# Patient Record
Sex: FEMALE | Race: WHITE | NOT HISPANIC OR LATINO | Employment: OTHER | URBAN - METROPOLITAN AREA
[De-identification: names, ages, dates, MRNs, and addresses within clinical notes are randomized per-mention and may not be internally consistent; named-entity substitution may affect disease eponyms.]

---

## 2017-01-12 ENCOUNTER — TRANSFERRED RECORDS (OUTPATIENT)
Dept: HEALTH INFORMATION MANAGEMENT | Facility: CLINIC | Age: 72
End: 2017-01-12

## 2017-01-12 LAB
ALT SERPL-CCNC: 16 U/L (ref 5–35)
AST SERPL-CCNC: 21 U/L (ref 5–34)
CREAT SERPL-MCNC: 0.62 MG/DL (ref 0.5–1.3)
GFR SERPL CREATININE-BSD FRML MDRD: 100.9 ML/MIN/1.73M2

## 2017-02-28 ENCOUNTER — OFFICE VISIT (OUTPATIENT)
Dept: FAMILY MEDICINE | Facility: CLINIC | Age: 72
End: 2017-02-28
Payer: COMMERCIAL

## 2017-02-28 VITALS
WEIGHT: 116 LBS | DIASTOLIC BLOOD PRESSURE: 67 MMHG | BODY MASS INDEX: 19.81 KG/M2 | OXYGEN SATURATION: 97 % | HEART RATE: 58 BPM | SYSTOLIC BLOOD PRESSURE: 100 MMHG | TEMPERATURE: 98.1 F | HEIGHT: 64 IN

## 2017-02-28 DIAGNOSIS — Z01.818 PREOP GENERAL PHYSICAL EXAM: Primary | ICD-10-CM

## 2017-02-28 DIAGNOSIS — I10 ESSENTIAL HYPERTENSION WITH GOAL BLOOD PRESSURE LESS THAN 140/90: ICD-10-CM

## 2017-02-28 DIAGNOSIS — Z23 NEED FOR VACCINATION WITH 13-POLYVALENT PNEUMOCOCCAL CONJUGATE VACCINE: ICD-10-CM

## 2017-02-28 DIAGNOSIS — Z23 NEED FOR PNEUMOCOCCAL VACCINE: ICD-10-CM

## 2017-02-28 LAB
ANION GAP SERPL CALCULATED.3IONS-SCNC: 10 MMOL/L (ref 3–14)
BASOPHILS # BLD AUTO: 0 10E9/L (ref 0–0.2)
BASOPHILS NFR BLD AUTO: 0.3 %
BUN SERPL-MCNC: 14 MG/DL (ref 7–30)
CALCIUM SERPL-MCNC: 9.6 MG/DL (ref 8.5–10.1)
CHLORIDE SERPL-SCNC: 103 MMOL/L (ref 94–109)
CO2 SERPL-SCNC: 23 MMOL/L (ref 20–32)
CREAT SERPL-MCNC: 0.7 MG/DL (ref 0.52–1.04)
DIFFERENTIAL METHOD BLD: ABNORMAL
EOSINOPHIL # BLD AUTO: 0.1 10E9/L (ref 0–0.7)
EOSINOPHIL NFR BLD AUTO: 0.8 %
ERYTHROCYTE [DISTWIDTH] IN BLOOD BY AUTOMATED COUNT: 15.8 % (ref 10–15)
GFR SERPL CREATININE-BSD FRML MDRD: 83 ML/MIN/1.7M2
GLUCOSE SERPL-MCNC: 101 MG/DL (ref 70–99)
HCT VFR BLD AUTO: 35.4 % (ref 35–47)
HGB BLD-MCNC: 11.5 G/DL (ref 11.7–15.7)
LYMPHOCYTES # BLD AUTO: 0.7 10E9/L (ref 0.8–5.3)
LYMPHOCYTES NFR BLD AUTO: 6.1 %
MCH RBC QN AUTO: 29 PG (ref 26.5–33)
MCHC RBC AUTO-ENTMCNC: 32.5 G/DL (ref 31.5–36.5)
MCV RBC AUTO: 89 FL (ref 78–100)
MONOCYTES # BLD AUTO: 0.6 10E9/L (ref 0–1.3)
MONOCYTES NFR BLD AUTO: 5.3 %
NEUTROPHILS # BLD AUTO: 9.4 10E9/L (ref 1.6–8.3)
NEUTROPHILS NFR BLD AUTO: 87.5 %
PLATELET # BLD AUTO: 308 10E9/L (ref 150–450)
POTASSIUM SERPL-SCNC: 4.5 MMOL/L (ref 3.4–5.3)
RBC # BLD AUTO: 3.96 10E12/L (ref 3.8–5.2)
SODIUM SERPL-SCNC: 136 MMOL/L (ref 133–144)
WBC # BLD AUTO: 10.7 10E9/L (ref 4–11)

## 2017-02-28 PROCEDURE — 90670 PCV13 VACCINE IM: CPT | Performed by: FAMILY MEDICINE

## 2017-02-28 PROCEDURE — 99214 OFFICE O/P EST MOD 30 MIN: CPT | Mod: 25 | Performed by: FAMILY MEDICINE

## 2017-02-28 PROCEDURE — 80048 BASIC METABOLIC PNL TOTAL CA: CPT | Performed by: FAMILY MEDICINE

## 2017-02-28 PROCEDURE — 36415 COLL VENOUS BLD VENIPUNCTURE: CPT | Performed by: FAMILY MEDICINE

## 2017-02-28 PROCEDURE — 85025 COMPLETE CBC W/AUTO DIFF WBC: CPT | Performed by: FAMILY MEDICINE

## 2017-02-28 PROCEDURE — G0009 ADMIN PNEUMOCOCCAL VACCINE: HCPCS | Performed by: FAMILY MEDICINE

## 2017-02-28 ASSESSMENT — PAIN SCALES - GENERAL: PAINLEVEL: NO PAIN (0)

## 2017-02-28 NOTE — MR AVS SNAPSHOT
After Visit Summary   2/28/2017    Sakshi Jaeger    MRN: [id number]           Patient Information     Date Of Birth          1945        Visit Information        Provider Department      2/28/2017 2:00 PM Javan Pepe MD [facility]        Today's Diagnoses     Preop general physical exam    -  1    Essential hypertension with goal blood pressure less than 140/90          Care Instructions      Before Your Surgery      Call your surgeon if there is any change in your health. This includes signs of a cold or flu (such as a sore throat, runny nose, cough, rash or fever).    Do not smoke, drink alcohol or take over the counter medicine (unless your surgeon or primary care doctor tells you to) for the 24 hours before and after surgery.    If you take prescribed drugs: Follow your doctor s orders about which medicines to take and which to stop until after surgery.    Eating and drinking prior to surgery: follow the instructions from your surgeon    Take a shower or bath the night before surgery. Use the soap your surgeon gave you to gently clean your skin. If you do not have soap from your surgeon, use your regular soap. Do not shave or scrub the surgery site.  Wear clean pajamas and have clean sheets on your bed.         Follow-ups after your visit        Who to contact     If you have questions or need follow up information about today's clinic visit or your schedule please contact [facility] directly at [phone].  Normal or non-critical lab and imaging results will be communicated to you by MyChart, letter or phone within 4 business days after the clinic has received the results. If you do not hear from us within 7 days, please contact the clinic through MyChart or phone. If you have a critical or abnormal lab result, we will notify you by phone as soon as possible.  Submit refill requests through [employer] or call your pharmacy and they will  "forward the refill request to us. Please allow 3 business days for your refill to be completed.          Additional Information About Your Visit        MyChart Information     [employer] lets you send messages to your doctor, view your test results, renew your prescriptions, schedule appointments and more. To sign up, go to [email or web address] . Click on \"Log in\" on the left side of the screen, which will take you to the Welcome page. Then click on \"Sign up Now\" on the right side of the page.     You will be asked to enter the access code listed below, as well as some personal information. Please follow the directions to create your username and password.     Your access code is: [email or web address]  Expires: 2017  3:27 PM     Your access code will  in 90 days. If you need help or a new code, please call your [place] clinic or [phone].        Care EveryWhere ID     This is your [facility] EveryWhere ID. This could be used by other organizations to access your [place] medical records  [id number]        Your Vitals Were     Pulse Temperature Height Pulse Oximetry Breastfeeding? BMI (Body Mass Index)    58 98.1  F (36.7  C) (Oral) 1.626 m (5' 4\") 97% No 19.91 kg/m2       Blood Pressure from Last 3 Encounters:   17 100/67   16 119/64   16 120/68    Weight from Last 3 Encounters:   17 52.6 kg (116 lb)   16 49 kg (108 lb)   16 49 kg (108 lb)              We Performed the Following     Basic metabolic panel     CBC with platelets differential        Primary Care Provider Office Phone # Fax #    Javan Pepe [phone]       [facility] [address]  [facility] [address]        Thank you!     Thank you for choosing [facility]  for your care. Our goal is always to provide you with excellent care. Hearing back from our patients is one way we can continue to improve our services. Please take a few minutes to " complete the written survey that you may receive in the mail after your visit with us. Thank you!             Your Updated Medication List - Protect others around you: Learn how to safely use, store and throw away your medicines at www.disposemymeds.org.          This list is accurate as of: 2/28/17  3:27 PM.  Always use your most recent med list.                   Brand Name Dispense Instructions for use    amLODIPine 5 MG tablet    NORVASC    90 tablet    Take 1 tablet (5 mg) by mouth daily       aspirin 81 MG tablet      Take 2 tablets by mouth daily.       CALCIUM 600 + D PO      2 daily       FLINTSTONES PLUS IRON PO      Take 1 tablet by mouth daily.       leflunomide 20 MG tablet    ARAVA     Take 20 mg by mouth daily.       lisinopril 20 MG tablet    PRINIVIL/ZESTRIL    90 tablet    Take 1 tablet (20 mg) by mouth daily       methotrexate 2.5 MG tablet CHEMO     8 tablet    Take 8 tablets by mouth once a week.       metoprolol 50 MG tablet    LOPRESSOR    180 tablet    Take 1 tablet (50 mg) by mouth 2 times daily       niacin 500 MG tablet     100 tablet    Take 1 tablet by mouth At Bedtime.       nitroglycerin 0.4 MG sublingual tablet    NITROSTAT     0.4 mg by Sublingual route every 5 (five) minutes as needed.       predniSONE 5 MG tablet    DELTASONE     1 TABLET DAILY       simvastatin 20 MG tablet    ZOCOR    90 tablet    Take 1 tablet (20 mg) by mouth At Bedtime APPT NEEDED FOR FURTHER REFILLS

## 2017-02-28 NOTE — Clinical Note
Dr. Jackson. She requires evaluation and anesthesia risk assessment prior to undergoing surgery/procedure for treatment of Right Knee Skin Graft . Proposed procedure: Right Knee Skin Graft   Date of Surgery/ Procedure: TBD Time of Surgery/ Procedure: TBD Hospital/Surgical Facility: [facility]

## 2017-02-28 NOTE — NURSING NOTE
"Chief Complaint   Patient presents with     Pre-Op Exam     [facility] - Skin Graft right knee       Initial /67  Pulse 58  Temp 98.1  F (36.7  C) (Oral)  Ht 5' 4\" (1.626 m)  Wt 116 lb (52.6 kg)  SpO2 97%  Breastfeeding? No  BMI 19.91 kg/m2 Estimated body mass index is 19.91 kg/(m^2) as calculated from the following:    Height as of this encounter: 5' 4\" (1.626 m).    Weight as of this encounter: 116 lb (52.6 kg).  Medication Reconciliation: complete   Ezra RODRÍGUEZ      "

## 2017-02-28 NOTE — LETTER
[facility]   [address] [place]  [address]  [phone]                                   March 8, 2017    Sakshi Jaeger  [address] [facility] [address]        Dear Sakshi,    Your metabolic profile including your kidney tests were essentially normal. Your hemoglobin appears slightly low, but is pretty stable.     Results for orders placed or performed in visit on 02/28/17   CBC with platelets differential   Result Value Ref Range    WBC 10.7 4.0 - 11.0 10e9/L    RBC Count 3.96 3.8 - 5.2 10e12/L    Hemoglobin 11.5 (L) 11.7 - 15.7 g/dL    Hematocrit 35.4 35.0 - 47.0 %    MCV 89 78 - 100 fl    MCH 29.0 26.5 - 33.0 pg    MCHC 32.5 31.5 - 36.5 g/dL    RDW 15.8 (H) 10.0 - 15.0 %    Platelet Count 308 150 - 450 10e9/L    Diff Method Automated Method     % Neutrophils 87.5 %    % Lymphocytes 6.1 %    % Monocytes 5.3 %    % Eosinophils 0.8 %    % Basophils 0.3 %    Absolute Neutrophil 9.4 (H) 1.6 - 8.3 10e9/L    Absolute Lymphocytes 0.7 (L) 0.8 - 5.3 10e9/L    Absolute Monocytes 0.6 0.0 - 1.3 10e9/L    Absolute Eosinophils 0.1 0.0 - 0.7 10e9/L    Absolute Basophils 0.0 0.0 - 0.2 10e9/L   Basic metabolic panel   Result Value Ref Range    Sodium 136 133 - 144 mmol/L    Potassium 4.5 3.4 - 5.3 mmol/L    Chloride 103 94 - 109 mmol/L    Carbon Dioxide 23 20 - 32 mmol/L    Anion Gap 10 3 - 14 mmol/L    Glucose 101 (H) 70 - 99 mg/dL    Urea Nitrogen 14 7 - 30 mg/dL    Creatinine 0.70 0.52 - 1.04 mg/dL    GFR Estimate 83 >60 mL/min/1.7m2    GFR Estimate If Black >90   GFR Calc   >60 mL/min/1.7m2    Calcium 9.6 8.5 - 10.1 mg/dL       If you have any questions please call the clinic at [phone]    Sincerely,    Javan Pepe MD  bmd

## 2017-02-28 NOTE — PROGRESS NOTES
[address] [address]  [phone]  Dept: [phone]    PRE-OP EVALUATION:  Today's date: 2017    Sakshi Jaeger (: 1945) presents for pre-operative evaluation assessment as requested by Dr. Jackson.  She requires evaluation and anesthesia risk assessment prior to undergoing surgery/procedure for treatment of Right Knee Skin Graft .  Proposed procedure: Right Knee Skin Graft    Date of Surgery/ Procedure: TBD  Time of Surgery/ Procedure: TBD  Hospital/Surgical Facility: [facility]  Fax number for surgical facility:   Primary Physician: Javan Pepe  Type of Anesthesia Anticipated: to be determined    Patient has a Health Care Directive or Living Will:  NO    1. YES - DO YOU HAVE A HISTORY OF HEART ATTACK, STROKE, STENT, BYPASS OR SURGERY ON AN ARTERY IN THE HEAD, NECK, HEART OR LEG? Years ago; no symptoms currently.   2. NO - Do you ever have any pain or discomfort in your chest?  3. NO - Do you have a history of  Heart Failure?  4. NO - Are you troubled by shortness of breath when: walking on the level, up a slight hill or at night?  5. NO - Do you currently have a cold, bronchitis or other respiratory infection?  6. NO - Do you have a cough, shortness of breath or wheezing?  7. NO - Do you sometimes get pains in the calves of your legs when you walk?  8. NO - Do you or anyone in your family have previous history of blood clots?  9. NO - Do you or does anyone in your family have a serious bleeding problem such as prolonged bleeding following surgeries or cuts?  10. NO - Have you ever had problems with anemia or been told to take iron pills?  11. NO - Have you had any abnormal blood loss such as black, tarry or bloody stools, or abnormal vaginal bleeding?  12. NO - Have you ever had a blood transfusion?  13. NO - Have you or any of your relatives ever had problems with anesthesia?  14. NO - Do you have sleep  apnea, excessive snoring or daytime drowsiness?  15. NO - Do you have any prosthetic heart valves?  16. YES - DO YOU HAVE PROSTHETIC JOINTS? Complete left knee   17. NO - Is there any chance that you may be pregnant?      HPI:                                                      Brief HPI related to upcoming procedure: patient needs a revision of her stump on her right knee   She is able to wear her prosthetic.  She still needs to be closed.           MEDICAL HISTORY:                                                      Patient Active Problem List    Diagnosis Date Noted     Coronary artery disease involving native coronary artery of native heart without angina pectoris 09/27/2016     Priority: Medium     Essential hypertension with goal blood pressure less than 140/90 08/25/2016     Priority: Medium     Septic prepatellar bursitis of right knee 03/04/2014     Priority: Medium     Status post below knee amputation (H) 12/26/2012     Priority: Medium     Problem list name updated by automated process. Provider to review       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     Priority: Medium     Advanced directives, counseling/discussion 11/25/2011     Priority: Medium     Advance Care Planning 9/30/2016: ACP Review of Chart / Resources Provided:  Reviewed chart for advance care plan.  Sakshi Jaeger has no plan or code status on file. Discussed available resources and provided with information. Confirmed code status reflects current choices pending further ACP discussions.  Confirmed/documented legally designated decision makers.    Added by Mishel Wallace        Discussed advance care planning with patient; information given to patient to review. Jennifer Jaeger, Clinical Medical Assistant   11/25/2011          Hyperlipidemia LDL goal <100 11/12/2010     Priority: Medium     Iron deficiency anemia 09/15/2005     Priority: Medium     Problem list name updated by automated process. Provider to review       Tobacco use disorder  02/16/2005     Priority: Medium     Osteoporosis 02/16/2005     Priority: Medium     Problem list name updated by automated process. Provider to review       Rheumatoid arthritis (H) 02/16/2005     Priority: Medium     Problem list name updated by automated process. Provider to review        Past Medical History   Diagnosis Date     BENIGN HYPERTENSION 3/1/2005     CAD (coronary artery disease) 1/26/2011     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     Hypertension goal BP (blood pressure) < 130/80 1/26/2011     IRON DEFIC ANEMIA NOS 9/15/2005     Lower limb amputation, below knee 12/26/2012     MI (myocardial infarction) (H)      3/2010     OSTEOPOROSIS NOS 2/16/2005     Rheumatoid arthritis(714.0) (H) 2/16/2005     Past Surgical History   Procedure Laterality Date     Surgical history of -        Appendectomy     Amputation below knee rt/lt  2005     right lowwer leg.      Coronary stent       Appendectomy       Arthroplasty knee  4/27/2011     Procedure:ARTHROPLASTY KNEE; Surgeon:JESSE HAWKINS; Location:[facility]     ---------incision and drainage  3/5/14     Current Outpatient Prescriptions   Medication Sig Dispense Refill     simvastatin (ZOCOR) 20 MG tablet Take 1 tablet (20 mg) by mouth At Bedtime APPT NEEDED FOR FURTHER REFILLS 90 tablet 1     lisinopril (PRINIVIL,ZESTRIL) 20 MG tablet Take 1 tablet (20 mg) by mouth daily 90 tablet 3     amLODIPine (NORVASC) 5 MG tablet Take 1 tablet (5 mg) by mouth daily 90 tablet 3     metoprolol (LOPRESSOR) 50 MG tablet Take 1 tablet (50 mg) by mouth 2 times daily 180 tablet 3     nitroglycerin (NITROSTAT) 0.4 MG SL tablet 0.4 mg by Sublingual route every 5 (five) minutes as needed.       niacin 500 MG tablet Take 1 tablet by mouth At Bedtime. 100 tablet 6     aspirin 81 MG tablet Take 2 tablets by mouth daily.  3     METHOTREXate 2.5 MG tablet Take 8 tablets by mouth once a week. 8 tablet 0     Pediatric Multivitamins-Iron (FLINTSTONES PLUS IRON PO) Take 1 tablet by mouth  daily.       leflunomide (ARAVA) 20 MG tablet Take 20 mg by mouth daily.       CALCIUM 600 + D OR 2 daily       PREDNISONE 5 MG OR TABS 1 TABLET DAILY  0     OTC products: Aspirin    Allergies   Allergen Reactions     Penicillins Hives      Latex Allergy: NO    Social History   Substance Use Topics     Smoking status: Former Smoker     Packs/day: 0.50     Years: 45.00     Types: Cigarettes     Quit date: 2/26/2010     Smokeless tobacco: Never Used     Alcohol use Yes      Comment: occsionally-3 -4 drinks a week     History   Drug Use No       REVIEW OF SYSTEMS:                                                    C: NEGATIVE for fever, chills, change in weight  I: NEGATIVE for worrisome rashes, moles or lesions  E: NEGATIVE for vision changes or irritation  E/M: NEGATIVE for ear, mouth and throat problems  R: NEGATIVE for significant cough or SOB  B: NEGATIVE for masses, tenderness or discharge  CV: NEGATIVE for chest pain, palpitations or peripheral edema  GI: NEGATIVE for nausea, abdominal pain, heartburn, or change in bowel habits  : NEGATIVE for frequency, dysuria, or hematuria  M: NEGATIVE for significant arthralgias or myalgia  N: NEGATIVE for weakness, dizziness or paresthesias  E: NEGATIVE for temperature intolerance, skin/hair changes  H: NEGATIVE for bleeding problems  P: NEGATIVE for changes in mood or affect    EXAM:                                                    There were no vitals taken for this visit.    GENERAL APPEARANCE: healthy, alert and no distress     EYES: EOMI, PERRL     HENT: ear canals and TM's normal and nose and mouth without ulcers or lesions     NECK: no adenopathy, no asymmetry, masses, or scars and thyroid normal to palpation     RESP: lungs clear to auscultation - no rales, rhonchi or wheezes     CV: regular rates and rhythm, normal S1 S2, no S3 or S4 and no murmur, click or rub     ABDOMEN:  soft, nontender, no HSM or masses and bowel sounds normal     MS: extremities normal-  no gross deformities noted, no evidence of inflammation in joints, FROM in all extremities.     SKIN: no suspicious lesions or rashes     NEURO: Normal strength and tone, sensory exam grossly normal, mentation intact and speech normal     PSYCH: mentation appears normal. and affect normal/bright     LYMPHATICS: No axillary, cervical, or supraclavicular nodes    DIAGNOSTICS:                                                    EKG: sinus bradycardia, normal axis, normal intervals, no acute ST/T changes c/w ischemia, no LVH by voltage criteria, unchanged from previous tracings    Recent Labs   Lab Test 01/12/17 09/27/16   1655  08/30/16   0850  06/08/16   1418   HGB   --   12.5   --   12.1   PLT   --   343   --   429   NA   --   135  138  137   POTASSIUM   --   4.9  3.7  4.4   CR  0.620  0.61  0.59  0.61        IMPRESSION:                                                    Reason for surgery/procedure: needs graft   Diagnosis/reason for consult: open sore on right leg stump    The proposed surgical procedure is considered INTERMEDIATE risk.    REVISED CARDIAC RISK INDEX  The patient has the following serious cardiovascular risks for perioperative complications such as (MI, PE, VFib and 3  AV Block):  No serious cardiac risks  INTERPRETATION: 0 risks: Class I (very low risk - 0.4% complication rate)    The patient has the following additional risks for perioperative complications:  No identified additional risks      ICD-10-CM    1. Preop general physical exam Z01.818 CBC with platelets differential     Basic metabolic panel   2. Essential hypertension with goal blood pressure less than 140/90 I10 CBC with platelets differential     Basic metabolic panel   3. Need for pneumococcal vaccine Z23    4. Need for vaccination with 13-polyvalent pneumococcal conjugate vaccine Z23 VACCINE ADMINISTRATION, INITIAL     PNEUMOCOCCAL CONJ VACCINE 13 VALENT IM       RECOMMENDATIONS:                                                         Patient will receive routine prevnar vaccination today     --Patient is to take all scheduled medications on the day of surgery EXCEPT for modifications listed below.    APPROVAL GIVEN to proceed with proposed procedure, without further diagnostic evaluation       Signed Electronically by: Javan Pepe MD    Copy of this evaluation report is provided to requesting physician.    [facility] Preop Guidelines

## 2017-03-08 NOTE — PROGRESS NOTES
Your metabolic profile including your kidney tests were essentially normal. Your hemoglobin appears slightly low, but is pretty stable.

## 2017-04-01 DIAGNOSIS — I10 HTN (HYPERTENSION): ICD-10-CM

## 2017-04-03 RX ORDER — METOPROLOL TARTRATE 50 MG
50 TABLET ORAL 2 TIMES DAILY
Qty: 180 TABLET | Refills: 3 | Status: SHIPPED | OUTPATIENT
Start: 2017-04-03 | End: 2018-07-15

## 2017-04-10 ENCOUNTER — OFFICE VISIT (OUTPATIENT)
Dept: FAMILY MEDICINE | Facility: CLINIC | Age: 72
End: 2017-04-10
Payer: COMMERCIAL

## 2017-04-10 VITALS
OXYGEN SATURATION: 98 % | TEMPERATURE: 98.2 F | WEIGHT: 110 LBS | HEIGHT: 65 IN | BODY MASS INDEX: 18.33 KG/M2 | DIASTOLIC BLOOD PRESSURE: 51 MMHG | SYSTOLIC BLOOD PRESSURE: 80 MMHG | HEART RATE: 108 BPM

## 2017-04-10 DIAGNOSIS — I95.89 OTHER SPECIFIED HYPOTENSION: ICD-10-CM

## 2017-04-10 DIAGNOSIS — R19.7 DIARRHEA OF PRESUMED INFECTIOUS ORIGIN: Primary | ICD-10-CM

## 2017-04-10 PROCEDURE — 99213 OFFICE O/P EST LOW 20 MIN: CPT | Performed by: FAMILY MEDICINE

## 2017-04-10 ASSESSMENT — PAIN SCALES - GENERAL: PAINLEVEL: MILD PAIN (2)

## 2017-04-10 NOTE — PROGRESS NOTES
SUBJECTIVE:                                                    Sakshi Jaeger is a 71 year old female who presents to clinic today for the following health issues:    Diarrhea     Onset: 4/7/17    Description:   Consistency of stool: loose but not liquid, sometimes more yellow than brown  Blood in stool: no   Number of loose stools in past 24 hours: 10    Progression of Symptoms:  worsening    Accompanying Signs & Symptoms:  Fever: no   Nausea or vomiting; no   Abdominal pain: YES  Episodes of constipation: no   Weight loss: no    History:   Ill contacts: no   Recent use of antibiotics:Yes - March   Recent travels: YES- February she was in [place]         Recent medication-new or changes(Rx or OTC): no     Precipitating factors:   Eating    Alleviating factors:   None       Therapies Tried and outcome:  Imodium AD; Outcome: no relief    Patient presents for worsening diarrhea. She initially started having symptoms on Friday night, but she was able to tolerate PO without pain or immediate BMs until yesterday; since then she has had no appetite and the frequency of the diarrhea has increased, as has the cramping.     Of note, she had C. Diff in the past after antibiotics, but states that she developed those symptoms more closely to the time she was on antibiotics. It's been about 3-4 weeks since she's taken them.     She also states that she did not take any BP meds this morning.     Problem list and histories reviewed & adjusted, as indicated.  Additional history: as documented    Patient Active Problem List   Diagnosis     Osteoporosis     Rheumatoid arthritis (H)     Iron deficiency anemia     Hyperlipidemia LDL goal <100     Advanced directives, counseling/discussion     Status post below knee amputation (H)     Employs prosthetic leg     Essential hypertension with goal blood pressure less than 140/90     Coronary artery disease involving native coronary artery of native heart without angina pectoris     Past  "Surgical History:   Procedure Laterality Date     ---------INCISION AND DRAINAGE  3/5/14     AMPUTATION BELOW KNEE RT/LT  2005    right lowwer leg.      APPENDECTOMY       ARTHROPLASTY KNEE  4/27/2011    Procedure:ARTHROPLASTY KNEE; Surgeon:JESSE HAWKINS; Location:[facility]     coronary stent       SURGICAL HISTORY OF -       Appendectomy       Social History   Substance Use Topics     Smoking status: Former Smoker     Packs/day: 0.50     Years: 45.00     Types: Cigarettes     Quit date: 2/26/2010     Smokeless tobacco: Never Used     Alcohol use Yes      Comment: occsionally-3 -4 drinks a week     Family History   Problem Relation Age of Onset     Cardiovascular Mother      CANCER Brother      DIABETES No family hx of      Hypertension No family hx of      CEREBROVASCULAR DISEASE No family hx of      Alzheimer Disease No family hx of      Thyroid Disease No family hx of      Respiratory No family hx of            Reviewed and updated as needed this visit by clinical staff       Reviewed and updated as needed this visit by Provider         ROS:  Constitutional, HEENT, cardiovascular, pulmonary, gi and gu systems are negative, except as otherwise noted.    OBJECTIVE:                                                    BP (!) 80/51 (BP Location: Right arm, Patient Position: Chair, Cuff Size: Adult Regular)  Pulse 108  Temp 98.2  F (36.8  C) (Oral)  Ht 5' 5\" (1.651 m)  Wt 110 lb (49.9 kg)  SpO2 98%  BMI 18.3 kg/m2  Body mass index is 18.3 kg/(m^2).  GENERAL: alert, no distress and seated in wheelchair  NECK: no adenopathy, no asymmetry, masses, or scars and thyroid normal to palpation  RESP: lungs clear to auscultation - no rales, rhonchi or wheezes  CV: tachycardia (HR palpated to 102 bpm), normal S1 S2, no S3 or S4, no murmur, click or rub, no peripheral edema and peripheral pulses strong  ABDOMEN: soft, diffusely tender, hyperactive bowel sounds, eccymosis noted on RLQ  MS: FARHAN SOOD, no edema    MD BP recheck " on manual cuff: 90/54       ASSESSMENT/PLAN:                                                        ICD-10-CM    1. Diarrhea of presumed infectious origin A09    2. Other specified hypotension I95.89      Given patient's hypotension and tachycardia, I instructed her to seek care in the ED for IV fluids and further workup. I am still concerned that she may have C diff or another infectious colitis given her recent antibiotic use.     Her partner will transport her to the ED at [facility].     See Patient Instructions    Lauren A. Engelmann, MD  [facility]

## 2017-04-10 NOTE — PATIENT INSTRUCTIONS
Please go to the ER for further evaluation of your diarrhea. Give the note from me to the triage nurse.

## 2017-04-10 NOTE — NURSING NOTE
"Chief Complaint   Patient presents with     Diarrhea       Initial BP (!) 80/51 (BP Location: Right arm, Patient Position: Chair, Cuff Size: Adult Regular)  Pulse 108  Temp 98.2  F (36.8  C) (Oral)  Ht 5' 5\" (1.651 m)  Wt 110 lb (49.9 kg)  SpO2 98%  BMI 18.3 kg/m2 Estimated body mass index is 18.3 kg/(m^2) as calculated from the following:    Height as of this encounter: 5' 5\" (1.651 m).    Weight as of this encounter: 110 lb (49.9 kg).  Medication Reconciliation: complete   Neva De Guzman MA      "

## 2017-04-10 NOTE — MR AVS SNAPSHOT
"              After Visit Summary   4/10/2017    Sakshi Jaeger    MRN: [id number]           Patient Information     Date Of Birth          1945        Visit Information        Provider Department      4/10/2017 1:40 PM Engelmann, Lauren Anneliese, MD [facility]        Today's Diagnoses     Diarrhea of presumed infectious origin    -  1    Other specified hypotension          Care Instructions    Please go to the ER for further evaluation of your diarrhea. Give the note from me to the triage nurse.        Follow-ups after your visit        Who to contact     If you have questions or need follow up information about today's clinic visit or your schedule please contact [facility] directly at [phone].  Normal or non-critical lab and imaging results will be communicated to you by MyChart, letter or phone within 4 business days after the clinic has received the results. If you do not hear from us within 7 days, please contact the clinic through MyChart or phone. If you have a critical or abnormal lab result, we will notify you by phone as soon as possible.  Submit refill requests through [employer] or call your pharmacy and they will forward the refill request to us. Please allow 3 business days for your refill to be completed.          Additional Information About Your Visit        MyChart Information     [employer] lets you send messages to your doctor, view your test results, renew your prescriptions, schedule appointments and more. To sign up, go to [email or web address] . Click on \"Log in\" on the left side of the screen, which will take you to the Welcome page. Then click on \"Sign up Now\" on the right side of the page.     You will be asked to enter the access code listed below, as well as some personal information. Please follow the directions to create your username and password.     Your access code is: [email or web address]  Expires: 5/29/2017  4:27 PM     Your access " "code will  in 90 days. If you need help or a new code, please call your [place] clinic or [phone].        Care EveryWhere ID     This is your Care EveryWhere ID. This could be used by other organizations to access your [place] medical records  [id number]        Your Vitals Were     Pulse Temperature Height Pulse Oximetry BMI (Body Mass Index)       108 98.2  F (36.8  C) (Oral) 5' 5\" (1.651 m) 98% 18.3 kg/m2        Blood Pressure from Last 3 Encounters:   04/10/17 (!) 80/51   17 100/67   16 119/64    Weight from Last 3 Encounters:   04/10/17 110 lb (49.9 kg)   17 116 lb (52.6 kg)   16 108 lb (49 kg)              Today, you had the following     No orders found for display       Primary Care Provider Office Phone # Fax #    Javan Pepe [phone]       [facility] [address] [facility] [address]        Thank you!     Thank you for choosing [facility]  for your care. Our goal is always to provide you with excellent care. Hearing back from our patients is one way we can continue to improve our services. Please take a few minutes to complete the written survey that you may receive in the mail after your visit with us. Thank you!             Your Updated Medication List - Protect others around you: Learn how to safely use, store and throw away your medicines at www.disposemymeds.org.          This list is accurate as of: 4/10/17  2:12 PM.  Always use your most recent med list.                   Brand Name Dispense Instructions for use    amLODIPine 5 MG tablet    NORVASC    90 tablet    Take 1 tablet (5 mg) by mouth daily       aspirin 81 MG tablet      Take 2 tablets by mouth daily.       CALCIUM 600 + D PO      2 daily       FLINTSTONES PLUS IRON PO      Take 1 tablet by mouth daily.       leflunomide 20 MG tablet    ARAVA     Take 20 mg by mouth daily.       lisinopril 20 MG tablet    PRINIVIL/ZESTRIL "    90 tablet    Take 1 tablet (20 mg) by mouth daily       methotrexate 2.5 MG tablet CHEMO     8 tablet    Take 8 tablets by mouth once a week.       metoprolol 50 MG tablet    LOPRESSOR    180 tablet    Take 1 tablet (50 mg) by mouth 2 times daily       niacin 500 MG tablet     100 tablet    Take 1 tablet by mouth At Bedtime.       nitroglycerin 0.4 MG sublingual tablet    NITROSTAT     0.4 mg by Sublingual route every 5 (five) minutes as needed.       predniSONE 5 MG tablet    DELTASONE     1 TABLET DAILY       simvastatin 20 MG tablet    ZOCOR    90 tablet    Take 1 tablet (20 mg) by mouth At Bedtime APPT NEEDED FOR FURTHER REFILLS

## 2017-04-12 ENCOUNTER — TELEPHONE (OUTPATIENT)
Dept: FAMILY MEDICINE | Facility: CLINIC | Age: 72
End: 2017-04-12

## 2017-04-12 ENCOUNTER — DOCUMENTATION ONLY (OUTPATIENT)
Dept: LAB | Facility: CLINIC | Age: 72
End: 2017-04-12

## 2017-04-12 DIAGNOSIS — A04.72 COLITIS DUE TO CLOSTRIDIUM DIFFICILE: Primary | ICD-10-CM

## 2017-04-12 DIAGNOSIS — R19.7 DIARRHEA OF PRESUMED INFECTIOUS ORIGIN: Primary | ICD-10-CM

## 2017-04-12 DIAGNOSIS — R19.7 DIARRHEA OF PRESUMED INFECTIOUS ORIGIN: ICD-10-CM

## 2017-04-12 LAB
C DIFF TOX B STL QL: ABNORMAL
SPECIMEN SOURCE: ABNORMAL

## 2017-04-12 PROCEDURE — 87493 C DIFF AMPLIFIED PROBE: CPT | Performed by: FAMILY MEDICINE

## 2017-04-12 NOTE — TELEPHONE ENCOUNTER
Patient dropped off stool sample without orders. Reviewed patient's chart and she was told by the ED provider at [facility] to bring in an outpatient stool sample for C diff since she was unable to produce one there.     Will order stool culture, O+P, and C diff.     Lauren Engelmann, MD

## 2017-04-12 NOTE — PROGRESS NOTES
Your patient Sakshi Jaeger brought in a stool sample today 4/11/2017. She was seen at the [facility] Emergency room on 4/10/2017 and they told her to bring the stool sample to  because you are her primary care provider. Unfortunately the patient did not have orders with them. So we have a stool sample if you would like to order anything on Sakshi.

## 2017-04-13 RX ORDER — METRONIDAZOLE 500 MG/1
500 TABLET ORAL 3 TIMES DAILY
Qty: 42 TABLET | Refills: 0 | Status: SHIPPED | OUTPATIENT
Start: 2017-04-13 | End: 2017-04-27

## 2017-04-13 NOTE — PROGRESS NOTES
Positive for C diff again. She will need treatment. I will send an rx to the pharmacy on file. I attempted to call patient and left a voice mail to return our call. When she calls back, please communicate these results to her. Thank you!    Lauren Engelmann, MD

## 2017-04-14 NOTE — TELEPHONE ENCOUNTER
Notified patient of the message below per provider.  Patient stated understanding and agreeable with the plan of care. Tiffanie Carmona RN CPC Triage.

## 2017-04-14 NOTE — TELEPHONE ENCOUNTER
Called patient at [phone] (home).  No answer or voicemail.  Will try again later.  Tiffanie Carmona RN CPC Triage.

## 2017-04-14 NOTE — TELEPHONE ENCOUNTER
Reason for Call:  Other     Detailed comments: Patient is calling back / returning call from nurse/ Please call patient back.    Phone Number Patient can be reached at: Home number on file [phone] (home)    Best Time: any    Can we leave a detailed message on this number? YES/ patient only has an answering machine so if she is on the phone you will not be able to leave a message- then you will have to call back.    Call taken on 4/14/2017 at 11:17 AM by Snea Johnson

## 2017-04-14 NOTE — TELEPHONE ENCOUNTER
Notes Recorded by Engelmann, Lauren Anneliese, MD on 4/13/2017 at 12:47 PM  Positive for C diff again. She will need treatment. I will send an rx to the pharmacy on file. I attempted to call patient and left a voice mail to return our call. When she calls back, please communicate these results to her. Thank you!    Lauren Engelmann, MD    Called patient at [phone] (home). No answer or voicemail.  Will try back.  Tiffanie Carmona RN CPC Triage.

## 2017-04-27 ENCOUNTER — TELEPHONE (OUTPATIENT)
Dept: FAMILY MEDICINE | Facility: CLINIC | Age: 72
End: 2017-04-27

## 2017-04-27 NOTE — LETTER
June 1, 2017    Sakshi Jaeger  [address] [address]      Dear Sakshi Jaeger,     We have tried to contact you about your health, but have been unable to reach you.  Please call us as soon as possible so we can provide you with the best care possible.  We will continue to check in with you throughout the year to complete these items of care, if you are not able to complete these items at this time.  If you would like to complete the missing items for your care, please contact us at [phone].    We recommend the following:  -schedule a MAMMOGRAM 1 in 8 women will develop invasive breast cancer during her lifetime and it is the most common non-skin cancer in American women.  EARLY detection, new treatments, and a better understanding of the disease have increased survival rates - the 5 year survival rate in the 1960s was 63% and today it is close to 90% .  Please disregard this reminder if you have had this exam elsewhere within the last year.  It would be helpful for us to have a copy of your mammogram report in our file so that we can best coordinate your care - please contact us with when your test was done so we can update your record. Please call [phone] to schedule your mammogram today.     Sincerely,     Your Care Team at [place]

## 2017-04-27 NOTE — LETTER
April 27, 2017    Sakshi Jaeger  [address] [address]    Dear Sakshi    We care about your health and have reviewed your health plan. We have reviewed your medical conditions, medication list, and lab results and are making recommendations based on this review, to better manage your health.    You are in particular need of attention regarding:  - Scheduling a Breast Cancer Screening (Mammography) [phone]      Here is a list of Health Maintenance topics that are due now or due soon:  Health Maintenance Due   Topic Date Due     DEXA Q3 YR  10/04/2013     MAMMO SCREEN Q2 YR (SYSTEM ASSIGNED)  02/19/2016     FALL RISK ASSESSMENT  09/01/2016     We will be calling you in the next couple of weeks to help you schedule any appointments that are needed.  Please call us at [phone] (or use [employer]) to address the above recommendations.     Thank you for trusting [facility] and we appreciate the opportunity to serve you.  We look forward to supporting your healthcare needs in the future.    Healthy Regards,    Dr. Pepe

## 2017-04-27 NOTE — TELEPHONE ENCOUNTER
Left message to call clinic.  Panel Management Review  Left message to call clinic.    Final letteer mailed.    Patient has the following on her problem list:       IVD   ASA: Passed    Last LDL:    Lab Results   Component Value Date    CHOL 143 06/08/2016     Lab Results   Component Value Date    HDL 41 06/08/2016     Lab Results   Component Value Date    LDL 75 06/08/2016     Lab Results   Component Value Date    TRIG 135 06/08/2016        Lab Results   Component Value Date    CHOLHDLRATIO 3.0 08/27/2014        Is the patient on a Statin? NO   Is the patient on Aspirin? YES                  Medications     HMG CoA Reductase Inhibitors    simvastatin (ZOCOR) 20 MG tablet    Salicylates    aspirin 81 MG tablet          Last three blood pressure readings:  BP Readings from Last 3 Encounters:   04/10/17 (!) 80/51   02/28/17 100/67   07/22/16 119/64        Tobacco History:     History   Smoking Status     Former Smoker     Packs/day: 0.50     Years: 45.00     Types: Cigarettes     Quit date: 2/26/2010   Smokeless Tobacco     Never Used       Hypertension   Last three blood pressure readings:  BP Readings from Last 3 Encounters:   04/10/17 (!) 80/51   02/28/17 100/67   07/22/16 119/64     Blood pressure: Passed    HTN Guidelines:  Age 18-59 BP range:  Less than 140/90  Age 60-85 with Diabetes:  Less than 140/90  Age 60-85 without Diabetes:  less than 150/90      Composite cancer screening  Chart review shows that this patient is due/due soon for the following Mammogram  Summary:    Patient is due/failing the following:   MAMMOGRAM    Action needed:   mammogram    Type of outreach:    Sent letter.    Questions for provider review:    None                                                                                                                                    Shital Sotelo MA       Chart routed to Care Team .

## 2017-05-11 ENCOUNTER — TRANSFERRED RECORDS (OUTPATIENT)
Dept: HEALTH INFORMATION MANAGEMENT | Facility: CLINIC | Age: 72
End: 2017-05-11

## 2017-05-11 LAB
ALT SERPL-CCNC: 13 IU/L (ref 5–35)
AST SERPL-CCNC: 21 U/L (ref 5–34)
CREAT SERPL-MCNC: 0.7 MG/DL (ref 0.5–1.3)
GFR SERPL CREATININE-BSD FRML MDRD: 87.7 ML/MIN/1.73M2

## 2017-05-16 ENCOUNTER — OFFICE VISIT (OUTPATIENT)
Dept: FAMILY MEDICINE | Facility: CLINIC | Age: 72
End: 2017-05-16
Payer: COMMERCIAL

## 2017-05-16 VITALS
OXYGEN SATURATION: 97 % | TEMPERATURE: 98.2 F | WEIGHT: 114 LBS | BODY MASS INDEX: 18.97 KG/M2 | HEART RATE: 64 BPM | SYSTOLIC BLOOD PRESSURE: 101 MMHG | DIASTOLIC BLOOD PRESSURE: 61 MMHG

## 2017-05-16 DIAGNOSIS — M46.1 SACROILIITIS (H): ICD-10-CM

## 2017-05-16 DIAGNOSIS — H61.22 IMPACTED CERUMEN OF LEFT EAR: Primary | ICD-10-CM

## 2017-05-16 DIAGNOSIS — A04.72 C. DIFFICILE COLITIS: ICD-10-CM

## 2017-05-16 PROCEDURE — 69210 REMOVE IMPACTED EAR WAX UNI: CPT | Mod: LT | Performed by: FAMILY MEDICINE

## 2017-05-16 PROCEDURE — 99213 OFFICE O/P EST LOW 20 MIN: CPT | Mod: 25 | Performed by: FAMILY MEDICINE

## 2017-05-16 NOTE — NURSING NOTE
"Chief Complaint   Patient presents with     Ear Problem     Left ear feels plugged       Initial /61  Pulse 64  Temp 98.2  F (36.8  C) (Oral)  Wt 114 lb (51.7 kg)  SpO2 97%  Breastfeeding? No  BMI 18.97 kg/m2 Estimated body mass index is 18.97 kg/(m^2) as calculated from the following:    Height as of 4/10/17: 5' 5\" (1.651 m).    Weight as of this encounter: 114 lb (51.7 kg).  Medication Reconciliation: complete   Ezra RODRÍGUEZ      "

## 2017-05-16 NOTE — PROGRESS NOTES
SUBJECTIVE:                                                    Sakshi Jaeger is a 71 year old female who presents to clinic today for the following health issues:    Left ear feels Plugged     History of c diff with severe diarrhea   This has resolved and she wanted to be tested   Since she no longer had watery diarrhea she no longer can be tested for this and since she completed therapy I told her there probably no reason to repeat this     Problem list and histories reviewed & adjusted, as indicated.      Patient tried to have the ear flushed and this did not work for her   She requested I remove the wax     O: /61  Pulse 64  Temp 98.2  F (36.8  C) (Oral)  Wt 114 lb (51.7 kg)  SpO2 97%  Breastfeeding? No  BMI 18.97 kg/m2    Left ear has wax in it   After irrigation the plug came out further in the canal and I was manually able to remove about 1/2 of the wax   Encouraged to use debrox if needed to remove the rest of the wax     She continues to have pain in the SI joint   She has had this for a while and it does not go down the leg     Tenderness over SI joint           ICD-10-CM    1. Impacted cerumen of left ear H61.22 REMOVE IMPACTED CERUMEN   2. C. difficile colitis A04.7 Clostridium difficile toxin B PCR   3. Sacroiliitis (H) M46.1 PAIN MANAGEMENT CENTER ([place]) REFERRAL     CANCELED: PAIN INJECTION EVAL/TREAT/FOLLOW UP

## 2017-05-16 NOTE — MR AVS SNAPSHOT
After Visit Summary   5/16/2017    Sakshi Jaeger    MRN: [id number]           Patient Information     Date Of Birth          1945        Visit Information        Provider Department      5/16/2017 3:20 PM Javan Pepe MD [facility]        Today's Diagnoses     C. difficile colitis    -  1    Sacroiliitis (H)           Follow-ups after your visit        Additional Services     PAIN MANAGEMENT CENTER ([place]) REFERRAL       Your provider has referred you to the [facility].    Reason for Referral: Procedure or injection only - patient will be contacted within 24 hrs to schedule: Joint Injection: SI Joint    Please complete the following questions:    What is your diagnosis for the patient's pain? sacroilietis     Do you have any specific questions for the pain specialist? No    Are there any red flags that may impact the assessment or management of the patient? None    **ANY DIAGNOSTIC TESTS THAT ARE NOT IN EPIC SHOULD BE SENT TO THE PAIN CENTER**    Please note the Pre-Op Pain Consult must be scheduled 2-3 weeks prior to the patient's surgery.  Patient's trying to schedule within 2 weeks of surgery may not be accommodated.     Pre-Op Pain Consults are only good for 30 days.    REGARDING OPIOID MEDICATIONS:  We will always address appropriateness of opioid pain medications, but we generally will not automatically take on a prescribing role. When we do take on prescribing of opioids for chronic pain, it is in collaboration with the referring physician for an intermediate period of time (months), with an expectation that the primary physician or provider will assume the prescribing role if medications are effective at stable doses with demonstrated compliance.  Therefore, please do not assume that your prescribing responsibilities end on the day of pain clinic consultation.  Is this agreeable to you? YES    For any questions, contact the [facility] at [phone].    Please be aware that coverage of these services is subject to the terms and limitations of your health insurance plan.  Call member services at your health plan with any benefit or coverage questions.      Please bring the following with you to your appointment:    (1) Any X-Rays, CTs or MRIs which have been performed.  Contact the facility where they were done to arrange for  prior to your scheduled appointment.    (2) List of current medications   (3) This referral request   (4) Any documents/labs given to you for this referral                  Future tests that were ordered for you today     Open Future Orders        Priority Expected Expires Ordered    Clostridium difficile toxin B PCR Routine  6/16/2017 5/16/2017            Who to contact     If you have questions or need follow up information about today's clinic visit or your schedule please contact [facility] directly at [phone].  Normal or non-critical lab and imaging results will be communicated to you by MyChart, letter or phone within 4 business days after the clinic has received the results. If you do not hear from us within 7 days, please contact the clinic through MyChart or phone. If you have a critical or abnormal lab result, we will notify you by phone as soon as possible.  Submit refill requests through [employer] or call your pharmacy and they will forward the refill request to us. Please allow 3 business days for your refill to be completed.          Additional Information About Your Visit        [employer] Information     [employer] lets you send messages to your doctor, view your test results, renew your prescriptions, schedule appointments and more. To sign up, go to [email or web address] . Click on \"Log in\" on the left side of the screen, which will take you to the Welcome page. Then click on \"Sign up Now\" on the right side of the page.     You will be asked to enter " the access code listed below, as well as some personal information. Please follow the directions to create your username and password.     Your access code is: [email or web address]  Expires: 2017  4:27 PM     Your access code will  in 90 days. If you need help or a new code, please call your [place] clinic or [phone].        Care EveryWhere ID     This is your Care EveryWhere ID. This could be used by other organizations to access your [place] medical records  [id number]        Your Vitals Were     Pulse Temperature Pulse Oximetry Breastfeeding? BMI (Body Mass Index)       64 98.2  F (36.8  C) (Oral) 97% No 18.97 kg/m2        Blood Pressure from Last 3 Encounters:   17 101/61   04/10/17 (!) 80/51   17 100/67    Weight from Last 3 Encounters:   17 114 lb (51.7 kg)   04/10/17 110 lb (49.9 kg)   17 116 lb (52.6 kg)              We Performed the Following     PAIN MANAGEMENT CENTER ([place]) REFERRAL        Primary Care Provider Office Phone # Fax #    Javan Pepe [phone]       [facility] [address] [facility] [address]        Thank you!     Thank you for choosing [facility]  for your care. Our goal is always to provide you with excellent care. Hearing back from our patients is one way we can continue to improve our services. Please take a few minutes to complete the written survey that you may receive in the mail after your visit with us. Thank you!             Your Updated Medication List - Protect others around you: Learn how to safely use, store and throw away your medicines at www.disposemymeds.org.          This list is accurate as of: 17  4:38 PM.  Always use your most recent med list.                   Brand Name Dispense Instructions for use    amLODIPine 5 MG tablet    NORVASC    90 tablet    Take 1 tablet (5 mg) by mouth daily       aspirin 81 MG tablet      Take 2 tablets by mouth  daily.       CALCIUM 600 + D PO      2 daily       FLINTSTONES PLUS IRON PO      Take 1 tablet by mouth daily.       leflunomide 20 MG tablet    ARAVA     Take 20 mg by mouth daily.       lisinopril 20 MG tablet    PRINIVIL/ZESTRIL    90 tablet    Take 1 tablet (20 mg) by mouth daily       methotrexate 2.5 MG tablet CHEMO     8 tablet    Take 8 tablets by mouth once a week.       metoprolol 50 MG tablet    LOPRESSOR    180 tablet    Take 1 tablet (50 mg) by mouth 2 times daily       niacin 500 MG tablet     100 tablet    Take 1 tablet by mouth At Bedtime.       nitroglycerin 0.4 MG sublingual tablet    NITROSTAT     0.4 mg by Sublingual route every 5 (five) minutes as needed.       predniSONE 5 MG tablet    DELTASONE     1 TABLET DAILY       simvastatin 20 MG tablet    ZOCOR    90 tablet    Take 1 tablet (20 mg) by mouth At Bedtime APPT NEEDED FOR FURTHER REFILLS

## 2017-05-17 ENCOUNTER — TELEPHONE (OUTPATIENT)
Dept: PALLIATIVE MEDICINE | Facility: CLINIC | Age: 72
End: 2017-05-17

## 2017-05-18 NOTE — TELEPHONE ENCOUNTER
Pre-screening Questions for Radiology Injections:    Injection to be done at which interventional clinic site? [facility]    Procedure ordered by Javan Pepe MD    Procedure ordered?  SI Joint Injection    What insurance would patient like us to bill for this procedure? BCBS, Medicare      Worker's comp-Any injection DO NOT SCHEDULE and route to Rashida Laguna.      [employer] insurance - For SI joint injections, DO NOT SCHEDULE and route Rashida Laguna.      [employer]- MBB's must be scheduled at LEAST two weeks apart      [employer] - Any injection besides hip/shoulder/knee joint DO NOT SCHEDULE and route to Rashida Laguna. She will obtain PA and call pt back to schedule procedure or notify pt of denial.     HP [employer]-PA REQUIRED FOR NON-PAGE OR Joint injections    Any chance of pregnancy? NO   If YES, do NOT schedule and route to RN pool    Is an  needed? No     Patient has a drive home? (mandatory) YES: INFORMED    Is patient taking any blood thinners (plavix, coumadin, jantoven, warfarin, heparin, pradaxa or dabigatran )? No   If hold needed, do NOT schedule, route to RN pool     Is patient taking any aspirin products? Yes - Pt takes 81mg daily; instructed to hold 0 day(s) prior to procedure.      If more than 325mg/day do NOT schedule; route to RN pool     For CERVICAL procedures, hold all aspirin products for 6 days.      Does the patient have a bleeding or clotting disorder? No     If yes, okay to schedule AND route to RN nurse pool    **For any patients with platelet count <100, must be forwarded to provider**    Is patient diabetic?  No  If YES, have them bring their glucometer.    Does patient have an active infection or treated for one within the past week? No     Is patient currently taking any antibiotics?  No     For patients on chronic, preventative, or prophylactic antibiotics, procedures may be scheduled.     For patients on antibiotics for active or  recent infection:    Josey Flood [place], [place]-antibiotic course must have been completed for 4 days    Joaquín Lutz-antibiotic course must have been completed for 7 days    Is patient currently taking any steroid medications? (i.e. Prednisone, Medrol)  Yes - 5 MG PREDNISONE     For patients on steroid medications:    Josey Flood [place], [place]-steroid course must have been completed for 4 days    Joaquín Lutz-steroid course must have been completed for 7 days    Reviewed with patient:  If you are started on any steroids or antibiotics between now and your appointment, you must contact us because it may affect our ability to perform your procedure.  Yes    Is patient actively being treated for cancer or immunocompromised, including the spleen having been removed? No    If YES, do NOT schedule and route to RN pool     **For Dr. Cortez patients without spleens should have the chart sent to her**    Are you able to get on and off an exam table with minimal or no assistance? Yes  If NO, do NOT schedule and route to RN pool    Are you able to roll over and lay on your stomach with minimal or no assistance? Yes  If NO, do NOT schedule and route to RN pool     Any allergies to contrast dye, iodine, shellfish, or numbing and steroid medications? No  If YES, route to RN pool AND add allergy information to appointment notes    Allergies: Penicillins        Has the patient had a flu shot or any other vaccinations within 7 days before or after the procedure.  No       Does patient have an MRI/CT?  Not Applicable  (SI joint, hip injections, lumbar sympathetic blocks, and stellate ganglion blocks do not require an MRI)    Was the MRI done w/in the last 3 years?  NA    Was MRI done at [facility]? No      If not, where was it done? N/A       If MRI was not done at [facility], [facility] do NOT schedule and route to nursing.  If pt has an imaging disc, the injection may be scheduled  but pt has to bring disc to appt. If they show up w/out disc the injection cannot be done    Reminders (please tell patient if applicable):       Instructed pt to arrive 30 minutes early for IV start if this is for a cervical procedure, ALL sympathetic (stellate ganglion, hypogastric, or lumbar sympathetic block) and all sedation procedures (RFA, spinal cord stimulation trials).  Not Applicable    -IVs are not routinely placed for [place] and [place] cervical case       If NPO for sedation, informed patient that it is okay to take medications with sips of water (except if they are to hold blood thinners).  Not Applicable   *DO take blood pressure medication if it is prescribed*      If this is for a cervical PAGE, informed patient that aspirin needs to be held for 6 days.   Not Applicable      For all patients not having spinal cord stimulator (SCS) trials or radiofrequency ablations (RFAs), informed patient:  IV sedation is not provided for this procedure.  If you feel that an oral anti-anxiety medication is needed, you can discuss this further with your referring provider or primary care provider.  The Pain Clinic provider will discuss specifics of what the procedure includes at your appointment.  Most procedures last 10-20 minutes.  We use numbing medications to help with any discomfort during the procedure.  Not Applicable      Do not schedule procedures requiring IV placement in the first appointment of the day or first appointment after lunch. REVIEWED      For patients 85 or older we recommend having an adult stay w/ them for the remainder of the day.   REVIEWED    Does the patient have any questions?  NO  Carina Samuel  [facility]

## 2017-06-05 ENCOUNTER — TELEPHONE (OUTPATIENT)
Dept: FAMILY MEDICINE | Facility: CLINIC | Age: 72
End: 2017-06-05

## 2017-06-05 NOTE — TELEPHONE ENCOUNTER
Reason for Call:  Other - Forms    Detailed comments: Shweta from [employer] called to check on the status of a 7 page form she faxed over on 5/30/17. She is going to fax the form over again. It requires two signatures. Please call if this is not received.    Phone Number Patient can be reached at: [employer]: [phone]    Best Time: Anytime    Can we leave a detailed message on this number? YES    Call taken on 6/5/2017 at 3:25 PM by Jenny Irene

## 2017-06-06 ENCOUNTER — TELEPHONE (OUTPATIENT)
Dept: FAMILY MEDICINE | Facility: CLINIC | Age: 72
End: 2017-06-06

## 2017-06-09 ENCOUNTER — MEDICAL CORRESPONDENCE (OUTPATIENT)
Dept: HEALTH INFORMATION MANAGEMENT | Facility: CLINIC | Age: 72
End: 2017-06-09

## 2017-06-14 ENCOUNTER — RADIOLOGY INJECTION OFFICE VISIT (OUTPATIENT)
Dept: PALLIATIVE MEDICINE | Facility: CLINIC | Age: 72
End: 2017-06-14
Payer: COMMERCIAL

## 2017-06-14 ENCOUNTER — RADIANT APPOINTMENT (OUTPATIENT)
Dept: RADIOLOGY | Facility: CLINIC | Age: 72
End: 2017-06-14
Attending: PSYCHIATRY & NEUROLOGY

## 2017-06-14 VITALS — DIASTOLIC BLOOD PRESSURE: 82 MMHG | OXYGEN SATURATION: 96 % | SYSTOLIC BLOOD PRESSURE: 123 MMHG | HEART RATE: 57 BPM

## 2017-06-14 DIAGNOSIS — M53.3 SI (SACROILIAC) JOINT DYSFUNCTION: ICD-10-CM

## 2017-06-14 DIAGNOSIS — M53.3 SI (SACROILIAC) JOINT DYSFUNCTION: Primary | ICD-10-CM

## 2017-06-14 PROCEDURE — 27096 INJECT SACROILIAC JOINT: CPT | Mod: LT | Performed by: PSYCHIATRY & NEUROLOGY

## 2017-06-14 ASSESSMENT — PAIN SCALES - GENERAL
PAINLEVEL: NO PAIN (0)
PAINLEVEL: NO PAIN (0)

## 2017-06-14 NOTE — PATIENT INSTRUCTIONS
[facility]   Procedure Discharge Instructions    Today you saw:  Dr. Rosaura Patton      You had an: sacro-iliac joint injection      Medications used:  Lidocaine  Omnipaque  Ropivicaine   Kenalog             Be cautious when walking. Numbness and/or weakness in the lower extremities may occur up to 6-8 hours after the procedure due to effect of the local anesthetic    Do not drive for 6 hours. The effect of the local anesthetic could slow your reflexes.     You may resume your regular activities after 24 hours    Avoid strenuous activity for the first 24 hours    You may shower, however avoid swimming, tub baths or hot tubs for 24 hours following your procedure    You may have a mild to moderate increase in pain for several days following the injection.    It may take up to 14 days for the steroid medication to start working although you may feel the effect as early as a few days after the procedure.       You may use ice packs for 10-15 minutes, 3 to 4 times a day at the injection site for comfort    Do not use heat to painful areas for 6 to 8 hours. This will give the local anesthetic time to wear off and prevent you from accidentally burning your skin.     You may use anti-inflammatory medications (such as Ibuprofen or Aleve or Advil) or Tylenol for pain control if necessary    If you experience any of the following, call the pain center nursing line during work hours at [phone] or the after hours provider line at [phone]:  -Fever over 100 degree F  -Swelling, bleeding, redness, drainage, warmth at the injection site  -Progressive weakness or numbness in your legs  -Loss of bowel or bladder function  -Unusual new onset of pain that is not improving      Phone #s:  Appointment line: [phone];  Nurse line: [phone]

## 2017-06-14 NOTE — PROGRESS NOTES
Pre procedure Diagnosis: SI joint dysfunction    Post procedure Diagnosis: Same  Procedure performed: Left SI joint injection  Anesthesia: None  Complications: None  Operators: Bibi Patton MD, and Sebastian Ch MD (pain fellow)    Indications:   Sakshi Jaeger is a 71 year old female was sent by Dr. Pepe for SI joint injection.  They have a history of chronic, left-sided low back and buttock pain.  Exam shows tenderness to palpation over the left PSIS, and they have tried conservative treatment including PT and medications.    Options/alternatives, benefits and risks were discussed with the patient including bleeding, infection, tissue trauma, exposure to radiation, reaction to medications including seizure, nerve injury, weakness, and numbness.  Questions were answered to her satisfaction and she agrees to proceed. Voluntary informed consent was obtained and signed.     Vitals were reviewed: Yes  Allergies were reviewed:  Yes   Medications were reviewed:  Yes   Pre-procedure pain score: 0/10    Procedure:  After getting informed consent, patient was brought into the procedure suite and was placed in a prone position on the procedure table.   A Pause for the Cause was performed.  Patient was prepped and draped in sterile fashion.     After identifying the left SI joint, the C-arm was rotated to a obliquely to obtain the best view of the inferior angle of the joint.  A total of 1 ml of Lidocaine 1%  was used to anesthetize the skin at a skin entry site coaxial with the fluoroscopy beam at this location.  A 22gauge 3.5 inch needle was advanced under intermittent fluoroscopy until it was felt to enter the SI joint.    A total of 1ml of Omnipaque-300 was injected, confirming appropriate position, with spread into the intraarticular space, with no intravascular uptake noted.  9ml was wasted. Location was verified in lateral view.    1.5ml of 0.2% ropivacaine with 40mg of kenalog was injected.  The needle was  flushed with lidocaine and removed.     Hemostasis was achieved, the area was cleaned, and bandaids were placed when appropriate.  The patient tolerated the procedure well, and was taken to the recovery room.    Images were saved to PACS.    Post-procedure pain score: 0/10  Follow-up includes:   -f/u phone call in one week  -f/u with referring provider    Bibi Patton MD  [facility]

## 2017-06-14 NOTE — MR AVS SNAPSHOT
After Visit Summary   6/14/2017    Sakshi Jaeger    MRN: [id number]           Patient Information     Date Of Birth          1945        Visit Information        Provider Department      6/14/2017 10:45 AM Rosaura Patton MD [facility]        Care Instructions    [facility]   Procedure Discharge Instructions    Today you saw:  Dr. Rosaura Patton      You had an: sacro-iliac joint injection      Medications used:  Lidocaine  Omnipaque  Ropivicaine   Kenalog             Be cautious when walking. Numbness and/or weakness in the lower extremities may occur up to 6-8 hours after the procedure due to effect of the local anesthetic    Do not drive for 6 hours. The effect of the local anesthetic could slow your reflexes.     You may resume your regular activities after 24 hours    Avoid strenuous activity for the first 24 hours    You may shower, however avoid swimming, tub baths or hot tubs for 24 hours following your procedure    You may have a mild to moderate increase in pain for several days following the injection.    It may take up to 14 days for the steroid medication to start working although you may feel the effect as early as a few days after the procedure.       You may use ice packs for 10-15 minutes, 3 to 4 times a day at the injection site for comfort    Do not use heat to painful areas for 6 to 8 hours. This will give the local anesthetic time to wear off and prevent you from accidentally burning your skin.     You may use anti-inflammatory medications (such as Ibuprofen or Aleve or Advil) or Tylenol for pain control if necessary    If you experience any of the following, call the pain center nursing line during work hours at [phone] or the after hours provider line at [phone]:  -Fever over 100 degree F  -Swelling, bleeding, redness, drainage, warmth at the injection site  -Progressive weakness or numbness in your legs  -Loss of bowel  "or bladder function  -Unusual new onset of pain that is not improving      Phone #s:  Appointment line: [phone];  Nurse line: [phone]              Follow-ups after your visit        Who to contact     If you have questions or need follow up information about today's clinic visit or your schedule please contact [facility] directly at [phone].  Normal or non-critical lab and imaging results will be communicated to you by MyChart, letter or phone within 4 business days after the clinic has received the results. If you do not hear from us within 7 days, please contact the clinic through [employer] or phone. If you have a critical or abnormal lab result, we will notify you by phone as soon as possible.  Submit refill requests through [employer] or call your pharmacy and they will forward the refill request to us. Please allow 3 business days for your refill to be completed.          Additional Information About Your Visit        [employer] Information     [employer] lets you send messages to your doctor, view your test results, renew your prescriptions, schedule appointments and more. To sign up, go to [email or web address] . Click on \"Log in\" on the left side of the screen, which will take you to the Welcome page. Then click on \"Sign up Now\" on the right side of the page.     You will be asked to enter the access code listed below, as well as some personal information. Please follow the directions to create your username and password.     Your access code is: [email or web address]  Expires: 2017 11:21 AM     Your access code will  in 90 days. If you need help or a new code, please call your [facility] clinic or [phone].        Care EveryWhere ID     This is your Care EveryWhere ID. This could be used by other organizations to access your [facility] medical records  [id number]        Your Vitals Were     Pulse Pulse Oximetry                57 96%           Blood Pressure from Last 3 Encounters: "   06/14/17 123/82   05/16/17 101/61   04/10/17 (!) 80/51    Weight from Last 3 Encounters:   05/16/17 51.7 kg (114 lb)   04/10/17 49.9 kg (110 lb)   02/28/17 52.6 kg (116 lb)              Today, you had the following     No orders found for display       Primary Care Provider Office Phone # Fax #    Javan Pepe [phone]       [facility] [address]  [facility] [address]        Thank you!     Thank you for choosing [facility]  for your care. Our goal is always to provide you with excellent care. Hearing back from our patients is one way we can continue to improve our services. Please take a few minutes to complete the written survey that you may receive in the mail after your visit with us. Thank you!             Your Updated Medication List - Protect others around you: Learn how to safely use, store and throw away your medicines at www.disposemymeds.org.          This list is accurate as of: 6/14/17 11:21 AM.  Always use your most recent med list.                   Brand Name Dispense Instructions for use    amLODIPine 5 MG tablet    NORVASC    90 tablet    Take 1 tablet (5 mg) by mouth daily       aspirin 81 MG tablet      Take 2 tablets by mouth daily.       CALCIUM 600 + D PO      2 daily       FLINTSTONES PLUS IRON PO      Take 1 tablet by mouth daily.       leflunomide 20 MG tablet    ARAVA     Take 20 mg by mouth daily.       lisinopril 20 MG tablet    PRINIVIL/ZESTRIL    90 tablet    Take 1 tablet (20 mg) by mouth daily       methotrexate 2.5 MG tablet CHEMO     8 tablet    Take 8 tablets by mouth once a week.       metoprolol 50 MG tablet    LOPRESSOR    180 tablet    Take 1 tablet (50 mg) by mouth 2 times daily       niacin 500 MG tablet     100 tablet    Take 1 tablet by mouth At Bedtime.       nitroglycerin 0.4 MG sublingual tablet    NITROSTAT     0.4 mg by Sublingual route every 5 (five) minutes as needed.       predniSONE 5 MG tablet     DELTASONE     1 TABLET DAILY       simvastatin 20 MG tablet    ZOCOR    90 tablet    Take 1 tablet (20 mg) by mouth At Bedtime APPT NEEDED FOR FURTHER REFILLS

## 2017-06-14 NOTE — NURSING NOTE
"    Chief Complaint   Patient presents with     Pain       Initial /68  Pulse 60 Estimated body mass index is 18.97 kg/(m^2) as calculated from the following:    Height as of 4/10/17: 1.651 m (5' 5\").    Weight as of 5/16/17: 51.7 kg (114 lb).  Medication Reconciliation: complete       Injection intake:    If this procedure is requiring IV sedation has patient been NPO for 6  Hours? NA    Is patient on coumadin, plavix or other prescribed blood thinner?   No    If patient is on coumadin was it held for 5 days?   NA    If patient is on plavix was it held for 7 days?    NA     Does patient take aspirin?  Yes -   ASA - 81mg    If this is for a cervical procedure and patient is on aspirin has it been held for 6 days?   NA    Any allergies to contrast dye, iodine, steroid and/or numbing medications?  NO    Is patient currently taking antibiotics or have an active infection?  NO    Does patient have a ? Yes       Is patient pregnant or breastfeeding?  NO    Are the vital signs normal?  Yes    Criselda Tamayo CMA (AAMA)        "

## 2017-06-14 NOTE — NURSING NOTE
Discharge Information    IV Discontiued Time:  NA    Amount of Fluid Infused:  NA    Discharge Criteria = When patient returns to baseline or as per MD order    Consciousness:  Pt is fully awake    Circulation:  BP +/- 20% of pre-procedure level    Respiration:  Patient is able to breathe deeply    O2 Sat:  Patient is able to maintain O2 Sat >92% on room air    Activity:  Moves 4 extremities on command    Ambulation:  Patient is able to stand and walk or stand and pivot into wheelchair    Dressing:  Clean/dry or No Dressing    Notes:   Discharge instructions and AVS given to patient    Patient meets criteria for discharge?  YES    Admitted to PCU?  No    Responsible adult present to accompany patient home?  Yes    Signature/Title:    Mary Sherwood RN Care Coordinator  [facility]

## 2017-06-21 ENCOUNTER — TELEPHONE (OUTPATIENT)
Dept: RADIOLOGY | Facility: CLINIC | Age: 72
End: 2017-06-21

## 2017-06-21 NOTE — TELEPHONE ENCOUNTER
Patient had a Left SI joint injection on 6/14/17.  Called patient for an update.      Pt reported the following details:  She has had significant improvement in her pain. She still has some pain but nothing compared to before. She is very happy with the results.  Told patient that the information will be forwarded to her provider.  Also explained that, if a steroid medication was used, it could take up to 14 days to feel the full effect and if pt has any further questions or concerns pt should call the nurse line at [phone].

## 2017-08-15 ENCOUNTER — TELEPHONE (OUTPATIENT)
Dept: FAMILY MEDICINE | Facility: CLINIC | Age: 72
End: 2017-08-15

## 2017-08-15 NOTE — TELEPHONE ENCOUNTER
Forms received from: [employer]    Phone number listed: [phone] Fax listed: [phone]  Date received: 08/15/2016  Form description:   Once forms are completed, please return to [employer] via fax.  Form placed: in providers folder  Georgina Ibrahim

## 2017-08-16 DIAGNOSIS — I25.10 CORONARY ARTERY DISEASE INVOLVING NATIVE CORONARY ARTERY OF NATIVE HEART WITHOUT ANGINA PECTORIS: Primary | ICD-10-CM

## 2017-08-16 NOTE — LETTER
[facility]  [address]  [address] [facility] [address] [phone]          August 22, 2017    Sakshi Jaeger                                                                                                                     [address] [address]            Dear Sakshi,    We have tried to contact you, but have not been able to connect with you.    We were calling to let you know that we received a refill request for a medication.    We were able to send in a supply to your pharmacy, but the provider noted that you will need to be seen for a follow up in order to continue the medication.    Please call us at [phone] if you have any questions or to schedule an appointment.    Thank you        Sincerely,         Javan Pepe MD

## 2017-08-16 NOTE — TELEPHONE ENCOUNTER
simvastatin (ZOCOR) 20 MG tablet     Last Written Prescription Date: 11/17/16  Last Fill Quantity: 90, # refills: 1  Last Office Visit with G, [facility] or [facility] prescribing provider: 5/16/17       Lab Results   Component Value Date    CHOL 143 06/08/2016     Lab Results   Component Value Date    HDL 41 06/08/2016     Lab Results   Component Value Date    LDL 75 06/08/2016     Lab Results   Component Value Date    TRIG 135 06/08/2016     Lab Results   Component Value Date    CHOLHDLRATIO 3.0 08/27/2014

## 2017-08-17 RX ORDER — SIMVASTATIN 20 MG
TABLET ORAL
Qty: 90 TABLET | Refills: 0 | Status: SHIPPED | OUTPATIENT
Start: 2017-08-17 | End: 2017-12-04

## 2017-08-17 NOTE — TELEPHONE ENCOUNTER
Routing refill request to provider for review/approval because:  Labs not current  Tiffanie Carmona RN [facility] Triage.

## 2017-08-21 NOTE — TELEPHONE ENCOUNTER
Left message to contact clinic and when contacted please schedule a fasting office visit. BRIGIDA Moore

## 2017-08-23 ENCOUNTER — TELEPHONE (OUTPATIENT)
Dept: FAMILY MEDICINE | Facility: CLINIC | Age: 72
End: 2017-08-23

## 2017-08-23 NOTE — LETTER
August 23, 2017    Sakshi Jaeger  [address] [address]    Dear Sakshi    We care about your health and have reviewed your health plan. We have reviewed your medical conditions, medication list, and lab results and are making recommendations based on this review, to better manage your health.    You are in particular need of attention regarding:  - Scheduling a Breast Cancer Screening (Mammography) [phone]  - Completing a Colon Cancer Screening (FIT) - Please complete FIT test *** and mail completed test to clinic.      Here is a list of Health Maintenance topics that are due now or due soon:  Health Maintenance Due   Topic Date Due     DEXA Q3 YR  10/04/2013     MAMMO SCREEN Q2 YR (SYSTEM ASSIGNED)  02/19/2016     FALL RISK ASSESSMENT  09/01/2016     FIT Q1 YR  08/08/2017     We will be calling you in the next couple of weeks to help you schedule any appointments that are needed.  Please call us at [phone] (or use [employer]) to address the above recommendations.     Thank you for trusting [facility] and we appreciate the opportunity to serve you.  We look forward to supporting your healthcare needs in the future.    Healthy Regards,    Dr. Pepe

## 2017-08-23 NOTE — TELEPHONE ENCOUNTER
Panel Management Review      Patient has the following on her problem list:       IVD   ASA: Passed    Last LDL:    Lab Results   Component Value Date    CHOL 143 06/08/2016     Lab Results   Component Value Date    HDL 41 06/08/2016     Lab Results   Component Value Date    LDL 75 06/08/2016     Lab Results   Component Value Date    TRIG 135 06/08/2016        Lab Results   Component Value Date    CHOLHDLRATIO 3.0 08/27/2014        Is the patient on a Statin? YES   Is the patient on Aspirin? YES                  Medications     HMG CoA Reductase Inhibitors    simvastatin (ZOCOR) 20 MG tablet    Salicylates    aspirin 81 MG tablet          Last three blood pressure readings:  BP Readings from Last 3 Encounters:   06/14/17 123/82   05/16/17 101/61   04/10/17 (!) 80/51        Tobacco History:     History   Smoking Status     Former Smoker     Packs/day: 0.50     Years: 45.00     Types: Cigarettes     Quit date: 2/26/2010   Smokeless Tobacco     Never Used           Composite cancer screening  Chart review shows that this patient is due/due soon for the following Mammogram and Fecal Colorectal (FIT)  Summary:    Patient is due/failing the following:   FIT and MAMMOGRAM    Action needed:   Fit and mammogram     Type of outreach:    Sent letter.    Questions for provider review:    None                                                                                                                                    Shital Sotelo MA       Chart routed to Care Team .

## 2017-09-14 ENCOUNTER — TRANSFERRED RECORDS (OUTPATIENT)
Dept: HEALTH INFORMATION MANAGEMENT | Facility: CLINIC | Age: 72
End: 2017-09-14

## 2017-09-28 DIAGNOSIS — I10 ESSENTIAL HYPERTENSION WITH GOAL BLOOD PRESSURE LESS THAN 140/90: ICD-10-CM

## 2017-09-28 RX ORDER — AMLODIPINE BESYLATE 5 MG/1
TABLET ORAL
Qty: 90 TABLET | Refills: 3 | Status: SHIPPED | OUTPATIENT
Start: 2017-09-28 | End: 2018-09-11

## 2017-09-28 RX ORDER — LISINOPRIL 20 MG/1
TABLET ORAL
Qty: 90 TABLET | Refills: 3 | Status: SHIPPED | OUTPATIENT
Start: 2017-09-28 | End: 2018-09-11

## 2017-09-28 NOTE — TELEPHONE ENCOUNTER
amlodipine      Last Written Prescription Date: 9/30/16  Last Fill Quantity: 90, # refills: 3  Last Office Visit with [facility], [facility] or [facility] prescribing provider: 5/16/17       Potassium   Date Value Ref Range Status   02/28/2017 4.5 3.4 - 5.3 mmol/L Final     Creatinine   Date Value Ref Range Status   05/11/2017 0.700 0.500 - 1.300 mg/dL Final     BP Readings from Last 3 Encounters:   06/14/17 123/82   05/16/17 101/61   04/10/17 (!) 80/51     lisinopril      Last Written Prescription Date: 9/30/16  Last Fill Quantity: 90, # refills: 3  Last Office Visit with [facility], [facility] or [facility] prescribing provider: 5/16/17       Potassium   Date Value Ref Range Status   02/28/2017 4.5 3.4 - 5.3 mmol/L Final     Creatinine   Date Value Ref Range Status   05/11/2017 0.700 0.500 - 1.300 mg/dL Final     BP Readings from Last 3 Encounters:   06/14/17 123/82   05/16/17 101/61   04/10/17 (!) 80/51   Prescription approved per [facility] Refill Protocol.  Nalini Lundberg RN  [facility]

## 2017-12-04 ENCOUNTER — OFFICE VISIT (OUTPATIENT)
Dept: FAMILY MEDICINE | Facility: CLINIC | Age: 72
End: 2017-12-04
Payer: COMMERCIAL

## 2017-12-04 ENCOUNTER — RADIANT APPOINTMENT (OUTPATIENT)
Dept: MAMMOGRAPHY | Facility: CLINIC | Age: 72
End: 2017-12-04
Payer: COMMERCIAL

## 2017-12-04 VITALS
TEMPERATURE: 97.9 F | WEIGHT: 114 LBS | OXYGEN SATURATION: 97 % | HEART RATE: 66 BPM | BODY MASS INDEX: 18.97 KG/M2 | DIASTOLIC BLOOD PRESSURE: 65 MMHG | SYSTOLIC BLOOD PRESSURE: 117 MMHG

## 2017-12-04 DIAGNOSIS — E78.5 HYPERLIPIDEMIA LDL GOAL <100: ICD-10-CM

## 2017-12-04 DIAGNOSIS — Z12.31 VISIT FOR SCREENING MAMMOGRAM: ICD-10-CM

## 2017-12-04 DIAGNOSIS — Z12.11 SPECIAL SCREENING FOR MALIGNANT NEOPLASMS, COLON: ICD-10-CM

## 2017-12-04 DIAGNOSIS — M53.3 SACROILIAC JOINT PAIN: ICD-10-CM

## 2017-12-04 DIAGNOSIS — I25.10 CORONARY ARTERY DISEASE INVOLVING NATIVE CORONARY ARTERY OF NATIVE HEART WITHOUT ANGINA PECTORIS: Primary | ICD-10-CM

## 2017-12-04 PROCEDURE — 99214 OFFICE O/P EST MOD 30 MIN: CPT | Performed by: FAMILY MEDICINE

## 2017-12-04 PROCEDURE — G0202 SCR MAMMO BI INCL CAD: HCPCS | Mod: TC

## 2017-12-04 RX ORDER — SIMVASTATIN 20 MG
20 TABLET ORAL AT BEDTIME
Qty: 90 TABLET | Refills: 3 | Status: SHIPPED | OUTPATIENT
Start: 2017-12-04 | End: 2018-12-14

## 2017-12-04 ASSESSMENT — PAIN SCALES - GENERAL: PAINLEVEL: NO PAIN (0)

## 2017-12-04 NOTE — PROGRESS NOTES
SUBJECTIVE:   Sakshi Jaeger is a 72 year old female who presents to clinic today for the following health issues:    Hypertension Follow-up      Outpatient blood pressures are not being checked.    Low Salt Diet: no added salt    Amount of exercise or physical activity: None    Problems taking medications regularly: No    Medication side effects: none    Diet: low salt    Hyperlipidemia Follow-Up      Rate your low fat/cholesterol diet?: good    Taking statin?  Yes, no muscle aches from statin    Other lipid medications/supplements?:  Niacin    Vascular Disease Follow-up:  Coronary Artery Disease (CAD)      Chest pain or pressure, left side neck or arm pain: No    Shortness of breath/increased sweats/nausea with exertion: No    Pain in calves walking 1-2 blocks: No    Worsened or new symptoms since last visit: No    Nitroglycerin use: no    Daily aspirin use: Yes    Problem list and histories reviewed & adjusted, as indicated.  Additional history: as documented    Patient Active Problem List   Diagnosis     Osteoporosis     Rheumatoid arthritis (H)     Iron deficiency anemia     Hyperlipidemia LDL goal <100     Advanced directives, counseling/discussion     Status post below knee amputation (H)     Employs prosthetic leg     Essential hypertension with goal blood pressure less than 140/90     Coronary artery disease involving native coronary artery of native heart without angina pectoris     Past Surgical History:   Procedure Laterality Date     ---------INCISION AND DRAINAGE  3/5/14     AMPUTATION BELOW KNEE RT/LT  2005    right lowwer leg.      APPENDECTOMY       ARTHROPLASTY KNEE  4/27/2011    Procedure:ARTHROPLASTY KNEE; Surgeon:JESSE HAWKINS; Location:[facility] OR     coronary stent       SURGICAL HISTORY OF -       Appendectomy       Social History   Substance Use Topics     Smoking status: Former Smoker     Packs/day: 0.50     Years: 45.00     Types: Cigarettes     Quit date: 2/26/2010     Smokeless  tobacco: Never Used     Alcohol use Yes      Comment: occsionally-3 -4 drinks a week     Family History   Problem Relation Age of Onset     Cardiovascular Mother      CANCER Brother      DIABETES No family hx of      Hypertension No family hx of      CEREBROVASCULAR DISEASE No family hx of      Alzheimer Disease No family hx of      Thyroid Disease No family hx of      Respiratory No family hx of          Current Outpatient Prescriptions   Medication Sig Dispense Refill     simvastatin (ZOCOR) 20 MG tablet Take 1 tablet (20 mg) by mouth At Bedtime 90 tablet 3     lisinopril (PRINIVIL/ZESTRIL) 20 MG tablet TAKE 1 TABLET (20 MG) BY MOUTH DAILY 90 tablet 3     amLODIPine (NORVASC) 5 MG tablet TAKE 1 TABLET (5 MG) BY MOUTH DAILY 90 tablet 3     metoprolol (LOPRESSOR) 50 MG tablet Take 1 tablet (50 mg) by mouth 2 times daily 180 tablet 3     nitroglycerin (NITROSTAT) 0.4 MG SL tablet 0.4 mg by Sublingual route every 5 (five) minutes as needed.       niacin 500 MG tablet Take 1 tablet by mouth At Bedtime. 100 tablet 6     aspirin 81 MG tablet Take 2 tablets by mouth daily.  3     METHOTREXate 2.5 MG tablet Take 8 tablets by mouth once a week. 8 tablet 0     Pediatric Multivitamins-Iron (FLINTSTONES PLUS IRON PO) Take 1 tablet by mouth daily.       leflunomide (ARAVA) 20 MG tablet Take 20 mg by mouth daily.       CALCIUM 600 + D OR 2 daily       PREDNISONE 5 MG OR TABS 1 TABLET DAILY  0         Reviewed and updated as needed this visit by clinical staffTobacco  Allergies  Meds  Med Hx  Surg Hx  Fam Hx  Soc Hx      Reviewed and updated as needed this visit by Provider         ROS:  Constitutional, HEENT, cardiovascular, pulmonary, gi and gu systems are negative, except as otherwise noted.      OBJECTIVE:   /65 (BP Location: Right arm, Patient Position: Chair, Cuff Size: Adult Small)  Pulse 66  Temp 97.9  F (36.6  C) (Oral)  Wt 114 lb (51.7 kg)  SpO2 97%  BMI 18.97 kg/m2  Body mass index is 18.97  kg/(m^2).  GENERAL: healthy, alert and no distress  NECK: no adenopathy, no asymmetry, masses, or scars and thyroid normal to palpation  RESP: lungs clear to auscultation - no rales, rhonchi or wheezes  CV: regular rate and rhythm, normal S1 S2, no S3 or S4, no murmur, click or rub, no peripheral edema and peripheral pulses strong  ABDOMEN: soft, nontender, no hepatosplenomegaly, no masses and bowel sounds normal  MS: prosthesis due to BKA present on right    Diagnostic Test Results:  none     ASSESSMENT/PLAN:       ICD-10-CM    1. Coronary artery disease involving native coronary artery of native heart without angina pectoris I25.10 simvastatin (ZOCOR) 20 MG tablet   2. Special screening for malignant neoplasms, colon Z12.11 Fecal colorectal cancer screen (FIT)   3. Hyperlipidemia LDL goal <100 E78.5 simvastatin (ZOCOR) 20 MG tablet   4. Sacroiliac joint pain M53.3 PAIN MANAGEMENT REFERRAL     Refilled statin, had HTN meds at pharmacy.   FIT test ordered.   Ordered referral for SI joint injections via [facility].     See Patient Instructions    Lauren A. Engelmann, MD  [facility]

## 2017-12-04 NOTE — MR AVS SNAPSHOT
After Visit Summary   12/4/2017    Sakshi Jaeger    MRN: [id number]           Patient Information     Date Of Birth          1945        Visit Information        Provider Department      12/4/2017 1:00 PM Engelmann, Lauren Anneliese, MD [facility]        Today's Diagnoses     Coronary artery disease involving native coronary artery of native heart without angina pectoris    -  1    Special screening for malignant neoplasms, colon        Hyperlipidemia LDL goal <100        Sacroiliac joint pain           Follow-ups after your visit        Additional Services     PAIN MANAGEMENT REFERRAL       Your provider has referred you to: [facility]: [facility] -    Reason for Referral: Procedure or injection only - patient will be contacted within 24 hrs to schedule: Injection to be determined by Pain Management Specialist    Please complete the following questions:    What is your diagnosis for the patient's pain? SI Joint dysfunction    Do you have any specific questions for the pain specialist? No    Are there any red flags that may impact the assessment or management of the patient? None    For any questions, contact the [facility] at [phone].     **ANY DIAGNOSTIC TESTS THAT ARE NOT IN EPIC SHOULD BE SENT TO THE PAIN CENTER**    REGARDING OPIOID MEDICATIONS:  We will always address appropriateness of opioid pain medications, but we generally will not automatically take on a prescribing role. When we do take on prescribing of opioids for chronic pain, it is in collaboration with the referring physician for an intermediate period of time (months), with an expectation that the primary physician or provider will assume the prescribing role if medications are effective at stable doses with demonstrated compliance.  Therefore, please do not assume that your prescribing responsibilities end on the day of pain clinic consultation.  Is this  agreeable to you? YES    Please be aware that coverage of these services is subject to the terms and limitations of your health insurance plan.  Call member services at your health plan with any benefit or coverage questions.      Please bring the following with you to your appointment:    (1) Any X-Rays, CTs or MRIs which have been performed.  Contact the facility where they were done to arrange for  prior to your scheduled appointment.    (2) List of current medications   (3) This referral request   (4) Any documents/labs given to you for this referral                  Your next 10 appointments already scheduled     Dec 04, 2017  2:00 PM CST   MA SCREENING DIGITAL BILATERAL with [facility]   [facility]    [address]  [facility] [id number] [phone]           Do not use any powder, lotion or deodorant under your arms or on your breast. If you do, we will ask you to remove it before your exam.  Wear comfortable, two-piece clothing.  If you have any allergies, tell your care team.  Bring any previous mammograms from other facilities or have them mailed to the breast center.              Future tests that were ordered for you today     Open Future Orders        Priority Expected Expires Ordered    Fecal colorectal cancer screen (FIT) Routine 12/25/2017 2/26/2018 12/4/2017            Who to contact     If you have questions or need follow up information about today's clinic visit or your schedule please contact [facility] directly at [phone].  Normal or non-critical lab and imaging results will be communicated to you by MyChart, letter or phone within 4 business days after the clinic has received the results. If you do not hear from us within 7 days, please contact the clinic through MyChart or phone. If you have a critical or abnormal lab result, we will notify you by phone as soon as possible.  Submit  "refill requests through "[employer]" or call your pharmacy and they will forward the refill request to us. Please allow 3 business days for your refill to be completed.          Additional Information About Your Visit        [facility] [employer] Information     "[employer]" lets you send messages to your doctor, view your test results, renew your prescriptions, schedule appointments and more. To sign up, go to [email or web address]" . Click on \"Log in\" on the left side of the screen, which will take you to the Welcome page. Then click on \"Sign up Now\" on the right side of the page.     You will be asked to enter the access code listed below, as well as some personal information. Please follow the directions to create your username and password.     Your access code is: [email or web address]  Expires: 3/4/2018  1:52 PM     Your access code will  in 90 days. If you need help or a new code, please call your [place] clinic or [phone].        Care EveryWhere ID     This is your Care EveryWhere ID. This could be used by other organizations to access your [place] medical records  [id number]        Your Vitals Were     Pulse Temperature Pulse Oximetry BMI (Body Mass Index)          66 97.9  F (36.6  C) (Oral) 97% 18.97 kg/m2         Blood Pressure from Last 3 Encounters:   17 117/65   17 123/82   17 101/61    Weight from Last 3 Encounters:   17 114 lb (51.7 kg)   17 114 lb (51.7 kg)   04/10/17 110 lb (49.9 kg)              We Performed the Following     PAIN MANAGEMENT REFERRAL          Today's Medication Changes          These changes are accurate as of: 17  1:52 PM.  If you have any questions, ask your nurse or doctor.               These medicines have changed or have updated prescriptions.        Dose/Directions    simvastatin 20 MG tablet   Commonly known as:  ZOCOR   This may have changed:  See the new instructions.   Used for:  Coronary artery disease involving native coronary artery of native " heart without angina pectoris, Hyperlipidemia LDL goal <100   Changed by:  Engelmann, Lauren Anneliese, MD        Dose:  20 mg   Take 1 tablet (20 mg) by mouth At Bedtime   Quantity:  90 tablet   Refills:  3            Where to get your medicines      These medications were sent to [facility] [address] [facility] [employer] - [place], [place] - [address]  [address] [facility] [address]     Phone:  [phone]     simvastatin 20 MG tablet                Primary Care Provider Office Phone # Fax #    Javan Pepe [phone]       [address] [facility] [address]        Equal Access to Services     [facility]: Hadii aad ku hadasho Soomaali, waaxda luqadaha, qaybta kaalmada adeegyada, waxvirginia phillips . So [facility] [phone].    ATENCIÓN: Si habla español, tiene a smith disposición servicios gratuitos de asistencia lingüística. Llame al [phone].    We comply with applicable federal civil rights laws and [place] laws. We do not discriminate on the basis of race, color, national origin, age, disability, sex, sexual orientation, or gender identity.            Thank you!     Thank you for choosing [facility]  for your care. Our goal is always to provide you with excellent care. Hearing back from our patients is one way we can continue to improve our services. Please take a few minutes to complete the written survey that you may receive in the mail after your visit with us. Thank you!             Your Updated Medication List - Protect others around you: Learn how to safely use, store and throw away your medicines at www.disposemymeds.org.          This list is accurate as of: 12/4/17  1:52 PM.  Always use your most recent med list.                   Brand Name Dispense Instructions for use Diagnosis    amLODIPine 5 MG tablet    NORVASC    90 tablet    TAKE 1 TABLET (5 MG) BY MOUTH DAILY    Essential hypertension with goal blood  pressure less than 140/90       aspirin 81 MG tablet      Take 2 tablets by mouth daily.        CALCIUM 600 + D PO      2 daily        FLINTSTONES PLUS IRON PO      Take 1 tablet by mouth daily.        leflunomide 20 MG tablet    ARAVA     Take 20 mg by mouth daily.        lisinopril 20 MG tablet    PRINIVIL/ZESTRIL    90 tablet    TAKE 1 TABLET (20 MG) BY MOUTH DAILY    Essential hypertension with goal blood pressure less than 140/90       methotrexate 2.5 MG tablet CHEMO     8 tablet    Take 8 tablets by mouth once a week.    Rheumatoid arthritis(714.0)       metoprolol 50 MG tablet    LOPRESSOR    180 tablet    Take 1 tablet (50 mg) by mouth 2 times daily    HTN (hypertension)       niacin 500 MG tablet     100 tablet    Take 1 tablet by mouth At Bedtime.    CAD (coronary artery disease)       nitroGLYcerin 0.4 MG sublingual tablet    NITROSTAT     0.4 mg by Sublingual route every 5 (five) minutes as needed.        predniSONE 5 MG tablet    DELTASONE     1 TABLET DAILY        simvastatin 20 MG tablet    ZOCOR    90 tablet    Take 1 tablet (20 mg) by mouth At Bedtime    Coronary artery disease involving native coronary artery of native heart without angina pectoris, Hyperlipidemia LDL goal <100

## 2017-12-04 NOTE — NURSING NOTE
"Chief Complaint   Patient presents with     Hypertension       Initial /65 (BP Location: Right arm, Patient Position: Chair, Cuff Size: Adult Small)  Pulse 66  Temp 97.9  F (36.6  C) (Oral)  Wt 114 lb (51.7 kg)  SpO2 97%  BMI 18.97 kg/m2 Estimated body mass index is 18.97 kg/(m^2) as calculated from the following:    Height as of 4/10/17: 5' 5\" (1.651 m).    Weight as of this encounter: 114 lb (51.7 kg).  Medication Reconciliation: complete    Neva De Guzman MA    "

## 2017-12-13 ENCOUNTER — TRANSFERRED RECORDS (OUTPATIENT)
Dept: HEALTH INFORMATION MANAGEMENT | Facility: CLINIC | Age: 72
End: 2017-12-13

## 2017-12-13 LAB
ALT SERPL-CCNC: 13 IU/L (ref 5–35)
AST SERPL-CCNC: 21 U/L (ref 5–34)
CREAT SERPL-MCNC: 0.6 MG/DL (ref 0.5–1.3)
GFR SERPL CREATININE-BSD FRML MDRD: 104.4 ML/MIN/1.73M2

## 2017-12-22 DIAGNOSIS — Z12.11 SPECIAL SCREENING FOR MALIGNANT NEOPLASMS, COLON: ICD-10-CM

## 2017-12-22 PROCEDURE — G0328 FECAL BLOOD SCRN IMMUNOASSAY: HCPCS | Performed by: FAMILY MEDICINE

## 2017-12-24 LAB — HEMOCCULT STL QL IA: POSITIVE

## 2018-02-08 ENCOUNTER — RADIANT APPOINTMENT (OUTPATIENT)
Dept: RADIOLOGY | Facility: CLINIC | Age: 73
End: 2018-02-08
Attending: PAIN MEDICINE
Payer: COMMERCIAL

## 2018-02-08 ENCOUNTER — RADIOLOGY INJECTION OFFICE VISIT (OUTPATIENT)
Dept: PALLIATIVE MEDICINE | Facility: CLINIC | Age: 73
End: 2018-02-08
Payer: COMMERCIAL

## 2018-02-08 VITALS
OXYGEN SATURATION: 98 % | SYSTOLIC BLOOD PRESSURE: 125 MMHG | HEART RATE: 62 BPM | DIASTOLIC BLOOD PRESSURE: 60 MMHG | RESPIRATION RATE: 16 BRPM

## 2018-02-08 DIAGNOSIS — M53.3 SACROILIAC JOINT DYSFUNCTION: ICD-10-CM

## 2018-02-08 DIAGNOSIS — M53.3 SI (SACROILIAC) JOINT DYSFUNCTION: Primary | ICD-10-CM

## 2018-02-08 PROCEDURE — 27096 INJECT SACROILIAC JOINT: CPT | Mod: LT | Performed by: PAIN MEDICINE

## 2018-02-08 ASSESSMENT — PAIN SCALES - GENERAL: PAINLEVEL: SEVERE PAIN (7)

## 2018-02-08 NOTE — MR AVS SNAPSHOT
After Visit Summary   2/8/2018    Sakshi Jaeger    MRN: [id number]           Patient Information     Date Of Birth          1945        Visit Information        Provider Department      2/8/2018 3:45 PM Matt Lu MD [facility]        Care Instructions    [facility]   Procedure Discharge Instructions    Today you saw:     Dr. Matt Lu    You had an:    sacro-iliac joint injection      Medications used:  Lidocaine   Bupivacaine     Omnipaque    Kenalog             Be cautious when walking. Numbness and/or weakness in the lower extremities may occur for up to 6-8 hours after the procedure due to effect of the local anesthetic    Do not drive for 6 hours. The effect of the local anesthetic could slow your reflexes.     You may resume your regular activities after 24 hours    Avoid strenuous activity for the first 24 hours    You may shower, however avoid swimming, tub baths or hot tubs for 24 hours following your procedure    You may have a mild to moderate increase in pain for several days following the injection.    It may take up to 14 days for the steroid medication to start working although you may feel the effect as early as a few days after the procedure.       You may use ice packs for 10-15 minutes, 3 to 4 times a day at the injection site for comfort    Do not use heat to painful areas for 6 to 8 hours. This will give the local anesthetic time to wear off and prevent you from accidentally burning your skin.     You may use anti-inflammatory medications (such as Ibuprofen or Aleve or Advil) or Tylenol for pain control if necessary    If you were fasting, you may resume your normal diet and medications after the procedure    If you have diabetes, check your blood sugar more frequently than usual as your blood sugar may be higher than normal for 10-14 days following a steroid injection. Contact your doctor who manages your diabetes if  "your blood sugar is higher than usual    If you experience any of the following, call the pain center nursing line during work hours at [phone] or the after hours provider line at [phone]:  -Fever over 100 degree F  -Swelling, bleeding, redness, drainage, warmth at the injection site  -Progressive weakness or numbness in your legs or arms  -Loss of bowel or bladder function  -Unusual headache that is not relieved by Tylenol or other pain reliever  -Unusual new onset of pain that is not improving      Phone #s:  Appointment line: [phone];  Nurse line: [phone]              Follow-ups after your visit        Who to contact     If you have questions or need follow up information about today's clinic visit or your schedule please contact [facility] directly at [phone].  Normal or non-critical lab and imaging results will be communicated to you by MyChart, letter or phone within 4 business days after the clinic has received the results. If you do not hear from us within 7 days, please contact the clinic through [employer] or phone. If you have a critical or abnormal lab result, we will notify you by phone as soon as possible.  Submit refill requests through [employer] or call your pharmacy and they will forward the refill request to us. Please allow 3 business days for your refill to be completed.          Additional Information About Your Visit        [employer] Information     [employer] lets you send messages to your doctor, view your test results, renew your prescriptions, schedule appointments and more. To sign up, go to [email or web address] . Click on \"Log in\" on the left side of the screen, which will take you to the Welcome page. Then click on \"Sign up Now\" on the right side of the page.     You will be asked to enter the access code listed below, as well as some personal information. Please follow the directions to create your username and password.     Your access code is: " [email or web address]  Expires: 3/4/2018  1:52 PM     Your access code will  in 90 days. If you need help or a new code, please call your [place] clinic or [phone].        Care EveryWhere ID     This is your Care EveryWhere ID. This could be used by other organizations to access your [place] medical records  [id number]        Your Vitals Were     Pulse Respirations Pulse Oximetry             62 16 98%          Blood Pressure from Last 3 Encounters:   18 125/60   17 117/65   17 123/82    Weight from Last 3 Encounters:   17 51.7 kg (114 lb)   17 51.7 kg (114 lb)   04/10/17 49.9 kg (110 lb)              Today, you had the following     No orders found for display       Primary Care Provider Office Phone # Fax #    Javan Pepe [phone]       [address] [place] [address]        Equal Access to Services     [facility]: Hadii aad ku hadasho Soomaali, waaxda luqadaha, qaybta kaalmada adeegyada, waxay joanin hayjackyn petey phillips . So [facility] [phone].    ATENCIÓN: Si habla español, tiene a smith disposición servicios gratuitos de asistencia lingüística. Llame al [phone].    We comply with applicable federal civil rights laws and [place] laws. We do not discriminate on the basis of race, color, national origin, age, disability, sex, sexual orientation, or gender identity.            Thank you!     Thank you for choosing [facility]  for your care. Our goal is always to provide you with excellent care. Hearing back from our patients is one way we can continue to improve our services. Please take a few minutes to complete the written survey that you may receive in the mail after your visit with us. Thank you!             Your Updated Medication List - Protect others around you: Learn how to safely use, store and throw away your medicines at www.disposemymeds.org.          This list is accurate as of 18  4:26 PM.   Always use your most recent med list.                   Brand Name Dispense Instructions for use Diagnosis    amLODIPine 5 MG tablet    NORVASC    90 tablet    TAKE 1 TABLET (5 MG) BY MOUTH DAILY    Essential hypertension with goal blood pressure less than 140/90       aspirin 81 MG tablet      Take 2 tablets by mouth daily.        CALCIUM 600 + D PO      2 daily        FLINTSTONES PLUS IRON PO      Take 1 tablet by mouth daily.        leflunomide 20 MG tablet    ARAVA     Take 20 mg by mouth daily.        lisinopril 20 MG tablet    PRINIVIL/ZESTRIL    90 tablet    TAKE 1 TABLET (20 MG) BY MOUTH DAILY    Essential hypertension with goal blood pressure less than 140/90       methotrexate 2.5 MG tablet CHEMO     8 tablet    Take 8 tablets by mouth once a week.    Rheumatoid arthritis(714.0)       metoprolol tartrate 50 MG tablet    LOPRESSOR    180 tablet    Take 1 tablet (50 mg) by mouth 2 times daily    HTN (hypertension)       niacin 500 MG tablet     100 tablet    Take 1 tablet by mouth At Bedtime.    CAD (coronary artery disease)       nitroGLYcerin 0.4 MG sublingual tablet    NITROSTAT     0.4 mg by Sublingual route every 5 (five) minutes as needed.        predniSONE 5 MG tablet    DELTASONE     1 TABLET DAILY        simvastatin 20 MG tablet    ZOCOR    90 tablet    Take 1 tablet (20 mg) by mouth At Bedtime    Coronary artery disease involving native coronary artery of native heart without angina pectoris, Hyperlipidemia LDL goal <100

## 2018-02-08 NOTE — NURSING NOTE
Discharge Information    IV Discontiued Time:  NA    Amount of Fluid Infused:  NA    Discharge Criteria = When patient returns to baseline or as per MD order    Consciousness:  Pt is fully awake    Circulation:  BP +/- 20% of pre-procedure level    Respiration:  Patient is able to breathe deeply    O2 Sat:  Patient is able to maintain O2 Sat >92% on room air    Activity:  Moves 4 extremities on command    Ambulation:  Patient is able to stand and walk or stand and pivot into wheelchair    Dressing:  Clean/dry or No Dressing    Notes:   Discharge instructions and AVS given to patient    Patient meets criteria for discharge?  YES    Admitted to PCU?  No    Responsible adult present to accompany patient home?  Yes    Signature/Title:    rony guadarrama RN Care Coordinator  [facility]

## 2018-02-08 NOTE — PROGRESS NOTES
Pre procedure Diagnosis: SI joint dysfunction    Post procedure Diagnosis: Same  Procedure performed: Left SI joint injection  Anesthesia: none  Complications: none  Operators: Matt Lu MD and Jus Butler DO    Indications:   Sakshi Jaeger is a 72 year old female. The patient has a history of left low back pain with pain radiating into her buttock. Other conservative treatments prior to injection include oral medications. She has had 3+ months of benefit with previous injection in July of 2017.    Options/alternatives, benefits and risks were discussed with the patient including bleeding, infection, tissue trauma, exposure to radiation, reaction to medications including seizure, nerve injury, weakness, and numbness.  Questions were answered to her satisfaction and she agrees to proceed. Voluntary informed consent was obtained and signed.     Vitals were reviewed: Yes  Allergies were reviewed:  Yes   Medications were reviewed:  Yes   Pre-procedure pain score: 7/10    Procedure:  After obtaining signed informed consent, the patient was brought into the procedure suite and was placed in a prone position on the procedure table.   A Pause for the Cause was performed.  The patient was prepped and draped in the usual sterile fashion.     After identifying the left SI joint, the C-arm was rotated obliquely to obtain the best view of the inferior angle of the joint.  Then 3 ml of Lidocaine 1%  was used to anesthetize the skin at a skin entry site coaxial with the fluoroscopy beam at this location.  A 22 gauge 3.5 inch needle was advanced under intermittent fluoroscopy until it was felt to enter the SI joint.  Aspiration was negative.    A total of 1.5ml of Omnipaque-300 was injected, confirming appropriate position, with spread into the intraarticular space, with no intravascular uptake noted.  3.5ml of Omnipaque was wasted. Location was verified in lateral view.    2 ml of 0.5% bupivacaine with 40mg of  Kenalog was injected.  The needle was removed.     Hemostasis was achieved, the area was cleaned, and bandaids were placed when appropriate.  The patient tolerated the procedure well, and was taken to the recovery room.  Images were saved to PACS.    Post-procedure pain score: 0/10  Follow-up includes:   -f/u phone call in one week  -f/u with referring Physician     Matt Lu MD  [facility]  I saw and examined the patient with the Pain Fellow/Resident. I have reviewed and agree with the resident's note and plan of care and made changes and corrections directly to the body of the note.   TIME SPENT:     BY MYSELF AND FELLOW/RESIDENT TOGETHER with fellow the entire procedure.

## 2018-02-08 NOTE — NURSING NOTE
"Chief Complaint   Patient presents with     Pain       Initial /70 (BP Location: Left arm, Patient Position: Chair, Cuff Size: Adult Small)  Pulse 60  Resp 16 Estimated body mass index is 18.97 kg/(m^2) as calculated from the following:    Height as of 4/10/17: 1.651 m (5' 5\").    Weight as of 12/4/17: 51.7 kg (114 lb).  Medication Reconciliation: complete     Eileen Kim MA  [facility]      "

## 2018-02-08 NOTE — PATIENT INSTRUCTIONS
[facility]   Procedure Discharge Instructions    Today you saw:     Dr. Matt Lu    You had an:    sacro-iliac joint injection      Medications used:  Lidocaine   Bupivacaine     Omnipaque    Kenalog             Be cautious when walking. Numbness and/or weakness in the lower extremities may occur for up to 6-8 hours after the procedure due to effect of the local anesthetic    Do not drive for 6 hours. The effect of the local anesthetic could slow your reflexes.     You may resume your regular activities after 24 hours    Avoid strenuous activity for the first 24 hours    You may shower, however avoid swimming, tub baths or hot tubs for 24 hours following your procedure    You may have a mild to moderate increase in pain for several days following the injection.    It may take up to 14 days for the steroid medication to start working although you may feel the effect as early as a few days after the procedure.       You may use ice packs for 10-15 minutes, 3 to 4 times a day at the injection site for comfort    Do not use heat to painful areas for 6 to 8 hours. This will give the local anesthetic time to wear off and prevent you from accidentally burning your skin.     You may use anti-inflammatory medications (such as Ibuprofen or Aleve or Advil) or Tylenol for pain control if necessary    If you were fasting, you may resume your normal diet and medications after the procedure    If you have diabetes, check your blood sugar more frequently than usual as your blood sugar may be higher than normal for 10-14 days following a steroid injection. Contact your doctor who manages your diabetes if your blood sugar is higher than usual    If you experience any of the following, call the pain center nursing line during work hours at [phone] or the after hours provider line at [phone]:  -Fever over 100 degree F  -Swelling, bleeding, redness, drainage, warmth at the injection  site  -Progressive weakness or numbness in your legs or arms  -Loss of bowel or bladder function  -Unusual headache that is not relieved by Tylenol or other pain reliever  -Unusual new onset of pain that is not improving      Phone #s:  Appointment line: [phone];  Nurse line: [phone]

## 2018-02-08 NOTE — NURSING NOTE
Pre-procedure Intake    Have you been fasting? No     If yes, for how long?     Are you taking a prescribed blood thinner such as coumadin, Plavix, Xarelto?    No    If yes, when did you take your last dose?     Do you take aspirin?  Yes -   ASA    If cervical procedure, have you held aspirin for 6 days?   Yes    Do you have any allergies to contrast dye, iodine, steroid and/or numbing medications?  NO    Are you currently taking antibiotics or have an active infection?  NO    Have you had a fever/elevated temperature within the past week? NO    Are you currently taking oral steroids? YES: Prednisone patient takes every day    Do you have a ? Yes       Are you pregnant or breastfeeding?  Not Applicable    Are the vital signs normal?  Yes

## 2018-02-16 ENCOUNTER — TELEPHONE (OUTPATIENT)
Dept: PALLIATIVE MEDICINE | Facility: CLINIC | Age: 73
End: 2018-02-16

## 2018-02-16 NOTE — TELEPHONE ENCOUNTER
Patient had a Left SI joint injection on 2/8/18.  Called patient for an update.      No answer, no voicemail, unable to leave message.

## 2018-02-22 ENCOUNTER — TELEPHONE (OUTPATIENT)
Dept: FAMILY MEDICINE | Facility: CLINIC | Age: 73
End: 2018-02-22

## 2018-02-22 NOTE — TELEPHONE ENCOUNTER
Forms received from: [facility]   Phone number listed: [phone]   Fax listed: [phone]  Date received: 02/22/2018  Form description: detailed written order and letter of medical necessity  Once forms are completed, please return to [facility] via fax.  Form placed: in providers folder  Georgina Ibrahim

## 2018-03-29 ENCOUNTER — OFFICE VISIT (OUTPATIENT)
Dept: FAMILY MEDICINE | Facility: CLINIC | Age: 73
End: 2018-03-29
Payer: COMMERCIAL

## 2018-03-29 VITALS
SYSTOLIC BLOOD PRESSURE: 120 MMHG | HEART RATE: 52 BPM | DIASTOLIC BLOOD PRESSURE: 63 MMHG | TEMPERATURE: 98.2 F | OXYGEN SATURATION: 99 % | BODY MASS INDEX: 19.3 KG/M2 | WEIGHT: 116 LBS

## 2018-03-29 DIAGNOSIS — D21.9 FIBROMA: ICD-10-CM

## 2018-03-29 DIAGNOSIS — R19.5 HEME POSITIVE STOOL: ICD-10-CM

## 2018-03-29 DIAGNOSIS — M25.512 ACUTE PAIN OF LEFT SHOULDER: ICD-10-CM

## 2018-03-29 DIAGNOSIS — M06.9 RHEUMATOID ARTHRITIS, INVOLVING UNSPECIFIED SITE, UNSPECIFIED RHEUMATOID FACTOR PRESENCE: ICD-10-CM

## 2018-03-29 DIAGNOSIS — M81.8 OTHER OSTEOPOROSIS, UNSPECIFIED PATHOLOGICAL FRACTURE PRESENCE: Primary | ICD-10-CM

## 2018-03-29 DIAGNOSIS — M53.3 SACROILIAC JOINT PAIN: ICD-10-CM

## 2018-03-29 PROCEDURE — 99214 OFFICE O/P EST MOD 30 MIN: CPT | Performed by: FAMILY MEDICINE

## 2018-03-29 RX ORDER — PREDNISONE 20 MG/1
TABLET ORAL
Qty: 20 TABLET | Refills: 0 | Status: SHIPPED | OUTPATIENT
Start: 2018-03-29 | End: 2019-04-11

## 2018-03-29 ASSESSMENT — PAIN SCALES - GENERAL: PAINLEVEL: WORST PAIN (10)

## 2018-03-29 NOTE — NURSING NOTE
"Chief Complaint   Patient presents with     Shoulder     Left Shoulder pain     Derm Problem     Check skin lesion under Right elbow     Referral     Dexa Scan       Initial /63 (BP Location: Right arm, Patient Position: Sitting, Cuff Size: Adult Regular)  Pulse 52  Temp 98.2  F (36.8  C) (Oral)  Wt 116 lb (52.6 kg)  SpO2 99%  Breastfeeding? No  BMI 19.3 kg/m2 Estimated body mass index is 19.3 kg/(m^2) as calculated from the following:    Height as of 4/10/17: 5' 5\" (1.651 m).    Weight as of this encounter: 116 lb (52.6 kg).  Medication Reconciliation: complete   Karen Power MA      "

## 2018-03-29 NOTE — MR AVS SNAPSHOT
After Visit Summary   3/29/2018    Sakshi Jaeger    MRN: [id number]           Patient Information     Date Of Birth          1945        Visit Information        Provider Department      3/29/2018 11:40 AM Javan Pepe MD [facility]        Today's Diagnoses     Other osteoporosis, unspecified pathological fracture presence    -  1    Fibroma        Heme positive stool        Acute pain of left shoulder        Sacroiliac joint pain           Follow-ups after your visit        Additional Services     GASTROENTEROLOGY ADULT REF CONSULT ONLY       Preferred Location: [facility] [phone]      Please be aware that coverage of these services is subject to the terms and limitations of your health insurance plan.  Call member services at your health plan with any benefit or coverage questions.  Any procedures must be performed at a [facility] facility OR coordinated by your clinic's referral office.    Please bring the following with you to your appointment:    (1) Any X-Rays, CTs or MRIs which have been performed.  Contact the facility where they were done to arrange for  prior to your scheduled appointment.    (2) List of current medications   (3) This referral request   (4) Any documents/labs given to you for this referral            GENERAL SURG ADULT REFERRAL       Your provider has referred you to: G: [facility] [phone]   [email or web address]    Please be aware that coverage of these services is subject to the terms and limitations of your health insurance plan.  Call member services at your health plan with any benefit or coverage questions.      Please bring the following with you to your appointment:    (1) Any X-Rays, CTs or MRIs which have been performed.  Contact the facility where they were done to arrange for  prior to your scheduled appointment.   (2) List of current medications   (3) This  referral request   (4) Any documents/labs given to you for this referral            [facility] PT, HAND, AND CHIROPRACTIC REFERRAL       === This order will print in the [facility] Scheduling  Office ===    Physical therapy, hand therapy and chiropractic care are available through:    [facility]  [facility]  [facility]    Call one easy number to schedule at any of the above locations:  [phone].    Your provider has referred you to Physical Therapy at [facility] or [facility]    Indication/Reason for Referral: Shoulder Pain and SI joint pain   Onset of Illness:  2 weeks ago   Therapy Orders:  Evaluate and Treat  Special Programs:  None  Special Request:  None    Additional Comments for the therapist or chiropractor:      Please be aware that coverage of these services is subject to the terms and limitations of your health insurance plan.  Call member services at your health plan with any benefit or coverage questions.      Please bring the following to your appointment:    >>   Your personal calendar for scheduling future appointments  >>   Comfortable clothing                  Future tests that were ordered for you today     Open Future Orders        Priority Expected Expires Ordered    [facility] PT, HAND, AND CHIROPRACTIC REFERRAL Routine 3/30/2018 4/12/2018 3/29/2018    DX Hip/Pelvis/Spine Routine  3/29/2019 3/29/2018            Who to contact     If you have questions or need follow up information about today's clinic visit or your schedule please contact [facility] directly at [phone].  Normal or non-critical lab and imaging results will be communicated to you by MyChart, letter or phone within 4 business days after the clinic has received the results. If you do not hear from us within 7 days, please contact the clinic through MyChart or phone. If you have a critical or abnormal lab result, we will notify you by phone as soon as possible.  Submit refill  "requests through [employer] or call your pharmacy and they will forward the refill request to us. Please allow 3 business days for your refill to be completed.          Additional Information About Your Visit        [employer] Information     [employer] lets you send messages to your doctor, view your test results, renew your prescriptions, schedule appointments and more. To sign up, go to [email or web address] . Click on \"Log in\" on the left side of the screen, which will take you to the Welcome page. Then click on \"Sign up Now\" on the right side of the page.     You will be asked to enter the access code listed below, as well as some personal information. Please follow the directions to create your username and password.     Your access code is: [email or web address]  Expires: 2018 12:37 PM     Your access code will  in 90 days. If you need help or a new code, please call your [facility] clinic or [phone].        Care EveryWhere ID     This is your Care EveryWhere ID. This could be used by other organizations to access your [facility] medical records  [id number]        Your Vitals Were     Pulse Temperature Pulse Oximetry Breastfeeding? BMI (Body Mass Index)       52 98.2  F (36.8  C) (Oral) 99% No 19.3 kg/m2        Blood Pressure from Last 3 Encounters:   18 120/63   18 125/60   17 117/65    Weight from Last 3 Encounters:   18 116 lb (52.6 kg)   17 114 lb (51.7 kg)   17 114 lb (51.7 kg)              We Performed the Following     GASTROENTEROLOGY ADULT REF CONSULT ONLY     GENERAL SURG ADULT REFERRAL          Today's Medication Changes          These changes are accurate as of 3/29/18 12:37 PM.  If you have any questions, ask your nurse or doctor.               These medicines have changed or have updated prescriptions.        Dose/Directions    * predniSONE 5 MG tablet   Commonly known as:  DELTASONE   This may have changed:  Another medication with the same name was added. Make " sure you understand how and when to take each.   Changed by:  Javan Pepe MD        1 TABLET DAILY   Refills:  0       * predniSONE 20 MG tablet   Commonly known as:  DELTASONE   This may have changed:  You were already taking a medication with the same name, and this prescription was added. Make sure you understand how and when to take each.   Used for:  Acute pain of left shoulder, Sacroiliac joint pain   Changed by:  Javan Pepe MD        Take 3 tabs (60 mg) by mouth daily x 3 days, 2 tabs (40 mg) daily x 3 days, 1 tab (20 mg) daily x 3 days, then 1/2 tab (10 mg) x 3 days.   Quantity:  20 tablet   Refills:  0       * Notice:  This list has 2 medication(s) that are the same as other medications prescribed for you. Read the directions carefully, and ask your doctor or other care provider to review them with you.         Where to get your medicines      These medications were sent to [id number] - [place], [place] - [address]  [address] [address]     Phone:  [phone]     predniSONE 20 MG tablet                Primary Care Provider Office Phone # Fax #    Javan Pepe [phone]       [address] [address]        Equal Access to Services     RUBEN KENNY AH: Hadmargi stocko Sorachel, waaxda luqadaha, qaybta kaalmada adeegyada, ethel delarosa haykellie beatty. So [facility] [phone].    ATENCIÓN: Si habla español, tiene a smith disposición servicios gratuitos de asistencia lingüística. Llame al [phone].    We comply with applicable federal civil rights laws and [place] laws. We do not discriminate on the basis of race, color, national origin, age, disability, sex, sexual orientation, or gender identity.            Thank you!     Thank you for choosing [facility]  for your care. Our goal is always to provide you with excellent care. Hearing back from our patients  is one way we can continue to improve our services. Please take a few minutes to complete the written survey that you may receive in the mail after your visit with us. Thank you!             Your Updated Medication List - Protect others around you: Learn how to safely use, store and throw away your medicines at www.disposemymeds.org.          This list is accurate as of 3/29/18 12:37 PM.  Always use your most recent med list.                   Brand Name Dispense Instructions for use Diagnosis    amLODIPine 5 MG tablet    NORVASC    90 tablet    TAKE 1 TABLET (5 MG) BY MOUTH DAILY    Essential hypertension with goal blood pressure less than 140/90       aspirin 81 MG tablet      Take 2 tablets by mouth daily.        CALCIUM 600 + D PO      2 daily        FLINTSTONES PLUS IRON PO      Take 1 tablet by mouth daily.        leflunomide 20 MG tablet    ARAVA     Take 20 mg by mouth daily.        lisinopril 20 MG tablet    PRINIVIL/ZESTRIL    90 tablet    TAKE 1 TABLET (20 MG) BY MOUTH DAILY    Essential hypertension with goal blood pressure less than 140/90       methotrexate 2.5 MG tablet CHEMO     8 tablet    Take 8 tablets by mouth once a week.    Rheumatoid arthritis(714.0)       metoprolol tartrate 50 MG tablet    LOPRESSOR    180 tablet    Take 1 tablet (50 mg) by mouth 2 times daily    HTN (hypertension)       niacin 500 MG tablet     100 tablet    Take 1 tablet by mouth At Bedtime.    CAD (coronary artery disease)       nitroGLYcerin 0.4 MG sublingual tablet    NITROSTAT     0.4 mg by Sublingual route every 5 (five) minutes as needed.        * predniSONE 5 MG tablet    DELTASONE     1 TABLET DAILY        * predniSONE 20 MG tablet    DELTASONE    20 tablet    Take 3 tabs (60 mg) by mouth daily x 3 days, 2 tabs (40 mg) daily x 3 days, 1 tab (20 mg) daily x 3 days, then 1/2 tab (10 mg) x 3 days.    Acute pain of left shoulder, Sacroiliac joint pain       simvastatin 20 MG tablet    ZOCOR    90 tablet    Take 1  tablet (20 mg) by mouth At Bedtime    Coronary artery disease involving native coronary artery of native heart without angina pectoris, Hyperlipidemia LDL goal <100       * Notice:  This list has 2 medication(s) that are the same as other medications prescribed for you. Read the directions carefully, and ask your doctor or other care provider to review them with you.

## 2018-03-29 NOTE — PROGRESS NOTES
SUBJECTIVE:   Sakshi Jaeger is a 72 year old female who presents to clinic today for the following health issues:    Joint Pain     Onset: x 2 weeks    Description:   Location: Left shoulder - NKI  Character: Sharp    Intensity: severe, 10/10    Progression of Symptoms: Same to worse    Accompanying Signs & Symptoms:  Other symptoms: none    History:   Previous similar pain: no       Precipitating factors:   Trauma or overuse: no     Alleviating factors:  Improved by: nothing    No injuries     Left shoulder hurting       Check skin lesion under right elbow    Referral for Dexa Scan    Positive hemoccult   No colonoscopy ever     Schedule     O: /63 (BP Location: Right arm, Patient Position: Sitting, Cuff Size: Adult Regular)  Pulse 52  Temp 98.2  F (36.8  C) (Oral)  Wt 116 lb (52.6 kg)  SpO2 99%  Breastfeeding? No  BMI 19.3 kg/m2    Head: Normocephalic, atraumatic.  Eyes: Conjunctiva clear, non icteric. PERRLA.  Ears: External ears and TMs normal BL.  Nose: Septum midline, nasal mucosa pink and moist. No discharge.  Mouth / Throat: Normal dentition.  No oral lesions. Pharynx non erythematous, tonsils without hypertrophy.  Neck: Supple, no enlarged LN, trachea midline.    Chest wall normal to inspection and palpation. Good excursion bilaterally. Lungs clear to auscultation. Good air movement bilaterally without rales, wheezes, or rhonchi.   Regular rate and  rhythm. S1 and S2 normal, no murmurs, clicks, gallops or rubs. No edema or JVD.    Non tender fibroma of the right forearm that she would like removed   Positive SI joint pain       ICD-10-CM    1. Other osteoporosis, unspecified pathological fracture presence M81.8 DX Hip/Pelvis/Spine   2. Fibroma D21.9 GENERAL SURG ADULT REFERRAL   3. Heme positive stool R19.5 GASTROENTEROLOGY ADULT REF CONSULT ONLY   4. Acute pain of left shoulder M25.512 predniSONE (DELTASONE) 20 MG tablet     [facility] PT, HAND, AND CHIROPRACTIC REFERRAL   5. Sacroiliac joint pain  M53.3 predniSONE (DELTASONE) 20 MG tablet     [facility] PT, HAND, AND CHIR[facility]RACTIC REFERRAL   6. Rheumatoid arthritis, involving unspecified site, unspecified rheumatoid factor presence (H) M06.9          ICD-10-CM    1. Other osteoporosis, unspecified pathological fracture presence M81.8 DX Hip/Pelvis/Spine   2. Fibroma D21.9 GENERAL SURG ADULT REFERRAL   3. Heme positive stool R19.5 GASTROENTEROLOGY ADULT REF CONSULT ONLY   4. Acute pain of left shoulder M25.512 predniSONE (DELTASONE) 20 MG tablet     [facility] PT, HAND, AND CHIROPRACTIC REFERRAL   5. Sacroiliac joint pain M53.3 predniSONE (DELTASONE) 20 MG tablet     [facility] PT, HAND, AND CHIR[facility]RACTIC REFERRAL

## 2018-04-04 ENCOUNTER — TELEPHONE (OUTPATIENT)
Dept: FAMILY MEDICINE | Facility: CLINIC | Age: 73
End: 2018-04-04

## 2018-04-04 NOTE — TELEPHONE ENCOUNTER
Reason for Call:  Other colonoscopy    Detailed comments: Patient states that she called [facility] to schedule her colonoscopy and they told her they needed the order from her doctor before they could schedule that appointment. Please send order to [facility]    Phone Number Patient can be reached at: Home number on file [phone] (home)    Best Time: anytime    Can we leave a detailed message on this number? YES    Call taken on 4/4/2018 at 4:00 PM by Don Cerda

## 2018-04-12 ENCOUNTER — TRANSFERRED RECORDS (OUTPATIENT)
Dept: HEALTH INFORMATION MANAGEMENT | Facility: CLINIC | Age: 73
End: 2018-04-12

## 2018-04-12 LAB
ALT SERPL-CCNC: 12 IU/L (ref 5–35)
AST SERPL-CCNC: 18 U/L (ref 5–34)
CREAT SERPL-MCNC: 0.66 MG/DL (ref 0.5–1.3)
GFR SERPL CREATININE-BSD FRML MDRD: 93.6 ML/MIN/1.73M2

## 2018-04-13 ENCOUNTER — RADIANT APPOINTMENT (OUTPATIENT)
Dept: BONE DENSITY | Facility: CLINIC | Age: 73
End: 2018-04-13
Attending: FAMILY MEDICINE
Payer: COMMERCIAL

## 2018-04-13 DIAGNOSIS — M81.8 OTHER OSTEOPOROSIS, UNSPECIFIED PATHOLOGICAL FRACTURE PRESENCE: ICD-10-CM

## 2018-04-13 PROCEDURE — 77080 DXA BONE DENSITY AXIAL: CPT | Performed by: INTERNAL MEDICINE

## 2018-04-18 ENCOUNTER — TELEPHONE (OUTPATIENT)
Dept: FAMILY MEDICINE | Facility: CLINIC | Age: 73
End: 2018-04-18

## 2018-04-18 DIAGNOSIS — M81.0 SENILE OSTEOPOROSIS: Primary | ICD-10-CM

## 2018-04-18 NOTE — TELEPHONE ENCOUNTER
Your DEXA indicates osteoporosis and you should probably be on Fosamax once a week for the next four years.  Please call.

## 2018-04-19 NOTE — TELEPHONE ENCOUNTER
Called patient at [phone] (home).   Left message on voicemail to return phone call to triage line at [phone].  Tiffanie Carmona RN [facility] Triage.

## 2018-04-20 NOTE — TELEPHONE ENCOUNTER
Called patient at  Sakshi Jaeger (Self) [phone] (H)     Left message on voicemail to return phone call to triage line at [phone].  Tiffanie Carmona RN [facility] Triage.

## 2018-04-23 RX ORDER — ALENDRONATE SODIUM 35 MG/1
TABLET ORAL
Qty: 4 TABLET | Refills: 0 | Status: SHIPPED | OUTPATIENT
Start: 2018-04-23 | End: 2018-05-21

## 2018-04-23 NOTE — TELEPHONE ENCOUNTER
Attempted to call patient at home number, left message on voicemail; patient was instructed to return call to [facility] RN directly on the RN call back line at [phone]   Isidra Montoya RN  [facility]

## 2018-04-23 NOTE — TELEPHONE ENCOUNTER
Notified patient of the message below per PCP.    Patient stated understanding and agreeable with the plan of care.     Routed to PCP to please send medication to pharmacy.    Tiffanie Carmona RN [facility] Triage.

## 2018-04-23 NOTE — TELEPHONE ENCOUNTER
I have prescribed the fosamax   Makes sure she understands to take it about the same day and time once a week and that she must stay upright for 30 minutes after taking the tab

## 2018-04-24 NOTE — TELEPHONE ENCOUNTER
Called patient at [phone] (home). Left message on voicemail to return phone call to triage.line at [phone].    Will route back to PCP after talking to patient to see if he wants to order more that one month at a time. He only sent in 4 pills.    Tiffanie Carmona RN [facility] Triage.

## 2018-04-24 NOTE — TELEPHONE ENCOUNTER
Notified patient of the message below per PCP.    Routed to PCP to see if he would like to add refills.    Tiffanie Carmona RN [facility] Triage.

## 2018-04-24 NOTE — TELEPHONE ENCOUNTER
I purposely only sent in a month for the first dose because I want to make sure the patient is tolerating the medication before we give her an extended prescription.

## 2018-05-02 ENCOUNTER — TRANSFERRED RECORDS (OUTPATIENT)
Dept: HEALTH INFORMATION MANAGEMENT | Facility: CLINIC | Age: 73
End: 2018-05-02

## 2018-05-21 DIAGNOSIS — M81.0 SENILE OSTEOPOROSIS: ICD-10-CM

## 2018-05-21 NOTE — TELEPHONE ENCOUNTER
"Requested Prescriptions   Pending Prescriptions Disp Refills     alendronate (FOSAMAX) 35 MG tablet [Pharmacy Med Name: ALENDRONATE SODIUM 35 MG TAB] 4 tablet 0    Last Written Prescription Date:  4-23-18  Last Fill Quantity: 4,  # refills: 0   Last office visit: 3/29/2018 with prescribing provider:     Future Office Visit:     Sig: TAKE 1 TAB BY MOUTH WITH 8OZ OF WATER ONCE EVERY WEEK BEFORE BREAKFAST, REMAIN UPRIGHT AT THIS TIME    Bisphosphonates Passed    5/21/2018 10:44 AM       Passed - Recent (12 mo) or future (30 days) visit within the authorizing provider's specialty    Patient had office visit in the last 12 months or has a visit in the next 30 days with authorizing provider or within the authorizing provider's specialty.  See \"Patient Info\" tab in inbasket, or \"Choose Columns\" in Meds & Orders section of the refill encounter.           Passed - Dexa on file within past 2 years    Please review last Dexa result.          Passed - Patient is age 18 or older       Passed - Normal serum creatinine on file within past 12 months    Recent Labs   Lab Test 04/12/18   CR  0.660               "

## 2018-05-23 RX ORDER — ALENDRONATE SODIUM 35 MG/1
TABLET ORAL
Qty: 4 TABLET | Refills: 0 | Status: SHIPPED | OUTPATIENT
Start: 2018-05-23 | End: 2018-06-06

## 2018-05-23 NOTE — TELEPHONE ENCOUNTER
Dexa scan 4/13/2018     FINDINGS:               Lumbar Spine (L1-L4) T-score:  0.2, marked degenerative changes present                Left Femoral Neck T-score:  -2.7               Right Femoral Neck T-score:  -2.6                             Lumbar (L1-L4) BMD: 1.199                                                           Total Hip Mean BMD: 0.605    Prescription approved per [facility] Refill Protocol.  Nalini Lundberg RN  [facility]

## 2018-06-06 DIAGNOSIS — M81.0 SENILE OSTEOPOROSIS: ICD-10-CM

## 2018-06-06 NOTE — TELEPHONE ENCOUNTER
"Requested Prescriptions   Pending Prescriptions Disp Refills     alendronate (FOSAMAX) 35 MG tablet [Pharmacy Med Name: ALENDRONATE SODIUM 35 MG TAB] 4 tablet 0    Last Written Prescription Date:  5/23/18  Last Fill Quantity: 4,  # refills: 0   Last office visit: 3/29/2018 with prescribing provider:     Future Office Visit:     Sig: TAKE 1 TAB BY MOUTH WITH 8OZ OF WATER ONCE EVERY WEEK BEFORE BREAKFAST, REMAIN UPRIGHT AT THIS TIME    Bisphosphonates Passed    6/6/2018 11:11 AM       Passed - Recent (12 mo) or future (30 days) visit within the authorizing provider's specialty    Patient had office visit in the last 12 months or has a visit in the next 30 days with authorizing provider or within the authorizing provider's specialty.  See \"Patient Info\" tab in inbasket, or \"Choose Columns\" in Meds & Orders section of the refill encounter.           Passed - Dexa on file within past 2 years    Please review last Dexa result.          Passed - Patient is age 18 or older       Passed - Normal serum creatinine on file within past 12 months    Recent Labs   Lab Test 04/12/18   CR  0.660               "

## 2018-06-07 ENCOUNTER — THERAPY VISIT (OUTPATIENT)
Dept: PHYSICAL THERAPY | Facility: CLINIC | Age: 73
End: 2018-06-07
Payer: MEDICARE

## 2018-06-07 DIAGNOSIS — M53.3 SACROILIAC JOINT PAIN: Primary | ICD-10-CM

## 2018-06-07 PROCEDURE — 97530 THERAPEUTIC ACTIVITIES: CPT | Mod: GP | Performed by: PHYSICAL THERAPIST

## 2018-06-07 PROCEDURE — 97161 PT EVAL LOW COMPLEX 20 MIN: CPT | Mod: GP | Performed by: PHYSICAL THERAPIST

## 2018-06-07 PROCEDURE — 97110 THERAPEUTIC EXERCISES: CPT | Mod: GP | Performed by: PHYSICAL THERAPIST

## 2018-06-07 PROCEDURE — G8978 MOBILITY CURRENT STATUS: HCPCS | Mod: GP | Performed by: PHYSICAL THERAPIST

## 2018-06-07 PROCEDURE — G8979 MOBILITY GOAL STATUS: HCPCS | Mod: GP | Performed by: PHYSICAL THERAPIST

## 2018-06-07 RX ORDER — ALENDRONATE SODIUM 35 MG/1
TABLET ORAL
Qty: 12 TABLET | Refills: 2 | Status: SHIPPED | OUTPATIENT
Start: 2018-06-07 | End: 2019-03-19

## 2018-06-07 NOTE — LETTER
DEPARTMENT OF HEALTH AND HUMAN SERVICES  CENTERS FOR MEDICARE & MEDICAID SERVICES    PLAN/UPDATED PLAN OF PROGRESS FOR OUTPATIENT REHABILITATION    PATIENTS NAME:  Sakshi Jaeger   : 1945    PROVIDER NUMBER:    [id number]  [facility]  [id number]     PROVIDER NAME: [facility]  MEDICAL RECORD NUMBER: [id number]     START OF CARE DATE:  SOC Date: 18   TYPE:  PT    PRIMARY/TREATMENT DIAGNOSIS: (Pertinent Medical Diagnosis)  Sacroiliac joint pain    VISITS FROM START OF CARE:    1     [facility] Initial Evaluation -- Lumbar  Date: 2018  Sakshi Jaeger is a 72 year old female with a Lumbar condition.   Referral: GP  Work mechanical stresses:  Retired  Employment status:    Leisure mechanical stresses: not a regular exerciser  Functional disability score (VINCENZO/STarT Back):  See flowsheet  VAS score (0-10): [facility]10 [facility]  Patient goals/expectations:  Stand and walk longer w/o pain    HISTORY:  Pt to PT w/ orders (dated 3-)  for Lt shld pain and SIJ pain.  She reports her shld is feeling much better and she would like to treat the SIJ pain.  Present symptoms: Lt LB/SI, no radiation  Pain quality (sharp/shooting/stabbing/aching/burning/cramping):  aching - sharp   Paresthesia (yes/no):  no  Present since (onset date): 6 mos.   MD orders 3-.  Symptoms (improving/unchanging/worsening):  unchanging.   Symptoms commenced as a result of: unknown   Condition occurred in the following environment:   unknown   Symptoms at onset (back/thigh/leg): back  Constant symptoms (back/thigh/leg):   Intermittent symptoms (back/thigh/leg): back  Symptoms are made worse with the following: Always Standing, Always Walking, Time of day - No effect and Always On the move   Symptoms are made better with the following: Always Sitting and Other - rubbing  Disturbed sleep (yes/no):  no   Sleeping postures (prone/sup/side R/L):  sides  Previous episodes (0/1-5/6-10/11+): 0  Year of first episode:   Previous history: Previous treatments: Steroid injection - 4-6 wks ago - no relief. Did have an injection prev which did help  PATIENTS NAME:  Sakshi Jaeger   LORRAINE: 1945  PRIMARY/TREATMENT DIAGNOSIS: (Pertinent Medical Diagnosis)  Sacroiliac joint pain    Specific Questions:  Cough/Sneeze/Strain (pos/neg): no  Bowel/Bladder (normal/abnormal): no  Gait (normal/abnormal): no  Medications (nil/NSAIDS/analg/steroids/anticoag/other):  Steroids and Other - Bone densisty and High blood pressure  Medical allergies:  PCN  General health (excellent/good/fair/poor):  good  Pertinent medical history:  High blood pressure, Implanted device, Osteoarthritis, Osteoporosis and Rheumatoid arthritis, Prosthetic leg BK Rt  Imaging (None/Xray/MRI/Other):  none  Recent or major surgery (yes/no):  Heart Stint  Night pain (yes/no): no  Accidents (yes/no): no  Unexplained weight loss (yes/no): no  Barriers at home: none  Other red flags: none    EXAMINATION    Posture:   Sitting (good/fair/poor): Poor  Standing (good/fair/poor):fair - scoliosis curve convex Rt thoracic  Lordosis (red/acc/normal): normal  Correction of posture (better/worse/no effect): NE  Lateral Shift (right/left/nil): Rt  Relevant (yes/no):  no  Other Observations: none    Neurological:  Motor deficit:  Decr Ant Tib Lt,  Reflexes:  NA  Sensory deficit:  NA   Dural signs:  Slump (-)    Movement Loss:   Edilberto Mod Min Nil Pain   Flexion    X    Extension  X      Side Gliding R    X    Side Gliding L    X      Test Movements:   During: produces, abolishes, increases, decreases, no effect, centralizing, peripheralizing   After: better, worse, no better, no worse, no effect, centralized, peripheralized            PATIENTS NAME:  Sakshi Jaeger   LORRAINE: 1945  PRIMARY/TREATMENT DIAGNOSIS: (Pertinent Medical Diagnosis)  Sacroiliac joint pain    Pretest symptoms standing: no pain   Symptoms During Symptoms  After ROM increased ROM decreased No Effect   FIS No Effect    No Effect         Rep FIS No Effect    No Effect      X   EIS No Effect    No Effect         Rep EIS No Effect    No Effect      X   Pretest symptoms lying: no pain    Symptoms During Symptoms After ROM increased ROM decreased No Effect   JOSE ALFREDO No Effect    No Effect         Rep JOSE ALFREDO No Effect    No Effect      X   EIL        Rep EIL        Static Tests:  Sitting slouched:  NE    Sitting erect:  NE - reports sitting up straight eventually causes incr pain.  Standing slouched NA    Standing erect:  NA  Lying prone in extension:  X1-2 min - aching across back, worse initially then NE Long sitting:  NA  Other Tests:   Provisional Classification:  Inconclusive/Other - needs further assessment  Principle of Management:  Education:  Discussed posture, trying to sit erect and avoid slouching, discussed DOP, discussed peripheralisation and needing to D/C exer. And HEP     Equipment provided:  none  Mechanical therapy (Y/N):  ???   Extension principle:      Lateral Principle:    Flexion principle:  FISit 10x 6 x/day    Other:      ASSESSMENT/PLAN:  Patient is a 72 year old female with lumbar and sacral complaints.    Patient has the following significant findings with corresponding treatment plan.                Diagnosis 1:  Lt SIJ Pain  Pain -  hot/cold therapy, manual therapy, education, directional preference exercise and home program  Decreased ROM/flexibility - manual therapy, therapeutic exercise and home program  Decreased strength - therapeutic exercise, therapeutic activities and home program  Decreased function - therapeutic activities and home program  Impaired posture - neuro re-education and home program  PATIENTS NAME:  Sakshi Jaeger   LORRAINE: 1945  PRIMARY/TREATMENT DIAGNOSIS: (Pertinent Medical Diagnosis)  Sacroiliac joint pain    Therapy Evaluation Codes:   1) History comprised of:   Personal factors that impact the plan of care:      None.     Comorbidity factors that impact the plan of care are:      High blood pressure, Implanted device, Osteoarthritis, Rheumatoid arthritis and   Osteoporosis and Prosthetic leg Rt BK.     Medications impacting care: Bone density, High blood pressure and Steroids.  2) Examination of Body Systems comprised of:   Body structures and functions that impact the plan of care:      Lumbar spine.   Activity limitations that impact the plan of care are:      Standing and Walking.  3) Clinical presentation characteristics are:   Stable/Uncomplicated.  4) Decision-Making    Low complexity using standardized patient assessment instrument and/or   measureable assessment of functional outcome.  Cumulative Therapy Evaluation is: Low complexity.    Previous and current functional limitations:  (See Goal Flow Sheet for this information)    Short term and Long term goals: (See Goal Flow Sheet for this information)   Communication ability:  Patient appears to be able to clearly communicate and understand verbal and written communication and follow directions correctly.  Treatment Explanation - The following has been discussed with the patient:   RX ordered/plan of care, Anticipated outcomes, Possible risks and side effects                                              PATIENTS NAME:  Sakshi Jaeger   : 1945  PRIMARY/TREATMENT DIAGNOSIS: (Pertinent Medical Diagnosis)  Sacroiliac joint pain    This patient would benefit from PT intervention to resume normal activities.   Rehab potential is good.  Frequency:  1 X week, once daily  Duration:  for 6 weeks  Discharge Plan:  Achieve all LTG.  Independent in home treatment program.  Reach maximal therapeutic benefit.            Caregiver Signature/Credentials _____________________________ Date ________         Katie Cutler PT, cert MDT     I have reviewed and certified the need for these services and plan of treatment while under my care.        PHYSICIAN'S SIGNATURE:    "_________________________________________      Date___________   Javan Pepe MD    Certification period:  Beginning of Cert date period: 06/07/18 to  09/04/18     Functional Level Progress Report: Please see attached \"Goal Flow sheet for Functional level.\"    ____X____ Continue Services or       ________ DC Services                Service dates: From  SOC Date: 06/07/18 to present                         "

## 2018-06-07 NOTE — PROGRESS NOTES
[facility] Initial Evaluation -- Lumbar    Date: June 7, 2018  Sakshi Jaeger is a 72 year old female with a Lumbar condition.   Referral: GP  Work mechanical stresses:  Retired  Employment status:    Leisure mechanical stresses: not a regular exerciser  Functional disability score (VINCENZO/STarT Back):  See flowsheet  VAS score (0-10): 0/10 currently, 10/10 at [facility]  Patient goals/expectations:  Stand and walk longer w/o pain    HISTORY:    Pt to PT w/ orders (dated 3-)  for Lt shld pain and SIJ pain.  She reports her shld is feeling much better and she would like to treat the SIJ pain.    Present symptoms: Lt LB/SI, no radiation  Pain quality (sharp/shooting/stabbing/aching/burning/cramping):  aching - sharp   Paresthesia (yes/no):  no    Present since (onset date): 6 mos.   MD chong 3-.  Symptoms (improving/unchanging/worsening):  unchanging.     Symptoms commenced as a result of: unknown   Condition occurred in the following environment:   unknown     Symptoms at onset (back/thigh/leg): back  Constant symptoms (back/thigh/leg):   Intermittent symptoms (back/thigh/leg): back    Symptoms are made worse with the following: Always Standing, Always Walking, Time of day - No effect and Always On the move   Symptoms are made better with the following: Always Sitting and Other - rubbing    Disturbed sleep (yes/no):  no Sleeping postures (prone/sup/side R/L): sides    Previous episodes (0/1-5/6-10/11+): 0 Year of first episode:     Previous history:   Previous treatments: Steroid injection - 4-6 wks ago - no relief    Did have an injection prev which did help      Specific Questions:  Cough/Sneeze/Strain (pos/neg): no  Bowel/Bladder (normal/abnormal): no  Gait (normal/abnormal): no  Medications (nil/NSAIDS/analg/steroids/anticoag/other):  Steroids and Other - Bone densisty and High blood pressure  Medical allergies:  PCN  General health (excellent/good/fair/poor):  good  Pertinent medical  history:  High blood pressure, Implanted device, Osteoarthritis, Osteoporosis and Rheumatoid arthritis, Prosthetic leg BK Rt  Imaging (None/Xray/MRI/Other):  none  Recent or major surgery (yes/no):  Heart Stint  Night pain (yes/no): no  Accidents (yes/no): no  Unexplained weight loss (yes/no): no  Barriers at home: none  Other red flags: none    EXAMINATION    Posture:   Sitting (good/fair/poor): Poor  Standing (good/fair/poor):fair - scoliosis curve convex Rt thoracic  Lordosis (red/acc/normal): normal  Correction of posture (better/worse/no effect): NE    Lateral Shift (right/left/nil): Rt  Relevant (yes/no):  no  Other Observations: none    Neurological:    Motor deficit:  Decr Ant Tib Lt,   Reflexes:  NA  Sensory deficit:  NA  Dural signs:  Slump (-)    Movement Loss:   Edilberto Mod Min Nil Pain   Flexion    X    Extension  X      Side Gliding R    X    Side Gliding L    X      Test Movements:   During: produces, abolishes, increases, decreases, no effect, centralizing, peripheralizing   After: better, worse, no better, no worse, no effect, centralized, peripheralized    Pretest symptoms standing: no pain   Symptoms During Symptoms After ROM increased ROM decreased No Effect   FIS No Effect    No Effect         Rep FIS No Effect    No Effect      X   EIS No Effect    No Effect         Rep EIS No Effect    No Effect      X   Pretest symptoms lying: no pain    Symptoms During Symptoms After ROM increased ROM decreased No Effect   JOSE ALFREDO No Effect    No Effect         Rep JOSE ALFREDO No Effect    No Effect      X   EIL        Rep EIL          Static Tests:  Sitting slouched:  NE  Sitting erect:  NE - reports sitting up straight eventually causes incr pain.  Standing slouched NA  Standing erect:  NA  Lying prone in extension:  X1-2 min - aching across back, worse initially then NE  Long sitting:  NA    Other Tests:     Provisional Classification:  Inconclusive/Other - needs further assessment    Principle of Management:  Education:   Discussed posture, trying to sit erect and avoid slouching, discussed DOP, discussed peripheralisation and needing to D/C exer. And HEP   Equipment provided:  none  Mechanical therapy (Y/N):  ???   Extension principle:    Lateral Principle:    Flexion principle:  FISit 10x 6 x/day  Other:      ASSESSMENT/PLAN:    Patient is a 72 year old female with lumbar and sacral complaints.    Patient has the following significant findings with corresponding treatment plan.                Diagnosis 1:  Lt SIJ Pain  Pain -  hot/cold therapy, manual therapy, education, directional preference exercise and home program  Decreased ROM/flexibility - manual therapy, therapeutic exercise and home program  Decreased strength - therapeutic exercise, therapeutic activities and home program  Decreased function - therapeutic activities and home program  Impaired posture - neuro re-education and home program    Therapy Evaluation Codes:   1) History comprised of:   Personal factors that impact the plan of care:      None.    Comorbidity factors that impact the plan of care are:      High blood pressure, Implanted device, Osteoarthritis, Rheumatoid arthritis and Osteoporosis and Prosthetic leg Rt BK.     Medications impacting care: Bone density, High blood pressure and Steroids.  2) Examination of Body Systems comprised of:   Body structures and functions that impact the plan of care:      Lumbar spine.   Activity limitations that impact the plan of care are:      Standing and Walking.  3) Clinical presentation characteristics are:   Stable/Uncomplicated.  4) Decision-Making    Low complexity using standardized patient assessment instrument and/or measureable assessment of functional outcome.  Cumulative Therapy Evaluation is: Low complexity.    Previous and current functional limitations:  (See Goal Flow Sheet for this information)    Short term and Long term goals: (See Goal Flow Sheet for this information)     Communication ability:   Patient appears to be able to clearly communicate and understand verbal and written communication and follow directions correctly.  Treatment Explanation - The following has been discussed with the patient:   RX ordered/plan of care  Anticipated outcomes  Possible risks and side effects  This patient would benefit from PT intervention to resume normal activities.   Rehab potential is good.    Frequency:  1 X week, once daily  Duration:  for 6 weeks  Discharge Plan:  Achieve all LTG.  Independent in home treatment program.  Reach maximal therapeutic benefit.    Please refer to the daily flowsheet for treatment today, total treatment time and time spent performing 1:1 timed codes.

## 2018-06-13 ENCOUNTER — THERAPY VISIT (OUTPATIENT)
Dept: PHYSICAL THERAPY | Facility: CLINIC | Age: 73
End: 2018-06-13
Payer: MEDICARE

## 2018-06-13 DIAGNOSIS — M53.3 SACROILIAC JOINT PAIN: ICD-10-CM

## 2018-06-13 PROCEDURE — 97530 THERAPEUTIC ACTIVITIES: CPT | Mod: GP | Performed by: PHYSICAL THERAPY ASSISTANT

## 2018-06-13 PROCEDURE — 97110 THERAPEUTIC EXERCISES: CPT | Mod: GP | Performed by: PHYSICAL THERAPY ASSISTANT

## 2018-06-20 ENCOUNTER — THERAPY VISIT (OUTPATIENT)
Dept: PHYSICAL THERAPY | Facility: CLINIC | Age: 73
End: 2018-06-20
Payer: MEDICARE

## 2018-06-20 DIAGNOSIS — M53.3 SACROILIAC JOINT PAIN: ICD-10-CM

## 2018-06-20 PROCEDURE — 97110 THERAPEUTIC EXERCISES: CPT | Mod: GP | Performed by: PHYSICAL THERAPIST

## 2018-06-20 PROCEDURE — 97530 THERAPEUTIC ACTIVITIES: CPT | Mod: GP | Performed by: PHYSICAL THERAPIST

## 2018-07-06 ENCOUNTER — TELEPHONE (OUTPATIENT)
Dept: FAMILY MEDICINE | Facility: CLINIC | Age: 73
End: 2018-07-06

## 2018-07-06 NOTE — TELEPHONE ENCOUNTER
Forms received from: [facility]   Phone number listed: [phone]   Fax listed: [phone]  Date received: 07/06/2018  Form description: plan/updated plan of progress for outpatient rehabilitation  Once forms are completed, please return to [facility] via fax.  Form placed: in Providers folder  Georgina Ibrahim

## 2018-07-15 DIAGNOSIS — I10 HTN (HYPERTENSION): ICD-10-CM

## 2018-07-16 RX ORDER — METOPROLOL TARTRATE 50 MG
TABLET ORAL
Qty: 60 TABLET | Refills: 0 | Status: SHIPPED | OUTPATIENT
Start: 2018-07-16 | End: 2018-08-18

## 2018-07-26 ENCOUNTER — OFFICE VISIT (OUTPATIENT)
Dept: FAMILY MEDICINE | Facility: CLINIC | Age: 73
End: 2018-07-26
Payer: COMMERCIAL

## 2018-07-26 VITALS
SYSTOLIC BLOOD PRESSURE: 150 MMHG | TEMPERATURE: 97.4 F | WEIGHT: 113 LBS | OXYGEN SATURATION: 98 % | DIASTOLIC BLOOD PRESSURE: 80 MMHG | HEART RATE: 62 BPM | BODY MASS INDEX: 18.8 KG/M2

## 2018-07-26 DIAGNOSIS — M46.1 SACROILIITIS (H): Primary | ICD-10-CM

## 2018-07-26 DIAGNOSIS — H61.23 BILATERAL IMPACTED CERUMEN: ICD-10-CM

## 2018-07-26 PROCEDURE — 99213 OFFICE O/P EST LOW 20 MIN: CPT | Performed by: FAMILY MEDICINE

## 2018-07-26 RX ORDER — MELOXICAM 15 MG/1
15 TABLET ORAL DAILY
Qty: 14 TABLET | Refills: 0 | Status: SHIPPED | OUTPATIENT
Start: 2018-07-26 | End: 2019-04-11

## 2018-07-26 NOTE — MR AVS SNAPSHOT
After Visit Summary   7/26/2018    Sakshi Jaeger    MRN: [id number]           Patient Information     Date Of Birth          1945        Visit Information        Provider Department      7/26/2018 10:40 AM Javan Pepe MD [facility]        Today's Diagnoses     Sacroiliitis (H)    -  1    Bilateral impacted cerumen           Follow-ups after your visit        Additional Services     OTOLARYNGOLOGY REFERRAL       Your provider has referred you to: [facility]: [facility] [phone]   [email or web address]    Please be aware that coverage of these services is subject to the terms and limitations of your health insurance plan.  Call member services at your health plan with any benefit or coverage questions.      Please bring the following with you to your appointment:    (1) Any X-Rays, CTs or MRIs which have been performed.  Contact the facility where they were done to arrange for  prior to your scheduled appointment.   (2) List of current medications  (3) This referral request   (4) Any documents/labs given to you for this referral            PAIN INJECTION EVAL/TREAT/FOLLOW UP                 Who to contact     If you have questions or need follow up information about today's clinic visit or your schedule please contact [facility] directly at [phone].  Normal or non-critical lab and imaging results will be communicated to you by MyChart, letter or phone within 4 business days after the clinic has received the results. If you do not hear from us within 7 days, please contact the clinic through MyChart or phone. If you have a critical or abnormal lab result, we will notify you by phone as soon as possible.  Submit refill requests through [employer] or call your pharmacy and they will forward the refill request to us. Please allow 3 business days for your refill to be completed.          Additional  Information About Your Visit        Care EveryWhere ID     This is your Care EveryWhere ID. This could be used by other organizations to access your [place] medical records  [id number]        Your Vitals Were     Pulse Temperature Pulse Oximetry Breastfeeding? BMI (Body Mass Index)       62 97.4  F (36.3  C) (Oral) 98% No 18.8 kg/m2        Blood Pressure from Last 3 Encounters:   07/26/18 150/80   03/29/18 120/63   02/08/18 125/60    Weight from Last 3 Encounters:   07/26/18 113 lb (51.3 kg)   03/29/18 116 lb (52.6 kg)   12/04/17 114 lb (51.7 kg)              We Performed the Following     OTOLARYNGOLOGY REFERRAL     PAIN INJECTION EVAL/TREAT/FOLLOW UP          Today's Medication Changes          These changes are accurate as of 7/26/18 11:41 AM.  If you have any questions, ask your nurse or doctor.               Start taking these medicines.        Dose/Directions    meloxicam 15 MG tablet   Commonly known as:  MOBIC   Used for:  Sacroiliitis (H)   Started by:  Javan Pepe MD        Dose:  15 mg   Take 1 tablet (15 mg) by mouth daily   Quantity:  14 tablet   Refills:  0            Where to get your medicines      These medications were sent to [facility] [id number] [place], [place] - [address]  [address] [address]     Phone:  [phone]     meloxicam 15 MG tablet                Primary Care Provider Office Phone # Fax #    Javan Pepe [phone]       [address] [address]        Equal Access to Services     [facility]: Hadii aad ku hadashkoffi Sorachel, waaxda luqadaha, qaybta kaalmada francisco, ethel beatty. So [facility] [phone].    ATENCIÓN: Si habla español, tiene a smith disposición servicios gratuitos de asistencia lingüística. Llame al [phone].    We comply with applicable federal civil rights laws and [place] laws. We do not discriminate on the basis of race, color,  national origin, age, disability, sex, sexual orientation, or gender identity.            Thank you!     Thank you for choosing [facility]  for your care. Our goal is always to provide you with excellent care. Hearing back from our patients is one way we can continue to improve our services. Please take a few minutes to complete the written survey that you may receive in the mail after your visit with us. Thank you!             Your Updated Medication List - Protect others around you: Learn how to safely use, store and throw away your medicines at www.disposemymeds.org.          This list is accurate as of 7/26/18 11:41 AM.  Always use your most recent med list.                   Brand Name Dispense Instructions for use Diagnosis    alendronate 35 MG tablet    FOSAMAX    12 tablet    TAKE 1 TAB BY MOUTH WITH 8OZ OF WATER ONCE EVERY WEEK BEFORE BREAKFAST, REMAIN UPRIGHT AT THIS TIME    Senile osteoporosis       amLODIPine 5 MG tablet    NORVASC    90 tablet    TAKE 1 TABLET (5 MG) BY MOUTH DAILY    Essential hypertension with goal blood pressure less than 140/90       aspirin 81 MG tablet      Take 2 tablets by mouth daily.        CALCIUM 600 + D PO      2 daily        FLINTSTONES PLUS IRON PO      Take 1 tablet by mouth daily.        leflunomide 20 MG tablet    ARAVA     Take 20 mg by mouth daily.        lisinopril 20 MG tablet    PRINIVIL/ZESTRIL    90 tablet    TAKE 1 TABLET (20 MG) BY MOUTH DAILY    Essential hypertension with goal blood pressure less than 140/90       meloxicam 15 MG tablet    MOBIC    14 tablet    Take 1 tablet (15 mg) by mouth daily    Sacroiliitis (H)       methotrexate 2.5 MG tablet CHEMO     8 tablet    Take 8 tablets by mouth once a week.    Rheumatoid arthritis(714.0)       metoprolol tartrate 50 MG tablet    LOPRESSOR    60 tablet    TAKE 1 TABLET BY MOUTH 2 TIMES DAILY    HTN (hypertension)       niacin 500 MG tablet     100 tablet    Take 1 tablet by mouth At  Bedtime.    CAD (coronary artery disease)       nitroGLYcerin 0.4 MG sublingual tablet    NITROSTAT     0.4 mg by Sublingual route every 5 (five) minutes as needed.        * predniSONE 5 MG tablet    DELTASONE     1 TABLET DAILY        * predniSONE 20 MG tablet    DELTASONE    20 tablet    Take 3 tabs (60 mg) by mouth daily x 3 days, 2 tabs (40 mg) daily x 3 days, 1 tab (20 mg) daily x 3 days, then 1/2 tab (10 mg) x 3 days.    Acute pain of left shoulder, Sacroiliac joint pain       simvastatin 20 MG tablet    ZOCOR    90 tablet    Take 1 tablet (20 mg) by mouth At Bedtime    Coronary artery disease involving native coronary artery of native heart without angina pectoris, Hyperlipidemia LDL goal <100       * Notice:  This list has 2 medication(s) that are the same as other medications prescribed for you. Read the directions carefully, and ask your doctor or other care provider to review them with you.

## 2018-07-26 NOTE — PROGRESS NOTES
SUBJECTIVE:   Sakshi Jaeger is a 73 year old female who presents to clinic today for the following health issues:      Ears feel plugged    Would like to get information on Medical [facility].     O: /80  Pulse 62  Temp 97.4  F (36.3  C) (Oral)  Wt 113 lb (51.3 kg)  SpO2 98%  Breastfeeding? No  BMI 18.8 kg/m2    BP Readings from Last 3 Encounters:   07/26/18 150/80   03/29/18 120/63   02/08/18 125/60         Problem list and histories reviewed & adjusted, as indicated.        Patient would like referral for medical marijuana   She has chronic sacroiliac pain   She has not had anyone suggest this   I discussed the medical marijuana program.  I am not sure she qualifies is a candidate for it, but she does have a history of both rheumatoid arthritis and chronic pain from sacroiliitis is not responding to injections.  She can discuss this with the doctor who would certify her for this condition.  I discussed the cost involved and she does not think this would be something that she would want to really pursue at this point.    Patient has tried salon pas and this did not work   She tried using this for 4-6 times   She is using ibuprofen 600 mg every 8 hours     Exercises are not helping   She stopped her physical therapy since she did not think she was getting a benefit from this         O; /80  Pulse 62  Temp 97.4  F (36.3  C) (Oral)  Wt 113 lb (51.3 kg)  SpO2 98%  Breastfeeding? No  BMI 18.8 kg/m2    Lateral earwax.  She does not want these irrigated, and manually removing it did not work since the wax was so soft that when I would grab at it would just break into the chunks.      Patient has sacroiliac tenderness present in the left side    (M46.1) Sacroiliitis (H)  (primary encounter diagnosis)  Comment: Patient was told that she wants medical marijuana and she would have to get certified and she could check to see who is available to do that.  Plan: PAIN INJECTION EVAL/TREAT/FOLLOW UP,  meloxicam         (MOBIC) 15 MG tablet        Hopefully she will respond to the Mobic, she was told that if she was going to use this on a regular basis she would need to get creatinine and liver function tests done every 3 months.  I did suggest she follow-up for another pain injection see if this would improve her symptoms for longer.  It does appear that it was placed correctly and that she had a good response immediately after the injection.    (H61.23) Bilateral impacted cerumen  Comment: Follow-up with ENT for removal  Plan: OTOLARYNGOLOGY REFERRAL

## 2018-07-27 ENCOUNTER — TELEPHONE (OUTPATIENT)
Dept: PALLIATIVE MEDICINE | Facility: CLINIC | Age: 73
End: 2018-07-27

## 2018-07-27 NOTE — TELEPHONE ENCOUNTER
Left voicemail for patient to schedule SI Joint Injection.        Dilma BROOKS    [facility]

## 2018-08-02 NOTE — TELEPHONE ENCOUNTER
Left VM for patient to schedule SI joint injection.      Rashida HENDRICKS    [facility]

## 2018-08-18 DIAGNOSIS — I10 HTN (HYPERTENSION): ICD-10-CM

## 2018-08-21 RX ORDER — METOPROLOL TARTRATE 50 MG
TABLET ORAL
Qty: 60 TABLET | Refills: 0 | Status: SHIPPED | OUTPATIENT
Start: 2018-08-21 | End: 2018-09-19

## 2018-08-29 ENCOUNTER — TRANSFERRED RECORDS (OUTPATIENT)
Dept: HEALTH INFORMATION MANAGEMENT | Facility: CLINIC | Age: 73
End: 2018-08-29

## 2018-09-11 DIAGNOSIS — I10 ESSENTIAL HYPERTENSION WITH GOAL BLOOD PRESSURE LESS THAN 140/90: ICD-10-CM

## 2018-09-11 NOTE — TELEPHONE ENCOUNTER
"Requested Prescriptions   Pending Prescriptions Disp Refills     amLODIPine (NORVASC) 5 MG tablet [Pharmacy Med Name: AMLODIPINE BESYLATE 5 MG TAB] 90 tablet 3    Last Written Prescription Date:  9/28/17  Last Fill Quantity: 90,  # refills: 3   Last office visit: 7/26/2018 with prescribing provider:     Future Office Visit:     Sig: TAKE 1 TABLET (5 MG) BY MOUTH DAILY    Calcium Channel Blockers Protocol  Failed    9/11/2018  1:30 AM       Failed - Blood pressure under 140/90 in past 12 months    BP Readings from Last 3 Encounters:   07/26/18 150/80   03/29/18 120/63   02/08/18 125/60                Passed - Recent (12 mo) or future (30 days) visit within the authorizing provider's specialty    Patient had office visit in the last 12 months or has a visit in the next 30 days with authorizing provider or within the authorizing provider's specialty.  See \"Patient Info\" tab in inbasket, or \"Choose Columns\" in Meds & Orders section of the refill encounter.           Passed - Patient is age 18 or older       Passed - No active pregnancy on record       Passed - Normal serum creatinine on file in past 12 months    Recent Labs   Lab Test 04/12/18   CR  0.660            Passed - No positive pregnancy test in past 12 months        lisinopril (PRINIVIL/ZESTRIL) 20 MG tablet [Pharmacy Med Name: LISINOPRIL 20 MG TABLET] 90 tablet 3    Last Written Prescription Date:  9/28/17  Last Fill Quantity: 90,  # refills: 3   Last office visit: 7/26/2018 with prescribing provider:     Future Office Visit:     Sig: TAKE 1 TABLET (20 MG) BY MOUTH DAILY    ACE Inhibitors (Including Combos) Protocol Failed    9/11/2018  1:30 AM       Failed - Blood pressure under 140/90 in past 12 months    BP Readings from Last 3 Encounters:   07/26/18 150/80   03/29/18 120/63   02/08/18 125/60                Failed - Normal serum potassium on file in past 12 months    Recent Labs   Lab Test  02/28/17   1535   POTASSIUM  4.5            Passed - Recent (12 " "mo) or future (30 days) visit within the authorizing provider's specialty    Patient had office visit in the last 12 months or has a visit in the next 30 days with authorizing provider or within the authorizing provider's specialty.  See \"Patient Info\" tab in inbasket, or \"Choose Columns\" in Meds & Orders section of the refill encounter.           Passed - Patient is age 18 or older       Passed - No active pregnancy on record       Passed - Normal serum creatinine on file in past 12 months    Recent Labs   Lab Test 04/12/18   CR  0.660            Passed - No positive pregnancy test in past 12 months          "

## 2018-09-13 RX ORDER — LISINOPRIL 20 MG/1
TABLET ORAL
Qty: 90 TABLET | Refills: 0 | Status: SHIPPED | OUTPATIENT
Start: 2018-09-13 | End: 2018-12-12

## 2018-09-13 RX ORDER — AMLODIPINE BESYLATE 5 MG/1
TABLET ORAL
Qty: 90 TABLET | Refills: 0 | Status: SHIPPED | OUTPATIENT
Start: 2018-09-13 | End: 2018-12-12

## 2018-09-13 NOTE — TELEPHONE ENCOUNTER
Routing refill request to provider for review/approval because:  Labs not current  BP not at goal.    Routed to PCP.  Please send back if patient needs office visit.    Tiffanie Carmona RN [facility] Triage.

## 2018-09-19 DIAGNOSIS — I10 HTN (HYPERTENSION): ICD-10-CM

## 2018-09-20 RX ORDER — METOPROLOL TARTRATE 50 MG
TABLET ORAL
Qty: 60 TABLET | Refills: 0 | Status: SHIPPED | OUTPATIENT
Start: 2018-09-20 | End: 2018-12-20

## 2018-11-21 DIAGNOSIS — I10 HTN (HYPERTENSION): ICD-10-CM

## 2018-11-26 RX ORDER — METOPROLOL TARTRATE 50 MG
TABLET ORAL
Qty: 60 TABLET | Refills: 0 | OUTPATIENT
Start: 2018-11-26

## 2018-12-07 ENCOUNTER — TELEPHONE (OUTPATIENT)
Dept: FAMILY MEDICINE | Facility: CLINIC | Age: 73
End: 2018-12-07

## 2018-12-07 NOTE — TELEPHONE ENCOUNTER
Forms received from: [employer]    Phone number listed: [phone]   Fax listed: [phone]  Date received: 12/07/2018  Form description: detailed written order and letter of medical necessity  Once forms are completed, please return to [facility] via fax.  Form placed: in providers folder  Georgina Ibrahim

## 2018-12-12 DIAGNOSIS — I10 ESSENTIAL HYPERTENSION WITH GOAL BLOOD PRESSURE LESS THAN 140/90: ICD-10-CM

## 2018-12-12 NOTE — TELEPHONE ENCOUNTER
"Requested Prescriptions   Pending Prescriptions Disp Refills     amLODIPine (NORVASC) 5 MG tablet [Pharmacy Med Name: AMLODIPINE BESYLATE 5 MG TAB] 90 tablet 0    Last Written Prescription Date:  9-13-18  Last Fill Quantity: 90,  # refills: 0   Last office visit: 7/26/2018 with prescribing provider:  7-26-18   Future Office Visit:     Sig: TAKE 1 TABLET (5 MG) BY MOUTH DAILY    Calcium Channel Blockers Protocol  Failed - 12/12/2018  2:13 AM       Failed - Blood pressure under 140/90 in past 12 months    BP Readings from Last 3 Encounters:   07/26/18 150/80   03/29/18 120/63   02/08/18 125/60                Passed - Recent (12 mo) or future (30 days) visit within the authorizing provider's specialty    Patient had office visit in the last 12 months or has a visit in the next 30 days with authorizing provider or within the authorizing provider's specialty.  See \"Patient Info\" tab in inbasket, or \"Choose Columns\" in Meds & Orders section of the refill encounter.             Passed - Patient is age 18 or older       Passed - No active pregnancy on record       Passed - Normal serum creatinine on file in past 12 months    Recent Labs   Lab Test 04/12/18   CR 0.660            Passed - No positive pregnancy test in past 12 months        lisinopril (PRINIVIL/ZESTRIL) 20 MG tablet [Pharmacy Med Name: LISINOPRIL 20 MG TABLET] 90 tablet 0     Sig: TAKE 1 TABLET (20 MG) BY MOUTH DAILY    ACE Inhibitors (Including Combos) Protocol Failed - 12/12/2018  2:13 AM       Failed - Blood pressure under 140/90 in past 12 months    BP Readings from Last 3 Encounters:   07/26/18 150/80   03/29/18 120/63   02/08/18 125/60                Failed - Normal serum potassium on file in past 12 months    Recent Labs   Lab Test 02/28/17  1535   POTASSIUM 4.5            Passed - Recent (12 mo) or future (30 days) visit within the authorizing provider's specialty    Patient had office visit in the last 12 months or has a visit in the next 30 days with " "authorizing provider or within the authorizing provider's specialty.  See \"Patient Info\" tab in inbasket, or \"Choose Columns\" in Meds & Orders section of the refill encounter.             Passed - Patient is age 18 or older       Passed - No active pregnancy on record       Passed - Normal serum creatinine on file in past 12 months    Recent Labs   Lab Test 04/12/18   CR 0.660            Passed - No positive pregnancy test in past 12 months          "

## 2018-12-12 NOTE — TELEPHONE ENCOUNTER
Routing refill request to provider for review/approval because:  Failed protocol: see below     Stefania Thomason RN

## 2018-12-14 DIAGNOSIS — E78.5 HYPERLIPIDEMIA LDL GOAL <100: ICD-10-CM

## 2018-12-14 DIAGNOSIS — I25.10 CORONARY ARTERY DISEASE INVOLVING NATIVE CORONARY ARTERY OF NATIVE HEART WITHOUT ANGINA PECTORIS: ICD-10-CM

## 2018-12-14 RX ORDER — AMLODIPINE BESYLATE 5 MG/1
TABLET ORAL
Qty: 90 TABLET | Refills: 0 | Status: SHIPPED | OUTPATIENT
Start: 2018-12-14 | End: 2019-03-14

## 2018-12-14 RX ORDER — SIMVASTATIN 20 MG
TABLET ORAL
Qty: 90 TABLET | Refills: 1 | Status: SHIPPED | OUTPATIENT
Start: 2018-12-14 | End: 2019-04-11

## 2018-12-14 RX ORDER — LISINOPRIL 20 MG/1
TABLET ORAL
Qty: 90 TABLET | Refills: 0 | Status: SHIPPED | OUTPATIENT
Start: 2018-12-14 | End: 2019-03-16

## 2018-12-14 NOTE — TELEPHONE ENCOUNTER
Patient is due for fasting lipid profile   Ordered   Please call patient to let her know to have this done.

## 2018-12-14 NOTE — TELEPHONE ENCOUNTER
"Requested Prescriptions   Pending Prescriptions Disp Refills     simvastatin (ZOCOR) 20 MG tablet [Pharmacy Med Name: SIMVASTATIN 20 MG TABLET] 90 tablet 1    Last Written Prescription Date:  12-4-17  Last Fill Quantity: 90,  # refills: 3   Last office visit: 7/26/2018 with prescribing provider:  12-4-17   Future Office Visit:     Sig: TAKE 1 TABLET BY MOUTH DAILY AT BEDTIME    Statins Protocol Failed - 12/14/2018  1:52 AM       Failed - LDL on file in past 12 months    Recent Labs   Lab Test 06/08/16  1418   LDL 75            Passed - No abnormal creatine kinase in past 12 months    No lab results found.            Passed - Recent (12 mo) or future (30 days) visit within the authorizing provider's specialty    Patient had office visit in the last 12 months or has a visit in the next 30 days with authorizing provider or within the authorizing provider's specialty.  See \"Patient Info\" tab in inbasket, or \"Choose Columns\" in Meds & Orders section of the refill encounter.             Passed - Patient is age 18 or older       Passed - No active pregnancy on record       Passed - No positive pregnancy test in past 12 months          "

## 2018-12-19 DIAGNOSIS — I10 HTN (HYPERTENSION): ICD-10-CM

## 2018-12-19 DIAGNOSIS — I10 HYPERTENSION GOAL BP (BLOOD PRESSURE) < 140/90: Primary | ICD-10-CM

## 2018-12-19 NOTE — TELEPHONE ENCOUNTER
"Requested Prescriptions   Pending Prescriptions Disp Refills     metoprolol tartrate (LOPRESSOR) 50 MG tablet  Last Written Prescription Date:  9-20-18  Last Fill Quantity: 60,  # refills: 0   Last office visit: 7/26/2018 with prescribing provider:  7-26-18   Future Office Visit:     60 tablet 0    Beta-Blockers Protocol Failed - 12/19/2018  8:34 AM       Failed - Blood pressure under 140/90 in past 12 months    BP Readings from Last 3 Encounters:   07/26/18 150/80   03/29/18 120/63   02/08/18 125/60                Passed - Patient is age 6 or older       Passed - Recent (12 mo) or future (30 days) visit within the authorizing provider's specialty    Patient had office visit in the last 12 months or has a visit in the next 30 days with authorizing provider or within the authorizing provider's specialty.  See \"Patient Info\" tab in inbasket, or \"Choose Columns\" in Meds & Orders section of the refill encounter.                "

## 2018-12-19 NOTE — TELEPHONE ENCOUNTER
Requested Prescriptions   Pending Prescriptions Disp Refills     metoprolol tartrate (LOPRESSOR) 50 MG tablet 60 tablet 0    There is no refill protocol information for this order   Last Written Prescription Date:  9-20-18  Last Fill Quantity: 60,  # refills: 0   Last office visit: 7/26/2018 with prescribing provider:  7-26-18   Future Office Visit:

## 2018-12-19 NOTE — TELEPHONE ENCOUNTER
Routing refill request to provider for review/approval because:  RX Protocol Failed.  Please review refill.  Thank you.  Claritza Peña RN

## 2018-12-20 RX ORDER — METOPROLOL TARTRATE 50 MG
TABLET ORAL
Qty: 60 TABLET | Refills: 1 | Status: SHIPPED | OUTPATIENT
Start: 2018-12-20 | End: 2019-04-11

## 2018-12-24 DIAGNOSIS — E78.5 HYPERLIPIDEMIA LDL GOAL <100: ICD-10-CM

## 2018-12-24 DIAGNOSIS — I25.10 CORONARY ARTERY DISEASE INVOLVING NATIVE CORONARY ARTERY OF NATIVE HEART WITHOUT ANGINA PECTORIS: ICD-10-CM

## 2018-12-24 LAB
CHOLEST SERPL-MCNC: 144 MG/DL
HDLC SERPL-MCNC: 36 MG/DL
LDLC SERPL CALC-MCNC: 84 MG/DL
NONHDLC SERPL-MCNC: 108 MG/DL
TRIGL SERPL-MCNC: 120 MG/DL

## 2018-12-24 PROCEDURE — 36415 COLL VENOUS BLD VENIPUNCTURE: CPT | Performed by: FAMILY MEDICINE

## 2018-12-24 PROCEDURE — 80061 LIPID PANEL: CPT | Performed by: FAMILY MEDICINE

## 2018-12-24 NOTE — LETTER
[facility]   [address] [place]  [address]  [phone]                                   December 27, 2018    Sakshi Jaeger  [address] [id number]        Dear Sakshi,    Your cholesterols are unchanged   Continue on same medications    Results for orders placed or performed in visit on 12/24/18   Lipid panel reflex to direct LDL Fasting   Result Value Ref Range    Cholesterol 144 <200 mg/dL    Triglycerides 120 <150 mg/dL    HDL Cholesterol 36 (L) >49 mg/dL    LDL Cholesterol Calculated 84 <100 mg/dL    Non HDL Cholesterol 108 <130 mg/dL       If you have any questions please call the clinic at [phone]    Sincerely,    Javan Pepe MD  bmd

## 2019-01-07 ENCOUNTER — TRANSFERRED RECORDS (OUTPATIENT)
Dept: HEALTH INFORMATION MANAGEMENT | Facility: CLINIC | Age: 74
End: 2019-01-07

## 2019-01-24 ENCOUNTER — OFFICE VISIT (OUTPATIENT)
Dept: OTOLARYNGOLOGY | Facility: CLINIC | Age: 74
End: 2019-01-24
Payer: COMMERCIAL

## 2019-01-24 VITALS
BODY MASS INDEX: 19.14 KG/M2 | HEART RATE: 63 BPM | WEIGHT: 115 LBS | RESPIRATION RATE: 12 BRPM | SYSTOLIC BLOOD PRESSURE: 133 MMHG | DIASTOLIC BLOOD PRESSURE: 66 MMHG | OXYGEN SATURATION: 100 %

## 2019-01-24 DIAGNOSIS — H93.8X3 EAR FULLNESS, BILATERAL: ICD-10-CM

## 2019-01-24 DIAGNOSIS — H61.23 BILATERAL IMPACTED CERUMEN: Primary | ICD-10-CM

## 2019-01-24 PROCEDURE — 99203 OFFICE O/P NEW LOW 30 MIN: CPT | Mod: 25 | Performed by: OTOLARYNGOLOGY

## 2019-01-24 PROCEDURE — 69210 REMOVE IMPACTED EAR WAX UNI: CPT | Performed by: OTOLARYNGOLOGY

## 2019-01-24 NOTE — LETTER
1/24/2019         RE: Sakshi Jaeger  [address] [facility] [address]        Dear Colleague,    Thank you for referring your patient, Sakshi Jaeger, to the [facility]. Please see a copy of my visit note below.    History of Present Illness - Sakshi Jaeger is a 73 year old female here to see me for the first time at the consult of Dr. OSMAN for an ear issue.    She tells me that she has had issues with ear wax, more on the RIGHT than LEFT, and for many years Dr. OSMAN has helped her with alligators or irrigations.  However, over the last year it seems much more tenacious and sticky.  The other day the LEFT ear got some water in it from the shower and has been totally clogged since then.    No previous ear surgery, no chronic ear disease, no bleeding or drainage from the ears.    Past Medical History -   Patient Active Problem List   Diagnosis     Osteoporosis     Rheumatoid arthritis (H)     Iron deficiency anemia     Hyperlipidemia LDL goal <100     Advanced directives, counseling/discussion     Status post below knee amputation (H)     Employs prosthetic leg     Essential hypertension with goal blood pressure less than 140/90     Coronary artery disease involving native coronary artery of native heart without angina pectoris     Sacroiliac joint pain       Current Medications -   Current Outpatient Medications:      alendronate (FOSAMAX) 35 MG tablet, TAKE 1 TAB BY MOUTH WITH 8OZ OF WATER ONCE EVERY WEEK BEFORE BREAKFAST, REMAIN UPRIGHT AT THIS TIME, Disp: 12 tablet, Rfl: 2     amLODIPine (NORVASC) 5 MG tablet, TAKE 1 TABLET (5 MG) BY MOUTH DAILY, Disp: 90 tablet, Rfl: 0     aspirin 81 MG tablet, Take 2 tablets by mouth daily., Disp: , Rfl: 3     CALCIUM 600 + D OR, 2 daily, Disp: , Rfl:      leflunomide (ARAVA) 20 MG tablet, Take 20 mg by mouth daily., Disp: , Rfl:      lisinopril (PRINIVIL/ZESTRIL) 20 MG tablet, TAKE 1 TABLET (20 MG) BY MOUTH DAILY, Disp: 90 tablet, Rfl: 0     meloxicam  (MOBIC) 15 MG tablet, Take 1 tablet (15 mg) by mouth daily, Disp: 14 tablet, Rfl: 0     METHOTREXate 2.5 MG tablet, Take 8 tablets by mouth once a week., Disp: 8 tablet, Rfl: 0     metoprolol tartrate (LOPRESSOR) 50 MG tablet, TAKE 1 TABLET BY MOUTH TWICE A DAY. PATIENT NEEDS APPOINTMENT FOR FUTURE REFILLS., Disp: 60 tablet, Rfl: 1     niacin 500 MG tablet, Take 1 tablet by mouth At Bedtime., Disp: 100 tablet, Rfl: 6     nitroglycerin (NITROSTAT) 0.4 MG SL tablet, 0.4 mg by Sublingual route every 5 (five) minutes as needed., Disp: , Rfl:      Pediatric Multivitamins-Iron (FLINTSTONES PLUS IRON PO), Take 1 tablet by mouth daily., Disp: , Rfl:      predniSONE (DELTASONE) 20 MG tablet, Take 3 tabs (60 mg) by mouth daily x 3 days, 2 tabs (40 mg) daily x 3 days, 1 tab (20 mg) daily x 3 days, then 1/2 tab (10 mg) x 3 days., Disp: 20 tablet, Rfl: 0     PREDNISONE 5 MG OR TABS, 1 TABLET DAILY, Disp: , Rfl: 0     simvastatin (ZOCOR) 20 MG tablet, TAKE 1 TABLET BY MOUTH DAILY AT BEDTIME, Disp: 90 tablet, Rfl: 1    Allergies -   Allergies   Allergen Reactions     Penicillins Hives       Social History -   Social History     Socioeconomic History     Marital status: Single     Spouse name: None     Number of children: None     Years of education: None     Highest education level: None   Social Needs     Financial resource strain: None     Food insecurity - worry: None     Food insecurity - inability: None     Transportation needs - medical: None     Transportation needs - non-medical: None   Occupational History     None   Tobacco Use     Smoking status: Former Smoker     Packs/day: 0.50     Years: 45.00     Pack years: 22.50     Types: Cigarettes     Last attempt to quit: 2010     Years since quittin.9     Smokeless tobacco: Never Used   Substance and Sexual Activity     Alcohol use: Yes     Comment: occsionally-3 -4 drinks a week     Drug use: No     Sexual activity: Yes     Partners: Male   Other Topics Concern      Parent/sibling w/ CABG, MI or angioplasty before 65F 55M? No   Social History Narrative     None       Family History -   Family History   Problem Relation Age of Onset     Cardiovascular Mother      Cancer Brother      Diabetes No family hx of      Hypertension No family hx of      Cerebrovascular Disease No family hx of      Alzheimer Disease No family hx of      Thyroid Disease No family hx of      Respiratory No family hx of        Review of Systems - As per HPI and PMHx, otherwise 10+ system review of the head and neck, and general constitution is negative.    Physical Exam  B/P: 133/66, T: Data Unavailable, P: 63, R: 12  Vitals: /66   Pulse 63   Resp 12   Wt 52.2 kg (115 lb)   SpO2 100%   BMI 19.14 kg/m     BMI= Body mass index is 19.14 kg/m .    General - The patient is well nourished and well developed, and appears to have good nutritional status.  Alert and oriented to person and place, answers questions and cooperates with examination appropriately.   Head and Face - Normocephalic and atraumatic, with no gross asymmetry noted of the contour of the facial features.  The facial nerve is intact, with strong symmetric movements.  Voice and Breathing - The patient was breathing comfortably without the use of accessory muscles. There was no wheezing, stridor, or stertor.  The patients voice was clear and strong, and had appropriate pitch and quality.  Eyes - Extraocular movements intact, and the pupils were reactive to light.  Sclera were not icteric or injected, conjunctiva were pink and moist.  Mouth - Examination of the oral cavity showed pink, healthy oral mucosa. No lesions or ulcerations noted.  The tongue was mobile and midline, and the dentition were in good condition.    Throat - The walls of the oropharynx were smooth, pink, moist, symmetric, and had no lesions or ulcerations.  The tonsillar pillars and soft palate were symmetric.  The uvula was midline on elevation.    Neck - Normal midline  excursion of the laryngotracheal complex during swallowing.  Full range of motion on passive movement.  Palpation of the occipital, submental, submandibular, internal jugular chain, and supraclavicular nodes did not demonstrate any abnormal lymph nodes or masses.  The carotid pulse was palpable bilaterally.  Palpation of the thyroid was soft and smooth, with no nodules or goiter appreciated.  The trachea was mobile and midline.  Nose - External contour is symmetric, no gross deflection or scars.  Nasal mucosa is pink and moist with no abnormal mucus.  The septum was midline and non-obstructive, turbinates of normal size and position.  No polyps, masses, or purulence noted on examination.    Cerumen Removal    Physical Exam and Procedure  Ears - On examination of the ears, I found that the BOTH sides had cerumen impaction.  Therefore, I positioned them in the examination chair in a semi-supine position, beginning with the right side.  I used the binocular surgical microscope to perform cerumen removal.  I began by using a cerumen loop to gently lift the edges of the cerumen mass away from the walls of the external canal.  Once I did this, I was able to suction away fragments of wax and debris using suction.  Once the mass was loose enough, the entire plug was pulled from the canal.  The tympanic membrane was intact, no sign of perforation or middle ear effusion.    I turned my attention to the contralateral side once again using the binocular surgical microscope to perform cerumen removal.  I began by using a cerumen loop to gently lift the edges of the cerumen mass away from the walls of the external canal.  Once I did this, I was able to suction away fragments of wax and debris using suction.  Once the mass was loose enough, the entire plug was pulled from the canal.  The tympanic membrane was intact, no sign of perforation or middle ear effusion.        A/P - Sakshi Jaeger is a 73 year old female  (H61.23)  Bilateral impacted cerumen  (primary encounter diagnosis)  (H93.8X3) Ear fullness, bilateral    The patient has had their cerumen procedurally removed today.  I have discussed ear care at home, including avoiding qtips or any other object placed in the canal.  I have also discussed that over the counter cerumen kits like Debrox or Cerumenex can be useful.    If no other issues, follow up with me in four months, to make sure that her exceptional ear wax today was just a fluke, and not hypercerumenosis.      Again, thank you for allowing me to participate in the care of your patient.        Sincerely,        Pito Mosher MD

## 2019-01-24 NOTE — PROGRESS NOTES
History of Present Illness - Sakshi Jaeger is a 73 year old female here to see me for the first time at the consult of Dr. OSMAN for an ear issue.    She tells me that she has had issues with ear wax, more on the RIGHT than LEFT, and for many years Dr. OSMAN has helped her with alligators or irrigations.  However, over the last year it seems much more tenacious and sticky.  The other day the LEFT ear got some water in it from the shower and has been totally clogged since then.    No previous ear surgery, no chronic ear disease, no bleeding or drainage from the ears.    Past Medical History -   Patient Active Problem List   Diagnosis     Osteoporosis     Rheumatoid arthritis (H)     Iron deficiency anemia     Hyperlipidemia LDL goal <100     Advanced directives, counseling/discussion     Status post below knee amputation (H)     Employs prosthetic leg     Essential hypertension with goal blood pressure less than 140/90     Coronary artery disease involving native coronary artery of native heart without angina pectoris     Sacroiliac joint pain       Current Medications -   Current Outpatient Medications:      alendronate (FOSAMAX) 35 MG tablet, TAKE 1 TAB BY MOUTH WITH 8OZ OF WATER ONCE EVERY WEEK BEFORE BREAKFAST, REMAIN UPRIGHT AT THIS TIME, Disp: 12 tablet, Rfl: 2     amLODIPine (NORVASC) 5 MG tablet, TAKE 1 TABLET (5 MG) BY MOUTH DAILY, Disp: 90 tablet, Rfl: 0     aspirin 81 MG tablet, Take 2 tablets by mouth daily., Disp: , Rfl: 3     CALCIUM 600 + D OR, 2 daily, Disp: , Rfl:      leflunomide (ARAVA) 20 MG tablet, Take 20 mg by mouth daily., Disp: , Rfl:      lisinopril (PRINIVIL/ZESTRIL) 20 MG tablet, TAKE 1 TABLET (20 MG) BY MOUTH DAILY, Disp: 90 tablet, Rfl: 0     meloxicam (MOBIC) 15 MG tablet, Take 1 tablet (15 mg) by mouth daily, Disp: 14 tablet, Rfl: 0     METHOTREXate 2.5 MG tablet, Take 8 tablets by mouth once a week., Disp: 8 tablet, Rfl: 0     metoprolol tartrate (LOPRESSOR) 50 MG tablet, TAKE 1 TABLET BY  MOUTH TWICE A DAY. PATIENT NEEDS APPOINTMENT FOR FUTURE REFILLS., Disp: 60 tablet, Rfl: 1     niacin 500 MG tablet, Take 1 tablet by mouth At Bedtime., Disp: 100 tablet, Rfl: 6     nitroglycerin (NITROSTAT) 0.4 MG SL tablet, 0.4 mg by Sublingual route every 5 (five) minutes as needed., Disp: , Rfl:      Pediatric Multivitamins-Iron (FLINTSTONES PLUS IRON PO), Take 1 tablet by mouth daily., Disp: , Rfl:      predniSONE (DELTASONE) 20 MG tablet, Take 3 tabs (60 mg) by mouth daily x 3 days, 2 tabs (40 mg) daily x 3 days, 1 tab (20 mg) daily x 3 days, then 1/2 tab (10 mg) x 3 days., Disp: 20 tablet, Rfl: 0     PREDNISONE 5 MG OR TABS, 1 TABLET DAILY, Disp: , Rfl: 0     simvastatin (ZOCOR) 20 MG tablet, TAKE 1 TABLET BY MOUTH DAILY AT BEDTIME, Disp: 90 tablet, Rfl: 1    Allergies -   Allergies   Allergen Reactions     Penicillins Hives       Social History -   Social History     Socioeconomic History     Marital status: Single     Spouse name: None     Number of children: None     Years of education: None     Highest education level: None   Social Needs     Financial resource strain: None     Food insecurity - worry: None     Food insecurity - inability: None     Transportation needs - medical: None     Transportation needs - non-medical: None   Occupational History     None   Tobacco Use     Smoking status: Former Smoker     Packs/day: 0.50     Years: 45.00     Pack years: 22.50     Types: Cigarettes     Last attempt to quit: 2010     Years since quittin.9     Smokeless tobacco: Never Used   Substance and Sexual Activity     Alcohol use: Yes     Comment: occsionally-3 -4 drinks a week     Drug use: No     Sexual activity: Yes     Partners: Male   Other Topics Concern     Parent/sibling w/ CABG, MI or angioplasty before 65F 55M? No   Social History Narrative     None       Family History -   Family History   Problem Relation Age of Onset     Cardiovascular Mother      Cancer Brother      Diabetes No family hx of       Hypertension No family hx of      Cerebrovascular Disease No family hx of      Alzheimer Disease No family hx of      Thyroid Disease No family hx of      Respiratory No family hx of        Review of Systems - As per HPI and PMHx, otherwise 10+ system review of the head and neck, and general constitution is negative.    Physical Exam  B/P: 133/66, T: Data Unavailable, P: 63, R: 12  Vitals: /66   Pulse 63   Resp 12   Wt 52.2 kg (115 lb)   SpO2 100%   BMI 19.14 kg/m    BMI= Body mass index is 19.14 kg/m .    General - The patient is well nourished and well developed, and appears to have good nutritional status.  Alert and oriented to person and place, answers questions and cooperates with examination appropriately.   Head and Face - Normocephalic and atraumatic, with no gross asymmetry noted of the contour of the facial features.  The facial nerve is intact, with strong symmetric movements.  Voice and Breathing - The patient was breathing comfortably without the use of accessory muscles. There was no wheezing, stridor, or stertor.  The patients voice was clear and strong, and had appropriate pitch and quality.  Eyes - Extraocular movements intact, and the pupils were reactive to light.  Sclera were not icteric or injected, conjunctiva were pink and moist.  Mouth - Examination of the oral cavity showed pink, healthy oral mucosa. No lesions or ulcerations noted.  The tongue was mobile and midline, and the dentition were in good condition.    Throat - The walls of the oropharynx were smooth, pink, moist, symmetric, and had no lesions or ulcerations.  The tonsillar pillars and soft palate were symmetric.  The uvula was midline on elevation.    Neck - Normal midline excursion of the laryngotracheal complex during swallowing.  Full range of motion on passive movement.  Palpation of the occipital, submental, submandibular, internal jugular chain, and supraclavicular nodes did not demonstrate any abnormal  lymph nodes or masses.  The carotid pulse was palpable bilaterally.  Palpation of the thyroid was soft and smooth, with no nodules or goiter appreciated.  The trachea was mobile and midline.  Nose - External contour is symmetric, no gross deflection or scars.  Nasal mucosa is pink and moist with no abnormal mucus.  The septum was midline and non-obstructive, turbinates of normal size and position.  No polyps, masses, or purulence noted on examination.    Cerumen Removal    Physical Exam and Procedure  Ears - On examination of the ears, I found that the BOTH sides had cerumen impaction.  Therefore, I positioned them in the examination chair in a semi-supine position, beginning with the right side.  I used the binocular surgical microscope to perform cerumen removal.  I began by using a cerumen loop to gently lift the edges of the cerumen mass away from the walls of the external canal.  Once I did this, I was able to suction away fragments of wax and debris using suction.  Once the mass was loose enough, the entire plug was pulled from the canal.  The tympanic membrane was intact, no sign of perforation or middle ear effusion.    I turned my attention to the contralateral side once again using the binocular surgical microscope to perform cerumen removal.  I began by using a cerumen loop to gently lift the edges of the cerumen mass away from the walls of the external canal.  Once I did this, I was able to suction away fragments of wax and debris using suction.  Once the mass was loose enough, the entire plug was pulled from the canal.  The tympanic membrane was intact, no sign of perforation or middle ear effusion.        A/P - Sakshi Jaeger is a 73 year old female  (H61.23) Bilateral impacted cerumen  (primary encounter diagnosis)  (H93.8X3) Ear fullness, bilateral    The patient has had their cerumen procedurally removed today.  I have discussed ear care at home, including avoiding qtips or any other object placed  in the canal.  I have also discussed that over the counter cerumen kits like Debrox or Cerumenex can be useful.    If no other issues, follow up with me in four months, to make sure that her exceptional ear wax today was just a fluke, and not hypercerumenosis.

## 2019-01-24 NOTE — PATIENT INSTRUCTIONS
Scheduling Information  To schedule your CT/MRI scan, please contact [facility] at [phone] OR [facility] at [phone]    To schedule your Surgery, please contact our Specialty Schedulers at [phone]      ENT Clinic Locations Clinic Hours Telephone Number     [facility]  [address] [place] [address]   Monday:           1:00pm -- 5:00pm    Friday:              8:00am - 12:00pm   To schedule/reschedule an appointment with   Dr. Mosher,   please contact our   Specialty Scheduling Department at:     [phone]       [facility]  [address] [place] [address] Tuesday:          8:00am -- 2:00pm         Urgent Care Locations Clinic Hours Telephone Numbers     [facility]  [address] [place] [address]     Monday-Friday:     11:00am - 9:00pm    Saturday-Sunday:  9:00am - 5:00pm   [phone]     [facility]  [address] [place] [address]     Monday-Friday:      5:00pm - 9:00pm     Saturday-Sunday:  9:00am - 5:00pm   [phone]

## 2019-03-12 ENCOUNTER — MEDICAL CORRESPONDENCE (OUTPATIENT)
Dept: HEALTH INFORMATION MANAGEMENT | Facility: CLINIC | Age: 74
End: 2019-03-12

## 2019-03-14 DIAGNOSIS — I10 ESSENTIAL HYPERTENSION WITH GOAL BLOOD PRESSURE LESS THAN 140/90: ICD-10-CM

## 2019-03-14 NOTE — TELEPHONE ENCOUNTER
"Requested Prescriptions   Pending Prescriptions Disp Refills     amLODIPine (NORVASC) 5 MG tablet [Pharmacy Med Name: AMLODIPINE BESYLATE 5 MG TAB] 90 tablet 0    Last Written Prescription Date:  12-14-18  Last Fill Quantity: 90,  # refills: 0   Last office visit: 7/26/2018 with prescribing provider:  7-26-18   Future Office Visit:     Sig: TAKE 1 TABLET (5 MG) BY MOUTH DAILY    Calcium Channel Blockers Protocol  Passed - 3/14/2019 12:32 PM       Passed - Blood pressure under 140/90 in past 12 months    BP Readings from Last 3 Encounters:   01/24/19 133/66   07/26/18 150/80   03/29/18 120/63                Passed - Recent (12 mo) or future (30 days) visit within the authorizing provider's specialty    Patient had office visit in the last 12 months or has a visit in the next 30 days with authorizing provider or within the authorizing provider's specialty.  See \"Patient Info\" tab in inbasket, or \"Choose Columns\" in Meds & Orders section of the refill encounter.             Passed - Medication is active on med list       Passed - Patient is age 18 or older       Passed - No active pregnancy on record       Passed - Normal serum creatinine on file in past 12 months    Recent Labs   Lab Test 04/12/18   CR 0.660            Passed - No positive pregnancy test in past 12 months          "

## 2019-03-15 RX ORDER — AMLODIPINE BESYLATE 5 MG/1
TABLET ORAL
Qty: 90 TABLET | Refills: 0 | Status: SHIPPED | OUTPATIENT
Start: 2019-03-15 | End: 2019-04-11

## 2019-03-16 DIAGNOSIS — I10 ESSENTIAL HYPERTENSION WITH GOAL BLOOD PRESSURE LESS THAN 140/90: ICD-10-CM

## 2019-03-18 NOTE — TELEPHONE ENCOUNTER
"Requested Prescriptions   Pending Prescriptions Disp Refills     lisinopril (PRINIVIL/ZESTRIL) 20 MG tablet [Pharmacy Med Name: LISINOPRIL 20 MG TABLET] 90 tablet 0    Last Written Prescription Date:  12/14/18  Last Fill Quantity: 90,  # refills: 0   Last office visit: 7/26/2018 with prescribing provider:     Future Office Visit:     Sig: TAKE 1 TABLET (20 MG) BY MOUTH DAILY    ACE Inhibitors (Including Combos) Protocol Failed - 3/16/2019  8:29 AM       Failed - Normal serum potassium on file in past 12 months    Recent Labs   Lab Test 02/28/17  1535   POTASSIUM 4.5            Passed - Blood pressure under 140/90 in past 12 months    BP Readings from Last 3 Encounters:   01/24/19 133/66   07/26/18 150/80   03/29/18 120/63                Passed - Recent (12 mo) or future (30 days) visit within the authorizing provider's specialty    Patient had office visit in the last 12 months or has a visit in the next 30 days with authorizing provider or within the authorizing provider's specialty.  See \"Patient Info\" tab in inbasket, or \"Choose Columns\" in Meds & Orders section of the refill encounter.             Passed - Medication is active on med list       Passed - Patient is age 18 or older       Passed - No active pregnancy on record       Passed - Normal serum creatinine on file in past 12 months    Recent Labs   Lab Test 04/12/18   CR 0.660            Passed - No positive pregnancy test within past 12 months          "

## 2019-03-19 DIAGNOSIS — M81.0 SENILE OSTEOPOROSIS: ICD-10-CM

## 2019-03-19 RX ORDER — LISINOPRIL 20 MG/1
TABLET ORAL
Qty: 90 TABLET | Refills: 0 | Status: SHIPPED | OUTPATIENT
Start: 2019-03-19 | End: 2019-04-11

## 2019-03-19 NOTE — TELEPHONE ENCOUNTER
"Requested Prescriptions   Pending Prescriptions Disp Refills     alendronate (FOSAMAX) 35 MG tablet [Pharmacy Med Name: ALENDRONATE SODIUM 35 MG TAB] 12 tablet 2    Last Written Prescription Date:  6/7/18  Last Fill Quantity: 12,  # refills: 2   Last office visit: 7/26/2018 with prescribing provider:     Future Office Visit:     Sig: TAKE 1 TAB BY MOUTH WITH 8OZ OF WATER ONCE EVERY WEEK BEFORE BREAKFAST, REMAIN UPRIGHT AT THIS TIME    Bisphosphonates Passed - 3/19/2019  1:15 AM       Passed - Recent (12 mo) or future (30 days) visit within the authorizing provider's specialty    Patient had office visit in the last 12 months or has a visit in the next 30 days with authorizing provider or within the authorizing provider's specialty.  See \"Patient Info\" tab in inbasket, or \"Choose Columns\" in Meds & Orders section of the refill encounter.             Passed - Dexa on file within past 2 years    Please review last Dexa result.          Passed - Medication is active on med list       Passed - Patient is age 18 or older       Passed - Normal serum creatinine on file within past 12 months    Recent Labs   Lab Test 04/12/18   CR 0.660               "

## 2019-03-20 RX ORDER — ALENDRONATE SODIUM 35 MG/1
TABLET ORAL
Qty: 12 TABLET | Refills: 2 | Status: ON HOLD | OUTPATIENT
Start: 2019-03-20 | End: 2019-10-13

## 2019-03-29 ENCOUNTER — TELEPHONE (OUTPATIENT)
Dept: FAMILY MEDICINE | Facility: CLINIC | Age: 74
End: 2019-03-29

## 2019-03-29 NOTE — TELEPHONE ENCOUNTER
Forms received from: [employer]   Phone number listed: [phone]   Fax listed: [phone]  Date received: 03/29/2019  Form description: Detailed written order and letter of medical necessity  Once forms are completed, please return to [facility] via fax.  Form placed: in providers folder  Georgina Ibrahim

## 2019-04-11 ENCOUNTER — OFFICE VISIT (OUTPATIENT)
Dept: FAMILY MEDICINE | Facility: CLINIC | Age: 74
End: 2019-04-11
Payer: COMMERCIAL

## 2019-04-11 VITALS
BODY MASS INDEX: 18.44 KG/M2 | SYSTOLIC BLOOD PRESSURE: 134 MMHG | TEMPERATURE: 98.7 F | DIASTOLIC BLOOD PRESSURE: 69 MMHG | OXYGEN SATURATION: 95 % | HEIGHT: 64 IN | HEART RATE: 73 BPM | WEIGHT: 108 LBS

## 2019-04-11 DIAGNOSIS — D50.8 OTHER IRON DEFICIENCY ANEMIA: ICD-10-CM

## 2019-04-11 DIAGNOSIS — Z23 NEED FOR SHINGLES VACCINE: ICD-10-CM

## 2019-04-11 DIAGNOSIS — Z00.00 ENCOUNTER FOR PREVENTATIVE ADULT HEALTH CARE EXAMINATION: ICD-10-CM

## 2019-04-11 DIAGNOSIS — Z12.11 SCREEN FOR COLON CANCER: Primary | ICD-10-CM

## 2019-04-11 DIAGNOSIS — Z12.31 ENCOUNTER FOR SCREENING MAMMOGRAM FOR BREAST CANCER: ICD-10-CM

## 2019-04-11 DIAGNOSIS — I10 HYPERTENSION GOAL BP (BLOOD PRESSURE) < 140/90: ICD-10-CM

## 2019-04-11 DIAGNOSIS — E78.5 HYPERLIPIDEMIA LDL GOAL <100: ICD-10-CM

## 2019-04-11 DIAGNOSIS — I10 ESSENTIAL HYPERTENSION WITH GOAL BLOOD PRESSURE LESS THAN 140/90: ICD-10-CM

## 2019-04-11 DIAGNOSIS — M06.9 RHEUMATOID ARTHRITIS, INVOLVING UNSPECIFIED SITE, UNSPECIFIED RHEUMATOID FACTOR PRESENCE: ICD-10-CM

## 2019-04-11 DIAGNOSIS — Z89.511 STATUS POST BELOW KNEE AMPUTATION OF RIGHT LOWER EXTREMITY (H): ICD-10-CM

## 2019-04-11 DIAGNOSIS — I25.10 CORONARY ARTERY DISEASE INVOLVING NATIVE CORONARY ARTERY OF NATIVE HEART WITHOUT ANGINA PECTORIS: ICD-10-CM

## 2019-04-11 PROCEDURE — 99397 PER PM REEVAL EST PAT 65+ YR: CPT | Performed by: NURSE PRACTITIONER

## 2019-04-11 RX ORDER — METOPROLOL TARTRATE 50 MG
TABLET ORAL
Qty: 180 TABLET | Refills: 3 | Status: ON HOLD | OUTPATIENT
Start: 2019-04-11 | End: 2019-10-13

## 2019-04-11 RX ORDER — LISINOPRIL 20 MG/1
20 TABLET ORAL DAILY
Qty: 90 TABLET | Refills: 3 | Status: ON HOLD | OUTPATIENT
Start: 2019-04-11 | End: 2019-10-13

## 2019-04-11 RX ORDER — SIMVASTATIN 20 MG
TABLET ORAL
Qty: 90 TABLET | Refills: 3 | Status: ON HOLD | OUTPATIENT
Start: 2019-04-11 | End: 2019-10-13

## 2019-04-11 RX ORDER — AMLODIPINE BESYLATE 5 MG/1
5 TABLET ORAL DAILY
Qty: 90 TABLET | Refills: 3 | Status: ON HOLD | OUTPATIENT
Start: 2019-04-11 | End: 2019-10-13

## 2019-04-11 ASSESSMENT — ACTIVITIES OF DAILY LIVING (ADL): CURRENT_FUNCTION: NO ASSISTANCE NEEDED

## 2019-04-11 ASSESSMENT — MIFFLIN-ST. JEOR: SCORE: 979.88

## 2019-04-11 ASSESSMENT — PAIN SCALES - GENERAL: PAINLEVEL: NO PAIN (0)

## 2019-04-11 NOTE — PROGRESS NOTES
"SUBJECTIVE:   Sakshi Jaeger is a 73 year old female who presents for Preventive Visit.      Are you in the first 12 months of your Medicare coverage?  No    Healthy Habits:    In general, how would you rate your overall health?  Very good    Frequency of exercise:  None    Do you usually eat at least 4 servings of fruit and vegetables a day, include whole grains    & fiber and avoid regularly eating high fat or \"junk\" foods?  Yes    Taking medications regularly:  Yes    Barriers to taking medications:  None    Medication side effects:  None    Ability to successfully perform activities of daily living:  No assistance needed    Home Safety:  Lack of grab bars in the bathroom    Hearing Impairment:  No hearing concerns    In the past 6 months, have you been bothered by leaking of urine?  No    In general, how would you rate your overall mental or emotional health?  Excellent      PHQ-2 Total Score:    Additional concerns today:  No    Do you feel safe in your environment? Yes    Do you have a Health Care Directive? No: Advance care planning reviewed with patient; information given to patient to review.    She has a history of CAD, HTN and hyperlipidemia  She had fasting labs Dec 24, 2018  LDL 84    She has a history of RA  On methotrexate  [facility]  She has labs including BMP and CBC every 4 months  Most recently done in January and abstracted into chart and reviewed today    Right BKA 2011  Had stress fracture in ankle, worse, had surgery, developed infection  She wants a new foam around her prostheses  Current foam is worn down, has lost its shape  Needs physical and signed letter faxed to [facility] today for insurance compliance      Last mammogram Dec 2017    Hx osteoporosis  On Fosamax          Fall risk       Cognitive Screening   1) Repeat 3 items (Leader, Season, Table)    2) Clock draw: NORMAL  3) 3 item recall: Recalls 3 objects  Results: NORMAL clock, 1-2 items " recalled: COGNITIVE IMPAIRMENT LESS LIKELY    Mini-CogTM Copyright KEITH Agarwal. Licensed by the author for use in [facility]; reprinted with permission (chelsie@[facility]). All rights reserved.      Do you have sleep apnea, excessive snoring or daytime drowsiness?: no    Reviewed and updated as needed this visit by clinical staff         Reviewed and updated as needed this visit by Provider        Social History     Tobacco Use     Smoking status: Former Smoker     Packs/day: 0.50     Years: 45.00     Pack years: 22.50     Types: Cigarettes     Last attempt to quit: 2010     Years since quittin.1     Smokeless tobacco: Never Used   Substance Use Topics     Alcohol use: Yes     Comment: occsionally-3 -4 drinks a week     If you drink alcohol do you typically have >3 drinks per day or >7 drinks per week? No    No flowsheet data found.      Current providers sharing in care for this patient include:   Patient Care Team:  Javan Pepe MD as PCP - General (Family Practice)  Javan Pepe MD as Assigned PCP    The following health maintenance items are reviewed in Epic and correct as of today:  Health Maintenance   Topic Date Due     ZOSTER IMMUNIZATION (1 of 2) 1995     MEDICARE ANNUAL WELLNESS VISIT  2010     PHQ-2 Q1 YR  2017     BMP Q1 YR  2018     INFLUENZA VACCINE (1) 2018     FIT Q1 YR  2018     FALL RISK ASSESSMENT  2019     MAMMO SCREEN Q2 YR (SYSTEM ASSIGNED)  2019     DEXA Q3 YR  2021     ADVANCE DIRECTIVE PLANNING Q5 YRS  2021     DTAP/TDAP/TD IMMUNIZATION (2 - Td) 2021     LIPID SCREEN Q5 YR FEMALE (SYSTEM ASSIGNED)  2023     HEPATITIS C SCREENING  Completed     IPV IMMUNIZATION  Aged Out     MENINGITIS IMMUNIZATION  Aged Out     Labs reviewed in EPIC  BP Readings from Last 3 Encounters:   19 134/69   19 133/66   18 150/80    Wt Readings from Last 3 Encounters:   19 49 kg (108 lb)    19 52.2 kg (115 lb)   18 51.3 kg (113 lb)                  Patient Active Problem List   Diagnosis     Osteoporosis     Rheumatoid arthritis (H)     Iron deficiency anemia     Hyperlipidemia LDL goal <100     Advanced directives, counseling/discussion     Status post below knee amputation (H)     Employs prosthetic leg     Essential hypertension with goal blood pressure less than 140/90     Coronary artery disease involving native coronary artery of native heart without angina pectoris     Sacroiliac joint pain     Past Surgical History:   Procedure Laterality Date     ---------INCISION AND DRAINAGE  3/5/14     AMPUTATION BELOW KNEE RT/LT      right lowwer leg.      APPENDECTOMY       ARTHROPLASTY KNEE  2011    Procedure:ARTHROPLASTY KNEE; Surgeon:JESSE HAWKINS; Location:[facility]     coronary stent       SURGICAL HISTORY OF -       Appendectomy       Social History     Tobacco Use     Smoking status: Former Smoker     Packs/day: 0.50     Years: 45.00     Pack years: 22.50     Types: Cigarettes     Last attempt to quit: 2010     Years since quittin.1     Smokeless tobacco: Never Used   Substance Use Topics     Alcohol use: Yes     Comment: occsionally-3 -4 drinks a week     Family History   Problem Relation Age of Onset     Cardiovascular Mother      Cancer Brother      Diabetes No family hx of      Hypertension No family hx of      Cerebrovascular Disease No family hx of      Alzheimer Disease No family hx of      Thyroid Disease No family hx of      Respiratory No family hx of          Allergies   Allergen Reactions     Penicillins Hives     Mammogram Screening: Mammogram Screening: Patient over age 50, mutual decision to screen reflected in health maintenance.    Review of Systems  Constitutional, HEENT, cardiovascular, pulmonary, GI, , musculoskeletal, neuro, skin, endocrine and psych systems are negative, except as otherwise noted.    OBJECTIVE:   /69 (BP Location:  "Right arm, Patient Position: Sitting, Cuff Size: Adult Small)   Pulse 73   Temp 98.7  F (37.1  C) (Oral)   Ht 1.626 m (5' 4\")   Wt 49 kg (108 lb)   SpO2 95%   Breastfeeding? No   BMI 18.54 kg/m   Estimated body mass index is 19.14 kg/m  as calculated from the following:    Height as of 4/10/17: 1.651 m (5' 5\").    Weight as of 1/24/19: 52.2 kg (115 lb).  Physical Exam  GENERAL: healthy, alert and no distress  EYES: Eyes grossly normal to inspection, PERRL and conjunctivae and sclerae normal  HENT: ear canals and TM's normal, nose and mouth without ulcers or lesions  NECK: no adenopathy, no asymmetry, masses, or scars and thyroid normal to palpation  RESP: lungs clear to auscultation - no rales, rhonchi or wheezes  BREAST: normal without masses, tenderness or nipple discharge and no palpable axillary masses or adenopathy  CV: regular rate and rhythm, normal S1 S2, no S3 or S4, no murmur, click or rub, no peripheral edema and peripheral pulses strong  ABDOMEN: soft, nontender, no hepatosplenomegaly, no masses and bowel sounds normal  MS: right below the knee amputation with prosthesis. Foam protective barrier is worn down, irregularly shaped  SKIN: no suspicious lesions or rashes  NEURO: Normal strength and tone, mentation intact and speech normal  PSYCH: mentation appears normal, affect normal/bright    Diagnostic Test Results:  none     ASSESSMENT / PLAN:       ICD-10-CM    1. Screen for colon cancer Z12.11 CANCELED: Fecal colorectal cancer screen FIT - Future (S+30)   2. Encounter for preventative adult health care examination Z00.00    3. Status post below knee amputation of right lower extremity (H) Z89.511 order for DME     DISCONTINUED: order for DME   4. Rheumatoid arthritis, involving unspecified site, unspecified rheumatoid factor presence (H) M06.9 CANCELED: BASIC METABOLIC PANEL     CANCELED: CBC with platelets     CANCELED: TSH with free T4 reflex   5. Essential hypertension with goal blood " "pressure less than 140/90 I10 amLODIPine (NORVASC) 5 MG tablet     lisinopril (PRINIVIL/ZESTRIL) 20 MG tablet     CANCELED: BASIC METABOLIC PANEL   6. Other iron deficiency anemia D50.8 CANCELED: CBC with platelets   7. Coronary artery disease involving native coronary artery of native heart without angina pectoris I25.10 simvastatin (ZOCOR) 20 MG tablet   8. Hyperlipidemia LDL goal <100 E78.5 simvastatin (ZOCOR) 20 MG tablet   9. Hypertension goal BP (blood pressure) < 140/90 I10 metoprolol tartrate (LOPRESSOR) 50 MG tablet   10. Encounter for screening mammogram for breast cancer Z12.31 *MA Screening Digital Bilateral   11. Need for shingles vaccine Z23 zoster vaccine recombinant adjuvanted (SHINGRIX) injection       End of Life Planning:  Patient currently has an advanced directive: No.  I have verified the patient's ablity to prepare an advanced directive/make health care decisions.  Literature was provided to assist patient in preparing an advanced directive.    COUNSELING:  Reviewed preventive health counseling, as reflected in patient instructions       Regular exercise       Vision screening       Osteoporosis Prevention/Bone Health    BP Readings from Last 1 Encounters:   01/24/19 133/66     Estimated body mass index is 19.14 kg/m  as calculated from the following:    Height as of 4/10/17: 1.651 m (5' 5\").    Weight as of 1/24/19: 52.2 kg (115 lb).           reports that she quit smoking about 9 years ago. Her smoking use included cigarettes. She has a 22.50 pack-year smoking history. She has never used smokeless tobacco.      Appropriate preventive services were discussed with this patient, including applicable screening as appropriate for cardiovascular disease, diabetes, osteopenia/osteoporosis, and glaucoma.  As appropriate for age/gender, discussed screening for colorectal cancer, prostate cancer, breast cancer, and cervical cancer. Checklist reviewing preventive services available has been given to " the patient.    Reviewed patients plan of care and provided an AVS. The Basic Care Plan (routine screening as documented in Health Maintenance) for Sakshi meets the Care Plan requirement. This Care Plan has been established and reviewed with the Patient.    Counseling Resources:  ATP IV Guidelines  Pooled Cohorts Equation Calculator  Breast Cancer Risk Calculator  FRAX Risk Assessment  ICSI Preventive Guidelines  Dietary Guidelines for Americans, 2010  USDA's MyPlate  ASA Prophylaxis  Lung CA Screening    DESIRE Givens CNP  [facility]    Identified Health Risks:

## 2019-04-11 NOTE — PROGRESS NOTES
"SUBJECTIVE:   Sakshi Jaeger is a 73 year old female who presents for Preventive Visit.  {PVP to remind patient that this is not necessarily a physical exam; physical exam may or may not be done:565340::\"click delete button to remove this line now\"}  {PVP to inform patient that additional E&M charge may apply, if additional problems addressed:272612::\"click delete button to remove this line now\"}  Are you in the first 12 months of your Medicare coverage?  { :901038::\"No\"}    HPI  Do you feel safe in your environment? { :538599}    Do you have a Health Care Directive? { :706084}    {Hearing Test Done (Optional):614526}  Fall risk  { :230954}  {If any of the above assessments are answered yes, consider ordering appropriate referrals (Optional):111120::\"click delete button to remove this line now\"}  Cognitive Screening { :332450}    {Do you have sleep apnea, excessive snoring or daytime drowsiness? (Optional):875013}    Reviewed and updated as needed this visit by clinical staff         Reviewed and updated as needed this visit by Provider        Social History     Tobacco Use     Smoking status: Former Smoker     Packs/day: 0.50     Years: 45.00     Pack years: 22.50     Types: Cigarettes     Last attempt to quit: 2010     Years since quittin.1     Smokeless tobacco: Never Used   Substance Use Topics     Alcohol use: Yes     Comment: occsionally-3 -4 drinks a week     {Rooming Staff- Complete this question if Prescreen response is not shown below for today's visit. If you drink alcohol do you typically have >3 drinks per day or >7 drinks per week? (Optional):745724}    No flowsheet data found.{add AUDIT responses (Optional) (A score of 7 for adult men is an indication of hazardous drinking; a score of 8 or more is an indication of an alcohol use disorder.  A score of 7 or more for adult women is an indication of hazardous drinking or an alchohol use disorder):899132}    {Outside tests to abstract? " ":134575}    {additional problems to add (Optional):203944}    Current providers sharing in care for this patient include: {Rooming staff:  Please update Care Team in Rooming Activity, refresh this note and then delete this statement}  Patient Care Team:  Javan Pepe MD as PCP - General (Family Practice)  Javan Pepe MD as Assigned PCP    The following health maintenance items are reviewed in Epic and correct as of today:  Health Maintenance   Topic Date Due     ZOSTER IMMUNIZATION (1 of 2) 1995     MEDICARE ANNUAL WELLNESS VISIT  2010     PHQ-2 Q1 YR  2017     BMP Q1 YR  2018     INFLUENZA VACCINE (1) 2018     FIT Q1 YR  2018     FALL RISK ASSESSMENT  2019     MAMMO SCREEN Q2 YR (SYSTEM ASSIGNED)  2019     DEXA Q3 YR  2021     ADVANCE DIRECTIVE PLANNING Q5 YRS  2021     DTAP/TDAP/TD IMMUNIZATION (2 - Td) 2021     LIPID SCREEN Q5 YR FEMALE (SYSTEM ASSIGNED)  2023     HEPATITIS C SCREENING  Completed     IPV IMMUNIZATION  Aged Out     MENINGITIS IMMUNIZATION  Aged Out     {Chronicprobdata (Optional):838895}  {Decision Support (Optional):856569}    Review of Systems  {ROS COMP (Optional):578614}    OBJECTIVE:   There were no vitals taken for this visit. Estimated body mass index is 19.14 kg/m  as calculated from the following:    Height as of 4/10/17: 1.651 m (5' 5\").    Weight as of 19: 52.2 kg (115 lb).  Physical Exam  {Exam (Optional) :808551}    {Diagnostic Test Results (Optional):004698::\"Diagnostic Test Results:\",\"none \"}    ASSESSMENT / PLAN:   {Diag Picklist:529278}    End of Life Planning:  Patient currently has an advanced directive: { :207626}    COUNSELING:  {Medicare Counselin}    BP Readings from Last 1 Encounters:   19 133/66     Estimated body mass index is 19.14 kg/m  as calculated from the following:    Height as of 4/10/17: 1.651 m (5' 5\").    Weight as of 19: 52.2 kg (115 lb).    {BP " Counseling- Complete if BP >= 120/80  (Optional):413959}  {Weight Management Plan (ACO) Complete if BMI is abnormal-  Ages 18-64  BMI >24.9.  Age 65+ with BMI <23 or >30 (Optional):033324}     reports that she quit smoking about 9 years ago. Her smoking use included cigarettes. She has a 22.50 pack-year smoking history. She has never used smokeless tobacco.  {Tobacco Cessation -- Complete if patient is a smoker (Optional):479638}    Appropriate preventive services were discussed with this patient, including applicable screening as appropriate for cardiovascular disease, diabetes, osteopenia/osteoporosis, and glaucoma.  As appropriate for age/gender, discussed screening for colorectal cancer, prostate cancer, breast cancer, and cervical cancer. Checklist reviewing preventive services available has been given to the patient.    Reviewed patients plan of care and provided an AVS. The {CarePlan:728805} for Sakshi meets the Care Plan requirement. This Care Plan has been established and reviewed with the {PATIENT, FAMILY MEMBER, CAREGIVER:999978}.    Counseling Resources:  ATP IV Guidelines  Pooled Cohorts Equation Calculator  Breast Cancer Risk Calculator  FRAX Risk Assessment  ICSI Preventive Guidelines  Dietary Guidelines for Americans, 2010  [employer]'s MyPlate  ASA Prophylaxis  Lung CA Screening    DESIRE Givens CNP  [facility]    Identified Health Risks:

## 2019-04-11 NOTE — PATIENT INSTRUCTIONS
You will need a Shingrix booster in 2-6 months. Please schedule this appointment with our ancillary staff

## 2019-04-11 NOTE — LETTER
[address] [address]  Phone: [phone]  Fax: [phone]    April 11, 2019        Sakshi Jaeger  [facility] [facility] [address]          To whom it may concern:    RE: Sakshi Jaeger    I saw Sakshi Jaeger today for annual physical exam. She has a right lower extremity prostheses that she wears daily for ambulation. Her protective cover is very worn and need of replacement.       Sincerely,        DESIRE Givens CNP

## 2019-05-01 ENCOUNTER — TRANSFERRED RECORDS (OUTPATIENT)
Dept: HEALTH INFORMATION MANAGEMENT | Facility: CLINIC | Age: 74
End: 2019-05-01

## 2019-06-13 ENCOUNTER — OFFICE VISIT (OUTPATIENT)
Dept: URGENT CARE | Facility: URGENT CARE | Age: 74
End: 2019-06-13
Payer: COMMERCIAL

## 2019-06-13 ENCOUNTER — ANCILLARY PROCEDURE (OUTPATIENT)
Dept: GENERAL RADIOLOGY | Facility: CLINIC | Age: 74
End: 2019-06-13
Attending: PHYSICIAN ASSISTANT
Payer: COMMERCIAL

## 2019-06-13 VITALS
TEMPERATURE: 98.4 F | SYSTOLIC BLOOD PRESSURE: 136 MMHG | HEART RATE: 68 BPM | OXYGEN SATURATION: 100 % | BODY MASS INDEX: 18.37 KG/M2 | DIASTOLIC BLOOD PRESSURE: 76 MMHG | WEIGHT: 107 LBS

## 2019-06-13 DIAGNOSIS — M53.3 SACROILIAC JOINT PAIN: ICD-10-CM

## 2019-06-13 DIAGNOSIS — M25.551 HIP PAIN, RIGHT: Primary | ICD-10-CM

## 2019-06-13 DIAGNOSIS — M06.9 RHEUMATOID ARTHRITIS, INVOLVING UNSPECIFIED SITE, UNSPECIFIED RHEUMATOID FACTOR PRESENCE: ICD-10-CM

## 2019-06-13 DIAGNOSIS — R35.0 URINARY FREQUENCY: ICD-10-CM

## 2019-06-13 LAB
ALBUMIN UR-MCNC: ABNORMAL MG/DL
APPEARANCE UR: CLEAR
BACTERIA #/AREA URNS HPF: ABNORMAL /HPF
BILIRUB UR QL STRIP: NEGATIVE
COLOR UR AUTO: YELLOW
GLUCOSE UR STRIP-MCNC: NEGATIVE MG/DL
HGB UR QL STRIP: NEGATIVE
KETONES UR STRIP-MCNC: 15 MG/DL
LEUKOCYTE ESTERASE UR QL STRIP: ABNORMAL
NITRATE UR QL: NEGATIVE
NON-SQ EPI CELLS #/AREA URNS LPF: ABNORMAL /LPF
PH UR STRIP: 7 PH (ref 5–7)
RBC #/AREA URNS AUTO: ABNORMAL /HPF
SOURCE: ABNORMAL
SP GR UR STRIP: 1.01 (ref 1–1.03)
UROBILINOGEN UR STRIP-ACNC: 0.2 EU/DL (ref 0.2–1)
WBC #/AREA URNS AUTO: ABNORMAL /HPF

## 2019-06-13 PROCEDURE — 87086 URINE CULTURE/COLONY COUNT: CPT | Performed by: PHYSICIAN ASSISTANT

## 2019-06-13 PROCEDURE — 81001 URINALYSIS AUTO W/SCOPE: CPT | Performed by: PHYSICIAN ASSISTANT

## 2019-06-13 PROCEDURE — 73502 X-RAY EXAM HIP UNI 2-3 VIEWS: CPT

## 2019-06-13 PROCEDURE — 99214 OFFICE O/P EST MOD 30 MIN: CPT | Performed by: PHYSICIAN ASSISTANT

## 2019-06-13 RX ORDER — SULINDAC 200 MG/1
TABLET ORAL
Refills: 0 | Status: ON HOLD | COMMUNITY
Start: 2019-06-03 | End: 2019-10-13

## 2019-06-13 ASSESSMENT — ENCOUNTER SYMPTOMS
BRUISES/BLEEDS EASILY: 0
SHORTNESS OF BREATH: 0
NECK STIFFNESS: 0
HEMATOLOGIC/LYMPHATIC NEGATIVE: 1
FEVER: 0
BACK PAIN: 1
ARTHRALGIAS: 0
WOUND: 0
DIARRHEA: 0
JOINT SWELLING: 0
PALPITATIONS: 0
LIGHT-HEADEDNESS: 0
MYALGIAS: 0
DIZZINESS: 0
SORE THROAT: 0
WEAKNESS: 0
VOMITING: 0
HEADACHES: 0
ALLERGIC/IMMUNOLOGIC NEGATIVE: 1
ENDOCRINE NEGATIVE: 1
CHILLS: 0
RESPIRATORY NEGATIVE: 1
COUGH: 0
RHINORRHEA: 0
EYES NEGATIVE: 1
NECK PAIN: 0
NAUSEA: 0
CARDIOVASCULAR NEGATIVE: 1

## 2019-06-13 NOTE — PROGRESS NOTES
Chief Complaint:    Chief Complaint   Patient presents with     Back Pain     Patient complains of back pain        HPI: Sakshi Jaeger is an 73 year old female who presents for evaluation and treatment of low back pain.  The pain started yesterday.  She does not recall any injury to the low back or hip.  Patient has a significant Hx of sacroiliac joint pain and RA.  The pain is in the lower R lumbar area and extends into R groin.  She has not tried anything for the pain.    She is here with her son who also mentions that she seems to be urinating more than normal.      ROS:      Review of Systems   Constitutional: Negative for chills and fever.   HENT: Negative for congestion, ear pain, rhinorrhea and sore throat.    Eyes: Negative.    Respiratory: Negative.  Negative for cough and shortness of breath.    Cardiovascular: Negative.  Negative for chest pain and palpitations.   Gastrointestinal: Negative for diarrhea, nausea and vomiting.   Endocrine: Negative.    Genitourinary: Negative.    Musculoskeletal: Positive for back pain. Negative for arthralgias, joint swelling, myalgias, neck pain and neck stiffness.   Skin: Negative.  Negative for rash and wound.   Allergic/Immunologic: Negative.  Negative for immunocompromised state.   Neurological: Negative for dizziness, weakness, light-headedness and headaches.   Hematological: Negative.  Does not bruise/bleed easily.        Family History   Family History   Problem Relation Age of Onset     Cardiovascular Mother      Cancer Brother      Diabetes No family hx of      Hypertension No family hx of      Cerebrovascular Disease No family hx of      Alzheimer Disease No family hx of      Thyroid Disease No family hx of      Respiratory No family hx of        Social History  Social History     Socioeconomic History     Marital status: Single     Spouse name: Not on file     Number of children: Not on file     Years of education: Not on file     Highest education level: Not  on file   Occupational History     Not on file   Social Needs     Financial resource strain: Not on file     Food insecurity:     Worry: Not on file     Inability: Not on file     Transportation needs:     Medical: Not on file     Non-medical: Not on file   Tobacco Use     Smoking status: Former Smoker     Packs/day: 0.50     Years: 45.00     Pack years: 22.50     Types: Cigarettes     Last attempt to quit: 2010     Years since quittin.2     Smokeless tobacco: Never Used   Substance and Sexual Activity     Alcohol use: Yes     Comment: occsionally-3 -4 drinks a week     Drug use: No     Sexual activity: Yes     Partners: Male   Lifestyle     Physical activity:     Days per week: Not on file     Minutes per session: Not on file     Stress: Not on file   Relationships     Social connections:     Talks on phone: Not on file     Gets together: Not on file     Attends [employer] service: Not on file     Active member of club or organization: Not on file     Attends meetings of clubs or organizations: Not on file     Relationship status: Not on file     Intimate partner violence:     Fear of current or ex partner: Not on file     Emotionally abused: Not on file     Physically abused: Not on file     Forced sexual activity: Not on file   Other Topics Concern     Parent/sibling w/ CABG, MI or angioplasty before 65F 55M? No   Social History Narrative     Not on file        Surgical History:  Past Surgical History:   Procedure Laterality Date     ---------INCISION AND DRAINAGE  3/5/14     AMPUTATION BELOW KNEE RT/LT      right lowwer leg.      APPENDECTOMY       ARTHROPLASTY KNEE  2011    Procedure:ARTHROPLASTY KNEE; Surgeon:JESSE HAWKINS; Location:[facility]     coronary stent       SURGICAL HISTORY OF -       Appendectomy        Problem List:  Patient Active Problem List   Diagnosis     Osteoporosis     Rheumatoid arthritis (H)     Iron deficiency anemia     Hyperlipidemia LDL goal <100     Advanced  directives, counseling/discussion     Status post below knee amputation (H)     Employs prosthetic leg     Essential hypertension with goal blood pressure less than 140/90     Coronary artery disease involving native coronary artery of native heart without angina pectoris     Sacroiliac joint pain        Allergies:  Allergies   Allergen Reactions     Penicillins Hives        Current Meds:    Current Outpatient Medications:      alendronate (FOSAMAX) 35 MG tablet, TAKE 1 TAB BY MOUTH WITH 8OZ OF WATER ONCE EVERY WEEK BEFORE BREAKFAST, REMAIN UPRIGHT AT THIS TIME, Disp: 12 tablet, Rfl: 2     amLODIPine (NORVASC) 5 MG tablet, Take 1 tablet (5 mg) by mouth daily, Disp: 90 tablet, Rfl: 3     aspirin 81 MG tablet, Take 2 tablets by mouth daily., Disp: , Rfl: 3     CALCIUM 600 + D OR, 2 daily, Disp: , Rfl:      leflunomide (ARAVA) 20 MG tablet, Take 20 mg by mouth daily., Disp: , Rfl:      lisinopril (PRINIVIL/ZESTRIL) 20 MG tablet, Take 1 tablet (20 mg) by mouth daily, Disp: 90 tablet, Rfl: 3     METHOTREXate 2.5 MG tablet, Take 8 tablets by mouth once a week., Disp: 8 tablet, Rfl: 0     metoprolol tartrate (LOPRESSOR) 50 MG tablet, TAKE 1 TABLET BY MOUTH TWICE A DAY, Disp: 180 tablet, Rfl: 3     niacin 500 MG tablet, Take 1 tablet by mouth At Bedtime., Disp: 100 tablet, Rfl: 6     nitroglycerin (NITROSTAT) 0.4 MG SL tablet, 0.4 mg by Sublingual route every 5 (five) minutes as needed., Disp: , Rfl:      order for DME, Equipment being ordered: New foam (custom shaped protective cover) for right below the knee prostheses, Disp: 1 Units, Rfl: 0     Pediatric Multivitamins-Iron (FLINTSTONES PLUS IRON PO), Take 1 tablet by mouth daily., Disp: , Rfl:      PREDNISONE 5 MG OR TABS, 1 TABLET DAILY, Disp: , Rfl: 0     simvastatin (ZOCOR) 20 MG tablet, TAKE 1 TABLET BY MOUTH DAILY AT BEDTIME, Disp: 90 tablet, Rfl: 3     sulindac (CLINORIL) 200 MG tablet, TAKE 1 TABLET BY MOUTH TWICE A DAY WITH FOOD, Disp: , Rfl: 0     PHYSICAL  EXAM:     Vital signs noted and reviewed by Rishabh Zamudio  /76 (BP Location: Left arm, Patient Position: Chair, Cuff Size: Adult Regular)   Pulse 68   Temp 98.4  F (36.9  C) (Oral)   Wt 48.5 kg (107 lb)   SpO2 100%   BMI 18.37 kg/m       PEFR:    Physical Exam   Constitutional: She is oriented to person, place, and time. She appears well-developed and well-nourished. She is cooperative.  Non-toxic appearance. She does not have a sickly appearance. She does not appear ill. No distress.   HENT:   Head: Normocephalic and atraumatic.   Right Ear: Tympanic membrane and external ear normal. Tympanic membrane is not perforated, not erythematous, not retracted and not bulging.   Left Ear: Tympanic membrane and external ear normal. Tympanic membrane is not perforated, not erythematous, not retracted and not bulging.   Nose: No mucosal edema or rhinorrhea.   Mouth/Throat: Oropharynx is clear and moist. No oropharyngeal exudate, posterior oropharyngeal edema, posterior oropharyngeal erythema or tonsillar abscesses.   Eyes: Pupils are equal, round, and reactive to light. EOM are normal.   Neck: Trachea normal, normal range of motion and full passive range of motion without pain. Neck supple.   Cardiovascular: Normal rate, regular rhythm, S1 normal, S2 normal, normal heart sounds and intact distal pulses. Exam reveals no gallop and no friction rub.   No murmur heard.  Pulmonary/Chest: Effort normal and breath sounds normal. No respiratory distress. She has no decreased breath sounds. She has no wheezes. She has no rhonchi. She has no rales.   Abdominal: Soft. Bowel sounds are normal. She exhibits no distension and no mass. There is no hepatosplenomegaly. There is no tenderness. There is no rigidity, no rebound, no guarding and no CVA tenderness.   Musculoskeletal:        Right hip: She exhibits decreased range of motion, tenderness and bony tenderness. She exhibits normal strength and no swelling.   Pain with  movement, as well as palpation of the greater troch area,  Decreased range of motion due to discomfort.   Lymphadenopathy:     She has no cervical adenopathy.   Neurological: She is alert and oriented to person, place, and time. She has normal reflexes. No cranial nerve deficit.   Skin: Skin is warm and dry. She is not diaphoretic.   Psychiatric: She has a normal mood and affect. Her behavior is normal. Judgment and thought content normal.   Nursing note and vitals reviewed.       Labs:       Medical Decision Making:    Differential Diagnosis:  MS Injury Pain: sprain, fracture, tendonitis, muscle strain and dislocation      ASSESSMENT:     1. Hip pain, right    2. Sacroiliac joint pain    3. Rheumatoid arthritis, involving unspecified site, unspecified rheumatoid factor presence (H)    4. Urinary frequency           PLAN:     XR of the pelvis and R hip were negative for any acute fracture per my read.  Patient has antiinflammatory medications and was advised to start these if pain persists.  Urine discussed with patient.  This was unremarkable for UTI at this time.  There were a few bacteria.  Will order culture and call only if positive for growth.  Worrisome symptoms discussed with instructions to go to the ED.  Patient verbalized understanding and agreed with this plan.     Rishabh Zamudio  6/13/2019, 1:10 PM

## 2019-06-14 LAB
BACTERIA SPEC CULT: NO GROWTH
SPECIMEN SOURCE: NORMAL

## 2019-06-19 ENCOUNTER — HOSPITAL ENCOUNTER (EMERGENCY)
Facility: CLINIC | Age: 74
Discharge: HOME | End: 2019-06-19
Attending: EMERGENCY MEDICINE | Admitting: EMERGENCY MEDICINE
Payer: COMMERCIAL

## 2019-06-19 ENCOUNTER — APPOINTMENT (OUTPATIENT)
Dept: CT IMAGING | Facility: CLINIC | Age: 74
End: 2019-06-19
Attending: EMERGENCY MEDICINE
Payer: COMMERCIAL

## 2019-06-19 VITALS
DIASTOLIC BLOOD PRESSURE: 68 MMHG | SYSTOLIC BLOOD PRESSURE: 145 MMHG | RESPIRATION RATE: 16 BRPM | BODY MASS INDEX: 18.42 KG/M2 | WEIGHT: 107.3 LBS | TEMPERATURE: 98.5 F | OXYGEN SATURATION: 98 % | HEART RATE: 68 BPM

## 2019-06-19 DIAGNOSIS — M54.16 LUMBAR RADICULOPATHY: ICD-10-CM

## 2019-06-19 PROCEDURE — 99284 EMERGENCY DEPT VISIT MOD MDM: CPT | Mod: 25

## 2019-06-19 PROCEDURE — 99284 EMERGENCY DEPT VISIT MOD MDM: CPT | Mod: Z6 | Performed by: EMERGENCY MEDICINE

## 2019-06-19 PROCEDURE — 72192 CT PELVIS W/O DYE: CPT

## 2019-06-19 PROCEDURE — 25000132 ZZH RX MED GY IP 250 OP 250 PS 637: Performed by: EMERGENCY MEDICINE

## 2019-06-19 PROCEDURE — 72131 CT LUMBAR SPINE W/O DYE: CPT

## 2019-06-19 RX ORDER — HYDROCODONE BITARTRATE AND ACETAMINOPHEN 5; 325 MG/1; MG/1
1 TABLET ORAL ONCE
Status: COMPLETED | OUTPATIENT
Start: 2019-06-19 | End: 2019-06-19

## 2019-06-19 RX ORDER — MORPHINE SULFATE 15 MG/1
15 TABLET ORAL EVERY 12 HOURS PRN
Qty: 10 TABLET | Refills: 0 | Status: ON HOLD | OUTPATIENT
Start: 2019-06-19 | End: 2019-10-13

## 2019-06-19 RX ORDER — MORPHINE SULFATE 15 MG/1
15 TABLET ORAL ONCE
Status: COMPLETED | OUTPATIENT
Start: 2019-06-19 | End: 2019-06-19

## 2019-06-19 RX ADMIN — HYDROCODONE BITARTRATE AND ACETAMINOPHEN 1 TABLET: 5; 325 TABLET ORAL at 21:03

## 2019-06-19 RX ADMIN — MORPHINE SULFATE 15 MG: 15 TABLET ORAL at 23:02

## 2019-06-19 ASSESSMENT — ENCOUNTER SYMPTOMS
HEMATURIA: 0
CONSTIPATION: 0
FREQUENCY: 0
DIARRHEA: 0
DYSURIA: 0
BACK PAIN: 1
APPETITE CHANGE: 1

## 2019-06-19 NOTE — ED AVS SNAPSHOT
[facility], [place], Emergency Department  [address] [place] [id number]  Phone:  [phone]                                    Sakshi Jaeger   MRN: [id number]    Department:  [facility], Emergency Department   Date of Visit:  6/19/2019           After Visit Summary Signature Page    I have received my discharge instructions, and my questions have been answered. I have discussed any challenges I see with this plan with the nurse or doctor.    ..........................................................................................................................................  Patient/Patient Representative Signature      ..........................................................................................................................................  Patient Representative Print Name and Relationship to Patient    ..................................................               ................................................  Date                                   Time    ..........................................................................................................................................  Reviewed by Signature/Title    ...................................................              ..............................................  Date                                               Time          22EPIC Rev 08/18

## 2019-06-20 NOTE — ED TRIAGE NOTES
Pt arrives to triage with complaints of back pain. Pt reports R lower back pain that radiates to her R hip. Symptoms for the past 10 days. Denies any falls or trauma. Pt using walker at home d/t pain. A&O, VSS  Pt was seen at an urgent care earlier today and an xray was completed. Pt's family member reports pt may have a stress fracture.

## 2019-06-20 NOTE — DISCHARGE INSTRUCTIONS
Thank you for coming to the [facility] Emergency Department.     Please keep your scheduled follow up with the pain clinic on Monday. Discuss with them about options for possible steroid injection for your L1 nerve root impingement.     Return to the ER for new leg weakness, difficulty walking, new inability to pass urine.     Start morphine for pain twice daily if needed. Do not drive while using this medication. Start an over the counter stool softener to prevent constipation. Store morphine in a childproof container if you have children in the home. If you have roommates or teens in then home narcotic medications should be locked.     For information on disposal of narcotic medications, see the [facility] website at [email or web address]

## 2019-06-20 NOTE — ED PROVIDER NOTES
History     Chief Complaint   Patient presents with     Back Pain     HPI  Sakshi Jaeger is a 73 year old female with a history of CAD, HTN, sacroiliac joint pain, rheumatoid arthritis, and right lower leg amputation (2005) who presents with spouse for evaluation of back pain. Patient reports onset of back pain that wraps to her groin 10 days ago after waking. She states the pain is worse in the mornings and is keeping her up at night. Of note, patient was seen in [facility] on 6/13/19, 6 days prior, for evaluation of this pain. She had an x-ray of her pelvis and hip (results below), though states her Rheumatologist is still concerned about the possibility of a fracture and asked her to complete a CT scan. In addition, patient reports she has an appointment with her pain clinic on Monday, 5 days from now, but is unable to wait as her pain is unbearable.     Patient states she had started a new prescription of  mg sulindac that was prescribed by her rheumatologist but feels it is not effective. In addition, she is on prednisone 5 mg chronically and takes methotrexate 1x a week. Patient denies change in urinary/bowel habits or change in gait. She also denies recent trauma as well as history of back surgery. Patient does note, however, that she is not eating well due to lack of appetite.    ----------------------------------------------------------------------------------------------------------------------------------------  XR PELVIS AND HIP RIGHT 1 VIEW 6/13/2019 1:54 PM    IMPRESSION: Mild right hip degenerative change.    Past Medical History:   Diagnosis Date     BENIGN HYPERTENSION 3/1/2005     CAD (coronary artery disease) 1/26/2011     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     Hypertension goal BP (blood pressure) < 130/80 1/26/2011     IRON DEFIC ANEMIA NOS 9/15/2005     Lower limb amputation, below knee 12/26/2012     MI (myocardial infarction) (H)     3/2010      OSTEOPOROSIS NOS 2005     Rheumatoid arthritis(714.0) 2005       Past Surgical History:   Procedure Laterality Date     ---------INCISION AND DRAINAGE  3/5/14     AMPUTATION BELOW KNEE RT/LT      right lowwer leg.      APPENDECTOMY       ARTHROPLASTY KNEE  2011    Procedure:ARTHROPLASTY KNEE; Surgeon:JESSE HAWKINS; Location:[facility]     coronary stent       SURGICAL HISTORY OF -       Appendectomy       Family History   Problem Relation Age of Onset     Cardiovascular Mother      Cancer Brother      Diabetes No family hx of      Hypertension No family hx of      Cerebrovascular Disease No family hx of      Alzheimer Disease No family hx of      Thyroid Disease No family hx of      Respiratory No family hx of        Social History     Tobacco Use     Smoking status: Current Every Day Smoker     Packs/day: 0.50     Years: 45.00     Pack years: 22.50     Types: Cigarettes     Last attempt to quit: 2010     Years since quittin.3     Smokeless tobacco: Never Used     Tobacco comment: pt has recently started smoking again d/t pain   Substance Use Topics     Alcohol use: Yes     Comment: occsionally-3 -4 drinks a week       No current facility-administered medications for this encounter.      Current Outpatient Medications   Medication     morphine (MSIR) 15 MG IR tablet     alendronate (FOSAMAX) 35 MG tablet     amLODIPine (NORVASC) 5 MG tablet     aspirin 81 MG tablet     CALCIUM 600 + D OR     leflunomide (ARAVA) 20 MG tablet     lisinopril (PRINIVIL/ZESTRIL) 20 MG tablet     METHOTREXate 2.5 MG tablet     metoprolol tartrate (LOPRESSOR) 50 MG tablet     niacin 500 MG tablet     nitroglycerin (NITROSTAT) 0.4 MG SL tablet     order for DME     Pediatric Multivitamins-Iron (FLINTSTONES PLUS IRON PO)     PREDNISONE 5 MG OR TABS     simvastatin (ZOCOR) 20 MG tablet     sulindac (CLINORIL) 200 MG tablet        Allergies   Allergen Reactions     Penicillins Hives     I have reviewed the  Medications, Allergies, Past Medical and Surgical History, and Social History in the Epic system.    Review of Systems   Constitutional: Positive for appetite change (decreased).   Gastrointestinal: Negative for constipation and diarrhea.   Genitourinary: Negative for dysuria, frequency, hematuria and urgency.   Musculoskeletal: Positive for back pain.   All other systems reviewed and are negative.      Physical Exam   BP: 145/68  Pulse: 68  Heart Rate: 63  Temp: 98.5  F (36.9  C)  Resp: 16  Weight: 48.7 kg (107 lb 4.8 oz)  SpO2: 98 %      Physical Exam     Gen:A&Ox3, uncomfortable.   HEENT:PERRL, no facial tenderness or wounds, head atraumatic, oropharynx clear, mucous membranes moist, TMs clear bilaterally  Neck:no bony tenderness or step offs, no JVD, trachea midline  Back: lumbar bony hypertrophy and lordosis, midline and right perispinal muscle tenderness  CV:RRR without murmurs  PULM:Clear to auscultation bilaterally  Abd:soft, nontender, nondistended. Bowel sounds present and normal  UE:No traumatic injuries, skin normal, MCP and finger deformities of RA  LE:no traumatic injuries, skin normal, no LE edema  Neuro:CN II-XII intact, strength 5/5 throughout UE and LE, gait stable, coordination normal on finger to nose testing.   Skin: no rashes or ecchymoses    ED Course        Procedures             Critical Care time:  none      Assessments & Plan (with Medical Decision Making)   72 yo F with a hx of rheumatoid arthritis presenting with lumbar back pain. Associated with a high lumbar radiculopathy.   Without leg weakness, sensory changes, or bowel/bladder dysfunction.   CT lumbar spine and pelvis performed.   Impression:  1.  No acute osseous pathology.  2.  Chronic L2 pedicle fractures with grade 1 retrolisthesis of L1 on  L2. Disc herniation and disc sequestration extending into the right  neural foramen causing severe neural foraminal narrowing. Sequestered  disc impinges the exiting right L1 nerve root.  3.   Grade 1 anterolisthesis of L3 on L4 and severe left neuroforaminal  narrowing and moderate to severe right neural foraminal narrowing.  4.  Moderate spinal canal stenosis L4-5.    Pt's symptoms are consistent with L1 nerve root impingement.   Has follow up on Monday with a pain clinic in [place]. Pt given CT report and image copies for that appointment in the event they are considering injection therapy. Referral to spine surgery if not improving.     Given dose of vicodin in the ED with minimal change in her pain. Will try a short course of MSIR 15mg BID until her appointment for follow up on Monday. Reviewed starting OTC stool softener and driving/activity precautions for opiates.     I have reviewed the nursing notes.    I have reviewed the findings, diagnosis, plan and need for follow up with the patient.       Medication List      Started    morphine 15 MG IR tablet  Commonly known as:  MSIR  15 mg, Oral, EVERY 12 HOURS PRN            Final diagnoses:   Lumbar radiculopathy   IHarjinder, am serving as a trained medical scribe to document services personally performed by Funmilayo Newman MD, based on the provider's statements to me.   IFunmilayo MD, was physically present and have reviewed and verified the accuracy of this note documented by Harjinder Carl.    6/19/2019   [facility], [place], EMERGENCY DEPARTMENT    MD PITER Mac Katrina Anne, MD  06/21/19 [id number]

## 2019-09-30 ENCOUNTER — OFFICE VISIT (OUTPATIENT)
Dept: FAMILY MEDICINE | Facility: CLINIC | Age: 74
End: 2019-09-30
Payer: COMMERCIAL

## 2019-09-30 VITALS
WEIGHT: 107 LBS | SYSTOLIC BLOOD PRESSURE: 123 MMHG | HEART RATE: 75 BPM | OXYGEN SATURATION: 95 % | BODY MASS INDEX: 18.37 KG/M2 | DIASTOLIC BLOOD PRESSURE: 74 MMHG | TEMPERATURE: 98.7 F

## 2019-09-30 DIAGNOSIS — L03.115 CELLULITIS OF RIGHT LOWER EXTREMITY: Primary | ICD-10-CM

## 2019-09-30 DIAGNOSIS — T87.40 INFECTION OF AMPUTATION STUMP (H): ICD-10-CM

## 2019-09-30 LAB
GRAM STN SPEC: ABNORMAL
GRAM STN SPEC: ABNORMAL
Lab: ABNORMAL
SPECIMEN SOURCE: ABNORMAL

## 2019-09-30 PROCEDURE — 87077 CULTURE AEROBIC IDENTIFY: CPT | Performed by: FAMILY MEDICINE

## 2019-09-30 PROCEDURE — 87205 SMEAR GRAM STAIN: CPT | Performed by: FAMILY MEDICINE

## 2019-09-30 PROCEDURE — 99213 OFFICE O/P EST LOW 20 MIN: CPT | Performed by: FAMILY MEDICINE

## 2019-09-30 PROCEDURE — 87186 SC STD MICRODIL/AGAR DIL: CPT | Performed by: FAMILY MEDICINE

## 2019-09-30 PROCEDURE — 87070 CULTURE OTHR SPECIMN AEROBIC: CPT | Performed by: FAMILY MEDICINE

## 2019-09-30 RX ORDER — CLINDAMYCIN HCL 300 MG
300 CAPSULE ORAL 3 TIMES DAILY
Qty: 30 CAPSULE | Refills: 0 | Status: ON HOLD | OUTPATIENT
Start: 2019-09-30 | End: 2019-10-13

## 2019-09-30 ASSESSMENT — PAIN SCALES - GENERAL: PAINLEVEL: EXTREME PAIN (8)

## 2019-09-30 NOTE — PROGRESS NOTES
Subjective     Sakshi Jaeger is a 74 year old female who presents to clinic today for the following health issues:    HPI   Patient is present today for weeping wound of the right leg. Patient is also complaining of back pain.  Patient with history of below the knee amputation, right leg since 2005.  She does wear a prosthesis.  Comes in today reports that her stump, been having, pain, and draining and a warm for the past 3 days.  She denies any recent injury or trauma.  She reports her prosthesis is been adjusted few months ago.  Does not feel loose.  She has not lost weight.    She denies any fever, denies any chills.  She reports she has been having clear yellow fluid drainage.    She denies any previous history of MRSA.  She is up-to-date with her immunization.         Review of Systems   ROS COMP: Constitutional, HEENT, cardiovascular, pulmonary, gi and gu systems are negative, except as otherwise noted.  Constitutional, HEENT, cardiovascular, pulmonary, GI, , musculoskeletal, neuro, skin, endocrine and psych systems are negative, except as otherwise noted.      Objective    There were no vitals taken for this visit.  There is no height or weight on file to calculate BMI.  Physical Exam   GENERAL: healthy, alert and no distress  SKIN: Right below-knee amputation, the stump she does have a small area of blister type of ulcer.  Has a clear liquid, yellow drainage.  There is minimal tenderness with minimal warmth.  There is no induration, no fluctuation.    Diagnostic Test Results:  Labs reviewed in Epic  Orders Placed This Encounter   Procedures     Wound Culture Aerobic Bacterial GICH (FUTURE)     Standing Status:   Future     Standing Expiration Date:   9/29/2020     Gram stain GICH (Future)     Standing Status:   Future     Standing Expiration Date:   9/29/2020     Wound Culture Aerobic Bacterial     Gram stain           Assessment & Plan       ICD-10-CM    1. Cellulitis of right lower extremity L03.115  clindamycin (CLEOCIN) 300 MG capsule     Wound Culture Aerobic Bacterial GICH (FUTURE)     Gram stain GICH (Future)     Wound Culture Aerobic Bacterial     Gram stain   2. Infection of amputation stump (H) T87.40 clindamycin (CLEOCIN) 300 MG capsule     Wound Culture Aerobic Bacterial GICH (FUTURE)     Gram stain GICH (Future)     Wound Culture Aerobic Bacterial     Gram stain     I advised patient was supportive care, I did culture the wound.  She was started on clindamycin, advised to take it with food.    She is up-to-date with her tetanus vaccine.    She was advised if symptoms get worse to follow-up to the clinic or go to ER.    See Patient Instructions    No follow-ups on file.    Josr Hernandez MD  [facility]

## 2019-10-01 ENCOUNTER — HOSPITAL ENCOUNTER (INPATIENT)
Facility: CLINIC | Age: 74
LOS: 12 days | Discharge: NURSING, REHAB OR LONG-TERM CARE | DRG: 492 | End: 2019-10-13
Attending: FAMILY MEDICINE | Admitting: INTERNAL MEDICINE
Payer: COMMERCIAL

## 2019-10-01 ENCOUNTER — APPOINTMENT (OUTPATIENT)
Dept: CT IMAGING | Facility: CLINIC | Age: 74
DRG: 492 | End: 2019-10-01
Attending: FAMILY MEDICINE
Payer: COMMERCIAL

## 2019-10-01 ENCOUNTER — APPOINTMENT (OUTPATIENT)
Dept: CT IMAGING | Facility: CLINIC | Age: 74
DRG: 492 | End: 2019-10-01
Attending: INTERNAL MEDICINE
Payer: COMMERCIAL

## 2019-10-01 ENCOUNTER — APPOINTMENT (OUTPATIENT)
Dept: GENERAL RADIOLOGY | Facility: CLINIC | Age: 74
DRG: 492 | End: 2019-10-01
Attending: FAMILY MEDICINE
Payer: COMMERCIAL

## 2019-10-01 ENCOUNTER — TELEPHONE (OUTPATIENT)
Dept: FAMILY MEDICINE | Facility: CLINIC | Age: 74
End: 2019-10-01

## 2019-10-01 DIAGNOSIS — M86.9 OSTEOMYELITIS (H): ICD-10-CM

## 2019-10-01 DIAGNOSIS — M81.0 SENILE OSTEOPOROSIS: ICD-10-CM

## 2019-10-01 DIAGNOSIS — R65.10 SIRS (SYSTEMIC INFLAMMATORY RESPONSE SYNDROME) (H): ICD-10-CM

## 2019-10-01 DIAGNOSIS — I25.10 CAD (CORONARY ARTERY DISEASE): ICD-10-CM

## 2019-10-01 DIAGNOSIS — B95.61 MSSA BACTEREMIA: Primary | ICD-10-CM

## 2019-10-01 DIAGNOSIS — M06.9 RHEUMATOID ARTHRITIS, INVOLVING UNSPECIFIED SITE, UNSPECIFIED RHEUMATOID FACTOR PRESENCE: ICD-10-CM

## 2019-10-01 DIAGNOSIS — M54.50 BILATERAL LOW BACK PAIN, UNSPECIFIED CHRONICITY, UNSPECIFIED WHETHER SCIATICA PRESENT: ICD-10-CM

## 2019-10-01 DIAGNOSIS — L03.115 CELLULITIS OF RIGHT LEG: ICD-10-CM

## 2019-10-01 DIAGNOSIS — R50.9 FEVER IN ADULT: ICD-10-CM

## 2019-10-01 DIAGNOSIS — R78.81 MSSA BACTEREMIA: Primary | ICD-10-CM

## 2019-10-01 DIAGNOSIS — D72.829 LEUKOCYTOSIS, UNSPECIFIED TYPE: ICD-10-CM

## 2019-10-01 DIAGNOSIS — Z89.511 STATUS POST BELOW-KNEE AMPUTATION OF RIGHT LOWER EXTREMITY (H): ICD-10-CM

## 2019-10-01 DIAGNOSIS — I10 ESSENTIAL HYPERTENSION WITH GOAL BLOOD PRESSURE LESS THAN 140/90: ICD-10-CM

## 2019-10-01 DIAGNOSIS — E78.5 HYPERLIPIDEMIA LDL GOAL <100: ICD-10-CM

## 2019-10-01 PROBLEM — T87.43: Status: ACTIVE | Noted: 2019-10-01

## 2019-10-01 LAB
ABO + RH BLD: NORMAL
ABO + RH BLD: NORMAL
ALBUMIN SERPL-MCNC: 2.5 G/DL (ref 3.4–5)
ALP SERPL-CCNC: 62 U/L (ref 40–150)
ALT SERPL W P-5'-P-CCNC: 14 U/L (ref 0–50)
ANION GAP SERPL CALCULATED.3IONS-SCNC: 10 MMOL/L (ref 3–14)
APTT PPP: 39 SEC (ref 22–37)
AST SERPL W P-5'-P-CCNC: 34 U/L (ref 0–45)
BASOPHILS # BLD AUTO: 0 10E9/L (ref 0–0.2)
BASOPHILS NFR BLD AUTO: 0 %
BILIRUB SERPL-MCNC: 0.6 MG/DL (ref 0.2–1.3)
BLD GP AB SCN SERPL QL: NORMAL
BLOOD BANK CMNT PATIENT-IMP: NORMAL
BUN SERPL-MCNC: 12 MG/DL (ref 7–30)
CALCIUM SERPL-MCNC: 8.8 MG/DL (ref 8.5–10.1)
CHLORIDE SERPL-SCNC: 94 MMOL/L (ref 94–109)
CO2 BLDCOV-SCNC: 23 MMOL/L (ref 21–28)
CO2 SERPL-SCNC: 24 MMOL/L (ref 20–32)
CREAT SERPL-MCNC: 0.56 MG/DL (ref 0.52–1.04)
CRP SERPL-MCNC: 280 MG/L (ref 0–8)
DIFFERENTIAL METHOD BLD: ABNORMAL
EOSINOPHIL # BLD AUTO: 0 10E9/L (ref 0–0.7)
EOSINOPHIL NFR BLD AUTO: 0 %
ERYTHROCYTE [DISTWIDTH] IN BLOOD BY AUTOMATED COUNT: 14.8 % (ref 10–15)
GFR SERPL CREATININE-BSD FRML MDRD: >90 ML/MIN/{1.73_M2}
GLUCOSE SERPL-MCNC: 82 MG/DL (ref 70–99)
HCT VFR BLD AUTO: 32.3 % (ref 35–47)
HGB BLD-MCNC: 10.5 G/DL (ref 11.7–15.7)
INR PPP: 1.38 (ref 0.86–1.14)
INTERPRETATION ECG - MUSE: NORMAL
LACTATE BLD-SCNC: 0.9 MMOL/L (ref 0.7–2.1)
LIPASE SERPL-CCNC: 37 U/L (ref 73–393)
LYMPHOCYTES # BLD AUTO: 0.4 10E9/L (ref 0.8–5.3)
LYMPHOCYTES NFR BLD AUTO: 1.7 %
MCH RBC QN AUTO: 29 PG (ref 26.5–33)
MCHC RBC AUTO-ENTMCNC: 32.5 G/DL (ref 31.5–36.5)
MCV RBC AUTO: 89 FL (ref 78–100)
MONOCYTES # BLD AUTO: 0.6 10E9/L (ref 0–1.3)
MONOCYTES NFR BLD AUTO: 2.6 %
NEUTROPHILS # BLD AUTO: 23.2 10E9/L (ref 1.6–8.3)
NEUTROPHILS NFR BLD AUTO: 95.7 %
PCO2 BLDV: 38 MM HG (ref 40–50)
PH BLDV: 7.4 PH (ref 7.32–7.43)
PLATELET # BLD AUTO: 357 10E9/L (ref 150–450)
PLATELET # BLD EST: ABNORMAL 10*3/UL
PO2 BLDV: 17 MM HG (ref 25–47)
POLYCHROMASIA BLD QL SMEAR: SLIGHT
POTASSIUM SERPL-SCNC: 3.7 MMOL/L (ref 3.4–5.3)
PROCALCITONIN SERPL-MCNC: 1.9 NG/ML
PROT SERPL-MCNC: 6.9 G/DL (ref 6.8–8.8)
RBC # BLD AUTO: 3.62 10E12/L (ref 3.8–5.2)
SAO2 % BLDV FROM PO2: 25 %
SODIUM SERPL-SCNC: 128 MMOL/L (ref 133–144)
SPECIMEN EXP DATE BLD: NORMAL
WBC # BLD AUTO: 24.2 10E9/L (ref 4–11)

## 2019-10-01 PROCEDURE — 87040 BLOOD CULTURE FOR BACTERIA: CPT | Performed by: FAMILY MEDICINE

## 2019-10-01 PROCEDURE — 85730 THROMBOPLASTIN TIME PARTIAL: CPT | Performed by: FAMILY MEDICINE

## 2019-10-01 PROCEDURE — 86850 RBC ANTIBODY SCREEN: CPT | Performed by: FAMILY MEDICINE

## 2019-10-01 PROCEDURE — 99223 1ST HOSP IP/OBS HIGH 75: CPT | Mod: AI | Performed by: INTERNAL MEDICINE

## 2019-10-01 PROCEDURE — 96375 TX/PRO/DX INJ NEW DRUG ADDON: CPT

## 2019-10-01 PROCEDURE — 99285 EMERGENCY DEPT VISIT HI MDM: CPT | Mod: 25

## 2019-10-01 PROCEDURE — 86901 BLOOD TYPING SEROLOGIC RH(D): CPT | Performed by: FAMILY MEDICINE

## 2019-10-01 PROCEDURE — 86900 BLOOD TYPING SEROLOGIC ABO: CPT | Performed by: FAMILY MEDICINE

## 2019-10-01 PROCEDURE — 25000128 H RX IP 250 OP 636: Performed by: EMERGENCY MEDICINE

## 2019-10-01 PROCEDURE — 87186 SC STD MICRODIL/AGAR DIL: CPT | Performed by: FAMILY MEDICINE

## 2019-10-01 PROCEDURE — 86140 C-REACTIVE PROTEIN: CPT | Performed by: FAMILY MEDICINE

## 2019-10-01 PROCEDURE — 25800030 ZZH RX IP 258 OP 636

## 2019-10-01 PROCEDURE — 85610 PROTHROMBIN TIME: CPT | Performed by: FAMILY MEDICINE

## 2019-10-01 PROCEDURE — 93010 ELECTROCARDIOGRAM REPORT: CPT | Mod: Z6 | Performed by: FAMILY MEDICINE

## 2019-10-01 PROCEDURE — 71045 X-RAY EXAM CHEST 1 VIEW: CPT

## 2019-10-01 PROCEDURE — 80053 COMPREHEN METABOLIC PANEL: CPT | Performed by: FAMILY MEDICINE

## 2019-10-01 PROCEDURE — 99291 CRITICAL CARE FIRST HOUR: CPT | Mod: 25 | Performed by: FAMILY MEDICINE

## 2019-10-01 PROCEDURE — 25000132 ZZH RX MED GY IP 250 OP 250 PS 637: Performed by: FAMILY MEDICINE

## 2019-10-01 PROCEDURE — 74176 CT ABD & PELVIS W/O CONTRAST: CPT

## 2019-10-01 PROCEDURE — 93005 ELECTROCARDIOGRAM TRACING: CPT

## 2019-10-01 PROCEDURE — 25800030 ZZH RX IP 258 OP 636: Performed by: FAMILY MEDICINE

## 2019-10-01 PROCEDURE — 96367 TX/PROPH/DG ADDL SEQ IV INF: CPT

## 2019-10-01 PROCEDURE — 82803 BLOOD GASES ANY COMBINATION: CPT

## 2019-10-01 PROCEDURE — 83605 ASSAY OF LACTIC ACID: CPT

## 2019-10-01 PROCEDURE — 87077 CULTURE AEROBIC IDENTIFY: CPT | Performed by: FAMILY MEDICINE

## 2019-10-01 PROCEDURE — 25000132 ZZH RX MED GY IP 250 OP 250 PS 637: Performed by: STUDENT IN AN ORGANIZED HEALTH CARE EDUCATION/TRAINING PROGRAM

## 2019-10-01 PROCEDURE — 73701 CT LOWER EXTREMITY W/DYE: CPT | Mod: RT

## 2019-10-01 PROCEDURE — 87800 DETECT AGNT MULT DNA DIREC: CPT | Performed by: FAMILY MEDICINE

## 2019-10-01 PROCEDURE — 83690 ASSAY OF LIPASE: CPT | Performed by: FAMILY MEDICINE

## 2019-10-01 PROCEDURE — 25000131 ZZH RX MED GY IP 250 OP 636 PS 637: Performed by: STUDENT IN AN ORGANIZED HEALTH CARE EDUCATION/TRAINING PROGRAM

## 2019-10-01 PROCEDURE — 12000001 ZZH R&B MED SURG/OB UMMC

## 2019-10-01 PROCEDURE — 25000128 H RX IP 250 OP 636: Performed by: FAMILY MEDICINE

## 2019-10-01 PROCEDURE — 25000128 H RX IP 250 OP 636

## 2019-10-01 PROCEDURE — 36415 COLL VENOUS BLD VENIPUNCTURE: CPT | Performed by: STUDENT IN AN ORGANIZED HEALTH CARE EDUCATION/TRAINING PROGRAM

## 2019-10-01 PROCEDURE — 96366 THER/PROPH/DIAG IV INF ADDON: CPT

## 2019-10-01 PROCEDURE — 96365 THER/PROPH/DIAG IV INF INIT: CPT

## 2019-10-01 PROCEDURE — 85025 COMPLETE CBC W/AUTO DIFF WBC: CPT | Performed by: FAMILY MEDICINE

## 2019-10-01 PROCEDURE — 72131 CT LUMBAR SPINE W/O DYE: CPT

## 2019-10-01 PROCEDURE — 87040 BLOOD CULTURE FOR BACTERIA: CPT | Performed by: STUDENT IN AN ORGANIZED HEALTH CARE EDUCATION/TRAINING PROGRAM

## 2019-10-01 PROCEDURE — 25800030 ZZH RX IP 258 OP 636: Performed by: STUDENT IN AN ORGANIZED HEALTH CARE EDUCATION/TRAINING PROGRAM

## 2019-10-01 PROCEDURE — 84145 PROCALCITONIN (PCT): CPT | Performed by: FAMILY MEDICINE

## 2019-10-01 PROCEDURE — 87077 CULTURE AEROBIC IDENTIFY: CPT | Performed by: STUDENT IN AN ORGANIZED HEALTH CARE EDUCATION/TRAINING PROGRAM

## 2019-10-01 PROCEDURE — 25000128 H RX IP 250 OP 636: Performed by: STUDENT IN AN ORGANIZED HEALTH CARE EDUCATION/TRAINING PROGRAM

## 2019-10-01 RX ORDER — VANCOMYCIN HYDROCHLORIDE 1 G/200ML
1000 INJECTION, SOLUTION INTRAVENOUS EVERY 24 HOURS
Status: DISCONTINUED | OUTPATIENT
Start: 2019-10-01 | End: 2019-10-03

## 2019-10-01 RX ORDER — SIMVASTATIN 20 MG
20 TABLET ORAL AT BEDTIME
Status: DISCONTINUED | OUTPATIENT
Start: 2019-10-01 | End: 2019-10-13 | Stop reason: HOSPADM

## 2019-10-01 RX ORDER — PREDNISONE 5 MG/1
5 TABLET ORAL DAILY
Status: DISCONTINUED | OUTPATIENT
Start: 2019-10-01 | End: 2019-10-13 | Stop reason: HOSPADM

## 2019-10-01 RX ORDER — IOPAMIDOL 755 MG/ML
68 INJECTION, SOLUTION INTRAVASCULAR ONCE
Status: COMPLETED | OUTPATIENT
Start: 2019-10-01 | End: 2019-10-01

## 2019-10-01 RX ORDER — ONDANSETRON 2 MG/ML
4 INJECTION INTRAMUSCULAR; INTRAVENOUS EVERY 6 HOURS PRN
Status: DISCONTINUED | OUTPATIENT
Start: 2019-10-01 | End: 2019-10-13 | Stop reason: HOSPADM

## 2019-10-01 RX ORDER — METOCLOPRAMIDE 5 MG/1
5 TABLET ORAL EVERY 6 HOURS PRN
Status: DISCONTINUED | OUTPATIENT
Start: 2019-10-01 | End: 2019-10-13 | Stop reason: HOSPADM

## 2019-10-01 RX ORDER — PROCHLORPERAZINE MALEATE 5 MG
5 TABLET ORAL EVERY 6 HOURS PRN
Status: DISCONTINUED | OUTPATIENT
Start: 2019-10-01 | End: 2019-10-13 | Stop reason: HOSPADM

## 2019-10-01 RX ORDER — CEFTRIAXONE 1 G/1
1 INJECTION, POWDER, FOR SOLUTION INTRAMUSCULAR; INTRAVENOUS ONCE
Status: COMPLETED | OUTPATIENT
Start: 2019-10-01 | End: 2019-10-01

## 2019-10-01 RX ORDER — PROCHLORPERAZINE 25 MG
12.5 SUPPOSITORY, RECTAL RECTAL EVERY 12 HOURS PRN
Status: DISCONTINUED | OUTPATIENT
Start: 2019-10-01 | End: 2019-10-13 | Stop reason: HOSPADM

## 2019-10-01 RX ORDER — SODIUM CHLORIDE, SODIUM LACTATE, POTASSIUM CHLORIDE, CALCIUM CHLORIDE 600; 310; 30; 20 MG/100ML; MG/100ML; MG/100ML; MG/100ML
INJECTION, SOLUTION INTRAVENOUS CONTINUOUS
Status: DISCONTINUED | OUTPATIENT
Start: 2019-10-01 | End: 2019-10-03

## 2019-10-01 RX ORDER — ACETAMINOPHEN 325 MG/1
650 TABLET ORAL EVERY 4 HOURS
Status: DISCONTINUED | OUTPATIENT
Start: 2019-10-02 | End: 2019-10-13 | Stop reason: HOSPADM

## 2019-10-01 RX ORDER — METOCLOPRAMIDE HYDROCHLORIDE 5 MG/ML
5 INJECTION INTRAMUSCULAR; INTRAVENOUS EVERY 6 HOURS PRN
Status: DISCONTINUED | OUTPATIENT
Start: 2019-10-01 | End: 2019-10-13 | Stop reason: HOSPADM

## 2019-10-01 RX ORDER — OXYCODONE HYDROCHLORIDE 5 MG/1
5 TABLET ORAL EVERY 4 HOURS PRN
Status: DISCONTINUED | OUTPATIENT
Start: 2019-10-01 | End: 2019-10-03

## 2019-10-01 RX ORDER — ONDANSETRON 4 MG/1
4 TABLET, ORALLY DISINTEGRATING ORAL EVERY 6 HOURS PRN
Status: DISCONTINUED | OUTPATIENT
Start: 2019-10-01 | End: 2019-10-13 | Stop reason: HOSPADM

## 2019-10-01 RX ORDER — ACETAMINOPHEN 500 MG
1000 TABLET ORAL ONCE
Status: COMPLETED | OUTPATIENT
Start: 2019-10-01 | End: 2019-10-01

## 2019-10-01 RX ORDER — ACETAMINOPHEN 325 MG/1
650 TABLET ORAL EVERY 4 HOURS PRN
Status: DISCONTINUED | OUTPATIENT
Start: 2019-10-01 | End: 2019-10-01

## 2019-10-01 RX ORDER — SODIUM CHLORIDE 9 MG/ML
1000 INJECTION, SOLUTION INTRAVENOUS CONTINUOUS
Status: DISCONTINUED | OUTPATIENT
Start: 2019-10-01 | End: 2019-10-03

## 2019-10-01 RX ORDER — GABAPENTIN 300 MG/1
600-1200 CAPSULE ORAL 3 TIMES DAILY
Status: ON HOLD | COMMUNITY
End: 2019-10-13

## 2019-10-01 RX ORDER — NALOXONE HYDROCHLORIDE 0.4 MG/ML
.1-.4 INJECTION, SOLUTION INTRAMUSCULAR; INTRAVENOUS; SUBCUTANEOUS
Status: DISCONTINUED | OUTPATIENT
Start: 2019-10-01 | End: 2019-10-09

## 2019-10-01 RX ORDER — MORPHINE SULFATE 4 MG/ML
4 INJECTION, SOLUTION INTRAMUSCULAR; INTRAVENOUS
Status: DISCONTINUED | OUTPATIENT
Start: 2019-10-01 | End: 2019-10-01

## 2019-10-01 RX ORDER — MORPHINE SULFATE 2 MG/ML
4 INJECTION, SOLUTION INTRAMUSCULAR; INTRAVENOUS EVERY 4 HOURS PRN
Status: DISCONTINUED | OUTPATIENT
Start: 2019-10-01 | End: 2019-10-01

## 2019-10-01 RX ORDER — LIDOCAINE 40 MG/G
CREAM TOPICAL
Status: DISCONTINUED | OUTPATIENT
Start: 2019-10-01 | End: 2019-10-11

## 2019-10-01 RX ORDER — LIDOCAINE 40 MG/G
CREAM TOPICAL
Status: DISCONTINUED | OUTPATIENT
Start: 2019-10-01 | End: 2019-10-01

## 2019-10-01 RX ADMIN — OXYCODONE HYDROCHLORIDE 5 MG: 5 TABLET ORAL at 23:59

## 2019-10-01 RX ADMIN — PREDNISONE 5 MG: 5 TABLET ORAL at 18:19

## 2019-10-01 RX ADMIN — ACETAMINOPHEN 650 MG: 325 TABLET, FILM COATED ORAL at 22:04

## 2019-10-01 RX ADMIN — CEFTRIAXONE 1 G: 1 INJECTION, POWDER, FOR SOLUTION INTRAMUSCULAR; INTRAVENOUS at 13:13

## 2019-10-01 RX ADMIN — IOPAMIDOL 68 ML: 755 INJECTION, SOLUTION INTRAVENOUS at 17:31

## 2019-10-01 RX ADMIN — ACETAMINOPHEN 1000 MG: 500 TABLET, FILM COATED ORAL at 13:13

## 2019-10-01 RX ADMIN — MORPHINE SULFATE 4 MG: 4 INJECTION INTRAVENOUS at 18:13

## 2019-10-01 RX ADMIN — SODIUM CHLORIDE 1000 ML: 9 INJECTION, SOLUTION INTRAVENOUS at 14:13

## 2019-10-01 RX ADMIN — MORPHINE SULFATE 4 MG: 4 INJECTION INTRAVENOUS at 16:13

## 2019-10-01 RX ADMIN — SODIUM CHLORIDE, POTASSIUM CHLORIDE, SODIUM LACTATE AND CALCIUM CHLORIDE: 600; 310; 30; 20 INJECTION, SOLUTION INTRAVENOUS at 18:19

## 2019-10-01 RX ADMIN — SODIUM CHLORIDE 1000 ML: 9 INJECTION, SOLUTION INTRAVENOUS at 13:11

## 2019-10-01 RX ADMIN — SIMVASTATIN 20 MG: 20 TABLET, FILM COATED ORAL at 22:04

## 2019-10-01 RX ADMIN — VANCOMYCIN HYDROCHLORIDE 1250 MG: 10 INJECTION, POWDER, LYOPHILIZED, FOR SOLUTION INTRAVENOUS at 14:14

## 2019-10-01 ASSESSMENT — ENCOUNTER SYMPTOMS
ABDOMINAL PAIN: 0
VOMITING: 0
BRUISES/BLEEDS EASILY: 0
SORE THROAT: 0
DIFFICULTY URINATING: 0
CONFUSION: 0
APPETITE CHANGE: 0
BACK PAIN: 1
SHORTNESS OF BREATH: 0
SINUS PRESSURE: 0
WOUND: 1
CHILLS: 1
ACTIVITY CHANGE: 1
HALLUCINATIONS: 0
FEVER: 1
NECK STIFFNESS: 0
EYE REDNESS: 0
COUGH: 0
FATIGUE: 1
COLOR CHANGE: 0
DYSPHORIC MOOD: 1
HEADACHES: 0
DYSURIA: 0
ARTHRALGIAS: 0
WEAKNESS: 1
FACIAL SWELLING: 0
DECREASED CONCENTRATION: 1

## 2019-10-01 ASSESSMENT — ACTIVITIES OF DAILY LIVING (ADL): ADLS_ACUITY_SCORE: 15

## 2019-10-01 ASSESSMENT — MIFFLIN-ST. JEOR: SCORE: 1015.71

## 2019-10-01 NOTE — H&P
[facility], [place]    History and Physical - Roddy 4 Service        Date of Admission:  10/1/2019    Assessment & Plan    74 year old female with history of RA on DMARD therapy s/p R BKA due to surgical infection of the R ankle, CAD and HTN who presents with acute onset R amputation stump pain with purulent drainage positive for S.aureus and CT findings concerning for early onset osteomyelitis.    1. Acute osteomyelitis d/t S.aureus  Wound cultures taken from Dr. Hernandez's office 9/30 were positive for S.aureus, speciation of MSSA v MRSA pending on admission. Upon physical examination, there was significant purulent drainage expressed from the R amputation stump associated with pain, and more expressed with flexion of the joint. Moderate leukocytosis of 24 noted, while CT w/contrast of the R knee was notable for sinus tracking down the femur, suspicious for early onset osteomyelitis. Currently on vancomycin IV for broad spectrum coverage until sensitivities result. Orthopedic surgery consulted, will plan for I&D of the R amputation stump and to send off bone cultures.  -Continue IV vancomycin until sensitivities result  -Continue trending blood cultures daily  -Orthopedic surgery consulted, appreciate recs  -CBC checks daily  -Trend daily CRP    2. Right pleural effusion  Patient with CT findings of trace right pleural fluid and tree-in-bud nodular pattern, concerning for inflammatory v infectious process. Although patient is presenting with a leukocytosis, this is most likely a reaction to the early osteomyelitis; no change in respiratory status to suggest a developing PNA picture.   -Continue trending blood cultures as noted above  -Procalcitonin result pending at this time  -If patient begins to experience signs of respiratory distress, will obtain sputum culture and broaden coverage    3. Mild hyponatremia  Sodium 128 on admission, most likely in the setting of hypovolemia from  infection.  -Continue IVF,  ml/hr  -Repeat BMP in the AM    4. Mild hypoalbuminemia  Albumin 2.5 on admission, most likely due to poor nutrition in the setting of infection.  -NPO at this time due to possible surgical intervention for source control  -Consider RD consult if appetite does not improve    Chronic medical conditions---  #Rheumatoid arthritis - Continue prednisone 5 mg daily; will hold DMARDs at this time  #CAD - Continue PTA statin dose  #HTN - Hold antihypertensives in the setting of developing infection/sepsis  #Lumbar spinal stenosis - Will continue with conservative pain management at this time.     Diet: NPO for Medical/Clinical Reasons Except for: Meds, Ice Chips    Fluids: Lactated Ringers 150 ml/hr IV  DVT Prophylaxis: Pneumatic Compression Devices  Nolan Catheter: in place, indication:    Code Status: Full Code      Disposition Plan   Expected discharge: 2 - 3 days, recommended to prior living arrangement once adequate pain management/ tolerating PO medications and antibiotic plan established.  Entered: Sathya Nolasco MD 10/01/2019, 6:24 PM       The patient's care was discussed with the Attending Physician, Dr. Cuello, Bedside Nurse and Patient.    Sathya Nolasco MD  [address], [place]  Pager: [phone]  Please see sticky note for cross cover information  ______________________________________________________________________    Chief Complaint   Back pain, leg pain    History is obtained from the patient    History of Present Illness   Sakshi Jaeger is a 74 year old female with history of chronic low back pain w/L1 fracture, HTN, HLD, OA of the knees s/p L TKA, RA on DMARDs, s/p R BKA d/t surgical infection from R ankle plate placement who presents to the ED with her fiance for constant, worsening R amputation stump pain for the 4 days and unchanged acute on chronic low back pain. Per patient's fiance, patient began  experiencing R stump pain on 9/27 after it was noted she had a developing sore.  Patient's fiance notes that patient typically wears a prosthetic, but is unable to due to increasing pain from the sore and the presence of brown colored drainage. As a result, patient visited [place] [facility] 9/30 for the R knee wound check; a wound culture was taken and patient was discharged with clindamycin PO. Patient notes feeling feverish overnight, which prompted patient's fiance to bring her to [facility] ED this morning. Patient notes feeling fatigued and weak prior to the onset of her fever. She denies chills, night sweats, CP, SOB, abd pain, n/v/d, dysuria, hematuria, numbness or tingling.    Upon ED arrival, patient's temperature was borderline febrile at 100.2F; the rest of her vital signs were normal. She received 1L of IVF, in addition to ceftriaxone and vancomycin IV. Labs were notable for a leukocytosis of 24, CT abdomen findings of trace R pleural fluid, and chronic degenerative disc changes on CT of the lumbar spine. Wound cultures taken from Dr. Hernandez's clinic at [facility] were positive for S.aureus. Patient was admitted for concern of developing sepsis with positive wound cultures.     Review of Systems    The 10 point Review of Systems is negative other than noted in the HPI or here.     Past Medical History    I have reviewed this patient's medical history and updated it with pertinent information if needed.   Past Medical History:   Diagnosis Date     BENIGN HYPERTENSION 3/1/2005     CAD (coronary artery disease) 1/26/2011     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     Hypertension goal BP (blood pressure) < 130/80 1/26/2011     IRON DEFIC ANEMIA NOS 9/15/2005     Lower limb amputation, below knee 12/26/2012     MI (myocardial infarction) (H)     3/2010     OSTEOPOROSIS NOS 2/16/2005     Rheumatoid arthritis(714.0) 2/16/2005        Past Surgical History   I have reviewed this patient's surgical history and  updated it with pertinent information if needed.  Past Surgical History:   Procedure Laterality Date     ---------INCISION AND DRAINAGE  3/5/14     AMPUTATION BELOW KNEE RT/LT      right lowwer leg.      APPENDECTOMY       ARTHROPLASTY KNEE  2011    Procedure:ARTHROPLASTY KNEE; Surgeon:JESSE HAWKINS; Location:[facility] OR     coronary stent       SURGICAL HISTORY OF -       Appendectomy        Social History   I have reviewed this patient's social history and updated it with pertinent information if needed. Sakshi Jaeger  reports that she has been smoking cigarettes. She has a 22.50 pack-year smoking history. She has never used smokeless tobacco. She reports current alcohol use. She reports that she does not use drugs.    Family History   I have reviewed this patient's family history and updated it with pertinent information if needed.   Family History   Problem Relation Age of Onset     Cardiovascular Mother      Cancer Brother      Diabetes No family hx of      Hypertension No family hx of      Cerebrovascular Disease No family hx of      Alzheimer Disease No family hx of      Thyroid Disease No family hx of      Respiratory No family hx of        Prior to Admission Medications   Prior to Admission Medications   Prescriptions Last Dose Informant Patient Reported? Taking?   CALCIUM 600 + D OR 2019 at AM  Yes Yes   Sig: Take 2 tablets by mouth daily    METHOTREXate 2.5 MG tablet Unknown at Unknown time  No No   Sig: Take 8 tablets by mouth once a week.   Patient taking differently: Take 20 mg by mouth once a week Pt believes that she takes this on Saturday, but is not sure   PREDNISONE 5 MG OR TABS 2019 at AM  Yes Yes   Si TABLET DAILY   Pediatric Multivitamins-Iron (FLINTSTONES PLUS IRON PO) 2019 at AM  Yes Yes   Sig: Take 1 tablet by mouth daily.   alendronate (FOSAMAX) 35 MG tablet   No No   Sig: TAKE 1 TAB BY MOUTH WITH 8OZ OF WATER ONCE EVERY WEEK BEFORE BREAKFAST, REMAIN UPRIGHT AT  THIS TIME   Patient not taking: Reported on 2019   amLODIPine (NORVASC) 5 MG tablet 2019 at AM  No Yes   Sig: Take 1 tablet (5 mg) by mouth daily   aspirin 81 MG tablet   Yes No   Sig: Take 2 tablets by mouth daily.   clindamycin (CLEOCIN) 300 MG capsule   No No   Sig: Take 1 capsule (300 mg) by mouth 3 times daily for 10 days   gabapentin (NEURONTIN) 300 MG capsule 10/1/2019 at 0230  Yes Yes   Sig: Take 600-1,200 mg by mouth 3 times daily   leflunomide (ARAVA) 20 MG tablet Unknown at Unknown time  Yes No   Sig: Take 20 mg by mouth daily.   lisinopril (PRINIVIL/ZESTRIL) 20 MG tablet 2019 at AM  No Yes   Sig: Take 1 tablet (20 mg) by mouth daily   metoprolol tartrate (LOPRESSOR) 50 MG tablet 2019 at AM  No Yes   Sig: TAKE 1 TABLET BY MOUTH TWICE A DAY   morphine (MSIR) 15 MG IR tablet Past Month at Unknown time  No Yes   Sig: Take 1 tablet (15 mg) by mouth every 12 hours as needed for severe pain   niacin 500 MG tablet Unknown at Unknown time  No No   Sig: Take 1 tablet by mouth At Bedtime.   nitroglycerin (NITROSTAT) 0.4 MG SL tablet Unknown at Unknown time  Yes No   Si.4 mg by Sublingual route every 5 (five) minutes as needed.   order for DME   No No   Sig: Equipment being ordered: New foam (custom shaped protective cover) for right below the knee prostheses   simvastatin (ZOCOR) 20 MG tablet 2019 at PM  No Yes   Sig: TAKE 1 TABLET BY MOUTH DAILY AT BEDTIME   sulindac (CLINORIL) 200 MG tablet Unknown at Unknown time  Yes No   Sig: TAKE 1 TABLET BY MOUTH TWICE A DAY WITH FOOD   zoster vaccine recombinant adjuvanted (SHINGRIX) injection   No No   Sig: Inject 0.5 mLs into the muscle once for 1 dose      Facility-Administered Medications: None     Allergies   Allergies   Allergen Reactions     Penicillins Hives       Physical Exam   Vital Signs: Temp: 98.9  F (37.2  C) Temp src: Oral BP: 108/47 Pulse: 87   Resp: 16 SpO2: 95 % O2 Device: None (Room air)    Weight: 110 lbs 0 oz    General  Appearance: Appears as stated age, feeble, in mild distress  Eyes: NC/AT. PERRL.  HEENT: NC/AT. Dry mucous membranes.  Respiratory: Shallow breath sounds, though no increased work of breathing. Speaking in full sentences on room air.  Cardiovascular: Normal rate, regular rhythm. No m/r/g.  GI: Soft, non tender, non distended  Skin: Warm, dry. Erythema and warmth noted over the R amputation site with tenderness to palpation. Bandages were taken down and a puncture site was present; purulent drainage was expressed from the site with pain elicited.  Musculoskeletal: R BKA noted; FROM x 4 extremities. No clubbing or cyanosis.  Neurologic: Normal speech, no FND.    Data   Data reviewed today: I reviewed all medications, new labs and imaging results over the last 24 hours. I personally reviewed     Recent Labs   Lab 10/01/19  1309   WBC 24.2*   HGB 10.5*   MCV 89      INR 1.38*   *   POTASSIUM 3.7   CHLORIDE 94   CO2 24   BUN 12   CR 0.56   ANIONGAP 10   ADRIAN 8.8   GLC 82   ALBUMIN 2.5*   PROTTOTAL 6.9   BILITOTAL 0.6   ALKPHOS 62   ALT 14   AST 34   LIPASE 37*

## 2019-10-01 NOTE — PHARMACY-VANCOMYCIN DOSING SERVICE
Pharmacy Vancomycin Initial Note  Date of Service 2019  Patient's  1945  74 year old, female    Indication: Sepsis, SSTI    Current estimated CrCl = CrCl cannot be calculated (Patient's most recent lab result is older than the maximum 90 days allowed.).    Creatinine for last 3 days  No results found for requested labs within last 72 hours.    Recent Vancomycin Level(s) for last 3 days  No results found for requested labs within last 72 hours.      Vancomycin IV Administrations (past 72 hours)      No vancomycin orders with administrations in past 72 hours.                Nephrotoxins and other renal medications (From now, onward)    Start     Dose/Rate Route Frequency Ordered Stop    10/01/19 1312  vancomycin (VANCOCIN) 1000 mg in dextrose 5% 200 mL PREMIX      1,000 mg  200 mL/hr over 1 Hours Intravenous EVERY 24 HOURS 10/01/19 1311      10/01/19 1312  vancomycin 1250 mg in 0.9% NaCl 250 mL intermittent infusion 1,250 mg      1,250 mg  over 90 Minutes Intravenous ONCE 10/01/19 1311            Contrast Orders - past 72 hours (72h ago, onward)    None                Plan:  1.  Load vancomycin  1250 mg IV (25mg/kg) then 1000mg (20mg/kg) q24h. Last Scr from 18 is 0.6. Will adjust dose as today's level comes back.  2.  Goal Trough Level: 15-20 mg/L   3.  Pharmacy will check trough levels as appropriate in 1-3 Days.    4. Serum creatinine levels will be ordered daily for the first week of therapy and at least twice weekly for subsequent weeks.    5. [place] method utilized to dose vancomycin therapy: Method 2    Denis Pitts [facility]

## 2019-10-01 NOTE — TELEPHONE ENCOUNTER
"Patient was just seen yesterday by Dr. Hernandez.     I see she was started on Clindamycin yesterday.  I don't find consent to communicate with anyone on file.       \"Santi\" is calling with concern.    I called home number, advised Santi that I can take information from him but cannot give him any information.  Advised they sign a consent next time they are in.     He states they \"had a problem\" at the pharmacy last night and so did not get the clindamycin started.   Santi picked it up today but he cannot get patient to wake up and sit up enough to take it; says patient went to bed early last night and is disoriented and \"not herself\" today.   States she is in extreme pain to her back.   He denies she is having chills or fever.  He says this is an unusual and sudden change for her behavior.    Due to report of sudden change in level of consciousness I advised he call 911 for evaluation and likely transport to ER for evaluation.  Patient has cardiac disease on problem list.       Santi verbalized understanding of and agreement with plan.    Source:  Telephone Triage Protocols for Nurses, Jayy, 5th ed, confusion    Isidra Montoya, BREANNE  [facility]          .  "

## 2019-10-01 NOTE — ED PROVIDER NOTES
[place] EMERGENCY DEPARTMENT ([facility])  10/01/19    History     Chief Complaint   Patient presents with     Wound Check     Back Pain     History was provided by the patient, the patient's partner and medical records.      Sakshi Jaeger is a 74 year old female with a history of CAD, HTN, currently joint pain, rheumatoid arthritis and right lower leg amputation (2005) who presents with fiancé for evaluation of multiple complaints.  Patient was seen on 6/19/2019 and found to have a lumbar fracture.  Patient reports injection into her left lumbar back in early September which gave her relief for her back pain for a short time.  She states this episode of back pain began yesterday and is present across her whole lumbar back without radiating elsewhere.  Patient is taking gabapentin for her pain.  While visiting patient's mother over the weekend, patient's partner picked her up and carried her up and down stairs which she believes could be the reason for her pain.    Patient visited [facility] yesterday for her back pain and wound check of her right leg.  Patient typically wears a prosthetic but has been unable to since Friday, when a sore developed on her amputation site.  The patient notes drainage from the site but was clean yesterday at the clinic with a new bandage being placed.  Patient was prescribed oral antibiotics but she has not started taking them.  Patient notes a fever must have developed last night as her vitals were within normal limits at the clinic.  Patient denies taking Tylenol.  She denies cough, dysuria or vomiting.  No headache noted this point just general weakness also feeling fatigued did not know she had a fever until today.  No other rashes seen but some drainage from wound on the right below-knee amputation right.    Lumbar Spine CT - [facility] (6/19/2019)  Impression:  1.  No acute osseous pathology.  2.  Chronic L2 pedicle fractures with grade 1 retrolisthesis of L1  on L2. Disc herniation and disc sequestration extending into the right neural foramen causing severe neural foraminal narrowing. Sequestered disc impinges the exiting right L1 nerve root.  3.  Grade 1 anterolisthesis of L3 on L4 and severe left neuroforaminal narrowing and moderate to severe right neural foraminal narrowing.  4.  Moderate spinal canal stenosis L4-5.    Past Medical History:   Diagnosis Date     BENIGN HYPERTENSION 3/1/2005     CAD (coronary artery disease) 2011     Employs prosthetic leg 2012     Hypertension goal BP (blood pressure) < 130/80 2011     IRON DEFIC ANEMIA NOS 9/15/2005     Lower limb amputation, below knee 2012     MI (myocardial infarction) (H)     3/2010     OSTEOPOROSIS NOS 2005     Rheumatoid arthritis(714.0) 2005       Past Surgical History:   Procedure Laterality Date     ---------INCISION AND DRAINAGE  3/5/14     AMPUTATION BELOW KNEE RT/LT      right lowwer leg.      APPENDECTOMY       ARTHROPLASTY KNEE  2011    Procedure:ARTHROPLASTY KNEE; Surgeon:JESSE HAWKINS; Location:[facility] OR     coronary stent       SURGICAL HISTORY OF -       Appendectomy       Family History   Problem Relation Age of Onset     Cardiovascular Mother      Cancer Brother      Diabetes No family hx of      Hypertension No family hx of      Cerebrovascular Disease No family hx of      Alzheimer Disease No family hx of      Thyroid Disease No family hx of      Respiratory No family hx of        Social History     Tobacco Use     Smoking status: Current Every Day Smoker     Packs/day: 0.50     Years: 45.00     Pack years: 22.50     Types: Cigarettes     Last attempt to quit: 2010     Years since quittin.6     Smokeless tobacco: Never Used     Tobacco comment: pt has recently started smoking again d/t pain   Substance Use Topics     Alcohol use: Yes     Comment: occsionally-3 -4 drinks a week        Allergies   Allergen Reactions     Penicillins Hives      Current Facility-Administered Medications   Medication     lidocaine (LMX4) cream     lidocaine 1 % 0.1-1 mL     sodium chloride (PF) 0.9% PF flush 3 mL     sodium chloride (PF) 0.9% PF flush 3 mL     sodium chloride 0.9% infusion     vancomycin (VANCOCIN) 1000 mg in dextrose 5% 200 mL PREMIX     vancomycin 1250 mg in 0.9% NaCl 250 mL intermittent infusion 1,250 mg     Current Outpatient Medications   Medication     alendronate (FOSAMAX) 35 MG tablet     amLODIPine (NORVASC) 5 MG tablet     aspirin 81 MG tablet     CALCIUM 600 + D OR     clindamycin (CLEOCIN) 300 MG capsule     leflunomide (ARAVA) 20 MG tablet     lisinopril (PRINIVIL/ZESTRIL) 20 MG tablet     METHOTREXate 2.5 MG tablet     metoprolol tartrate (LOPRESSOR) 50 MG tablet     morphine (MSIR) 15 MG IR tablet     niacin 500 MG tablet     nitroglycerin (NITROSTAT) 0.4 MG SL tablet     order for DME     Pediatric Multivitamins-Iron (FLINTSTONES PLUS IRON PO)     PREDNISONE 5 MG OR TABS     simvastatin (ZOCOR) 20 MG tablet     sulindac (CLINORIL) 200 MG tablet     I have reviewed the Medications, Allergies, Past Medical and Surgical History, and Social History in the Epic system.    Review of Systems   Constitutional: Positive for activity change, chills, fatigue and fever. Negative for appetite change.   HENT: Negative for congestion, facial swelling, sinus pressure, sneezing and sore throat.    Eyes: Negative for redness and visual disturbance.   Respiratory: Negative for cough and shortness of breath.    Cardiovascular: Negative for chest pain.   Gastrointestinal: Negative for abdominal pain and vomiting.   Genitourinary: Negative for difficulty urinating and dysuria.   Musculoskeletal: Positive for back pain. Negative for arthralgias and neck stiffness.   Skin: Positive for wound. Negative for color change.        Right below-knee amputation years ago now with localized wound with some erythema   Neurological: Positive for weakness. Negative for  "syncope and headaches.   Hematological: Does not bruise/bleed easily.   Psychiatric/Behavioral: Positive for decreased concentration and dysphoric mood. Negative for confusion and hallucinations.   All other systems reviewed and are negative.      Physical Exam   BP: 90/55  Pulse: 95  Temp: 100.2  F (37.9  C)  Resp: 18  Height: 167.6 cm (5' 6\")  Weight: 49.9 kg (110 lb)  SpO2: 94 %      Physical Exam  Vitals signs and nursing note reviewed.   Constitutional:       General: She is in acute distress.      Appearance: She is well-developed. She is ill-appearing. She is not diaphoretic.   HENT:      Head: Normocephalic and atraumatic.      Mouth/Throat:      Mouth: Mucous membranes are dry.   Eyes:      General: No scleral icterus.     Extraocular Movements: Extraocular movements intact.      Conjunctiva/sclera: Conjunctivae normal.      Pupils: Pupils are equal, round, and reactive to light.   Neck:      Musculoskeletal: Normal range of motion and neck supple.   Cardiovascular:      Rate and Rhythm: Normal rate and regular rhythm.   Pulmonary:      Effort: No respiratory distress.      Breath sounds: Normal breath sounds.   Abdominal:      General: There is no distension.      Palpations: There is no mass.      Tenderness: There is no tenderness.   Musculoskeletal:         General: Tenderness and deformity present. No signs of injury.      Comments: Right bka     Skin:     General: Skin is warm.      Capillary Refill: Capillary refill takes less than 2 seconds.      Coloration: Skin is not pale.      Findings: Erythema and rash present.      Comments: Right bka with small lesion noted on ext with warmth without lymphangitis or crepitus.   Neurological:      General: No focal deficit present.      Mental Status: She is alert and oriented to person, place, and time.   Psychiatric:      Comments: Flat affect         ED Course   12:36 PM  The patient was seen and examined by Dr. Lewis Doan in Room ED 18.      "   Procedures         Patient evaluate here upon arrival in the ER.  IV had been established.  Initial blood pressure 90 systolic.  Patient though was still alert appears somewhat ill appearing has a low-grade temperature 100.2.  Blood cultures were sent along with labs.  White count was 24,200 with a left shift.  Sodium is 128.  Other chemistries with anion gap bicarb renal functions liver function test within normal limits.  Urinalysis with urine culture was pending at this point.  Yesterday wound culture was sent according to the fiancé from the clinic.  Chest x-ray done no acute infiltrates identified.  CT scan abdomen pelvis stone protocol was ordered along with this lumbar spine CT was ordered with this also.  Patient did receive a gram of Tylenol here in the ER for fever also did take her other normal medications etc.  IV fluid normal saline bolus along with continuous infusion here in the ER.  Blood pressure is improved to 1 teens to 120s heart rate is under 100 here in the ER.  Oxygen saturation stable.  Patient reassessed several times after CT is sleeping currently at this point but airway is intact vital signs stable otherwise strong radial pulses bilateral.  Patient is not writhing in pain at this point.    With these findings we had started vancomycin and ceftriaxone initially right away patient does have penicillin allergies.  Discussed with medicine we will plan to admit to medical unit    For ongoing management of leukocytosis with right BKA wound infection with exacerbation of low back pain etiology unknown at this point.  Further work-up in the hospital for cause of back pain and fever etc.    CT scan of the abdomen pelvis did not reveal any stone or major findings could be related to her inflammation infection etc.    Lumbar spine revealed no new fracture.  No significant change of disc sequestration patient with multilevel lumbar spondylosis not changed significantly.       EKG Interpretation:       Interpreted by Lewis Doan MD  Time reviewed: 1252  Symptoms at time of EKG: weakness   Rhythm: normal sinus   Rate: normal  Axis: normal  Ectopy: none  Conduction: normal  ST Segments/ T Waves: No hyperacute ST-T wave changes  Q Waves: none  Comparison to prior: No old EKG available    Clinical Impression: nsr          Critical Care time:  was 45 minutes for this patient excluding procedures.            Labs Ordered and Resulted from Time of ED Arrival Up to the Time of Departure from the ED   CBC WITH PLATELETS DIFFERENTIAL - Abnormal; Notable for the following components:       Result Value    WBC 24.2 (*)     RBC Count 3.62 (*)     Hemoglobin 10.5 (*)     Hematocrit 32.3 (*)     Absolute Neutrophil 23.2 (*)     Absolute Lymphocytes 0.4 (*)     All other components within normal limits   CRP INFLAMMATION - Abnormal; Notable for the following components:    CRP Inflammation 280.0 (*)     All other components within normal limits   INR - Abnormal; Notable for the following components:    INR 1.38 (*)     All other components within normal limits   PARTIAL THROMBOPLASTIN TIME - Abnormal; Notable for the following components:    PTT 39 (*)     All other components within normal limits   COMPREHENSIVE METABOLIC PANEL - Abnormal; Notable for the following components:    Sodium 128 (*)     Albumin 2.5 (*)     All other components within normal limits   LIPASE - Abnormal; Notable for the following components:    Lipase 37 (*)     All other components within normal limits   ISTAT  GASES LACTATE IMHIR POCT - Abnormal; Notable for the following components:    PCO2 Venous 38 (*)     PO2 Venous 17 (*)     All other components within normal limits   ROUTINE UA WITH MICROSCOPIC REFLEX TO CULTURE   PULSE OXIMETRY NURSING   PERIPHERAL IV CATHETER   ISTAT CG4 GASES LACTATE MIHIR NURSING POCT   CARDIAC CONTINUOUS MONITORING   ABO/RH TYPE AND SCREEN   BLOOD CULTURE     Results for orders placed or performed during the  hospital encounter of 10/01/19   XR Chest Port 1 View    Narrative    EXAM: XR CHEST PORT 1 VW  10/1/2019 2:05 PM     HISTORY:  fever hypotension       COMPARISON:  Chest x-ray from 3/31/2010.    FINDINGS:   AP view the chest. The Patient is rotated.    The trachea is midline. The cardiomediastinal silhouette is normal.  The pulmonary vasculature are distinct.     The included osseous thorax, soft tissues and upper abdomen and are  within normal limits.     Lungs are clear without discrete nodule/mass, focal  airspace/interstitial consolidative opacity, pleural effusion or  pneumothorax.      Impression    IMPRESSION: No radiographic evidence on this study for acute disease  in the chest.    Abd/pelvis CT no contrast - Stone Protocol    Narrative    1. No acute intra-abdominal pathology.  2. Trace right pleural fluid/pleural thickening with tree-in-bud  nodular opacities in the right lower lobe, likely  infectious/inflammatory in etiology.  3. Marked vascular calcifications in the abdomen and pelvis. Patency  is not assessed on this noncontrast examination.  4. Skin thickening/soft tissue stranding in the right greater than  left gluteal creases. Recommend direct visualization.  5. Please see same day neuroradiology report for findings in the  lumbar spine.    Lumbar spine CT w/o contrast    Narrative    CT LUMBAR SPINE W/O CONTRAST 10/1/2019 2:07 PM    History: back pain hx of lumbar fracture in June, now increasing pain  also.    Comparison: CT lumbar spine 6/19/2019.    Technique: Using multidetector thin collimation helical acquisition  technique, axial, coronal and sagittal CT images through the lumbar  spine were obtained without intravenous contrast.     Findings: There are 5 lumbar type vertebrae. There is multilevel  spondylosis of the spine. Grade 1 retrolisthesis of L1 on L2. Slightly  improved disc height loss at L1-L2 since 6/9/2019. Chronic bilateral  pedicle fractures of L2. Grade 1 anterolisthesis of  L3 on L4 with  compression deformity of the superior endplate of L4 caused by the  inferoposterior aspect of L3. Dextroscoliosis of the spine centered at  L3.    Findings on a level by level basis are as follows:    L1-L2: Disc extrusion/sequestration with partial calcification  extending into the right neural foramen. Severe right and moderate  left neural foraminal narrowing. Sequestered disc impinges the exiting  L1 nerve root. Mild spinal canal stenosis secondary to partial  calcified disc bulge.     L2-L3: Posterior disc bulge. Mild to moderate neural foraminal  narrowing bilaterally. No significant spinal canal narrowing.    L3-L4: Posterior disc bulge with unroofing of the disc. Ligamentum  flavum hypertrophy. Facet arthropathy. Mild spinal canal stenosis.  Severe left neural foraminal narrowing. Moderate to severe right  neural foraminal narrowing.    L4-L5: Posterior disc bulge. Ligamentum flavum hypertrophy. Facet  arthropathy. Mild to moderate spinal canal stenosis. Moderate  neuroforaminal narrowing bilaterally.    L5-S1: Ligamentum flavum hypertrophy. Posterior disc bulge. Facet  arthropathy. No spinal canal stenosis. Moderate bilateral neural  foraminal narrowing.    The visualized adjacent paraspinous tissues are grossly within normal  limits. Moderate to severe atherosclerotic disease of the distal  aorta, proximal common iliac arteries. Scarring or atelectasis within  the right lung base.      Impression    Impression:  1. No new fracture is identified.  2. No significant change of disc sequestration extending into the  right neural foramen causing severe neural foraminal narrowing at L1-2  since prior.  3. Multilevel lumbar spondylosis, not changed significantly since  6/19/2019. Unchanged grade 1 anterolisthesis of L3 on L4 and severe  left neuroforaminal narrowing and moderate to severe right neural  foraminal narrowing at this level.    I have personally reviewed the examination and initial  interpretation  and I agree with the findings.    KELSY NATH MD   CBC with platelets differential   Result Value Ref Range    WBC 24.2 (H) 4.0 - 11.0 10e9/L    RBC Count 3.62 (L) 3.8 - 5.2 10e12/L    Hemoglobin 10.5 (L) 11.7 - 15.7 g/dL    Hematocrit 32.3 (L) 35.0 - 47.0 %    MCV 89 78 - 100 fl    MCH 29.0 26.5 - 33.0 pg    MCHC 32.5 31.5 - 36.5 g/dL    RDW 14.8 10.0 - 15.0 %    Platelet Count 357 150 - 450 10e9/L    Diff Method Manual Differential     % Neutrophils 95.7 %    % Lymphocytes 1.7 %    % Monocytes 2.6 %    % Eosinophils 0.0 %    % Basophils 0.0 %    Absolute Neutrophil 23.2 (H) 1.6 - 8.3 10e9/L    Absolute Lymphocytes 0.4 (L) 0.8 - 5.3 10e9/L    Absolute Monocytes 0.6 0.0 - 1.3 10e9/L    Absolute Eosinophils 0.0 0.0 - 0.7 10e9/L    Absolute Basophils 0.0 0.0 - 0.2 10e9/L    Polychromasia Slight     Platelet Estimate Confirming automated cell count    CRP inflammation   Result Value Ref Range    CRP Inflammation 280.0 (H) 0.0 - 8.0 mg/L   INR   Result Value Ref Range    INR 1.38 (H) 0.86 - 1.14   Partial thromboplastin time   Result Value Ref Range    PTT 39 (H) 22 - 37 sec   Comprehensive metabolic panel   Result Value Ref Range    Sodium 128 (L) 133 - 144 mmol/L    Potassium 3.7 3.4 - 5.3 mmol/L    Chloride 94 94 - 109 mmol/L    Carbon Dioxide 24 20 - 32 mmol/L    Anion Gap 10 3 - 14 mmol/L    Glucose 82 70 - 99 mg/dL    Urea Nitrogen 12 7 - 30 mg/dL    Creatinine 0.56 0.52 - 1.04 mg/dL    GFR Estimate >90 >60 mL/min/[1.73_m2]    GFR Estimate If Black >90 >60 mL/min/[1.73_m2]    Calcium 8.8 8.5 - 10.1 mg/dL    Bilirubin Total 0.6 0.2 - 1.3 mg/dL    Albumin 2.5 (L) 3.4 - 5.0 g/dL    Protein Total 6.9 6.8 - 8.8 g/dL    Alkaline Phosphatase 62 40 - 150 U/L    ALT 14 0 - 50 U/L    AST 34 0 - 45 U/L   Lipase   Result Value Ref Range    Lipase 37 (L) 73 - 393 U/L   EKG 12-lead, tracing only   Result Value Ref Range    Interpretation ECG Click View Image link to view waveform and result    ISTAT gases  lactate richard POCT   Result Value Ref Range    Ph Venous 7.40 7.32 - 7.43 pH    PCO2 Venous 38 (L) 40 - 50 mm Hg    PO2 Venous 17 (L) 25 - 47 mm Hg    Bicarbonate Venous 23 21 - 28 mmol/L    O2 Sat Venous 25 %    Lactic Acid 0.9 0.7 - 2.1 mmol/L   ABO/Rh type and screen   Result Value Ref Range    ABO B     RH(D) Pos     Antibody Screen Neg     Test Valid Only At          [facility]    Specimen Expires 10/04/2019    Blood culture   Result Value Ref Range    Specimen Description Blood Right Arm     Special Requests Aerobic and anaerobic bottles received     Culture Micro No growth after 1 hour             Assessments & Plan (with Medical Decision Making)  74-year-old female presents the ER with fever and low blood pressure increasing low back pain and wound on right BKA.  Patient has history of lumbar injury had injections in September she is had chronic back pain which does take medications for it seems to be escalating the last few days went to wound clinic yesterday they did a wound culture from a wound that she is had for a week or so in the right BKA.  Increasing back pain now presented the ER now has a fever also 100.3.  Initial blood pressure 90 IV established fluids given blood pressure now is been in the 1 teens to 120 heart rates been under 100.  Patient feels uncomfortable but alert airways patent otherwise.  Patient evaluated here in the ER with IV fluid resuscitation lactic acid 0.9.  White count was 24,000.  Besides a sodium 128 other labs are within normal limits including liver function test chemistries etc.  Culture sent.  Patient started on vancomycin and ceftriaxone immediately in the ER she has a penicillin allergy.  As noted did receive a liter fluid is now getting continuous infusions at 125 an hour.  Tylenol for fever also given chest x-ray done no acute infiltrates.  EKG did not show any hyperacute changes.  Patient had a CT scan lumbar spine that  showed previous injury degenerative changes but no acute fractures.  CT abdomen did not show any obvious acute findings.  Patient the ER is been stable discussed with medicine we will plan to admit for increasing low back pain without radiculopathy along with right BKA wound infection/cellulitis.  Patient admitted to medicine service.       I have reviewed the nursing notes.    I have reviewed the findings, diagnosis, plan and need for follow up with the patient.    New Prescriptions    No medications on file       Final diagnoses:   Cellulitis of right leg   Fever in adult   SIRS (systemic inflammatory response syndrome) (H)   Bilateral low back pain, unspecified chronicity, unspecified whether sciatica present   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)   Rheumatoid arthritis, involving unspecified site, unspecified rheumatoid factor presence (H)   Leukocytosis, unspecified type     I, Pito Zimmerman, am serving as a trained medical scribe to document services personally performed by Lewis Doan MD, based on the provider's statements to me.      I, Lewis Doan MD, was physically present and have reviewed and verified the accuracy of this note documented by Pito Zimmerman.     10/1/2019   [facility], [place], EMERGENCY DEPARTMENT     Lewis Doan MD  10/01/19 1500

## 2019-10-01 NOTE — PHARMACY-ADMISSION MEDICATION HISTORY
Admission medication history interview status for the 10/1/2019 admission is complete. See Epic admission navigator for allergy information, pharmacy, prior to admission medications and immunization status.     Medication history interview sources:  patient + patient's fiance + pt's discharge summary list printed on 09/30/2019 but I have an impression that the information on the sheet wasn't updated during that clinic visit + [facility] pharmacy + patient's morning pill box.     Changes made to PTA medication list (reason)  Added: gabapentin 300 mg capsule (pharmacy dispense history + patient's report)  Deleted: none  Changed: none    Additional medication history information (including reliability of information, actions taken by pharmacist):    1) The patient's fiance told me the pt stops taking aspirin (even though it is still listed as an active med on the summary) but he didn't provide a clear explanation. He said the doctor instructed the pt to replace ibuprofen with acetaminophen and as she starts taking acetaminophen, she should stop taking aspirin...    2) The patient reports she stops taking alendronate for over a year stating Dr. OSMAN at [facility] instructed her so. She stated Dr. OSMAN starts her on an injectable medication but she doesn't recall its name.     3) It is unsure where the patient is getting methotrexate from. I called [facility] but was told that the last time they ever dispensed methotrexate 2.5 mg tablets to the patient was in Dec 2018. The patient is adamant she is still taking methotrexate, and what on the discharge paper (takes 8 tablets once a week) is the dose she is on.     4) I was given the AM pill box and did some ID search on [employer]. I was able to identify lisinopril 20 mg, amlodipine 5mg, metoprolol tartrate 50 mg, ped-shape multivitamin, and prednisone5 mg tablet + 2 of oblong white tablets look like vitamin D + calcium.     5) It is unknown when the pt takes leflunomide  (last dispense was on 03/21 for a 90-day-supply), niacin, and sulindac.     6) The patient's fiance brought in a prescription bottle of morphine and the dose is the same as what we have. The patient only uses it PRN.       Prior to Admission medications    Medication Sig Last Dose Taking? Auth Provider   amLODIPine (NORVASC) 5 MG tablet Take 1 tablet (5 mg) by mouth daily 9/30/2019 at AM Yes Whitney Lainez APRN CNP   CALCIUM 600 + D OR Take 2 tablets by mouth daily  9/30/2019 at AM Yes Reported, Patient   gabapentin (NEURONTIN) 300 MG capsule Take 600-1,200 mg by mouth 3 times daily 10/1/2019 at 0230 Yes Unknown, Entered By History   lisinopril (PRINIVIL/ZESTRIL) 20 MG tablet Take 1 tablet (20 mg) by mouth daily 9/30/2019 at AM Yes Whitney Lainez APRN CNP   metoprolol tartrate (LOPRESSOR) 50 MG tablet TAKE 1 TABLET BY MOUTH TWICE A DAY 9/30/2019 at AM Yes Whitney Lainez APRN CNP   morphine (MSIR) 15 MG IR tablet Take 1 tablet (15 mg) by mouth every 12 hours as needed for severe pain Past Month at Unknown time Yes Funmilayo Newman MD   Pediatric Multivitamins-Iron (FLINTSTONES PLUS IRON PO) Take 1 tablet by mouth daily. 9/30/2019 at AM Yes Reported, Patient   PREDNISONE 5 MG OR TABS 1 TABLET DAILY 9/30/2019 at AM Yes Tali Butler APRN CNP   simvastatin (ZOCOR) 20 MG tablet TAKE 1 TABLET BY MOUTH DAILY AT BEDTIME 9/29/2019 at PM Yes Whitney Lainez APRN CNP   alendronate (FOSAMAX) 35 MG tablet TAKE 1 TAB BY MOUTH WITH 8OZ OF WATER ONCE EVERY WEEK BEFORE BREAKFAST, REMAIN UPRIGHT AT THIS TIME  Patient not taking: Reported on 9/30/2019   Javan Pepe MD   aspirin 81 MG tablet Take 2 tablets by mouth daily.   Alf Ross MD   clindamycin (CLEOCIN) 300 MG capsule Take 1 capsule (300 mg) by mouth 3 times daily for 10 days   Josr Hernandez MD   leflunomide (ARAVA) 20 MG tablet Take 20 mg by mouth daily. Unknown at Unknown time  Reported, Patient    METHOTREXate 2.5 MG tablet Take 8 tablets by mouth once a week.  Patient taking differently: Take 20 mg by mouth once a week Pt believes that she takes this on Saturday, but is not sure Unknown at Unknown time  Jack Everett MD   niacin 500 MG tablet Take 1 tablet by mouth At Bedtime. Unknown at Unknown time  Alf Ross MD   nitroglycerin (NITROSTAT) 0.4 MG SL tablet 0.4 mg by Sublingual route every 5 (five) minutes as needed. Unknown at Unknown time  Reported, Patient   order for DME Equipment being ordered: New foam (custom shaped protective cover) for right below the knee prostheses   Whitney Lainez APRN CNP   sulindac (CLINORIL) 200 MG tablet TAKE 1 TABLET BY MOUTH TWICE A DAY WITH FOOD Unknown at Unknown time  Reported, Patient     Medication history completed by: Thank you,     Zenaida Gonzales, 4th year Pharmacy Student.

## 2019-10-01 NOTE — PLAN OF CARE
Alert but tired. Oriented x4. Arrived to unit from ED @ 1745. Complains of back pain - given 4mg of IV morphine. R PIV running LR @ 100ml/hr. NPO. R BKA with wound on the bottom of leg. Did not void. Can't remember the last BM she had. Micheal Hancock at bedside. Did not ambulate but micheal states that pt's ambulates with prosthetic leg or with a walker.    Admission/Transfer from: ED  2 RN skin assessment completed. Yes  Significant findings include: Wound on R amputated leg  WO Nurse Consult Ordered? Yes

## 2019-10-01 NOTE — TELEPHONE ENCOUNTER
"Reason for call:  Patient reporting a symptom    Symptom or request: sever back pain, sleeping a lot    Duration (how long have symptoms been present):     Have you been treated for this before? Yes    Additional comments: patient was seen in clinic on 09/30, Santi states patient \"has been in and out of it\"    Phone Number patient can be reached at:  Home number on file [phone] (home)    Best Time:  Anytime    Can we leave a detailed message on this number:  YES    Call taken on 10/1/2019 at 10:52 AM by Deepa Merida    "

## 2019-10-01 NOTE — ED NOTES
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  Past Medical Hx:   Past Medical History:   Diagnosis Date     BENIGN HYPERTENSION 3/1/2005     CAD (coronary artery disease) 1/26/2011     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     Hypertension goal BP (blood pressure) < 130/80 1/26/2011     IRON DEFIC ANEMIA NOS 9/15/2005     Lower limb amputation, below knee 12/26/2012     MI (myocardial infarction) (H)     3/2010     OSTEOPOROSIS NOS 2/16/2005     Rheumatoid arthritis(714.0) 2/16/2005      Baseline Mental status: WDL  Current Mental Status changes: at basesline, pt letharic    Infection present or suspected this encounter: cultures pending  Sepsis suspected: No  Isolation type: No active isolations     Activity level - Baseline/Home:  Independent  Activity Level - Current:   pt has right BKA - usually wears prosthetic but unable to at this time due to wound on stump    Bariatric equipment needed?: No    In the ED these meds were given:   Medications   lidocaine 1 % 0.1-1 mL (has no administration in time range)   lidocaine (LMX4) cream (has no administration in time range)   sodium chloride (PF) 0.9% PF flush 3 mL (has no administration in time range)   sodium chloride (PF) 0.9% PF flush 3 mL (3 mLs  Intracatheter Not Given 10/1/19 1356)   0.9% sodium chloride BOLUS (0 mLs Intravenous Stopped 10/1/19 1412)     Followed by   sodium chloride 0.9% infusion (1,000 mLs Intravenous New Bag 10/1/19 1413)   vancomycin (VANCOCIN) 1000 mg in dextrose 5% 200 mL PREMIX (has no administration in time range)   vancomycin 1250 mg in 0.9% NaCl 250 mL intermittent infusion 1,250 mg (1,250 mg Intravenous Given 10/1/19 1414)   acetaminophen (TYLENOL) tablet 1,000 mg (1,000 mg Oral Given 10/1/19 1313)   cefTRIAXone (ROCEPHIN) 1 g vial to attach to  mL bag for ADULTS or NS 50 mL bag for PEDS (0 g Intravenous Stopped 10/1/19 1358)       Drips running?  Yes, IV abx and fluids infusing    Home pump  No    Current LDAs  Peripheral IV 10/01/19 Right Upper forearm (Active)   Number of days: 0       Urethral Catheter Straight-tip (Active)   Number of days: 3079       Incision/Surgical Site 04/27/11 Midline Knee (Active)   Number of days: 3079       Labs results:   Labs Ordered and Resulted from Time of ED Arrival Up to the Time of Departure from the ED   CBC WITH PLATELETS DIFFERENTIAL - Abnormal; Notable for the following components:       Result Value    WBC 24.2 (*)     RBC Count 3.62 (*)     Hemoglobin 10.5 (*)     Hematocrit 32.3 (*)     Absolute Neutrophil 23.2 (*)     Absolute Lymphocytes 0.4 (*)     All other components within normal limits   CRP INFLAMMATION - Abnormal; Notable for the following components:    CRP Inflammation 280.0 (*)     All other components within normal limits   INR - Abnormal; Notable for the following components:    INR 1.38 (*)     All other components within normal limits   PARTIAL THROMBOPLASTIN TIME - Abnormal; Notable for the following components:    PTT 39 (*)     All other components within normal limits   COMPREHENSIVE METABOLIC PANEL - Abnormal; Notable for the following components:    Sodium 128 (*)     Albumin 2.5 (*)     All other components within normal limits   LIPASE - Abnormal;  Notable for the following components:    Lipase 37 (*)     All other components within normal limits   ISTAT  GASES LACTATE MIHIR POCT - Abnormal; Notable for the following components:    PCO2 Venous 38 (*)     PO2 Venous 17 (*)     All other components within normal limits   ROUTINE UA WITH MICROSCOPIC REFLEX TO CULTURE   PULSE OXIMETRY NURSING   PERIPHERAL IV CATHETER   ISTAT CG4 GASES LACTATE MIHIR NURSING POCT   CARDIAC CONTINUOUS MONITORING   ABO/RH TYPE AND SCREEN   BLOOD CULTURE       Imaging Studies:   Recent Results (from the past 24 hour(s))   XR Chest Port 1 View    Narrative    EXAM: XR CHEST PORT 1 VW  10/1/2019 2:05 PM     HISTORY:  fever hypotension       COMPARISON:  Chest x-ray from 3/31/2010.    FINDINGS:   AP view the chest. The Patient is rotated.    The trachea is midline. The cardiomediastinal silhouette is normal.  The pulmonary vasculature are distinct.     The included osseous thorax, soft tissues and upper abdomen and are  within normal limits.     Lungs are clear without discrete nodule/mass, focal  airspace/interstitial consolidative opacity, pleural effusion or  pneumothorax.      Impression    IMPRESSION: No radiographic evidence on this study for acute disease  in the chest.    Lumbar spine CT w/o contrast    Narrative    CT LUMBAR SPINE W/O CONTRAST 10/1/2019 2:07 PM    History: back pain hx of lumbar frax in june, now increasing pain  also.    Comparison: CT lumbar spine 6/19/2019.    Technique: Using multidetector thin collimation helical acquisition  technique, axial, coronal and sagittal CT images through the lumbar  spine were obtained without intravenous contrast.     Findings: There are 5 lumbar type vertebrae. There is multilevel  spondylosis of the spine. Grade 1 retrolisthesis of L1 on L2. Slightly  improved disc height loss at L1-L2 since 6/9/2019. Chronic bilateral  pedicle fractures of L2. Grade 1 anterolisthesis of L3 on L4 with  compression deformity of the superior  "endplate of L4 caused by the  inferoposterior aspect of L3. Dextroscoliosis of the spine centered at  L3.    Findings on a level by level basis are as follows:    L1-L2: Disc sequestration with partial calcification extending into  the right neural foramen. Severe right and moderate left neural  foraminal narrowing. Sequestered disc impinges the exiting L1 nerve  root. Mild spinal canal stenosis secondary to partial calcified disc  bulge.     L2-L3: Posterior disc bulge. Mild to moderate neural foraminal  narrowing bilaterally. No significant spinal canal narrowing.    L3-L4: Posterior disc bulge with unroofing of the disc. Ligamentum  flavum hypertrophy. Facet arthropathy. Mild spinal canal stenosis.  Severe left neural foraminal narrowing. Moderate to severe right  neural foraminal narrowing.    L4-L5: Posterior disc bulge. Ligamentum flavum hypertrophy. Facet  arthropathy. Mild to moderate spinal canal stenosis. Moderate  neuroforaminal narrowing bilaterally.    L5-S1: Ligamentum flavum hypertrophy. Posterior disc bulge. Facet  arthropathy. No spinal canal stenosis. Moderate bilateral neural  foraminal narrowing.    The visualized adjacent paraspinous tissues are grossly within normal  limits. Moderate to severe atherosclerotic disease of the distal  aorta, proximal common iliac arteries.      Impression    Impression:  1. No new fracture is identified.  2. No significant change of disc sequestration extending into the  right neural foramen causing severe neural foraminal narrowing at L1-2  since prior.  3. Multilevel lumbar spondylosis, not changed significantly since  6/19/2019. Unchanged grade 1 anterolisthesis of L3 on L4 and severe  left neuroforaminal narrowing and moderate to severe right neural  foraminal narrowing at this level.         Recent vital signs:   /54   Pulse 86   Temp 100.2  F (37.9  C) (Oral)   Resp 18   Ht 1.676 m (5' 6\")   Wt 49.9 kg (110 lb)   SpO2 93%   BMI 17.75 kg/m  "     Jbphh Coma Scale Score: 15 (10/01/19 1300)       Cardiac Rhythm: Normal Sinus  Pt needs tele? No  Skin/wound Issues: pt has wound on R stump    Code Status: Full Code    Pain control: poor    Nausea control: pt had none    Abnormal labs/tests/findings requiring intervention:     Family present during ED course? Yes   Family Comments/Social Situation comments: fiance at bedside    Tasks needing completion: None    Nena Odom, RN    [phone] ED

## 2019-10-01 NOTE — ED TRIAGE NOTES
Pt BIBA from home with complaints of wound infection and back pain.  R leg amputation. Amputation in 1995. Pt believes amputation is infected. Pt was prescribed oral abx and morphine on Friday in clinic, pt has not started taking medications. Pt reports severe lower back pain and unable to sit up on her own. 18g R IV placed by EMS.

## 2019-10-02 ENCOUNTER — APPOINTMENT (OUTPATIENT)
Dept: GENERAL RADIOLOGY | Facility: CLINIC | Age: 74
DRG: 492 | End: 2019-10-02
Attending: STUDENT IN AN ORGANIZED HEALTH CARE EDUCATION/TRAINING PROGRAM
Payer: COMMERCIAL

## 2019-10-02 ENCOUNTER — APPOINTMENT (OUTPATIENT)
Dept: CARDIOLOGY | Facility: CLINIC | Age: 74
DRG: 492 | End: 2019-10-02
Attending: STUDENT IN AN ORGANIZED HEALTH CARE EDUCATION/TRAINING PROGRAM
Payer: COMMERCIAL

## 2019-10-02 ENCOUNTER — TELEPHONE (OUTPATIENT)
Dept: FAMILY MEDICINE | Facility: CLINIC | Age: 74
End: 2019-10-02

## 2019-10-02 LAB
ANION GAP SERPL CALCULATED.3IONS-SCNC: 10 MMOL/L (ref 3–14)
BACTERIA SPEC CULT: ABNORMAL
BUN SERPL-MCNC: 10 MG/DL (ref 7–30)
CALCIUM SERPL-MCNC: 8 MG/DL (ref 8.5–10.1)
CHLORIDE SERPL-SCNC: 102 MMOL/L (ref 94–109)
CO2 SERPL-SCNC: 19 MMOL/L (ref 20–32)
CREAT SERPL-MCNC: 0.44 MG/DL (ref 0.52–1.04)
CRP SERPL-MCNC: 270 MG/L (ref 0–8)
ERYTHROCYTE [DISTWIDTH] IN BLOOD BY AUTOMATED COUNT: 14.9 % (ref 10–15)
GFR SERPL CREATININE-BSD FRML MDRD: >90 ML/MIN/{1.73_M2}
GLUCOSE SERPL-MCNC: 83 MG/DL (ref 70–99)
HCT VFR BLD AUTO: 27.5 % (ref 35–47)
HGB BLD-MCNC: 8.8 G/DL (ref 11.7–15.7)
Lab: ABNORMAL
MCH RBC QN AUTO: 28.9 PG (ref 26.5–33)
MCHC RBC AUTO-ENTMCNC: 32 G/DL (ref 31.5–36.5)
MCV RBC AUTO: 91 FL (ref 78–100)
PLATELET # BLD AUTO: 302 10E9/L (ref 150–450)
POTASSIUM SERPL-SCNC: 3.8 MMOL/L (ref 3.4–5.3)
RBC # BLD AUTO: 3.04 10E12/L (ref 3.8–5.2)
SODIUM SERPL-SCNC: 131 MMOL/L (ref 133–144)
SPECIMEN SOURCE: ABNORMAL
WBC # BLD AUTO: 22.5 10E9/L (ref 4–11)

## 2019-10-02 PROCEDURE — 40000802 ZZH SITE CHECK

## 2019-10-02 PROCEDURE — 87040 BLOOD CULTURE FOR BACTERIA: CPT | Performed by: STUDENT IN AN ORGANIZED HEALTH CARE EDUCATION/TRAINING PROGRAM

## 2019-10-02 PROCEDURE — 87077 CULTURE AEROBIC IDENTIFY: CPT | Performed by: STUDENT IN AN ORGANIZED HEALTH CARE EDUCATION/TRAINING PROGRAM

## 2019-10-02 PROCEDURE — 25800030 ZZH RX IP 258 OP 636: Performed by: STUDENT IN AN ORGANIZED HEALTH CARE EDUCATION/TRAINING PROGRAM

## 2019-10-02 PROCEDURE — 93306 TTE W/DOPPLER COMPLETE: CPT | Mod: 26 | Performed by: INTERNAL MEDICINE

## 2019-10-02 PROCEDURE — 25000132 ZZH RX MED GY IP 250 OP 250 PS 637: Performed by: STUDENT IN AN ORGANIZED HEALTH CARE EDUCATION/TRAINING PROGRAM

## 2019-10-02 PROCEDURE — 99233 SBSQ HOSP IP/OBS HIGH 50: CPT | Mod: GC | Performed by: INTERNAL MEDICINE

## 2019-10-02 PROCEDURE — 73590 X-RAY EXAM OF LOWER LEG: CPT | Mod: RT

## 2019-10-02 PROCEDURE — 25000128 H RX IP 250 OP 636

## 2019-10-02 PROCEDURE — 12000001 ZZH R&B MED SURG/OB UMMC

## 2019-10-02 PROCEDURE — 25000131 ZZH RX MED GY IP 250 OP 636 PS 637: Performed by: STUDENT IN AN ORGANIZED HEALTH CARE EDUCATION/TRAINING PROGRAM

## 2019-10-02 PROCEDURE — 36415 COLL VENOUS BLD VENIPUNCTURE: CPT | Performed by: STUDENT IN AN ORGANIZED HEALTH CARE EDUCATION/TRAINING PROGRAM

## 2019-10-02 PROCEDURE — 85027 COMPLETE CBC AUTOMATED: CPT | Performed by: STUDENT IN AN ORGANIZED HEALTH CARE EDUCATION/TRAINING PROGRAM

## 2019-10-02 PROCEDURE — 40000141 ZZH STATISTIC PERIPHERAL IV START W/O US GUIDANCE

## 2019-10-02 PROCEDURE — 86140 C-REACTIVE PROTEIN: CPT | Performed by: STUDENT IN AN ORGANIZED HEALTH CARE EDUCATION/TRAINING PROGRAM

## 2019-10-02 PROCEDURE — 80048 BASIC METABOLIC PNL TOTAL CA: CPT | Performed by: STUDENT IN AN ORGANIZED HEALTH CARE EDUCATION/TRAINING PROGRAM

## 2019-10-02 PROCEDURE — 93306 TTE W/DOPPLER COMPLETE: CPT

## 2019-10-02 RX ADMIN — OXYCODONE HYDROCHLORIDE 5 MG: 5 TABLET ORAL at 14:16

## 2019-10-02 RX ADMIN — SODIUM CHLORIDE, POTASSIUM CHLORIDE, SODIUM LACTATE AND CALCIUM CHLORIDE: 600; 310; 30; 20 INJECTION, SOLUTION INTRAVENOUS at 04:33

## 2019-10-02 RX ADMIN — VANCOMYCIN HYDROCHLORIDE 1000 MG: 1 INJECTION, SOLUTION INTRAVENOUS at 16:27

## 2019-10-02 RX ADMIN — PREDNISONE 5 MG: 5 TABLET ORAL at 08:42

## 2019-10-02 RX ADMIN — ACETAMINOPHEN 650 MG: 325 TABLET, FILM COATED ORAL at 06:12

## 2019-10-02 RX ADMIN — ACETAMINOPHEN 650 MG: 325 TABLET, FILM COATED ORAL at 10:16

## 2019-10-02 RX ADMIN — ACETAMINOPHEN 650 MG: 325 TABLET, FILM COATED ORAL at 22:46

## 2019-10-02 RX ADMIN — OXYCODONE HYDROCHLORIDE 5 MG: 5 TABLET ORAL at 04:33

## 2019-10-02 RX ADMIN — ACETAMINOPHEN 650 MG: 325 TABLET, FILM COATED ORAL at 15:03

## 2019-10-02 RX ADMIN — SIMVASTATIN 20 MG: 20 TABLET, FILM COATED ORAL at 22:46

## 2019-10-02 RX ADMIN — OXYCODONE HYDROCHLORIDE 5 MG: 5 TABLET ORAL at 22:46

## 2019-10-02 RX ADMIN — OXYCODONE HYDROCHLORIDE 5 MG: 5 TABLET ORAL at 08:41

## 2019-10-02 RX ADMIN — ACETAMINOPHEN 650 MG: 325 TABLET, FILM COATED ORAL at 18:11

## 2019-10-02 RX ADMIN — OXYCODONE HYDROCHLORIDE 5 MG: 5 TABLET ORAL at 18:51

## 2019-10-02 RX ADMIN — ACETAMINOPHEN 650 MG: 325 TABLET, FILM COATED ORAL at 02:13

## 2019-10-02 ASSESSMENT — ACTIVITIES OF DAILY LIVING (ADL)
ADLS_ACUITY_SCORE: 20

## 2019-10-02 NOTE — PROGRESS NOTES
"Orthopaedic Surgery Progress Note   10/02/2019    Subjective: No acute events overnight.  Patient feels fatigued. Pain well controlled. Tolerating PO. Reporting subjective chills. Denies, CP, SOB, numbness or tingling, motor dysfunction or weakness.  Had long discussion with patient and  regarding treatment of this BKA stump infection- plan of care, future surgery, follow up, weight bearing.  Questions answered.    Objective: /61 (BP Location: Right arm)   Pulse 77   Temp 97.5  F (36.4  C) (Oral)   Resp 18   Ht 1.676 m (5' 6\")   Wt 49.9 kg (110 lb)   SpO2 96%   BMI 17.75 kg/m      General: NAD, alert and oriented, cooperative with exam.   Cardio: RRR, extremities wwp.   Respiratory: Non-labored breathing.  MSK: Focused examination of     Right Lower Extremity: Below  Knee amputation with 3 mm sinus at the distal tip.  Tender but without surrounding erythema.  No pain with ROM hip/knee.  Limb warm and well perfused with soft compartments and intact sensation in the thigh and leg.    Labs:     Lab Results   Component Value Date    WBC 22.5 (H) 10/02/2019    HGB 8.8 (L) 10/02/2019     10/02/2019       BMP:  Lab Results   Component Value Date     (L) 10/02/2019    POTASSIUM 3.8 10/02/2019    CHLORIDE 102 10/02/2019    CO2 19 (L) 10/02/2019    BUN 10 10/02/2019    CR 0.44 (L) 10/02/2019    ANIONGAP 10 10/02/2019    ADRIAN 8.0 (L) 10/02/2019    GLC 83 10/02/2019       Inflammatory Markers:  Lab Results   Component Value Date    WBC 22.5 (H) 10/02/2019    WBC 24.2 (H) 10/01/2019    WBC 10.7 02/28/2017    .0 (H) 10/02/2019    .0 (H) 10/01/2019    CRP 4.10 (A) 03/16/2015    SED 34 03/16/2015    SED 46 (H) 04/21/2005    SED 44 (H) 04/14/2005       Cultures:   Staph Aureus    Assessment and Plan:     ASSESSMENT AND PLAN:     Sakshi Jaeger is a 74 year old female with PMH including CAD, RA (on methotrexate, leflunomide, prednisone), Hypertension, Miocardial infarction, osteoporosis, " and Right Below knee amputation who presents with a 4 day history of right limb pain, malaise, and 1 day of fevers. Consistent with a:     1. Infection of the below knee amputation stump of Right leg with sinus tracking to bone and likely osteomyelitis of the distal tibial stump.     CT results reviewed, very small area of enhancing fluid around the tip of the tibial stump with ostepenia and cortical erosions c/w osteomyelitis of the tibia and stump infection.    Plan for admission with IV abx, fluid resuscitation, monitoring of cultures.  Patient should be screened for all potential sources of infection--as the osteomyelitis appears to be chronic given the bony cahnges and while this could be the sole cause of systemic symptoms, the stump clinically appears to be less significant than labs would suggest (which may be a result of DMARDs for RA) but could be a masked infection.  Discussed case with Dr. Mott who recommends patient be treated for acute infection with systemic symptoms with short course  and follow up in clinic (would recommend oral abx suppression in interim after d/c) to discuss/schedule a revision amputation (at level to be determined) at mutual convenience.  The consideration for this plan includes: patient benefit/outcome, safety, OR and surgeon resources.    If the patient were to clinically worsen or not respond to antibiotics plan likely to change.  Would continue to trend CRP.     Admit to medicine for IV abx     Activity: Up with assist until independent.  Weight bearing status: NWB RLE, no use of prosthesis, ambulate with crutches, walker, chair  Pain management: PO medications as tolerated.    Antibiotics/Tetanus: IV abx to continue pending cultures  Diet: TIERNEY no procedures planned  Anticoagulation/DVT prophylaxis: Mechanical, ok for Chem DVT ppx per primary team  Imaging: XR right tib fib on 10/2/19  Labs: trend CRP q48 h  Dressings: Keep clean, dry and intact, change PRN  Elevation:  Elevate RLE on pillows to keep above the level of the heart as much as possible.   Physical Therapy/Occupational Therapy: Eval and treat as able  Cultures: Pending, follow culture results closely.  Staph aureus on initial growth  Follow-up: within 1 week of discharge with Dr. Mott.  Disposition: Inpatient, pending improvement     Assessment and Plan discussed with Dr. Mott, Orthopaedic Surgery Staff Surgeon.     Lewis Jolly MD  Orthopedic Surgery PGY-4  [phone]      For questions regarding this patient during Weekday business hours (7-4 pm), please attempt to contact me at my pager () prior to contacting the Orthopaedic Surgery Provider on call. If it is a night, weekend, or there is no response please page the Orthopaedic Surgery Provider on call. Thank you!

## 2019-10-02 NOTE — PROVIDER NOTIFICATION
Positive blood culture from R arm drawn 10/1.  Gram + cocci and clusters.  Provider notified.  Will continue to monitor and follow POC.

## 2019-10-02 NOTE — CONSULTS
[facility]  ORTHOPAEDIC SURGERY CONSULT - HISTORY AND PHYSICAL    DATE OF CONSULT: 10/1/2019 7:38 PM    REQUESTING PROVIDER: Lily Cuello *, MD - N Staff.    CC: Right below knee amputation infection    DATE OF INJURY: 9/27/19    HISTORY OF PRESENT ILLNESS:   Sakshi Jaeger is a 74 year old female with PMH including CAD, RA (on methotrexate, leflunomide, prednisone), Hypertension, Miocardial infarction, osteoporosis, and Right Below knee amputation who presents with a 4 day history of right limb pain, malaise, and 1 day of fevers.  The patient is seen at bedside with her  who helps to provide history.  The patient developed soreness in her right leg stump on Friday 9/27 while wearing her prosthesis.  On 9/28 she noted a small wound on the tip of the limb.  This was draining cloudy fluid.  On Monday 9/30 she was seen in her primary physicians office and the wound was cultured, she as started on clindamycin with instructions to be seen in the ED if her condition worsened.  The patient reports that she felt worse today, more malaise, fatigue, and had worsening back pain.    She continued to have drainage from the right leg 3 mm wound.  In the ED she was noted to have elevated WBC to 24, with elevated inflammatory markers.  Meeting SIRS criteria she was admitted for infectious work up.  Medicine team reports being able to express purulent fluid from the wound site.  They obtained a CT with contrast showing a sinus that tracks down to the tip of leg.    Patient reports fatigue.  No new sensory changes reported.  Fevers/chills reported.    The Right below knee amputation was performed in 2005 for osteomyelitis associated with infected surgical implants at the ankle.     She has a history of intermittent wounds on the right below knee stump, and has had skin grafts in the right knee area 3 times in the past with Dr. Noble at [facility]    Nursing and physician Emergency Department  notes reviewed and HPI updated to reflect their ED course.     PAST MEDICAL HISTORY:   Past Medical History:   Diagnosis Date     BENIGN HYPERTENSION 3/1/2005     CAD (coronary artery disease) 1/26/2011     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     Hypertension goal BP (blood pressure) < 130/80 1/26/2011     IRON DEFIC ANEMIA NOS 9/15/2005     Lower limb amputation, below knee 12/26/2012     MI (myocardial infarction) (H)     3/2010     OSTEOPOROSIS NOS 2/16/2005     Rheumatoid arthritis(714.0) 2/16/2005       Patient denies any personal history of bleeding disorders, clotting disorders, or adverse reactions to anesthesia.    PAST SURGICAL HISTORY:   Past Surgical History:   Procedure Laterality Date     ---------INCISION AND DRAINAGE  3/5/14     AMPUTATION BELOW KNEE RT/LT  2005    right lowwer leg.      APPENDECTOMY       ARTHROPLASTY KNEE  4/27/2011    Procedure:ARTHROPLASTY KNEE; Surgeon:JESSE HAWKINS; Location:[facility] OR     coronary stent       SURGICAL HISTORY OF -       Appendectomy         MEDICATIONS:   Prior to Admission medications    Medication Sig Last Dose Taking? Auth Provider   amLODIPine (NORVASC) 5 MG tablet Take 1 tablet (5 mg) by mouth daily 9/30/2019 at AM Yes Whitney Lainez APRN CNP   CALCIUM 600 + D OR Take 2 tablets by mouth daily  9/30/2019 at AM Yes Reported, Patient   gabapentin (NEURONTIN) 300 MG capsule Take 600-1,200 mg by mouth 3 times daily 10/1/2019 at 0230 Yes Unknown, Entered By History   lisinopril (PRINIVIL/ZESTRIL) 20 MG tablet Take 1 tablet (20 mg) by mouth daily 9/30/2019 at AM Yes Whitney Lainez APRN CNP   metoprolol tartrate (LOPRESSOR) 50 MG tablet TAKE 1 TABLET BY MOUTH TWICE A DAY 9/30/2019 at AM Yes Whitney Lainez APRN CNP   morphine (MSIR) 15 MG IR tablet Take 1 tablet (15 mg) by mouth every 12 hours as needed for severe pain Past Month at Unknown time Yes Funmilayo Newman MD   Pediatric Multivitamins-Iron (FLINTSTONES PLUS IRON PO) Take 1  tablet by mouth daily. 9/30/2019 at AM Yes Reported, Patient   PREDNISONE 5 MG OR TABS 1 TABLET DAILY 9/30/2019 at AM Yes Tali Butler APRN CNP   simvastatin (ZOCOR) 20 MG tablet TAKE 1 TABLET BY MOUTH DAILY AT BEDTIME 9/29/2019 at PM Yes Whitney Lainez APRN CNP   alendronate (FOSAMAX) 35 MG tablet TAKE 1 TAB BY MOUTH WITH 8OZ OF WATER ONCE EVERY WEEK BEFORE BREAKFAST, REMAIN UPRIGHT AT THIS TIME  Patient not taking: Reported on 9/30/2019   Javan Pepe MD   aspirin 81 MG tablet Take 2 tablets by mouth daily.   Alf Ross MD   clindamycin (CLEOCIN) 300 MG capsule Take 1 capsule (300 mg) by mouth 3 times daily for 10 days   Josr Hernandez MD   leflunomide (ARAVA) 20 MG tablet Take 20 mg by mouth daily. Unknown at Unknown time  Reported, Patient   METHOTREXate 2.5 MG tablet Take 8 tablets by mouth once a week.  Patient taking differently: Take 20 mg by mouth once a week Pt believes that she takes this on Saturday, but is not sure Unknown at Unknown time  Jack Everett MD   niacin 500 MG tablet Take 1 tablet by mouth At Bedtime. Unknown at Unknown time  Alf Ross MD   nitroglycerin (NITROSTAT) 0.4 MG SL tablet 0.4 mg by Sublingual route every 5 (five) minutes as needed. Unknown at Unknown time  Reported, Patient   order for DME Equipment being ordered: New foam (custom shaped protective cover) for right below the knee prostheses   Whitney Lainez APRN CNP   sulindac (CLINORIL) 200 MG tablet TAKE 1 TABLET BY MOUTH TWICE A DAY WITH FOOD Unknown at Unknown time  Reported, Patient       ALLERGIES:   Penicillins    SOCIAL HISTORY:   Living situation: Patient lives with her  in [facility]  Occupation: retired  Tobacco: denies  Alcohol: denies  Illicit Drugs: dnies    FAMILY HISTORY:  Patient denies known family history of bleeding, clotting, or anesthesia related complications.     REVIEW OF SYSTEMS:   Otherwise, a 10-point reviews of systems was negative  "except as noted above in the HPI.     PHYSICAL EXAM:   Vitals:    10/01/19 1430 10/01/19 1500 10/01/19 1615 10/01/19 1743   BP: 114/52   108/47   BP Location:    Right arm   Pulse: 91   87   Resp:    16   Temp:   98.8  F (37.1  C) 98.9  F (37.2  C)   TempSrc:   Oral Oral   SpO2: 96% 94%  95%   Weight:       Height:         General: Somnolent, appropriate, following commands, NAD.  Neuro: CN II-XII grossly intact.   Psych: Appropriate affect.   Skin: No rashes,  skin color normal.  HEENT: Normal.   Lungs: Breathing comfortably and nonlabored, no wheezes or stridor noted.  Heart/Cardiovascular: Regular pulse, no peripheral cyanosis.    Right Lower Extremity: Below  Knee amputation with 3 mm sinus at the distal tip.  Tender but without surrounding erythema.  No pain with ROM hip/knee.  Limb warm and well perfused with soft compartments and intact sensation in the thigh and leg.    LABS:  Hemoglobin   Date Value Ref Range Status   10/01/2019 10.5 (L) 11.7 - 15.7 g/dL Final   02/28/2017 11.5 (L) 11.7 - 15.7 g/dL Final     WBC   Date Value Ref Range Status   10/01/2019 24.2 (H) 4.0 - 11.0 10e9/L Final     Platelet Count   Date Value Ref Range Status   10/01/2019 357 150 - 450 10e9/L Final     INR   Date Value Ref Range Status   10/01/2019 1.38 (H) 0.86 - 1.14 Final     Creatinine   Date Value Ref Range Status   10/01/2019 0.56 0.52 - 1.04 mg/dL Final     Glucose   Date Value Ref Range Status   10/01/2019 82 70 - 99 mg/dL Final       IMAGING:  CT Right lower extremity with IV constrast 10/1/19:  Sinus tracking to bone from the distal tip of the below knee amputation. Area of fluid collection with probably subcutaneous abscess and osteomyelitis of the distal tibia    Radiology read: \"Area of skin ulceration anterior to the distal tibial stump with  adjacent rim-enhancing fluid collection containing internal foci of  subcutaneous emphysema. Subcutaneous gas and fluid collection extends  into the medullary cavity of the " "distal fibular stump. Multiple foci  of cortical dehiscence of the distal tibial stump. Constellation of  findings compatible with subcutaneous abscess and osteomyelitis.\"                ASSESSMENT AND PLAN:   Sakshi Jaeger is a 74 year old female with PMH including CAD, RA (on methotrexate, leflunomide, prednisone), Hypertension, Miocardial infarction, osteoporosis, and Right Below knee amputation who presents with a 4 day history of right limb pain, malaise, and 1 day of fevers. Consistent with a:    1. Infection of the below knee amputation stump of Right leg with sinus tracking to bone and likely osteomyelitis of the distal tibial stump.    Plan for admission with IV abx, fluid resuscitation, monitoring of cultures. Patient likely to need a revision amputation (level to be determined- challenging given short segment remaining) either inpatient our outpatient pending response to acute treatment and stabilization    Admit to medicine for IV abx    Activity: Up with assist until independent.  Weight bearing status: NWB RLE  Pain management: Transition from IV to PO as tolerated.    Antibiotics/Tetanus: IV abx to continue pending cultures  Diet: NPOafter MN  Anticoagulation/DVT prophylaxis: Mechanical, hold chem DVT ppx at present  Imaging: XR right tib fib on 10/2/19  Labs: trend CRP q48 h  Dressings: Keep clean, dry and intact, change PRN  Elevation: Elevate RLE on pillows to keep above the level of the heart as much as possible.   Physical Therapy/Occupational Therapy: Eval and treat as able  Cultures: Pending, follow culture results closely.  Staph aureus on initial growth  Follow-up: TBD  Disposition: Inpatient    Assessment and Plan discussed with Dr. Mott, Orthopaedic Surgery Staff Surgeon.     Lewis Jolly MD  Orthopaedics PGY4  [phone]      "

## 2019-10-02 NOTE — PLAN OF CARE
"/61 (BP Location: Right arm)   Pulse 77   Temp 97.5  F (36.4  C) (Oral)   Resp 18   Ht 1.676 m (5' 6\")   Wt 49.9 kg (110 lb)   SpO2 96%   BMI 17.75 kg/m      Time:  4049-5836     Reason for admission:  R BKA Infection  Neuro:  A&Ox4.  Clear and logical speech.  No aphasia.  PERRLA.  R BKA.  Behavior:  Calm and cooperative.  Calls appropriately.  Activity:  Ax2 w/ walker.  Bed alarm in place.  Vitals:  VSS on RA.  Febrile this shift w/ Tmax 101, relieved w/ tylenol.  Lines:  R PIV w/ LR @ 100cc.  Cardiac:  Tele NSR  Respiratory:  Dim LS.  Sats in 90s on RA.  GI/:  Incontinent of urine.  No BM this shift.  Need UA.    Skin:  R BKA wound w/ purulent drainage, primapore CDI this shift.  R knee w/ scabbing incision, no drainage, ROMY.  Blanchable redness on buttocks.    Endo:  NA  Pain:  IV morphine dc'd this shift, did not work per patient.  Scheduled tylenol and PRN Q4H oxy given x 2 for c/o pain.    Diet:  NPO  Labs/Imaging:  RUI=955, Fu=087, WBC=24.2, BCx w/ gram + cocci and clusters, team notified.  Consults:  Ortho Surg     New changes this shift:  Admitted w/ acute onset R amputation pain w/ discharge.  CT w/ concern for osteomyelitis.  IV abx.  Trending BCx,  + R arm culture this shift, team aware.  R pleural effusion.  Low Na, LR @ 100.  NPO for possible surgical intervention.  Incontinent of urine.  No BM this shift.  Scheduled tylenol and PRN oxy x 2 this shift for c/o back pain.     Plan:  Trend CRP.  I & D of stump and send off bone cultures.  May need revision amputation.     Continue to monitor and follow POC.  "

## 2019-10-02 NOTE — TELEPHONE ENCOUNTER
----- Message from Josr Hernandez MD sent at 10/2/2019  7:34 AM CDT -----  Please inform pt. Of her final wound culture   grew Staph, ( NOT MRSA )  Continue with current medications ( Clindamycin )  Follow up if needed  thanks

## 2019-10-02 NOTE — PROGRESS NOTES
[facility], [place]    Progress Note - [facility] Service        Date of Admission:  10/1/2019    Assessment & Plan   1. Acute osteomyelitis w/MSSA bacteremia and subcutaneous emphysema  Wound cultures taken from Dr. Hernandez's office 9/30 were positive for MSSA  (Verigene positive). Physical examination demonstrated significant purulent drainage expressed from the R amputation stump associated with pain, and more expressed with flexion of the joint. Initial CT w/contrast of the R knee was notable for sinus tracking down the femur, suspicious for early onset osteomyelitis. WBC, procalcitonin and CRP remain elevated in the presence of MSSA bacteremia, thus concerning for developing sepsis from early osteomyelitis. Infectious Disease consulted, recommended to continue vancomycin IV given patient's allergies to penicillins, in addition to advocating for sooner surgical intervention. At this time Orthopedic Surgery will plan elective I&D unless patient she clinically decompensates.  -TTE ordered to r/o endocarditis  -Infectious disease following, appreciate recs  -Will continue IV vancomycin   -Continue trending blood cultures daily  -Orthopedic surgery consulted, appreciate recs  -Trend daily CBC, CRP     2. Right pleural effusion  Patient with CT findings of trace right pleural fluid and tree-in-bud nodular pattern, concerning for inflammatory v infectious process. No clinical respiratory symptoms to suggest pneumonia or another pulmonary etiology at this time.  -Continue trending blood cultures as noted above  -If patient begins to experience signs of respiratory distress, will obtain sputum culture and broaden coverage     3. Mild hyponatremia - improving  Sodium 128 on admission, most likely in the setting of hypovolemia from infection. Improved to 131.   -Continue IVF,  ml/hr  -Repeat BMP in the AM     4. Mild hypoalbuminemia  Albumin 2.5 on admission, most likely due to poor  nutrition in the setting of infection.  -NPO at this time due to possible surgical intervention for source control  -Consider RD consult if appetite does not improve     Chronic medical conditions---  #Rheumatoid arthritis - Continue prednisone 5 mg daily; will hold DMARDs at this time  #CAD - Continue PTA statin dose  #HTN - Hold antihypertensives in the setting of developing infection/sepsis  #Lumbar spinal stenosis - Will continue with conservative pain management at this time.     Diet: Regular adult diet   Fluids: Lactated Ringers 150 ml/hr IV  DVT Prophylaxis: Pneumatic Compression Devices  Nolan Catheter: in place, indication:    Code Status: Full Code    Disposition Plan   Expected discharge: 2 - 3 days, recommended to transitional care unit once SIRS/Sepsis treated.  Entered: Sathya Nolasco MD 10/02/2019, 7:41 AM       The patient's care was discussed with the Attending Physician, Dr. Cuello, Bedside Nurse and Patient.    Sathya Nolasco MD  [address], [place]  Pager: [phone]  Please see sticky note for cross cover information  ______________________________________________________________________    Interval History   Nursing notes reviewed, patient reports mild improvement of her low back pain and feels less fatigued compared to initial arrival. She remains bedbound and NPO, states she still has pain over her R amputation stump site. No fevers, chills, CP, SOB, cough, abd pain, n/v/d, dysuria, hematuria, numbness, tingling or HA.    Data reviewed today: I reviewed all medications, new labs and imaging results over the last 24 hours. I personally reviewed no images or EKG's today.  Labs notable for hyponatremia, hypoalbuminemia.    Physical Exam   Vital Signs: Temp: 97.5  F (36.4  C) Temp src: Oral BP: 111/61 Pulse: 77   Resp: 18 SpO2: 96 % O2 Device: None (Room air)    Weight: 110 lbs 0 oz  General Appearance: Appears as stated age, cooperative,  no acute distress.  Respiratory: Speaking in full sentences on room air. CTAB posteriorly.  Cardiovascular: Normal rate, regular rhythm. No m/r/g.  GI: Soft, non tender. Non distended.  Skin: Warm, dry. Erythema and warmth noted over the R amputation site with tenderness to palpation unchanged from yesterday. Purulent drainage was expressed from the site with associated tenderness.  Other: No slurred speech, no FND.     Data   Recent Labs   Lab 10/02/19  0816 10/02/19  0637 10/01/19  1309   WBC 22.5*  --  24.2*   HGB 8.8*  --  10.5*   MCV 91  --  89     --  357   INR  --   --  1.38*   NA  --  131* 128*   POTASSIUM  --  3.8 3.7   CHLORIDE  --  102 94   CO2  --  19* 24   BUN  --  10 12   CR  --  0.44* 0.56   ANIONGAP  --  10 10   ADRIAN  --  8.0* 8.8   GLC  --  83 82   ALBUMIN  --   --  2.5*   PROTTOTAL  --   --  6.9   BILITOTAL  --   --  0.6   ALKPHOS  --   --  62   ALT  --   --  14   AST  --   --  34   LIPASE  --   --  37*     XR Tibia & Fibia 10/2/19:  gas. Bony change better assessed on CT from he day prior. Soft tissue gas noted.

## 2019-10-02 NOTE — CONSULTS
GENERAL ID SERVICE: NEW CONSULTATION  Patient:  Sakshi Jaeger, Date of birth 1945, Medical record number [id number]  Date of Admission: 10/1/2019  Date of Visit:  10/2/2019  Requesting Provider: Lily Cuello         Assessment and Recommendations:   Problem List:    #1 Right tibial stump osteomyelitis with CT findings of  fluid collection containing internal foci of subcutaneous emphysema adjacent to the distal tibial stump as well as evidence of osteomyelitis - Wound culture from 9/30 positive for MSSA     #2 MSSA bloodstream infection secondary to #1     #3 Allergy to penicillin - hives      Discussion:    Mrs. Sakshi Jaeger is a 74 year-old female admitted on 10/01/19. 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 She presented to the ED with  worsening R amputation stump pain for the 4 days (started on 9/27)  and fever. She did have fever as well as leukocytosis and elevated CRP.  Wound culture from 9/30 positive for MSSA and blood cultures from 10/1 also positive for MSSA. In addition a CT femur showed fluid collection containing internal foci ofsubcutaneous emphysema adjacent to the distal tibial stump. There is extension to the medullary cavity suggestive of osteomyelitis.     Patient is on vancomycin. Of note, she has report of penicillin history (hives)    Recommendations:  1. Given history of penicillin allergy I recommend to continue vancomycin for treatment of MSSA bloodstream infection secondary to right tibial stump osteomyelitis with adjacent fluid collection  2. Given the severity of the case and the bloodstream infection as well as presence of collection adjacent to the infected bone (right tibial stump) the patient will need surgical intervention for  drainage of the abscess  And bone debridement   for source control   3. TTE ordered. If negative she will still need a LINDA to rule out endocarditis  4. Please repeat blood cultures daily until negative for 48h      Thank you for this consult. The General ID team will continue to follow this patient. Please feel free to call with any questions.     Zeinab Robert MD  Date of Service: 10/02/19  Pager: [phone]       History of Present Illness:   Mrs. Sakshi Jaeger is a 74 year-old female admitted on 10/01/19. 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 She has a history of intermittent wounds on the right below knee stump, and has had skin grafts in the right knee area 3 times in the past  She presented to the ED with  worsening R amputation stump pain for the 4 days (started on 9/27)  and fever. On 9/28 she noted a small wound on the tip of the limb and it was culture on the following day by her primary care physician. She was prescribed clindamycin but her condition worsened and she presented to the hospital .     Upon arrival patient was febrile with T max of 101, leucocytosis (24.2), and normal pro calcitonin acid lactic acid. Renal function is normal. CRP is 270. Wound culture from 9/30 positive for MSSA and blood cultures from 10/1 also positive for MSSA.     CT femur showed fluid collection containing internal foci of subcutaneous emphysema adjacent to the distal tibial stump. There is extension to the medullary cavity suggestive of osteomyelitis.     Ortho on board.     Patient was started on vancomycin and ceftriaxone.             Review of Systems:     CONSTITUTIONAL:  No fevers or chills  INTEGUMENTARY/SKIN: NEGATIVE for worrisome rashes, moles or lesions  EYES: Negative for icterus, vision changes or irritation  ENT/MOUTH:  Negative for oral lesions and sore  throat  RESPIRATORY:  Negative for cough and dyspnea  CARDIOVASCULAR:  Negative for chest pain, palpitations and  shortness of breath  GASTROINTESTINAL:  Negative for abdominal pain, nausea, vomiting, diarrhea and constipation  GENITOURINARY:  Negative for dysuria, hematuria, frequency and urgency  MUSCULOSKELETAL: Negative for joint pain, swelling, motion restriction, negative for musculoskeletal pain  NEURO:  Negative for headache, altered mental status, numbness or weakness  PSYCHIATRIC: Negative for changes in mood or affect  HEMATOLOGIC/LYMPHATIC: negative for lymphadenopathy or bleeding  ALLERGIC/IMMUNOLOGIC: Negative for allergic reaction   ENDOCRINE: Negative for temperature intolerance, skin/hair changes       Past Medical History:     Past Medical History:   Diagnosis Date     BENIGN HYPERTENSION 3/1/2005     CAD (coronary artery disease) 1/26/2011     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     Hypertension goal BP (blood pressure) < 130/80 1/26/2011     IRON DEFIC ANEMIA NOS 9/15/2005     Lower limb amputation, below knee 12/26/2012     MI (myocardial infarction) (H)     3/2010     OSTEOPOROSIS NOS 2/16/2005     Rheumatoid arthritis(714.0) 2/16/2005         Allergies:      Allergies   Allergen Reactions     Penicillins Hives          Family History:   Reviewed and noncontributory.   Family History   Problem Relation Age of Onset     Cardiovascular Mother      Cancer Brother      Diabetes No family hx of      Hypertension No family hx of      Cerebrovascular Disease No family hx of      Alzheimer Disease No family hx of      Thyroid Disease No family hx of      Respiratory No family hx of             Social History:     Social History     Socioeconomic History     Marital status: Single     Spouse name: Not on file     Number of children: Not on file     Years of education: Not on file     Highest education level: Not on file   Occupational History     Not on file   Social Needs     Financial resource strain:  "Not on file     Food insecurity:     Worry: Not on file     Inability: Not on file     Transportation needs:     Medical: Not on file     Non-medical: Not on file   Tobacco Use     Smoking status: Current Every Day Smoker     Packs/day: 0.50     Years: 45.00     Pack years: 22.50     Types: Cigarettes     Last attempt to quit: 2010     Years since quittin.6     Smokeless tobacco: Never Used     Tobacco comment: pt has recently started smoking again d/t pain   Substance and Sexual Activity     Alcohol use: Yes     Comment: occsionally-3 -4 drinks a week     Drug use: No     Sexual activity: Yes     Partners: Male   Lifestyle     Physical activity:     Days per week: Not on file     Minutes per session: Not on file     Stress: Not on file   Relationships     Social connections:     Talks on phone: Not on file     Gets together: Not on file     Attends Confucianist service: Not on file     Active member of club or organization: Not on file     Attends meetings of clubs or organizations: Not on file     Relationship status: Not on file     Intimate partner violence:     Fear of current or ex partner: Not on file     Emotionally abused: Not on file     Physically abused: Not on file     Forced sexual activity: Not on file   Other Topics Concern     Parent/sibling w/ CABG, MI or angioplasty before 65F 55M? No   Social History Narrative     Not on file              Physical Exam:   BP (!) 143/69 (BP Location: Right arm)   Pulse 84   Temp 97.6  F (36.4  C) (Oral)   Resp 16   Ht 1.676 m (5' 6\")   Wt 49.9 kg (110 lb)   SpO2 96%   BMI 17.75 kg/m           Exam:  GENERAL:  Not in acute distress.   HEAD: Normocephalic and atraumatic  ENT:  No hearing impairment, no ear pain or exudate, ear canals and TM's normal, nose and mouth without ulcers or lesions, oropharynx clear, oral mucous membranes moist, no tonsillar erythema and edema, oropharynx is without exudates or ulcers.  EYES:  Eyes grossly normal to inspection, " PERRL and conjunctivae and sclerae normal   NECK:  Supple, no adenopathy, no asymmetry, masses, or scars and thyroid normal to palpation  LUNGS:  Clear to auscultation - no rales, rhonchi or wheezes  CARDIOVASCULAR:  Regular rate and rhythm, normal S1 S2, no S3 or S4, no murmur, click or rub, no peripheral edema and peripheral pulses strong  ABDOMEN:  Soft, nontender, no hepatosplenomegaly, no masses and bowel sounds normal  EXT/MS/SKIN:  Right BKA stump has an opening that tracks to bone. No drainage. Not tenderness.   NEUROLOGIC:  Grossly nonfocal. Normal strength and tone, mentation intact and speech normal             Laboratory Data:     Creatinine   Date Value Ref Range Status   10/02/2019 0.44 (L) 0.52 - 1.04 mg/dL Final   10/01/2019 0.56 0.52 - 1.04 mg/dL Final   04/12/2018 0.660 0.500 - 1.300 mg/dL Final   12/13/2017 0.600 0.500 - 1.300 mg/dL Final   05/11/2017 0.700 0.500 - 1.300 mg/dL Final     WBC   Date Value Ref Range Status   10/02/2019 22.5 (H) 4.0 - 11.0 10e9/L Final   10/01/2019 24.2 (H) 4.0 - 11.0 10e9/L Final   02/28/2017 10.7 4.0 - 11.0 10e9/L Final   09/27/2016 7.9 4.0 - 11.0 10e9/L Final   06/08/2016 9.6 4.0 - 11.0 10e9/L Final     Hemoglobin   Date Value Ref Range Status   10/02/2019 8.8 (L) 11.7 - 15.7 g/dL Final     Platelet Count   Date Value Ref Range Status   10/02/2019 302 150 - 450 10e9/L Final     Lab Results   Component Value Date     (L) 10/02/2019    BUN 10 10/02/2019    CO2 19 (L) 10/02/2019     C-Reactive Protein   Date Value Ref Range Status   03/16/2015 4.10 (A) 0 - 0.8 mg/dL Final   11/05/2014 0.26 0 - 0.8 mg/dL Final   04/21/2005 0.43 0.00 - 0.80 mg/dL Final   04/14/2005 0.59 0.00 - 0.80 mg/dL Final   04/08/2005 0.45 0.00 - 0.80 mg/dL Final     CRP Inflammation   Date Value Ref Range Status   10/02/2019 270.0 (H) 0.0 - 8.0 mg/L Final   10/01/2019 280.0 (H) 0.0 - 8.0 mg/L Final           Pertinent Recent Microbiology Data:     Recent Labs   Lab 10/01/19  1833  10/01/19  1309 09/30/19  1440   CULT Cultured on the 1st day of incubation:  Gram positive cocci in clusters  *  Critical Value/Significant Value, preliminary result only, called to and read back by  Vickie Boyd RN @ 1253 10/2/19 TM.   Cultured on the 1st day of incubation:  Gram positive cocci in clusters  *  Critical Value/Significant Value, preliminary result only, called to and read back by  Krista Mueller RN @ 9282 10/2/19. NANCY    (Note)  POSITIVE for STAPHYLOCOCCUS AUREUS and NEGATIVE for the mecA gene  (not MRSA) by [facility] multiplex nucleic acid test. The mecA gene was  not detected. Final identification and antimicrobial susceptibility  testing will be verified by standard methods.    Specimen tested with Verigene multiplex, gram-positive blood culture  nucleic acid test for the following targets: Staph aureus, Staph  epidermidis, Staph lugdunensis, other Staph species, Enterococcus  faecalis, Enterococcus faecium, Streptococcus species, S. agalactiae,  S. anginosus grp., S. pneumoniae, S. pyogenes, Listeria sp., mecA  (methicillin resistance) and Archie/B (vancomycin resistance).    Critical Value/Significant Value called to and read back by  KRISTA MUELLER RN 5A [id number] 10/2/19 AM       Moderate growth  Staphylococcus aureus  *   SDES Blood Left Arm Blood Right Arm Right Knee Wound  Right Knee Wound            Imaging:     Recent Results (from the past 48 hour(s))   Abd/pelvis CT no contrast - Stone Protocol    Narrative    Exam: CT abdomen pelvis without contrast 10/1/2019 2:04 PM.    History: Back pain and fever.    Comparison: CT pelvis 6/19/2019.    Technique: Helical CT images from the lung bases through the symphysis  pubis without intravenous contrast. Multiplanar reconstructions  obtained and reviewed.    Dose: 813 mGy*cm    Findings:     Abdomen/Pelvis:  Unremarkable noncontrast appearance of the liver. Distended  gallbladder without calcified gallstones or pericholecystic  inflammatory  changes. No biliary dilatation. The pancreas, spleen, and  adrenal glands are normal. Subcentimeter right renal hyperdensity  (series 5, image 133), likely a hemorrhagic/proteinaceous cyst. No  hydronephrosis or hydroureter. No renal or ureteral calculi. Bladder  is well-distended and normal in morphology. Calcified uterine fibroid.  Unchanged punctate right greater than left ovarian calcifications. No  pelvic free fluid.    No abnormally dilated bowel. No pneumatosis, portal venous gas, or  pneumoperitoneum. Dense contents within the colon, likely ingested.  The appendix is not identified, surgically absent. Extensive  atherosclerotic calcifications of the abdominal aorta, iliac arteries,  and origins of the celiac and superior mesenteric, and renal arteries.  Vascular patency is not assessed on this noncontrast exam. No  abdominal aortic aneurysm. No abdominal lymphadenopathy.    Lower chest:  Trace right pleural fluid/pleural thickening. Mucous plugging in the  central right lower lobe bronchi. Tree-in-bud nodular opacities in the  right lower lobe peripherally with mild smooth interlobular septal  thickening in the lung bases. Heart is mildly enlarged. Partially  visualized coronary artery stent.    Bones and soft tissues:  No acute osseous abnormality. Marked degenerative changes in the  visualized thoracal lumbar spine including degenerative retrolisthesis  of L1 on L2 and grade 1 anterolisthesis of L3 on L4. Degenerative  endplate sclerosis at multiple levels. Disc bulges/sequestrations are  better evaluated on same-day lumbar spine CT. Chronic, healed fracture  deformity of the mid sacral body. Skin thickening/soft tissue  stranding in the right greater than left gluteal creases.      Impression    Impression:  1. No acute intra-abdominal pathology.  2. Trace right pleural fluid/pleural thickening with peripheral  tree-in-bud nodular opacities in the right lower lobe, likely  infectious/inflammatory in  etiology. Consider aspiration given mucous  plugging in the central right lower lobe bronchi.  3. Marked vascular calcifications in the abdomen and pelvis without  aneurysmal dilation. Patency is not assessed on this noncontrast  examination.  4. Skin thickening/soft tissue stranding in the right greater than  left gluteal creases. Recommend direct visualization.  5. Please see same day neuroradiology report for findings in the  lumbar spine.     I have personally reviewed the examination and initial interpretation  and I agree with the findings.    BERNADETTE KUHN, DO   XR Chest Port 1 View    Narrative    EXAM: XR CHEST PORT 1 VW  10/1/2019 2:05 PM     HISTORY:  fever hypotension       COMPARISON:  Chest x-ray from 3/31/2010.    FINDINGS:   AP view the chest. The Patient is rotated.    The trachea is midline. The cardiomediastinal silhouette is normal.  The pulmonary vasculature are distinct.     The included osseous thorax, soft tissues and upper abdomen and are  within normal limits.     Lungs are clear without discrete nodule/mass, focal  airspace/interstitial consolidative opacity, pleural effusion or  pneumothorax.      Impression    IMPRESSION: No radiographic evidence on this study for acute disease  in the chest.     I have personally reviewed the examination and initial interpretation  and I agree with the findings.    GAUTAM NUNEZ MD   Lumbar spine CT w/o contrast    Narrative    CT LUMBAR SPINE W/O CONTRAST 10/1/2019 2:07 PM    History: back pain hx of lumbar fracture in June, now increasing pain  also.    Comparison: CT lumbar spine 6/19/2019.    Technique: Using multidetector thin collimation helical acquisition  technique, axial, coronal and sagittal CT images through the lumbar  spine were obtained without intravenous contrast.     Findings: There are 5 lumbar type vertebrae. There is multilevel  spondylosis of the spine. Grade 1 retrolisthesis of L1 on L2. Slightly  improved disc height loss at  L1-L2 since 6/9/2019. Chronic bilateral  pedicle fractures of L2. Grade 1 anterolisthesis of L3 on L4 with  compression deformity of the superior endplate of L4 caused by the  inferoposterior aspect of L3. Dextroscoliosis of the spine centered at  L3.    Findings on a level by level basis are as follows:    L1-L2: Disc extrusion/sequestration with partial calcification  extending into the right neural foramen. Severe right and moderate  left neural foraminal narrowing. Sequestered disc impinges the exiting  L1 nerve root. Mild spinal canal stenosis secondary to partial  calcified disc bulge.     L2-L3: Posterior disc bulge. Mild to moderate neural foraminal  narrowing bilaterally. No significant spinal canal narrowing.    L3-L4: Posterior disc bulge with unroofing of the disc. Ligamentum  flavum hypertrophy. Facet arthropathy. Mild spinal canal stenosis.  Severe left neural foraminal narrowing. Moderate to severe right  neural foraminal narrowing.    L4-L5: Posterior disc bulge. Ligamentum flavum hypertrophy. Facet  arthropathy. Mild to moderate spinal canal stenosis. Moderate  neuroforaminal narrowing bilaterally.    L5-S1: Ligamentum flavum hypertrophy. Posterior disc bulge. Facet  arthropathy. No spinal canal stenosis. Moderate bilateral neural  foraminal narrowing.    The visualized adjacent paraspinous tissues are grossly within normal  limits. Moderate to severe atherosclerotic disease of the distal  aorta, proximal common iliac arteries. Scarring or atelectasis within  the right lung base.      Impression    Impression:  1. No new fracture is identified.  2. No significant change of disc sequestration extending into the  right neural foramen causing severe neural foraminal narrowing at L1-2  since prior.  3. Multilevel lumbar spondylosis, not changed significantly since  6/19/2019. Unchanged grade 1 anterolisthesis of L3 on L4 and severe  left neuroforaminal narrowing and moderate to severe right  neural  foraminal narrowing at this level.    I have personally reviewed the examination and initial interpretation  and I agree with the findings.    KELSY NATH MD   CT Femur Thigh Right w Contrast    Narrative    EXAMINATION: CT FEMUR THIGH RIGHT WITH CONTRAST, 10/1/2019 5:56 PM    TECHNIQUE:  Helical CT images from proximal thigh through right lower  extremity stump. Contrast dose: 68ml isovue 370    COMPARISON: Right knee radiograph on 9/21/2005    HISTORY: h/o R BKA, drainage from stomp, concern for underlying  abscess. pls obtain CT R leg down to stomp    FINDINGS:    Postsurgical changes of below-the-knee amputation. Foci of  subcutaneous emphysema within the subcutaneous tissues overlying the  stump which also extend into the distal medullary cavity of the tibial  stump. Thin rim-enhancing fluid collection about the distal stump then  extending into the medullary cavity of the tibial remnant (coronal  series 4 image 67, sagittal series 5 image 50). Foci of cortical  irregularity and dehiscence in this region. Significant skin  thickening and subcutaneous edema of the remnant leg, especially  anteriorly. Area of anterior skin ulceration anterior to the stump on  sagittal series 5 image 49.    Bones appear osteopenic. Fibular stump unremarkable in appearance.  Visualized femur unremarkable.    Vascular calcifications. Fatty infiltration of the remaining  gastrocnemius and soleus and anterior musculature of the leg.      Impression    IMPRESSION:     Area of skin ulceration anterior to the distal tibial stump with  adjacent rim-enhancing fluid collection containing internal foci of  subcutaneous emphysema. Subcutaneous gas and fluid collection extends  into the medullary cavity of the distal fibular stump. Multiple foci  of cortical dehiscence of the distal tibial stump. Constellation of  findings compatible with subcutaneous abscess and osteomyelitis.    I have personally reviewed the examination and  initial interpretation  and I agree with the findings.    SHIVANI (Fiona NETTLES MD

## 2019-10-02 NOTE — PLAN OF CARE
A&Ox4. VSS on RA. Complains of back pain - given scheduled tylenol and PRN oxy q4h. Complained of chills @ 1415. Vitals signs WNL - MD notified. A2 with walker, bed alarm on for safety. Incontinent of urine. No BM today. On regular diet, tolerating well. R BKA with purulent drainage. R knee with scabbing incision, no drainage. Blanchable redness on buttocks. R PIV infusing LR @ 100/hr. Gets q24h IV vancomycin. Blood cultures came back with gram positive cocci and clusters. X-ray done today of R leg.

## 2019-10-03 LAB
ANION GAP SERPL CALCULATED.3IONS-SCNC: 7 MMOL/L (ref 3–14)
BUN SERPL-MCNC: 7 MG/DL (ref 7–30)
CALCIUM SERPL-MCNC: 8 MG/DL (ref 8.5–10.1)
CHLORIDE SERPL-SCNC: 99 MMOL/L (ref 94–109)
CO2 SERPL-SCNC: 24 MMOL/L (ref 20–32)
CREAT SERPL-MCNC: 0.39 MG/DL (ref 0.52–1.04)
CRP SERPL-MCNC: 240 MG/L (ref 0–8)
ERYTHROCYTE [DISTWIDTH] IN BLOOD BY AUTOMATED COUNT: 14.7 % (ref 10–15)
GFR SERPL CREATININE-BSD FRML MDRD: >90 ML/MIN/{1.73_M2}
GLUCOSE SERPL-MCNC: 90 MG/DL (ref 70–99)
HCT VFR BLD AUTO: 29.2 % (ref 35–47)
HGB BLD-MCNC: 9.2 G/DL (ref 11.7–15.7)
MCH RBC QN AUTO: 28 PG (ref 26.5–33)
MCHC RBC AUTO-ENTMCNC: 31.5 G/DL (ref 31.5–36.5)
MCV RBC AUTO: 89 FL (ref 78–100)
PLATELET # BLD AUTO: 323 10E9/L (ref 150–450)
POTASSIUM SERPL-SCNC: 3.5 MMOL/L (ref 3.4–5.3)
RBC # BLD AUTO: 3.29 10E12/L (ref 3.8–5.2)
SODIUM SERPL-SCNC: 130 MMOL/L (ref 133–144)
VANCOMYCIN SERPL-MCNC: 4.2 MG/L
WBC # BLD AUTO: 22.7 10E9/L (ref 4–11)

## 2019-10-03 PROCEDURE — 99233 SBSQ HOSP IP/OBS HIGH 50: CPT | Mod: GC | Performed by: INTERNAL MEDICINE

## 2019-10-03 PROCEDURE — 25800030 ZZH RX IP 258 OP 636: Performed by: STUDENT IN AN ORGANIZED HEALTH CARE EDUCATION/TRAINING PROGRAM

## 2019-10-03 PROCEDURE — 25000128 H RX IP 250 OP 636

## 2019-10-03 PROCEDURE — 80048 BASIC METABOLIC PNL TOTAL CA: CPT | Performed by: STUDENT IN AN ORGANIZED HEALTH CARE EDUCATION/TRAINING PROGRAM

## 2019-10-03 PROCEDURE — 25000131 ZZH RX MED GY IP 250 OP 636 PS 637: Performed by: STUDENT IN AN ORGANIZED HEALTH CARE EDUCATION/TRAINING PROGRAM

## 2019-10-03 PROCEDURE — 81001 URINALYSIS AUTO W/SCOPE: CPT | Performed by: FAMILY MEDICINE

## 2019-10-03 PROCEDURE — 25000132 ZZH RX MED GY IP 250 OP 250 PS 637: Performed by: STUDENT IN AN ORGANIZED HEALTH CARE EDUCATION/TRAINING PROGRAM

## 2019-10-03 PROCEDURE — 12000001 ZZH R&B MED SURG/OB UMMC

## 2019-10-03 PROCEDURE — 86140 C-REACTIVE PROTEIN: CPT | Performed by: STUDENT IN AN ORGANIZED HEALTH CARE EDUCATION/TRAINING PROGRAM

## 2019-10-03 PROCEDURE — 87040 BLOOD CULTURE FOR BACTERIA: CPT | Performed by: STUDENT IN AN ORGANIZED HEALTH CARE EDUCATION/TRAINING PROGRAM

## 2019-10-03 PROCEDURE — 87077 CULTURE AEROBIC IDENTIFY: CPT | Performed by: STUDENT IN AN ORGANIZED HEALTH CARE EDUCATION/TRAINING PROGRAM

## 2019-10-03 PROCEDURE — 80202 ASSAY OF VANCOMYCIN: CPT | Performed by: INTERNAL MEDICINE

## 2019-10-03 PROCEDURE — G0463 HOSPITAL OUTPT CLINIC VISIT: HCPCS

## 2019-10-03 PROCEDURE — 36415 COLL VENOUS BLD VENIPUNCTURE: CPT | Performed by: INTERNAL MEDICINE

## 2019-10-03 PROCEDURE — 85027 COMPLETE CBC AUTOMATED: CPT | Performed by: STUDENT IN AN ORGANIZED HEALTH CARE EDUCATION/TRAINING PROGRAM

## 2019-10-03 PROCEDURE — 36415 COLL VENOUS BLD VENIPUNCTURE: CPT | Performed by: STUDENT IN AN ORGANIZED HEALTH CARE EDUCATION/TRAINING PROGRAM

## 2019-10-03 RX ORDER — OXYCODONE HYDROCHLORIDE 5 MG/1
5-10 TABLET ORAL EVERY 4 HOURS PRN
Status: DISCONTINUED | OUTPATIENT
Start: 2019-10-03 | End: 2019-10-13 | Stop reason: HOSPADM

## 2019-10-03 RX ORDER — AMLODIPINE BESYLATE 5 MG/1
5 TABLET ORAL DAILY
Status: DISCONTINUED | OUTPATIENT
Start: 2019-10-03 | End: 2019-10-13 | Stop reason: HOSPADM

## 2019-10-03 RX ORDER — LIDOCAINE 4 G/G
1 PATCH TOPICAL
Status: DISCONTINUED | OUTPATIENT
Start: 2019-10-03 | End: 2019-10-13 | Stop reason: HOSPADM

## 2019-10-03 RX ORDER — OXYCODONE HYDROCHLORIDE 5 MG/1
10 TABLET ORAL EVERY 4 HOURS PRN
Status: DISCONTINUED | OUTPATIENT
Start: 2019-10-03 | End: 2019-10-03

## 2019-10-03 RX ORDER — POLYETHYLENE GLYCOL 3350 17 G/17G
17 POWDER, FOR SOLUTION ORAL DAILY
Status: DISCONTINUED | OUTPATIENT
Start: 2019-10-03 | End: 2019-10-04

## 2019-10-03 RX ORDER — LISINOPRIL 20 MG/1
20 TABLET ORAL DAILY
Status: DISCONTINUED | OUTPATIENT
Start: 2019-10-03 | End: 2019-10-13 | Stop reason: HOSPADM

## 2019-10-03 RX ORDER — AMOXICILLIN 250 MG
1 CAPSULE ORAL 2 TIMES DAILY
Status: DISCONTINUED | OUTPATIENT
Start: 2019-10-03 | End: 2019-10-13 | Stop reason: HOSPADM

## 2019-10-03 RX ORDER — VANCOMYCIN HYDROCHLORIDE 1 G/200ML
1000 INJECTION, SOLUTION INTRAVENOUS EVERY 12 HOURS
Status: DISCONTINUED | OUTPATIENT
Start: 2019-10-04 | End: 2019-10-06

## 2019-10-03 RX ADMIN — ACETAMINOPHEN 650 MG: 325 TABLET, FILM COATED ORAL at 18:36

## 2019-10-03 RX ADMIN — SENNOSIDES AND DOCUSATE SODIUM 1 TABLET: 8.6; 5 TABLET ORAL at 12:54

## 2019-10-03 RX ADMIN — OXYCODONE HYDROCHLORIDE 5 MG: 5 TABLET ORAL at 04:10

## 2019-10-03 RX ADMIN — PREDNISONE 5 MG: 5 TABLET ORAL at 08:26

## 2019-10-03 RX ADMIN — LIDOCAINE 1 PATCH: 560 PATCH PERCUTANEOUS; TOPICAL; TRANSDERMAL at 12:54

## 2019-10-03 RX ADMIN — ACETAMINOPHEN 650 MG: 325 TABLET, FILM COATED ORAL at 10:21

## 2019-10-03 RX ADMIN — OXYCODONE HYDROCHLORIDE 10 MG: 5 TABLET ORAL at 23:39

## 2019-10-03 RX ADMIN — SODIUM CHLORIDE, POTASSIUM CHLORIDE, SODIUM LACTATE AND CALCIUM CHLORIDE: 600; 310; 30; 20 INJECTION, SOLUTION INTRAVENOUS at 02:57

## 2019-10-03 RX ADMIN — ACETAMINOPHEN 650 MG: 325 TABLET, FILM COATED ORAL at 02:54

## 2019-10-03 RX ADMIN — ACETAMINOPHEN 650 MG: 325 TABLET, FILM COATED ORAL at 23:39

## 2019-10-03 RX ADMIN — DICLOFENAC 2 G: 10 GEL TOPICAL at 10:21

## 2019-10-03 RX ADMIN — OXYCODONE HYDROCHLORIDE 5 MG: 5 TABLET ORAL at 08:26

## 2019-10-03 RX ADMIN — ACETAMINOPHEN 650 MG: 325 TABLET, FILM COATED ORAL at 06:50

## 2019-10-03 RX ADMIN — VANCOMYCIN HYDROCHLORIDE 1000 MG: 1 INJECTION, SOLUTION INTRAVENOUS at 16:45

## 2019-10-03 RX ADMIN — OXYCODONE HYDROCHLORIDE 10 MG: 5 TABLET ORAL at 19:39

## 2019-10-03 RX ADMIN — SIMVASTATIN 20 MG: 20 TABLET, FILM COATED ORAL at 21:39

## 2019-10-03 RX ADMIN — SENNOSIDES AND DOCUSATE SODIUM 1 TABLET: 8.6; 5 TABLET ORAL at 20:45

## 2019-10-03 RX ADMIN — AMLODIPINE BESYLATE 5 MG: 5 TABLET ORAL at 12:54

## 2019-10-03 RX ADMIN — ACETAMINOPHEN 650 MG: 325 TABLET, FILM COATED ORAL at 15:01

## 2019-10-03 RX ADMIN — OXYCODONE HYDROCHLORIDE 10 MG: 5 TABLET ORAL at 12:55

## 2019-10-03 RX ADMIN — LISINOPRIL 20 MG: 20 TABLET ORAL at 12:54

## 2019-10-03 ASSESSMENT — ACTIVITIES OF DAILY LIVING (ADL)
ADLS_ACUITY_SCORE: 19
ADLS_ACUITY_SCORE: 19
ADLS_ACUITY_SCORE: 20
ADLS_ACUITY_SCORE: 19
ADLS_ACUITY_SCORE: 20
ADLS_ACUITY_SCORE: 20

## 2019-10-03 NOTE — PROGRESS NOTES
[facility] Nurse Inpatient Wound Assessment   Reason for consultation: Evaluate and treat R BKA  wound     Assessment  R BKA stump wound due to Surgical Wound, s/p BKA with chronic tibial osteo  Status: initial assessment    R knee wound: prior wound site with scar tissue and dry, flaky skin.   Status: initial assessment       Treatment Plan  R BKA stump wound: Daily  and prn saturation: irrigate sinus tract using microKlenz spray bottle and dry, apply Cavilon barrier film. Cut out small square of Aquacel AG ([id number]) and place over wound bed, then cover with mepilex.     R knee: BID apply Vaseline over dry, flaky area.     Orders Written    WOC Nurse follow-up plan:weekly  Nursing to notify the Provider(s) and re-consult the WOC Nurse if wound(s) deteriorates or new skin concern.    Patient History  According to provider note(s):  74 year old female with history of RA on DMARD therapy s/p R BKA due to surgical infection of the R ankle, CAD and HTN who presents with acute onset R amputation stump pain with purulent drainage positive for S.aureus and CT findings concerning for early onset osteomyelitis.    Objective Data  Containment of urine/stool: Continent of bowel and Continent of bladder    Active Diet Order  Orders Placed This Encounter      Regular Diet Adult      Output:   I/O last 3 completed shifts:  In: 1360 [P.O.:360; I.V.:1000]  Out: 350 [Urine:350]    Risk Assessment:   Sensory Perception: 3-->slightly limited  Moisture: 3-->occasionally moist  Activity: 2-->chairfast  Mobility: 3-->slightly limited  Nutrition: 3-->adequate  Friction and Shear: 2-->potential problem  Alexandro Score: 16                          Labs:   Recent Labs   Lab 10/03/19  0642  10/01/19  1309   ALBUMIN  --   --  2.5*   HGB 9.2*   < > 10.5*   INR  --   --  1.38*   WBC 22.7*   < > 24.2*   .0*   < > 280.0*    < > = values in this interval not displayed.       Physical Exam  Skin inspection: focused R BKA    Wound Location:  R BKA  stump  Wound History: noted recently, per ortho chronic osteo present   Measurements (length x width x depth, in cm)pinpoint sized open area (sinus tract) within shallow ulceration measuring about 0.5cm x 0.5cm  Wound Base: 100 % dermis with sinus tract draining purulent drainage.   Tunneling up to unknown in center   Undermining N/A  Palpation of the wound bed: boggy, cavity present underneath with expressible drainage.   Periwound skin: dry/scaly  Color: pink  Temperature: normal   Drainage:, moderate  Description of drainage: purulent and cloudy  Odor: none  Pain: denies , none    Interventions  Current support surface: Standard  Atmos Air mattress  Current off-loading measures: Pillows under calves  Visual inspection of wound(s) completed  Wound Care: done per plan of care  Supplies: ordered: aquacel ag  Education provided: plan of care  Discussed plan of care with Patient and Nurse    Kay Babcock RN, BSN, CWON

## 2019-10-03 NOTE — PLAN OF CARE
"  /54 (BP Location: Left arm)   Pulse 88   Temp 98.4  F (36.9  C) (Oral)   Resp 16   Ht 1.676 m (5' 6\")   Wt 49.9 kg (110 lb)   SpO2 94%   BMI 17.75 kg/m      Time: 4894-3871     Reason for admission: Worsening R BKA pain and fever secondary to infection  Activity: Pt up today working w PT  Pain: Pain is a major focus right now since it is the pt's chief complaint. Her oxy dose has been adjusted to range from 5-10mg PRN, diclofenac cream added, essential oils utilized and tylenol regimen maintained. So far today she reports pain is tolerable and appears comfortable.   Neuro: WDL, A&O x4  Cardiac: Normal sinus w occasional PAC  Respiratory: WDL, infrequent cough   GI/: Urinating normally w purewick, unable to have BM so far, stool senna-docusate given.   Diet:  Regular, fair appetite  Lines: PIV SL  Skin/Wounds: WOC consulted in regards to wound on patients R BKA, see orders.   Labs: Elevated WBC (22.7)    New changes this shift: Pt able to comfortably sit up in bed     Plan: Control pain, continue w IV abx and work w therapy to improve mobility.      Continue to monitor and follow POC    "

## 2019-10-03 NOTE — PROGRESS NOTES
[facility], [place]    Progress Note - [facility] Service        Date of Admission:  10/1/2019    Assessment & Plan   1. Acute osteomyelitis w/MSSA bacteremia and subcutaneous emphysema  Wound cultures taken from Dr. Hernandez's office 9/30 were positive for MSSA  (Verigene positive). Physical examination demonstrated significant purulent drainage expressed from the R amputation stump associated with pain, and more expressed with flexion of the joint. Initial CT w/contrast of the R knee was notable for sinus tracking down the femur, suspicious for early onset osteomyelitis. WBC, procalcitonin and CRP remain elevated in the presence of MSSA bacteremia, thus concerning for developing sepsis from early osteomyelitis. Infectious Disease consulted, recommended to continue vancomycin IV given patient's allergies to penicillins. TTE negative for endocarditis with no LINDA follow as decided by the patient, given that the antibiotic course of duration for osteomyelitis is sufficient. MRI R Knee pending to r/o abscess of the R femur.  -MRI of the R knee pending  -Infectious disease following, appreciate recs  -Vancomycin IV per pharmacy dosing schedule   -Trend daily blood cultures  -Orthopedic surgery following, appreciate recs  -Trend daily CBC, CRP     2. Right pleural effusion  Patient with CT findings of trace right pleural fluid and tree-in-bud nodular pattern, concerning for inflammatory v infectious process. No clinical respiratory symptoms to suggest pneumonia or another pulmonary etiology at this time.  -Continue trending blood cultures as noted above  -If patient begins to experience signs of respiratory distress, will obtain sputum culture and broaden coverage     3. Mild hyponatremia - improving  Sodium 128 on admission, most likely in the setting of hypovolemia from infection. Improved to 130.   -PO adlib  -BMP checks in the AM     4. Mild hypoalbuminemia  Albumin 2.5 on admission, most  likely due to poor nutrition in the setting of infection.  -NPO at this time due to possible surgical intervention for source control  -Consider RD consult if appetite does not improve     5. Physical deconditioning  R BKA infection has caused patient ample distress and difficulty with ambulating. Her overall functional improvement will determine her disposition plan, as she will most likely need ongoing PT/OT outpatient.  -PT/OT ordered, appreciate recs    Chronic medical conditions---  #Rheumatoid arthritis - Continue prednisone 5 mg daily; will hold DMARDs at this time  #CAD - Continue PTA statin dose  #HTN - Hold antihypertensives in the setting of developing infection/sepsis  #Lumbar spinal stenosis - Oxycodone 10 mg Q4H prn, lidocaine patches, diclofenac gel PRN.     Diet: Regular adult diet   Fluids: Lactated Ringers 150 ml/hr IV  DVT Prophylaxis: Pneumatic Compression Devices  Nolan Catheter: in place, indication:    Code Status: Full Code    Disposition Plan   Expected discharge: 2 - 3 days, recommended to transitional care unit once SIRS/Sepsis treated.  Entered: Sathya Nolasco MD 10/03/2019, 5:32 PM       The patient's care was discussed with the Attending Physician, Dr. Cuello, Bedside Nurse and Patient.    Sathya Nolasco MD  [place] [address] Service  [facility], [place]  Pager: [phone]  Please see sticky note for cross cover information  ______________________________________________________________________    Interval History   Nursing notes reviewed, patient reports more improvement of her back pain after increasing oxycodone dose to 10 mg q4h prn.  She feels more willing to ambulate and work on physical recovery.  No fevers, chills, CP, SOB, cough, abd pain, n/v/d, dysuria, hematuria, numbness, tingling or HA.    Data reviewed today: I reviewed all medications, new labs and imaging results over the last 24 hours. I personally reviewed no images or EKG's today.  Labs  notable for hyponatremia, hypoalbuminemia.    Physical Exam   Vital Signs: Temp: 97.4  F (36.3  C) Temp src: Oral BP: 110/60 Pulse: 74   Resp: 16 SpO2: 96 % O2 Device: None (Room air)    Weight: 110 lbs 0 oz  General Appearance: Appears as stated age, cooperative, no acute distress.  Respiratory: Speaking in full sentences on room air. CTAB posteriorly.  Cardiovascular: Normal rate, regular rhythm. No m/r/g.  GI: Soft, non tender. Non distended.  Skin: Warm, dry. Erythema and warmth noted over the R amputation site with tenderness to palpation unchanged from yesterday. Bandages over the puncture site appear clean,dry and intact.  Other: No slurred speech, no FND.     Data   Recent Labs   Lab 10/03/19  0642 10/02/19  0816 10/02/19  0637 10/01/19  1309   WBC 22.7* 22.5*  --  24.2*   HGB 9.2* 8.8*  --  10.5*   MCV 89 91  --  89    302  --  357   INR  --   --   --  1.38*   *  --  131* 128*   POTASSIUM 3.5  --  3.8 3.7   CHLORIDE 99  --  102 94   CO2 24  --  19* 24   BUN 7  --  10 12   CR 0.39*  --  0.44* 0.56   ANIONGAP 7  --  10 10   ADRIAN 8.0*  --  8.0* 8.8   GLC 90  --  83 82   ALBUMIN  --   --   --  2.5*   PROTTOTAL  --   --   --  6.9   BILITOTAL  --   --   --  0.6   ALKPHOS  --   --   --  62   ALT  --   --   --  14   AST  --   --   --  34   LIPASE  --   --   --  37*     XR Tibia & Fibia 10/2/19:  gas. Bony change better assessed on CT from he day prior. Soft tissue gas noted.

## 2019-10-03 NOTE — PHARMACY-VANCOMYCIN DOSING SERVICE
Pharmacy Vancomycin Note  Date of Service October 3, 2019  Patient's  1945   74 year old, female    Indication: Sepsis and Skin and Soft Tissue Infection  Goal Trough Level: 15-20 mg/L  Day of Therapy: 3  Current Vancomycin regimen:  1000 mg IV q24h    Current estimated CrCl = Estimated Creatinine Clearance: 99.7 mL/min (A) (based on SCr of 0.39 mg/dL (L)).    Creatinine for last 3 days  10/1/2019:  1:09 PM Creatinine 0.56 mg/dL  10/2/2019:  6:37 AM Creatinine 0.44 mg/dL  10/3/2019:  6:42 AM Creatinine 0.39 mg/dL    Recent Vancomycin Levels (past 3 days)  10/3/2019:  4:09 PM Vancomycin Level 4.2 mg/L    Vancomycin IV Administrations (past 72 hours)                   vancomycin (VANCOCIN) 1000 mg in dextrose 5% 200 mL PREMIX (mg) 1,000 mg New Bag 10/03/19 1645     1,000 mg New Bag 10/02/19 1627                Nephrotoxins and other renal medications (From now, onward)    Start     Dose/Rate Route Frequency Ordered Stop    10/04/19 0445  vancomycin (VANCOCIN) 1000 mg in dextrose 5% 200 mL PREMIX      1,000 mg  200 mL/hr over 1 Hours Intravenous EVERY 12 HOURS 10/03/19 1815      10/03/19 1200  lisinopril (PRINIVIL/ZESTRIL) tablet 20 mg      20 mg Oral DAILY 10/03/19 1149               Contrast Orders - past 72 hours (72h ago, onward)    Start     Dose/Rate Route Frequency Ordered Stop    10/01/19 1725  iopamidol (ISOVUE-370) solution 68 mL      68 mL Intravenous ONCE 10/01/19 1724 10/01/19 1731          Interpretation of levels and current regimen:  Trough level is  Subtherapeutic    Has serum creatinine changed > 50% in last 72 hours: No    Urine output:  unable to determine    Renal Function: Stable    Plan:  1.  Increase Dose to 1000 mg Q12h  2.  Pharmacy will check trough levels as appropriate in 1-3 Days.    3. Serum creatinine levels will be ordered daily for the first week of therapy and at least twice weekly for subsequent weeks.      Trish Varghese RPH        .

## 2019-10-03 NOTE — PLAN OF CARE
Pt A&O. VSS on RA. Bedfast this shift, assist x 2. C/o severe, chronic back pain, poorly managed with current scheduled tylenol and PRN oxycodone. Crosscover aware of uncontrolled pain, plan to reassess regimen today. Right BKA wound with small amount of serosanguineous output, primapore dressing applied. BC's from yesterday both found to be positive, cross cover text page notified. LR at 100 ml/hr to PIV. Purwick in place. No BM. Plan for continued IV abx. ID and ortho following.

## 2019-10-03 NOTE — TELEPHONE ENCOUNTER
Called patient at [phone]. Left message to return call to nurse triage line at [phone].    Nurse sees patient is currently admitted to the hospital for cellulitis. Nurse to close encounter.     Stefania Thomason RN

## 2019-10-03 NOTE — PROGRESS NOTES
GENERAL ID SERVICE: PROGRESS NOTE  Patient:  Sakshi Jaeger, Date of birth 1945, Medical record number [id number]  Date of Admission: 10/1/2019  Date of Visit:  10/3/2019         Assessment and Recommendations:   Problem List:     #1 Right tibial stump osteomyelitis with CT findings of  fluid collection containing internal foci of subcutaneous emphysema adjacent to the distal tibial stump as well as evidence of osteomyelitis - Wound culture from 9/30 positive for MSSA   - CT lumbar spine without evidence of spinal osteomyelitis     #2 MSSA bloodstream infection secondary to #1   - TTE from 10/2 did not show vegetations     #3 Allergy to penicillin - hives       Discussion:    Mrs. Sakshi Jaeger is a 74 year-old female admitted on 10/01/19. 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 She presented to the ED with  worsening R amputation stump pain for the 4 days (started on 9/27)  and fever. She did have fever as well as leukocytosis and elevated CRP.  Wound culture from 9/30 positive for MSSA and blood cultures from 10/1 and 10/2 also positive for MSSA. In addition a CT femur showed fluid collection containing internal foci ofsubcutaneous emphysema adjacent to the distal tibial stump. There is extension to the medullary cavity suggestive of osteomyelitis. Primary team ordered MRI of the lower limb for better visualization of bone infection and collection.      Patient is on vancomycin. Of note, she has report of penicillin history (hives). She states that she had hives (without angioedema or respiratory symptoms) about 15 years ago. She is not sure but she believes that she might have received oral courses of augmentin after the event without allergy. She agrees with skin penicillin allergy test.       Recommendations:  1. Given history of penicillin allergy I recommend to continue vancomycin for treatment of MSSA bloodstream infection secondary to right tibial stump osteomyelitis with adjacent fluid collection      - I would appreciate pharmacy support with vancomycin dose adjustment since the trough from today is low. Goal trough 15-20.   2. Please consult pharmacy to discuss the possibility of penicillin allergy skin test to clarify the penicillin allergy  3. Given the severity of the case and the bloodstream infection as well as presence of collection adjacent to the infected bone (right tibial stump) I do believe that the patient will need surgical intervention for  drainage of the abscess and bone debridement  for source control. Primary team ordered MRI of the lower limb for better visualization of rodrigo infection and collection.   4. I appreciate Ortho support and consideration regarding possibility of surgical debridement  3. Given MSSA bloodstream infection patient will need a LINDA  4. Please repeat blood cultures daily until negative for 48h     The General ID team will continue to follow this patient. Please feel free to call with any questions.     Zeinab Robert MD  Date of Service: 10/03/19  Pager: [phone]          Interval History:   No fever over the last 24h (last fever on 10/1)  White count 22.7 (elevated)  -270-240  TTE (10/1) showed no vegetation - LINDA still needs to be performed    Micro:  9/30 wound culture positive for MSSA  10/01 blood culture MSSA  10/02 blood culture GPC in clusters  10/3 blood culture in progress      ATB:  - On vancomycin started on 10/01        History of Present Illness:   Mrs. Sakshi Jaeger is a 74 year-old female admitted on 10/01/19. She has PMHx of chronic low back pain w/L1 fracture, HTN, HLD, OA of the knees s/p L TKA in 2011, RA  (on methotrexate, leflunomide, prednisone), s/p R BKA in 2005 for osteomyelitis associated with infected surgical  "implants at the ankle. She has a history of intermittent wounds on the right below knee stump, and has had skin grafts in the right knee area 3 times in the past  She presented to the ED with  worsening R amputation stump pain for the 4 days (started on 9/27)  and fever. On 9/28 she noted a small wound on the tip of the limb and it was culture on the following day by her primary care physician. She was prescribed clindamycin but her condition worsened and she presented to the hospital .      Upon arrival patient was febrile with T max of 101, leucocytosis (24.2), and normal pro calcitonin acid lactic acid. Renal function is normal. CRP is 270. Wound culture from 9/30 positive for MSSA and blood cultures from 10/1 also positive for MSSA.      CT femur showed fluid collection containing internal foci of subcutaneous emphysema adjacent to the distal tibial stump. There is extension to the medullary cavity suggestive of osteomyelitis.      Ortho on board.      Patient was started on vancomycin and ceftriaxone.              Physical Exam:   /54 (BP Location: Left arm)   Pulse 88   Temp 98.4  F (36.9  C) (Oral)   Resp 16   Ht 1.676 m (5' 6\")   Wt 49.9 kg (110 lb)   SpO2 94%   BMI 17.75 kg/m       Exam:  GENERAL:  Not in acute distress.   HEAD: Normocephalic and atraumatic  ENT:  No hearing impairment, no ear pain or exudate, ear canals and TM's normal, nose and mouth without ulcers or lesions, oropharynx clear, oral mucous membranes moist, no tonsillar erythema and edema, oropharynx is without exudates or ulcers.  EYES:  Eyes grossly normal to inspection, PERRL and conjunctivae and sclerae normal   NECK:  Supple, no adenopathy, no asymmetry, masses, or scars and thyroid normal to palpation  LUNGS:  Clear to auscultation - no rales, rhonchi or wheezes  CARDIOVASCULAR:  Regular rate and rhythm, normal S1 S2, no S3 or S4, no murmur, click or rub, no peripheral edema and peripheral pulses strong  ABDOMEN:  Soft, " nontender, no hepatosplenomegaly, no masses and bowel sounds normal  EXT/MS/SKIN:  Right BKA stump has an opening that tracks to bone. No drainage. Not tenderness.   NEUROLOGIC:  Grossly nonfocal. Normal strength and tone, mentation intact and speech normal           Laboratory Data:     Creatinine   Date Value Ref Range Status   10/03/2019 0.39 (L) 0.52 - 1.04 mg/dL Final   10/02/2019 0.44 (L) 0.52 - 1.04 mg/dL Final   10/01/2019 0.56 0.52 - 1.04 mg/dL Final   04/12/2018 0.660 0.500 - 1.300 mg/dL Final   12/13/2017 0.600 0.500 - 1.300 mg/dL Final     WBC   Date Value Ref Range Status   10/03/2019 22.7 (H) 4.0 - 11.0 10e9/L Final   10/02/2019 22.5 (H) 4.0 - 11.0 10e9/L Final   10/01/2019 24.2 (H) 4.0 - 11.0 10e9/L Final   02/28/2017 10.7 4.0 - 11.0 10e9/L Final   09/27/2016 7.9 4.0 - 11.0 10e9/L Final     Hemoglobin   Date Value Ref Range Status   10/03/2019 9.2 (L) 11.7 - 15.7 g/dL Final     Platelet Count   Date Value Ref Range Status   10/03/2019 323 150 - 450 10e9/L Final     Lab Results   Component Value Date     (L) 10/03/2019    BUN 7 10/03/2019    CO2 24 10/03/2019     C-Reactive Protein   Date Value Ref Range Status   03/16/2015 4.10 (A) 0 - 0.8 mg/dL Final   11/05/2014 0.26 0 - 0.8 mg/dL Final   04/21/2005 0.43 0.00 - 0.80 mg/dL Final   04/14/2005 0.59 0.00 - 0.80 mg/dL Final   04/08/2005 0.45 0.00 - 0.80 mg/dL Final     CRP Inflammation   Date Value Ref Range Status   10/03/2019 240.0 (H) 0.0 - 8.0 mg/L Final   10/02/2019 270.0 (H) 0.0 - 8.0 mg/L Final   10/01/2019 280.0 (H) 0.0 - 8.0 mg/L Final           Pertinent Recent Microbiology Data:     Recent Labs   Lab 10/03/19  0642 10/02/19  0643 10/02/19  0636 10/01/19  1837 10/01/19  1309 09/30/19  1440   CULT PENDING Cultured on the 1st day of incubation:  Gram positive cocci in clusters  *  Critical Value/Significant Value, preliminary result only, called to and read back by  Shruthi Diaz Rn on 10.2.2019 at 2343.  JRt   Cultured on the 1st day of  incubation:  Gram positive cocci in clusters  *  Critical Value/Significant Value, preliminary result only, called to and read back by  Shruthi Diaz RN @ 0032 10/3/19. NANCY   Cultured on the 1st day of incubation:  Gram positive cocci in clusters  *  Critical Value/Significant Value, preliminary result only, called to and read back by  Vickie Boyd, RN @ 1253 10/2/19 TM.   Cultured on the 1st day of incubation:  Staphylococcus aureus  *  Critical Value/Significant Value, preliminary result only, called to and read back by  Krista Mueller RN @ 0432 10/2/19. NANCY    (Note)  POSITIVE for STAPHYLOCOCCUS AUREUS and NEGATIVE for the mecA gene  (not MRSA) by [employer] multiplex nucleic acid test. The mecA gene was  not detected. Final identification and antimicrobial susceptibility  testing will be verified by standard methods.    Specimen tested with [employer] multiplex, gram-positive blood culture  nucleic acid test for the following targets: Staph aureus, Staph  epidermidis, Staph lugdunensis, other Staph species, Enterococcus  faecalis, Enterococcus faecium, Streptococcus species, S. agalactiae,  S. anginosus grp., S. pneumoniae, S. pyogenes, Listeria sp., mecA  (methicillin resistance) and Archie/B (vancomycin resistance).    Critical Value/Significant Value called to and read back by  KRISTA MUELLER RN 5A 3226 10/2/19 AM       Moderate growth  Staphylococcus aureus  *   SDES Blood Right Arm Blood Right Hand Blood Left Hand Blood Left Arm Blood Right Arm Right Knee Wound  Right Knee Wound            Imaging:     Recent Results (from the past 48 hour(s))   Abd/pelvis CT no contrast - Stone Protocol    Narrative    Exam: CT abdomen pelvis without contrast 10/1/2019 2:04 PM.    History: Back pain and fever.    Comparison: CT pelvis 6/19/2019.    Technique: Helical CT images from the lung bases through the symphysis  pubis without intravenous contrast. Multiplanar reconstructions  obtained and reviewed.    Dose: 813  mGy*cm    Findings:     Abdomen/Pelvis:  Unremarkable noncontrast appearance of the liver. Distended  gallbladder without calcified gallstones or pericholecystic  inflammatory changes. No biliary dilatation. The pancreas, spleen, and  adrenal glands are normal. Subcentimeter right renal hyperdensity  (series 5, image 133), likely a hemorrhagic/proteinaceous cyst. No  hydronephrosis or hydroureter. No renal or ureteral calculi. Bladder  is well-distended and normal in morphology. Calcified uterine fibroid.  Unchanged punctate right greater than left ovarian calcifications. No  pelvic free fluid.    No abnormally dilated bowel. No pneumatosis, portal venous gas, or  pneumoperitoneum. Dense contents within the colon, likely ingested.  The appendix is not identified, surgically absent. Extensive  atherosclerotic calcifications of the abdominal aorta, iliac arteries,  and origins of the celiac and superior mesenteric, and renal arteries.  Vascular patency is not assessed on this noncontrast exam. No  abdominal aortic aneurysm. No abdominal lymphadenopathy.    Lower chest:  Trace right pleural fluid/pleural thickening. Mucous plugging in the  central right lower lobe bronchi. Tree-in-bud nodular opacities in the  right lower lobe peripherally with mild smooth interlobular septal  thickening in the lung bases. Heart is mildly enlarged. Partially  visualized coronary artery stent.    Bones and soft tissues:  No acute osseous abnormality. Marked degenerative changes in the  visualized thoracal lumbar spine including degenerative retrolisthesis  of L1 on L2 and grade 1 anterolisthesis of L3 on L4. Degenerative  endplate sclerosis at multiple levels. Disc bulges/sequestrations are  better evaluated on same-day lumbar spine CT. Chronic, healed fracture  deformity of the mid sacral body. Skin thickening/soft tissue  stranding in the right greater than left gluteal creases.      Impression    Impression:  1. No acute  intra-abdominal pathology.  2. Trace right pleural fluid/pleural thickening with peripheral  tree-in-bud nodular opacities in the right lower lobe, likely  infectious/inflammatory in etiology. Consider aspiration given mucous  plugging in the central right lower lobe bronchi.  3. Marked vascular calcifications in the abdomen and pelvis without  aneurysmal dilation. Patency is not assessed on this noncontrast  examination.  4. Skin thickening/soft tissue stranding in the right greater than  left gluteal creases. Recommend direct visualization.  5. Please see same day neuroradiology report for findings in the  lumbar spine.     I have personally reviewed the examination and initial interpretation  and I agree with the findings.    BERNADETTE KUHN, DO   XR Chest Port 1 View    Narrative    EXAM: XR CHEST PORT 1 VW  10/1/2019 2:05 PM     HISTORY:  fever hypotension       COMPARISON:  Chest x-ray from 3/31/2010.    FINDINGS:   AP view the chest. The Patient is rotated.    The trachea is midline. The cardiomediastinal silhouette is normal.  The pulmonary vasculature are distinct.     The included osseous thorax, soft tissues and upper abdomen and are  within normal limits.     Lungs are clear without discrete nodule/mass, focal  airspace/interstitial consolidative opacity, pleural effusion or  pneumothorax.      Impression    IMPRESSION: No radiographic evidence on this study for acute disease  in the chest.     I have personally reviewed the examination and initial interpretation  and I agree with the findings.    GAUTAM NUNEZ MD   Lumbar spine CT w/o contrast    Narrative    CT LUMBAR SPINE W/O CONTRAST 10/1/2019 2:07 PM    History: back pain hx of lumbar fracture in June, now increasing pain  also.    Comparison: CT lumbar spine 6/19/2019.    Technique: Using multidetector thin collimation helical acquisition  technique, axial, coronal and sagittal CT images through the lumbar  spine were obtained without intravenous  contrast.     Findings: There are 5 lumbar type vertebrae. There is multilevel  spondylosis of the spine. Grade 1 retrolisthesis of L1 on L2. Slightly  improved disc height loss at L1-L2 since 6/9/2019. Chronic bilateral  pedicle fractures of L2. Grade 1 anterolisthesis of L3 on L4 with  compression deformity of the superior endplate of L4 caused by the  inferoposterior aspect of L3. Dextroscoliosis of the spine centered at  L3.    Findings on a level by level basis are as follows:    L1-L2: Disc extrusion/sequestration with partial calcification  extending into the right neural foramen. Severe right and moderate  left neural foraminal narrowing. Sequestered disc impinges the exiting  L1 nerve root. Mild spinal canal stenosis secondary to partial  calcified disc bulge.     L2-L3: Posterior disc bulge. Mild to moderate neural foraminal  narrowing bilaterally. No significant spinal canal narrowing.    L3-L4: Posterior disc bulge with unroofing of the disc. Ligamentum  flavum hypertrophy. Facet arthropathy. Mild spinal canal stenosis.  Severe left neural foraminal narrowing. Moderate to severe right  neural foraminal narrowing.    L4-L5: Posterior disc bulge. Ligamentum flavum hypertrophy. Facet  arthropathy. Mild to moderate spinal canal stenosis. Moderate  neuroforaminal narrowing bilaterally.    L5-S1: Ligamentum flavum hypertrophy. Posterior disc bulge. Facet  arthropathy. No spinal canal stenosis. Moderate bilateral neural  foraminal narrowing.    The visualized adjacent paraspinous tissues are grossly within normal  limits. Moderate to severe atherosclerotic disease of the distal  aorta, proximal common iliac arteries. Scarring or atelectasis within  the right lung base.      Impression    Impression:  1. No new fracture is identified.  2. No significant change of disc sequestration extending into the  right neural foramen causing severe neural foraminal narrowing at L1-2  since prior.  3. Multilevel lumbar  spondylosis, not changed significantly since  6/19/2019. Unchanged grade 1 anterolisthesis of L3 on L4 and severe  left neuroforaminal narrowing and moderate to severe right neural  foraminal narrowing at this level.    I have personally reviewed the examination and initial interpretation  and I agree with the findings.    KELSY NATH MD   CT Femur Thigh Right w Contrast    Narrative    EXAMINATION: CT FEMUR THIGH RIGHT WITH CONTRAST, 10/1/2019 5:56 PM    TECHNIQUE:  Helical CT images from proximal thigh through right lower  extremity stump. Contrast dose: 68ml isovue 370    COMPARISON: Right knee radiograph on 9/21/2005    HISTORY: h/o R BKA, drainage from stomp, concern for underlying  abscess. pls obtain CT R leg down to stomp    FINDINGS:    Postsurgical changes of below-the-knee amputation. Foci of  subcutaneous emphysema within the subcutaneous tissues overlying the  stump which also extend into the distal medullary cavity of the tibial  stump. Thin rim-enhancing fluid collection about the distal stump then  extending into the medullary cavity of the tibial remnant (coronal  series 4 image 67, sagittal series 5 image 50). Foci of cortical  irregularity and dehiscence in this region. Significant skin  thickening and subcutaneous edema of the remnant leg, especially  anteriorly. Area of anterior skin ulceration anterior to the stump on  sagittal series 5 image 49.    Bones appear osteopenic. Fibular stump unremarkable in appearance.  Visualized femur unremarkable.    Vascular calcifications. Fatty infiltration of the remaining  gastrocnemius and soleus and anterior musculature of the leg.      Impression    IMPRESSION:     Area of skin ulceration anterior to the distal tibial stump with  adjacent rim-enhancing fluid collection containing internal foci of  subcutaneous emphysema. Subcutaneous gas and fluid collection extends  into the medullary cavity of the distal fibular stump. Multiple foci  of cortical  dehiscence of the distal tibial stump. Constellation of  findings compatible with subcutaneous abscess and osteomyelitis.    I have personally reviewed the examination and initial interpretation  and I agree with the findings.    SHIVANI (Fiona NETTLES MD   XR Tibia & Fibula Right 2 Views    Narrative    2 views right knee radiographs 10/2/2019 2:49 PM    History: for future comparison/pre op planning revision BKA    Comparison: CT October 1, 2019    Findings:    AP and lateral views of the left knee were obtained.     Redemonstration postsurgical change of the below knee amputation.  There are areas of soft tissue gas foci at the amputation stump area.  Radiographically, amputation stump shows mild irregularity at its tip  but CT from the prior is more sensitive.    Bones appear osteopenic. Multiple surgical clips in the distal thigh.  Vascular calcifications.      Impression    IMPRESSION: Postsurgical change below knee amputation with soft tissue  gas. Bony change better assessed on CT from he day prior.    EUFEMIA MUSA

## 2019-10-03 NOTE — PLAN OF CARE
"  /60 (BP Location: Right arm)   Pulse 74   Temp 97.4  F (36.3  C) (Oral)   Resp 16   Ht 1.676 m (5' 6\")   Wt 49.9 kg (110 lb)   SpO2 96%   BMI 17.75 kg/m      Time: 1145-6609     Reason for admission: fever, pain, infection  Vitals: VSS on RA  Pain: c/o back and L knee pain; scheduled tylenol, PRN Oxy and lido patch and voltaren gel for pain control  Neuro: A&Ox4, forgetful  Cardiac: WDL, tele: NSR with occasional PACs  Respiratory: WDL  GI/: Confinement of urine to purewick  Diet: Regular with fair appetite  Lines: Purewick  Skin/Wounds: R knee scarring. RLE BKA  Labs: WBC=22.7. JKM=628.     New changes this shift: IV abx continue. Pain control. Plan for MRI.      Plan: MRI tonight for wound and bone imaging.      Continue to monitor and follow POC    "

## 2019-10-03 NOTE — PROGRESS NOTES
"Orthopaedic Surgery Progress Note   10/03/2019    Subjective: No acute events overnight.  Patients biggest complaint is back pain, she reports that even laying is bed is causing discomfort. Continues to feel fatigued. Pain well controlled. Tolerating PO. Voiding.  No sensory changes reported.  Discussed with patient ongoing cares and discussion of timing of surgery.    Objective: /54 (BP Location: Left arm)   Pulse 88   Temp 98.4  F (36.9  C) (Oral)   Resp 16   Ht 1.676 m (5' 6\")   Wt 49.9 kg (110 lb)   SpO2 94%   BMI 17.75 kg/m      General: NAD, alert and oriented, cooperative with exam.   Cardio: RRR, extremities wwp.   Respiratory: Non-labored breathing.  MSK: Focused examination of     Right Lower Extremity: Below  Knee amputation with 3 mm sinus at the distal tip.  Tender but without surrounding erythema. Some cloudy drainage expressed from the sinus tract No pain with ROM hip/knee.  Limb warm and well perfused with soft compartments and intact sensation in the thigh and leg.    Labs:     Lab Results   Component Value Date    WBC 22.7 (H) 10/03/2019    HGB 9.2 (L) 10/03/2019     10/03/2019       BMP:  Lab Results   Component Value Date     (L) 10/03/2019    POTASSIUM 3.5 10/03/2019    CHLORIDE 99 10/03/2019    CO2 24 10/03/2019    BUN 7 10/03/2019    CR 0.39 (L) 10/03/2019    ANIONGAP 7 10/03/2019    ADRIAN 8.0 (L) 10/03/2019    GLC 90 10/03/2019       Inflammatory Markers:  Lab Results   Component Value Date    WBC 22.7 (H) 10/03/2019    WBC 22.5 (H) 10/02/2019    WBC 24.2 (H) 10/01/2019    .0 (H) 10/03/2019    .0 (H) 10/02/2019    .0 (H) 10/01/2019    SED 34 03/16/2015    SED 46 (H) 04/21/2005    SED 44 (H) 04/14/2005       Cultures:   Staph Aureus    Assessment and Plan:     ASSESSMENT AND PLAN:     Sakshi Jaeger is a 74 year old female with PMH including CAD, RA (on methotrexate, leflunomide, prednisone), Hypertension, Miocardial infarction, osteoporosis, and " Right Below knee amputation who presents with a 4 day history of right limb pain, malaise, and 1 day of fevers. Consistent with a:     1. Infection of the below knee amputation stump of Right leg with sinus tracking to bone and likely osteomyelitis of the distal tibial stump.     Patient had noted to have MSSA bacteremia with source of Tibial osteomyelitis.  Patient continues to have CRP elevated to 240 and WBC of 22.  Patient continues on IV abx.  Will discuss with Dr. Mott timing of surgical intervention     Admitted for IV abx  Activity: Up with assist until independent.  Weight bearing status: NWB RLE, no use of prosthesis, ambulate with crutches, walker, chair  Pain management: PO medications as tolerated.    Antibiotics/Tetanus: IV abx to continue pending cultures  Diet: TIERNEY no procedures planned  Anticoagulation/DVT prophylaxis: Mechanical, ok for Chem DVT ppx per primary team  Labs: trend CRP q48 h  Dressings: Keep clean, dry and intact, change PRN  Elevation: Elevate RLE on pillows to keep above the level of the heart as much as possible.   Physical Therapy/Occupational Therapy: Eval and treat as able  Cultures: Pending, follow culture results closely.  Staph aureus  Follow-up: TBD.  Disposition: Inpatient, pending improvement        Lewis Jolly MD  Orthopedic Surgery PGY-4  [phone]      For questions regarding this patient during Weekday business hours (7-4 pm), please attempt to contact me at my pager () prior to contacting the Orthopaedic Surgery Provider on call. If it is a night, weekend, or there is no response please page the Orthopaedic Surgery Provider on call. Thank you!

## 2019-10-04 ENCOUNTER — APPOINTMENT (OUTPATIENT)
Dept: PHYSICAL THERAPY | Facility: CLINIC | Age: 74
DRG: 492 | End: 2019-10-04
Attending: STUDENT IN AN ORGANIZED HEALTH CARE EDUCATION/TRAINING PROGRAM
Payer: COMMERCIAL

## 2019-10-04 ENCOUNTER — APPOINTMENT (OUTPATIENT)
Dept: MRI IMAGING | Facility: CLINIC | Age: 74
DRG: 492 | End: 2019-10-04
Attending: STUDENT IN AN ORGANIZED HEALTH CARE EDUCATION/TRAINING PROGRAM
Payer: COMMERCIAL

## 2019-10-04 LAB
ALBUMIN UR-MCNC: NEGATIVE MG/DL
ANION GAP SERPL CALCULATED.3IONS-SCNC: 10 MMOL/L (ref 3–14)
APPEARANCE UR: CLEAR
BACTERIA SPEC CULT: ABNORMAL
BILIRUB UR QL STRIP: NEGATIVE
BUN SERPL-MCNC: 8 MG/DL (ref 7–30)
CALCIUM SERPL-MCNC: 8 MG/DL (ref 8.5–10.1)
CHLORIDE SERPL-SCNC: 99 MMOL/L (ref 94–109)
CO2 SERPL-SCNC: 21 MMOL/L (ref 20–32)
COLOR UR AUTO: ABNORMAL
CREAT SERPL-MCNC: 0.43 MG/DL (ref 0.52–1.04)
CRP SERPL-MCNC: 180 MG/L (ref 0–8)
ERYTHROCYTE [DISTWIDTH] IN BLOOD BY AUTOMATED COUNT: 15.1 % (ref 10–15)
GFR SERPL CREATININE-BSD FRML MDRD: >90 ML/MIN/{1.73_M2}
GLUCOSE SERPL-MCNC: 97 MG/DL (ref 70–99)
GLUCOSE UR STRIP-MCNC: NEGATIVE MG/DL
HCT VFR BLD AUTO: 30.3 % (ref 35–47)
HGB BLD-MCNC: 9.7 G/DL (ref 11.7–15.7)
HGB UR QL STRIP: NEGATIVE
KETONES UR STRIP-MCNC: NEGATIVE MG/DL
LEUKOCYTE ESTERASE UR QL STRIP: NEGATIVE
Lab: ABNORMAL
MCH RBC QN AUTO: 28.5 PG (ref 26.5–33)
MCHC RBC AUTO-ENTMCNC: 32 G/DL (ref 31.5–36.5)
MCV RBC AUTO: 89 FL (ref 78–100)
MUCOUS THREADS #/AREA URNS LPF: PRESENT /LPF
NITRATE UR QL: NEGATIVE
PH UR STRIP: 6.5 PH (ref 5–7)
PLATELET # BLD AUTO: 344 10E9/L (ref 150–450)
POTASSIUM SERPL-SCNC: 3.2 MMOL/L (ref 3.4–5.3)
RBC # BLD AUTO: 3.4 10E12/L (ref 3.8–5.2)
RBC #/AREA URNS AUTO: 0 /HPF (ref 0–2)
SODIUM SERPL-SCNC: 130 MMOL/L (ref 133–144)
SOURCE: ABNORMAL
SP GR UR STRIP: 1.01 (ref 1–1.03)
SPECIMEN SOURCE: ABNORMAL
SQUAMOUS #/AREA URNS AUTO: <1 /HPF (ref 0–1)
UROBILINOGEN UR STRIP-MCNC: NORMAL MG/DL (ref 0–2)
WBC # BLD AUTO: 18.4 10E9/L (ref 4–11)
WBC #/AREA URNS AUTO: 1 /HPF (ref 0–5)

## 2019-10-04 PROCEDURE — 25000132 ZZH RX MED GY IP 250 OP 250 PS 637: Performed by: STUDENT IN AN ORGANIZED HEALTH CARE EDUCATION/TRAINING PROGRAM

## 2019-10-04 PROCEDURE — 25500064 ZZH RX 255 OP 636: Performed by: RADIOLOGY

## 2019-10-04 PROCEDURE — 97530 THERAPEUTIC ACTIVITIES: CPT | Mod: GP | Performed by: PHYSICAL THERAPIST

## 2019-10-04 PROCEDURE — 99233 SBSQ HOSP IP/OBS HIGH 50: CPT | Mod: GC | Performed by: INTERNAL MEDICINE

## 2019-10-04 PROCEDURE — 40000556 ZZH STATISTIC PERIPHERAL IV START W US GUIDANCE

## 2019-10-04 PROCEDURE — 87077 CULTURE AEROBIC IDENTIFY: CPT | Performed by: STUDENT IN AN ORGANIZED HEALTH CARE EDUCATION/TRAINING PROGRAM

## 2019-10-04 PROCEDURE — 25000132 ZZH RX MED GY IP 250 OP 250 PS 637: Performed by: INTERNAL MEDICINE

## 2019-10-04 PROCEDURE — 85027 COMPLETE CBC AUTOMATED: CPT | Performed by: STUDENT IN AN ORGANIZED HEALTH CARE EDUCATION/TRAINING PROGRAM

## 2019-10-04 PROCEDURE — 36415 COLL VENOUS BLD VENIPUNCTURE: CPT | Performed by: STUDENT IN AN ORGANIZED HEALTH CARE EDUCATION/TRAINING PROGRAM

## 2019-10-04 PROCEDURE — 87040 BLOOD CULTURE FOR BACTERIA: CPT | Performed by: STUDENT IN AN ORGANIZED HEALTH CARE EDUCATION/TRAINING PROGRAM

## 2019-10-04 PROCEDURE — 25000128 H RX IP 250 OP 636: Performed by: INTERNAL MEDICINE

## 2019-10-04 PROCEDURE — 97161 PT EVAL LOW COMPLEX 20 MIN: CPT | Mod: GP | Performed by: PHYSICAL THERAPIST

## 2019-10-04 PROCEDURE — G0463 HOSPITAL OUTPT CLINIC VISIT: HCPCS

## 2019-10-04 PROCEDURE — 25000131 ZZH RX MED GY IP 250 OP 636 PS 637: Performed by: STUDENT IN AN ORGANIZED HEALTH CARE EDUCATION/TRAINING PROGRAM

## 2019-10-04 PROCEDURE — 12000001 ZZH R&B MED SURG/OB UMMC

## 2019-10-04 PROCEDURE — A9585 GADOBUTROL INJECTION: HCPCS | Performed by: RADIOLOGY

## 2019-10-04 PROCEDURE — 73723 MRI JOINT LWR EXTR W/O&W/DYE: CPT | Mod: RT

## 2019-10-04 PROCEDURE — 86140 C-REACTIVE PROTEIN: CPT | Performed by: STUDENT IN AN ORGANIZED HEALTH CARE EDUCATION/TRAINING PROGRAM

## 2019-10-04 PROCEDURE — 80048 BASIC METABOLIC PNL TOTAL CA: CPT | Performed by: STUDENT IN AN ORGANIZED HEALTH CARE EDUCATION/TRAINING PROGRAM

## 2019-10-04 RX ORDER — POTASSIUM CHLORIDE 1.5 G/1.58G
40 POWDER, FOR SOLUTION ORAL ONCE
Status: DISCONTINUED | OUTPATIENT
Start: 2019-10-04 | End: 2019-10-04 | Stop reason: ALTCHOICE

## 2019-10-04 RX ORDER — POTASSIUM CHLORIDE 750 MG/1
40 TABLET, EXTENDED RELEASE ORAL ONCE
Status: COMPLETED | OUTPATIENT
Start: 2019-10-04 | End: 2019-10-04

## 2019-10-04 RX ORDER — GADOBUTROL 604.72 MG/ML
0.1 INJECTION INTRAVENOUS ONCE
Status: COMPLETED | OUTPATIENT
Start: 2019-10-04 | End: 2019-10-04

## 2019-10-04 RX ORDER — POLYETHYLENE GLYCOL 3350 17 G/17G
17 POWDER, FOR SOLUTION ORAL 2 TIMES DAILY
Status: DISCONTINUED | OUTPATIENT
Start: 2019-10-04 | End: 2019-10-07

## 2019-10-04 RX ADMIN — ACETAMINOPHEN 650 MG: 325 TABLET, FILM COATED ORAL at 04:30

## 2019-10-04 RX ADMIN — POLYETHYLENE GLYCOL 3350 17 G: 17 POWDER, FOR SOLUTION ORAL at 20:41

## 2019-10-04 RX ADMIN — POTASSIUM CHLORIDE 40 MEQ: 750 TABLET, EXTENDED RELEASE ORAL at 16:18

## 2019-10-04 RX ADMIN — LISINOPRIL 20 MG: 20 TABLET ORAL at 08:00

## 2019-10-04 RX ADMIN — AMLODIPINE BESYLATE 5 MG: 5 TABLET ORAL at 07:59

## 2019-10-04 RX ADMIN — ACETAMINOPHEN 650 MG: 325 TABLET, FILM COATED ORAL at 12:19

## 2019-10-04 RX ADMIN — ACETAMINOPHEN 650 MG: 325 TABLET, FILM COATED ORAL at 16:18

## 2019-10-04 RX ADMIN — POLYETHYLENE GLYCOL 3350 17 G: 17 POWDER, FOR SOLUTION ORAL at 08:00

## 2019-10-04 RX ADMIN — VANCOMYCIN HYDROCHLORIDE 1000 MG: 1 INJECTION, SOLUTION INTRAVENOUS at 05:41

## 2019-10-04 RX ADMIN — PREDNISONE 5 MG: 5 TABLET ORAL at 07:59

## 2019-10-04 RX ADMIN — VANCOMYCIN HYDROCHLORIDE 1000 MG: 1 INJECTION, SOLUTION INTRAVENOUS at 17:33

## 2019-10-04 RX ADMIN — OXYCODONE HYDROCHLORIDE 5 MG: 5 TABLET ORAL at 14:02

## 2019-10-04 RX ADMIN — GADOBUTROL 5 ML: 604.72 INJECTION INTRAVENOUS at 13:44

## 2019-10-04 RX ADMIN — SIMVASTATIN 20 MG: 20 TABLET, FILM COATED ORAL at 21:39

## 2019-10-04 RX ADMIN — SENNOSIDES AND DOCUSATE SODIUM 1 TABLET: 8.6; 5 TABLET ORAL at 20:41

## 2019-10-04 RX ADMIN — ACETAMINOPHEN 650 MG: 325 TABLET, FILM COATED ORAL at 07:59

## 2019-10-04 RX ADMIN — OXYCODONE HYDROCHLORIDE 10 MG: 5 TABLET ORAL at 04:30

## 2019-10-04 RX ADMIN — ACETAMINOPHEN 650 MG: 325 TABLET, FILM COATED ORAL at 20:41

## 2019-10-04 RX ADMIN — OXYCODONE HYDROCHLORIDE 10 MG: 5 TABLET ORAL at 21:40

## 2019-10-04 RX ADMIN — SENNOSIDES AND DOCUSATE SODIUM 1 TABLET: 8.6; 5 TABLET ORAL at 08:00

## 2019-10-04 ASSESSMENT — ACTIVITIES OF DAILY LIVING (ADL)
ADLS_ACUITY_SCORE: 22
ADLS_ACUITY_SCORE: 22
ADLS_ACUITY_SCORE: 19
ADLS_ACUITY_SCORE: 22
ADLS_ACUITY_SCORE: 19
ADLS_ACUITY_SCORE: 19

## 2019-10-04 NOTE — PROGRESS NOTES
"Orthopaedic Surgery Progress Note   10/04/2019    Subjective: No acute events overnight. Continues to have pain in the back.  Was unable to have MRI yesterday due to more urgent MRI and then refused late time slot per notes. Tolerating PO. Voiding.  No sensory changes reported RLE. Discussed with patient that we are hoping to get the MRI today.  Questions answered.    Objective: /55 (BP Location: Right arm)   Pulse 68   Temp 97.5  F (36.4  C) (Oral)   Resp 16   Ht 1.676 m (5' 6\")   Wt 49.9 kg (110 lb)   SpO2 93%   BMI 17.75 kg/m      General: NAD, alert and oriented, cooperative with exam.   Cardio: RRR, extremities wwp.   Respiratory: Non-labored breathing.  MSK: Focused examination of     Right Lower Extremity: Below Knee amputation with 3 mm sinus at the distal tip.  Tender but without surrounding erythema. No drainage from the sinus tract today. No pain with ROM hip/knee.  Limb warm and well perfused with soft compartments and intact sensation in the thigh and leg.    Labs:     Lab Results   Component Value Date    WBC 18.4 (H) 10/04/2019    HGB 9.7 (L) 10/04/2019     10/04/2019       BMP:  Lab Results   Component Value Date     (L) 10/04/2019    POTASSIUM 3.2 (L) 10/04/2019    CHLORIDE 99 10/04/2019    CO2 21 10/04/2019    BUN 8 10/04/2019    CR 0.43 (L) 10/04/2019    ANIONGAP 10 10/04/2019    ADRIAN 8.0 (L) 10/04/2019    GLC 97 10/04/2019       Inflammatory Markers:  Lab Results   Component Value Date    WBC 18.4 (H) 10/04/2019    WBC 22.7 (H) 10/03/2019    WBC 22.5 (H) 10/02/2019    .0 (H) 10/04/2019    .0 (H) 10/03/2019    .0 (H) 10/02/2019    SED 34 03/16/2015    SED 46 (H) 04/21/2005    SED 44 (H) 04/14/2005       Cultures:   Staph Aureus    IMAGING:  MRI RLE pending.    ASSESSMENT AND PLAN:     Sakshi Jaeger is a 74 year old female with PMH including CAD, RA (on methotrexate, leflunomide, prednisone), Hypertension, Miocardial infarction, osteoporosis, and Right " Below knee amputation who presents with a 4 day history of right limb pain, malaise, and 1 day of fevers, bacteremia Consistent with a:     1. Infection of the below knee amputation stump of Right leg with sinus tracking to bone and osteomyelitis of the distal tibial stump.     Patient had noted to have MSSA bacteremia with source of Tibial osteomyelitis.  Patient continues to have CRP elevated to 240 and WBC of 22.  Patient continues on IV abx.  MRI to evaluate for abscess.  If large abscess in limb will need I&D with decompression, if not then will proceed with outpatient revision of BKA.    WBC and CRP improved today down to 18 and 180 respectively.    Patient unable to have MRI yesterday, likely obtained today.     Admitted for IV abx  Activity: Up with assist until independent.  Weight bearing status: NWB RLE, no use of prosthesis, ambulate with crutches, walker, chair  Pain management: PO medications as tolerated.    Antibiotics/Tetanus: IV abx to continue per ID  Diet: TIERNEY no procedures planned  Anticoagulation/DVT prophylaxis: Mechanical, ok for Chem DVT ppx per primary team  Labs: Trend CRP q48 h  Dressings: Keep clean, dry and intact, change PRN  Elevation: Elevate RLE on pillows to keep above the level of the heart as much as possible.   Physical Therapy/Occupational Therapy: Eval and treat as able  Cultures: Pending, follow culture results closely.  Staph aureus  Follow-up: TBD.  Disposition: Inpatient, pending improvement, MRI RLE    Staff: Dr. Pantera Jolly MD  Orthopedic Surgery PGY-4  [phone]

## 2019-10-04 NOTE — PROGRESS NOTES
WO Nurse Inpatient Wound Assessment   Reason for consultation: Evaluate and treat R BKA  Wound  New: concern for R IT PI      Assessment  R BKA stump wound due to Surgical Wound, s/p BKA with chronic tibial osteo  Status: follow up, still with copious seropurulent drainage, hole is slightly larger than on last assessment and able to probe for depth and irrigate using flush syringe. Adjusted orders to irrigate BID.     R knee wound: prior wound site with scar tissue and dry, flaky skin.   Status: follow up, improving, less dry and flaky    Bilateral IT's with chronic moisture changes. Skin is intact with brown discoloration. No acute injury and patient has been completing turns independently, does have constant urine leakage however.     Treatment Plan  R BKA stump wound: BID irrgate sinus tract using a saline flush (hold flush syringe right up to wound bed and try to get fluid within cavity in stump, then allow fluid to flow back out) then irrigate sinus tract using microKlenz spray bottle and dry, apply Cavilon barrier film. Cut out small square of Aquacel AG ([id number]) and place over wound bed, then cover with mepilex.    R knee: BID apply Vaseline over dry, flaky area.     Orders Written    WOC Nurse follow-up plan:weekly  Nursing to notify the Provider(s) and re-consult the WO Nurse if wound(s) deteriorates or new skin concern.    Patient History  According to provider note(s):  74 year old female with history of RA on DMARD therapy s/p R BKA due to surgical infection of the R ankle, CAD and HTN who presents with acute onset R amputation stump pain with purulent drainage positive for S.aureus and CT findings concerning for early onset osteomyelitis.    Objective Data  Containment of urine/stool: Continent of bowel and External catheter    Active Diet Order  Orders Placed This Encounter      Regular Diet Adult      Output:   I/O last 3 completed shifts:  In: 480 [P.O.:480]  Out: 550 [Urine:550]    Risk  Assessment:   Sensory Perception: 3-->slightly limited  Moisture: 3-->occasionally moist  Activity: 2-->chairfast  Mobility: 3-->slightly limited  Nutrition: 3-->adequate  Friction and Shear: 2-->potential problem  Alexandro Score: 16                          Labs:   Recent Labs   Lab 10/04/19  0617  10/01/19  1309   ALBUMIN  --   --  2.5*   HGB 9.7*   < > 10.5*   INR  --   --  1.38*   WBC 18.4*   < > 24.2*   .0*   < > 280.0*    < > = values in this interval not displayed.       Physical Exam  Skin inspection: focused R BKA    Wound Location:  R BKA stump  Wound History: noted recently, per ortho chronic osteo present   Measurements (length x width x depth, in cm)pinhead sized open area (sinus tract) measures about 0.2cm x 0.2cm x 0.5cm   Wound Base: MELVIN, however when probed does feel like bone may be exposed within cavity   Undermining circumferential extending at least 2.5cm  Palpation of the wound bed: boggy, cavity present underneath with expressible drainage.   Periwound skin: dry/scaly  Color: pink  Temperature: normal   Drainage:, copious  Description of drainage: seropurulent   Odor: none  Pain: denies , none     Bilateral IT's with circular brown blanchable areas, likely chronic moisture skin changes.     Interventions  Current support surface: Standard  Atmos Air mattress  Current off-loading measures: Pillows under calves  Visual inspection of wound(s) completed  Wound Care: done per plan of care  Supplies: ordered: aquacel ag  Education provided: plan of care  Discussed plan of care with Patient and Nurse    Kay Babcock, RN, BSN, CWON

## 2019-10-04 NOTE — PLAN OF CARE
Pt A&O. VSS on RA. A1-2 in bed. Complains of back pain, prn Oxy and scheduled Tylenol given.  R BKA. Mepilex CDI on R stump. Incontinent of urine. Purewick in place. UA sent. No BM. Tele-SR w PACs. Pt refused MRI after 10 pm and wanted to sleep. Plan for MRI today. Will cont to monitor.

## 2019-10-04 NOTE — PLAN OF CARE
[id number]: Afebrile. VSS on RA. Tele NSR with PACs. A/Ox4 but forgetful. Calls appropriately. R BKA. Wound on R stump with purulent drainage. Patient receives IV vancomycin for bacteremia. Mepilex dressing CDI. C/O pain in back. Scheduled tylenol and PRN oxycodone given with relief. Incontinent; patient has purewick in place for urine. Last BM was 9/29; bowel regimen increased today. Sacrum and coccyx CDI. WOC consulted for discoloration on ischial tuberosities; no concern for pressure injury at this time. Regular diet with fair appetite. Significant other at bedside most of the day updated on plan. Continue to monitor.    Tiffany Pineda RN on 10/4/2019 at 6:26 PM

## 2019-10-04 NOTE — PLAN OF CARE
PT 5A: PT evaluation completed.   Discharge Planner PT   Patient plan for discharge: home with assist from    Current status: pt needs SBA for bed mobility, limited by back pain sitting EOB. Pt declines standing with walker/Ax2 despite encouragement, pt reports feeling too tired and weak.   Barriers to return to prior living situation: NWB on RLE, unable to use prosthesis on RLE, pt does have stairs to enter home, unable to stand/pivot at this time   Recommendations for discharge: TCU  Rationale for recommendations: pt would benefit from TCU as pt currently unable to ambulate and needing further therapy to maximize IND. Pt reports assist available from  at home, reports  will carry her up the stairs and into their home. Pt declines TCU stay but is agreeable to HHPT to progress strength, safety, and IND.        Entered by: Nena Lara 10/04/2019 2:34 PM

## 2019-10-04 NOTE — PROGRESS NOTES
[facility]  Allergy Assessment and Penicillin Allergy Skin Test (PAAST)   Antimicrobial Stewardship Team (AST) Note    Antimicrobial Stewardship Program - A joint venture between [facility] and  Physicians to optimize antibiotic management.     Allergy History:   Question Response   What was the name of agent which caused the event? Penicillin. Patient unable to recall specific medication or formulation   When did the reaction occur? ~44 years ago (30 years old)   What was the nature of the event? (including symptoms and severity) hives, rash. No reports of angioedema or respiratory symtoms   What was the course of the reaction? (description of event) The patient described raised erythematous non-pruritic, non-painful, rash that developed only on her upper thighs   How long after taking the medication did it take for the symptoms to begin?  Immediately after taking the medication   How was the reaction treated?   Self-resolved after stopping the medication   Did you ever experience symptoms like this before?  No   Can you name antibiotics that you've tolerated? Per patient report she may have tolerated augmentin   From chart review the patient has tolerated the following antibiotics:  (include drug & start date) Cefazolin (2011, 2009), Ceftriaxone (2019), Cephalexin (2005)   Has the patient met [facility] Penicillin Skin Test Protocol for Penicillin Allergy Skin Testing? no   Was the allergy was removed or edited from the health record? yes       Assessment:  Cross-reactivity between penicillins and cephalosporins is associated with structurally similar side chains rather than the beta-lactam ring itself. The rate of cross-reactivity between penicillins and cephalosporins from recent observational studies have been reported between 0.17% and 0.7%, with the highest incidence between 1st/2nd generation cephalosporins.  Initially the rate of cross reactivity between penicillins and cephalosporins was higher due contamination of trace amounts of penicillin G with early preparations of cephalosporins.   There is data supporting B-lactams (i.e. antistaphylococcal penicillins and cefazolin) superiority over vancomycin for the treatment of MSSA blood stream infections. Since this patient has documented tolerance to cefazolin and potentially tolerated Augmentin - she is not a candidate for inpatient penicillin skin testing. Would consider a test dose of amoxicillin since her reaction is not consistent with anaphylaxis or hives and the reaction occurred in her remote past.     Recommendations:  1. Not a candidate for inpatient penicillin skin testing - can tolerate cefazolin  2. Consider test dose of amoxicillin 250 mg PO x 1     Muriel Muller, PharmD, [facility]  Antimicrobial Stewardship & Infectious Diseases Pharmacist  Office Ph: [phone]  Pager: [phone]

## 2019-10-04 NOTE — PROGRESS NOTES
10/04/19 1013   Quick Adds   Type of Visit Initial PT Evaluation   Living Environment   Lives With spouse   Living Arrangements house   Home Accessibility stairs to enter home   Number of Stairs, Main Entrance 4   Stair Railings, Main Entrance none   Transportation Anticipated family or friend will provide   Living Environment Comment pt lives with her  who is around to assist, reports he can carry her up the stairs    Self-Care   Usual Activity Tolerance good   Current Activity Tolerance fair   Regular Exercise No   Equipment Currently Used at Home walker, rolling;prosthesis  (electric scooter)   Activity/Exercise/Self-Care Comment pt generally walks without AD, has a 4WW she can sit on, also just got an electric scooter she can use in the home   Functional Level Prior   Ambulation 1-->assistive equipment   Transferring 1-->assistive equipment   Fall history within last six months no   Which of the above functional risks had a recent onset or change? ambulation;transferring   Prior Functional Level Comment pt uses prosthesis for gait, currently unable to do so    General Information   Onset of Illness/Injury or Date of Surgery - Date 10/01/19   Referring Physician Sathya Nolasco MD   Patient/Family Goals Statement to go home    Pertinent History of Current Problem (include personal factors and/or comorbidities that impact the POC) 74 year old female with history of RA on DMARD therapy s/p R BKA due to surgical infection of the R ankle, CAD and HTN who presents with acute onset R amputation stump pain with purulent drainage positive for S.aureus and CT findings concerning for early onset osteomyelitis.   Precautions/Limitations fall precautions   Weight-Bearing Status - RLE nonweight-bearing  (residual limb )   Heart Disease Risk Factors High blood pressure   General Observations pt supine, agreeable to session with encouragement.    General Info Comments activity orders: ambulate with assist     Cognitive Status Examination   Orientation orientation to person, place and time   Level of Consciousness alert   Follows Commands and Answers Questions 100% of the time   Personal Safety and Judgment intact   Pain Assessment   Patient Currently in Pain Yes, see Vital Sign flowsheet  (low back )   Integumentary/Edema   Integumentary/Edema Comments wound on R residual limb   Posture    Posture Forward head position;Protracted shoulders   Range of Motion (ROM)   ROM Quick Adds No deficits were identified   Strength   Strength Comments not overtly tested due to pt's discomfort    Bed Mobility   Bed Mobility Comments min A for supine to sit transfer   Transfer Skills   Transfer Comments declines STS despite encouragement    Gait   Gait Comments pt declines trial    Balance   Balance Comments SBA for EOB balance    Sensory Examination   Sensory Perception no deficits were identified   General Therapy Interventions   Planned Therapy Interventions progressive activity/exercise;home program guidelines;risk factor education;transfer training;strengthening;bed mobility training;balance training;gait training   Clinical Impression   Criteria for Skilled Therapeutic Intervention yes, treatment indicated   PT Diagnosis impaired mobility    Influenced by the following impairments impaired balance, strength   Functional limitations due to impairments impaired gait and transfers    Clinical Presentation Evolving/Changing   Clinical Presentation Rationale pt with new change in status    Clinical Decision Making (Complexity) Moderate complexity   Therapy Frequency 5x/week   Predicted Duration of Therapy Intervention (days/wks) 1 week    Anticipated Discharge Disposition Transitional Care Facility   Risk & Benefits of therapy have been explained Yes   Patient, Family & other staff in agreement with plan of care Yes   Clinical Impression Comments pt declines TCU today despite education of deficits, agreeable to [facility]   Jalen  "[facility] TM \"6 Clicks\"   2016, Trustees of [facility], under license to [employer].  All rights reserved.   6 Clicks Short Forms Basic Mobility Inpatient Short Form   [facility]  \"6 Clicks\" V.2 Basic Mobility Inpatient Short Form   1. Turning from your back to your side while in a flat bed without using bedrails? 4 - None   2. Moving from lying on your back to sitting on the side of a flat bed without using bedrails? 3 - A Little   3. Moving to and from a bed to a chair (including a wheelchair)? 2 - A Lot   4. Standing up from a chair using your arms (e.g., wheelchair, or bedside chair)? 2 - A Lot   5. To walk in hospital room? 1 - Total   6. Climbing 3-5 steps with a railing? 1 - Total   Basic Mobility Raw Score (Score out of 24.Lower scores equate to lower levels of function) 13   Total Evaluation Time   Total Evaluation Time (Minutes) 8     "

## 2019-10-04 NOTE — PROGRESS NOTES
GENERAL ID SERVICE: PROGRESS NOTE  Patient:  Sakshi Jaeger, Date of birth 1945, Medical record number [id number]  Date of Admission: 10/1/2019  Date of Visit:  10/4/2019         Assessment and Recommendations:   Problem List:  #1 Right tibial stump osteomyelitis with CT findings of  fluid collection containing internal foci of subcutaneous emphysema adjacent to the distal tibial stump as well as evidence of osteomyelitis - Wound culture from 9/30 positive for MSSA   - CT lumbar spine without evidence of spinal osteomyelitis     #2 MSSA persistent  bloodstream infection secondary to #1   - TTE from 10/2 did not show vegetations     #3 Allergy to penicillin - hives        Discussion:     Mrs. Sakshi Jaeger is a 74 year-old female admitted on 10/01/19. 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 She presented to the ED with  worsening R amputation stump pain for the 4 days (started on 9/27)  and fever. She did have fever as well as leukocytosis and elevated CRP.  Wound culture from 9/30 positive for MSSA and blood cultures also positive for MSSA (they persist positive so far from 10/1-10/3). In addition a CT femur showed fluid collection containing internal foci ofsubcutaneous emphysema adjacent to the distal tibial stump. There is extension to the medullary cavity suggestive of osteomyelitis. MRI of the right limb showed osteomyelitis of distal tibia, extending approximately 3 cm from the knee joint line. As well as 4.6 x 4.1 cm air-fluid level containing fluid collection with  likely open skin sinus tract at amputation stump tip.     Given persistent MSSA bacteremia in the setting of right tibial osteomyelitis with air-fluid collection, I would recommend re-consultation with surgery/ortho to discuss the possibility  of bone and air-fluid collection debridement in order to achieve source control.      Patient is on vancomycin. Of note, she has report of penicillin history (hives). She states that she had hives (without angioedema or respiratory symptoms) about 15 years ago. She is not sure but she believes that she might have received oral courses of augmentin after the event without allergy. She agrees with skin penicillin allergy test.      Recommendations:  1. Given history of penicillin allergy I recommend to continue vancomycin for treatment of MSSA bloodstream infection secondary to right tibial stump osteomyelitis with adjacent fluid collection      - I appreciate pharmacy support with vancomycin dose adjustment and vancomycin trough monitoring - goal trough 15-20  2. Please consult pharmacy to discuss the possibility of penicillin allergy skin test to clarify the penicillin allergy      - If it turns out that the patient is not allergic to penicillin we could potentially switch the treatment to nafcillin which would be a better option for treatment of MSSA infection  3. Given persistent MSSA bacteremia in the setting of right tibial osteomyelitis with air-fluid collection, I would recommend re-consultation with surgery/ortho to discuss the possibility of bone and air-fluid collection debridement in order to achieve source control  4. Given MSSA bloodstream infection patient will need a LINDA  4. Please repeat blood cultures daily until negative for 48h       The General ID team will continue to follow this patient. Please feel free to call with any questions.     Zeinab Robert MD  Date of Service: 10/04/19  Pager: [phone]          Interval History:     No fever over the last 24h (last fever on 10/1)  White count 18.4 (down from yesterday)  -[phone]  TTE (10/1) showed no vegetation - LINDA still needs to be performed     Micro:  9/30 wound culture positive for MSSA  10/01 blood culture MSSA  10/02 blood culture  "MSSA  10/3 blood culture GPC in clusters  10/4 blood culture in process        ATB:  - On vancomycin started on 10/01           Physical Exam:   /60   Pulse 66   Temp 97.6  F (36.4  C) (Oral)   Resp 18   Ht 1.676 m (5' 6\")   Wt 49.9 kg (110 lb)   SpO2 96%   BMI 17.75 kg/m       GENERAL:  Not in acute distress.   HEAD: Normocephalic and atraumatic  ENT:  No hearing impairment, no ear pain or exudate, ear canals and TM's normal, nose and mouth without ulcers or lesions, oropharynx clear, oral mucous membranes moist, no tonsillar erythema and edema, oropharynx is without exudates or ulcers.  EYES:  Eyes grossly normal to inspection, PERRL and conjunctivae and sclerae normal   NECK:  Supple, no adenopathy, no asymmetry, masses, or scars and thyroid normal to palpation  LUNGS:  Clear to auscultation - no rales, rhonchi or wheezes  CARDIOVASCULAR:  Regular rate and rhythm, normal S1 S2, no S3 or S4, no murmur, click or rub, no peripheral edema and peripheral pulses strong  ABDOMEN:  Soft, nontender, no hepatosplenomegaly, no masses and bowel sounds normal  EXT/MS/SKIN:  Right BKA stump has an opening that tracks to bone. No drainage. No tenderness.   NEUROLOGIC:  Grossly nonfocal. Normal strength and tone, mentation intact and speech normal         Laboratory Data:     Creatinine   Date Value Ref Range Status   10/04/2019 0.43 (L) 0.52 - 1.04 mg/dL Final   10/03/2019 0.39 (L) 0.52 - 1.04 mg/dL Final   10/02/2019 0.44 (L) 0.52 - 1.04 mg/dL Final   10/01/2019 0.56 0.52 - 1.04 mg/dL Final   04/12/2018 0.660 0.500 - 1.300 mg/dL Final     WBC   Date Value Ref Range Status   10/04/2019 18.4 (H) 4.0 - 11.0 10e9/L Final   10/03/2019 22.7 (H) 4.0 - 11.0 10e9/L Final   10/02/2019 22.5 (H) 4.0 - 11.0 10e9/L Final   10/01/2019 24.2 (H) 4.0 - 11.0 10e9/L Final   02/28/2017 10.7 4.0 - 11.0 10e9/L Final     Hemoglobin   Date Value Ref Range Status   10/04/2019 9.7 (L) 11.7 - 15.7 g/dL Final     Platelet Count   Date Value " Ref Range Status   10/04/2019 344 150 - 450 10e9/L Final     Lab Results   Component Value Date     (L) 10/04/2019    BUN 8 10/04/2019    CO2 21 10/04/2019     C-Reactive Protein   Date Value Ref Range Status   03/16/2015 4.10 (A) 0 - 0.8 mg/dL Final   11/05/2014 0.26 0 - 0.8 mg/dL Final   04/21/2005 0.43 0.00 - 0.80 mg/dL Final   04/14/2005 0.59 0.00 - 0.80 mg/dL Final   04/08/2005 0.45 0.00 - 0.80 mg/dL Final     CRP Inflammation   Date Value Ref Range Status   10/04/2019 180.0 (H) 0.0 - 8.0 mg/L Final   10/03/2019 240.0 (H) 0.0 - 8.0 mg/L Final   10/02/2019 270.0 (H) 0.0 - 8.0 mg/L Final   10/01/2019 280.0 (H) 0.0 - 8.0 mg/L Final           Pertinent Recent Microbiology Data:     Recent Labs   Lab 10/03/19  0642 10/03/19  0445 10/02/19  0643 10/02/19  0636 10/01/19  1837 10/01/19  1309 09/30/19  1440   CULT Cultured on the 1st day of incubation:  Gram positive cocci in clusters  *  Critical Value/Significant Value, preliminary result only, called to and read back by  Hina Gonzales RN at 0440 on 10.4.19 by SS.   Cultured on the 1st day of incubation:  Gram positive cocci in clusters  *  Critical Value/Significant Value, preliminary result only, called to and read back by  Hina Gonzales RN at 0400 on 10.4.19 by SS.   Cultured on the 1st day of incubation:  Staphylococcus aureus  *  Critical Value/Significant Value, preliminary result only, called to and read back by  Shruthi Diaz Rn on 10.2.2019 at 2343.  JRt    Susceptibility testing done on previous specimen Cultured on the 1st day of incubation:  Staphylococcus aureus  *  Critical Value/Significant Value, preliminary result only, called to and read back by  Shruhti Diaz RN @ 0032 10/3/19. NANCY    Susceptibility testing done on previous specimen Cultured on the 1st day of incubation:  Staphylococcus aureus  *  Critical Value/Significant Value, preliminary result only, called to and read back by  Vickie Boyd RN @ 1435 10/2/19 TM.     Susceptibility testing done on previous specimen Cultured on the 1st day of incubation:  Staphylococcus aureus  *  Critical Value/Significant Value, preliminary result only, called to and read back by  Krista Mueller RN @ 0432 10/2/19. NANCY    (Note)  POSITIVE for STAPHYLOCOCCUS AUREUS and NEGATIVE for the mecA gene  (not MRSA) by Verigene multiplex nucleic acid test. The mecA gene was  not detected. Final identification and antimicrobial susceptibility  testing will be verified by standard methods.    Specimen tested with Verigene multiplex, gram-positive blood culture  nucleic acid test for the following targets: Staph aureus, Staph  epidermidis, Staph lugdunensis, other Staph species, Enterococcus  faecalis, Enterococcus faecium, Streptococcus species, S. agalactiae,  S. anginosus grp., S. pneumoniae, S. pyogenes, Listeria sp., mecA  (methicillin resistance) and Archie/B (vancomycin resistance).    Critical Value/Significant Value called to and read back by  KRISTA MUELLER RN [id number] 10/2/19 AM       Moderate growth  Staphylococcus aureus  *   SDES Blood Right Arm Blood Left Arm Blood Right Hand Blood Left Hand Blood Left Arm Blood Right Arm Right Knee Wound  Right Knee Wound            Imaging:     Recent Results (from the past 48 hour(s))   MR Knee Right w/o & w Contrast    Narrative    MR right knee without and with contrast 10/4/2019 3:22 PM    Techniques: Multiplanar multisequence imaging of the right knee was  obtained before and after administration of intravenous contrast using  routing protocol.    Contrast: 5cc Gadavist    History: Osteomyelitis suspected, knee, initial exam; 74 year old  female with history of RA on DMARDs, OA, s/p R BKA d/t complications  from R ankle surgery (2005), p/w osteomyelitis of the R femur and  fluid pocket concerning for abscess    Comparison: October 2, 2019, CT October 1, 2019    Findings:    Postsurgical change of below knee amputation. There is approximately  4.6 x 4.1 cm  air-fluid level containing fluid pocket/collection at the  distal amputation stump soft tissue with likely small open skin sinus  tract. There is underlying distal tibial marrow signal alteration with  T1 hypointensity, enhancement and T2 hyperintensity, consistent with  osteomyelitis. Signal abnormality of osteomyelitis extends  approximately to the level 3 cm distal to the knee joint line.    There is diffuse subcutaneous edema throughout the visualized soft  tissue. There is intense periostitis around the distal tibial stump  area.    Nonspecific focal T2 hyperintensity distal femur with associated  enhancement, likely radiographically occult enchondroma.    Diffuse muscle edema and fatty infiltration/atrophy.    Internal derangement of joint assessment partially compromised owing  to chosen field of view. Medial and lateral menisci, anterior and  posterior cruciate ligaments are intact. Extensor mechanisms are  intact. Apparent patellar articular cartilage thinning and signal  heterogeneity. No associated subchondral edema. Medial and lateral  supporting structures are grossly intact.    Medial and lateral compartment articular cartilage are grossly  preserved. Physiologic amount of joint fluid is present.       Impression    IMPRESSION:   1. In this patient with below knee amputation, osteomyelitis of distal  tibia, extending approximately 3 cm from the knee joint line.  2. 4.6 x 4.1 cm air-fluid level containing fluid collection with  likely open skin sinus tract at amputation stump tip.    EUFEMIA MUSA

## 2019-10-04 NOTE — PROGRESS NOTES
[facility] [place]    Progress Note - Roddy 4 Service        Date of Admission:  10/1/2019    Assessment & Plan   1. Acute osteomyelitis w/MSSA bacteremia and subcutaneous emphysema  2. Concern for abscess of the R amputation  Wound cultures taken from Dr. Hernandez's office 9/30 were positive for MSSA  (Verigene positive). Initial CT w/contrast of the R knee was notable for sinus tracking down the femur, suspicious for early onset osteomyelitis. WBC, procalcitonin and CRP are now downtrending after multiple courses of IV vancomycin; however, there is still purulent drainage expressed from the knee and blood cultures continue to grow MSSA. ID following, recommend penicillin desensitization protocol to treat MSSA bacteremia, as nafcillin is bacteriocidal. TTE negative for endocarditis with no LINDA follow up per patient request, MRI R Knee pending to r/o abscess of the R femur.  -MRI of the R knee pending  -Penicillin desensitization protocol ordered  -Continue to trend daily blood cultures  -Orthopedic surgery following, appreciate recs  -Trend daily CBC, CRP     2. Right pleural effusion  Patient with CT findings of trace right pleural fluid and tree-in-bud nodular pattern, concerning for inflammatory v infectious process. No clinical respiratory symptoms to suggest pneumonia or another pulmonary etiology at this time.  -Continue trending blood cultures as noted above  -If patient begins to experience signs of respiratory distress, will obtain sputum culture and broaden coverage     3. Mild hyponatremia - stable  Sodium 128 on admission, most likely in the setting of hypovolemia from infection. Improved to 130.   -PO adlib  -BMP checks in the AM     4. Mild hypoalbuminemia  Albumin 2.5 on admission, most likely due to poor nutrition in the setting of infection.  -Regular diet ordered, will monitor patient's PO itnake  -Consider RD consult if appetite does not improve     5. Physical  deconditioning  R BKA infection has caused patient ample distress and difficulty with ambulating. Her overall functional improvement will determine her disposition plan, as she will most likely need ongoing PT/OT outpatient.  -PT/OT ordered, appreciate recs    Chronic medical conditions---  #Rheumatoid arthritis - Continue prednisone 5 mg daily; will hold DMARDs at this time  #CAD - Continue PTA statin dose  #HTN - Hold antihypertensives in the setting of developing infection/sepsis  #Lumbar spinal stenosis - Oxycodone 10 mg Q4H prn, lidocaine patches, diclofenac gel PRN.     Diet: Regular adult diet   Fluids: Lactated Ringers 150 ml/hr IV  DVT Prophylaxis: Pneumatic Compression Devices  Nolan Catheter: in place, indication:    Code Status: Full Code    Disposition Plan   Expected discharge: 3-5 days, recommended to transitional care unit once SIRS/Sepsis treated.  Entered: Sathya Nolasco MD 10/04/2019, 6:54 AM     The patient's care was discussed with the Attending Physician, Dr. Cuello, Bedside Nurse and Patient.    Sathya Nolasco MD  [address], [place]  Pager: [phone]  Please see sticky note for cross cover information  ______________________________________________________________________    Interval History   Nursing notes reviewed, patient reports sustained improvement of low back pain and is attempting to partake in physical therapy.  She also notes improvement of her appetite; she is passing gas and having multiple BMs.  No fevers, chills, CP, SOB, cough, abd pain, n/v/d, dysuria, hematuria, numbness, tingling or HA.    Data reviewed today: I reviewed all medications, new labs and imaging results over the last 24 hours. I personally reviewed no images or EKG's today.    Labs notable for hyponatremia, hypoalbuminemia, hypokalemia.     Physical Exam   Vital Signs: Temp: 97.5  F (36.4  C) Temp src: Oral BP: 134/55 Pulse: 68   Resp: 16 SpO2: 93 % O2  Device: None (Room air)    Weight: 110 lbs 0 oz  General Appearance: Appears more alert compared to prior days, pleasant, no acute distress.  Respiratory: Speaking in full sentences on room air. CTAB posteriorly.  Cardiovascular: Normal rate, regular rhythm. No m/r/g.  GI: Soft, non tender. Non distended.  Skin: Warm, dry. Erythema and warmth noted over the R amputation site with tenderness to palpation improved from yesterday. Bandages over the puncture site appear clean,dry and intact.  Other: No slurred speech, no FND.     Data   Recent Labs   Lab 10/04/19  0617 10/03/19  0642 10/02/19  0816 10/02/19  0637 10/01/19  1309   WBC 18.4* 22.7* 22.5*  --  24.2*   HGB 9.7* 9.2* 8.8*  --  10.5*   MCV 89 89 91  --  89    323 302  --  357   INR  --   --   --   --  1.38*   NA  --  130*  --  131* 128*   POTASSIUM  --  3.5  --  3.8 3.7   CHLORIDE  --  99  --  102 94   CO2  --  24  --  19* 24   BUN  --  7  --  10 12   CR  --  0.39*  --  0.44* 0.56   ANIONGAP  --  7  --  10 10   ADRIAN  --  8.0*  --  8.0* 8.8   GLC  --  90  --  83 82   ALBUMIN  --   --   --   --  2.5*   PROTTOTAL  --   --   --   --  6.9   BILITOTAL  --   --   --   --  0.6   ALKPHOS  --   --   --   --  62   ALT  --   --   --   --  14   AST  --   --   --   --  34   LIPASE  --   --   --   --  37*     XR Tibia & Fibia 10/2/19:  gas. Bony change better assessed on CT from he day prior. Soft tissue gas noted.

## 2019-10-04 NOTE — PROGRESS NOTES
RN noticed area of discoloration on R ischial tuberosity when turning patient. Area only slightly blanchable. WOC consult ordered to better assess. Continue to monitor.    Tiffany Pineda RN on 10/4/2019 at 11:46 AM

## 2019-10-05 ENCOUNTER — APPOINTMENT (OUTPATIENT)
Dept: OCCUPATIONAL THERAPY | Facility: CLINIC | Age: 74
DRG: 492 | End: 2019-10-05
Attending: STUDENT IN AN ORGANIZED HEALTH CARE EDUCATION/TRAINING PROGRAM
Payer: COMMERCIAL

## 2019-10-05 LAB
ANION GAP SERPL CALCULATED.3IONS-SCNC: 8 MMOL/L (ref 3–14)
BUN SERPL-MCNC: 10 MG/DL (ref 7–30)
CALCIUM SERPL-MCNC: 8.4 MG/DL (ref 8.5–10.1)
CHLORIDE SERPL-SCNC: 102 MMOL/L (ref 94–109)
CO2 SERPL-SCNC: 24 MMOL/L (ref 20–32)
CREAT SERPL-MCNC: 0.46 MG/DL (ref 0.52–1.04)
ERYTHROCYTE [DISTWIDTH] IN BLOOD BY AUTOMATED COUNT: 15.2 % (ref 10–15)
GFR SERPL CREATININE-BSD FRML MDRD: >90 ML/MIN/{1.73_M2}
GLUCOSE SERPL-MCNC: 77 MG/DL (ref 70–99)
HCT VFR BLD AUTO: 30.8 % (ref 35–47)
HGB BLD-MCNC: 9.7 G/DL (ref 11.7–15.7)
MCH RBC QN AUTO: 28.3 PG (ref 26.5–33)
MCHC RBC AUTO-ENTMCNC: 31.5 G/DL (ref 31.5–36.5)
MCV RBC AUTO: 90 FL (ref 78–100)
PLATELET # BLD AUTO: 455 10E9/L (ref 150–450)
POTASSIUM SERPL-SCNC: 4.4 MMOL/L (ref 3.4–5.3)
RBC # BLD AUTO: 3.43 10E12/L (ref 3.8–5.2)
SODIUM SERPL-SCNC: 134 MMOL/L (ref 133–144)
VANCOMYCIN SERPL-MCNC: 16.4 MG/L
WBC # BLD AUTO: 14.8 10E9/L (ref 4–11)

## 2019-10-05 PROCEDURE — 12000001 ZZH R&B MED SURG/OB UMMC

## 2019-10-05 PROCEDURE — 25000128 H RX IP 250 OP 636: Performed by: INTERNAL MEDICINE

## 2019-10-05 PROCEDURE — 25000132 ZZH RX MED GY IP 250 OP 250 PS 637: Performed by: STUDENT IN AN ORGANIZED HEALTH CARE EDUCATION/TRAINING PROGRAM

## 2019-10-05 PROCEDURE — 40000141 ZZH STATISTIC PERIPHERAL IV START W/O US GUIDANCE

## 2019-10-05 PROCEDURE — 80202 ASSAY OF VANCOMYCIN: CPT | Performed by: INTERNAL MEDICINE

## 2019-10-05 PROCEDURE — 80048 BASIC METABOLIC PNL TOTAL CA: CPT | Performed by: STUDENT IN AN ORGANIZED HEALTH CARE EDUCATION/TRAINING PROGRAM

## 2019-10-05 PROCEDURE — 36415 COLL VENOUS BLD VENIPUNCTURE: CPT | Performed by: INTERNAL MEDICINE

## 2019-10-05 PROCEDURE — 25000131 ZZH RX MED GY IP 250 OP 636 PS 637: Performed by: STUDENT IN AN ORGANIZED HEALTH CARE EDUCATION/TRAINING PROGRAM

## 2019-10-05 PROCEDURE — 99233 SBSQ HOSP IP/OBS HIGH 50: CPT | Mod: GC | Performed by: INTERNAL MEDICINE

## 2019-10-05 PROCEDURE — 97166 OT EVAL MOD COMPLEX 45 MIN: CPT | Mod: GO

## 2019-10-05 PROCEDURE — 87800 DETECT AGNT MULT DNA DIREC: CPT | Performed by: STUDENT IN AN ORGANIZED HEALTH CARE EDUCATION/TRAINING PROGRAM

## 2019-10-05 PROCEDURE — 87077 CULTURE AEROBIC IDENTIFY: CPT | Performed by: STUDENT IN AN ORGANIZED HEALTH CARE EDUCATION/TRAINING PROGRAM

## 2019-10-05 PROCEDURE — 85027 COMPLETE CBC AUTOMATED: CPT | Performed by: STUDENT IN AN ORGANIZED HEALTH CARE EDUCATION/TRAINING PROGRAM

## 2019-10-05 PROCEDURE — 97530 THERAPEUTIC ACTIVITIES: CPT | Mod: GO

## 2019-10-05 PROCEDURE — 87040 BLOOD CULTURE FOR BACTERIA: CPT | Performed by: STUDENT IN AN ORGANIZED HEALTH CARE EDUCATION/TRAINING PROGRAM

## 2019-10-05 PROCEDURE — 25000132 ZZH RX MED GY IP 250 OP 250 PS 637: Performed by: INTERNAL MEDICINE

## 2019-10-05 PROCEDURE — 36415 COLL VENOUS BLD VENIPUNCTURE: CPT | Performed by: STUDENT IN AN ORGANIZED HEALTH CARE EDUCATION/TRAINING PROGRAM

## 2019-10-05 PROCEDURE — 87186 SC STD MICRODIL/AGAR DIL: CPT | Performed by: STUDENT IN AN ORGANIZED HEALTH CARE EDUCATION/TRAINING PROGRAM

## 2019-10-05 RX ORDER — BISACODYL 10 MG
10 SUPPOSITORY, RECTAL RECTAL DAILY
Status: DISCONTINUED | OUTPATIENT
Start: 2019-10-05 | End: 2019-10-13 | Stop reason: HOSPADM

## 2019-10-05 RX ORDER — AMOXICILLIN 250 MG/1
250 CAPSULE ORAL ONCE
Status: COMPLETED | OUTPATIENT
Start: 2019-10-05 | End: 2019-10-05

## 2019-10-05 RX ORDER — DIPHENHYDRAMINE HYDROCHLORIDE 50 MG/ML
25 INJECTION INTRAMUSCULAR; INTRAVENOUS EVERY 6 HOURS PRN
Status: DISCONTINUED | OUTPATIENT
Start: 2019-10-05 | End: 2019-10-13 | Stop reason: HOSPADM

## 2019-10-05 RX ADMIN — LISINOPRIL 20 MG: 20 TABLET ORAL at 08:30

## 2019-10-05 RX ADMIN — ACETAMINOPHEN 650 MG: 325 TABLET, FILM COATED ORAL at 12:39

## 2019-10-05 RX ADMIN — ENOXAPARIN SODIUM 40 MG: 40 INJECTION SUBCUTANEOUS at 19:48

## 2019-10-05 RX ADMIN — ACETAMINOPHEN 650 MG: 325 TABLET, FILM COATED ORAL at 08:29

## 2019-10-05 RX ADMIN — VANCOMYCIN HYDROCHLORIDE 1000 MG: 1 INJECTION, SOLUTION INTRAVENOUS at 05:02

## 2019-10-05 RX ADMIN — ACETAMINOPHEN 650 MG: 325 TABLET, FILM COATED ORAL at 05:02

## 2019-10-05 RX ADMIN — OXYCODONE HYDROCHLORIDE 10 MG: 5 TABLET ORAL at 08:36

## 2019-10-05 RX ADMIN — AMOXICILLIN 250 MG: 250 CAPSULE ORAL at 13:31

## 2019-10-05 RX ADMIN — VANCOMYCIN HYDROCHLORIDE 1000 MG: 1 INJECTION, SOLUTION INTRAVENOUS at 16:33

## 2019-10-05 RX ADMIN — ACETAMINOPHEN 650 MG: 325 TABLET, FILM COATED ORAL at 16:27

## 2019-10-05 RX ADMIN — SENNOSIDES AND DOCUSATE SODIUM 1 TABLET: 8.6; 5 TABLET ORAL at 08:29

## 2019-10-05 RX ADMIN — SIMVASTATIN 20 MG: 20 TABLET, FILM COATED ORAL at 22:00

## 2019-10-05 RX ADMIN — ACETAMINOPHEN 650 MG: 325 TABLET, FILM COATED ORAL at 19:44

## 2019-10-05 RX ADMIN — PREDNISONE 5 MG: 5 TABLET ORAL at 08:29

## 2019-10-05 RX ADMIN — BISACODYL 10 MG: 10 SUPPOSITORY RECTAL at 18:25

## 2019-10-05 RX ADMIN — ACETAMINOPHEN 650 MG: 325 TABLET, FILM COATED ORAL at 00:38

## 2019-10-05 RX ADMIN — POLYETHYLENE GLYCOL 3350 17 G: 17 POWDER, FOR SOLUTION ORAL at 08:28

## 2019-10-05 RX ADMIN — AMLODIPINE BESYLATE 5 MG: 5 TABLET ORAL at 08:29

## 2019-10-05 RX ADMIN — LIDOCAINE 1 PATCH: 560 PATCH PERCUTANEOUS; TOPICAL; TRANSDERMAL at 08:31

## 2019-10-05 RX ADMIN — SENNOSIDES AND DOCUSATE SODIUM 1 TABLET: 8.6; 5 TABLET ORAL at 19:45

## 2019-10-05 ASSESSMENT — ACTIVITIES OF DAILY LIVING (ADL)
ADLS_ACUITY_SCORE: 22
ADLS_ACUITY_SCORE: 20
ADLS_ACUITY_SCORE: 22
ADLS_ACUITY_SCORE: 20
PREVIOUS_RESPONSIBILITIES: MEAL PREP;HOUSEKEEPING;LAUNDRY;SHOPPING;MEDICATION MANAGEMENT;FINANCES;DRIVING
ADLS_ACUITY_SCORE: 20
ADLS_ACUITY_SCORE: 20

## 2019-10-05 NOTE — PROGRESS NOTES
"Orthopaedic Surgery Progress Note   10/05/2019    Subjective: No acute events overnight. Reporting ongoing back pain due to bed. RLE MRI yesterday completed. Denies fevers, chills, sweats. Denies cp, sob.     Objective: BP (!) 143/61 (BP Location: Left arm)   Pulse 73   Temp 97.9  F (36.6  C) (Oral)   Resp 16   Ht 1.676 m (5' 6\")   Wt 49.9 kg (110 lb)   SpO2 94%   BMI 17.75 kg/m      General: NAD, alert and oriented, cooperative with exam.   Cardio: RRR, extremities wwp.   Respiratory: Non-labored breathing.  MSK: Focused examination of     Right Lower Extremity: Below Knee amputation with 3 mm sinus at the distal tip - covered with dressing this am.  Tender but without surrounding erythema. No obvious drainage from the sinus tract today. No pain with ROM hip/knee.  Limb warm and well perfused with soft compartments and intact sensation in the thigh and leg.    Labs:     Lab Results   Component Value Date    WBC 18.4 (H) 10/04/2019    HGB 9.7 (L) 10/04/2019     10/04/2019       BMP:  Lab Results   Component Value Date     (L) 10/04/2019    POTASSIUM 3.2 (L) 10/04/2019    CHLORIDE 99 10/04/2019    CO2 21 10/04/2019    BUN 8 10/04/2019    CR 0.43 (L) 10/04/2019    ANIONGAP 10 10/04/2019    ADRIAN 8.0 (L) 10/04/2019    GLC 97 10/04/2019       Inflammatory Markers:  Lab Results   Component Value Date    WBC 18.4 (H) 10/04/2019    WBC 22.7 (H) 10/03/2019    WBC 22.5 (H) 10/02/2019    .0 (H) 10/04/2019    .0 (H) 10/03/2019    .0 (H) 10/02/2019    SED 34 03/16/2015    SED 46 (H) 04/21/2005    SED 44 (H) 04/14/2005       Cultures:   Staph Aureus    IMAGING:  MRI RLE (10/4/19) - focal osteomyelitis observed in distal tibia, open skin sinus tract      ASSESSMENT AND PLAN:     Sakshi Jaeger is a 74 year old female with PMH including CAD, RA (on methotrexate, leflunomide, prednisone), Hypertension, Miocardial infarction, osteoporosis, and Right Below knee amputation who presents with a 4 " day history of right limb pain, malaise, and 1 day of fevers, bacteremia Consistent with a:     1. Infection of the below knee amputation stump of Right leg with sinus tracking to bone and osteomyelitis of the distal tibial stump.     Patient had noted to have MSSA bacteremia with source of Tibial osteomyelitis on admission.     Currently HDS, afebrile.     WBC and CRP continue downtrend on IV abx.     Admitted for IV abx  Activity: Up with assist until independent.  Weight bearing status: NWB RLE, no use of prosthesis, ambulate with crutches, walker, chair  Pain management: PO medications as tolerated.    Antibiotics/Tetanus: IV abx to continue per ID  Diet: TIERNEY no procedures planned  Anticoagulation/DVT prophylaxis: Mechanical, ok for Chem DVT ppx per primary team  Labs: Trend CRP q48 h  Dressings: Keep clean, dry and intact, change PRN  Elevation: Elevate RLE on pillows to keep above the level of the heart as much as possible.   Physical Therapy/Occupational Therapy: Eval and treat as able  Cultures: Pending, follow culture results closely.  Staph aureus  Follow-up: pending hospital course    Disposition: anticipate repeat surgical intervention next week - possible I&D vs. Through knee amputation vs. AKA. If destabilizes, will possible require emergent I&D.        Nick Napoles MD  Orthopedic Surgery PGY4  [phone]

## 2019-10-05 NOTE — PROGRESS NOTES
10/05/19 0900   Quick Adds   Type of Visit Initial Occupational Therapy Evaluation   Living Environment   Lives With spouse   Living Arrangements house   Home Accessibility stairs to enter home   Number of Stairs, Main Entrance 4   Stair Railings, Main Entrance none   Transportation Anticipated family or friend will provide   Living Environment Comment Pt lives with  who carries her upstairs, transfers her between scooter and car, and assists with other ADL's and IADLs, however pt reporting he is not around or needed to be around 24/7   Self-Care   Usual Activity Tolerance good   Current Activity Tolerance fair   Regular Exercise No   Equipment Currently Used at Home walker, rolling;other (see comments);prosthesis  (scooter)   Activity/Exercise/Self-Care Comment Pt typically independent when able to use prosthetic, however with sore, pt unable to wear prosthetic and instead uses power scooter at home and  assists with self-cares as necessary   Functional Level   Ambulation 1-->assistive equipment   Transferring 1-->assistive equipment   Toileting 0-->independent   Bathing 0-->independent   Dressing 0-->independent   Eating 0-->independent   Communication 0-->understands/communicates without difficulty   Swallowing 0-->swallows foods/liquids without difficulty   Cognition 0 - no cognition issues reported   Fall history within last six months no   General Information   Onset of Illness/Injury or Date of Surgery - Date 10/01/19   Referring Physician Sathya Nolasco MD   Patient/Family Goals Statement return home and regain independence   Additional Occupational Profile Info/Pertinent History of Current Problem Sakshi Jaeger is a 74 year old female with PMH including CAD, RA (on methotrexate, leflunomide, prednisone), Hypertension, Miocardial infarction, osteoporosis, and Right Below knee amputation who presents with a 4 day history of right limb pain, malaise, and 1 day of fevers, bacteremia  Consistent with a:   Precautions/Limitations fall precautions;other (see comments)  (NWB RLE)   Weight-Bearing Status - LUE full weight-bearing   Weight-Bearing Status - RUE full weight-bearing   Weight-Bearing Status - LLE full weight-bearing   Weight-Bearing Status - RLE nonweight-bearing   General Observations Pt observed to have difficulty remembering specifics about adaptations and transfers at home, as well as has some decreased awareness to deficits and limitations   General Info Comments Pt reporting previously independent with prosthetic and completes ADLs and IADLs independently.  Pt reporting since unable to use prosthetic has had to get help from     Cognitive Status Examination   Orientation orientation to person, place and time   Level of Consciousness alert   Follows Commands (Cognition) WNL   Attention No deficits were identified   Cognitive Comment Pt did demonstrate some difficulties with memory and inconsistent stories, cognition to be further monitored   Visual Perception   Visual Perception Wears glasses   Visual Perception Comments cheaters   Sensory Examination   Sensory Comments BUE WNL   Integumentary/Edema   Integumentary/Edema Comments BUE WNL   Range of Motion (ROM)   ROM Comment BUE WNL   Strength   Strength Comments BUE 4/5   Instrumental Activities of Daily Living (IADL)   Previous Responsibilities meal prep;housekeeping;laundry;shopping;medication management;finances;driving   IADL Comments Pt reporting when able to use prosthetic, was able to complete   Activities of Daily Living Analysis   Impairments Contributing to Impaired Activities of Daily Living balance impaired;strength decreased;cognition impaired   General Therapy Interventions   Planned Therapy Interventions ADL retraining;strengthening;home program guidelines   Clinical Impression   Criteria for Skilled Therapeutic Interventions Met yes, treatment indicated   OT Diagnosis decreased independence in ADLs and IADLs  "due to prosthetic restrictions and weight bearing restrictions   Influenced by the following impairments wound, WB precautions   Assessment of Occupational Performance 1-3 Performance Deficits   Identified Performance Deficits dressing, toileting, transfer, showering   Clinical Decision Making (Complexity) Moderate complexity   Therapy Frequency 5x/week   Predicted Duration of Therapy Intervention (days/wks) 2 weeks   Anticipated Discharge Disposition Home with Assist;Home with Home Therapy;Transitional Care Facility   Risks and Benefits of Treatment have been explained. Yes   Patient, Family & other staff in agreement with plan of care Yes   [facility] TM \"6 Clicks\"   2016, Trustees of [facility], under license to [employer].  All rights reserved.   6 Clicks Short Forms Daily Activity Inpatient Short Form   [facility]  \"6 Clicks\" Daily Activity Inpatient Short Form   1. Putting on and taking off regular lower body clothing? 3 - A Little   2. Bathing (including washing, rinsing, drying)? 3 - A Little   3. Toileting, which includes using toilet, bedpan or urinal? 2 - A Lot   4. Putting on and taking off regular upper body clothing? 4 - None   5. Taking care of personal grooming such as brushing teeth? 4 - None   6. Eating meals? 4 - None   Daily Activity Raw Score (Score out of 24.Lower scores equate to lower levels of function) 20   Total Evaluation Time   Total Evaluation Time (Minutes) 10     "

## 2019-10-05 NOTE — PLAN OF CARE
[id number]: Afebrile. VSS on RA. Tele NSR with PACs. A/Ox4 but forgetful. Calls appropriately. R BKA. Wound on R stump with purulent drainage. Dressing change done x2 today. Receives IV vancomycin for bacteremia. Small dose of oral amoxicillin trialed today (patient has history of penicillin reaction.) Tolerated ampicillin well.  C/O pain in back. Scheduled tylenol and PRN oxycodone given with relief. Incontinent; patient has purewick in place for urine. Last BM was 9/29. Suppository given this evening.  Sacrum and coccyx intact with blanchable redness. Regular diet with fair appetite. Continue to monitor.    Tiffany Pineda RN on 10/5/2019 at 6:40 PM

## 2019-10-05 NOTE — PLAN OF CARE
1900 - 0730:    VSS on RA. Tele on - NSR. A&Ox4. Assist of 1 in bed - pt did not get up overnight. Pt complaining of back pain. Scheduled Tylenol and PRN Oxycodone given overnight. PIV TKO. IV antibiotics (Vanco) continued. Plan for Penicillin reaction test to be completed today in order to verify safety of giving pt Penicillin. R BKA wound covered with Mepilex. Dressing CDI. Regular diet. Tolerating well. Incontinent of bladder and bowel. Purewick in place. Pt has not had BM since 9/29. Bowel meds given without success. Will continue to monitor.

## 2019-10-05 NOTE — PROGRESS NOTES
"/52, Pulse 73, Temp 97.9, RR 16, O2 94 on room air, Ht 5' 6\", Wt 49.9 kg, BMI 17.75  Reason for admission: MSSA bacteremia of wound on RLE d/t R BKA with osteomyelitis  Neuro: A&Ox4  Behavior: WDL  Activity: Pt refused ambulation today; working with PT/OT  Lines: R Peripheral IV replaced today; d/t symptomatic at site  Diet: Regular Diet Adult  Cardiac: WDL  Respiratory: WDL  GI/: Urine incontinence, Purewick in place (changed at 0900)  Pain: c/o back and L knee pain; scheduled tylenol, PRN oxy and lidocaine patch (to L knee) for pain control  Labs/Imaging: WBC 14.8  Consults: PT, OT, and Orthopedics consulted for evaluation with plan. Psychiatry to be consulted for evaluation of depression  Plan: Continue to monitor; on Vancomycin for infection. Evaluate pt with amoxicillin dose for Penicillin allergy test. Wound care twice daily.  "

## 2019-10-05 NOTE — PLAN OF CARE
Discharge Planner OT   Patient plan for discharge: Home vs. Possible TCU  Current status: Evaluation completed treatment initiated.  Educated pt on adaptive methods for transfers as well as educated pt on OT role and POC, pt hesitant at first, however when educated, pt realized that things may be more difficult than she expected and warmed up to idea of working with OT.  Pt reporting back very painful, and refusing OOB activity today, but agreeable tomorrow.    Barriers to return to prior living situation: Unable to wear prosthetic,  not home all the time, weight bearing precautions  Recommendations for discharge: TCU vs. Home with A pending progress while inpatient  Rationale for recommendations: Pt with post surgical precautions and unable to bear weight on RUE and has limited transfer abilities, decreasing her independence and safety in ADLs and IADLs.       Entered by: Jenny Hong 10/05/2019 12:47 PM

## 2019-10-05 NOTE — PROVIDER NOTIFICATION
RN notified provider of critical lab result. Blood draw from 0617 on 10/4/19 growing gram positive cocci in clusters.     Tiffany Pineda RN on 10/5/2019 at 2:00 PM

## 2019-10-05 NOTE — PROGRESS NOTES
[facility], [place]    Progress Note - Roddy Savage Service        Date of Admission:  10/1/2019    Assessment & Plan   1. Acute osteomyelitis w/MSSA bacteremia with abscess  Wound cultures taken from Dr. Hernandez's office 9/30 were positive for MSSA  (Verigene positive). Initial CT w/contrast of the R knee was notable for sinus tracking down the femur, suspicious for early onset osteomyelitis. WBC, procalcitonin and CRP are now downtrending after initiating IV vancomycin; however, MRI of the R Knee was notable for fluid collection with a sinus tract to the skin, concerning for abscess formation. ID following, Antimicrobial Stewardship team had recommended giving one dose of amoxicillin and observing for type I hypersensitivity symptoms. TTE negative for endocarditis; Ortho to decide next week when to perform I&D vs through knee amputation vs AKA.   -Continue to trend daily blood cultures  -Orthopedic surgery following, appreciate recs  -Trend daily CBC, CRP     2. Physical deconditioning  R BKA infection has caused patient ample distress and difficulty with ambulating. Her overall functional improvement will determine her disposition plan, as she will most likely need ongoing PT/OT outpatient.  -PT/OT following, appreciate recs    3. Mild hypoalbuminemia  Albumin 2.5 on admission, most likely due to poor nutrition in the setting of infection.  -Regular diet ordered, will monitor patient's PO itnake  -Consider RD consult if appetite does not improve     4. Right pleural effusion  Patient with CT findings of trace right pleural fluid and tree-in-bud nodular pattern, concerning for inflammatory v infectious process. No clinical respiratory symptoms to suggest pneumonia or another pulmonary etiology at this time.  -Continue trending blood cultures as noted above  -If patient begins to experience signs of respiratory distress, will obtain sputum culture and broaden coverage      Mild  hyponatremia - resolved  Sodium 128 on admission, most likely in the setting of hypovolemia from infection. Improved to 134 10/5.     Chronic medical conditions---  #Rheumatoid arthritis - Continue prednisone 5 mg daily; will hold DMARDs at this time  #CAD - Continue PTA statin dose  #HTN - Hold antihypertensives in the setting of developing infection/sepsis  #Lumbar spinal stenosis - Oxycodone 5 mg Q4H prn, lidocaine patches, diclofenac gel PRN.     Diet: Regular adult diet   Fluids: PO adlib  DVT Prophylaxis: Pneumatic Compression Devices  Nolan Catheter: in place, indication:    Code Status: Full Code    Disposition Plan   Expected discharge: 3-5 days, recommended to transitional care unit once SIRS/Sepsis treated.  Entered: Sathya Nolasco MD 10/05/2019, 7:24 AM     The patient's care was discussed with the Attending Physician, Dr. Cuello, Bedside Nurse and Patient.    Sathya Nolasco MD  [address], [place]  Pager: [phone]  Please see sticky note for cross cover information  ______________________________________________________________________    Interval History   Nursing notes reviewed, no acute events overnight. Patient reports feeling sad because she is hospitalized and will require weeks of IV antibiotics. She admits to feeling improved and having more energy after working with PT yesterday. She continues to have a great appetite.  No fevers, chills, CP, SOB, cough, abd pain, n/v/d, dysuria, hematuria, numbness, tingling or HA.    Data reviewed today: I reviewed all medications, new labs and imaging results over the last 24 hours. I personally reviewed the MRI of the R knee image(s) showing evidence of fluid collection, concerning for an abscess.    Labs notable for hypocalcemia    Physical Exam   Vital Signs: Temp: 97.9  F (36.6  C) Temp src: Oral BP: (!) 143/61 Pulse: 73   Resp: 16 SpO2: 94 % O2 Device: None (Room air)    Weight: 110 lbs 0  oz  General Appearance: A&O x 3, no acute distress.  Respiratory: Speaking in full sentences on room air. CTAB posteriorly.  Cardiovascular: Normal rate, regular rhythm. No m/r/g.  GI: Soft, non tender. Non distended.  Skin: Warm, dry. Erythema and warmth noted over the R amputation site with tenderness to palpation improved from yesterday. Bandages over the puncture site appear clean,dry and intact.  Other: No slurred speech, no FND.     Data   Recent Labs   Lab 10/04/19  0617 10/03/19  0642 10/02/19  0816 10/02/19  0637 10/01/19  1309   WBC 18.4* 22.7* 22.5*  --  24.2*   HGB 9.7* 9.2* 8.8*  --  10.5*   MCV 89 89 91  --  89    323 302  --  357   INR  --   --   --   --  1.38*   * 130*  --  131* 128*   POTASSIUM 3.2* 3.5  --  3.8 3.7   CHLORIDE 99 99  --  102 94   CO2 21 24  --  19* 24   BUN 8 7  --  10 12   CR 0.43* 0.39*  --  0.44* 0.56   ANIONGAP 10 7  --  10 10   ADRIAN 8.0* 8.0*  --  8.0* 8.8   GLC 97 90  --  83 82   ALBUMIN  --   --   --   --  2.5*   PROTTOTAL  --   --   --   --  6.9   BILITOTAL  --   --   --   --  0.6   ALKPHOS  --   --   --   --  62   ALT  --   --   --   --  14   AST  --   --   --   --  34   LIPASE  --   --   --   --  37*     MRI of R Knee 10/4/19:  IMPRESSION:   1. In this patient with below knee amputation, osteomyelitis of distal  tibia, extending approximately 3 cm from the knee joint line.  2. 4.6 x 4.1 cm air-fluid level containing fluid collection with  likely open skin sinus tract at amputation stump tip.

## 2019-10-05 NOTE — PHARMACY-VANCOMYCIN DOSING SERVICE
Pharmacy Vancomycin Note  Date of Service 2019  Patient's  1945   74 year old, female    Indication: Bacteremia and Bone and Joint Infection  Goal Trough Level: 15-20 mg/L  Day of Therapy: 4  Current Vancomycin regimen:  1000 mg IV q12h    Current estimated CrCl = Estimated Creatinine Clearance: 84.5 mL/min (A) (based on SCr of 0.46 mg/dL (L)).    Creatinine for last 3 days  10/3/2019:  6:42 AM Creatinine 0.39 mg/dL  10/4/2019:  6:17 AM Creatinine 0.43 mg/dL  10/5/2019:  6:39 AM Creatinine 0.46 mg/dL    Recent Vancomycin Levels (past 3 days)  10/3/2019:  4:09 PM Vancomycin Level 4.2 mg/L  10/5/2019:  3:47 PM Vancomycin Level 16.4 mg/L 10.75hr level in a 12 hour regimen, true trough ~ 15     Vancomycin IV Administrations (past 72 hours)                   vancomycin (VANCOCIN) 1000 mg in dextrose 5% 200 mL PREMIX (mg) 1,000 mg New Bag 10/05/19 1633     1,000 mg New Bag  0502     1,000 mg New Bag 10/04/19 1733     1,000 mg New Bag  0541    vancomycin (VANCOCIN) 1000 mg in dextrose 5% 200 mL PREMIX (mg) 1,000 mg New Bag 10/03/19 1645                Nephrotoxins and other renal medications (From now, onward)    Start     Dose/Rate Route Frequency Ordered Stop    10/04/19 0445  vancomycin (VANCOCIN) 1000 mg in dextrose 5% 200 mL PREMIX      1,000 mg  200 mL/hr over 1 Hours Intravenous EVERY 12 HOURS 10/03/19 1815      10/03/19 1200  lisinopril (PRINIVIL/ZESTRIL) tablet 20 mg      20 mg Oral DAILY 10/03/19 1149               Contrast Orders - past 72 hours (72h ago, onward)    Start     Dose/Rate Route Frequency Ordered Stop    10/04/19 1345  gadobutrol (GADAVIST) injection 4.99 mL      0.1 mL/kg × 49.9 kg Intravenous ONCE 10/04/19 1339 10/04/19 1344          Interpretation of levels and current regimen:  Trough level is  Therapeutic or slightly subtherapeutic, but only after 3 doses of the new 12 hour regimen. Level expected to go up some with subsequent doses.    Has serum creatinine changed > 50% in  last 72 hours: No    Renal Function: Stable    Plan:  1.  Continue Current Dose  2.  Pharmacy will check trough levels as appropriate in 1-3 Days.    3. Serum creatinine levels will be ordered daily for the first week of therapy and at least twice weekly for subsequent weeks.      Mich Schaefer PharmD, [facility]    [phone]  Pager [phone]          .

## 2019-10-06 ENCOUNTER — APPOINTMENT (OUTPATIENT)
Dept: OCCUPATIONAL THERAPY | Facility: CLINIC | Age: 74
DRG: 492 | End: 2019-10-06
Payer: COMMERCIAL

## 2019-10-06 LAB
ANION GAP SERPL CALCULATED.3IONS-SCNC: 8 MMOL/L (ref 3–14)
BACTERIA SPEC CULT: ABNORMAL
BUN SERPL-MCNC: 8 MG/DL (ref 7–30)
CALCIUM SERPL-MCNC: 8.3 MG/DL (ref 8.5–10.1)
CHLORIDE SERPL-SCNC: 99 MMOL/L (ref 94–109)
CO2 SERPL-SCNC: 25 MMOL/L (ref 20–32)
CREAT SERPL-MCNC: 0.44 MG/DL (ref 0.52–1.04)
ERYTHROCYTE [DISTWIDTH] IN BLOOD BY AUTOMATED COUNT: 15.3 % (ref 10–15)
GFR SERPL CREATININE-BSD FRML MDRD: >90 ML/MIN/{1.73_M2}
GLUCOSE SERPL-MCNC: 86 MG/DL (ref 70–99)
HCT VFR BLD AUTO: 31.2 % (ref 35–47)
HGB BLD-MCNC: 10.1 G/DL (ref 11.7–15.7)
Lab: ABNORMAL
Lab: ABNORMAL
MCH RBC QN AUTO: 28.5 PG (ref 26.5–33)
MCHC RBC AUTO-ENTMCNC: 32.4 G/DL (ref 31.5–36.5)
MCV RBC AUTO: 88 FL (ref 78–100)
PLATELET # BLD AUTO: 485 10E9/L (ref 150–450)
POTASSIUM SERPL-SCNC: 3.7 MMOL/L (ref 3.4–5.3)
RBC # BLD AUTO: 3.55 10E12/L (ref 3.8–5.2)
SODIUM SERPL-SCNC: 132 MMOL/L (ref 133–144)
SPECIMEN SOURCE: ABNORMAL
SPECIMEN SOURCE: ABNORMAL
WBC # BLD AUTO: 13.6 10E9/L (ref 4–11)

## 2019-10-06 PROCEDURE — 97535 SELF CARE MNGMENT TRAINING: CPT | Mod: GO

## 2019-10-06 PROCEDURE — 87040 BLOOD CULTURE FOR BACTERIA: CPT | Performed by: STUDENT IN AN ORGANIZED HEALTH CARE EDUCATION/TRAINING PROGRAM

## 2019-10-06 PROCEDURE — 25000132 ZZH RX MED GY IP 250 OP 250 PS 637: Performed by: STUDENT IN AN ORGANIZED HEALTH CARE EDUCATION/TRAINING PROGRAM

## 2019-10-06 PROCEDURE — 97110 THERAPEUTIC EXERCISES: CPT | Mod: GO

## 2019-10-06 PROCEDURE — 25000128 H RX IP 250 OP 636: Performed by: INTERNAL MEDICINE

## 2019-10-06 PROCEDURE — 80048 BASIC METABOLIC PNL TOTAL CA: CPT | Performed by: STUDENT IN AN ORGANIZED HEALTH CARE EDUCATION/TRAINING PROGRAM

## 2019-10-06 PROCEDURE — 85027 COMPLETE CBC AUTOMATED: CPT | Performed by: STUDENT IN AN ORGANIZED HEALTH CARE EDUCATION/TRAINING PROGRAM

## 2019-10-06 PROCEDURE — 12000001 ZZH R&B MED SURG/OB UMMC

## 2019-10-06 PROCEDURE — 25000131 ZZH RX MED GY IP 250 OP 636 PS 637: Performed by: STUDENT IN AN ORGANIZED HEALTH CARE EDUCATION/TRAINING PROGRAM

## 2019-10-06 PROCEDURE — 25000132 ZZH RX MED GY IP 250 OP 250 PS 637: Performed by: INTERNAL MEDICINE

## 2019-10-06 PROCEDURE — 99233 SBSQ HOSP IP/OBS HIGH 50: CPT | Performed by: INTERNAL MEDICINE

## 2019-10-06 PROCEDURE — 40000141 ZZH STATISTIC PERIPHERAL IV START W/O US GUIDANCE

## 2019-10-06 PROCEDURE — 36415 COLL VENOUS BLD VENIPUNCTURE: CPT | Performed by: STUDENT IN AN ORGANIZED HEALTH CARE EDUCATION/TRAINING PROGRAM

## 2019-10-06 PROCEDURE — 25000125 ZZHC RX 250: Performed by: INTERNAL MEDICINE

## 2019-10-06 RX ORDER — NAFCILLIN SODIUM 2 G/8ML
2 INJECTION, POWDER, FOR SOLUTION INTRAMUSCULAR; INTRAVENOUS EVERY 4 HOURS
Status: DISCONTINUED | OUTPATIENT
Start: 2019-10-06 | End: 2019-10-13 | Stop reason: HOSPADM

## 2019-10-06 RX ADMIN — PREDNISONE 5 MG: 5 TABLET ORAL at 07:58

## 2019-10-06 RX ADMIN — VANCOMYCIN HYDROCHLORIDE 1000 MG: 1 INJECTION, SOLUTION INTRAVENOUS at 05:33

## 2019-10-06 RX ADMIN — SENNOSIDES AND DOCUSATE SODIUM 1 TABLET: 8.6; 5 TABLET ORAL at 08:01

## 2019-10-06 RX ADMIN — NAFCILLIN SODIUM 2 G: 2 INJECTION, POWDER, LYOPHILIZED, FOR SOLUTION INTRAMUSCULAR; INTRAVENOUS at 17:31

## 2019-10-06 RX ADMIN — LISINOPRIL 20 MG: 20 TABLET ORAL at 07:58

## 2019-10-06 RX ADMIN — NAFCILLIN SODIUM 2 G: 2 INJECTION, POWDER, LYOPHILIZED, FOR SOLUTION INTRAMUSCULAR; INTRAVENOUS at 20:41

## 2019-10-06 RX ADMIN — SENNOSIDES AND DOCUSATE SODIUM 1 TABLET: 8.6; 5 TABLET ORAL at 20:40

## 2019-10-06 RX ADMIN — ACETAMINOPHEN 650 MG: 325 TABLET, FILM COATED ORAL at 16:20

## 2019-10-06 RX ADMIN — AMLODIPINE BESYLATE 5 MG: 5 TABLET ORAL at 07:58

## 2019-10-06 RX ADMIN — ENOXAPARIN SODIUM 40 MG: 40 INJECTION SUBCUTANEOUS at 20:40

## 2019-10-06 RX ADMIN — OXYCODONE HYDROCHLORIDE 10 MG: 5 TABLET ORAL at 14:45

## 2019-10-06 RX ADMIN — ACETAMINOPHEN 650 MG: 325 TABLET, FILM COATED ORAL at 12:25

## 2019-10-06 RX ADMIN — ACETAMINOPHEN 650 MG: 325 TABLET, FILM COATED ORAL at 07:57

## 2019-10-06 RX ADMIN — OXYCODONE HYDROCHLORIDE 10 MG: 5 TABLET ORAL at 02:38

## 2019-10-06 RX ADMIN — POLYETHYLENE GLYCOL 3350 17 G: 17 POWDER, FOR SOLUTION ORAL at 20:40

## 2019-10-06 RX ADMIN — ACETAMINOPHEN 650 MG: 325 TABLET, FILM COATED ORAL at 05:32

## 2019-10-06 RX ADMIN — NAFCILLIN SODIUM 2 G: 2 INJECTION, POWDER, LYOPHILIZED, FOR SOLUTION INTRAMUSCULAR; INTRAVENOUS at 11:06

## 2019-10-06 RX ADMIN — POLYETHYLENE GLYCOL 3350 17 G: 17 POWDER, FOR SOLUTION ORAL at 08:00

## 2019-10-06 RX ADMIN — ACETAMINOPHEN 650 MG: 325 TABLET, FILM COATED ORAL at 20:40

## 2019-10-06 RX ADMIN — NAFCILLIN SODIUM 2 G: 2 INJECTION, POWDER, LYOPHILIZED, FOR SOLUTION INTRAMUSCULAR; INTRAVENOUS at 14:36

## 2019-10-06 ASSESSMENT — ACTIVITIES OF DAILY LIVING (ADL)
ADLS_ACUITY_SCORE: 22
ADLS_ACUITY_SCORE: 20
ADLS_ACUITY_SCORE: 20
ADLS_ACUITY_SCORE: 22
ADLS_ACUITY_SCORE: 20
ADLS_ACUITY_SCORE: 22

## 2019-10-06 NOTE — PROGRESS NOTES
"/59 (BP Location: Left arm)   Pulse 91   Temp 98.4  F (36.9  C) (Oral)   Resp 18   Ht 1.676 m (5' 6\")   Wt 49.9 kg (110 lb)   SpO2 98%   BMI 17.75 kg/m    Reason for admission: MSSA bacteremia of wound on RLE d/t R BKA with osteomyelitis  Neuro: A&Ox4  Behavior: WDL  Lines: R peripheral IV  Diet: Regular Diet; changing NPO after midnight  Cardiac: WDL  Respiratory: WDL  GI/: Urine incontinence, Purewick in place (changed at 1200)  Pain: c/o back pain; scheduled tylenol for pain control  Labs/Imaging: WBC 13.6, blood culture from 10/5 grew gram positive cocci in chains  Skin: R BKA stump wound; pinpoint wound, purulent drainage, with irrigation and dressing change today. Dry, flaky skin   Consults: Orthopedics consulted for evaluation with plan.  Plan: Continue to monitor. Changed antibiotic to nafcillin. Plan for LINDA, ordered for 10/7)    "

## 2019-10-06 NOTE — PLAN OF CARE
Discharge Planner OT   Patient plan for discharge: Home with A  Current status: Pt demonstrating independence in log roll and supine to sit transfer.  Pt educated on AE for transfers and showering like shower chair, sliding board, etc.  Pt reporting interest in trying sliding board.  Pt sat EOB for 10 minutes with supporting self through hands, then reporting of back pain.  Pt then independent in transferring to supine.  Pt completed 3 BUE AROM exercises while supine in bed.  Barriers to return to prior living situation: Decreased strength  Recommendations for discharge: TCU vs. Home with A and HHOT/ Home Safety Evaluation  Rationale for recommendations: Pt demonstrating decreased activity tolerance and strength, and unable to wear prosthetic at home limiting her independence and safety in transfers, ADLs, and IADLs.       Entered by: Jenny Hong 10/06/2019 3:20 PM

## 2019-10-06 NOTE — PROVIDER NOTIFICATION
Provider notified of critical lab result; blood drawn from right arm on 10/5 growing gram positive cocci in chains.     Tiffany Pineda RN on 10/6/2019 at 8:48 AM

## 2019-10-06 NOTE — PLAN OF CARE
Temp: 98.4  F (36.9  C) Temp src: Oral BP: (!) 148/67 Pulse: 82   Resp: 18 SpO2: 98 % O2 Device: None (Room air)        [id number]  A/ox4- forgetful, VSS, Reg Diet- fair appetite. Right BKA- Wound on R Stump CDI. C/o pain in back- managed with scheduled tylenol and prn oxycodone x1 overnight. Incontinent urine x1 and Small BM. Blanchable redness on bottom, repo throughout night. Recieveing IV abx for bacteremia. Will continue to monitor and follow POC.

## 2019-10-06 NOTE — PROGRESS NOTES
[facility] Progress Note - Teaching Service, Roddy Savage       Date of Admission:  10/1/2019  Assessment & Plan     Acute R Tibial osteomyelitis w MSSA bacteremia with abscess  Wound cultures taken from Dr. Hernandez's office 9/30 were positive for MSSA  (Verigene positive). Initial CT w/contrast of the R knee was notable for sinus tracking down the femur, suspicious for early onset osteomyelitis. WBC, procalcitonin and CRP are now downtrending after initiating IV vancomycin; however, MRI of the R Knee was notable for fluid collection with a sinus tract to the skin, concerning for abscess formation. ID following. Initially kept on vancomycin IV (10/1-10/6) due to hx of PCN allergy (hives), but she tolerated amoxicillin 250mg oral test dose without any issue on 10/5. Blood culture remains positive as of 10/5.  - switch antibiotic to nafcillin 2gm IV q4h (10/6-)   - Discontinue IV vancomycin (10/1-10/6)  - ortho to decide next week when to perform I&D vs through knee amputation vs AKA.  - continue to obtain daily blood culture until negative x48hrs.  - given high grade, persistent MSSA bacteremia, proceed with LINDA (ordered for 10/7), NPO after MN  - appreciate ID inputs  - blood culture from 10/5 speciated as 'streptococcus' as opposed to staph. Will follow further speciation/sensitivity ?contamination    Depressed mood  Has been having anxious mood and frustrated from being in the hospital for some time. Also plan for R leg is yet to be determined.  - health psychology consulted  - therapy dog visits when available     Physical deconditioning  R BKA infection has caused patient ample distress and difficulty with ambulating.   -PT/OT following, PT rec TCU     Mild hypoalbuminemia  Albumin 2.5 on admission, most likely due to poor nutrition in the setting of infection.  -Regular diet ordered, will monitor patient's PO itnake  -Consider RD consult if appetite does not  improve     Incidentally noted Right pleural effusion  Patient with CT findings of trace right pleural fluid and tree-in-bud nodular pattern, concerning for inflammatory v infectious process. No clinical respiratory symptoms to suggest pneumonia or another pulmonary etiology at this time.  -Continue trending blood cultures as noted above  -If patient begins to experience signs of respiratory distress, will obtain sputum culture and broaden coverage      Mild hyponatremia  Sodium 128 on admission, most likely in the setting of hypovolemia from infection. Improved to 134 10/5. 132 today  - continue to monitor    Thrombocytosis  Likely in the setting of acute inflammation.  - continue to monitor     Chronic medical conditions---  #Rheumatoid arthritis - Continue prednisone 5 mg daily; will hold DMARDs at this time  #CAD - Continue PTA statin dose  #HTN - Hold antihypertensives in the setting of developing infection/sepsis. Resume lower dose of metoprolol if BP remains elevated  #Lumbar spinal stenosis - Oxycodone 5 mg Q4H prn, lidocaine patches, diclofenac gel PRN.     Diet: Regular Diet Adult  NPO per Anesthesia Guidelines for Procedure/Surgery Except for: Meds    DVT Prophylaxis: Enoxaparin (Lovenox) SQ  Nolan Catheter: in place, indication:    Code Status: Full Code      Disposition Plan   Expected discharge: 4 - 7 days, recommended to transitional care unit once SIRS/Sepsis treated.  Entered: Lily Cuello MD 10/06/2019, 10:02 AM       The patient's care was discussed with the Bedside Nurse, Patient, Patient's Family and ID Consultant.    Lily Cuello MD  [address]  Pager: [phone]  Please see sticky note for cross cover information  ______________________________________________________________________    Interval History   No event overnight, could not get good rest as she was worried and frustrated about the current situation. Denied any rash, hive or  respiratory issue following amoxicillin. No worsening of pain. Other 4 point ROS negative.    Data reviewed today: I reviewed all medications, new labs and imaging results over the last 24 hours.    Physical Exam   Vital Signs: Temp: 98.4  F (36.9  C) Temp src: Oral BP: 132/59 Pulse: 91   Resp: 18 SpO2: 98 % O2 Device: None (Room air)    Weight: 110 lbs 0 oz  General Appearance: Appears worried but not in acute distress  Respiratory: clear bilaterally  Cardiovascular: RRR no murmur  GI: soft non tender  Skin: R stump with no erythema, 2mm sinus tract present, when exprressed, minimal thin purulent material was drained. Too small to pack. R knee ROM intact.      Data   Recent Labs   Lab 10/06/19  0755 10/05/19  0639 10/04/19  0617  10/01/19  1309   WBC 13.6* 14.8* 18.4*   < > 24.2*   HGB 10.1* 9.7* 9.7*   < > 10.5*   MCV 88 90 89   < > 89   * 455* 344   < > 357   INR  --   --   --   --  1.38*   * 134 130*   < > 128*   POTASSIUM 3.7 4.4 3.2*   < > 3.7   CHLORIDE 99 102 99   < > 94   CO2 25 24 21   < > 24   BUN 8 10 8   < > 12   CR 0.44* 0.46* 0.43*   < > 0.56   ANIONGAP 8 8 10   < > 10   ADRIAN 8.3* 8.4* 8.0*   < > 8.8   GLC 86 77 97   < > 82   ALBUMIN  --   --   --   --  2.5*   PROTTOTAL  --   --   --   --  6.9   BILITOTAL  --   --   --   --  0.6   ALKPHOS  --   --   --   --  62   ALT  --   --   --   --  14   AST  --   --   --   --  34   LIPASE  --   --   --   --  37*    < > = values in this interval not displayed.

## 2019-10-06 NOTE — PROGRESS NOTES
"Orthopaedic Surgery Progress Note   10/06/2019    Subjective: No acute events overnight. Admits to feeling \"frustrated\" that she is still in the hospital. Denies fevers, chills, sweats. Denies cp, sob.     Objective: BP (!) 148/67 (BP Location: Left arm)   Pulse 82   Temp 98.4  F (36.9  C) (Oral)   Resp 18   Ht 1.676 m (5' 6\")   Wt 49.9 kg (110 lb)   SpO2 98%   BMI 17.75 kg/m      General: NAD, alert and oriented, cooperative with exam.   Cardio: RRR, extremities wwp.   Respiratory: Non-labored breathing.  MSK: Focused examination of     Right Lower Extremity: Below Knee amputation with 3 mm sinus at the distal tip - covered with dressing this am.  Tender but without surrounding erythema. No obvious drainage from the sinus tract today. No pain with ROM hip/knee.  Limb warm and well perfused with soft compartments and intact sensation in the thigh and leg.    Labs:     Lab Results   Component Value Date    WBC 14.8 (H) 10/05/2019    HGB 9.7 (L) 10/05/2019     (H) 10/05/2019       BMP:  Lab Results   Component Value Date     10/05/2019    POTASSIUM 4.4 10/05/2019    CHLORIDE 102 10/05/2019    CO2 24 10/05/2019    BUN 10 10/05/2019    CR 0.46 (L) 10/05/2019    ANIONGAP 8 10/05/2019    ADRIAN 8.4 (L) 10/05/2019    GLC 77 10/05/2019       Inflammatory Markers:  Lab Results   Component Value Date    WBC 14.8 (H) 10/05/2019    WBC 18.4 (H) 10/04/2019    WBC 22.7 (H) 10/03/2019    .0 (H) 10/04/2019    .0 (H) 10/03/2019    .0 (H) 10/02/2019    SED 34 03/16/2015    SED 46 (H) 04/21/2005    SED 44 (H) 04/14/2005       Cultures:   Staph Aureus    IMAGING:  MRI RLE (10/4/19) - focal osteomyelitis observed in distal tibia, open skin sinus tract      ASSESSMENT AND PLAN:     Sakshi Jaeger is a 74 year old female with PMH including CAD, RA (on methotrexate, leflunomide, prednisone), Hypertension, Miocardial infarction, osteoporosis, and Right Below knee amputation who presents with a 4 day " history of right limb pain, malaise, and 1 day of fevers, bacteremia Consistent with a:     1. Infection of the below knee amputation stump of Right leg with sinus tracking to bone and osteomyelitis of the distal tibial stump.     Patient had noted to have MSSA bacteremia with source of Tibial osteomyelitis on admission.     Currently HDS, afebrile.     WBC and CRP continue downtrend on IV abx.     Admitted for IV abx  Activity: Up with assist until independent.  Weight bearing status: NWB RLE, no use of prosthesis, ambulate with crutches, walker, chair  Pain management: PO medications as tolerated.    Antibiotics/Tetanus: IV abx to continue per ID  Diet: TIERNEY no procedures planned  Anticoagulation/DVT prophylaxis: Mechanical, ok for Chem DVT ppx per primary team  Labs: Trend CRP q48 h  Dressings: Keep clean, dry and intact, change PRN  Elevation: Elevate RLE on pillows to keep above the level of the heart as much as possible.   Physical Therapy/Occupational Therapy: Eval and treat as able  Cultures: Pending, follow culture results closely.  Staph aureus  Follow-up: pending hospital course    Disposition: anticipate repeat surgical intervention next week - possible I&D vs. Through knee amputation vs. AKA. If destabilizes, will possible require emergent I&D.        Nick Napoles MD  Orthopedic Surgery PGY4  [phone]

## 2019-10-06 NOTE — PLAN OF CARE
4515-1032: Afebrile. VSS on RA. Tele NSR. A/Ox4 but forgetful. Calls appropriately. R BKA. Wound on R stump with purulent drainage. Dressing change done x2 today. Started on IV nafcillin for today for infection; tolerating well. C/O pain in back. Scheduled tylenol and PRN oxycodone given with relief. Incontinent; purewick in place for urine. Had one small BM in bedpan today. Sacrum and coccyx intact with blanchable redness. Regular diet with fair appetite. Patient will need to be NPO at midnight for LINDA procedure tomorrow. Fiance at bedside most of the afternoon. Continue to monitor and follow POC.    Tiffany Pineda RN on 10/6/2019 at 6:08 PM

## 2019-10-07 ENCOUNTER — PREP FOR PROCEDURE (OUTPATIENT)
Dept: ORTHOPEDICS | Facility: CLINIC | Age: 74
End: 2019-10-07

## 2019-10-07 ENCOUNTER — APPOINTMENT (OUTPATIENT)
Dept: CARDIOLOGY | Facility: CLINIC | Age: 74
DRG: 492 | End: 2019-10-07
Attending: INTERNAL MEDICINE
Payer: COMMERCIAL

## 2019-10-07 DIAGNOSIS — M86.9 OSTEOMYELITIS (H): Primary | ICD-10-CM

## 2019-10-07 LAB
ALBUMIN SERPL-MCNC: 1.9 G/DL (ref 3.4–5)
ALP SERPL-CCNC: 64 U/L (ref 40–150)
ALT SERPL W P-5'-P-CCNC: 14 U/L (ref 0–50)
ANION GAP SERPL CALCULATED.3IONS-SCNC: 8 MMOL/L (ref 3–14)
AST SERPL W P-5'-P-CCNC: 14 U/L (ref 0–45)
BACTERIA SPEC CULT: ABNORMAL
BACTERIA SPEC CULT: ABNORMAL
BILIRUB DIRECT SERPL-MCNC: 0.2 MG/DL (ref 0–0.2)
BILIRUB SERPL-MCNC: 0.6 MG/DL (ref 0.2–1.3)
BUN SERPL-MCNC: 10 MG/DL (ref 7–30)
CALCIUM SERPL-MCNC: 8.1 MG/DL (ref 8.5–10.1)
CHLORIDE SERPL-SCNC: 102 MMOL/L (ref 94–109)
CO2 SERPL-SCNC: 26 MMOL/L (ref 20–32)
CREAT SERPL-MCNC: 0.43 MG/DL (ref 0.52–1.04)
ERYTHROCYTE [DISTWIDTH] IN BLOOD BY AUTOMATED COUNT: 15.1 % (ref 10–15)
GFR SERPL CREATININE-BSD FRML MDRD: >90 ML/MIN/{1.73_M2}
GLUCOSE SERPL-MCNC: 89 MG/DL (ref 70–99)
HCT VFR BLD AUTO: 31.4 % (ref 35–47)
HGB BLD-MCNC: 10 G/DL (ref 11.7–15.7)
Lab: ABNORMAL
MCH RBC QN AUTO: 28.2 PG (ref 26.5–33)
MCHC RBC AUTO-ENTMCNC: 31.8 G/DL (ref 31.5–36.5)
MCV RBC AUTO: 89 FL (ref 78–100)
PLATELET # BLD AUTO: 501 10E9/L (ref 150–450)
POTASSIUM SERPL-SCNC: 3.4 MMOL/L (ref 3.4–5.3)
PROT SERPL-MCNC: 5.4 G/DL (ref 6.8–8.8)
RBC # BLD AUTO: 3.55 10E12/L (ref 3.8–5.2)
SODIUM SERPL-SCNC: 136 MMOL/L (ref 133–144)
SPECIMEN SOURCE: ABNORMAL
WBC # BLD AUTO: 12.9 10E9/L (ref 4–11)

## 2019-10-07 PROCEDURE — 25000132 ZZH RX MED GY IP 250 OP 250 PS 637: Performed by: STUDENT IN AN ORGANIZED HEALTH CARE EDUCATION/TRAINING PROGRAM

## 2019-10-07 PROCEDURE — 25000128 H RX IP 250 OP 636: Performed by: STUDENT IN AN ORGANIZED HEALTH CARE EDUCATION/TRAINING PROGRAM

## 2019-10-07 PROCEDURE — 25000131 ZZH RX MED GY IP 250 OP 636 PS 637: Performed by: STUDENT IN AN ORGANIZED HEALTH CARE EDUCATION/TRAINING PROGRAM

## 2019-10-07 PROCEDURE — 36415 COLL VENOUS BLD VENIPUNCTURE: CPT | Performed by: STUDENT IN AN ORGANIZED HEALTH CARE EDUCATION/TRAINING PROGRAM

## 2019-10-07 PROCEDURE — 80076 HEPATIC FUNCTION PANEL: CPT | Performed by: STUDENT IN AN ORGANIZED HEALTH CARE EDUCATION/TRAINING PROGRAM

## 2019-10-07 PROCEDURE — 93320 DOPPLER ECHO COMPLETE: CPT | Mod: 26 | Performed by: INTERNAL MEDICINE

## 2019-10-07 PROCEDURE — 25000125 ZZHC RX 250: Performed by: INTERNAL MEDICINE

## 2019-10-07 PROCEDURE — 85027 COMPLETE CBC AUTOMATED: CPT | Performed by: STUDENT IN AN ORGANIZED HEALTH CARE EDUCATION/TRAINING PROGRAM

## 2019-10-07 PROCEDURE — 99152 MOD SED SAME PHYS/QHP 5/>YRS: CPT

## 2019-10-07 PROCEDURE — 93320 DOPPLER ECHO COMPLETE: CPT

## 2019-10-07 PROCEDURE — 25000128 H RX IP 250 OP 636: Performed by: INTERNAL MEDICINE

## 2019-10-07 PROCEDURE — 99153 MOD SED SAME PHYS/QHP EA: CPT

## 2019-10-07 PROCEDURE — 25800030 ZZH RX IP 258 OP 636: Performed by: INTERNAL MEDICINE

## 2019-10-07 PROCEDURE — 12000001 ZZH R&B MED SURG/OB UMMC

## 2019-10-07 PROCEDURE — 99233 SBSQ HOSP IP/OBS HIGH 50: CPT | Mod: GC | Performed by: INTERNAL MEDICINE

## 2019-10-07 PROCEDURE — 93325 DOPPLER ECHO COLOR FLOW MAPG: CPT | Mod: 26 | Performed by: INTERNAL MEDICINE

## 2019-10-07 PROCEDURE — 87040 BLOOD CULTURE FOR BACTERIA: CPT | Performed by: STUDENT IN AN ORGANIZED HEALTH CARE EDUCATION/TRAINING PROGRAM

## 2019-10-07 PROCEDURE — 80048 BASIC METABOLIC PNL TOTAL CA: CPT | Performed by: STUDENT IN AN ORGANIZED HEALTH CARE EDUCATION/TRAINING PROGRAM

## 2019-10-07 PROCEDURE — 93312 ECHO TRANSESOPHAGEAL: CPT | Mod: 26 | Performed by: INTERNAL MEDICINE

## 2019-10-07 RX ORDER — LIDOCAINE 40 MG/G
CREAM TOPICAL
Status: DISCONTINUED | OUTPATIENT
Start: 2019-10-07 | End: 2019-10-07 | Stop reason: HOSPADM

## 2019-10-07 RX ORDER — FLUMAZENIL 0.1 MG/ML
0.2 INJECTION, SOLUTION INTRAVENOUS
Status: DISCONTINUED | OUTPATIENT
Start: 2019-10-07 | End: 2019-10-07 | Stop reason: HOSPADM

## 2019-10-07 RX ORDER — LIDOCAINE HYDROCHLORIDE 20 MG/ML
15 SOLUTION OROPHARYNGEAL ONCE
Status: COMPLETED | OUTPATIENT
Start: 2019-10-07 | End: 2019-10-07

## 2019-10-07 RX ORDER — NALOXONE HYDROCHLORIDE 0.4 MG/ML
.1-.4 INJECTION, SOLUTION INTRAMUSCULAR; INTRAVENOUS; SUBCUTANEOUS
Status: DISCONTINUED | OUTPATIENT
Start: 2019-10-07 | End: 2019-10-07 | Stop reason: HOSPADM

## 2019-10-07 RX ORDER — SODIUM CHLORIDE 9 MG/ML
INJECTION, SOLUTION INTRAVENOUS CONTINUOUS PRN
Status: DISCONTINUED | OUTPATIENT
Start: 2019-10-07 | End: 2019-10-07 | Stop reason: HOSPADM

## 2019-10-07 RX ORDER — ACYCLOVIR 200 MG/1
9.5 CAPSULE ORAL
Status: DISCONTINUED | OUTPATIENT
Start: 2019-10-07 | End: 2019-10-07 | Stop reason: HOSPADM

## 2019-10-07 RX ORDER — POLYETHYLENE GLYCOL 3350 17 G/17G
17 POWDER, FOR SOLUTION ORAL 2 TIMES DAILY PRN
Status: DISCONTINUED | OUTPATIENT
Start: 2019-10-07 | End: 2019-10-13 | Stop reason: HOSPADM

## 2019-10-07 RX ORDER — FENTANYL CITRATE 50 UG/ML
25 INJECTION, SOLUTION INTRAMUSCULAR; INTRAVENOUS
Status: DISCONTINUED | OUTPATIENT
Start: 2019-10-07 | End: 2019-10-07 | Stop reason: HOSPADM

## 2019-10-07 RX ADMIN — SIMVASTATIN 20 MG: 20 TABLET, FILM COATED ORAL at 00:43

## 2019-10-07 RX ADMIN — ACETAMINOPHEN 650 MG: 325 TABLET, FILM COATED ORAL at 20:05

## 2019-10-07 RX ADMIN — NAFCILLIN SODIUM 2 G: 2 INJECTION, POWDER, LYOPHILIZED, FOR SOLUTION INTRAMUSCULAR; INTRAVENOUS at 10:22

## 2019-10-07 RX ADMIN — NAFCILLIN SODIUM 2 G: 2 INJECTION, POWDER, LYOPHILIZED, FOR SOLUTION INTRAMUSCULAR; INTRAVENOUS at 04:25

## 2019-10-07 RX ADMIN — TOPICAL ANESTHETIC 0.5 ML: 200 SPRAY DENTAL; PERIODONTAL at 08:58

## 2019-10-07 RX ADMIN — LIDOCAINE HYDROCHLORIDE 30 ML: 20 SOLUTION ORAL; TOPICAL at 08:55

## 2019-10-07 RX ADMIN — NAFCILLIN SODIUM 2 G: 2 INJECTION, POWDER, LYOPHILIZED, FOR SOLUTION INTRAMUSCULAR; INTRAVENOUS at 21:07

## 2019-10-07 RX ADMIN — NAFCILLIN SODIUM 2 G: 2 INJECTION, POWDER, LYOPHILIZED, FOR SOLUTION INTRAMUSCULAR; INTRAVENOUS at 17:15

## 2019-10-07 RX ADMIN — AMLODIPINE BESYLATE 5 MG: 5 TABLET ORAL at 08:15

## 2019-10-07 RX ADMIN — OXYCODONE HYDROCHLORIDE 10 MG: 5 TABLET ORAL at 08:14

## 2019-10-07 RX ADMIN — MIDAZOLAM 0.5 MG: 1 INJECTION INTRAMUSCULAR; INTRAVENOUS at 09:08

## 2019-10-07 RX ADMIN — MIDAZOLAM 0.5 MG: 1 INJECTION INTRAMUSCULAR; INTRAVENOUS at 09:14

## 2019-10-07 RX ADMIN — ACETAMINOPHEN 650 MG: 325 TABLET, FILM COATED ORAL at 14:21

## 2019-10-07 RX ADMIN — MIDAZOLAM 0.5 MG: 1 INJECTION INTRAMUSCULAR; INTRAVENOUS at 09:11

## 2019-10-07 RX ADMIN — FENTANYL CITRATE 50 MCG: 50 INJECTION INTRAMUSCULAR; INTRAVENOUS at 09:10

## 2019-10-07 RX ADMIN — ACETAMINOPHEN 650 MG: 325 TABLET, FILM COATED ORAL at 17:14

## 2019-10-07 RX ADMIN — LIDOCAINE 1 PATCH: 560 PATCH PERCUTANEOUS; TOPICAL; TRANSDERMAL at 08:15

## 2019-10-07 RX ADMIN — LISINOPRIL 20 MG: 20 TABLET ORAL at 08:15

## 2019-10-07 RX ADMIN — SIMVASTATIN 20 MG: 20 TABLET, FILM COATED ORAL at 23:46

## 2019-10-07 RX ADMIN — ONDANSETRON 4 MG: 2 INJECTION INTRAMUSCULAR; INTRAVENOUS at 05:19

## 2019-10-07 RX ADMIN — FENTANYL CITRATE 25 MCG: 50 INJECTION INTRAMUSCULAR; INTRAVENOUS at 09:17

## 2019-10-07 RX ADMIN — PREDNISONE 5 MG: 5 TABLET ORAL at 10:23

## 2019-10-07 RX ADMIN — SODIUM CHLORIDE 200 ML: 9 INJECTION, SOLUTION INTRAVENOUS at 09:35

## 2019-10-07 RX ADMIN — NAFCILLIN SODIUM 2 G: 2 INJECTION, POWDER, LYOPHILIZED, FOR SOLUTION INTRAMUSCULAR; INTRAVENOUS at 13:09

## 2019-10-07 RX ADMIN — NAFCILLIN SODIUM 2 G: 2 INJECTION, POWDER, LYOPHILIZED, FOR SOLUTION INTRAMUSCULAR; INTRAVENOUS at 00:43

## 2019-10-07 RX ADMIN — FENTANYL CITRATE 50 MCG: 50 INJECTION INTRAMUSCULAR; INTRAVENOUS at 09:07

## 2019-10-07 RX ADMIN — ENOXAPARIN SODIUM 40 MG: 40 INJECTION SUBCUTANEOUS at 20:05

## 2019-10-07 RX ADMIN — ACETAMINOPHEN 650 MG: 325 TABLET, FILM COATED ORAL at 00:43

## 2019-10-07 RX ADMIN — MIDAZOLAM 0.5 MG: 1 INJECTION INTRAMUSCULAR; INTRAVENOUS at 09:17

## 2019-10-07 ASSESSMENT — PAIN DESCRIPTION - DESCRIPTORS
DESCRIPTORS: ACHING

## 2019-10-07 ASSESSMENT — ACTIVITIES OF DAILY LIVING (ADL)
ADLS_ACUITY_SCORE: 20
ADLS_ACUITY_SCORE: 22
ADLS_ACUITY_SCORE: 20

## 2019-10-07 NOTE — PROGRESS NOTES
[facility], [facility] [place] Progress Note - Teaching Service, Roddy Savage       Date of Admission:  10/1/2019  Assessment & Plan     Acute R Tibial osteomyelitis w MSSA bacteremia with abscess  Wound cultures taken from Dr. Hernandez's office 9/30 were positive for MSSA  (Verigene positive). Initial CT w/contrast of the R knee was notable for sinus tracking down the femur, suspicious for early onset osteomyelitis. WBC, procalcitonin and CRP are now downtrending after initiating IV vancomycin; however, MRI of the R Knee was notable for fluid collection with a sinus tract to the skin, concerning for abscess formation. ID following. Initially kept on vancomycin IV (10/1-10/6) due to hx of PCN allergy (hives), but she tolerated amoxicillin 250mg oral test dose without any issue on 10/5. Blood culture remains positive as of 10/6; currently on nafcillin IV therapy. LINDA results pending to r/o IE given persistent MSSA bacteremia.  - Continue nafcillin 2gm IV q4h (10/6-)   - Ortho to decide this week when to perform I&D vs through knee amputation vs AKA.  - Continue to obtain daily blood culture until negative x48hrs.  - LINDA results pending  - Appreciate ID inputs  - blood culture from 10/5 speciated as 'streptococcus' as opposed to staph. Will follow further speciation/sensitivity ?contamination    Depressed mood  Has been having anxious mood and frustrated from being in the hospital for some time. Also plan for R leg is yet to be determined.  - health psychology consulted  - therapy dog visits when available     Physical deconditioning  R BKA infection has caused patient ample distress and difficulty with ambulating.   -PT/OT following, PT rec TCU     Mild hypoalbuminemia  Albumin 2.5 on admission, most likely due to poor nutrition in the setting of infection.  -Regular diet ordered, will monitor patient's PO itnake  -Consider RD consult if appetite does not improve     Incidentally noted Right  pleural effusion  Patient with CT findings of trace right pleural fluid and tree-in-bud nodular pattern, concerning for inflammatory v infectious process. No clinical respiratory symptoms to suggest pneumonia or another pulmonary etiology at this time.  -Continue trending blood cultures as noted above  -If patient begins to experience signs of respiratory distress, will obtain sputum culture and broaden coverage     Thrombocytosis  Likely in the setting of acute inflammation.  - continue to monitor     Mild hyponatremia - resolved  Sodium 128 on admission, most likely in the setting of hypovolemia from infection. Improved to 136 on 10/7. continue to monitor    Chronic medical conditions---  #Rheumatoid arthritis - Continue prednisone 5 mg daily; will hold DMARDs at this time  #CAD - Continue PTA statin dose  #HTN - Hold antihypertensives in the setting of developing infection/sepsis. Resume lower dose of metoprolol if BP remains elevated  #Lumbar spinal stenosis - Oxycodone 5 mg Q4H prn, lidocaine patches, diclofenac gel PRN.     Diet: NPO per Anesthesia Guidelines for Procedure/Surgery Except for: Meds  Will restart after LINDA is complete.  DVT Prophylaxis: Enoxaparin (Lovenox) SQ  Nolan Catheter: in place, indication:    Code Status: Full Code      Disposition Plan   Expected discharge: 4 - 7 days, recommended to transitional care unit once SIRS/Sepsis treated.  Entered: Sathya Nolasco MD 10/07/2019, 7:04 AM     The patient's care was discussed with the Attending Physician, Dr. Cuello, Bedside Nurse and Patient.    Sathya Nolasco MD  [address], [place]  Pager: [phone]  Please see sticky note for cross cover information  ______________________________________________________________________    Interval History   Nursing notes reviewed, no acute events overnight. Patient reports sleeping comfortably, states she will spend time with her fiance today. She is  scheduled for a LINDA this morning. Patient made NPO at MN, notes having an appetite. No fever, chills, CP, SOB, cough, abd pain, n/v/d.     Data reviewed today: I reviewed all medications, new labs and imaging results over the last 24 hours.    Physical Exam   Vital Signs: Temp: 97.8  F (36.6  C) Temp src: Oral BP: 126/51 Pulse: 74   Resp: 16 SpO2: 94 % O2 Device: None (Room air)    Weight: 110 lbs 0 oz  General Appearance: Lying in bed comfortably, appears slightly anxious.  Respiratory: Speaking in full sentences on room air. CTAB.  Cardiovascular: RRR no murmur  GI: Soft, non tender and non distended.  Skin: R stump with no erythema, 2mm sinus tract present, when expressed, minimal thin purulent material was drained. Too small to pack. R knee ROM intact.      Data   Recent Labs   Lab 10/07/19  0621 10/06/19  0755 10/05/19  0639  10/01/19  1309   WBC 12.9* 13.6* 14.8*   < > 24.2*   HGB 10.0* 10.1* 9.7*   < > 10.5*   MCV 89 88 90   < > 89   * 485* 455*   < > 357   INR  --   --   --   --  1.38*    132* 134   < > 128*   POTASSIUM 3.4 3.7 4.4   < > 3.7   CHLORIDE 102 99 102   < > 94   CO2 26 25 24   < > 24   BUN 10 8 10   < > 12   CR 0.43* 0.44* 0.46*   < > 0.56   ANIONGAP 8 8 8   < > 10   ADRIAN 8.1* 8.3* 8.4*   < > 8.8   GLC 89 86 77   < > 82   ALBUMIN  --   --   --   --  2.5*   PROTTOTAL  --   --   --   --  6.9   BILITOTAL  --   --   --   --  0.6   ALKPHOS  --   --   --   --  62   ALT  --   --   --   --  14   AST  --   --   --   --  34   LIPASE  --   --   --   --  37*    < > = values in this interval not displayed.

## 2019-10-07 NOTE — SEDATION DOCUMENTATION
Pt arrived in ECHO department  for scheduled LINDA.   Procedure explained, questions answered and consent signed. Discharge instructions discussed with patient and her significant other Santi.  Pt's throat sprayed at 0900, therefore pt will not be able to eat or drink until 2 hours after at 1100 .  Informed pt of this time and encouraged to start with warm fluids and soft foods.    Pt tolerated procedure well, and was given a total of 125 mcg IV fentanyl and 2 mg IV versed for conscious sedation.  Pt denied throat or chest pain after LINDA complete.   LINDA probe 51 used for procedure.  Report given to 5A RN.

## 2019-10-07 NOTE — PLAN OF CARE
1900 - 0730:     VSS on RA. Tele on - NSR. A&Ox4. Assist of 1 in bed - pt did not get up overnight. Pt complaining of back pain. Scheduled Tylenol given. Pt complaining of nausea this morning. PRN Zofran given. Pt reports relief. PIV TKO. IV antibiotics (Nafcillin) continued. R BKA wound covered with Mepilex. Dressing CDI. Pt has been NPO since midnight. Incontinent of bladder and bowel. Purewick in place. BM x 3 overnight. Plan for LINDA today. Will continue to monitor.

## 2019-10-07 NOTE — PLAN OF CARE
PT Cancel: Pt declining PT this afternoon. Pt asleep upon arrival and gently awoken. She states she has not gotten any rest and is not interested in participation in therapy this afternoon.

## 2019-10-07 NOTE — CONSULTS
"  Health Psychology                  Clinic    Department of Medicine  Evette Madsen, Ph.D., L.P. [phone]                          [facility]  [facility] Candi Harkins, Ph.D.,  L.P. [phone]                 3rd Floor  [place] [address]   Kerline Bauer, Ph.D., L.P. [phone]     [address] Simi Temple, Ph.D., L.P. [phone]            [place] [address]          Jerson Kelly, Ph.D., A.B.PIOTR.P., L.P. [phone]      Kay Carrasquillo, Ph.D., L.P. [phone]      Inpatient Health Psychology Consultation*    DOS: 10/7/19    Attempted to meet with Ms Jaeger together with postdoctoral fellow Dr Ha. Ms Jaeger had recently returned from LINDA procedure and was sound asleep with sleep mask on.  by bedside, and checked in briefly with him. He reports that she is feeling very discouraged, making statements that appear to reflect significant depression by expressing concern regarding whether or not she can continue doing this, wondering \"if there is a light at the end of the tunnel.\" He reports that she misses her dog greatly, would appreciate opportunity to get visit from her own dog or from volunteer therapy dogs . He also believes that contact with hospital  could be helpful. Let Mr Jaeger know that we will request contact from [facility] and that Health Psychology will return to try and connect with Ms Jaeger on Wednesday 10/9.    Evette Madsen, PhD, LP  [phone]      *In accordance with the Rules of the [place] [employer], it is noted that psychological descriptions and scientific procedures underlying psychological evaluations have limitations.  Absolute predictions cannot be made based on information in this report.    "

## 2019-10-07 NOTE — PROGRESS NOTES
GENERAL ID SERVICE: PROGRESS NOTE  Patient:  Sakshi Jaeger, Date of birth 1945, Medical record number [id number]  Date of Admission: 10/1/2019  Date of Visit:  10/7/2019         Assessment and Recommendations:   Problem List:  #1 Right tibial stump osteomyelitis with CT findings of  fluid collection containing internal foci of subcutaneous emphysema adjacent to the distal tibial stump as well as evidence of osteomyelitis - Wound culture from 9/30 positive for MSSA   - CT lumbar spine without evidence of spinal osteomyelitis     #2 MSSA persistent  bloodstream infection secondary to #1   - TTE from 10/2 did not show vegetations  - LINDA (10/7) negative for vegetations           Discussion:     Mrs. Sakshi Jaeger is a 74 year-old female admitted on 10/01/19. 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 She presented to the ED with  worsening R amputation stump pain for the 4 days (started on 9/27)  and fever. She did have fever as well as leukocytosis and elevated CRP.  Wound culture from 9/30 positive for MSSA and blood cultures also positive for MSSA (they persist positive so far from 10/1-10/3). In addition a CT femur showed fluid collection containing internal foci ofsubcutaneous emphysema adjacent to the distal tibial stump. There is extension to the medullary cavity suggestive of osteomyelitis. MRI of the right limb showed osteomyelitis of distal tibia, extending approximately 3 cm from the knee joint line. As well as 4.6 x 4.1 cm air-fluid level containing fluid collection with  likely open skin sinus tract at amputation stump tip.      Patient was initially on vancomycin given presumed allergy to penicilli, although she passed the Augmentin challenge test and she was switched to nafcillin on 10/6/19. Ortho os  "planning for  air-fluid collection/bone debridement likely on Wednesday. Of note, LINDA was negative for vegetation.      Recommendations:  1. Please continue nafcillin 2 grams IV q 4h  2. Given persistent MSSA bacteremia in the setting of right tibial osteomyelitis with air-fluid collection, surgical debridement will likely be necessary in order to achieve source control - Appreciate Ortho support plan for surgical procedure likely on Wednesday  3. Please repeat blood cultures daily until negative for 48h     The General ID team will continue to follow this patient. Please feel free to call with any questions.     Zeinab Robert MD  Date of Service: 10/07/19  Pager: [phone]          Interval History:      No fever over the last 24h (last fever on 10/1)  White count 18.4 (down from yesterday)  -[phone]- 180  TTE (10/1) showed no vegetation - LINDA still needs to be performed     Micro:  9/30 wound culture positive for MSSA  10/01 blood culture MSSA  10/02 blood culture MSSA  10/3 blood culture MSSA  10/4 blood culture MSSA  10/5 blood culture MSSA  10/6 blood culture NTD  10/7 blood culture NTD          ATB:  - Nafcilin started on 10/6        - On vancomycin started on 10/01- 10/6 - discontinued             Physical Exam:   /46 (BP Location: Left arm, Cuff Size: Adult Regular)   Pulse 72   Temp 97.8  F (36.6  C) (Oral)   Resp 16   Ht 1.676 m (5' 6\")   Wt 49.9 kg (110 lb)   SpO2 95%   BMI 17.75 kg/m       GENERAL:  Not in acute distress.   HEAD: Normocephalic and atraumatic  ENT:  No hearing impairment, no ear pain or exudate, ear canals and TM's normal, nose and mouth without ulcers or lesions, oropharynx clear, oral mucous membranes moist, no tonsillar erythema and edema, oropharynx is without exudates or ulcers.  EYES:  Eyes grossly normal to inspection, PERRL and conjunctivae and sclerae normal   NECK:  Supple, no adenopathy, no asymmetry, masses, or scars and thyroid normal to palpation  LUNGS: "  Clear to auscultation - no rales, rhonchi or wheezes  CARDIOVASCULAR:  Regular rate and rhythm, normal S1 S2, no S3 or S4, no murmur, click or rub, no peripheral edema and peripheral pulses strong  ABDOMEN:  Soft, nontender, no hepatosplenomegaly, no masses and bowel sounds normal  EXT/MS/SKIN:  Right BKA stump has an opening that tracks to bone. No drainage. No tenderness.   NEUROLOGIC:  Grossly nonfocal. Normal strength and tone, mentation intact and speech normal         Laboratory Data:     Creatinine   Date Value Ref Range Status   10/07/2019 0.43 (L) 0.52 - 1.04 mg/dL Final   10/06/2019 0.44 (L) 0.52 - 1.04 mg/dL Final   10/05/2019 0.46 (L) 0.52 - 1.04 mg/dL Final   10/04/2019 0.43 (L) 0.52 - 1.04 mg/dL Final   10/03/2019 0.39 (L) 0.52 - 1.04 mg/dL Final     WBC   Date Value Ref Range Status   10/07/2019 12.9 (H) 4.0 - 11.0 10e9/L Final   10/06/2019 13.6 (H) 4.0 - 11.0 10e9/L Final   10/05/2019 14.8 (H) 4.0 - 11.0 10e9/L Final   10/04/2019 18.4 (H) 4.0 - 11.0 10e9/L Final   10/03/2019 22.7 (H) 4.0 - 11.0 10e9/L Final     Hemoglobin   Date Value Ref Range Status   10/07/2019 10.0 (L) 11.7 - 15.7 g/dL Final     Platelet Count   Date Value Ref Range Status   10/07/2019 501 (H) 150 - 450 10e9/L Final     Lab Results   Component Value Date     10/07/2019    BUN 10 10/07/2019    CO2 26 10/07/2019     C-Reactive Protein   Date Value Ref Range Status   03/16/2015 4.10 (A) 0 - 0.8 mg/dL Final   11/05/2014 0.26 0 - 0.8 mg/dL Final   04/21/2005 0.43 0.00 - 0.80 mg/dL Final   04/14/2005 0.59 0.00 - 0.80 mg/dL Final   04/08/2005 0.45 0.00 - 0.80 mg/dL Final     CRP Inflammation   Date Value Ref Range Status   10/04/2019 180.0 (H) 0.0 - 8.0 mg/L Final   10/03/2019 240.0 (H) 0.0 - 8.0 mg/L Final   10/02/2019 270.0 (H) 0.0 - 8.0 mg/L Final   10/01/2019 280.0 (H) 0.0 - 8.0 mg/L Final           Pertinent Recent Microbiology Data:     Recent Labs   Lab 10/07/19  0621 10/06/19  0755 10/05/19  0639 10/04/19  0617  10/03/19  0642 10/03/19  0445 10/02/19  0643 10/02/19  0636 10/01/19  1837 10/01/19  1309   CULT No growth after 6 hours No growth after 1 day Cultured on the 1st day of incubation:  Streptococcus mitis group  *  Critical Value/Significant Value, preliminary result only, called to and read back by  Italia Pineda RN at 0843 on 10.6.19 n.    Susceptibility testing in progress  (Note)  POSITIVE for STREPTOCOCCUS SPECIES OTHER THAN pneumococcus, anginosus  group, S. pyogenes and S. agalactiae. Performed using ISORG  multiplex nucleic acid test. Final identification and antimicrobial  susceptibility testing will be verified by standard methods.    Specimen tested with Rev [employer]igene multiplex, gram-positive blood culture  nucleic acid test for the following targets: Staph aureus, Staph  epidermidis, Staph lugdunensis, other Staph species, Enterococcus  faecalis, Enterococcus faecium, Streptococcus species, S. agalactiae,  S. anginosus grp., S. pneumoniae, S. pyogenes, Listeria sp., mecA  (methicillin resistance) and Archie/B (vancomycin resistance).    Critical Value/Significant Value called to and read back by Italia Pineda @ 1139.cg 10/06/19     Cultured on the 1st day of incubation:  Staphylococcus aureus  Susceptibility testing done on previous specimen  *  Critical Value/Significant Value, preliminary result only, called to and read back by  CASEY PINEDA RN 1348 10/5/19 AA   Cultured on the 1st day of incubation:  Staphylococcus aureus  Susceptibility testing done on previous specimen  *  Critical Value/Significant Value, preliminary result only, called to and read back by  Hina Gonzales RN at 0440 on 10.4.19 by SS.   Cultured on the 1st day of incubation:  Staphylococcus aureus  Susceptibility testing done on previous specimen  *  Critical Value/Significant Value, preliminary result only, called to and read back by  Hina Gonzales RN at 0400 on 10.4.19 by SS.   Cultured on the 1st day of  incubation:  Staphylococcus aureus  *  Critical Value/Significant Value, preliminary result only, called to and read back by  Shruthi Diaz Rn on 10.2.2019 at 2343.  JRt    Susceptibility testing done on previous specimen Cultured on the 1st day of incubation:  Staphylococcus aureus  *  Critical Value/Significant Value, preliminary result only, called to and read back by  Shruthi Diaz RN @ 0032 10/3/19. NANCY    Susceptibility testing done on previous specimen Cultured on the 1st day of incubation:  Staphylococcus aureus  *  Critical Value/Significant Value, preliminary result only, called to and read back by  Vickie Boyd, RN @ 1253 10/2/19 TM.    Susceptibility testing done on previous specimen Cultured on the 1st day of incubation:  Staphylococcus aureus  *  Critical Value/Significant Value, preliminary result only, called to and read back by  Krista Mueller RN @ 0435 10/2/19. NANCY    (Note)  POSITIVE for STAPHYLOCOCCUS AUREUS and NEGATIVE for the mecA gene  (not MRSA) by Verigene multiplex nucleic acid test. The mecA gene was  not detected. Final identification and antimicrobial susceptibility  testing will be verified by standard methods.    Specimen tested with Verigene multiplex, gram-positive blood culture  nucleic acid test for the following targets: Staph aureus, Staph  epidermidis, Staph lugdunensis, other Staph species, Enterococcus  faecalis, Enterococcus faecium, Streptococcus species, S. agalactiae,  S. anginosus grp., S. pneumoniae, S. pyogenes, Listeria sp., mecA  (methicillin resistance) and Archie/B (vancomycin resistance).    Critical Value/Significant Value called to and read back by  KRISTA MUELLER RN 5A [id number] 10/2/19 AM         SDES Blood Left Arm Blood Left Arm Blood Right Arm Blood Right Arm Blood Right Arm Blood Left Arm Blood Right Hand Blood Left Hand Blood Left Arm Blood Right Arm            Imaging:   No results found for this or any previous visit (from the past 48 hour(s)).

## 2019-10-07 NOTE — PROGRESS NOTES
ORTHO PLAN UPDATE:    Plan for right BKA I&D, possible partial tibia excision on Wednesday 10/9 with Dr. Doron Mott.    - NPO Tuesday at midnight  - preop labs (cbc, bmp, INR, type/screen) within 24 hours of surgery  - please document cardiopulmonary preop risk assessment   - medically optimize  - will consent at bedside    Nick Napoles MD  Orthopedic Surgery PGY4  [phone]

## 2019-10-08 ENCOUNTER — ANESTHESIA EVENT (OUTPATIENT)
Dept: SURGERY | Facility: CLINIC | Age: 74
DRG: 492 | End: 2019-10-08
Payer: COMMERCIAL

## 2019-10-08 ENCOUNTER — APPOINTMENT (OUTPATIENT)
Dept: PHYSICAL THERAPY | Facility: CLINIC | Age: 74
DRG: 492 | End: 2019-10-08
Payer: COMMERCIAL

## 2019-10-08 PROBLEM — M86.9 OSTEOMYELITIS (H): Status: ACTIVE | Noted: 2019-10-01

## 2019-10-08 LAB
ANION GAP SERPL CALCULATED.3IONS-SCNC: 10 MMOL/L (ref 3–14)
BUN SERPL-MCNC: 9 MG/DL (ref 7–30)
C DIFF TOX B STL QL: NEGATIVE
CALCIUM SERPL-MCNC: 8 MG/DL (ref 8.5–10.1)
CHLORIDE SERPL-SCNC: 102 MMOL/L (ref 94–109)
CO2 SERPL-SCNC: 24 MMOL/L (ref 20–32)
CREAT SERPL-MCNC: 0.46 MG/DL (ref 0.52–1.04)
ERYTHROCYTE [DISTWIDTH] IN BLOOD BY AUTOMATED COUNT: 15.4 % (ref 10–15)
GFR SERPL CREATININE-BSD FRML MDRD: >90 ML/MIN/{1.73_M2}
GLUCOSE SERPL-MCNC: 81 MG/DL (ref 70–99)
HCT VFR BLD AUTO: 31 % (ref 35–47)
HGB BLD-MCNC: 9.8 G/DL (ref 11.7–15.7)
MCH RBC QN AUTO: 28.4 PG (ref 26.5–33)
MCHC RBC AUTO-ENTMCNC: 31.6 G/DL (ref 31.5–36.5)
MCV RBC AUTO: 90 FL (ref 78–100)
PLATELET # BLD AUTO: 501 10E9/L (ref 150–450)
POTASSIUM SERPL-SCNC: 2.9 MMOL/L (ref 3.4–5.3)
RBC # BLD AUTO: 3.45 10E12/L (ref 3.8–5.2)
SODIUM SERPL-SCNC: 136 MMOL/L (ref 133–144)
SPECIMEN SOURCE: NORMAL
WBC # BLD AUTO: 16.1 10E9/L (ref 4–11)

## 2019-10-08 PROCEDURE — 25000131 ZZH RX MED GY IP 250 OP 636 PS 637: Performed by: STUDENT IN AN ORGANIZED HEALTH CARE EDUCATION/TRAINING PROGRAM

## 2019-10-08 PROCEDURE — 97110 THERAPEUTIC EXERCISES: CPT | Mod: GP

## 2019-10-08 PROCEDURE — 25000132 ZZH RX MED GY IP 250 OP 250 PS 637: Performed by: INTERNAL MEDICINE

## 2019-10-08 PROCEDURE — 12000001 ZZH R&B MED SURG/OB UMMC

## 2019-10-08 PROCEDURE — 25000125 ZZHC RX 250: Performed by: INTERNAL MEDICINE

## 2019-10-08 PROCEDURE — 25000132 ZZH RX MED GY IP 250 OP 250 PS 637: Performed by: STUDENT IN AN ORGANIZED HEALTH CARE EDUCATION/TRAINING PROGRAM

## 2019-10-08 PROCEDURE — 97530 THERAPEUTIC ACTIVITIES: CPT | Mod: GP

## 2019-10-08 PROCEDURE — 80048 BASIC METABOLIC PNL TOTAL CA: CPT | Performed by: STUDENT IN AN ORGANIZED HEALTH CARE EDUCATION/TRAINING PROGRAM

## 2019-10-08 PROCEDURE — 99233 SBSQ HOSP IP/OBS HIGH 50: CPT | Mod: GC | Performed by: INTERNAL MEDICINE

## 2019-10-08 PROCEDURE — 85027 COMPLETE CBC AUTOMATED: CPT | Performed by: STUDENT IN AN ORGANIZED HEALTH CARE EDUCATION/TRAINING PROGRAM

## 2019-10-08 PROCEDURE — 36415 COLL VENOUS BLD VENIPUNCTURE: CPT | Performed by: STUDENT IN AN ORGANIZED HEALTH CARE EDUCATION/TRAINING PROGRAM

## 2019-10-08 PROCEDURE — 25000128 H RX IP 250 OP 636: Performed by: STUDENT IN AN ORGANIZED HEALTH CARE EDUCATION/TRAINING PROGRAM

## 2019-10-08 PROCEDURE — 87493 C DIFF AMPLIFIED PROBE: CPT | Performed by: INTERNAL MEDICINE

## 2019-10-08 PROCEDURE — 87040 BLOOD CULTURE FOR BACTERIA: CPT | Performed by: STUDENT IN AN ORGANIZED HEALTH CARE EDUCATION/TRAINING PROGRAM

## 2019-10-08 RX ORDER — POTASSIUM CHLORIDE 750 MG/1
40 TABLET, EXTENDED RELEASE ORAL ONCE
Status: COMPLETED | OUTPATIENT
Start: 2019-10-08 | End: 2019-10-08

## 2019-10-08 RX ORDER — MULTIPLE VITAMINS W/ MINERALS TAB 9MG-400MCG
1 TAB ORAL DAILY
Status: DISCONTINUED | OUTPATIENT
Start: 2019-10-08 | End: 2019-10-13 | Stop reason: HOSPADM

## 2019-10-08 RX ORDER — POTASSIUM CHLORIDE 20MEQ/15ML
40 LIQUID (ML) ORAL ONCE
Status: DISCONTINUED | OUTPATIENT
Start: 2019-10-08 | End: 2019-10-13 | Stop reason: HOSPADM

## 2019-10-08 RX ORDER — LACTOBACILLUS RHAMNOSUS GG 10B CELL
1 CAPSULE ORAL 2 TIMES DAILY
Status: DISCONTINUED | OUTPATIENT
Start: 2019-10-08 | End: 2019-10-13 | Stop reason: HOSPADM

## 2019-10-08 RX ORDER — LOPERAMIDE HCL 2 MG
2 CAPSULE ORAL 4 TIMES DAILY PRN
Status: DISCONTINUED | OUTPATIENT
Start: 2019-10-08 | End: 2019-10-13 | Stop reason: HOSPADM

## 2019-10-08 RX ADMIN — LOPERAMIDE HYDROCHLORIDE 2 MG: 2 CAPSULE ORAL at 17:56

## 2019-10-08 RX ADMIN — NAFCILLIN SODIUM 2 G: 2 INJECTION, POWDER, LYOPHILIZED, FOR SOLUTION INTRAMUSCULAR; INTRAVENOUS at 16:58

## 2019-10-08 RX ADMIN — Medication 1 CAPSULE: at 20:52

## 2019-10-08 RX ADMIN — ONDANSETRON 4 MG: 4 TABLET, ORALLY DISINTEGRATING ORAL at 21:29

## 2019-10-08 RX ADMIN — OXYCODONE HYDROCHLORIDE 5 MG: 5 TABLET ORAL at 03:20

## 2019-10-08 RX ADMIN — SIMVASTATIN 20 MG: 20 TABLET, FILM COATED ORAL at 22:16

## 2019-10-08 RX ADMIN — ACETAMINOPHEN 650 MG: 325 TABLET, FILM COATED ORAL at 12:34

## 2019-10-08 RX ADMIN — NAFCILLIN SODIUM 2 G: 2 INJECTION, POWDER, LYOPHILIZED, FOR SOLUTION INTRAMUSCULAR; INTRAVENOUS at 21:08

## 2019-10-08 RX ADMIN — NAFCILLIN SODIUM 2 G: 2 INJECTION, POWDER, LYOPHILIZED, FOR SOLUTION INTRAMUSCULAR; INTRAVENOUS at 00:55

## 2019-10-08 RX ADMIN — ACETAMINOPHEN 650 MG: 325 TABLET, FILM COATED ORAL at 04:17

## 2019-10-08 RX ADMIN — MULTIPLE VITAMINS W/ MINERALS TAB 1 TABLET: TAB at 12:33

## 2019-10-08 RX ADMIN — NAFCILLIN SODIUM 2 G: 2 INJECTION, POWDER, LYOPHILIZED, FOR SOLUTION INTRAMUSCULAR; INTRAVENOUS at 05:02

## 2019-10-08 RX ADMIN — LIDOCAINE 1 PATCH: 560 PATCH PERCUTANEOUS; TOPICAL; TRANSDERMAL at 09:39

## 2019-10-08 RX ADMIN — ONDANSETRON 4 MG: 2 INJECTION INTRAMUSCULAR; INTRAVENOUS at 09:50

## 2019-10-08 RX ADMIN — AMLODIPINE BESYLATE 5 MG: 5 TABLET ORAL at 09:41

## 2019-10-08 RX ADMIN — ACETAMINOPHEN 325 MG: 325 TABLET, FILM COATED ORAL at 22:15

## 2019-10-08 RX ADMIN — Medication 1 MG: at 22:15

## 2019-10-08 RX ADMIN — NAFCILLIN SODIUM 2 G: 2 INJECTION, POWDER, LYOPHILIZED, FOR SOLUTION INTRAMUSCULAR; INTRAVENOUS at 09:42

## 2019-10-08 RX ADMIN — ACETAMINOPHEN 650 MG: 325 TABLET, FILM COATED ORAL at 00:32

## 2019-10-08 RX ADMIN — LISINOPRIL 20 MG: 20 TABLET ORAL at 09:40

## 2019-10-08 RX ADMIN — NAFCILLIN SODIUM 2 G: 2 INJECTION, POWDER, LYOPHILIZED, FOR SOLUTION INTRAMUSCULAR; INTRAVENOUS at 12:32

## 2019-10-08 RX ADMIN — ACETAMINOPHEN 650 MG: 325 TABLET, FILM COATED ORAL at 09:40

## 2019-10-08 RX ADMIN — OXYCODONE HYDROCHLORIDE 5 MG: 5 TABLET ORAL at 04:17

## 2019-10-08 RX ADMIN — POTASSIUM CHLORIDE 40 MEQ: 750 TABLET, EXTENDED RELEASE ORAL at 22:16

## 2019-10-08 RX ADMIN — ACETAMINOPHEN 325 MG: 325 TABLET, FILM COATED ORAL at 20:52

## 2019-10-08 RX ADMIN — PREDNISONE 5 MG: 5 TABLET ORAL at 09:41

## 2019-10-08 ASSESSMENT — ACTIVITIES OF DAILY LIVING (ADL)
ADLS_ACUITY_SCORE: 20
ADLS_ACUITY_SCORE: 20
ADLS_ACUITY_SCORE: 22
ADLS_ACUITY_SCORE: 20
ADLS_ACUITY_SCORE: 22
ADLS_ACUITY_SCORE: 22

## 2019-10-08 NOTE — PLAN OF CARE
[id number]  Reason for admission: CRISTIAN, cellulitis of R leg. Infection of BKA.   Vitals: VSS on RA.  Activity:  Ax1 with walker and gait belt. Did not get out of bed this shift.   Pain: Bilateral lower back pain. PRN 10 mg oxy given and scheduled tylenol.   Neuro:  A&Ox4. Calls appropriately.   Cardiac:  WDL, tele discontinued today  Respiratory: WDL on RA  GI/:  Incontinent of B&B x 2. Diarrhea, likely from bowel regimen 10/8. MD aware, does not want to order antidiarrheal.   Diet:  Regular diet  Lines:  R PIV TKO between int abx   Skin/Wounds: Nonblanchable redness of perineum. Barrier cream applied, incontinence care.      New changes this shift: Pt intermittently nauseous with pain. Refused repo. Incontinence care provided as needed, pt able to make needs known.     Plan: NPO 10/8 at midnight for I/D on Wednesday 10/9.

## 2019-10-08 NOTE — PLAN OF CARE
PT -   Discharge Planner PT   Patient plan for discharge: home  Current status: Pt performs bed mobility with mod I. Pt performs sit<>stand with min A using FWW. Pt has limited tolerance in standing, ~30 seconds for 4 trials of transfers.  Barriers to return to prior living situation: medical status, falls risk  Recommendations for discharge: TCU  Rationale for recommendations: Pt has decreased activity tolerance and would benefit from continued PT to progress strengthening, transfers, standing tolerance and overall activity tolerance for functional mobility with ADLs.  Entered by: Janeth Carlisle 10/08/2019 2:49 PM

## 2019-10-08 NOTE — PROGRESS NOTES
[facility], [facility] Progress Note - Teaching Service, Roddy Savage       Date of Admission:  10/1/2019  Assessment & Plan     Acute R Tibial osteomyelitis w MSSA bacteremia with abscess  Wound cultures taken from Dr. Hernandez's office 9/30 were positive for MSSA  (Verigene positive). Initial CT w/contrast of the R knee was notable for sinus tracking down the femur, suspicious for early onset osteomyelitis. WBC, procalcitonin and CRP are now downtrending after initiating IV vancomycin; however, MRI of the R Knee was notable for fluid collection with a sinus tract to the skin, concerning for abscess formation. ID following. Initially kept on vancomycin IV (10/1-10/6) due to hx of PCN allergy (hives), but she tolerated amoxicillin 250mg oral test dose without any issue on 10/5. Blood culture remains positive as of 10/6; currently on nafcillin IV therapy. LINDA results negative for IE. BCx remain NGTD since 10/6.  - NPO at midnight for debridement tomorrow   - Continue nafcillin 2gm IV q4h (10/6-)   - Appreciate ID inputs    Depressed mood  Has been having anxious mood and frustrated from being in the hospital for some time. Also plan for R leg is yet to be determined.  - Health psychology support appreciated  - therapy dog visits when available     Physical deconditioning  R BKA infection has caused patient ample distress and difficulty with ambulating.   -PT/OT following, PT rec TCU     Mild hypoalbuminemia  Albumin 2.5 on admission, most likely due to poor nutrition in the setting of infection.  -Regular diet ordered, will monitor patient's PO itnake  -Consider RD consult if appetite does not improve     Incidentally noted Right pleural effusion  Patient with CT findings of trace right pleural fluid and tree-in-bud nodular pattern, concerning for inflammatory v infectious process. No clinical respiratory symptoms to suggest pneumonia or another pulmonary etiology at this  time.  -Continue trending blood cultures as noted above  -If patient begins to experience signs of respiratory distress, will obtain sputum culture and broaden coverage     Thrombocytosis  Likely in the setting of acute inflammation.  - continue to monitor     Mild hyponatremia - resolved  Sodium 128 on admission, most likely in the setting of hypovolemia from infection. Improved to 136 on 10/7. continue to monitor    Chronic medical conditions---  #Rheumatoid arthritis - Continue prednisone 5 mg daily; will hold DMARDs at this time  #CAD - Continue PTA statin dose  #HTN - Hold antihypertensives in the setting of developing infection/sepsis. Resume lower dose of metoprolol if BP remains elevated  #Lumbar spinal stenosis - Oxycodone 5 mg Q4H prn, lidocaine patches, diclofenac gel PRN.     Diet: Regular Diet Adult  Snacks/Supplements Adult: Boost Shake; Between Meals  Snacks/Supplements Adult: Other; see below; Between Meals  NPO per Anesthesia Guidelines for Procedure/Surgery Except for: No Exceptions  Will restart after LINDA is complete.  DVT Prophylaxis: HOLD Enoxaparin (Lovenox) subcutaneous for surgery  Nolan Catheter: in place, indication:    Code Status: Full Code      Disposition Plan   Expected discharge: 4 - 7 days, recommended to transitional care unit once SIRS/Sepsis treated.  Entered: Sathya Nolasco MD 10/08/2019, 7:09 AM     The patient's care was discussed with the Attending Physician, Dr. Sahni, Bedside Nurse and Patient.    Sathya Nolasco MD  [address], [place]  Pager: [phone]  Please see sticky note for cross cover information  ______________________________________________________________________    Interval History   Nursing notes reviewed, no acute events overnight. Patient reports sleeping poorly due to the constant alarms sounding in her room. She notes few episodes of watery diarrhea, though states it is not like when she had C.diff in the  past. She is tolerating a regular diet. No fevers, chills, abdominal pain, n/v/d, CP, SOB, dysuria or hematuria.  Data reviewed today: I reviewed all medications, new labs and imaging results over the last 24 hours.    Physical Exam   Vital Signs: Temp: 98.1  F (36.7  C) Temp src: Oral BP: 134/57 Pulse: 81 Heart Rate: 74 Resp: 16 SpO2: 94 % O2 Device: None (Room air) Oxygen Delivery: 2 LPM  Weight: 110 lbs 0 oz  General Appearance: Lying in bed comfortably, appears slightly anxious.  Respiratory: Speaking in full sentences on room air. CTAB.  Cardiovascular: RRR no murmur  GI: Soft, non tender and non distended.  Skin: R stump with no erythema or warmth. Mild tenderness to palpation of the R stump. R knee ROM intact.      Data   Recent Labs   Lab 10/07/19  0621 10/06/19  0755 10/05/19  0639  10/01/19  1309   WBC 12.9* 13.6* 14.8*   < > 24.2*   HGB 10.0* 10.1* 9.7*   < > 10.5*   MCV 89 88 90   < > 89   * 485* 455*   < > 357   INR  --   --   --   --  1.38*    132* 134   < > 128*   POTASSIUM 3.4 3.7 4.4   < > 3.7   CHLORIDE 102 99 102   < > 94   CO2 26 25 24   < > 24   BUN 10 8 10   < > 12   CR 0.43* 0.44* 0.46*   < > 0.56   ANIONGAP 8 8 8   < > 10   ADRIAN 8.1* 8.3* 8.4*   < > 8.8   GLC 89 86 77   < > 82   ALBUMIN 1.9*  --   --   --  2.5*   PROTTOTAL 5.4*  --   --   --  6.9   BILITOTAL 0.6  --   --   --  0.6   ALKPHOS 64  --   --   --  62   ALT 14  --   --   --  14   AST 14  --   --   --  34   LIPASE  --   --   --   --  37*    < > = values in this interval not displayed.

## 2019-10-08 NOTE — PLAN OF CARE
Time:1164-8024   Reason for admission/Dx: SIRS ,Cellulitis of right leg, Fever, Rheumatoid arthritis, involving unspecified site, unspecified rheumatoid factor presence, Leukocytosis, unspecified type,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), Bilateral low back pain, unspecified chronicity, unspecified whether sciatica present    [Vitals]: /54 (BP Location: Right arm)   Pulse 72   Temp 98.1  F (36.7  C)   Resp 16 SpO2 98%   [Pain/PRN/s]: Chronic pain in lower back controled with lidocaine patch and scheduled tylenol. Intermittent pain in abdomen.  [Respiratory]: WDL, LS clear and diminished at bases   [Cardiac]: WDL   [Neuros]: WDL, AOx4   [GI]: Regular Diet Adult, poor appetite, but tolerating well. Several loose BM during shift. Held senna.   []: Incontinent x1, Purewick in place and patent  [Skin/Wounds]: WDL, Dressing changed and wound care performed per POC on R BKA. Site was pink with scant drainage present.   [Lines]: R PIV running TKO. WDL  [Infusions]: IV nafcillin   [Activity]: A2 [Labs/Lactic]:   Lab Results   Component Value Date    HGB 10.0 10/07/2019    HGB 10.1 10/06/2019    CR 0.43 10/07/2019     10/07/2019    HCT 31.4 10/07/2019    WBC 12.9 10/07/2019      [Electrolytes]:   Potassium (mmol/L)   Date Value   10/07/2019 3.4      Shift changes: Hold all bowel medications as pt has had multiple loose BMs during shift. Poor appetite. Wound cares performed per POC.   Plan: Will continue to monitor and follow POC.

## 2019-10-08 NOTE — PLAN OF CARE
[id number]: Afebrile. VSS on RA. A/Ox4. Calls appropriately. Pt has BKA and is here for abscess on R stump + bacteremia. IV nafcillin given q4hrs. Abscess on R knee with purulent drainage. Dressing changed x1, patient will need one additional dressing change this evening. C/O pain in back. Scheduled tylenol and lidocaine patch given with relief. Incontinent of urine and stool. Purewick in place. Stools are loose and watery; cdiff negative. PRN immodium given x1. Sacrum and coccyx intact with blanchable redness. Cleansed and barrier cream applied. Regular diet with fair appetite. Patient will need to be NPO at midnight for I&D of R knee tomorrow. Significant other at bedside throughout the day attentive and involved in POC. Continue to monitor.

## 2019-10-08 NOTE — PROGRESS NOTES
CLINICAL NUTRITION SERVICES - ASSESSMENT NOTE     Nutrition Prescription    RECOMMENDATIONS FOR MDs/PROVIDERS TO ORDER:  HIGHLY recommend consideration of an appetite stimulant (Remeron, Marinol, or Megace) to assist with improvement of PO intake.    Malnutrition Status:    Non-severe malnutrition in the context of acute illness.      Recommendations already ordered by Registered Dietitian (RD):  Trial of Gelatein Plus (cherry) and Gelatein SF (orange)   Boost Shake (chocolate) @ 2 pm  Thera-Vit-M      Future/Additional Recommendations:  1. If patient consistently consuming </= 50% of meals, consider starting calorie counts.     2. Monitor tolerance to scheduled supplements and need for changes pending patient's preference.      REASON FOR ASSESSMENT  Sakshi Jaeger is a/an 74 year old female assessed by the dietitian for [facility]    NUTRITION HISTORY  Information obtained from patient and patient's :   - At baseline, pt reports consuming 1 full meal + 1 snack-like meal daily. Typical intake described as meat + potatoes diet with frozen vegetables (corn, green beans, broccoli, carrots) or fresh tomatoes and occasional fruit (bananas, cantaloupe).    - Currently, she reports having little to no appetite, which usually happens when she is ill. States this has lasted for the past week or so.   - States that she dislikes Boost/Ensure, but is willing to consume as a 'milk shake.'     CURRENT NUTRITION ORDERS  Diet: Regular  Prior Diet: NPO (10/1). Regular (10/2-10/6). NPO (10/7).     Intake/Tolerance: 50 - 100% of 2 meals/day per RN flowsheet. Per [facility] review, patient has only ordered 2 meals via kitchen since admit. Patient states she ate a boris cracker so far today. Bedside RN notes reporting patient with poor appetite.     LABS - 10/7  Cr: 0.43 (L) - may indicate poor muscle mass   CRP: 180 (abnormal)   BGMs (24-hr): 86 - 89 (goal < 180 mg/dL)     MEDICATIONS  Prednisone   Bowel regimen  "    ANTHROPOMETRICS  Height: 167.6 cm (5' 6\")  Most Recent Weight: 49.9 kg (110 lb)    IBW: 56 kg (adj for R BKA)   BMI: Underweight BMI <18.5  Weight History: 4.4% weight loss in 9 months, suspect fluid related, at least in part.   Wt Readings from Last 15 Encounters:   10/01/19 49.9 kg (110 lb)   09/30/19 48.5 kg (107 lb)   06/19/19 48.7 kg (107 lb 4.8 oz)   06/13/19 48.5 kg (107 lb)   04/11/19 49 kg (108 lb)   01/24/19 52.2 kg (115 lb)   07/26/18 51.3 kg (113 lb)   03/29/18 52.6 kg (116 lb)       Dosing Weight: 50 kg (current BW on admit)     ASSESSED NUTRITION NEEDS  Estimated Energy Needs: 1250 - 1500 kcals/day (25 - 30 + kcals/kg)  Justification: Maintenance; aim high end for repletion  Estimated Protein Needs: 60 - 75 grams protein/day (1.2 - 1.5 grams of pro/kg)  Justification: Preservation of LBM   Estimated Fluid Needs: 1 mL/kcal  Justification: Maintenance and Per provider pending fluid status    PHYSICAL FINDINGS  See malnutrition section below.    MALNUTRITION  % Intake: </= 50% for >/= 5 days (severe)  % Weight Loss: Up to 20% in 1 year (non-severe)  Subcutaneous Fat Loss: Facial region, Upper arm, Lower arm and Thoracic/intercostal: Mild - moderate (age vs nutrition related)   Muscle Loss: Temporal, Facial & jaw region, Scapular bone, Thoracic region (clavicle, acromium bone, deltoid, trapezius, pectoral), Upper arm (bicep, tricep) and Lower arm  (forearm): Mild   Fluid Accumulation/Edema: None noted  Malnutrition Diagnosis: Non-severe malnutrition in the context of acute illness.     NUTRITION DIAGNOSIS  Inadequate oral intake related to decreased appetite 2/2 medical status as evidenced by patient consuming </= 50% of estimated nutrition needs over the past week and observed muscle/fat loss.      INTERVENTIONS  Implementation  Nutrition Education: Provided education on encouraged 5-6 small/frequent meals daily, importance of energy/protein intake for optimized healing, encouraged/offered " utilizations of snacks/supplements and role of RD.     Medical food supplement therapy - see above   Multivitamin/mineral supplement therapy - see above    Goals  Patient to consume % of nutritionally adequate meal trays TID, or the equivalent with supplements/snacks.     Monitoring/Evaluation  Progress toward goals will be monitored and evaluated per protocol.      Carina Johnson RD, LD   5A ([phone])/7B floor pager [phone]

## 2019-10-08 NOTE — PROGRESS NOTES
Social Work Services Progress Note    Hospital Day: 8  Collaborated with:  Patient, pt's spouse, RNCC    Data:  Pt is a 74-year-old female admitted to [facility] 10/1/19.     Intervention:  Pt will require IV nafcillin q4 at discharge. Pt may not have home infusion benefits. Discussed with pt that if she does not have home infusion benefits, we can get a private pay quote for IVs at home, or she can go to a TCU for IVs. Pt is not interested in a private pay quote. Pt states she has done IVs at home in the past. Provided pt and spouse a list of TCUs near their home if TCU is needed. Pt and spouse will look over list. Referred pt to RNCC to check into possible home infusion coverage.     Assessment:  Provided information on TCU if needed    Plan:    Anticipated Disposition:  TBD    Barriers to d/c plan:  Medical stability    Follow Up:  SW to follow for discharge planning    DEBI Pedraza, KAROL    [facility]  Pager [phone]  Phone [phone]

## 2019-10-08 NOTE — PROGRESS NOTES
Care Coordinator - Discharge Planning    Admission Date/Time:  10/1/2019  Attending MD:  Lily Cuello,*     Data  Chart reviewed, discussed with interdisciplinary team.   Patient was admitted for:   1. Cellulitis of right leg    2. Fever in adult    3. SIRS (systemic inflammatory response syndrome) (H)    4. Bilateral low back pain, unspecified chronicity, unspecified whether sciatica present    5. 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)    6. Rheumatoid arthritis, involving unspecified site, unspecified rheumatoid factor presence (H)    7. Leukocytosis, unspecified type    8. Osteomyelitis (H)         Assessment   Concerns with insurance coverage for discharge needs: None.  Current Living Situation: Patient lives with spouse.  Support System: Supportive and Involved  Services Involved: None  Transportation at Discharge: Car and Family or friend will provide  Transportation to Medical Appointments:    - Name of caregiver: Self and spouse  Barriers to Discharge: Medical stability    Notified by SW that team anticipates that pt will require q 4 hour IV nafcillin.   Pt and spouse requesting that a benefit check be done as pt has previously been on IV antibiotics at home.    Met with pt and spouse.  Introduced RNCC role.  Discussed home infusion options.   Pt would prefer [facility].   Pt states she had home infusion services back in 2005 but her insurance coverage has changed.      Email referral made to [facility].     Notified by [facility] that pt does not have home infusion coverage.  Updated SW.  Pt will need to look into TCU options.    Coordination of Care and Referrals: Provided patient/family with options for Home Infusion.      Plan  Anticipated Discharge Date:  TBD  Anticipated Discharge Plan:  TBD    Rachell Hutchison RN BSN, PHN RN Care Coordinator  Internal Medicine   [phone]  Pager: [phone]  Weekend RN Care Coordinator job code * * * [phone]  [facility]  Bank  10/8/2019 2:12 PM

## 2019-10-09 ENCOUNTER — ANESTHESIA (OUTPATIENT)
Dept: SURGERY | Facility: CLINIC | Age: 74
DRG: 492 | End: 2019-10-09
Payer: COMMERCIAL

## 2019-10-09 LAB
ABO + RH BLD: NORMAL
ABO + RH BLD: NORMAL
ANION GAP SERPL CALCULATED.3IONS-SCNC: 8 MMOL/L (ref 3–14)
BLD GP AB SCN SERPL QL: NORMAL
BLOOD BANK CMNT PATIENT-IMP: NORMAL
BUN SERPL-MCNC: 10 MG/DL (ref 7–30)
CALCIUM SERPL-MCNC: 7.7 MG/DL (ref 8.5–10.1)
CHLORIDE SERPL-SCNC: 105 MMOL/L (ref 94–109)
CO2 SERPL-SCNC: 22 MMOL/L (ref 20–32)
CREAT SERPL-MCNC: 0.45 MG/DL (ref 0.52–1.04)
CRP SERPL-MCNC: 59 MG/L (ref 0–8)
ERYTHROCYTE [DISTWIDTH] IN BLOOD BY AUTOMATED COUNT: 15.5 % (ref 10–15)
GFR SERPL CREATININE-BSD FRML MDRD: >90 ML/MIN/{1.73_M2}
GLUCOSE SERPL-MCNC: 88 MG/DL (ref 70–99)
GRAM STN SPEC: ABNORMAL
GRAM STN SPEC: ABNORMAL
GRAM STN SPEC: NORMAL
GRAM STN SPEC: NORMAL
HCT VFR BLD AUTO: 28.1 % (ref 35–47)
HGB BLD-MCNC: 8.8 G/DL (ref 11.7–15.7)
INR PPP: 1.31 (ref 0.86–1.14)
MCH RBC QN AUTO: 28.4 PG (ref 26.5–33)
MCHC RBC AUTO-ENTMCNC: 31.3 G/DL (ref 31.5–36.5)
MCV RBC AUTO: 91 FL (ref 78–100)
PLATELET # BLD AUTO: 472 10E9/L (ref 150–450)
POTASSIUM SERPL-SCNC: 3.5 MMOL/L (ref 3.4–5.3)
RBC # BLD AUTO: 3.1 10E12/L (ref 3.8–5.2)
SODIUM SERPL-SCNC: 135 MMOL/L (ref 133–144)
SPECIMEN EXP DATE BLD: NORMAL
SPECIMEN SOURCE: ABNORMAL
SPECIMEN SOURCE: NORMAL
WBC # BLD AUTO: 14.2 10E9/L (ref 4–11)

## 2019-10-09 PROCEDURE — 25000125 ZZHC RX 250

## 2019-10-09 PROCEDURE — 25000131 ZZH RX MED GY IP 250 OP 636 PS 637: Performed by: STUDENT IN AN ORGANIZED HEALTH CARE EDUCATION/TRAINING PROGRAM

## 2019-10-09 PROCEDURE — 37000009 ZZH ANESTHESIA TECHNICAL FEE, EACH ADDTL 15 MIN: Performed by: ORTHOPAEDIC SURGERY

## 2019-10-09 PROCEDURE — 25000128 H RX IP 250 OP 636: Performed by: ANESTHESIOLOGY

## 2019-10-09 PROCEDURE — 85027 COMPLETE CBC AUTOMATED: CPT | Performed by: STUDENT IN AN ORGANIZED HEALTH CARE EDUCATION/TRAINING PROGRAM

## 2019-10-09 PROCEDURE — 36000051 ZZH SURGERY LEVEL 2 1ST 30 MIN - UMMC: Performed by: ORTHOPAEDIC SURGERY

## 2019-10-09 PROCEDURE — 71000016 ZZH RECOVERY PHASE 1 LEVEL 3 FIRST HR: Performed by: ORTHOPAEDIC SURGERY

## 2019-10-09 PROCEDURE — 85610 PROTHROMBIN TIME: CPT | Performed by: STUDENT IN AN ORGANIZED HEALTH CARE EDUCATION/TRAINING PROGRAM

## 2019-10-09 PROCEDURE — 25000566 ZZH SEVOFLURANE, EA 15 MIN: Performed by: ORTHOPAEDIC SURGERY

## 2019-10-09 PROCEDURE — 36415 COLL VENOUS BLD VENIPUNCTURE: CPT | Performed by: STUDENT IN AN ORGANIZED HEALTH CARE EDUCATION/TRAINING PROGRAM

## 2019-10-09 PROCEDURE — 87015 SPECIMEN INFECT AGNT CONCNTJ: CPT | Performed by: ORTHOPAEDIC SURGERY

## 2019-10-09 PROCEDURE — 86901 BLOOD TYPING SEROLOGIC RH(D): CPT | Performed by: STUDENT IN AN ORGANIZED HEALTH CARE EDUCATION/TRAINING PROGRAM

## 2019-10-09 PROCEDURE — 87076 CULTURE ANAEROBE IDENT EACH: CPT | Performed by: ORTHOPAEDIC SURGERY

## 2019-10-09 PROCEDURE — 25000125 ZZHC RX 250: Performed by: INTERNAL MEDICINE

## 2019-10-09 PROCEDURE — 12000001 ZZH R&B MED SURG/OB UMMC

## 2019-10-09 PROCEDURE — 87070 CULTURE OTHR SPECIMN AEROBIC: CPT | Performed by: ORTHOPAEDIC SURGERY

## 2019-10-09 PROCEDURE — 25000132 ZZH RX MED GY IP 250 OP 250 PS 637: Performed by: INTERNAL MEDICINE

## 2019-10-09 PROCEDURE — 25000132 ZZH RX MED GY IP 250 OP 250 PS 637: Performed by: STUDENT IN AN ORGANIZED HEALTH CARE EDUCATION/TRAINING PROGRAM

## 2019-10-09 PROCEDURE — 99233 SBSQ HOSP IP/OBS HIGH 50: CPT | Mod: GC | Performed by: INTERNAL MEDICINE

## 2019-10-09 PROCEDURE — 87102 FUNGUS ISOLATION CULTURE: CPT | Performed by: ORTHOPAEDIC SURGERY

## 2019-10-09 PROCEDURE — 80048 BASIC METABOLIC PNL TOTAL CA: CPT | Performed by: STUDENT IN AN ORGANIZED HEALTH CARE EDUCATION/TRAINING PROGRAM

## 2019-10-09 PROCEDURE — 27110028 ZZH OR GENERAL SUPPLY NON-STERILE: Performed by: ORTHOPAEDIC SURGERY

## 2019-10-09 PROCEDURE — 86850 RBC ANTIBODY SCREEN: CPT | Performed by: STUDENT IN AN ORGANIZED HEALTH CARE EDUCATION/TRAINING PROGRAM

## 2019-10-09 PROCEDURE — 25000128 H RX IP 250 OP 636: Performed by: ORTHOPAEDIC SURGERY

## 2019-10-09 PROCEDURE — 40000170 ZZH STATISTIC PRE-PROCEDURE ASSESSMENT II: Performed by: ORTHOPAEDIC SURGERY

## 2019-10-09 PROCEDURE — 36000053 ZZH SURGERY LEVEL 2 EA 15 ADDTL MIN - UMMC: Performed by: ORTHOPAEDIC SURGERY

## 2019-10-09 PROCEDURE — 86900 BLOOD TYPING SEROLOGIC ABO: CPT | Performed by: STUDENT IN AN ORGANIZED HEALTH CARE EDUCATION/TRAINING PROGRAM

## 2019-10-09 PROCEDURE — 27210794 ZZH OR GENERAL SUPPLY STERILE: Performed by: ORTHOPAEDIC SURGERY

## 2019-10-09 PROCEDURE — 86140 C-REACTIVE PROTEIN: CPT | Performed by: STUDENT IN AN ORGANIZED HEALTH CARE EDUCATION/TRAINING PROGRAM

## 2019-10-09 PROCEDURE — 87205 SMEAR GRAM STAIN: CPT | Performed by: ORTHOPAEDIC SURGERY

## 2019-10-09 PROCEDURE — 87206 SMEAR FLUORESCENT/ACID STAI: CPT | Performed by: ORTHOPAEDIC SURGERY

## 2019-10-09 PROCEDURE — 25800030 ZZH RX IP 258 OP 636

## 2019-10-09 PROCEDURE — 87176 TISSUE HOMOGENIZATION CULTR: CPT | Performed by: ORTHOPAEDIC SURGERY

## 2019-10-09 PROCEDURE — 87075 CULTR BACTERIA EXCEPT BLOOD: CPT | Performed by: ORTHOPAEDIC SURGERY

## 2019-10-09 PROCEDURE — 37000008 ZZH ANESTHESIA TECHNICAL FEE, 1ST 30 MIN: Performed by: ORTHOPAEDIC SURGERY

## 2019-10-09 PROCEDURE — 25000128 H RX IP 250 OP 636

## 2019-10-09 PROCEDURE — 87077 CULTURE AEROBIC IDENTIFY: CPT | Performed by: ORTHOPAEDIC SURGERY

## 2019-10-09 PROCEDURE — 87116 MYCOBACTERIA CULTURE: CPT | Performed by: ORTHOPAEDIC SURGERY

## 2019-10-09 PROCEDURE — 87186 SC STD MICRODIL/AGAR DIL: CPT | Performed by: ORTHOPAEDIC SURGERY

## 2019-10-09 RX ORDER — FENTANYL CITRATE 50 UG/ML
INJECTION, SOLUTION INTRAMUSCULAR; INTRAVENOUS PRN
Status: DISCONTINUED | OUTPATIENT
Start: 2019-10-09 | End: 2019-10-09

## 2019-10-09 RX ORDER — BUPIVACAINE HYDROCHLORIDE 5 MG/ML
INJECTION, SOLUTION EPIDURAL; INTRACAUDAL PRN
Status: DISCONTINUED | OUTPATIENT
Start: 2019-10-09 | End: 2019-10-09 | Stop reason: HOSPADM

## 2019-10-09 RX ORDER — ALBUTEROL SULFATE 0.83 MG/ML
2.5 SOLUTION RESPIRATORY (INHALATION) EVERY 4 HOURS PRN
Status: DISCONTINUED | OUTPATIENT
Start: 2019-10-09 | End: 2019-10-09 | Stop reason: HOSPADM

## 2019-10-09 RX ORDER — SODIUM CHLORIDE, SODIUM LACTATE, POTASSIUM CHLORIDE, CALCIUM CHLORIDE 600; 310; 30; 20 MG/100ML; MG/100ML; MG/100ML; MG/100ML
INJECTION, SOLUTION INTRAVENOUS CONTINUOUS
Status: DISCONTINUED | OUTPATIENT
Start: 2019-10-09 | End: 2019-10-09 | Stop reason: HOSPADM

## 2019-10-09 RX ORDER — ONDANSETRON 4 MG/1
4 TABLET, ORALLY DISINTEGRATING ORAL EVERY 30 MIN PRN
Status: DISCONTINUED | OUTPATIENT
Start: 2019-10-09 | End: 2019-10-09 | Stop reason: HOSPADM

## 2019-10-09 RX ORDER — FENTANYL CITRATE 50 UG/ML
25-50 INJECTION, SOLUTION INTRAMUSCULAR; INTRAVENOUS
Status: DISCONTINUED | OUTPATIENT
Start: 2019-10-09 | End: 2019-10-09 | Stop reason: HOSPADM

## 2019-10-09 RX ORDER — NALOXONE HYDROCHLORIDE 0.4 MG/ML
.1-.4 INJECTION, SOLUTION INTRAMUSCULAR; INTRAVENOUS; SUBCUTANEOUS
Status: DISCONTINUED | OUTPATIENT
Start: 2019-10-09 | End: 2019-10-09 | Stop reason: HOSPADM

## 2019-10-09 RX ORDER — NALOXONE HYDROCHLORIDE 0.4 MG/ML
.1-.4 INJECTION, SOLUTION INTRAMUSCULAR; INTRAVENOUS; SUBCUTANEOUS
Status: DISCONTINUED | OUTPATIENT
Start: 2019-10-09 | End: 2019-10-13 | Stop reason: HOSPADM

## 2019-10-09 RX ORDER — PROPOFOL 10 MG/ML
INJECTION, EMULSION INTRAVENOUS PRN
Status: DISCONTINUED | OUTPATIENT
Start: 2019-10-09 | End: 2019-10-09

## 2019-10-09 RX ORDER — HYDROMORPHONE HYDROCHLORIDE 1 MG/ML
.3-.5 INJECTION, SOLUTION INTRAMUSCULAR; INTRAVENOUS; SUBCUTANEOUS EVERY 10 MIN PRN
Status: DISCONTINUED | OUTPATIENT
Start: 2019-10-09 | End: 2019-10-09 | Stop reason: HOSPADM

## 2019-10-09 RX ORDER — ONDANSETRON 2 MG/ML
INJECTION INTRAMUSCULAR; INTRAVENOUS PRN
Status: DISCONTINUED | OUTPATIENT
Start: 2019-10-09 | End: 2019-10-09

## 2019-10-09 RX ORDER — DIMENHYDRINATE 50 MG/ML
25 INJECTION, SOLUTION INTRAMUSCULAR; INTRAVENOUS
Status: DISCONTINUED | OUTPATIENT
Start: 2019-10-09 | End: 2019-10-09 | Stop reason: HOSPADM

## 2019-10-09 RX ORDER — HYDRALAZINE HYDROCHLORIDE 20 MG/ML
2.5-5 INJECTION INTRAMUSCULAR; INTRAVENOUS EVERY 10 MIN PRN
Status: DISCONTINUED | OUTPATIENT
Start: 2019-10-09 | End: 2019-10-09 | Stop reason: HOSPADM

## 2019-10-09 RX ORDER — LIDOCAINE 40 MG/G
CREAM TOPICAL
Status: DISCONTINUED | OUTPATIENT
Start: 2019-10-09 | End: 2019-10-09 | Stop reason: HOSPADM

## 2019-10-09 RX ORDER — METOPROLOL TARTRATE 1 MG/ML
1-2 INJECTION, SOLUTION INTRAVENOUS EVERY 5 MIN PRN
Status: DISCONTINUED | OUTPATIENT
Start: 2019-10-09 | End: 2019-10-09 | Stop reason: HOSPADM

## 2019-10-09 RX ORDER — EPHEDRINE SULFATE 50 MG/ML
INJECTION, SOLUTION INTRAMUSCULAR; INTRAVENOUS; SUBCUTANEOUS PRN
Status: DISCONTINUED | OUTPATIENT
Start: 2019-10-09 | End: 2019-10-09

## 2019-10-09 RX ORDER — ONDANSETRON 2 MG/ML
4 INJECTION INTRAMUSCULAR; INTRAVENOUS EVERY 30 MIN PRN
Status: DISCONTINUED | OUTPATIENT
Start: 2019-10-09 | End: 2019-10-09 | Stop reason: HOSPADM

## 2019-10-09 RX ORDER — SODIUM CHLORIDE, SODIUM LACTATE, POTASSIUM CHLORIDE, CALCIUM CHLORIDE 600; 310; 30; 20 MG/100ML; MG/100ML; MG/100ML; MG/100ML
INJECTION, SOLUTION INTRAVENOUS CONTINUOUS PRN
Status: DISCONTINUED | OUTPATIENT
Start: 2019-10-09 | End: 2019-10-09

## 2019-10-09 RX ORDER — MEPERIDINE HYDROCHLORIDE 25 MG/ML
12.5 INJECTION INTRAMUSCULAR; INTRAVENOUS; SUBCUTANEOUS
Status: DISCONTINUED | OUTPATIENT
Start: 2019-10-09 | End: 2019-10-09 | Stop reason: HOSPADM

## 2019-10-09 RX ADMIN — FENTANYL CITRATE 25 MCG: 50 INJECTION, SOLUTION INTRAMUSCULAR; INTRAVENOUS at 14:15

## 2019-10-09 RX ADMIN — FENTANYL CITRATE 50 MCG: 50 INJECTION, SOLUTION INTRAMUSCULAR; INTRAVENOUS at 14:10

## 2019-10-09 RX ADMIN — NAFCILLIN SODIUM 2 G: 2 INJECTION, POWDER, LYOPHILIZED, FOR SOLUTION INTRAMUSCULAR; INTRAVENOUS at 20:54

## 2019-10-09 RX ADMIN — PROPOFOL 70 MG: 10 INJECTION, EMULSION INTRAVENOUS at 12:46

## 2019-10-09 RX ADMIN — NAFCILLIN SODIUM 2 G: 2 INJECTION, POWDER, LYOPHILIZED, FOR SOLUTION INTRAMUSCULAR; INTRAVENOUS at 14:57

## 2019-10-09 RX ADMIN — Medication 5 MG: at 13:14

## 2019-10-09 RX ADMIN — LOPERAMIDE HYDROCHLORIDE 2 MG: 2 CAPSULE ORAL at 18:13

## 2019-10-09 RX ADMIN — Medication 1 CAPSULE: at 20:52

## 2019-10-09 RX ADMIN — FENTANYL CITRATE 50 MCG: 50 INJECTION, SOLUTION INTRAMUSCULAR; INTRAVENOUS at 12:43

## 2019-10-09 RX ADMIN — MULTIPLE VITAMINS W/ MINERALS TAB 1 TABLET: TAB at 16:09

## 2019-10-09 RX ADMIN — MIDAZOLAM 1 MG: 1 INJECTION INTRAMUSCULAR; INTRAVENOUS at 12:43

## 2019-10-09 RX ADMIN — SODIUM CHLORIDE, POTASSIUM CHLORIDE, SODIUM LACTATE AND CALCIUM CHLORIDE: 600; 310; 30; 20 INJECTION, SOLUTION INTRAVENOUS at 12:43

## 2019-10-09 RX ADMIN — NAFCILLIN SODIUM 2 G: 2 INJECTION, POWDER, LYOPHILIZED, FOR SOLUTION INTRAMUSCULAR; INTRAVENOUS at 05:33

## 2019-10-09 RX ADMIN — DICLOFENAC 2 G: 10 GEL TOPICAL at 03:08

## 2019-10-09 RX ADMIN — ACETAMINOPHEN 650 MG: 325 TABLET, FILM COATED ORAL at 05:33

## 2019-10-09 RX ADMIN — FENTANYL CITRATE 25 MCG: 50 INJECTION, SOLUTION INTRAMUSCULAR; INTRAVENOUS at 14:00

## 2019-10-09 RX ADMIN — LISINOPRIL 20 MG: 20 TABLET ORAL at 16:07

## 2019-10-09 RX ADMIN — PREDNISONE 5 MG: 5 TABLET ORAL at 16:08

## 2019-10-09 RX ADMIN — ACETAMINOPHEN 650 MG: 325 TABLET, FILM COATED ORAL at 20:52

## 2019-10-09 RX ADMIN — ACETAMINOPHEN 650 MG: 325 TABLET, FILM COATED ORAL at 18:11

## 2019-10-09 RX ADMIN — ONDANSETRON 4 MG: 2 INJECTION INTRAMUSCULAR; INTRAVENOUS at 13:25

## 2019-10-09 RX ADMIN — PROPOFOL 20 MG: 10 INJECTION, EMULSION INTRAVENOUS at 13:00

## 2019-10-09 RX ADMIN — NAFCILLIN SODIUM 2 G: 2 INJECTION, POWDER, LYOPHILIZED, FOR SOLUTION INTRAMUSCULAR; INTRAVENOUS at 10:46

## 2019-10-09 RX ADMIN — LIDOCAINE 1 PATCH: 560 PATCH PERCUTANEOUS; TOPICAL; TRANSDERMAL at 20:52

## 2019-10-09 RX ADMIN — AMLODIPINE BESYLATE 5 MG: 5 TABLET ORAL at 16:09

## 2019-10-09 RX ADMIN — SIMVASTATIN 20 MG: 20 TABLET, FILM COATED ORAL at 20:52

## 2019-10-09 RX ADMIN — FENTANYL CITRATE 50 MCG: 50 INJECTION, SOLUTION INTRAMUSCULAR; INTRAVENOUS at 13:00

## 2019-10-09 RX ADMIN — Medication 0.2 MG: at 14:24

## 2019-10-09 RX ADMIN — NAFCILLIN SODIUM 2 G: 2 INJECTION, POWDER, LYOPHILIZED, FOR SOLUTION INTRAMUSCULAR; INTRAVENOUS at 01:05

## 2019-10-09 RX ADMIN — NAFCILLIN SODIUM 2 G: 2 INJECTION, POWDER, LYOPHILIZED, FOR SOLUTION INTRAMUSCULAR; INTRAVENOUS at 18:16

## 2019-10-09 ASSESSMENT — PAIN DESCRIPTION - DESCRIPTORS: DESCRIPTORS: SPASM;SHARP

## 2019-10-09 ASSESSMENT — ACTIVITIES OF DAILY LIVING (ADL)
ADLS_ACUITY_SCORE: 20
ADLS_ACUITY_SCORE: 20
ADLS_ACUITY_SCORE: 23
ADLS_ACUITY_SCORE: 20
ADLS_ACUITY_SCORE: 23

## 2019-10-09 ASSESSMENT — LIFESTYLE VARIABLES: TOBACCO_USE: 1

## 2019-10-09 NOTE — PROGRESS NOTES
"SPIRITUAL HEALTH SERVICES  SPIRITUAL ASSESSMENT Progress Note  [facility] ([place]) 5A     Follow-up visit with pt and significant other, before pt's scheduled procedure today. When I asked pt and significant other what they are hoping for, pt hesitated but said she hopes \"they can take care of the infection without taking more of my leg...\" Partner said realistically that \"if they end up needing to amputate more, my hope is Sakshi can cope with that and maybe even benefit. That would be a best case.\"  I shared prayer and encouragement with pt and significant other, and will follow-up up with pt again.     PLAN: I will be gone until next week - on-call  always available for support if requested.    Jerson Oneill) Yudith Martinez M.Div., [facility]  Staff   Pager [phone]      "

## 2019-10-09 NOTE — PROGRESS NOTES
Social Work Services Progress Note     Hospital Day: 9  Collaborated with:  Primary team Rodyd Savage, patient     Data:  Pt is a 74-year-old female admitted to [facility] 10/1/19. Pt requires TCU at discharge for IV nafcillin q4.     Intervention:  Met with pt to see if pt has chosen any desired TCUs. Pt states she and her  have not looked at list yet. Updated pt that she may be ready for discharge in a couple days. Pt states she has surgery today and will look at list tomorrow. Pt wondering how long she will need to be in TCU, stated this is dependent on how long she will need IV abx. Pt disappointed by need to go to TCU.     Submitted insurance authorization via [employer] (ph [phone], fax [phone]) for TCU.     Assessment:  Pt has not chosen TCUs for referral yet     Plan:    Anticipated Disposition:  Facility: TCU    Barriers to d/c plan:  Medical stability    Follow Up:  SW to follow for discharge planning     DEBI Pedraza, LGSW    [facility]  Pager [phone]  Phone [phone]

## 2019-10-09 NOTE — ANESTHESIA CARE TRANSFER NOTE
Patient: Sakshi Jaeger    Procedure(s):  Irrigation and debridement Right leg    Diagnosis: Osteomyelitis (H) [M86.9]  Diagnosis Additional Information: No value filed.    Anesthesia Type:   General     Note:  Airway :Room Air  Patient transferred to:PACU  Comments: VSS. Breathing spontaneously at a regular rate with adequate tidal volumes and maintaining O2 sats on RA. Denies nausea or pain. No apparent complications from anesthesia.     Wilson Tillman MD, MSc.  Anesthesia Resident, CA-1  Handoff Report: Identifed the Patient, Identified the Reponsible Provider, Reviewed the pertinent medical history, Discussed the surgical course, Reviewed Intra-OP anesthesia mangement and issues during anesthesia, Set expectations for post-procedure period and Allowed opportunity for questions and acknowledgement of understanding      Vitals: (Last set prior to Anesthesia Care Transfer)    CRNA VITALS  10/9/2019 1308 - 10/9/2019 1352      10/9/2019             Resp Rate (observed): 16                Electronically Signed By: Wilson Tillman MD  October 9, 2019  1:52 PM

## 2019-10-09 NOTE — PLAN OF CARE
Pt is alert and oriented but forgetful. Vomited once unprovoked and a second time after liquid potassium given. Zofran given and pt was able to take her other meds and keep them down. Dressing changed to R knee. Pt is anxious about procedure tomorrow. Melatonin given to help with sleep.

## 2019-10-09 NOTE — CONSULTS
Health Psychology                  Clinic    Department of Medicine  Evette Madsen, PhD, LP [phone]                          Clinics and Surgery Center  [facility] Candi Harkins, PhD, LP [phone]                  3rd Floor  [place] [address]   Jerson Kelly, PhD, ABPP, LP [phone]     [address] [facility], [address],  Simi Temple,  PhD, LP [phone]            [place] [address] Kay Carrasquillo, PhD, LP [phone]     Inpatient Health Psychology Consultation    Reviewed chart and attempted to meet Ms. Jaeger per referral from Lily Cuello MD, [facility], for referral for depression in the context of prolonged hospitalization. Pt not available as she is currently in pre-op for debridement today. Health Psychology to return at a later time, with attending staff available next on 10/11.    Simi Temple, PhD, LP  Clinical Health Psychologist

## 2019-10-09 NOTE — PROGRESS NOTES
"Orthopaedic Surgery Progress Note   10/09/2019    Subjective: No acute events overnight. Service spoke with significant other at patient at length about surgical options and plan for today. Questions answered.     Objective: /59 (BP Location: Right arm)   Pulse 81   Temp 98.2  F (36.8  C) (Oral)   Resp 12   Ht 1.676 m (5' 6\")   Wt 49.9 kg (110 lb)   SpO2 96%   BMI 17.75 kg/m      General: NAD, resting  Cardio: RRR, extremities wwp.   Respiratory: Non-labored breathing.  MSK: Focused examination of     Right Lower Extremity: Below Knee amputation with 3 mm sinus at the distal tip - covered with dressing this am.  Tender but without surrounding erythema. No obvious drainage from the sinus tract today. No pain with ROM hip/knee.  Limb warm and well perfused with soft compartments and intact sensation in the thigh and leg.    Labs:     Lab Results   Component Value Date    WBC 14.2 (H) 10/09/2019    HGB 8.8 (L) 10/09/2019     (H) 10/09/2019       BMP:  Lab Results   Component Value Date     10/08/2019    POTASSIUM 2.9 (L) 10/08/2019    CHLORIDE 102 10/08/2019    CO2 24 10/08/2019    BUN 9 10/08/2019    CR 0.46 (L) 10/08/2019    ANIONGAP 10 10/08/2019    ADRIAN 8.0 (L) 10/08/2019    GLC 81 10/08/2019       Inflammatory Markers:  Lab Results   Component Value Date    WBC 14.2 (H) 10/09/2019    WBC 16.1 (H) 10/08/2019    WBC 12.9 (H) 10/07/2019    .0 (H) 10/04/2019    .0 (H) 10/03/2019    .0 (H) 10/02/2019    SED 34 03/16/2015    SED 46 (H) 04/21/2005    SED 44 (H) 04/14/2005       Cultures:   BCx (prior to 10/6) - MSSA    IMAGING:  MRI RLE (10/4/19) - focal osteomyelitis observed in distal tibia, open skin sinus tract      ASSESSMENT AND PLAN:     Sakshi Jaeger is a 74 year old female with PMH including CAD, RA (on methotrexate, leflunomide, prednisone), Hypertension, Miocardial infarction, osteoporosis, and Right Below knee amputation who presents with a 4 day history of right " limb pain, malaise, and 1 day of fevers, bacteremia Consistent with a:     1. Infection of the below knee amputation stump of Right leg with sinus tracking to bone and osteomyelitis of the distal tibial stump.     Patient had noted to have MSSA bacteremia with source of Tibial osteomyelitis on admission.     Currently HDS, afebrile, on IV abx     Medicine primary  Activity: Up with assist until independent.  Weight bearing status: NWB RLE, no use of prosthesis, ambulate with crutches, walker, chair  Pain management: PO medications as tolerated.  Antibiotics/Tetanus: IV abx to continue per ID  Diet: NPO for OR today  Anticoagulation/DVT prophylaxis: Mechanical, ok for Chem DVT ppx per primary team  Labs: Trend CRP q48 h  Dressings: Keep clean, dry and intact, change PRN  Elevation: Elevate RLE on pillows to keep above the level of the heart as much as possible.   Physical Therapy/Occupational Therapy: Eval and treat as able  Cultures: Pending, follow culture results closely.  Staph aureus  Follow-up: pending hospital course    Disposition: maintain NPO today for I&D vs. Through knee amputation vs. AKA with Dr. Pantera Npaoles MD  Orthopedic Surgery PGY4  [phone]

## 2019-10-09 NOTE — BRIEF OP NOTE
[facility], [place]    Brief Operative Note    Patient: Sakshi Jaeger  : 1945  Date of Service: 10/9/2019 12:33 PM      Pre-operative diagnosis: Osteomyelitis (H) [M86.9]   Post-operative diagnosis Osteomyelitis (H) [M86.9]   Procedure: Procedure(s):  Irrigation and debridement Right leg with packing and partial closure of the incision    Surgeon: Dr. Doron Mott MD   Assistants(s): Stefania Artis PA-C   Estimated blood loss: 50 ml's    Specimens: Cultures of right tibia   Findings: See dictated operative report       Plan:  Transfer back to Inpatient. Medicine Primary  Pain medication per primary team  Appreciate ID recommendations   Appreciate wound nurse consult for management of right lower leg stump wound.  Distal wound left open 5cm and distal tibia packed with one strip of 1/2 in packing gauze.  Soft dressing to right lower leg stump.  To be changed by wound nurse on POD#2  Drains: none  DVT prophylaxis: none per ortho  F/U in clinic with Tiffani Lora or Dr. Walker in 2 weeks for incision check and suture removal    I was asked by Dr. Mott to assist with surgery. I positioned and prepped the patient. I retracted soft tissue.   I suctioned fluids when needed. I provided traction for dissection. I helped to ligate blood vessels. I helped Dr. Mott identify and protect important structures. The procedure was medically necessary for an assistant because Dr. Mott needed the operative exposure and assistance that I provided. This allowed him to safely and efficiently operate. It was also important that I help ligate blood vessels to maintain hemostasis and reduce the bleeding risk. I helped with the closure of the operative incisions as well as helping with the boot/cast/splint.  The assistance that I provided reduced operative time which meant less general anesthetic for the patient. No qualified residents were available to assist.    Stefania Artis PA-C  [facility]

## 2019-10-09 NOTE — PLAN OF CARE
[id number]: Afebrile. VSS on RA. A/Ox4. Calls appropriately. Pt has BKA and is here for abscess on R stump + bacteremia. IV nafcillin given q4hrs. RPIV TKO between infusions. Had I&D done this afternoon in OR. Patient is stable post-op and is on capnography monitoring. R leg is packed and dressed with ace bandage; plan for WOC to remove dressing on POD2. C/O pain in back. Scheduled tylenol given with relief. Wears lidocaine patch overnight. Incontinent of urine and stool. Purewick in place. Buttocks intact with blanchable redness. Cleansed and barrier cream applied. Regular diet with fair appetite. Significant other at bedside throughout the day attentive and involved in POC. Continue to monitor.    Tiffany Pineda RN on 10/9/2019 at 6:06 PM

## 2019-10-09 NOTE — ANESTHESIA PREPROCEDURE EVALUATION
Anesthesia Pre-Procedure Evaluation    Patient: Sakshi Jaeger   MRN:     [id number] Gender:   female   Age:    74 year old :      1945        Preoperative Diagnosis: Osteomyelitis (H) [M86.9]   Procedure(s):  Irrigation and debridement Right leg  Possible partial tibial excision Right leg     Past Medical History:   Diagnosis Date     BENIGN HYPERTENSION 3/1/2005     CAD (coronary artery disease) 2011     Employs prosthetic leg 2012     Hypertension goal BP (blood pressure) < 130/80 2011     IRON DEFIC ANEMIA NOS 9/15/2005     Lower limb amputation, below knee 2012     MI (myocardial infarction) (H)     3/2010     OSTEOPOROSIS NOS 2005     Rheumatoid arthritis(714.0) 2005      Past Surgical History:   Procedure Laterality Date     ---------INCISION AND DRAINAGE  3/5/14     AMPUTATION BELOW KNEE RT/LT      right lowwer leg.      APPENDECTOMY       ARTHROPLASTY KNEE  2011    Procedure:ARTHROPLASTY KNEE; Surgeon:JESSE HAWKINS; Location:[facility] OR     coronary stent       SURGICAL HISTORY OF -       Appendectomy          Anesthesia Evaluation     . Pt has had prior anesthetic. Type: General and Regional    No history of anesthetic complications          ROS/MED HX    ENT/Pulmonary:     (+)tobacco use, Past use , . .    Neurologic:  - neg neurologic ROS     Cardiovascular:     (+) Dyslipidemia, hypertension--CAD, -past MI (3/21/10),-stent,3/21/10  1 RCA Drug Eluting Stent .. : . . . :. . Previous cardiac testing Echodate:10/7/19results:No vegetation or mass identified, however this does not exclude endocarditis.  Transgastric views not obtained due to patient discomfort and aggitation.  Transgastric views not obtained due to patient discomfort and aggitation.  EF=60-65%. No signif valve dzdate: results:ECG reviewed date:10/1/19 results:Sinus rhythm 91 with Premature atrial complexes  Otherwise normal ECG   date: results:          METS/Exercise Tolerance:     Hematologic:      (+) Anemia (Hb=8.8), -      Musculoskeletal: Comment: S/P L TKA  S/P R BKA after infection 2006  LBP  (+) arthritis (RA),  -       GI/Hepatic:  - neg GI/hepatic ROS       Renal/Genitourinary:  - ROS Renal section negative       Endo:     (+) Chronic steroid usage for Arthritis Date most recently used: today,.      Psychiatric:  - neg psychiatric ROS       Infectious Disease: Comment: osteo        Malignancy:      - no malignancy   Other: Comment: Ca++=7.7                        PHYSICAL EXAM:   Mental Status/Neuro: A/A/O   Airway: Facies: Feasible  Mallampati: I  Mouth/Opening: Full  TM distance: > 6 cm  Neck ROM: Full   Respiratory: Auscultation: CTAB     Resp. Rate: Normal     Resp. Effort: Normal      CV: Rhythm: Regular  Rate: Age appropriate  Heart: Normal Sounds  Edema: None   Comments:      Dental: Normal Dentition                LABS:  CBC:   Lab Results   Component Value Date    WBC 14.2 (H) 10/09/2019    WBC 16.1 (H) 10/08/2019    HGB 8.8 (L) 10/09/2019    HGB 9.8 (L) 10/08/2019    HCT 28.1 (L) 10/09/2019    HCT 31.0 (L) 10/08/2019     (H) 10/09/2019     (H) 10/08/2019     BMP:   Lab Results   Component Value Date     10/08/2019     10/07/2019    POTASSIUM 2.9 (L) 10/08/2019    POTASSIUM 3.4 10/07/2019    CHLORIDE 102 10/08/2019    CHLORIDE 102 10/07/2019    CO2 24 10/08/2019    CO2 26 10/07/2019    BUN 9 10/08/2019    BUN 10 10/07/2019    CR 0.46 (L) 10/08/2019    CR 0.43 (L) 10/07/2019    GLC 81 10/08/2019    GLC 89 10/07/2019     COAGS:   Lab Results   Component Value Date    PTT 39 (H) 10/01/2019    INR 1.38 (H) 10/01/2019     POC:   Lab Results   Component Value Date     (H) 05/03/2011     OTHER:   Lab Results   Component Value Date    LACT 0.9 10/01/2019    A1C 5.6 07/07/2008    ADRIAN 8.0 (L) 10/08/2019    PHOS 3.9 06/08/2016    MAG 1.7 04/02/2010    ALBUMIN 1.9 (L) 10/07/2019    PROTTOTAL 5.4 (L) 10/07/2019    ALT 14 10/07/2019    AST 14 10/07/2019    ALKPHOS 64  "10/07/2019    BILITOTAL 0.6 10/07/2019    LIPASE 37 (L) 10/01/2019    TSH 2.97 06/06/2007    .0 (H) 10/04/2019    SED 34 03/16/2015        Preop Vitals    BP Readings from Last 3 Encounters:   10/09/19 135/59   09/30/19 123/74   06/19/19 145/68    Pulse Readings from Last 3 Encounters:   10/08/19 81   09/30/19 75   06/19/19 68      Resp Readings from Last 3 Encounters:   10/09/19 12   06/19/19 16   01/24/19 12    SpO2 Readings from Last 3 Encounters:   10/08/19 96%   09/30/19 95%   06/19/19 98%      Temp Readings from Last 1 Encounters:   10/09/19 36.8  C (98.2  F) (Oral)    Ht Readings from Last 1 Encounters:   10/01/19 1.676 m (5' 6\")      Wt Readings from Last 1 Encounters:   10/01/19 49.9 kg (110 lb)    Estimated body mass index is 17.75 kg/m  as calculated from the following:    Height as of this encounter: 1.676 m (5' 6\").    Weight as of this encounter: 49.9 kg (110 lb).     LDA:  Peripheral IV 10/06/19 Right;Anterior Lower forearm (Active)   Site Assessment WDL 10/9/2019  2:00 AM   Line Status Infusing 10/9/2019  2:00 AM   Phlebitis Scale 0-->no symptoms 10/9/2019  2:00 AM   Infiltration Scale 0 10/9/2019  2:00 AM   Infiltration Site Treatment Method  None 10/9/2019  2:00 AM   Extravasation? No 10/9/2019  2:00 AM   Dressing Intervention Other (Comment) 10/6/2019  9:00 AM   Number of days: 3       Urethral Catheter Straight-tip (Active)   Number of days: 3087        Assessment:   ASA SCORE: 3    H&P: History and physical reviewed and following examination; no interval change.   Smoking Status:  Non-Smoker/Unknown   NPO Status: NPO Appropriate     Plan:   Anes. Type:  General   Pre-Medication: None   Induction:  IV (Standard)   Airway: ETT; Oral   Access/Monitoring: PIV; 2nd PIV (has 22 G)   Maintenance: Balanced     Postop Plan:   Postop Pain: Opioids  Postop Sedation/Airway: Not planned  Disposition: Inpatient/Admit     PONV Management:   Adult Risk Factors: Female, Non-Smoker, Postop Opioids "   Prevention: Ondansetron, Dexamethasone     CONSENT: Direct conversation   Plan and risks discussed with: Patient          Comments for Plan/Consent:  Replace Ca++    Airway Size: 7;  Cuffed;  Oral endotracheal tube;  Blade Type: Mann;  Blade Size: 2;  Place by: Gilda PAREDES;  Insertion Attempts: 1;  Secured at (cm)to lip: 22 cm;  Breath Sounds: Other (comment ) (Diminished BS, Rx Albuterol Inhaler, BS improved, exp wheeze);  End Tidal CO2: Present;  Dentition: Intact (partial plate secured pre-op);  Grade View of Cords: 1                 Tali Bear MD

## 2019-10-09 NOTE — PROGRESS NOTES
"SPIRITUAL HEALTH SERVICES  [facility] ([place]) 5A  ON-CALL VISIT     REFERRAL SOURCE: On-call  visit with patient Sakshi and significant other Santi, per request for hospital  visit as noted in initial nursing assessment.     Pt said she was quite frustrated: \"communication with my doctors has been difficult\". Pt said she often asks God \"why is this happening to me?\". Pt self-identifies as Yarsani, but is active in the [employer] that Santi attends (he is [employer]): \"I help out with the meals when there are funerals at the [employer]...\"  Pt said she expects she will be having surgery on Thursday. Prayer was welcomed today, particularly regarding her upcoming procedure.      PLAN: I will continue to follow while pt on 5A - will check in on pt on Thursday before her surgery.     Jerson Oneill) Yudith Martinez M.Div., [facility]  Staff   Pager [phone]                                                                                          "

## 2019-10-09 NOTE — OR NURSING
"Following message was sent to Sakshi Guillermo MD: pt needs an affirmation consent that states \"Right below knee amputation wound debridement, irrigation, versus revision amputation\".   "

## 2019-10-10 ENCOUNTER — APPOINTMENT (OUTPATIENT)
Dept: PHYSICAL THERAPY | Facility: CLINIC | Age: 74
DRG: 492 | End: 2019-10-10
Attending: PHYSICIAN ASSISTANT
Payer: COMMERCIAL

## 2019-10-10 ENCOUNTER — APPOINTMENT (OUTPATIENT)
Dept: OCCUPATIONAL THERAPY | Facility: CLINIC | Age: 74
DRG: 492 | End: 2019-10-10
Payer: COMMERCIAL

## 2019-10-10 LAB
ANION GAP SERPL CALCULATED.3IONS-SCNC: 11 MMOL/L (ref 3–14)
BUN SERPL-MCNC: 7 MG/DL (ref 7–30)
CALCIUM SERPL-MCNC: 7.5 MG/DL (ref 8.5–10.1)
CHLORIDE SERPL-SCNC: 104 MMOL/L (ref 94–109)
CO2 SERPL-SCNC: 21 MMOL/L (ref 20–32)
CREAT SERPL-MCNC: 0.48 MG/DL (ref 0.52–1.04)
CRP SERPL-MCNC: 59 MG/L (ref 0–8)
ERYTHROCYTE [DISTWIDTH] IN BLOOD BY AUTOMATED COUNT: 15.3 % (ref 10–15)
GFR SERPL CREATININE-BSD FRML MDRD: >90 ML/MIN/{1.73_M2}
GLUCOSE SERPL-MCNC: 85 MG/DL (ref 70–99)
HCT VFR BLD AUTO: 26.4 % (ref 35–47)
HGB BLD-MCNC: 8.3 G/DL (ref 11.7–15.7)
MCH RBC QN AUTO: 28.3 PG (ref 26.5–33)
MCHC RBC AUTO-ENTMCNC: 31.4 G/DL (ref 31.5–36.5)
MCV RBC AUTO: 90 FL (ref 78–100)
PLATELET # BLD AUTO: 506 10E9/L (ref 150–450)
POTASSIUM SERPL-SCNC: 3.2 MMOL/L (ref 3.4–5.3)
RBC # BLD AUTO: 2.93 10E12/L (ref 3.8–5.2)
SODIUM SERPL-SCNC: 136 MMOL/L (ref 133–144)
WBC # BLD AUTO: 16.1 10E9/L (ref 4–11)

## 2019-10-10 PROCEDURE — 25000131 ZZH RX MED GY IP 250 OP 636 PS 637: Performed by: STUDENT IN AN ORGANIZED HEALTH CARE EDUCATION/TRAINING PROGRAM

## 2019-10-10 PROCEDURE — 0QBG0ZZ EXCISION OF RIGHT TIBIA, OPEN APPROACH: ICD-10-PCS | Performed by: ORTHOPAEDIC SURGERY

## 2019-10-10 PROCEDURE — 25000125 ZZHC RX 250: Performed by: INTERNAL MEDICINE

## 2019-10-10 PROCEDURE — 25000132 ZZH RX MED GY IP 250 OP 250 PS 637: Performed by: STUDENT IN AN ORGANIZED HEALTH CARE EDUCATION/TRAINING PROGRAM

## 2019-10-10 PROCEDURE — 80048 BASIC METABOLIC PNL TOTAL CA: CPT | Performed by: STUDENT IN AN ORGANIZED HEALTH CARE EDUCATION/TRAINING PROGRAM

## 2019-10-10 PROCEDURE — 36415 COLL VENOUS BLD VENIPUNCTURE: CPT | Performed by: STUDENT IN AN ORGANIZED HEALTH CARE EDUCATION/TRAINING PROGRAM

## 2019-10-10 PROCEDURE — 40000141 ZZH STATISTIC PERIPHERAL IV START W/O US GUIDANCE

## 2019-10-10 PROCEDURE — 85027 COMPLETE CBC AUTOMATED: CPT | Performed by: STUDENT IN AN ORGANIZED HEALTH CARE EDUCATION/TRAINING PROGRAM

## 2019-10-10 PROCEDURE — 25000128 H RX IP 250 OP 636: Performed by: STUDENT IN AN ORGANIZED HEALTH CARE EDUCATION/TRAINING PROGRAM

## 2019-10-10 PROCEDURE — 12000001 ZZH R&B MED SURG/OB UMMC

## 2019-10-10 PROCEDURE — 25000132 ZZH RX MED GY IP 250 OP 250 PS 637: Performed by: INTERNAL MEDICINE

## 2019-10-10 PROCEDURE — 97535 SELF CARE MNGMENT TRAINING: CPT | Mod: GO | Performed by: OCCUPATIONAL THERAPIST

## 2019-10-10 PROCEDURE — 97110 THERAPEUTIC EXERCISES: CPT | Mod: GO | Performed by: OCCUPATIONAL THERAPIST

## 2019-10-10 PROCEDURE — 99233 SBSQ HOSP IP/OBS HIGH 50: CPT | Mod: GC | Performed by: INTERNAL MEDICINE

## 2019-10-10 PROCEDURE — 97110 THERAPEUTIC EXERCISES: CPT | Mod: GP

## 2019-10-10 PROCEDURE — 86140 C-REACTIVE PROTEIN: CPT | Performed by: STUDENT IN AN ORGANIZED HEALTH CARE EDUCATION/TRAINING PROGRAM

## 2019-10-10 RX ORDER — POTASSIUM CHLORIDE 750 MG/1
40 TABLET, EXTENDED RELEASE ORAL ONCE
Status: COMPLETED | OUTPATIENT
Start: 2019-10-10 | End: 2019-10-10

## 2019-10-10 RX ORDER — POTASSIUM CHLORIDE 1.5 G/1.58G
40 POWDER, FOR SOLUTION ORAL ONCE
Status: DISCONTINUED | OUTPATIENT
Start: 2019-10-10 | End: 2019-10-10

## 2019-10-10 RX ADMIN — ACETAMINOPHEN 650 MG: 325 TABLET, FILM COATED ORAL at 20:30

## 2019-10-10 RX ADMIN — POTASSIUM CHLORIDE 40 MEQ: 750 TABLET, EXTENDED RELEASE ORAL at 10:50

## 2019-10-10 RX ADMIN — NAFCILLIN SODIUM 2 G: 2 INJECTION, POWDER, LYOPHILIZED, FOR SOLUTION INTRAMUSCULAR; INTRAVENOUS at 18:20

## 2019-10-10 RX ADMIN — NAFCILLIN SODIUM 2 G: 2 INJECTION, POWDER, LYOPHILIZED, FOR SOLUTION INTRAMUSCULAR; INTRAVENOUS at 09:55

## 2019-10-10 RX ADMIN — Medication 1 CAPSULE: at 20:30

## 2019-10-10 RX ADMIN — AMLODIPINE BESYLATE 5 MG: 5 TABLET ORAL at 10:51

## 2019-10-10 RX ADMIN — LOPERAMIDE HYDROCHLORIDE 2 MG: 2 CAPSULE ORAL at 16:38

## 2019-10-10 RX ADMIN — NAFCILLIN SODIUM 2 G: 2 INJECTION, POWDER, LYOPHILIZED, FOR SOLUTION INTRAMUSCULAR; INTRAVENOUS at 00:35

## 2019-10-10 RX ADMIN — LIDOCAINE 1 PATCH: 560 PATCH PERCUTANEOUS; TOPICAL; TRANSDERMAL at 20:30

## 2019-10-10 RX ADMIN — LISINOPRIL 20 MG: 20 TABLET ORAL at 10:50

## 2019-10-10 RX ADMIN — NAFCILLIN SODIUM 2 G: 2 INJECTION, POWDER, LYOPHILIZED, FOR SOLUTION INTRAMUSCULAR; INTRAVENOUS at 04:53

## 2019-10-10 RX ADMIN — ACETAMINOPHEN 650 MG: 325 TABLET, FILM COATED ORAL at 00:35

## 2019-10-10 RX ADMIN — ACETAMINOPHEN 650 MG: 325 TABLET, FILM COATED ORAL at 16:38

## 2019-10-10 RX ADMIN — ENOXAPARIN SODIUM 40 MG: 40 INJECTION SUBCUTANEOUS at 10:51

## 2019-10-10 RX ADMIN — PREDNISONE 5 MG: 5 TABLET ORAL at 10:50

## 2019-10-10 RX ADMIN — Medication 1 CAPSULE: at 10:51

## 2019-10-10 RX ADMIN — MULTIPLE VITAMINS W/ MINERALS TAB 1 TABLET: TAB at 10:51

## 2019-10-10 RX ADMIN — NAFCILLIN SODIUM 2 G: 2 INJECTION, POWDER, LYOPHILIZED, FOR SOLUTION INTRAMUSCULAR; INTRAVENOUS at 21:33

## 2019-10-10 RX ADMIN — LOPERAMIDE HYDROCHLORIDE 2 MG: 2 CAPSULE ORAL at 21:33

## 2019-10-10 RX ADMIN — NAFCILLIN SODIUM 2 G: 2 INJECTION, POWDER, LYOPHILIZED, FOR SOLUTION INTRAMUSCULAR; INTRAVENOUS at 14:09

## 2019-10-10 RX ADMIN — ACETAMINOPHEN 650 MG: 325 TABLET, FILM COATED ORAL at 10:50

## 2019-10-10 RX ADMIN — SIMVASTATIN 20 MG: 20 TABLET, FILM COATED ORAL at 20:30

## 2019-10-10 RX ADMIN — LOPERAMIDE HYDROCHLORIDE 2 MG: 2 CAPSULE ORAL at 11:05

## 2019-10-10 ASSESSMENT — ACTIVITIES OF DAILY LIVING (ADL)
ADLS_ACUITY_SCORE: 23
ADLS_ACUITY_SCORE: 22
ADLS_ACUITY_SCORE: 22
ADLS_ACUITY_SCORE: 23
ADLS_ACUITY_SCORE: 22
ADLS_ACUITY_SCORE: 22

## 2019-10-10 NOTE — ANESTHESIA POSTPROCEDURE EVALUATION
Anesthesia POST Procedure Evaluation    Patient: Sakshi Jaeger   MRN:     [id number] Gender:   female   Age:    74 year old :      1945        Preoperative Diagnosis: Osteomyelitis (H) [M86.9]   Procedure(s):  Irrigation and debridement Right leg   Postop Comments: No value filed.       Anesthesia Type:  Not documented  General    Reportable Event: NO     PAIN: Uncomplicated   Sign Out status: Comfortable, Well controlled pain     PONV: No PONV   Sign Out status:  No Nausea or Vomiting     Neuro/Psych: Uneventful perioperative course   Sign Out Status: Preoperative baseline; Age appropriate mentation     Airway/Resp.: Uneventful perioperative course   Sign Out Status: Non labored breathing, age appropriate RR; Resp. Status within EXPECTED Parameters     CV: Uneventful perioperative course   Sign Out status: Appropriate BP and perfusion indices; Appropriate HR/Rhythm     Disposition:   Sign Out in:  PACU  Disposition:  Floor  Recovery Course: Uneventful  Follow-Up: Not required           Last Anesthesia Record Vitals:  CRNA VITALS  10/9/2019 1308 - 10/9/2019 1408      10/9/2019             Resp Rate (observed):  (!) 2          Last PACU Vitals:  Vitals Value Taken Time   /45 10/10/2019  6:21 AM   Temp 36.6  C (97.9  F) 10/10/2019  6:21 AM   Pulse 70 10/9/2019  2:30 PM   Resp 16 10/10/2019  6:21 AM   SpO2 96 % 10/10/2019  6:21 AM   Temp src     NIBP     Pulse     SpO2     Resp     Temp     Ht Rate     Temp 2           Electronically Signed By: Rehan Paulino MD, October 10, 2019, 8:11 AM

## 2019-10-10 NOTE — PROGRESS NOTES
GENERAL ID SERVICE: PROGRESS NOTE  Patient:  Sakshi Jaeger, Date of birth 1945, Medical record number [id number]  Date of Admission: 10/1/2019  Date of Visit:  10/10/2019         Assessment and Recommendations:   Problem List:    #1 Right tibial stump osteomyelitis with CT findings of  fluid collection containing internal foci of subcutaneous emphysema adjacent to the distal tibial stump as well as evidence of osteomyelitis - Wound culture from 9/30 positive for MSSA - s/p surgical debridement on 10/9 - tissue cultures positive for MSSA  - CT lumbar spine without evidence of spinal osteomyelitis     #2 MSSA and Streptococcus mitis  bloodstream infection secondary to #1   - TTE from 10/2 did not show vegetations  - ILNDA (10/7) negative for vegetations  - Blood cultures positive for MSSA from 10/1-10/4. Blood cultures negative since 10/6  - Blood culture positive for Streptococcus mitis on 10/5           Discussion:     Mrs. Sakshi Jaeger is a 74 year-old female admitted on 10/01/19. 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 She presented to the ED with  worsening R amputation stump pain for the 4 days (started on 9/27)  and fever. She did have fever as well as leukocytosis and elevated CRP.  Wound culture from 9/30 positive for MSSA and blood cultures also positive for MSSA (they persist positive so far from 10/1-10/3). In addition a CT femur showed fluid collection containing internal foci ofsubcutaneous emphysema adjacent to the distal tibial stump. There is extension to the medullary cavity suggestive of osteomyelitis. MRI of the right limb showed osteomyelitis of distal tibia, extending approximately 3 cm from the knee joint line. As well as 4.6 x 4.1 cm air-fluid level containing fluid collection with likely  open skin sinus tract at amputation stump tip.      Patient was initially on vancomycin given presumed allergy to penicillin, although she passed the augmentin challenge test and she was switched to nafcillin on 10/6/19.  LINDA was negative for vegetation. Ortho performed right lower leg I and D on 10/9 with partial tibial excision. Intraoperative tissue cultures are positive for Staphylococcus aureus. White count elevated today and I believe that this inflammatory response from surgical procedure     Recommendations:  1. Please continue nafcillin 2 grams IV q 4h (she can be placed in continuous infusion through CADD pump when outside the hospital setting - if insurance coverage allows).   Duration will be at least 6 weeks for treatment of MSSA and Streptococcus mitis bloodstream infection + right tibial stump osteomyelitis (D1: 10/6/19 - date of the first negative blood cultures) . Anticipated last day of treatment will be November 17, 2019.       3. PICC line can be placed if necessary since blood cultures are negative                 4.  While on antibiotics, please obtain at least weekly CBC,  CMP, ESR and CRP (daily CBC and BMP while in hospital). When she is in the outpatient setting, the results will need to be faxed to the ID clinic ()     5. Follow up schedule in the ID clinic on the week of November 11, 2019. . I will call clinic and request to schedule a follow up appointment. Once I hear from the clinic in regards of the follow up appointment I will place a miscellaneous note.      6. I will sign off at this moment. Please see sign off recommendations above. Please page me [phone] if any question. I will still follow peripherally   to make sure white count trends down.      Zeinab Robert MD  Date of Service: 10/10/19  Pager: [phone]          Interval History:        Micro:  9/30 wound culture positive for MSSA  10/01 blood culture MSSA  10/02 blood culture MSSA  10/3 blood culture MSSA  10/4  "blood culture MSSA  10/5 blood culture MSSA  10/6 blood culture NTD  10/7 blood culture NTD  10/8 blood culture NTD       ATB:  - Nafcilin started on 10/6        - On vancomycin started on 10/01- 10/6 - discontinued            Physical Exam:   /57 (BP Location: Right arm)   Pulse 70   Temp 98.4  F (36.9  C) (Oral)   Resp 16   Ht 1.676 m (5' 6\")   Wt 49.9 kg (110 lb)   SpO2 97%   BMI 17.75 kg/m       GENERAL:  Not in acute distress.   HEAD: Normocephalic and atraumatic  ENT:  No hearing impairment, no ear pain or exudate, ear canals and TM's normal, nose and mouth without ulcers or lesions, oropharynx clear, oral mucous membranes moist, no tonsillar erythema and edema, oropharynx is without exudates or ulcers.  EYES:  Eyes grossly normal to inspection, PERRL and conjunctivae and sclerae normal   NECK:  Supple, no adenopathy, no asymmetry, masses, or scars and thyroid normal to palpation  LUNGS:  Clear to auscultation - no rales, rhonchi or wheezes  CARDIOVASCULAR:  Regular rate and rhythm, normal S1 S2, no S3 or S4, no murmur, click or rub, no peripheral edema and peripheral pulses strong  ABDOMEN:  Soft, nontender, no hepatosplenomegaly, no masses and bowel sounds normal  EXT/MS/SKIN:  Right BKA stump covered with dressings  NEUROLOGIC:  Grossly nonfocal. Normal strength and tone, mentation intact and speech normal         Laboratory Data:     Creatinine   Date Value Ref Range Status   10/10/2019 0.48 (L) 0.52 - 1.04 mg/dL Final   10/09/2019 0.45 (L) 0.52 - 1.04 mg/dL Final   10/08/2019 0.46 (L) 0.52 - 1.04 mg/dL Final   10/07/2019 0.43 (L) 0.52 - 1.04 mg/dL Final   10/06/2019 0.44 (L) 0.52 - 1.04 mg/dL Final     WBC   Date Value Ref Range Status   10/10/2019 16.1 (H) 4.0 - 11.0 10e9/L Final   10/09/2019 14.2 (H) 4.0 - 11.0 10e9/L Final   10/08/2019 16.1 (H) 4.0 - 11.0 10e9/L Final   10/07/2019 12.9 (H) 4.0 - 11.0 10e9/L Final   10/06/2019 13.6 (H) 4.0 - 11.0 10e9/L Final     Hemoglobin   Date Value Ref " Range Status   10/10/2019 8.3 (L) 11.7 - 15.7 g/dL Final     Platelet Count   Date Value Ref Range Status   10/10/2019 506 (H) 150 - 450 10e9/L Final     Lab Results   Component Value Date     10/10/2019    BUN 7 10/10/2019    CO2 21 10/10/2019     C-Reactive Protein   Date Value Ref Range Status   03/16/2015 4.10 (A) 0 - 0.8 mg/dL Final   11/05/2014 0.26 0 - 0.8 mg/dL Final   04/21/2005 0.43 0.00 - 0.80 mg/dL Final   04/14/2005 0.59 0.00 - 0.80 mg/dL Final   04/08/2005 0.45 0.00 - 0.80 mg/dL Final     CRP Inflammation   Date Value Ref Range Status   10/10/2019 59.0 (H) 0.0 - 8.0 mg/L Final   10/09/2019 59.0 (H) 0.0 - 8.0 mg/L Final   10/04/2019 180.0 (H) 0.0 - 8.0 mg/L Final   10/03/2019 240.0 (H) 0.0 - 8.0 mg/L Final   10/02/2019 270.0 (H) 0.0 - 8.0 mg/L Final           Pertinent Recent Microbiology Data:     Recent Labs   Lab 10/09/19  1304 10/09/19  1303 10/08/19  1215 10/08/19  1037 10/07/19  0621 10/06/19  0755 10/05/19  0639 10/04/19  0617   CULT Culture negative after 17 hours  Culture negative monitoring continues  Culture negative monitoring continues Culture negative after 19 hours  Light growth  Staphylococcus aureus  *  Culture in progress  Culture negative monitoring continues  --  No growth after 2 days No growth after 3 days No growth after 4 days Cultured on the 1st day of incubation:  Streptococcus mitis group  *  Critical Value/Significant Value, preliminary result only, called to and read back by  Italia Pineda RN at 0843 on 10.6.19 kln.    (Note)  POSITIVE for STREPTOCOCCUS SPECIES OTHER THAN pneumococcus, anginosus  group, S. pyogenes and S. agalactiae. Performed using [employer]  multiplex nucleic acid test. Final identification and antimicrobial  susceptibility testing will be verified by standard methods.    Specimen tested with [employer] multiplex, gram-positive blood culture  nucleic acid test for the following targets: Staph aureus, Staph  epidermidis, Staph lugdunensis, other  Staph species, Enterococcus  faecalis, Enterococcus faecium, Streptococcus species, S. agalactiae,  S. anginosus grp., S. pneumoniae, S. pyogenes, Listeria sp., mecA  (methicillin resistance) and Archie/B (vancomycin resistance).    Critical Value/Significant Value called to and read back by Italia Pineda @ 1139.cg 10/06/19     Cultured on the 1st day of incubation:  Staphylococcus aureus  Susceptibility testing done on previous specimen  *  Critical Value/Significant Value, preliminary result only, called to and read back by  CASEY PINEDA RN 1348 10/5/19 AA     SDES Right Distal Tibia CULTURE2  Right Distal Tibia CULTURE2  Right Distal Tibia CULTURE2  Right Distal Tibia CULTURE2 Tibia Right Distal Tissue SPECIMEN 1  Tibia Right Distal Tissue SPECIMEN 1  Tibia Right Distal Tissue SPECIMEN 1  Tibia Right Distal Tissue SPECIMEN 1 Feces Blood Right Hand Blood Left Arm Blood Left Arm Blood Right Arm Blood Right Arm              Imaging:   No results found for this or any previous visit (from the past 48 hour(s)).

## 2019-10-10 NOTE — PROGRESS NOTES
[facility] Progress Note - Teaching Service, Roddy Savage       Date of Admission:  10/1/2019  Assessment & Plan     Acute R Tibial osteomyelitis w MSSA bacteremia with abscess s/p debridement   Initial CT w/contrast of the R knee was notable for sinus tracking down the femur, suspicious for early onset osteomyelitis. Subsequent MRI of the R Knee was notable for fluid collection with a sinus tract to the skin, concerning for abscess formation. Initially kept on vancomycin IV (10/1-10/6) due to hx of PCN allergy (hives), but was switched to IV nafcillin after tolerating amoxicillin trial.  Blood culture remain negative as of 10/6; LINDA results negative for IE. S/P  irrigation and debridement right leg with packing and partial closure of the incision on 10/9, currently HDS and resuming normal diet. Patient's antibiotic course will total 6 weeks of IV nafcillin infusions, beginning with the first culture negative date.  - Continue nafcillin 2gm IV q4h (10/6- )   - Pending tissue culture results taken from Ortho  - Ortho following, appreciate recs  - Appreciate ID inputs    Depressed mood - improving  Has been having anxious mood and frustrated from being in the hospital for some time, but improved with health psychology and defined discharge plan.  - Health psychology support appreciated     Physical deconditioning - improving  R BKA infection has caused patient ample distress and difficulty with ambulating.   -PT/OT following, PT rec TCU at discharge     Mild hypoalbuminemia  Albumin 2.5 on admission, most likely due to poor nutrition in the setting of infection.  -Regular diet ordered, continue to monitor PO intake     Incidentally noted Right pleural effusion  Patient with CT findings of trace right pleural fluid and tree-in-bud nodular pattern, concerning for inflammatory v infectious process. No clinical respiratory symptoms to suggest pneumonia or another pulmonary  etiology at this time.  -If patient begins to experience signs of respiratory distress, will obtain sputum culture and broaden coverage     Thrombocytosis - improving  Likely in the setting of acute inflammation.  - Continue to monitor     Mild hyponatremia - resolved  Sodium 128 on admission, most likely in the setting of hypovolemia from infection. Improved to 136 on 10/7.     Chronic medical conditions---  #Rheumatoid arthritis - Continue prednisone 5 mg daily; will hold DMARDs at this time, most likely to be restarted by outpatient Rheumatologist.  #CAD - Continue PTA statin dose  #HTN - Hold antihypertensives in the setting of developing infection/sepsis. Resume lower dose of metoprolol if BP remains elevated  #Lumbar spinal stenosis - Oxycodone 5 mg Q4H prn, lidocaine patches, diclofenac gel PRN.     Diet: Snacks/Supplements Adult: Boost Shake; Between Meals  Snacks/Supplements Adult: Other; see below; Between Meals  Regular Diet Adult    DVT Prophylaxis: Enoxaparin (Lovenox) 40 mg subcutaneous  Nolan Catheter: in place, indication:  present for surgery  Code Status: Full Code      Disposition Plan   Expected discharge: 2 - 3 days, recommended to transitional care unit once SIRS/Sepsis treated.  Entered: Sathya Nolasco MD 10/10/2019, 7:08 AM     The patient's care was discussed with the Attending Physician, Dr. Sahni, Bedside Nurse and Patient.    Sathya Nolasco MD  [address], [place]  Pager: [phone]  Please see sticky note for cross cover information  ______________________________________________________________________    Interval History   Nursing notes reviewed, no acute events overnight.  Patient reports sleeping well, states she wants to proceed with more PT exercises. She is tolerating a regular diet and reports being in better spirits. No fever, chills, tingling, numbness, rash, increased lower extremity pain, n/v/d, dysuria.    4 point ROS is  negative except for what is noted in the HPI.    Data reviewed today: I reviewed all medications, new labs and imaging results over the last 24 hours.    Physical Exam   Vital Signs: Temp: 97.9  F (36.6  C) Temp src: Oral BP: 124/45 Pulse: 70 Heart Rate: 67 Resp: 16 SpO2: 96 % O2 Device: None (Room air)    Weight: 110 lbs 0 oz  General Appearance: Sitting upright in bed comfortably, appears in no acute distress  Respiratory: Non labored breathing. Speaking in full sentences on room air.  Cardiovascular: RRR, no murmur, rubs or gallops.  GI: Soft, non tender and non distended.  Ext: Able to move all 4 extremities. Mild tenderness to palpation of the R amputation stump  Skin: R amputation stumped is bandaged with no e/o drainage.   Neuro: Normal speech, no FND.      Data   Recent Labs   Lab 10/10/19  0512 10/09/19  0930 10/09/19  0640 10/08/19  1037 10/07/19  0621   WBC 16.1*  --  14.2* 16.1* 12.9*   HGB 8.3*  --  8.8* 9.8* 10.0*   MCV 90  --  91 90 89   *  --  472* 501* 501*   INR  --  1.31*  --   --   --      --  135 136 136   POTASSIUM 3.2*  --  3.5 2.9* 3.4   CHLORIDE 104  --  105 102 102   CO2 PENDING  --  22 24 26   BUN PENDING  --  10 9 10   CR PENDING  --  0.45* 0.46* 0.43*   ANIONGAP PENDING  --  8 10 8   ADRIAN PENDING  --  7.7* 8.0* 8.1*   GLC PENDING  --  88 81 89   ALBUMIN  --   --   --   --  1.9*   PROTTOTAL  --   --   --   --  5.4*   BILITOTAL  --   --   --   --  0.6   ALKPHOS  --   --   --   --  64   ALT  --   --   --   --  14   AST  --   --   --   --  14

## 2019-10-10 NOTE — PLAN OF CARE
5138-1226: Afebrile. VSS on RA. A/Ox4. Calls appropriately. Pt has BKA and is here for abscess on R stump + bacteremia. IV nafcillin given q4hrs. RPIV TKO between infusions. Today is POD1 for I&D of R knee abscess. R leg is packed and dressed with ace bandage; plan for WOC to remove dressing on POD2. C/O pain in back. Scheduled tylenol given with relief. Wears lidocaine patch overnight. Intermittently incontinent of urine and stool. Patient getting up to commode with A2 + GB today. Stools still watery, PRN immodium given x2. Buttocks intact with blanchable redness. Cleansed and barrier cream applied. Regular diet with fair appetite. Continue to monitor.    Tiffany Pineda RN on 10/10/2019 at 7:02 PM

## 2019-10-10 NOTE — PLAN OF CARE
1900 - 0730:     VSS on RA. A&Ox4. Assist of 1 in bed - pt did not get up overnight. Pt complaining of back pain. Scheduled Tylenol given. PIV TKO. IV antibiotics (Nafcillin) continued. R BKA wound covered with ACE wrap. WOC to change dressing in AM. Dressing CDI. Incontinent of bladder and bowel. BM x 2 overnight. Pt wants to try getting up to bedside commode today. SW following for TCU placement. Will continue to monitor.

## 2019-10-10 NOTE — OP NOTE
Procedure Date: 10/10/2019      PREOPERATIVE DIAGNOSIS:  Chronic nonhealing ulcer, right leg.      POSTOPERATIVE DIAGNOSIS:  Chronic nonhealing ulcer, right leg.      PROCEDURE:  Right lower leg irrigation and debridement with partial tibia excision.      SURGEON:  Doron Mott MD.       FIRST ASSISTANT:  Stefania Artis PA-C.  Mrs. Artis's assistance was required to help with positioning, the surgery itself, holding retractors, closing the wound and application of immobilization devices.  At the time of surgery, there was no available help from an orthopedic trainee.      COMPLICATIONS:  None.      DRAINS:  None.      ESTIMATED BLOOD LOSS:  50  mL.      PATHOLOGY:  Routine cultures from aerobe, anaerobe, Gram stain, fungal and AFB for 2 samples from the tibia canal.      INDICATIONS:  Please refer to hospital chart.      DESCRIPTION OF PROCEDURE:  On 10/10/2019, patient was taken to surgery.  The patient was maintained on her regular schedule of antibiotic treatment.      After successful induction of general endotracheal anesthesia, she was placed supine on the operating table.  The right lower extremity was prepped and draped in a sterile fashion.  After exsanguination by gravity, the tourniquet cuff was inflated to 250 mmHg on the proximal third of the right thigh.      A pause for the cause was performed according to our institution's policy, which confirmed laterality of the procedure.      Following this, we proceeded with an incision along the previous surgical incision for the below-the-knee amputation.  Subcutaneous tissues were dissected.  We proceeded with identification of the most distal portion of the tibia.  We proceeded then with an incision along the most posteromedial portion of the canal and we confirmed the presence of an extremely soft material compatible with a chronic infection.  No purulence was identified at any point.  We proceeded with an extensive debridement of the canal as  well as trimming of the edges of the distal tibia in order to remove the existing infection. A large amount of cancellous bone was excised with a curette from the intramedullary canal. This was excisional.     We debrided approximately 2.5 cm of the canal along the most posteromedial portion of the tibia.  This was performed until finding more consistent intramedullary bone.      With a total of 2 liters of normal saline the area was irrigated.  Following this, we proceeded with approximation of the skin edges with monofilament suture material while still having the center portion that was packed open.  The packing was placed all the way into the tibial canal.      Tourniquet cuff was deflated prior to closure.  Sterile dressings were applied.  The patient was transferred in stable condition to PACU after being extubated in the OR.      PLAN:  The patient will remain nonweightbearing until satisfactory healing of the wound.  The patient will proceed with dressing changes within 48 hours, which will be managed by the wound nurse team while being in the hospital and she will require outpatient followup with either Dr. Walker or Dr. Oseguera.      The patient will require a 2-week appointment for suture removal, which will be followed by a 6-week appointment with us.  No x-rays will be obtained.        Antibiotic therapy will be dictated by ID based on culture results.         JESSICA HAAS MD             D: 10/10/2019   T: 10/10/2019   MT:       Name:     CARLOS PEDRAZA   MRN:      [id number]        Account:        [id number]   :      1945           Procedure Date: 10/10/2019      Document: [id number]

## 2019-10-10 NOTE — PLAN OF CARE
Discharge Planner OT   Patient plan for discharge: wishing for home, accepting of TCU  Current status: pt completed transfer bed <> commode 2x, with ModA 1st attempt, and Adan 2nd attempt.  Pt sat EOB for hygienes and BUE exercises with 1# weights. Returned to supine SBA.    Barriers to return to prior living situation: medical status, falls risk  Recommendations for discharge: TCU  Rationale for recommendations: patient below baseline, and would benefit from continued therapy to increase safety and independence with ADLs       Entered by: QUAN TAN 10/10/2019 2:38 PM

## 2019-10-10 NOTE — PROGRESS NOTES
"Orthopaedic Surgery Progress Note   10/10/2019    Subjective: returned to floor s/p OR yesterday. No acute events overnight. Pain controlled. Denies fevers, chills, sweats. Denies cp, sob.     Objective: /45 (BP Location: Left arm)   Pulse 70   Temp 97.9  F (36.6  C) (Oral)   Resp 16   Ht 1.676 m (5' 6\")   Wt 49.9 kg (110 lb)   SpO2 96%   BMI 17.75 kg/m      General: NAD, resting  Cardio: RRR, extremities wwp.   Respiratory: Non-labored breathing.  MSK: Focused examination of     Right Lower Extremity: Below Knee amputation with operative dressing in place, c/d/i. No pain with ROM hip/knee.      Labs:     Lab Results   Component Value Date    WBC 16.1 (H) 10/10/2019    HGB 8.3 (L) 10/10/2019     (H) 10/10/2019       BMP:  Lab Results   Component Value Date     10/10/2019    POTASSIUM 3.2 (L) 10/10/2019    CHLORIDE 104 10/10/2019    CO2 21 10/10/2019    BUN 7 10/10/2019    CR 0.48 (L) 10/10/2019    ANIONGAP 11 10/10/2019    ADRIAN 7.5 (L) 10/10/2019    GLC 85 10/10/2019       Inflammatory Markers:  Lab Results   Component Value Date    WBC 16.1 (H) 10/10/2019    WBC 14.2 (H) 10/09/2019    WBC 16.1 (H) 10/08/2019    CRP 59.0 (H) 10/10/2019    CRP 59.0 (H) 10/09/2019    .0 (H) 10/04/2019    SED 34 03/16/2015    SED 46 (H) 04/21/2005    SED 44 (H) 04/14/2005       Cultures:   BCx (prior to 10/6) - MSSA    IMAGING:  MRI RLE (10/4/19) - focal osteomyelitis observed in distal tibia, open skin sinus tract      ASSESSMENT AND PLAN:     Sakshi Jaeger is a 74 year old female with PMH including CAD, RA (on methotrexate, leflunomide, prednisone), Hypertension, Miocardial infarction, osteoporosis, and Right Below knee amputation who presents with a 4 day history of right limb pain, malaise, and 1 day of fevers, bacteremia Consistent with a:     1. Infection of the below knee amputation stump of Right leg with sinus tracking to bone and osteomyelitis of the distal tibial stump.     Patient had noted " to have MSSA bacteremia with source of Tibial osteomyelitis on admission.     Now s/p the follow orthopedic procedures:  - right BKA stump I&D, packing and partial closure on 10/10 with Dr. Mott     Medicine primary  Pain medication per primary team  Appreciate ID recommendations   Appreciate wound nurse consult for management of right lower leg stump wound.  Distal wound left open 5cm and distal tibia packed with one strip of 1/2 in packing gauze.  Soft dressing to right lower leg stump.  To be changed by wound nurse on POD#2  Drains: none  DVT prophylaxis: none per ortho    F/U in clinic with Tiffani Lora or Dr. Walker in 2 weeks for incision check and suture removal     Nick Napoles MD  Orthopedic Surgery PGY4  [phone]

## 2019-10-10 NOTE — PLAN OF CARE
Discharge Planner PT   Patient plan for discharge: rehab  Current status: Pt agreeable to therapy for in bed exercises only as reporting fatigue post OT session. Pt performs supine exercises with good tolerance, issued handout for IND performance.   Barriers to return to prior living situation: medical, mobility  Recommendations for discharge: TCU  Rationale for recommendations: Pt is below baseline, recommend rehab stay to progress IND w/ transfers and functional strengthening prior to discharge home.        Entered by: Janeth Meyer 10/10/2019 3:44 PM

## 2019-10-11 LAB
ANION GAP SERPL CALCULATED.3IONS-SCNC: 6 MMOL/L (ref 3–14)
BUN SERPL-MCNC: 6 MG/DL (ref 7–30)
CALCIUM SERPL-MCNC: 7.6 MG/DL (ref 8.5–10.1)
CHLORIDE SERPL-SCNC: 107 MMOL/L (ref 94–109)
CO2 SERPL-SCNC: 22 MMOL/L (ref 20–32)
CREAT SERPL-MCNC: 0.47 MG/DL (ref 0.52–1.04)
CRP SERPL-MCNC: 55 MG/L (ref 0–8)
ERYTHROCYTE [DISTWIDTH] IN BLOOD BY AUTOMATED COUNT: 15.5 % (ref 10–15)
GFR SERPL CREATININE-BSD FRML MDRD: >90 ML/MIN/{1.73_M2}
GLUCOSE SERPL-MCNC: 88 MG/DL (ref 70–99)
HCT VFR BLD AUTO: 25 % (ref 35–47)
HGB BLD-MCNC: 7.8 G/DL (ref 11.7–15.7)
MCH RBC QN AUTO: 28.2 PG (ref 26.5–33)
MCHC RBC AUTO-ENTMCNC: 31.2 G/DL (ref 31.5–36.5)
MCV RBC AUTO: 90 FL (ref 78–100)
PLATELET # BLD AUTO: 505 10E9/L (ref 150–450)
POTASSIUM SERPL-SCNC: 3.3 MMOL/L (ref 3.4–5.3)
RBC # BLD AUTO: 2.77 10E12/L (ref 3.8–5.2)
SODIUM SERPL-SCNC: 136 MMOL/L (ref 133–144)
WBC # BLD AUTO: 13.9 10E9/L (ref 4–11)

## 2019-10-11 PROCEDURE — 25000132 ZZH RX MED GY IP 250 OP 250 PS 637: Performed by: INTERNAL MEDICINE

## 2019-10-11 PROCEDURE — 12000001 ZZH R&B MED SURG/OB UMMC

## 2019-10-11 PROCEDURE — 86140 C-REACTIVE PROTEIN: CPT | Performed by: STUDENT IN AN ORGANIZED HEALTH CARE EDUCATION/TRAINING PROGRAM

## 2019-10-11 PROCEDURE — 25000125 ZZHC RX 250: Performed by: INTERNAL MEDICINE

## 2019-10-11 PROCEDURE — 36415 COLL VENOUS BLD VENIPUNCTURE: CPT | Performed by: STUDENT IN AN ORGANIZED HEALTH CARE EDUCATION/TRAINING PROGRAM

## 2019-10-11 PROCEDURE — 80048 BASIC METABOLIC PNL TOTAL CA: CPT | Performed by: STUDENT IN AN ORGANIZED HEALTH CARE EDUCATION/TRAINING PROGRAM

## 2019-10-11 PROCEDURE — 25000128 H RX IP 250 OP 636: Performed by: STUDENT IN AN ORGANIZED HEALTH CARE EDUCATION/TRAINING PROGRAM

## 2019-10-11 PROCEDURE — 85027 COMPLETE CBC AUTOMATED: CPT | Performed by: STUDENT IN AN ORGANIZED HEALTH CARE EDUCATION/TRAINING PROGRAM

## 2019-10-11 PROCEDURE — 25000128 H RX IP 250 OP 636

## 2019-10-11 PROCEDURE — 25000132 ZZH RX MED GY IP 250 OP 250 PS 637: Performed by: STUDENT IN AN ORGANIZED HEALTH CARE EDUCATION/TRAINING PROGRAM

## 2019-10-11 PROCEDURE — G0463 HOSPITAL OUTPT CLINIC VISIT: HCPCS

## 2019-10-11 PROCEDURE — 99233 SBSQ HOSP IP/OBS HIGH 50: CPT | Mod: GC | Performed by: INTERNAL MEDICINE

## 2019-10-11 PROCEDURE — 25000131 ZZH RX MED GY IP 250 OP 636 PS 637: Performed by: STUDENT IN AN ORGANIZED HEALTH CARE EDUCATION/TRAINING PROGRAM

## 2019-10-11 RX ORDER — HYDROMORPHONE HYDROCHLORIDE 1 MG/ML
.25-.5 INJECTION, SOLUTION INTRAMUSCULAR; INTRAVENOUS; SUBCUTANEOUS ONCE
Status: COMPLETED | OUTPATIENT
Start: 2019-10-11 | End: 2019-10-11

## 2019-10-11 RX ORDER — POTASSIUM CHLORIDE 750 MG/1
40 TABLET, EXTENDED RELEASE ORAL DAILY
Status: DISCONTINUED | OUTPATIENT
Start: 2019-10-12 | End: 2019-10-13 | Stop reason: HOSPADM

## 2019-10-11 RX ORDER — LIDOCAINE 40 MG/G
CREAM TOPICAL
Status: DISCONTINUED | OUTPATIENT
Start: 2019-10-11 | End: 2019-10-13 | Stop reason: HOSPADM

## 2019-10-11 RX ORDER — POTASSIUM CHLORIDE 750 MG/1
40 TABLET, EXTENDED RELEASE ORAL ONCE
Status: COMPLETED | OUTPATIENT
Start: 2019-10-11 | End: 2019-10-11

## 2019-10-11 RX ORDER — HYDROMORPHONE HYDROCHLORIDE 1 MG/ML
INJECTION, SOLUTION INTRAMUSCULAR; INTRAVENOUS; SUBCUTANEOUS
Status: COMPLETED
Start: 2019-10-11 | End: 2019-10-11

## 2019-10-11 RX ORDER — HEPARIN SODIUM,PORCINE 10 UNIT/ML
2-5 VIAL (ML) INTRAVENOUS
Status: DISCONTINUED | OUTPATIENT
Start: 2019-10-11 | End: 2019-10-13 | Stop reason: HOSPADM

## 2019-10-11 RX ADMIN — ACETAMINOPHEN 650 MG: 325 TABLET, FILM COATED ORAL at 17:45

## 2019-10-11 RX ADMIN — AMLODIPINE BESYLATE 5 MG: 5 TABLET ORAL at 09:28

## 2019-10-11 RX ADMIN — HYDROMORPHONE HYDROCHLORIDE 0.2 MG: 1 INJECTION, SOLUTION INTRAMUSCULAR; INTRAVENOUS; SUBCUTANEOUS at 10:58

## 2019-10-11 RX ADMIN — PREDNISONE 5 MG: 5 TABLET ORAL at 09:28

## 2019-10-11 RX ADMIN — LISINOPRIL 20 MG: 20 TABLET ORAL at 09:29

## 2019-10-11 RX ADMIN — Medication 1 CAPSULE: at 20:03

## 2019-10-11 RX ADMIN — Medication 1 CAPSULE: at 11:44

## 2019-10-11 RX ADMIN — NAFCILLIN SODIUM 2 G: 2 INJECTION, POWDER, LYOPHILIZED, FOR SOLUTION INTRAMUSCULAR; INTRAVENOUS at 17:29

## 2019-10-11 RX ADMIN — ACETAMINOPHEN 650 MG: 325 TABLET, FILM COATED ORAL at 01:39

## 2019-10-11 RX ADMIN — HYDROMORPHONE HYDROCHLORIDE 0.3 MG: 1 INJECTION, SOLUTION INTRAMUSCULAR; INTRAVENOUS; SUBCUTANEOUS at 10:38

## 2019-10-11 RX ADMIN — SIMVASTATIN 20 MG: 20 TABLET, FILM COATED ORAL at 21:16

## 2019-10-11 RX ADMIN — NAFCILLIN SODIUM 2 G: 2 INJECTION, POWDER, LYOPHILIZED, FOR SOLUTION INTRAMUSCULAR; INTRAVENOUS at 13:47

## 2019-10-11 RX ADMIN — NAFCILLIN SODIUM 2 G: 2 INJECTION, POWDER, LYOPHILIZED, FOR SOLUTION INTRAMUSCULAR; INTRAVENOUS at 00:43

## 2019-10-11 RX ADMIN — ACETAMINOPHEN 650 MG: 325 TABLET, FILM COATED ORAL at 05:47

## 2019-10-11 RX ADMIN — NAFCILLIN SODIUM 2 G: 2 INJECTION, POWDER, LYOPHILIZED, FOR SOLUTION INTRAMUSCULAR; INTRAVENOUS at 05:11

## 2019-10-11 RX ADMIN — ACETAMINOPHEN 650 MG: 325 TABLET, FILM COATED ORAL at 09:32

## 2019-10-11 RX ADMIN — NAFCILLIN SODIUM 2 G: 2 INJECTION, POWDER, LYOPHILIZED, FOR SOLUTION INTRAMUSCULAR; INTRAVENOUS at 09:28

## 2019-10-11 RX ADMIN — MULTIPLE VITAMINS W/ MINERALS TAB 1 TABLET: TAB at 11:44

## 2019-10-11 RX ADMIN — ACETAMINOPHEN 650 MG: 325 TABLET, FILM COATED ORAL at 21:16

## 2019-10-11 RX ADMIN — ACETAMINOPHEN 650 MG: 325 TABLET, FILM COATED ORAL at 13:45

## 2019-10-11 RX ADMIN — NAFCILLIN SODIUM 2 G: 2 INJECTION, POWDER, LYOPHILIZED, FOR SOLUTION INTRAMUSCULAR; INTRAVENOUS at 21:16

## 2019-10-11 RX ADMIN — LIDOCAINE 1 PATCH: 560 PATCH PERCUTANEOUS; TOPICAL; TRANSDERMAL at 20:02

## 2019-10-11 RX ADMIN — POTASSIUM CHLORIDE 40 MEQ: 750 TABLET, EXTENDED RELEASE ORAL at 09:28

## 2019-10-11 RX ADMIN — ENOXAPARIN SODIUM 40 MG: 40 INJECTION SUBCUTANEOUS at 09:29

## 2019-10-11 ASSESSMENT — ACTIVITIES OF DAILY LIVING (ADL)
ADLS_ACUITY_SCORE: 22

## 2019-10-11 NOTE — PROGRESS NOTES
[facility], [facility] Progress Note - Teaching Service, Roddy Savage       Date of Admission:  10/1/2019  Assessment & Plan     Acute R Tibial osteomyelitis w MSSA bacteremia with abscess s/p debridement   Initial CT w/contrast of the R knee demonstrated  sinus tracking down the femur, suspicious for early onset osteomyelitis. Subsequent MRI of the R Knee was notable for fluid collection with a sinus tract to the skin, concerning for abscess formation. Initially kept on vancomycin IV (10/1-10/6) due to hx of PCN allergy (hives), but was switched to IV nafcillin after tolerating amoxicillin trial.  Blood culture remain negative as of 10/6; LINDA results negative for IE. S/P  irrigation and debridement right leg with packing and partial closure of the incision on 10/9, currently HDS and resuming normal diet. Patient's antibiotic course will total 6 weeks of IV nafcillin infusions, beginning with the first culture negative date.  - Continue nafcillin 2gm IV q4h (10/6- )   - Pending tissue culture results taken from Ortho    Depressed mood - improving  Has been having anxious mood and frustrated from being in the hospital for some time, but improved with health psychology and defined discharge plan.  - Health psychology support appreciated     Physical deconditioning - improving  R BKA infection has caused patient ample distress and difficulty with ambulating.   -PT/OT following, PT rec TCU at discharge     Mild hypoalbuminemia   Albumin 2.5 on admission, most likely due to poor nutrition in the setting of infection.     Incidentally noted Right pleural effusion - no active symptoms of respiratory distress.    Thrombocytosis - improving  Likely in the setting of acute inflammation.  - Continue to monitor     Mild hyponatremia - resolved  Sodium 128 on admission, most likely in the setting of hypovolemia from infection. Improved to 136 on 10/7.     Chronic medical  conditions---  #Rheumatoid arthritis - Continue prednisone 5 mg daily; will hold DMARDs at this time, most likely to be restarted by outpatient Rheumatologist.  #CAD - Continue PTA statin dose  #HTN - Resume lower dose of metoprolol  #Lumbar spinal stenosis - Oxycodone 5 mg Q4H prn, lidocaine patches, diclofenac gel PRN.     Diet: Snacks/Supplements Adult: Boost Shake; Between Meals  Snacks/Supplements Adult: Other; see below; Between Meals  Regular Diet Adult      DVT Prophylaxis: Enoxaparin (Lovenox) 40 mg subcutaneous  Nolan Catheter: in place, indication:  present for surgery  Code Status: Full Code      Disposition Plan   Expected discharge: 2 - 3 days, recommended to transitional care unit once SIRS/Sepsis treated.  Entered: Sathya Nolasco MD 10/11/2019, 7:15 AM     The patient's care was discussed with the Attending Physician, Dr. Sahni, Bedside Nurse and Patient.    Sathya Nolasco MD  [address]  Pager: [phone]  Please see sticky note for cross cover information  ______________________________________________________________________    Interval History   Nursing notes reviewed, no acute events overnight. Patient reports feeling improved overall, notes feeling pain with dressing changes. Otherwise she states she is in good spirits and exploring TCU options with her significant other. No fever, chills, CP, SOB, cough, abd pain, n/v/d, rash or purulent discharge.    4 point ROS is negative except for what is noted in the HPI.    Data reviewed today: I reviewed all medications, new labs and imaging results over the last 24 hours.    Physical Exam   Vital Signs: Temp: 97.5  F (36.4  C) Temp src: Oral BP: (!) 168/60 Pulse: 68 Heart Rate: 69 Resp: 16 SpO2: 97 % O2 Device: None (Room air)    Weight: 110 lbs 0 oz  General Appearance: Sitting upright in bed comfortably, appears in no acute distress  Respiratory: Non labored breathing. Speaking in full  sentences on room air.  Cardiovascular: RRR, no murmur, rubs or gallops.  GI: Soft, non tender and non distended.  Ext: Able to move all 4 extremities. Mild tenderness to palpation of the R amputation stump  Skin: R amputation stumped is bandaged with no e/o drainage.   Neuro: Normal speech, no FND.      Data   Recent Labs   Lab 10/10/19  0512 10/09/19  0930 10/09/19  0640 10/08/19  1037 10/07/19  0621   WBC 16.1*  --  14.2* 16.1* 12.9*   HGB 8.3*  --  8.8* 9.8* 10.0*   MCV 90  --  91 90 89   *  --  472* 501* 501*   INR  --  1.31*  --   --   --      --  135 136 136   POTASSIUM 3.2*  --  3.5 2.9* 3.4   CHLORIDE 104  --  105 102 102   CO2 21  --  22 24 26   BUN 7  --  10 9 10   CR 0.48*  --  0.45* 0.46* 0.43*   ANIONGAP 11  --  8 10 8   ADRIAN 7.5*  --  7.7* 8.0* 8.1*   GLC 85  --  88 81 89   ALBUMIN  --   --   --   --  1.9*   PROTTOTAL  --   --   --   --  5.4*   BILITOTAL  --   --   --   --  0.6   ALKPHOS  --   --   --   --  64   ALT  --   --   --   --  14   AST  --   --   --   --  14

## 2019-10-11 NOTE — CONSULTS
Health Psychology                  Clinic    Department of Medicine  Evette Madsen, PhD, LP [phone]                          Clinics and Surgery Center  [facility] Candi Harkins, PhD, LP [phone]                  3rd Floor  [place] Mail Code 743   Jerson Kelly, PhD, ABPP, LP [phone]     [facility], [address],  Simi Temple,  PhD, LP [phone]            [place] [address] Kay Carrasquillo, PhD, LP [phone]     Inpatient Health Psychology Consultation    Attempted again to meet Ms. Jaeger per referral from Lily Cuello MD, [facility], for referral for depression in the context of prolonged hospitalization. Ms Jaeger was occupied with visit from volunteer and therapy dog and then with nursing cares. Health Psychology to return at a later time, with attending staff available next on 10/14.    Evette Madsen, PhD, LP  [phone]

## 2019-10-11 NOTE — PROGRESS NOTES
[facility] Nurse Inpatient Wound Assessment   Reason for consultation: Evaluate and treat R BKA  Wound  New: concern for R IT PI      Assessment  R BKA stump wound due to Surgical Wound, s/p I&D with ortho on 10/9,   Status: surgical packing difficult to remove today due to being packed into the tibial canal and getting caught. Large bleeding occurred with removal as well as severe pain. Patient given IV dilaudid with minimal relief. Bleeding controlled with surgicel.     R knee wound: prior wound site with scar tissue and dry, flaky skin.   Status: follow up, improving, less dry and flaky    Bilateral IT's with chronic moisture changes. Skin is intact with brown discoloration. No acute injury and patient has been completing turns independently, does have constant urine leakage however.     Treatment Plan  R BKA stump wound: Daily irrigate cavity with saline, then pack with exufiber AG (at bedside), cover with ABD pad, secure with kerlix, then place ace wrap.     R knee: BID apply Vaseline over dry, flaky area.     Orders Written    WO Nurse follow-up plan:weekly  Nursing to notify the Provider(s) and re-consult the WO Nurse if wound(s) deteriorates or new skin concern.    Patient History  According to provider note(s):  74 year old female with history of RA on DMARD therapy s/p R BKA due to surgical infection of the R ankle, CAD and HTN who presents with acute onset R amputation stump pain with purulent drainage positive for S.aureus and CT findings concerning for early onset osteomyelitis. S/P I&D with ortho on 10/9.     Objective Data  Containment of urine/stool: Continent of bowel and External catheter    Active Diet Order  Orders Placed This Encounter      Regular Diet Adult      Output:   I/O last 3 completed shifts:  In: -   Out: 200 [Urine:200]    Risk Assessment:   Sensory Perception: 3-->slightly limited  Moisture: 3-->occasionally moist  Activity: 2-->chairfast  Mobility: 3-->slightly limited  Nutrition:  3-->adequate  Friction and Shear: 2-->potential problem  Alexandro Score: 16                          Labs:   Recent Labs   Lab 10/11/19  0646  10/09/19  0930  10/07/19  0621   ALBUMIN  --   --   --   --  1.9*   HGB 7.8*   < >  --    < > 10.0*   INR  --   --  1.31*  --   --    WBC 13.9*   < >  --    < > 12.9*   CRP 55.0*   < >  --    < >  --     < > = values in this interval not displayed.       Physical Exam  Skin inspection: focused R BKA    Wound Location:  R BKA stump  Wound History: noted recently, per ortho chronic osteo present   Measurements (length x width x depth, in cm) 0.5cm x 1.5cm x 4cm  Wound Base: bone in base  Palpation of the wound bed: Normal  Periwound skin: dry/scaly  Color: pink  Temperature: normal   Drainage:, moderate  Description of drainage: bloody  Odor: none  Pain: denies , none     Bilateral IT's with circular brown blanchable areas, likely chronic moisture skin changes.     Interventions  Current support surface: Standard  Atmos Air mattress  Current off-loading measures: Pillows under calves  Visual inspection of wound(s) completed  Wound Care: done per plan of care  Supplies: ordered: aquacel ag  Education provided: plan of care  Discussed plan of care with Patient and Nurse    Kay Babcock, RN, BSN, CWON

## 2019-10-11 NOTE — PROGRESS NOTES
Social Work Services Progress Note    Hospital Day: 10  Collaborated with:  Pt & significant other, chart review, bedside nurse    Data:  SW involved for discharge planning - TCU recommended.     SW met w/ Pt and significant other at bedside to discuss TCU recommendation. Patient is agreeable to TCU for q4 IV antibiotics and wound care. Per team, Pt could potentially be ready to discharge over the weekend vs early next week. Pt's significant other will transport her when she is ready to discharge.     [facility] (P: [phone], option 1, 8, 1, 2, 1, Ext [phone]) to be updated with discharge location and readjust prior auth dates. Authorization Number: [id number] (Dates 10/12/19-10/16/19).     TCU referrals sent today:   1. [facility] (P: [phone]; F: [phone])  2. [facility] (P: [phone]; F: [phone])  3. [facility]  (P: [phone]; F: [phone])    Intervention:  Discharge planning    Assessment:  Pt was pleasant and open to SW visit. Patient is actively particpatory in discharge planning and reports significant support at home.     Plan:    Anticipated Disposition:  Facility:  [facility]    Barriers to d/c plan:  Medical stability    Follow Up:  SW to f/u & assist as needed.     DEBI Moise, KAROL  7C Surgical/Oncology Unit   Phone: [phone]  Pager: [phone]

## 2019-10-11 NOTE — PLAN OF CARE
5A PT: Attempted PT session this PM though pt asleep and pt's  firmly requests letting pt rest. Will check back as able or reschedule per PT POC.

## 2019-10-11 NOTE — PLAN OF CARE
1900 - 0730:     VSS on RA. A&Ox4. Assist of 2. Pt up to bedside commode in evening. Pt complaining of back pain. Scheduled Tylenol given. Lidocaine patch on lower back. PIV TKO. IV antibiotics (Nafcillin) continued. R BKA wound covered with ACE wrap. WOC to change dressing in AM. Dressing CDI. Incontinent of bladder and bowel. BM x 1 overnight. Imodium given x 1 for diarrhea. SW following for TCU placement. Will continue to monitor.

## 2019-10-12 ENCOUNTER — APPOINTMENT (OUTPATIENT)
Dept: GENERAL RADIOLOGY | Facility: CLINIC | Age: 74
DRG: 492 | End: 2019-10-12
Attending: STUDENT IN AN ORGANIZED HEALTH CARE EDUCATION/TRAINING PROGRAM
Payer: COMMERCIAL

## 2019-10-12 LAB
ACID FAST STN SPEC QL: NORMAL
BACTERIA SPEC CULT: ABNORMAL
BACTERIA SPEC CULT: NO GROWTH
Lab: ABNORMAL
Lab: NORMAL
POTASSIUM SERPL-SCNC: 3.6 MMOL/L (ref 3.4–5.3)
SPECIMEN SOURCE: ABNORMAL
SPECIMEN SOURCE: NORMAL

## 2019-10-12 PROCEDURE — 25000125 ZZHC RX 250: Performed by: INTERNAL MEDICINE

## 2019-10-12 PROCEDURE — 25000132 ZZH RX MED GY IP 250 OP 250 PS 637: Performed by: INTERNAL MEDICINE

## 2019-10-12 PROCEDURE — 36569 INSJ PICC 5 YR+ W/O IMAGING: CPT

## 2019-10-12 PROCEDURE — 25000128 H RX IP 250 OP 636: Performed by: STUDENT IN AN ORGANIZED HEALTH CARE EDUCATION/TRAINING PROGRAM

## 2019-10-12 PROCEDURE — C1751 CATH, INF, PER/CENT/MIDLINE: HCPCS

## 2019-10-12 PROCEDURE — 40000986 XR CHEST PORT 1 VW

## 2019-10-12 PROCEDURE — 25000131 ZZH RX MED GY IP 250 OP 636 PS 637: Performed by: STUDENT IN AN ORGANIZED HEALTH CARE EDUCATION/TRAINING PROGRAM

## 2019-10-12 PROCEDURE — 27211417 ZZ H KIT, VPS RHYTHM STYLET

## 2019-10-12 PROCEDURE — 84132 ASSAY OF SERUM POTASSIUM: CPT | Performed by: STUDENT IN AN ORGANIZED HEALTH CARE EDUCATION/TRAINING PROGRAM

## 2019-10-12 PROCEDURE — 25000132 ZZH RX MED GY IP 250 OP 250 PS 637: Performed by: STUDENT IN AN ORGANIZED HEALTH CARE EDUCATION/TRAINING PROGRAM

## 2019-10-12 PROCEDURE — 27211414 ZZ H ADHESIVE SKIN CLOSURE, DERMABOND

## 2019-10-12 PROCEDURE — 12000001 ZZH R&B MED SURG/OB UMMC

## 2019-10-12 PROCEDURE — 36415 COLL VENOUS BLD VENIPUNCTURE: CPT | Performed by: STUDENT IN AN ORGANIZED HEALTH CARE EDUCATION/TRAINING PROGRAM

## 2019-10-12 PROCEDURE — 71045 X-RAY EXAM CHEST 1 VIEW: CPT

## 2019-10-12 PROCEDURE — 99233 SBSQ HOSP IP/OBS HIGH 50: CPT | Mod: GC | Performed by: INTERNAL MEDICINE

## 2019-10-12 RX ORDER — LIDOCAINE 40 MG/G
CREAM TOPICAL
Status: DISCONTINUED | OUTPATIENT
Start: 2019-10-12 | End: 2019-10-13 | Stop reason: HOSPADM

## 2019-10-12 RX ADMIN — ACETAMINOPHEN 650 MG: 325 TABLET, FILM COATED ORAL at 08:30

## 2019-10-12 RX ADMIN — ACETAMINOPHEN 650 MG: 325 TABLET, FILM COATED ORAL at 17:40

## 2019-10-12 RX ADMIN — ACETAMINOPHEN 650 MG: 325 TABLET, FILM COATED ORAL at 13:14

## 2019-10-12 RX ADMIN — Medication 1 CAPSULE: at 10:15

## 2019-10-12 RX ADMIN — NAFCILLIN SODIUM 2 G: 2 INJECTION, POWDER, LYOPHILIZED, FOR SOLUTION INTRAMUSCULAR; INTRAVENOUS at 08:24

## 2019-10-12 RX ADMIN — ACETAMINOPHEN 650 MG: 325 TABLET, FILM COATED ORAL at 02:26

## 2019-10-12 RX ADMIN — MULTIPLE VITAMINS W/ MINERALS TAB 1 TABLET: TAB at 10:14

## 2019-10-12 RX ADMIN — AMLODIPINE BESYLATE 5 MG: 5 TABLET ORAL at 08:21

## 2019-10-12 RX ADMIN — NAFCILLIN SODIUM 2 G: 2 INJECTION, POWDER, LYOPHILIZED, FOR SOLUTION INTRAMUSCULAR; INTRAVENOUS at 04:34

## 2019-10-12 RX ADMIN — NAFCILLIN SODIUM 2 G: 2 INJECTION, POWDER, LYOPHILIZED, FOR SOLUTION INTRAMUSCULAR; INTRAVENOUS at 17:40

## 2019-10-12 RX ADMIN — OXYCODONE HYDROCHLORIDE 5 MG: 5 TABLET ORAL at 10:13

## 2019-10-12 RX ADMIN — POTASSIUM CHLORIDE 40 MEQ: 750 TABLET, EXTENDED RELEASE ORAL at 10:14

## 2019-10-12 RX ADMIN — NAFCILLIN SODIUM 2 G: 2 INJECTION, POWDER, LYOPHILIZED, FOR SOLUTION INTRAMUSCULAR; INTRAVENOUS at 13:14

## 2019-10-12 RX ADMIN — ENOXAPARIN SODIUM 40 MG: 40 INJECTION SUBCUTANEOUS at 08:30

## 2019-10-12 RX ADMIN — NAFCILLIN SODIUM 2 G: 2 INJECTION, POWDER, LYOPHILIZED, FOR SOLUTION INTRAMUSCULAR; INTRAVENOUS at 01:27

## 2019-10-12 RX ADMIN — NAFCILLIN SODIUM 2 G: 2 INJECTION, POWDER, LYOPHILIZED, FOR SOLUTION INTRAMUSCULAR; INTRAVENOUS at 20:59

## 2019-10-12 RX ADMIN — PREDNISONE 5 MG: 5 TABLET ORAL at 08:21

## 2019-10-12 RX ADMIN — LOPERAMIDE HYDROCHLORIDE 2 MG: 2 CAPSULE ORAL at 16:09

## 2019-10-12 RX ADMIN — LISINOPRIL 20 MG: 20 TABLET ORAL at 08:21

## 2019-10-12 ASSESSMENT — ACTIVITIES OF DAILY LIVING (ADL)
ADLS_ACUITY_SCORE: 15
ADLS_ACUITY_SCORE: 19
ADLS_ACUITY_SCORE: 15

## 2019-10-12 NOTE — PROGRESS NOTES
Temp: 99.3 F   Temp src: Oral BP: 123/60 Heart Rate:83  Resp: 14 SpO2: 98 % O2 Device: None (Room air)       Activity: Able to reposition in bed, non-walker, moves with assistance of 1. Can pivot for transfers.  Pain: Complains of low back pain. Well controlled with scheduled tylenol. 5 mg of oxy given prior to wound debridement. Lidocaine patch removed this morning.   Neuro: A/o x4  Cardiac: WDL  Respiratory: WDL  GI/: Voiding without difficulty, mentions that she sometimes wets her diaper in the evening if her call for assistance isn't answered fast enough. Had one loose bowel movement this morning. Refused laxatives and suppository.   Diet: Regular. Did not eat breakfast but ate 100% of her lunch.   Lines: PICC line in upper right arm; peripheral IV in lower right arm (may be discontinued today).   Skin/Wounds: R stump/wound site- debrided, and new dressing placed.   New this shift: Significant other and their dog, Ivania, came for a visit. PICC line placed in right arm, PICC nurse came bedside to reposition it. Placement confirmed via x-ray.     Plan: Continue to plan for TCU discharge.      SOAP Note:   S: Patient had painful experience during wound debridement yesterday.    O: Daily wound care required for patient's right leg.   A: In order to address the patient's pain and anxiety, preventative pain medication will be provided and procedure explained. Collaborated with patient regarding timing and hygiene cares provided prior to debridement in order to help relax patient.   P: Oxy offered and provided to the patient about an hour prior to performing the wound debridement procedure.Procedure completed successfully and pain tolerable.

## 2019-10-12 NOTE — PLAN OF CARE
Temp: 97.9  F (36.6  C) Temp src: Oral BP: 132/52 Pulse: 68 Heart Rate: 76 Resp: 16 SpO2: 96 % O2 Device: None (Room air)        Time: 9291-0395  Reason for admission: Osteomyelitis   Activity: 1A to bedside commode  Pain: C/o pain in back and stump wound site- managed with scheduled tylenol and lidocaine patch in place  Neuro: A/o x4  Cardiac: WDL  Respiratory: WDL  GI/: Voiding without difficulty, still having loose BMs  Diet: Regular  Lines: R PIV  Skin/Wounds: R stump/wound site- orders for daily dressing changes  Labs/Imaging: K 3.3    New changes this shift: 2 loose BM overnight, slept intermittently throughout night  Plan: PICC placement, awaiting TCU placement     Continue to monitor and follow POC.

## 2019-10-12 NOTE — PLAN OF CARE
"  /46 (BP Location: Left arm)   Pulse 68   Temp 99  F (37.2  C) (Oral)   Resp 14   Ht 1.676 m (5' 6\")   Wt 49.9 kg (110 lb)   SpO2 97%   BMI 17.75 kg/m      2353-7544: Pt A&Ox4, VSS on RA, denies nausea. C/o some pain at stump wound site, on scheduled Tylenol. Needed one time IV Dilaudid for WOCN dressing change today. Wound orders for daily dressings starting tomorrow. IV Nafcillin q4h continues via PIV. Plan for PICC tomorrow. Loose diarrhea slowing down. K 3.3 and replaced one time. Plan to begin scheduled K tomorrow. Awaiting TCU placement. Significant other at bedside most of day. Continue to monitor and with POC.   "

## 2019-10-12 NOTE — PLAN OF CARE
"  /60 (BP Location: Left arm)   Pulse 68   Temp 99.3  F (37.4  C) (Oral)   Resp 14   Ht 1.676 m (5' 6\")   Wt 49.9 kg (110 lb)   SpO2 98%   BMI 17.75 kg/m      0775-2825: Pt A&Ox4, VSS on RA. C/o some pain at stump wound site, on scheduled Tylenol. C/o nausea but declined medication, given lavendar and decreased stimuli. Needed one time PRN Oxy 5mg for dressing change today. IV Nafcillin q4h continues via PIV.  PICC placed and placement confirmed. Loose diarrhea slowing down. Awaiting TCU placement. Significant other at bedside most of day with pt's dog. Continue to monitor and with POC.   "

## 2019-10-12 NOTE — PROGRESS NOTES
[facility], [facility] Progress Note - Teaching Service, Roddy Savage       Date of Admission:  10/1/2019  Assessment & Plan     Acute R Tibial osteomyelitis w MSSA bacteremia with abscess s/p debridement   Initial CT w/contrast of the R knee demonstrated  sinus tracking down the femur, suspicious for early onset osteomyelitis. Subsequent MRI of the R Knee was notable for fluid collection with a sinus tract to the skin, concerning for abscess formation. Initially kept on vancomycin IV (10/1-10/6) due to hx of PCN allergy (hives), but was switched to IV nafcillin after tolerating amoxicillin trial.  Blood culture remain negative as of 10/6; LINDA results negative for IE. S/P  irrigation and debridement right leg with packing and partial closure of the incision on 10/9, currently HDS and resuming normal diet. Patient's antibiotic course will total 6 weeks of IV nafcillin infusions, beginning with the first culture negative date.  - Continue nafcillin 2gm IV q4h (10/6-11/17/19) for total duration of 6 weeks.    Physical deconditioning - improving  R BKA infection has caused patient ample distress and difficulty with ambulating.   -PT/OT following, TCU at discharge    Depressed mood - improved  Patient's mood has improved after surgery, physical therapy and multiple Health psychology visits.      Mild hypoalbuminemia   Albumin 2.5 on admission, most likely due to poor nutrition in the setting of infection.     Incidentally noted Right pleural effusion - no active symptoms of respiratory distress.    Thrombocytosis - improving  Likely in the setting of acute inflammation.  - Continue to monitor     Mild hyponatremia - resolved  Sodium 128 on admission, most likely in the setting of hypovolemia from infection. Improved to 136 on 10/7.     Chronic medical conditions---  #Rheumatoid arthritis - Continue prednisone 5 mg daily; will hold DMARDs at this time, most likely to be restarted by  outpatient Rheumatologist.  #CAD - Continue PTA statin dose  #HTN - Resume lower dose of metoprolol  #Lumbar spinal stenosis - Oxycodone 5 mg Q4H prn, lidocaine patches, diclofenac gel PRN.     Diet: Snacks/Supplements Adult: Boost Shake; Between Meals  Snacks/Supplements Adult: Other; see below; Between Meals  Regular Diet Adult      DVT Prophylaxis: Enoxaparin (Lovenox) 40 mg subcutaneous  Nolan catheter: To be removed  Code Status: Full Code      Disposition Plan   Expected discharge: 2 - 3 days, recommended to transitional care unit once SIRS/Sepsis treated.  Entered: Sathya Nolasco MD 10/12/2019, 7:01 AM     The patient's care was discussed with the Attending Physician, Dr. Sahni, Bedside Nurse and Patient.    Sathya Nolasco MD  [address], [place]  Pager: [phone]  Please see sticky note for cross cover information  ______________________________________________________________________    Interval History   Nursing notes reviewed, no acute events overnight. Patient reports feeling well, states she is excited to see her dog today. She is tolerating a regular diet and having normal BMs. No fever, chills, CP, SOB, cough, abd pain, n/v/d, rash or pain from surgical site.    4 point ROS is negative except for what is noted in the HPI.    Data reviewed today: I reviewed all medications, new labs and imaging results over the last 24 hours.    Physical Exam   Vital Signs: Temp: 97.9  F (36.6  C) Temp src: Oral BP: 132/52 Pulse: 68 Heart Rate: 76 Resp: 16 SpO2: 96 % O2 Device: None (Room air)    Weight: 110 lbs 0 oz  General Appearance: Sitting upright in bed comfortably, appears in no acute distress  Respiratory: Non labored breathing. Speaking in full sentences on room air.  Cardiovascular: RRR, no murmur, rubs or gallops.  GI: Soft, non tender and non distended.  Ext: Able to move all 4 extremities. Mild tenderness to palpation of the R amputation stump  Skin: R  amputation stumped is bandaged with no e/o drainage.   Neuro: Normal speech, no FND.      Data   Recent Labs   Lab 10/11/19  0646 10/10/19  0512 10/09/19  0930 10/09/19  0640  10/07/19  0621   WBC 13.9* 16.1*  --  14.2*   < > 12.9*   HGB 7.8* 8.3*  --  8.8*   < > 10.0*   MCV 90 90  --  91   < > 89   * 506*  --  472*   < > 501*   INR  --   --  1.31*  --   --   --     136  --  135   < > 136   POTASSIUM 3.3* 3.2*  --  3.5   < > 3.4   CHLORIDE 107 104  --  105   < > 102   CO2 22 21  --  22   < > 26   BUN 6* 7  --  10   < > 10   CR 0.47* 0.48*  --  0.45*   < > 0.43*   ANIONGAP 6 11  --  8   < > 8   ADRIAN 7.6* 7.5*  --  7.7*   < > 8.1*   GLC 88 85  --  88   < > 89   ALBUMIN  --   --   --   --   --  1.9*   PROTTOTAL  --   --   --   --   --  5.4*   BILITOTAL  --   --   --   --   --  0.6   ALKPHOS  --   --   --   --   --  64   ALT  --   --   --   --   --  14   AST  --   --   --   --   --  14    < > = values in this interval not displayed.

## 2019-10-12 NOTE — PROGRESS NOTES
Orthopaedic Surgery Progress Note 10/12/2019    Interval Events  Medicine: anticipate discharge to TCU 1-2 days  ID: IV nafcillin x6 weeks (end 11/17/2019)    Subjective  No acute events overnight.  Pain well controlled. Denies new numbness, tingling, or weakness.  Tolerating diet without nausea or vomiting; little appetite due to inactivity.  Voiding spontaneously.  +flatus, -BM.   Denies fevers, chills, chest pain, SOB.    Objective  Temp: 99  F (37.2  C) Temp src: Oral BP: 136/51   Heart Rate: 76 Resp: 16 SpO2: 98 % O2 Device: None (Room air)      Exam:  Gen: No acute distress, resting comfortably in bed.  Resp: Non-labored breathing  Cardio: Regular rate via peripheral pulse  MSK:  RLE:  - Below Knee amputation with dressing, c/d/i.   - No pain with ROM hip/knee.        Recent Labs   Lab 10/11/19  0646 10/10/19  0512 10/09/19  0640   WBC 13.9* 16.1* 14.2*   HGB 7.8* 8.3* 8.8*   * 506* 472*   CRP 55.0* 59.0* 59.0*     Cultures:   BCx (prior to 10/6) - MSSA    Imaging:  MRI RLE (10/4/19) - focal osteomyelitis observed in distal tibia, open skin sinus tract      Assessment: Sakshi Jaeger is a 74 year old female with PMH including CAD, RA (on methotrexate, leflunomide, prednisone), Hypertension, Miocardial infarction, osteoporosis, and Right Below knee amputation who presented with a 4 day history of right limb pain, malaise, and 1 day of fevers, bacteremia Consistent with:     1. Infection of the below knee amputation stump of Right leg with sinus tracking to bone and osteomyelitis of the distal tibial stump.     +MSSA bacteremia with source of Tibial osteomyelitis on admission.     Now s/p the follow orthopedic procedures:  - right BKA stump I&D, packing and partial closure on 10/10 with Dr. Mott    Plan:  Medicine Primary  Pain control: per prmary  Antibiotics: per ID, appreciate recs - IV nafcillin x6 weeks (end 11/17/2019)  DVT prophylaxis: none per ortho.  Wound Care: Appreciate wound nurse consult for  management of right lower leg stump wound.  Distal wound left open 5cm and distal tibia packed with one strip of 1/2 in packing gauze.  Soft dressing to right lower leg stump.  To be changed by wound nurse today, POD#2  Drains: none    Disposition: Pending progress with therapies, pain control on orals, and medical stability, anticipate discharge to TCU this weekend or early this coming week.    Follow-up: in clinic with Tiffani Lora or Dr. Walker in 2 weeks for incision check and suture removal; schedulers emailed    Wolf Farnsworth MD  Orthopaedic Surgery PGY-1  [phone]       Future Appointments   Date Time Provider Department Center   10/14/2019  6:00 AM [facility] OT OVERFLOW [facility] O   10/14/2019  6:00 AM [facility]U PT OVERFLOW [facility] O

## 2019-10-13 VITALS
RESPIRATION RATE: 16 BRPM | TEMPERATURE: 97.7 F | WEIGHT: 110 LBS | BODY MASS INDEX: 17.68 KG/M2 | HEIGHT: 66 IN | HEART RATE: 68 BPM | OXYGEN SATURATION: 98 % | DIASTOLIC BLOOD PRESSURE: 58 MMHG | SYSTOLIC BLOOD PRESSURE: 135 MMHG

## 2019-10-13 LAB
ANION GAP SERPL CALCULATED.3IONS-SCNC: 9 MMOL/L (ref 3–14)
BACTERIA SPEC CULT: NO GROWTH
BUN SERPL-MCNC: 6 MG/DL (ref 7–30)
CALCIUM SERPL-MCNC: 7.6 MG/DL (ref 8.5–10.1)
CHLORIDE SERPL-SCNC: 107 MMOL/L (ref 94–109)
CO2 SERPL-SCNC: 20 MMOL/L (ref 20–32)
CREAT SERPL-MCNC: 0.55 MG/DL (ref 0.52–1.04)
CRP SERPL-MCNC: 45 MG/L (ref 0–8)
ERYTHROCYTE [DISTWIDTH] IN BLOOD BY AUTOMATED COUNT: 16 % (ref 10–15)
GFR SERPL CREATININE-BSD FRML MDRD: >90 ML/MIN/{1.73_M2}
GLUCOSE SERPL-MCNC: 81 MG/DL (ref 70–99)
HCT VFR BLD AUTO: 24.3 % (ref 35–47)
HGB BLD-MCNC: 7.5 G/DL (ref 11.7–15.7)
Lab: NORMAL
MCH RBC QN AUTO: 27.8 PG (ref 26.5–33)
MCHC RBC AUTO-ENTMCNC: 30.9 G/DL (ref 31.5–36.5)
MCV RBC AUTO: 90 FL (ref 78–100)
PLATELET # BLD AUTO: 563 10E9/L (ref 150–450)
POTASSIUM SERPL-SCNC: 3.7 MMOL/L (ref 3.4–5.3)
RBC # BLD AUTO: 2.7 10E12/L (ref 3.8–5.2)
SODIUM SERPL-SCNC: 136 MMOL/L (ref 133–144)
SPECIMEN SOURCE: NORMAL
WBC # BLD AUTO: 11.3 10E9/L (ref 4–11)

## 2019-10-13 PROCEDURE — 80048 BASIC METABOLIC PNL TOTAL CA: CPT | Performed by: STUDENT IN AN ORGANIZED HEALTH CARE EDUCATION/TRAINING PROGRAM

## 2019-10-13 PROCEDURE — 85027 COMPLETE CBC AUTOMATED: CPT | Performed by: STUDENT IN AN ORGANIZED HEALTH CARE EDUCATION/TRAINING PROGRAM

## 2019-10-13 PROCEDURE — 36415 COLL VENOUS BLD VENIPUNCTURE: CPT | Performed by: STUDENT IN AN ORGANIZED HEALTH CARE EDUCATION/TRAINING PROGRAM

## 2019-10-13 PROCEDURE — 25000132 ZZH RX MED GY IP 250 OP 250 PS 637: Performed by: STUDENT IN AN ORGANIZED HEALTH CARE EDUCATION/TRAINING PROGRAM

## 2019-10-13 PROCEDURE — 25000132 ZZH RX MED GY IP 250 OP 250 PS 637: Performed by: INTERNAL MEDICINE

## 2019-10-13 PROCEDURE — 86140 C-REACTIVE PROTEIN: CPT | Performed by: STUDENT IN AN ORGANIZED HEALTH CARE EDUCATION/TRAINING PROGRAM

## 2019-10-13 PROCEDURE — 25000125 ZZHC RX 250: Performed by: INTERNAL MEDICINE

## 2019-10-13 PROCEDURE — 99239 HOSP IP/OBS DSCHRG MGMT >30: CPT | Performed by: INTERNAL MEDICINE

## 2019-10-13 PROCEDURE — 25000128 H RX IP 250 OP 636: Performed by: STUDENT IN AN ORGANIZED HEALTH CARE EDUCATION/TRAINING PROGRAM

## 2019-10-13 PROCEDURE — 25000131 ZZH RX MED GY IP 250 OP 636 PS 637: Performed by: STUDENT IN AN ORGANIZED HEALTH CARE EDUCATION/TRAINING PROGRAM

## 2019-10-13 RX ORDER — LIDOCAINE 4 G/G
1 PATCH TOPICAL EVERY 24 HOURS
DISCHARGE
Start: 2019-10-13 | End: 2021-10-27

## 2019-10-13 RX ORDER — MULTIPLE VITAMINS W/ MINERALS TAB 9MG-400MCG
1 TAB ORAL DAILY
DISCHARGE
Start: 2019-10-14 | End: 2024-01-01

## 2019-10-13 RX ORDER — AMLODIPINE BESYLATE 5 MG/1
5 TABLET ORAL DAILY
Qty: 90 TABLET | Refills: 3 | DISCHARGE
Start: 2019-10-13 | End: 2020-05-21

## 2019-10-13 RX ORDER — SULINDAC 200 MG/1
TABLET ORAL
Refills: 0 | DISCHARGE
Start: 2019-10-13 | End: 2020-03-12

## 2019-10-13 RX ORDER — AMOXICILLIN 250 MG
1 CAPSULE ORAL 2 TIMES DAILY
DISCHARGE
Start: 2019-10-13 | End: 2020-12-17

## 2019-10-13 RX ORDER — NIACIN 500 MG
500 TABLET ORAL AT BEDTIME
Qty: 100 TABLET | Refills: 6 | DISCHARGE
Start: 2019-10-13 | End: 2020-12-17

## 2019-10-13 RX ORDER — ONDANSETRON 4 MG/1
4 TABLET, ORALLY DISINTEGRATING ORAL EVERY 6 HOURS PRN
DISCHARGE
Start: 2019-10-13 | End: 2020-03-12

## 2019-10-13 RX ORDER — GABAPENTIN 300 MG/1
600 CAPSULE ORAL 3 TIMES DAILY
Status: ON HOLD | DISCHARGE
Start: 2019-10-13 | End: 2022-09-27

## 2019-10-13 RX ORDER — ACETAMINOPHEN 325 MG/1
650 TABLET ORAL EVERY 4 HOURS
Status: ON HOLD | DISCHARGE
Start: 2019-10-13 | End: 2024-01-01

## 2019-10-13 RX ORDER — LACTOBACILLUS RHAMNOSUS GG 10B CELL
1 CAPSULE ORAL 2 TIMES DAILY
DISCHARGE
Start: 2019-10-13 | End: 2020-12-17

## 2019-10-13 RX ORDER — POLYETHYLENE GLYCOL 3350 17 G/17G
17 POWDER, FOR SOLUTION ORAL 2 TIMES DAILY PRN
DISCHARGE
Start: 2019-10-13 | End: 2021-03-23

## 2019-10-13 RX ORDER — LISINOPRIL 20 MG/1
20 TABLET ORAL DAILY
Qty: 90 TABLET | Refills: 3 | DISCHARGE
Start: 2019-10-13 | End: 2020-03-12

## 2019-10-13 RX ORDER — POTASSIUM CHLORIDE 1500 MG/1
40 TABLET, EXTENDED RELEASE ORAL DAILY
DISCHARGE
Start: 2019-10-13 | End: 2020-03-12

## 2019-10-13 RX ORDER — PREDNISONE 5 MG/1
5 TABLET ORAL DAILY
Refills: 0 | DISCHARGE
Start: 2019-10-13

## 2019-10-13 RX ORDER — OXYCODONE HYDROCHLORIDE 5 MG/1
5-10 TABLET ORAL EVERY 4 HOURS PRN
Qty: 30 TABLET | Refills: 0 | Status: SHIPPED | OUTPATIENT
Start: 2019-10-13 | End: 2020-03-12

## 2019-10-13 RX ORDER — NAFCILLIN SODIUM 2 G/8ML
2 INJECTION, POWDER, FOR SOLUTION INTRAMUSCULAR; INTRAVENOUS EVERY 4 HOURS
DISCHARGE
Start: 2019-10-13 | End: 2020-03-12

## 2019-10-13 RX ORDER — SIMVASTATIN 20 MG
20 TABLET ORAL AT BEDTIME
DISCHARGE
Start: 2019-10-13 | End: 2020-05-13

## 2019-10-13 RX ORDER — ALENDRONATE SODIUM 35 MG/1
TABLET ORAL
Qty: 12 TABLET | Refills: 2 | DISCHARGE
Start: 2019-10-13 | End: 2020-12-17

## 2019-10-13 RX ADMIN — ACETAMINOPHEN 650 MG: 325 TABLET, FILM COATED ORAL at 08:46

## 2019-10-13 RX ADMIN — ENOXAPARIN SODIUM 40 MG: 40 INJECTION SUBCUTANEOUS at 08:43

## 2019-10-13 RX ADMIN — NAFCILLIN SODIUM 2 G: 2 INJECTION, POWDER, LYOPHILIZED, FOR SOLUTION INTRAMUSCULAR; INTRAVENOUS at 08:43

## 2019-10-13 RX ADMIN — AMLODIPINE BESYLATE 5 MG: 5 TABLET ORAL at 08:46

## 2019-10-13 RX ADMIN — MULTIPLE VITAMINS W/ MINERALS TAB 1 TABLET: TAB at 08:45

## 2019-10-13 RX ADMIN — OXYCODONE HYDROCHLORIDE 10 MG: 5 TABLET ORAL at 05:56

## 2019-10-13 RX ADMIN — OXYCODONE HYDROCHLORIDE 10 MG: 5 TABLET ORAL at 10:06

## 2019-10-13 RX ADMIN — PREDNISONE 5 MG: 5 TABLET ORAL at 08:46

## 2019-10-13 RX ADMIN — ACETAMINOPHEN 650 MG: 325 TABLET, FILM COATED ORAL at 01:05

## 2019-10-13 RX ADMIN — NAFCILLIN SODIUM 2 G: 2 INJECTION, POWDER, LYOPHILIZED, FOR SOLUTION INTRAMUSCULAR; INTRAVENOUS at 04:40

## 2019-10-13 RX ADMIN — NAFCILLIN SODIUM 2 G: 2 INJECTION, POWDER, LYOPHILIZED, FOR SOLUTION INTRAMUSCULAR; INTRAVENOUS at 13:13

## 2019-10-13 RX ADMIN — Medication 1 CAPSULE: at 10:07

## 2019-10-13 RX ADMIN — POTASSIUM CHLORIDE 40 MEQ: 750 TABLET, EXTENDED RELEASE ORAL at 13:14

## 2019-10-13 RX ADMIN — ACETAMINOPHEN 650 MG: 325 TABLET, FILM COATED ORAL at 13:14

## 2019-10-13 RX ADMIN — ACETAMINOPHEN 650 MG: 325 TABLET, FILM COATED ORAL at 04:40

## 2019-10-13 RX ADMIN — LISINOPRIL 20 MG: 20 TABLET ORAL at 08:46

## 2019-10-13 RX ADMIN — NAFCILLIN SODIUM 2 G: 2 INJECTION, POWDER, LYOPHILIZED, FOR SOLUTION INTRAMUSCULAR; INTRAVENOUS at 01:06

## 2019-10-13 ASSESSMENT — ACTIVITIES OF DAILY LIVING (ADL)
ADLS_ACUITY_SCORE: 15
ADLS_ACUITY_SCORE: 19
ADLS_ACUITY_SCORE: 19
ADLS_ACUITY_SCORE: 15
ADLS_ACUITY_SCORE: 19

## 2019-10-13 NOTE — PROGRESS NOTES
Social Work Services Progress Note    Hospital Day: 13  Collaborated with:  Medical team, TCUs, patient    Data:  SW involved for discharge planning - TCU recommended.     SW followed up with previous referrals sent on Friday    TCU referrals sent 10/11/19:   1. [facility] (P: [phone]; F: [phone]) - NO WEEKEND ADMISSION STAFF  2. [facility] (P: [phone]; F: [phone]) - Going to screen now and call SW back    Call from [facility] - they have beds available today, they are going to check on the IV antibiotics and if they can take her.  Sent therapy notes and WOC note.  [facility] will follow up.    [facility]  (P: [phone]; F: [phone]) - NO TCU BEDS OPEN TODAY, CHECK TOMORROW IF NEEDED    [facility] can accept today.  SW updated medical team to work on discharge orders.  SW called AGUSTÍN Orellana to update authorization [id number] with discharge location and date of discharge.  SW spoke with patient about ride time and updated Justina at [facility] [phone].  Called s/o about ride at 230pm.  Updated charge nurse.  Fax: [phone] with discharge time.      Intervention:  Discharge planning    Assessment:  Pt plan to discharge to TCU, tbd    Plan:    Anticipated Disposition:  Facility:  [facility]    Barriers to d/c plan:  Facility acceptance    Follow Up:  SW to continue to follow up as needed.    Per previous note:  AGUST[facility]N Orellana (P: [phone], option 1, 8, 1, 2, 1, Ext [phone]) to be updated with discharge location and readjust prior auth dates. Authorization Number: [id number] (Dates 10/12/19-10/16/19).     DEBI Leyva, [facility]

## 2019-10-13 NOTE — PLAN OF CARE
Temp: 97.3  F (36.3  C) Temp src: Oral BP: 135/50   Heart Rate: 71 Resp: 18 SpO2: 96 % O2 Device: None (Room air)        [id number]  Pt A/0x4. VSS. C/o pain at stump wound site and back pain- managed with scheduled tylenol and prn oxy x1. 1A to bedside commode. R PICC infusing TKO and q4 IV abx. Incontinent of B&B x1 overnight. Hgb 7.5. Calls appropriately makes needs known. Awaiting TCU placement. Will continue to monitor and follow POC.

## 2019-10-13 NOTE — DISCHARGE SUMMARY
[facility] [place]  Hospitalist Discharge Summary       Date of Admission:  10/1/2019  Date of Discharge:  10/13/2019  Discharging Provider: Trish Sahni MD  Discharge Team: Hospitalist Service, Roddy 4    Discharge Diagnoses   Sepsis secondary to acute right tibial osteomyelitis with abscess and MSSA bacteremia, now s/p debridement  Depression  Asymptomatic right pleural effusion  Thrombocytosis  Hyponatremia, resolved  Rheumatoid arthritis  Lumbar spinal stenosis  CAD  HTN      Follow-ups Needed After Discharge   Follow-up Appointments     Follow Up and recommended labs and tests      Follow up with [facility] in 2 weeks.  The following labs/tests   are recommended: weekly CBC, CMP, ESR, and CRP.  Please fax results to the   [facility] at [phone].  Follow up with [facility] the week of November 11.  Follow up with your outpatient rheumatologist in 2-6 weeks.             Unresulted Labs Ordered in the Past 30 Days of this Admission     Date and Time Order Name Status Description    10/9/2019 1305 Fungus Culture, non-blood Preliminary     10/9/2019 1305 Anaerobic bacterial culture Preliminary     10/9/2019 1305 AFB Culture Non Blood Preliminary     10/9/2019 1304 Fungus Culture, non-blood Preliminary     10/9/2019 1304 Anaerobic bacterial culture Preliminary     10/9/2019 1304 AFB Culture Non Blood Preliminary     10/8/2019 0000 Blood culture Preliminary       These results will be followed up by me    Discharge Disposition   Discharged to TCU  Condition at discharge: Stable    Hospital Course   Sepsis secondary to acute right tibial osteomyelitis with abscess and MSSA bacteremia, now s/p debridement  Initial CT w/contrast of the R knee demonstrated  sinus tracking down the femur, suspicious for early onset osteomyelitis. Subsequent MRI of the R Knee was notable for fluid collection with a sinus tract to the skin, concerning for abscess  formation. Initially kept on vancomycin IV (10/1-10/6) due to hx of PCN allergy (hives), but was switched to IV nafcillin after tolerating amoxicillin trial.  Blood culture remain negative as of 10/6; LINDA results negative for IE. S/P  irrigation and debridement right leg with packing and partial closure of the incision on 10/9, currently HDS and resuming normal diet. Patient's antibiotic course will total 6 weeks of IV nafcillin infusions, beginning with the first culture negative date.  - Continue nafcillin 2gm IV q4h (10/6-11/17/19) for total duration of 6 weeks.  -  Daily dressing changes  - Follow up with Ortho in 2 weeks for wound check  -  Weekly CBC, CMP, ESR, and CRP with results faxed to the [facility] at [phone]  -  Follow up with [facility] the week of November 11  -  PT/OT at TCU for deconditioning    Depression  Improved with visits from Health Psychology.    Asymptomatic right pleural effusion  No intervention needed, no respiratory distress.    Thrombocytosis  Presumed secondary to acute inflammation from osteomyelitis and bacteremia.  Will continue to monitor via CBC checks while on IV antibiotics.    Rheumatoid arthritis  Will continue prednisone 5mg only at this time, holding the rest of her DMARDs while she is recovering from surgery and her bacteremia.  Encourage follow-up with her outpatient rheumatologist to determine best time to resume her medications.     Lumbar spinal stenosis  Continue pain management with oxycodone, lidocaine patches.    CAD  Continue statin.    HTN  Continue amlodipine.  Stopped her metoprolol, encourage monitoring after discharge to see if this needs to be resumed at a lower dose.    Hyponatremia, resolved    Consultations This Hospital Stay   PHARMACY TO DOSE [facility]  ORTHOPAEDIC SURGERY ADULT/PEDS IP CONSULT  MEDICATION HISTORY IP PHARMACY CONSULT  INFECTIOUS DISEASE GENERAL ADULT IP CONSULT  VASCULAR ACCESS CARE ADULT IP CONSULT  WOUND OSTOMY CONTINENCE  NURSE  IP CONSULT  PHYSICAL THERAPY ADULT IP CONSULT  OCCUPATIONAL THERAPY ADULT IP CONSULT  VASCULAR ACCESS CARE ADULT IP CONSULT  WOUND OSTOMY CONTINENCE NURSE  IP CONSULT  VASCULAR ACCESS CARE ADULT IP CONSULT  PSYCHOLOGY ADULT IP CONSULT  VASCULAR ACCESS CARE ADULT IP CONSULT  SPIRITUAL HEALTH SERVICES IP CONSULT  OCCUPATIONAL THERAPY ADULT IP CONSULT  PHYSICAL THERAPY ADULT IP CONSULT  VASCULAR ACCESS CARE ADULT IP CONSULT  VASCULAR ACCESS ADULT IP CONSULT  PHYSICAL THERAPY ADULT IP CONSULT  OCCUPATIONAL THERAPY ADULT IP CONSULT  WOUND OSTOMY CONTINENCE NURSE  IP CONSULT    Code Status   Full Code    Time Spent on this Encounter   I, Trish Sahni MD, personally saw the patient today and spent greater than 30 minutes discharging this patient.       Trish Sahni MD  [facility], [place]  ______________________________________________________________________    Physical Exam   Vital Signs: Temp: 97.3  F (36.3  C) Temp src: Oral BP: 132/55   Heart Rate: 71 Resp: 18 SpO2: 96 % O2 Device: None (Room air)    Weight: 110 lbs 0 oz  General Appearance: Sitting in bed in NAD  Respiratory: CTAB, breathing comfortably on room air  Cardiovascular: RRR, no m/r/g, peripheral pulses intact  GI: NABS, soft, NT, ND  Skin: No apparent rashes  MSK: Right stump wrapping in bandage, no apparent drainage.  No edema in bilateral lower extremities.        Primary Care Physician   Javan Pepe    Discharge Orders      General info for SNF    Length of Stay Estimate: Short Term Care: Estimated # of Days 31-90  Condition at Discharge: Improving  Level of care:skilled   Rehabilitation Potential: Good  Admission H&P remains valid and up-to-date: Yes  Recent Chemotherapy: N/A  Use Nursing Home Standing Orders: Yes     Mantoux instructions    Give two-step Mantoux (PPD) Per Facility Policy Yes     Reason for your hospital stay    You were hospitalized for an abscess in your right leg  concerning for osteomyelitis.  You were found to have a blood stream infection as well necessitating treatment with IV antibiotics.     Wound care (specify)    R BKA stump wound: Daily irrigate cavity with saline, then pack with exufiber AG (at bedside), cover with ABD pad, secure with kerlix, then place ace wrap.      R knee: BID apply Vaseline over dry, flaky area.     IV access    PICC.     Activity - Up with assistive device     Follow Up and recommended labs and tests    Follow up with [facility] in 2 weeks.  The following labs/tests are recommended: weekly CBC, CMP, ESR, and CRP.  Please fax results to the [facility] at [phone].  Follow up with [facility] the week of November 11.  Follow up with your outpatient rheumatologist in 2-6 weeks.     Full Code     Physical Therapy Adult Consult    Evaluate and treat as clinically indicated.    Reason:  Deconditioning     Occupational Therapy Adult Consult    Evaluate and treat as clinically indicated.    Reason:  Deconditioning     Fall precautions     Advance Diet as Tolerated    Follow this diet upon discharge: Orders Placed This Encounter      Snacks/Supplements Adult: Boost Shake; Between Meals      Snacks/Supplements Adult: Other; see below; Between Meals      Regular Diet Adult       Significant Results and Procedures   Most Recent 3 CBC's:  Recent Labs   Lab Test 10/13/19  0613 10/11/19  0646 10/10/19  0512   WBC 11.3* 13.9* 16.1*   HGB 7.5* 7.8* 8.3*   MCV 90 90 90   * 505* 506*     Most Recent 3 BMP's:  Recent Labs   Lab Test 10/13/19  0613 10/12/19  0618 10/11/19  0646 10/10/19  0512     --  136 136   POTASSIUM 3.7 3.6 3.3* 3.2*   CHLORIDE 107  --  107 104   CO2 20  --  22 21   BUN 6*  --  6* 7   CR 0.55  --  0.47* 0.48*   ANIONGAP 9  --  6 11   ADRIAN 7.6*  --  7.6* 7.5*   GLC 81  --  88 85   ,   Results for orders placed or performed during the hospital encounter of 10/01/19   XR Chest Port 1 View     Narrative    EXAM: XR CHEST PORT 1 VW  10/1/2019 2:05 PM     HISTORY:  fever hypotension       COMPARISON:  Chest x-ray from 3/31/2010.    FINDINGS:   AP view the chest. The Patient is rotated.    The trachea is midline. The cardiomediastinal silhouette is normal.  The pulmonary vasculature are distinct.     The included osseous thorax, soft tissues and upper abdomen and are  within normal limits.     Lungs are clear without discrete nodule/mass, focal  airspace/interstitial consolidative opacity, pleural effusion or  pneumothorax.      Impression    IMPRESSION: No radiographic evidence on this study for acute disease  in the chest.     I have personally reviewed the examination and initial interpretation  and I agree with the findings.    GAUTAM NUNEZ MD   Abd/pelvis CT no contrast - Stone Protocol    Narrative    Exam: CT abdomen pelvis without contrast 10/1/2019 2:04 PM.    History: Back pain and fever.    Comparison: CT pelvis 6/19/2019.    Technique: Helical CT images from the lung bases through the symphysis  pubis without intravenous contrast. Multiplanar reconstructions  obtained and reviewed.    Dose: 813 mGy*cm    Findings:     Abdomen/Pelvis:  Unremarkable noncontrast appearance of the liver. Distended  gallbladder without calcified gallstones or pericholecystic  inflammatory changes. No biliary dilatation. The pancreas, spleen, and  adrenal glands are normal. Subcentimeter right renal hyperdensity  (series 5, image 133), likely a hemorrhagic/proteinaceous cyst. No  hydronephrosis or hydroureter. No renal or ureteral calculi. Bladder  is well-distended and normal in morphology. Calcified uterine fibroid.  Unchanged punctate right greater than left ovarian calcifications. No  pelvic free fluid.    No abnormally dilated bowel. No pneumatosis, portal venous gas, or  pneumoperitoneum. Dense contents within the colon, likely ingested.  The appendix is not identified, surgically absent.  Extensive  atherosclerotic calcifications of the abdominal aorta, iliac arteries,  and origins of the celiac and superior mesenteric, and renal arteries.  Vascular patency is not assessed on this noncontrast exam. No  abdominal aortic aneurysm. No abdominal lymphadenopathy.    Lower chest:  Trace right pleural fluid/pleural thickening. Mucous plugging in the  central right lower lobe bronchi. Tree-in-bud nodular opacities in the  right lower lobe peripherally with mild smooth interlobular septal  thickening in the lung bases. Heart is mildly enlarged. Partially  visualized coronary artery stent.    Bones and soft tissues:  No acute osseous abnormality. Marked degenerative changes in the  visualized thoracal lumbar spine including degenerative retrolisthesis  of L1 on L2 and grade 1 anterolisthesis of L3 on L4. Degenerative  endplate sclerosis at multiple levels. Disc bulges/sequestrations are  better evaluated on same-day lumbar spine CT. Chronic, healed fracture  deformity of the mid sacral body. Skin thickening/soft tissue  stranding in the right greater than left gluteal creases.      Impression    Impression:  1. No acute intra-abdominal pathology.  2. Trace right pleural fluid/pleural thickening with peripheral  tree-in-bud nodular opacities in the right lower lobe, likely  infectious/inflammatory in etiology. Consider aspiration given mucous  plugging in the central right lower lobe bronchi.  3. Marked vascular calcifications in the abdomen and pelvis without  aneurysmal dilation. Patency is not assessed on this noncontrast  examination.  4. Skin thickening/soft tissue stranding in the right greater than  left gluteal creases. Recommend direct visualization.  5. Please see same day neuroradiology report for findings in the  lumbar spine.     I have personally reviewed the examination and initial interpretation  and I agree with the findings.    BERNADETTE KUHN, DO   Lumbar spine CT w/o contrast    Narrative     CT LUMBAR SPINE W/O CONTRAST 10/1/2019 2:07 PM    History: back pain hx of lumbar fracture in June, now increasing pain  also.    Comparison: CT lumbar spine 6/19/2019.    Technique: Using multidetector thin collimation helical acquisition  technique, axial, coronal and sagittal CT images through the lumbar  spine were obtained without intravenous contrast.     Findings: There are 5 lumbar type vertebrae. There is multilevel  spondylosis of the spine. Grade 1 retrolisthesis of L1 on L2. Slightly  improved disc height loss at L1-L2 since 6/9/2019. Chronic bilateral  pedicle fractures of L2. Grade 1 anterolisthesis of L3 on L4 with  compression deformity of the superior endplate of L4 caused by the  inferoposterior aspect of L3. Dextroscoliosis of the spine centered at  L3.    Findings on a level by level basis are as follows:    L1-L2: Disc extrusion/sequestration with partial calcification  extending into the right neural foramen. Severe right and moderate  left neural foraminal narrowing. Sequestered disc impinges the exiting  L1 nerve root. Mild spinal canal stenosis secondary to partial  calcified disc bulge.     L2-L3: Posterior disc bulge. Mild to moderate neural foraminal  narrowing bilaterally. No significant spinal canal narrowing.    L3-L4: Posterior disc bulge with unroofing of the disc. Ligamentum  flavum hypertrophy. Facet arthropathy. Mild spinal canal stenosis.  Severe left neural foraminal narrowing. Moderate to severe right  neural foraminal narrowing.    L4-L5: Posterior disc bulge. Ligamentum flavum hypertrophy. Facet  arthropathy. Mild to moderate spinal canal stenosis. Moderate  neuroforaminal narrowing bilaterally.    L5-S1: Ligamentum flavum hypertrophy. Posterior disc bulge. Facet  arthropathy. No spinal canal stenosis. Moderate bilateral neural  foraminal narrowing.    The visualized adjacent paraspinous tissues are grossly within normal  limits. Moderate to severe atherosclerotic disease of  the distal  aorta, proximal common iliac arteries. Scarring or atelectasis within  the right lung base.      Impression    Impression:  1. No new fracture is identified.  2. No significant change of disc sequestration extending into the  right neural foramen causing severe neural foraminal narrowing at L1-2  since prior.  3. Multilevel lumbar spondylosis, not changed significantly since  6/19/2019. Unchanged grade 1 anterolisthesis of L3 on L4 and severe  left neuroforaminal narrowing and moderate to severe right neural  foraminal narrowing at this level.    I have personally reviewed the examination and initial interpretation  and I agree with the findings.    KELSY NATH MD   CT Femur Thigh Right w Contrast    Narrative    EXAMINATION: CT FEMUR THIGH RIGHT WITH CONTRAST, 10/1/2019 5:56 PM    TECHNIQUE:  Helical CT images from proximal thigh through right lower  extremity stump. Contrast dose: 68ml isovue 370    COMPARISON: Right knee radiograph on 9/21/2005    HISTORY: h/o R BKA, drainage from stomp, concern for underlying  abscess. pls obtain CT R leg down to stomp    FINDINGS:    Postsurgical changes of below-the-knee amputation. Foci of  subcutaneous emphysema within the subcutaneous tissues overlying the  stump which also extend into the distal medullary cavity of the tibial  stump. Thin rim-enhancing fluid collection about the distal stump then  extending into the medullary cavity of the tibial remnant (coronal  series 4 image 67, sagittal series 5 image 50). Foci of cortical  irregularity and dehiscence in this region. Significant skin  thickening and subcutaneous edema of the remnant leg, especially  anteriorly. Area of anterior skin ulceration anterior to the stump on  sagittal series 5 image 49.    Bones appear osteopenic. Fibular stump unremarkable in appearance.  Visualized femur unremarkable.    Vascular calcifications. Fatty infiltration of the remaining  gastrocnemius and soleus and anterior  musculature of the leg.      Impression    IMPRESSION:     Area of skin ulceration anterior to the distal tibial stump with  adjacent rim-enhancing fluid collection containing internal foci of  subcutaneous emphysema. Subcutaneous gas and fluid collection extends  into the medullary cavity of the distal fibular stump. Multiple foci  of cortical dehiscence of the distal tibial stump. Constellation of  findings compatible with subcutaneous abscess and osteomyelitis.    I have personally reviewed the examination and initial interpretation  and I agree with the findings.    SHIVANI NETTLES MD (Joe)   XR Tibia & Fibula Right 2 Views    Narrative    2 views right knee radiographs 10/2/2019 2:49 PM    History: for future comparison/pre op planning revision BKA    Comparison: CT October 1, 2019    Findings:    AP and lateral views of the left knee were obtained.     Redemonstration postsurgical change of the below knee amputation.  There are areas of soft tissue gas foci at the amputation stump area.  Radiographically, amputation stump shows mild irregularity at its tip  but CT from the prior is more sensitive.    Bones appear osteopenic. Multiple surgical clips in the distal thigh.  Vascular calcifications.      Impression    IMPRESSION: Postsurgical change below knee amputation with soft tissue  gas. Bony change better assessed on CT from he day prior.    EUFEMIA NICOLASAHASHI   MR Knee Right w/o & w Contrast    Narrative    MR right knee without and with contrast 10/4/2019 3:22 PM    Techniques: Multiplanar multisequence imaging of the right knee was  obtained before and after administration of intravenous contrast using  routing protocol.    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 There is approximately  4.6 x 4.1 cm air-fluid level containing fluid pocket/collection at the  distal amputation stump soft tissue with likely small open skin sinus  tract. There is underlying distal tibial marrow signal alteration with  T1 hypointensity, enhancement and T2 hyperintensity, consistent with  osteomyelitis. Signal abnormality of osteomyelitis extends  approximately to the level 3 cm distal to the knee joint line.    There is diffuse subcutaneous edema throughout the visualized soft  tissue. There is intense periostitis around the distal tibial stump  area.    Nonspecific focal T2 hyperintensity distal femur with associated  enhancement, likely radiographically occult enchondroma.    Diffuse muscle edema and fatty infiltration/atrophy.    Internal derangement of joint assessment partially compromised owing  to chosen field of view. Medial and lateral menisci, anterior and  posterior cruciate ligaments are intact. Extensor mechanisms are  intact. Apparent patellar articular cartilage thinning and signal  heterogeneity. No associated subchondral edema. Medial and lateral  supporting structures are grossly intact.    Medial and lateral compartment articular cartilage are grossly  preserved. Physiologic amount of joint fluid is present.       Impression    IMPRESSION:   1. In this patient with below knee amputation, osteomyelitis of distal  tibia, extending approximately 3 cm from the knee joint line.  2. 4.6 x 4.1 cm air-fluid level containing fluid collection with  likely open skin sinus tract at amputation stump tip.    EUFEMIA MUSA   XR Chest Port 1 View    Narrative    EXAMINATION: XR CHEST PORT 1 VW, 10/12/2019 10:27 AM    INDICATION: Please assess for PICC line placement    COMPARISON: Chest x-ray 10/1/2019    FINDINGS: Single portable AP radiograph of the chest. Interval  placement of right upper  extremity PICC line with tip projecting over  the right atrium. Trachea is midline. The cardiac mediastinal  silhouette is within normal limits. There is no left pleural effusion.  Moderate right pleural effusion. No pneumothorax. Diffuse reticular  appearance of the lung parenchyma. Prominent perihilar vasculature.  Opacities overlying the right lung base. Upper abdomen is grossly  unremarkable. No acute osseous abnormalities.      Impression    IMPRESSION:   1. Interval placement of PICC line with tip projecting over the right  atrium.  2. New moderate right pleural effusion.    I have personally reviewed the examination and initial interpretation  and I agree with the findings.    DASHA HOLCOMB MD   XR Chest Port 1 View    Narrative    Exam: XR CHEST PORT 1 VW, 10/12/2019 12:14 PM    Indication: Please assess for PICC line placement    Comparison: 10/12/2018    Findings:   PICC tip appears to been pulled back slightly. The tip is still in the  midright atrium. Suggest retracting 3-4 cm. Chest x-ray is otherwise  unchanged. Normal size heart. Mild interstitial opacities throughout  both lungs are stable. Right pleural effusion with associated  atelectasis.      Impression    Impression: PICC tip still in the and right atrium. Suggest pulling  back 3 to 4 cm.    NURYS PERSON MD   XR Chest Port 1 View    Narrative    EXAMINATION:  XR CHEST PORT 1 VW 10/12/2019 6:10 PM.    COMPARISON: Earlier same day.    HISTORY:  Picc line placement pull back    FINDINGS: Single AP upright radiograph of the chest. Interval  retraction of right upper extremity PICC with tip projecting over the  low SVC. Trachea is midline. Cardiomediastinal silhouette is not  enlarged. Diffuse interstitial opacities. No pneumothorax. Stable  right greater than left pleural effusions. Visualized bones upper  abdomen is grossly normal.      Impression    IMPRESSION: Interval retraction of right upper extremity PICC, with  tip now projecting over the  low SVC.    I have personally reviewed the examination and initial interpretation  and I agree with the findings.    PATRICK WHITE MD       Discharge Medications   Current Discharge Medication List      START taking these medications    Details   acetaminophen (TYLENOL) 325 MG tablet Take 2 tablets (650 mg) by mouth every 4 hours    Associated Diagnoses: Bilateral low back pain, unspecified chronicity, unspecified whether sciatica present; Rheumatoid arthritis, involving unspecified site, unspecified rheumatoid factor presence (H)      calcium carbonate 600 mg-vitamin D 400 units (CALCIUM 600 + D) 600-400 MG-UNIT per tablet Take 2 tablets by mouth daily    Associated Diagnoses: Rheumatoid arthritis, involving unspecified site, unspecified rheumatoid factor presence (H)      diclofenac (VOLTAREN) 1 % topical gel Place 2 g onto the skin 4 times daily as needed for moderate pain    Associated Diagnoses: Rheumatoid arthritis, involving unspecified site, unspecified rheumatoid factor presence (H)      lactobacillus rhamnosus, GG, (CULTURELL) capsule Take 1 capsule by mouth 2 times daily    Associated Diagnoses: Rheumatoid arthritis, involving unspecified site, unspecified rheumatoid factor presence (H)      Lidocaine (LIDOCARE) 4 % Patch Place 1 patch onto the skin every 24 hours To prevent lidocaine toxicity, patient should be patch free for 12 hrs daily.    Associated Diagnoses: Bilateral low back pain, unspecified chronicity, unspecified whether sciatica present; Rheumatoid arthritis, involving unspecified site, unspecified rheumatoid factor presence (H)      multivitamin w/minerals (THERA-VIT-M) tablet Take 1 tablet by mouth daily    Associated Diagnoses: Rheumatoid arthritis, involving unspecified site, unspecified rheumatoid factor presence (H)      nafcillin 2 GM vial Inject 2 g into the vein every 4 hours    Associated Diagnoses: MSSA bacteremia      ondansetron (ZOFRAN-ODT) 4 MG ODT tab Take 1 tablet (4 mg) by  mouth every 6 hours as needed for nausea or vomiting    Associated Diagnoses: Rheumatoid arthritis, involving unspecified site, unspecified rheumatoid factor presence (H)      oxyCODONE (ROXICODONE) 5 MG tablet Take 1-2 tablets (5-10 mg) by mouth every 4 hours as needed for moderate to severe pain  Qty: 30 tablet, Refills: 0    Associated Diagnoses: 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)      polyethylene glycol (MIRALAX/GLYCOLAX) packet Take 17 g by mouth 2 times daily as needed for constipation    Associated Diagnoses: 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)      potassium chloride ER (K-DUR/KLOR-CON M) 20 MEQ CR tablet Take 2 tablets (40 mEq) by mouth daily    Associated Diagnoses: Rheumatoid arthritis, involving unspecified site, unspecified rheumatoid factor presence (H)      senna-docusate (SENOKOT-S/PERICOLACE) 8.6-50 MG tablet Take 1 tablet by mouth 2 times daily    Associated Diagnoses: 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)         CONTINUE these medications which have CHANGED    Details   alendronate (FOSAMAX) 35 MG tablet TAKE 1 TAB BY MOUTH WITH 8OZ OF WATER ONCE EVERY WEEK BEFORE BREAKFAST, REMAIN UPRIGHT AT THIS TIME  Qty: 12 tablet, Refills: 2    Associated Diagnoses: Senile osteoporosis      amLODIPine (NORVASC) 5 MG tablet Take 1 tablet (5 mg) by mouth daily  Qty: 90 tablet, Refills: 3    Associated Diagnoses: Essential hypertension with goal blood pressure less than 140/90      gabapentin (NEURONTIN) 300 MG capsule Take 2 capsules (600 mg) by mouth 3 times daily    Associated Diagnoses: 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)      lisinopril (PRINIVIL/ZESTRIL) 20 MG tablet Take 1 tablet (20 mg) by mouth daily  Qty: 90 tablet, Refills: 3    Associated Diagnoses: Essential hypertension with goal blood pressure less than 140/90      niacin 500 MG tablet Take 1 tablet (500 mg) by mouth At Bedtime  Qty: 100  tablet, Refills: 6    Associated Diagnoses: Hyperlipidemia LDL goal <100      predniSONE (DELTASONE) 5 MG tablet Take 1 tablet (5 mg) by mouth daily  Refills: 0    Associated Diagnoses: Rheumatoid arthritis, involving unspecified site, unspecified rheumatoid factor presence (H)      simvastatin (ZOCOR) 20 MG tablet Take 1 tablet (20 mg) by mouth At Bedtime    Associated Diagnoses: Hyperlipidemia LDL goal <100      sulindac (CLINORIL) 200 MG tablet TAKE 1 TABLET BY MOUTH TWICE A DAY WITH FOOD  Refills: 0    Associated Diagnoses: Rheumatoid arthritis, involving unspecified site, unspecified rheumatoid factor presence (H)         CONTINUE these medications which have NOT CHANGED    Details   order for DME Equipment being ordered: New foam (custom shaped protective cover) for right below the knee prostheses  Qty: 1 Units, Refills: 0    Associated Diagnoses: Status post below knee amputation of right lower extremity (H)         STOP taking these medications       aspirin 81 MG tablet Comments:   Reason for Stopping:         CALCIUM 600 + D OR Comments:   Reason for Stopping:         clindamycin (CLEOCIN) 300 MG capsule Comments:   Reason for Stopping:         leflunomide (ARAVA) 20 MG tablet Comments:   Reason for Stopping:         METHOTREXate 2.5 MG tablet Comments:   Reason for Stopping:         metoprolol tartrate (LOPRESSOR) 50 MG tablet Comments:   Reason for Stopping:         morphine (MSIR) 15 MG IR tablet Comments:   Reason for Stopping:         nitroglycerin (NITROSTAT) 0.4 MG SL tablet Comments:   Reason for Stopping:         Pediatric Multivitamins-Iron (FLINTSTONES PLUS IRON PO) Comments:   Reason for Stopping:         zoster vaccine recombinant adjuvanted (SHINGRIX) injection Comments:   Reason for Stopping:             Allergies   Allergies   Allergen Reactions     Penicillins Rash     Tolerated nafcillin and amoxicillin without rash (2019). Has tolerated cefazolin (2005, 2011), cephalexin (2015, 2014,  2013, 2012), ceftriaxone (2019)

## 2019-10-13 NOTE — PROGRESS NOTES
Social Work Services Discharge Note      Patient Name:  Sakshi Jaeger     Anticipated Discharge Date:  10/13/19    Discharge Disposition:   TCU:  [facility] [phone] Fax: [phone]    Following MD:  Juan VERMA     Pre-Admission Screening (PAS) online form has been completed.  The Level of Care (LOC) is:  Determined  Confirmation Code is:  Admissions stated this was not required  Patient/caregiver informed of referral to [facility] for Pre-Admission Screening for skilled nursing facility (SNF) placement and to expect a phone call post discharge from SNF.     Additional Services/Equipment Arranged:  Significant other to transport     Patient / Family response to discharge plan:  Agreeable     Persons notified of above discharge plan:  Medical team, TCU, patient    Staff Discharge Instructions:  Please fax discharge orders and signed hard scripts for any controlled substances.  Please print a packet and send with patient.     CTS Handoff completed:  NO    Medicare Notice of Rights provided to the patient/family:  YES    BCBS Auth [id number] auth dates 10/13/19-10/17/19    DEBI Leyva, [facility]  [facility]

## 2019-10-13 NOTE — PROGRESS NOTES
Temp: 97.7 F  Temp src: Oral BP: 132/58 mmHg Heart Rate: 89 BPM  Resp: 16 resp/min  SpO2:  98% O2 Device: None (Room air)       Activity: Able to reposition in bed, non-walker, moves with assistance of 1. Can pivot for transfers. Nursing tried to encourage increased activity.   Pain: Low back pain has been the main complaint during this shift. Patient refused pain medications last night and we have been trying to control it throughout the shift with success. Tylenol back on schedule and oxy given prior to wound debridement.   Neuro: A/o x4  Cardiac: WDL  Respiratory: Bilateral lower lobes sounds were diminished and wheezing was auscultated. Incentive spirometer provided to patient and educated on how to use it.   GI/: Voided into commode this morning. Admits to urgency. Last BM was around 1400 today. Laxatives and suppository held due to loose stools.    Diet: Regular. Only had a cup of pears and half a cup of coffee for breakfast. Ate 100% of her lunch and about 200 mL of fluid this afternoon.  Lines: PICC line in upper right arm. Nafcillin infusing every 4 hours.   Labs: Hemoglobin has been trending down.   Skin/Wounds: R stump/wound site- debrided, and new dressing placed. Patient tolerated it well.   New this shift: Diminished lower lobe lung sounds. I/S provided. Edema observed in left ankle.  Plan: Discharged to TCU this afternoon.      SOAP Note:   S: Patient seems very tired but denies shortness of breath.    O: Diminished lung sounds and wheezes auscultated in lower lobes, bilaterally.   A: The patient's inactivity may be promoting her diminished lung sounds.    P: Incentive Spirometer provided to the patient, along with education on how to use it.

## 2019-10-13 NOTE — PLAN OF CARE
"  /58 (BP Location: Left arm)   Pulse 68   Temp 97.7  F (36.5  C) (Oral)   Resp 16   Ht 1.676 m (5' 6\")   Wt 49.9 kg (110 lb)   SpO2 98%   BMI 17.75 kg/m      Pt medically ready for discharge, accepted at [facility]. Orders faxed. PICC is SL. Back in street clothes and belongings all with pt. Went to lobby with staff and hermes Hanley in wheelchair. Transportation to [facility] TCU with hermes Hanley. Left unit at 1510. Hard copy of packet sent with pt and hermes.  "

## 2019-10-14 ENCOUNTER — TRANSFERRED RECORDS (OUTPATIENT)
Dept: HEALTH INFORMATION MANAGEMENT | Facility: CLINIC | Age: 74
End: 2019-10-14

## 2019-10-14 ENCOUNTER — AMBULATORY - HEALTHEAST (OUTPATIENT)
Dept: GERIATRICS | Facility: CLINIC | Age: 74
End: 2019-10-14

## 2019-10-14 ENCOUNTER — TELEPHONE (OUTPATIENT)
Dept: ORTHOPEDICS | Facility: CLINIC | Age: 74
End: 2019-10-14

## 2019-10-14 ENCOUNTER — COMMUNICATION - HEALTHEAST (OUTPATIENT)
Dept: GERIATRICS | Facility: CLINIC | Age: 74
End: 2019-10-14

## 2019-10-14 ENCOUNTER — RECORDS - HEALTHEAST (OUTPATIENT)
Dept: LAB | Facility: CLINIC | Age: 74
End: 2019-10-14

## 2019-10-14 LAB
ALBUMIN SERPL-MCNC: 1.6 G/DL (ref 3.5–5)
ALP SERPL-CCNC: 52 U/L (ref 45–120)
ALT SERPL W P-5'-P-CCNC: <9 U/L (ref 0–45)
ANION GAP SERPL CALCULATED.3IONS-SCNC: 6 MMOL/L (ref 5–18)
AST SERPL W P-5'-P-CCNC: 16 U/L (ref 0–40)
BACTERIA SPEC CULT: NO GROWTH
BASOPHILS # BLD AUTO: 0.1 THOU/UL (ref 0–0.2)
BASOPHILS NFR BLD AUTO: 1 % (ref 0–2)
BILIRUB SERPL-MCNC: 0.4 MG/DL (ref 0–1)
BUN SERPL-MCNC: 6 MG/DL (ref 8–28)
C REACTIVE PROTEIN LHE: 4.3 MG/DL (ref 0–0.8)
CALCIUM SERPL-MCNC: 8.2 MG/DL (ref 8.5–10.5)
CHLORIDE BLD-SCNC: 105 MMOL/L (ref 98–107)
CO2 SERPL-SCNC: 22 MMOL/L (ref 22–31)
CREAT SERPL-MCNC: 0.57 MG/DL (ref 0.6–1.1)
EOSINOPHIL # BLD AUTO: 0.2 THOU/UL (ref 0–0.4)
EOSINOPHIL NFR BLD AUTO: 2 % (ref 0–6)
ERYTHROCYTE [DISTWIDTH] IN BLOOD BY AUTOMATED COUNT: 16.3 % (ref 11–14.5)
ERYTHROCYTE [SEDIMENTATION RATE] IN BLOOD BY WESTERGREN METHOD: 50 MM/HR (ref 0–20)
GFR SERPL CREATININE-BSD FRML MDRD: >60 ML/MIN/1.73M2
GLUCOSE BLD-MCNC: 77 MG/DL (ref 70–125)
HCT VFR BLD AUTO: 23.9 % (ref 35–47)
HGB BLD-MCNC: 7.5 G/DL (ref 12–16)
LYMPHOCYTES # BLD AUTO: 0.8 THOU/UL (ref 0.8–4.4)
LYMPHOCYTES NFR BLD AUTO: 7 % (ref 20–40)
Lab: NORMAL
MCH RBC QN AUTO: 28.3 PG (ref 27–34)
MCHC RBC AUTO-ENTMCNC: 31.4 G/DL (ref 32–36)
MCV RBC AUTO: 90 FL (ref 80–100)
MONOCYTES # BLD AUTO: 0.6 THOU/UL (ref 0–0.9)
MONOCYTES NFR BLD AUTO: 6 % (ref 2–10)
NEUTROPHILS # BLD AUTO: 9.4 THOU/UL (ref 2–7.7)
NEUTROPHILS NFR BLD AUTO: 85 % (ref 50–70)
PLATELET # BLD AUTO: 576 THOU/UL (ref 140–440)
PMV BLD AUTO: 9.1 FL (ref 8.5–12.5)
POTASSIUM BLD-SCNC: 3.8 MMOL/L (ref 3.5–5)
PROT SERPL-MCNC: 5.1 G/DL (ref 6–8)
RBC # BLD AUTO: 2.65 MILL/UL (ref 3.8–5.4)
SODIUM SERPL-SCNC: 133 MMOL/L (ref 136–145)
SPECIMEN SOURCE: NORMAL
WBC: 11.1 THOU/UL (ref 4–11)

## 2019-10-14 NOTE — TELEPHONE ENCOUNTER
Called pt to schedule appt's and left clinic number to schedule.    ----- Message from Janeth Maynard ATC sent at 10/14/2019  7:55 AM CDT -----  Please schedule patient for 2 week pop visit with me on nurse schedule on 10/30/19 between 8am and noon. I currently have one patient at 9:00 so do not book between 9-9:30 then a 6 week follow up with Dr. Mott on 11/19/19.    Thanks  ----- Message -----  From: Nick Napoles MD  Sent: 10/11/2019   6:22 PM CDT  To: [facility]    Please schedule patient for 2 week postop clinic follow up with Tiffani Lora or Dr. Walker. Right BKA stump I&D on 10/10 with Dr. Mott. Open BKA stump wound with WOC dressing changes. Thanks!

## 2019-10-14 NOTE — TELEPHONE ENCOUNTER
[facility] Call Center    Phone Message    May a detailed message be left on voicemail: yes    Reason for Call: Other: Deanna from [facility] called about the appts you are making. She would like to be notified of the appts as she will have to arange transportation, etc. Please call the nurses station at [phone] with the info. Thanks.     Action Taken: Message routed to:  [facility] ([facility]

## 2019-10-14 NOTE — PLAN OF CARE
Physical Therapy Discharge Summary    Reason for therapy discharge:    Discharged to transitional care facility.    Progress towards therapy goal(s). See goals on Care Plan in [facility] electronic health record for goal details.  Goals partially met.  Barriers to achieving goals:   limited tolerance for therapy and discharge from facility.    Therapy recommendation(s):    Continued therapy is recommended.  Rationale/Recommendations:  progress strength, functional endurance, safety and independence with ADLs.

## 2019-10-14 NOTE — TELEPHONE ENCOUNTER
RECORDS RECEIVED FROM: Betty Tabor, Pt Cellulitis in lower right extremity   DATE RECEIVED: 11.11.2019   NOTES (Gather within 2 years) STATUS DETAILS   OFFICE NOTE from referring provider   Internal 10.01.2019   OFFICE NOTE from other specialist Internal 09.30.2019   DISCHARGE SUMMARY from hospital Internal 10.01.2019   DISCHARGE REPORT from the ER N/A    LABS (any labs) Internal    MEDICATION LIST Internal    IMAGING  (NEED IMAGES AND REPORTS)     Osteomyelitis: Foot imaging  N/A    Liver Abscess: Abdominal imaging N/A    Other (anything related to diagnoses Internal

## 2019-10-14 NOTE — PLAN OF CARE
Occupational Therapy Discharge Summary    Reason for therapy discharge:    Discharged to transitional care facility.    Progress towards therapy goal(s). See goals on Care Plan in [facility] electronic health record for goal details.  Goals partially met.  Barriers to achieving goals:   discharge from facility.    Therapy recommendation(s):    Continued therapy is recommended.  Rationale/Recommendations:  to maximize safety and I with ADL.

## 2019-10-15 ENCOUNTER — OFFICE VISIT - HEALTHEAST (OUTPATIENT)
Dept: GERIATRICS | Facility: CLINIC | Age: 74
End: 2019-10-15

## 2019-10-15 DIAGNOSIS — I10 ESSENTIAL HYPERTENSION: ICD-10-CM

## 2019-10-15 DIAGNOSIS — M81.0 AGE-RELATED OSTEOPOROSIS WITHOUT CURRENT PATHOLOGICAL FRACTURE: ICD-10-CM

## 2019-10-15 DIAGNOSIS — M05.80 OTHER RHEUMATOID ARTHRITIS WITH RHEUMATOID FACTOR OF UNSPECIFIED SITE (H): ICD-10-CM

## 2019-10-15 DIAGNOSIS — M48.061 SPINAL STENOSIS OF LUMBAR REGION WITHOUT NEUROGENIC CLAUDICATION: ICD-10-CM

## 2019-10-15 DIAGNOSIS — D64.9 CHRONIC ANEMIA: ICD-10-CM

## 2019-10-15 DIAGNOSIS — M86.661 CHRONIC OSTEOMYELITIS OF RIGHT TIBIA (H): ICD-10-CM

## 2019-10-16 ENCOUNTER — RECORDS - HEALTHEAST (OUTPATIENT)
Dept: LAB | Facility: CLINIC | Age: 74
End: 2019-10-16

## 2019-10-16 ENCOUNTER — TELEPHONE (OUTPATIENT)
Dept: ORTHOPEDICS | Facility: CLINIC | Age: 74
End: 2019-10-16

## 2019-10-16 LAB
BACTERIA SPEC CULT: ABNORMAL
Lab: ABNORMAL
SPECIMEN SOURCE: ABNORMAL

## 2019-10-16 NOTE — TELEPHONE ENCOUNTER
----- Message from Janeth Maynard ATC sent at 10/14/2019 11:37 AM CDT -----  No because its a week and a half and I am full on the 23. I want to give that incision just a little longer any ways. Thanks for double checking!  ----- Message -----  From: Eleanor Quinonez  Sent: 10/14/2019  10:42 AM CDT  To: Janeth Maynard ATC    Tawanna Janeth,    Just want to double check with you about his Post op with you 10/3019 would be 3 weeks do you want it then or sooner    Thanks, Eleanor  ----- Message -----  From: Janeth Maynard ATC  Sent: 10/14/2019   7:55 AM CDT  To: [facility] Coordinators-[facility]    Please schedule patient for 2 week pop visit with me on nurse schedule on 10/30/19 between 8am and noon. I currently have one patient at 9:00 so do not book between 9-9:30 then a 6 week follow up with Dr. Mott on 11/19/19.    Thanks  ----- Message -----  From: Nick Napoles MD  Sent: 10/11/2019   6:22 PM CDT  To: [facility] [place]    Please schedule patient for 2 week postop clinic follow up with Tiffani Lora or Dr. Walker. Right BKA stump I&D on 10/10 with Dr. Mott. Open BKA stump wound with WOC dressing changes. Thanks!

## 2019-10-17 ENCOUNTER — COMMUNICATION - HEALTHEAST (OUTPATIENT)
Dept: GERIATRICS | Facility: CLINIC | Age: 74
End: 2019-10-17

## 2019-10-17 ENCOUNTER — OFFICE VISIT - HEALTHEAST (OUTPATIENT)
Dept: GERIATRICS | Facility: CLINIC | Age: 74
End: 2019-10-17

## 2019-10-17 DIAGNOSIS — D64.9 CHRONIC ANEMIA: ICD-10-CM

## 2019-10-17 DIAGNOSIS — M05.80 OTHER RHEUMATOID ARTHRITIS WITH RHEUMATOID FACTOR OF UNSPECIFIED SITE (H): ICD-10-CM

## 2019-10-17 DIAGNOSIS — I10 ESSENTIAL HYPERTENSION: ICD-10-CM

## 2019-10-17 DIAGNOSIS — M86.661 CHRONIC OSTEOMYELITIS OF RIGHT TIBIA (H): ICD-10-CM

## 2019-10-17 DIAGNOSIS — M81.0 AGE-RELATED OSTEOPOROSIS WITHOUT CURRENT PATHOLOGICAL FRACTURE: ICD-10-CM

## 2019-10-17 LAB
25(OH)D3 SERPL-MCNC: 28.5 NG/ML (ref 30–80)
BACTERIA SPEC CULT: ABNORMAL
HGB BLD-MCNC: 7.6 G/DL (ref 12–16)
Lab: ABNORMAL
POTASSIUM BLD-SCNC: 4 MMOL/L (ref 3.5–5)
SPECIMEN SOURCE: ABNORMAL
TSH SERPL DL<=0.005 MIU/L-ACNC: 4.55 UIU/ML (ref 0.3–5)
TSH SERPL-ACNC: 4.55 UIU/ML (ref 0.3–5)

## 2019-10-18 ENCOUNTER — RECORDS - HEALTHEAST (OUTPATIENT)
Dept: LAB | Facility: CLINIC | Age: 74
End: 2019-10-18

## 2019-10-21 ENCOUNTER — OFFICE VISIT - HEALTHEAST (OUTPATIENT)
Dept: GERIATRICS | Facility: CLINIC | Age: 74
End: 2019-10-21

## 2019-10-21 DIAGNOSIS — M05.80 OTHER RHEUMATOID ARTHRITIS WITH RHEUMATOID FACTOR OF UNSPECIFIED SITE (H): ICD-10-CM

## 2019-10-21 DIAGNOSIS — I10 ESSENTIAL HYPERTENSION: ICD-10-CM

## 2019-10-21 DIAGNOSIS — M86.661 CHRONIC OSTEOMYELITIS OF RIGHT TIBIA (H): ICD-10-CM

## 2019-10-21 DIAGNOSIS — D64.9 CHRONIC ANEMIA: ICD-10-CM

## 2019-10-21 LAB
ALBUMIN SERPL-MCNC: 1.4 G/DL (ref 3.5–5)
ALP SERPL-CCNC: 60 U/L (ref 45–120)
ALT SERPL W P-5'-P-CCNC: <9 U/L (ref 0–45)
ANION GAP SERPL CALCULATED.3IONS-SCNC: 8 MMOL/L (ref 5–18)
AST SERPL W P-5'-P-CCNC: 13 U/L (ref 0–40)
BASOPHILS # BLD AUTO: 0.1 THOU/UL (ref 0–0.2)
BASOPHILS NFR BLD AUTO: 1 % (ref 0–2)
BILIRUB SERPL-MCNC: 0.2 MG/DL (ref 0–1)
BUN SERPL-MCNC: 8 MG/DL (ref 8–28)
C REACTIVE PROTEIN LHE: 2.7 MG/DL (ref 0–0.8)
CALCIUM SERPL-MCNC: 8.1 MG/DL (ref 8.5–10.5)
CHLORIDE BLD-SCNC: 105 MMOL/L (ref 98–107)
CO2 SERPL-SCNC: 24 MMOL/L (ref 22–31)
CREAT SERPL-MCNC: 0.67 MG/DL (ref 0.6–1.1)
EOSINOPHIL # BLD AUTO: 0.2 THOU/UL (ref 0–0.4)
EOSINOPHIL NFR BLD AUTO: 4 % (ref 0–6)
ERYTHROCYTE [DISTWIDTH] IN BLOOD BY AUTOMATED COUNT: 19.3 % (ref 11–14.5)
ERYTHROCYTE [SEDIMENTATION RATE] IN BLOOD BY WESTERGREN METHOD: 48 MM/HR (ref 0–20)
GFR SERPL CREATININE-BSD FRML MDRD: >60 ML/MIN/1.73M2
GLUCOSE BLD-MCNC: 78 MG/DL (ref 70–125)
HCT VFR BLD AUTO: 23.5 % (ref 35–47)
HGB BLD-MCNC: 7.2 G/DL (ref 12–16)
LYMPHOCYTES # BLD AUTO: 0.9 THOU/UL (ref 0.8–4.4)
LYMPHOCYTES NFR BLD AUTO: 17 % (ref 20–40)
MCH RBC QN AUTO: 28.9 PG (ref 27–34)
MCHC RBC AUTO-ENTMCNC: 30.6 G/DL (ref 32–36)
MCV RBC AUTO: 94 FL (ref 80–100)
MONOCYTES # BLD AUTO: 0.6 THOU/UL (ref 0–0.9)
MONOCYTES NFR BLD AUTO: 11 % (ref 2–10)
NEUTROPHILS # BLD AUTO: 3.4 THOU/UL (ref 2–7.7)
NEUTROPHILS NFR BLD AUTO: 65 % (ref 50–70)
PLATELET # BLD AUTO: 482 THOU/UL (ref 140–440)
PMV BLD AUTO: 9.3 FL (ref 8.5–12.5)
POTASSIUM BLD-SCNC: 3.9 MMOL/L (ref 3.5–5)
PROT SERPL-MCNC: 5 G/DL (ref 6–8)
RBC # BLD AUTO: 2.49 MILL/UL (ref 3.8–5.4)
SODIUM SERPL-SCNC: 137 MMOL/L (ref 136–145)
WBC: 5.2 THOU/UL (ref 4–11)

## 2019-10-24 ENCOUNTER — TELEPHONE (OUTPATIENT)
Dept: RHEUMATOLOGY | Facility: CLINIC | Age: 74
End: 2019-10-24

## 2019-10-24 ENCOUNTER — RECORDS - HEALTHEAST (OUTPATIENT)
Dept: LAB | Facility: CLINIC | Age: 74
End: 2019-10-24

## 2019-10-24 ENCOUNTER — COMMUNICATION - HEALTHEAST (OUTPATIENT)
Dept: GERIATRICS | Facility: CLINIC | Age: 74
End: 2019-10-24

## 2019-10-24 ENCOUNTER — OFFICE VISIT - HEALTHEAST (OUTPATIENT)
Dept: GERIATRICS | Facility: CLINIC | Age: 74
End: 2019-10-24

## 2019-10-24 DIAGNOSIS — R60.0 EDEMA OF LEFT LOWER EXTREMITY: ICD-10-CM

## 2019-10-24 DIAGNOSIS — I10 ESSENTIAL HYPERTENSION: ICD-10-CM

## 2019-10-24 DIAGNOSIS — M86.661 CHRONIC OSTEOMYELITIS OF RIGHT TIBIA (H): ICD-10-CM

## 2019-10-24 DIAGNOSIS — M05.80 OTHER RHEUMATOID ARTHRITIS WITH RHEUMATOID FACTOR OF UNSPECIFIED SITE (H): ICD-10-CM

## 2019-10-24 LAB — HGB BLD-MCNC: 7.8 G/DL (ref 12–16)

## 2019-10-24 NOTE — TELEPHONE ENCOUNTER
[facility] Call Center    Phone Message    May a detailed message be left on voicemail: yes    Reason for Call: Other: Daniel, with [facility], wanted to schedule pt per referral. Pt was DC from our ED on 10/13 and taken to an acute rehab facility. Daniel stated the referred her to out patient rheumatology. Writer was unable to see this referral or ED discharge summary. Writer did explain our intake process. Please advise     Action Taken: Message routed to:  [facility]): Rheum

## 2019-10-25 ENCOUNTER — RECORDS - HEALTHEAST (OUTPATIENT)
Dept: LAB | Facility: CLINIC | Age: 74
End: 2019-10-25

## 2019-10-28 ENCOUNTER — COMMUNICATION - HEALTHEAST (OUTPATIENT)
Dept: GERIATRICS | Facility: CLINIC | Age: 74
End: 2019-10-28

## 2019-10-28 ENCOUNTER — TRANSFERRED RECORDS (OUTPATIENT)
Dept: HEALTH INFORMATION MANAGEMENT | Facility: CLINIC | Age: 74
End: 2019-10-28

## 2019-10-28 ENCOUNTER — OFFICE VISIT - HEALTHEAST (OUTPATIENT)
Dept: GERIATRICS | Facility: CLINIC | Age: 74
End: 2019-10-28

## 2019-10-28 DIAGNOSIS — D64.9 CHRONIC ANEMIA: ICD-10-CM

## 2019-10-28 DIAGNOSIS — M86.661 CHRONIC OSTEOMYELITIS OF RIGHT TIBIA (H): ICD-10-CM

## 2019-10-28 DIAGNOSIS — I10 ESSENTIAL HYPERTENSION: ICD-10-CM

## 2019-10-28 DIAGNOSIS — M81.0 AGE-RELATED OSTEOPOROSIS WITHOUT CURRENT PATHOLOGICAL FRACTURE: ICD-10-CM

## 2019-10-28 LAB
ALBUMIN SERPL-MCNC: 1.4 G/DL (ref 3.5–5)
ALP SERPL-CCNC: 68 U/L (ref 45–120)
ALT SERPL W P-5'-P-CCNC: <9 U/L (ref 0–45)
ANION GAP SERPL CALCULATED.3IONS-SCNC: 5 MMOL/L (ref 5–18)
AST SERPL W P-5'-P-CCNC: 28 U/L (ref 0–40)
BASOPHILS # BLD AUTO: 0.1 THOU/UL (ref 0–0.2)
BASOPHILS NFR BLD AUTO: 1 % (ref 0–2)
BILIRUB SERPL-MCNC: 0.3 MG/DL (ref 0–1)
BUN SERPL-MCNC: 8 MG/DL (ref 8–28)
C REACTIVE PROTEIN LHE: 4.4 MG/DL (ref 0–0.8)
CALCIUM SERPL-MCNC: 7.9 MG/DL (ref 8.5–10.5)
CHLORIDE BLD-SCNC: 104 MMOL/L (ref 98–107)
CO2 SERPL-SCNC: 25 MMOL/L (ref 22–31)
CREAT SERPL-MCNC: 0.68 MG/DL (ref 0.6–1.1)
EOSINOPHIL # BLD AUTO: 0.3 THOU/UL (ref 0–0.4)
EOSINOPHIL NFR BLD AUTO: 5 % (ref 0–6)
ERYTHROCYTE [DISTWIDTH] IN BLOOD BY AUTOMATED COUNT: 22.5 % (ref 11–14.5)
ERYTHROCYTE [SEDIMENTATION RATE] IN BLOOD BY WESTERGREN METHOD: 34 MM/HR (ref 0–20)
GFR SERPL CREATININE-BSD FRML MDRD: >60 ML/MIN/1.73M2
GLUCOSE BLD-MCNC: 73 MG/DL (ref 70–125)
HCT VFR BLD AUTO: 28.2 % (ref 35–47)
HGB BLD-MCNC: 8.8 G/DL (ref 12–16)
LYMPHOCYTES # BLD AUTO: 0.7 THOU/UL (ref 0.8–4.4)
LYMPHOCYTES NFR BLD AUTO: 10 % (ref 20–40)
MCH RBC QN AUTO: 28.9 PG (ref 27–34)
MCHC RBC AUTO-ENTMCNC: 31.2 G/DL (ref 32–36)
MCV RBC AUTO: 93 FL (ref 80–100)
MONOCYTES # BLD AUTO: 0.4 THOU/UL (ref 0–0.9)
MONOCYTES NFR BLD AUTO: 6 % (ref 2–10)
NEUTROPHILS # BLD AUTO: 5.6 THOU/UL (ref 2–7.7)
NEUTROPHILS NFR BLD AUTO: 79 % (ref 50–70)
OVALOCYTES: ABNORMAL
PLAT MORPH BLD: NORMAL
PLATELET # BLD AUTO: 419 THOU/UL (ref 140–440)
PMV BLD AUTO: 9.2 FL (ref 8.5–12.5)
POLYCHROMASIA BLD QL SMEAR: ABNORMAL
POTASSIUM BLD-SCNC: 3.8 MMOL/L (ref 3.5–5)
PROT SERPL-MCNC: 5.2 G/DL (ref 6–8)
RBC # BLD AUTO: 3.04 MILL/UL (ref 3.8–5.4)
SODIUM SERPL-SCNC: 134 MMOL/L (ref 136–145)
SPHEROCYTES BLD QL SMEAR: ABNORMAL
WBC: 7.1 THOU/UL (ref 4–11)

## 2019-10-28 NOTE — PROGRESS NOTES
Reason for visit:    Sakshi Jaeger came in to the clinic for a two week post op check.    Her surgery was done 10/9/19 by Dr Mott.  She had Right lower leg irrigation and debridement with partial tibia excision     Assessment:    Sakshi came into the clinic Non-WB    The Surgical wounds were exposed and found to be well-healed; so the sutures were removed. Middle of incision that was left open is looking good. Patient is continuing with dressing changes that include packing the wound daily. Wound is losing depth minimal packing required. She will continue with daily cleaning and dressing changes. CNS intact.   Patient mentioned she may be leaving TCU early and wanted to know plan for dressings if that were to happen. I informed patient and  that he could do the changes if he felt comfortable or we can have a home care nurse go to their home and change for them.     Plan:     ABD pad was placed over wound, then wrapped with Krelex and ACE wrap.  She was told to remain Non-WB until wound is completely healed.     She has an appointment to see Dr. Mott at that time Dr. Mott will determine further restrictions.    She has our phone number and will call with questions or problems.

## 2019-10-29 ENCOUNTER — AMBULATORY - HEALTHEAST (OUTPATIENT)
Dept: GERIATRICS | Facility: CLINIC | Age: 74
End: 2019-10-29

## 2019-10-30 ENCOUNTER — OFFICE VISIT (OUTPATIENT)
Dept: ORTHOPEDICS | Facility: CLINIC | Age: 74
End: 2019-10-30
Payer: COMMERCIAL

## 2019-10-30 DIAGNOSIS — Z51.89 VISIT FOR WOUND CHECK: Primary | ICD-10-CM

## 2019-11-01 ENCOUNTER — RECORDS - HEALTHEAST (OUTPATIENT)
Dept: LAB | Facility: CLINIC | Age: 74
End: 2019-11-01

## 2019-11-04 ENCOUNTER — OFFICE VISIT - HEALTHEAST (OUTPATIENT)
Dept: GERIATRICS | Facility: CLINIC | Age: 74
End: 2019-11-04

## 2019-11-04 ENCOUNTER — OFFICE VISIT (OUTPATIENT)
Dept: OTOLARYNGOLOGY | Facility: CLINIC | Age: 74
End: 2019-11-04
Payer: COMMERCIAL

## 2019-11-04 ENCOUNTER — COMMUNICATION - HEALTHEAST (OUTPATIENT)
Dept: GERIATRICS | Facility: CLINIC | Age: 74
End: 2019-11-04

## 2019-11-04 DIAGNOSIS — M48.061 SPINAL STENOSIS OF LUMBAR REGION WITHOUT NEUROGENIC CLAUDICATION: ICD-10-CM

## 2019-11-04 DIAGNOSIS — M86.661 CHRONIC OSTEOMYELITIS OF RIGHT TIBIA (H): ICD-10-CM

## 2019-11-04 DIAGNOSIS — D64.9 CHRONIC ANEMIA: ICD-10-CM

## 2019-11-04 DIAGNOSIS — H61.23 BILATERAL IMPACTED CERUMEN: ICD-10-CM

## 2019-11-04 DIAGNOSIS — I10 ESSENTIAL HYPERTENSION: ICD-10-CM

## 2019-11-04 DIAGNOSIS — H93.8X3 EAR FULLNESS, BILATERAL: Primary | ICD-10-CM

## 2019-11-04 LAB
ALBUMIN SERPL-MCNC: 1.4 G/DL (ref 3.5–5)
ALP SERPL-CCNC: 72 U/L (ref 45–120)
ALT SERPL W P-5'-P-CCNC: 10 U/L (ref 0–45)
ANION GAP SERPL CALCULATED.3IONS-SCNC: 7 MMOL/L (ref 5–18)
AST SERPL W P-5'-P-CCNC: 15 U/L (ref 0–40)
BASOPHILS # BLD AUTO: 0.1 THOU/UL (ref 0–0.2)
BASOPHILS NFR BLD AUTO: 1 % (ref 0–2)
BILIRUB SERPL-MCNC: 0.4 MG/DL (ref 0–1)
BUN SERPL-MCNC: 10 MG/DL (ref 8–28)
C REACTIVE PROTEIN LHE: 2.7 MG/DL (ref 0–0.8)
CALCIUM SERPL-MCNC: 8.4 MG/DL (ref 8.5–10.5)
CHLORIDE BLD-SCNC: 104 MMOL/L (ref 98–107)
CO2 SERPL-SCNC: 26 MMOL/L (ref 22–31)
CREAT SERPL-MCNC: 0.7 MG/DL (ref 0.6–1.1)
EOSINOPHIL # BLD AUTO: 0.5 THOU/UL (ref 0–0.4)
EOSINOPHIL NFR BLD AUTO: 6 % (ref 0–6)
ERYTHROCYTE [DISTWIDTH] IN BLOOD BY AUTOMATED COUNT: 23.9 % (ref 11–14.5)
ERYTHROCYTE [SEDIMENTATION RATE] IN BLOOD BY WESTERGREN METHOD: 23 MM/HR (ref 0–20)
GFR SERPL CREATININE-BSD FRML MDRD: >60 ML/MIN/1.73M2
GLUCOSE BLD-MCNC: 72 MG/DL (ref 70–125)
HCT VFR BLD AUTO: 28.4 % (ref 35–47)
HGB BLD-MCNC: 8.9 G/DL (ref 12–16)
LYMPHOCYTES # BLD AUTO: 0.9 THOU/UL (ref 0.8–4.4)
LYMPHOCYTES NFR BLD AUTO: 12 % (ref 20–40)
MCH RBC QN AUTO: 29.2 PG (ref 27–34)
MCHC RBC AUTO-ENTMCNC: 31.3 G/DL (ref 32–36)
MCV RBC AUTO: 93 FL (ref 80–100)
MONOCYTES # BLD AUTO: 0.8 THOU/UL (ref 0–0.9)
MONOCYTES NFR BLD AUTO: 9 % (ref 2–10)
NEUTROPHILS # BLD AUTO: 5.7 THOU/UL (ref 2–7.7)
NEUTROPHILS NFR BLD AUTO: 71 % (ref 50–70)
OVALOCYTES: ABNORMAL
PLAT MORPH BLD: NORMAL
PLATELET # BLD AUTO: 408 THOU/UL (ref 140–440)
PMV BLD AUTO: 9.7 FL (ref 8.5–12.5)
POTASSIUM BLD-SCNC: 4.6 MMOL/L (ref 3.5–5)
PROT SERPL-MCNC: 5.1 G/DL (ref 6–8)
RBC # BLD AUTO: 3.05 MILL/UL (ref 3.8–5.4)
SODIUM SERPL-SCNC: 137 MMOL/L (ref 136–145)
WBC: 8.1 THOU/UL (ref 4–11)

## 2019-11-04 PROCEDURE — 99207 ZZC NO CHARGE LOS: CPT | Performed by: OTOLARYNGOLOGY

## 2019-11-04 PROCEDURE — 69210 REMOVE IMPACTED EAR WAX UNI: CPT | Performed by: OTOLARYNGOLOGY

## 2019-11-04 NOTE — PROGRESS NOTES
History of Present Illness - Sakshi Jaeger is a 74 [place] old female last seen on 1/24/2019    To review, she has had issues with ear wax, more on the RIGHT than LEFT, and for many years Dr. OSMAN has helped her with alligators or irrigations.  However, over the last year prior to first seeing me in January 2019, it seems much more tenacious and sticky.  The other day the LEFT ear got some water in it from the shower and had been totally clogged since then.    No previous ear surgery, no chronic ear disease, no bleeding or drainage from the ears.    I had to procedurally clear tremendous cerumen from both ears.  I asked her to come back in four months, but she has not returned until now.    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)     Osteomyelitis (H)       Current Medications -   Current Outpatient Medications:      acetaminophen (TYLENOL) 325 MG tablet, Take 2 tablets (650 mg) by mouth every 4 hours, Disp: , Rfl:      alendronate (FOSAMAX) 35 MG tablet, TAKE 1 TAB BY MOUTH WITH 8OZ OF WATER ONCE EVERY WEEK BEFORE BREAKFAST, REMAIN UPRIGHT AT THIS TIME, Disp: 12 tablet, Rfl: 2     amLODIPine (NORVASC) 5 MG tablet, Take 1 tablet (5 mg) by mouth daily, Disp: 90 tablet, Rfl: 3     calcium carbonate 600 mg-vitamin D 400 units (CALCIUM 600 + D) 600-400 MG-UNIT per tablet, Take 2 tablets by mouth daily, Disp: , Rfl:      diclofenac (VOLTAREN) 1 % topical gel, Place 2 g onto the skin 4 times daily as needed for moderate pain, Disp: , Rfl:       gabapentin (NEURONTIN) 300 MG capsule, Take 2 capsules (600 mg) by mouth 3 times daily, Disp: , Rfl:      lactobacillus rhamnosus, GG, (CULTURELL) capsule, Take 1 capsule by mouth 2 times daily, Disp: , Rfl:      Lidocaine (LIDOCARE) 4 % Patch, Place 1 patch onto the skin every 24 hours To prevent lidocaine toxicity, patient should be patch free for 12 hrs daily., Disp: , Rfl:      lisinopril (PRINIVIL/ZESTRIL) 20 MG tablet, Take 1 tablet (20 mg) by mouth daily, Disp: 90 tablet, Rfl: 3     multivitamin w/minerals (THERA-VIT-M) tablet, Take 1 tablet by mouth daily, Disp: , Rfl:      nafcillin 2 GM vial, Inject 2 g into the vein every 4 hours, Disp: , Rfl:      niacin 500 MG tablet, Take 1 tablet (500 mg) by mouth At Bedtime, Disp: 100 tablet, Rfl: 6     ondansetron (ZOFRAN-ODT) 4 MG ODT tab, Take 1 tablet (4 mg) by mouth every 6 hours as needed for nausea or vomiting, Disp: , Rfl:      order for DME, Equipment being ordered: New foam (custom shaped protective cover) for right below the knee prostheses, Disp: 1 Units, Rfl: 0     oxyCODONE (ROXICODONE) 5 MG tablet, Take 1-2 tablets (5-10 mg) by mouth every 4 hours as needed for moderate to severe pain, Disp: 30 tablet, Rfl: 0     polyethylene glycol (MIRALAX/GLYCOLAX) packet, Take 17 g by mouth 2 times daily as needed for constipation, Disp: , Rfl:      potassium chloride ER (K-DUR/KLOR-CON M) 20 MEQ CR tablet, Take 2 tablets (40 mEq) by mouth daily, Disp: , Rfl:      predniSONE (DELTASONE) 5 MG tablet, Take 1 tablet (5 mg) by mouth daily, Disp: , Rfl: 0     senna-docusate (SENOKOT-S/PERICOLACE) 8.6-50 MG tablet, Take 1 tablet by mouth 2 times daily, Disp: , Rfl:      simvastatin (ZOCOR) 20 MG tablet, Take 1 tablet (20 mg) by mouth At Bedtime, Disp: , Rfl:      sulindac (CLINORIL) 200 MG tablet, TAKE 1 TABLET BY MOUTH TWICE A DAY WITH FOOD, Disp: , Rfl: 0    Allergies -   Allergies   Allergen Reactions     Penicillins Rash     Tolerated nafcillin and amoxicillin  without rash (2019). Has tolerated cefazolin (, ), cephalexin (, , 2013, 2012), ceftriaxone (2019)       Social History -   Social History     Socioeconomic History     Marital status: Single     Spouse name: None     Number of children: None     Years of education: None     Highest education level: None   Social Needs     Financial resource strain: None     Food insecurity - worry: None     Food insecurity - inability: None     Transportation needs - medical: None     Transportation needs - non-medical: None   Occupational History     None   Tobacco Use     Smoking status: Former Smoker     Packs/day: 0.50     Years: 45.00     Pack years: 22.50     Types: Cigarettes     Last attempt to quit: 2010     Years since quittin.9     Smokeless tobacco: Never Used   Substance and Sexual Activity     Alcohol use: Yes     Comment: occsionally-3 -4 drinks a week     Drug use: No     Sexual activity: Yes     Partners: Male   Other Topics Concern     Parent/sibling w/ CABG, MI or angioplasty before 65F 55M? No   Social History Narrative     None       Family History -   Family History   Problem Relation Age of Onset     Cardiovascular Mother      Cancer Brother      Diabetes No family hx of      Hypertension No family hx of      Cerebrovascular Disease No family hx of      Alzheimer Disease No family hx of      Thyroid Disease No family hx of      Respiratory No family hx of        Review of Systems - As per HPI and PMHx, otherwise 10+ system review of the head and neck, and general constitution is negative.    Physical Exam  B/P: 133/66, T: Data Unavailable, P: 63, R: 12  Vitals: There were no vitals taken for this visit.  BMI= There is no height or weight on file to calculate BMI.    General - The patient is well nourished and well developed, and appears to have good nutritional status.  Alert and oriented to person and place, answers questions and cooperates with examination appropriately.   Head and  Face - Normocephalic and atraumatic, with no gross asymmetry noted of the contour of the facial features.  The facial nerve is intact, with strong symmetric movements.  Voice and Breathing - The patient was breathing comfortably without the use of accessory muscles. There was no wheezing, stridor, or stertor.  The patients voice was clear and strong, and had appropriate pitch and quality.  Eyes - Extraocular movements intact, and the pupils were reactive to light.  Sclera were not icteric or injected, conjunctiva were pink and moist.  Mouth - Examination of the oral cavity showed pink, healthy oral mucosa. No lesions or ulcerations noted.  The tongue was mobile and midline, and the dentition were in good condition.    Throat - The walls of the oropharynx were smooth, pink, moist, symmetric, and had no lesions or ulcerations.  The tonsillar pillars and soft palate were symmetric.  The uvula was midline on elevation.    Neck - Normal midline excursion of the laryngotracheal complex during swallowing.  Full range of motion on passive movement.  Palpation of the occipital, submental, submandibular, internal jugular chain, and supraclavicular nodes did not demonstrate any abnormal lymph nodes or masses.  The carotid pulse was palpable bilaterally.  Palpation of the thyroid was soft and smooth, with no nodules or goiter appreciated.  The trachea was mobile and midline.  Nose - External contour is symmetric, no gross deflection or scars.  Nasal mucosa is pink and moist with no abnormal mucus.  The septum was midline and non-obstructive, turbinates of normal size and position.  No polyps, masses, or purulence noted on examination.    Cerumen Removal    Physical Exam and Procedure  Ears - On examination of the ears, I found that the BOTH sides had cerumen impaction.  Therefore, I positioned them in the examination chair in a semi-supine position, beginning with the right side.  I used the binocular surgical microscope to  perform cerumen removal.  I began by using a cerumen loop to gently lift the edges of the cerumen mass away from the walls of the external canal.  Once I did this, I was able to suction away fragments of wax and debris using suction.  Once the mass was loose enough, the entire plug was pulled from the canal.  The tympanic membrane was intact, no sign of perforation or middle ear effusion.    I turned my attention to the contralateral side once again using the binocular surgical microscope to perform cerumen removal.  I began by using a cerumen loop to gently lift the edges of the cerumen mass away from the walls of the external canal.  Once I did this, I was able to suction away fragments of wax and debris using suction.  Once the mass was loose enough, the entire plug was pulled from the canal.  The tympanic membrane was intact, no sign of perforation or middle ear effusion.        A/P - Sakshi Jaeger is a 73 year old female  (H61.23) Bilateral impacted cerumen  (primary encounter diagnosis)  (H93.8X3) Ear fullness, bilateral    The patient has had their cerumen procedurally removed today.  I have discussed ear care at home, including avoiding qtips or any other object placed in the canal.  I have also discussed that over the counter cerumen kits like Debrox or Cerumenex can be useful.

## 2019-11-04 NOTE — LETTER
11/4/2019         RE: Sakshi Jaeger  [address] [facility] [address]        Dear Colleague,    Thank you for referring your patient, Sakshi Jaeger, to the [facility]. Please see a copy of my visit note below.    History of Present Illness - Sakshi Jaeger is a 74 [place] old female last seen on 1/24/2019    To review, she has had issues with ear wax, more on the RIGHT than LEFT, and for many years Dr. OSMAN has helped her with alligators or irrigations.  However, over the last year prior to first seeing me in January 2019, it seems much more tenacious and sticky.  The other day the LEFT ear got some water in it from the shower and had been totally clogged since then.    No previous ear surgery, no chronic ear disease, no bleeding or drainage from the ears.    I had to procedurally clear tremendous cerumen from both ears.  I asked her to come back in four months, but she has not returned until now.    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)     Osteomyelitis (H)       Current Medications -   Current Outpatient Medications:      acetaminophen (TYLENOL) 325 MG tablet, Take 2 tablets (650 mg) by mouth every 4 hours, Disp: , Rfl:      alendronate (FOSAMAX) 35 MG tablet, TAKE 1 TAB BY MOUTH WITH 8OZ OF WATER ONCE EVERY WEEK BEFORE BREAKFAST, REMAIN UPRIGHT AT THIS TIME, Disp: 12 tablet, Rfl: 2     amLODIPine (NORVASC) 5 MG tablet, Take 1 tablet (5 mg) by mouth daily, Disp: 90 tablet, Rfl: 3     calcium carbonate  600 mg-vitamin D 400 units (CALCIUM 600 + D) 600-400 MG-UNIT per tablet, Take 2 tablets by mouth daily, Disp: , Rfl:      diclofenac (VOLTAREN) 1 % topical gel, Place 2 g onto the skin 4 times daily as needed for moderate pain, Disp: , Rfl:      gabapentin (NEURONTIN) 300 MG capsule, Take 2 capsules (600 mg) by mouth 3 times daily, Disp: , Rfl:      lactobacillus rhamnosus, GG, (CULTURELL) capsule, Take 1 capsule by mouth 2 times daily, Disp: , Rfl:      Lidocaine (LIDOCARE) 4 % Patch, Place 1 patch onto the skin every 24 hours To prevent lidocaine toxicity, patient should be patch free for 12 hrs daily., Disp: , Rfl:      lisinopril (PRINIVIL/ZESTRIL) 20 MG tablet, Take 1 tablet (20 mg) by mouth daily, Disp: 90 tablet, Rfl: 3     multivitamin w/minerals (THERA-VIT-M) tablet, Take 1 tablet by mouth daily, Disp: , Rfl:      nafcillin 2 GM vial, Inject 2 g into the vein every 4 hours, Disp: , Rfl:      niacin 500 MG tablet, Take 1 tablet (500 mg) by mouth At Bedtime, Disp: 100 tablet, Rfl: 6     ondansetron (ZOFRAN-ODT) 4 MG ODT tab, Take 1 tablet (4 mg) by mouth every 6 hours as needed for nausea or vomiting, Disp: , Rfl:      order for DME, Equipment being ordered: New foam (custom shaped protective cover) for right below the knee prostheses, Disp: 1 Units, Rfl: 0     oxyCODONE (ROXICODONE) 5 MG tablet, Take 1-2 tablets (5-10 mg) by mouth every 4 hours as needed for moderate to severe pain, Disp: 30 tablet, Rfl: 0     polyethylene glycol (MIRALAX/GLYCOLAX) packet, Take 17 g by mouth 2 times daily as needed for constipation, Disp: , Rfl:      potassium chloride ER (K-DUR/KLOR-CON M) 20 MEQ CR tablet, Take 2 tablets (40 mEq) by mouth daily, Disp: , Rfl:      predniSONE (DELTASONE) 5 MG tablet, Take 1 tablet (5 mg) by mouth daily, Disp: , Rfl: 0     senna-docusate (SENOKOT-S/PERICOLACE) 8.6-50 MG tablet, Take 1 tablet by mouth 2 times daily, Disp: , Rfl:      simvastatin (ZOCOR) 20 MG tablet, Take 1 tablet (20 mg) by  mouth At Bedtime, Disp: , Rfl:      sulindac (CLINORIL) 200 MG tablet, TAKE 1 TABLET BY MOUTH TWICE A DAY WITH FOOD, Disp: , Rfl: 0    Allergies -   Allergies   Allergen Reactions     Penicillins Rash     Tolerated nafcillin and amoxicillin without rash (2019). Has tolerated cefazolin (, ), cephalexin (, , 2013, 2012), ceftriaxone (2019)       Social History -   Social History     Socioeconomic History     Marital status: Single     Spouse name: None     Number of children: None     Years of education: None     Highest education level: None   Social Needs     Financial resource strain: None     Food insecurity - worry: None     Food insecurity - inability: None     Transportation needs - medical: None     Transportation needs - non-medical: None   Occupational History     None   Tobacco Use     Smoking status: Former Smoker     Packs/day: 0.50     Years: 45.00     Pack years: 22.50     Types: Cigarettes     Last attempt to quit: 2010     Years since quittin.9     Smokeless tobacco: Never Used   Substance and Sexual Activity     Alcohol use: Yes     Comment: occsionally-3 -4 drinks a week     Drug use: No     Sexual activity: Yes     Partners: Male   Other Topics Concern     Parent/sibling w/ CABG, MI or angioplasty before 65F 55M? No   Social History Narrative     None       Family History -   Family History   Problem Relation Age of Onset     Cardiovascular Mother      Cancer Brother      Diabetes No family hx of      Hypertension No family hx of      Cerebrovascular Disease No family hx of      Alzheimer Disease No family hx of      Thyroid Disease No family hx of      Respiratory No family hx of        Review of Systems - As per HPI and PMHx, otherwise 10+ system review of the head and neck, and general constitution is negative.    Physical Exam  B/P: 133/66, T: Data Unavailable, P: 63, R: 12  Vitals: There were no vitals taken for this visit.  BMI= There is no height or weight on file to  calculate BMI.    General - The patient is well nourished and well developed, and appears to have good nutritional status.  Alert and oriented to person and place, answers questions and cooperates with examination appropriately.   Head and Face - Normocephalic and atraumatic, with no gross asymmetry noted of the contour of the facial features.  The facial nerve is intact, with strong symmetric movements.  Voice and Breathing - The patient was breathing comfortably without the use of accessory muscles. There was no wheezing, stridor, or stertor.  The patients voice was clear and strong, and had appropriate pitch and quality.  Eyes - Extraocular movements intact, and the pupils were reactive to light.  Sclera were not icteric or injected, conjunctiva were pink and moist.  Mouth - Examination of the oral cavity showed pink, healthy oral mucosa. No lesions or ulcerations noted.  The tongue was mobile and midline, and the dentition were in good condition.    Throat - The walls of the oropharynx were smooth, pink, moist, symmetric, and had no lesions or ulcerations.  The tonsillar pillars and soft palate were symmetric.  The uvula was midline on elevation.    Neck - Normal midline excursion of the laryngotracheal complex during swallowing.  Full range of motion on passive movement.  Palpation of the occipital, submental, submandibular, internal jugular chain, and supraclavicular nodes did not demonstrate any abnormal lymph nodes or masses.  The carotid pulse was palpable bilaterally.  Palpation of the thyroid was soft and smooth, with no nodules or goiter appreciated.  The trachea was mobile and midline.  Nose - External contour is symmetric, no gross deflection or scars.  Nasal mucosa is pink and moist with no abnormal mucus.  The septum was midline and non-obstructive, turbinates of normal size and position.  No polyps, masses, or purulence noted on examination.    Cerumen Removal    Physical Exam and Procedure  Ears -  On examination of the ears, I found that the BOTH sides had cerumen impaction.  Therefore, I positioned them in the examination chair in a semi-supine position, beginning with the right side.  I used the binocular surgical microscope to perform cerumen removal.  I began by using a cerumen loop to gently lift the edges of the cerumen mass away from the walls of the external canal.  Once I did this, I was able to suction away fragments of wax and debris using suction.  Once the mass was loose enough, the entire plug was pulled from the canal.  The tympanic membrane was intact, no sign of perforation or middle ear effusion.    I turned my attention to the contralateral side once again using the binocular surgical microscope to perform cerumen removal.  I began by using a cerumen loop to gently lift the edges of the cerumen mass away from the walls of the external canal.  Once I did this, I was able to suction away fragments of wax and debris using suction.  Once the mass was loose enough, the entire plug was pulled from the canal.  The tympanic membrane was intact, no sign of perforation or middle ear effusion.        A/P - Sakshi Jaeger is a 73 year old female  (H61.23) Bilateral impacted cerumen  (primary encounter diagnosis)  (H93.8X3) Ear fullness, bilateral    The patient has had their cerumen procedurally removed today.  I have discussed ear care at home, including avoiding qtips or any other object placed in the canal.  I have also discussed that over the counter cerumen kits like Debrox or Cerumenex can be useful.        Again, thank you for allowing me to participate in the care of your patient.        Sincerely,        Pito Mosher MD

## 2019-11-05 ENCOUNTER — DOCUMENTATION ONLY (OUTPATIENT)
Dept: CARE COORDINATION | Facility: CLINIC | Age: 74
End: 2019-11-05

## 2019-11-06 ENCOUNTER — TRANSFERRED RECORDS (OUTPATIENT)
Dept: HEALTH INFORMATION MANAGEMENT | Facility: CLINIC | Age: 74
End: 2019-11-06

## 2019-11-06 ENCOUNTER — OFFICE VISIT - HEALTHEAST (OUTPATIENT)
Dept: GERIATRICS | Facility: CLINIC | Age: 74
End: 2019-11-06

## 2019-11-06 DIAGNOSIS — I10 ESSENTIAL HYPERTENSION: ICD-10-CM

## 2019-11-06 DIAGNOSIS — D64.9 CHRONIC ANEMIA: ICD-10-CM

## 2019-11-06 DIAGNOSIS — M81.0 AGE-RELATED OSTEOPOROSIS WITHOUT CURRENT PATHOLOGICAL FRACTURE: ICD-10-CM

## 2019-11-06 DIAGNOSIS — M86.661 CHRONIC OSTEOMYELITIS OF RIGHT TIBIA (H): ICD-10-CM

## 2019-11-06 LAB
FUNGUS SPEC CULT: NORMAL
FUNGUS SPEC CULT: NORMAL
SPECIMEN SOURCE: NORMAL
SPECIMEN SOURCE: NORMAL

## 2019-11-07 ENCOUNTER — TELEPHONE (OUTPATIENT)
Dept: INFECTIOUS DISEASES | Facility: CLINIC | Age: 74
End: 2019-11-07

## 2019-11-07 NOTE — TELEPHONE ENCOUNTER
Left message for pt reminding them of upcoming appointment.  Instructed pt to bring updated medications list.    Cyndi Rocha CMA CMA    11/7/2019 4:30 PM

## 2019-11-11 ENCOUNTER — OFFICE VISIT (OUTPATIENT)
Dept: INFECTIOUS DISEASES | Facility: CLINIC | Age: 74
End: 2019-11-11
Attending: STUDENT IN AN ORGANIZED HEALTH CARE EDUCATION/TRAINING PROGRAM
Payer: COMMERCIAL

## 2019-11-11 ENCOUNTER — RECORDS - HEALTHEAST (OUTPATIENT)
Dept: LAB | Facility: CLINIC | Age: 74
End: 2019-11-11

## 2019-11-11 ENCOUNTER — PRE VISIT (OUTPATIENT)
Dept: INFECTIOUS DISEASES | Facility: CLINIC | Age: 74
End: 2019-11-11

## 2019-11-11 ENCOUNTER — OFFICE VISIT - HEALTHEAST (OUTPATIENT)
Dept: GERIATRICS | Facility: CLINIC | Age: 74
End: 2019-11-11

## 2019-11-11 ENCOUNTER — TRANSFERRED RECORDS (OUTPATIENT)
Dept: HEALTH INFORMATION MANAGEMENT | Facility: CLINIC | Age: 74
End: 2019-11-11

## 2019-11-11 VITALS
TEMPERATURE: 98.6 F | DIASTOLIC BLOOD PRESSURE: 65 MMHG | OXYGEN SATURATION: 98 % | HEART RATE: 74 BPM | SYSTOLIC BLOOD PRESSURE: 153 MMHG

## 2019-11-11 DIAGNOSIS — M86.661 CHRONIC OSTEOMYELITIS OF RIGHT TIBIA (H): ICD-10-CM

## 2019-11-11 DIAGNOSIS — B95.61 MSSA BACTEREMIA: Primary | ICD-10-CM

## 2019-11-11 DIAGNOSIS — M05.80 OTHER RHEUMATOID ARTHRITIS WITH RHEUMATOID FACTOR OF UNSPECIFIED SITE (H): ICD-10-CM

## 2019-11-11 DIAGNOSIS — M81.0 AGE-RELATED OSTEOPOROSIS WITHOUT CURRENT PATHOLOGICAL FRACTURE: ICD-10-CM

## 2019-11-11 DIAGNOSIS — D64.9 CHRONIC ANEMIA: ICD-10-CM

## 2019-11-11 DIAGNOSIS — M86.261 SUBACUTE OSTEOMYELITIS OF RIGHT TIBIA (H): ICD-10-CM

## 2019-11-11 DIAGNOSIS — R78.81 MSSA BACTEREMIA: Primary | ICD-10-CM

## 2019-11-11 LAB
ALBUMIN SERPL-MCNC: 1.4 G/DL (ref 3.5–5)
ALP SERPL-CCNC: 52 U/L (ref 45–120)
ALT SERPL W P-5'-P-CCNC: <9 U/L (ref 0–45)
ALT SERPL-CCNC: <9 U/L (ref 0–45)
ANION GAP SERPL CALCULATED.3IONS-SCNC: 8 MMOL/L (ref 5–18)
AST SERPL W P-5'-P-CCNC: 17 U/L (ref 0–40)
AST SERPL-CCNC: 17 U/L (ref 0–40)
BASOPHILS # BLD AUTO: 0.1 THOU/UL (ref 0–0.2)
BASOPHILS NFR BLD AUTO: 1 % (ref 0–2)
BILIRUB SERPL-MCNC: 0.3 MG/DL (ref 0–1)
BUN SERPL-MCNC: 10 MG/DL (ref 8–28)
C REACTIVE PROTEIN LHE: 2.1 MG/DL (ref 0–0.8)
CALCIUM SERPL-MCNC: 8.4 MG/DL (ref 8.5–10.5)
CHLORIDE BLD-SCNC: 103 MMOL/L (ref 98–107)
CO2 SERPL-SCNC: 23 MMOL/L (ref 22–31)
CREAT SERPL-MCNC: 0.69 MG/DL (ref 0.6–1.1)
CREAT SERPL-MCNC: 0.69 MG/DL (ref 0.6–1.1)
EOSINOPHIL # BLD AUTO: 0.5 THOU/UL (ref 0–0.4)
EOSINOPHIL NFR BLD AUTO: 7 % (ref 0–6)
ERYTHROCYTE [DISTWIDTH] IN BLOOD BY AUTOMATED COUNT: 23.9 % (ref 11–14.5)
ERYTHROCYTE [SEDIMENTATION RATE] IN BLOOD BY WESTERGREN METHOD: 44 MM/HR (ref 0–20)
GFR SERPL CREATININE-BSD FRML MDRD: >60 ML/MIN/1.73M2
GFR SERPL CREATININE-BSD FRML MDRD: >60 ML/MIN/1.73M2
GLUCOSE BLD-MCNC: 77 MG/DL (ref 70–125)
GLUCOSE SERPL-MCNC: 77 MG/DL (ref 70–125)
HCT VFR BLD AUTO: 27.1 % (ref 35–47)
HGB BLD-MCNC: 8.6 G/DL (ref 12–16)
LYMPHOCYTES # BLD AUTO: 0.8 THOU/UL (ref 0.8–4.4)
LYMPHOCYTES NFR BLD AUTO: 11 % (ref 20–40)
MCH RBC QN AUTO: 29.9 PG (ref 27–34)
MCHC RBC AUTO-ENTMCNC: 31.7 G/DL (ref 32–36)
MCV RBC AUTO: 94 FL (ref 80–100)
MONOCYTES # BLD AUTO: 0.8 THOU/UL (ref 0–0.9)
MONOCYTES NFR BLD AUTO: 11 % (ref 2–10)
NEUTROPHILS # BLD AUTO: 4.9 THOU/UL (ref 2–7.7)
NEUTROPHILS NFR BLD AUTO: 69 % (ref 50–70)
OVALOCYTES: ABNORMAL
PLAT MORPH BLD: NORMAL
PLATELET # BLD AUTO: 415 THOU/UL (ref 140–440)
PMV BLD AUTO: 9.8 FL (ref 8.5–12.5)
POTASSIUM BLD-SCNC: 3.8 MMOL/L (ref 3.5–5)
POTASSIUM SERPL-SCNC: 3.8 MMOL/L (ref 3.5–5)
PROT SERPL-MCNC: 5.5 G/DL (ref 6–8)
RBC # BLD AUTO: 2.88 MILL/UL (ref 3.8–5.4)
SODIUM SERPL-SCNC: 134 MMOL/L (ref 136–145)
WBC: 7 THOU/UL (ref 4–11)

## 2019-11-11 PROCEDURE — G0463 HOSPITAL OUTPT CLINIC VISIT: HCPCS | Mod: ZF

## 2019-11-11 ASSESSMENT — PAIN SCALES - GENERAL: PAINLEVEL: NO PAIN (0)

## 2019-11-11 NOTE — LETTER
11/11/2019       RE: Sakshi Jaeger  [address] [facility] [address]     Dear Colleague,    Thank you for referring your patient, Sakshi Jaeger, to the [facility] INFECTIOUS DISEASES at [facility]. Please see a copy of my visit note below.        GENERAL ID SERVICE: PROGRESS NOTE  Patient:  Sakshi Jaeger, Date of birth 1945, Medical record number [id number]  Date of Admission: 10/1/2019  Date of Visit:  10/10/2019         Assessment and Recommendations:   Problem List:    #1 Right tibial stump osteomyelitis with CT findings of  fluid collection containing internal foci of subcutaneous emphysema adjacent to the distal tibial stump as well as evidence of osteomyelitis - Wound culture from 9/30 positive for MSSA - s/p surgical debridement on 10/9 - tissue cultures positive for MSSA  - CT lumbar spine without evidence of spinal osteomyelitis     #2 MSSA bloodstream infection secondary to #1 , strep mitis likely contaminant   - TTE from 10/2 did not show vegetations  - LINDA (10/7) negative for vegetations  - Blood cultures positive for MSSA from 10/1-10/4. Blood cultures negative since 10/6  - Blood culture positive for Streptococcus mitis on 10/5     Recommendations:   Been on on nafcillin 2g iv q4 with plan for 6 weeks until Nov 17, 2019. CRP has normalized and wound is healing well. Ok to complete abx therapy as planned on 11/17 and remove picc line. Continue weekly blood work with CBC, CMP, ESR, CRP until then. Patient knows to contact me if issues arise.        Gabriella Hinojosa MD          Interval History:     This is a follow up after hospital discharge. First time seeing patient. Seen by my colleague as an inpatient.     Mrs. Sakshi Jaeger is a 74 year-old female admitted on 10/01/19. She has PMHx of chronic low back pain w/L1 fracture, HTN, HLD, OA of the knees s/p L TKA in 2011, RA  (on methotrexate, leflunomide, prednisone 5 mg  daily), s/p R BKA in 2005 for osteomyelitis associated with infected surgical implants at the ankle. She presented to the ED in October with  worsening R amputation stump pain for the 4 days (started on 9/27)  and fever. She did have fever as well as leukocytosis and elevated CRP.  Wound culture from 9/30 positive for MSSA and blood cultures also positive for MSSA (they persist positive so far from 10/1-10/3). In addition a CT femur showed fluid collection containing internal foci ofsubcutaneous emphysema adjacent to the distal tibial stump. There is extension to the medullary cavity suggestive of osteomyelitis. MRI of the right limb showed osteomyelitis of distal tibia, extending approximately 3 cm from the knee joint line. As well as 4.6 x 4.1 cm air-fluid level containing fluid collection with likely open skin sinus tract at amputation stump tip.      Patient was initially on vancomycin given presumed allergy to penicillin, although she passed the augmentin challenge test and she was switched to nafcillin on 10/6/19.  LINDA was negative for vegetation. Ortho performed right lower leg I and D on 10/9 with partial tibial excision. Intraoperative tissue cultures are positive for Staphylococcus aureus.     She was dced to TCU on nafcillin 2g iv q4 with plan for 6 weeks until Nov 17, 2019.     Results for CARLOS PEDRAZA (MRN [id number]) as of 11/11/2019 15:51   Ref. Range 10/1/2019 13:09 10/2/2019 06:37 10/3/2019 06:42 10/4/2019 06:17 10/9/2019 06:40 10/10/2019 05:12 10/11/2019 06:46 10/13/2019 06:13   CRP Inflammation Latest Ref Range: 0.0 - 8.0 mg/L 280.0 (H) 270.0 (H) 240.0 (H) 180.0 (H) 59.0 (H) 59.0 (H) 55.0 (H) 45.0 (H)     10/14 ESR 50, CRP 4.3  10/28 ESR 34 , CRP 4.4  11/4 ESR 23   11/11 ESR 44, CRP 2.1 (<0.8)    Periodic CBC, BMP ok except for anemia     Micro:  9/30 wound culture positive for MSSA  10/01 blood culture MSSA  10/02 blood culture MSSA  10/3 blood culture MSSA  10/4 blood culture MSSA  10/5 blood  culture Strep mitis   10/6 blood culture NTD  10/7 blood culture NTD  10/8 blood culture NTD       ATB:  - Nafcilin started on 10/6  - On vancomycin started on 10/01- 10/6 - discontinued            Physical Exam:   BP (!) 153/65 (BP Location: Left arm, Patient Position: Chair, Cuff Size: Adult Regular)   Pulse 74   Temp 98.6  F (37  C) (Oral)   SpO2 98%      Constitutional: Patient in no distress  Eyes: not pale, not jaundiced  Neck: no lymphadenopathy  CVS: no added sounds  RS: clear  Abdomen: soft, BS+  Skin AND Extremities: no pedal edema, R BKA present. Stump dressing removed and redressed. Healing well, smal opening at tip packed with 2 inches of material  Psych: Alert and oriented x 3           Laboratory Data:     Creatinine   Date Value Ref Range Status   10/13/2019 0.55 0.52 - 1.04 mg/dL Final   10/11/2019 0.47 (L) 0.52 - 1.04 mg/dL Final   10/10/2019 0.48 (L) 0.52 - 1.04 mg/dL Final   10/09/2019 0.45 (L) 0.52 - 1.04 mg/dL Final   10/08/2019 0.46 (L) 0.52 - 1.04 mg/dL Final     WBC   Date Value Ref Range Status   10/13/2019 11.3 (H) 4.0 - 11.0 10e9/L Final   10/11/2019 13.9 (H) 4.0 - 11.0 10e9/L Final   10/10/2019 16.1 (H) 4.0 - 11.0 10e9/L Final   10/09/2019 14.2 (H) 4.0 - 11.0 10e9/L Final   10/08/2019 16.1 (H) 4.0 - 11.0 10e9/L Final     Hemoglobin   Date Value Ref Range Status   10/13/2019 7.5 (L) 11.7 - 15.7 g/dL Final     Platelet Count   Date Value Ref Range Status   10/13/2019 563 (H) 150 - 450 10e9/L Final     Lab Results   Component Value Date     10/13/2019    BUN 6 (L) 10/13/2019    CO2 20 10/13/2019     C-Reactive Protein   Date Value Ref Range Status   03/16/2015 4.10 (A) 0 - 0.8 mg/dL Final   11/05/2014 0.26 0 - 0.8 mg/dL Final   04/21/2005 0.43 0.00 - 0.80 mg/dL Final   04/14/2005 0.59 0.00 - 0.80 mg/dL Final   04/08/2005 0.45 0.00 - 0.80 mg/dL Final     CRP Inflammation   Date Value Ref Range Status   10/13/2019 45.0 (H) 0.0 - 8.0 mg/L Final   10/11/2019 55.0 (H) 0.0 - 8.0 mg/L  Final   10/10/2019 59.0 (H) 0.0 - 8.0 mg/L Final   10/09/2019 59.0 (H) 0.0 - 8.0 mg/L Final   10/04/2019 180.0 (H) 0.0 - 8.0 mg/L Final             Imaging:   LINDA 10/7/19  No vegetation or mass identified, however this does not exclude endocarditis.  Transgastric views not obtained due to patient discomfort and aggitation.  Transgastric views not obtained due to patient discomfort and aggitation.    10/4/19   MRI R knee  1. In this patient with below knee amputation, osteomyelitis of distal  tibia, extending approximately 3 cm from the knee joint line.  2. 4.6 x 4.1 cm air-fluid level containing fluid collection with  likely open skin sinus tract at amputation stump tip.    CT femur thigh 10/1/19  Area of skin ulceration anterior to the distal tibial stump with  adjacent rim-enhancing fluid collection containing internal foci of  subcutaneous emphysema. Subcutaneous gas and fluid collection extends  into the medullary cavity of the distal fibular stump. Multiple foci  of cortical dehiscence of the distal tibial stump. Constellation of  findings compatible with subcutaneous abscess and osteomyelitis.    CT lumbar spine 10/1/19  1. No new fracture is identified.  2. No significant change of disc sequestration extending into the  right neural foramen causing severe neural foraminal narrowing at L1-2  since prior.  3. Multilevel lumbar spondylosis, not changed significantly since  6/19/2019. Unchanged grade 1 anterolisthesis of L3 on L4 and severe  left neuroforaminal narrowing and moderate to severe right neural  foraminal narrowing at this level.    Ct abdomen 10/1  Impression:  1. No acute intra-abdominal pathology.  2. Trace right pleural fluid/pleural thickening with peripheral  tree-in-bud nodular opacities in the right lower lobe, likely  infectious/inflammatory in etiology. Consider aspiration given mucous  plugging in the central right lower lobe bronchi.  3. Marked vascular calcifications in the abdomen and  pelvis without  aneurysmal dilation. Patency is not assessed on this noncontrast  examination.  4. Skin thickening/soft tissue stranding in the right greater than  left gluteal creases. Recommend direct visualization.  5. Please see same day neuroradiology report for findings in the  lumbar spine.     Again, thank you for allowing me to participate in the care of your patient.      Sincerely,    Gabriella Hinojosa MD

## 2019-11-11 NOTE — PROGRESS NOTES
GENERAL ID SERVICE: PROGRESS NOTE  Patient:  Sakshi Jaeger, Date of birth 1945, Medical record number [id number]  Date of Admission: 10/1/2019  Date of Visit:  10/10/2019         Assessment and Recommendations:   Problem List:    #1 Right tibial stump osteomyelitis with CT findings of  fluid collection containing internal foci of subcutaneous emphysema adjacent to the distal tibial stump as well as evidence of osteomyelitis - Wound culture from 9/30 positive for MSSA - s/p surgical debridement on 10/9 - tissue cultures positive for MSSA  - CT lumbar spine without evidence of spinal osteomyelitis     #2 MSSA bloodstream infection secondary to #1 , strep mitis likely contaminant   - TTE from 10/2 did not show vegetations  - LINDA (10/7) negative for vegetations  - Blood cultures positive for MSSA from 10/1-10/4. Blood cultures negative since 10/6  - Blood culture positive for Streptococcus mitis on 10/5     Recommendations:   Been on on nafcillin 2g iv q4 with plan for 6 weeks until Nov 17, 2019. CRP has normalized and wound is healing well. Ok to complete abx therapy as planned on 11/17 and remove picc line. Continue weekly blood work with CBC, CMP, ESR, CRP until then. Patient knows to contact me if issues arise.      Addendum: 12/4/19  Got a call that there was abnormal drainage from stump on 11/13. I had asked for culture of the wound that grew normal smita. Patient completed abx therapy 11/17. 11/18 labs show CRP 4.6 and ESR 48.     aGbriella Hinojosa MD          Interval History:     This is a follow up after hospital discharge. First time seeing patient. Seen by my colleague as an inpatient.     Mrs. Sakshi Jaeger is a 74 year-old female admitted on 10/01/19. 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 She presented to the ED in October with  worsening R amputation stump pain for the 4 days (started on 9/27)  and fever. She did have fever as well as leukocytosis and elevated CRP.  Wound culture from 9/30 positive for MSSA and blood cultures also positive for MSSA (they persist positive so far from 10/1-10/3). In addition a CT femur showed fluid collection containing internal foci ofsubcutaneous emphysema adjacent to the distal tibial stump. There is extension to the medullary cavity suggestive of osteomyelitis. MRI of the right limb showed osteomyelitis of distal tibia, extending approximately 3 cm from the knee joint line. As well as 4.6 x 4.1 cm air-fluid level containing fluid collection with likely open skin sinus tract at amputation stump tip.      Patient was initially on vancomycin given presumed allergy to penicillin, although she passed the augmentin challenge test and she was switched to nafcillin on 10/6/19.  LINDA was negative for vegetation. Ortho performed right lower leg I and D on 10/9 with partial tibial excision. Intraoperative tissue cultures are positive for Staphylococcus aureus.     She was dced to TCU on nafcillin 2g iv q4 with plan for 6 weeks until Nov 17, 2019.     Results for CARLOS PEDRAZA (MRN [id number]) as of 11/11/2019 15:51   Ref. Range 10/1/2019 13:09 10/2/2019 06:37 10/3/2019 06:42 10/4/2019 06:17 10/9/2019 06:40 10/10/2019 05:12 10/11/2019 06:46 10/13/2019 06:13   CRP Inflammation Latest Ref Range: 0.0 - 8.0 mg/L 280.0 (H) 270.0 (H) 240.0 (H) 180.0 (H) 59.0 (H) 59.0 (H) 55.0 (H) 45.0 (H)     10/14 ESR 50, CRP 4.3  10/28 ESR 34 , CRP 4.4  11/4 ESR 23   11/11 ESR 44, CRP 2.1 (<0.8)    Periodic CBC, BMP ok except for anemia     Micro:  9/30 wound culture positive for MSSA  10/01 blood culture MSSA  10/02 blood culture MSSA  10/3 blood culture MSSA  10/4 blood culture MSSA  10/5 blood culture Strep mitis   10/6 blood culture NTD  10/7 blood culture NTD  10/8 blood culture  NTD       ATB:  - Nafcilin started on 10/6  - On vancomycin started on 10/01- 10/6 - discontinued            Physical Exam:   BP (!) 153/65 (BP Location: Left arm, Patient Position: Chair, Cuff Size: Adult Regular)   Pulse 74   Temp 98.6  F (37  C) (Oral)   SpO2 98%      Constitutional: Patient in no distress  Eyes: not pale, not jaundiced  Neck: no lymphadenopathy  CVS: no added sounds  RS: clear  Abdomen: soft, BS+  Skin AND Extremities: no pedal edema, R BKA present. Stump dressing removed and redressed. Healing well, smal opening at tip packed with 2 inches of material  Psych: Alert and oriented x 3           Laboratory Data:     Creatinine   Date Value Ref Range Status   10/13/2019 0.55 0.52 - 1.04 mg/dL Final   10/11/2019 0.47 (L) 0.52 - 1.04 mg/dL Final   10/10/2019 0.48 (L) 0.52 - 1.04 mg/dL Final   10/09/2019 0.45 (L) 0.52 - 1.04 mg/dL Final   10/08/2019 0.46 (L) 0.52 - 1.04 mg/dL Final     WBC   Date Value Ref Range Status   10/13/2019 11.3 (H) 4.0 - 11.0 10e9/L Final   10/11/2019 13.9 (H) 4.0 - 11.0 10e9/L Final   10/10/2019 16.1 (H) 4.0 - 11.0 10e9/L Final   10/09/2019 14.2 (H) 4.0 - 11.0 10e9/L Final   10/08/2019 16.1 (H) 4.0 - 11.0 10e9/L Final     Hemoglobin   Date Value Ref Range Status   10/13/2019 7.5 (L) 11.7 - 15.7 g/dL Final     Platelet Count   Date Value Ref Range Status   10/13/2019 563 (H) 150 - 450 10e9/L Final     Lab Results   Component Value Date     10/13/2019    BUN 6 (L) 10/13/2019    CO2 20 10/13/2019     C-Reactive Protein   Date Value Ref Range Status   03/16/2015 4.10 (A) 0 - 0.8 mg/dL Final   11/05/2014 0.26 0 - 0.8 mg/dL Final   04/21/2005 0.43 0.00 - 0.80 mg/dL Final   04/14/2005 0.59 0.00 - 0.80 mg/dL Final   04/08/2005 0.45 0.00 - 0.80 mg/dL Final     CRP Inflammation   Date Value Ref Range Status   10/13/2019 45.0 (H) 0.0 - 8.0 mg/L Final   10/11/2019 55.0 (H) 0.0 - 8.0 mg/L Final   10/10/2019 59.0 (H) 0.0 - 8.0 mg/L Final   10/09/2019 59.0 (H) 0.0 - 8.0 mg/L Final    10/04/2019 180.0 (H) 0.0 - 8.0 mg/L Final             Imaging:   LINDA 10/7/19  No vegetation or mass identified, however this does not exclude endocarditis.  Transgastric views not obtained due to patient discomfort and aggitation.  Transgastric views not obtained due to patient discomfort and aggitation.    10/4/19   MRI R knee  1. In this patient with below knee amputation, osteomyelitis of distal  tibia, extending approximately 3 cm from the knee joint line.  2. 4.6 x 4.1 cm air-fluid level containing fluid collection with  likely open skin sinus tract at amputation stump tip.    CT femur thigh 10/1/19  Area of skin ulceration anterior to the distal tibial stump with  adjacent rim-enhancing fluid collection containing internal foci of  subcutaneous emphysema. Subcutaneous gas and fluid collection extends  into the medullary cavity of the distal fibular stump. Multiple foci  of cortical dehiscence of the distal tibial stump. Constellation of  findings compatible with subcutaneous abscess and osteomyelitis.    CT lumbar spine 10/1/19  1. No new fracture is identified.  2. No significant change of disc sequestration extending into the  right neural foramen causing severe neural foraminal narrowing at L1-2  since prior.  3. Multilevel lumbar spondylosis, not changed significantly since  6/19/2019. Unchanged grade 1 anterolisthesis of L3 on L4 and severe  left neuroforaminal narrowing and moderate to severe right neural  foraminal narrowing at this level.    Ct abdomen 10/1  Impression:  1. No acute intra-abdominal pathology.  2. Trace right pleural fluid/pleural thickening with peripheral  tree-in-bud nodular opacities in the right lower lobe, likely  infectious/inflammatory in etiology. Consider aspiration given mucous  plugging in the central right lower lobe bronchi.  3. Marked vascular calcifications in the abdomen and pelvis without  aneurysmal dilation. Patency is not assessed on this  noncontrast  examination.  4. Skin thickening/soft tissue stranding in the right greater than  left gluteal creases. Recommend direct visualization.  5. Please see same day neuroradiology report for findings in the  lumbar spine.

## 2019-11-11 NOTE — LETTER
11/11/2019      RE: Sakshi Jaeger  [address] [address]     GENERAL ID SERVICE: PROGRESS NOTE  Patient:  Sakshi Jaeger, Date of birth 1945, Medical record number [id number]  Date of Admission: 10/1/2019  Date of Visit:  10/10/2019         Assessment and Recommendations:   Problem List:    #1 Right tibial stump osteomyelitis with CT findings of  fluid collection containing internal foci of subcutaneous emphysema adjacent to the distal tibial stump as well as evidence of osteomyelitis - Wound culture from 9/30 positive for MSSA - s/p surgical debridement on 10/9 - tissue cultures positive for MSSA  - CT lumbar spine without evidence of spinal osteomyelitis     #2 MSSA bloodstream infection secondary to #1 , strep mitis likely contaminant   - TTE from 10/2 did not show vegetations  - LINDA (10/7) negative for vegetations  - Blood cultures positive for MSSA from 10/1-10/4. Blood cultures negative since 10/6  - Blood culture positive for Streptococcus mitis on 10/5     Recommendations:   Been on on nafcillin 2g iv q4 with plan for 6 weeks until Nov 17, 2019. CRP has normalized and wound is healing well. Ok to complete abx therapy as planned on 11/17 and remove picc line. Continue weekly blood work with CBC, CMP, ESR, CRP until then. Patient knows to contact me if issues arise.        Gabriella Hinojosa MD          Interval History:     This is a follow up after hospital discharge. First time seeing patient. Seen by my colleague as an inpatient.     Mrs. Sakshi Jaeger is a 74 year-old female admitted on 10/01/19. She has PMHx of chronic low back pain w/L1 fracture, HTN, HLD, OA of the knees s/p L TKA in 2011, RA  (on methotrexate, leflunomide, prednisone 5 mg daily), s/p R BKA in 2005 for osteomyelitis associated with infected surgical implants at the ankle. She presented to the ED in October with  worsening R amputation stump pain for the 4 days (started on 9/27)  and fever. She did have  fever as well as leukocytosis and elevated CRP.  Wound culture from 9/30 positive for MSSA and blood cultures also positive for MSSA (they persist positive so far from 10/1-10/3). In addition a CT femur showed fluid collection containing internal foci ofsubcutaneous emphysema adjacent to the distal tibial stump. There is extension to the medullary cavity suggestive of osteomyelitis. MRI of the right limb showed osteomyelitis of distal tibia, extending approximately 3 cm from the knee joint line. As well as 4.6 x 4.1 cm air-fluid level containing fluid collection with likely open skin sinus tract at amputation stump tip.      Patient was initially on vancomycin given presumed allergy to penicillin, although she passed the augmentin challenge test and she was switched to nafcillin on 10/6/19.  LINDA was negative for vegetation. Ortho performed right lower leg I and D on 10/9 with partial tibial excision. Intraoperative tissue cultures are positive for Staphylococcus aureus.     She was dced to TCU on nafcillin 2g iv q4 with plan for 6 weeks until Nov 17, 2019.     Results for CARLOS PEDRAZA (MRN [id number]) as of 11/11/2019 15:51   Ref. Range 10/1/2019 13:09 10/2/2019 06:37 10/3/2019 06:42 10/4/2019 06:17 10/9/2019 06:40 10/10/2019 05:12 10/11/2019 06:46 10/13/2019 06:13   CRP Inflammation Latest Ref Range: 0.0 - 8.0 mg/L 280.0 (H) 270.0 (H) 240.0 (H) 180.0 (H) 59.0 (H) 59.0 (H) 55.0 (H) 45.0 (H)     10/14 ESR 50, CRP 4.3  10/28 ESR 34 , CRP 4.4  11/4 ESR 23   11/11 ESR 44, CRP 2.1 (<0.8)    Periodic CBC, BMP ok except for anemia     Micro:  9/30 wound culture positive for MSSA  10/01 blood culture MSSA  10/02 blood culture MSSA  10/3 blood culture MSSA  10/4 blood culture MSSA  10/5 blood culture Strep mitis   10/6 blood culture NTD  10/7 blood culture NTD  10/8 blood culture NTD       ATB:  - Nafcilin started on 10/6  - On vancomycin started on 10/01- 10/6 - discontinued            Physical Exam:   BP (!) 153/65 (BP  Location: Left arm, Patient Position: Chair, Cuff Size: Adult Regular)   Pulse 74   Temp 98.6  F (37  C) (Oral)   SpO2 98%      Constitutional: Patient in no distress  Eyes: not pale, not jaundiced  Neck: no lymphadenopathy  CVS: no added sounds  RS: clear  Abdomen: soft, BS+  Skin AND Extremities: no pedal edema, R BKA present. Stump dressing removed and redressed. Healing well, smal opening at tip packed with 2 inches of material  Psych: Alert and oriented x 3         Laboratory Data:     Creatinine   Date Value Ref Range Status   10/13/2019 0.55 0.52 - 1.04 mg/dL Final   10/11/2019 0.47 (L) 0.52 - 1.04 mg/dL Final   10/10/2019 0.48 (L) 0.52 - 1.04 mg/dL Final   10/09/2019 0.45 (L) 0.52 - 1.04 mg/dL Final   10/08/2019 0.46 (L) 0.52 - 1.04 mg/dL Final     WBC   Date Value Ref Range Status   10/13/2019 11.3 (H) 4.0 - 11.0 10e9/L Final   10/11/2019 13.9 (H) 4.0 - 11.0 10e9/L Final   10/10/2019 16.1 (H) 4.0 - 11.0 10e9/L Final   10/09/2019 14.2 (H) 4.0 - 11.0 10e9/L Final   10/08/2019 16.1 (H) 4.0 - 11.0 10e9/L Final     Hemoglobin   Date Value Ref Range Status   10/13/2019 7.5 (L) 11.7 - 15.7 g/dL Final     Platelet Count   Date Value Ref Range Status   10/13/2019 563 (H) 150 - 450 10e9/L Final     Lab Results   Component Value Date     10/13/2019    BUN 6 (L) 10/13/2019    CO2 20 10/13/2019     C-Reactive Protein   Date Value Ref Range Status   03/16/2015 4.10 (A) 0 - 0.8 mg/dL Final   11/05/2014 0.26 0 - 0.8 mg/dL Final   04/21/2005 0.43 0.00 - 0.80 mg/dL Final   04/14/2005 0.59 0.00 - 0.80 mg/dL Final   04/08/2005 0.45 0.00 - 0.80 mg/dL Final     CRP Inflammation   Date Value Ref Range Status   10/13/2019 45.0 (H) 0.0 - 8.0 mg/L Final   10/11/2019 55.0 (H) 0.0 - 8.0 mg/L Final   10/10/2019 59.0 (H) 0.0 - 8.0 mg/L Final   10/09/2019 59.0 (H) 0.0 - 8.0 mg/L Final   10/04/2019 180.0 (H) 0.0 - 8.0 mg/L Final           Imaging:   LINDA 10/7/19  No vegetation or mass identified, however this does not exclude  endocarditis.  Transgastric views not obtained due to patient discomfort and aggitation.  Transgastric views not obtained due to patient discomfort and aggitation.    10/4/19   MRI R knee  1. In this patient with below knee amputation, osteomyelitis of distal  tibia, extending approximately 3 cm from the knee joint line.  2. 4.6 x 4.1 cm air-fluid level containing fluid collection with  likely open skin sinus tract at amputation stump tip.    CT femur thigh 10/1/19  Area of skin ulceration anterior to the distal tibial stump with  adjacent rim-enhancing fluid collection containing internal foci of  subcutaneous emphysema. Subcutaneous gas and fluid collection extends  into the medullary cavity of the distal fibular stump. Multiple foci  of cortical dehiscence of the distal tibial stump. Constellation of  findings compatible with subcutaneous abscess and osteomyelitis.    CT lumbar spine 10/1/19  1. No new fracture is identified.  2. No significant change of disc sequestration extending into the  right neural foramen causing severe neural foraminal narrowing at L1-2  since prior.  3. Multilevel lumbar spondylosis, not changed significantly since  6/19/2019. Unchanged grade 1 anterolisthesis of L3 on L4 and severe  left neuroforaminal narrowing and moderate to severe right neural  foraminal narrowing at this level.    Ct abdomen 10/1  Impression:  1. No acute intra-abdominal pathology.  2. Trace right pleural fluid/pleural thickening with peripheral  tree-in-bud nodular opacities in the right lower lobe, likely  infectious/inflammatory in etiology. Consider aspiration given mucous  plugging in the central right lower lobe bronchi.  3. Marked vascular calcifications in the abdomen and pelvis without  aneurysmal dilation. Patency is not assessed on this noncontrast  examination.  4. Skin thickening/soft tissue stranding in the right greater than  left gluteal creases. Recommend direct visualization.  5. Please see same  day neuroradiology report for findings in the  lumbar spine.     Gabriella Hinojosa MD

## 2019-11-11 NOTE — NURSING NOTE
Chief Complaint   Patient presents with     Hospital F/U     Cellulitis     Blood pressure (!) 153/65, pulse 74, temperature 98.6  F (37  C), temperature source Oral, SpO2 98 %, not currently breastfeeding.    Edmund Terrell/CEFERINO  November 11, 2019 3:38 PM

## 2019-11-13 ENCOUNTER — RECORDS - HEALTHEAST (OUTPATIENT)
Dept: LAB | Facility: CLINIC | Age: 74
End: 2019-11-13

## 2019-11-13 ENCOUNTER — OFFICE VISIT - HEALTHEAST (OUTPATIENT)
Dept: GERIATRICS | Facility: CLINIC | Age: 74
End: 2019-11-13

## 2019-11-13 ENCOUNTER — TELEPHONE (OUTPATIENT)
Dept: INFECTIOUS DISEASES | Facility: CLINIC | Age: 74
End: 2019-11-13

## 2019-11-13 DIAGNOSIS — M86.661 CHRONIC OSTEOMYELITIS OF RIGHT TIBIA (H): ICD-10-CM

## 2019-11-13 DIAGNOSIS — M81.0 AGE-RELATED OSTEOPOROSIS WITHOUT CURRENT PATHOLOGICAL FRACTURE: ICD-10-CM

## 2019-11-13 DIAGNOSIS — I10 ESSENTIAL HYPERTENSION: ICD-10-CM

## 2019-11-13 DIAGNOSIS — M05.80 OTHER RHEUMATOID ARTHRITIS WITH RHEUMATOID FACTOR OF UNSPECIFIED SITE (H): ICD-10-CM

## 2019-11-13 NOTE — TELEPHONE ENCOUNTER
[facility] Call Center    Phone Message    May a detailed message be left on voicemail: yes    Reason for Call: Other: Bibi, nurse from [facility] called and stated that Bibi noticed pus and some kind of drainage coming out of the wound. Bibi would like to speak with a nurse asap if possible. Thanks.     Action Taken: Message routed to:  [facility] ([facility]): ID

## 2019-11-13 NOTE — TELEPHONE ENCOUNTER
Spoke with Bibi at pt's care center. She reports some tan, yellow drainage from the wound and a lot of slough in the wound bed. The drainage is not malodorous. There is some slough in the wound bed. Discussed with Dr. Hinojosa in clinic. She would like a routine bacterial culture done on the drainage from the wound. Gave Bibi this verbal order. They will call the clinic with the results of the culture. Pt's IV abx stop date is 11/17, and pt is hoping to discharge from TCU that day as well.  Simi Murrell RN

## 2019-11-15 LAB — BACTERIA SPEC CULT: NO GROWTH

## 2019-11-16 ENCOUNTER — RECORDS - HEALTHEAST (OUTPATIENT)
Dept: LAB | Facility: CLINIC | Age: 74
End: 2019-11-16

## 2019-11-16 LAB
C REACTIVE PROTEIN LHE: 2.2 MG/DL (ref 0–0.8)
ERYTHROCYTE [SEDIMENTATION RATE] IN BLOOD BY WESTERGREN METHOD: 48 MM/HR (ref 0–20)

## 2019-11-18 ENCOUNTER — RECORDS - HEALTHEAST (OUTPATIENT)
Dept: LAB | Facility: CLINIC | Age: 74
End: 2019-11-18

## 2019-11-18 ENCOUNTER — OFFICE VISIT - HEALTHEAST (OUTPATIENT)
Dept: GERIATRICS | Facility: CLINIC | Age: 74
End: 2019-11-18

## 2019-11-18 ENCOUNTER — TRANSFERRED RECORDS (OUTPATIENT)
Dept: HEALTH INFORMATION MANAGEMENT | Facility: CLINIC | Age: 74
End: 2019-11-18

## 2019-11-18 DIAGNOSIS — T87.43 RIGHT BKA INFECTION (H): ICD-10-CM

## 2019-11-18 DIAGNOSIS — M48.061 SPINAL STENOSIS OF LUMBAR REGION WITHOUT NEUROGENIC CLAUDICATION: ICD-10-CM

## 2019-11-18 DIAGNOSIS — I10 ESSENTIAL HYPERTENSION: ICD-10-CM

## 2019-11-18 DIAGNOSIS — D64.9 CHRONIC ANEMIA: ICD-10-CM

## 2019-11-18 DIAGNOSIS — M05.80 OTHER RHEUMATOID ARTHRITIS WITH RHEUMATOID FACTOR OF UNSPECIFIED SITE (H): ICD-10-CM

## 2019-11-18 DIAGNOSIS — M86.661 CHRONIC OSTEOMYELITIS OF RIGHT TIBIA (H): ICD-10-CM

## 2019-11-18 DIAGNOSIS — M81.0 AGE-RELATED OSTEOPOROSIS WITHOUT CURRENT PATHOLOGICAL FRACTURE: ICD-10-CM

## 2019-11-18 LAB
C REACTIVE PROTEIN LHE: 4.6 MG/DL (ref 0–0.8)
ERYTHROCYTE [SEDIMENTATION RATE] IN BLOOD BY WESTERGREN METHOD: 48 MM/HR (ref 0–20)

## 2019-11-19 ENCOUNTER — AMBULATORY - HEALTHEAST (OUTPATIENT)
Dept: GERIATRICS | Facility: CLINIC | Age: 74
End: 2019-11-19

## 2019-11-19 ENCOUNTER — PRE VISIT (OUTPATIENT)
Dept: ORTHOPEDICS | Facility: CLINIC | Age: 74
End: 2019-11-19

## 2019-11-19 ENCOUNTER — OFFICE VISIT (OUTPATIENT)
Dept: ORTHOPEDICS | Facility: CLINIC | Age: 74
End: 2019-11-19
Payer: COMMERCIAL

## 2019-11-19 DIAGNOSIS — L97.912 ULCER OF RIGHT LOWER EXTREMITY WITH FAT LAYER EXPOSED (H): ICD-10-CM

## 2019-11-19 DIAGNOSIS — M25.561 ARTHRALGIA OF RIGHT KNEE: Primary | ICD-10-CM

## 2019-11-19 DIAGNOSIS — Z89.511 STATUS POST BELOW-KNEE AMPUTATION OF RIGHT LOWER EXTREMITY (H): Primary | ICD-10-CM

## 2019-11-19 NOTE — NURSING NOTE
Reason For Visit:   Chief Complaint   Patient presents with     Wound Check     Irrigation and debridement Right BKA DOS:10/9/19       There were no vitals taken for this visit.    Pain Assessment  Patient Currently in Pain: Mirta Maynard, ATC

## 2019-11-19 NOTE — LETTER
11/19/2019       RE: Sakshi Jaeger  [address] [address]     Dear Colleague,    Thank you for referring your patient, Sakshi Jaeger, to the [facility] at [facility]. Please see a copy of my visit note below.    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 She presents with her son today. She relates a complicated history with this leg. In 2005, she had a BKA on the leg after having insufficiency fractures in the distal tibia and fibula. She had an ORIF January 2005. A month later, she had an infection that she had an I&D for. She subsequently had osteomyelitis of the distal fibula and had another I&D.  This failed to cure the infection and she had the BKA in September of the same year. She has had cellulitis in the leg. Most recently, she had an I&D of the stump with Dr. Mott on 10/09/19. She was admitted to a TCU for advanced cares. She relates that the wound has gotten much smaller since then. However, there remains an area that is still open and drains fluid. She relates that the area is being packed daily. I was consulted at th request of Dr. Mott for wound management.     Review of Systems: No n/v/d/f/c/ns/sob/cp     PMH:   Past Medical History:   Diagnosis Date     BENIGN HYPERTENSION 3/1/2005     CAD (coronary artery disease) 1/26/2011     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     Hypertension goal BP (blood pressure) < 130/80 1/26/2011     IRON DEFIC ANEMIA NOS 9/15/2005     Lower limb amputation, below knee 12/26/2012     MI (myocardial infarction) (H)     3/2010     OSTEOPOROSIS NOS 2/16/2005     Rheumatoid arthritis(714.0) 2/16/2005       PSxH:   Past Surgical History:   Procedure Laterality Date      ---------INCISION AND DRAINAGE  3/5/14     AMPUTATION BELOW KNEE RT/LT      right lowwer leg.      APPENDECTOMY       ARTHROPLASTY KNEE  2011    Procedure:ARTHROPLASTY KNEE; Surgeon:JESSE HAWKINS; Location:[facility] OR     coronary stent       IRRIGATION AND DEBRIDEMENT LOWER EXTREMITY, COMBINED Right 10/9/2019    Procedure: Irrigation and debridement Right leg;  Surgeon: Doron Mott MD;  Location: [facility] OR     SURGICAL HISTORY OF -       Appendectomy       Allergies: Penicillins    SH:   Social History     Socioeconomic History     Marital status: Single     Spouse name: Not on file     Number of children: Not on file     Years of education: Not on file     Highest education level: Not on file   Occupational History     Not on file   Social Needs     Financial resource strain: Not on file     Food insecurity:     Worry: Not on file     Inability: Not on file     Transportation needs:     Medical: Not on file     Non-medical: Not on file   Tobacco Use     Smoking status: Current Every Day Smoker     Packs/day: 0.50     Years: 45.00     Pack years: 22.50     Types: Cigarettes     Last attempt to quit: 2010     Years since quittin.7     Smokeless tobacco: Never Used     Tobacco comment: pt has recently started smoking again d/t pain   Substance and Sexual Activity     Alcohol use: Yes     Comment: occsionally-3 -4 drinks a week     Drug use: No     Sexual activity: Yes     Partners: Male   Lifestyle     Physical activity:     Days per week: Not on file     Minutes per session: Not on file     Stress: Not on file   Relationships     Social connections:     Talks on phone: Not on file     Gets together: Not on file     Attends Shinto service: Not on file     Active member of club or organization: Not on file     Attends meetings of clubs or organizations: Not on file     Relationship status: Not on file     Intimate partner violence:     Fear of current or ex partner: Not on file      Emotionally abused: Not on file     Physically abused: Not on file     Forced sexual activity: Not on file   Other Topics Concern     Parent/sibling w/ CABG, MI or angioplasty before 65F 55M? No   Social History Narrative     Not on file       FH:   Family History   Problem Relation Age of Onset     Cardiovascular Mother      Cancer Brother      Diabetes No family hx of      Hypertension No family hx of      Cerebrovascular Disease No family hx of      Alzheimer Disease No family hx of      Thyroid Disease No family hx of      Respiratory No family hx of        Objective:  Data Unavailable Data Unavailable Data Unavailable Data Unavailable Data Unavailable 0 lbs 0 oz     CRT is instnat.    Gross sensation is intact bilaterally.             A wound is noted at right  BKA stump measuring 0.4cm x 0.4cm x 4cm.    Ly Classification: 3    Wound base: Not Visible    Edges: intact but slight epibole    Drainage: copious/serous    Odor: no    Undermining: no    Tunnelin O'Clock for 4cm    Bone Exposure: Possible tibia in the base of the tunnel.     Clinical Signs of Infection: No    After obtaining patient consent, the wound was irrigated with copious amounts of saline. No shrp debridement performed today.        Assessment: Sakshi is a 73 YO female with right BKA ulceration. Her son, who does the packing, did not realize that the tunnel was present. She is quite upset that the tunnel has not healed in, as she thought the wound was almost healed.     Plan:  - Pt seen and evaluated.  - Will order a VAC for the area. For now, she should continue with the packing, especially in the tunnel. We taught her son how to perform this.  - Will order the VAC.  - See again after she gets the VAC for placement.     Again, thank you for allowing me to participate in the care of your patient.      Sincerely,    Joe Walker DPM

## 2019-11-19 NOTE — NURSING NOTE
Reason For Visit:   Chief Complaint   Patient presents with     RECHECK     Right lower leg irrigation and Debridement with parial tibia excision DOS: 10/9/19       There were no vitals taken for this visit.    Pain Assessment  Patient Currently in Pain: Mirta Maynard, ATC

## 2019-11-19 NOTE — TELEPHONE ENCOUNTER
RECORDS RECEIVED FROM: [facility] wound check   DATE RECEIVED: Nov 19, 2019     NOTES STATUS DETAILS   OFFICE NOTE from referring provider Internal  Gabriella Hinojosa MD   OFFICE NOTE from other specialist N/A    DISCHARGE SUMMARY from hospital N/A    DISCHARGE REPORT from the ER N/A    OPERATIVE REPORT Internal    MEDICATION LIST Internal    IMPLANT RECORD/STICKER N/A    LABS     CBC/DIFF N/A    CULTURES N/A    INJECTIONS DONE IN RADIOLOGY N/A    MRI N/A    CT SCAN Internal    XRAYS (IMAGES & REPORTS) Internal    TUMOR     PATHOLOGY  Slides & report N/A      11/19/19   2:23 PM   Pre-visit complete  Sol Glamm, CMA

## 2019-11-19 NOTE — PROGRESS NOTES
CHIEF COMPLAINT:  Status post right lower leg irrigation and debridement of the distal tibia performed on 10/09/2019.       HISTORY OF PRESENT ILLNESS:  Mrs. Jaeger presents today for followup.  Reports to be doing well.  Reports to continue making progress with the healing of the wound.      PHYSICAL EXAMINATION:  On today's visit, she presents with still some opening which is approximately 4-5 mm.  There is some serous drainage.  There is no redness.  There is no odor.  There are no signs of active infection or inflammation.      ASSESSMENT:     1.  Status post right distal tibia I&D.    2.  Right wound opening.      PLAN:  I discussed with the patient that I would recommend her to be evaluated and treated by Dr. Walker and to follow his recommendation in order to accomplish complete healing of the wound as she has now gone through a course of antibiotic therapy, which therefore, it is in her best interest to take advantage of that circumstance.      The patient will follow up with us on a p.r.n. basis assuming that she will be under the care of Dr. Walker.  In the eventuality that she chooses not to be under his care, she will be evaluated by us in 6 weeks from now and no x-rays will be obtained.  All questions were answered.  The patient was pleased with the discussion.  The patient will follow up accordingly.

## 2019-11-19 NOTE — LETTER
11/19/2019       RE: Sakshi Jaeger  [address] [address]     Dear Colleague,    Thank you for referring your patient, Sakshi Jaeger, to the [facility] Please see a copy of my visit note below.    CHIEF COMPLAINT:  Status post right lower leg irrigation and debridement of the distal tibia performed on 10/09/2019.       HISTORY OF PRESENT ILLNESS:  Mrs. Jaeger presents today for followup.  Reports to be doing well.  Reports to continue making progress with the healing of the wound.      PHYSICAL EXAMINATION:  On today's visit, she presents with still some opening which is approximately 4-5 mm.  There is some serous drainage.  There is no redness.  There is no odor.  There are no signs of active infection or inflammation.      ASSESSMENT:     1.  Status post right distal tibia I&D.    2.  Right wound opening.      PLAN:  I discussed with the patient that I would recommend her to be evaluated and treated by Dr. Walker and to follow his recommendation in order to accomplish complete healing of the wound as she has now gone through a course of antibiotic therapy, which therefore, it is in her best interest to take advantage of that circumstance.      The patient will follow up with us on a p.r.n. basis assuming that she will be under the care of Dr. Walker.  In the eventuality that she chooses not to be under his care, she will be evaluated by us in 6 weeks from now and no x-rays will be obtained.  All questions were answered.  The patient was pleased with the discussion.  The patient will follow up accordingly.     Doron Mott MD

## 2019-11-20 NOTE — PROGRESS NOTES
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 She presents with her son today. She relates a complicated history with this leg. In 2005, she had a BKA on the leg after having insufficiency fractures in the distal tibia and fibula. She had an ORIF January 2005. A month later, she had an infection that she had an I&D for. She subsequently had osteomyelitis of the distal fibula and had another I&D.  This failed to cure the infection and she had the BKA in September of the same year. She has had cellulitis in the leg. Most recently, she had an I&D of the stump with Dr. Mott on 10/09/19. She was admitted to a TCU for advanced cares. She relates that the wound has gotten much smaller since then. However, there remains an area that is still open and drains fluid. She relates that the area is being packed daily. I was consulted at th request of Dr. Mott for wound management.     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      APPENDECTOMY       ARTHROPLASTY KNEE  4/27/2011    Procedure:ARTHROPLASTY KNEE; Surgeon:JESSE HAWKINS; Location:[facility] OR     coronary stent       IRRIGATION AND DEBRIDEMENT LOWER EXTREMITY,  COMBINED Right 10/9/2019    Procedure: Irrigation and debridement Right leg;  Surgeon: Doron Mott MD;  Location: [facility] OR     SURGICAL HISTORY OF -       Appendectomy       Allergies: Penicillins    SH:   Social History     Socioeconomic History     Marital status: Single     Spouse name: Not on file     Number of children: Not on file     Years of education: Not on file     Highest education level: Not on file   Occupational History     Not on file   Social Needs     Financial resource strain: Not on file     Food insecurity:     Worry: Not on file     Inability: Not on file     Transportation needs:     Medical: Not on file     Non-medical: Not on file   Tobacco Use     Smoking status: Current Every Day Smoker     Packs/day: 0.50     Years: 45.00     Pack years: 22.50     Types: Cigarettes     Last attempt to quit: 2010     Years since quittin.7     Smokeless tobacco: Never Used     Tobacco comment: pt has recently started smoking again d/t pain   Substance and Sexual Activity     Alcohol use: Yes     Comment: occsionally-3 -4 drinks a week     Drug use: No     Sexual activity: Yes     Partners: Male   Lifestyle     Physical activity:     Days per week: Not on file     Minutes per session: Not on file     Stress: Not on file   Relationships     Social connections:     Talks on phone: Not on file     Gets together: Not on file     Attends Buddhist service: Not on file     Active member of club or organization: Not on file     Attends meetings of clubs or organizations: Not on file     Relationship status: Not on file     Intimate partner violence:     Fear of current or ex partner: Not on file     Emotionally abused: Not on file     Physically abused: Not on file     Forced sexual activity: Not on file   Other Topics Concern     Parent/sibling w/ CABG, MI or angioplasty before 65F 55M? No   Social History Narrative     Not on file       FH:   Family History   Problem Relation Age of Onset      Cardiovascular Mother      Cancer Brother      Diabetes No family hx of      Hypertension No family hx of      Cerebrovascular Disease No family hx of      Alzheimer Disease No family hx of      Thyroid Disease No family hx of      Respiratory No family hx of        Objective:  Data Unavailable Data Unavailable Data Unavailable Data Unavailable Data Unavailable 0 lbs 0 oz     CRT is instnat.    Gross sensation is intact bilaterally.             A wound is noted at right  BKA stump measuring 0.4cm x 0.4cm x 4cm.    Ly Classification: 3    Wound base: Not Visible    Edges: intact but slight epibole    Drainage: copious/serous    Odor: no    Undermining: no    Tunnelin O'Clock for 4cm    Bone Exposure: Possible tibia in the base of the tunnel.     Clinical Signs of Infection: No    After obtaining patient consent, the wound was irrigated with copious amounts of saline. No shrp debridement performed today.        Assessment: Sakshi is a 73 YO female with right BKA ulceration. Her son, who does the packing, did not realize that the tunnel was present. She is quite upset that the tunnel has not healed in, as she thought the wound was almost healed.     Plan:  - Pt seen and evaluated.  - Will order a VAC for the area. For now, she should continue with the packing, especially in the tunnel. We taught her son how to perform this.  - Will order the VAC.  - See again after she gets the VAC for placement.

## 2019-11-20 NOTE — TELEPHONE ENCOUNTER
RECORDS RECEIVED FROM: [facility] wound vac check   DATE RECEIVED: Dec 3, 2019       11/20/19   9:07 AM   See 11/19/19 pre-visit  Sol Colin CMA

## 2019-11-22 ENCOUNTER — TELEPHONE (OUTPATIENT)
Dept: ORTHOPEDICS | Facility: CLINIC | Age: 74
End: 2019-11-22

## 2019-11-22 NOTE — TELEPHONE ENCOUNTER
[facility] Call Center    Phone Message    May a detailed message be left on voicemail: yes    Reason for Call: Other: Pt's son is calling in to see what the status is of the pump they are waiting for. Santi was under the impression that it was going to be installed Saturday 11/23. Please advise     Action Taken: Message routed to:  [facility] Ortho

## 2019-11-22 NOTE — TELEPHONE ENCOUNTER
[facility] Call Center    Phone Message    May a detailed message be left on voicemail: yes    Reason for Call: Other: PT needing information regarding the pump that she was prescribed. Pt is needing information like where it willbe coming from and the phone number. Please advise     Action Taken: Message routed to:  [facility] Sports MEd

## 2019-11-22 NOTE — TELEPHONE ENCOUNTER
I spoke with brynn and Santi about the delivery of the wound vac. I spoke with [employer] and they said that the wound vac will be delivered either Saturday or Monday. Patient will call when delivered so that home care wound nursing will be ordered.    Janeth Maynard, ATC

## 2019-11-25 NOTE — TELEPHONE ENCOUNTER
[facility] Call Center    Phone Message    May a detailed message be left on voicemail: yes    Reason for Call: Other: Wound vac has arrived. Need orders. Per son. Please call to discuss.      Action Taken: Message routed to:  [facility] Ortho

## 2019-11-26 NOTE — TELEPHONE ENCOUNTER
[facility] Call Center    Phone Message    May a detailed message be left on voicemail: yes    Reason for Call: Other: Pt is very anxious because they thought someone was going to go out to them to help set up the wound vac. Please call pt or fiance to advise. Thank you.     Action Taken: Message routed to:  [facility] Sports/Ortho

## 2019-11-29 ENCOUNTER — TELEPHONE (OUTPATIENT)
Dept: FAMILY MEDICINE | Facility: CLINIC | Age: 74
End: 2019-11-29

## 2019-11-29 ENCOUNTER — OFFICE VISIT (OUTPATIENT)
Dept: ORTHOPEDICS | Facility: CLINIC | Age: 74
End: 2019-11-29
Payer: COMMERCIAL

## 2019-11-29 DIAGNOSIS — L97.912 ULCER OF RIGHT LOWER EXTREMITY WITH FAT LAYER EXPOSED (H): ICD-10-CM

## 2019-11-29 DIAGNOSIS — Z89.511 STATUS POST BELOW-KNEE AMPUTATION OF RIGHT LOWER EXTREMITY (H): Primary | ICD-10-CM

## 2019-11-29 NOTE — PROGRESS NOTES
Chief Complaint   Patient presents with     RECHECK     Right BKA wound vac placement            Allergies   Allergen Reactions     Penicillins Rash     Tolerated nafcillin and amoxicillin without rash (2019). Has tolerated cefazolin (, ), cephalexin (, , 2013, 2012), ceftriaxone (2019)         Subjective: Sakshi is a 74 year old female who presents to the clinic today for a follow up of right BKA stump ulceration. She got the VAC and all of the supplies. She presents today for application.     Objective  A wound is noted at right  BKA stump measuring 0.4cm x 0.4cm x 4cm.     Ly Classification: 3     Wound base: Not Visible     Edges: intact but slight epibole     Drainage: copious/serous     Odor: no     Undermining: no     Tunnelin O'Clock for 4cm     Bone Exposure: Possible tibia in the base of the tunnel.      Clinical Signs of Infection: No     After obtaining patient consent, the wound was irrigated with copious amounts of saline. No shrp debridement performed today.         Assessment: Sakshi is a 73 YO female with right BKA ulceration. Her son, who does the packing, did not realize that the tunnel was present. She is quite upset that the tunnel has not healed in, as she thought the wound was almost healed.      Plan:  - Pt seen and evaluated.  - VAC was applied to the right BKA stump.   - Will order home nursing  - See again in 1 week.

## 2019-11-29 NOTE — TELEPHONE ENCOUNTER
Reason for Call:  Other / Home care follow up    Detailed comments: Asia with [employer] called and stated they would like to know if patient's PCP would be willing to follow up with home care service.  Asia also stated since PCP will be leaving in the near future, they will try to schedule patient to see him before he leaves.    Phone Number Patient can be reached at: [phone] (Asia villalobos/[email or web address])    Best Time: ASAP    Can we leave a detailed message on this number? YES    Call taken on 11/29/2019 at 4:01 PM by Rosi Fair

## 2019-11-29 NOTE — NURSING NOTE
Reason For Visit:   Chief Complaint   Patient presents with     RECHECK     Right BKA wound vac placement       There were no vitals taken for this visit.    Pain Assessment  Patient Currently in Pain: Mirta Maynard, ATC

## 2019-11-29 NOTE — LETTER
2019       RE: Sakshi Jaeger  [address] [address]     Dear Colleague,    Thank you for referring your patient, Sakshi Jaeger, to the [facility] at [facility]. Please see a copy of my visit note below.    Chief Complaint   Patient presents with     RECHECK     Right BKA wound vac placement       Allergies   Allergen Reactions     Penicillins Rash     Tolerated nafcillin and amoxicillin without rash (2019). Has tolerated cefazolin (, ), cephalexin (, , , ), ceftriaxone ()       Subjective: Sakshi is a 74 year old female who presents to the clinic today for a follow up of right BKA stump ulceration. She got the VAC and all of the supplies. She presents today for application.     Objective  A wound is noted at right  BKA stump measuring 0.4cm x 0.4cm x 4cm.     Ly Classification: 3     Wound base: Not Visible     Edges: intact but slight epibole     Drainage: copious/serous     Odor: no     Undermining: no     Tunnelin O'Clock for 4cm     Bone Exposure: Possible tibia in the base of the tunnel.      Clinical Signs of Infection: No     After obtaining patient consent, the wound was irrigated with copious amounts of saline. No shrp debridement performed today.        Assessment: Sakshi is a 75 YO female with right BKA ulceration. Her son, who does the packing, did not realize that the tunnel was present. She is quite upset that the tunnel has not healed in, as she thought the wound was almost healed.      Plan:  - Pt seen and evaluated.  - VAC was applied to the right BKA stump.   - Will order home nursing  - See again in 1 week.     Again, thank you for allowing me to participate in the care of your patient.      Sincerely,    Joe Walker DPM

## 2019-12-02 NOTE — TELEPHONE ENCOUNTER
Called [employer] and relayed the message below. She verbalized understanding.     Stefania Thomason RN

## 2019-12-03 ENCOUNTER — PRE VISIT (OUTPATIENT)
Dept: ORTHOPEDICS | Facility: CLINIC | Age: 74
End: 2019-12-03

## 2019-12-05 ENCOUNTER — TELEPHONE (OUTPATIENT)
Dept: FAMILY MEDICINE | Facility: CLINIC | Age: 74
End: 2019-12-05

## 2019-12-05 NOTE — TELEPHONE ENCOUNTER
Forms received from: [facility]   Phone number listed: [phone]   Fax listed: [phone]  Date received: 12.05.19  Form description: Orders  Once forms are completed, please return to [facility] via Fax.  Is patient requesting to be contacted when forms are completed: NA    Form placed: in providers rodolfo Brennan

## 2019-12-06 ENCOUNTER — OFFICE VISIT (OUTPATIENT)
Dept: ORTHOPEDICS | Facility: CLINIC | Age: 74
End: 2019-12-06
Payer: COMMERCIAL

## 2019-12-06 DIAGNOSIS — Z89.511 STATUS POST BELOW-KNEE AMPUTATION OF RIGHT LOWER EXTREMITY (H): Primary | ICD-10-CM

## 2019-12-06 DIAGNOSIS — L97.912 ULCER OF RIGHT LOWER EXTREMITY WITH FAT LAYER EXPOSED (H): ICD-10-CM

## 2019-12-06 LAB
MYCOBACTERIUM SPEC CULT: NORMAL
SPECIMEN SOURCE: NORMAL
SPECIMEN SOURCE: NORMAL

## 2019-12-06 NOTE — LETTER
2019       RE: Sakshi Jaeger  [address] [address]     Dear Colleague,    Thank you for referring your patient, Sakshi Jaeger, to the [facility] at [facility]. Please see a copy of my visit note below.    Chief Complaint   Patient presents with     RECHECK     BKA wound vac check            Allergies   Allergen Reactions     Penicillins Rash     Tolerated nafcillin and amoxicillin without rash (2019). Has tolerated cefazolin (, ), cephalexin (, , , ), ceftriaxone ()         Subjective: Sakshi is a 74 year old female who presents to the clinic today for a follow up of right BKA stump ulcer. She relates that she is having the VAC changed M and W. No pain to the area.     Objective    A wound is noted at right  BKA stump measuring 0.4cm x 0.4cm x 3cm.     Ly Classification: 2     Wound base: Not Visible     Edges: intact but slight epibole     Drainage: copious/serous     Odor: no     Undermining: no     Tunnelin O'Clock for 4cm     Bone Exposure: Possible tibia in the base of the tunnel.      Clinical Signs of Infection: No     After obtaining patient consent, the wound was irrigated with copious amounts of saline. No sharp debridement performed today.         Assessment: Sakshi is a 75 YO female with right BKA ulceration. Her son, who does the packing, did not realize that the tunnel was present. She is quite upset that the tunnel has not healed in, as she thought the wound was almost healed.      Plan:  - Pt seen and evaluated.  - VAC was applied to the right BKA stump.   - Cont home nursing  - See again in  2 weeks.       Again, thank you for allowing me to participate in the care of your patient.      Sincerely,    Joe Walker DPM

## 2019-12-06 NOTE — NURSING NOTE
Reason For Visit:   Chief Complaint   Patient presents with     RECHECK     BKA wound vac check       There were no vitals taken for this visit.    Pain Assessment  Patient Currently in Pain: Mirta Maynard, ATC

## 2019-12-06 NOTE — PROGRESS NOTES
Chief Complaint   Patient presents with     RECHECK     BKA wound vac check            Allergies   Allergen Reactions     Penicillins Rash     Tolerated nafcillin and amoxicillin without rash (2019). Has tolerated cefazolin (, ), cephalexin (, , 2013, 2012), ceftriaxone (2019)         Subjective: Sakshi is a 74 year old female who presents to the clinic today for a follow up of right BKA stump ulcer. She relates that she is having the VAC changed M and W. No pain to the area.     Objective    A wound is noted at right  BKA stump measuring 0.4cm x 0.4cm x 3cm.     Ly Classification: 2     Wound base: Not Visible     Edges: intact but slight epibole     Drainage: copious/serous     Odor: no     Undermining: no     Tunnelin O'Clock for 4cm     Bone Exposure: Possible tibia in the base of the tunnel.      Clinical Signs of Infection: No     After obtaining patient consent, the wound was irrigated with copious amounts of saline. No sharp debridement performed today.         Assessment: Sakshi is a 73 YO female with right BKA ulceration. Her son, who does the packing, did not realize that the tunnel was present. She is quite upset that the tunnel has not healed in, as she thought the wound was almost healed.      Plan:  - Pt seen and evaluated.  - VAC was applied to the right BKA stump.   - Cont home nursing  - See again in 2 weeks.

## 2019-12-17 ENCOUNTER — TELEPHONE (OUTPATIENT)
Dept: FAMILY MEDICINE | Facility: CLINIC | Age: 74
End: 2019-12-17

## 2019-12-17 NOTE — TELEPHONE ENCOUNTER
Forms received from: [employer]   Phone number listed: [phone]   Fax listed: [phone]  Date received: 12/17/2019  Form description: Plan of Care  Once forms are completed, please return to [employer] via fax.  Is patient requesting to be contacted when forms are completed: NA    Form placed: In provider's basket  Keren De La Paz

## 2019-12-19 NOTE — PROGRESS NOTES
Chief Complaint   Patient presents with     RECHECK     right BKA wound vac check            Allergies   Allergen Reactions     Penicillins Rash     Tolerated nafcillin and amoxicillin without rash (2019). Has tolerated cefazolin (, ), cephalexin (, , 2013, 2012), ceftriaxone (2019)         Subjective: Sakshi is a 74 year old female who presents to the clinic today for a follow up of right BKA stump ulcer. She relates that she is having the VAC changed M/W/F. No pain to the area.      Objective         A wound is noted at right  BKA stump measuring 0.4cm x 0.4cm x 1.5cm.     Ly Classification: 2     Wound base: Not Visible     Edges: intact but slight epibole     Drainage: copious/serous     Odor: no     Undermining: no     Tunnelin O'Clock for 4cm     Bone Exposure: Possible tibia in the base of the tunnel.      Clinical Signs of Infection: No     After obtaining patient consent, the wound was irrigated with copious amounts of saline. No sharp debridement performed today.         Assessment: Sakshi is a 75 YO female with right BKA ulceration. Her son, who does the packing, did not realize that the tunnel was present. She is quite upset that the tunnel has not healed in, as she thought the wound was almost healed.      Plan:  - Pt seen and evaluated.  - VAC was applied to the right BKA stump.   - Cont home nursing  - See again in 2 weeks.

## 2019-12-20 ENCOUNTER — TELEPHONE (OUTPATIENT)
Dept: ORTHOPEDICS | Facility: CLINIC | Age: 74
End: 2019-12-20

## 2019-12-20 ENCOUNTER — OFFICE VISIT (OUTPATIENT)
Dept: ORTHOPEDICS | Facility: CLINIC | Age: 74
End: 2019-12-20
Payer: COMMERCIAL

## 2019-12-20 DIAGNOSIS — Z89.511 STATUS POST BELOW-KNEE AMPUTATION OF RIGHT LOWER EXTREMITY (H): Primary | ICD-10-CM

## 2019-12-20 DIAGNOSIS — L97.912 ULCER OF RIGHT LOWER EXTREMITY WITH FAT LAYER EXPOSED (H): ICD-10-CM

## 2019-12-20 NOTE — NURSING NOTE
Reason For Visit:   Chief Complaint   Patient presents with     RECHECK     right BKA wound vac check       There were no vitals taken for this visit.    Pain Assessment  Patient Currently in Pain: Mirta Maynard, ATC

## 2019-12-20 NOTE — TELEPHONE ENCOUNTER
[facility] Call Center    Phone Message    May a detailed message be left on voicemail: yes    Reason for Call: Other: pts hermes [facility]rea is calling stated they will be out of town starting jan 1st- he is wondering if they should/can reschuedule the filter change, or if thats not ok, please call andrea thanks     Action Taken: Message routed to:  [facility] ortho

## 2019-12-20 NOTE — TELEPHONE ENCOUNTER
I called Santi and Sakshi back and there question was not to change the appointment with Dr. Walker but that they would be out of town on the day Sakshi is scheduled to have the wound vac changed and what she should do in the mean time. Dr. Walker would like for Sakshi to reschedule her wound vac change for either later in the day on 1/1/20 or right away the following day 1/2/20. They will change this and we will see her back in clinic on 1/3/20 as planned.    Janeth Maynard, ATC

## 2019-12-20 NOTE — LETTER
2019       RE: Sakshi Jaeger  [address] [address]     Dear Colleague,    Thank you for referring your patient, Sakshi Jaeger, to the [facility] at [facility]. Please see a copy of my visit note below.    Chief Complaint   Patient presents with     RECHECK     right BKA wound vac check            Allergies   Allergen Reactions     Penicillins Rash     Tolerated nafcillin and amoxicillin without rash (2019). Has tolerated cefazolin (, ), cephalexin (, , , ), ceftriaxone ()         Subjective: Sakshi is a 74 year old female who presents to the clinic today for a follow up of right BKA stump ulcer. She relates that she is having the VAC changed M/W/F. No pain to the area.      Objective         A wound is noted at right  BKA stump measuring 0.4cm x 0.4cm x 1.5cm.     Ly Classification: 2     Wound base: Not Visible     Edges: intact but slight epibole     Drainage: copious/serous     Odor: no     Undermining: no     Tunnelin O'Clock for 4cm     Bone Exposure: Possible tibia in the base of the tunnel.      Clinical Signs of Infection: No     After obtaining patient consent, the wound was irrigated with copious amounts of saline. No sharp debridement performed today.         Assessment: Sakshi is a 75 YO female with right BKA ulceration. Her son, who does the packing, did not realize that the tunnel was present. She is quite upset that the tunnel has not healed in, as she thought the wound was almost healed.      Plan:  - Pt seen and evaluated.  - VAC was applied to the right BKA stump.   - Cont home nursing  - See again in 2 weeks.     Joe Walker, LYLE

## 2020-01-02 NOTE — PROGRESS NOTES
Chief Complaint   Patient presents with     RECHECK     BKA wound vac check             Allergies   Allergen Reactions     Penicillins Rash     Tolerated nafcillin and amoxicillin without rash (2019). Has tolerated cefazolin (, ), cephalexin (, , 2013, 2012), ceftriaxone (2019)         Subjective: Sakshi is a 74 year old female who presents to the clinic today for a follow up of right BKA stump ulcer. She is tolerating the VAC with no complications.     Objective        A wound is noted at right  BKA stump measuring 0.4cm x 0.4cm x 1.2cm.     Ly Classification: 2     Wound base: Not Visible     Edges: intact but slight epibole     Drainage: copious/serous     Odor: no     Undermining: no     Tunnelin O'Clock for 1cm     Bone Exposure: No     Clinical Signs of Infection: No     After obtaining patient consent, the wound was irrigated with copious amounts of saline. No sharp debridement performed today.         Assessment: Sakshi is a 73 YO female with right BKA ulceration. Her son, who does the packing, did not realize that the tunnel was present.      Plan:  - Pt seen and evaluated.  - VAC holiday for now.   - Start MeSalt packing M/W/F.   - Cont home nursing  - See again in 2 weeks.

## 2020-01-03 ENCOUNTER — OFFICE VISIT (OUTPATIENT)
Dept: ORTHOPEDICS | Facility: CLINIC | Age: 75
End: 2020-01-03
Payer: COMMERCIAL

## 2020-01-03 DIAGNOSIS — L97.912 ULCER OF RIGHT LOWER EXTREMITY WITH FAT LAYER EXPOSED (H): ICD-10-CM

## 2020-01-03 DIAGNOSIS — Z89.511 STATUS POST BELOW-KNEE AMPUTATION OF RIGHT LOWER EXTREMITY (H): Primary | ICD-10-CM

## 2020-01-03 NOTE — LETTER
1/3/2020       RE: Sakshi Jaeger  [address] [address]     Dear Colleague,    Thank you for referring your patient, Sakshi Jaeger, to the [facility] at [facility]. Please see a copy of my visit note below.    Chief Complaint   Patient presents with     RECHECK     BKA wound vac check         Allergies   Allergen Reactions     Penicillins Rash     Tolerated nafcillin and amoxicillin without rash (). Has tolerated cefazolin (, ), cephalexin (, , , ), ceftriaxone ()       Subjective: Sakshi is a 74 year old female who presents to the clinic today for a follow up of right BKA stump ulcer. She is tolerating the VAC with no complications.     Objective        A wound is noted at right  BKA stump measuring 0.4cm x 0.4cm x 1.2cm.     Ly Classification: 2     Wound base: Not Visible     Edges: intact but slight epibole     Drainage: copious/serous     Odor: no     Undermining: no     Tunnelin O'Clock for 1cm     Bone Exposure: No     Clinical Signs of Infection: No     After obtaining patient consent, the wound was irrigated with copious amounts of saline. No sharp debridement performed today.      Assessment: Sakshi is a 73 YO female with right BKA ulceration. Her son, who does the packing, did not realize that the tunnel was present.      Plan:  - Pt seen and evaluated.  - VAC holiday for now.   - Start MeSalt packing M/W/.   - Cont home nursing  - See again in 2 weeks.     Again, thank you for allowing me to participate in the care of your patient.      Sincerely,    Joe Walker DPM

## 2020-01-06 ENCOUNTER — TELEPHONE (OUTPATIENT)
Dept: FAMILY MEDICINE | Facility: CLINIC | Age: 75
End: 2020-01-06

## 2020-01-06 NOTE — TELEPHONE ENCOUNTER
Forms received from: [employer]   Phone number listed: [phone]   Fax listed: [phone]  Date received: 01/06/2020  Form description: physician orders  Once forms are completed, please return to [employer] via fax.  Form placed: in providers folder  Georgina Ibrahim

## 2020-01-08 ENCOUNTER — TELEPHONE (OUTPATIENT)
Dept: FAMILY MEDICINE | Facility: CLINIC | Age: 75
End: 2020-01-08

## 2020-01-08 NOTE — TELEPHONE ENCOUNTER
Forms received from: [employer]   Phone number listed: [phone]   Fax listed: [phone]  Date received: 01/08/2020  Form description: physician order  Once forms are completed, please return to [employer] via fax.  Form placed: in providers folder  Georgina Ibrahim

## 2020-01-10 NOTE — PROGRESS NOTES
Chief Complaint   Patient presents with     Wound Check     2wk follow up R BKA            Allergies   Allergen Reactions     Penicillins Rash     Tolerated nafcillin and amoxicillin without rash (2019). Has tolerated cefazolin (, ), cephalexin (, , 2013, ), ceftriaxone (2019)         Subjective: Sakshi is a 74 year old female who presents to the clinic today for a follow up of right BKA stump. She has been using the MeSalt packing and changing daily.     Objective        A wound is noted at right  BKA stump measuring 0.4cm x 0.4cm x 0.5cm.     Ly Classification: 2     Wound base: Not Visible     Edges: intact but slight epibole     Drainage: copious/serous     Odor: no     Undermining: no     Tunnelin O'Clock for 1cm     Bone Exposure: No     Clinical Signs of Infection: No     After obtaining patient consent, the wound was irrigated with copious amounts of saline. No sharp debridement performed today.         Assessment: Sakshi is a 75 YO female with right BKA ulceration.      Plan:  - Pt seen and evaluated.  - VAC holiday for now.   - Start MeSalt packing M/W/F.   - Cont home nursing  - See again in 4 weeks.

## 2020-01-16 ENCOUNTER — OFFICE VISIT (OUTPATIENT)
Dept: WOUND CARE | Facility: CLINIC | Age: 75
End: 2020-01-16
Payer: COMMERCIAL

## 2020-01-16 DIAGNOSIS — L97.912 ULCER OF RIGHT LOWER EXTREMITY WITH FAT LAYER EXPOSED (H): Primary | ICD-10-CM

## 2020-01-16 RX ORDER — METOPROLOL TARTRATE 50 MG
TABLET ORAL
COMMUNITY
Start: 2019-12-26 | End: 2020-07-10

## 2020-01-16 ASSESSMENT — PAIN SCALES - GENERAL: PAINLEVEL: NO PAIN (0)

## 2020-01-16 NOTE — NURSING NOTE
Chief Complaint   Patient presents with     Wound Check     2wk follow up R BKA       There were no vitals filed for this visit.    There is no height or weight on file to calculate BMI.    Tyler Matute NREMT     Vitals not taken per provider order.    Unable to obtain history as provider wanted to see patient.

## 2020-01-16 NOTE — LETTER
2020       RE: Sakshi Jaeger  [address] [facility] [address]     Dear Colleague,    Thank you for referring your patient, Sakshi Jaeger, to the [facility] at [facility] Please see a copy of my visit note below.    Chief Complaint   Patient presents with     Wound Check     2wk follow up R BKA       Allergies   Allergen Reactions     Penicillins Rash     Tolerated nafcillin and amoxicillin without rash (). Has tolerated cefazolin (, ), cephalexin (, , 2013, ), ceftriaxone ()     Subjective: Sakshi is a 74 year old female who presents to the clinic today for a follow up of right BKA stump. She has been using the MeSalt packing and changing daily.     Objective        A wound is noted at right  BKA stump measuring 0.4cm x 0.4cm x 0.5cm.     Ly Classification: 2     Wound base: Not Visible     Edges: intact but slight epibole     Drainage: copious/serous     Odor: no     Undermining: no     Tunnelin O'Clock for 1cm     Bone Exposure: No     Clinical Signs of Infection: No     After obtaining patient consent, the wound was irrigated with copious amounts of saline. No sharp debridement performed today.      Assessment: Sakshi is a 73 YO female with right BKA ulceration.      Plan:  - Pt seen and evaluated.  - VAC holiday for now.   - Start MeSalt packing M/W/F.   - Cont home nursing  - See again in 4 weeks.     Joe Walker DPM

## 2020-01-17 ENCOUNTER — MEDICAL CORRESPONDENCE (OUTPATIENT)
Dept: HEALTH INFORMATION MANAGEMENT | Facility: CLINIC | Age: 75
End: 2020-01-17

## 2020-01-23 ENCOUNTER — TELEPHONE (OUTPATIENT)
Dept: FAMILY MEDICINE | Facility: CLINIC | Age: 75
End: 2020-01-23

## 2020-01-23 NOTE — TELEPHONE ENCOUNTER
Forms received from: [employer]   Phone number listed: [phone]   Fax listed: [phone]  Date received: 01/23/2020  Form description: skilled nurse patient missed visit  Once forms are completed, please return to [employer] via fax.  Form placed: in providers folder  Georgina Ibrahim

## 2020-02-10 ENCOUNTER — TELEPHONE (OUTPATIENT)
Dept: FAMILY MEDICINE | Facility: CLINIC | Age: 75
End: 2020-02-10

## 2020-02-10 NOTE — TELEPHONE ENCOUNTER
Forms received from: [employer]   Phone number listed: [phone]   Fax listed: [phone]  Date received: 02/10/2020  Form description: plan of care  Once forms are completed, please return to [employer] via fax.  Form placed: in providers folder  Georgina Ibrahim

## 2020-02-13 ENCOUNTER — OFFICE VISIT (OUTPATIENT)
Dept: WOUND CARE | Facility: CLINIC | Age: 75
End: 2020-02-13
Payer: COMMERCIAL

## 2020-02-13 DIAGNOSIS — Z89.512 STATUS POST BELOW-KNEE AMPUTATION OF LEFT LOWER EXTREMITY (H): Primary | ICD-10-CM

## 2020-02-13 ASSESSMENT — PAIN SCALES - GENERAL: PAINLEVEL: NO PAIN (0)

## 2020-02-13 NOTE — LETTER
2/13/2020       RE: Sakshi Jaeger  [address] [address]     Dear Colleague,    Thank you for referring your patient, Sakshi Jaeger, to the [facility] Please see a copy of my visit note below.    Chief Complaint:   Chief Complaint   Patient presents with     Wound Check     Patient here today for wound check.           Allergies   Allergen Reactions     Penicillins Rash     Tolerated nafcillin and amoxicillin without rash (2019). Has tolerated cefazolin (2005, 2011), cephalexin (2015, 2014, 2013, 2012), ceftriaxone (2019)  Tolerated nafcillin and amoxicillin without rash (2019). Has tolerated cefazolin (2005, 2011), cephalexin (2015, 2014, 2013, 2012), ceftriaxone (2019)           Subjective: Sakshi is a 74 year old female who presents to the clinic today for a follow up of left BKA stump ulcer. She relates that the wound has healed over. She is no longer covering the area. No pain.     Objective  Data Unavailable Data Unavailable Data Unavailable Data Unavailable Data Unavailable 0 lbs 0 oz  Previous ulcer is scabbed over with solid base under the scab. No drainage noted. No s/s of infection noted.     Assessment: Healed left BKA ulcer.     Plan:   - Pt seen and evaluated  - No further treatment needed.  - Can start with prosthetic fitting.   - Pt to return to clinic PRN.    Joe Walker DPM

## 2020-02-13 NOTE — PROGRESS NOTES
Chief Complaint:   Chief Complaint   Patient presents with     Wound Check     Patient here today for wound check.           Allergies   Allergen Reactions     Penicillins Rash     Tolerated nafcillin and amoxicillin without rash (2019). Has tolerated cefazolin (2005, 2011), cephalexin (2015, 2014, 2013, 2012), ceftriaxone (2019)  Tolerated nafcillin and amoxicillin without rash (2019). Has tolerated cefazolin (2005, 2011), cephalexin (2015, 2014, 2013, 2012), ceftriaxone (2019)           Subjective: Sakshi is a 74 year old female who presents to the clinic today for a follow up of left BKA stump ulcer. She relates that the wound has healed over. She is no longer covering the area. No pain.     Objective  Data Unavailable Data Unavailable Data Unavailable Data Unavailable Data Unavailable 0 lbs 0 oz  Previous ulcer is scabbed over with solid base under the scab. No drainage noted. No s/s of infection noted.     Assessment: Healed left BKA ulcer.     Plan:   - Pt seen and evaluated  - No further treatment needed.  - Can start with prosthetic fitting.   - Pt to return to clinic PRN.

## 2020-02-13 NOTE — NURSING NOTE
Chief Complaint   Patient presents with     Wound Check     Patient here today for wound check.        There were no vitals filed for this visit.    There is no height or weight on file to calculate BMI.      WOUND EVALUATION: Right lower leg wound.    Leo Queen LPN

## 2020-02-19 ENCOUNTER — TELEPHONE (OUTPATIENT)
Dept: FAMILY MEDICINE | Facility: CLINIC | Age: 75
End: 2020-02-19

## 2020-02-19 NOTE — TELEPHONE ENCOUNTER
Forms received from: [employer]   Phone number listed: [phone]   Fax listed: [phone]  Date received: 02/19/2020  Form description: skilled nurse patient missed visit  Once forms are completed, please return to [employer] via fax.  Form placed: in providers folder  Georgina Ibrahim

## 2020-02-21 ENCOUNTER — TELEPHONE (OUTPATIENT)
Dept: FAMILY MEDICINE | Facility: CLINIC | Age: 75
End: 2020-02-21

## 2020-02-21 NOTE — TELEPHONE ENCOUNTER
Forms received from: home health care [facility]   Phone number listed: [phone]   Fax listed: [phone]  Date received: 02/21/2020  Form description: home care discontinue summary  Once forms are completed, please return to [employer] [facility] [employer] via fax.  Form placed: in providers folder  Georgina Ibrahim

## 2020-03-03 ENCOUNTER — TELEPHONE (OUTPATIENT)
Dept: FAMILY MEDICINE | Facility: CLINIC | Age: 75
End: 2020-03-03

## 2020-03-03 NOTE — TELEPHONE ENCOUNTER
Forms received from: [facility]   Phone number listed: [phone]   Fax listed: [phone]  Date received: 03/03/2020  Form description: Detailed Written Order and CMN  Once forms are completed, please return to [facility] via fax.  Is patient requesting to be contacted when forms are completed: NA    Form placed: In provider's basket  Keren De La Paz

## 2020-03-09 NOTE — TELEPHONE ENCOUNTER
Reason for call:  Other   Patient called regarding (reason for call): Orders    Additional comments: Jack from [employer] [facility] called in. There were 3 spots on the forms  was supposed to sign and only 1 of them was signed.       Phone number to reach patient:  [phone] -can talk to anyone that answers about this    Best Time:      Can we leave a detailed message on this number?      Travel screening:

## 2020-03-12 ENCOUNTER — OFFICE VISIT (OUTPATIENT)
Dept: FAMILY MEDICINE | Facility: CLINIC | Age: 75
End: 2020-03-12
Payer: COMMERCIAL

## 2020-03-12 VITALS
OXYGEN SATURATION: 96 % | HEART RATE: 64 BPM | DIASTOLIC BLOOD PRESSURE: 78 MMHG | TEMPERATURE: 98.2 F | SYSTOLIC BLOOD PRESSURE: 132 MMHG

## 2020-03-12 DIAGNOSIS — D64.9 ANEMIA, UNSPECIFIED TYPE: ICD-10-CM

## 2020-03-12 DIAGNOSIS — I10 ESSENTIAL HYPERTENSION WITH GOAL BLOOD PRESSURE LESS THAN 140/90: Primary | ICD-10-CM

## 2020-03-12 DIAGNOSIS — I25.10 CORONARY ARTERY DISEASE INVOLVING NATIVE CORONARY ARTERY OF NATIVE HEART WITHOUT ANGINA PECTORIS: ICD-10-CM

## 2020-03-12 DIAGNOSIS — M86.261 SUBACUTE OSTEOMYELITIS OF RIGHT TIBIA (H): ICD-10-CM

## 2020-03-12 DIAGNOSIS — M06.9 RHEUMATOID ARTHRITIS, INVOLVING UNSPECIFIED SITE, UNSPECIFIED RHEUMATOID FACTOR PRESENCE: ICD-10-CM

## 2020-03-12 LAB
ERYTHROCYTE [DISTWIDTH] IN BLOOD BY AUTOMATED COUNT: 14.3 % (ref 10–15)
HCT VFR BLD AUTO: 38.1 % (ref 35–47)
HGB BLD-MCNC: 12.2 G/DL (ref 11.7–15.7)
MCH RBC QN AUTO: 28.4 PG (ref 26.5–33)
MCHC RBC AUTO-ENTMCNC: 32 G/DL (ref 31.5–36.5)
MCV RBC AUTO: 89 FL (ref 78–100)
PLATELET # BLD AUTO: 356 10E9/L (ref 150–450)
RBC # BLD AUTO: 4.29 10E12/L (ref 3.8–5.2)
WBC # BLD AUTO: 13 10E9/L (ref 4–11)

## 2020-03-12 PROCEDURE — 99214 OFFICE O/P EST MOD 30 MIN: CPT | Performed by: FAMILY MEDICINE

## 2020-03-12 PROCEDURE — 36415 COLL VENOUS BLD VENIPUNCTURE: CPT | Performed by: FAMILY MEDICINE

## 2020-03-12 PROCEDURE — 85027 COMPLETE CBC AUTOMATED: CPT | Performed by: FAMILY MEDICINE

## 2020-03-12 RX ORDER — LISINOPRIL 20 MG/1
20 TABLET ORAL DAILY
Qty: 90 TABLET | Refills: 3 | Status: SHIPPED | OUTPATIENT
Start: 2020-03-12 | End: 2021-02-22

## 2020-03-12 ASSESSMENT — PAIN SCALES - GENERAL: PAINLEVEL: MILD PAIN (2)

## 2020-03-12 NOTE — LETTER
[facility]   [address] [place]  [address]  [phone]                                   March 16, 2020    Sakshi Jaeger  [address] [address]        Dear Sakshi,    Your blood counts consistently look better compared to the last check in October.  The reason for the mild elevation in your white blood cell count is not entirely clear but you do not have any current evidence of infection, so right now I would suggest rechecking this at your next visit.  Other markers of inflammation including an elevated platelet count that you had before have normalized.     Results for orders placed or performed in visit on 03/12/20   CBC with platelets     Status: Abnormal   Result Value Ref Range    WBC 13.0 (H) 4.0 - 11.0 10e9/L    RBC Count 4.29 3.8 - 5.2 10e12/L    Hemoglobin 12.2 11.7 - 15.7 g/dL    Hematocrit 38.1 35.0 - 47.0 %    MCV 89 78 - 100 fl    MCH 28.4 26.5 - 33.0 pg    MCHC 32.0 31.5 - 36.5 g/dL    RDW 14.3 10.0 - 15.0 %    Platelet Count 356 150 - 450 10e9/L       If you have any questions please call the clinic at [phone]    Sincerely,    Nick De La Cruz MD  bmd

## 2020-03-12 NOTE — PROGRESS NOTES
Subjective     Sakshi Jaeger is a 74 year old female who presents to clinic today for the following health issues:    HPI   Medication Followup of all mediactions    Taking Medication as prescribed: yes    Side Effects:  None    Medication Helping Symptoms:  yes     She has morphine at home but not taking anymore.  Confirmed she was not longer allergic to Penicillin removed from allergy list.  Neva De Guzman MA    Sakshi is here for follow-up.  She is accompanied today by her significant other.  She would also like to establish care.  She is new to me but had previously seen Dr. OSMAN.  She thinks she needs a refill on her lisinopril today.  Her medication list and history were reviewed today.  Overall she reports feeling well at this time.    Her most recent significant health issues were in October of last year where she dealt with osteomyelitis of her tibia.  She had many years ago a right BKA.  The tibial fragment got infected and she was treated with nafcillin for 6 weeks.  It is pretty much healed up at this time and she is seeing the orthotist to get fit with a new prosthesis in the near future.  She is not having any pain in the stump and there are no open areas.  She is no longer taking any pain medication because of this.      Patient Active Problem List   Diagnosis     Osteoporosis     Rheumatoid arthritis (H)     Iron deficiency anemia     Hyperlipidemia LDL goal <100     Advanced directives, counseling/discussion     Status post below-knee amputation (H)     Employs prosthetic leg     Essential hypertension with goal blood pressure less than 140/90     Coronary artery disease involving native coronary artery of native heart without angina pectoris     Sacroiliac joint pain     Infection of right below knee amputation (H)     Osteomyelitis (H)     Past Surgical History:   Procedure Laterality Date     ---------INCISION AND DRAINAGE  3/5/14     AMPUTATION BELOW KNEE RT/LT  2005    right lowwer leg.       APPENDECTOMY       ARTHROPLASTY KNEE  4/27/2011    Procedure:ARTHROPLASTY KNEE; Surgeon:JESSE HAWKINS; Location:[facility] OR     coronary stent       IRRIGATION AND DEBRIDEMENT LOWER EXTREMITY, COMBINED Right 10/9/2019    Procedure: Irrigation and debridement Right leg;  Surgeon: Doron Mott MD;  Location: [facility] OR     SURGICAL HISTORY OF -       Appendectomy       Social History     Tobacco Use     Smoking status: Former Smoker     Packs/day: 0.50     Years: 45.00     Pack years: 22.50     Types: Cigarettes     Last attempt to quit: 2/26/2010     Years since quitting: 10.0     Smokeless tobacco: Never Used     Tobacco comment: pt has recently started smoking again d/t pain   Substance Use Topics     Alcohol use: Yes     Comment: occsionally-3 -4 drinks a week     Family History   Problem Relation Age of Onset     Cardiovascular Mother      Cancer Brother      Diabetes No family hx of      Hypertension No family hx of      Cerebrovascular Disease No family hx of      Alzheimer Disease No family hx of      Thyroid Disease No family hx of      Respiratory No family hx of          Current Outpatient Medications   Medication Sig Dispense Refill     acetaminophen (TYLENOL) 325 MG tablet Take 2 tablets (650 mg) by mouth every 4 hours       alendronate (FOSAMAX) 35 MG tablet TAKE 1 TAB BY MOUTH WITH 8OZ OF WATER ONCE EVERY WEEK BEFORE BREAKFAST, REMAIN UPRIGHT AT THIS TIME 12 tablet 2     amLODIPine (NORVASC) 5 MG tablet Take 1 tablet (5 mg) by mouth daily 90 tablet 3     Ascorbic Acid (VITAMIN C) 500 MG CHEW        aspirin (ASA) 81 MG tablet Take 1 tablet (81 mg) by mouth daily       calcium carbonate 600 mg-vitamin D 400 units (CALCIUM 600 + D) 600-400 MG-UNIT per tablet Take 2 tablets by mouth daily       gabapentin (NEURONTIN) 300 MG capsule Take 2 capsules (600 mg) by mouth 3 times daily       lactobacillus rhamnosus, GG, (CULTURELL) capsule Take 1 capsule by mouth 2 times daily       Lidocaine (LIDOCARE) 4 %  Patch Place 1 patch onto the skin every 24 hours To prevent lidocaine toxicity, patient should be patch free for 12 hrs daily.       lisinopril (ZESTRIL) 20 MG tablet Take 1 tablet (20 mg) by mouth daily 90 tablet 3     metoprolol tartrate (LOPRESSOR) 50 MG tablet        multivitamin w/minerals (THERA-VIT-M) tablet Take 1 tablet by mouth daily       niacin 500 MG tablet Take 1 tablet (500 mg) by mouth At Bedtime 100 tablet 6     order for DME Equipment being ordered: New foam (custom shaped protective cover) for right below the knee prostheses 1 Units 0     polyethylene glycol (MIRALAX/GLYCOLAX) packet Take 17 g by mouth 2 times daily as needed for constipation       predniSONE (DELTASONE) 5 MG tablet Take 1 tablet (5 mg) by mouth daily  0     senna-docusate (SENOKOT-S/PERICOLACE) 8.6-50 MG tablet Take 1 tablet by mouth 2 times daily       simvastatin (ZOCOR) 20 MG tablet Take 1 tablet (20 mg) by mouth At Bedtime       No Known Allergies    Reviewed and updated as needed this visit by Provider  Tobacco  Allergies  Meds  Problems  Med Hx  Surg Hx  Fam Hx  Soc Hx          Review of Systems   ROS COMP: Constitutional, HEENT, cardiovascular, pulmonary, gi and gu systems are negative, except as otherwise noted.      Objective    /78 (BP Location: Right arm, Patient Position: Chair, Cuff Size: Adult Regular)   Pulse 64   Temp 98.2  F (36.8  C) (Oral)   SpO2 96%   There is no height or weight on file to calculate BMI.  Physical Exam   GENERAL: healthy, alert and no distress  EYES: Eyes grossly normal to inspection, PERRL and conjunctivae and sclerae normal  HENT: ear canals and TM's normal, nose and mouth without ulcers or lesions  NECK: no adenopathy, no asymmetry, masses, or scars and thyroid normal to palpation  RESP: lungs clear to auscultation - no rales, rhonchi or wheezes  CV: regular rate and rhythm, normal S1 S2, no S3 or S4, no murmur, click or rub, no peripheral edema and peripheral pulses  strong  MS: no gross musculoskeletal defects noted, no edema  MS: The right BKA stump looks good.  There was 1 tiny area where I removed basically a dried scab but there was no open area beneath it.  It is difficult for the patient to get out this because it was an indentation in the scar.  SKIN: no suspicious lesions or rashes  NEURO: Normal strength and tone, mentation intact and speech normal  PSYCH: mentation appears normal, affect normal/bright    Diagnostic Test Results:  Labs reviewed in Epic        Assessment & Plan     1. Essential hypertension with goal blood pressure less than 140/90  Under control.  Lisinopril was refilled.  Most recent BMP was normal so I did not recheck that today.  - lisinopril (ZESTRIL) 20 MG tablet; Take 1 tablet (20 mg) by mouth daily  Dispense: 90 tablet; Refill: 3    2. Rheumatoid arthritis, involving unspecified site, unspecified rheumatoid factor presence (H)  Clinically stable but does have stigmata of this disease at least noted with her hands.    3. Subacute osteomyelitis of right tibia (H)  This has resolved and there is no evidence of recurrence at this time clinically.    4. Coronary artery disease involving native coronary artery of native heart without angina pectoris  Patient denies any angina at this time.  She is not on antiplatelet therapy and had stopped aspirin prior to his surgery without ever restarting it.  I currently see no contraindication to restarting it and have suggested that she do so with a dose of 81 mg once daily.  - aspirin (ASA) 81 MG tablet; Take 1 tablet (81 mg) by mouth daily  Dispense:      5. Anemia, unspecified type  Significant anemia was noted even at time of the transitional care unit discharge so I recommended that we recheck that today and she reluctantly agreed.  - CBC with platelets           Return in about 6 months (around 9/12/2020) for Routine Visit.    Nick De La Cruz MD  [facility]

## 2020-03-16 ENCOUNTER — TELEPHONE (OUTPATIENT)
Dept: FAMILY MEDICINE | Facility: CLINIC | Age: 75
End: 2020-03-16

## 2020-03-16 NOTE — TELEPHONE ENCOUNTER
Forms received from: [employer]   Phone number listed: [phone]   Fax listed: [phone]  Date received: 03/16/2020  Form description: physician/practioner signature & abbreviation   Once forms are completed, please return to [employer] via fax.  Form placed: in providers folder  Georgina Ibrahim

## 2020-03-26 ENCOUNTER — TELEPHONE (OUTPATIENT)
Dept: FAMILY MEDICINE | Facility: CLINIC | Age: 75
End: 2020-03-26

## 2020-03-26 NOTE — TELEPHONE ENCOUNTER
Forms received from: [employer]   Phone number listed: [phone]   Fax listed: [phone]  Date received: 03/26/2020  Form description: physician/practioner signature & abbreviation log  Once forms are completed, please return to [employer] via fax.  Form placed: in providers folder  Georgina Ibrahim

## 2020-04-20 ENCOUNTER — OFFICE VISIT (OUTPATIENT)
Dept: OTOLARYNGOLOGY | Facility: CLINIC | Age: 75
End: 2020-04-20
Payer: COMMERCIAL

## 2020-04-20 VITALS — BODY MASS INDEX: 18.56 KG/M2 | HEART RATE: 67 BPM | RESPIRATION RATE: 14 BRPM | WEIGHT: 115 LBS

## 2020-04-20 DIAGNOSIS — H61.23 BILATERAL IMPACTED CERUMEN: Primary | ICD-10-CM

## 2020-04-20 DIAGNOSIS — H93.8X3 EAR FULLNESS, BILATERAL: ICD-10-CM

## 2020-04-20 PROCEDURE — 69210 REMOVE IMPACTED EAR WAX UNI: CPT | Performed by: OTOLARYNGOLOGY

## 2020-04-20 ASSESSMENT — PAIN SCALES - GENERAL: PAINLEVEL: NO PAIN (0)

## 2020-04-20 NOTE — LETTER
4/20/2020         RE: Sakshi Jaeger  [address] [address]        Dear Colleague,    Thank you for referring your patient, Sakshi Jaeger, to the [facility]. Please see a copy of my visit note below.    History of Present Illness - Sakshi Jaeger is a 74 mitchell old female last seen on 11/4/2019    To review, she has had issues with ear wax, more on the RIGHT than LEFT, and for many years Dr. OSMAN has helped her with alligators or irrigations.  However, over the last year prior to first seeing me in January 2019, it seems much more tenacious and sticky.  The other day the LEFT ear got some water in it from the shower and had been totally clogged since then.    No previous ear surgery, no chronic ear disease, no bleeding or drainage from the ears.    I had to procedurally clear tremendous cerumen from both ears.  And after the initial visit I asked her to come back in four months and she was again impacted severely.  So at this point I have asked her to come back at least every 4-6 months for debridement.    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)     Osteomyelitis (H)       Current Medications -   Current Outpatient Medications:      acetaminophen (TYLENOL) 325 MG tablet, Take 2 tablets (650 mg) by mouth every 4 hours, Disp: , Rfl:      alendronate (FOSAMAX) 35 MG tablet, TAKE 1 TAB BY MOUTH WITH 8OZ OF WATER ONCE EVERY WEEK BEFORE BREAKFAST, REMAIN UPRIGHT AT THIS TIME, Disp: 12 tablet, Rfl: 2      amLODIPine (NORVASC) 5 MG tablet, Take 1 tablet (5 mg) by mouth daily, Disp: 90 tablet, Rfl: 3     Ascorbic Acid (VITAMIN C) 500 MG CHEW, , Disp: , Rfl:      aspirin (ASA) 81 MG tablet, Take 1 tablet (81 mg) by mouth daily, Disp:  , Rfl:      calcium carbonate 600 mg-vitamin D 400 units (CALCIUM 600 + D) 600-400 MG-UNIT per tablet, Take 2 tablets by mouth daily, Disp: , Rfl:      gabapentin (NEURONTIN) 300 MG capsule, Take 2 capsules (600 mg) by mouth 3 times daily, Disp: , Rfl:      lactobacillus rhamnosus, GG, (CULTURELL) capsule, Take 1 capsule by mouth 2 times daily, Disp: , Rfl:      Lidocaine (LIDOCARE) 4 % Patch, Place 1 patch onto the skin every 24 hours To prevent lidocaine toxicity, patient should be patch free for 12 hrs daily., Disp: , Rfl:      lisinopril (ZESTRIL) 20 MG tablet, Take 1 tablet (20 mg) by mouth daily, Disp: 90 tablet, Rfl: 3     metoprolol tartrate (LOPRESSOR) 50 MG tablet, , Disp: , Rfl:      multivitamin w/minerals (THERA-VIT-M) tablet, Take 1 tablet by mouth daily, Disp: , Rfl:      niacin 500 MG tablet, Take 1 tablet (500 mg) by mouth At Bedtime, Disp: 100 tablet, Rfl: 6     order for DME, Equipment being ordered: New foam (custom shaped protective cover) for right below the knee prostheses, Disp: 1 Units, Rfl: 0     polyethylene glycol (MIRALAX/GLYCOLAX) packet, Take 17 g by mouth 2 times daily as needed for constipation, Disp: , Rfl:      predniSONE (DELTASONE) 5 MG tablet, Take 1 tablet (5 mg) by mouth daily, Disp: , Rfl: 0     senna-docusate (SENOKOT-S/PERICOLACE) 8.6-50 MG tablet, Take 1 tablet by mouth 2 times daily, Disp: , Rfl:      simvastatin (ZOCOR) 20 MG tablet, Take 1 tablet (20 mg) by mouth At Bedtime, Disp: , Rfl:     Allergies -   No Known Allergies    Social History -   Social History     Socioeconomic History     Marital status: Single     Spouse name: None     Number of children: None     Years of education: None     Highest education level: None   Social Needs      Financial resource strain: None     Food insecurity - worry: None     Food insecurity - inability: None     Transportation needs - medical: None     Transportation needs - non-medical: None   Occupational History     None   Tobacco Use     Smoking status: Former Smoker     Packs/day: 0.50     Years: 45.00     Pack years: 22.50     Types: Cigarettes     Last attempt to quit: 2010     Years since quittin.9     Smokeless tobacco: Never Used   Substance and Sexual Activity     Alcohol use: Yes     Comment: occsionally-3 -4 drinks a week     Drug use: No     Sexual activity: Yes     Partners: Male   Other Topics Concern     Parent/sibling w/ CABG, MI or angioplasty before 65F 55M? No   Social History Narrative     None       Family History -   Family History   Problem Relation Age of Onset     Cardiovascular Mother      Cancer Brother      Diabetes No family hx of      Hypertension No family hx of      Cerebrovascular Disease No family hx of      Alzheimer Disease No family hx of      Thyroid Disease No family hx of      Respiratory No family hx of        Review of Systems - As per HPI and PMHx, otherwise 10+ system review of the head and neck, and general constitution is negative.    Physical Exam  Pulse 67   Resp 14   Wt 52.2 kg (115 lb)   BMI 18.56 kg/m      General - The patient is well nourished and well developed, and appears to have good nutritional status.  Alert and oriented to person and place, answers questions and cooperates with examination appropriately.   Head and Face - Normocephalic and atraumatic, with no gross asymmetry noted of the contour of the facial features.  The facial nerve is intact, with strong symmetric movements.  Voice and Breathing - The patient was breathing comfortably without the use of accessory muscles. There was no wheezing, stridor, or stertor.  The patients voice was clear and strong, and had appropriate pitch and quality.  Eyes - Extraocular movements intact, and the  pupils were reactive to light.  Sclera were not icteric or injected, conjunctiva were pink and moist.  Mouth - Examination of the oral cavity showed pink, healthy oral mucosa. No lesions or ulcerations noted.  The tongue was mobile and midline, and the dentition were in good condition.        Cerumen Removal    Physical Exam and Procedure  Ears - On examination of the ears, I found that the BOTH sides had cerumen impaction.  Therefore, I positioned them in the examination chair in a semi-supine position, beginning with the right side.  I used the binocular surgical microscope to perform cerumen removal.  I began by using a cerumen loop to gently lift the edges of the cerumen mass away from the walls of the external canal.  Once I did this, I was able to suction away fragments of wax and debris using suction.  Once the mass was loose enough, the entire plug was pulled from the canal.  The tympanic membrane was intact, no sign of perforation or middle ear effusion.    I turned my attention to the contralateral side once again using the binocular surgical microscope to perform cerumen removal.  I began by using a cerumen loop to gently lift the edges of the cerumen mass away from the walls of the external canal.  Once I did this, I was able to suction away fragments of wax and debris using suction.  Once the mass was loose enough, the entire plug was pulled from the canal.  The tympanic membrane was intact, no sign of perforation or middle ear effusion.        A/P - Sakshi Jaeger is a 74 year old female  (H61.23) Bilateral impacted cerumen  (primary encounter diagnosis)  (H93.8X3) Ear fullness, bilateral    The patient has had their cerumen procedurally removed today.  I have discussed ear care at home, including avoiding qtips or any other object placed in the canal.  I have also discussed that over the counter cerumen kits like Debrox or Cerumenex can be useful.    See me in another 4-5 months    Again, thank you  for allowing me to participate in the care of your patient.        Sincerely,        Pito Mosher MD

## 2020-04-20 NOTE — PROGRESS NOTES
History of Present Illness - Sakshi Jaeger is a 74 mitchell old female last seen on 11/4/2019    To review, she has had issues with ear wax, more on the RIGHT than LEFT, and for many years Dr. OSMAN has helped her with alligators or irrigations.  However, over the last year prior to first seeing me in January 2019, it seems much more tenacious and sticky.  The other day the LEFT ear got some water in it from the shower and had been totally clogged since then.    No previous ear surgery, no chronic ear disease, no bleeding or drainage from the ears.    I had to procedurally clear tremendous cerumen from both ears.  And after the initial visit I asked her to come back in four months and she was again impacted severely.  So at this point I have asked her to come back at least every 4-6 months for debridement.    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)     Osteomyelitis (H)       Current Medications -   Current Outpatient Medications:      acetaminophen (TYLENOL) 325 MG tablet, Take 2 tablets (650 mg) by mouth every 4 hours, Disp: , Rfl:      alendronate (FOSAMAX) 35 MG tablet, TAKE 1 TAB BY MOUTH WITH 8OZ OF WATER ONCE EVERY WEEK BEFORE BREAKFAST, REMAIN UPRIGHT AT THIS TIME, Disp: 12 tablet, Rfl: 2     amLODIPine (NORVASC) 5 MG tablet, Take 1 tablet (5 mg) by mouth daily, Disp: 90 tablet, Rfl: 3     Ascorbic Acid (VITAMIN C) 500 MG CHEW, , Disp: , Rfl:      aspirin (ASA) 81 MG tablet, Take 1 tablet (81 mg) by mouth daily, Disp:  , Rfl:       calcium carbonate 600 mg-vitamin D 400 units (CALCIUM 600 + D) 600-400 MG-UNIT per tablet, Take 2 tablets by mouth daily, Disp: , Rfl:      gabapentin (NEURONTIN) 300 MG capsule, Take 2 capsules (600 mg) by mouth 3 times daily, Disp: , Rfl:      lactobacillus rhamnosus, GG, (CULTURELL) capsule, Take 1 capsule by mouth 2 times daily, Disp: , Rfl:      Lidocaine (LIDOCARE) 4 % Patch, Place 1 patch onto the skin every 24 hours To prevent lidocaine toxicity, patient should be patch free for 12 hrs daily., Disp: , Rfl:      lisinopril (ZESTRIL) 20 MG tablet, Take 1 tablet (20 mg) by mouth daily, Disp: 90 tablet, Rfl: 3     metoprolol tartrate (LOPRESSOR) 50 MG tablet, , Disp: , Rfl:      multivitamin w/minerals (THERA-VIT-M) tablet, Take 1 tablet by mouth daily, Disp: , Rfl:      niacin 500 MG tablet, Take 1 tablet (500 mg) by mouth At Bedtime, Disp: 100 tablet, Rfl: 6     order for DME, Equipment being ordered: New foam (custom shaped protective cover) for right below the knee prostheses, Disp: 1 Units, Rfl: 0     polyethylene glycol (MIRALAX/GLYCOLAX) packet, Take 17 g by mouth 2 times daily as needed for constipation, Disp: , Rfl:      predniSONE (DELTASONE) 5 MG tablet, Take 1 tablet (5 mg) by mouth daily, Disp: , Rfl: 0     senna-docusate (SENOKOT-S/PERICOLACE) 8.6-50 MG tablet, Take 1 tablet by mouth 2 times daily, Disp: , Rfl:      simvastatin (ZOCOR) 20 MG tablet, Take 1 tablet (20 mg) by mouth At Bedtime, Disp: , Rfl:     Allergies -   No Known Allergies    Social History -   Social History     Socioeconomic History     Marital status: Single     Spouse name: None     Number of children: None     Years of education: None     Highest education level: None   Social Needs     Financial resource strain: None     Food insecurity - worry: None     Food insecurity - inability: None     Transportation needs - medical: None     Transportation needs - non-medical: None   Occupational History     None   Tobacco Use      Smoking status: Former Smoker     Packs/day: 0.50     Years: 45.00     Pack years: 22.50     Types: Cigarettes     Last attempt to quit: 2010     Years since quittin.9     Smokeless tobacco: Never Used   Substance and Sexual Activity     Alcohol use: Yes     Comment: occsionally-3 -4 drinks a week     Drug use: No     Sexual activity: Yes     Partners: Male   Other Topics Concern     Parent/sibling w/ CABG, MI or angioplasty before 65F 55M? No   Social History Narrative     None       Family History -   Family History   Problem Relation Age of Onset     Cardiovascular Mother      Cancer Brother      Diabetes No family hx of      Hypertension No family hx of      Cerebrovascular Disease No family hx of      Alzheimer Disease No family hx of      Thyroid Disease No family hx of      Respiratory No family hx of        Review of Systems - As per HPI and PMHx, otherwise 10+ system review of the head and neck, and general constitution is negative.    Physical Exam  Pulse 67   Resp 14   Wt 52.2 kg (115 lb)   BMI 18.56 kg/m      General - The patient is well nourished and well developed, and appears to have good nutritional status.  Alert and oriented to person and place, answers questions and cooperates with examination appropriately.   Head and Face - Normocephalic and atraumatic, with no gross asymmetry noted of the contour of the facial features.  The facial nerve is intact, with strong symmetric movements.  Voice and Breathing - The patient was breathing comfortably without the use of accessory muscles. There was no wheezing, stridor, or stertor.  The patients voice was clear and strong, and had appropriate pitch and quality.  Eyes - Extraocular movements intact, and the pupils were reactive to light.  Sclera were not icteric or injected, conjunctiva were pink and moist.  Mouth - Examination of the oral cavity showed pink, healthy oral mucosa. No lesions or ulcerations noted.  The tongue was mobile and  midline, and the dentition were in good condition.        Cerumen Removal    Physical Exam and Procedure  Ears - On examination of the ears, I found that the BOTH sides had cerumen impaction.  Therefore, I positioned them in the examination chair in a semi-supine position, beginning with the right side.  I used the binocular surgical microscope to perform cerumen removal.  I began by using a cerumen loop to gently lift the edges of the cerumen mass away from the walls of the external canal.  Once I did this, I was able to suction away fragments of wax and debris using suction.  Once the mass was loose enough, the entire plug was pulled from the canal.  The tympanic membrane was intact, no sign of perforation or middle ear effusion.    I turned my attention to the contralateral side once again using the binocular surgical microscope to perform cerumen removal.  I began by using a cerumen loop to gently lift the edges of the cerumen mass away from the walls of the external canal.  Once I did this, I was able to suction away fragments of wax and debris using suction.  Once the mass was loose enough, the entire plug was pulled from the canal.  The tympanic membrane was intact, no sign of perforation or middle ear effusion.        A/P - Sakshi Jaeger is a 74 year old female  (H61.23) Bilateral impacted cerumen  (primary encounter diagnosis)  (H93.8X3) Ear fullness, bilateral    The patient has had their cerumen procedurally removed today.  I have discussed ear care at home, including avoiding qtips or any other object placed in the canal.  I have also discussed that over the counter cerumen kits like Debrox or Cerumenex can be useful.    See me in another 4-5 months

## 2020-05-13 ENCOUNTER — TELEPHONE (OUTPATIENT)
Dept: WOUND CARE | Facility: CLINIC | Age: 75
End: 2020-05-13

## 2020-05-13 DIAGNOSIS — E78.5 HYPERLIPIDEMIA LDL GOAL <100: ICD-10-CM

## 2020-05-13 NOTE — TELEPHONE ENCOUNTER
Reason for Call:  Medication or medication refill:    Do you use a [facility]?  Name of the pharmacy and phone number for the current request:  [facility], [address], [phone]    Name of the medication requested: simvastatin (ZOCOR) 20 MG tablet     Other request:     Can we leave a detailed message on this number? YES    Phone number patient can be reached at: Home number on file [phone] (home)    Best Time: any    Call taken on 5/13/2020 at 10:36 AM by Sena Johnson

## 2020-05-13 NOTE — TELEPHONE ENCOUNTER
Called patient, per Dr. Walker, set up appointment to be seen in clinic next Thursday 5/21/20. Pt to apply Mepilex to the sore until seen. Clinic appointment made for 5/21/20 @ 4pm. Mepilex supply ready for patient to  at front door of [facility]. [facility] will  supply for the patient. They will call with any questions.    Leo Queen LPN

## 2020-05-13 NOTE — TELEPHONE ENCOUNTER
[facility] Call Center    Phone Message    May a detailed message be left on voicemail: yes     Reason for Call: Symptoms or Concerns     If patient has red-flag symptoms, warm transfer to triage line    Current symptom or concern: new issues with wound, puss    Symptoms have been present for:  unknown day(s)    Has patient previously been seen for this? Yes    By : Dr. Walker    Date: 2/13/20    Are there any new or worsening symptoms? Yes: see above      Action Taken: Message routed to:  [facility] ([facility] wound

## 2020-05-14 NOTE — TELEPHONE ENCOUNTER
Reason for Call:  Other / Requests call back    Detailed comments: Santi, patient's emergency contact, called and stated they have been waiting for this medication refill for several days and would appreciate to know why refill has not been completed yet.    Phone Number Patient can be reached at: Home number on file [phone] (home)    Best Time: ASAP    Can we leave a detailed message on this number? YES    Call taken on 5/14/2020 at 3:57 PM by Rosi Fair

## 2020-05-14 NOTE — TELEPHONE ENCOUNTER
"Routing refill request to provider for review/approval because:  Labs not current:  FLP  Med is \"transitional\"      Isidra Montoya RN  [facility]            "

## 2020-05-14 NOTE — TELEPHONE ENCOUNTER
"Requested Prescriptions   Pending Prescriptions Disp Refills     simvastatin (ZOCOR) 20 MG tablet       Sig: Take 1 tablet (20 mg) by mouth At Bedtime       Statins Protocol Failed - 5/14/2020  3:59 PM        Failed - LDL on file in past 12 months     Recent Labs   Lab Test 12/24/18  0959   LDL 84             Passed - No abnormal creatine kinase in past 12 months     No lab results found.             Passed - Recent (12 mo) or future (30 days) visit within the authorizing provider's specialty     Patient has had an office visit with the authorizing provider or a provider within the authorizing providers department within the previous 12 mos or has a future within next 30 days. See \"Patient Info\" tab in inbasket, or \"Choose Columns\" in Meds & Orders section of the refill encounter.              Passed - Medication is active on med list        Passed - Patient is age 18 or older        Passed - No active pregnancy on record        Passed - No positive pregnancy test in past 12 months             "

## 2020-05-15 RX ORDER — SIMVASTATIN 20 MG
20 TABLET ORAL AT BEDTIME
Qty: 90 TABLET | Refills: 3 | Status: SHIPPED | OUTPATIENT
Start: 2020-05-15 | End: 2021-06-16

## 2020-05-15 NOTE — TELEPHONE ENCOUNTER
Refill on the simvastatin was sent to the pharmacy.  Please call patient to let her know if needed.    Nick De La Cruz MD

## 2020-05-20 DIAGNOSIS — I10 ESSENTIAL HYPERTENSION WITH GOAL BLOOD PRESSURE LESS THAN 140/90: ICD-10-CM

## 2020-05-20 NOTE — TELEPHONE ENCOUNTER
Reason for Call:  Medication or medication refill:    Do you use a [facility]?  Name of the pharmacy and phone number for the current request:  [facility] #[id number]    Name of the medication requested: amLODIPine (NORVASC) 5 MG tablet    Other request: Pt would like a call when sent.    Can we leave a detailed message on this number? YES    Phone number patient can be reached at: Home number on file [phone] (home)    Best Time: Anytime    Call taken on 5/20/2020 at 12:08 PM by Joanna Vo

## 2020-05-21 ENCOUNTER — OFFICE VISIT (OUTPATIENT)
Dept: WOUND CARE | Facility: CLINIC | Age: 75
End: 2020-05-21
Payer: COMMERCIAL

## 2020-05-21 DIAGNOSIS — Z89.512 STATUS POST BELOW-KNEE AMPUTATION OF LEFT LOWER EXTREMITY (H): Primary | ICD-10-CM

## 2020-05-21 DIAGNOSIS — L97.911 ULCER OF RIGHT LOWER EXTREMITY, LIMITED TO BREAKDOWN OF SKIN (H): ICD-10-CM

## 2020-05-21 RX ORDER — AMLODIPINE BESYLATE 5 MG/1
5 TABLET ORAL DAILY
Qty: 90 TABLET | Refills: 3 | Status: SHIPPED | OUTPATIENT
Start: 2020-05-21 | End: 2021-05-06

## 2020-05-21 RX ORDER — METOPROLOL SUCCINATE 50 MG/1
1 TABLET, EXTENDED RELEASE ORAL EVERY 24 HOURS
COMMUNITY
End: 2020-07-14

## 2020-05-21 RX ORDER — LEFLUNOMIDE 20 MG/1
1 TABLET ORAL EVERY 24 HOURS
COMMUNITY

## 2020-05-21 ASSESSMENT — PAIN SCALES - GENERAL: PAINLEVEL: NO PAIN (0)

## 2020-05-21 NOTE — LETTER
5/21/2020     RE: Sakshi Jaeger  [address] [address]     Dear Colleague,    Thank you for referring your patient, Sakshi Jaeger, to the [facility] at [facility]. Please see a copy of my visit note below.    Chief Complaint:   Chief Complaint   Patient presents with     Wound Check     RLE wound        No Known Allergies      Subjective: Sakshi is a 74 year old female who presents to the clinic today for a follow up of right stump wound. Shew relates that she was wearing the new prosthetic and a wound formed. It has been healing well. No pain to the area. No n/v/d/f/c/ns/sob/cp    Objective  Data Unavailable Data Unavailable Data Unavailable Data Unavailable Data Unavailable 0 lbs 0 oz        A wound is noted at right  stump measuring 0.3cm x 0.7cm x 0.1cm.    Ly Classification: 2    Wound base: Pink/dermis    Edges: intact    Drainage: scant/serous    Odor: no    Undermining: no    Bone Exposure: No    Clinical Signs of Infection: No    After obtaining patient consent, the wound was irrigated with copious amounts of saline.       Assessment: Sakshi is a 75 YO with new right stump irritation. 2/2 new prosthetic. I called Fidel, her prosthetist, and he will add more padding to the sleeve.     Plan:   - Pt seen and evaluated  - Start Polymem and Mepilex to the area. Change daily.   - Pt to return to clinic in 1 month.     Again, thank you for allowing me to participate in the care of your patient.      Sincerely,    Joe Walker DPM

## 2020-05-21 NOTE — NURSING NOTE
Chief Complaint   Patient presents with     Wound Check     RLE wound       There were no vitals filed for this visit.    There is no height or weight on file to calculate BMI.    Tyler Matute, EMT

## 2020-05-22 NOTE — PROGRESS NOTES
Chief Complaint:   Chief Complaint   Patient presents with     Wound Check     RLE wound        No Known Allergies      Subjective: Sakshi is a 74 year old female who presents to the clinic today for a follow up of right stump wound. Shew relates that she was wearing the new prosthetic and a wound formed. It has been healing well. No pain to the area. No n/v/d/f/c/ns/sob/cp    Objective  Data Unavailable Data Unavailable Data Unavailable Data Unavailable Data Unavailable 0 lbs 0 oz        A wound is noted at right  stump measuring 0.3cm x 0.7cm x 0.1cm.    Ly Classification: 2    Wound base: Pink/dermis    Edges: intact    Drainage: scant/serous    Odor: no    Undermining: no    Bone Exposure: No    Clinical Signs of Infection: No    After obtaining patient consent, the wound was irrigated with copious amounts of saline.       Assessment: Sakshi is a 73 YO with new right stump irritation. 2/2 new prosthetic. I called Fidel, her prosthetist, and he will add more padding to the sleeve.     Plan:   - Pt seen and evaluated  - Start Polymem and Mepilex to the area. Change daily.   - Pt to return to clinic in 1 month.

## 2020-06-04 ENCOUNTER — OFFICE VISIT (OUTPATIENT)
Dept: WOUND CARE | Facility: CLINIC | Age: 75
End: 2020-06-04
Payer: COMMERCIAL

## 2020-06-04 DIAGNOSIS — Z89.512 STATUS POST BELOW-KNEE AMPUTATION OF LEFT LOWER EXTREMITY (H): Primary | ICD-10-CM

## 2020-06-04 ASSESSMENT — PAIN SCALES - GENERAL: PAINLEVEL: NO PAIN (0)

## 2020-06-04 NOTE — PROGRESS NOTES
Chief Complaint:   Chief Complaint   Patient presents with     WOUND CARE     Pt here for wound care on left leg        No Known Allergies      Subjective: Sakshi is a 74 year old female who presents to the clinic today for a follow up of right stump ulcer. She has been using the Polymem on the area. No pain. No drainage.     Objective  Data Unavailable Data Unavailable Data Unavailable Data Unavailable Data Unavailable 0 lbs 0 oz  Area on the right stump is healed over today. No s/s of infection noted.     Assessment: Healed right stump ulceration.    Plan:   - Pt seen and evaluated  - OK to get new prosthetic.   - Keep an eye on the area.   - Pt to return to clinic in 2 weeks.

## 2020-06-04 NOTE — NURSING NOTE
Chief Complaint   Patient presents with     WOUND CARE     Pt here for wound care on left leg       There were no vitals filed for this visit.    There is no height or weight on file to calculate BMI.      JO Terrell NREMT                    No vitals taken per provider

## 2020-06-04 NOTE — LETTER
6/4/2020     RE: Sakshi Jaeger  [address] [address]     Dear Colleague,    Thank you for referring your patient, Sakshi Jaeger, to the [facility] WOUND CARE at [facility]. Please see a copy of my visit note below.    Chief Complaint:   Chief Complaint   Patient presents with     WOUND CARE     Pt here for wound care on left leg        No Known Allergies      Subjective: Sakshi is a 74 year old female who presents to the clinic today for a follow up of right stump ulcer. She has been using the Polymem on the area. No pain. No drainage.     Objective  Data Unavailable Data Unavailable Data Unavailable Data Unavailable Data Unavailable 0 lbs 0 oz  Area on the right stump is healed over today. No s/s of infection noted.     Assessment: Healed right stump ulceration.    Plan:   - Pt seen and evaluated  - OK to get new prosthetic.   - Keep an eye on the area.   - Pt to return to clinic in 2 weeks.     Again, thank you for allowing me to participate in the care of your patient.      Sincerely,    Joe Walker DPM

## 2020-06-18 ENCOUNTER — OFFICE VISIT (OUTPATIENT)
Dept: WOUND CARE | Facility: CLINIC | Age: 75
End: 2020-06-18
Payer: COMMERCIAL

## 2020-06-18 DIAGNOSIS — L97.911 ULCER OF RIGHT LOWER EXTREMITY, LIMITED TO BREAKDOWN OF SKIN (H): ICD-10-CM

## 2020-06-18 DIAGNOSIS — Z89.512 STATUS POST BELOW-KNEE AMPUTATION OF LEFT LOWER EXTREMITY (H): Primary | ICD-10-CM

## 2020-06-18 ASSESSMENT — PAIN SCALES - GENERAL: PAINLEVEL: NO PAIN (0)

## 2020-06-18 NOTE — LETTER
6/18/2020       RE: Sakshi Jaeger  [address] [address]     Dear Colleague,    Thank you for referring your patient, Sakshi Jaeger, to the [facility] WOUND CARE at [facility]. Please see a copy of my visit note below.    Chief Complaint:   Chief Complaint   Patient presents with     Wound Check     2wk recheck RLE wound        No Known Allergies      Subjective: Sakshi is a 74 year old female who presents to the clinic today for a follow up of right stump wound. Shew relates that she was wearing the new prosthetic and a wound formed. It has been healing well. No pain to the area. No n/v/d/f/c/ns/sob/cp    Objective  Data Unavailable Data Unavailable Data Unavailable Data Unavailable Data Unavailable 0 lbs 0 oz            A wound is noted at right  stump measuring 0.3cm x 0.7cm x 0.1cm.    Ly Classification: 2    Wound base: Pink/dermis    Edges: intact    Drainage: scant/serous    Odor: no    Undermining: no    Bone Exposure: No    Clinical Signs of Infection: No    After obtaining patient consent, the wound was irrigated with copious amounts of saline.       Assessment: Sakshi is a 75 YO with new right stump irritation. 2/2 new prosthetic. She is goping to the prosthetic lab in a few days.     Plan:   - Pt seen and evaluated  - Start Polymem and Mepilex to the area. Change daily.   - Pt to return to clinic in 2 weeks.     Again, thank you for allowing me to participate in the care of your patient.      Sincerely,    Joe Walker DPM

## 2020-06-18 NOTE — NURSING NOTE
Chief Complaint   Patient presents with     Wound Check     2wk recheck RLE wound       There were no vitals filed for this visit.    There is no height or weight on file to calculate BMI.    Tyler Matute, EMT

## 2020-06-19 NOTE — PROGRESS NOTES
Chief Complaint:   Chief Complaint   Patient presents with     Wound Check     2wk recheck RLE wound        No Known Allergies      Subjective: Sakshi is a 74 year old female who presents to the clinic today for a follow up of right stump wound. Shew relates that she was wearing the new prosthetic and a wound formed. It has been healing well. No pain to the area. No n/v/d/f/c/ns/sob/cp    Objective  Data Unavailable Data Unavailable Data Unavailable Data Unavailable Data Unavailable 0 lbs 0 oz            A wound is noted at right  stump measuring 0.3cm x 0.7cm x 0.1cm.    Ly Classification: 2    Wound base: Pink/dermis    Edges: intact    Drainage: scant/serous    Odor: no    Undermining: no    Bone Exposure: No    Clinical Signs of Infection: No    After obtaining patient consent, the wound was irrigated with copious amounts of saline.       Assessment: Sakshi is a 75 YO with new right stump irritation. 2/2 new prosthetic. She is goping to the prosthetic lab in a few days.     Plan:   - Pt seen and evaluated  - Start Polymem and Mepilex to the area. Change daily.   - Pt to return to clinic in 2 weeks.

## 2020-07-02 ENCOUNTER — OFFICE VISIT (OUTPATIENT)
Dept: WOUND CARE | Facility: CLINIC | Age: 75
End: 2020-07-02
Payer: COMMERCIAL

## 2020-07-02 DIAGNOSIS — L97.911 ULCER OF RIGHT LOWER EXTREMITY, LIMITED TO BREAKDOWN OF SKIN (H): ICD-10-CM

## 2020-07-02 DIAGNOSIS — Z89.512 STATUS POST BELOW-KNEE AMPUTATION OF LEFT LOWER EXTREMITY (H): Primary | ICD-10-CM

## 2020-07-02 ASSESSMENT — PAIN SCALES - GENERAL: PAINLEVEL: NO PAIN (0)

## 2020-07-02 NOTE — PROGRESS NOTES
Chief Complaint   Patient presents with     WOUND CARE     Pt here for wound care on right lower leg          No Known Allergies      Subjective: Sakshi is a 74 year old female who presents to the clinic today for a follow up of right stump wound. She relates that she has had the new prosthetic remolded, but she does not want to wear it until the wound is fully healed.     No n/v/d/f/c/ns/sob/cp    Objective  Data Unavailable Data Unavailable Data Unavailable Data Unavailable Data Unavailable 0 lbs 0 oz                A wound is noted at right  stump measuring 0.3cm x 0.7cm x 1cm.    Ly Classification: 2    Wound base: Pink/dermis    Edges: intact    Drainage: scant/serous    Odor: no    Undermining: no    Bone Exposure: No    Clinical Signs of Infection: No    After obtaining patient consent, the wound was irrigated with copious amounts of saline.       Assessment: Sakshi is a 73 YO with new right stump irritation. 2/2 new prosthetic. She wants to wait on getting the new prosthetic on.     Plan:   - Pt seen and evaluated  - Start MeSalt packing and gauze and a Tegaderm over the area and change daily.   - Pt to return to clinic in 3 weeks.

## 2020-07-02 NOTE — NURSING NOTE
Chief Complaint   Patient presents with     WOUND CARE     Pt here for wound care on right lower leg       There were no vitals filed for this visit.    There is no height or weight on file to calculate BMI.      JO Terrell NREMT                    No vitals taken per provider

## 2020-07-10 DIAGNOSIS — I25.10 CORONARY ARTERY DISEASE INVOLVING NATIVE CORONARY ARTERY OF NATIVE HEART WITHOUT ANGINA PECTORIS: Primary | ICD-10-CM

## 2020-07-10 DIAGNOSIS — I10 ESSENTIAL HYPERTENSION WITH GOAL BLOOD PRESSURE LESS THAN 140/90: ICD-10-CM

## 2020-07-13 NOTE — TELEPHONE ENCOUNTER
There are two forms of metoprolol in her chart (different forms) and I don't know which one the refill is for and if for the lopressor, I do not what the directions are as we have never filled that medication for her.    Please call the pharmacy to check,    Nick De La Cruz MD

## 2020-07-13 NOTE — TELEPHONE ENCOUNTER
Routing refill request to provider for review/approval because:  Medication is reported/historical      Stefania Thomason RN

## 2020-07-14 RX ORDER — METOPROLOL TARTRATE 50 MG
50 TABLET ORAL 2 TIMES DAILY
Qty: 180 TABLET | Refills: 3 | Status: SHIPPED | OUTPATIENT
Start: 2020-07-14 | End: 2022-01-17

## 2020-07-14 NOTE — TELEPHONE ENCOUNTER
Fax from pharmacy indicates script is for lopressor 50 mg bid.    Will send refill to pharmacy and discontinue metoprolol xl.    Nick De La Cruz MD

## 2020-07-23 ENCOUNTER — OFFICE VISIT (OUTPATIENT)
Dept: WOUND CARE | Facility: CLINIC | Age: 75
End: 2020-07-23
Payer: COMMERCIAL

## 2020-07-23 DIAGNOSIS — Z89.511 STATUS POST BELOW-KNEE AMPUTATION OF RIGHT LOWER EXTREMITY (H): Primary | ICD-10-CM

## 2020-07-23 DIAGNOSIS — L97.911 ULCER OF RIGHT LOWER EXTREMITY, LIMITED TO BREAKDOWN OF SKIN (H): ICD-10-CM

## 2020-07-23 ASSESSMENT — PAIN SCALES - GENERAL: PAINLEVEL: NO PAIN (0)

## 2020-07-23 NOTE — PROGRESS NOTES
Chief Complaint   Patient presents with     Wound Check     RLE wound.          No Known Allergies      Subjective: Sakshi is a 74 year old female who presents to the clinic today for a follow up of right stump wound. She relates that she would like to try the new prosthetic. She no longer needs to use the MeSalt packing. No pain to the stump.     No n/v/d/f/c/ns/sob/cp    Objective  Data Unavailable Data Unavailable Data Unavailable Data Unavailable Data Unavailable 0 lbs 0 oz          A wound is noted at right  stump measuring 0.1cm x 0.1cm x 1cm.    Ly Classification: 2    Wound base: Pink/dermis    Edges: intact    Drainage: scant/serous    Odor: no    Undermining: no    Bone Exposure: No    Clinical Signs of Infection: No    After obtaining patient consent, the wound was irrigated with copious amounts of saline.       Assessment: Sakshi is a 75 YO with new right stump irritation. 2/2 new prosthetic. She would like to try the new prosthetic. Wound is significantly improved.      Plan:   - Pt seen and evaluated  - Cont MeSalt gauze and a Tegaderm over the area and change daily.   - Can try her new socket.   - Pt to return to clinic in 2 weeks.

## 2020-07-23 NOTE — LETTER
7/23/2020       RE: Sakshi Jaeger  [address] [address]     Dear Colleague,    Thank you for referring your patient, Sakshi Jaeger, to the [facility] WOUND CARE at [facility]. Please see a copy of my visit note below.    Chief Complaint   Patient presents with     Wound Check     RLE wound.          No Known Allergies      Subjective: Sakshi is a 74 year old female who presents to the clinic today for a follow up of right stump wound. She relates that she would like to try the new prosthetic. She no longer needs to use the MeSalt packing. No pain to the stump.     No n/v/d/f/c/ns/sob/cp    Objective  Data Unavailable Data Unavailable Data Unavailable Data Unavailable Data Unavailable 0 lbs 0 oz          A wound is noted at right  stump measuring 0.1cm x 0.1cm x 1cm.    Ly Classification: 2    Wound base: Pink/dermis    Edges: intact    Drainage: scant/serous    Odor: no    Undermining: no    Bone Exposure: No    Clinical Signs of Infection: No    After obtaining patient consent, the wound was irrigated with copious amounts of saline.       Assessment: Sakshi is a 73 YO with new right stump irritation. 2/2 new prosthetic. She would like to try the new prosthetic. Wound is significantly improved.      Plan:   - Pt seen and evaluated  - Cont MeSalt gauze and a Tegaderm over the area and change daily.   - Can try her new socket.   - Pt to return to clinic in 2 weeks.     Again, thank you for allowing me to participate in the care of your patient.      Sincerely,    Joe Walker DPM

## 2020-07-23 NOTE — NURSING NOTE
Chief Complaint   Patient presents with     Wound Check     RLE wound.       There were no vitals filed for this visit.    There is no height or weight on file to calculate BMI.    Leo Queen LPN

## 2020-07-27 ENCOUNTER — TELEPHONE (OUTPATIENT)
Dept: ORTHOPEDICS | Facility: CLINIC | Age: 75
End: 2020-07-27

## 2020-07-27 NOTE — TELEPHONE ENCOUNTER
[facility] Call Center    Phone Message    May a detailed message be left on voicemail: yes     Reason for Call: Other: Pt needs bandage supplies ordered as she is out and would like a call back to discuss asap . PLease reach out to pt     Action Taken: Message routed to:  [facility]o    Travel Screening: Not Applicable

## 2020-07-28 NOTE — TELEPHONE ENCOUNTER
Phoned patient to inquire about what dressing were needed. I left my direct number for a call back.    JO Terrell NREMT

## 2020-08-06 ENCOUNTER — OFFICE VISIT (OUTPATIENT)
Dept: WOUND CARE | Facility: CLINIC | Age: 75
End: 2020-08-06
Payer: COMMERCIAL

## 2020-08-06 DIAGNOSIS — Z89.512 STATUS POST BELOW-KNEE AMPUTATION OF LEFT LOWER EXTREMITY (H): ICD-10-CM

## 2020-08-06 DIAGNOSIS — L97.911 ULCER OF RIGHT LOWER EXTREMITY, LIMITED TO BREAKDOWN OF SKIN (H): Primary | ICD-10-CM

## 2020-08-06 ASSESSMENT — PAIN SCALES - GENERAL: PAINLEVEL: NO PAIN (0)

## 2020-08-06 NOTE — NURSING NOTE
Chief Complaint   Patient presents with     WOUND CARE     Pt here for wound care       There were no vitals filed for this visit.    There is no height or weight on file to calculate BMI.      JO Terrell NREMT                    No vitals taken per provider

## 2020-08-06 NOTE — LETTER
8/6/2020       RE: Sakshi Jaeger  [address] [facility] [address]     Dear Colleague,    Thank you for referring your patient, Sakshi Jaeger, to the [facility] WOUND CARE at [facility]. Please see a copy of my visit note below.    Chief Complaint   Patient presents with     WOUND CARE     Pt here for wound care          No Known Allergies      Subjective: Sakshi is a 74 year old female who presents to the clinic today for a follow up of right stump wound. She relates that she has been using the new prosthetic.  She relates that for the first week, it was fine.  The week after, she did have some irritation to the stump and it started draining again.  She relates that the area is now quite painful for her.  They have been using PolyMem on the area.    No n/v/d/f/c/ns/sob/cp    Objective  Data Unavailable Data Unavailable Data Unavailable Data Unavailable Data Unavailable 0 lbs 0 oz              A wound is noted at right  stump measuring 0.2cm x 0.2cm x 1cm.    Ly Classification: 2    Wound base: Pink/dermis    Edges: intact    Drainage: scant/serous    Odor: no    Undermining: no    Bone Exposure: No    Clinical Signs of Infection: No    After obtaining patient consent, the wound was irrigated with copious amounts of saline.       Assessment: Sakshi is a 75 YO with new right stump irritation 2/2 new prosthetic.  For this ulceration, I sterilely applied a piece of Meri into the wound.  We should be left intact until Saturday.  The area was covered with PolyMem silver and Medipore tape.  On Saturday, the area should be changed with intra-dry strips Tegaderm.  I discussed with her today that the prosthetic is just not feeling well on her stump, even with multiple attempts to modify it.  She may need surgical modification of the stump in order to get this to close.  We have been trying to avoid this, but it may be inevitable at this point.  We will see how she is  doing in a couple of weeks.    Plan:   - Pt seen and evaluated  - Interdry gauze and a Tegaderm over the area and change daily.   - No prosthetic  - Pt to return to clinic in 2 weeks.     Again, thank you for allowing me to participate in the care of your patient.      Sincerely,    Joe Walker DPM

## 2020-08-06 NOTE — PROGRESS NOTES
Chief Complaint   Patient presents with     WOUND CARE     Pt here for wound care          No Known Allergies      Subjective: Sakshi is a 74 year old female who presents to the clinic today for a follow up of right stump wound. She relates that she has been using the new prosthetic.  She relates that for the first week, it was fine.  The week after, she did have some irritation to the stump and it started draining again.  She relates that the area is now quite painful for her.  They have been using PolyMem on the area.    No n/v/d/f/c/ns/sob/cp    Objective  Data Unavailable Data Unavailable Data Unavailable Data Unavailable Data Unavailable 0 lbs 0 oz              A wound is noted at right  stump measuring 0.2cm x 0.2cm x 1cm.    Ly Classification: 2    Wound base: Pink/dermis    Edges: intact    Drainage: scant/serous    Odor: no    Undermining: no    Bone Exposure: No    Clinical Signs of Infection: No    After obtaining patient consent, the wound was irrigated with copious amounts of saline.       Assessment: Sakshi is a 75 YO with new right stump irritation 2/2 new prosthetic.  For this ulceration, I sterilely applied a piece of Meri into the wound.  We should be left intact until Saturday.  The area was covered with PolyMem silver and Medipore tape.  On Saturday, the area should be changed with intra-dry strips Tegaderm.  I discussed with her today that the prosthetic is just not feeling well on her stump, even with multiple attempts to modify it.  She may need surgical modification of the stump in order to get this to close.  We have been trying to avoid this, but it may be inevitable at this point.  We will see how she is doing in a couple of weeks.    Plan:   - Pt seen and evaluated  - Interdry gauze and a Tegaderm over the area and change daily.   - No prosthetic  - Pt to return to clinic in 2 weeks.

## 2020-08-20 ENCOUNTER — OFFICE VISIT (OUTPATIENT)
Dept: WOUND CARE | Facility: CLINIC | Age: 75
End: 2020-08-20
Payer: COMMERCIAL

## 2020-08-20 DIAGNOSIS — Z89.511 STATUS POST BELOW-KNEE AMPUTATION OF RIGHT LOWER EXTREMITY (H): Primary | ICD-10-CM

## 2020-08-20 ASSESSMENT — PAIN SCALES - GENERAL: PAINLEVEL: NO PAIN (0)

## 2020-08-20 NOTE — PROGRESS NOTES
Chief Complaint:   Chief Complaint   Patient presents with     WOUND CARE     Pt here for wound care        No Known Allergies      Subjective: Sakshi is a 75 year old female who presents to the clinic today for a follow up of right BKA ulceration.  She relates that the dressing will be changed today.  There is no more drainage to the area.  She does have a prosthetic today, she is hoping that she can wear this today.    Objective  Data Unavailable Data Unavailable Data Unavailable Data Unavailable Data Unavailable 0 lbs 0 oz  Wound on the right BKA stump is healed over today.  There is no drainage noted to the area.  I examined his closely and in the divot, it appears to be dermal tissue.  No anais open ulcerations noted.    Assessment: Sakshi is a 75-year-old woman with right BKA and ulceration that is healed on the stump.  She would like to get back into her prosthetic.  I related that she can do this, however she needs to go slowly.  She can wear for 1 hour day increasing by 1 hour as long as there is no drainage on the dressing.    Plan:   - Pt seen and evaluated  -Cover the area with an unfolded piece of Mesalt.  Cover with a Tegaderm.  Changes daily.  - Pt to return to clinic in 1 week.

## 2020-08-20 NOTE — LETTER
8/20/2020       RE: Sakshi Jaeger  [address] [address]     Dear Colleague,    Thank you for referring your patient, Sakshi Jaeger, to the [facility] WOUND CARE at [facility]. Please see a copy of my visit note below.    Chief Complaint:   Chief Complaint   Patient presents with     WOUND CARE     Pt here for wound care        No Known Allergies    Subjective: Sakshi is a 75 year old female who presents to the clinic today for a follow up of right BKA ulceration.  She relates that the dressing will be changed today.  There is no more drainage to the area.  She does have a prosthetic today, she is hoping that she can wear this today.    Objective  Data Unavailable Data Unavailable Data Unavailable Data Unavailable Data Unavailable 0 lbs 0 oz  Wound on the right BKA stump is healed over today.  There is no drainage noted to the area.  I examined his closely and in the divot, it appears to be dermal tissue.  No anais open ulcerations noted.    Assessment: Sakshi is a 75-year-old woman with right BKA and ulceration that is healed on the stump.  She would like to get back into her prosthetic.  I related that she can do this, however she needs to go slowly.  She can wear for 1 hour day increasing by 1 hour as long as there is no drainage on the dressing.                  Plan:   - Pt seen and evaluated  -Cover the area with an unfolded piece of Mesalt.  Cover with a Tegaderm.  Changes daily.  - Pt to return to clinic in 1 week.      Again, thank you for allowing me to participate in the care of your patient.  Sincerely,    Joe Walker DPM

## 2020-08-20 NOTE — LETTER
8/20/2020       RE: Sakshi Jaeger  [address] [address]     Dear Colleague,    Thank you for referring your patient, Sakshi Jaeger, to the [facility] WOUND CARE at [facility]. Please see a copy of my visit note below.    Chief Complaint:   Chief Complaint   Patient presents with     WOUND CARE     Pt here for wound care        No Known Allergies      Subjective: Sakshi is a 75 year old female who presents to the clinic today for a follow up of right BKA ulceration.  She relates that the dressing will be changed today.  There is no more drainage to the area.  She does have a prosthetic today, she is hoping that she can wear this today.    Objective  Data Unavailable Data Unavailable Data Unavailable Data Unavailable Data Unavailable 0 lbs 0 oz  Wound on the right BKA stump is healed over today.  There is no drainage noted to the area.  I examined his closely and in the divot, it appears to be dermal tissue.  No anais open ulcerations noted.    Assessment: Sakshi is a 75-year-old woman with right BKA and ulceration that is healed on the stump.  She would like to get back into her prosthetic.  I related that she can do this, however she needs to go slowly.  She can wear for 1 hour day increasing by 1 hour as long as there is no drainage on the dressing.    Plan:   - Pt seen and evaluated  -Cover the area with an unfolded piece of Mesalt.  Cover with a Tegaderm.  Changes daily.  - Pt to return to clinic in 1 week.      Again, thank you for allowing me to participate in the care of your patient.      Sincerely,    Joe Walker DPM

## 2020-08-27 ENCOUNTER — OFFICE VISIT (OUTPATIENT)
Dept: WOUND CARE | Facility: CLINIC | Age: 75
End: 2020-08-27
Payer: COMMERCIAL

## 2020-08-27 DIAGNOSIS — Z89.511 STATUS POST BELOW-KNEE AMPUTATION OF RIGHT LOWER EXTREMITY (H): Primary | ICD-10-CM

## 2020-08-27 DIAGNOSIS — L97.911 ULCER OF RIGHT LOWER EXTREMITY, LIMITED TO BREAKDOWN OF SKIN (H): ICD-10-CM

## 2020-08-27 ASSESSMENT — PAIN SCALES - GENERAL: PAINLEVEL: NO PAIN (0)

## 2020-08-27 NOTE — LETTER
8/27/2020       RE: Sakshi Jaeger  [address] [address]     Dear Colleague,    Thank you for referring your patient, Sakshi Jaeger, to the [facility] WOUND CARE at [facility]. Please see a copy of my visit note below.    Chief Complaint   Patient presents with     Wound Check     Wound check          No Known Allergies      Subjective: Sakshi is a 75 year old female who presents to the clinic today for a follow up of right BKA ulceration.  She has been wearing the prosthetic, increasing an hour each day. On day 4, the stump started to get irritated around the crevice. She stopped using the prosthetic and kept the areas open to scab over. The crevice has not reopened. No pain to the area.     Objective  Data Unavailable Data Unavailable Data Unavailable Data Unavailable Data Unavailable 0 lbs 0 oz  Wound on the right BKA stump crevice is healed over today.  There is no drainage noted to the area.  I examined his closely and in the divot, it appears to be dermal tissue.  New scabbing noted to the distal stump at the prominence of the distal tibia. No s/s of infection noted.           Assessment: Sakshi is a 75-year-old woman with right BKA and ulceration that is healed on the stump crevice, however she has new areas of irritation from the sleeve. I discussed with her that she can go back to the orthotist to see if she can get the sleeve made in a new way. If not, I think she would benefit from seeing Dr. Perea in plastics to see if there are ways to create a more padded distal tibia.     Plan:   - Pt seen and evaluated  - Areas are scabbed over. She does not need to cover the stump.  - Stop prosthetic for now.   - See Dr. Perea.   - Pt to return to clinic PRN.      Again, thank you for allowing me to participate in the care of your patient.      Sincerely,    Joe Walker DPM

## 2020-08-27 NOTE — PROGRESS NOTES
Chief Complaint   Patient presents with     Wound Check     Wound check          No Known Allergies      Subjective: Sakshi is a 75 year old female who presents to the clinic today for a follow up of right BKA ulceration.  She has been wearing the prosthetic, increasing an hour each day. On day 4, the stump started to get irritated around the crevice. She stopped using the prosthetic and kept the areas open to scab over. The crevice has not reopened. No pain to the area.     Objective  Data Unavailable Data Unavailable Data Unavailable Data Unavailable Data Unavailable 0 lbs 0 oz  Wound on the right BKA stump crevice is healed over today.  There is no drainage noted to the area.  I examined his closely and in the divot, it appears to be dermal tissue.  New scabbing noted to the distal stump at the prominence of the distal tibia. No s/s of infection noted.           Assessment: Sakshi is a 75-year-old woman with right BKA and ulceration that is healed on the stump crevice, however she has new areas of irritation from the sleeve. I discussed with her that she can go back to the orthotist to see if she can get the sleeve made in a new way. If not, I think she would benefit from seeing Dr. Perea in plastics to see if there are ways to create a more padded distal tibia.     Plan:   - Pt seen and evaluated  - Areas are scabbed over. She does not need to cover the stump.  - Stop prosthetic for now.   - See Dr. Perea.   - Pt to return to clinic PRN.

## 2020-08-28 NOTE — TELEPHONE ENCOUNTER
FUTURE VISIT INFORMATION      FUTURE VISIT INFORMATION:    Date: 9/9/20    Time: 10:15am    [place] [facility] [place]:    Referring providers clinic:  [facility]    Reason for visit/diagnosis  Ulcer of right lower extremity, limited to breakdown of skin    RECORDS REQUESTED FROM:       Clinic name Comments Records Status Imaging Status   [facility] OV/referral 8/27/20 EPIC

## 2020-09-02 ENCOUNTER — TELEPHONE (OUTPATIENT)
Dept: SURGERY | Facility: CLINIC | Age: 75
End: 2020-09-02

## 2020-09-02 NOTE — TELEPHONE ENCOUNTER
9/2/2020 2:25 PM    Called patient at the contact number listed on file; no answer. Left voicemail informing of clinic cancellation on 9/9 w/ Dr Perea. Provided contact info for patient to call back in order to reschedule appointment.    ABIGAIL LaneT

## 2020-09-03 ENCOUNTER — TELEPHONE (OUTPATIENT)
Dept: PLASTIC SURGERY | Facility: CLINIC | Age: 75
End: 2020-09-03

## 2020-09-03 NOTE — TELEPHONE ENCOUNTER
9/3/2020, 1:52 PM    Spoke with patient. Rescheduled appointment from 9/9 to 9/23/20 at 1015h. The patient voiced agreement and understanding of date, time, and place of appointment.      ABIGAIL LaneT

## 2020-09-03 NOTE — TELEPHONE ENCOUNTER
[facility] Call Center    Phone Message    May a detailed message be left on voicemail: yes     Reason for Call: Other: Santi called in with pt and they were trying to get this rescheduled. Writer did advise them of next available appointmen being in November and they stated they were offered an appointment for 9/16/2020. Please follow up when available. thank you      Action Taken: Message routed to:  [facility] Plastic Surgery    Travel Screening: Not Applicable

## 2020-09-04 NOTE — TELEPHONE ENCOUNTER
FUTURE VISIT INFORMATION      FUTURE VISIT INFORMATION:    Date: 10/23/20    Time: 10:15am    [place] [facility] [place] INFORMATION:    Referring providers clinic:  [facility]    Reason for visit/diagnosis  Ulcer of right lower extremity, limited to breakdown of skin     RECORDS REQUESTED FROM:         Clinic name Comments Records Status Imaging Status   [facility] OV/referral 8/27/20 EPIC

## 2020-09-09 ENCOUNTER — PRE VISIT (OUTPATIENT)
Dept: SURGERY | Facility: CLINIC | Age: 75
End: 2020-09-09

## 2020-09-21 NOTE — PROGRESS NOTES
History of Present Illness - Sakshi Jaeger is a 74 [place] old female last seen on 4/20/20.    She is here for a pre planned regular procedure visit    To review, she has had issues with ear wax, more on the RIGHT than LEFT, and for many years Dr. OSMAN has helped her with alligators or irrigations.  However, over the last year prior to first seeing me in January 2019, it seems much more tenacious and sticky.  The other day the LEFT ear got some water in it from the shower and had been totally clogged since then.    No previous ear surgery, no chronic ear disease, no bleeding or drainage from the ears.    I had to procedurally clear tremendous cerumen from both ears.  And after the initial visit I asked her to come back in four months and she was again impacted severely.  So at this point I have asked her to come back at least every 4-6 months for debridement.    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)     Osteomyelitis (H)       Current Medications -   Current Outpatient Medications:      acetaminophen (TYLENOL) 325 MG tablet, Take 2 tablets (650 mg) by mouth every 4 hours, Disp: , Rfl:      alendronate (FOSAMAX) 35 MG tablet, TAKE 1 TAB BY MOUTH WITH 8OZ OF WATER ONCE EVERY WEEK BEFORE BREAKFAST, REMAIN UPRIGHT AT THIS TIME, Disp: 12 tablet, Rfl: 2     amLODIPine (NORVASC) 5 MG tablet, Take 1 tablet (5 mg) by mouth daily, Disp: 90 tablet, Rfl: 3     Ascorbic Acid (VITAMIN C) 500 MG CHEW, , Disp: , Rfl:      aspirin (ASA) 81 MG tablet,  Take 1 tablet (81 mg) by mouth daily, Disp:  , Rfl:      calcium carbonate 600 mg-vitamin D 400 units (CALCIUM 600 + D) 600-400 MG-UNIT per tablet, Take 2 tablets by mouth daily, Disp: , Rfl:      gabapentin (NEURONTIN) 300 MG capsule, Take 2 capsules (600 mg) by mouth 3 times daily, Disp: , Rfl:      lactobacillus rhamnosus, GG, (CULTURELL) capsule, Take 1 capsule by mouth 2 times daily, Disp: , Rfl:      leflunomide (ARAVA) 20 MG tablet, Take 1 tablet by mouth every 24 hours, Disp: , Rfl:      Lidocaine (LIDOCARE) 4 % Patch, Place 1 patch onto the skin every 24 hours To prevent lidocaine toxicity, patient should be patch free for 12 hrs daily., Disp: , Rfl:      lisinopril (ZESTRIL) 20 MG tablet, Take 1 tablet (20 mg) by mouth daily, Disp: 90 tablet, Rfl: 3     methotrexate 2.5 MG tablet, Take 1 tablet by mouth, Disp: , Rfl:      metoprolol tartrate (LOPRESSOR) 50 MG tablet, Take 1 tablet (50 mg) by mouth 2 times daily, Disp: 180 tablet, Rfl: 3     multivitamin w/minerals (THERA-VIT-M) tablet, Take 1 tablet by mouth daily, Disp: , Rfl:      niacin 500 MG tablet, Take 1 tablet (500 mg) by mouth At Bedtime, Disp: 100 tablet, Rfl: 6     order for DME, Equipment being ordered: New foam (custom shaped protective cover) for right below the knee prostheses, Disp: 1 Units, Rfl: 0     polyethylene glycol (MIRALAX/GLYCOLAX) packet, Take 17 g by mouth 2 times daily as needed for constipation, Disp: , Rfl:      predniSONE (DELTASONE) 5 MG tablet, Take 1 tablet (5 mg) by mouth daily, Disp: , Rfl: 0     senna-docusate (SENOKOT-S/PERICOLACE) 8.6-50 MG tablet, Take 1 tablet by mouth 2 times daily, Disp: , Rfl:      simvastatin (ZOCOR) 20 MG tablet, Take 1 tablet (20 mg) by mouth At Bedtime, Disp: 90 tablet, Rfl: 3    Allergies -   No Known Allergies    Social History -   Social History     Socioeconomic History     Marital status: Single     Spouse name: None     Number of children: None     Years of education: None     Highest  education level: None   Social Needs     Financial resource strain: None     Food insecurity - worry: None     Food insecurity - inability: None     Transportation needs - medical: None     Transportation needs - non-medical: None   Occupational History     None   Tobacco Use     Smoking status: Former Smoker     Packs/day: 0.50     Years: 45.00     Pack years: 22.50     Types: Cigarettes     Last attempt to quit: 2010     Years since quittin.9     Smokeless tobacco: Never Used   Substance and Sexual Activity     Alcohol use: Yes     Comment: occsionally-3 -4 drinks a week     Drug use: No     Sexual activity: Yes     Partners: Male   Other Topics Concern     Parent/sibling w/ CABG, MI or angioplasty before 65F 55M? No   Social History Narrative     None       Family History -   Family History   Problem Relation Age of Onset     Cardiovascular Mother      Cancer Brother      Diabetes No family hx of      Hypertension No family hx of      Cerebrovascular Disease No family hx of      Alzheimer Disease No family hx of      Thyroid Disease No family hx of      Respiratory No family hx of        Review of Systems - As per HPI and PMHx, otherwise 10+ system review of the head and neck, and general constitution is negative.    Physical Exam  There were no vitals taken for this visit.    General - The patient is well nourished and well developed, and appears to have good nutritional status.  Alert and oriented to person and place, answers questions and cooperates with examination appropriately.   Head and Face - Normocephalic and atraumatic, with no gross asymmetry noted of the contour of the facial features.  The facial nerve is intact, with strong symmetric movements.  Voice and Breathing - The patient was breathing comfortably without the use of accessory muscles. There was no wheezing, stridor, or stertor.  The patients voice was clear and strong, and had appropriate pitch and quality.  Eyes - Extraocular  movements intact, and the pupils were reactive to light.  Sclera were not icteric or injected, conjunctiva were pink and moist.  Mouth - Examination of the oral cavity showed pink, healthy oral mucosa. No lesions or ulcerations noted.  The tongue was mobile and midline, and the dentition were in good condition.        Cerumen Removal    Physical Exam and Procedure  Ears - On examination of the ears, I found that the BOTH sides had cerumen impaction.  Therefore, I positioned them in the examination chair in a semi-supine position, beginning with the right side.  I used the binocular surgical microscope to perform cerumen removal.  I began by using a cerumen loop to gently lift the edges of the cerumen mass away from the walls of the external canal.  Once I did this, I was able to suction away fragments of wax and debris using suction.  Once the mass was loose enough, the entire plug was pulled from the canal.  The tympanic membrane was intact, no sign of perforation or middle ear effusion.    I turned my attention to the contralateral side once again using the binocular surgical microscope to perform cerumen removal.  I began by using a cerumen loop to gently lift the edges of the cerumen mass away from the walls of the external canal.  Once I did this, I was able to suction away fragments of wax and debris using suction.  Once the mass was loose enough, the entire plug was pulled from the canal.  The tympanic membrane was intact, no sign of perforation or middle ear effusion.        A/P - Sakshi Jaeger is a 74 year old female  (H61.23) Bilateral impacted cerumen  (primary encounter diagnosis)  (H93.8X3) Ear fullness, bilateral    The patient has had their cerumen procedurally removed today.  I have discussed ear care at home, including avoiding qtips or any other object placed in the canal.  I have also discussed that over the counter cerumen kits like Debrox or Cerumenex can be useful.    See me in another 4-5  months

## 2020-09-23 ENCOUNTER — OFFICE VISIT (OUTPATIENT)
Dept: PLASTIC SURGERY | Facility: CLINIC | Age: 75
End: 2020-09-23
Payer: COMMERCIAL

## 2020-09-23 ENCOUNTER — PRE VISIT (OUTPATIENT)
Dept: SURGERY | Facility: CLINIC | Age: 75
End: 2020-09-23

## 2020-09-23 VITALS
DIASTOLIC BLOOD PRESSURE: 72 MMHG | BODY MASS INDEX: 18.33 KG/M2 | HEIGHT: 65 IN | WEIGHT: 110 LBS | HEART RATE: 62 BPM | SYSTOLIC BLOOD PRESSURE: 141 MMHG | OXYGEN SATURATION: 97 %

## 2020-09-23 DIAGNOSIS — T81.89XD NON-HEALING SURGICAL WOUND, SUBSEQUENT ENCOUNTER: Primary | ICD-10-CM

## 2020-09-23 RX ORDER — CEFAZOLIN SODIUM 2 G/50ML
2 SOLUTION INTRAVENOUS
Status: CANCELLED | OUTPATIENT
Start: 2020-09-23

## 2020-09-23 RX ORDER — CEFAZOLIN SODIUM 1 G/50ML
1 INJECTION, SOLUTION INTRAVENOUS SEE ADMIN INSTRUCTIONS
Status: CANCELLED | OUTPATIENT
Start: 2020-09-23

## 2020-09-23 ASSESSMENT — PAIN SCALES - GENERAL: PAINLEVEL: NO PAIN (0)

## 2020-09-23 ASSESSMENT — MIFFLIN-ST. JEOR: SCORE: 994.84

## 2020-09-23 NOTE — LETTER
9/23/2020       RE: Sakshi Jaeger  [address] [facility] [facility] [address]     Dear Colleague,    Thank you for referring your patient, Sakshi Jaeger, to the [facility] P[facility]IC [facility] RECONSTRU[facility]IVE SURGERY at [facility]. Please see a copy of my visit note below.    Service Date: 09/23/2020      CONSULT NOTE      REFERRING PROVIDER:  Joe Walker MD      PRESENTING COMPLAINT:  Consultation for a slow healing wound on the distal end of a right BKA stump.      HISTORY OF PRESENTING COMPLAINT:  Ms. Jaeger is 75 years old.  She has a right BKA.  This occurred in 2005 from a tib-fib fracture that got osteomyelitis requiring BKA.  Over the years, she has been using a lower extremity prosthesis without much issue until 2019 when she had a pressure sore that ended up getting infected requiring a number of debridements including shortening of the stump and primary closure.  Prior to this in 2015, she also had a prepatellar wound that required some form of flap reconstruction.  The latest wound redeveloped the end of last year, and despite continuing to use her lower extremity prosthesis, it was just very slow to heal.  Ultimately, it did heal and 6 weeks ago she started using a recontoured prosthesis and once again it opened up again.  She is here to have it looked at.      PAST MEDICAL HISTORY:  Osteoporosis, rheumatoid arthritis, MI in 2009 requiring stenting, hypertension and hyperlipidemia.      PAST SURGICAL HISTORY:  Right lower extremity surgeries, right knee wound requiring flap closure, left TKA and open appendectomy.      MEDICATIONS:  Prednisone. methotrexate, amlodipine, aspirin, gabapentin, lisinopril, simvastatin.          ALLERGIES:  Nil.      SOCIAL HISTORY:  Used to smoke about 1/2 pack a day for 40 years, quit in 2010.  Drinks socially.      REVIEW OF SYSTEMS:  Denies active chest pain, shortness of breath, has had an MI, no CVA, DVT or PE.      PHYSICAL  EXAMINATION:  Vital signs are stable.  She is afebrile, in no obvious distress.  She is 5 feet 5 inches, 110 pounds, BMI of 18 kg/m2.  She is sitting in a wheelchair.  She does have a prosthesis with her that she has been wearing.  On examination of her right BKA stump, there is an approximately 2 cm wide scar that is indented, basically like a crevice, with a small, less than 1 cm superficial opening.  No evidence for active infection.  She has very thin skin in the BKA stump with underlying bone very close to the skin.      ASSESSMENT AND PLAN:  Based on above findings, a diagnosis of slow healing/nonhealing stump wound was made.  This most likely is related to pressure, poor soft tissue coverage over the BKA stump and, of course, bad physiology and protoplasm secondary to her medical comorbidities, low BMI and active medications that inhibit healing.  She is an ex-smoker but is around an active smoker.  All of these difficult issues probably relate to why she has not completely healed in.  I had a anais conversation with the patient and her significant other.  I reiterated the importance of staying away from nicotine.  I think from a standpoint of the wound currently my advice would be to go back to the operating room, excise this unstable scar, recontour the area and close it primarily and then see if wearing this refurbished prosthesis results in either a sustained closed wound or it recurs.  If it does recur, then the only option going forward is to refurbish the entire BKA stump by removing this unstable thin skin and replacing it with a free flap like an ALT flap which is thicker and more cushioned.  This plan was explained.  They understood and agreed.  All risks, benefits and alternatives of the first stage, including pain, infection, bleeding, scarring, asymmetries, recurrence, requirement of further surgeries, DVT, PE, MI, CVA, pneumonia, renal failure and death were explained.  She understood everything  and wants to proceed.  I look forward to helping her out in the near future.      Total time spent with the patient was 30 minutes, more than half counseling.      cc:   Joe Walker DPM   [facility] [address]  [address]         RAISA PEREA MD             D: 2020   T: 2020   MT: monika      Name:     CARLOS PEDRAZA   MRN:      [id number]        Account:      [id number]   :      1945           Service Date: 2020     Document: [id number]        Again, thank you for allowing me to participate in the care of your patient.      Sincerely,    RAISA Perea MD

## 2020-09-23 NOTE — PROGRESS NOTES
Service Date: 09/23/2020      CONSULT NOTE      REFERRING PROVIDER:  Joe Walker MD      PRESENTING COMPLAINT:  Consultation for a slow healing wound on the distal end of a right BKA stump.      HISTORY OF PRESENTING COMPLAINT:  Ms. Jaeger is 75 years old.  She has a right BKA.  This occurred in 2005 from a tib-fib fracture that got osteomyelitis requiring BKA.  Over the years, she has been using a lower extremity prosthesis without much issue until 2019 when she had a pressure sore that ended up getting infected requiring a number of debridements including shortening of the stump and primary closure.  Prior to this in 2015, she also had a prepatellar wound that required some form of flap reconstruction.  The latest wound redeveloped the end of last year, and despite continuing to use her lower extremity prosthesis, it was just very slow to heal.  Ultimately, it did heal and 6 weeks ago she started using a recontoured prosthesis and once again it opened up again.  She is here to have it looked at.      PAST MEDICAL HISTORY:  Osteoporosis, rheumatoid arthritis, MI in 2009 requiring stenting, hypertension and hyperlipidemia.      PAST SURGICAL HISTORY:  Right lower extremity surgeries, right knee wound requiring flap closure, left TKA and open appendectomy.      MEDICATIONS:  Prednisone. methotrexate, amlodipine, aspirin, gabapentin, lisinopril, simvastatin.          ALLERGIES:  Nil.      SOCIAL HISTORY:  Used to smoke about 1/2 pack a day for 40 years, quit in 2010.  Drinks socially.      REVIEW OF SYSTEMS:  Denies active chest pain, shortness of breath, has had an MI, no CVA, DVT or PE.      PHYSICAL EXAMINATION:  Vital signs are stable.  She is afebrile, in no obvious distress.  She is 5 feet 5 inches, 110 pounds, BMI of 18 kg/m2.  She is sitting in a wheelchair.  She does have a prosthesis with her that she has been wearing.  On examination of her right BKA stump, there is an approximately 2 cm wide scar  that is indented, basically like a crevice, with a small, less than 1 cm superficial opening.  No evidence for active infection.  She has very thin skin in the BKA stump with underlying bone very close to the skin.      ASSESSMENT AND PLAN:  Based on above findings, a diagnosis of slow healing/nonhealing stump wound was made.  This most likely is related to pressure, poor soft tissue coverage over the BKA stump and, of course, bad physiology and protoplasm secondary to her medical comorbidities, low BMI and active medications that inhibit healing.  She is an ex-smoker but is around an active smoker.  All of these difficult issues probably relate to why she has not completely healed in.  I had a anais conversation with the patient and her significant other.  I reiterated the importance of staying away from nicotine.  I think from a standpoint of the wound currently my advice would be to go back to the operating room, excise this unstable scar, recontour the area and close it primarily and then see if wearing this refurbished prosthesis results in either a sustained closed wound or it recurs.  If it does recur, then the only option going forward is to refurbish the entire BKA stump by removing this unstable thin skin and replacing it with a free flap like an ALT flap which is thicker and more cushioned.  This plan was explained.  They understood and agreed.  All risks, benefits and alternatives of the first stage, including pain, infection, bleeding, scarring, asymmetries, recurrence, requirement of further surgeries, DVT, PE, MI, CVA, pneumonia, renal failure and death were explained.  She understood everything and wants to proceed.  I look forward to helping her out in the near future.      Total time spent with the patient was 30 minutes, more than half counseling.      cc:   Joe Walker DPM   [facility]   [address] [place] [address]  [id number] ABI EATON MD              D: 2020   T: 2020   MT: monika      Name:     CARLOS PEDRAZA   MRN:      [id number]        Account:      [id number]   :      1945           Service Date: 2020      Document: [id number]

## 2020-09-23 NOTE — NURSING NOTE
"Chief Complaint   Patient presents with     Consult     consult for possible flap for RLE wound per Dr Walker       Vitals:    09/23/20 1012   BP: (!) 141/72   BP Location: Left arm   Patient Position: Chair   Cuff Size: Adult Small   Pulse: 62   SpO2: 97%   Weight: 49.9 kg (110 lb)   Height: 1.651 m (5' 5\")       Body mass index is 18.3 kg/m .    Tyler Matute, EMT    "

## 2020-09-23 NOTE — LETTER
9/23/2020       RE: Sakshi Jaeger  [address] [facility] [address]     Dear Colleague,    Thank you for referring your patient, Sakshi Jaeger, to the [facility] PLASTIC AND RECONSTRUCTIVE SURGERY at [facility]. Please see a copy of my visit note below.    Service Date: 09/23/2020      CONSULT NOTE      REFERRING PROVIDER:  Joe Walker MD      PRESENTING COMPLAINT:  Consultation for a slow healing wound on the distal end of a right BKA stump.      HISTORY OF PRESENTING COMPLAINT:  Ms. Jaeger is 75 years old.  She has a right BKA.  This occurred in 2005 from a tib-fib fracture that got osteomyelitis requiring BKA.  Over the years, she has been using a lower extremity prosthesis without much issue until 2019 when she had a pressure sore that ended up getting infected requiring a number of debridements including shortening of the stump and primary closure.  Prior to this in 2015, she also had a prepatellar wound that required some form of flap reconstruction.  The latest wound redeveloped the end of last year, and despite continuing to use her lower extremity prosthesis, it was just very slow to heal.  Ultimately, it did heal and 6 weeks ago she started using a recontoured prosthesis and once again it opened up again.  She is here to have it looked at.      PAST MEDICAL HISTORY:  Osteoporosis, rheumatoid arthritis, MI in 2009 requiring stenting, hypertension and hyperlipidemia.      PAST SURGICAL HISTORY:  Right lower extremity surgeries, right knee wound requiring flap closure, left TKA and open appendectomy.      MEDICATIONS:  Prednisone. methotrexate, amlodipine, aspirin, gabapentin, lisinopril, simvastatin.          ALLERGIES:  Nil.      SOCIAL HISTORY:  Used to smoke about 1/2 pack a day for 40 years, quit in 2010.  Drinks socially.      REVIEW OF SYSTEMS:  Denies active chest pain, shortness of breath, has had an MI, no CVA, DVT or PE.      PHYSICAL  EXAMINATION:  Vital signs are stable.  She is afebrile, in no obvious distress.  She is 5 feet 5 inches, 110 pounds, BMI of 18 kg/m2.  She is sitting in a wheelchair.  She does have a prosthesis with her that she has been wearing.  On examination of her right BKA stump, there is an approximately 2 cm wide scar that is indented, basically like a crevice, with a small, less than 1 cm superficial opening.  No evidence for active infection.  She has very thin skin in the BKA stump with underlying bone very close to the skin.      ASSESSMENT AND PLAN:  Based on above findings, a diagnosis of slow healing/nonhealing stump wound was made.  This most likely is related to pressure, poor soft tissue coverage over the BKA stump and, of course, bad physiology and protoplasm secondary to her medical comorbidities, low BMI and active medications that inhibit healing.  She is an ex-smoker but is around an active smoker.  All of these difficult issues probably relate to why she has not completely healed in.  I had a anais conversation with the patient and her significant other.  I reiterated the importance of staying away from nicotine.  I think from a standpoint of the wound currently my advice would be to go back to the operating room, excise this unstable scar, recontour the area and close it primarily and then see if wearing this refurbished prosthesis results in either a sustained closed wound or it recurs.  If it does recur, then the only option going forward is to refurbish the entire BKA stump by removing this unstable thin skin and replacing it with a free flap like an ALT flap which is thicker and more cushioned.  This plan was explained.  They understood and agreed.  All risks, benefits and alternatives of the first stage, including pain, infection, bleeding, scarring, asymmetries, recurrence, requirement of further surgeries, DVT, PE, MI, CVA, pneumonia, renal failure and death were explained.  She understood everything  and wants to proceed.  I look forward to helping her out in the near future.      Total time spent with the patient was 30 minutes, more than half counseling.      Again, thank you for allowing me to participate in the care of your patient.  Sincerely,        RAISA EATON MD  D: 2020   T: 2020   MT: monika      Name:     CARLOS PEDRAZA   MRN:      [id number]        Account:      [id number]   :      1945           Service Date: 2020     Document: [id number]      cc:   Joe Walker DPM   [facility]   [address] [place] [address]  [id number]

## 2020-09-24 ENCOUNTER — OFFICE VISIT (OUTPATIENT)
Dept: OTOLARYNGOLOGY | Facility: CLINIC | Age: 75
End: 2020-09-24
Payer: COMMERCIAL

## 2020-09-24 ENCOUNTER — TELEPHONE (OUTPATIENT)
Dept: SURGERY | Facility: CLINIC | Age: 75
End: 2020-09-24

## 2020-09-24 VITALS
RESPIRATION RATE: 16 BRPM | SYSTOLIC BLOOD PRESSURE: 148 MMHG | OXYGEN SATURATION: 97 % | BODY MASS INDEX: 18.33 KG/M2 | DIASTOLIC BLOOD PRESSURE: 79 MMHG | HEART RATE: 71 BPM | HEIGHT: 65 IN | WEIGHT: 110 LBS

## 2020-09-24 DIAGNOSIS — H93.8X3 EAR FULLNESS, BILATERAL: ICD-10-CM

## 2020-09-24 DIAGNOSIS — H61.23 BILATERAL IMPACTED CERUMEN: Primary | ICD-10-CM

## 2020-09-24 PROBLEM — T81.89XD NON-HEALING SURGICAL WOUND, SUBSEQUENT ENCOUNTER: Status: ACTIVE | Noted: 2020-09-24

## 2020-09-24 PROCEDURE — 69210 REMOVE IMPACTED EAR WAX UNI: CPT | Mod: 50 | Performed by: OTOLARYNGOLOGY

## 2020-09-24 ASSESSMENT — PAIN SCALES - GENERAL: PAINLEVEL: NO PAIN (0)

## 2020-09-24 ASSESSMENT — MIFFLIN-ST. JEOR: SCORE: 994.84

## 2020-09-24 NOTE — LETTER
9/24/2020         RE: Sakshi Jaeger  [address] [facility] [address]        Dear Colleague,    Thank you for referring your patient, Sakshi Jaeger, to the [facility]. Please see a copy of my visit note below.    History of Present Illness - Sakshi Jaeger is a 74 [place] old female last seen on 4/20/20.    She is here for a pre planned regular procedure visit    To review, she has had issues with ear wax, more on the RIGHT than LEFT, and for many years Dr. OSMAN has helped her with alligators or irrigations.  However, over the last year prior to first seeing me in January 2019, it seems much more tenacious and sticky.  The other day the LEFT ear got some water in it from the shower and had been totally clogged since then.    No previous ear surgery, no chronic ear disease, no bleeding or drainage from the ears.    I had to procedurally clear tremendous cerumen from both ears.  And after the initial visit I asked her to come back in four months and she was again impacted severely.  So at this point I have asked her to come back at least every 4-6 months for debridement.    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)     Osteomyelitis (H)       Current Medications -   Current Outpatient Medications:      acetaminophen (TYLENOL) 325 MG tablet, Take 2 tablets (650 mg) by mouth every 4 hours, Disp: , Rfl:      alendronate (FOSAMAX) 35 MG tablet, TAKE 1 TAB BY MOUTH WITH 8OZ OF WATER ONCE EVERY WEEK BEFORE BREAKFAST,  REMAIN UPRIGHT AT THIS TIME, Disp: 12 tablet, Rfl: 2     amLODIPine (NORVASC) 5 MG tablet, Take 1 tablet (5 mg) by mouth daily, Disp: 90 tablet, Rfl: 3     Ascorbic Acid (VITAMIN C) 500 MG CHEW, , Disp: , Rfl:      aspirin (ASA) 81 MG tablet, Take 1 tablet (81 mg) by mouth daily, Disp:  , Rfl:      calcium carbonate 600 mg-vitamin D 400 units (CALCIUM 600 + D) 600-400 MG-UNIT per tablet, Take 2 tablets by mouth daily, Disp: , Rfl:      gabapentin (NEURONTIN) 300 MG capsule, Take 2 capsules (600 mg) by mouth 3 times daily, Disp: , Rfl:      lactobacillus rhamnosus, GG, (CULTURELL) capsule, Take 1 capsule by mouth 2 times daily, Disp: , Rfl:      leflunomide (ARAVA) 20 MG tablet, Take 1 tablet by mouth every 24 hours, Disp: , Rfl:      Lidocaine (LIDOCARE) 4 % Patch, Place 1 patch onto the skin every 24 hours To prevent lidocaine toxicity, patient should be patch free for 12 hrs daily., Disp: , Rfl:      lisinopril (ZESTRIL) 20 MG tablet, Take 1 tablet (20 mg) by mouth daily, Disp: 90 tablet, Rfl: 3     methotrexate 2.5 MG tablet, Take 1 tablet by mouth, Disp: , Rfl:      metoprolol tartrate (LOPRESSOR) 50 MG tablet, Take 1 tablet (50 mg) by mouth 2 times daily, Disp: 180 tablet, Rfl: 3     multivitamin w/minerals (THERA-VIT-M) tablet, Take 1 tablet by mouth daily, Disp: , Rfl:      niacin 500 MG tablet, Take 1 tablet (500 mg) by mouth At Bedtime, Disp: 100 tablet, Rfl: 6     order for DME, Equipment being ordered: New foam (custom shaped protective cover) for right below the knee prostheses, Disp: 1 Units, Rfl: 0     polyethylene glycol (MIRALAX/GLYCOLAX) packet, Take 17 g by mouth 2 times daily as needed for constipation, Disp: , Rfl:      predniSONE (DELTASONE) 5 MG tablet, Take 1 tablet (5 mg) by mouth daily, Disp: , Rfl: 0     senna-docusate (SENOKOT-S/PERICOLACE) 8.6-50 MG tablet, Take 1 tablet by mouth 2 times daily, Disp: , Rfl:      simvastatin (ZOCOR) 20 MG tablet, Take 1 tablet (20 mg) by mouth At Bedtime,  Disp: 90 tablet, Rfl: 3    Allergies -   No Known Allergies    Social History -   Social History     Socioeconomic History     Marital status: Single     Spouse name: None     Number of children: None     Years of education: None     Highest education level: None   Social Needs     Financial resource strain: None     Food insecurity - worry: None     Food insecurity - inability: None     Transportation needs - medical: None     Transportation needs - non-medical: None   Occupational History     None   Tobacco Use     Smoking status: Former Smoker     Packs/day: 0.50     Years: 45.00     Pack years: 22.50     Types: Cigarettes     Last attempt to quit: 2010     Years since quittin.9     Smokeless tobacco: Never Used   Substance and Sexual Activity     Alcohol use: Yes     Comment: occsionally-3 -4 drinks a week     Drug use: No     Sexual activity: Yes     Partners: Male   Other Topics Concern     Parent/sibling w/ CABG, MI or angioplasty before 65F 55M? No   Social History Narrative     None       Family History -   Family History   Problem Relation Age of Onset     Cardiovascular Mother      Cancer Brother      Diabetes No family hx of      Hypertension No family hx of      Cerebrovascular Disease No family hx of      Alzheimer Disease No family hx of      Thyroid Disease No family hx of      Respiratory No family hx of        Review of Systems - As per HPI and PMHx, otherwise 10+ system review of the head and neck, and general constitution is negative.    Physical Exam  There were no vitals taken for this visit.    General - The patient is well nourished and well developed, and appears to have good nutritional status.  Alert and oriented to person and place, answers questions and cooperates with examination appropriately.   Head and Face - Normocephalic and atraumatic, with no gross asymmetry noted of the contour of the facial features.  The facial nerve is intact, with strong symmetric movements.  Voice  and Breathing - The patient was breathing comfortably without the use of accessory muscles. There was no wheezing, stridor, or stertor.  The patients voice was clear and strong, and had appropriate pitch and quality.  Eyes - Extraocular movements intact, and the pupils were reactive to light.  Sclera were not icteric or injected, conjunctiva were pink and moist.  Mouth - Examination of the oral cavity showed pink, healthy oral mucosa. No lesions or ulcerations noted.  The tongue was mobile and midline, and the dentition were in good condition.        Cerumen Removal    Physical Exam and Procedure  Ears - On examination of the ears, I found that the BOTH sides had cerumen impaction.  Therefore, I positioned them in the examination chair in a semi-supine position, beginning with the right side.  I used the binocular surgical microscope to perform cerumen removal.  I began by using a cerumen loop to gently lift the edges of the cerumen mass away from the walls of the external canal.  Once I did this, I was able to suction away fragments of wax and debris using suction.  Once the mass was loose enough, the entire plug was pulled from the canal.  The tympanic membrane was intact, no sign of perforation or middle ear effusion.    I turned my attention to the contralateral side once again using the binocular surgical microscope to perform cerumen removal.  I began by using a cerumen loop to gently lift the edges of the cerumen mass away from the walls of the external canal.  Once I did this, I was able to suction away fragments of wax and debris using suction.  Once the mass was loose enough, the entire plug was pulled from the canal.  The tympanic membrane was intact, no sign of perforation or middle ear effusion.        A/P - Sakshi Jaeger is a 74 year old female  (H61.23) Bilateral impacted cerumen  (primary encounter diagnosis)  (H93.8X3) Ear fullness, bilateral    The patient has had their cerumen procedurally  removed today.  I have discussed ear care at home, including avoiding qtips or any other object placed in the canal.  I have also discussed that over the counter cerumen kits like Debrox or Cerumenex can be useful.    See me in another 4-5 months    Again, thank you for allowing me to participate in the care of your patient.        Sincerely,        Pito Mosher MD

## 2020-09-24 NOTE — TELEPHONE ENCOUNTER
Surgery is scheduled with Dr. Perea on 12/17 at [facility].  Scheduled per MD.    H&P: to be completed by PCP.  POST-OP: 12/29    The surgery packet was provided via letter in the mail.    Pre-op consult with Dr. Perea on 12/2.     Pt is aware that they will be contacted to schedule a COVID-19 test before surgery.    They are aware that they will receive a call  ~2 days prior to the scheduled procedure and will be given an exact arrival/start time.    I contacted the patient via phone and left a message to confirm the scheduled dates. Letter also sent.

## 2020-09-24 NOTE — PATIENT INSTRUCTIONS
Sakshi to follow up with Primary Care provider regarding elevated blood pressure.      Scheduling Information  To schedule your CT/MRI scan, please contact [facility] at [phone] OR [facility] at [phone]    To schedule your Surgery, please contact our Specialty Schedulers at [phone]      ENT Clinic Locations Clinic Hours Telephone Number     [facility]  [address] [place] [address]   Monday:           1:00pm -- 5:00pm    Friday:              8:00am - 12:00pm   To schedule/reschedule an appointment with   Dr. Mosher,   please contact our   Specialty Scheduling Department at:     [phone]       [facility]  [address] [place] [address] Tuesday:          8:00am -- 2:00pm         Urgent Care Locations Clinic Hours Telephone Numbers     [facility]  [address] [place] [address]     Monday-Friday:     11:00am - 9:00pm    Saturday-Sunday:  9:00am - 5:00pm   [phone]     [facility]  [address] [place] [address]     Monday-Friday:      5:00pm - 9:00pm     Saturday-Sunday:  9:00am - 5:00pm   [phone]

## 2020-09-24 NOTE — NURSING NOTE
Sakshi to follow up with Primary Care provider regarding elevated blood pressure.  Italo Lomax MA

## 2020-09-30 DIAGNOSIS — Z11.59 ENCOUNTER FOR SCREENING FOR OTHER VIRAL DISEASES: Primary | ICD-10-CM

## 2020-10-20 ENCOUNTER — TELEPHONE (OUTPATIENT)
Dept: ORTHOPEDICS | Facility: CLINIC | Age: 75
End: 2020-10-20

## 2020-10-20 NOTE — TELEPHONE ENCOUNTER
ATC called pt and her  regarding what dressing supplies do they need. They informed that they need Tegaderm, 4x4 gauze, and Q-tips. ATC sent it to the first floor and they will come pick it up from there.            -LATHA Stein- Orthopedics

## 2020-11-04 ENCOUNTER — DOCUMENTATION ONLY (OUTPATIENT)
Dept: CARE COORDINATION | Facility: CLINIC | Age: 75
End: 2020-11-04

## 2020-11-17 NOTE — TELEPHONE ENCOUNTER
[facility] Call Center    Phone Message    May a detailed message be left on voicemail: yes     Reason for Call: Other: Pt's  Santi is calling to speak with Dr. Walker's care team about getting more wound care supplies for Pt.  He said they normally pick them up at the [facility] but he is asking for a call back to see what other options are available     Action Taken: Message routed to:  [facility] ([facility]): Ortho    Travel Screening: Not Applicable                                                                       Is This A New Presentation, Or A Follow-Up?: Mole What Type Of Note Output Would You Prefer (Optional)?: Standard Output How Severe Is Your Skin Lesion?: moderate Has Your Skin Lesion Been Treated?: not been treated

## 2020-11-18 NOTE — PROGRESS NOTES
Chief Complaint   Patient presents with     Right Leg - WOUND CARE     Wound Check     right lower leg wound check         No Known Allergies      Subjective: Sakshi is a 75 year old female who presents to the clinic today for a follow up of right BKA ulceration.  She is currently scheduled to have a scar revision with Dr. Perea in a month. Area does leak still. Sometimes painful. She does use the Polymem.     Objective  Data Unavailable Data Unavailable Data Unavailable Data Unavailable Data Unavailable 0 lbs 0 oz      A wound is noted at right  BKA stump measuring 0.5cm x 2cm x 0.3cm.    Ly Classification: 2    Wound base: Red/Granulation    Edges: epibole    Drainage: slight/serous    Odor: no    Underminin O'Clock    Bone Exposure: No    Clinical Signs of Infection: No    After obtaining patient consent, the wound was irrigated with copious amounts of saline.     Barriers to Healing: Wound is in an area of pressure in the prosthetic.     Treatment Plan: Hyderofera Blue strips and covered with Mepitel One.     Pt Ability to Follow Plan: Good.           Assessment: Sakshi is a 75-year-old woman with right BKA and ulceration. She will be having sx with Dr. Perea in about a month.     Plan:   - Pt seen and evaluated  - Dress wound as described above. .  - Stop prosthetic for now.   - See Dr. Perea.   - Pt to return to clinic PRN.

## 2020-11-20 ENCOUNTER — OFFICE VISIT (OUTPATIENT)
Dept: ORTHOPEDICS | Facility: CLINIC | Age: 75
End: 2020-11-20
Payer: COMMERCIAL

## 2020-11-20 DIAGNOSIS — L97.912 ULCER OF RIGHT LOWER EXTREMITY WITH FAT LAYER EXPOSED (H): ICD-10-CM

## 2020-11-20 DIAGNOSIS — Z89.511 STATUS POST BELOW-KNEE AMPUTATION OF RIGHT LOWER EXTREMITY (H): Primary | ICD-10-CM

## 2020-11-20 PROCEDURE — 99213 OFFICE O/P EST LOW 20 MIN: CPT | Performed by: PODIATRIST

## 2020-11-20 ASSESSMENT — PAIN SCALES - GENERAL: PAINLEVEL: SEVERE PAIN (6)

## 2020-11-20 NOTE — NURSING NOTE
Reason For Visit:   Chief Complaint   Patient presents with     Right Leg - WOUND CARE     Wound Check     right lower leg wound check        There were no vitals taken for this visit.         Vasquez Thapa CMA

## 2020-11-20 NOTE — LETTER
2020         RE: Sakshi Jaeger  [address] [facility] [address]        Dear Colleague,    Thank you for referring your patient, Sakshi Jaeger, to the [facility] [place]. Please see a copy of my visit note below.    Chief Complaint   Patient presents with     Right Leg - WOUND CARE     Wound Check     right lower leg wound check         No Known Allergies      Subjective: Sakshi is a 75 year old female who presents to the clinic today for a follow up of right BKA ulceration.  She is currently scheduled to have a scar revision with Dr. Perea in a month. Area does leak still. Sometimes painful. She does use the Polymem.     Objective  Data Unavailable Data Unavailable Data Unavailable Data Unavailable Data Unavailable 0 lbs 0 oz      A wound is noted at right  BKA stump measuring 0.5cm x 2cm x 0.3cm.    Ly Classification: 2    Wound base: Red/Granulation    Edges: epibole    Drainage: slight/serous    Odor: no    Underminin O'Clock    Bone Exposure: No    Clinical Signs of Infection: No    After obtaining patient consent, the wound was irrigated with copious amounts of saline.     Barriers to Healing: Wound is in an area of pressure in the prosthetic.     Treatment Plan: Hyderofera Blue strips and covered with Mepitel One.     Pt Ability to Follow Plan: Good.           Assessment: Sakshi is a 75-year-old woman with right BKA and ulceration. She will be having sx with Dr. Perea in about a month.     Plan:   - Pt seen and evaluated  - Dress wound as described above. .  - Stop prosthetic for now.   - See Dr. Perea.   - Pt to return to clinic PRN.      Joe Walker DPM

## 2020-11-30 ENCOUNTER — OFFICE VISIT (OUTPATIENT)
Dept: FAMILY MEDICINE | Facility: CLINIC | Age: 75
End: 2020-11-30
Payer: COMMERCIAL

## 2020-11-30 VITALS
OXYGEN SATURATION: 96 % | SYSTOLIC BLOOD PRESSURE: 108 MMHG | TEMPERATURE: 98.2 F | WEIGHT: 112 LBS | HEART RATE: 68 BPM | BODY MASS INDEX: 18.64 KG/M2 | DIASTOLIC BLOOD PRESSURE: 68 MMHG

## 2020-11-30 DIAGNOSIS — Z01.818 PREOP GENERAL PHYSICAL EXAM: Primary | ICD-10-CM

## 2020-11-30 DIAGNOSIS — M81.8 OTHER OSTEOPOROSIS, UNSPECIFIED PATHOLOGICAL FRACTURE PRESENCE: ICD-10-CM

## 2020-11-30 DIAGNOSIS — Z89.511 STATUS POST BELOW-KNEE AMPUTATION OF RIGHT LOWER EXTREMITY (H): ICD-10-CM

## 2020-11-30 DIAGNOSIS — Z97.10 EMPLOYS PROSTHETIC LEG: ICD-10-CM

## 2020-11-30 DIAGNOSIS — I10 ESSENTIAL HYPERTENSION WITH GOAL BLOOD PRESSURE LESS THAN 140/90: ICD-10-CM

## 2020-11-30 DIAGNOSIS — E78.5 HYPERLIPIDEMIA LDL GOAL <100: ICD-10-CM

## 2020-11-30 DIAGNOSIS — M06.9 RHEUMATOID ARTHRITIS, INVOLVING UNSPECIFIED SITE, UNSPECIFIED WHETHER RHEUMATOID FACTOR PRESENT (H): ICD-10-CM

## 2020-11-30 PROCEDURE — 99215 OFFICE O/P EST HI 40 MIN: CPT | Performed by: FAMILY MEDICINE

## 2020-11-30 PROCEDURE — 93000 ELECTROCARDIOGRAM COMPLETE: CPT | Performed by: FAMILY MEDICINE

## 2020-11-30 ASSESSMENT — PAIN SCALES - GENERAL: PAINLEVEL: NO PAIN (0)

## 2020-11-30 NOTE — PATIENT INSTRUCTIONS

## 2020-11-30 NOTE — PROGRESS NOTES
[address] [address]  [phone]  Dept: [phone]    PRE-OP EVALUATION:  Today's date: 2020    Sakshi aJeger (: 1945) presents for pre-operative evaluation assessment as requested by Dr. Perea.  She requires evaluation and anesthesia risk assessment prior to undergoing surgery/procedure for treatment of stump scar revision .    Proposed Surgery/ Procedure: stump scar revision  Date of Surgery/ Procedure: 20  Time of Surgery/ Procedure: unknown  Hospital/Surgical Facility: [facility]  Surgery Fax Number: Note does not need to be faxed, will be available electronically in Epic.  Primary Physician: Nick De La Cruz  Type of Anesthesia Anticipated: to be determined    Preoperative Questionnaire:   yes - Have you ever had a heart attack or stroke?  yes - Have you ever had surgery on your heart or blood vessels, such as a stent, coronary (heart) bypass, or surgery on an artery in the head, neck, heart, or legs?  No - Do you have chest pain when you are physically active?  No - Do you have a history of heart failure?  No - Do you currently have a cold, bronchitis, or symptoms of other respiratory (head and chest) infections?  No - Do you have a cough, shortness of breath, or wheezing?  No - Do you or anyone in your family have a history of blood clots?  No - Do you or anyone in your family have a serious bleeding problem, such as long-lasting bleeding after surgeries or cuts?  yes - Have you ever had anemia or been told to take iron pills?  No - Have you had any abnormal blood loss such as black, tarry or bloody stools, or abnormal vaginal bleeding?  yes - Have you ever had a blood transfusion?  Yes - Are you willing to have a blood transfusion if it is medically needed before, during, or after your surgery?  No - Have you or anyone in your family ever had problems with anesthesia (sedation for  surgery)?  No - Do you have sleep apnea, excessive snoring, or daytime drowsiness?   No - Do you have any artifical heart valves or other implanted medical devices, such as a pacemaker, defibrillator, or continuous glucose monitor?  No - Do you have any artifical joints?  No - Are you allergic to latex?  No - Is there any chance that you may be pregnant?    Patient has a Health Care Directive or Living Will:  N/A    HPI:     HPI related to upcoming procedure: Patient has prosthetic leg, right side, status post below-knee amputation. She has scar tissue of her Stump. She is scheduled to have reconstruction of her stump.    Previous history of coronary artery disease, status post stent he has been stable she has no recent.  Chest pain, short of breath.  She had a normal 2D echo from October 2019.  With ejection fraction of 60 to 65%.    History of rheumatoid arthritis, she is on a low-dose of prednisone 5 mg, Leflunomide, and methotrexate.  No lower extremity edema.    See problem list for active medical problems.  Problems all longstanding and stable, except as noted/documented.  See ROS for pertinent symptoms related to these conditions.      MEDICAL HISTORY:     Patient Active Problem List    Diagnosis Date Noted     Non-healing surgical wound, subsequent encounter 09/24/2020     Priority: Medium     Added automatically from request for surgery [id number]       Infection of right below knee amputation (H) 10/01/2019     Priority: Medium     Osteomyelitis (H) 10/01/2019     Priority: Medium     Added automatically from request for surgery [id number]       Sacroiliac joint pain 06/07/2018     Priority: Medium     Coronary artery disease involving native coronary artery of native heart without angina pectoris 09/27/2016     Priority: Medium     Essential hypertension with goal blood pressure less than 140/90 08/25/2016     Priority: Medium     Status post below-knee amputation (H) 12/26/2012     Priority: Medium     Problem  list name updated by automated process. Provider to review       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     Priority: Medium     Advanced directives, counseling/discussion 11/25/2011     Priority: Medium     Advance Care Planning 9/30/2016: ACP Review of Chart / Resources Provided:  Reviewed chart for advance care plan.  Sakshi Jaeger has no plan or code status on file. Discussed available resources and provided with information. Confirmed code status reflects current choices pending further ACP discussions.  Confirmed/documented legally designated decision makers.    Added by Mishel Wallace        Discussed advance care planning with patient; information given to patient to review. Jennifer Jaeger, Clinical Medical Assistant   11/25/2011          Hyperlipidemia LDL goal <100 11/12/2010     Priority: Medium     Iron deficiency anemia 09/15/2005     Priority: Medium     Problem list name updated by automated process. Provider to review       Osteoporosis 02/16/2005     Priority: Medium     Problem list name updated by automated process. Provider to review       Rheumatoid arthritis (H) 02/16/2005     Priority: Medium     Problem list name updated by automated process. Provider to review        Past Medical History:   Diagnosis Date     BENIGN HYPERTENSION 3/1/2005     CAD (coronary artery disease) 1/26/2011     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     Hypertension goal BP (blood pressure) < 130/80 1/26/2011     IRON DEFIC ANEMIA NOS 9/15/2005     Lower limb amputation, below knee 12/26/2012     MI (myocardial infarction) (H)     3/2010     OSTEOPOROSIS NOS 2/16/2005     Rheumatoid arthritis(714.0) 2/16/2005     Past Surgical History:   Procedure Laterality Date     ---------INCISION AND DRAINAGE  3/5/14     AMPUTATION BELOW KNEE RT/LT  2005    right lowwer leg.      APPENDECTOMY       ARTHROPLASTY KNEE  4/27/2011    Procedure:ARTHROPLASTY KNEE; Surgeon:JESSE HAWKINS; Location:[facility] OR     coronary stent       IRRIGATION AND  DEBRIDEMENT LOWER EXTREMITY, COMBINED Right 10/9/2019    Procedure: Irrigation and debridement Right leg;  Surgeon: Doron Mott MD;  Location: [facility] OR     SURGICAL HISTORY OF -       Appendectomy     Current Outpatient Medications   Medication Sig Dispense Refill     acetaminophen (TYLENOL) 325 MG tablet Take 2 tablets (650 mg) by mouth every 4 hours       alendronate (FOSAMAX) 35 MG tablet TAKE 1 TAB BY MOUTH WITH 8OZ OF WATER ONCE EVERY WEEK BEFORE BREAKFAST, REMAIN UPRIGHT AT THIS TIME 12 tablet 2     amLODIPine (NORVASC) 5 MG tablet Take 1 tablet (5 mg) by mouth daily 90 tablet 3     Ascorbic Acid (VITAMIN C) 500 MG CHEW        aspirin (ASA) 81 MG tablet Take 1 tablet (81 mg) by mouth daily       calcium carbonate 600 mg-vitamin D 400 units (CALCIUM 600 + D) 600-400 MG-UNIT per tablet Take 2 tablets by mouth daily       gabapentin (NEURONTIN) 300 MG capsule Take 2 capsules (600 mg) by mouth 3 times daily       lactobacillus rhamnosus, GG, (CULTURELL) capsule Take 1 capsule by mouth 2 times daily       leflunomide (ARAVA) 20 MG tablet Take 1 tablet by mouth every 24 hours       Lidocaine (LIDOCARE) 4 % Patch Place 1 patch onto the skin every 24 hours To prevent lidocaine toxicity, patient should be patch free for 12 hrs daily.       lisinopril (ZESTRIL) 20 MG tablet Take 1 tablet (20 mg) by mouth daily 90 tablet 3     methotrexate 2.5 MG tablet Take 1 tablet by mouth       metoprolol tartrate (LOPRESSOR) 50 MG tablet Take 1 tablet (50 mg) by mouth 2 times daily 180 tablet 3     multivitamin w/minerals (THERA-VIT-M) tablet Take 1 tablet by mouth daily       niacin 500 MG tablet Take 1 tablet (500 mg) by mouth At Bedtime 100 tablet 6     order for DME Equipment being ordered: New foam (custom shaped protective cover) for right below the knee prostheses 1 Units 0     polyethylene glycol (MIRALAX/GLYCOLAX) packet Take 17 g by mouth 2 times daily as needed for constipation       predniSONE (DELTASONE) 5 MG  tablet Take 1 tablet (5 mg) by mouth daily  0     senna-docusate (SENOKOT-S/PERICOLACE) 8.6-50 MG tablet Take 1 tablet by mouth 2 times daily       simvastatin (ZOCOR) 20 MG tablet Take 1 tablet (20 mg) by mouth At Bedtime 90 tablet 3     OTC products: None, except as noted above    No Known Allergies   Latex Allergy: NO    Social History     Tobacco Use     Smoking status: Former Smoker     Packs/day: 0.50     Years: 45.00     Pack years: 22.50     Types: Cigarettes     Quit date: 2/26/2010     Years since quitting: 10.7     Smokeless tobacco: Never Used     Tobacco comment: pt has recently started smoking again d/t pain   Substance Use Topics     Alcohol use: Yes     Comment: occsionally-3 -4 drinks a week     History   Drug Use No       REVIEW OF SYSTEMS:   CONSTITUTIONAL: NEGATIVE for fever, chills, change in weight  INTEGUMENTARY/SKIN: NEGATIVE for worrisome rashes, moles or lesions  EYES: NEGATIVE for vision changes or irritation  ENT/MOUTH: NEGATIVE for ear, mouth and throat problems  RESP: NEGATIVE for significant cough or SOB  BREAST: NEGATIVE for masses, tenderness or discharge  CV: NEGATIVE for chest pain, palpitations or peripheral edema  GI: NEGATIVE for nausea, abdominal pain, heartburn, or change in bowel habits  : NEGATIVE for frequency, dysuria, or hematuria  MUSCULOSKELETAL: NEGATIVE for significant arthralgias or myalgia  NEURO: NEGATIVE for weakness, dizziness or paresthesias  ENDOCRINE: NEGATIVE for temperature intolerance, skin/hair changes  HEME: NEGATIVE for bleeding problems  PSYCHIATRIC: NEGATIVE for changes in mood or affect    EXAM:   There were no vitals taken for this visit.    GENERAL APPEARANCE: healthy, alert and no distress     HENT: ear canals and TM's normal and nose and mouth without ulcers or lesions     NECK: no adenopathy, no asymmetry, masses, or scars and thyroid normal to palpation     RESP: lungs clear to auscultation - no rales, rhonchi or wheezes     CV: regular rates  and rhythm, normal S1 S2, no S3 or S4 and no murmur, click or rub     ABDOMEN:  soft, nontender, no HSM or masses and bowel sounds normal     MS: extremities normal- no gross deformities noted, no evidence of inflammation in joints, FROM in all extremities.     SKIN: no suspicious lesions or rashes     NEURO: Normal strength and tone, sensory exam grossly normal, mentation intact and speech normal     PSYCH: mentation appears normal. and affect normal/bright     LYMPHATICS: No cervical adenopathy    DIAGNOSTICS:   EKG: Sinuses Bradycardia, nonspecific T- abnormality., unchanged from previous tracings  2D echo from October 2019 was normal with ejection fraction of 60 to 65%.    Recent Labs   Lab Test 03/12/20  1518 11/11/19 10/13/19  0613 10/11/19  0646 10/11/19  0646 10/09/19  0930 10/09/19  0930 10/01/19  1309 10/01/19  1309   HGB 12.2  --  7.5*  --  7.8*   < >  --    < > 10.5*     --  563*  --  505*   < >  --    < > 357   INR  --   --   --   --   --   --  1.31*  --  1.38*   NA  --   --  136  --  136   < >  --    < > 128*   POTASSIUM  --  3.8 3.7   < > 3.3*   < >  --    < > 3.7   CR  --  0.69 0.55  --  0.47*   < >  --    < > 0.56    < > = values in this interval not displayed.        IMPRESSION:   Reason for surgery/procedure: Reconstructions for right stump  Diagnosis/reason for consult: PreOP, history and physical.    The proposed surgical procedure is considered INTERMEDIATE risk.    REVISED CARDIAC RISK INDEX  The patient has the following serious cardiovascular risks for perioperative complications such as (MI, PE, VFib and 3  AV Block):  No serious cardiac risks  INTERPRETATION: 0 risks: Class I (very low risk - 0.4% complication rate)    The patient has the following additional risks for perioperative complications:  No identified additional risks     Diagnosis Comments   1. Preop general physical exam  EKG 12-lead complete w/read - Clinics    2. Status post below-knee amputation of right lower  "extremity (H)     3. Employs prosthetic leg     4. Essential hypertension with goal blood pressure less than 140/90  EKG 12-lead complete w/read - Clinics    5. Hyperlipidemia LDL goal <100     6. Rheumatoid arthritis, involving unspecified site, unspecified whether rheumatoid factor present (H)     7. Other osteoporosis, unspecified pathological fracture presence       Asked patient to check with her rheumatology, she has a follow-up appointment with him in 2 days.   if she could be able to stop her for the prednisone, and Leflunomide.    .  Patient Instructions       Preparing for Your Surgery  Getting started  A surgery nurse will call you to review your health history and instructions. They will give you an arrival time based on your scheduled surgery time.  Please be ready to share the following:    Your doctor's clinic name and phone number    Your medical, surgical and anesthesia history    A list of allergies and sensitivities    A list of medicines, including herbal treatments and over-the-counter drugs    Whether the patient has a legal guardian (ask how to send us the papers in advance)  If your child is having surgery, please ask for a copy of Preparing for Your Child's Surgery.    Preparing for surgery    Within 30 days of surgery: Have an exam at your family clinic (primary care clinic), or go to a pre-operative clinic. This exam is called a \"History and Physical,\" or H&P.    At your H&P exam, talk to your care team about all medicines you take. If you need to stop any medicines before surgery, ask when to start taking them again.  ? We do this for your safety. Many medicines can make you bleed too much during surgery. Some change how well surgery (anesthesia) drugs work.    Call your insurance company to see what it will and won't pay for. Ask if they need to pre-approve the surgery. (If no insurance, call [phone].)    Call your surgeon's clinic if there's any change in your health. This includes " signs of a cold or flu (sore throat, runny nose, cough, rash, fever). It also includes a scrape or scratch near the surgery site.    If you have questions on the day of surgery, call your surgery center.  Eating and drinking guidelines  For your safety: Unless your surgeon tells you otherwise, follow the guidelines below.    Eat and drink as usual until 8 hours before surgery. After that, no food or milk.    Drink clear liquids until 2 hours before surgery. These are liquids you can see through, like water, Gatorade and Propel Water. You may also have black coffee and tea (no cream or milk).    Nothing by mouth within 2 hours of surgery. This includes gum, candy and breath mints.    Stop alcohol the midnight before surgery.    If your family clinic tells you to take medicine on the morning of surgery, it's okay to take it with a sip of water.  Preventing infection    Shower or bathe the night before and morning of your surgery. Follow the instructions your clinic gave you. (If no instructions, use regular soap.)    Don't shave or clip hair near your surgery site. This can lead to skin infection.    Don't smoke the morning of surgery. Smoking increases the risk of infection. You may chew nicotine gum up to 2 hours before surgery. A nicotine patch is okay.  ? Note: Some surgeries require you to completely quit smoking and nicotine. Check with your surgeon.    Your care team will make every effort to keep you safe from infection. We will:  ? Clean our hands often with soap and water (or an alcohol-based hand rub).  ? Clean the skin at your surgery site with a special soap that kills germs. We'll also remove hair from the site as needed.  ? Wear special hair covers, masks, gowns and gloves during surgery.  ? Give antibiotic medicine, if prescribed. Not all surgeries need antibiotics.  What to bring on the day of surgery    Photo ID and insurance card    Copy of your health care directive, if you have one    Glasses and  hearing aides (bring cases)  ? You can't wear contacts during surgery    Inhaler and eye drops, if you use them (tell us about these when you arrive)    CPAP machine or breathing device, if you use them    A few personal items, if spending the night    If you have . . .  ? A pacemaker or ICD (cardiac defibrillator): Bring the ID card.  ? An implanted stimulator: Bring the remote control.  ? A legal guardian: Bring a copy of the certified (court-stamped) guardianship papers.  Please remove any jewelry, including body piercings. Leave jewelry and other valuables at home.  If you're going home the day of surgery  Important: If you don't follow the rules below, we must cancel your surgery.     Arrange for someone to drive you home after surgery. You may not drive, take a taxi or take public transportation by yourself (unless you'll have local anesthesia only).    Arrange for a responsible adult to stay with you overnight. If you don't, we may keep you in the hospital overnight, and you may need to pay the costs yourself.  Questions?   If you have any questions for your care team, list them here: _________________________________________________________________________________________________________________________________________________________________________________________________________________________________________________________________________________________________________________________  For informational purposes only. Not to replace the advice of your health care provider. Copyright   6373-1465 [facility]. All rights reserved. Clinically reviewed by Marie Montano MD. [employer] ([employer] [id number] - REV 07/19.      Please check with your Rheumatologist if able to hold Prednisone, Leflunomide, and Methotrexate.      RECOMMENDATIONS:     --Consult hospital rounder / IM to assist post-op medical management    --Patient is to take all scheduled medications on the day of surgery EXCEPT for modifications  listed below.    APPROVAL GIVEN to proceed with proposed procedure, without further diagnostic evaluation       Signed Electronically by: Josr Hernandez MD    Copy of this evaluation report is provided to requesting physician.    [facility] Preop Guidelines    Revised Cardiac Risk Index

## 2020-12-02 ENCOUNTER — VIRTUAL VISIT (OUTPATIENT)
Dept: PLASTIC SURGERY | Facility: CLINIC | Age: 75
End: 2020-12-02
Payer: COMMERCIAL

## 2020-12-02 VITALS — BODY MASS INDEX: 19.12 KG/M2 | HEIGHT: 64 IN | WEIGHT: 112 LBS

## 2020-12-02 DIAGNOSIS — T81.89XD NON-HEALING SURGICAL WOUND, SUBSEQUENT ENCOUNTER: Primary | ICD-10-CM

## 2020-12-02 PROCEDURE — 99213 OFFICE O/P EST LOW 20 MIN: CPT | Mod: 95 | Performed by: PLASTIC SURGERY

## 2020-12-02 ASSESSMENT — MIFFLIN-ST. JEOR: SCORE: 988.03

## 2020-12-02 ASSESSMENT — PAIN SCALES - GENERAL: PAINLEVEL: NO PAIN (0)

## 2020-12-02 NOTE — LETTER
"12/2/2020       RE: Sakshi Jaeger  [address] [address]     Dear Colleague,    Thank you for referring your patient, Sakshi Jaeger, to the [facility] [place] [facility]. Please see a copy of my visit note below.    Sakshi Jaeger is a 75 year old female who is being evaluated via a billable telephone visit.      The patient has been notified of following:     \"This telephone visit will be conducted via a call between you and your physician/provider. We have found that certain health care needs can be provided without the need for a physical exam.  This service lets us provide the care you need with a short phone conversation.  If a prescription is necessary we can send it directly to your pharmacy.  If lab work is needed we can place an order for that and you can then stop by our lab to have the test done at a later time.    Telephone visits are billed at different rates depending on your insurance coverage. During this emergency period, for some insurers they may be billed the same as an in-person visit.  Please reach out to your insurance provider with any questions.    If during the course of the call the physician/provider feels a telephone visit is not appropriate, you will not be charged for this service.\"    Patient has given verbal consent for Telephone visit?  Yes    What phone number would you like to be contacted at? [phone]    How would you like to obtain your AVS? Mail a copy    Phone call duration: 15 minutes            Service Date: 12/02/2020      PRESENTING COMPLAINT:  Preoperative visit for upcoming right stump scar revision scheduled for 12/17/2020.      HISTORY OF PRESENTING COMPLAINT:  Ms. Jaeger is 75 years old, scheduled for the above-named procedure.  No change in history and physical exam.      ASSESSMENT AND PLAN:  Based on above findings, a diagnosis of a right stump " scar requiring revision was made.  Plan is to excise the original nonhealing scar, clean it out, wash it out and close it primarily.  Risks of pain, infection, bleeding, scarring, asymmetry, seromas, hematomas, wound breakdown, wound dehiscence, recurrence, requirement of further surgeries in the future, injury to deeper structures, DVT, PE, MI, CVA, pneumonia, renal failure and death were explained.  They understood everything and want to proceed.  I look forward to helping her out in the near future.      Total time spent with the patient was 15 minutes, more than half counseling.      RAISA EATON MD          D: 2020   T: 2020   MT: EDGARD      Name:     CARLOS PEDRAZA   MRN:      [id number]        Account:      [id number]   :      1945           Service Date: 2020      Document: [id number]

## 2020-12-02 NOTE — NURSING NOTE
"Chief Complaint   Patient presents with     RECHECK     pre op, R stump scar revision DOS 12/17       Vitals:    12/02/20 0953   Weight: 50.8 kg (112 lb)   Height: 1.626 m (5' 4\")       Body mass index is 19.22 kg/m .    Tyler Matute, EMT    "

## 2020-12-02 NOTE — PROGRESS NOTES
"Sakshi Jaeger is a 75 year old female who is being evaluated via a billable telephone visit.      The patient has been notified of following:     \"This telephone visit will be conducted via a call between you and your physician/provider. We have found that certain health care needs can be provided without the need for a physical exam.  This service lets us provide the care you need with a short phone conversation.  If a prescription is necessary we can send it directly to your pharmacy.  If lab work is needed we can place an order for that and you can then stop by our lab to have the test done at a later time.    Telephone visits are billed at different rates depending on your insurance coverage. During this emergency period, for some insurers they may be billed the same as an in-person visit.  Please reach out to your insurance provider with any questions.    If during the course of the call the physician/provider feels a telephone visit is not appropriate, you will not be charged for this service.\"    Patient has given verbal consent for Telephone visit?  Yes    What phone number would you like to be contacted at? [phone]    How would you like to obtain your AVS? Mail a copy    Phone call duration: 15 minutes    Terell Perea MD      "

## 2020-12-03 NOTE — PROGRESS NOTES
Service Date: 2020      PRESENTING COMPLAINT:  Preoperative visit for upcoming right stump scar revision scheduled for 2020.      HISTORY OF PRESENTING COMPLAINT:  Ms. Jaeger is 75 years old, scheduled for the above-named procedure.  No change in history and physical exam.      ASSESSMENT AND PLAN:  Based on above findings, a diagnosis of a right stump scar requiring revision was made.  Plan is to excise the original nonhealing scar, clean it out, wash it out and close it primarily.  Risks of pain, infection, bleeding, scarring, asymmetry, seromas, hematomas, wound breakdown, wound dehiscence, recurrence, requirement of further surgeries in the future, injury to deeper structures, DVT, PE, MI, CVA, pneumonia, renal failure and death were explained.  They understood everything and want to proceed.  I look forward to helping her out in the near future.      Total time spent with the patient was 15 minutes, more than half counseling.         RAISA EATON MD             D: 2020   T: 2020   MT: EDGARD      Name:     CARLOS JAEGER   MRN:      [id number]        Account:      [id number]   :      1945           Service Date: 2020      Document: [id number]

## 2020-12-14 DIAGNOSIS — Z11.59 ENCOUNTER FOR SCREENING FOR OTHER VIRAL DISEASES: ICD-10-CM

## 2020-12-14 PROCEDURE — U0003 INFECTIOUS AGENT DETECTION BY NUCLEIC ACID (DNA OR RNA); SEVERE ACUTE RESPIRATORY SYNDROME CORONAVIRUS 2 (SARS-COV-2) (CORONAVIRUS DISEASE [COVID-19]), AMPLIFIED PROBE TECHNIQUE, MAKING USE OF HIGH THROUGHPUT TECHNOLOGIES AS DESCRIBED BY CMS-2020-01-R: HCPCS | Performed by: PLASTIC SURGERY

## 2020-12-15 ENCOUNTER — TELEPHONE (OUTPATIENT)
Dept: PLASTIC SURGERY | Facility: CLINIC | Age: 75
End: 2020-12-15

## 2020-12-15 ENCOUNTER — PATIENT OUTREACH (OUTPATIENT)
Dept: PLASTIC SURGERY | Facility: CLINIC | Age: 75
End: 2020-12-15

## 2020-12-15 ENCOUNTER — NURSE TRIAGE (OUTPATIENT)
Dept: NURSING | Facility: CLINIC | Age: 75
End: 2020-12-15

## 2020-12-15 LAB
SARS-COV-2 RNA SPEC QL NAA+PROBE: NOT DETECTED
SPECIMEN SOURCE: NORMAL

## 2020-12-15 NOTE — TELEPHONE ENCOUNTER
Coronavirus (COVID-19) Notification     Reason for call  Patient requesting results     Lab Result    Lab test 2019-nCoV rRt-PCR in process        RN Recommendations/Instructions per [facility]  Continue quarantee and following instructions until you receive the results     Please Contact your PCP clinic or return to the Emergency department if your:    Symptoms worsen or other concerning symptom's.     Patient informed that if test for COVID19 is POSITIVE,  you will receive a call typically within 48 hours from the test date (date lab collected).  If NEGATIVE result, you will receive a letter in the mail or [employer].      Yanelis Jason RN

## 2020-12-15 NOTE — PATIENT INSTRUCTIONS
Spoke with pt regarding request for graft or flap to cover wound. Explained that Dr. Perea does not feel this is necessary. Feels the area can be closed and larger surgery is not needed. If this fails and the area continues to open up or become larger we could always consider a bigger flap closure in the future. Pt states understanding. States she would like to proceed as scheduled. Kerline HAWKINS RN, BSN

## 2020-12-15 NOTE — TELEPHONE ENCOUNTER
Receive call from patient and spouse, patient has surgery on 12/17 with Dr. perea for stump revision. They wanted to clarify the surgery because their understanding was that a graft was going to be done. Inform them, this writer will send message to confirm with Dr. Perea and someone will get back to them.    Leo Queen LPN

## 2020-12-16 ENCOUNTER — ANESTHESIA EVENT (OUTPATIENT)
Dept: SURGERY | Facility: AMBULATORY SURGERY CENTER | Age: 75
End: 2020-12-16

## 2020-12-17 ENCOUNTER — ANESTHESIA (OUTPATIENT)
Dept: SURGERY | Facility: AMBULATORY SURGERY CENTER | Age: 75
End: 2020-12-17

## 2020-12-17 ENCOUNTER — HOSPITAL ENCOUNTER (OUTPATIENT)
Facility: AMBULATORY SURGERY CENTER | Age: 75
Discharge: HOME | End: 2020-12-17
Attending: PLASTIC SURGERY | Admitting: PLASTIC SURGERY
Payer: COMMERCIAL

## 2020-12-17 VITALS
SYSTOLIC BLOOD PRESSURE: 128 MMHG | HEIGHT: 64 IN | RESPIRATION RATE: 18 BRPM | WEIGHT: 112 LBS | HEART RATE: 87 BPM | OXYGEN SATURATION: 93 % | BODY MASS INDEX: 19.12 KG/M2 | TEMPERATURE: 97.9 F | DIASTOLIC BLOOD PRESSURE: 64 MMHG

## 2020-12-17 DIAGNOSIS — T81.89XD NON-HEALING SURGICAL WOUND, SUBSEQUENT ENCOUNTER: ICD-10-CM

## 2020-12-17 PROCEDURE — 88304 TISSUE EXAM BY PATHOLOGIST: CPT | Performed by: PATHOLOGY

## 2020-12-17 PROCEDURE — 27884 AMPUTATION FOLLOW-UP SURGERY: CPT | Mod: RT

## 2020-12-17 RX ORDER — PROPOFOL 10 MG/ML
INJECTION, EMULSION INTRAVENOUS CONTINUOUS PRN
Status: DISCONTINUED | OUTPATIENT
Start: 2020-12-17 | End: 2020-12-17

## 2020-12-17 RX ORDER — FENTANYL CITRATE 50 UG/ML
INJECTION, SOLUTION INTRAMUSCULAR; INTRAVENOUS PRN
Status: DISCONTINUED | OUTPATIENT
Start: 2020-12-17 | End: 2020-12-17

## 2020-12-17 RX ORDER — SODIUM CHLORIDE, SODIUM LACTATE, POTASSIUM CHLORIDE, CALCIUM CHLORIDE 600; 310; 30; 20 MG/100ML; MG/100ML; MG/100ML; MG/100ML
INJECTION, SOLUTION INTRAVENOUS CONTINUOUS
Status: DISCONTINUED | OUTPATIENT
Start: 2020-12-17 | End: 2020-12-17

## 2020-12-17 RX ORDER — NALOXONE HYDROCHLORIDE 0.4 MG/ML
0.4 INJECTION, SOLUTION INTRAMUSCULAR; INTRAVENOUS; SUBCUTANEOUS
Status: DISCONTINUED | OUTPATIENT
Start: 2020-12-17 | End: 2020-12-18 | Stop reason: HOSPADM

## 2020-12-17 RX ORDER — AMOXICILLIN 250 MG
1-2 CAPSULE ORAL 2 TIMES DAILY
Qty: 30 TABLET | Refills: 0 | Status: SHIPPED | OUTPATIENT
Start: 2020-12-17 | End: 2021-03-23

## 2020-12-17 RX ORDER — CEFAZOLIN SODIUM 2 G/50ML
2 SOLUTION INTRAVENOUS
Status: COMPLETED | OUTPATIENT
Start: 2020-12-17 | End: 2020-12-17

## 2020-12-17 RX ORDER — ONDANSETRON 2 MG/ML
4 INJECTION INTRAMUSCULAR; INTRAVENOUS EVERY 30 MIN PRN
Status: DISCONTINUED | OUTPATIENT
Start: 2020-12-17 | End: 2020-12-18 | Stop reason: HOSPADM

## 2020-12-17 RX ORDER — OXYCODONE HYDROCHLORIDE 5 MG/1
5 TABLET ORAL EVERY 4 HOURS PRN
Status: DISCONTINUED | OUTPATIENT
Start: 2020-12-17 | End: 2020-12-18 | Stop reason: HOSPADM

## 2020-12-17 RX ORDER — DEXAMETHASONE SODIUM PHOSPHATE 4 MG/ML
INJECTION, SOLUTION INTRA-ARTICULAR; INTRALESIONAL; INTRAMUSCULAR; INTRAVENOUS; SOFT TISSUE PRN
Status: DISCONTINUED | OUTPATIENT
Start: 2020-12-17 | End: 2020-12-17

## 2020-12-17 RX ORDER — SODIUM CHLORIDE, SODIUM LACTATE, POTASSIUM CHLORIDE, CALCIUM CHLORIDE 600; 310; 30; 20 MG/100ML; MG/100ML; MG/100ML; MG/100ML
INJECTION, SOLUTION INTRAVENOUS CONTINUOUS
Status: DISCONTINUED | OUTPATIENT
Start: 2020-12-17 | End: 2020-12-17 | Stop reason: HOSPADM

## 2020-12-17 RX ORDER — FENTANYL CITRATE 50 UG/ML
25-50 INJECTION, SOLUTION INTRAMUSCULAR; INTRAVENOUS
Status: DISCONTINUED | OUTPATIENT
Start: 2020-12-17 | End: 2020-12-18 | Stop reason: HOSPADM

## 2020-12-17 RX ORDER — OXYCODONE HYDROCHLORIDE 5 MG/1
5-10 TABLET ORAL EVERY 6 HOURS PRN
Qty: 8 TABLET | Refills: 0 | Status: SHIPPED | OUTPATIENT
Start: 2020-12-17 | End: 2021-03-23

## 2020-12-17 RX ORDER — PROPOFOL 10 MG/ML
INJECTION, EMULSION INTRAVENOUS PRN
Status: DISCONTINUED | OUTPATIENT
Start: 2020-12-17 | End: 2020-12-17

## 2020-12-17 RX ORDER — ONDANSETRON 2 MG/ML
INJECTION INTRAMUSCULAR; INTRAVENOUS PRN
Status: DISCONTINUED | OUTPATIENT
Start: 2020-12-17 | End: 2020-12-17

## 2020-12-17 RX ORDER — NALOXONE HYDROCHLORIDE 0.4 MG/ML
0.2 INJECTION, SOLUTION INTRAMUSCULAR; INTRAVENOUS; SUBCUTANEOUS
Status: DISCONTINUED | OUTPATIENT
Start: 2020-12-17 | End: 2020-12-18 | Stop reason: HOSPADM

## 2020-12-17 RX ORDER — MEPERIDINE HYDROCHLORIDE 25 MG/ML
12.5 INJECTION INTRAMUSCULAR; INTRAVENOUS; SUBCUTANEOUS
Status: DISCONTINUED | OUTPATIENT
Start: 2020-12-17 | End: 2020-12-18 | Stop reason: HOSPADM

## 2020-12-17 RX ORDER — ONDANSETRON 4 MG/1
4 TABLET, ORALLY DISINTEGRATING ORAL EVERY 30 MIN PRN
Status: DISCONTINUED | OUTPATIENT
Start: 2020-12-17 | End: 2020-12-18 | Stop reason: HOSPADM

## 2020-12-17 RX ORDER — BUPIVACAINE HYDROCHLORIDE 2.5 MG/ML
INJECTION, SOLUTION INFILTRATION; PERINEURAL PRN
Status: DISCONTINUED | OUTPATIENT
Start: 2020-12-17 | End: 2020-12-17 | Stop reason: HOSPADM

## 2020-12-17 RX ORDER — SODIUM CHLORIDE, SODIUM LACTATE, POTASSIUM CHLORIDE, CALCIUM CHLORIDE 600; 310; 30; 20 MG/100ML; MG/100ML; MG/100ML; MG/100ML
INJECTION, SOLUTION INTRAVENOUS CONTINUOUS
Status: DISCONTINUED | OUTPATIENT
Start: 2020-12-17 | End: 2020-12-18 | Stop reason: HOSPADM

## 2020-12-17 RX ORDER — HYDROMORPHONE HYDROCHLORIDE 1 MG/ML
.3-.5 INJECTION, SOLUTION INTRAMUSCULAR; INTRAVENOUS; SUBCUTANEOUS EVERY 10 MIN PRN
Status: DISCONTINUED | OUTPATIENT
Start: 2020-12-17 | End: 2020-12-18 | Stop reason: HOSPADM

## 2020-12-17 RX ORDER — ONDANSETRON 4 MG/1
4-8 TABLET, ORALLY DISINTEGRATING ORAL EVERY 8 HOURS PRN
Qty: 4 TABLET | Refills: 0 | Status: SHIPPED | OUTPATIENT
Start: 2020-12-17 | End: 2021-03-23

## 2020-12-17 RX ORDER — ACETAMINOPHEN 325 MG/1
975 TABLET ORAL ONCE
Status: COMPLETED | OUTPATIENT
Start: 2020-12-17 | End: 2020-12-17

## 2020-12-17 RX ORDER — CEFAZOLIN SODIUM 1 G/50ML
1 SOLUTION INTRAVENOUS SEE ADMIN INSTRUCTIONS
Status: DISCONTINUED | OUTPATIENT
Start: 2020-12-17 | End: 2020-12-17 | Stop reason: HOSPADM

## 2020-12-17 RX ORDER — LIDOCAINE HYDROCHLORIDE 20 MG/ML
INJECTION, SOLUTION INFILTRATION; PERINEURAL PRN
Status: DISCONTINUED | OUTPATIENT
Start: 2020-12-17 | End: 2020-12-17

## 2020-12-17 RX ADMIN — PROPOFOL 100 MCG/KG/MIN: 10 INJECTION, EMULSION INTRAVENOUS at 08:34

## 2020-12-17 RX ADMIN — DEXAMETHASONE SODIUM PHOSPHATE 4 MG: 4 INJECTION, SOLUTION INTRA-ARTICULAR; INTRALESIONAL; INTRAMUSCULAR; INTRAVENOUS; SOFT TISSUE at 08:43

## 2020-12-17 RX ADMIN — OXYCODONE HYDROCHLORIDE 5 MG: 5 TABLET ORAL at 10:07

## 2020-12-17 RX ADMIN — Medication 100 MCG: at 08:43

## 2020-12-17 RX ADMIN — Medication 100 MCG: at 08:51

## 2020-12-17 RX ADMIN — FENTANYL CITRATE 50 MCG: 50 INJECTION, SOLUTION INTRAMUSCULAR; INTRAVENOUS at 10:02

## 2020-12-17 RX ADMIN — PROPOFOL 130 MG: 10 INJECTION, EMULSION INTRAVENOUS at 08:34

## 2020-12-17 RX ADMIN — FENTANYL CITRATE 25 MCG: 50 INJECTION, SOLUTION INTRAMUSCULAR; INTRAVENOUS at 08:34

## 2020-12-17 RX ADMIN — FENTANYL CITRATE 25 MCG: 50 INJECTION, SOLUTION INTRAMUSCULAR; INTRAVENOUS at 09:57

## 2020-12-17 RX ADMIN — PROPOFOL 30 MG: 10 INJECTION, EMULSION INTRAVENOUS at 09:00

## 2020-12-17 RX ADMIN — FENTANYL CITRATE 25 MCG: 50 INJECTION, SOLUTION INTRAMUSCULAR; INTRAVENOUS at 09:00

## 2020-12-17 RX ADMIN — Medication 100 MCG: at 08:55

## 2020-12-17 RX ADMIN — ACETAMINOPHEN 975 MG: 325 TABLET ORAL at 07:47

## 2020-12-17 RX ADMIN — SODIUM CHLORIDE, SODIUM LACTATE, POTASSIUM CHLORIDE, CALCIUM CHLORIDE: 600; 310; 30; 20 INJECTION, SOLUTION INTRAVENOUS at 08:28

## 2020-12-17 RX ADMIN — CEFAZOLIN SODIUM 2 G: 2 SOLUTION INTRAVENOUS at 08:45

## 2020-12-17 RX ADMIN — Medication 100 MCG: at 08:38

## 2020-12-17 RX ADMIN — FENTANYL CITRATE 25 MCG: 50 INJECTION, SOLUTION INTRAMUSCULAR; INTRAVENOUS at 09:52

## 2020-12-17 RX ADMIN — Medication 100 MCG: at 09:05

## 2020-12-17 RX ADMIN — ONDANSETRON 4 MG: 2 INJECTION INTRAMUSCULAR; INTRAVENOUS at 08:43

## 2020-12-17 RX ADMIN — LIDOCAINE HYDROCHLORIDE 100 MG: 20 INJECTION, SOLUTION INFILTRATION; PERINEURAL at 08:34

## 2020-12-17 ASSESSMENT — MIFFLIN-ST. JEOR: SCORE: 988.03

## 2020-12-17 ASSESSMENT — LIFESTYLE VARIABLES: TOBACCO_USE: 1

## 2020-12-17 NOTE — ANESTHESIA CARE TRANSFER NOTE
Patient: Sakshi Jaeger    Procedure(s):  Right stump scar revision    Diagnosis: Non-healing surgical wound, subsequent encounter [T81.89XD]  Diagnosis Additional Information: No value filed.    Anesthesia Type:   General     Note:  Airway :Room Air  Patient transferred to:PACU  Comments: Cristina Report: Identifed the Patient, Identified the Reponsible Provider, Reviewed the pertinent medical history, Discussed the surgical course, Reviewed Intra-OP anesthesia mangement and issues during anesthesia, Set expectations for post-procedure period and Allowed opportunity for questions and acknowledgement of understanding      Vitals: (Last set prior to Anesthesia Care Transfer)    CRNA VITALS  12/17/2020 0911 - 12/17/2020 0941      12/17/2020             Resp Rate (set):  10                Electronically Signed By: DESIRE Guevara CRNA  December 17, 2020  9:41 AM

## 2020-12-17 NOTE — DISCHARGE INSTRUCTIONS
SCAR REVISION OR EXCISION OF LESIONS POST-OPERATIVE INSTRUCTIONS    Instructions       Have someone drive you home after surgery and help you at home for 1-2      days.      Get plenty of rest.      Follow balanced diet.      Decreased activity may promote constipation, so you may want to add      more raw fruit to your diet, and be sure to increase fluid intake.      Take pain medication as prescribed. Do not take aspirin or any products      containing aspirin.      Do not drink alcohol when taking pain medications.      Even when not taking pain medications, no alcohol for 3 weeks as it      causes fluid retention.      If you are taking vitamins with iron, resume these as tolerated.      Do not smoke, as smoking delays healing and increases the risk of      complications.    Activities      Do not drive until you are no longer taking any pain medications      (narcotics).      Start walking as soon as possible, this helps to reduce swelling and       lowers the chance of blood clots.      Resume normal activities gradually.      Return to work in 1-2 days, depending upon extent of surgery      No strenuous work or stress on wound for 2-3 weeks.    Incision Care      If steri strips have been used, keep steri-strips on; replace if they come off.      Avoid exposing scars to sun for at least 12 months.      Always use a strong sunblock, if sun exposure is unavoidable (SPF 50 or      greater).      Inspect daily for signs of infection.      No tub soaking, bathing, or swimming while sutures or drains are in place.      Keep area clean and dry for first 24 hours.     What to Expect      Some swelling and bruising.      May have slight bleeding from incision.  Apply 4 x 4 gauze with slight pressure to       control bleeding.      Skin grafts and flaps may take several weeks or months to heal; a support garment or      bandage may be necessary for up to a year.    Appearance      Final results of surgery may take a  year or more.    Follow-Up Care      If external sutures have been used, they will be removed in 5-14 days.    Please note my office will call you 1-2 business days after your procedure to check up on how you're doing and to schedule your post-operative appointment.        When to Call      If you have increased swelling or bruising.      If swelling and redness persist after a few days.      If you have increased redness along the incision.      If you have severe or increased pain not relieved by medication.      If you have any side effects to medications; such as, rash, nausea,      headache, vomiting.      If you have an oral temperature over 100.4 degrees.      If you have any yellowish or greenish drainage from the incisions or      notice a foul odor.      If you have bleeding from the incisions that is difficult to control with      light pressure.      If you have loss of feeling or motion.    For Medical Questions, Please Call:      [phone], Monday - Friday, 8 a.m. - 4:30 p.m.      After hours and on weekends, call Hospital Paging at [phone] and      ask for the Plastic Surgeon on call.    [facility]  Home Care Following Anesthesia  For 24 hours after surgery:  1. Get plenty of rest.  A responsible adult must stay with you for at least 24 hours after you leave the surgery center.  2. Do not drive or use heavy equipment.  If you have weakness or tingling, don't drive or use heavy equipment until this feeling goes away.   3. Do not drink alcohol.   4. Avoid strenuous or risky activities.  Ask for help when climbing stairs.  5. You may feel lightheaded.  IF so, sit for a few minutes before standing.  Have someone help you get up.   6. If you have nausea (feel sick to your stomach): Drink only clear liquids such as apple juice, ginger ale, broth or 7-Up.  Rest may also help.  Be sure to drink enough fluids.  Move to a regular diet as you feel able.   7. You may  have a slight fever.  Call the doctor if your fever is over 100 F (37.7 C) (taken under the tongue) or lasts longer than 24 hours.  8. You may have a dry mouth, a sore throat, muscle aches or trouble sleeping. These should go away after 24 hours.  9. Do not make important or legal decisions.               Tips for taking pain medications  To get the best pain relief possible, remember these points:    Take pain medications as directed, before pain becomes severe.    Pain medication can upset your stomach: taking it with food may help.    Constipation is a common side effect of pain medication. Drink plenty of  fluids.    Eat foods high in fiber. Take a stool softener if recommended by your doctor or pharmacist.    Do not drink alcohol, drive or operate machinery while taking pain medications.    Ask about other ways to control pain, such as with heat, ice or relaxation.    Tylenol/Acetaminophen Consumption  To help encourage the safe use of acetaminophen, the makers of TYLENOL  have lowered the maximum daily dose for single-ingredient Extra Strength TYLENOL  (acetaminophen) products sold in the [place] from 8 pills per day (4,000 mg) to 6 pills per day (3,000 mg). The dosing interval has also changed from 2 pills every 4-6 hours to 2 pills every 6 hours.    If you feel your pain relief is insufficient, you may take Tylenol/Acetaminophen in addition to your narcotic pain medication.     Be careful not to exceed 3,000 mg of Tylenol/Acetaminophen in a 24 hour period from all sources.    If you are taking extra strength Tylenol/acetaminophen (500 mg), the maximum dose is 6 tablets in 24 hours.    If you are taking regular strength acetaminophen (325 mg), the maximum dose is 9 tablets in 24 hours.    Call a doctor for any of the followin. Signs of infection (fever, growing tenderness at the surgery site, a large amount of drainage or bleeding, severe pain, foul-smelling drainage, redness, swelling).  2. It has been  over 8 to 10 hours since surgery and you are still not able to urinate (pass water).  3. Headache for over 24 hours.  4. Signs of Covid-19 infection (temperature over 100 degrees, shortness of breath, cough, loss of taste/smell, generalized body aches, persistent headache, chills, sore throat, nausea/vomiting/diarrhea)    Your doctor is:  Dr. Terell Perea, Plastic Surgery: [phone]  Or dial [phone] and ask for the resident on call for:  Plastics  For emergency care, call the:  [facility]:  [phone] (TTY for hearing impaired: [phone])

## 2020-12-17 NOTE — ANESTHESIA POSTPROCEDURE EVALUATION
Anesthesia POST Procedure Evaluation    Patient: Sakshi Jaeger   MRN:     [id number] Gender:   female   Age:    75 year old :      1945        Preoperative Diagnosis: Non-healing surgical wound, subsequent encounter [T81.89XD]   Procedure(s):  Right stump scar revision   Postop Comments: No value filed.     Anesthesia Type: General       Disposition: Outpatient   Postop Pain Control: Uneventful            Sign Out: Well controlled pain   PONV: No   Neuro/Psych: Uneventful            Sign Out: Acceptable/Baseline neuro status   Airway/Respiratory: Uneventful            Sign Out: Acceptable/Baseline resp. status   CV/Hemodynamics: Uneventful            Sign Out: Acceptable CV status   Other NRE: NONE   DID A NON-ROUTINE EVENT OCCUR? No         Last Anesthesia Record Vitals:  CRNA VITALS  2020 0911 - 2020 1011      2020             Resp Rate (set):  10          Last PACU Vitals:  Vitals Value Taken Time   /89 20 1013   Temp 36.7  C (98  F) 20 1000   Pulse 83 20 1013   Resp 18 20 1013   SpO2 94 % 20 1013   Temp src     NIBP     Pulse     SpO2     Resp     Temp     Ht Rate     Temp 2           Electronically Signed By: Rush Norton MD, 2020, 3:11 PM

## 2020-12-17 NOTE — OP NOTE
Procedure Date: 12/17/2020      PREOPERATIVE DIAGNOSIS:  Status post right BKA with nonhealing wound.      POSTOPERATIVE DIAGNOSIS:  Status post right BKA with nonhealing wound.      PROCEDURES:  Revision of right BKA stump.      SURGEON:  BETTY Perea MD      RESIDENT:  Italo Eric MD      ANESTHESIA:  General anesthesia with LMA.      COMPLICATIONS:  Nil.      DRAINS:  Nil.      SPECIMENS:  Right BKA stump wound.      BLOOD LOSS:  5 mL      DESCRIPTION OF PROCEDURE:  After informed consent was taken from the patient, the proper site and procedure was ascertained with her and she was appropriately marked, she was taken to the operating room.  She was placed in a supine position with the knees comfortably flexed, pillows underneath them and pneumoboots placed and running prior to induction of anesthesia.  Preoperative antibiotics were given in the OR.  All pressure points were appropriately padded.  General anesthesia was administered without any complications.  Her right stump and thigh were prepped and draped in a standard surgical fashion.  She had approximately a 2 cm chronically draining sinus at the distal tip of the amputation stump.  Went ahead and opened the incision for at least 5-6 cm, about 2 cm on each side of the open area.  I followed the tract all the way down to the underlying bone, then elevated the flaps anteriorly and posteriorly completely.  I then used a rongeur to rongeur down the bone and took away all underlying bursa-like soft tissue in the area.  The skin tract was also excised.  All of this was sent for pathology.  No purulence was seen.  No obvious infection was seen.  I then released the skin from the underlying tissues, refreshened the skin edge anteriorly and then posteriorly the skin edge that was redundant was advanced and the area that needed to be excised was not really excised but just deepithelialized.  I then used this deepithelialized portion of skin and  subcutaneous tissues as a bolster over the underlying bone.  I sewed it to the bone anterior periosteum with 0 PDS suture.  I then went ahead and closed the skin using 3-0 nylon suture in interrupted horizontal mattress fashion.  Surgical tape and glue were placed.  The patient was wrapped with a Coban dressing.  The patient tolerated the procedure well.  All counts correct at the end of the case.  The patient was extubated and sent to recovery room in a stable condition.         RAISA EATON MD             D: 2020   T: 2020   MT: monika      Name:     CARLOS PEDRAZA   MRN:      [id number]        Account:        [id number]   :      1945           Procedure Date: 2020      Document: [id number]

## 2020-12-17 NOTE — ANESTHESIA PREPROCEDURE EVALUATION
Anesthesia Pre-Procedure Evaluation    Patient: Sakshi Jaeger   MRN:     [id number] Gender:   female   Age:    74 year old :      1945        Preoperative Diagnosis: Osteomyelitis (H) [M86.9]   Procedure(s):  Irrigation and debridement Right leg  Possible partial tibial excision Right leg     Past Medical History:   Diagnosis Date     BENIGN HYPERTENSION 3/1/2005     CAD (coronary artery disease) 2011     Employs prosthetic leg 2012     Hypertension goal BP (blood pressure) < 130/80 2011     IRON DEFIC ANEMIA NOS 9/15/2005     Lower limb amputation, below knee 2012     MI (myocardial infarction) (H)     3/2010     OSTEOPOROSIS NOS 2005     Rheumatoid arthritis(714.0) 2005      Past Surgical History:   Procedure Laterality Date     ---------INCISION AND DRAINAGE  3/5/14     AMPUTATION BELOW KNEE RT/LT      right lowwer leg.      APPENDECTOMY       ARTHROPLASTY KNEE  2011    Procedure:ARTHROPLASTY KNEE; Surgeon:JESSE HAWKINS; Location:[facility] OR     coronary stent       IRRIGATION AND DEBRIDEMENT LOWER EXTREMITY, COMBINED Right 10/9/2019    Procedure: Irrigation and debridement Right leg;  Surgeon: Doron Mott MD;  Location: [facility] OR     SURGICAL HISTORY OF -       Appendectomy          Anesthesia Evaluation     . Pt has had prior anesthetic. Type: General and Regional    No history of anesthetic complications          ROS/MED HX    ENT/Pulmonary:     (+)tobacco use, Past use , . .    Neurologic:  - neg neurologic ROS     Cardiovascular:     (+) Dyslipidemia, hypertension--CAD, -past MI (3/21/10),-stent,3/21/10  1 RCA Drug Eluting Stent .. : . . . :. . Previous cardiac testing Echodate:10/7/19results:No vegetation or mass identified, however this does not exclude endocarditis.  Transgastric views not obtained due to patient discomfort and aggitation.  Transgastric views not obtained due to patient discomfort and aggitation.  EF=60-65%. No signif valve dzdate:  results:ECG reviewed date:10/1/19 results:Sinus rhythm 91 with Premature atrial complexes  Otherwise normal ECG   date: results:          METS/Exercise Tolerance:     Hematologic:     (+) Anemia (Hb=8.8), -      Musculoskeletal: Comment: S/P L TKA  S/P R BKA after infection 2006  LBP  (+) arthritis (RA),  -       GI/Hepatic:  - neg GI/hepatic ROS       Renal/Genitourinary:  - ROS Renal section negative       Endo:     (+) Chronic steroid usage for Arthritis Date most recently used: today,.      Psychiatric:  - neg psychiatric ROS       Infectious Disease: Comment: osteo        Malignancy:      - no malignancy   Other: Comment: Ca++=7.7                        PHYSICAL EXAM:   Mental Status/Neuro: A/A/O   Airway: Facies: Feasible  Mallampati: I  Mouth/Opening: Full  TM distance: > 6 cm  Neck ROM: Full   Respiratory: Auscultation: CTAB     Resp. Rate: Normal     Resp. Effort: Normal      CV: Rhythm: Regular  Rate: Age appropriate  Heart: Normal Sounds  Edema: None   Comments:      Dental: Normal Dentition                LABS:  CBC:   Lab Results   Component Value Date    WBC 13.0 (H) 03/12/2020    WBC 11.3 (H) 10/13/2019    HGB 12.2 03/12/2020    HGB 7.5 (L) 10/13/2019    HCT 38.1 03/12/2020    HCT 24.3 (L) 10/13/2019     03/12/2020     (H) 10/13/2019     BMP:   Lab Results   Component Value Date     10/13/2019     10/11/2019    POTASSIUM 3.8 11/11/2019    POTASSIUM 3.7 10/13/2019    CHLORIDE 107 10/13/2019    CHLORIDE 107 10/11/2019    CO2 20 10/13/2019    CO2 22 10/11/2019    BUN 6 (L) 10/13/2019    BUN 6 (L) 10/11/2019    CR 0.69 11/11/2019    CR 0.55 10/13/2019    GLC 77 11/11/2019    GLC 81 10/13/2019     COAGS:   Lab Results   Component Value Date    PTT 39 (H) 10/01/2019    INR 1.31 (H) 10/09/2019     POC:   Lab Results   Component Value Date     (H) 05/03/2011     OTHER:   Lab Results   Component Value Date    LACT 0.9 10/01/2019    A1C 5.6 07/07/2008    ADRIAN 7.6 (L) 10/13/2019  "   PHOS 3.9 06/08/2016    MAG 1.7 04/02/2010    ALBUMIN 1.9 (L) 10/07/2019    PROTTOTAL 5.4 (L) 10/07/2019    ALT <9 11/11/2019    AST 17 11/11/2019    ALKPHOS 64 10/07/2019    BILITOTAL 0.6 10/07/2019    LIPASE 37 (L) 10/01/2019    TSH 4.55 10/17/2019    CRP 45.0 (H) 10/13/2019    SED 34 03/16/2015        Preop Vitals    BP Readings from Last 3 Encounters:   12/17/20 (!) 141/82   11/30/20 108/68   09/24/20 (!) 148/79    Pulse Readings from Last 3 Encounters:   12/17/20 89   11/30/20 68   09/24/20 71      Resp Readings from Last 3 Encounters:   12/17/20 16   09/24/20 16   04/20/20 14    SpO2 Readings from Last 3 Encounters:   12/17/20 96%   11/30/20 96%   09/24/20 97%      Temp Readings from Last 1 Encounters:   12/17/20 36.5  C (97.7  F) (Temporal)    Ht Readings from Last 1 Encounters:   12/17/20 1.626 m (5' 4\")      Wt Readings from Last 1 Encounters:   12/17/20 50.8 kg (112 lb)    Estimated body mass index is 19.22 kg/m  as calculated from the following:    Height as of an earlier encounter on 12/17/20: 1.626 m (5' 4\").    Weight as of an earlier encounter on 12/17/20: 50.8 kg (112 lb).     LDA:  PICC Single Lumen 10/12/19 Right Basilic (Active)   Number of days: 432       Supraglottic Airway (Active)   Number of days: 0       Urethral Catheter Straight-tip (Active)   Number of days: 3522        Assessment:   ASA SCORE: 3    H&P: History and physical reviewed and following examination; no interval change.   Smoking Status:  Non-Smoker/Unknown   NPO Status: NPO Appropriate     Plan:   Anes. Type:  General   Pre-Medication: None   Induction:  IV (Standard)   Airway: LMA   Access/Monitoring: PIV   Maintenance: TIVA     Postop Plan:   Postop Pain: Opioids  Postop Sedation/Airway: Not planned  Disposition: Outpatient     PONV Management:   Adult Risk Factors: Female, Non-Smoker, Postop Opioids   Prevention: Ondansetron, Dexamethasone, No Volatiles     CONSENT: Direct conversation   Plan and risks discussed with: " Patient                          Rush Norton MD

## 2020-12-17 NOTE — BRIEF OP NOTE
[facility] [place]    Brief Operative Note    Pre-operative diagnosis: Non-healing surgical wound, subsequent encounter [T81.89XD]  Post-operative diagnosis Same as pre-operative diagnosis    Procedure: Procedure(s):  Right stump scar revision    Surgeon: Surgeon(s) and Role:     * RAISA Perea MD - Primary  Anesthesia: General   Estimated blood loss: 10 ml  Drains: None  Specimens:   ID Type Source Tests Collected by Time Destination   A : Right stump wound Tissue Leg, Right SURGICAL PATHOLOGY EXAM RAISA Perea MD 12/17/2020  9:03 AM      Findings:   0.5-1.0cm pressure ulcer on the patella.  Complications: None.  Implants: * No implants in log *

## 2020-12-18 LAB — COPATH REPORT: NORMAL

## 2020-12-28 ENCOUNTER — DOCUMENTATION ONLY (OUTPATIENT)
Dept: SURGERY | Facility: CLINIC | Age: 75
End: 2020-12-28

## 2020-12-28 NOTE — PROGRESS NOTES
Writer rec'd forms to be completed from [employer] for coverage of Odansetron 4 mg tablets for Medicare coverage.     Forms completed and sent to [phone].    Forms sent to [facility] for scanning.    Radha Rogel LPN

## 2020-12-29 ENCOUNTER — OFFICE VISIT (OUTPATIENT)
Dept: PLASTIC SURGERY | Facility: CLINIC | Age: 75
End: 2020-12-29
Payer: COMMERCIAL

## 2020-12-29 VITALS — SYSTOLIC BLOOD PRESSURE: 128 MMHG | DIASTOLIC BLOOD PRESSURE: 66 MMHG | HEART RATE: 52 BPM

## 2020-12-29 DIAGNOSIS — T81.89XD NON-HEALING SURGICAL WOUND, SUBSEQUENT ENCOUNTER: Primary | ICD-10-CM

## 2020-12-29 PROCEDURE — 99024 POSTOP FOLLOW-UP VISIT: CPT | Performed by: PHYSICIAN ASSISTANT

## 2020-12-29 ASSESSMENT — PAIN SCALES - GENERAL: PAINLEVEL: NO PAIN (0)

## 2020-12-29 NOTE — LETTER
12/29/2020       RE: Sakshi Jaeger  [address] [address]     Dear Colleague,    Thank you for referring your patient, Sakshi Jaeger, to the [facility]LASTIC [facility]ERY [facility] [place] at [facility]. Please see a copy of my visit note below.    Plastic Surgery Outpatient Visit    ID: Sakshi Jaeger is a 75 year old female PMH R BKA non healing wound now s/p stump revision 12/17/20.    S: Doing well. Minimal pain.     O:  /66 (BP Location: Right arm, Patient Position: Sitting, Cuff Size: Adult Regular)   Pulse 52    General: NAD  R stump with steri strips intact. Skin soft. No erythema. Small open wound to anterior knee.     A/P:  -ok to shower and get leg wet. Peel off steri strips as they loosen. May use aquafor to help remove steri strips.   -RTC 2 weeks for suture removal    Total time spent with patient was 7 min of which greater than 50% was in counseling.    Jennifer Preston PA-C  Plastic and Reconstructive Surgery

## 2020-12-29 NOTE — PROGRESS NOTES
Plastic Surgery Outpatient Visit    ID: Sakshi Jaeger is a 75 year old female PMH R BKA non healing wound now s/p stump revision 12/17/20.    S: Doing well. Minimal pain.     O:  /66 (BP Location: Right arm, Patient Position: Sitting, Cuff Size: Adult Regular)   Pulse 52    General: NAD  R stump with steri strips intact. Skin soft. No erythema. Small open wound to anterior knee.     A/P:  -ok to shower and get leg wet. Peel off steri strips as they loosen. May use aquafor to help remove steri strips.   -RTC 2 weeks for suture removal    Total time spent with patient was 7 min of which greater than 50% was in counseling.    Jennifer Preston PA-C  Plastic and Reconstructive Surgery

## 2020-12-29 NOTE — NURSING NOTE
Chief Complaint   Patient presents with     Surgical Followup     Post op visit. DOS: 12/17       Vitals:    12/29/20 1041   BP: 128/66   BP Location: Right arm   Patient Position: Sitting   Cuff Size: Adult Regular   Pulse: 52       There is no height or weight on file to calculate BMI.    Leo Queen LPN

## 2021-01-05 ENCOUNTER — DOCUMENTATION ONLY (OUTPATIENT)
Dept: SURGERY | Facility: CLINIC | Age: 76
End: 2021-01-05

## 2021-01-05 NOTE — PROGRESS NOTES
Approval from [facility] for Odansetron 4mg tablets from 9/30/20 - 12/29/21. Case# [id number].    Sent to [facility] for scanning.    Radha Rogel LPN

## 2021-01-12 ENCOUNTER — OFFICE VISIT (OUTPATIENT)
Dept: PLASTIC SURGERY | Facility: CLINIC | Age: 76
End: 2021-01-12
Payer: COMMERCIAL

## 2021-01-12 VITALS — OXYGEN SATURATION: 100 %

## 2021-01-12 DIAGNOSIS — S81.801D OPEN WOUND OF RIGHT LOWER EXTREMITY, SUBSEQUENT ENCOUNTER: ICD-10-CM

## 2021-01-12 DIAGNOSIS — T81.89XD NON-HEALING SURGICAL WOUND, SUBSEQUENT ENCOUNTER: Primary | ICD-10-CM

## 2021-01-12 PROCEDURE — 99024 POSTOP FOLLOW-UP VISIT: CPT | Performed by: PHYSICIAN ASSISTANT

## 2021-01-12 ASSESSMENT — PAIN SCALES - GENERAL: PAINLEVEL: NO PAIN (0)

## 2021-01-12 NOTE — NURSING NOTE
Chief Complaint   Patient presents with     Surgical Followup     Post op visit. DOS: 12/17       Vitals:    01/12/21 1030   SpO2: 100%       There is no height or weight on file to calculate BMI.    Leo Queen LPN

## 2021-01-12 NOTE — LETTER
1/12/2021       RE: Sakshi Jaeger  [address] [address]     Dear Colleague,    Thank you for referring your patient, Sakshi Jaeger, to the [facility] PLASTIC AND RECONSTRU[facility] SURGERY CLINIC [place] at [facility]. Please see a copy of my visit note below.    Plastic Surgery Outpatient Visit    ID: Sakshi Jaeger is a 75 year old female PMH R BKA non healing wound now s/p stump revision 12/17/20.    S: Doing well since last week, no issues with surgical site, no pain. Does continue to have small wound to anterior knee.     O:  SpO2 100%    General: NAD  RLE: incision stump incision c/d/i with sutures intact. No open areas or erythema. Small wound to anterior knee, ~3mm, in area of old skin graft.     A/P:  -sutures removed  -ok to use prosthesis and bear weight on stump at 6 weeks post op  -moisturize incision  -ok for aquafor and dressing to small open area. Recommend wound nurse for wound management, pt refusing at this time. Pt prefers seeeing Dr. Walker but cannot for this wound due to location.   -RTC 1 month. May consider WTD dressings at this time if not healing in or consult to Shasha.    25 minutes spent on the date of the encounter doing chart review, history and physical, dressing changes, documentation and further activity as noted above.    Jennifer Preston PA-C  Plastic and Reconstructive Surgery

## 2021-01-12 NOTE — PROGRESS NOTES
Plastic Surgery Outpatient Visit    ID: Sakshi Jaeger is a 75 year old female PMH R BKA non healing wound now s/p stump revision 12/17/20.    S: Doing well since last week, no issues with surgical site, no pain. Does continue to have small wound to anterior knee.     O:  SpO2 100%    General: NAD  RLE: incision stump incision c/d/i with sutures intact. No open areas or erythema. Small wound to anterior knee, ~3mm, in area of old skin graft.     A/P:  -sutures removed  -ok to use prosthesis and bear weight on stump at 6 weeks post op  -moisturize incision  -ok for aquafor and dressing to small open area. Recommend wound nurse for wound management, pt refusing at this time. Pt prefers seeeing Dr. Walker but cannot for this wound due to location.   -RTC 1 month. May consider WTD dressings at this time if not healing in or consult to Shasha.    25 minutes spent on the date of the encounter doing chart review, history and physical, dressing changes, documentation and further activity as noted above.    Jennifer Preston PA-C  Plastic and Reconstructive Surgery

## 2021-01-13 ENCOUNTER — TELEPHONE (OUTPATIENT)
Dept: PLASTIC SURGERY | Facility: CLINIC | Age: 76
End: 2021-01-13

## 2021-01-13 NOTE — TELEPHONE ENCOUNTER
LVM for patient to call back to schedule 1 month follow up with Jennifer REGALADO (Plastic surgery).    -LON Bailey

## 2021-02-11 NOTE — PROGRESS NOTES
"Plastic Surgery Outpatient Visit    ID: Sakshi Jaeger is a 75 year old female PMH R BKA non healing wound now s/p stump revision 12/17/20.    S: Doing well since last visit. No issues with stump revision site. Still has small wound to anterior knee. Using vaseline and gauze covering.     O:  /43 (BP Location: Left arm, Patient Position: Sitting, Cuff Size: Adult Regular)   Pulse 64   Ht 1.626 m (5' 4\")   Wt 49.9 kg (110 lb)   SpO2 97%   BMI 18.88 kg/m     General: NAD  RLE: stump scar well healed, no open areas. Anterior knee with 5mm open area in old skin graft. Small amount serous drainage on dressing.     A/P:  -surgical site well healed  -small knee wound persists  -Discussed with Shasha. Will start medi honey. Also recommend speaking with prosthetic specialist to see if they can modify sleeve or prosethetic leg so it doesn't have as much pressure.   -RTC 6 weeks    Jennifer Preston PA-C  Plastic and Reconstructive Surgery    15 minutes spent on the date of the encounter doing chart review, history and physical, dressing changes, documentation and further activity as noted above.    "

## 2021-02-12 ENCOUNTER — OFFICE VISIT (OUTPATIENT)
Dept: PLASTIC SURGERY | Facility: CLINIC | Age: 76
End: 2021-02-12
Payer: COMMERCIAL

## 2021-02-12 VITALS
DIASTOLIC BLOOD PRESSURE: 43 MMHG | HEIGHT: 64 IN | SYSTOLIC BLOOD PRESSURE: 109 MMHG | HEART RATE: 64 BPM | OXYGEN SATURATION: 97 % | WEIGHT: 110 LBS | BODY MASS INDEX: 18.78 KG/M2

## 2021-02-12 DIAGNOSIS — S81.801D OPEN WOUND OF RIGHT LOWER EXTREMITY, SUBSEQUENT ENCOUNTER: Primary | ICD-10-CM

## 2021-02-12 PROCEDURE — 99024 POSTOP FOLLOW-UP VISIT: CPT | Performed by: PHYSICIAN ASSISTANT

## 2021-02-12 ASSESSMENT — PAIN SCALES - GENERAL: PAINLEVEL: NO PAIN (0)

## 2021-02-12 ASSESSMENT — MIFFLIN-ST. JEOR: SCORE: 978.96

## 2021-02-12 NOTE — LETTER
"2/12/2021       RE: Sakshi Jaeger  [address] [facility] [address]     Dear Colleague,    Thank you for referring your patient, Sakshi Jaeger, to the [facility] PLASTIC AND RECONSTRUCTIVE SURGERY CLINIC [place] at [facility]. Please see a copy of my visit note below.    Plastic Surgery Outpatient Visit    ID: Sakshi Jaeger is a 75 year old female PMH R BKA non healing wound now s/p stump revision 12/17/20.    S: Doing well since last visit. No issues with stump revision site. Still has small wound to anterior knee. Using vaseline and gauze covering.     O:  /43 (BP Location: Left arm, Patient Position: Sitting, Cuff Size: Adult Regular)   Pulse 64   Ht 1.626 m (5' 4\")   Wt 49.9 kg (110 lb)   SpO2 97%   BMI 18.88 kg/m     General: NAD  RLE: stump scar well healed, no open areas. Anterior knee with 5mm open area in old skin graft. Small amount serous drainage on dressing.     A/P:  -surgical site well healed  -small knee wound persists  -Discussed with Shasha. Will start medi honey. Also recommend speaking with prosthetic specialist to see if they can modify sleeve or prosethetic leg so it doesn't have as much pressure.   -RTC 6 weeks    Jennifer Preston PA-C  Plastic and Reconstructive Surgery    15 minutes spent on the date of the encounter doing chart review, history and physical, dressing changes, documentation and further activity as noted above.  "

## 2021-02-22 DIAGNOSIS — I10 ESSENTIAL HYPERTENSION WITH GOAL BLOOD PRESSURE LESS THAN 140/90: ICD-10-CM

## 2021-02-22 RX ORDER — LISINOPRIL 20 MG/1
20 TABLET ORAL DAILY
Qty: 90 TABLET | Refills: 3 | Status: SHIPPED | OUTPATIENT
Start: 2021-02-22 | End: 2022-02-25

## 2021-02-23 ENCOUNTER — MEDICAL CORRESPONDENCE (OUTPATIENT)
Dept: HEALTH INFORMATION MANAGEMENT | Facility: CLINIC | Age: 76
End: 2021-02-23

## 2021-02-26 ENCOUNTER — TELEPHONE (OUTPATIENT)
Dept: PLASTIC SURGERY | Facility: CLINIC | Age: 76
End: 2021-02-26

## 2021-02-26 NOTE — TELEPHONE ENCOUNTER
Spoke w/ [employer] rep, confirmed receipt of faxed forms. Will get to dr. Perea to sign before faxing back to [employer] at [phone]  Tyler Matute, EMT

## 2021-03-01 ENCOUNTER — TELEPHONE (OUTPATIENT)
Dept: SURGERY | Facility: CLINIC | Age: 76
End: 2021-03-01

## 2021-03-01 NOTE — TELEPHONE ENCOUNTER
[facility] Call Center    Phone Message    May a detailed message be left on voicemail: yes     Reason for Call: Symptoms or Concerns     If patient has red-flag symptoms, warm transfer to triage line    Current symptom or concern: Wound on knee is getting bigger.    Symptoms have been present for:  2-3 day(s)    Has patient previously been seen for this? No    Are there any new or worsening symptoms? Yes: Wound is getting bigger      Action Taken: Message routed to:  [facility]    Travel Screening: Not Applicable

## 2021-03-01 NOTE — TELEPHONE ENCOUNTER
Spoke with pt regarding report that wound is enlarging. Pt states it is now about 1 inch across. Pt states padding was added to prosthesis and she believes this created additional pressure over the area. Pt states pads have since been removed. Recommended pt monitor wound over the next week now that pads have been removed. If wound is not improving or continues to worsen pt to reach out for an earlier appt. Pt states understanding. Kerline HAWKINS RN, BSN

## 2021-03-02 ENCOUNTER — IMMUNIZATION (OUTPATIENT)
Dept: NURSING | Facility: CLINIC | Age: 76
End: 2021-03-02
Payer: COMMERCIAL

## 2021-03-02 PROCEDURE — 91300 PR COVID VAC PFIZER DIL RECON 30 MCG/0.3 ML IM: CPT

## 2021-03-02 PROCEDURE — 0001A PR COVID VAC PFIZER DIL RECON 30 MCG/0.3 ML IM: CPT

## 2021-03-22 NOTE — PROGRESS NOTES
"Plastic Surgery Outpatient Visit    ID: Sakshi Jaeger is a 75 year old female PMH R BKA non healing wound now s/p stump revision 12/17/20.    S: pt last seen 6 weeks ago, wound has become larger. States that she had her prosthesis tweaked and feels that it rubbed on the wound causing it to enlarge. Orthotist disagrees that prosthetic caused the enlarging wound. Pt has not been wearing prosthetic for 2+ weeks to avoid pressure on wound.     O:  /68   Pulse 59   Ht 1.626 m (5' 4\")   Wt 49.9 kg (110 lb)   SpO2 98%   BMI 18.88 kg/m     General: NAD  RLE: BKA stump with 2cm x 2cm x 0.3mm wound over anterior knee with pink granulation tissue at base. Wound is in previous skin graft. Stump revision scar well healed.     A/P:  -wound larger since last visit  -discussed with Shasha, she will see patient to discuss more wound care options. I would like Dr. Perea to see as well as I feel that this may require more intervention as it is in a previous skin graft.   -continue with medihoney for now  -RTC 1-2 weeks with Shasha and Dr. Perea.     Jennifer Preston PA-C  Plastic and Reconstructive Surgery    20 minutes spent on the date of the encounter doing chart review, history and physical, dressing changes, documentation and further activity as noted above.    "

## 2021-03-23 ENCOUNTER — OFFICE VISIT (OUTPATIENT)
Dept: PLASTIC SURGERY | Facility: CLINIC | Age: 76
End: 2021-03-23
Payer: COMMERCIAL

## 2021-03-23 ENCOUNTER — IMMUNIZATION (OUTPATIENT)
Dept: NURSING | Facility: CLINIC | Age: 76
End: 2021-03-23
Attending: FAMILY MEDICINE
Payer: COMMERCIAL

## 2021-03-23 VITALS
OXYGEN SATURATION: 98 % | SYSTOLIC BLOOD PRESSURE: 134 MMHG | DIASTOLIC BLOOD PRESSURE: 68 MMHG | WEIGHT: 110 LBS | HEART RATE: 59 BPM | HEIGHT: 64 IN | BODY MASS INDEX: 18.78 KG/M2

## 2021-03-23 DIAGNOSIS — S81.801D OPEN WOUND OF RIGHT LOWER EXTREMITY, SUBSEQUENT ENCOUNTER: Primary | ICD-10-CM

## 2021-03-23 PROCEDURE — 0002A PR COVID VAC PFIZER DIL RECON 30 MCG/0.3 ML IM: CPT

## 2021-03-23 PROCEDURE — 99213 OFFICE O/P EST LOW 20 MIN: CPT | Performed by: PHYSICIAN ASSISTANT

## 2021-03-23 PROCEDURE — 91300 PR COVID VAC PFIZER DIL RECON 30 MCG/0.3 ML IM: CPT

## 2021-03-23 ASSESSMENT — MIFFLIN-ST. JEOR: SCORE: 978.96

## 2021-03-23 ASSESSMENT — PAIN SCALES - GENERAL: PAINLEVEL: NO PAIN (0)

## 2021-03-23 NOTE — LETTER
"3/23/2021       RE: Sakshi Jaeger  [address] [facility] [address]     Dear Colleague,    Thank you for referring your patient, Sakshi Jaeger, to the [facility] PLASTIC AND RECONSTRUCTIVE SURGERY CLINIC [place] at [facility]. Please see a copy of my visit note below.    Plastic Surgery Outpatient Visit    ID: Sakshi Jaeger is a 75 year old female PMH R BKA non healing wound now s/p stump revision 12/17/20.    S: pt last seen 6 weeks ago, wound has become larger. States that she had her prosthesis tweaked and feels that it rubbed on the wound causing it to enlarge. Orthotist disagrees that prosthetic caused the enlarging wound. Pt has not been wearing prosthetic for 2+ weeks to avoid pressure on wound.     O:  /68   Pulse 59   Ht 1.626 m (5' 4\")   Wt 49.9 kg (110 lb)   SpO2 98%   BMI 18.88 kg/m     General: NAD  RLE: BKA stump with 2cm x 2cm x 0.3mm wound over anterior knee with pink granulation tissue at base. Wound is in previous skin graft. Stump revision scar well healed.     A/P:  -wound larger since last visit  -discussed with Shasha, she will see patient to discuss more wound care options. I would like Dr. Perea to see as well as I feel that this may require more intervention as it is in a previous skin graft.   -continue with medihoney for now  -RTC 1-2 weeks with Shasha and Dr. Perea.     Jennifer Preston PA-C  Plastic and Reconstructive Surgery    20 minutes spent on the date of the encounter doing chart review, history and physical, dressing changes, documentation and further activity as noted above.  "

## 2021-03-23 NOTE — PROGRESS NOTES
History of Present Illness - Sakshi Jaeger is a wonderfully nice 75 [place] old female last seen on 9/24/2020.    To review, she has had issues with ear wax, more on the RIGHT than LEFT, and for many years Dr. OSMAN has helped her with alligators or irrigations.  However, over the last year prior to first seeing me in January 2019, it seems much more tenacious and sticky.  The other day the LEFT ear got some water in it from the shower and had been totally clogged since then.    No previous ear surgery, no chronic ear disease, no bleeding or drainage from the ears.    I had to procedurally clear tremendous cerumen from both ears.  And after the initial visit I asked her to come back in four months and she was again impacted severely.  So at this point I have asked her to come back at least every 4-6 months for debridement.    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)     Osteomyelitis (H)     Non-healing surgical wound, subsequent encounter       Current Medications -   Current Outpatient Medications:      acetaminophen (TYLENOL) 325 MG tablet, Take 2 tablets (650 mg) by mouth every 4 hours, Disp: , Rfl:      amLODIPine (NORVASC) 5 MG tablet, Take 1 tablet (5 mg) by mouth daily, Disp: 90 tablet, Rfl: 3     Ascorbic Acid (VITAMIN C) 500 MG CHEW, , Disp: , Rfl:      aspirin (ASA) 81 MG tablet, Take 1 tablet (81 mg) by mouth daily, Disp:  , Rfl:      calcium carbonate 600 mg-vitamin D 400 units (CALCIUM 600 + D) 600-400 MG-UNIT per tablet, Take  2 tablets by mouth daily, Disp: , Rfl:      gabapentin (NEURONTIN) 300 MG capsule, Take 2 capsules (600 mg) by mouth 3 times daily, Disp: , Rfl:      leflunomide (ARAVA) 20 MG tablet, Take 1 tablet by mouth every 24 hours, Disp: , Rfl:      Lidocaine (LIDOCARE) 4 % Patch, Place 1 patch onto the skin every 24 hours To prevent lidocaine toxicity, patient should be patch free for 12 hrs daily., Disp: , Rfl:      lisinopril (ZESTRIL) 20 MG tablet, Take 1 tablet (20 mg) by mouth daily, Disp: 90 tablet, Rfl: 3     methotrexate 2.5 MG tablet, Take 1 tablet by mouth, Disp: , Rfl:      metoprolol tartrate (LOPRESSOR) 50 MG tablet, Take 1 tablet (50 mg) by mouth 2 times daily, Disp: 180 tablet, Rfl: 3     multivitamin w/minerals (THERA-VIT-M) tablet, Take 1 tablet by mouth daily, Disp: , Rfl:      ondansetron (ZOFRAN-ODT) 4 MG ODT tab, Take 1-2 tablets (4-8 mg) by mouth every 8 hours as needed for nausea (Patient not taking: Reported on 1/12/2021), Disp: 4 tablet, Rfl: 0     order for DME, Equipment being ordered: New foam (custom shaped protective cover) for right below the knee prostheses, Disp: 1 Units, Rfl: 0     oxyCODONE (ROXICODONE) 5 MG tablet, Take 1-2 tablets (5-10 mg) by mouth every 6 hours as needed for moderate to severe pain (Patient not taking: Reported on 1/12/2021), Disp: 8 tablet, Rfl: 0     polyethylene glycol (MIRALAX/GLYCOLAX) packet, Take 17 g by mouth 2 times daily as needed for constipation (Patient not taking: Reported on 1/12/2021), Disp: , Rfl:      predniSONE (DELTASONE) 5 MG tablet, Take 1 tablet (5 mg) by mouth daily, Disp: , Rfl: 0     senna-docusate (SENOKOT-S/PERICOLACE) 8.6-50 MG tablet, Take 1-2 tablets by mouth 2 times daily (Patient not taking: Reported on 1/12/2021), Disp: 30 tablet, Rfl: 0     simvastatin (ZOCOR) 20 MG tablet, Take 1 tablet (20 mg) by mouth At Bedtime, Disp: 90 tablet, Rfl: 3    Allergies -   No Known Allergies    Social History -   Social History     Socioeconomic  "History     Marital status: Single     Spouse name: None     Number of children: None     Years of education: None     Highest education level: None   Social Needs     Financial resource strain: None     Food insecurity - worry: None     Food insecurity - inability: None     Transportation needs - medical: None     Transportation needs - non-medical: None   Occupational History     None   Tobacco Use     Smoking status: Former Smoker     Packs/day: 0.50     Years: 45.00     Pack years: 22.50     Types: Cigarettes     Last attempt to quit: 2010     Years since quittin.9     Smokeless tobacco: Never Used   Substance and Sexual Activity     Alcohol use: Yes     Comment: occsionally-3 -4 drinks a week     Drug use: No     Sexual activity: Yes     Partners: Male   Other Topics Concern     Parent/sibling w/ CABG, MI or angioplasty before 65F 55M? No   Social History Narrative     None       Family History -   Family History   Problem Relation Age of Onset     Cardiovascular Mother      Cancer Brother      Diabetes No family hx of      Hypertension No family hx of      Cerebrovascular Disease No family hx of      Alzheimer Disease No family hx of      Thyroid Disease No family hx of      Respiratory No family hx of        Review of Systems - As per HPI and PMHx, otherwise 10+ system review of the head and neck, and general constitution is negative.    Physical Exam  Resp 14   Ht 1.626 m (5' 4\")   Wt 49.9 kg (110 lb)   SpO2 95%   BMI 18.88 kg/m      General - The patient is well nourished and well developed, and appears to have good nutritional status.  Alert and oriented to person and place, answers questions and cooperates with examination appropriately.   Head and Face - Normocephalic and atraumatic, with no gross asymmetry noted of the contour of the facial features.  The facial nerve is intact, with strong symmetric movements.  Voice and Breathing - The patient was breathing comfortably without the use of " accessory muscles. There was no wheezing, stridor, or stertor.  The patients voice was clear and strong, and had appropriate pitch and quality.  Eyes - Extraocular movements intact, and the pupils were reactive to light.  Sclera were not icteric or injected, conjunctiva were pink and moist.  Mouth - Examination of the oral cavity showed pink, healthy oral mucosa. No lesions or ulcerations noted.  The tongue was mobile and midline, and the dentition were in good condition.        Cerumen Removal    Physical Exam and Procedure  Ears - On examination of the ears, I found that the BOTH sides had cerumen impaction.  Therefore, I positioned them in the examination chair in a semi-supine position, beginning with the right side.  I used the binocular surgical microscope to perform cerumen removal.  I began by using a cerumen loop to gently lift the edges of the cerumen mass away from the walls of the external canal.  Once I did this, I was able to suction away fragments of wax and debris using suction.  Once the mass was loose enough, the entire plug was pulled from the canal.  The tympanic membrane was intact, no sign of perforation or middle ear effusion.    I turned my attention to the contralateral side once again using the binocular surgical microscope to perform cerumen removal.  I began by using a cerumen loop to gently lift the edges of the cerumen mass away from the walls of the external canal.  Once I did this, I was able to suction away fragments of wax and debris using suction.  Once the mass was loose enough, the entire plug was pulled from the canal.  The tympanic membrane was intact, no sign of perforation or middle ear effusion.        A/P - Sakshi Jaeger is a 75 year old female  (H61.23) Bilateral impacted cerumen  (primary encounter diagnosis)  (H93.8X3) Ear fullness, bilateral    The patient has had their cerumen procedurally removed today.  I have discussed ear care at home, including avoiding qtips or  any other object placed in the canal.  I have also discussed that over the counter cerumen kits like Debrox or Cerumenex can be useful.    See me in another 4-6 months

## 2021-03-23 NOTE — NURSING NOTE
"Chief Complaint   Patient presents with     RECHECK     6 week recheck.        Vitals:    03/23/21 1125   BP: 134/68   Pulse: 59   SpO2: 98%   Weight: 49.9 kg (110 lb)   Height: 1.626 m (5' 4\")       Body mass index is 18.88 kg/m .    Leo Queen LPN      "

## 2021-03-24 ENCOUNTER — TELEPHONE (OUTPATIENT)
Dept: PLASTIC SURGERY | Facility: CLINIC | Age: 76
End: 2021-03-24

## 2021-03-24 NOTE — TELEPHONE ENCOUNTER
LVM for patient to call back to set up appointment with Dr. Perea and Shasha in a few weeks.    Leo Queen LPN

## 2021-03-24 NOTE — TELEPHONE ENCOUNTER
----- Message from Kerline Sargent RN sent at 3/23/2021  3:15 PM CDT -----  Hi,     Can you please schedule?    Thanks,   Kerline  ----- Message -----  From: Jennifer Preston PA-C  Sent: 3/23/2021   1:13 PM CDT  To: Shasha eDgroot RN, #    Kerline, Zong, Max,    Can we please schedule this patient of Dr. Perea on a Wednesday in the next few weeks with Dr. Perea and Shasha. I have discussed patient with Shasha, she is ok seeing her. Visit is for enlarging R knee wound.     Thanks,  Jennifer

## 2021-03-25 ENCOUNTER — OFFICE VISIT (OUTPATIENT)
Dept: OTOLARYNGOLOGY | Facility: CLINIC | Age: 76
End: 2021-03-25
Payer: COMMERCIAL

## 2021-03-25 VITALS — WEIGHT: 110 LBS | RESPIRATION RATE: 14 BRPM | OXYGEN SATURATION: 95 % | HEIGHT: 64 IN | BODY MASS INDEX: 18.78 KG/M2

## 2021-03-25 DIAGNOSIS — H93.8X3 EAR FULLNESS, BILATERAL: ICD-10-CM

## 2021-03-25 DIAGNOSIS — H61.23 BILATERAL IMPACTED CERUMEN: Primary | ICD-10-CM

## 2021-03-25 PROCEDURE — 69210 REMOVE IMPACTED EAR WAX UNI: CPT | Mod: 50 | Performed by: OTOLARYNGOLOGY

## 2021-03-25 PROCEDURE — 99213 OFFICE O/P EST LOW 20 MIN: CPT | Mod: 25 | Performed by: OTOLARYNGOLOGY

## 2021-03-25 ASSESSMENT — PAIN SCALES - GENERAL: PAINLEVEL: NO PAIN (0)

## 2021-03-25 ASSESSMENT — MIFFLIN-ST. JEOR: SCORE: 978.96

## 2021-03-25 NOTE — LETTER
3/25/2021         RE: Sakshi Jaeger  [address] [facility] [address]        Dear Colleague,    Thank you for referring your patient, Sakshi Jaeger, to the [facility]. Please see a copy of my visit note below.    History of Present Illness - Sakshi Jaeger is a wonderfully nice 75 [place] old female last seen on 9/24/2020.    To review, she has had issues with ear wax, more on the RIGHT than LEFT, and for many years Dr. OSMAN has helped her with alligators or irrigations.  However, over the last year prior to first seeing me in January 2019, it seems much more tenacious and sticky.  The other day the LEFT ear got some water in it from the shower and had been totally clogged since then.    No previous ear surgery, no chronic ear disease, no bleeding or drainage from the ears.    I had to procedurally clear tremendous cerumen from both ears.  And after the initial visit I asked her to come back in four months and she was again impacted severely.  So at this point I have asked her to come back at least every 4-6 months for debridement.    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)     Osteomyelitis (H)     Non-healing surgical wound, subsequent encounter       Current Medications -   Current Outpatient Medications:      acetaminophen (TYLENOL) 325 MG tablet, Take 2 tablets (650 mg) by mouth every 4 hours, Disp: , Rfl:      amLODIPine (NORVASC) 5 MG tablet, Take 1 tablet (5 mg) by mouth daily, Disp: 90  tablet, Rfl: 3     Ascorbic Acid (VITAMIN C) 500 MG CHEW, , Disp: , Rfl:      aspirin (ASA) 81 MG tablet, Take 1 tablet (81 mg) by mouth daily, Disp:  , Rfl:      calcium carbonate 600 mg-vitamin D 400 units (CALCIUM 600 + D) 600-400 MG-UNIT per tablet, Take 2 tablets by mouth daily, Disp: , Rfl:      gabapentin (NEURONTIN) 300 MG capsule, Take 2 capsules (600 mg) by mouth 3 times daily, Disp: , Rfl:      leflunomide (ARAVA) 20 MG tablet, Take 1 tablet by mouth every 24 hours, Disp: , Rfl:      Lidocaine (LIDOCARE) 4 % Patch, Place 1 patch onto the skin every 24 hours To prevent lidocaine toxicity, patient should be patch free for 12 hrs daily., Disp: , Rfl:      lisinopril (ZESTRIL) 20 MG tablet, Take 1 tablet (20 mg) by mouth daily, Disp: 90 tablet, Rfl: 3     methotrexate 2.5 MG tablet, Take 1 tablet by mouth, Disp: , Rfl:      metoprolol tartrate (LOPRESSOR) 50 MG tablet, Take 1 tablet (50 mg) by mouth 2 times daily, Disp: 180 tablet, Rfl: 3     multivitamin w/minerals (THERA-VIT-M) tablet, Take 1 tablet by mouth daily, Disp: , Rfl:      ondansetron (ZOFRAN-ODT) 4 MG ODT tab, Take 1-2 tablets (4-8 mg) by mouth every 8 hours as needed for nausea (Patient not taking: Reported on 1/12/2021), Disp: 4 tablet, Rfl: 0     order for DME, Equipment being ordered: New foam (custom shaped protective cover) for right below the knee prostheses, Disp: 1 Units, Rfl: 0     oxyCODONE (ROXICODONE) 5 MG tablet, Take 1-2 tablets (5-10 mg) by mouth every 6 hours as needed for moderate to severe pain (Patient not taking: Reported on 1/12/2021), Disp: 8 tablet, Rfl: 0     polyethylene glycol (MIRALAX/GLYCOLAX) packet, Take 17 g by mouth 2 times daily as needed for constipation (Patient not taking: Reported on 1/12/2021), Disp: , Rfl:      predniSONE (DELTASONE) 5 MG tablet, Take 1 tablet (5 mg) by mouth daily, Disp: , Rfl: 0     senna-docusate (SENOKOT-S/PERICOLACE) 8.6-50 MG tablet, Take 1-2 tablets by mouth 2 times daily (Patient  "not taking: Reported on 2021), Disp: 30 tablet, Rfl: 0     simvastatin (ZOCOR) 20 MG tablet, Take 1 tablet (20 mg) by mouth At Bedtime, Disp: 90 tablet, Rfl: 3    Allergies -   No Known Allergies    Social History -   Social History     Socioeconomic History     Marital status: Single     Spouse name: None     Number of children: None     Years of education: None     Highest education level: None   Social Needs     Financial resource strain: None     Food insecurity - worry: None     Food insecurity - inability: None     Transportation needs - medical: None     Transportation needs - non-medical: None   Occupational History     None   Tobacco Use     Smoking status: Former Smoker     Packs/day: 0.50     Years: 45.00     Pack years: 22.50     Types: Cigarettes     Last attempt to quit: 2010     Years since quittin.9     Smokeless tobacco: Never Used   Substance and Sexual Activity     Alcohol use: Yes     Comment: occsionally-3 -4 drinks a week     Drug use: No     Sexual activity: Yes     Partners: Male   Other Topics Concern     Parent/sibling w/ CABG, MI or angioplasty before 65F 55M? No   Social History Narrative     None       Family History -   Family History   Problem Relation Age of Onset     Cardiovascular Mother      Cancer Brother      Diabetes No family hx of      Hypertension No family hx of      Cerebrovascular Disease No family hx of      Alzheimer Disease No family hx of      Thyroid Disease No family hx of      Respiratory No family hx of        Review of Systems - As per HPI and PMHx, otherwise 10+ system review of the head and neck, and general constitution is negative.    Physical Exam  Resp 14   Ht 1.626 m (5' 4\")   Wt 49.9 kg (110 lb)   SpO2 95%   BMI 18.88 kg/m      General - The patient is well nourished and well developed, and appears to have good nutritional status.  Alert and oriented to person and place, answers questions and cooperates with examination appropriately. "   Head and Face - Normocephalic and atraumatic, with no gross asymmetry noted of the contour of the facial features.  The facial nerve is intact, with strong symmetric movements.  Voice and Breathing - The patient was breathing comfortably without the use of accessory muscles. There was no wheezing, stridor, or stertor.  The patients voice was clear and strong, and had appropriate pitch and quality.  Eyes - Extraocular movements intact, and the pupils were reactive to light.  Sclera were not icteric or injected, conjunctiva were pink and moist.  Mouth - Examination of the oral cavity showed pink, healthy oral mucosa. No lesions or ulcerations noted.  The tongue was mobile and midline, and the dentition were in good condition.        Cerumen Removal    Physical Exam and Procedure  Ears - On examination of the ears, I found that the BOTH sides had cerumen impaction.  Therefore, I positioned them in the examination chair in a semi-supine position, beginning with the right side.  I used the binocular surgical microscope to perform cerumen removal.  I began by using a cerumen loop to gently lift the edges of the cerumen mass away from the walls of the external canal.  Once I did this, I was able to suction away fragments of wax and debris using suction.  Once the mass was loose enough, the entire plug was pulled from the canal.  The tympanic membrane was intact, no sign of perforation or middle ear effusion.    I turned my attention to the contralateral side once again using the binocular surgical microscope to perform cerumen removal.  I began by using a cerumen loop to gently lift the edges of the cerumen mass away from the walls of the external canal.  Once I did this, I was able to suction away fragments of wax and debris using suction.  Once the mass was loose enough, the entire plug was pulled from the canal.  The tympanic membrane was intact, no sign of perforation or middle ear effusion.        JEFFERY/PIOTR PARKER  Mil is a 75 year old female  (H61.23) Bilateral impacted cerumen  (primary encounter diagnosis)  (H93.8X3) Ear fullness, bilateral    The patient has had their cerumen procedurally removed today.  I have discussed ear care at home, including avoiding qtips or any other object placed in the canal.  I have also discussed that over the counter cerumen kits like Debrox or Cerumenex can be useful.    See me in another 4-6 months      Again, thank you for allowing me to participate in the care of your patient.        Sincerely,        Pito Mosher MD

## 2021-03-25 NOTE — PATIENT INSTRUCTIONS
Scheduling Information  To schedule your CT/MRI scan, please contact [facility] at [phone] OR [facility] at [phone]    To schedule your Surgery, please contact our Specialty Schedulers at [phone]      ENT Clinic Locations Clinic Hours Telephone Number     [facility]  [address] [place] [address]   Monday:           1:00pm -- 5:00pm    Friday:              8:00am - 12:00pm   To schedule/reschedule an appointment with   Dr. Mosher,   please contact our   Specialty Scheduling Department at:     [phone]       [facility]  [address] [place] [address] Tuesday:          8:00am -- 2:00pm         Urgent Care Locations Clinic Hours Telephone Numbers     [facility]  [address] [place] [address]     Monday-Friday:     11:00am - 9:00pm    Saturday-Sunday:  9:00am - 5:00pm   [phone]     [facility]  [address] [place] [address]     Monday-Friday:      5:00pm - 9:00pm     Saturday-Sunday:  9:00am - 5:00pm   [phone]

## 2021-04-14 ENCOUNTER — OFFICE VISIT (OUTPATIENT)
Dept: WOUND CARE | Facility: CLINIC | Age: 76
End: 2021-04-14
Payer: COMMERCIAL

## 2021-04-14 DIAGNOSIS — L97.911 ULCER OF RIGHT LOWER EXTREMITY, LIMITED TO BREAKDOWN OF SKIN (H): Primary | ICD-10-CM

## 2021-04-14 PROCEDURE — 99211 OFF/OP EST MAY X REQ PHY/QHP: CPT

## 2021-04-14 NOTE — PROGRESS NOTES
Patient comes to wound clinic for dressing change  per request of dr. Walker. She has history of a new wound on 3 knee in wskin graft     Dressing removed, wound washed with Microklenz, irrigated with Microklenz,and measured.     Wound evaluation:  Size:  2.5 cm length x 2 cm width x 0.3 cm depth.    There is undermining or tunneling and wound bed is dry     Dressing change:           PLAN: Dressing changes two times per week  Wound gel on the wound  Cover with Meri Ag  HydroferaBlue dressing  Add a little more gel if needed  Cover with Mepilex   Change twice per week    Pt has our number should other issues arise. All questions answered for now.   Patient needs to be seen 2 weeks.   Treated and follow-up appointment made Dr. Kaur was available for supervision of care if needed or if questions should arise and regarding plan of care. Shasha Degroot RN CWON

## 2021-04-14 NOTE — PATIENT INSTRUCTIONS
Wound gel on the wound  Cover with Meri Ag  HydroferaBlue dressing  Add a little more gel if needed  Cover with Mepilex   Change twice per week

## 2021-05-03 ENCOUNTER — OFFICE VISIT (OUTPATIENT)
Dept: WOUND CARE | Facility: CLINIC | Age: 76
End: 2021-05-03
Payer: COMMERCIAL

## 2021-05-03 DIAGNOSIS — L97.911 ULCER OF RIGHT LOWER EXTREMITY, LIMITED TO BREAKDOWN OF SKIN (H): Primary | ICD-10-CM

## 2021-05-03 DIAGNOSIS — Z89.511 STATUS POST BELOW-KNEE AMPUTATION OF RIGHT LOWER EXTREMITY (H): ICD-10-CM

## 2021-05-03 PROCEDURE — 97607 NEG PRS WND THR NDME<=50SQCM: CPT

## 2021-05-03 NOTE — PROGRESS NOTES
Patient comes to wound clinic for dressing change  per request of dr. Walker. She has history of a new wound on 3 knee in wskin graft     Dressing removed, wound washed with Microklenz, irrigated with Microklenz,and measured.     Wound evaluation: Slight improvement in depth. Will try to see if we get more accomplished with the snap vac today  Size: length  2.5 cm length x 2 cm width x 0.1 cm depth.    There is no undermining or tunneling and wound bed more moist   and less deep    Dressing change:                 PLAN: Dressing changes two times per week.  SNAP disposable wound vac placement: Non-excisional debridement: Wound washed with Microklenz soaked gauze. Patient is assessed for discomfort, which is minimal, and understanding of SNAP wound vac change procedure.  Wound is repacked with blue SNAP foam and covered with hydrocolloid adhesive and suction. Amount of sponges used are noted to be 1 . Vacuum pump is initialed and assured there are no leaks. turned on  continuous suction at 125 mmHg and seal is confirmed. Instructions and warnings reiterated. Meri AG underneath the sponge       PLAN: Dressing changes 2 times/week in clinic  Pt has our number should other issues arise. All questions answered for now.   Patient needs to be seen 2 weeks.   Treated and follow-up appointment made Dr. Kaur was available for supervision of care if needed or if questions should arise and regarding plan of care. Shasha Degroot RN CWON

## 2021-05-06 ENCOUNTER — OFFICE VISIT (OUTPATIENT)
Dept: WOUND CARE | Facility: CLINIC | Age: 76
End: 2021-05-06
Payer: COMMERCIAL

## 2021-05-06 DIAGNOSIS — I10 ESSENTIAL HYPERTENSION WITH GOAL BLOOD PRESSURE LESS THAN 140/90: ICD-10-CM

## 2021-05-06 DIAGNOSIS — L97.911 ULCER OF RIGHT LOWER EXTREMITY, LIMITED TO BREAKDOWN OF SKIN (H): Primary | ICD-10-CM

## 2021-05-06 DIAGNOSIS — Z89.511 STATUS POST BELOW-KNEE AMPUTATION OF RIGHT LOWER EXTREMITY (H): ICD-10-CM

## 2021-05-06 PROCEDURE — 99211 OFF/OP EST MAY X REQ PHY/QHP: CPT

## 2021-05-06 RX ORDER — AMLODIPINE BESYLATE 5 MG/1
TABLET ORAL
Qty: 90 TABLET | Refills: 3 | Status: SHIPPED | OUTPATIENT
Start: 2021-05-06 | End: 2022-05-18

## 2021-05-06 NOTE — PROGRESS NOTES
Patient comes to wound clinic for dressing change  per request of dr. Walker. She has history of a new wound on 3 knee in wskin graft     Dressing removed, wound washed with Microklenz, irrigated with Microklenz,and measured.     Wound evaluation: Slight improvement in depth. Will try to see if we get more accomplished with the snap vac today  Size: length  2.5 cm length x 2 cm width x 0.1 cm depth.    There is no undermining or tunneling and wound bed more moist   and less deep   Macerated from Vac. Not much improvement not much drainage, only in tubing.   Dressing change:                Treatment Clean with Microklenz spray , HydroferaBlue dressing and elier, Change twice week.     PLAN: Dressing changes 2 times/week in clinic  Pt has our number should other issues arise. All questions answered for now.   Patient needs to be seen 2 weeks.   Treated and follow-up appointment made Dr. Kaur was available for supervision of care if needed or if questions should arise and regarding plan of care. Shasha Degroot RN CWON

## 2021-05-18 ENCOUNTER — OFFICE VISIT (OUTPATIENT)
Dept: WOUND CARE | Facility: CLINIC | Age: 76
End: 2021-05-18
Payer: COMMERCIAL

## 2021-05-18 DIAGNOSIS — L97.911 ULCER OF RIGHT LOWER EXTREMITY, LIMITED TO BREAKDOWN OF SKIN (H): Primary | ICD-10-CM

## 2021-05-18 PROCEDURE — 99211 OFF/OP EST MAY X REQ PHY/QHP: CPT

## 2021-05-18 NOTE — PROGRESS NOTES
Patient comes to wound clinic for dressing change  per request of dr. Walker. She has history of a new wound on 3 knee in wskin graft     Dressing removed, wound washed with Microklenz, irrigated with Microklenz,and measured.     Wound evaluation: No improvement.   Size: length  2.5 cm length x 2.5 cm width x 0.1 cm depth.    There is no undermining or tunneling and wound bed is pretty dry and light pink   Dressing change:                Treatment Clean with Microklenz spray , HydroferaBlue dressing and elier, Change twice week.     PLAN: Will refer pt back to plastic surgery and dr Perea for possible skin graft. Continue with current dressing however I don't expect much improvement  Pt has our number should other issues arise. All questions answered for now.   Patient needs to be seen prn .   Treated and follow-up appointment made Dr. Perea was available for supervision of care if needed or if questions should arise and regarding plan of care. Shasha Degroot RN CWON

## 2021-05-19 ENCOUNTER — PATIENT OUTREACH (OUTPATIENT)
Dept: PLASTIC SURGERY | Facility: CLINIC | Age: 76
End: 2021-05-19

## 2021-05-19 NOTE — TELEPHONE ENCOUNTER
FUTURE VISIT INFORMATION      FUTURE VISIT INFORMATION:    Date: 6/23/21    Time: 11:00am    [place] [facility] [place]ERRAL INFORMATION:    Referring provider:  Shasha Degroot RN    Referring providers clinic:  [facility]OUND [facility]    Reason for visit/diagnosis  Ulcer of right lower extremity, limited to breakdown of skin     RECORDS REQUESTED FROM:       Clinic name Comments Records Status Imaging Status   [facility] Referral/ov 5/18/21  Ov/notes 5/18/16-5/18/21 EPIC

## 2021-05-19 NOTE — PATIENT INSTRUCTIONS
Left message for pt offering earlier appt with Dr. Perea. Provided direct contact information and requested call back to schedule. Kerline HAWKINS RN, BSN

## 2021-05-21 ENCOUNTER — PATIENT OUTREACH (OUTPATIENT)
Dept: PLASTIC SURGERY | Facility: CLINIC | Age: 76
End: 2021-05-21

## 2021-05-21 NOTE — PATIENT INSTRUCTIONS
Left message for pt offering earlier appt with Dr. Perea. Provided direct contact information and requested call back to schedule. Kerline HAWKINS RN, BSN  Pt returned call and confirmed offered appt time on 6/2/21 at 11:15am. Kerline HAWKINS RN, BSN

## 2021-05-30 ENCOUNTER — RECORDS - HEALTHEAST (OUTPATIENT)
Dept: ADMINISTRATIVE | Facility: CLINIC | Age: 76
End: 2021-05-30

## 2021-06-02 ENCOUNTER — OFFICE VISIT (OUTPATIENT)
Dept: PLASTIC SURGERY | Facility: CLINIC | Age: 76
End: 2021-06-02
Payer: COMMERCIAL

## 2021-06-02 ENCOUNTER — OFFICE VISIT (OUTPATIENT)
Dept: WOUND CARE | Facility: CLINIC | Age: 76
End: 2021-06-02
Payer: COMMERCIAL

## 2021-06-02 VITALS
HEIGHT: 64 IN | SYSTOLIC BLOOD PRESSURE: 136 MMHG | OXYGEN SATURATION: 97 % | HEART RATE: 79 BPM | WEIGHT: 103.6 LBS | BODY MASS INDEX: 17.69 KG/M2 | DIASTOLIC BLOOD PRESSURE: 62 MMHG

## 2021-06-02 DIAGNOSIS — S81.801D OPEN WOUND OF RIGHT LOWER EXTREMITY, SUBSEQUENT ENCOUNTER: Primary | ICD-10-CM

## 2021-06-02 DIAGNOSIS — L97.911 ULCER OF RIGHT LOWER EXTREMITY, LIMITED TO BREAKDOWN OF SKIN (H): Primary | ICD-10-CM

## 2021-06-02 DIAGNOSIS — Z89.511 STATUS POST BELOW-KNEE AMPUTATION OF RIGHT LOWER EXTREMITY (H): ICD-10-CM

## 2021-06-02 PROCEDURE — 99211 OFF/OP EST MAY X REQ PHY/QHP: CPT

## 2021-06-02 PROCEDURE — 99213 OFFICE O/P EST LOW 20 MIN: CPT | Performed by: PLASTIC SURGERY

## 2021-06-02 ASSESSMENT — MIFFLIN-ST. JEOR: SCORE: 949.93

## 2021-06-02 ASSESSMENT — PAIN SCALES - GENERAL: PAINLEVEL: NO PAIN (0)

## 2021-06-02 NOTE — TELEPHONE ENCOUNTER
[facility] Nurse Triage Telephone Note      Provider: KELLI Gao  Facility: [facility]    Facility Type: TCU    Caller: Nivia  Call Back Number:  [phone]    Allergies: Penicillins    Reason for call: ESR 59 On IV Nafcillin with weekly labs. Also pt requests,compression wrap/ace LLE.    Verbal Order/Direction given by Provider: Fax to [phone] compression wrap/ace to LLE, on in am off HS.    Provider giving order: KELLI Gao    Verbal order given to: Clemencia Grant RN

## 2021-06-02 NOTE — NURSING NOTE
"Chief Complaint   Patient presents with     RECHECK     Discuss possible skin graft.       Vitals:    06/02/21 1123   BP: 136/62   BP Location: Left arm   Patient Position: Sitting   Cuff Size: Adult Regular   Pulse: 79   SpO2: 97%   Weight: 47 kg (103 lb 9.6 oz)   Height: 1.626 m (5' 4\")       Body mass index is 17.78 kg/m .                          Tiffanie Fisher, EMT    "

## 2021-06-02 NOTE — TELEPHONE ENCOUNTER
[facility] Nurse Triage Telephone Note      Provider: KELLI Gao  Facility: [facility]    Facility Type: TCU    Caller: Marjorie   Call Back Number:  [phone]    Allergies: Penicillins    Reason for call: Hgb of 7.8 last was 7.2 on Fe 325mg two times a day      Verbal Order/Direction given by Provider: Hgb Monday 10/28    Provider giving order: KELLI Gao    Verbal order given to: Marjorie Bower RN

## 2021-06-02 NOTE — TELEPHONE ENCOUNTER
[facility] Nurse Triage Telephone Note      Provider: KELLI Gao  Facility: [facility]    Facility Type: TCU    Caller: Bibi  Call Back Number:  [phone]    Allergies: Penicillins    Reason for call: CMP, CRP, Sed rate and CBC     Verbal Order/Direction given by Provider: send results to infectious disease    Provider giving order: KELLI Gao    Verbal order given to: Bibi Bower RN

## 2021-06-02 NOTE — TELEPHONE ENCOUNTER
[facility] Nurse Triage Telephone Note      Provider: KELLI Gao  Facility: [facility]    Facility Type: TCU    Caller: Bibi  Call Back Number:  [phone]    Allergies: Penicillins    Reason for call: Hgb-7.6 last 7.5, Vit D28.5- on calcium with D, TSH-4.55 not on levothyroxine and K+-4.0    Verbal Order/Direction given by Provider: Type and cross, 2units PRBC with lasix 20mg between units. Check Hgb 24hr after blood transfusion     Provider giving order: KELLI Gao    Verbal order given to: Bibi Bower RN

## 2021-06-02 NOTE — PROGRESS NOTES
"Patient comes to wound clinic for dressing change  per request of dr. Walker. She has history of a new wound on right knee  S/p skingraft      Dressing removed, wound washed with Microklenz, irrigated with Microklenz,and measured.     Wound evaluation: No improvement.   Size: length  2.5 cm length x 2.5 cm width x 0.1 cm depth.    There is no undermining or tunneling and wound bed is pretty dry and light pink   Drainage minimal  Serosanguinous  Not debrided not surgical      PLAN:  Pt saw dr Perea today for evaluation of wound and possible flap closure. Continue with HydroferaBlue dressing and bordered gauze    Plan of care:   Reason for dressing use non-debriding dressing applied   Cleaning: Microklenz  Primary:  HydroferaBlue dressing  Secondary: 4x4 bordered gauze  Secure with 1\" tape Micropore to keep on the knee  Frequency of change 2/week per day  Verbally approved by   How many supplies pt still has on hand: none    Pt received the following instructions:    Cleansing with Microklenz or soap and water Primary Dressing HydroferaBlue dressing. Secure with: bordered gauze. Frequency:2/week.   Signs and symptoms of infection taught.      Pt has our number should other issues arise. All questions answered for now.   Patient needs to be seen 1 month for wound measurements.   Treated and follow-up appointment made Dr. Perea was available for supervision of care if needed or if questions should arise and regarding plan of care. Shasha Degroot RN CWON                "

## 2021-06-02 NOTE — LETTER
6/2/2021       RE: Sakshi Jaeger  [address] [facility] [facility] [address]     Dear Colleague,    Thank you for referring your patient, Sakshi Jaeger, to the [facility] [place] at [facility]. Please see a copy of my visit note below.    Service Date: 06/02/2021    PRESENTING COMPLAINT:   Followup visit for nonhealing wound on the right BKA.    HISTORY OF PRESENTING COMPLAINT:  Ms. Jaeger is 75 years old, well known to my service.  She had a BKA done and a chronic nonhealing wound on the end of the stump that I operated on in 12/2020 that has healed.  The patient now has a prepatellar pressure wound, approximately 3 x 3 cm, down to the patella that is clean and noninfected but not healing.  She has been sent to me for my recommendations.  She is wearing a stump, but the stump does not put pressure on that wound.  There is a cut-out in that area.  She has no pain, no infection from it and just doing daily dressing changes.  Otherwise, her history and physical exam remain unchanged.    ASSESSMENT AND PLAN:  Based on the above findings, a diagnosis of pressure sore in the prepatellar wound of the BKA of the right leg was made.  I had a anais discussion with the patient and her  about options.  The conservative option is to leave things alone, just do conservative wound dressings, and continue to wear her stump without pressure to the area and live her life.  This would be the most conservative way to proceed.  Obviously, there is a very small risk of this wound getting worse or getting infected, and if that occurs, then it would force us into doing something more invasive.  The invasive way to proceed would be to plan a wide excision of the unstable prepatellar skin including the wound and then reconstructing it with a free ALT flap, and then once healed, then proceed with using the prosthesis; however, the  tissue that would be removed may change the architecture of her stump, and that needs to be taken into consideration as well.  Additionally, she has had numerous surgeries to her leg, and therefore, a thorough workup will need to be undertaken including old OP notes as well as a CTA to plan this procedure.  She wants to go with the conservative route.  I think that is reasonable.  I am available if she changes her mind.  I will see her back as needed.    Total time spent with chart review, visit itself and post-visit paperwork was 20 minutes.    RAISA Perea MD    D: 2021   T: 2021   MT: stefano    Name:     CARLOS PEDRAZA  MRN:      [id number]        Account:      [id number]   :      1945           Service Date: 2021       Document: [id number]

## 2021-06-03 VITALS
TEMPERATURE: 98 F | HEART RATE: 76 BPM | SYSTOLIC BLOOD PRESSURE: 124 MMHG | BODY MASS INDEX: 18.88 KG/M2 | WEIGHT: 117 LBS | DIASTOLIC BLOOD PRESSURE: 53 MMHG | RESPIRATION RATE: 18 BRPM | OXYGEN SATURATION: 92 %

## 2021-06-03 VITALS
OXYGEN SATURATION: 94 % | TEMPERATURE: 98.4 F | RESPIRATION RATE: 16 BRPM | SYSTOLIC BLOOD PRESSURE: 148 MMHG | WEIGHT: 119.2 LBS | DIASTOLIC BLOOD PRESSURE: 68 MMHG | HEART RATE: 80 BPM | BODY MASS INDEX: 19.24 KG/M2

## 2021-06-03 VITALS
TEMPERATURE: 98.4 F | BODY MASS INDEX: 18.88 KG/M2 | DIASTOLIC BLOOD PRESSURE: 64 MMHG | WEIGHT: 117 LBS | RESPIRATION RATE: 18 BRPM | HEART RATE: 90 BPM | SYSTOLIC BLOOD PRESSURE: 142 MMHG | OXYGEN SATURATION: 94 %

## 2021-06-03 VITALS
SYSTOLIC BLOOD PRESSURE: 151 MMHG | RESPIRATION RATE: 16 BRPM | BODY MASS INDEX: 19.24 KG/M2 | WEIGHT: 119.2 LBS | OXYGEN SATURATION: 94 % | TEMPERATURE: 97.8 F | DIASTOLIC BLOOD PRESSURE: 69 MMHG | HEART RATE: 89 BPM

## 2021-06-03 VITALS
TEMPERATURE: 97.7 F | DIASTOLIC BLOOD PRESSURE: 65 MMHG | OXYGEN SATURATION: 97 % | RESPIRATION RATE: 16 BRPM | WEIGHT: 117 LBS | SYSTOLIC BLOOD PRESSURE: 141 MMHG | HEART RATE: 86 BPM | BODY MASS INDEX: 18.88 KG/M2

## 2021-06-03 NOTE — PROGRESS NOTES
[facility]    NAME:  Sakshi Jaeger             :  1945  MRN: [id number]  CODE STATUS:  FULL CODE    VISIT TYPE: DISCHARGE SUMMARY  FACILYTY: [facility] [[id number]]                    PRIMARY CARE PROVIDER: Javan Pepe MD    DISCHARGE DIAGNOSIS:      1. Chronic osteomyelitis of right tibia (H)    2. Chronic anemia    3. Essential hypertension    4. Spinal stenosis of lumbar region without neurogenic claudication    5. Other rheumatoid arthritis with rheumatoid factor of unspecified site (H)    6. Age-related osteoporosis without current pathological fracture    7. Right BKA infection (H)         DISCHARGE MEDICATIONS:      Current Outpatient Medications   Medication Sig Dispense Refill     acetaminophen (TYLENOL) 325 MG tablet Take 650 mg by mouth every 4 (four) hours.       alendronate (FOSAMAX) 35 MG tablet Take 35 mg by mouth every 7 days. Take in the morning on an empty stomach with a full glass of water 30 minutes before food       amLODIPine (NORVASC) 5 MG tablet Take 5 mg by mouth daily.       ascorbic acid, vitamin C, (ASCORBIC ACID WITH ERI HIPS) 500 MG tablet Take 500 mg by mouth 2 (two) times a day.        calcium carbonate-vit D3-min 600 mg calcium- 400 unit Tab Take 2 tablets by mouth daily.       diclofenac sodium (VOLTAREN) 1 % Gel Apply 2 g topically 4 (four) times a day as needed.       ferrous sulfate 325 (65 FE) MG tablet Take 1 tablet by mouth 2 (two) times a day.       gabapentin (NEURONTIN) 300 MG capsule Take 600 mg by mouth 3 (three) times a day.       Lactobacillus rhamnosus GG (CULTURELLE) 10-15 Billion cell capsule Take 1 capsule by mouth 2 (two) times a day.       lidocaine (LIDOCARE) 4 % patch Place 1 patch on the skin daily. Remove and discard patch with 12 hours or as directed by MD.       lisinopril (PRINIVIL,ZESTRIL) 20 MG tablet Take 20 mg by mouth daily.       loperamide (IMODIUM A-D) 2 mg tablet Take 2 mg by mouth as needed for  diarrhea (Give 4 mg for after first stool then 2 mg for any loose stools after than for a max of 4 tabs/day).       multivitamin therapeutic tablet Take 1 tablet by mouth daily.       niacin 500 MG tablet Take 500 mg by mouth daily with breakfast.       ondansetron (ZOFRAN) 4 MG tablet Take 4 mg by mouth every 6 (six) hours as needed for nausea.       oxyCODONE (ROXICODONE) 5 MG immediate release tablet Take 5-10 mg by mouth every 4 (four) hours as needed for pain.       polyethylene glycol (MIRALAX) 17 gram packet Take 17 g by mouth 2 (two) times a day as needed.       potassium chloride (K-DUR,KLOR-CON) 20 MEQ tablet Take 20 mEq by mouth daily.              predniSONE (DELTASONE) 5 MG tablet Take 5 mg by mouth daily.       senna-docusate (PERICOLACE) 8.6-50 mg tablet Take 1 tablet by mouth 2 (two) times a day as needed.        simvastatin (ZOCOR) 20 MG tablet Take 20 mg by mouth at bedtime.       sulindac (CLINORIL) 200 MG tablet Take 200 mg by mouth 2 (two) times a day.       No current facility-administered medications for this visit.        HISTORY OF PRESENT ILLNESS: Sakshi Jaeger is a 74 y.o. female with a history of hypertension, CAD status post PCI in 2009, depression, RA on DMARDs and prednisone, lumbar spinal stenosis with chronic pain on narcotics, osteoporosis and chronic osteomyelitis of the right lower extremity after the patient sustained an ankle fracture with placement of screws in early 2005 status post foot amputation in 2005 but continued to have intermittent right leg stump infection.  She was found to have Sepsis secondary to acute right tibial osteomyelitis with abscess and MSSA bacteremia, now s/p debridement  Initial CT w/contrast of the R knee demonstrated sinus tracking down the femur, suspicious for early onset osteomyelitis. Subsequent MRI of the R Knee was notable for fluid collection with a sinus tract to the skin, concerning for abscess formation. Initially kept on vancomycin IV  (10/1-10/6) due to hx of PCN allergy (hives), but was switched to IV nafcillin after tolerating amoxicillin trial. Blood culture remain negative as of 10/6; LINDA results negative for IE. S/P irrigation and debridement right leg with packing and partial closure of the incision on 10/9, currently HDS and resuming normal diet. Patient's antibiotic course will total 6 weeks of IV nafcillin infusions, beginning with the first culture negative date.  - Continue nafcillin 2gm IV q4h (10/6-11/17/19) for total duration of 6 weeks.  - Daily dressing changes  - Follow up with Ortho in 2 weeks for wound check  - Weekly CBC, CMP, ESR, and CRP with results faxed to the [facility] at [phone]  - Follow up with [facility] the week of November 11  - PT/OT at TCU for deconditioning    Depression  Improved with visits from Health Psychology.    Asymptomatic right pleural effusion  No intervention needed, no respiratory distress.    Thrombocytosis  Presumed secondary to acute inflammation from osteomyelitis and bacteremia. Will continue to monitor via CBC checks while on IV antibiotics.    Rheumatoid arthritis  Will continue prednisone 5mg only at this time, holding the rest of her DMARDs while she is recovering from surgery and her bacteremia. Encourage follow-up with her outpatient rheumatologist to determine best time to resume her medications.    Lumbar spinal stenosis  Continue pain management with oxycodone, lidocaine patches.    CAD  Continue statin.    HTN  Continue amlodipine. Stopped her metoprolol, encourage monitoring after discharge to see if this needs to be resumed at a lower dose.    Hyponatremia, resolved    Patient was stabilized and transferred to TCU for continued rehabilitation.    SKILLED NURSING FACILITY COURSE:  During this TCU stay, patient completed all anticipated goals of therapy.  Completed a 6 week course of Nafcillin 2gm IV q4h (10/6-11/17/19).  Her pain is well managed with   Gabapentin,  Oxycodone, Lidocaine patch, and APAP.  Staff was concerned that her wound was infected so a wound culture was done and it came back negative. She wears a tubigrip on LLE for edema.  Unable to tolerate STEF hose. PICC line was discontinue. She has a follow up appointment on 11/19/19 at 1:30 with Ortho.  Patient had a Hgb of 7.2, last Hgb was 8.6, continues with Ferrous sulfate 325 mg two times a day coupled with Vitamin C.  She will need to follow up with Rheumatology in 2 - 6 weeks.      PHYSICAL EXAMINATION:    Vitals:    11/25/19 1046   BP: 126/49   Pulse: 78   Resp: 16   Temp: 98.2  F (36.8  C)   SpO2: 94%   Weight: 115 lb (52.2 kg)     General: Awake, Alert, oriented x3, not in any form of acute distress, answers questions appropriately, follows simple commands, conversant slightly anxious and thin looking.  Pale  HEENT: Pale conjunctiva, anicteric sclerae, oral mucosa is moist  NECK: Supple, without any lymphadenopathy, thyromegaly or any masses  LUNG: Clear to auscultation, good chest expansion. There are no crackles, no wheezes, normal AP diameter  BACK:  Kyphosis of the thoracic spine  CVS: There is good S1  S2, there are no murmurs, no heaves, rhythm is regular with occasional premature beats  ABDOMEN: Globular and soft, nontender to palpation, no organomegaly, good bowel sounds left buttock area with very small blanchable reddish area, no wound or opening noted  EXTREMITIES: Good range of motion on both upper and lower extremities except on the right knee where there is decreased range of motion with some tenderness, right BKA area with clean sutures with a 1inch surgical opening with extra fiber Ag pack that is filled with blood, no pedal edema, no cyanosis or clubbing, no calf tenderness PICC line on right arm is clean  SKIN: Warm and dry, no rashes or erythema noted    LABS:  All labs reviewed in the nursing home record.    DISCHARGE PLAN:  I certify that this patient is under my care and that I or the  nurse practitioner working with me, had a face-to-face encounter that meets the physician face-to-face encounter requirements with this patient.   Date of Face-to-Face Encounter: 11/18/2019    This patient is homebound because: The patient cannot leave home without  considerable and taxing effort.  Patient requires the aid of an electric wheelchair and needs the assistance of another person.    I certify that, based on my findings, the following services are medically necessary home health services: PT, OT, RN, and HHA    My clinical findings support the need for the above skilled services because:   Patient to be followed by home care for physical therapy to eval and treat for strengthening, balance, endurance, and safety with mobility, and ambulation.  Patient to be followed by home care for occupational therapy to eval and treat for strengthening, ADL needs, adaptive equipment, and safety.  Patient to be followed by home care for nursing services for medication set up and teaching, symptom and disease processes monitoring and education.    Patient to be followed by home care for home health aid services for bathing and ADL needs.    The patient is, or has been, under my care and I have initiated the establishment of the plan of care. This patient will be followed by a physician who will periodically review the plan of care.  Planned discharge.  All therapy goals have been met.  Family will assist with discharge and transportation.  Patient will follow up with PCP within 7-10 days after discharge for medication mangagment and appropriate lab studies, specifically Hgb.  Patient will need to follow up with Rheumatology in 2 - 6 weeks.       Patient received a hard script for Oxycodone #20      Electronically signed by:  Deisi Stein CNP      For documentation purposes, chart review, medication management, and discharge coordination of care was greater than 35 minutes

## 2021-06-03 NOTE — PROGRESS NOTES
[facility]    NAME:  Sakshi Jaeger             :  1945  MRN: [id number]  CODE STATUS:  FULL CODE    FACILITY:  [facility] [[id number]]         Chief Complaint   Patient presents with     Problem Visit     Osteomyeltitis of right tibia     HISTORY OF PRESENT ILLNESS: Sakshi Jaeger is a 74 y.o. female with a history of hypertension, CAD status post PCI in , depression, RA on DMARDs and prednisone, lumbar spinal stenosis with chronic pain on narcotics, osteoporosis and chronic osteomyelitis of the right lower extremity after the patient sustained an ankle fracture with placement of screws in early  status post foot amputation in  but continued to have intermittent right leg stump infection.  She was found to have Sepsis secondary to acute right tibial osteomyelitis with abscess and MSSA bacteremia, now s/p debridement  Initial CT w/contrast of the R knee demonstrated sinus tracking down the femur, suspicious for early onset osteomyelitis. Subsequent MRI of the R Knee was notable for fluid collection with a sinus tract to the skin, concerning for abscess formation. Initially kept on vancomycin IV (10/1-10/6) due to hx of PCN allergy (hives), but was switched to IV nafcillin after tolerating amoxicillin trial. Blood culture remain negative as of 10/6; LINDA results negative for IE. S/P irrigation and debridement right leg with packing and partial closure of the incision on 10/9, currently HDS and resuming normal diet. Patient's antibiotic course will total 6 weeks of IV nafcillin infusions, beginning with the first culture negative date.  - Continue nafcillin 2gm IV q4h (10/6-19) for total duration of 6 weeks.  - Daily dressing changes  - Follow up with Ortho in 2 weeks for wound check  - Weekly CBC, CMP, ESR, and CRP with results faxed to the  [facility] at [phone]  - Follow up with  [facility] the week of   - PT/OT at [facility] for  deconditioning    Depression  Improved with visits from Health Psychology.    Asymptomatic right pleural effusion  No intervention needed, no respiratory distress.    Thrombocytosis  Presumed secondary to acute inflammation from osteomyelitis and bacteremia. Will continue to monitor via CBC checks while on IV antibiotics.    Rheumatoid arthritis  Will continue prednisone 5mg only at this time, holding the rest of her DMARDs while she is recovering from surgery and her bacteremia. Encourage follow-up with her outpatient rheumatologist to determine best time to resume her medications.    Lumbar spinal stenosis  Continue pain management with oxycodone, lidocaine patches.    CAD  Continue statin.    HTN  Continue amlodipine. Stopped her metoprolol, encourage monitoring after discharge to see if this needs to be resumed at a lower dose.    Hyponatremia, resolved    Patient was stabilized and transferred to TCU for continued rehabilitation.    Today, patient is seen at the bedside.  Staff expresses concerns about a possible infected of right tibia stump.  Afebrile.  No erythema or malodorous.  Currently on IV nafcillin until 11/17/2019.  She had a follow up appointment with ID on 11/11/19.  Last CRP was 2.1 from 2.7.  No pain.    Past Medical History:   Diagnosis Date     Acute osteomyelitis of right tibia (H)     Acute right tibial osteomyelitis with abscess and MSSA bacteremia, now s/p debridement     Benign hypertension      CAD (coronary artery disease)      Depression      Hyponatremia      Iron deficiency anemia      Lumbar spinal stenosis      MI (myocardial infarction) (H) 03/2010     Osteoporosis      Pleural effusion, right      Rheumatoid arthritis (H)      Thrombocytosis (H)      Past Surgical History:   Procedure Laterality Date     APPENDECTOMY       BELOW KNEE LEG AMPUTATION Right 2005     CORONARY STENT PLACEMENT       I AND D   03/05/2014     IRRIGATION AND DEBRIDEMENT LOWER EXTREMITY, COMBINED Right  10/09/2019     KNEE ARTHROPLASTY Left 2011     Family History   Problem Relation Age of Onset     Other Mother         CARDIOVASCULAR     Cancer Brother      Social History     Socioeconomic History     Marital status:      Spouse name: Not on file     Number of children: Not on file     Years of education: Not on file     Highest education level: Not on file   Occupational History     Not on file   Social Needs     Financial resource strain: Not on file     Food insecurity:     Worry: Not on file     Inability: Not on file     Transportation needs:     Medical: Not on file     Non-medical: Not on file   Tobacco Use     Smoking status: Former Smoker     Packs/day: 0.50     Years: 45.00     Pack years: 22.50     Types: Cigarettes     Last attempt to quit: 2010     Years since quittin.7     Smokeless tobacco: Never Used   Substance and Sexual Activity     Alcohol use: Yes     Alcohol/week: 3.0 - 4.0 standard drinks     Types: 3 - 4 Standard drinks or equivalent per week     Drug use: Not on file     Sexual activity: Not on file   Lifestyle     Physical activity:     Days per week: Not on file     Minutes per session: Not on file     Stress: Not on file   Relationships     Social connections:     Talks on phone: Not on file     Gets together: Not on file     Attends Yazdanism service: Not on file     Active member of club or organization: Not on file     Attends meetings of clubs or organizations: Not on file     Relationship status: Not on file     Intimate partner violence:     Fear of current or ex partner: Not on file     Emotionally abused: Not on file     Physically abused: Not on file     Forced sexual activity: Not on file   Other Topics Concern     Not on file   Social History Narrative     Not on file     Allergies   Allergen Reactions     Penicillins Rash     Tolerated nafcillin and amoxicillin without rash (2019). Has tolerated cefazolin (, ), cephalexin (, , 2013,  2012), ceftriaxone (2019)       Current Outpatient Medications   Medication Sig Dispense Refill     acetaminophen (TYLENOL) 325 MG tablet Take 650 mg by mouth every 4 (four) hours.       alendronate (FOSAMAX) 35 MG tablet Take 35 mg by mouth every 7 days. Take in the morning on an empty stomach with a full glass of water 30 minutes before food       amLODIPine (NORVASC) 5 MG tablet Take 5 mg by mouth daily.       ascorbic acid, vitamin C, (ASCORBIC ACID WITH ERI HIPS) 500 MG tablet Take 500 mg by mouth daily.       calcium carbonate-vit D3-min 600 mg calcium- 400 unit Tab Take 2 tablets by mouth daily.       diclofenac sodium (VOLTAREN) 1 % Gel Apply 2 g topically 4 (four) times a day as needed.       ferrous sulfate 325 (65 FE) MG tablet Take 1 tablet by mouth 2 (two) times a day.       gabapentin (NEURONTIN) 300 MG capsule Take 600 mg by mouth 3 (three) times a day.       Lactobacillus rhamnosus GG (CULTURELLE) 10-15 Billion cell capsule Take 1 capsule by mouth 2 (two) times a day.       lidocaine (LIDOCARE) 4 % patch Place 1 patch on the skin daily. Remove and discard patch with 12 hours or as directed by MD.       lisinopril (PRINIVIL,ZESTRIL) 20 MG tablet Take 20 mg by mouth daily.       multivitamin therapeutic tablet Take 1 tablet by mouth daily.       nafcillin 1 gram/50 mL IVPB Infuse 2 g into a venous catheter every 4 (four) hours.       niacin 500 MG tablet Take 500 mg by mouth daily with breakfast.       ondansetron (ZOFRAN) 4 MG tablet Take 4 mg by mouth every 6 (six) hours as needed for nausea.       oxyCODONE (ROXICODONE) 5 MG immediate release tablet Take 5-10 mg by mouth every 4 (four) hours as needed for pain.       polyethylene glycol (MIRALAX) 17 gram packet Take 17 g by mouth 2 (two) times a day as needed.       potassium chloride (K-DUR,KLOR-CON) 20 MEQ tablet Take 20 mEq by mouth daily.              predniSONE (DELTASONE) 5 MG tablet Take 5 mg by mouth daily.       senna-docusate (PERICOLACE)  8.6-50 mg tablet Take 1 tablet by mouth 2 (two) times a day.       simvastatin (ZOCOR) 20 MG tablet Take 20 mg by mouth at bedtime.       sulindac (CLINORIL) 200 MG tablet Take 200 mg by mouth 2 (two) times a day.       No current facility-administered medications for this visit.        REVIEW OF SYSTEMS:    Currently, no fever, chills, or rigors. Does not have any visual or hearing problems. Denies any chest pain, headaches, palpitations, lightheadedness, dizziness, shortness of breath, or cough. Appetite is good. Denies any GERD symptoms. Denies any difficulty with swallowing, nausea, or vomiting.  Denies any abdominal pain, diarrhea or constipation. Denies any urinary symptoms. No insomnia. No active bleeding. No rash.       PHYSICAL EXAMINATION:  Vitals:    11/13/19 2023   BP: 148/68   Pulse: 80   Resp: 16   Temp: 98.4  F (36.9  C)   SpO2: 94%   Weight: 119 lb 3.2 oz (54.1 kg)       GENERAL: Awake, Alert, oriented x3, not in any form of acute distress, answers questions appropriately, follows simple commands, conversant  HEENT: Head is normocephalic with normal hair distribution. No evidence of trauma. Ears: No acute purulent discharge. Eyes: Conjunctivae pink with no scleral jaundice. Nose: Normal mucosa and septum. NECK: Supple with no cervical or supraclavicular lymphadenopathy. Trachea is midline.   CHEST: No tenderness or deformity, no crepitus  LUNG: Clear to auscultation with good chest expansion. There are no crackles or wheezes, normal AP diameter.  BACK: No kyphosis of the thoracic spine. Symmetric, no curvature, ROM normal, no CVA tenderness, no spinal tenderness   CVS: There is good S1  S2, there are no murmurs, rubs, gallops, or heaves, rhythm is regular,  2+ pulses symmetric in all extremities.  ABDOMEN: Globular and soft, nontender to palpation, non distended, no masses, no organomegaly, good bowel sounds, no rebound or guarding, no peritoneal signs.   EXTREMITIES:  Full range of motion on both  upper and lower extremities, there is no tenderness to palpation, no pedal edema, no cyanosis or clubbing, no calf tenderness.  Pulses equal in all extremities, normal cap refill, no joint swelling.  SKIN: Warm and dry, no erythema noted.  Skin color, texture, no rashes or lesions.  NEUROLOGICAL: The patient is oriented to person, place and time. Strength and sensation are grossly intact. Face is symmetric.    LABS:      Lab Results   Component Value Date    WBC 7.0 11/11/2019    HGB 8.6 (L) 11/11/2019    HCT 27.1 (L) 11/11/2019    MCV 94 11/11/2019     11/11/2019     Vitamin D, Total (25-Hydroxy)   Date Value Ref Range Status   10/17/2019 28.5 (L) 30.0 - 80.0 ng/mL Final     Lab Results   Component Value Date    TSH 4.55 10/17/2019         ASSESSMENT/PLAN:    1. Chronic osteomyelitis of right tibia (H) - Status post I&D care of Dr. Lopes, next appointment is this afternoon. The patient will continue on IV Nafcillin.  The patient will need dressing daily dressing changes and weekly CBC, CMP, ESR, CRP   2. Other rheumatoid arthritis with rheumatoid factor of unspecified site (H) - Continue Prednisone   3. Age-related osteoporosis without current pathological fracture  - Continue Alendronate.  Last  Vitamin D - 28.5 and TSH - 4.55   4. Chronic anemia - Last Hgb 8.9, will continue Ferrous sulfate and Vitamin C                       Electronically signed by:  Deisi Stein CNP

## 2021-06-03 NOTE — PROGRESS NOTES
[facility]    NAME:  Sakshi Jaeger             :  1945  MRN: [id number]  CODE STATUS:  FULL CODE    FACILITY:  [facility], located within [facility] [[id number]]         Chief Complaint   Patient presents with     Problem Visit     LLE edema     HISTORY OF PRESENT ILLNESS: Sakshi Jaeger is a 74 y.o. female with a history of hypertension, CAD status post PCI in , depression, RA on DMARDs and prednisone, lumbar spinal stenosis with chronic pain on narcotics, osteoporosis and chronic osteomyelitis of the right lower extremity after the patient sustained an ankle fracture with placement of screws in early  status post foot amputation in  but continued to have intermittent right leg stump infection.  She was found to have Sepsis secondary to acute right tibial osteomyelitis with abscess and MSSA bacteremia, now s/p debridement  Initial CT w/contrast of the R knee demonstrated sinus tracking down the femur, suspicious for early onset osteomyelitis. Subsequent MRI of the R Knee was notable for fluid collection with a sinus tract to the skin, concerning for abscess formation. Initially kept on vancomycin IV (10/1-10/6) due to hx of PCN allergy (hives), but was switched to IV nafcillin after tolerating amoxicillin trial. Blood culture remain negative as of 10/6; LINDA results negative for IE. S/P irrigation and debridement right leg with packing and partial closure of the incision on 10/9, currently HDS and resuming normal diet. Patient's antibiotic course will total 6 weeks of IV nafcillin infusions, beginning with the first culture negative date.  - Continue nafcillin 2gm IV q4h (10/6-19) for total duration of 6 weeks.  - Daily dressing changes  - Follow up with Ortho in 2 weeks for wound check  - Weekly CBC, CMP, ESR, and CRP with results faxed to the  [facility] at [phone]  - Follow up with  [facility] the week of   - PT/OT at [facility] for  deconditioning    Depression  Improved with visits from Health Psychology.    Asymptomatic right pleural effusion  No intervention needed, no respiratory distress.    Thrombocytosis  Presumed secondary to acute inflammation from osteomyelitis and bacteremia. Will continue to monitor via CBC checks while on IV antibiotics.    Rheumatoid arthritis  Will continue prednisone 5mg only at this time, holding the rest of her DMARDs while she is recovering from surgery and her bacteremia. Encourage follow-up with her outpatient rheumatologist to determine best time to resume her medications.    Lumbar spinal stenosis  Continue pain management with oxycodone, lidocaine patches.    CAD  Continue statin.    HTN  Continue amlodipine. Stopped her metoprolol, encourage monitoring after discharge to see if this needs to be resumed at a lower dose.    Hyponatremia, resolved    Patient was stabilized and transferred to TCU for continued rehabilitation.    Today, patient is seen at the bedside.  She denies any pain.  Expresses some concerns about LLE edema which is managed with Tubigrips.  Hgb continue to improve, with last Hgb of 8.9 from 7.2.  No active bleeding. No complaints of fatigue, loss of energy, dizziness, insomnia.    Past Medical History:   Diagnosis Date     Acute osteomyelitis of right tibia (H)     Acute right tibial osteomyelitis with abscess and MSSA bacteremia, now s/p debridement     Benign hypertension      CAD (coronary artery disease)      Depression      Hyponatremia      Iron deficiency anemia      Lumbar spinal stenosis      MI (myocardial infarction) (H) 03/2010     Osteoporosis      Pleural effusion, right      Rheumatoid arthritis (H)      Thrombocytosis (H)      Past Surgical History:   Procedure Laterality Date     APPENDECTOMY       BELOW KNEE LEG AMPUTATION Right 2005     CORONARY STENT PLACEMENT       I AND D   03/05/2014     IRRIGATION AND DEBRIDEMENT LOWER EXTREMITY, COMBINED Right 10/09/2019      KNEE ARTHROPLASTY Left 2011     Family History   Problem Relation Age of Onset     Other Mother         CARDIOVASCULAR     Cancer Brother      Social History     Socioeconomic History     Marital status:      Spouse name: Not on file     Number of children: Not on file     Years of education: Not on file     Highest education level: Not on file   Occupational History     Not on file   Social Needs     Financial resource strain: Not on file     Food insecurity:     Worry: Not on file     Inability: Not on file     Transportation needs:     Medical: Not on file     Non-medical: Not on file   Tobacco Use     Smoking status: Former Smoker     Packs/day: 0.50     Years: 45.00     Pack years: 22.50     Types: Cigarettes     Last attempt to quit: 2010     Years since quittin.7     Smokeless tobacco: Never Used   Substance and Sexual Activity     Alcohol use: Yes     Alcohol/week: 3.0 - 4.0 standard drinks     Types: 3 - 4 Standard drinks or equivalent per week     Drug use: Not on file     Sexual activity: Not on file   Lifestyle     Physical activity:     Days per week: Not on file     Minutes per session: Not on file     Stress: Not on file   Relationships     Social connections:     Talks on phone: Not on file     Gets together: Not on file     Attends Christianity service: Not on file     Active member of club or organization: Not on file     Attends meetings of clubs or organizations: Not on file     Relationship status: Not on file     Intimate partner violence:     Fear of current or ex partner: Not on file     Emotionally abused: Not on file     Physically abused: Not on file     Forced sexual activity: Not on file   Other Topics Concern     Not on file   Social History Narrative     Not on file     Allergies   Allergen Reactions     Penicillins Rash     Tolerated nafcillin and amoxicillin without rash (2019). Has tolerated cefazolin (, ), cephalexin (, , , ), ceftriaxone  (2019)       Current Outpatient Medications   Medication Sig Dispense Refill     acetaminophen (TYLENOL) 325 MG tablet Take 650 mg by mouth every 4 (four) hours.       alendronate (FOSAMAX) 35 MG tablet Take 35 mg by mouth every 7 days. Take in the morning on an empty stomach with a full glass of water 30 minutes before food       amLODIPine (NORVASC) 5 MG tablet Take 5 mg by mouth daily.       ascorbic acid, vitamin C, (ASCORBIC ACID WITH ERI HIPS) 500 MG tablet Take 500 mg by mouth daily.       calcium carbonate-vit D3-min 600 mg calcium- 400 unit Tab Take 2 tablets by mouth daily.       diclofenac sodium (VOLTAREN) 1 % Gel Apply 2 g topically 4 (four) times a day as needed.       ferrous sulfate 325 (65 FE) MG tablet Take 1 tablet by mouth 2 (two) times a day.       gabapentin (NEURONTIN) 300 MG capsule Take 600 mg by mouth 3 (three) times a day.       Lactobacillus rhamnosus GG (CULTURELLE) 10-15 Billion cell capsule Take 1 capsule by mouth 2 (two) times a day.       lidocaine (LIDOCARE) 4 % patch Place 1 patch on the skin daily. Remove and discard patch with 12 hours or as directed by MD.       lisinopril (PRINIVIL,ZESTRIL) 20 MG tablet Take 20 mg by mouth daily.       multivitamin therapeutic tablet Take 1 tablet by mouth daily.       nafcillin 1 gram/50 mL IVPB Infuse 2 g into a venous catheter every 4 (four) hours.       niacin 500 MG tablet Take 500 mg by mouth daily with breakfast.       ondansetron (ZOFRAN) 4 MG tablet Take 4 mg by mouth every 6 (six) hours as needed for nausea.       oxyCODONE (ROXICODONE) 5 MG immediate release tablet Take 5-10 mg by mouth every 4 (four) hours as needed for pain.       polyethylene glycol (MIRALAX) 17 gram packet Take 17 g by mouth 2 (two) times a day as needed.       potassium chloride (K-DUR,KLOR-CON) 20 MEQ tablet Take 20 mEq by mouth daily.              predniSONE (DELTASONE) 5 MG tablet Take 5 mg by mouth daily.       senna-docusate (PERICOLACE) 8.6-50 mg tablet  Take 1 tablet by mouth 2 (two) times a day.       simvastatin (ZOCOR) 20 MG tablet Take 20 mg by mouth at bedtime.       sulindac (CLINORIL) 200 MG tablet Take 200 mg by mouth 2 (two) times a day.       No current facility-administered medications for this visit.        REVIEW OF SYSTEMS:    Currently, no fever, chills, or rigors. Does not have any visual or hearing problems. Denies any chest pain, headaches, palpitations, lightheadedness, dizziness, shortness of breath, or cough. Appetite is good. Denies any GERD symptoms. Denies any difficulty with swallowing, nausea, or vomiting.  Denies any abdominal pain, diarrhea or constipation. Denies any urinary symptoms. No insomnia. No active bleeding. No rash.       PHYSICAL EXAMINATION:  Vitals:    11/04/19 1417   BP: 142/64   Pulse: 90   Resp: 18   Temp: 98.4  F (36.9  C)   SpO2: 94%   Weight: 117 lb (53.1 kg)       GENERAL: Awake, Alert, oriented x3, not in any form of acute distress, answers questions appropriately, follows simple commands, conversant  HEENT: Head is normocephalic with normal hair distribution. No evidence of trauma. Ears: No acute purulent discharge. Eyes: Conjunctivae pink with no scleral jaundice. Nose: Normal mucosa and septum. NECK: Supple with no cervical or supraclavicular lymphadenopathy. Trachea is midline.   CHEST: No tenderness or deformity, no crepitus  LUNG: Clear to auscultation with good chest expansion. There are no crackles or wheezes, normal AP diameter.  BACK: No kyphosis of the thoracic spine. Symmetric, no curvature, ROM normal, no CVA tenderness, no spinal tenderness   CVS: There is good S1  S2, there are no murmurs, rubs, gallops, or heaves, rhythm is regular,  2+ pulses symmetric in all extremities.  ABDOMEN: Globular and soft, nontender to palpation, non distended, no masses, no organomegaly, good bowel sounds, no rebound or guarding, no peritoneal signs.   EXTREMITIES:  Full range of motion on both upper and lower  extremities, there is no tenderness to palpation, no pedal edema, no cyanosis or clubbing, no calf tenderness.  Pulses equal in all extremities, normal cap refill, no joint swelling.  SKIN: Warm and dry, no erythema noted.  Skin color, texture, no rashes or lesions.  NEUROLOGICAL: The patient is oriented to person, place and time. Strength and sensation are grossly intact. Face is symmetric.    LABS:  All labs reviewed in the nursing home record.  Lab Results   Component Value Date    WBC 8.1 11/04/2019    HGB 8.9 (L) 11/04/2019    HCT 28.4 (L) 11/04/2019    MCV 93 11/04/2019     11/04/2019     No results found for this or any previous visit.      No results found for: HGBA1C  Vitamin D, Total (25-Hydroxy)   Date Value Ref Range Status   10/17/2019 28.5 (L) 30.0 - 80.0 ng/mL Final       ASSESSMENT/PLAN:      1. Chronic osteomyelitis of right tibia (H)  Status post I&D care of Dr. Lopes.  The patient has follow-up with Dr. Lopes on 10/30.  The patient will continue on IV nafcillin until 11/17/2019 and will have ID follow-up on 11/11/19.  The patient will need dressing daily dressing changes and weekly CBC, CMP, ESR, CRP.   2. Essential hypertension  We will continue to monitor for now since intermittently high blood pressures could be secondary to the patient's pain.  Decrease KCl to 20 M EQ daily and recheck potassium levels next week.  Patient also has mild hyponatremia with sodium on 10/28/2019 at 134 from 133 on 10/14/19 .  Will repeat BMP as already ordered by infectious disease   3. Spinal stenosis of lumbar region without neurogenic claudication  Continue pain medications, discussed narcotics risks and benefits   4. Chronic anemia  Will continue to monitor, consider PT if less than 8 since the patient has a history of CAD   5. Left lower extremity edema Will continue Tubigrips and continue to monitor           Electronically signed by:  Deisi Stein CNP

## 2021-06-03 NOTE — PROGRESS NOTES
[facility]    NAME:  Sakshi Jaeger             :  1945  MRN: [id number]  CODE STATUS:  FULL CODE    FACILITY:  [facility] [[id number]]         Chief Complaint   Patient presents with     Problem Visit     Osteomyeltitis of the right tibia and Anemia     HISTORY OF PRESENT ILLNESS: Sakshi Jaeger is a 74 y.o. female with a history of hypertension, CAD status post PCI in , depression, RA on DMARDs and prednisone, lumbar spinal stenosis with chronic pain on narcotics, osteoporosis and chronic osteomyelitis of the right lower extremity after the patient sustained an ankle fracture with placement of screws in early  status post foot amputation in  but continued to have intermittent right leg stump infection.  She was found to have Sepsis secondary to acute right tibial osteomyelitis with abscess and MSSA bacteremia, now s/p debridement  Initial CT w/contrast of the R knee demonstrated sinus tracking down the femur, suspicious for early onset osteomyelitis. Subsequent MRI of the R Knee was notable for fluid collection with a sinus tract to the skin, concerning for abscess formation. Initially kept on vancomycin IV (10/1-10/6) due to hx of PCN allergy (hives), but was switched to IV nafcillin after tolerating amoxicillin trial. Blood culture remain negative as of 10/6; LINDA results negative for IE. S/P irrigation and debridement right leg with packing and partial closure of the incision on 10/9, currently HDS and resuming normal diet. Patient's antibiotic course will total 6 weeks of IV nafcillin infusions, beginning with the first culture negative date.  - Continue nafcillin 2gm IV q4h (10/6-19) for total duration of 6 weeks.  - Daily dressing changes  - Follow up with Ortho in 2 weeks for wound check  - Weekly CBC, CMP, ESR, and CRP with results faxed to the [facility] at [phone]  - Follow up with [facility] the week of   - PT/OT at [facility]  for deconditioning    Depression  Improved with visits from Health Psychology.    Asymptomatic right pleural effusion  No intervention needed, no respiratory distress.    Thrombocytosis  Presumed secondary to acute inflammation from osteomyelitis and bacteremia. Will continue to monitor via CBC checks while on IV antibiotics.    Rheumatoid arthritis  Will continue prednisone 5mg only at this time, holding the rest of her DMARDs while she is recovering from surgery and her bacteremia. Encourage follow-up with her outpatient rheumatologist to determine best time to resume her medications.    Lumbar spinal stenosis  Continue pain management with oxycodone, lidocaine patches.    CAD  Continue statin.    HTN  Continue amlodipine. Stopped her metoprolol, encourage monitoring after discharge to see if this needs to be resumed at a lower dose.    Hyponatremia, resolved     Patient was stabilized and transferred to TCU for continued rehabilitation.     Today, patient is seen at the bedside. S/p BKA.. She is on IV nafcillin until 11/17/2019 for Osteomyelitis of the right tibia.   Followed by ID with the next appointment on 11/11/19.  She has LLEE but is noncompliant with STEF hose as she feels that these are too tight.     Past Medical History:   Diagnosis Date     Acute osteomyelitis of right tibia (H)     Acute right tibial osteomyelitis with abscess and MSSA bacteremia, now s/p debridement     Benign hypertension      CAD (coronary artery disease)      Depression      Hyponatremia      Iron deficiency anemia      Lumbar spinal stenosis      MI (myocardial infarction) (H) 03/2010     Osteoporosis      Pleural effusion, right      Rheumatoid arthritis (H)      Thrombocytosis (H)      Past Surgical History:   Procedure Laterality Date     APPENDECTOMY       BELOW KNEE LEG AMPUTATION Right 2005     CORONARY STENT PLACEMENT       I AND D   03/05/2014     IRRIGATION AND DEBRIDEMENT LOWER EXTREMITY, COMBINED Right 10/09/2019      KNEE ARTHROPLASTY Left 2011     Family History   Problem Relation Age of Onset     Other Mother         CARDIOVASCULAR     Cancer Brother      Social History     Socioeconomic History     Marital status:      Spouse name: Not on file     Number of children: Not on file     Years of education: Not on file     Highest education level: Not on file   Occupational History     Not on file   Social Needs     Financial resource strain: Not on file     Food insecurity:     Worry: Not on file     Inability: Not on file     Transportation needs:     Medical: Not on file     Non-medical: Not on file   Tobacco Use     Smoking status: Former Smoker     Packs/day: 0.50     Years: 45.00     Pack years: 22.50     Types: Cigarettes     Last attempt to quit: 2010     Years since quittin.7     Smokeless tobacco: Never Used   Substance and Sexual Activity     Alcohol use: Yes     Alcohol/week: 3.0 - 4.0 standard drinks     Types: 3 - 4 Standard drinks or equivalent per week     Drug use: Not on file     Sexual activity: Not on file   Lifestyle     Physical activity:     Days per week: Not on file     Minutes per session: Not on file     Stress: Not on file   Relationships     Social connections:     Talks on phone: Not on file     Gets together: Not on file     Attends Jewish service: Not on file     Active member of club or organization: Not on file     Attends meetings of clubs or organizations: Not on file     Relationship status: Not on file     Intimate partner violence:     Fear of current or ex partner: Not on file     Emotionally abused: Not on file     Physically abused: Not on file     Forced sexual activity: Not on file   Other Topics Concern     Not on file   Social History Narrative     Not on file     Allergies   Allergen Reactions     Penicillins Rash     Tolerated nafcillin and amoxicillin without rash (2019). Has tolerated cefazolin (, ), cephalexin (, , , ), ceftriaxone  (2019)       Current Outpatient Medications   Medication Sig Dispense Refill     acetaminophen (TYLENOL) 325 MG tablet Take 650 mg by mouth every 4 (four) hours.       alendronate (FOSAMAX) 35 MG tablet Take 35 mg by mouth every 7 days. Take in the morning on an empty stomach with a full glass of water 30 minutes before food       amLODIPine (NORVASC) 5 MG tablet Take 5 mg by mouth daily.       ascorbic acid, vitamin C, (ASCORBIC ACID WITH ERI HIPS) 500 MG tablet Take 500 mg by mouth daily.       calcium carbonate-vit D3-min 600 mg calcium- 400 unit Tab Take 2 tablets by mouth daily.       diclofenac sodium (VOLTAREN) 1 % Gel Apply 2 g topically 4 (four) times a day as needed.       ferrous sulfate 325 (65 FE) MG tablet Take 1 tablet by mouth 2 (two) times a day.       gabapentin (NEURONTIN) 300 MG capsule Take 600 mg by mouth 3 (three) times a day.       Lactobacillus rhamnosus GG (CULTURELLE) 10-15 Billion cell capsule Take 1 capsule by mouth 2 (two) times a day.       lidocaine (LIDOCARE) 4 % patch Place 1 patch on the skin daily. Remove and discard patch with 12 hours or as directed by MD.       lisinopril (PRINIVIL,ZESTRIL) 20 MG tablet Take 20 mg by mouth daily.       multivitamin therapeutic tablet Take 1 tablet by mouth daily.       nafcillin 1 gram/50 mL IVPB Infuse 2 g into a venous catheter every 4 (four) hours.       niacin 500 MG tablet Take 500 mg by mouth daily with breakfast.       ondansetron (ZOFRAN) 4 MG tablet Take 4 mg by mouth every 6 (six) hours as needed for nausea.       oxyCODONE (ROXICODONE) 5 MG immediate release tablet Take 5-10 mg by mouth every 4 (four) hours as needed for pain.       polyethylene glycol (MIRALAX) 17 gram packet Take 17 g by mouth 2 (two) times a day as needed.       potassium chloride (K-DUR,KLOR-CON) 20 MEQ tablet Take 20 mEq by mouth daily.              predniSONE (DELTASONE) 5 MG tablet Take 5 mg by mouth daily.       senna-docusate (PERICOLACE) 8.6-50 mg tablet  Take 1 tablet by mouth 2 (two) times a day.       simvastatin (ZOCOR) 20 MG tablet Take 20 mg by mouth at bedtime.       sulindac (CLINORIL) 200 MG tablet Take 200 mg by mouth 2 (two) times a day.       No current facility-administered medications for this visit.        REVIEW OF SYSTEMS:    Currently, no fever, chills, or rigors. Does not have any visual or hearing problems. Denies any chest pain, headaches, palpitations, lightheadedness, dizziness, shortness of breath, or cough. Appetite is good. Denies any GERD symptoms. Denies any difficulty with swallowing, nausea, or vomiting.  Denies any abdominal pain, diarrhea or constipation. Denies any urinary symptoms. No insomnia. No active bleeding. No rash.       PHYSICAL EXAMINATION:  Vitals:    11/10/19 1842   BP: 141/65   Pulse: 86   Resp: 16   Temp: 97.7  F (36.5  C)   SpO2: 97%   Weight: 117 lb (53.1 kg)       GENERAL: Awake, Alert, oriented x3, not in any form of acute distress, answers questions appropriately, follows simple commands, conversant  HEENT: Head is normocephalic with normal hair distribution. No evidence of trauma. Ears: No acute purulent discharge. Eyes: Conjunctivae pink with no scleral jaundice. Nose: Normal mucosa and septum. NECK: Supple with no cervical or supraclavicular lymphadenopathy. Trachea is midline.   CHEST: No tenderness or deformity, no crepitus  LUNG: Clear to auscultation with good chest expansion. There are no crackles or wheezes, normal AP diameter.  BACK: No kyphosis of the thoracic spine. Symmetric, no curvature, ROM normal, no CVA tenderness, no spinal tenderness   CVS: There is good S1  S2, there are no murmurs, rubs, gallops, or heaves, rhythm is regular,  2+ pulses symmetric in all extremities.  ABDOMEN: Globular and soft, nontender to palpation, non distended, no masses, no organomegaly, good bowel sounds, no rebound or guarding, no peritoneal signs.   EXTREMITIES:  Full range of motion on both upper and lower  extremities, there is no tenderness to palpation, no pedal edema, no cyanosis or clubbing, no calf tenderness.  Pulses equal in all extremities, normal cap refill, no joint swelling.  SKIN: Warm and dry, no erythema noted.  Skin color, texture, no rashes or lesions.  NEUROLOGICAL: The patient is oriented to person, place and time. Strength and sensation are grossly intact. Face is symmetric.    LABS:    Lab Results   Component Value Date    WBC 8.1 11/04/2019    HGB 8.9 (L) 11/04/2019    HCT 28.4 (L) 11/04/2019    MCV 93 11/04/2019     11/04/2019     Vitamin D, Total (25-Hydroxy)   Date Value Ref Range Status   10/17/2019 28.5 (L) 30.0 - 80.0 ng/mL Final     ASSESSMENT/PLAN:    1. Chronic osteomyelitis of right tibia (H)  - Continue IV nafcillin until 11/17/2019. Will follow-up with ID 11/11/2019    2. Chronic anemia - Will continue to monitor Hgb weeky   3. Age-related osteoporosis without current pathological fracture - Continue Alendronate   4. Essential hypertension - Blood pressures are within target range, will continue Lisinopril and Norvasc   5. Left lower extremity edema - Will have therapy manage Tubigrips           Electronically signed by:  Deisi Stein CNP    Total time spent on the unit was 40 minutes of which 25 minutes was spent in counseling Antibiotic therapy, Anemia and close monitoring of Hgb and coordination of care of the above plan with nursing staff, patient, and therapy.

## 2021-06-03 NOTE — TELEPHONE ENCOUNTER
[facility] Nurse Triage Telephone Note      Provider: KELLI Gao  Facility: [facility]    Facility Type: TCU    Caller: Bibi  Call Back Number:  [phone]    Allergies: Penicillins    Reason for call: Ca 8.4, Protein 5.1, Albumin 1.4, CRP 2.7 (4.4), WBC 8.1, Hgb 8.9 ESR 23 (34)     Verbal Order/Direction given by Provider: FAX to ID, RD to increase protein.    Provider giving order: KELLI Gao    Verbal order given to: Bibi Grant RN

## 2021-06-03 NOTE — PROGRESS NOTES
[facility]    NAME:  Sakshi Jaeger             :  1945  MRN: [id number]  CODE STATUS:  FULL CODE    FACILITY:  [facility] [[id number]]         Chief Complaint   Patient presents with     Problem Visit     Chronic osteomyelitis of right tibia     HISTORY OF PRESENT ILLNESS: Sakshi Jaeger is a 74 y.o. female with a history of hypertension, CAD status post PCI in , depression, RA on DMARDs and prednisone, lumbar spinal stenosis with chronic pain on narcotics, osteoporosis and chronic osteomyelitis of the right lower extremity after the patient sustained an ankle fracture with placement of screws in early  status post foot amputation in  but continued to have intermittent right leg stump infection.  She was found to have Sepsis secondary to acute right tibial osteomyelitis with abscess and MSSA bacteremia, now s/p debridement  Initial CT w/contrast of the R knee demonstrated sinus tracking down the femur, suspicious for early onset osteomyelitis. Subsequent MRI of the R Knee was notable for fluid collection with a sinus tract to the skin, concerning for abscess formation. Initially kept on vancomycin IV (10/1-10/6) due to hx of PCN allergy (hives), but was switched to IV nafcillin after tolerating amoxicillin trial. Blood culture remain negative as of 10/6; LINDA results negative for IE. S/P irrigation and debridement right leg with packing and partial closure of the incision on 10/9, currently HDS and resuming normal diet. Patient's antibiotic course will total 6 weeks of IV nafcillin infusions, beginning with the first culture negative date.  - Continue nafcillin 2gm IV q4h (10/6-19) for total duration of 6 weeks.  - Daily dressing changes  - Follow up with Ortho in 2 weeks for wound check  - Weekly CBC, CMP, ESR, and CRP with results faxed to the  [facility] at [phone]  - Follow up with  [facility] the week of   - PT[facility] for  deconditioning    Depression  Improved with visits from Health Psychology.    Asymptomatic right pleural effusion  No intervention needed, no respiratory distress.    Thrombocytosis  Presumed secondary to acute inflammation from osteomyelitis and bacteremia. Will continue to monitor via CBC checks while on IV antibiotics.    Rheumatoid arthritis  Will continue prednisone 5mg only at this time, holding the rest of her DMARDs while she is recovering from surgery and her bacteremia. Encourage follow-up with her outpatient rheumatologist to determine best time to resume her medications.    Lumbar spinal stenosis  Continue pain management with oxycodone, lidocaine patches.    CAD  Continue statin.    HTN  Continue amlodipine. Stopped her metoprolol, encourage monitoring after discharge to see if this needs to be resumed at a lower dose.    Hyponatremia, resolved    Patient was stabilized and transferred to TCU for continued rehabilitation.    Today, patient is seen at the bedside.  Staff expresses concerns about a possible infected of right tibia stump.  Afebrile.  No erythema or malodorous.  Currently on IV nafcillin until 11/17/2019 and will have ID follow-up on 11/11/19.    Past Medical History:   Diagnosis Date     Acute osteomyelitis of right tibia (H)     Acute right tibial osteomyelitis with abscess and MSSA bacteremia, now s/p debridement     Benign hypertension      CAD (coronary artery disease)      Depression      Hyponatremia      Iron deficiency anemia      Lumbar spinal stenosis      MI (myocardial infarction) (H) 03/2010     Osteoporosis      Pleural effusion, right      Rheumatoid arthritis (H)      Thrombocytosis (H)      Past Surgical History:   Procedure Laterality Date     APPENDECTOMY       BELOW KNEE LEG AMPUTATION Right 2005     CORONARY STENT PLACEMENT       I AND D   03/05/2014     IRRIGATION AND DEBRIDEMENT LOWER EXTREMITY, COMBINED Right 10/09/2019     KNEE ARTHROPLASTY Left 04/27/2011      Family History   Problem Relation Age of Onset     Other Mother         CARDIOVASCULAR     Cancer Brother      Social History     Socioeconomic History     Marital status:      Spouse name: Not on file     Number of children: Not on file     Years of education: Not on file     Highest education level: Not on file   Occupational History     Not on file   Social Needs     Financial resource strain: Not on file     Food insecurity:     Worry: Not on file     Inability: Not on file     Transportation needs:     Medical: Not on file     Non-medical: Not on file   Tobacco Use     Smoking status: Former Smoker     Packs/day: 0.50     Years: 45.00     Pack years: 22.50     Types: Cigarettes     Last attempt to quit: 2010     Years since quittin.7     Smokeless tobacco: Never Used   Substance and Sexual Activity     Alcohol use: Yes     Alcohol/week: 3.0 - 4.0 standard drinks     Types: 3 - 4 Standard drinks or equivalent per week     Drug use: Not on file     Sexual activity: Not on file   Lifestyle     Physical activity:     Days per week: Not on file     Minutes per session: Not on file     Stress: Not on file   Relationships     Social connections:     Talks on phone: Not on file     Gets together: Not on file     Attends [employer]ars[employer] service: Not on file     Active member of club or organization: Not on file     Attends meetings of clubs or organizations: Not on file     Relationship status: Not on file     Intimate partner violence:     Fear of current or ex partner: Not on file     Emotionally abused: Not on file     Physically abused: Not on file     Forced sexual activity: Not on file   Other Topics Concern     Not on file   Social History Narrative     Not on file     Allergies   Allergen Reactions     Penicillins Rash     Tolerated nafcillin and amoxicillin without rash (2019). Has tolerated cefazolin (, ), cephalexin (, , 2013, ), ceftriaxone (2019)       Current Outpatient  Medications   Medication Sig Dispense Refill     acetaminophen (TYLENOL) 325 MG tablet Take 650 mg by mouth every 4 (four) hours.       alendronate (FOSAMAX) 35 MG tablet Take 35 mg by mouth every 7 days. Take in the morning on an empty stomach with a full glass of water 30 minutes before food       amLODIPine (NORVASC) 5 MG tablet Take 5 mg by mouth daily.       ascorbic acid, vitamin C, (ASCORBIC ACID WITH ERI HIPS) 500 MG tablet Take 500 mg by mouth daily.       calcium carbonate-vit D3-min 600 mg calcium- 400 unit Tab Take 2 tablets by mouth daily.       diclofenac sodium (VOLTAREN) 1 % Gel Apply 2 g topically 4 (four) times a day as needed.       ferrous sulfate 325 (65 FE) MG tablet Take 1 tablet by mouth 2 (two) times a day.       gabapentin (NEURONTIN) 300 MG capsule Take 600 mg by mouth 3 (three) times a day.       Lactobacillus rhamnosus GG (CULTURELLE) 10-15 Billion cell capsule Take 1 capsule by mouth 2 (two) times a day.       lidocaine (LIDOCARE) 4 % patch Place 1 patch on the skin daily. Remove and discard patch with 12 hours or as directed by MD.       lisinopril (PRINIVIL,ZESTRIL) 20 MG tablet Take 20 mg by mouth daily.       multivitamin therapeutic tablet Take 1 tablet by mouth daily.       nafcillin 1 gram/50 mL IVPB Infuse 2 g into a venous catheter every 4 (four) hours.       niacin 500 MG tablet Take 500 mg by mouth daily with breakfast.       ondansetron (ZOFRAN) 4 MG tablet Take 4 mg by mouth every 6 (six) hours as needed for nausea.       oxyCODONE (ROXICODONE) 5 MG immediate release tablet Take 5-10 mg by mouth every 4 (four) hours as needed for pain.       polyethylene glycol (MIRALAX) 17 gram packet Take 17 g by mouth 2 (two) times a day as needed.       potassium chloride (K-DUR,KLOR-CON) 20 MEQ tablet Take 20 mEq by mouth daily.              predniSONE (DELTASONE) 5 MG tablet Take 5 mg by mouth daily.       senna-docusate (PERICOLACE) 8.6-50 mg tablet Take 1 tablet by mouth 2 (two)  times a day.       simvastatin (ZOCOR) 20 MG tablet Take 20 mg by mouth at bedtime.       sulindac (CLINORIL) 200 MG tablet Take 200 mg by mouth 2 (two) times a day.       No current facility-administered medications for this visit.        REVIEW OF SYSTEMS:    Currently, no fever, chills, or rigors. Does not have any visual or hearing problems. Denies any chest pain, headaches, palpitations, lightheadedness, dizziness, shortness of breath, or cough. Appetite is good. Denies any GERD symptoms. Denies any difficulty with swallowing, nausea, or vomiting.  Denies any abdominal pain, diarrhea or constipation. Denies any urinary symptoms. No insomnia. No active bleeding. No rash.       PHYSICAL EXAMINATION:  Vitals:    11/09/19 2134   BP: 124/53   Pulse: 76   Resp: 18   Temp: 98  F (36.7  C)   SpO2: 92%   Weight: 117 lb (53.1 kg)       GENERAL: Awake, Alert, oriented x3, not in any form of acute distress, answers questions appropriately, follows simple commands, conversant  HEENT: Head is normocephalic with normal hair distribution. No evidence of trauma. Ears: No acute purulent discharge. Eyes: Conjunctivae pink with no scleral jaundice. Nose: Normal mucosa and septum. NECK: Supple with no cervical or supraclavicular lymphadenopathy. Trachea is midline.   CHEST: No tenderness or deformity, no crepitus  LUNG: Clear to auscultation with good chest expansion. There are no crackles or wheezes, normal AP diameter.  BACK: No kyphosis of the thoracic spine. Symmetric, no curvature, ROM normal, no CVA tenderness, no spinal tenderness   CVS: There is good S1  S2, there are no murmurs, rubs, gallops, or heaves, rhythm is regular,  2+ pulses symmetric in all extremities.  ABDOMEN: Globular and soft, nontender to palpation, non distended, no masses, no organomegaly, good bowel sounds, no rebound or guarding, no peritoneal signs.   EXTREMITIES:  Full range of motion on both upper and lower extremities, there is no tenderness to  palpation, no pedal edema, no cyanosis or clubbing, no calf tenderness.  Pulses equal in all extremities, normal cap refill, no joint swelling.  SKIN: Warm and dry, no erythema noted.  Skin color, texture, no rashes or lesions.  NEUROLOGICAL: The patient is oriented to person, place and time. Strength and sensation are grossly intact. Face is symmetric.    LABS:  All labs reviewed in the nursing home record.  Lab Results   Component Value Date    WBC 7.0 11/11/2019    HGB 8.6 (L) 11/11/2019    HCT 27.1 (L) 11/11/2019    MCV 94 11/11/2019     11/11/2019     Vitamin D, Total (25-Hydroxy)   Date Value Ref Range Status   10/17/2019 28.5 (L) 30.0 - 80.0 ng/mL Final     Lab Results   Component Value Date    TSH 4.55 10/17/2019         ASSESSMENT/PLAN:    1. Chronic osteomyelitis of right tibia (H) - Status post I&D care of Dr. Lopes.  The patient has follow-up with Dr. Lopes on 10/30.  The patient will continue on IV nafcillin until 11/17/2019 and will have ID follow-up on 11/11/19.  The patient will need dressing daily dressing changes and weekly CBC, CMP, ESR, CRP.   2. Chronic anemia - Last Hgb 8.9, will continue to monitor    3. Age-related osteoporosis without current pathological fracture - Continue Alendronate.  Last  Vitamin D - 28.5 and TSH - 4.55   4. Essential hypertension - Patient also has mild hyponatremia with sodium on 10/28/2019 at 134 from 133 on 10/14/19 .  Will repeat BMP as already ordered by infectious disease                 Electronically signed by:  Deisi Stein CNP

## 2021-06-03 NOTE — PROGRESS NOTES
Service Date: 06/02/2021    PRESENTING COMPLAINT:   Followup visit for nonhealing wound on the right BKA.    HISTORY OF PRESENTING COMPLAINT:  Ms. Jaeger is 75 years old, well known to my service.  She had a BKA done and a chronic nonhealing wound on the end of the stump that I operated on in 12/2020 that has healed.  The patient now has a prepatellar pressure wound, approximately 3 x 3 cm, down to the patella that is clean and noninfected but not healing.  She has been sent to me for my recommendations.  She is wearing a stump, but the stump does not put pressure on that wound.  There is a cut-out in that area.  She has no pain, no infection from it and just doing daily dressing changes.  Otherwise, her history and physical exam remain unchanged.    ASSESSMENT AND PLAN:  Based on the above findings, a diagnosis of pressure sore in the prepatellar wound of the BKA of the right leg was made.  I had a anais discussion with the patient and her  about options.  The conservative option is to leave things alone, just do conservative wound dressings, and continue to wear her stump without pressure to the area and live her life.  This would be the most conservative way to proceed.  Obviously, there is a very small risk of this wound getting worse or getting infected, and if that occurs, then it would force us into doing something more invasive.  The invasive way to proceed would be to plan a wide excision of the unstable prepatellar skin including the wound and then reconstructing it with a free ALT flap, and then once healed, then proceed with using the prosthesis; however, the tissue that would be removed may change the architecture of her stump, and that needs to be taken into consideration as well.  Additionally, she has had numerous surgeries to her leg, and therefore, a thorough workup will need to be undertaken including old OP notes as well as a CTA to plan this procedure.  She wants to go with the  conservative route.  I think that is reasonable.  I am available if she changes her mind.  I will see her back as needed.    Total time spent with chart review, visit itself and post-visit paperwork was 20 minutes.    RAISA Perea MD        D: 2021   T: 2021   MT: stefano    Name:     CARLOS PEDRAZA  MRN:      [id number]        Account:      [id number]   :      1945           Service Date: 2021       Document: [id number]

## 2021-06-03 NOTE — PROGRESS NOTES
[facility]    NAME:  Sakshi Jaeger             :  1945  MRN: [id number]  CODE STATUS:  FULL CODE    FACILITY:  [facility] [[id number]]         Chief Complaint   Patient presents with     Problem Visit     Osteomyelitis of the right tibia     HISTORY OF PRESENT ILLNESS: Sakshi Jaeger is a 74 y.o. female with a history of hypertension, CAD status post PCI in , depression, RA on DMARDs and prednisone, lumbar spinal stenosis with chronic pain on narcotics, osteoporosis and chronic osteomyelitis of the right lower extremity after the patient sustained an ankle fracture with placement of screws in early  status post foot amputation in  but continued to have intermittent right leg stump infection.  She was found to have Sepsis secondary to acute right tibial osteomyelitis with abscess and MSSA bacteremia, now s/p debridement  Initial CT w/contrast of the R knee demonstrated sinus tracking down the femur, suspicious for early onset osteomyelitis. Subsequent MRI of the R Knee was notable for fluid collection with a sinus tract to the skin, concerning for abscess formation. Initially kept on vancomycin IV (10/1-10/6) due to hx of PCN allergy (hives), but was switched to IV nafcillin after tolerating amoxicillin trial. Blood culture remain negative as of 10/6; LINDA results negative for IE. S/P irrigation and debridement right leg with packing and partial closure of the incision on 10/9, currently HDS and resuming normal diet. Patient's antibiotic course will total 6 weeks of IV nafcillin infusions, beginning with the first culture negative date.  - Continue nafcillin 2gm IV q4h (10/6-19) for total duration of 6 weeks.  - Daily dressing changes  - Follow up with Ortho in 2 weeks for wound check  - Weekly CBC, CMP, ESR, and CRP with results faxed to the  [facility] at [phone]  - Follow up with  [facility] the week of   - [facility] for  deconditioning    Depression  Improved with visits from Health Psychology.    Asymptomatic right pleural effusion  No intervention needed, no respiratory distress.    Thrombocytosis  Presumed secondary to acute inflammation from osteomyelitis and bacteremia. Will continue to monitor via CBC checks while on IV antibiotics.    Rheumatoid arthritis  Will continue prednisone 5mg only at this time, holding the rest of her DMARDs while she is recovering from surgery and her bacteremia. Encourage follow-up with her outpatient rheumatologist to determine best time to resume her medications.    Lumbar spinal stenosis  Continue pain management with oxycodone, lidocaine patches.    CAD  Continue statin.    HTN  Continue amlodipine. Stopped her metoprolol, encourage monitoring after discharge to see if this needs to be resumed at a lower dose.    Hyponatremia, resolved    Patient was stabilized and transferred to TCU for continued rehabilitation.    Today, patient is seen at the bedside. She has osteomyelitis of right tibia stump. Currently on IV nafcillin until 11/17/2019.  Follow-up Appt Nov 19th 1:30PM with Dr. Pantera More. She was seen by ID on 11/12/2019 and they have given the ok to discharge on 11/18/2019. Last CRP was 2.1 from 2.7.  No pain.    Past Medical History:   Diagnosis Date     Acute osteomyelitis of right tibia (H)     Acute right tibial osteomyelitis with abscess and MSSA bacteremia, now s/p debridement     Benign hypertension      CAD (coronary artery disease)      Depression      Hyponatremia      Iron deficiency anemia      Lumbar spinal stenosis      MI (myocardial infarction) (H) 03/2010     Osteoporosis      Pleural effusion, right      Rheumatoid arthritis (H)      Thrombocytosis (H)      Past Surgical History:   Procedure Laterality Date     APPENDECTOMY       BELOW KNEE LEG AMPUTATION Right 2005     CORONARY STENT PLACEMENT       I AND D   03/05/2014     IRRIGATION AND DEBRIDEMENT LOWER  EXTREMITY, COMBINED Right 10/09/2019     KNEE ARTHROPLASTY Left 2011     Family History   Problem Relation Age of Onset     Other Mother         CARDIOVASCULAR     Cancer Brother      Social History     Socioeconomic History     Marital status:      Spouse name: Not on file     Number of children: Not on file     Years of education: Not on file     Highest education level: Not on file   Occupational History     Not on file   Social Needs     Financial resource strain: Not on file     Food insecurity:     Worry: Not on file     Inability: Not on file     Transportation needs:     Medical: Not on file     Non-medical: Not on file   Tobacco Use     Smoking status: Former Smoker     Packs/day: 0.50     Years: 45.00     Pack years: 22.50     Types: Cigarettes     Last attempt to quit: 2010     Years since quittin.7     Smokeless tobacco: Never Used   Substance and Sexual Activity     Alcohol use: Yes     Alcohol/week: 3.0 - 4.0 standard drinks     Types: 3 - 4 Standard drinks or equivalent per week     Drug use: Not on file     Sexual activity: Not on file   Lifestyle     Physical activity:     Days per week: Not on file     Minutes per session: Not on file     Stress: Not on file   Relationships     Social connections:     Talks on phone: Not on file     Gets together: Not on file     Attends Jainism service: Not on file     Active member of club or organization: Not on file     Attends meetings of clubs or organizations: Not on file     Relationship status: Not on file     Intimate partner violence:     Fear of current or ex partner: Not on file     Emotionally abused: Not on file     Physically abused: Not on file     Forced sexual activity: Not on file   Other Topics Concern     Not on file   Social History Narrative     Not on file     Allergies   Allergen Reactions     Penicillins Rash     Tolerated nafcillin and amoxicillin without rash (). Has tolerated cefazolin (, ),  cephalexin (2015, 2014, 2013, 2012), ceftriaxone (2019)       Current Outpatient Medications   Medication Sig Dispense Refill     acetaminophen (TYLENOL) 325 MG tablet Take 650 mg by mouth every 4 (four) hours.       alendronate (FOSAMAX) 35 MG tablet Take 35 mg by mouth every 7 days. Take in the morning on an empty stomach with a full glass of water 30 minutes before food       amLODIPine (NORVASC) 5 MG tablet Take 5 mg by mouth daily.       ascorbic acid, vitamin C, (ASCORBIC ACID WITH ERI HIPS) 500 MG tablet Take 500 mg by mouth daily.       calcium carbonate-vit D3-min 600 mg calcium- 400 unit Tab Take 2 tablets by mouth daily.       diclofenac sodium (VOLTAREN) 1 % Gel Apply 2 g topically 4 (four) times a day as needed.       ferrous sulfate 325 (65 FE) MG tablet Take 1 tablet by mouth 2 (two) times a day.       gabapentin (NEURONTIN) 300 MG capsule Take 600 mg by mouth 3 (three) times a day.       Lactobacillus rhamnosus GG (CULTURELLE) 10-15 Billion cell capsule Take 1 capsule by mouth 2 (two) times a day.       lidocaine (LIDOCARE) 4 % patch Place 1 patch on the skin daily. Remove and discard patch with 12 hours or as directed by MD.       lisinopril (PRINIVIL,ZESTRIL) 20 MG tablet Take 20 mg by mouth daily.       multivitamin therapeutic tablet Take 1 tablet by mouth daily.       nafcillin 1 gram/50 mL IVPB Infuse 2 g into a venous catheter every 4 (four) hours.       niacin 500 MG tablet Take 500 mg by mouth daily with breakfast.       ondansetron (ZOFRAN) 4 MG tablet Take 4 mg by mouth every 6 (six) hours as needed for nausea.       oxyCODONE (ROXICODONE) 5 MG immediate release tablet Take 5-10 mg by mouth every 4 (four) hours as needed for pain.       polyethylene glycol (MIRALAX) 17 gram packet Take 17 g by mouth 2 (two) times a day as needed.       potassium chloride (K-DUR,KLOR-CON) 20 MEQ tablet Take 20 mEq by mouth daily.              predniSONE (DELTASONE) 5 MG tablet Take 5 mg by mouth daily.        senna-docusate (PERICOLACE) 8.6-50 mg tablet Take 1 tablet by mouth 2 (two) times a day.       simvastatin (ZOCOR) 20 MG tablet Take 20 mg by mouth at bedtime.       sulindac (CLINORIL) 200 MG tablet Take 200 mg by mouth 2 (two) times a day.       No current facility-administered medications for this visit.        REVIEW OF SYSTEMS:    Currently, no fever, chills, or rigors. Does not have any visual or hearing problems. Denies any chest pain, headaches, palpitations, lightheadedness, dizziness, shortness of breath, or cough. Appetite is good. Denies any GERD symptoms. Denies any difficulty with swallowing, nausea, or vomiting.  Denies any abdominal pain, diarrhea or constipation. Denies any urinary symptoms. No insomnia. No active bleeding. No rash.       PHYSICAL EXAMINATION:  Vitals:    11/13/19 1102   BP: 151/69   Pulse: 89   Resp: 16   Temp: 97.8  F (36.6  C)   SpO2: 94%   Weight: 119 lb 3.2 oz (54.1 kg)       GENERAL: Awake, Alert, oriented x3, not in any form of acute distress, answers questions appropriately, follows simple commands, conversant  HEENT: Head is normocephalic with normal hair distribution. No evidence of trauma. Ears: No acute purulent discharge. Eyes: Conjunctivae pink with no scleral jaundice. Nose: Normal mucosa and septum. NECK: Supple with no cervical or supraclavicular lymphadenopathy. Trachea is midline.   CHEST: No tenderness or deformity, no crepitus  LUNG: Clear to auscultation with good chest expansion. There are no crackles or wheezes, normal AP diameter.  BACK: No kyphosis of the thoracic spine. Symmetric, no curvature, ROM normal, no CVA tenderness, no spinal tenderness   CVS: There is good S1  S2, there are no murmurs, rubs, gallops, or heaves, rhythm is regular,  2+ pulses symmetric in all extremities.  ABDOMEN: Globular and soft, nontender to palpation, non distended, no masses, no organomegaly, good bowel sounds, no rebound or guarding, no peritoneal signs.   EXTREMITIES:   Full range of motion on both upper and lower extremities, there is no tenderness to palpation, no pedal edema, no cyanosis or clubbing, no calf tenderness.  Pulses equal in all extremities, normal cap refill, no joint swelling.  SKIN: Warm and dry, no erythema noted.  Skin color, texture, no rashes or lesions.  NEUROLOGICAL: The patient is oriented to person, place and time. Strength and sensation are grossly intact. Face is symmetric.    LABS:      Lab Results   Component Value Date    WBC 7.0 11/11/2019    HGB 8.6 (L) 11/11/2019    HCT 27.1 (L) 11/11/2019    MCV 94 11/11/2019     11/11/2019     Vitamin D, Total (25-Hydroxy)   Date Value Ref Range Status   10/17/2019 28.5 (L) 30.0 - 80.0 ng/mL Final     Lab Results   Component Value Date    TSH 4.55 10/17/2019         ASSESSMENT/PLAN:    1. Chronic osteomyelitis of right tibia (H) Status post I&D care of Dr. Lopes, last appointment was on 11/12/2019 with no further follow up appointments. The patient will continue on IV Nafcillin until 11/17/2019.  Last CRP 2.1   2. Essential hypertension - Blood pressures are within target range, will continue Norvasc and Lisinopril   3. Other rheumatoid arthritis with rheumatoid factor of unspecified site (H)  - Continue DMARDs and Prednisone   4. Age-related osteoporosis without current pathological fracture  - Continue Alendronate.  Last  Vitamin D - 28.5 and TSH - 4.55                             Electronically signed by:  Deisi Stein CNP

## 2021-06-04 VITALS
BODY MASS INDEX: 18.56 KG/M2 | TEMPERATURE: 98.2 F | SYSTOLIC BLOOD PRESSURE: 126 MMHG | OXYGEN SATURATION: 94 % | HEART RATE: 78 BPM | RESPIRATION RATE: 16 BRPM | DIASTOLIC BLOOD PRESSURE: 49 MMHG | WEIGHT: 115 LBS

## 2021-06-04 VITALS
RESPIRATION RATE: 16 BRPM | TEMPERATURE: 98.2 F | BODY MASS INDEX: 18.56 KG/M2 | HEART RATE: 78 BPM | DIASTOLIC BLOOD PRESSURE: 49 MMHG | OXYGEN SATURATION: 94 % | WEIGHT: 115 LBS | SYSTOLIC BLOOD PRESSURE: 126 MMHG

## 2021-06-04 VITALS
HEART RATE: 76 BPM | WEIGHT: 104.5 LBS | OXYGEN SATURATION: 96 % | SYSTOLIC BLOOD PRESSURE: 137 MMHG | DIASTOLIC BLOOD PRESSURE: 76 MMHG | BODY MASS INDEX: 16.87 KG/M2 | TEMPERATURE: 98.6 F | RESPIRATION RATE: 18 BRPM

## 2021-06-04 VITALS
RESPIRATION RATE: 16 BRPM | WEIGHT: 104.5 LBS | SYSTOLIC BLOOD PRESSURE: 144 MMHG | BODY MASS INDEX: 16.87 KG/M2 | DIASTOLIC BLOOD PRESSURE: 76 MMHG | OXYGEN SATURATION: 97 % | HEART RATE: 85 BPM | TEMPERATURE: 97.4 F

## 2021-06-04 NOTE — PROGRESS NOTES
[facility]    NAME:  Sakshi Jaeger             :  1945  MRN: [id number]  CODE STATUS:  FULL CODE    FACILITY:  [facility] [[id number]]         Chief Complaint   Patient presents with     Problem Visit     Right BKA     HISTORY OF PRESENT ILLNESS: Sakshi Jaeger is a 74 y.o. female with a history of hypertension, CAD status post PCI in , depression, RA on DMARDs and prednisone, lumbar spinal stenosis with chronic pain on narcotics, osteoporosis and chronic osteomyelitis of the right lower extremity after the patient sustained an ankle fracture with placement of screws in early  status post foot amputation in  but continued to have intermittent right leg stump infection.  She was found to have Sepsis secondary to acute right tibial osteomyelitis with abscess and MSSA bacteremia, now s/p debridement  Initial CT w/contrast of the R knee demonstrated sinus tracking down the femur, suspicious for early onset osteomyelitis. Subsequent MRI of the R Knee was notable for fluid collection with a sinus tract to the skin, concerning for abscess formation. Initially kept on vancomycin IV (10/1-10/6) due to hx of PCN allergy (hives), but was switched to IV nafcillin after tolerating amoxicillin trial. Blood culture remain negative as of 10/6; LINDA results negative for IE. S/P irrigation and debridement right leg with packing and partial closure of the incision on 10/9, currently HDS and resuming normal diet. Patient's antibiotic course will total 6 weeks of IV nafcillin infusions, beginning with the first culture negative date.  - Continue nafcillin 2gm IV q4h (10/6-19) for total duration of 6 weeks.  - Daily dressing changes  - Follow up with Ortho in 2 weeks for wound check  - Weekly CBC, CMP, ESR, and CRP with results faxed to the  [facility] at [phone]  - Follow up with  [facility] the week of   - PT/OT at [facility] for  deconditioning    Depression  Improved with visits from Health Psychology.    Asymptomatic right pleural effusion  No intervention needed, no respiratory distress.    Thrombocytosis  Presumed secondary to acute inflammation from osteomyelitis and bacteremia. Will continue to monitor via CBC checks while on IV antibiotics.    Rheumatoid arthritis  Will continue prednisone 5mg only at this time, holding the rest of her DMARDs while she is recovering from surgery and her bacteremia. Encourage follow-up with her outpatient rheumatologist to determine best time to resume her medications.    Lumbar spinal stenosis  Continue pain management with oxycodone, lidocaine patches.    CAD  Continue statin.    HTN  Continue amlodipine. Stopped her metoprolol, encourage monitoring after discharge to see if this needs to be resumed at a lower dose.    Hyponatremia, resolved     Patient was stabilized and transferred to TCU for continued rehabilitation.    Today, patient is seen at the bedside.  Denies pain.  No insomnia. Appetite is good. She does have some LLEE.      Past Medical History:   Diagnosis Date     Acute osteomyelitis of right tibia (H)     Acute right tibial osteomyelitis with abscess and MSSA bacteremia, now s/p debridement     Benign hypertension      CAD (coronary artery disease)      Depression      Hyponatremia      Iron deficiency anemia      Lumbar spinal stenosis      MI (myocardial infarction) (H) 03/2010     Osteoporosis      Pleural effusion, right      Rheumatoid arthritis (H)      Thrombocytosis (H)      Past Surgical History:   Procedure Laterality Date     APPENDECTOMY       BELOW KNEE LEG AMPUTATION Right 2005     CORONARY STENT PLACEMENT       I AND D   03/05/2014     IRRIGATION AND DEBRIDEMENT LOWER EXTREMITY, COMBINED Right 10/09/2019     KNEE ARTHROPLASTY Left 04/27/2011     Family History   Problem Relation Age of Onset     Other Mother         CARDIOVASCULAR     Cancer Brother      Social History      Socioeconomic History     Marital status:      Spouse name: Not on file     Number of children: Not on file     Years of education: Not on file     Highest education level: Not on file   Occupational History     Not on file   Social Needs     Financial resource strain: Not on file     Food insecurity:     Worry: Not on file     Inability: Not on file     Transportation needs:     Medical: Not on file     Non-medical: Not on file   Tobacco Use     Smoking status: Former Smoker     Packs/day: 0.50     Years: 45.00     Pack years: 22.50     Types: Cigarettes     Last attempt to quit: 2010     Years since quittin.8     Smokeless tobacco: Never Used   Substance and Sexual Activity     Alcohol use: Yes     Alcohol/week: 3.0 - 4.0 standard drinks     Types: 3 - 4 Standard drinks or equivalent per week     Drug use: Not on file     Sexual activity: Not on file   Lifestyle     Physical activity:     Days per week: Not on file     Minutes per session: Not on file     Stress: Not on file   Relationships     Social connections:     Talks on phone: Not on file     Gets together: Not on file     Attends Latter day service: Not on file     Active member of club or organization: Not on file     Attends meetings of clubs or organizations: Not on file     Relationship status: Not on file     Intimate partner violence:     Fear of current or ex partner: Not on file     Emotionally abused: Not on file     Physically abused: Not on file     Forced sexual activity: Not on file   Other Topics Concern     Not on file   Social History Narrative     Not on file     Allergies   Allergen Reactions     Penicillins Rash     Tolerated nafcillin and amoxicillin without rash (2019). Has tolerated cefazolin (, ), cephalexin (, , 2013, 2012), ceftriaxone (2019)       Current Outpatient Medications   Medication Sig Dispense Refill     acetaminophen (TYLENOL) 325 MG tablet Take 650 mg by mouth every 4 (four) hours.        alendronate (FOSAMAX) 35 MG tablet Take 35 mg by mouth every 7 days. Take in the morning on an empty stomach with a full glass of water 30 minutes before food       amLODIPine (NORVASC) 5 MG tablet Take 5 mg by mouth daily.       ascorbic acid, vitamin C, (ASCORBIC ACID WITH ERI HIPS) 500 MG tablet Take 500 mg by mouth 2 (two) times a day.        calcium carbonate-vit D3-min 600 mg calcium- 400 unit Tab Take 2 tablets by mouth daily.       diclofenac sodium (VOLTAREN) 1 % Gel Apply 2 g topically 4 (four) times a day as needed.       ferrous sulfate 325 (65 FE) MG tablet Take 1 tablet by mouth 2 (two) times a day.       gabapentin (NEURONTIN) 300 MG capsule Take 600 mg by mouth 3 (three) times a day.       Lactobacillus rhamnosus GG (CULTURELLE) 10-15 Billion cell capsule Take 1 capsule by mouth 2 (two) times a day.       lidocaine (LIDOCARE) 4 % patch Place 1 patch on the skin daily. Remove and discard patch with 12 hours or as directed by MD.       lisinopril (PRINIVIL,ZESTRIL) 20 MG tablet Take 20 mg by mouth daily.       loperamide (IMODIUM A-D) 2 mg tablet Take 2 mg by mouth as needed for diarrhea (Give 4 mg for after first stool then 2 mg for any loose stools after than for a max of 4 tabs/day).       multivitamin therapeutic tablet Take 1 tablet by mouth daily.       niacin 500 MG tablet Take 500 mg by mouth daily with breakfast.       ondansetron (ZOFRAN) 4 MG tablet Take 4 mg by mouth every 6 (six) hours as needed for nausea.       oxyCODONE (ROXICODONE) 5 MG immediate release tablet Take 5-10 mg by mouth every 4 (four) hours as needed for pain.       polyethylene glycol (MIRALAX) 17 gram packet Take 17 g by mouth 2 (two) times a day as needed.       potassium chloride (K-DUR,KLOR-CON) 20 MEQ tablet Take 20 mEq by mouth daily.              predniSONE (DELTASONE) 5 MG tablet Take 5 mg by mouth daily.       senna-docusate (PERICOLACE) 8.6-50 mg tablet Take 1 tablet by mouth 2 (two) times a day as needed.         simvastatin (ZOCOR) 20 MG tablet Take 20 mg by mouth at bedtime.       sulindac (CLINORIL) 200 MG tablet Take 200 mg by mouth 2 (two) times a day.       No current facility-administered medications for this visit.        REVIEW OF SYSTEMS:    Currently, no fever, chills, or rigors. Does not have any visual or hearing problems. Denies any chest pain, headaches, palpitations, lightheadedness, dizziness, shortness of breath, or cough. Appetite is good. Denies any GERD symptoms. Denies any difficulty with swallowing, nausea, or vomiting.  Denies any abdominal pain, diarrhea or constipation. Denies any urinary symptoms. No insomnia. No active bleeding. No rash.       PHYSICAL EXAMINATION:  Vitals:    12/15/19 2048   BP: 137/76   Pulse: 76   Resp: 18   Temp: 98.6  F (37  C)   SpO2: 96%   Weight: 104 lb 8 oz (47.4 kg)       GENERAL: Awake, Alert, oriented x3, not in any form of acute distress, answers questions appropriately, follows simple commands, conversant  HEENT: Head is normocephalic with normal hair distribution. No evidence of trauma. Ears: No acute purulent discharge. Eyes: Conjunctivae pink with no scleral jaundice. Nose: Normal mucosa and septum. NECK: Supple with no cervical or supraclavicular lymphadenopathy. Trachea is midline.   CHEST: No tenderness or deformity, no crepitus  LUNG: Clear to auscultation with good chest expansion. There are no crackles or wheezes, normal AP diameter.  BACK: No kyphosis of the thoracic spine. Symmetric, no curvature, ROM normal, no CVA tenderness, no spinal tenderness   CVS: There is good S1  S2, there are no murmurs, rubs, gallops, or heaves, rhythm is regular,  2+ pulses symmetric in all extremities.  ABDOMEN: Globular and soft, nontender to palpation, non distended, no masses, no organomegaly, good bowel sounds, no rebound or guarding, no peritoneal signs.   EXTREMITIES:  Full range of motion on both upper and lower extremities, there is no tenderness to palpation,  no pedal edema, no cyanosis or clubbing, no calf tenderness.  Pulses equal in all extremities, normal cap refill, no joint swelling.  SKIN: Warm and dry, no erythema noted.  Skin color, texture, no rashes or lesions.  NEUROLOGICAL: The patient is oriented to person, place and time. Strength and sensation are grossly intact. Face is symmetric.    LABS:      Lab Results   Component Value Date    WBC 7.0 11/11/2019    HGB 8.6 (L) 11/11/2019    HCT 27.1 (L) 11/11/2019    MCV 94 11/11/2019     11/11/2019     Vitamin D, Total (25-Hydroxy)   Date Value Ref Range Status   10/17/2019 28.5 (L) 30.0 - 80.0 ng/mL Final     ASSESSMENT/PLAN:    1. Edema of left lower extremity - Ok for Tubigrips   2. Chronic osteomyelitis of right tibia (H) - Continue IV nafcillin until 11/17/2019. Will follow-up with ID 11/11/2019    3. Other rheumatoid arthritis with rheumatoid factor of unspecified site (H) ) - Followed by Rheumatology, will continue prednisone 5 mg daily.  However, patient's DMARDs and Methotrexate are being held until after IV antibiotics.   4. Essential hypertension - Blood pressures are within target range, will continue Lisinopril and Norvasc   5. Chronic anemia - Hgb 7.8, will continue to monitor                   Electronically signed by:  Deisi Stein CNP

## 2021-06-04 NOTE — PROGRESS NOTES
[facility]    NAME:  Sakshi Jaeger             :  1945  MRN: [id number]  CODE STATUS:  FULL CODE    FACILITY:  [facility] [[id number]]         Chief Complaint   Patient presents with     Problem Visit     Chronic osteomyelitis of right tibia      HISTORY OF PRESENT ILLNESS: Sakshi Jaeger is a 74 y.o. female with a history of hypertension, CAD status post PCI in , depression, RA on DMARDs and prednisone, lumbar spinal stenosis with chronic pain on narcotics, osteoporosis and chronic osteomyelitis of the right lower extremity after the patient sustained an ankle fracture with placement of screws in early  status post foot amputation in  but continued to have intermittent right leg stump infection.  She was found to have Sepsis secondary to acute right tibial osteomyelitis with abscess and MSSA bacteremia, now s/p debridement  Initial CT w/contrast of the R knee demonstrated sinus tracking down the femur, suspicious for early onset osteomyelitis. Subsequent MRI of the R Knee was notable for fluid collection with a sinus tract to the skin, concerning for abscess formation. Initially kept on vancomycin IV (10/1-10/6) due to hx of PCN allergy (hives), but was switched to IV nafcillin after tolerating amoxicillin trial. Blood culture remain negative as of 10/6; LINDA results negative for IE. S/P irrigation and debridement right leg with packing and partial closure of the incision on 10/9, currently HDS and resuming normal diet. Patient's antibiotic course will total 6 weeks of IV nafcillin infusions, beginning with the first culture negative date.  - Continue nafcillin 2gm IV q4h (10/6-19) for total duration of 6 weeks.  - Daily dressing changes  - Follow up with Ortho in 2 weeks for wound check  - Weekly CBC, CMP, ESR, and CRP with results faxed to the  [facility] at [phone]  - Follow up with  [facility] the week of   - [facility] for  deconditioning    Depression  Improved with visits from Health Psychology.    Asymptomatic right pleural effusion  No intervention needed, no respiratory distress.    Thrombocytosis  Presumed secondary to acute inflammation from osteomyelitis and bacteremia. Will continue to monitor via CBC checks while on IV antibiotics.    Rheumatoid arthritis  Will continue prednisone 5mg only at this time, holding the rest of her DMARDs while she is recovering from surgery and her bacteremia. Encourage follow-up with her outpatient rheumatologist to determine best time to resume her medications.    Lumbar spinal stenosis  Continue pain management with oxycodone, lidocaine patches.    CAD  Continue statin.    HTN  Continue amlodipine. Stopped her metoprolol, encourage monitoring after discharge to see if this needs to be resumed at a lower dose.    Hyponatremia, resolved     Patient was stabilized and transferred to TCU for continued rehabilitation.    Past Medical History:   Diagnosis Date     Acute osteomyelitis of right tibia (H)     Acute right tibial osteomyelitis with abscess and MSSA bacteremia, now s/p debridement     Benign hypertension      CAD (coronary artery disease)      Depression      Hyponatremia      Iron deficiency anemia      Lumbar spinal stenosis      MI (myocardial infarction) (H) 03/2010     Osteoporosis      Pleural effusion, right      Rheumatoid arthritis (H)      Thrombocytosis (H)      Past Surgical History:   Procedure Laterality Date     APPENDECTOMY       BELOW KNEE LEG AMPUTATION Right 2005     CORONARY STENT PLACEMENT       I AND D   03/05/2014     IRRIGATION AND DEBRIDEMENT LOWER EXTREMITY, COMBINED Right 10/09/2019     KNEE ARTHROPLASTY Left 04/27/2011     Family History   Problem Relation Age of Onset     Other Mother         CARDIOVASCULAR     Cancer Brother      Social History     Socioeconomic History     Marital status:      Spouse name: Not on file     Number of children: Not on  file     Years of education: Not on file     Highest education level: Not on file   Occupational History     Not on file   Social Needs     Financial resource strain: Not on file     Food insecurity:     Worry: Not on file     Inability: Not on file     Transportation needs:     Medical: Not on file     Non-medical: Not on file   Tobacco Use     Smoking status: Former Smoker     Packs/day: 0.50     Years: 45.00     Pack years: 22.50     Types: Cigarettes     Last attempt to quit: 2010     Years since quittin.8     Smokeless tobacco: Never Used   Substance and Sexual Activity     Alcohol use: Yes     Alcohol/week: 3.0 - 4.0 standard drinks     Types: 3 - 4 Standard drinks or equivalent per week     Drug use: Not on file     Sexual activity: Not on file   Lifestyle     Physical activity:     Days per week: Not on file     Minutes per session: Not on file     Stress: Not on file   Relationships     Social connections:     Talks on phone: Not on file     Gets together: Not on file     Attends [employer]ism service: Not on file     Active member of club or organization: Not on file     Attends meetings of clubs or organizations: Not on file     Relationship status: Not on file     Intimate partner violence:     Fear of current or ex partner: Not on file     Emotionally abused: Not on file     Physically abused: Not on file     Forced sexual activity: Not on file   Other Topics Concern     Not on file   Social History Narrative     Not on file     Allergies   Allergen Reactions     Penicillins Rash     Tolerated nafcillin and amoxicillin without rash (2019). Has tolerated cefazolin (, ), cephalexin (, , , ), ceftriaxone (2019)       Current Outpatient Medications   Medication Sig Dispense Refill     acetaminophen (TYLENOL) 325 MG tablet Take 650 mg by mouth every 4 (four) hours.       alendronate (FOSAMAX) 35 MG tablet Take 35 mg by mouth every 7 days. Take in the morning on an empty stomach  with a full glass of water 30 minutes before food       amLODIPine (NORVASC) 5 MG tablet Take 5 mg by mouth daily.       ascorbic acid, vitamin C, (ASCORBIC ACID WITH ERI HIPS) 500 MG tablet Take 500 mg by mouth 2 (two) times a day.        calcium carbonate-vit D3-min 600 mg calcium- 400 unit Tab Take 2 tablets by mouth daily.       diclofenac sodium (VOLTAREN) 1 % Gel Apply 2 g topically 4 (four) times a day as needed.       ferrous sulfate 325 (65 FE) MG tablet Take 1 tablet by mouth 2 (two) times a day.       gabapentin (NEURONTIN) 300 MG capsule Take 600 mg by mouth 3 (three) times a day.       Lactobacillus rhamnosus GG (CULTURELLE) 10-15 Billion cell capsule Take 1 capsule by mouth 2 (two) times a day.       lidocaine (LIDOCARE) 4 % patch Place 1 patch on the skin daily. Remove and discard patch with 12 hours or as directed by MD.       lisinopril (PRINIVIL,ZESTRIL) 20 MG tablet Take 20 mg by mouth daily.       loperamide (IMODIUM A-D) 2 mg tablet Take 2 mg by mouth as needed for diarrhea (Give 4 mg for after first stool then 2 mg for any loose stools after than for a max of 4 tabs/day).       multivitamin therapeutic tablet Take 1 tablet by mouth daily.       niacin 500 MG tablet Take 500 mg by mouth daily with breakfast.       ondansetron (ZOFRAN) 4 MG tablet Take 4 mg by mouth every 6 (six) hours as needed for nausea.       oxyCODONE (ROXICODONE) 5 MG immediate release tablet Take 5-10 mg by mouth every 4 (four) hours as needed for pain.       polyethylene glycol (MIRALAX) 17 gram packet Take 17 g by mouth 2 (two) times a day as needed.       potassium chloride (K-DUR,KLOR-CON) 20 MEQ tablet Take 20 mEq by mouth daily.              predniSONE (DELTASONE) 5 MG tablet Take 5 mg by mouth daily.       senna-docusate (PERICOLACE) 8.6-50 mg tablet Take 1 tablet by mouth 2 (two) times a day as needed.        simvastatin (ZOCOR) 20 MG tablet Take 20 mg by mouth at bedtime.       sulindac (CLINORIL) 200 MG tablet  Take 200 mg by mouth 2 (two) times a day.       No current facility-administered medications for this visit.        REVIEW OF SYSTEMS:    Currently, no fever, chills, or rigors. Does not have any visual or hearing problems. Denies any chest pain, headaches, palpitations, lightheadedness, dizziness, shortness of breath, or cough. Appetite is good. Denies any GERD symptoms. Denies any difficulty with swallowing, nausea, or vomiting.  Denies any abdominal pain, diarrhea or constipation. Denies any urinary symptoms. No insomnia. No active bleeding. No rash.       PHYSICAL EXAMINATION:  Vitals:    12/15/19 0711   BP: 126/49   Pulse: 78   Resp: 16   Temp: 98.2  F (36.8  C)   SpO2: 94%   Weight: 115 lb (52.2 kg)       GENERAL: Awake, Alert, oriented x3, not in any form of acute distress, answers questions appropriately, follows simple commands, conversant  HEENT: Head is normocephalic with normal hair distribution. No evidence of trauma. Ears: No acute purulent discharge. Eyes: Conjunctivae pink with no scleral jaundice. Nose: Normal mucosa and septum. NECK: Supple with no cervical or supraclavicular lymphadenopathy. Trachea is midline.   CHEST: No tenderness or deformity, no crepitus  LUNG: Clear to auscultation with good chest expansion. There are no crackles or wheezes, normal AP diameter.  BACK: No kyphosis of the thoracic spine. Symmetric, no curvature, ROM normal, no CVA tenderness, no spinal tenderness   CVS: There is good S1  S2, there are no murmurs, rubs, gallops, or heaves, rhythm is regular,  2+ pulses symmetric in all extremities.  ABDOMEN: Globular and soft, nontender to palpation, non distended, no masses, no organomegaly, good bowel sounds, no rebound or guarding, no peritoneal signs.   EXTREMITIES:  Full range of motion on both upper and lower extremities, there is no tenderness to palpation, no pedal edema, no cyanosis or clubbing, no calf tenderness.  Pulses equal in all extremities, normal cap refill,  no joint swelling.  SKIN: Warm and dry, no erythema noted.  Skin color, texture, no rashes or lesions.  NEUROLOGICAL: The patient is oriented to person, place and time. Strength and sensation are grossly intact. Face is symmetric.    LABS:     Lab Results   Component Value Date    WBC 7.0 11/11/2019    HGB 8.6 (L) 11/11/2019    HCT 27.1 (L) 11/11/2019    MCV 94 11/11/2019     11/11/2019       Vitamin D, Total (25-Hydroxy)   Date Value Ref Range Status   10/17/2019 28.5 (L) 30.0 - 80.0 ng/mL Final       ASSESSMENT/PLAN:    1. Chronic osteomyelitis of right tibia (H) - Continue IV nafcillin until 11/17/2019. Will follow-up with ID 11/11/2019    2. Chronic anemia  - Hgb 7.6, will check again on 10/24/2019, if it continues to be low, patient will need a transfusion.  This has been discussed with patient and she agrees with plan of care   3. Essential hypertension  - Blood pressures are within target range, will continue Lisinopril and Norvasc   4. Other rheumatoid arthritis with rheumatoid factor of unspecified site (H) - Followed by Rheumatology, will continue prednisone 5 mg daily.  However, patient's DMARDs and Methotrexate are being held until after IV antibiotics.   5. Age-related osteoporosis without current pathological fracture - Continue Fosamax           Electronically signed by:  Deisi Stein CNP    Total time spent on the unit was 40 minutes of which 25 minutes was spent in counseling  Osteomyelitis and duration of antibiotic therapy and the need to f/u with ID, Anemia and the possibility of blood transfusion and coordination of care of the above plan with nursing staff, patient, and therapy.

## 2021-06-04 NOTE — PROGRESS NOTES
[facility]    NAME:  Sakshi Jaeger             :  1945  MRN: [id number]  CODE STATUS:  FULL CODE    FACILITY:  [facility] [[id number]]         Chief Complaint   Patient presents with     Problem Visit     Chronic anemia     HISTORY OF PRESENT ILLNESS: Sakshi Jaeger is a 74 y.o. female with a history of hypertension, CAD status post PCI in , depression, RA on DMARDs and prednisone, lumbar spinal stenosis with chronic pain on narcotics, osteoporosis and chronic osteomyelitis of the right lower extremity after the patient sustained an ankle fracture with placement of screws in early  status post foot amputation in  but continued to have intermittent right leg stump infection.  She was found to have Sepsis secondary to acute right tibial osteomyelitis with abscess and MSSA bacteremia, now s/p debridement  Initial CT w/contrast of the R knee demonstrated sinus tracking down the femur, suspicious for early onset osteomyelitis. Subsequent MRI of the R Knee was notable for fluid collection with a sinus tract to the skin, concerning for abscess formation. Initially kept on vancomycin IV (10/1-10/6) due to hx of PCN allergy (hives), but was switched to IV nafcillin after tolerating amoxicillin trial. Blood culture remain negative as of 10/6; LINDA results negative for IE. S/P irrigation and debridement right leg with packing and partial closure of the incision on 10/9, currently HDS and resuming normal diet. Patient's antibiotic course will total 6 weeks of IV nafcillin infusions, beginning with the first culture negative date.  - Continue nafcillin 2gm IV q4h (10/6-19) for total duration of 6 weeks.  - Daily dressing changes  - Follow up with Ortho in 2 weeks for wound check  - Weekly CBC, CMP, ESR, and CRP with results faxed to the  [facility] at [phone]  - Follow up with  [facility] the week of   - PT/OT at [facility] for  deconditioning    Depression  Improved with visits from Health Psychology.    Asymptomatic right pleural effusion  No intervention needed, no respiratory distress.    Thrombocytosis  Presumed secondary to acute inflammation from osteomyelitis and bacteremia. Will continue to monitor via CBC checks while on IV antibiotics.    Rheumatoid arthritis  Will continue prednisone 5mg only at this time, holding the rest of her DMARDs while she is recovering from surgery and her bacteremia. Encourage follow-up with her outpatient rheumatologist to determine best time to resume her medications.    Lumbar spinal stenosis  Continue pain management with oxycodone, lidocaine patches.    CAD  Continue statin.    HTN  Continue amlodipine. Stopped her metoprolol, encourage monitoring after discharge to see if this needs to be resumed at a lower dose.    Hyponatremia, resolved     Patient was stabilized and transferred to TCU for continued rehabilitation.    Today, patient is seen at the bedside.  Hgb today was 7.2.  No active bleeding.  Denies any shortness of breath, chest pain, extreme fatigue, or dizziness.  Denies pain.  No insomnia. Appetite is good.      Past Medical History:   Diagnosis Date     Acute osteomyelitis of right tibia (H)     Acute right tibial osteomyelitis with abscess and MSSA bacteremia, now s/p debridement     Benign hypertension      CAD (coronary artery disease)      Depression      Hyponatremia      Iron deficiency anemia      Lumbar spinal stenosis      MI (myocardial infarction) (H) 03/2010     Osteoporosis      Pleural effusion, right      Rheumatoid arthritis (H)      Thrombocytosis (H)      Past Surgical History:   Procedure Laterality Date     APPENDECTOMY       BELOW KNEE LEG AMPUTATION Right 2005     CORONARY STENT PLACEMENT       I AND D   03/05/2014     IRRIGATION AND DEBRIDEMENT LOWER EXTREMITY, COMBINED Right 10/09/2019     KNEE ARTHROPLASTY Left 04/27/2011     Family History   Problem Relation  Age of Onset     Other Mother         CARDIOVASCULAR     Cancer Brother      Social History     Socioeconomic History     Marital status:      Spouse name: Not on file     Number of children: Not on file     Years of education: Not on file     Highest education level: Not on file   Occupational History     Not on file   Social Needs     Financial resource strain: Not on file     Food insecurity:     Worry: Not on file     Inability: Not on file     Transportation needs:     Medical: Not on file     Non-medical: Not on file   Tobacco Use     Smoking status: Former Smoker     Packs/day: 0.50     Years: 45.00     Pack years: 22.50     Types: Cigarettes     Last attempt to quit: 2010     Years since quittin.7     Smokeless tobacco: Never Used   Substance and Sexual Activity     Alcohol use: Yes     Alcohol/week: 3.0 - 4.0 standard drinks     Types: 3 - 4 Standard drinks or equivalent per week     Drug use: Not on file     Sexual activity: Not on file   Lifestyle     Physical activity:     Days per week: Not on file     Minutes per session: Not on file     Stress: Not on file   Relationships     Social connections:     Talks on phone: Not on file     Gets together: Not on file     Attends Holiness service: Not on file     Active member of club or organization: Not on file     Attends meetings of clubs or organizations: Not on file     Relationship status: Not on file     Intimate partner violence:     Fear of current or ex partner: Not on file     Emotionally abused: Not on file     Physically abused: Not on file     Forced sexual activity: Not on file   Other Topics Concern     Not on file   Social History Narrative     Not on file     Allergies   Allergen Reactions     Penicillins Rash     Tolerated nafcillin and amoxicillin without rash (2019). Has tolerated cefazolin (, ), cephalexin (, , 2013, 2012), ceftriaxone (2019)       Current Outpatient Medications   Medication Sig Dispense  Refill     acetaminophen (TYLENOL) 325 MG tablet Take 650 mg by mouth every 4 (four) hours.       alendronate (FOSAMAX) 35 MG tablet Take 35 mg by mouth every 7 days. Take in the morning on an empty stomach with a full glass of water 30 minutes before food       amLODIPine (NORVASC) 5 MG tablet Take 5 mg by mouth daily.       ascorbic acid, vitamin C, (ASCORBIC ACID WITH ERI HIPS) 500 MG tablet Take 500 mg by mouth 2 (two) times a day.        calcium carbonate-vit D3-min 600 mg calcium- 400 unit Tab Take 2 tablets by mouth daily.       diclofenac sodium (VOLTAREN) 1 % Gel Apply 2 g topically 4 (four) times a day as needed.       ferrous sulfate 325 (65 FE) MG tablet Take 1 tablet by mouth 2 (two) times a day.       gabapentin (NEURONTIN) 300 MG capsule Take 600 mg by mouth 3 (three) times a day.       Lactobacillus rhamnosus GG (CULTURELLE) 10-15 Billion cell capsule Take 1 capsule by mouth 2 (two) times a day.       lidocaine (LIDOCARE) 4 % patch Place 1 patch on the skin daily. Remove and discard patch with 12 hours or as directed by MD.       lisinopril (PRINIVIL,ZESTRIL) 20 MG tablet Take 20 mg by mouth daily.       loperamide (IMODIUM A-D) 2 mg tablet Take 2 mg by mouth as needed for diarrhea (Give 4 mg for after first stool then 2 mg for any loose stools after than for a max of 4 tabs/day).       multivitamin therapeutic tablet Take 1 tablet by mouth daily.       niacin 500 MG tablet Take 500 mg by mouth daily with breakfast.       ondansetron (ZOFRAN) 4 MG tablet Take 4 mg by mouth every 6 (six) hours as needed for nausea.       oxyCODONE (ROXICODONE) 5 MG immediate release tablet Take 5-10 mg by mouth every 4 (four) hours as needed for pain.       polyethylene glycol (MIRALAX) 17 gram packet Take 17 g by mouth 2 (two) times a day as needed.       potassium chloride (K-DUR,KLOR-CON) 20 MEQ tablet Take 20 mEq by mouth daily.              predniSONE (DELTASONE) 5 MG tablet Take 5 mg by mouth daily.        senna-docusate (PERICOLACE) 8.6-50 mg tablet Take 1 tablet by mouth 2 (two) times a day as needed.        simvastatin (ZOCOR) 20 MG tablet Take 20 mg by mouth at bedtime.       sulindac (CLINORIL) 200 MG tablet Take 200 mg by mouth 2 (two) times a day.       No current facility-administered medications for this visit.        REVIEW OF SYSTEMS:    Currently, no fever, chills, or rigors. Does not have any visual or hearing problems. Denies any chest pain, headaches, palpitations, lightheadedness, dizziness, shortness of breath, or cough. Appetite is good. Denies any GERD symptoms. Denies any difficulty with swallowing, nausea, or vomiting.  Denies any abdominal pain, diarrhea or constipation. Denies any urinary symptoms. No insomnia. No active bleeding. No rash.       PHYSICAL EXAMINATION:  Vitals:    12/11/19 1620   BP: 144/76   Pulse: 85   Resp: 16   Temp: 97.4  F (36.3  C)   SpO2: 97%   Weight: 104 lb 8 oz (47.4 kg)       GENERAL: Awake, Alert, oriented x3, not in any form of acute distress, answers questions appropriately, follows simple commands, conversant  HEENT: Head is normocephalic with normal hair distribution. No evidence of trauma. Ears: No acute purulent discharge. Eyes: Conjunctivae pink with no scleral jaundice. Nose: Normal mucosa and septum. NECK: Supple with no cervical or supraclavicular lymphadenopathy. Trachea is midline.   CHEST: No tenderness or deformity, no crepitus  LUNG: Clear to auscultation with good chest expansion. There are no crackles or wheezes, normal AP diameter.  BACK: No kyphosis of the thoracic spine. Symmetric, no curvature, ROM normal, no CVA tenderness, no spinal tenderness   CVS: There is good S1  S2, there are no murmurs, rubs, gallops, or heaves, rhythm is regular,  2+ pulses symmetric in all extremities.  ABDOMEN: Globular and soft, nontender to palpation, non distended, no masses, no organomegaly, good bowel sounds, no rebound or guarding, no peritoneal signs.    EXTREMITIES:  Full range of motion on both upper and lower extremities, there is no tenderness to palpation, no pedal edema, no cyanosis or clubbing, no calf tenderness.  Pulses equal in all extremities, normal cap refill, no joint swelling.  SKIN: Warm and dry, no erythema noted.  Skin color, texture, no rashes or lesions.  NEUROLOGICAL: The patient is oriented to person, place and time. Strength and sensation are grossly intact. Face is symmetric.    LABS:      Lab Results   Component Value Date    WBC 7.0 11/11/2019    HGB 8.6 (L) 11/11/2019    HCT 27.1 (L) 11/11/2019    MCV 94 11/11/2019     11/11/2019     Vitamin D, Total (25-Hydroxy)   Date Value Ref Range Status   10/17/2019 28.5 (L) 30.0 - 80.0 ng/mL Final     ASSESSMENT/PLAN:    1. Chronic anemia - Hgb 7.2, will check again on 10/24/2019, if it continues to be low, patient will need a transfusion.  This has been discussed with patient and she agrees with plan of care   2. Chronic osteomyelitis of right tibia (H) - Continue IV nafcillin until 11/17/2019. Will follow-up with ID 11/11/2019    3. Other rheumatoid arthritis with rheumatoid factor of unspecified site (H) - Followed by Rheumatology, will continue prednisone 5 mg daily.  However, patient's DMARDs and Methotrexate are being held until after IV antibiotics.   4. Essential hypertension  - Blood pressures are within target range, will continue Lisinopril and Norvasc           Electronically signed by:  Deisi Stein CNP

## 2021-06-08 NOTE — NURSING NOTE
DME order , letter ,and Letter of Medical Necessity for a custom shaped protective cover faxed to [facility] [employer].    Left message voice mail, Dr. Esqueda sent message with results in portal 5/25. Resent message.     Please call with questions.

## 2021-06-16 DIAGNOSIS — E78.5 HYPERLIPIDEMIA LDL GOAL <100: ICD-10-CM

## 2021-06-16 RX ORDER — SIMVASTATIN 20 MG
TABLET ORAL
Qty: 90 TABLET | Refills: 3 | Status: SHIPPED | OUTPATIENT
Start: 2021-06-16 | End: 2022-05-18

## 2021-06-23 ENCOUNTER — PRE VISIT (OUTPATIENT)
Dept: SURGERY | Facility: CLINIC | Age: 76
End: 2021-06-23

## 2021-07-01 ENCOUNTER — OFFICE VISIT (OUTPATIENT)
Dept: WOUND CARE | Facility: CLINIC | Age: 76
End: 2021-07-01
Payer: COMMERCIAL

## 2021-07-01 DIAGNOSIS — L97.911 ULCER OF RIGHT LOWER EXTREMITY, LIMITED TO BREAKDOWN OF SKIN (H): Primary | ICD-10-CM

## 2021-07-01 DIAGNOSIS — Z89.511 STATUS POST BELOW-KNEE AMPUTATION OF RIGHT LOWER EXTREMITY (H): ICD-10-CM

## 2021-07-01 PROCEDURE — 99211 OFF/OP EST MAY X REQ PHY/QHP: CPT

## 2021-07-01 NOTE — PROGRESS NOTES
"Patient comes to wound clinic for dressing change  per request of dr. Walker. She has history of a new wound on right knee  S/p skingraft      Dressing removed, wound washed with Microklenz, irrigated with Microklenz,and measured.     Wound evaluation: No improvement.   Size: length  3 cm length x 2.5 cm width x 0.1 cm depth.    There is no undermining or tunneling and wound bed is pretty dry and light pink   Drainage minimal  Serosanguinous  Not debrided not surgical      PLAN: Patient will do flap closure of this wound in September with Dr. Perea.  Continue with Hydrofera Blue dressings and bordered gauze every other day changes for now.  Due to the deterioration of the wound I suspect she would benefit from an JESSICA and TCO2 to exam     Plan of care:   Reason for dressing use non-debriding dressing applied   Cleaning: Microklenz  Primary:  HydroferaBlue dressing  Secondary: 4x4 bordered gauze  Secure with 1\" tape Micropore to keep on the knee  Frequency of change1 per day  Verbally approved by   How many supplies pt still has on hand: none    Pt received the following instructions:    Cleansing with Microklenz or soap and water Primary Dressing HydroferaBlue dressing. Secure with: bordered gauze. Frequency: 1/day   Signs and symptoms of infection taught.      Pt has our number should other issues arise. All questions answered for now.   Patient needs to be seen 1 month for wound measurements.   Treated and follow-up appointment made Dr. Perea was available for supervision of care if needed or if questions should arise and regarding plan of care. Shasha Degroot RN CWON         "

## 2021-07-06 ENCOUNTER — TELEPHONE (OUTPATIENT)
Dept: WOUND CARE | Facility: CLINIC | Age: 76
End: 2021-07-06

## 2021-07-21 ENCOUNTER — ANCILLARY PROCEDURE (OUTPATIENT)
Dept: ULTRASOUND IMAGING | Facility: CLINIC | Age: 76
End: 2021-07-21
Attending: PODIATRIST
Payer: COMMERCIAL

## 2021-07-21 DIAGNOSIS — Z89.511 STATUS POST BELOW-KNEE AMPUTATION OF RIGHT LOWER EXTREMITY (H): ICD-10-CM

## 2021-07-21 DIAGNOSIS — L97.911 ULCER OF RIGHT LOWER EXTREMITY, LIMITED TO BREAKDOWN OF SKIN (H): ICD-10-CM

## 2021-07-21 PROCEDURE — 93922 UPR/L XTREMITY ART 2 LEVELS: CPT | Performed by: RADIOLOGY

## 2021-08-11 ENCOUNTER — TELEPHONE (OUTPATIENT)
Dept: WOUND CARE | Facility: CLINIC | Age: 76
End: 2021-08-11

## 2021-08-11 NOTE — TELEPHONE ENCOUNTER
LVM regarding 8/12/21 appt cancellation with Shasha due to provider out ill. Reschedule to next available No Mychart.

## 2021-08-23 ENCOUNTER — TELEPHONE (OUTPATIENT)
Dept: PLASTIC SURGERY | Facility: CLINIC | Age: 76
End: 2021-08-23

## 2021-08-23 NOTE — TELEPHONE ENCOUNTER
Called back patient to discuss appointment to be scheduled with .    Patient is scheduled 9/22 at 11:30.    Patient is aware of date, time, and location of appointment and had no further questions.    Patient did call Zong's number and was concerned about needing to wait for a month to be seen.

## 2021-09-16 ENCOUNTER — TELEPHONE (OUTPATIENT)
Dept: PLASTIC SURGERY | Facility: CLINIC | Age: 76
End: 2021-09-16

## 2021-09-17 ENCOUNTER — TELEPHONE (OUTPATIENT)
Dept: PLASTIC SURGERY | Facility: CLINIC | Age: 76
End: 2021-09-17

## 2021-09-17 NOTE — TELEPHONE ENCOUNTER
Called and spoke to patient about rescheduling her appointment.  Date and time changed to 10/6/21 at 11 am.  Pt aware of date, time and location.

## 2021-10-06 ENCOUNTER — OFFICE VISIT (OUTPATIENT)
Dept: PLASTIC SURGERY | Facility: CLINIC | Age: 76
End: 2021-10-06
Payer: COMMERCIAL

## 2021-10-06 VITALS
HEIGHT: 64 IN | SYSTOLIC BLOOD PRESSURE: 133 MMHG | OXYGEN SATURATION: 97 % | WEIGHT: 101 LBS | BODY MASS INDEX: 17.24 KG/M2 | HEART RATE: 63 BPM | DIASTOLIC BLOOD PRESSURE: 69 MMHG | TEMPERATURE: 98.3 F

## 2021-10-06 DIAGNOSIS — T81.89XD NON-HEALING SURGICAL WOUND, SUBSEQUENT ENCOUNTER: Primary | ICD-10-CM

## 2021-10-06 PROCEDURE — 99214 OFFICE O/P EST MOD 30 MIN: CPT | Performed by: PLASTIC SURGERY

## 2021-10-06 ASSESSMENT — PAIN SCALES - GENERAL: PAINLEVEL: NO PAIN (0)

## 2021-10-06 ASSESSMENT — MIFFLIN-ST. JEOR: SCORE: 933.13

## 2021-10-06 NOTE — LETTER
10/6/2021       RE: Sakshi Jaeger  [address] [id number]     Dear Colleague,    Thank you for referring your patient, Sakshi Jaeger, to the [facility] [place] at [facility]. Please see a copy of my visit note below.    Service Date: 10/06/2021    PRESENTING COMPLAINT:  Followup visit for nonhealing right BKA stump wound and prepatellar wound.    HISTORY OF PRESENTING COMPLAINT:  Ms. Jaeger is 76 years old.  She last saw me in 06/2021.  At that time, the decision was made to conservatively manage her wounds; however, the patient now has changed her mind and wants to proceed with surgical treatment of these nonhealing wounds on her right BKA stump and prepatellar area.  The BKA stump wound reopened a few weeks ago.  Prior to that it was fully healed for a number of months.  She denies any trauma.  She has been wearing her prosthesis, which is due for a change per the prosthetic people.  The prepatellar wound is stable, but not healed.    ASSESSMENT AND PLAN:  Based on the above findings, a diagnosis of pressure sore in the prepatellar area of the right BKA.  Plan is to get the old operative notes of her right BKA stump area that she has had and what seems to be a gracilis flap and a skin graft.  Additionally, need to workup her blood flow to the right leg to plan appropriate free flap reconstruction and then also get a cardiac workup given her medical history and history of MI with stenting about 10 years ago.  All of this was explained to the patient and her  in detail.  I was very clear that this is going to be a complicated reconstruction and there is a high chance for this not to work and lead to an AKA.  In my opinion, her options are to either continue with conservative wound management or proceed with an AKA directly or proceed with this kind of redo reconstruction of her right  BKA stump area.  She is adamant she wants to try this surgery knowing the risks involved.  Once the above steps are taken, we will then plan the appropriate procedure.  All questions answered.  She was happy with the visit.    Total time spent with chart review, the visit itself and post-visit paperwork was 30 minutes.    RAISA Perea MD

## 2021-10-06 NOTE — PROGRESS NOTES
History of Present Illness - Sakshi Jaeger is a wonderfully nice 76 mitchell old female last seen on 3/25/2021    To review, she has had issues with ear wax, more on the RIGHT than LEFT, and for many years Dr. OSMAN has helped her with alligators or irrigations.  However, over the last year prior to first seeing me in January 2019, it seems much more tenacious and sticky.  The other day the LEFT ear got some water in it from the shower and had been totally clogged since then.    No previous ear surgery, no chronic ear disease, no bleeding or drainage from the ears.    I had to procedurally clear tremendous cerumen from both ears.  And after the initial visit I asked her to come back in four months and she was again impacted severely.  So at this point I have asked her to come back at least every 4-6 months for debridement.    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)     Osteomyelitis (H)     Non-healing surgical wound, subsequent encounter       Current Medications -   Current Outpatient Medications:      acetaminophen (TYLENOL) 325 MG tablet, Take 2 tablets (650 mg) by mouth every 4 hours, Disp: , Rfl:      amLODIPine (NORVASC) 5 MG tablet, TAKE 1 TABLET BY MOUTH EVERY DAY, Disp: 90 tablet, Rfl: 3     Ascorbic Acid (VITAMIN C) 500 MG CHEW, , Disp: , Rfl:      aspirin (ASA) 81 MG tablet, Take 1 tablet (81 mg) by mouth daily, Disp:  , Rfl:      calcium carbonate 600 mg-vitamin D 400 units (CALCIUM 600 + D) 600-400 MG-UNIT per tablet, Take 2  tablets by mouth daily, Disp: , Rfl:      gabapentin (NEURONTIN) 300 MG capsule, Take 2 capsules (600 mg) by mouth 3 times daily, Disp: , Rfl:      leflunomide (ARAVA) 20 MG tablet, Take 1 tablet by mouth every 24 hours, Disp: , Rfl:      Lidocaine (LIDOCARE) 4 % Patch, Place 1 patch onto the skin every 24 hours To prevent lidocaine toxicity, patient should be patch free for 12 hrs daily. (Patient not taking: Reported on 10/6/2021), Disp: , Rfl:      lisinopril (ZESTRIL) 20 MG tablet, Take 1 tablet (20 mg) by mouth daily, Disp: 90 tablet, Rfl: 3     methotrexate 2.5 MG tablet, Take 1 tablet by mouth, Disp: , Rfl:      metoprolol tartrate (LOPRESSOR) 50 MG tablet, Take 1 tablet (50 mg) by mouth 2 times daily, Disp: 180 tablet, Rfl: 3     multivitamin w/minerals (THERA-VIT-M) tablet, Take 1 tablet by mouth daily, Disp: , Rfl:      order for DME, Equipment being ordered: New foam (custom shaped protective cover) for right below the knee prostheses, Disp: 1 Units, Rfl: 0     predniSONE (DELTASONE) 5 MG tablet, Take 1 tablet (5 mg) by mouth daily, Disp: , Rfl: 0     simvastatin (ZOCOR) 20 MG tablet, TAKE 1 TABLET BY MOUTH AT BEDTIME, Disp: 90 tablet, Rfl: 3    Allergies -   No Known Allergies    Social History -   Social History     Socioeconomic History     Marital status: Single     Spouse name: None     Number of children: None     Years of education: None     Highest education level: None   Social Needs     Financial resource strain: None     Food insecurity - worry: None     Food insecurity - inability: None     Transportation needs - medical: None     Transportation needs - non-medical: None   Occupational History     None   Tobacco Use     Smoking status: Former Smoker     Packs/day: 0.50     Years: 45.00     Pack years: 22.50     Types: Cigarettes     Last attempt to quit: 2010     Years since quittin.9     Smokeless tobacco: Never Used   Substance and Sexual Activity     Alcohol use: Yes     Comment:  occsionally-3 -4 drinks a week     Drug use: No     Sexual activity: Yes     Partners: Male   Other Topics Concern     Parent/sibling w/ CABG, MI or angioplasty before 65F 55M? No   Social History Narrative     None       Family History -   Family History   Problem Relation Age of Onset     Cardiovascular Mother      Cancer Brother      Diabetes No family hx of      Hypertension No family hx of      Cerebrovascular Disease No family hx of      Alzheimer Disease No family hx of      Thyroid Disease No family hx of      Respiratory No family hx of      Other - See Comments Mother         CARDIOVASCULAR     Cancer Brother        Review of Systems - As per HPI and PMHx, otherwise 10+ system review of the head and neck, and general constitution is negative.    Physical Exam  /65 (BP Location: Left arm, Patient Position: Sitting, Cuff Size: Adult Regular)   Pulse 54   Wt 45.8 kg (101 lb)   SpO2 98%   BMI 17.34 kg/m      General - The patient is well nourished and well developed, and appears to have good nutritional status.  Alert and oriented to person and place, answers questions and cooperates with examination appropriately.   Head and Face - Normocephalic and atraumatic, with no gross asymmetry noted of the contour of the facial features.  The facial nerve is intact, with strong symmetric movements.  Voice and Breathing - The patient was breathing comfortably without the use of accessory muscles. There was no wheezing, stridor, or stertor.  The patients voice was clear and strong, and had appropriate pitch and quality.  Eyes - Extraocular movements intact, and the pupils were reactive to light.  Sclera were not icteric or injected, conjunctiva were pink and moist.  Mouth - Examination of the oral cavity showed pink, healthy oral mucosa. No lesions or ulcerations noted.  The tongue was mobile and midline, and the dentition were in good condition.        Cerumen Removal    Physical Exam and Procedure  Ears - On  examination of the ears, I found that the BOTH sides had cerumen impaction.  Therefore, I positioned them in the examination chair in a semi-supine position, beginning with the right side.  I used the binocular surgical microscope to perform cerumen removal.  I began by using a cerumen loop to gently lift the edges of the cerumen mass away from the walls of the external canal.  Once I did this, I was able to suction away fragments of wax and debris using suction.  Once the mass was loose enough, the entire plug was pulled from the canal.  The tympanic membrane was intact, no sign of perforation or middle ear effusion.    I turned my attention to the contralateral side once again using the binocular surgical microscope to perform cerumen removal.  I began by using a cerumen loop to gently lift the edges of the cerumen mass away from the walls of the external canal.  Once I did this, I was able to suction away fragments of wax and debris using suction.  Once the mass was loose enough, the entire plug was pulled from the canal.  The tympanic membrane was intact, no sign of perforation or middle ear effusion.        A/P - Sakshi Jaeger is a 76 year old female  (H61.23) Bilateral impacted cerumen  (primary encounter diagnosis)  (H93.8X3) Ear fullness, bilateral    The patient has had their cerumen procedurally removed today.  I have discussed ear care at home, including avoiding qtips or any other object placed in the canal.  I have also discussed that over the counter cerumen kits like Debrox or Cerumenex can be useful.    See me in another 4-6 months.  She has my medical clearance for hearing aid fitting

## 2021-10-06 NOTE — PROGRESS NOTES
Service Date: 10/06/2021    PRESENTING COMPLAINT:  Followup visit for nonhealing right BKA stump wound and prepatellar wound.    HISTORY OF PRESENTING COMPLAINT:  Ms. Jaeger is 76 years old.  She last saw me in 06/2021.  At that time, the decision was made to conservatively manage her wounds; however, the patient now has changed her mind and wants to proceed with surgical treatment of these nonhealing wounds on her right BKA stump and prepatellar area.  The BKA stump wound reopened a few weeks ago.  Prior to that it was fully healed for a number of months.  She denies any trauma.  She has been wearing her prosthesis, which is due for a change per the prosthetic people.  The prepatellar wound is stable, but not healed.    ASSESSMENT AND PLAN:  Based on the above findings, a diagnosis of pressure sore in the prepatellar area of the right BKA.  Plan is to get the old operative notes of her right BKA stump area that she has had and what seems to be a gracilis flap and a skin graft.  Additionally, need to workup her blood flow to the right leg to plan appropriate free flap reconstruction and then also get a cardiac workup given her medical history and history of MI with stenting about 10 years ago.  All of this was explained to the patient and her  in detail.  I was very clear that this is going to be a complicated reconstruction and there is a high chance for this not to work and lead to an AKA.  In my opinion, her options are to either continue with conservative wound management or proceed with an AKA directly or proceed with this kind of redo reconstruction of her right BKA stump area.  She is adamant she wants to try this surgery knowing the risks involved.  Once the above steps are taken, we will then plan the appropriate procedure.  All questions answered.  She was happy with the visit.    Total time spent with chart review, the visit itself and post-visit paperwork was 30 minutes.    RAISA Perea  MD        D: 10/06/2021   T: 10/06/2021   MT: avel    Name:     CARLOS PEDRAZA  MRN:      [id number]        Account:      [id number]   :      1945           Service Date: 10/06/2021       Document: [id number]

## 2021-10-08 DIAGNOSIS — T81.89XD NON-HEALING SURGICAL WOUND, SUBSEQUENT ENCOUNTER: Primary | ICD-10-CM

## 2021-10-12 ENCOUNTER — PATIENT OUTREACH (OUTPATIENT)
Dept: PLASTIC SURGERY | Facility: CLINIC | Age: 76
End: 2021-10-12

## 2021-10-12 NOTE — PATIENT INSTRUCTIONS
Spoke with pt today to provide update that her CTA with run-off has been ordered and she will need to call and schedule this test. I also provided update that she will need to schedule a stress test with her PCP which she was already aware. We discussed need for release of information for [facility] records and I let her know that I would work on getting this information and will let her know if I need to get her consent.  Daniel SWAIN RN

## 2021-10-18 ENCOUNTER — OFFICE VISIT (OUTPATIENT)
Dept: OTOLARYNGOLOGY | Facility: CLINIC | Age: 76
End: 2021-10-18
Payer: COMMERCIAL

## 2021-10-18 VITALS
DIASTOLIC BLOOD PRESSURE: 65 MMHG | HEART RATE: 54 BPM | SYSTOLIC BLOOD PRESSURE: 121 MMHG | OXYGEN SATURATION: 98 % | WEIGHT: 101 LBS | BODY MASS INDEX: 17.34 KG/M2

## 2021-10-18 DIAGNOSIS — H93.8X3 EAR FULLNESS, BILATERAL: ICD-10-CM

## 2021-10-18 DIAGNOSIS — H61.23 BILATERAL IMPACTED CERUMEN: Primary | ICD-10-CM

## 2021-10-18 PROCEDURE — 69210 REMOVE IMPACTED EAR WAX UNI: CPT | Performed by: OTOLARYNGOLOGY

## 2021-10-18 NOTE — LETTER
10/18/2021         RE: Sakshi Jaeger  [address] [address]        Dear Colleague,    Thank you for referring your patient, Sakshi Jaeger, to the [facility]. Please see a copy of my visit note below.    History of Present Illness - Sakshi Jaeger is a wonderfully nice 76 mitchell old female last seen on 3/25/2021    To review, she has had issues with ear wax, more on the RIGHT than LEFT, and for many years Dr. OSMAN has helped her with alligators or irrigations.  However, over the last year prior to first seeing me in January 2019, it seems much more tenacious and sticky.  The other day the LEFT ear got some water in it from the shower and had been totally clogged since then.    No previous ear surgery, no chronic ear disease, no bleeding or drainage from the ears.    I had to procedurally clear tremendous cerumen from both ears.  And after the initial visit I asked her to come back in four months and she was again impacted severely.  So at this point I have asked her to come back at least every 4-6 months for debridement.    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)     Osteomyelitis (H)     Non-healing surgical wound, subsequent encounter       Current Medications -   Current Outpatient Medications:      acetaminophen (TYLENOL) 325 MG tablet, Take 2 tablets (650 mg) by mouth every 4 hours, Disp: , Rfl:      amLODIPine (NORVASC) 5 MG tablet, TAKE 1 TABLET BY MOUTH EVERY DAY, Disp: 90 tablet,  Rfl: 3     Ascorbic Acid (VITAMIN C) 500 MG CHEW, , Disp: , Rfl:      aspirin (ASA) 81 MG tablet, Take 1 tablet (81 mg) by mouth daily, Disp:  , Rfl:      calcium carbonate 600 mg-vitamin D 400 units (CALCIUM 600 + D) 600-400 MG-UNIT per tablet, Take 2 tablets by mouth daily, Disp: , Rfl:      gabapentin (NEURONTIN) 300 MG capsule, Take 2 capsules (600 mg) by mouth 3 times daily, Disp: , Rfl:      leflunomide (ARAVA) 20 MG tablet, Take 1 tablet by mouth every 24 hours, Disp: , Rfl:      Lidocaine (LIDOCARE) 4 % Patch, Place 1 patch onto the skin every 24 hours To prevent lidocaine toxicity, patient should be patch free for 12 hrs daily. (Patient not taking: Reported on 10/6/2021), Disp: , Rfl:      lisinopril (ZESTRIL) 20 MG tablet, Take 1 tablet (20 mg) by mouth daily, Disp: 90 tablet, Rfl: 3     methotrexate 2.5 MG tablet, Take 1 tablet by mouth, Disp: , Rfl:      metoprolol tartrate (LOPRESSOR) 50 MG tablet, Take 1 tablet (50 mg) by mouth 2 times daily, Disp: 180 tablet, Rfl: 3     multivitamin w/minerals (THERA-VIT-M) tablet, Take 1 tablet by mouth daily, Disp: , Rfl:      order for DME, Equipment being ordered: New foam (custom shaped protective cover) for right below the knee prostheses, Disp: 1 Units, Rfl: 0     predniSONE (DELTASONE) 5 MG tablet, Take 1 tablet (5 mg) by mouth daily, Disp: , Rfl: 0     simvastatin (ZOCOR) 20 MG tablet, TAKE 1 TABLET BY MOUTH AT BEDTIME, Disp: 90 tablet, Rfl: 3    Allergies -   No Known Allergies    Social History -   Social History     Socioeconomic History     Marital status: Single     Spouse name: None     Number of children: None     Years of education: None     Highest education level: None   Social Needs     Financial resource strain: None     Food insecurity - worry: None     Food insecurity - inability: None     Transportation needs - medical: None     Transportation needs - non-medical: None   Occupational History     None   Tobacco Use     Smoking status: Former  Smoker     Packs/day: 0.50     Years: 45.00     Pack years: 22.50     Types: Cigarettes     Last attempt to quit: 2010     Years since quittin.9     Smokeless tobacco: Never Used   Substance and Sexual Activity     Alcohol use: Yes     Comment: occsionally-3 -4 drinks a week     Drug use: No     Sexual activity: Yes     Partners: Male   Other Topics Concern     Parent/sibling w/ CABG, MI or angioplasty before 65F 55M? No   Social History Narrative     None       Family History -   Family History   Problem Relation Age of Onset     Cardiovascular Mother      Cancer Brother      Diabetes No family hx of      Hypertension No family hx of      Cerebrovascular Disease No family hx of      Alzheimer Disease No family hx of      Thyroid Disease No family hx of      Respiratory No family hx of      Other - See Comments Mother         CARDIOVASCULAR     Cancer Brother        Review of Systems - As per HPI and PMHx, otherwise 10+ system review of the head and neck, and general constitution is negative.    Physical Exam  /65 (BP Location: Left arm, Patient Position: Sitting, Cuff Size: Adult Regular)   Pulse 54   Wt 45.8 kg (101 lb)   SpO2 98%   BMI 17.34 kg/m      General - The patient is well nourished and well developed, and appears to have good nutritional status.  Alert and oriented to person and place, answers questions and cooperates with examination appropriately.   Head and Face - Normocephalic and atraumatic, with no gross asymmetry noted of the contour of the facial features.  The facial nerve is intact, with strong symmetric movements.  Voice and Breathing - The patient was breathing comfortably without the use of accessory muscles. There was no wheezing, stridor, or stertor.  The patients voice was clear and strong, and had appropriate pitch and quality.  Eyes - Extraocular movements intact, and the pupils were reactive to light.  Sclera were not icteric or injected, conjunctiva were pink and  moist.  Mouth - Examination of the oral cavity showed pink, healthy oral mucosa. No lesions or ulcerations noted.  The tongue was mobile and midline, and the dentition were in good condition.        Cerumen Removal    Physical Exam and Procedure  Ears - On examination of the ears, I found that the BOTH sides had cerumen impaction.  Therefore, I positioned them in the examination chair in a semi-supine position, beginning with the right side.  I used the binocular surgical microscope to perform cerumen removal.  I began by using a cerumen loop to gently lift the edges of the cerumen mass away from the walls of the external canal.  Once I did this, I was able to suction away fragments of wax and debris using suction.  Once the mass was loose enough, the entire plug was pulled from the canal.  The tympanic membrane was intact, no sign of perforation or middle ear effusion.    I turned my attention to the contralateral side once again using the binocular surgical microscope to perform cerumen removal.  I began by using a cerumen loop to gently lift the edges of the cerumen mass away from the walls of the external canal.  Once I did this, I was able to suction away fragments of wax and debris using suction.  Once the mass was loose enough, the entire plug was pulled from the canal.  The tympanic membrane was intact, no sign of perforation or middle ear effusion.        A/P - Sakshi Jaeger is a 76 year old female  (H61.23) Bilateral impacted cerumen  (primary encounter diagnosis)  (H93.8X3) Ear fullness, bilateral    The patient has had their cerumen procedurally removed today.  I have discussed ear care at home, including avoiding qtips or any other object placed in the canal.  I have also discussed that over the counter cerumen kits like Debrox or Cerumenex can be useful.    See me in another 4-6 months.  She has my medical clearance for hearing aid fitting      Again, thank you for allowing me to participate in the  care of your patient.        Sincerely,        Pito Mosher MD

## 2021-10-18 NOTE — NURSING NOTE
"Chief Complaint   Patient presents with     Cerumen Impaction     6 month ear cleaning       Vitals:    10/18/21 1301   BP: 121/65   BP Location: Left arm   Patient Position: Sitting   Cuff Size: Adult Regular   Pulse: 54   SpO2: 98%   Weight: 45.8 kg (101 lb)     Wt Readings from Last 1 Encounters:   10/18/21 45.8 kg (101 lb)     Ht Readings from Last 1 Encounters:   10/06/21 1.626 m (5' 4\")       Angelina Cisneros [facility], 10/18/2021 1:02 PM    "

## 2021-10-19 ENCOUNTER — ANCILLARY PROCEDURE (OUTPATIENT)
Dept: CT IMAGING | Facility: CLINIC | Age: 76
End: 2021-10-19
Attending: PLASTIC SURGERY
Payer: COMMERCIAL

## 2021-10-19 DIAGNOSIS — T81.89XD NON-HEALING SURGICAL WOUND, SUBSEQUENT ENCOUNTER: ICD-10-CM

## 2021-10-19 LAB
CREAT BLD-MCNC: 0.6 MG/DL (ref 0.5–1)
GFR SERPL CREATININE-BSD FRML MDRD: >60 ML/MIN/1.73M2

## 2021-10-19 PROCEDURE — 75635 CT ANGIO ABDOMINAL ARTERIES: CPT | Mod: GC | Performed by: RADIOLOGY

## 2021-10-19 RX ORDER — IOPAMIDOL 755 MG/ML
150 INJECTION, SOLUTION INTRAVASCULAR ONCE
Status: COMPLETED | OUTPATIENT
Start: 2021-10-19 | End: 2021-10-19

## 2021-10-19 RX ADMIN — IOPAMIDOL 150 ML: 755 INJECTION, SOLUTION INTRAVASCULAR at 14:55

## 2021-10-22 ENCOUNTER — TELEPHONE (OUTPATIENT)
Dept: PLASTIC SURGERY | Facility: CLINIC | Age: 76
End: 2021-10-22

## 2021-10-22 NOTE — TELEPHONE ENCOUNTER
Called pt to schedule a follow appointment per Dr. Perea to discuss CT results.  No answer, Left a voice message with call back #.

## 2021-10-22 NOTE — TELEPHONE ENCOUNTER
Received call back from Pt's .  Requested in person visit.  Results follow up visit made for 10/26 at 11am. Appt ok per Dr. Perea.   Pt aware of date, time and location.

## 2021-10-26 ENCOUNTER — OFFICE VISIT (OUTPATIENT)
Dept: PLASTIC SURGERY | Facility: CLINIC | Age: 76
End: 2021-10-26
Attending: PLASTIC SURGERY
Payer: COMMERCIAL

## 2021-10-26 VITALS
HEART RATE: 67 BPM | TEMPERATURE: 98.8 F | SYSTOLIC BLOOD PRESSURE: 104 MMHG | DIASTOLIC BLOOD PRESSURE: 69 MMHG | OXYGEN SATURATION: 99 %

## 2021-10-26 DIAGNOSIS — T81.89XD NON-HEALING SURGICAL WOUND, SUBSEQUENT ENCOUNTER: Primary | ICD-10-CM

## 2021-10-26 PROCEDURE — G0463 HOSPITAL OUTPT CLINIC VISIT: HCPCS

## 2021-10-26 PROCEDURE — 99214 OFFICE O/P EST MOD 30 MIN: CPT | Performed by: PLASTIC SURGERY

## 2021-10-26 RX ORDER — CEFAZOLIN SODIUM 2 G/50ML
2 SOLUTION INTRAVENOUS SEE ADMIN INSTRUCTIONS
Status: CANCELLED | OUTPATIENT
Start: 2021-10-26

## 2021-10-26 RX ORDER — CEFAZOLIN SODIUM 2 G/50ML
2 SOLUTION INTRAVENOUS
Status: CANCELLED | OUTPATIENT
Start: 2021-10-26

## 2021-10-26 ASSESSMENT — PAIN SCALES - GENERAL: PAINLEVEL: NO PAIN (0)

## 2021-10-26 NOTE — LETTER
10/26/2021         RE: Sakshi Jaeger  [address] [address]        Dear Colleague,    Thank you for referring your patient, Sakshi Jaeger, to the [facility]. Please see a copy of my visit note below.    PRESENTING COMPLAINT:  Followup visit for right BKA stump wound and prepatellar wound.    HISTORY OF PRESENTING COMPLAINT:  Ms. Jaeger is 76 years old.  Since our last visit on 10/06/2021, she has undergone a CTA that shows significant vascular issues including large-vessel problems as well as absence of a popliteal vessel as well as severe calcifications.  Otherwise, no change in her history and physical exam.    ASSESSMENT AND PLAN:  Based on above findings, a diagnosis of nonhealing right BKA stump wound and prepatellar wound was made.  I had a anais discussion with the patient and her son about these findings of the CTA and the high risk of failure with a free flap reconstruction given the fact she has severe calcified vessels, poor inflow and has had previous free flaps done in that region.  She also does not have veins for vein grafting.  Therefore, in my opinion, the more conservative route is safer.  I am happy to debride the distal tip wound and try and close it again.  However, the underlying cause of the problems, i.e., pressure from the prosthesis, remains and therefore recurrence is high.  She is to see her primary care physician in 2 days for a preliminary clearance for any major surgery.  I have advocated that she also follow up with Vascular Surgery to see if inflow can be improved.  Having said that, her TcPO2s of the distal stump region are in the 70s, therefore good enough for healing.  All of this was discussed with them in detail.  They understood everything.  Our plan now is to proceed with only a distal tip wound debridement and closure and conservative management for the prepatellar wound.  All questions were answered.  She was  happy with the visit.    Total time spent with chart review, visit itself and post-visit paperwork was 30 minutes.            Again, thank you for allowing me to participate in the care of your patient.        Sincerely,        RAISA Perea MD

## 2021-10-26 NOTE — NURSING NOTE
"Oncology Rooming Note    October 26, 2021 11:10 AM   Sakshi Jaeger is a 76 year old female who presents for:    Chief Complaint   Patient presents with     Oncology Clinic Visit     non-healing surgical wound     Initial Vitals: /69   Pulse 67   Temp 98.8  F (37.1  C) (Oral)   SpO2 99%  Estimated body mass index is 17.34 kg/m  as calculated from the following:    Height as of 10/6/21: 1.626 m (5' 4\").    Weight as of 10/18/21: 45.8 kg (101 lb). There is no height or weight on file to calculate BSA.  No Pain (0) Comment: Data Unavailable   No LMP recorded. Patient is postmenopausal.  Allergies reviewed: Yes  Medications reviewed: Yes    Medications: Medication refills not needed today.  Pharmacy name entered into [employer]:    [id number] - [place], [place] - [address]  [id number] [place], [place] - [address]    Clinical concerns: none       Luz Brand CMA            "

## 2021-10-26 NOTE — PROGRESS NOTES
PRESENTING COMPLAINT:  Followup visit for right BKA stump wound and prepatellar wound.    HISTORY OF PRESENTING COMPLAINT:  Ms. Jaeger is 76 years old.  Since our last visit on 10/06/2021, she has undergone a CTA that shows significant vascular issues including large-vessel problems as well as absence of a popliteal vessel as well as severe calcifications.  Otherwise, no change in her history and physical exam.    ASSESSMENT AND PLAN:  Based on above findings, a diagnosis of nonhealing right BKA stump wound and prepatellar wound was made.  I had a anais discussion with the patient and her son about these findings of the CTA and the high risk of failure with a free flap reconstruction given the fact she has severe calcified vessels, poor inflow and has had previous free flaps done in that region.  She also does not have veins for vein grafting.  Therefore, in my opinion, the more conservative route is safer.  I am happy to debride the distal tip wound and try and close it again.  However, the underlying cause of the problems, i.e., pressure from the prosthesis, remains and therefore recurrence is high.  She is to see her primary care physician in 2 days for a preliminary clearance for any major surgery.  I have advocated that she also follow up with Vascular Surgery to see if inflow can be improved.  Having said that, her TcPO2s of the distal stump region are in the 70s, therefore good enough for healing.  All of this was discussed with them in detail.  They understood everything.  Our plan now is to proceed with only a distal tip wound debridement and closure and conservative management for the prepatellar wound.  All questions were answered.  She was happy with the visit.    Total time spent with chart review, visit itself and post-visit paperwork was 30 minutes.

## 2021-10-27 ENCOUNTER — OFFICE VISIT (OUTPATIENT)
Dept: FAMILY MEDICINE | Facility: CLINIC | Age: 76
End: 2021-10-27
Payer: COMMERCIAL

## 2021-10-27 VITALS
HEIGHT: 64 IN | BODY MASS INDEX: 17.42 KG/M2 | DIASTOLIC BLOOD PRESSURE: 66 MMHG | OXYGEN SATURATION: 97 % | RESPIRATION RATE: 18 BRPM | WEIGHT: 102 LBS | TEMPERATURE: 64.4 F | SYSTOLIC BLOOD PRESSURE: 101 MMHG | HEART RATE: 97 BPM

## 2021-10-27 DIAGNOSIS — Z01.818 PREOP GENERAL PHYSICAL EXAM: Primary | ICD-10-CM

## 2021-10-27 DIAGNOSIS — E78.5 HYPERLIPIDEMIA LDL GOAL <100: ICD-10-CM

## 2021-10-27 DIAGNOSIS — M06.9 RHEUMATOID ARTHRITIS, INVOLVING UNSPECIFIED SITE, UNSPECIFIED WHETHER RHEUMATOID FACTOR PRESENT (H): ICD-10-CM

## 2021-10-27 DIAGNOSIS — S81.801D LEG WOUND, RIGHT, SUBSEQUENT ENCOUNTER: ICD-10-CM

## 2021-10-27 DIAGNOSIS — Z89.511 STATUS POST BELOW-KNEE AMPUTATION OF RIGHT LOWER EXTREMITY (H): ICD-10-CM

## 2021-10-27 DIAGNOSIS — I10 ESSENTIAL HYPERTENSION WITH GOAL BLOOD PRESSURE LESS THAN 140/90: ICD-10-CM

## 2021-10-27 DIAGNOSIS — I73.9 PVD (PERIPHERAL VASCULAR DISEASE) (H): ICD-10-CM

## 2021-10-27 DIAGNOSIS — I25.10 CORONARY ARTERY DISEASE INVOLVING NATIVE CORONARY ARTERY OF NATIVE HEART WITHOUT ANGINA PECTORIS: ICD-10-CM

## 2021-10-27 LAB
ANION GAP SERPL CALCULATED.3IONS-SCNC: 6 MMOL/L (ref 3–14)
BUN SERPL-MCNC: 11 MG/DL (ref 7–30)
CALCIUM SERPL-MCNC: 9.3 MG/DL (ref 8.5–10.1)
CHLORIDE BLD-SCNC: 102 MMOL/L (ref 94–109)
CO2 SERPL-SCNC: 25 MMOL/L (ref 20–32)
CREAT SERPL-MCNC: 0.55 MG/DL (ref 0.52–1.04)
GFR SERPL CREATININE-BSD FRML MDRD: >90 ML/MIN/1.73M2
GLUCOSE BLD-MCNC: 89 MG/DL (ref 70–99)
HGB BLD-MCNC: 10.2 G/DL (ref 11.7–15.7)
POTASSIUM BLD-SCNC: 3.9 MMOL/L (ref 3.4–5.3)
SODIUM SERPL-SCNC: 133 MMOL/L (ref 133–144)

## 2021-10-27 PROCEDURE — 85018 HEMOGLOBIN: CPT | Performed by: NURSE PRACTITIONER

## 2021-10-27 PROCEDURE — 99215 OFFICE O/P EST HI 40 MIN: CPT | Performed by: NURSE PRACTITIONER

## 2021-10-27 PROCEDURE — 93000 ELECTROCARDIOGRAM COMPLETE: CPT | Performed by: NURSE PRACTITIONER

## 2021-10-27 PROCEDURE — 80048 BASIC METABOLIC PNL TOTAL CA: CPT | Performed by: NURSE PRACTITIONER

## 2021-10-27 PROCEDURE — 36415 COLL VENOUS BLD VENIPUNCTURE: CPT | Performed by: NURSE PRACTITIONER

## 2021-10-27 ASSESSMENT — MIFFLIN-ST. JEOR: SCORE: 937.67

## 2021-10-27 ASSESSMENT — PAIN SCALES - GENERAL: PAINLEVEL: NO PAIN (0)

## 2021-10-27 NOTE — PATIENT INSTRUCTIONS

## 2021-10-27 NOTE — PROGRESS NOTES
[facility]  [address]  [place] [address]  Phone: [phone]  Primary Provider: Nick De La Cruz  Pre-op Performing Provider: LUCIEN COYNE    PREOPERATIVE EVALUATION:  Today's date: 10/27/2021    Sakshi Jaeger is a 76 year old female who presents for a preoperative evaluation.    Surgical Information:  Surgery/Procedure: Right leg wound debridement and closure   Surgery Location: [facility]   Surgeon: Dr. Perea   Surgery Date: N/A   Time of Surgery: N/A   Where patient plans to recover: At home with family  Fax number for surgical facility: Note does not need to be faxed, will be available electronically in Epic.    Type of Anesthesia Anticipated: to be determined    Assessment & Plan     The proposed surgical procedure is considered INTERMEDIATE risk.    Preop general physical exam  - BASIC METABOLIC PANEL; Future  - EKG 12-lead complete w/read - Clinics  - Hemoglobin; Future    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)  - Vascular Medicine Referral; Future    Leg wound, right, subsequent encounter  - Vascular Medicine Referral; Future    PVD (peripheral vascular disease) (H)  - Vascular Medicine Referral; Future    Essential hypertension with goal blood pressure less than 140/90  Well controlled with medications without side effects.  - BASIC METABOLIC PANEL; Future    Hyperlipidemia LDL goal <100  Well controlled with medications without side effects.    Coronary artery disease involving native coronary artery of native heart without angina pectoris  History of STEMI requiring DISHA to RCA 3/31/2010. On statin, ACEi, BB, ASA, CCB.  Denies chest pain/pressure, dyspnea, palpitations, PND, orthopnea. Echocardiogram 10/2019 EF 60%. EKG today NSR with PACs, normal axis, normal intervals, no acute ST/T changes c/w ischemia, no LVH by voltage criteria. Revised Cardiac Risk Index (RCRI) = 1 point, stress testing not indicated.    Rheumatoid  arthritis, involving unspecified site, unspecified whether rheumatoid factor present (H)  Continue current treatment plan without change.    Risks and Recommendations:  The patient has the following additional risks and recommendations for perioperative complications:   - No identified additional risk factors other than previously addressed    Medication Instructions:   - aspirin: Discontinue aspirin 7-10 days prior to procedure to reduce bleeding risk. It should be resumed postoperatively.    - ACE/ARB: May be continued on the day of surgery.    - Beta Blockers: Continue taking on the day of surgery.   - Calcium Channel Blockers: May be continued on the day of surgery.   - Statins: Continue taking on the day of surgery.    - DMARDs Continue taking on the day of surgery   - pregabalin, gabapentin: Continue without modification.   - Take usual dose on day of surgery   - Intraoperative stress dose steroids may be indicated due to chronic steroid use in the last 3 months (e.g. > 3 weeks of prednisone 20 mg or daily prednisone 5 mg).    RECOMMENDATION:  APPROVAL GIVEN to proceed with proposed procedure, without further diagnostic evaluation.    Review of the result(s) of each unique test - echo, lipids, labs, EKG    54 minutes spent on the date of the encounter doing chart review, history and exam, documentation and further activities per the note    Subjective     HPI related to upcoming procedure: Patient with right BKA nonhealing stump wound scheduled for debridement and closure.     Preop Questions 10/27/2021   1. Have you ever had a heart attack or stroke? YES - STEMI requiring coronary stent in 2010   2. Have you ever had surgery on your heart or blood vessels, such as a stent placement, a coronary artery bypass, or surgery on an artery in your head, neck, heart, or legs? YES - DISHA to RCA 3/31/2010   3. Do you have chest pain with activity? No   4. Do you have a history of  heart failure? No   5. Do you currently  have a cold, bronchitis or symptoms of other infection? No   6. Do you have a cough, shortness of breath, or wheezing? No   7. Do you or anyone in your family have previous history of blood clots? No   8. Do you or does anyone in your family have a serious bleeding problem such as prolonged bleeding following surgeries or cuts? No   9. Have you ever had problems with anemia or been told to take iron pills? YES - after the birth of daughter 50 years ago   10. Have you had any abnormal blood loss such as black, tarry or bloody stools, or abnormal vaginal bleeding? No   11. Have you ever had a blood transfusion? YES - when she was hospitalized for sepsis years ago   11a. Have you ever had a transfusion reaction? No   12. Are you willing to have a blood transfusion if it is medically needed before, during, or after your surgery? Yes   13. Have you or any of your relatives ever had problems with anesthesia? No   14. Do you have sleep apnea, excessive snoring or daytime drowsiness? No   15. Do you have any artifical heart valves or other implanted medical devices like a pacemaker, defibrillator, or continuous glucose monitor? No   16. Do you have artificial joints? YES - left TKA   17. Are you allergic to latex? No     Health Care Directive:  Patient does not have a Health Care Directive or Living Will: Discussed advance care planning with patient; however, patient declined at this time.    Preoperative Review of :   reviewed - no record of controlled substances prescribed.   PDMP Review       Value Time User    State PDMP site checked  Yes 10/27/2021  8:41 AM Stefania King APRN CNP        Status of Chronic Conditions:  CAD - Patient has a longstanding history of moderate-severe CAD. Patient denies recent chest pain or NTG use, denies exercise induced dyspnea or PND. Last echocardiogram 10/2019 EF 60%, EKG today SR with PACs.     HYPERLIPIDEMIA - Patient has a long history of significant Hyperlipidemia  requiring medication for treatment with recent good control. Patient reports no problems or side effects with the medication.     HYPERTENSION - Patient has longstanding history of HTN , currently denies any symptoms referable to elevated blood pressure. Specifically denies chest pain, palpitations, dyspnea, orthopnea, PND or peripheral edema. Blood pressure readings have been in normal range. Current medication regimen is as listed below. Patient denies any side effects of medication.       Review of Systems  Constitutional, neuro, ENT, endocrine, pulmonary, cardiac, gastrointestinal, genitourinary, musculoskeletal, integument and psychiatric systems are negative, except as otherwise noted.    Patient Active Problem List    Diagnosis Date Noted     Non-healing surgical wound, subsequent encounter 09/24/2020     Priority: Medium     Added automatically from request for surgery [id number]       Infection of right below knee amputation (H) 10/01/2019     Priority: Medium     Osteomyelitis (H) 10/01/2019     Priority: Medium     Added automatically from request for surgery [id number]       Sacroiliac joint pain 06/07/2018     Priority: Medium     Coronary artery disease involving native coronary artery of native heart without angina pectoris 09/27/2016     Priority: Medium     Essential hypertension with goal blood pressure less than 140/90 08/25/2016     Priority: Medium     Status post below-knee amputation (H) 12/26/2012     Priority: Medium     Problem list name updated by automated process. Provider to review       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     Priority: Medium     Advanced directives, counseling/discussion 11/25/2011     Priority: Medium     Advance Care Planning 9/30/2016: ACP Review of Chart / Resources Provided:  Reviewed chart for advance care plan.  Sakshi KEITH Jaeger has no plan or code status on file. Discussed available resources and provided with information. Confirmed code status reflects current choices  pending further ACP discussions.  Confirmed/documented legally designated decision makers.    Added by Mishel Wallace        Discussed advance care planning with patient; information given to patient to review. Jennifer Jaeger, Clinical Medical Assistant   11/25/2011          Hyperlipidemia LDL goal <100 11/12/2010     Priority: Medium     Iron deficiency anemia 09/15/2005     Priority: Medium     Problem list name updated by automated process. Provider to review       Osteoporosis 02/16/2005     Priority: Medium     Problem list name updated by automated process. Provider to review       Rheumatoid arthritis (H) 02/16/2005     Priority: Medium     Problem list name updated by automated process. Provider to review        Past Medical History:   Diagnosis Date     BENIGN HYPERTENSION 3/1/2005     CAD (coronary artery disease) 1/26/2011     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     Hypertension goal BP (blood pressure) < 130/80 1/26/2011     IRON DEFIC ANEMIA NOS 9/15/2005     Lower limb amputation, below knee 12/26/2012     MI (myocardial infarction) (H)     3/2010     OSTEOPOROSIS NOS 2/16/2005     Rheumatoid arthritis(714.0) 2/16/2005     Past Surgical History:   Procedure Laterality Date     ---------INCISION AND DRAINAGE  3/5/14     AMPUTATE LEG BELOW KNEE Right 2005     AMPUTATION BELOW KNEE RT/LT  2005    right lowwer leg.      APPENDECTOMY       APPENDECTOMY       ARTHROPLASTY KNEE  4/27/2011    Procedure:ARTHROPLASTY KNEE; Surgeon:JESSE HAWKINS; Location:[facility] OR     ARTHROPLASTY KNEE Left 04/27/2011     coronary stent       CORONARY STENT PLACEMENT       IRRIGATION AND DEBRIDEMENT LOWER EXTREMITY, COMBINED Right 10/9/2019    Procedure: Irrigation and debridement Right leg;  Surgeon: Doron Mott MD;  Location: [facility] OR     OTHER SURGICAL HISTORY  03/05/2014    I AND D      OTHER SURGICAL HISTORY Right 10/09/2019    IRRIGATION AND DEBRIDEMENT LOWER EXTREMITY, COMBINED     REVISE SCAR EXTREMITY Right  2020    Procedure: Right stump scar revision;  Surgeon: RAISA Perea MD;  Location: [facility] OR     SURGICAL HISTORY OF -       Appendectomy     Current Outpatient Medications   Medication Sig Dispense Refill     acetaminophen (TYLENOL) 325 MG tablet Take 2 tablets (650 mg) by mouth every 4 hours       amLODIPine (NORVASC) 5 MG tablet TAKE 1 TABLET BY MOUTH EVERY DAY 90 tablet 3     Ascorbic Acid (VITAMIN C) 500 MG CHEW  (Patient not taking: Reported on 10/6/2021)       aspirin (ASA) 81 MG tablet Take 1 tablet (81 mg) by mouth daily       calcium carbonate 600 mg-vitamin D 400 units (CALCIUM 600 + D) 600-400 MG-UNIT per tablet Take 2 tablets by mouth daily       gabapentin (NEURONTIN) 300 MG capsule Take 2 capsules (600 mg) by mouth 3 times daily       leflunomide (ARAVA) 20 MG tablet Take 1 tablet by mouth every 24 hours       Lidocaine (LIDOCARE) 4 % Patch Place 1 patch onto the skin every 24 hours To prevent lidocaine toxicity, patient should be patch free for 12 hrs daily. (Patient not taking: Reported on 10/6/2021)       lisinopril (ZESTRIL) 20 MG tablet Take 1 tablet (20 mg) by mouth daily 90 tablet 3     methotrexate 2.5 MG tablet Take 1 tablet by mouth       metoprolol tartrate (LOPRESSOR) 50 MG tablet Take 1 tablet (50 mg) by mouth 2 times daily 180 tablet 3     multivitamin w/minerals (THERA-VIT-M) tablet Take 1 tablet by mouth daily       order for DME Equipment being ordered: New foam (custom shaped protective cover) for right below the knee prostheses 1 Units 0     predniSONE (DELTASONE) 5 MG tablet Take 1 tablet (5 mg) by mouth daily  0     simvastatin (ZOCOR) 20 MG tablet TAKE 1 TABLET BY MOUTH AT BEDTIME 90 tablet 3       No Known Allergies     Social History     Tobacco Use     Smoking status: Former Smoker     Packs/day: 0.50     Years: 45.00     Pack years: 22.50     Types: Cigarettes     Quit date: 2010     Years since quittin.6     Smokeless tobacco: Never Used     Tobacco  "comment: pt has recently started smoking again d/t pain   Substance Use Topics     Alcohol use: Yes     Comment: occsionally-3 -4 drinks a week     History   Drug Use No         Objective     /66   Pulse 97   Temp (!) 64.4  F (18  C) (Oral)   Resp 18   Ht 1.626 m (5' 4\")   Wt 46.3 kg (102 lb)   SpO2 97%   BMI 17.51 kg/m      Physical Exam    GENERAL APPEARANCE: healthy, alert and no distress     EYES: EOMI, PERRL     HENT: ear canals and TM's normal and nose and mouth without ulcers or lesions     NECK: no adenopathy, no asymmetry, masses, or scars and thyroid normal to palpation     RESP: lungs clear to auscultation - no rales, rhonchi or wheezes     CV: regular rates and rhythm, normal S1 S2, no S3 or S4 and no murmur, click or rub     ABDOMEN:  soft, nontender, no HSM or masses and bowel sounds normal     MS: right bka, wearing prothesis      SKIN: no suspicious lesions or rashes     NEURO: Normal strength and tone, sensory exam grossly normal, mentation intact and speech normal     PSYCH: mentation appears normal. and affect normal/bright     LYMPHATICS: No cervical adenopathy    Recent Labs   Lab Test 10/19/21  1451 03/12/20  1518 11/11/19  0737 11/11/19  0000 11/04/19  0712 11/04/19  0712   HGB  --  12.2 8.6*  --    < > 8.9*   PLT  --  356 415  --    < > 408   NA  --   --  134*  --   --  137   POTASSIUM  --   --  3.8 3.8  --  4.6   CR 0.6  --  0.69 0.69   < > 0.70    < > = values in this interval not displayed.        Diagnostics:  Labs pending at this time.  Results will be reviewed when available.   EKG: appears normal, NSR, normal axis, normal intervals, no acute ST/T changes c/w ischemia, no LVH by voltage criteria, unchanged from previous tracings    Revised Cardiac Risk Index (RCRI):  The patient has the following serious cardiovascular risks for perioperative complications:   - Coronary Artery Disease (MI, positive stress test, angina, Qs on EKG) = 1 point     RCRI Interpretation: 1 point: " Class II (low risk - 0.9% complication rate)       Signed Electronically by: DESIRE Marroquin CNP  Copy of this evaluation report is provided to requesting physician.

## 2021-10-29 DIAGNOSIS — D64.9 HEMOGLOBIN LOW: Primary | ICD-10-CM

## 2021-10-29 RX ORDER — FERROUS SULFATE 325(65) MG
325 TABLET ORAL
Qty: 60 TABLET | Refills: 0 | Status: SHIPPED | OUTPATIENT
Start: 2021-10-29 | End: 2021-12-23

## 2021-10-29 NOTE — TELEPHONE ENCOUNTER
Called and spoke with patient.  Verified pharmacy: [facility] in [place]. Patient wants to  prescription today.  Assisted with scheduling a lab only appointment 11/58/21.  Patient verbalized understanding and has no further questions or concerns at this time.

## 2021-10-29 NOTE — TELEPHONE ENCOUNTER
Iron supplement sent. Patient has appointment scheduled 11/8/2021 for recheck.  Stefania King, APRN, CNP

## 2021-10-29 NOTE — TELEPHONE ENCOUNTER
Left message on patient's identified voice mail.  Advised patient to call [phone] at her earliest convenience to verify the pharmacy and scheduled lab only appointment as recommended.

## 2021-10-29 NOTE — TELEPHONE ENCOUNTER
Please call patient. Her hemoglobin is a little low. She should start the iron supplement (please verify pharmacy). Let's recheck next week with a lab only appointment to make sure that her number are stable prior to her surgery.   DESIRE Maza, CNP

## 2021-10-29 NOTE — TELEPHONE ENCOUNTER
Patient  returned call    Best number to reach caller: Home number on file [phone] (home)    Is it ok to leave a detailed message: YES     Pharmacy is [facility] [phone]    Please call Patient as she is unable to get into the lab until 11-15 and did not schedule appointment; Patient would like a call back, and requesting the lab work her in,  if team can talk to them, call with date and time; does not want to have to call back and wait on hold; requesting 11-05 as will be starting her iron as soon as it is prescribed; Thank you

## 2021-11-08 ENCOUNTER — LAB (OUTPATIENT)
Dept: LAB | Facility: CLINIC | Age: 76
End: 2021-11-08
Payer: COMMERCIAL

## 2021-11-08 DIAGNOSIS — D64.9 HEMOGLOBIN LOW: ICD-10-CM

## 2021-11-08 LAB — HGB BLD-MCNC: 11 G/DL (ref 11.7–15.7)

## 2021-11-08 PROCEDURE — 36415 COLL VENOUS BLD VENIPUNCTURE: CPT

## 2021-11-08 PROCEDURE — 85018 HEMOGLOBIN: CPT

## 2021-11-09 ENCOUNTER — TELEPHONE (OUTPATIENT)
Dept: SURGERY | Facility: CLINIC | Age: 76
End: 2021-11-09
Payer: COMMERCIAL

## 2021-11-09 NOTE — TELEPHONE ENCOUNTER
Called patient and spoke with the patients significant other regarding surgery possibly being on 12/8. Patient's significant other noted they have have been calling the vascular clinic with no return call and the patient is getting increasingly anxious/upset with the lack of communication. Explained to the patients significant other that I will send a high priority message to my team and we will attempt to get in touch with vascular and make sure this gets completed today.    Patients significant other will speak with patient regarding 12/8 and if that date will work for them. They will call me back with questions, but I will also follow up once vascular reaches out to them. Sent high priority message to the clinical team.

## 2021-11-11 ENCOUNTER — TELEPHONE (OUTPATIENT)
Dept: VASCULAR SURGERY | Facility: CLINIC | Age: 76
End: 2021-11-11
Payer: COMMERCIAL

## 2021-11-11 DIAGNOSIS — I73.9 PVD (PERIPHERAL VASCULAR DISEASE) (H): ICD-10-CM

## 2021-11-11 DIAGNOSIS — Z01.818 PRE-OP EXAM: ICD-10-CM

## 2021-11-11 DIAGNOSIS — Z71.89 ADVANCED DIRECTIVES, COUNSELING/DISCUSSION: ICD-10-CM

## 2021-11-11 DIAGNOSIS — T81.89XD NON-HEALING SURGICAL WOUND, SUBSEQUENT ENCOUNTER: Primary | ICD-10-CM

## 2021-11-11 DIAGNOSIS — Z89.511 STATUS POST BELOW-KNEE AMPUTATION OF RIGHT LOWER EXTREMITY (H): ICD-10-CM

## 2021-11-11 NOTE — TELEPHONE ENCOUNTER
I contacted pt about her vascular referral. Dr. Oconnor would like pt to have arterial duplex of RLE prior to making appt with our team. I contacted Sakshi and explained this to her and her . I gave them the phone number for the imaging scheduling line, as well as my direct callback.    AMANDA DavenportN, RN  [facility] Vascular Surgery  Ph: [phone]  Fax: [phone]

## 2021-11-12 DIAGNOSIS — Z11.59 ENCOUNTER FOR SCREENING FOR OTHER VIRAL DISEASES: ICD-10-CM

## 2021-11-16 ENCOUNTER — ANCILLARY PROCEDURE (OUTPATIENT)
Dept: ULTRASOUND IMAGING | Facility: CLINIC | Age: 76
End: 2021-11-16
Attending: SURGERY
Payer: COMMERCIAL

## 2021-11-16 DIAGNOSIS — I73.9 PVD (PERIPHERAL VASCULAR DISEASE) (H): ICD-10-CM

## 2021-11-16 DIAGNOSIS — T81.89XD NON-HEALING SURGICAL WOUND, SUBSEQUENT ENCOUNTER: ICD-10-CM

## 2021-11-16 DIAGNOSIS — Z89.511 STATUS POST BELOW-KNEE AMPUTATION OF RIGHT LOWER EXTREMITY (H): ICD-10-CM

## 2021-11-16 PROCEDURE — 93926 LOWER EXTREMITY STUDY: CPT | Mod: RT | Performed by: RADIOLOGY

## 2021-11-29 ENCOUNTER — PATIENT OUTREACH (OUTPATIENT)
Dept: PLASTIC SURGERY | Facility: CLINIC | Age: 76
End: 2021-11-29
Payer: COMMERCIAL

## 2021-11-29 NOTE — PATIENT INSTRUCTIONS
Pt's significant other called today to update on pt's right stump drainage. Last Wednesday, pt began to have blood tinged drainage with some pus drainage also. Stump was also warm but pt has not had fevers, chills or pain associated with drainage. Pt does not have MyChart so they were unable to send photos. Today drainage is mostly clear without pus. However, pt's SO is worried as the last time pt had changes in drainage she was hospitalized with sepsis. Pt has surgery scheduled for 12/8 but would like to be seen earlier to check on her stump. I have scheduled an appt for this Wednesday 12/1. Pt and SO are in agreement with the plan.  Daniel SWAIN RN

## 2021-12-01 ENCOUNTER — OFFICE VISIT (OUTPATIENT)
Dept: PLASTIC SURGERY | Facility: CLINIC | Age: 76
End: 2021-12-01
Payer: COMMERCIAL

## 2021-12-01 VITALS
SYSTOLIC BLOOD PRESSURE: 129 MMHG | OXYGEN SATURATION: 98 % | DIASTOLIC BLOOD PRESSURE: 65 MMHG | TEMPERATURE: 98.4 F | HEART RATE: 59 BPM

## 2021-12-01 DIAGNOSIS — T81.89XD NON-HEALING SURGICAL WOUND, SUBSEQUENT ENCOUNTER: Primary | ICD-10-CM

## 2021-12-01 PROCEDURE — 99213 OFFICE O/P EST LOW 20 MIN: CPT | Performed by: PLASTIC SURGERY

## 2021-12-01 RX ORDER — CLINDAMYCIN HCL 150 MG
150 CAPSULE ORAL 3 TIMES DAILY
Qty: 30 CAPSULE | Refills: 0 | Status: SHIPPED | OUTPATIENT
Start: 2021-12-01 | End: 2021-12-08

## 2021-12-01 ASSESSMENT — PAIN SCALES - GENERAL: PAINLEVEL: NO PAIN (0)

## 2021-12-01 NOTE — LETTER
12/1/2021       RE: Sakshi Jaeger  [address] [facility] [facility] [address]     Dear Colleague,    Thank you for referring your patient, Sakshi Jaeger, to the [facility]LAST[facility]GERY [facility]IN[facility] [place] at [facility]. Please see a copy of my visit note below.    Service Date: 12/01/2021    PRESENTING COMPLAINT:  Preoperative visit for upcoming right BKA stump wound debridement and closure scheduled for in 12/2021.    HISTORY OF PRESENTING COMPLAINT:  Ms. Jaeger is 76 years old, here for preoperative visit.  In a week's time, we plan to go back to the operating room to debride the area and close it.  However, she has started noticing some redness as well as purulent drainage, no fevers, feels well systemically.    PHYSICAL EXAMINATION:  Vital signs are stable.  She is afebrile, in no obvious distress.  Her incision is not open any further, but it is definitely draining some turbid-colored fluid and the surrounding skin is slightly cellulitic and edematous.    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  We will start her on clindamycin today and then see her back in a week's time prior to the surgery.  If it remains infected-looking, we will just debride that day.  If it looks healthy, then we will debride and close.  She understood the plan.  All risks, benefits and alternatives of the procedure including pain, infection, bleeding, scarring, asymmetry, seromas, hematomas, wound breakdown, wound dehiscence, wound infection, requirement of further surgeries, dehiscences, DVT, PE, MI, CVA, pneumonia, renal failure and death were all explained.  She understood them all and wants to proceed.  All exam and discussion was done in the presence of my nurse, Allison Meyer.    Total time spent with chart review, visit itself and post-visit paperwork was  20 minutes.    RAISA Perea MD

## 2021-12-02 ENCOUNTER — PATIENT OUTREACH (OUTPATIENT)
Dept: PLASTIC SURGERY | Facility: CLINIC | Age: 76
End: 2021-12-02
Payer: COMMERCIAL

## 2021-12-02 DIAGNOSIS — S81.801D OPEN WOUND OF RIGHT LOWER EXTREMITY, SUBSEQUENT ENCOUNTER: Primary | ICD-10-CM

## 2021-12-02 RX ORDER — SULFAMETHOXAZOLE/TRIMETHOPRIM 800-160 MG
1 TABLET ORAL 2 TIMES DAILY
Qty: 20 TABLET | Refills: 0 | Status: SHIPPED | OUTPATIENT
Start: 2021-12-02 | End: 2021-12-08

## 2021-12-02 NOTE — PATIENT INSTRUCTIONS
Received call from pt's SO, Santi today to discuss pt being on Clindamycin. I returned the call and spoke with Santi. He updated me that when they went  antibiotic yesterday, the pharmacist warned them that pt would be at higher risk of getting C-Diff by taking this antibiotic. Pt is worried as she has had C-diff in the past so she has not started taking the Clindamycin yet and would like to know if there is another one pt can take that would lower her risk. I encouraged Santi to speak with pharmacist again to see if they had a recommendation for a different antibiotic. I also encouraged pt to take a probiotic along with any antibiotic with history of C-diff.   I also updated that during the visit yesterday, Dr Perea assessed pt's stump and believes it is infected so pt should start taking an antibiotic ASAP. Santi plans to call pharmacist now and will call back if we need to order different antibiotic per their recommendation.  Daniel SWAIN RN

## 2021-12-02 NOTE — PATIENT INSTRUCTIONS
After speaking with pt's [facility] pharmacist Veronica, I called pt to review Veronica's recommendations as follows:  Change antibiotic coverage for stump wound from Clindamycin to Bactrim in order to decrease risk of C-Diff. Veronica also recommends holding pt's Methotrexate for one week due to possible drug interaction. Pt and Santi are in agreement with this plan. Pt also plans to communicate plan to hold Methotrexate with her Rheumatologist and start taking pro-biotic along with antibiotic. Pt very happy with this plan, grateful for the call today. I have updated Dr Perea on the above plan and changed order to Bactrim BID for 10 days.  Daniel SWAIN RN

## 2021-12-02 NOTE — PROGRESS NOTES
Service Date: 2021    PRESENTING COMPLAINT:  Preoperative visit for upcoming right BKA stump wound debridement and closure scheduled for in 2021.    HISTORY OF PRESENTING COMPLAINT:  Ms. Jaeger is 76 years old, here for preoperative visit.  In a week's time, we plan to go back to the operating room to debride the area and close it.  However, she has started noticing some redness as well as purulent drainage, no fevers, feels well systemically.    PHYSICAL EXAMINATION:  Vital signs are stable.  She is afebrile, in no obvious distress.  Her incision is not open any further, but it is definitely draining some turbid-colored fluid and the surrounding skin is slightly cellulitic and edematous.    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  We will start her on clindamycin today and then see her back in a week's time prior to the surgery.  If it remains infected-looking, we will just debride that day.  If it looks healthy, then we will debride and close.  She understood the plan.  All risks, benefits and alternatives of the procedure including pain, infection, bleeding, scarring, asymmetry, seromas, hematomas, wound breakdown, wound dehiscence, wound infection, requirement of further surgeries, dehiscences, DVT, PE, MI, CVA, pneumonia, renal failure and death were all explained.  She understood them all and wants to proceed.  All exam and discussion was done in the presence of my nurse, Allison Meyer.    Total time spent with chart review, visit itself and post-visit paperwork was 20 minutes.    RAISA Perea MD        D: 2021   T: 2021   MT: stefano    Name:     CARLOS JAEGER  MRN:      [id number]        Account:      [id number]   :      1945           Service Date: 2021       Document: [id number]

## 2021-12-06 ENCOUNTER — LAB (OUTPATIENT)
Dept: LAB | Facility: CLINIC | Age: 76
End: 2021-12-06
Attending: PLASTIC SURGERY

## 2021-12-06 DIAGNOSIS — Z11.59 ENCOUNTER FOR SCREENING FOR OTHER VIRAL DISEASES: ICD-10-CM

## 2021-12-06 PROCEDURE — U0005 INFEC AGEN DETEC AMPLI PROBE: HCPCS | Performed by: PLASTIC SURGERY

## 2021-12-07 ENCOUNTER — ANESTHESIA EVENT (OUTPATIENT)
Dept: SURGERY | Facility: AMBULATORY SURGERY CENTER | Age: 76
End: 2021-12-07
Payer: COMMERCIAL

## 2021-12-07 LAB — SARS-COV-2 RNA RESP QL NAA+PROBE: NEGATIVE

## 2021-12-07 RX ORDER — FENTANYL CITRATE 50 UG/ML
25 INJECTION, SOLUTION INTRAMUSCULAR; INTRAVENOUS
Status: CANCELLED | OUTPATIENT
Start: 2021-12-07

## 2021-12-08 ENCOUNTER — HOSPITAL ENCOUNTER (OUTPATIENT)
Facility: AMBULATORY SURGERY CENTER | Age: 76
End: 2021-12-08
Attending: PLASTIC SURGERY
Payer: COMMERCIAL

## 2021-12-08 ENCOUNTER — ANESTHESIA (OUTPATIENT)
Dept: SURGERY | Facility: AMBULATORY SURGERY CENTER | Age: 76
End: 2021-12-08
Payer: COMMERCIAL

## 2021-12-08 VITALS
DIASTOLIC BLOOD PRESSURE: 57 MMHG | HEART RATE: 69 BPM | WEIGHT: 105 LBS | SYSTOLIC BLOOD PRESSURE: 110 MMHG | BODY MASS INDEX: 17.49 KG/M2 | RESPIRATION RATE: 16 BRPM | HEIGHT: 65 IN | OXYGEN SATURATION: 99 % | TEMPERATURE: 97 F

## 2021-12-08 DIAGNOSIS — S81.801D OPEN WOUND OF RIGHT LOWER EXTREMITY, SUBSEQUENT ENCOUNTER: ICD-10-CM

## 2021-12-08 PROCEDURE — 87076 CULTURE ANAEROBE IDENT EACH: CPT | Performed by: PLASTIC SURGERY

## 2021-12-08 PROCEDURE — 27884 AMPUTATION FOLLOW-UP SURGERY: CPT | Mod: RT | Performed by: PLASTIC SURGERY

## 2021-12-08 PROCEDURE — 87075 CULTR BACTERIA EXCEPT BLOOD: CPT | Performed by: PLASTIC SURGERY

## 2021-12-08 PROCEDURE — 87077 CULTURE AEROBIC IDENTIFY: CPT | Performed by: PLASTIC SURGERY

## 2021-12-08 PROCEDURE — 27884 AMPUTATION FOLLOW-UP SURGERY: CPT | Mod: RT

## 2021-12-08 RX ORDER — HYDROMORPHONE HYDROCHLORIDE 1 MG/ML
0.2 INJECTION, SOLUTION INTRAMUSCULAR; INTRAVENOUS; SUBCUTANEOUS EVERY 5 MIN PRN
Status: DISCONTINUED | OUTPATIENT
Start: 2021-12-08 | End: 2021-12-10 | Stop reason: HOSPADM

## 2021-12-08 RX ORDER — BUPIVACAINE HYDROCHLORIDE 2.5 MG/ML
INJECTION, SOLUTION INFILTRATION; PERINEURAL PRN
Status: DISCONTINUED | OUTPATIENT
Start: 2021-12-08 | End: 2021-12-08 | Stop reason: HOSPADM

## 2021-12-08 RX ORDER — ONDANSETRON 4 MG/1
4 TABLET, ORALLY DISINTEGRATING ORAL EVERY 30 MIN PRN
Status: DISCONTINUED | OUTPATIENT
Start: 2021-12-08 | End: 2021-12-10 | Stop reason: HOSPADM

## 2021-12-08 RX ORDER — FENTANYL CITRATE 50 UG/ML
25 INJECTION, SOLUTION INTRAMUSCULAR; INTRAVENOUS EVERY 5 MIN PRN
Status: DISCONTINUED | OUTPATIENT
Start: 2021-12-08 | End: 2021-12-10 | Stop reason: HOSPADM

## 2021-12-08 RX ORDER — EPHEDRINE SULFATE 50 MG/ML
INJECTION, SOLUTION INTRAMUSCULAR; INTRAVENOUS; SUBCUTANEOUS PRN
Status: DISCONTINUED | OUTPATIENT
Start: 2021-12-08 | End: 2021-12-08

## 2021-12-08 RX ORDER — HYDRALAZINE HYDROCHLORIDE 20 MG/ML
2.5-5 INJECTION INTRAMUSCULAR; INTRAVENOUS EVERY 10 MIN PRN
Status: DISCONTINUED | OUTPATIENT
Start: 2021-12-08 | End: 2021-12-10 | Stop reason: HOSPADM

## 2021-12-08 RX ORDER — DEXAMETHASONE SODIUM PHOSPHATE 4 MG/ML
INJECTION, SOLUTION INTRA-ARTICULAR; INTRALESIONAL; INTRAMUSCULAR; INTRAVENOUS; SOFT TISSUE PRN
Status: DISCONTINUED | OUTPATIENT
Start: 2021-12-08 | End: 2021-12-08

## 2021-12-08 RX ORDER — KETOROLAC TROMETHAMINE 30 MG/ML
15 INJECTION, SOLUTION INTRAMUSCULAR; INTRAVENOUS EVERY 6 HOURS PRN
Status: DISCONTINUED | OUTPATIENT
Start: 2021-12-08 | End: 2021-12-10 | Stop reason: HOSPADM

## 2021-12-08 RX ORDER — ALBUTEROL SULFATE 0.83 MG/ML
2.5 SOLUTION RESPIRATORY (INHALATION) EVERY 4 HOURS PRN
Status: DISCONTINUED | OUTPATIENT
Start: 2021-12-08 | End: 2021-12-10 | Stop reason: HOSPADM

## 2021-12-08 RX ORDER — SODIUM CHLORIDE, SODIUM LACTATE, POTASSIUM CHLORIDE, CALCIUM CHLORIDE 600; 310; 30; 20 MG/100ML; MG/100ML; MG/100ML; MG/100ML
INJECTION, SOLUTION INTRAVENOUS CONTINUOUS
Status: DISCONTINUED | OUTPATIENT
Start: 2021-12-08 | End: 2021-12-10 | Stop reason: HOSPADM

## 2021-12-08 RX ORDER — LIDOCAINE 40 MG/G
CREAM TOPICAL
Status: DISCONTINUED | OUTPATIENT
Start: 2021-12-08 | End: 2021-12-08 | Stop reason: HOSPADM

## 2021-12-08 RX ORDER — PROPOFOL 10 MG/ML
INJECTION, EMULSION INTRAVENOUS CONTINUOUS PRN
Status: DISCONTINUED | OUTPATIENT
Start: 2021-12-08 | End: 2021-12-08

## 2021-12-08 RX ORDER — LORAZEPAM 2 MG/ML
.5-1 INJECTION INTRAMUSCULAR
Status: DISCONTINUED | OUTPATIENT
Start: 2021-12-08 | End: 2021-12-10 | Stop reason: HOSPADM

## 2021-12-08 RX ORDER — SULFAMETHOXAZOLE/TRIMETHOPRIM 800-160 MG
1 TABLET ORAL 2 TIMES DAILY
Qty: 20 TABLET | Refills: 1 | Status: ON HOLD | OUTPATIENT
Start: 2021-12-08 | End: 2022-09-27

## 2021-12-08 RX ORDER — OXYCODONE HYDROCHLORIDE 5 MG/1
5 TABLET ORAL EVERY 6 HOURS PRN
Qty: 15 TABLET | Refills: 0 | Status: SHIPPED | OUTPATIENT
Start: 2021-12-08 | End: 2022-09-02

## 2021-12-08 RX ORDER — FENTANYL CITRATE 50 UG/ML
INJECTION, SOLUTION INTRAMUSCULAR; INTRAVENOUS PRN
Status: DISCONTINUED | OUTPATIENT
Start: 2021-12-08 | End: 2021-12-08

## 2021-12-08 RX ORDER — CEFAZOLIN SODIUM 2 G/50ML
2 SOLUTION INTRAVENOUS SEE ADMIN INSTRUCTIONS
Status: DISCONTINUED | OUTPATIENT
Start: 2021-12-08 | End: 2021-12-08 | Stop reason: HOSPADM

## 2021-12-08 RX ORDER — ACETAMINOPHEN 325 MG/1
975 TABLET ORAL ONCE
Status: COMPLETED | OUTPATIENT
Start: 2021-12-08 | End: 2021-12-08

## 2021-12-08 RX ORDER — ONDANSETRON 2 MG/ML
4 INJECTION INTRAMUSCULAR; INTRAVENOUS EVERY 30 MIN PRN
Status: DISCONTINUED | OUTPATIENT
Start: 2021-12-08 | End: 2021-12-10 | Stop reason: HOSPADM

## 2021-12-08 RX ORDER — LABETALOL HYDROCHLORIDE 5 MG/ML
10 INJECTION, SOLUTION INTRAVENOUS
Status: DISCONTINUED | OUTPATIENT
Start: 2021-12-08 | End: 2021-12-10 | Stop reason: HOSPADM

## 2021-12-08 RX ORDER — MEPERIDINE HYDROCHLORIDE 25 MG/ML
12.5 INJECTION INTRAMUSCULAR; INTRAVENOUS; SUBCUTANEOUS
Status: DISCONTINUED | OUTPATIENT
Start: 2021-12-08 | End: 2021-12-10 | Stop reason: HOSPADM

## 2021-12-08 RX ORDER — GLYCOPYRROLATE 0.2 MG/ML
INJECTION, SOLUTION INTRAMUSCULAR; INTRAVENOUS PRN
Status: DISCONTINUED | OUTPATIENT
Start: 2021-12-08 | End: 2021-12-08

## 2021-12-08 RX ORDER — PROPOFOL 10 MG/ML
INJECTION, EMULSION INTRAVENOUS PRN
Status: DISCONTINUED | OUTPATIENT
Start: 2021-12-08 | End: 2021-12-08

## 2021-12-08 RX ORDER — SULFAMETHOXAZOLE/TRIMETHOPRIM 800-160 MG
1 TABLET ORAL 2 TIMES DAILY
Qty: 14 TABLET | Refills: 0 | Status: SHIPPED | OUTPATIENT
Start: 2021-12-08 | End: 2021-12-15

## 2021-12-08 RX ORDER — OXYCODONE HYDROCHLORIDE 5 MG/1
5 TABLET ORAL EVERY 4 HOURS PRN
Status: DISCONTINUED | OUTPATIENT
Start: 2021-12-08 | End: 2021-12-10 | Stop reason: HOSPADM

## 2021-12-08 RX ORDER — SODIUM CHLORIDE, SODIUM LACTATE, POTASSIUM CHLORIDE, CALCIUM CHLORIDE 600; 310; 30; 20 MG/100ML; MG/100ML; MG/100ML; MG/100ML
INJECTION, SOLUTION INTRAVENOUS CONTINUOUS
Status: DISCONTINUED | OUTPATIENT
Start: 2021-12-08 | End: 2021-12-08 | Stop reason: HOSPADM

## 2021-12-08 RX ORDER — CEFAZOLIN SODIUM 2 G/50ML
2 SOLUTION INTRAVENOUS
Status: COMPLETED | OUTPATIENT
Start: 2021-12-08 | End: 2021-12-08

## 2021-12-08 RX ADMIN — FENTANYL CITRATE 25 MCG: 50 INJECTION, SOLUTION INTRAMUSCULAR; INTRAVENOUS at 17:02

## 2021-12-08 RX ADMIN — OXYCODONE HYDROCHLORIDE 5 MG: 5 TABLET ORAL at 17:02

## 2021-12-08 RX ADMIN — EPHEDRINE SULFATE 5 MG: 50 INJECTION, SOLUTION INTRAMUSCULAR; INTRAVENOUS; SUBCUTANEOUS at 15:55

## 2021-12-08 RX ADMIN — FENTANYL CITRATE 25 MCG: 50 INJECTION, SOLUTION INTRAMUSCULAR; INTRAVENOUS at 15:46

## 2021-12-08 RX ADMIN — ACETAMINOPHEN 975 MG: 325 TABLET ORAL at 13:43

## 2021-12-08 RX ADMIN — FENTANYL CITRATE 25 MCG: 50 INJECTION, SOLUTION INTRAMUSCULAR; INTRAVENOUS at 17:07

## 2021-12-08 RX ADMIN — GLYCOPYRROLATE 0.2 MG: 0.2 INJECTION, SOLUTION INTRAMUSCULAR; INTRAVENOUS at 16:01

## 2021-12-08 RX ADMIN — FENTANYL CITRATE 25 MCG: 50 INJECTION, SOLUTION INTRAMUSCULAR; INTRAVENOUS at 17:11

## 2021-12-08 RX ADMIN — SODIUM CHLORIDE, SODIUM LACTATE, POTASSIUM CHLORIDE, CALCIUM CHLORIDE: 600; 310; 30; 20 INJECTION, SOLUTION INTRAVENOUS at 13:49

## 2021-12-08 RX ADMIN — CEFAZOLIN SODIUM 2 G: 2 SOLUTION INTRAVENOUS at 15:34

## 2021-12-08 RX ADMIN — Medication 50 MCG: at 16:09

## 2021-12-08 RX ADMIN — EPHEDRINE SULFATE 5 MG: 50 INJECTION, SOLUTION INTRAMUSCULAR; INTRAVENOUS; SUBCUTANEOUS at 16:01

## 2021-12-08 RX ADMIN — EPHEDRINE SULFATE 5 MG: 50 INJECTION, SOLUTION INTRAMUSCULAR; INTRAVENOUS; SUBCUTANEOUS at 15:53

## 2021-12-08 RX ADMIN — PROPOFOL 100 MG: 10 INJECTION, EMULSION INTRAVENOUS at 15:46

## 2021-12-08 RX ADMIN — FENTANYL CITRATE 25 MCG: 50 INJECTION, SOLUTION INTRAMUSCULAR; INTRAVENOUS at 16:54

## 2021-12-08 RX ADMIN — PROPOFOL 150 MCG/KG/MIN: 10 INJECTION, EMULSION INTRAVENOUS at 15:46

## 2021-12-08 RX ADMIN — DEXAMETHASONE SODIUM PHOSPHATE 4 MG: 4 INJECTION, SOLUTION INTRA-ARTICULAR; INTRALESIONAL; INTRAMUSCULAR; INTRAVENOUS; SOFT TISSUE at 15:56

## 2021-12-08 RX ADMIN — PROPOFOL 50 MG: 10 INJECTION, EMULSION INTRAVENOUS at 15:50

## 2021-12-08 ASSESSMENT — MIFFLIN-ST. JEOR: SCORE: 959.22

## 2021-12-08 ASSESSMENT — LIFESTYLE VARIABLES: TOBACCO_USE: 0

## 2021-12-08 NOTE — DISCHARGE INSTRUCTIONS
SCAR REVISION OR EXCISION OF LESIONS POST-OPERATIVE INSTRUCTIONS    Instructions       Have someone drive you home after surgery and help you at home for 1-2      days.      Get plenty of rest.      Follow balanced diet.      Decreased activity may promote constipation, so you may want to add      more raw fruit to your diet, and be sure to increase fluid intake.      Take pain medication as prescribed. Do not take aspirin or any products      containing aspirin.      Do not drink alcohol when taking pain medications.      Even when not taking pain medications, no alcohol for 3 weeks as it      causes fluid retention.      If you are taking vitamins with iron, resume these as tolerated.      Do not smoke, as smoking delays healing and increases the risk of      complications.    Activities      Do not drive until you are no longer taking any pain medications      (narcotics).      Start walking as soon as possible, this helps to reduce swelling and       lowers the chance of blood clots.      Resume normal activities gradually.      Return to work in 1-2 days, depending upon extent of surgery      No strenuous work or stress on wound for 2-3 weeks.    Incision Care      Keep the wrap on for 3 days and then change. Apply christie gauze and wrap with coban or ACE wrap. May shower from tomorrow, but need to cover the stump dressing with a plastic bag. 10 more days of Bactrim needed.       Avoid exposing scars to sun for at least 12 months.      Always use a strong sunblock, if sun exposure is unavoidable (SPF 50 or      greater).      Inspect daily for signs of infection.      No tub soaking, bathing, or swimming while sutures or drains are in place.      Keep area clean and dry for first 24 hours.     What to Expect      Some swelling and bruising.      May have slight bleeding from incision.  Apply 4 x 4 gauze with slight pressure to       control bleeding.      Skin grafts and flaps may take several weeks or months to  heal; a support garment or      bandage may be necessary for up to a year.    Appearance      Final results of surgery may take a year or more.    Follow-Up Care      If external sutures have been used, they will be removed in 5-14 days.    Please note my office will call you 1-2 business days after your procedure to check up on how you're doing and to schedule your post-operative appointment.        When to Call      If you have increased swelling or bruising.      If swelling and redness persist after a few days.      If you have increased redness along the incision.      If you have severe or increased pain not relieved by medication.      If you have any side effects to medications; such as, rash, nausea,      headache, vomiting.      If you have an oral temperature over 100.4 degrees.      If you have any yellowish or greenish drainage from the incisions or      notice a foul odor.      If you have bleeding from the incisions that is difficult to control with      light pressure.      If you have loss of feeling or motion.    For Medical Questions, Please Call:      [phone], Monday - Friday, 8 a.m. - 4:30 p.m.      After hours and on weekends, call Hospital Paging at [phone] and      ask for the Plastic Surgeon on call.          [facility]  Home Care Following Anesthesia  For 24 hours after surgery:  1. Get plenty of rest.  A responsible adult must stay with you for at least 24 hours after you leave the surgery center.  2. Do not drive or use heavy equipment.  If you have weakness or tingling, don't drive or use heavy equipment until this feeling goes away.   3. Do not drink alcohol.   4. Avoid strenuous or risky activities.  Ask for help when climbing stairs.  5. You may feel lightheaded.  IF so, sit for a few minutes before standing.  Have someone help you get up.   6. If you have nausea (feel sick to your stomach): Drink only clear liquids such as apple juice,  ginger ale, broth or 7-Up.  Rest may also help.  Be sure to drink enough fluids.  Move to a regular diet as you feel able.   7. You may have a slight fever.  Call the doctor if your fever is over 100 F (37.7 C) (taken under the tongue) or lasts longer than 24 hours.  8. You may have a dry mouth, a sore throat, muscle aches or trouble sleeping. These should go away after 24 hours.  9. Do not make important or legal decisions.   10. It is recommended to avoid smoking.               Tips for taking pain medications  To get the best pain relief possible, remember these points:    Take pain medications as directed, before pain becomes severe.    Pain medication can upset your stomach: taking it with food may help.    Constipation is a common side effect of pain medication. Drink plenty of  fluids.    Eat foods high in fiber. Take a stool softener if recommended by your doctor or pharmacist.    Do not drink alcohol, drive or operate machinery while taking pain medications.    Ask about other ways to control pain, such as with heat, ice or relaxation.    Tylenol/Acetaminophen Consumption  To help encourage the safe use of acetaminophen, the makers of TYLENOL  have lowered the maximum daily dose for single-ingredient Extra Strength TYLENOL  (acetaminophen) products sold in the [place] from 8 pills per day (4,000 mg) to 6 pills per day (3,000 mg). The dosing interval has also changed from 2 pills every 4-6 hours to 2 pills every 6 hours.    If you feel your pain relief is insufficient, you may take Tylenol/Acetaminophen in addition to your narcotic pain medication.     Be careful not to exceed 3,000 mg of Tylenol/Acetaminophen in a 24 hour period from all sources.    If you are taking extra strength Tylenol/acetaminophen (500 mg), the maximum dose is 6 tablets in 24 hours.    If you are taking regular strength acetaminophen (325 mg), the maximum dose is 9 tablets in 24 hours.    Call a doctor for any of the followin. Signs  of infection (fever, growing tenderness at the surgery site, a large amount of drainage or bleeding, severe pain, foul-smelling drainage, redness, swelling).  2. It has been over 8 to 10 hours since surgery and you are still not able to urinate (pass water).  3. Headache for over 24 hours.  4. Numbness, tingling or weakness the day after surgery (if you had spinal anesthesia).  5. Signs of Covid-19 infection (temperature over 100 degrees, shortness of breath, cough, loss of taste/smell, generalized body aches, persistent headache, chills, sore throat, nausea/vomiting/diarrhea)  Your doctor is:  Dr. Terell Perea, Plastic Surgery: [phone]                    Or dial [phone] and ask for the resident on call for:  Plastics  For emergency care, call the:  [facility]:  [phone] (TTY for hearing impaired: [phone])

## 2021-12-08 NOTE — ANESTHESIA PREPROCEDURE EVALUATION
Anesthesia Pre-Procedure Evaluation    Patient: Sakshi Jaeger   MRN: [id number] : 1945        Preoperative Diagnosis: Non-healing surgical wound, subsequent encounter [T81.89XD]    Procedure : Procedure(s):  Right stump wound debridment and closure          Past Medical History:   Diagnosis Date     BENIGN HYPERTENSION 3/1/2005     CAD (coronary artery disease) 2011     Employs prosthetic leg 2012     Hypertension goal BP (blood pressure) < 130/80 2011     IRON DEFIC ANEMIA NOS 9/15/2005     Lower limb amputation, below knee 2012     MI (myocardial infarction) (H)     3/2010     OSTEOPOROSIS NOS 2005     Rheumatoid arthritis(714.0) 2005      Past Surgical History:   Procedure Laterality Date     ---------INCISION AND DRAINAGE  3/5/14     AMPUTATE LEG BELOW KNEE Right 2005     AMPUTATION BELOW KNEE RT/LT      right lowwer leg.      APPENDECTOMY       APPENDECTOMY       ARTHROPLASTY KNEE  2011    Procedure:ARTHROPLASTY KNEE; Surgeon:JESSE HAWKINS; Location:[facility] OR     ARTHROPLASTY KNEE Left 2011     coronary stent       CORONARY STENT PLACEMENT       IRRIGATION AND DEBRIDEMENT LOWER EXTREMITY, COMBINED Right 10/9/2019    Procedure: Irrigation and debridement Right leg;  Surgeon: Doron Mott MD;  Location: [facility] OR     OTHER SURGICAL HISTORY  2014    I AND D      OTHER SURGICAL HISTORY Right 10/09/2019    IRRIGATION AND DEBRIDEMENT LOWER EXTREMITY, COMBINED     REVISE SCAR EXTREMITY Right 2020    Procedure: Right stump scar revision;  Surgeon: RAISA Perea MD;  Location: [facility] OR     SURGICAL HISTORY OF -       Appendectomy      No Known Allergies   Social History     Tobacco Use     Smoking status: Former Smoker     Packs/day: 0.50     Years: 45.00     Pack years: 22.50     Types: Cigarettes     Quit date: 2010     Years since quittin.7     Smokeless tobacco: Never Used     Tobacco comment: pt has recently started  "smoking again d/t pain   Substance Use Topics     Alcohol use: Yes     Comment: occsionally-3 -4 drinks a week      Wt Readings from Last 1 Encounters:   12/08/21 47.6 kg (105 lb)        Anesthesia Evaluation   Pt has had prior anesthetic. Type: General.    No history of anesthetic complications       ROS/MED HX  ENT/Pulmonary:    (-) tobacco use, asthma and recent URI   Neurologic:       Cardiovascular:     (+) hypertension-range: 100/60/ -CAD --stent-2010. Drug Eluting Stent. Previous cardiac testing   Echo: Date: 10/2019 Results:  \"Left Ventricle  Global and regional left ventricular function is normal with an EF of 60-65%.  Left ventricular wall thickness cannot evaluate. Left ventricular size is  normal. Left ventricular diastolic function is indeterminate.     Right Ventricle  Right ventricular function, chamber size, wall motion, and thickness are  normal.     Atria  The atria cannot be assessed.     Mitral Valve  The mitral valve is normal.        Aortic Valve  Aortic valve is normal in structure and function.     Tricuspid Valve  The tricuspid valve cannot be assessed. Trace tricuspid insufficiency is  present. The right ventricular systolic pressure is approximated at 25.5 mmHg  plus the right atrial pressure.     Pulmonic Valve  The pulmonic valve cannot be assessed.     Vessels  The aorta root cannot be assessed. The pulmonary artery cannot be assessed.  The inferior vena cava is normal.     Pericardium  No pericardial effusion is present.\"  Stress Test: Date: Results:    ECG Reviewed: Date: Results:    Cath: Date: Results:   (-) angina and angina   METS/Exercise Tolerance: 1 - Eating, dressing Comment: Uses walker and one leg for short distances, but mostly in electric scooter   Hematologic:       Musculoskeletal:       GI/Hepatic:    (-) GERD   Renal/Genitourinary:       Endo:     (+) Chronic steroid usage (prednisone 5mg daily, took it today) for     Psychiatric/Substance Use:       Infectious " Disease:       Malignancy:       Other:            Physical Exam    Airway        Mallampati: II   TM distance: > 3 FB   Neck ROM: full   Mouth opening: > 3 cm    Respiratory Devices and Support         Dental  no notable dental history         Cardiovascular          Rhythm and rate: regular and normal     Pulmonary           breath sounds clear to auscultation           OUTSIDE LABS:  CBC:   Lab Results   Component Value Date    WBC 13.0 (H) 03/12/2020    WBC 7.0 11/11/2019    HGB 11.0 (L) 11/08/2021    HGB 10.2 (L) 10/27/2021    HCT 38.1 03/12/2020    HCT 27.1 (L) 11/11/2019     03/12/2020     11/11/2019     BMP:   Lab Results   Component Value Date     10/27/2021     (L) 11/11/2019    POTASSIUM 3.9 10/27/2021    POTASSIUM 3.8 11/11/2019    CHLORIDE 102 10/27/2021    CHLORIDE 103 11/11/2019    CO2 25 10/27/2021    CO2 23 11/11/2019    BUN 11 10/27/2021    BUN 10 11/11/2019    CR 0.55 10/27/2021    CR 0.6 10/19/2021    GLC 89 10/27/2021    GLC 77 11/11/2019     COAGS:   Lab Results   Component Value Date    PTT 39 (H) 10/01/2019    INR 1.31 (H) 10/09/2019     POC:   Lab Results   Component Value Date     (H) 05/03/2011     HEPATIC:   Lab Results   Component Value Date    ALBUMIN 1.4 (L) 11/11/2019    PROTTOTAL 5.5 (L) 11/11/2019    ALT <9 11/11/2019    AST 17 11/11/2019    ALKPHOS 52 11/11/2019    BILITOTAL 0.3 11/11/2019     OTHER:   Lab Results   Component Value Date    LACT 0.9 10/01/2019    A1C 5.6 07/07/2008    ADRIAN 9.3 10/27/2021    PHOS 3.9 06/08/2016    MAG 1.7 04/02/2010    LIPASE 37 (L) 10/01/2019    TSH 4.55 10/17/2019    CRP 4.6 (H) 11/18/2019    SED 48 (H) 11/18/2019       Anesthesia Plan    ASA Status:  3   NPO Status:  NPO Appropriate    Anesthesia Type: General.     - Airway: LMA   Induction: Intravenous.   Maintenance: Balanced.        Consents    Anesthesia Plan(s) and associated risks, benefits, and realistic alternatives discussed. Questions answered and  patient/representative(s) expressed understanding.    - Discussed:     - Discussed with:  Patient         Postoperative Care            Comments:    Other Comments: Discussed risks of general anesthesia, including aspiration pneumonia, sore throat/hoarse voice, abrasions/damage to lips/tongue/teeth, nausea, rare complications (including medication reactions, cardiac, pulmonary, hypoxia/low oxygen, recall). Ensured understanding, invited questions and all questions were answered. Patient wishes to proceed.    Discussed possible increased risk of cardiac complications given history, but tolerated GA in 12/2020 and denies change in health.             Sally Coronado MD

## 2021-12-08 NOTE — H&P
PRESENTING COMPLAINT: right BKA stump wound debridement and possible closure      HISTORY OF PRESENTING COMPLAINT:  Ms. Jaeger is 76 years old, here for preoperative visit.  In a week's time, we plan to go back to the operating room to debride the area and close it.  However, she has started noticing some redness as well as purulent drainage, no fevers, feels well systemically.    PAST MEDICAL HISTORY:  Osteoporosis, rheumatoid arthritis, MI in 2009 requiring stenting, hypertension and hyperlipidemia.      PAST SURGICAL HISTORY:  Right lower extremity surgeries, right knee wound requiring flap closure, left TKA and open appendectomy.      MEDICATIONS:  Prednisone. methotrexate, amlodipine, aspirin, gabapentin, lisinopril, simvastatin.          ALLERGIES:  Nil.      SOCIAL HISTORY:  Used to smoke about 1/2 pack a day for 40 years, quit in 2010.  Drinks socially.      REVIEW OF SYSTEMS:  Denies active chest pain, shortness of breath, has had an MI, no CVA, DVT or PE.      PHYSICAL EXAMINATION:  Vital signs are stable.  She is afebrile, in no obvious distress.  Her incision is not open any further, but it is definitely draining some turbid-colored fluid and the surrounding skin is slightly cellulitic and edematous.    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  We will start her on clindamycin today and then see her back in a week's time prior to the surgery.  If it remains infected-looking, we will just debride that day.  If it looks healthy, then we will debride and close.  She understood the plan.  All risks, benefits and alternatives of the procedure including pain, infection, bleeding, scarring, asymmetry, seromas, hematomas, wound breakdown, wound dehiscence, wound infection, requirement of further surgeries, dehiscences, DVT, PE, MI, CVA, pneumonia, renal failure and death were all  explained.  She understood them all and wants to proceed.    All risks, benefits and alternatives were explained to the patient in detail, they were understood and agreed upon by the patient, they were acknowledged by the patient,   all the patient's questions were answered in detail to the patient's fullest understanding that they acknowledged, the team approach for treatment in the operating room was agreed upon by the patient, and proceeding with surgery was agreed upon by the patient.

## 2021-12-08 NOTE — BRIEF OP NOTE
[facility]    Brief Operative Note    Pre-operative diagnosis: Non-healing surgical wound, subsequent encounter [T81.89XD]  Post-operative diagnosis Same as pre-operative diagnosis    Procedure: Procedure(s):  Right stump wound debridment and closure  Surgeon: Surgeon(s) and Role:     * RAISA Perea MD - Primary   Damian Gabriel MD  Anesthesia: General   Estimated Blood Loss: Less than 50 ml    Drains: None  Specimens:   ID Type Source Tests Collected by Time Destination   A : Right Below the knee stump cultures  Wound Leg, Below Knee, Right ANAEROBIC BACTERIAL CULTURE ROUTINE, AEROBIC BACTERIAL CULTURE ROUTINE RAISA Perea MD 12/8/2021  4:16 PM    B : Below the knee tissue culture Tissue Leg, Below Knee, Right ANAEROBIC BACTERIAL CULTURE ROUTINE, AEROBIC BACTERIAL CULTURE ROUTINE RAISA Perea MD 12/8/2021  4:18 PM      Findings:   None.  Complications: None.  Implants: * No implants in log *

## 2021-12-08 NOTE — ANESTHESIA CARE TRANSFER NOTE
Patient: Sakshi Jaeger    Procedure: Procedure(s):  Right stump wound debridment and closure       Diagnosis: Non-healing surgical wound, subsequent encounter [T81.89XD]  Diagnosis Additional Information: No value filed.    Anesthesia Type:   General     Note:    Oropharynx: oropharynx clear of all foreign objects and spontaneously breathing  Level of Consciousness: drowsy  Oxygen Supplementation: face mask  Level of Supplemental Oxygen (L/min / FiO2): 4  Independent Airway: airway patency satisfactory and stable  Dentition: dentition unchanged  Vital Signs Stable: post-procedure vital signs reviewed and stable  Report to RN Given: handoff report given  Patient transferred to: PACU  Comments: Uneventful transport   Report to RN  Exchanging well; color natl  Pt responds appropriately to command  IV patent  Lips/teeth/dentition as preop status  Questions answered      Handoff Report: Identifed the Patient, Identified the Reponsible Provider, Reviewed the pertinent medical history, Discussed the surgical course, Reviewed Intra-OP anesthesia mangement and issues during anesthesia, Set expectations for post-procedure period and Allowed opportunity for questions and acknowledgement of understanding      Vitals:  Vitals Value Taken Time   /61 12/08/21 1646   Temp 35.8  C (96.5  F) 12/08/21 1645   Pulse 75 12/08/21 1649   Resp 18 12/08/21 1649   SpO2 99 % 12/08/21 1649   Vitals shown include unvalidated device data.    Electronically Signed By: DESIRE LAMBERT CRNA  December 8, 2021  4:50 PM

## 2021-12-09 ENCOUNTER — PATIENT OUTREACH (OUTPATIENT)
Dept: PLASTIC SURGERY | Facility: CLINIC | Age: 76
End: 2021-12-09
Payer: COMMERCIAL

## 2021-12-09 NOTE — PATIENT INSTRUCTIONS
Pt called this writer to update me that she is unable to make the post-op follow up appointment on 12/22 and needs to change the date. I searched for openings in [facility] and [facility] and pt prefers [facility] location. She would like appointment changed to 12/29. Pt updated that she is feeling great after her surgery yesterday and is comfortable waiting until 12/29. Appt changed per pt request. Daniel SWAIN RN

## 2021-12-09 NOTE — ANESTHESIA POSTPROCEDURE EVALUATION
Patient: Sakshi Jaeger    Procedure: Procedure(s):  Right stump wound debridment and closure       Diagnosis:Non-healing surgical wound, subsequent encounter [T81.89XD]  Diagnosis Additional Information: No value filed.    Anesthesia Type:  General    Note:  Disposition: Outpatient   Postop Pain Control: Uneventful            Sign Out: Well controlled pain   PONV: No   Neuro/Psych: Uneventful            Sign Out: Acceptable/Baseline neuro status   Airway/Respiratory: Uneventful            Sign Out: Acceptable/Baseline resp. status   CV/Hemodynamics: Uneventful            Sign Out: Acceptable CV status; No obvious hypovolemia; No obvious fluid overload   Other NRE: NONE   DID A NON-ROUTINE EVENT OCCUR? No           Last vitals:  Vitals Value Taken Time   /55 12/08/21 1715   Temp 36.1  C (97  F) 12/08/21 1715   Pulse 64 12/08/21 1719   Resp 15 12/08/21 1719   SpO2 98 % 12/08/21 1719   Vitals shown include unvalidated device data.    Electronically Signed By: Sally Coronado MD  December 8, 2021  6:16 PM

## 2021-12-09 NOTE — OP NOTE
Procedure Date: 12/08/2021    PREOPERATIVE DIAGNOSIS:  Right below-knee amputation stump draining wound.    POSTOPERATIVE DIAGNOSIS:  Right below-knee amputation stump draining wound.    PROCEDURE:    1.  Excision of skin, subcutaneous tissue and bone and washout of right BKA stump in preparation for closure.   2.  Complex layered closure of a right BKA stump measuring 5 cm.    SURGEON:  Terell Perea MD    RESIDENT:  Damian Gabriel MD    ANESTHESIA:  General anesthesia with endotracheal intubation.    COMPLICATIONS:  Nil.    DRAINS:  Nil.    SPECIMENS:    1.  Swab from deep wound of right BKA.    2.  Tissue from deep wound of right BKA.    BLOOD LOSS:  10 mL.    DESCRIPTION OF PROCEDURE:  After informed consent was taken from the patient, the proper site and procedure was ascertained with her, and she was appropriately marked and taken to the operating room.  She was placed in supine position with her left knee comfortably flexed, pillows underneath them and a pneumoboot placed on the left leg and running prior to induction of anesthesia.  Preoperative antibiotics were given in the OR.  All pressure points were appropriately padded.  General anesthesia was administered without any complications.  On evaluating her right BKA, she did not have cellulitis.  She had a closed wound, but there was some bogginess to it.  I opened the area through the original incision and entered into the BKA tip area.  There was some turbid fluid that emanated.  I sent some swabs from the wound for cultures.  No pus was seen.  Went ahead and opened it up about 5 cm, elevated the skin flaps and then debrided the underlying wound including skin, subcutaneous tissues, as well as bone.  The marrow space of the tibia was hollowed out.  I went ahead and thoroughly irrigated the wound with antibiotic irrigation and Betadine and then went ahead and closed the deep wound with 0 PDS suture and the skin with a 2-0 nylon suture in interrupted  horizontal mattress fashion.  A sterile dressing was placed on top with a Coban wrap.  The patient tolerated the procedure well.  All counts were correct at the end of the case.  The patient was extubated and sent to recovery room in stable condition.    RAISA Perea MD        D: 2021   T: 2021   MT: LOREN    Name:     CARLOS PEDRAZA  MRN:      [id number]        Account:        [id number]   :      1945           Procedure Date: 2021     Document: [id number]

## 2021-12-10 LAB
BACTERIA WND CULT: ABNORMAL

## 2021-12-11 LAB — BACTERIA TISS BX CULT: ABNORMAL

## 2021-12-15 LAB
BACTERIA TISS BX CULT: ABNORMAL
BACTERIA TISS BX CULT: ABNORMAL
BACTERIA WND CULT: ABNORMAL
BACTERIA WND CULT: ABNORMAL

## 2021-12-21 DIAGNOSIS — D64.9 HEMOGLOBIN LOW: ICD-10-CM

## 2021-12-23 RX ORDER — FERROUS SULFATE 325(65) MG
TABLET ORAL
Qty: 90 TABLET | Refills: 1 | Status: SHIPPED | OUTPATIENT
Start: 2021-12-23 | End: 2022-12-21

## 2021-12-23 NOTE — TELEPHONE ENCOUNTER
"Prescription approved per [facility] Refill Protocol.  Requested Prescriptions   Pending Prescriptions Disp Refills     ferrous sulfate (FEROSUL) 325 (65 Fe) MG tablet [Pharmacy Med Name: FERROUS SULFATE 325 MG TABLET] 60 tablet 0     Sig: TAKE 1 TABLET BY MOUTH EVERY DAY WITH BREAKFAST       Iron Supplements Passed - 12/21/2021  1:34 PM        Passed - Patient is 12 years of age or older        Passed - Recent (12 mo) or future (30 days) visit within the authorizing provider's specialty     Patient has had an office visit with the authorizing provider or a provider within the authorizing providers department within the previous 12 mos or has a future within next 30 days. See \"Patient Info\" tab in inbasket, or \"Choose Columns\" in Meds & Orders section of the refill encounter.              Passed - Hgb OR Hct on record within the past 12 mos.     Patient need only have had a HGB or HCT on file in the past 12 mos. That result does not need to be normal.    Recent Labs   Lab Test 11/08/21  1055 10/27/21  0936 03/12/20  1518   HGB 11.0* 10.2* 12.2     Recent Labs   Lab Test 03/12/20  1518 11/11/19  0737 11/04/19  0712   HCT 38.1 27.1* 28.4*       Please verify a HGB or HCT has been checked SINCE THE LAST DOSE CHANGE.            Passed - Medication is active on med list           Jennifer Quinn RN on 12/23/2021 at 11:51 AM    "

## 2021-12-29 ENCOUNTER — OFFICE VISIT (OUTPATIENT)
Dept: PLASTIC SURGERY | Facility: CLINIC | Age: 76
End: 2021-12-29
Payer: COMMERCIAL

## 2021-12-29 VITALS — HEART RATE: 62 BPM | OXYGEN SATURATION: 93 % | SYSTOLIC BLOOD PRESSURE: 120 MMHG | DIASTOLIC BLOOD PRESSURE: 67 MMHG

## 2021-12-29 DIAGNOSIS — T81.89XD NON-HEALING SURGICAL WOUND, SUBSEQUENT ENCOUNTER: Primary | ICD-10-CM

## 2021-12-29 PROCEDURE — 99024 POSTOP FOLLOW-UP VISIT: CPT | Performed by: PLASTIC SURGERY

## 2021-12-29 ASSESSMENT — PAIN SCALES - GENERAL: PAINLEVEL: NO PAIN (0)

## 2021-12-29 NOTE — LETTER
12/29/2021       RE: Sakshi Jaeger  [address] [address]     Dear Colleague,    Thank you for referring your patient, Sakshi Jaeger, to the [facility] PLASTIC AND RECONSTRU[facility]IVE SURGERY CLINIC [place] [facility]. Please see a copy of my visit note below.    POSTOPERATIVE VISIT    PRESENTING COMPLAINT:  Postoperative visit status post right BKA stump wound debridement and closure done on 12/08/2021.    HISTORY OF PRESENTING COMPLAINT:  Ms. Jaeger is 76 years old, here for regular followup postoperative visit.  Done well, no major issues.    PHYSICAL EXAMINATION:  Vital signs stable.  She is afebrile, in no obvious distress.  Wound is healing in well.    ASSESSMENT AND PLAN:  Based on the above findings, a diagnosis of right below-knee amputation draining, wound debridement and closure was made.  Took out the sutures.  Advised aggressive moisturization, keep pressure away for another 3 weeks.  See us back in 3 weeks.  All exam and discussion done in the presence my nurse, Allison Meyer.      RAISA Perea MD

## 2021-12-29 NOTE — PROGRESS NOTES
POSTOPERATIVE VISIT    PRESENTING COMPLAINT:  Postoperative visit status post right BKA stump wound debridement and closure done on 12/08/2021.    HISTORY OF PRESENTING COMPLAINT:  Ms. Jaeger is 76 years old, here for regular followup postoperative visit.  Done well, no major issues.    PHYSICAL EXAMINATION:  Vital signs stable.  She is afebrile, in no obvious distress.  Wound is healing in well.    ASSESSMENT AND PLAN:  Based on the above findings, a diagnosis of right below-knee amputation draining, wound debridement and closure was made.  Took out the sutures.  Advised aggressive moisturization, keep pressure away for another 3 weeks.  See us back in 3 weeks.  All exam and discussion done in the presence my nurse, Allison Meyer.

## 2021-12-29 NOTE — NURSING NOTE
Chief Complaint   Patient presents with     Surgical Followup     3 week post-op -- DOS 12/8       Vitals:    12/29/21 1036   BP: 120/67   Pulse: 62   SpO2: 93%       There is no height or weight on file to calculate BMI.          NATALIE BHATIA EMT

## 2022-01-06 ENCOUNTER — PATIENT OUTREACH (OUTPATIENT)
Dept: PLASTIC SURGERY | Facility: CLINIC | Age: 77
End: 2022-01-06
Payer: COMMERCIAL

## 2022-01-06 NOTE — PATIENT INSTRUCTIONS
Pt's significant other, Santi left  for this writer asking for return call to discuss area where stitch removed on stump that is still bleeding. I returned call and was on speaker phone talking with both pt and her SO. They updated me that since last appt a week ago, the same area has been oozing blood. Santi updated that it isn't a lot of blood and they have been covering area with vaseline, gauze and keeping stump elevated. This keeps the area from oozing for awhile but they just can't get the area to close up like the rest of the incision. No signs or symptoms of infection noted and pt is otherwise feeling great. They do not have Mychart in order to send photos.   I offered the following options of activating Mychart to send photo, coming in to clinic to see Dr Perea earlier than 1/19 appointment or continue with current plan of care and keep 1/19 appointment. Both pt and SO would like to continue current plan of care through the weekend and call back early next week if oozing continues in order to be seen earlier.  Daniel SWAIN, RN

## 2022-01-17 DIAGNOSIS — I25.10 CORONARY ARTERY DISEASE INVOLVING NATIVE CORONARY ARTERY OF NATIVE HEART WITHOUT ANGINA PECTORIS: ICD-10-CM

## 2022-01-17 DIAGNOSIS — I10 ESSENTIAL HYPERTENSION WITH GOAL BLOOD PRESSURE LESS THAN 140/90: ICD-10-CM

## 2022-01-17 RX ORDER — METOPROLOL TARTRATE 50 MG
TABLET ORAL
Qty: 180 TABLET | Refills: 3 | Status: SHIPPED | OUTPATIENT
Start: 2022-01-17 | End: 2022-05-18

## 2022-01-17 NOTE — TELEPHONE ENCOUNTER
"Requested Prescriptions   Signed Prescriptions Disp Refills    metoprolol tartrate (LOPRESSOR) 50 MG tablet 180 tablet 3     Sig: TAKE 1 TABLET BY MOUTH TWICE A DAY       Beta-Blockers Protocol Passed - 1/17/2022 12:25 PM        Passed - Blood pressure under 140/90 in past 12 months     BP Readings from Last 3 Encounters:   12/29/21 120/67   12/08/21 110/57   12/01/21 129/65                 Passed - Patient is age 6 or older        Passed - Recent (12 mo) or future (30 days) visit within the authorizing provider's specialty     Patient has had an office visit with the authorizing provider or a provider within the authorizing providers department within the previous 12 mos or has a future within next 30 days. See \"Patient Info\" tab in inbasket, or \"Choose Columns\" in Meds & Orders section of the refill encounter.              Passed - Medication is active on med list           Constanza Gonsalez RN  [facility]     "

## 2022-01-19 ENCOUNTER — OFFICE VISIT (OUTPATIENT)
Dept: PLASTIC SURGERY | Facility: CLINIC | Age: 77
End: 2022-01-19
Payer: COMMERCIAL

## 2022-01-19 VITALS
DIASTOLIC BLOOD PRESSURE: 66 MMHG | TEMPERATURE: 98 F | WEIGHT: 105 LBS | OXYGEN SATURATION: 98 % | SYSTOLIC BLOOD PRESSURE: 136 MMHG | BODY MASS INDEX: 17.49 KG/M2 | HEIGHT: 65 IN | HEART RATE: 68 BPM

## 2022-01-19 DIAGNOSIS — T81.89XD NON-HEALING SURGICAL WOUND, SUBSEQUENT ENCOUNTER: Primary | ICD-10-CM

## 2022-01-19 PROCEDURE — 99024 POSTOP FOLLOW-UP VISIT: CPT | Performed by: PLASTIC SURGERY

## 2022-01-19 ASSESSMENT — PAIN SCALES - GENERAL: PAINLEVEL: NO PAIN (0)

## 2022-01-19 ASSESSMENT — MIFFLIN-ST. JEOR: SCORE: 959.22

## 2022-01-19 NOTE — PROGRESS NOTES
PRESENTING COMPLAINT:  Postoperative visit status post right BKA stump wound debridement and closure done on 12/08/2021.    HISTORY OF PRESENTING COMPLAINT:  Ms. Jaeger is 76 years old.  She is about 6 weeks out from surgery.  Fully healed.    PHYSICAL EXAMINATION:  Vital signs are stable.  She is afebrile, in no obvious distress.  Everything is healed.    ASSESSMENT AND PLAN:  Based upon the above findings, a diagnosis of right below-knee amputation draining, wound debridement and closure done 6 weeks ago.  She is fully healed.  My advice now is to meet with the Prosthetics Department to make a prosthetic that does not put any pressure on the prepatellar area or the distal end of the stump, because that will lead to recurrence.  She understood.  I will see her back on a p.r.n. basis.  She was happy with the visit.  All exam and discussion from beginning to end done in the presence of my nurse, Allison Meyer.

## 2022-01-19 NOTE — LETTER
1/19/2022       RE: Sakshi Jaeger  [address] [address]     Dear Colleague,    Thank you for referring your patient, Sakshi Jaeger, to the [facility] PLASTIC AND RECONSTRUCTIVE SURGERY CLINIC [place] [facility]. Please see a copy of my visit note below.    PRESENTING COMPLAINT:  Postoperative visit status post right BKA stump wound debridement and closure done on 12/08/2021.    HISTORY OF PRESENTING COMPLAINT:  Ms. Jaeger is 76 years old.  She is about 6 weeks out from surgery.  Fully healed.    PHYSICAL EXAMINATION:  Vital signs are stable.  She is afebrile, in no obvious distress.  Everything is healed.    ASSESSMENT AND PLAN:  Based upon the above findings, a diagnosis of right below-knee amputation draining, wound debridement and closure done 6 weeks ago.  She is fully healed.  My advice now is to meet with the Prosthetics Department to make a prosthetic that does not put any pressure on the prepatellar area or the distal end of the stump, because that will lead to recurrence.  She understood.  I will see her back on a p.r.n. basis.  She was happy with the visit.  All exam and discussion from beginning to end done in the presence of my nurse, Allison Meyer.            Again, thank you for allowing me to participate in the care of your patient.      Sincerely,    RAISA Perea MD

## 2022-01-19 NOTE — NURSING NOTE
"Chief Complaint   Patient presents with     paulo Elias seen today for a 6 week post op DOS 12/8/21.       Vitals:    01/19/22 1123   BP: 136/66   BP Location: Right arm   Patient Position: Chair   Cuff Size: Adult Regular   Pulse: 68   Temp: 98  F (36.7  C)   TempSrc: Oral   SpO2: 98%   Weight: 47.6 kg (105 lb)   Height: 1.638 m (5' 4.5\")       Body mass index is 17.74 kg/m .      Stefania Mcclellan LPN    "

## 2022-02-25 DIAGNOSIS — I10 ESSENTIAL HYPERTENSION WITH GOAL BLOOD PRESSURE LESS THAN 140/90: ICD-10-CM

## 2022-02-25 RX ORDER — LISINOPRIL 20 MG/1
TABLET ORAL
Qty: 90 TABLET | Refills: 3 | Status: SHIPPED | OUTPATIENT
Start: 2022-02-25 | End: 2023-01-06

## 2022-02-25 NOTE — TELEPHONE ENCOUNTER
"Requested Prescriptions   Signed Prescriptions Disp Refills    lisinopril (ZESTRIL) 20 MG tablet 90 tablet 3     Sig: TAKE 1 TABLET BY MOUTH EVERY DAY       ACE Inhibitors (Including Combos) Protocol Passed - 2/25/2022  9:16 AM        Passed - Blood pressure under 140/90 in past 12 months     BP Readings from Last 3 Encounters:   01/19/22 136/66   12/29/21 120/67   12/08/21 110/57                 Passed - Recent (12 mo) or future (30 days) visit within the authorizing provider's specialty     Patient has had an office visit with the authorizing provider or a provider within the authorizing providers department within the previous 12 mos or has a future within next 30 days. See \"Patient Info\" tab in inbasket, or \"Choose Columns\" in Meds & Orders section of the refill encounter.              Passed - Medication is active on med list        Passed - Patient is age 18 or older        Passed - No active pregnancy on record        Passed - Normal serum creatinine on file in past 12 months     Recent Labs   Lab Test 10/27/21  0936   CR 0.55       Ok to refill medication if creatinine is low          Passed - Normal serum potassium on file in past 12 months     Recent Labs   Lab Test 10/27/21  0936   POTASSIUM 3.9             Passed - No positive pregnancy test within past 12 months           Carina Guthrie RN  [facility]    "

## 2022-03-29 NOTE — PROGRESS NOTES
History of Present Illness - Sakshi Jaeger is a wonderfully nice 76 [place] old female last seen on 10/18/21    To review, she has had issues with ear wax, more on the RIGHT than LEFT, and for many years Dr. OSMAN has helped her with alligators or irrigations.  However, over the last year prior to first seeing me in January 2019, it seems much more tenacious and sticky.  The other day the LEFT ear got some water in it from the shower and had been totally clogged since then.    No previous ear surgery, no chronic ear disease, no bleeding or drainage from the ears.    I had to procedurally clear tremendous cerumen from both ears.  And after the initial visit I asked her to come back in four months and she was again impacted severely.  So at this point I have asked her to come back at least every 4-6 months for debridement.    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)     Osteomyelitis (H)     Non-healing surgical wound, subsequent encounter       Current Medications -   Current Outpatient Medications:      acetaminophen (TYLENOL) 325 MG tablet, Take 2 tablets (650 mg) by mouth every 4 hours, Disp: , Rfl:      amLODIPine (NORVASC) 5 MG tablet, TAKE 1 TABLET BY MOUTH EVERY DAY, Disp: 90 tablet, Rfl: 3     aspirin (ASA) 81 MG tablet, Take 1 tablet (81 mg) by mouth daily, Disp:  , Rfl:      calcium carbonate 600 mg-vitamin D 400 units (CALCIUM 600 + D) 600-400 MG-UNIT per tablet, Take 2 tablets by mouth daily, Disp: , Rfl:      ferrous sulfate  (FEROSUL) 325 (65 Fe) MG tablet, TAKE 1 TABLET BY MOUTH EVERY DAY WITH BREAKFAST, Disp: 90 tablet, Rfl: 1     gabapentin (NEURONTIN) 300 MG capsule, Take 2 capsules (600 mg) by mouth 3 times daily, Disp: , Rfl:      leflunomide (ARAVA) 20 MG tablet, Take 1 tablet by mouth every 24 hours, Disp: , Rfl:      lisinopril (ZESTRIL) 20 MG tablet, TAKE 1 TABLET BY MOUTH EVERY DAY, Disp: 90 tablet, Rfl: 3     methotrexate 2.5 MG tablet, Take 1 tablet by mouth, Disp: , Rfl:      metoprolol tartrate (LOPRESSOR) 50 MG tablet, TAKE 1 TABLET BY MOUTH TWICE A DAY, Disp: 180 tablet, Rfl: 3     multivitamin w/minerals (THERA-VIT-M) tablet, Take 1 tablet by mouth daily, Disp: , Rfl:      order for DME, Equipment being ordered: New foam (custom shaped protective cover) for right below the knee prostheses, Disp: 1 Units, Rfl: 0     oxyCODONE (ROXICODONE) 5 MG tablet, Take 1 tablet (5 mg) by mouth every 6 hours as needed for severe pain, Disp: 15 tablet, Rfl: 0     predniSONE (DELTASONE) 5 MG tablet, Take 1 tablet (5 mg) by mouth daily, Disp: , Rfl: 0     simvastatin (ZOCOR) 20 MG tablet, TAKE 1 TABLET BY MOUTH AT BEDTIME, Disp: 90 tablet, Rfl: 3     sulfamethoxazole-trimethoprim (BACTRIM DS) 800-160 MG tablet, Take 1 tablet by mouth 2 times daily (Patient not taking: Reported on 12/29/2021), Disp: 20 tablet, Rfl: 1    Allergies -   No Known Allergies    Social History -   Social History     Socioeconomic History     Marital status: Single     Spouse name: None     Number of children: None     Years of education: None     Highest education level: None   Social Needs     Financial resource strain: None     Food insecurity - worry: None     Food insecurity - inability: None     Transportation needs - medical: None     Transportation needs - non-medical: None   Occupational History     None   Tobacco Use     Smoking status: Former Smoker     Packs/day: 0.50     Years: 45.00     Pack years: 22.50     Types: Cigarettes     Last attempt to  quit: 2010     Years since quittin.9     Smokeless tobacco: Never Used   Substance and Sexual Activity     Alcohol use: Yes     Comment: occsionally-3 -4 drinks a week     Drug use: No     Sexual activity: Yes     Partners: Male   Other Topics Concern     Parent/sibling w/ CABG, MI or angioplasty before 65F 55M? No   Social History Narrative     None       Family History -   Family History   Problem Relation Age of Onset     Cardiovascular Mother      Cancer Brother      Diabetes No family hx of      Hypertension No family hx of      Cerebrovascular Disease No family hx of      Alzheimer Disease No family hx of      Thyroid Disease No family hx of      Respiratory No family hx of      Other - See Comments Mother         CARDIOVASCULAR     Cancer Brother        Review of Systems - As per HPI and PMHx, otherwise 10+ system review of the head and neck, and general constitution is negative.    Physical Exam  There were no vitals taken for this visit.    General - The patient is well nourished and well developed, and appears to have good nutritional status.  Alert and oriented to person and place, answers questions and cooperates with examination appropriately.   Head and Face - Normocephalic and atraumatic, with no gross asymmetry noted of the contour of the facial features.  The facial nerve is intact, with strong symmetric movements.  Voice and Breathing - The patient was breathing comfortably without the use of accessory muscles. There was no wheezing, stridor, or stertor.  The patients voice was clear and strong, and had appropriate pitch and quality.  Eyes - Extraocular movements intact, and the pupils were reactive to light.  Sclera were not icteric or injected, conjunctiva were pink and moist.  Mouth - Examination of the oral cavity showed pink, healthy oral mucosa. No lesions or ulcerations noted.  The tongue was mobile and midline, and the dentition were in good condition.        Cerumen  Removal    Physical Exam and Procedure  Ears - On examination of the ears, I found that the BOTH sides had cerumen impaction.  Therefore, I positioned them in the examination chair in a semi-supine position, beginning with the right side.  I used the binocular surgical microscope to perform cerumen removal.  I began by using a cerumen loop to gently lift the edges of the cerumen mass away from the walls of the external canal.  Once I did this, I was able to suction away fragments of wax and debris using suction.  Once the mass was loose enough, the entire plug was pulled from the canal.  The tympanic membrane was intact, no sign of perforation or middle ear effusion.    I turned my attention to the contralateral side once again using the binocular surgical microscope to perform cerumen removal.  I began by using a cerumen loop to gently lift the edges of the cerumen mass away from the walls of the external canal.  Once I did this, I was able to suction away fragments of wax and debris using suction.  Once the mass was loose enough, the entire plug was pulled from the canal.  The tympanic membrane was intact, no sign of perforation or middle ear effusion.        A/P - Sakshi Jaeger is a 76 year old female  (H61.23) Bilateral impacted cerumen  (primary encounter diagnosis)  (H93.8X3) Ear fullness, bilateral    The patient has had their cerumen procedurally removed today.  I have discussed ear care at home, including avoiding qtips or any other object placed in the canal.  I have also discussed that over the counter cerumen kits like Debrox or Cerumenex can be useful.    See me in another 4-6 months.  She has my medical clearance for hearing aid fitting

## 2022-03-31 ENCOUNTER — OFFICE VISIT (OUTPATIENT)
Dept: OTOLARYNGOLOGY | Facility: CLINIC | Age: 77
End: 2022-03-31
Payer: COMMERCIAL

## 2022-03-31 VITALS
HEART RATE: 67 BPM | BODY MASS INDEX: 17.74 KG/M2 | SYSTOLIC BLOOD PRESSURE: 133 MMHG | OXYGEN SATURATION: 96 % | DIASTOLIC BLOOD PRESSURE: 69 MMHG | WEIGHT: 105 LBS

## 2022-03-31 DIAGNOSIS — H93.8X3 EAR FULLNESS, BILATERAL: ICD-10-CM

## 2022-03-31 DIAGNOSIS — H61.23 BILATERAL IMPACTED CERUMEN: Primary | ICD-10-CM

## 2022-03-31 PROCEDURE — 99212 OFFICE O/P EST SF 10 MIN: CPT | Mod: 25 | Performed by: OTOLARYNGOLOGY

## 2022-03-31 PROCEDURE — 69210 REMOVE IMPACTED EAR WAX UNI: CPT | Performed by: OTOLARYNGOLOGY

## 2022-03-31 NOTE — LETTER
3/31/2022         RE: Sakshi Jaeger  [address] [facility] [address]        Dear Colleague,    Thank you for referring your patient, Sakshi Jaeger, to the [facility]. Please see a copy of my visit note below.    History of Present Illness - Sakshi Jaeger is a wonderfully nice 76 [place] old female last seen on 10/18/21    To review, she has had issues with ear wax, more on the RIGHT than LEFT, and for many years Dr. OSMAN has helped her with alligators or irrigations.  However, over the last year prior to first seeing me in January 2019, it seems much more tenacious and sticky.  The other day the LEFT ear got some water in it from the shower and had been totally clogged since then.    No previous ear surgery, no chronic ear disease, no bleeding or drainage from the ears.    I had to procedurally clear tremendous cerumen from both ears.  And after the initial visit I asked her to come back in four months and she was again impacted severely.  So at this point I have asked her to come back at least every 4-6 months for debridement.    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)     Osteomyelitis (H)     Non-healing surgical wound, subsequent encounter       Current Medications -   Current Outpatient Medications:      acetaminophen (TYLENOL) 325 MG tablet, Take 2 tablets (650 mg) by mouth every 4 hours, Disp: , Rfl:      amLODIPine (NORVASC) 5 MG tablet, TAKE 1 TABLET BY MOUTH EVERY DAY, Disp: 90 tablet,  Rfl: 3     aspirin (ASA) 81 MG tablet, Take 1 tablet (81 mg) by mouth daily, Disp:  , Rfl:      calcium carbonate 600 mg-vitamin D 400 units (CALCIUM 600 + D) 600-400 MG-UNIT per tablet, Take 2 tablets by mouth daily, Disp: , Rfl:      ferrous sulfate (FEROSUL) 325 (65 Fe) MG tablet, TAKE 1 TABLET BY MOUTH EVERY DAY WITH BREAKFAST, Disp: 90 tablet, Rfl: 1     gabapentin (NEURONTIN) 300 MG capsule, Take 2 capsules (600 mg) by mouth 3 times daily, Disp: , Rfl:      leflunomide (ARAVA) 20 MG tablet, Take 1 tablet by mouth every 24 hours, Disp: , Rfl:      lisinopril (ZESTRIL) 20 MG tablet, TAKE 1 TABLET BY MOUTH EVERY DAY, Disp: 90 tablet, Rfl: 3     methotrexate 2.5 MG tablet, Take 1 tablet by mouth, Disp: , Rfl:      metoprolol tartrate (LOPRESSOR) 50 MG tablet, TAKE 1 TABLET BY MOUTH TWICE A DAY, Disp: 180 tablet, Rfl: 3     multivitamin w/minerals (THERA-VIT-M) tablet, Take 1 tablet by mouth daily, Disp: , Rfl:      order for DME, Equipment being ordered: New foam (custom shaped protective cover) for right below the knee prostheses, Disp: 1 Units, Rfl: 0     oxyCODONE (ROXICODONE) 5 MG tablet, Take 1 tablet (5 mg) by mouth every 6 hours as needed for severe pain, Disp: 15 tablet, Rfl: 0     predniSONE (DELTASONE) 5 MG tablet, Take 1 tablet (5 mg) by mouth daily, Disp: , Rfl: 0     simvastatin (ZOCOR) 20 MG tablet, TAKE 1 TABLET BY MOUTH AT BEDTIME, Disp: 90 tablet, Rfl: 3     sulfamethoxazole-trimethoprim (BACTRIM DS) 800-160 MG tablet, Take 1 tablet by mouth 2 times daily (Patient not taking: Reported on 12/29/2021), Disp: 20 tablet, Rfl: 1    Allergies -   No Known Allergies    Social History -   Social History     Socioeconomic History     Marital status: Single     Spouse name: None     Number of children: None     Years of education: None     Highest education level: None   Social Needs     Financial resource strain: None     Food insecurity - worry: None     Food insecurity - inability: None      Transportation needs - medical: None     Transportation needs - non-medical: None   Occupational History     None   Tobacco Use     Smoking status: Former Smoker     Packs/day: 0.50     Years: 45.00     Pack years: 22.50     Types: Cigarettes     Last attempt to quit: 2010     Years since quittin.9     Smokeless tobacco: Never Used   Substance and Sexual Activity     Alcohol use: Yes     Comment: occsionally-3 -4 drinks a week     Drug use: No     Sexual activity: Yes     Partners: Male   Other Topics Concern     Parent/sibling w/ CABG, MI or angioplasty before 65F 55M? No   Social History Narrative     None       Family History -   Family History   Problem Relation Age of Onset     Cardiovascular Mother      Cancer Brother      Diabetes No family hx of      Hypertension No family hx of      Cerebrovascular Disease No family hx of      Alzheimer Disease No family hx of      Thyroid Disease No family hx of      Respiratory No family hx of      Other - See Comments Mother         CARDIOVASCULAR     Cancer Brother        Review of Systems - As per HPI and PMHx, otherwise 10+ system review of the head and neck, and general constitution is negative.    Physical Exam  There were no vitals taken for this visit.    General - The patient is well nourished and well developed, and appears to have good nutritional status.  Alert and oriented to person and place, answers questions and cooperates with examination appropriately.   Head and Face - Normocephalic and atraumatic, with no gross asymmetry noted of the contour of the facial features.  The facial nerve is intact, with strong symmetric movements.  Voice and Breathing - The patient was breathing comfortably without the use of accessory muscles. There was no wheezing, stridor, or stertor.  The patients voice was clear and strong, and had appropriate pitch and quality.  Eyes - Extraocular movements intact, and the pupils were reactive to light.  Sclera were not  icteric or injected, conjunctiva were pink and moist.  Mouth - Examination of the oral cavity showed pink, healthy oral mucosa. No lesions or ulcerations noted.  The tongue was mobile and midline, and the dentition were in good condition.        Cerumen Removal    Physical Exam and Procedure  Ears - On examination of the ears, I found that the BOTH sides had cerumen impaction.  Therefore, I positioned them in the examination chair in a semi-supine position, beginning with the right side.  I used the binocular surgical microscope to perform cerumen removal.  I began by using a cerumen loop to gently lift the edges of the cerumen mass away from the walls of the external canal.  Once I did this, I was able to suction away fragments of wax and debris using suction.  Once the mass was loose enough, the entire plug was pulled from the canal.  The tympanic membrane was intact, no sign of perforation or middle ear effusion.    I turned my attention to the contralateral side once again using the binocular surgical microscope to perform cerumen removal.  I began by using a cerumen loop to gently lift the edges of the cerumen mass away from the walls of the external canal.  Once I did this, I was able to suction away fragments of wax and debris using suction.  Once the mass was loose enough, the entire plug was pulled from the canal.  The tympanic membrane was intact, no sign of perforation or middle ear effusion.        A/P - Sakshi Jaeger is a 76 year old female  (H61.23) Bilateral impacted cerumen  (primary encounter diagnosis)  (H93.8X3) Ear fullness, bilateral    The patient has had their cerumen procedurally removed today.  I have discussed ear care at home, including avoiding qtips or any other object placed in the canal.  I have also discussed that over the counter cerumen kits like Debrox or Cerumenex can be useful.    See me in another 4-6 months.  She has my medical clearance for hearing aid fitting      Again,  thank you for allowing me to participate in the care of your patient.        Sincerely,        Pito Mosher MD

## 2022-03-31 NOTE — NURSING NOTE
"Chief Complaint   Patient presents with     Cerumen Impaction       Vitals:    03/31/22 1438   BP: 133/69   BP Location: Left arm   Patient Position: Sitting   Cuff Size: Adult Small   Pulse: 67   SpO2: 96%   Weight: 47.6 kg (105 lb)     Wt Readings from Last 1 Encounters:   03/31/22 47.6 kg (105 lb)     Ht Readings from Last 1 Encounters:   01/19/22 1.638 m (5' 4.5\")       Angelina Cisneros [facility], 3/31/2022 2:39 PM    "

## 2022-04-28 ENCOUNTER — TELEPHONE (OUTPATIENT)
Dept: FAMILY MEDICINE | Facility: CLINIC | Age: 77
End: 2022-04-28
Payer: COMMERCIAL

## 2022-04-28 DIAGNOSIS — D72.9 ABNORMAL WBC COUNT: Primary | ICD-10-CM

## 2022-04-28 NOTE — TELEPHONE ENCOUNTER
Sakshi and sig other calling    Sakshi was at rheumatologist last week. Labs from visit showed lower than normal WBC. They wanted her to recheck in 2 weeks. They prefer to have done by PCP.     Requesting retest of WBC. Needs to be done this week.    [facility]  Dr Annie Jay  [phone]  [phone]    Patient thought provider sent notes to PCP. Team please follow up    Kaitlin HENRY RN  [facility]

## 2022-04-28 NOTE — TELEPHONE ENCOUNTER
Called [facility]  They said patient needs to fill out authorization form to have records sent.Called patient left message that lab order was in to call and make an appt and what the other office said she needed to do.  Tisha Izaguirre,

## 2022-05-03 ENCOUNTER — LAB (OUTPATIENT)
Dept: LAB | Facility: CLINIC | Age: 77
End: 2022-05-03
Payer: COMMERCIAL

## 2022-05-03 DIAGNOSIS — D72.9 ABNORMAL WBC COUNT: ICD-10-CM

## 2022-05-03 LAB
BASOPHILS # BLD AUTO: 0 10E3/UL (ref 0–0.2)
BASOPHILS NFR BLD AUTO: 1 %
EOSINOPHIL # BLD AUTO: 0.2 10E3/UL (ref 0–0.7)
EOSINOPHIL NFR BLD AUTO: 2 %
ERYTHROCYTE [DISTWIDTH] IN BLOOD BY AUTOMATED COUNT: 18.2 % (ref 10–15)
HCT VFR BLD AUTO: 33.9 % (ref 35–47)
HGB BLD-MCNC: 10.7 G/DL (ref 11.7–15.7)
LYMPHOCYTES # BLD AUTO: 0.2 10E3/UL (ref 0.8–5.3)
LYMPHOCYTES NFR BLD AUTO: 4 %
MCH RBC QN AUTO: 31.7 PG (ref 26.5–33)
MCHC RBC AUTO-ENTMCNC: 31.6 G/DL (ref 31.5–36.5)
MCV RBC AUTO: 100 FL (ref 78–100)
MONOCYTES # BLD AUTO: 0.4 10E3/UL (ref 0–1.3)
MONOCYTES NFR BLD AUTO: 6 %
NEUTROPHILS # BLD AUTO: 5.8 10E3/UL (ref 1.6–8.3)
NEUTROPHILS NFR BLD AUTO: 88 %
PLATELET # BLD AUTO: 246 10E3/UL (ref 150–450)
RBC # BLD AUTO: 3.38 10E6/UL (ref 3.8–5.2)
WBC # BLD AUTO: 6.6 10E3/UL (ref 4–11)

## 2022-05-03 PROCEDURE — 85025 COMPLETE CBC W/AUTO DIFF WBC: CPT

## 2022-05-03 PROCEDURE — 36415 COLL VENOUS BLD VENIPUNCTURE: CPT

## 2022-05-04 ENCOUNTER — TELEPHONE (OUTPATIENT)
Dept: FAMILY MEDICINE | Facility: CLINIC | Age: 77
End: 2022-05-04
Payer: COMMERCIAL

## 2022-05-04 NOTE — TELEPHONE ENCOUNTER
Please call patient.     Dear Sakshi,       Your hemoglobin remains low. Please schedule an in-person appointment for an exam and to discuss.       Regards,   DESIRE Marroquin CNP       Left message on answering machine for patient to call back to the nurse at [phone].    Kaitlin Aguilar RN  [facility]

## 2022-05-05 NOTE — TELEPHONE ENCOUNTER
Second attempt to call patient, no answer. Left voicemail and requested patient call back at [phone].     Jocelin Winter RN   [facility]

## 2022-05-06 NOTE — TELEPHONE ENCOUNTER
"Unable to reach patient via phone. Team, please mail patient a copy of results from 5/3/22 along with provider recommendations. Thanks!    \"Dear Sakshi,       Your hemoglobin remains low. Please call [phone] to schedule an in-person appointment for an exam and to discuss.       Regards,   Stefania King, DESIRE CNP\"    Jocelin Winter RN   [facility]   "

## 2022-05-10 NOTE — PROGRESS NOTES
Assessment & Plan     Anemia, unspecified type  Persistent low hemoglobin with normal MCV. Consider referral to hematology based on results of labs ordered.   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 Patient reports drainage turned from clear to yellowish recently. Will treat with cephalexin. Prosthetics Department adjusted patient's prosthetic yesterday so that it does not rub on this area now, she states that it feels better since. Counseled patient on need to increase her calories and protein intake and education on healthy protien options including high-protein BOOST as this will help with wound healing and help prevent future wounds.. Patient will follow up if no improvement in the next 10 days or if worsens.   - cephALEXin (KEFLEX) 500 MG capsule; Take 1 capsule (500 mg) by mouth 4 times daily for 7 days    PVD (peripheral vascular disease) (H)  Stable. Continue daily ASA and statin.     Coronary artery disease involving native coronary artery of native heart without angina pectoris  Stable. Currently asymptomatic. Continue ASA, statin, lisinopril, and metoprolol. Refills done.   - metoprolol tartrate (LOPRESSOR) 50 MG tablet; Take 1 tablet (50 mg) by mouth daily    Rheumatoid arthritis, involving unspecified site, unspecified whether rheumatoid factor present (H)  Managed by rheumatology with methotrexate which makes her vulnerable to infection so will start treatment as noted above now for leg wound.     Essential hypertension with goal blood pressure less than 140/90  The current  medical regimen is effective;  continue present plan and medications. Refills done.   - amLODIPine (NORVASC) 5 MG tablet; Take 1 tablet (5 mg) by mouth daily  - metoprolol tartrate (LOPRESSOR) 50 MG tablet; Take 1 tablet (50 mg) by mouth daily    Hyperlipidemia LDL goal <100  The current medical regimen is effective;  continue present plan and medications. Refills done.   - simvastatin (ZOCOR) 20 MG tablet; Take 1 tablet (20 mg) by mouth At Bedtime    Review of the result(s) of each unique test - as noted below  Ordering of each unique test  Prescription drug management  56 minutes spent on the date of the encounter doing chart review, history and exam, documentation and further activities per the note     Return in about 10 days (around 5/28/2022) for follow up if symptoms worsen or do not improve.    DESIRE Marroquin CNP  [facility] TOYA Cantor is a 76 year old who presents for the following health issues  accompanied by her spouse .    HPI     Hypertension Follow-up      Do you check your blood pressure regularly outside of the clinic? No     Are you following a low salt diet? No    Are your blood pressures ever more than 140 on the top number (systolic) OR more   than 90 on the bottom number (diastolic), for example 140/90? No    Anemia  States that she has side effects from iron pills, they give her stomach upset. She tries to take her iron every other day. Denies fatigue, palpitations, racing heart, denies sweats. States that she will get occasional warm at night and have to take off covers. Denies blood in her urine or stools.      States that ulcer on her right leg is stable but is now oozzing purulent fluid and is red around it. She was seen by prosthetics and they adjusted her prothesis so it doesn't rub. Denies fever, chills.    Hyperlipidemia Follow-Up      Are you regularly taking any medication or supplement to lower your cholesterol?   Yes- statin    Are you  "having muscle aches or other side effects that you think could be caused by your cholesterol lowering medication?  No    Vascular Disease Follow-up      Do you take an aspirin every day? Yes  Denies chest pain/pressure, dyspnea, edema, palpitations. Does note easy bruising on her lower arms.     Review of Systems   Constitutional, HEENT, cardiovascular, pulmonary, gi and gu systems are negative, except as otherwise noted.      Objective    /58   Pulse 102   Temp 98.4  F (36.9  C) (Oral)   Resp 14   Ht 1.638 m (5' 4.5\")   Wt 43.5 kg (96 lb)   LMP  (LMP Unknown)   SpO2 96%   Breastfeeding No   BMI 16.22 kg/m    Body mass index is 16.22 kg/m .  Physical Exam   GENERAL: healthy, alert and no distress  EYES: Eyes grossly normal to inspection, PERRL and conjunctivae and sclerae normal  RESP: lungs clear to auscultation - no rales, rhonchi or wheezes  CV: regular rate and rhythm, normal S1 S2, no S3 or S4, no murmur, click or rub, no peripheral edema and peripheral pulses strong  SKIN: 2cm shallow ulcer right stump with erythema surrounding to 4cm and scant yellow/brown drainage noted.  NEURO: Normal strength and tone, mentation intact and speech normal  PSYCH: mentation appears normal, affect normal/bright    Diagnostics I reviewed today:  Lab on 05/03/2022   Component Date Value Ref Range Status     WBC Count 05/03/2022 6.6  4.0 - 11.0 10e3/uL Final     RBC Count 05/03/2022 3.38 (A) 3.80 - 5.20 10e6/uL Final     Hemoglobin 05/03/2022 10.7 (A) 11.7 - 15.7 g/dL Final     Hematocrit 05/03/2022 33.9 (A) 35.0 - 47.0 % Final     MCV 05/03/2022 100  78 - 100 fL Final     MCH 05/03/2022 31.7  26.5 - 33.0 pg Final     MCHC 05/03/2022 31.6  31.5 - 36.5 g/dL Final     RDW 05/03/2022 18.2 (A) 10.0 - 15.0 % Final     Platelet Count 05/03/2022 246  150 - 450 10e3/uL Final     % Neutrophils 05/03/2022 88  % Final     % Lymphocytes 05/03/2022 4  % Final     % Monocytes 05/03/2022 6  % Final     % Eosinophils 05/03/2022 2  " % Final     % Basophils 05/03/2022 1  % Final     Absolute Neutrophils 05/03/2022 5.8  1.6 - 8.3 10e3/uL Final     Absolute Lymphocytes 05/03/2022 0.2 (A) 0.8 - 5.3 10e3/uL Final     Absolute Monocytes 05/03/2022 0.4  0.0 - 1.3 10e3/uL Final     Absolute Eosinophils 05/03/2022 0.2  0.0 - 0.7 10e3/uL Final     Absolute Basophils 05/03/2022 0.0  0.0 - 0.2 10e3/uL Final

## 2022-05-18 ENCOUNTER — OFFICE VISIT (OUTPATIENT)
Dept: FAMILY MEDICINE | Facility: CLINIC | Age: 77
End: 2022-05-18
Payer: COMMERCIAL

## 2022-05-18 VITALS
OXYGEN SATURATION: 96 % | HEART RATE: 102 BPM | RESPIRATION RATE: 14 BRPM | DIASTOLIC BLOOD PRESSURE: 58 MMHG | BODY MASS INDEX: 15.99 KG/M2 | SYSTOLIC BLOOD PRESSURE: 122 MMHG | TEMPERATURE: 98.4 F | HEIGHT: 65 IN | WEIGHT: 96 LBS

## 2022-05-18 DIAGNOSIS — D64.9 ANEMIA, UNSPECIFIED TYPE: Primary | ICD-10-CM

## 2022-05-18 DIAGNOSIS — E78.5 HYPERLIPIDEMIA LDL GOAL <100: ICD-10-CM

## 2022-05-18 DIAGNOSIS — L89.892 PRESSURE ULCER OF RIGHT LEG, STAGE 2 (H): ICD-10-CM

## 2022-05-18 DIAGNOSIS — E46 PROTEIN-CALORIE MALNUTRITION, UNSPECIFIED SEVERITY (H): ICD-10-CM

## 2022-05-18 DIAGNOSIS — M81.8 OTHER OSTEOPOROSIS, UNSPECIFIED PATHOLOGICAL FRACTURE PRESENCE: ICD-10-CM

## 2022-05-18 DIAGNOSIS — I25.10 CORONARY ARTERY DISEASE INVOLVING NATIVE CORONARY ARTERY OF NATIVE HEART WITHOUT ANGINA PECTORIS: ICD-10-CM

## 2022-05-18 DIAGNOSIS — Z89.511 ACQUIRED ABSENCE OF RIGHT LEG BELOW KNEE (H): ICD-10-CM

## 2022-05-18 DIAGNOSIS — I73.9 PVD (PERIPHERAL VASCULAR DISEASE) (H): ICD-10-CM

## 2022-05-18 DIAGNOSIS — M06.9 RHEUMATOID ARTHRITIS, INVOLVING UNSPECIFIED SITE, UNSPECIFIED WHETHER RHEUMATOID FACTOR PRESENT (H): ICD-10-CM

## 2022-05-18 DIAGNOSIS — I10 ESSENTIAL HYPERTENSION WITH GOAL BLOOD PRESSURE LESS THAN 140/90: ICD-10-CM

## 2022-05-18 PROBLEM — M86.9 OSTEOMYELITIS (H): Status: RESOLVED | Noted: 2019-10-01 | Resolved: 2022-05-18

## 2022-05-18 LAB
ERYTHROCYTE [DISTWIDTH] IN BLOOD BY AUTOMATED COUNT: 18.3 % (ref 10–15)
ERYTHROCYTE [DISTWIDTH] IN BLOOD BY AUTOMATED COUNT: NORMAL %
FOLATE SERPL-MCNC: 14.4 NG/ML
HCT VFR BLD AUTO: 36.6 % (ref 35–47)
HCT VFR BLD AUTO: NORMAL %
HGB BLD-MCNC: 11.9 G/DL (ref 11.7–15.7)
HGB BLD-MCNC: NORMAL G/DL
HOLD SPECIMEN: NORMAL
MCH RBC QN AUTO: 31.9 PG (ref 26.5–33)
MCH RBC QN AUTO: NORMAL PG
MCHC RBC AUTO-ENTMCNC: 32.5 G/DL (ref 31.5–36.5)
MCHC RBC AUTO-ENTMCNC: NORMAL G/DL
MCV RBC AUTO: 98 FL (ref 78–100)
MCV RBC AUTO: NORMAL FL
PLATELET # BLD AUTO: 378 10E3/UL (ref 150–450)
PLATELET # BLD AUTO: NORMAL 10*3/UL
RBC # BLD AUTO: 3.73 10E6/UL (ref 3.8–5.2)
RBC # BLD AUTO: NORMAL 10*6/UL
RETICS # AUTO: 0.04 10E6/UL (ref 0.03–0.1)
RETICS/RBC NFR AUTO: 1.2 % (ref 0.5–2)
VIT B12 SERPL-MCNC: 2354 PG/ML (ref 193–986)
WBC # BLD AUTO: 9 10E3/UL (ref 4–11)
WBC # BLD AUTO: NORMAL 10*3/UL

## 2022-05-18 PROCEDURE — 85060 BLOOD SMEAR INTERPRETATION: CPT | Performed by: PATHOLOGY

## 2022-05-18 PROCEDURE — 85027 COMPLETE CBC AUTOMATED: CPT | Performed by: NURSE PRACTITIONER

## 2022-05-18 PROCEDURE — 91305 COVID-19,PF,PFIZER (12+ YRS): CPT | Performed by: NURSE PRACTITIONER

## 2022-05-18 PROCEDURE — 82746 ASSAY OF FOLIC ACID SERUM: CPT | Performed by: NURSE PRACTITIONER

## 2022-05-18 PROCEDURE — 36415 COLL VENOUS BLD VENIPUNCTURE: CPT | Performed by: NURSE PRACTITIONER

## 2022-05-18 PROCEDURE — 90471 IMMUNIZATION ADMIN: CPT | Performed by: NURSE PRACTITIONER

## 2022-05-18 PROCEDURE — 99215 OFFICE O/P EST HI 40 MIN: CPT | Mod: 25 | Performed by: NURSE PRACTITIONER

## 2022-05-18 PROCEDURE — 82607 VITAMIN B-12: CPT | Performed by: NURSE PRACTITIONER

## 2022-05-18 PROCEDURE — 90715 TDAP VACCINE 7 YRS/> IM: CPT | Performed by: NURSE PRACTITIONER

## 2022-05-18 PROCEDURE — 83550 IRON BINDING TEST: CPT | Performed by: NURSE PRACTITIONER

## 2022-05-18 PROCEDURE — 82728 ASSAY OF FERRITIN: CPT | Performed by: NURSE PRACTITIONER

## 2022-05-18 PROCEDURE — 0054A COVID-19,PF,PFIZER (12+ YRS): CPT | Performed by: NURSE PRACTITIONER

## 2022-05-18 PROCEDURE — 85045 AUTOMATED RETICULOCYTE COUNT: CPT | Performed by: NURSE PRACTITIONER

## 2022-05-18 RX ORDER — SIMVASTATIN 20 MG
20 TABLET ORAL AT BEDTIME
Qty: 90 TABLET | Refills: 3 | Status: SHIPPED | OUTPATIENT
Start: 2022-05-18 | End: 2023-06-30

## 2022-05-18 RX ORDER — CEPHALEXIN 500 MG/1
500 CAPSULE ORAL 4 TIMES DAILY
Qty: 28 CAPSULE | Refills: 0 | Status: SHIPPED | OUTPATIENT
Start: 2022-05-18 | End: 2022-05-25

## 2022-05-18 RX ORDER — AMLODIPINE BESYLATE 5 MG/1
5 TABLET ORAL DAILY
Qty: 90 TABLET | Refills: 3 | Status: SHIPPED | OUTPATIENT
Start: 2022-05-18 | End: 2023-03-14

## 2022-05-18 RX ORDER — METOPROLOL TARTRATE 50 MG
50 TABLET ORAL DAILY
Qty: 90 TABLET | Refills: 3 | Status: SHIPPED | OUTPATIENT
Start: 2022-05-18 | End: 2022-06-10

## 2022-05-18 NOTE — LETTER
May 19, 2022      Sakshi Jaeger  [address] [address]        Dear ,    We are writing to inform you of your test results.    Your anemia is improving. Continue taking iron for at least 3 more months        Resulted Orders   Folate   Result Value Ref Range    Folic Acid 14.4 >=5.4 ng/mL      Comment:      Deficient: <3.4 ng/mL  Indeterminate: 3.4-5.4 ng/mL  Normal: > 5.4 ng/mL   Vitamin B12   Result Value Ref Range    Vitamin B12 2,354 (H) 193 - 986 pg/mL   CBC with Platelets and Reflex to Iron Studies   Result Value Ref Range    WBC Count 9.0 4.0 - 11.0 10e3/uL    RBC Count 3.73 (L) 3.80 - 5.20 10e6/uL    Hemoglobin 11.9 11.7 - 15.7 g/dL    Hematocrit 36.6 35.0 - 47.0 %    MCV 98 78 - 100 fL    MCH 31.9 26.5 - 33.0 pg    MCHC 32.5 31.5 - 36.5 g/dL    RDW 18.3 (H) 10.0 - 15.0 %    Platelet Count 378 150 - 450 10e3/uL   Extra Green Top (Lithium Heparin) Tube   Result Value Ref Range    Hold Specimen [facility]3]d morphology pathology review   Result Value Ref Range    Final Diagnosis       Peripheral blood, morphology:  - Peripheral blood without diagnostic morphologic abnormality.  - Hemoglobin quantitatively within normal limits.  - WBC subsets quantitatively within normal limits.  - Platelet count quantitatively within normal limits.      Peripheral Smear       A microscopic examination is performed.      Peripheral Hematologic Data       Date collected: 5/18/2022    Patient value (Reference range for >18 year old):    WBC: 8.96 (4.0-11.0 x 10^9/L)  Hemoglobin: 11.9 (female 11.7-15.7 g/dL; male 13.3-17.7 g/dL)  Hematocrit: 36.6 (female 35.0-47.0 %; male 40.0-53.0 %)  Platelet Count: 378 (150-450 x 10^9/L)  RBC Count: 3.73 (female 3.8-5.2 x 10^12/L; male 4.4-5.9 x 10^12/L)  MCV: 98.1 ( fL)  MCH: 31.9 (26.5-33.0 pg)  MCHC: 32.5 (31.5-36.5 g/dL)  RDW: 18.3 (10.0-15.0 %)  Diff Method: Automated  % Neutrophils: 60.4  % Lymphocytes: 24.2  % Monocytes: 12.5  % Eosinophils: 2.3  %  Basophils: 0.6  Absolute Neutrophil: 5.41 (1.6 - 8.3 x 10^9/L)  Absolute Lymphocytes: 2.17 (0.8 - 5.3 x 10^9/L)  Absolute Monocytes: 1.12 (0 - 1.3 x 10^9/L)  Absolute Eosinophils: 0.21 (0 - 0.7 x 10^9/L)  Absolute Basophils: 0.05 (0 - 0.2 x 10^9/L)  % Retic: 1.2 (0.5- 2.0%)  Absolute Retic: 43 (25 - 95 x 10^9/L)      Performing Labs       The technical component of this testing was completed at [facility], [facility] and [facility]     CBC with platelets and differential   Result Value Ref Range    WBC Count        Comment:      This is a corrected result. Previous result was 8.5 10e3/uL on 5/18/2022 at 12:52 PM CDT    RBC Count        Comment:      Test credited, duplicate   This is a corrected result. Previous result was 3.65 10e6/uL on 5/18/2022 at 12:52 PM CDT    Hemoglobin        Comment:      Test credited, duplicate   This is a corrected result. Previous result was 11.6 g/dL on 5/18/2022 at 12:52 PM CDT    Hematocrit        Comment:      Test credited, duplicate   This is a corrected result. Previous result was 35.8 % on 5/18/2022 at 12:52 PM CDT    MCV        Comment:      Test credited, duplicate   This is a corrected result. Previous result was 98 fL on 5/18/2022 at 12:52 PM CDT    MCH        Comment:      Test credited, duplicate   This is a corrected result. Previous result was 31.8 pg on 5/18/2022 at 12:52 PM CDT    MCHC        Comment:      Test credited, duplicate   This is a corrected result. Previous result was 32.4 g/dL on 5/18/2022 at 12:52 PM CDT    RDW        Comment:      Test credited, duplicate   This is a corrected result. Previous result was 18.3 % on 5/18/2022 at 12:52 PM CDT    Platelet Count        Comment:      This is a corrected result. Previous result was 378 10e3/uL on 5/18/2022 at 12:52 PM CDT    Narrative    Test credited, duplicate  Previously reported component [ % Neutrophils Auto ]  "is no longer reported.\"  Previously reported component [ % Lymphocytes Auto ] is no longer reported.\"  Previously reported component [ % Monocytes Auto ] is no longer reported.\"  Previously reported   component [ % Eosinophils Auto ] is no longer reported.\"  Previously reported component [ % Basophils Auto ] is no longer reported.\"  Previously reported component [ Absolute Neutrophil Auto ] is no longer reported.\"  Previously reported component [   Absolute Lymphocyte Auto ] is no longer reported.\"  Previously reported component [ Absolute Monocytes Auto ] is no longer reported.\"  Previously reported component [ Absolute Eosinophils Auto ] is no longer reported.\"  Previously reported component [   Absolute Basophils Auto ] is no longer reported.\"   Reticulocyte count   Result Value Ref Range    % Reticulocyte 1.2 0.5 - 2.0 %    Absolute Reticulocyte 0.043 0.025 - 0.095 10e6/uL   Iron & Iron Binding Capacity   Result Value Ref Range    Iron 115 35 - 180 ug/dL    Iron Binding Capacity 252 240 - 430 ug/dL    Iron Sat Index 46 15 - 46 %   Ferritin   Result Value Ref Range    Ferritin 676 (H) 8 - 252 ng/mL       If you have any questions or concerns, please call the clinic at the number listed above.       Sincerely,      Stefania King, DESIRE CNP/preciado          "

## 2022-05-19 LAB
FERRITIN SERPL-MCNC: 676 NG/ML (ref 8–252)
IRON SATN MFR SERPL: 46 % (ref 15–46)
IRON SERPL-MCNC: 115 UG/DL (ref 35–180)
PATH REPORT.COMMENTS IMP SPEC: NORMAL
PATH REPORT.FINAL DX SPEC: NORMAL
PATH REPORT.MICROSCOPIC SPEC OTHER STN: NORMAL
PATH REPORT.MICROSCOPIC SPEC OTHER STN: NORMAL
TIBC SERPL-MCNC: 252 UG/DL (ref 240–430)

## 2022-05-19 NOTE — RESULT ENCOUNTER NOTE
Mail letter:  Your anemia is improving. Continue taking iron for at least 3 more months.     Ruma Bergeron MD

## 2022-05-24 ENCOUNTER — TELEPHONE (OUTPATIENT)
Dept: FAMILY MEDICINE | Facility: CLINIC | Age: 77
End: 2022-05-24
Payer: COMMERCIAL

## 2022-05-24 DIAGNOSIS — L89.892 PRESSURE ULCER OF RIGHT LEG, STAGE 2 (H): ICD-10-CM

## 2022-05-24 DIAGNOSIS — D64.9 ANEMIA, UNSPECIFIED TYPE: Primary | ICD-10-CM

## 2022-05-24 RX ORDER — SULFAMETHOXAZOLE/TRIMETHOPRIM 800-160 MG
1 TABLET ORAL 2 TIMES DAILY
Qty: 14 TABLET | Refills: 0 | Status: SHIPPED | OUTPATIENT
Start: 2022-05-24 | End: 2022-05-31

## 2022-05-24 NOTE — TELEPHONE ENCOUNTER
I have sent an Rx for bactrim to her pharmacy on file. She has tolerated this medication in the past without problem so helpfully it will be better for her.     Stefania King, APRN, CNP

## 2022-05-24 NOTE — TELEPHONE ENCOUNTER
Patient notified of Provider's message as written.  Patient verbalized understanding.    Latrice Baker RN  [employer]

## 2022-05-24 NOTE — TELEPHONE ENCOUNTER
Reached out to patient to review provider's message as written. She states that she took the antibiotic on Friday and then one dose on Saturday, then re-started today (one dose). Her leg is reported as 'the same' - she stated that it is still red but not gooey at all.     She states that she will stop her iron supplement and follow up as indicated.     MILTON Brito RN  [facility], [place]

## 2022-05-24 NOTE — TELEPHONE ENCOUNTER
Reason for call:  Patient reporting a symptom    Symptom or request: vomiting and diarrhea     Duration (how long have symptoms been present): for about 3 hours last Saturday 5-21-22    Have you been treated for this before? No    Additional comments: Patient was out of town when this started. States she was seen on 5-18-22 and given an antibiotic, 2nd booster and tetanus shot. Would like a call back.    Phone Number patient can be reached at:  Home number on file [phone] (home)    Best Time:  any    Can we leave a detailed message on this number:  YES    Call taken on 5/24/2022 at 11:13 AM by Dominique Rogers

## 2022-05-24 NOTE — TELEPHONE ENCOUNTER
Her vomiting and diarrhea could be from the antibiotic as that is a known side effect. How many days of antibiotics was she able to complete? How does her leg wound look, still red or gooey?    Her hemoglobin improved and her ferritin is high so it is okay for her to stop the iron supplement. Just eat plenty of iron and protein rich foods. Have her schedule a lab only appointment for 3 months for a recheck off of the iron supplement.     Stefania King, APRN, CNP

## 2022-05-24 NOTE — TELEPHONE ENCOUNTER
"Reached out to patient to inquire regarding below.  She states that she started to have vomiting and diarrhea on 05/21/22 which has now resolved completely. She believes this is either due to her COVID-19 vaccine or antibiotics. Due to this, she stopped taking her antibiotics and has not taken them since. She is going to re-start them and contact us if she starts to experience symptoms again since it is unclear whether it was due to abx or COVID-19 vaccination (or something else).    She also states that she got a result note in the mail:  \"Mail letter:  Your anemia is improving. Continue taking iron for at least 3 more months.      Ruma Bergeron MD\"    She does not wish to take iron pills any longer because it causes her terrible diarrhea.    Routing update to Stefania King CNP to please review.    AMANDA BritoN RN  [facility], [place]  "

## 2022-06-09 ENCOUNTER — TELEPHONE (OUTPATIENT)
Dept: FAMILY MEDICINE | Facility: CLINIC | Age: 77
End: 2022-06-09
Payer: COMMERCIAL

## 2022-06-09 DIAGNOSIS — I25.10 CORONARY ARTERY DISEASE INVOLVING NATIVE CORONARY ARTERY OF NATIVE HEART WITHOUT ANGINA PECTORIS: ICD-10-CM

## 2022-06-09 DIAGNOSIS — I10 ESSENTIAL HYPERTENSION WITH GOAL BLOOD PRESSURE LESS THAN 140/90: ICD-10-CM

## 2022-06-09 NOTE — TELEPHONE ENCOUNTER
Received call from Whitney, pharmacist with [facility].     She states that prescription for metoprolol tartrate (LOPRESSOR) 50 MG tablet was received 05/18/2022 and she is questioning frequency. She would like to know if there was an error with sig because patient was previously taking BID instead of once daily.    Routing to provider to please review and advise.    Callback: [phone]    MILTON Brito RN  [facility], [facility]

## 2022-06-10 RX ORDER — METOPROLOL TARTRATE 50 MG
50 TABLET ORAL 2 TIMES DAILY
Qty: 90 TABLET | Refills: 3 | Status: SHIPPED | OUTPATIENT
Start: 2022-06-10 | End: 2023-02-10

## 2022-06-10 NOTE — TELEPHONE ENCOUNTER
Spoke with pharmacy and notified. Verbalized understanding.     Thanks,  BREANNE Apodaca  [facility]

## 2022-06-21 ENCOUNTER — OFFICE VISIT (OUTPATIENT)
Dept: OPTOMETRY | Facility: CLINIC | Age: 77
End: 2022-06-21
Payer: COMMERCIAL

## 2022-06-21 DIAGNOSIS — H52.4 PRESBYOPIA: ICD-10-CM

## 2022-06-21 DIAGNOSIS — H25.13 NUCLEAR AGE-RELATED CATARACT, BOTH EYES: Primary | ICD-10-CM

## 2022-06-21 DIAGNOSIS — H52.13 MYOPIA OF BOTH EYES: ICD-10-CM

## 2022-06-21 PROCEDURE — 92015 DETERMINE REFRACTIVE STATE: CPT | Performed by: OPTOMETRIST

## 2022-06-21 PROCEDURE — 92004 COMPRE OPH EXAM NEW PT 1/>: CPT | Performed by: OPTOMETRIST

## 2022-06-21 ASSESSMENT — CONF VISUAL FIELD
OD_NORMAL: 1
METHOD: COUNTING FINGERS
OS_NORMAL: 1

## 2022-06-21 ASSESSMENT — CUP TO DISC RATIO
OD_RATIO: 0.25
OS_RATIO: 0.25

## 2022-06-21 ASSESSMENT — TONOMETRY
OS_IOP_MMHG: 17
OD_IOP_MMHG: 16
IOP_METHOD: APPLANATION

## 2022-06-21 ASSESSMENT — VISUAL ACUITY
OS_CC: 20/60
OD_SC: 20/80+1
METHOD: SNELLEN - LINEAR
CORRECTION_TYPE: GLASSES
OS_SC+: -1
OD_CC: 20/40-2
OD_SC: 20/100
OS_SC: 20/80
OS_SC: 20/120

## 2022-06-21 ASSESSMENT — REFRACTION_MANIFEST
OS_SPHERE: -2.50
OS_AXIS: 133
OD_ADD: +2.50
OD_SPHERE: -1.75
OD_CYLINDER: SPHERE
OS_CYLINDER: +0.50
OS_ADD: +2.50

## 2022-06-21 ASSESSMENT — REFRACTION_WEARINGRX
OD_CYLINDER: SPHERE
SPECS_TYPE: OTC CHEATERS
OD_SPHERE: +2.00
OS_SPHERE: +2.00
OS_CYLINDER: SPHERE

## 2022-06-21 ASSESSMENT — EXTERNAL EXAM - RIGHT EYE: OD_EXAM: NORMAL

## 2022-06-21 ASSESSMENT — EXTERNAL EXAM - LEFT EYE: OS_EXAM: NORMAL

## 2022-06-21 ASSESSMENT — SLIT LAMP EXAM - LIDS
COMMENTS: 2+ DERMATOCHALASIS
COMMENTS: 2+ DERMATOCHALASIS

## 2022-06-21 NOTE — PROGRESS NOTES
Chief Complaint   Patient presents with     Annual Eye Exam      Accompanied by spouse, Snati    Last Eye Exam: 20+ years  Dilated Previously: Yes, side effects of dilation explained today    What are you currently using to see? +2.00 readers       Distance Vision Acuity: Satisfied with vision and Noticed gradual change in both eyes - trouble reading writing on TV screen and more difficult to see road signs    Near Vision Acuity: Satisfied with vision while reading and using computer with readers     Eye Comfort: good  Do you use eye drops? : No  Occupation or Hobbies: Retired    Nuha Guy        Medical, surgical and family histories reviewed and updated 6/21/2022.       OBJECTIVE: See Ophthalmology exam    ASSESSMENT:    ICD-10-CM    1. Nuclear age-related cataract, both eyes  H25.13 EYE EXAM (SIMPLE-NONBILLABLE)     Adult Eye Referral   2. Myopia of both eyes  H52.13 REFRACTION     EYE EXAM (SIMPLE-NONBILLABLE)   3. Presbyopia  H52.4 REFRACTION     EYE EXAM (SIMPLE-NONBILLABLE)       PLAN:     Patient Instructions   Visually significant cataracts each eye.   Refer to Dr. Izaguirre in [place] for cataract evaluation.     Hold off on new glasses prescription today, as this will change after having the cataract surgery.       The effects of the dilating drops last for 4- 6 hours.  You will be more sensitive to light and vision will be blurry up close.  Mydriatic sunglasses were given if needed.     Naveed Edwards, OD  [address] [place]  [address]    [phone]

## 2022-06-21 NOTE — PATIENT INSTRUCTIONS
Visually significant cataracts each eye.   Refer to Dr. Izaguirre in [place] for cataract evaluation.     Hold off on new glasses prescription today, as this will change after having the cataract surgery.       The effects of the dilating drops last for 4- 6 hours.  You will be more sensitive to light and vision will be blurry up close.  Mydriatic sunglasses were given if needed.     Naveed Edwards, OD  [address]  [place]  [address]    [phone]

## 2022-06-21 NOTE — LETTER
6/21/2022         RE: Sakshi Jaeger  [address] [address]        Dear Colleague,    Thank you for referring your patient, Sakshi Jaeger, to the [facility]. Please see a copy of my visit note below.    Chief Complaint   Patient presents with     Annual Eye Exam      Accompanied by spouse, Santi    Last Eye Exam: 20+ years  Dilated Previously: Yes, side effects of dilation explained today    What are you currently using to see? +2.00 readers       Distance Vision Acuity: Satisfied with vision and Noticed gradual change in both eyes - trouble reading writing on TV screen and more difficult to see road signs    Near Vision Acuity: Satisfied with vision while reading and using computer with readers     Eye Comfort: good  Do you use eye drops? : No  Occupation or Hobbies: Retired    Nuha [facility]        Medical, surgical and family histories reviewed and updated 6/21/2022.       OBJECTIVE: See Ophthalmology exam    ASSESSMENT:    ICD-10-CM    1. Encounter for examination of eyes and vision with abnormal findings  Z01.01    2. Nuclear age-related cataract, both eyes  H25.13    3. Myopia of both eyes  H52.13    4. Presbyopia  H52.4        PLAN:     Patient Instructions   Visually significant cataracts each eye.   Refer to Dr. Izaguirre in [place] for cataract evaluation.     Hold off on new glasses prescription today, as this will change after having the cataract surgery.       The effects of the dilating drops last for 4- 6 hours.  You will be more sensitive to light and vision will be blurry up close.  Mydriatic sunglasses were given if needed.     Naveed Edwards, NEENA  [facility]  [address] [place]  [address] [phone]            Again, thank you for allowing me to participate in the care of your patient.        Sincerely,        Naveed Edwards, OD

## 2022-07-13 ENCOUNTER — OFFICE VISIT (OUTPATIENT)
Dept: ORTHOPEDICS | Facility: CLINIC | Age: 77
End: 2022-07-13
Payer: COMMERCIAL

## 2022-07-13 DIAGNOSIS — L97.916 ULCER OF RIGHT LOWER EXTREMITY WITH BONE INVOLVEMENT WITHOUT EVIDENCE OF NECROSIS (H): ICD-10-CM

## 2022-07-13 DIAGNOSIS — Z89.511 STATUS POST BELOW-KNEE AMPUTATION OF RIGHT LOWER EXTREMITY (H): Primary | ICD-10-CM

## 2022-07-13 PROCEDURE — 99213 OFFICE O/P EST LOW 20 MIN: CPT | Performed by: PODIATRIST

## 2022-07-13 NOTE — LETTER
7/13/2022         RE: Sakshi Jaeger  [address] [address]        Dear Colleague,    Thank you for referring your patient, Sakshi Jaeger, to the [facility] [place]. Please see a copy of my visit note below.    Chief Complaint   Patient presents with     Wound Check     Right above and below the knee. Dr. Walker seen the patient for below the knee stump wound.           No Known Allergies      Subjective: Sakshi is a 77 year old female who presents to the clinic today for a follow up of right BKA wound.  She had a wound debridement and closure done on December 8, 2021 with Dr. Perea.  She saw him in January and it was noted that the wound was healed.  She relates that over the last 2 weeks, the wound started opening.  It does leak fluid.    Objective  Data Unavailable Data Unavailable Data Unavailable Data Unavailable Data Unavailable 0 lbs 0 oz        A wound is noted at right  Distal stump of the BKA measuring 0.2 cm x 0.3 cm x 0.5 cm.    Ly Classification: 3    Wound base: Not Visible    Edges: epibole    Drainage: moderate/serous and what appears to be fat possibly from the tibia.    Odor: No    Undermining: No    Bone Exposure: Yes: Distal end of the tibia    Clinical Signs of Infection: No    After obtaining patient consent, the wound was irrigated with copious amounts of saline.     Barriers to Healing: Length of time wound is been open.  Wound has continually stayed open.    Treatment Plan: Iodosorb and Mepilex for now.    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  This is from rubbing on the prosthetic.  I discussed with her that this does probe to her tibia and I am concerned that the medullary cavity is exposed.  I recommend that we get a CT scan to review the area.  She does agree to this.  I discussed with her that this may result in conversion to an  AKA.    Plan:   - Pt seen and evaluated  -For now, use Iodosorb and Mepilex.  -CT scan was ordered.  - Pt to return to clinic status post CT.      Joe Walekr DPM

## 2022-07-13 NOTE — PROGRESS NOTES
Chief Complaint   Patient presents with     Wound Check     Right above and below the knee. Dr. Walker seen the patient for below the knee stump wound.           No Known Allergies      Subjective: Sakshi is a 77 year old female who presents to the clinic today for a follow up of right BKA wound.  She had a wound debridement and closure done on December 8, 2021 with Dr. Perea.  She saw him in January and it was noted that the wound was healed.  She relates that over the last 2 weeks, the wound started opening.  It does leak fluid.    Objective  Data Unavailable Data Unavailable Data Unavailable Data Unavailable Data Unavailable 0 lbs 0 oz        A wound is noted at right  Distal stump of the BKA measuring 0.2 cm x 0.3 cm x 0.5 cm.    Ly Classification: 3    Wound base: Not Visible    Edges: epibole    Drainage: moderate/serous and what appears to be fat possibly from the tibia.    Odor: No    Undermining: No    Bone Exposure: Yes: Distal end of the tibia    Clinical Signs of Infection: No    After obtaining patient consent, the wound was irrigated with copious amounts of saline.     Barriers to Healing: Length of time wound is been open.  Wound has continually stayed open.    Treatment Plan: Iodosorb and Mepilex for now.    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  This is from rubbing on the prosthetic.  I discussed with her that this does probe to her tibia and I am concerned that the medullary cavity is exposed.  I recommend that we get a CT scan to review the area.  She does agree to this.  I discussed with her that this may result in conversion to an AKA.    Plan:   - Pt seen and evaluated  -For now, use Iodosorb and Mepilex.  -CT scan was ordered.  - Pt to return to clinic status post CT.

## 2022-07-13 NOTE — NURSING NOTE
Reason For Visit:   Chief Complaint   Patient presents with     Wound Check     Right above and below the knee. Dr. Walker seen the patient for below the knee stump wound.                 No Known Allergies        Shweta Cherry LPN

## 2022-07-14 ENCOUNTER — ANCILLARY PROCEDURE (OUTPATIENT)
Dept: CT IMAGING | Facility: CLINIC | Age: 77
End: 2022-07-14
Attending: PODIATRIST
Payer: COMMERCIAL

## 2022-07-14 DIAGNOSIS — L97.916 ULCER OF RIGHT LOWER EXTREMITY WITH BONE INVOLVEMENT WITHOUT EVIDENCE OF NECROSIS (H): ICD-10-CM

## 2022-07-14 DIAGNOSIS — Z89.511 STATUS POST BELOW-KNEE AMPUTATION OF RIGHT LOWER EXTREMITY (H): ICD-10-CM

## 2022-07-14 LAB
CREAT BLD-MCNC: 0.5 MG/DL (ref 0.5–1)
GFR SERPL CREATININE-BSD FRML MDRD: >60 ML/MIN/1.73M2

## 2022-07-14 PROCEDURE — 82565 ASSAY OF CREATININE: CPT | Performed by: PATHOLOGY

## 2022-07-14 PROCEDURE — 73701 CT LOWER EXTREMITY W/DYE: CPT | Mod: RT | Performed by: RADIOLOGY

## 2022-07-14 RX ORDER — IOPAMIDOL 755 MG/ML
59 INJECTION, SOLUTION INTRAVASCULAR ONCE
Status: COMPLETED | OUTPATIENT
Start: 2022-07-14 | End: 2022-07-14

## 2022-07-14 RX ADMIN — IOPAMIDOL 59 ML: 755 INJECTION, SOLUTION INTRAVASCULAR at 17:00

## 2022-07-15 LAB — RADIOLOGIST FLAGS: NORMAL

## 2022-07-18 ENCOUNTER — OFFICE VISIT (OUTPATIENT)
Dept: ORTHOPEDICS | Facility: CLINIC | Age: 77
End: 2022-07-18

## 2022-07-18 DIAGNOSIS — L97.916 ULCER OF RIGHT LOWER EXTREMITY WITH BONE INVOLVEMENT WITHOUT EVIDENCE OF NECROSIS (H): ICD-10-CM

## 2022-07-18 DIAGNOSIS — Z89.511 STATUS POST BELOW-KNEE AMPUTATION OF RIGHT LOWER EXTREMITY (H): Primary | ICD-10-CM

## 2022-07-18 PROCEDURE — 87075 CULTR BACTERIA EXCEPT BLOOD: CPT | Performed by: PODIATRIST

## 2022-07-18 PROCEDURE — 99215 OFFICE O/P EST HI 40 MIN: CPT | Performed by: PODIATRIST

## 2022-07-18 PROCEDURE — 87205 SMEAR GRAM STAIN: CPT | Performed by: PODIATRIST

## 2022-07-18 PROCEDURE — 87070 CULTURE OTHR SPECIMN AEROBIC: CPT | Performed by: PODIATRIST

## 2022-07-18 NOTE — LETTER
7/18/2022         RE: Sakshi Jaeger  [address] [address]        Dear Colleague,    Thank you for referring your patient, Sakshi Jaeger, to the [facility] [place]. Please see a copy of my visit note below.    Chief Complaint   Patient presents with     Results     CT, right leg.           No Known Allergies      Subjective: Sakshi is a 77 year old female who presents to the clinic today for a follow up of right BKA wound.  She had a wound debridement and closure done on December 8, 2021 with Dr. Perea.  She saw him in January and it was noted that the wound was healed.  She relates that over the last 2 weeks, the wound started opening.  It does leak fluid.    Interval history: She has been using the Iodosorb and Mepilex.  No new lower extremity complaints.    Objective    A wound is noted at right  Distal stump of the BKA measuring 0.2 cm x 0.3 cm x 5 cm.    Ly Classification: 3    Wound base: Not Visible    Edges: epibole    Drainage: moderate/serous and what appears to be fat possibly from the tibia.    Odor: No    Undermining: No    Bone Exposure: Yes: Distal end of the tibia    Clinical Signs of Infection: No    After obtaining patient consent, the wound was irrigated with copious amounts of saline.     Barriers to Healing: Length of time wound is been open.  Wound has continually stayed open.    Treatment Plan: Iodosorb and Mepilex for now.    Pt Ability to Follow Plan: Good    CT Impression:  1.Soft tissue ulceration most prominent centrally to the distal tibial  stump with air and soft-tissue density extending into the distal  tibial excision cavity. Osseous enhancement of the osseous margins of  the excision cavity with nonenhancing fluid and possibly infected  tissue within the cavity, highly suspicious for gas-forming infection  of necrotic tissue.  2. Alternatively, gas could be trapped through communication with the  distal skin defect,  recommend clinical correlation. However, on CT no  definite communication with sinus tract through of the skin  identified]        [Consider Follow Up: Distal stump cavity with nonenhancing necrotic  tissue and gas forming infection suspected. Alternatively, gas could  be trapped through communication with the distal skin defect,  recommend clinical correlation. However, on CT no definite  communication with sinus tract through from the skin identified.]     This report will be copied to the [facility] to ensure a  provider acknowledges the finding. [facility] is available Monday  through Friday 8am-3:30 pm.     I have personally reviewed the examination and initial interpretation  and I agree with the findings.     JUTTA ELLERMANN, MD     Assessment: Sakshi is a 77-year-old with reopened wound of the right BKA distal stump.  This is from rubbing on the prosthetic.  I discussed with her that this does probe to her tibia and I am concerned that the medullary cavity is exposed.  At the time of writing this note, I did speak to Dr. Lopes.  We agree that the remaining part of the tibia will likely need to be removed.  This then becomes whether she gets a through the knee amputation versus an above-knee imitation.  I have called her and let her know that Dr. Mott's team will be calling her.  I did leave a voicemail.  She does not use MyChart.  For now, she can continue with her dressings as noted.    Plan:   - Pt seen and evaluated  -For now, use Iodosorb and Mepilex.  -CT scan was discussed with her.  - Pt to return to clinic in 2 weeks.  On the date of service, I spent 40 minutes with patient care, documentation, imagery, chart review, care coordination        Joe Walker DPM

## 2022-07-18 NOTE — NURSING NOTE
Reason For Visit:   Chief Complaint   Patient presents with     Results     CT, right leg.                 No Known Allergies        Shweta Cherry LPN

## 2022-07-18 NOTE — PROGRESS NOTES
Chief Complaint   Patient presents with     Results     CT, right leg.           No Known Allergies      Subjective: Sakshi is a 77 year old female who presents to the clinic today for a follow up of right BKA wound.  She had a wound debridement and closure done on December 8, 2021 with Dr. Perea.  She saw him in January and it was noted that the wound was healed.  She relates that over the last 2 weeks, the wound started opening.  It does leak fluid.    Interval history: She has been using the Iodosorb and Mepilex.  No new lower extremity complaints.    Objective    A wound is noted at right  Distal stump of the BKA measuring 0.2 cm x 0.3 cm x 5 cm.    Ly Classification: 3    Wound base: Not Visible    Edges: epibole    Drainage: moderate/serous and what appears to be fat possibly from the tibia.    Odor: No    Undermining: No    Bone Exposure: Yes: Distal end of the tibia    Clinical Signs of Infection: No    After obtaining patient consent, the wound was irrigated with copious amounts of saline.     Barriers to Healing: Length of time wound is been open.  Wound has continually stayed open.    Treatment Plan: Iodosorb and Mepilex for now.    Pt Ability to Follow Plan: Good    CT Impression:  1.Soft tissue ulceration most prominent centrally to the distal tibial  stump with air and soft-tissue density extending into the distal  tibial excision cavity. Osseous enhancement of the osseous margins of  the excision cavity with nonenhancing fluid and possibly infected  tissue within the cavity, highly suspicious for gas-forming infection  of necrotic tissue.  2. Alternatively, gas could be trapped through communication with the  distal skin defect, recommend clinical correlation. However, on CT no  definite communication with sinus tract through of the skin  identified]        [Consider Follow Up: Distal stump cavity with nonenhancing necrotic  tissue and gas forming infection suspected. Alternatively, gas could  be  trapped through communication with the distal skin defect,  recommend clinical correlation. However, on CT no definite  communication with sinus tract through from the skin identified.]     This report will be copied to the [facility] to ensure a  provider acknowledges the finding. [facility] is available Monday  through Friday 8am-3:30 pm.     I have personally reviewed the examination and initial interpretation  and I agree with the findings.     JUTTA ELLERMANN, MD     Assessment: Sakshi is a 77-year-old with reopened wound of the right BKA distal stump.  This is from rubbing on the prosthetic.  I discussed with her that this does probe to her tibia and I am concerned that the medullary cavity is exposed.  At the time of writing this note, I did speak to Dr. Lopes.  We agree that the remaining part of the tibia will likely need to be removed.  This then becomes whether she gets a through the knee amputation versus an above-knee imitation.  I have called her and let her know that Dr. Mott's team will be calling her.  I did leave a voicemail.  She does not use MyChart.  For now, she can continue with her dressings as noted.    Plan:   - Pt seen and evaluated  -For now, use Iodosorb and Mepilex.  -CT scan was discussed with her.  - Pt to return to clinic in 2 weeks.  On the date of service, I spent 40 minutes with patient care, documentation, imagery, chart review, care coordination

## 2022-07-20 LAB
BACTERIA WND CULT: NO GROWTH
GRAM STAIN RESULT: ABNORMAL
GRAM STAIN RESULT: ABNORMAL

## 2022-07-21 NOTE — TELEPHONE ENCOUNTER
Action July 20, 2022 8:17 PM MT   Action Taken Need to call patient to update CE.     Action July 21, 2022 8:38 AM MT   Action Taken CSS called patient for verbal authorization to update CE, BUSY tone, no option for VM. CSS will try again.       DIAGNOSIS: Discuss AKA vs Thru-knee // Referred by Dr. Walker   APPOINTMENT DATE: 07/26/2022   NOTES STATUS DETAILS   OFFICE NOTE from referring provider Internal 07/18/2022, 07/13/2022 - Joe Walker DPM - [facility] Ortho     OFFICE NOTE from other specialist [facility] 12/011/2021 to 10/06/2021 - RAISA Perea MD - [facility] Plastic Surgery    07/01/2021 to 04/14/2021 - Shasha Stevenson RN - [facility] Wound Care    More in [facility]..   DISCHARGE SUMMARY from [facility]    OPERATIVE REPORT [facility] 12/08/2021 - RT Stump Wound Debridement & Closure  12/17/2020 - RT Stump Scar Revision    10/09/2019 - Irrigation and Debridement RT Leg    10/14/2016 - Free Flap Transfer to RT Knee Using Left Gracillis Donor Site    07/08/2016 - Fallon Flap Closure x 2 RT Knee    06/14/2016 - Split Thickness   Skin Graft from Left Thigh to Right Knee    03/5/2014 - Irrigation & Debridement RT Knee    04/27/2011 - Total Knee Arthroplasty Left    09/21/2005 - Right below-knee amputation.    03/17/2005 - Irrigation and debridement of the right lateral ankle wound/ Removal of hardware/ Debridement of the osteomyelitic portion of the distal fibula.    02/02/2005 - Irrigation and debridement of the right ankle   wound infection.     01/10/2005 - Open reduction internal fixation of the right   distal tibia and fibula fractures.     MEDICATION LIST Care Everywhere/Internal    IMPLANT RECORD/STICKER Care Everywhere/Internal    LABS     CBC/DIFF Internal 05/24/2022   DEXA PACS Internal:  04/13/2018   ULTRASOUND PACS Internal:  RT Lower Extremity - 11/16/2021     MRI PACS Internal:  RT Knee - 10/04/2019   CT SCAN PACS Internal:  RT Femur Thigh - 07/14/2022, 10/01/2019     XRAYS (IMAGES & REPORTS) PACS Internal:  RT  Knee - 09/21/2005  RT Ankle- 01/10/2005  RT Tib/Fib - 10/02/2019   LT Knee - 04/27/2011   TUMOR     PATHOLOGY  Slides & report Internal          Care Everywhere          Internal   Specimen #: [id number]   Collected: 12/17/2020   SPECIMEN(S):   Right Stump Wound     Specimen #: [id number]  Collected: 10/04/2016  SPECIMEN(S):  Right Knee Skin & Soft Tissue    Specimen #: [id number]   Collected: 9/21/2005   SPECIMEN(S):   Leg, below knee, right

## 2022-07-25 LAB
BACTERIA WND CULT: ABNORMAL
BACTERIA WND CULT: ABNORMAL

## 2022-07-26 ENCOUNTER — OFFICE VISIT (OUTPATIENT)
Dept: ORTHOPEDICS | Facility: CLINIC | Age: 77
End: 2022-07-26
Payer: COMMERCIAL

## 2022-07-26 ENCOUNTER — PRE VISIT (OUTPATIENT)
Dept: ORTHOPEDICS | Facility: CLINIC | Age: 77
End: 2022-07-26

## 2022-07-26 VITALS — BODY MASS INDEX: 15.99 KG/M2 | HEIGHT: 65 IN | WEIGHT: 96 LBS

## 2022-07-26 DIAGNOSIS — Z89.511 STATUS POST BELOW-KNEE AMPUTATION OF RIGHT LOWER EXTREMITY (H): ICD-10-CM

## 2022-07-26 DIAGNOSIS — L97.916 ULCER OF RIGHT LOWER EXTREMITY WITH BONE INVOLVEMENT WITHOUT EVIDENCE OF NECROSIS (H): ICD-10-CM

## 2022-07-26 PROCEDURE — 99204 OFFICE O/P NEW MOD 45 MIN: CPT | Performed by: ORTHOPAEDIC SURGERY

## 2022-07-26 NOTE — LETTER
7/26/2022         RE: Sakshi Jaeger  [address] [facility] [address]        Dear Colleague,    Thank you for referring your patient, Sakshi Jaeger, to the [facility] [place]. Please see a copy of my visit note below.    CHIEF COMPLAINT:  Right distal tibia chronic infection.    HISTORY OF PRESENT ILLNESS:  Ms. Jaeger is a 77-year-old female who presented in the company of her  for evaluation of her right lower leg.  The patient reports to have a history of a below-the-knee amputation, which eventually has gotten infected and now she presents with a chronic ulcer.  The patient has been evaluated by Dr. Walker who has diagnosed her with a fair amount of abscess cavity within the canal of the tibia, which is preventing her from accomplish any definitive healing.    The patient presents today to discuss if she would benefit from undergoing through the below-the-knee versus above-the-knee amputation.  She is somewhat discouraged by the fact that she will have to go through another surgery in order to resolve her current situation.    PAST MEDICAL HISTORY:  Peripheral vascular disease, rheumatoid arthritis, osteoporosis, among others.    PAST SURGICAL HISTORY:  Reviewed today.    DRUG ALLERGIES:  None.    CURRENT MEDICATIONS:  Please refer to encounter form.    PHYSICAL EXAMINATION:  On today's visit, she presents as a very pleasant female in no apparent distress with a height of 5 feet 4 inches and a weight of 96 pounds.  Denies to have any constitutional symptoms.  On today's visit, she presents with an open wound to the distal portion of the tibia.  There is no active drainage.  There is no odor.  Presents also with a slight wound along the most dorsal aspect of the patella.  This is probably no more than 1.5 cm in diameter.  There is some previous scars also from previous skin grafts attained by Plastics.    ASSESSMENT:    1.  Chronic right distal tibia  infection.  2.  Right distal lower leg chronic ulcer.    PLAN:  Discussed with the patient and her  that at this point, the options are to go through the knee versus an above-the-knee amputation.  It is my understanding she will be much more functional with an above-the-knee amputation than a through-the-knee amputation as we do not have the prosthetic devices to be functional with a through-the-knee amputation.    In spite of my belief, I will personally contact our Prosthetics Lab to understand what is the latest on prosthesis.  Similarly speaking, she will also proceed with contacting her current Prosthetic Lab as she has tremendous donnie in their level of knowledge.    We will reconnect via phone within 2 weeks from now, and then we will put our heads together to understand what procedure would be the most functional for her.    In the meantime, she will continue with the same regimen of wound care.  All questions were answered.    TT:  30 minutes.  CT:  20 minutes.        Doron Mott MD

## 2022-07-26 NOTE — PROGRESS NOTES
CHIEF COMPLAINT:  Right distal tibia chronic infection.    HISTORY OF PRESENT ILLNESS:  Ms. Jaeger is a 77-year-old female who presented in the company of her  for evaluation of her right lower leg.  The patient reports to have a history of a below-the-knee amputation, which eventually has gotten infected and now she presents with a chronic ulcer.  The patient has been evaluated by Dr. Walker who has diagnosed her with a fair amount of abscess cavity within the canal of the tibia, which is preventing her from accomplish any definitive healing.    The patient presents today to discuss if she would benefit from undergoing through the below-the-knee versus above-the-knee amputation.  She is somewhat discouraged by the fact that she will have to go through another surgery in order to resolve her current situation.    PAST MEDICAL HISTORY:  Peripheral vascular disease, rheumatoid arthritis, osteoporosis, among others.    PAST SURGICAL HISTORY:  Reviewed today.    DRUG ALLERGIES:  None.    CURRENT MEDICATIONS:  Please refer to encounter form.    PHYSICAL EXAMINATION:  On today's visit, she presents as a very pleasant female in no apparent distress with a height of 5 feet 4 inches and a weight of 96 pounds.  Denies to have any constitutional symptoms.  On today's visit, she presents with an open wound to the distal portion of the tibia.  There is no active drainage.  There is no odor.  Presents also with a slight wound along the most dorsal aspect of the patella.  This is probably no more than 1.5 cm in diameter.  There is some previous scars also from previous skin grafts attained by Plastics.    ASSESSMENT:    1.  Chronic right distal tibia infection.  2.  Right distal lower leg chronic ulcer.    PLAN:  Discussed with the patient and her  that at this point, the options are to go through the knee versus an above-the-knee amputation.  It is my understanding she will be much more functional with an  above-the-knee amputation than a through-the-knee amputation as we do not have the prosthetic devices to be functional with a through-the-knee amputation.    In spite of my belief, I will personally contact our Prosthetics Lab to understand what is the latest on prosthesis.  Similarly speaking, she will also proceed with contacting her current Prosthetic Lab as she has tremendous donnie in their level of knowledge.    We will reconnect via phone within 2 weeks from now, and then we will put our heads together to understand what procedure would be the most functional for her.    In the meantime, she will continue with the same regimen of wound care.  All questions were answered.    TT:  30 minutes.  CT:  20 minutes.

## 2022-07-26 NOTE — NURSING NOTE
"Reason For Visit:   Chief Complaint   Patient presents with     Consult     Discuss AKA vs. Thru-Knee // Ref. Dr. Walker        Ht 1.638 m (5' 4.5\")   Wt 43.5 kg (96 lb)   LMP  (LMP Unknown)   BMI 16.22 kg/m      Pain Assessment  Patient Currently in Pain: No    Rush Quick, EMT    "

## 2022-08-03 ENCOUNTER — TELEPHONE (OUTPATIENT)
Dept: ORTHOPEDICS | Facility: CLINIC | Age: 77
End: 2022-08-03

## 2022-08-03 NOTE — TELEPHONE ENCOUNTER
[facility] Call Center    Phone Message    May a detailed message be left on voicemail: yes     Reason for Call: Other: Fidel Pritchett, patient's prosthesis would like to get in contact with Dr. Mott to discuss patient's upcoming surgery. Fidel's phone# [phone].    Action Taken: Message routed to:  [facility] Orthopedics    Travel Screening: Not Applicable

## 2022-08-08 NOTE — TELEPHONE ENCOUNTER
[facility] Call [facility]    Phone Message    May a detailed message be left on voicemail: yes     Reason for Call: Other: braeden is still looking for a call back from [employer] or his team      Action Taken: Message routed to:  [facility] ortho    Travel Screening: Not Applicable     Braeden would like to relay that after his assement he believes a knee articulation amputation would be a better route than above the knee and he would like RN or PA to call him back and discuss before the patient meets with [employer] velazquez

## 2022-08-08 NOTE — TELEPHONE ENCOUNTER
[facility] Call Center    Phone Message    May a detailed message be left on voicemail: yes     Reason for Call: Other: Please call Braeden in re: to patient      Action Taken: Message routed to:  [facility] ortho    Travel Screening: Not Applicable

## 2022-08-09 ENCOUNTER — VIRTUAL VISIT (OUTPATIENT)
Dept: ORTHOPEDICS | Facility: CLINIC | Age: 77
End: 2022-08-09
Payer: COMMERCIAL

## 2022-08-09 DIAGNOSIS — M25.561 ARTHRALGIA OF RIGHT KNEE: Primary | ICD-10-CM

## 2022-08-09 PROCEDURE — 99442 PR PHYSICIAN TELEPHONE EVALUATION 11-20 MIN: CPT | Mod: 95 | Performed by: ORTHOPAEDIC SURGERY

## 2022-08-09 NOTE — PROGRESS NOTES
CHIEF COMPLAINT:  Right distal lower leg chronic ulcer with tibia infection.    HISTORY OF PRESENT ILLNESS:  Ms. Jaeger was reached out by phone secondary to the pandemic.  The patient authorized the encounter.    I discussed with the patient after talking to her prosthetist as well as our [place] prosthesis and they agreed that she will do quite well with a through-the-knee amputation.    I discussed with her the most likely postoperative course and complications from such intervention, which include but are not limited to infection, bleeding, nerve damage, residual pain, stiffness and wound dehiscence.    We will proceed also with an evaluation by Vascular Surgery to make sure we have enough vascularity to heal the surgical incision through the knee.    The patient will be reaching out once this workup is finalized.  In the meantime, she has no restrictions.  All questions were answered.    TT:  20 minutes.  CT:  15 minutes.

## 2022-08-09 NOTE — LETTER
8/9/2022         RE: Sakshi Jaeger  [address] [address]        Dear Colleague,    Thank you for referring your patient, Sakshi Jaeger, to the [facility] [place]. Please see a copy of my visit note below.    CHIEF COMPLAINT:  Right distal lower leg chronic ulcer with tibia infection.    HISTORY OF PRESENT ILLNESS:  Ms. Jaeger was reached out by phone secondary to the pandemic.  The patient authorized the encounter.    I discussed with the patient after talking to her prosthetist as well as our [place] prosthesis and they agreed that she will do quite well with a through-the-knee amputation.    I discussed with her the most likely postoperative course and complications from such intervention, which include but are not limited to infection, bleeding, nerve damage, residual pain, stiffness and wound dehiscence.    We will proceed also with an evaluation by Vascular Surgery to make sure we have enough vascularity to heal the surgical incision through the knee.    The patient will be reaching out once this workup is finalized.  In the meantime, she has no restrictions.  All questions were answered.    TT:  20 minutes.  CT:  15 minutes.      Doron Mott MD

## 2022-08-17 NOTE — TELEPHONE ENCOUNTER
[facility] Call Center    Phone Message    May a detailed message be left on voicemail: yes     Reason for Call: Other: Would like to know next step before setting up surgery. Please contact patient's home#.    Action Taken: Message routed to:  [facility] Orthopedics    Travel Screening: Not Applicable

## 2022-08-19 ENCOUNTER — HOSPITAL ENCOUNTER (INPATIENT)
Facility: CLINIC | Age: 77
Setting detail: SURGERY ADMIT
DRG: 474 | End: 2022-08-19
Attending: ORTHOPAEDIC SURGERY | Admitting: ORTHOPAEDIC SURGERY
Payer: COMMERCIAL

## 2022-08-19 NOTE — TELEPHONE ENCOUNTER
RN called pt and  to discuss upcoming surgery. Pt has been cleared by vascular surgery for surgery. Next available date is 9/28/22, pt verbalized acceptance of date. Will schedule pre-op physical within 30 days of surgery, COVID test 3-4 days before, scrub and NPO guidelines will be followed per Hospital policy. Pt educated on arriving to hospital on 9/27 for epidural placement. Packet sent to pt via mail.     Norman Yañez RNCC

## 2022-08-22 ENCOUNTER — OFFICE VISIT (OUTPATIENT)
Dept: OPHTHALMOLOGY | Facility: CLINIC | Age: 77
End: 2022-08-22
Payer: COMMERCIAL

## 2022-08-22 DIAGNOSIS — H02.831 DERMATOCHALASIS OF UPPER AND LOWER EYELIDS OF BOTH EYES: ICD-10-CM

## 2022-08-22 DIAGNOSIS — H52.203 MYOPIA OF BOTH EYES WITH ASTIGMATISM AND PRESBYOPIA: ICD-10-CM

## 2022-08-22 DIAGNOSIS — H02.832 DERMATOCHALASIS OF UPPER AND LOWER EYELIDS OF BOTH EYES: ICD-10-CM

## 2022-08-22 DIAGNOSIS — H02.834 DERMATOCHALASIS OF UPPER AND LOWER EYELIDS OF BOTH EYES: ICD-10-CM

## 2022-08-22 DIAGNOSIS — H25.813 COMBINED FORMS OF AGE-RELATED CATARACT OF BOTH EYES: Primary | ICD-10-CM

## 2022-08-22 DIAGNOSIS — H52.4 MYOPIA OF BOTH EYES WITH ASTIGMATISM AND PRESBYOPIA: ICD-10-CM

## 2022-08-22 DIAGNOSIS — H02.835 DERMATOCHALASIS OF UPPER AND LOWER EYELIDS OF BOTH EYES: ICD-10-CM

## 2022-08-22 DIAGNOSIS — H52.13 MYOPIA OF BOTH EYES WITH ASTIGMATISM AND PRESBYOPIA: ICD-10-CM

## 2022-08-22 PROCEDURE — 76519 ECHO EXAM OF EYE: CPT | Mod: 50 | Performed by: OPHTHALMOLOGY

## 2022-08-22 PROCEDURE — 99204 OFFICE O/P NEW MOD 45 MIN: CPT | Performed by: OPHTHALMOLOGY

## 2022-08-22 RX ORDER — FOLIC ACID 1 MG/1
1 TABLET ORAL DAILY
COMMUNITY
End: 2024-01-01

## 2022-08-22 ASSESSMENT — VISUAL ACUITY
OS_SC: 20/150
OD_SC: 20/150
METHOD: SNELLEN - LINEAR

## 2022-08-22 ASSESSMENT — SLIT LAMP EXAM - LIDS
COMMENTS: 3+ DERMATOCHALASIS
COMMENTS: 3+ DERMATOCHALASIS

## 2022-08-22 ASSESSMENT — CONF VISUAL FIELD
OS_NORMAL: 1
METHOD: COUNTING FINGERS
OD_NORMAL: 1

## 2022-08-22 ASSESSMENT — CUP TO DISC RATIO
OD_RATIO: 0.25
OS_RATIO: 0.25

## 2022-08-22 ASSESSMENT — TONOMETRY
IOP_METHOD: ICARE
OD_IOP_MMHG: 15
OS_IOP_MMHG: 17

## 2022-08-22 ASSESSMENT — EXTERNAL EXAM - LEFT EYE: OS_EXAM: NORMAL

## 2022-08-22 ASSESSMENT — EXTERNAL EXAM - RIGHT EYE: OD_EXAM: NORMAL

## 2022-08-22 NOTE — PROGRESS NOTES
HPI     Cataract     In both eyes.              Comments     Patient referred by Dr. Edwards for cataract evaluation. Pt feels left eye vision is worse than the right and is worse since her exam with Dr. Edwards on 6/21/22. Would like surgery as soon as possible          Last edited by Destiney Alonso COA on 8/22/2022  2:57 PM. (History)         Review of systems for the eyes was negative other than the pertinent positives/negatives listed in the HPI.      Assessment & Plan    HPI:  Sakshi Jaeger is a 77 year old female with history of RA, osteoporosis, HTN, BKA, myopia with astigmatism who presents from Dr. Edwards with decreased vision over many years.    POHx: myopia with astigmatism and presbyopia, dermatochalasis  PMHx: osteoporosis, HTN, BKA  Current Medications: acetaminophen (TYLENOL) 325 MG tablet, Take 2 tablets (650 mg) by mouth every 4 hours  amLODIPine (NORVASC) 5 MG tablet, Take 1 tablet (5 mg) by mouth daily  aspirin (ASA) 81 MG tablet, Take 1 tablet (81 mg) by mouth daily  calcium carbonate 600 mg-vitamin D 400 units (CALCIUM 600 + D) 600-400 MG-UNIT per tablet, Take 2 tablets by mouth daily  ferrous sulfate (FEROSUL) 325 (65 Fe) MG tablet, TAKE 1 TABLET BY MOUTH EVERY DAY WITH BREAKFAST  FOLIC ACID PO, Take 1 mg by mouth daily  gabapentin (NEURONTIN) 300 MG capsule, Take 2 capsules (600 mg) by mouth 3 times daily  leflunomide (ARAVA) 20 MG tablet, Take 1 tablet by mouth every 24 hours  lisinopril (ZESTRIL) 20 MG tablet, TAKE 1 TABLET BY MOUTH EVERY DAY  methotrexate 2.5 MG tablet, Take 1 tablet by mouth  metoprolol tartrate (LOPRESSOR) 50 MG tablet, Take 1 tablet (50 mg) by mouth 2 times daily  multivitamin w/minerals (THERA-VIT-M) tablet, Take 1 tablet by mouth daily  order for DME, Equipment being ordered: New foam (custom shaped protective cover) for right below the knee prostheses  oxyCODONE (ROXICODONE) 5 MG tablet, Take 1 tablet (5 mg) by mouth every 6 hours as needed for severe pain  predniSONE  (DELTASONE) 5 MG tablet, Take 1 tablet (5 mg) by mouth daily  simvastatin (ZOCOR) 20 MG tablet, Take 1 tablet (20 mg) by mouth At Bedtime  sulfamethoxazole-trimethoprim (BACTRIM DS) 800-160 MG tablet, Take 1 tablet by mouth 2 times daily    No current facility-administered medications on file prior to visit.    FHx: cataract-mom  PSHx: denies history of ocular surgeries       Current Eye Medications:      Assessment & Plan:  (H25.813) Combined forms of age-related cataract of both eyes  (primary encounter diagnosis)  Special equipment/needs:  Eye: right  Anesthesia:topical   Dilates to: 7mm  Iris expansion:  No  Pseudoexfoliation: No  Trypan Blue: No  Trauma: No    Able lay to flat: Yes  Blood Thinner: Yes ASA 81  Tamsulosin: No  DM: No  Guttae: No    Dominant Eye: right    Plan: Thrall each eye    We discussed the benefits, alternatives, and risks to cataract surgery, including blindness, decreased vision, and need for additional surgeries; and informed consent was obtained.  Proceed with CE/IOL left eye followed by right eye .        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        Flip Izaguirre MD     Attending Physician Attestation:  Complete documentation of historical and exam elements from today's encounter can be found in the full encounter summary report (not reduplicated in this progress note).  I personally obtained the chief complaint(s) and history of present illness.  I confirmed and edited as necessary the review of systems, past medical/surgical history, family history, social history, and examination findings as documented by others; and I examined the patient myself.  I personally reviewed the relevant tests,  images, and reports as documented above.  I formulated and edited as necessary the assessment and plan and discussed the findings and management plan with the patient and family. - Flip Izaguirre MD

## 2022-08-22 NOTE — NURSING NOTE
Chief Complaints and History of Present Illnesses   Patient presents with     Cataract       Chief Complaint(s) and History of Present Illness(es)     Cataract     Laterality: both eyes              Comments     Patient referred by Dr. Edwards for cataract evaluation. Pt feels left eye vision is worse than the right and is worse since her exam with Dr. Edwards on 6/21/22. Would like surgery as soon as possible                Destiney Alonso, COA

## 2022-08-24 ENCOUNTER — LAB (OUTPATIENT)
Dept: LAB | Facility: CLINIC | Age: 77
End: 2022-08-24
Payer: COMMERCIAL

## 2022-08-24 DIAGNOSIS — D64.9 ANEMIA, UNSPECIFIED TYPE: ICD-10-CM

## 2022-08-24 PROBLEM — T87.43: Status: RESOLVED | Noted: 2019-10-01 | Resolved: 2022-08-24

## 2022-08-24 PROBLEM — T81.89XD NON-HEALING SURGICAL WOUND, SUBSEQUENT ENCOUNTER: Status: RESOLVED | Noted: 2020-09-24 | Resolved: 2022-08-24

## 2022-08-24 LAB
ERYTHROCYTE [DISTWIDTH] IN BLOOD BY AUTOMATED COUNT: 17 % (ref 10–15)
FERRITIN SERPL-MCNC: 383 NG/ML (ref 8–252)
HCT VFR BLD AUTO: 34.6 % (ref 35–47)
HGB BLD-MCNC: 11.2 G/DL (ref 11.7–15.7)
HOLD SPECIMEN: NORMAL
IRON SATN MFR SERPL: 50 % (ref 15–46)
IRON SERPL-MCNC: 133 UG/DL (ref 35–180)
MCH RBC QN AUTO: 31.5 PG (ref 26.5–33)
MCHC RBC AUTO-ENTMCNC: 32.4 G/DL (ref 31.5–36.5)
MCV RBC AUTO: 97 FL (ref 78–100)
PLATELET # BLD AUTO: 322 10E3/UL (ref 150–450)
RBC # BLD AUTO: 3.56 10E6/UL (ref 3.8–5.2)
TIBC SERPL-MCNC: 265 UG/DL (ref 240–430)
WBC # BLD AUTO: 8.8 10E3/UL (ref 4–11)

## 2022-08-24 PROCEDURE — 36415 COLL VENOUS BLD VENIPUNCTURE: CPT

## 2022-08-24 PROCEDURE — 82728 ASSAY OF FERRITIN: CPT

## 2022-08-24 PROCEDURE — 85027 COMPLETE CBC AUTOMATED: CPT

## 2022-08-24 PROCEDURE — 83550 IRON BINDING TEST: CPT

## 2022-08-25 NOTE — RESULT ENCOUNTER NOTE
Mail letter:  Your iron levels are fine. You still have mild chronic anemia of unknown cause. This could be related to chronic disease, and does not look to be caused by bleeding. I recommend follow-up for your wellness visit with your new provider.     Ruma Bergeron MD

## 2022-08-29 DIAGNOSIS — H25.813 COMBINED FORMS OF AGE-RELATED CATARACT OF BOTH EYES: Primary | ICD-10-CM

## 2022-08-29 RX ORDER — KETOROLAC TROMETHAMINE 5 MG/ML
SOLUTION OPHTHALMIC
Qty: 4 ML | Refills: 1 | Status: ON HOLD | OUTPATIENT
Start: 2022-08-29 | End: 2022-09-30

## 2022-08-29 RX ORDER — PREDNISOLONE ACETATE 10 MG/ML
SUSPENSION/ DROPS OPHTHALMIC
Qty: 4 ML | Refills: 1 | Status: ON HOLD | OUTPATIENT
Start: 2022-08-29 | End: 2022-09-30

## 2022-08-29 RX ORDER — MOXIFLOXACIN 5 MG/ML
1 SOLUTION/ DROPS OPHTHALMIC 4 TIMES DAILY
Qty: 6 ML | Refills: 1 | Status: SHIPPED | OUTPATIENT
Start: 2022-08-29 | End: 2022-09-05

## 2022-09-02 ENCOUNTER — OFFICE VISIT (OUTPATIENT)
Dept: FAMILY MEDICINE | Facility: CLINIC | Age: 77
End: 2022-09-02
Payer: COMMERCIAL

## 2022-09-02 VITALS
HEART RATE: 56 BPM | SYSTOLIC BLOOD PRESSURE: 116 MMHG | DIASTOLIC BLOOD PRESSURE: 72 MMHG | HEIGHT: 65 IN | TEMPERATURE: 98.5 F | BODY MASS INDEX: 17.49 KG/M2 | WEIGHT: 105 LBS | OXYGEN SATURATION: 98 % | RESPIRATION RATE: 18 BRPM

## 2022-09-02 DIAGNOSIS — S88.111A BELOW-KNEE AMPUTATION OF RIGHT LOWER EXTREMITY (H): ICD-10-CM

## 2022-09-02 DIAGNOSIS — Z01.818 PREOP GENERAL PHYSICAL EXAM: Primary | ICD-10-CM

## 2022-09-02 DIAGNOSIS — H26.9 CATARACT OF BOTH EYES, UNSPECIFIED CATARACT TYPE: ICD-10-CM

## 2022-09-02 LAB
ALBUMIN SERPL-MCNC: 3.3 G/DL (ref 3.4–5)
ALP SERPL-CCNC: 64 U/L (ref 40–150)
ALT SERPL W P-5'-P-CCNC: 23 U/L (ref 0–50)
ANION GAP SERPL CALCULATED.3IONS-SCNC: 4 MMOL/L (ref 3–14)
AST SERPL W P-5'-P-CCNC: 22 U/L (ref 0–45)
BILIRUB SERPL-MCNC: 0.4 MG/DL (ref 0.2–1.3)
BUN SERPL-MCNC: 8 MG/DL (ref 7–30)
CALCIUM SERPL-MCNC: 9.3 MG/DL (ref 8.5–10.1)
CHLORIDE BLD-SCNC: 103 MMOL/L (ref 94–109)
CO2 SERPL-SCNC: 26 MMOL/L (ref 20–32)
CREAT SERPL-MCNC: 0.55 MG/DL (ref 0.52–1.04)
GFR SERPL CREATININE-BSD FRML MDRD: >90 ML/MIN/1.73M2
GLUCOSE BLD-MCNC: 89 MG/DL (ref 70–99)
POTASSIUM BLD-SCNC: 4 MMOL/L (ref 3.4–5.3)
PROT SERPL-MCNC: 6.6 G/DL (ref 6.8–8.8)
SODIUM SERPL-SCNC: 133 MMOL/L (ref 133–144)

## 2022-09-02 PROCEDURE — 99214 OFFICE O/P EST MOD 30 MIN: CPT | Performed by: FAMILY MEDICINE

## 2022-09-02 PROCEDURE — 36415 COLL VENOUS BLD VENIPUNCTURE: CPT | Performed by: FAMILY MEDICINE

## 2022-09-02 PROCEDURE — 80053 COMPREHEN METABOLIC PANEL: CPT | Performed by: FAMILY MEDICINE

## 2022-09-02 NOTE — PROGRESS NOTES
[facility]  [address] [place] [address]  [place] [address]  Phone: [phone]  Primary Provider: Stefania King  Pre-op Performing Provider: LIZETTE SANTANA       PREOPERATIVE EVALUATION:  Today's date: 9/2/2022    Sakshi Jaeger is a 77 year old female who presents for a preoperative evaluation.    Surgical Information:  Surgery/Procedure: PHACOEMULSIFICATION, CATARACT, WITH INTRAOCULAR LENS IMPLANT.  One cataract next week, then amputation late Setpember, and then another cataract mid October.    Surgery Location:  OR  Surgeon: Flip Izaguirre MD  Surgery Date: 10/13/2022  Time of Surgery: 11:00 AM  Where patient plans to recover:Recovery 2 days in the hospital then  At home with family  Fax number for surgical facility: Note does not need to be faxed, will be available electronically in Epic.    Type of Anesthesia Anticipated: Local with MAC         Subjective     HPI related to upcoming procedure: patient needs  Both cataracts  Done  And also amputation.  Had BKA 2005 and now to have that taken off at the knee.        Preop Questions 9/2/2022   1. Have you ever had a heart attack or stroke? YES - just one  stent   2. Have you ever had surgery on your heart or blood vessels, such as a stent placement, a coronary artery bypass, or surgery on an artery in your head, neck, heart, or legs? YES -one stent, years ago 2009   3. Do you have chest pain with activity? No   4. Do you have a history of  heart failure? No   5. Do you currently have a cold, bronchitis or symptoms of other infection? No   6. Do you have a cough, shortness of breath, or wheezing? No   7. Do you or anyone in your family have previous history of blood clots? No   8. Do you or does anyone in your family have a serious bleeding problem such as prolonged bleeding following surgeries or cuts? No   9. Have you ever had problems with anemia or been told to take iron pills? YES - on iron  About every other  day    10. Have you had any abnormal blood loss such as black, tarry or bloody stools, or abnormal vaginal bleeding? No   11. Have you ever had a blood transfusion? No   11a. Have you ever had a transfusion reaction? -   12. Are you willing to have a blood transfusion if it is medically needed before, during, or after your surgery? Yes   13. Have you or any of your relatives ever had problems with anesthesia? No   14. Do you have sleep apnea, excessive snoring or daytime drowsiness? No   15. Do you have any artifical heart valves or other implanted medical devices like a pacemaker, defibrillator, or continuous glucose monitor? No   16. Do you have artificial joints? YES - left knee   17. Are you allergic to latex? No     Health Care Directive:  Patient does not have a Health Care Directive or Living Will:     Preoperative Review of :  No  Current  Controlled substances  0956}        Review of Systems  Constitutional, neuro, ENT, endocrine, pulmonary, cardiac, gastrointestinal, genitourinary, musculoskeletal, integument and psychiatric systems are negative, except as otherwise noted.    No  Recent  Illnesses    On meds for rheumatoid arthritis, doing well    Patient Active Problem List    Diagnosis Date Noted     Anemia 08/24/2022     Priority: Medium     Combined forms of age-related cataract of both eyes 08/22/2022     Priority: Medium     Added automatically from request for surgery [id number]       PVD (peripheral vascular disease) (H) 05/18/2022     Priority: Medium     Protein-calorie malnutrition (H) 05/18/2022     Priority: Medium     Sacroiliac joint pain 06/07/2018     Priority: Medium     Coronary artery disease involving native coronary artery of native heart without angina pectoris 09/27/2016     Priority: Medium     Essential hypertension with goal blood pressure less than 140/90 08/25/2016     Priority: Medium     Status post below-knee amputation (H) 12/26/2012     Priority: Medium             Employs  prosthetic leg 12/26/2012     Priority: Medium     Advanced directives, counseling/discussion 11/25/2011     Priority: Medium     Advance Care Planning 9/30/2016: ACP Review of Chart / Resources Provided:  Reviewed chart for advance care plan.  Sakshi Jaeger has no plan or code status on file. Discussed available resources and provided with information. Confirmed code status reflects current choices pending further ACP discussions.  Confirmed/documented legally designated decision makers.    Added by Mishel Wallace        Discussed advance care planning with patient; information given to patient to review. Jennifer Jaeger, Clinical Medical Assistant   11/25/2011          Hyperlipidemia LDL goal <100 11/12/2010     Priority: Medium     Osteoporosis 02/16/2005     Priority: Medium     Rheumatoid arthritis (H) 02/16/2005     Priority: Medium              Past Medical History:   Diagnosis Date     Anemia 8/24/2022     BENIGN HYPERTENSION 3/1/2005     CAD (coronary artery disease) 1/26/2011     Coronary artery disease involving native coronary artery of native heart without angina pectoris 9/27/2016     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     Essential hypertension with goal blood pressure less than 140/90 8/25/2016     Hyperlipidemia LDL goal <100 11/12/2010     Hypertension goal BP (blood pressure) < 130/80 1/26/2011     Infection of right below knee amputation (H) 10/1/2019     IRON DEFIC ANEMIA NOS 9/15/2005     Lower limb amputation, below knee 12/26/2012     MI (myocardial infarction) (H)     3/2010     Non-healing surgical wound, subsequent encounter 9/24/2020    Added automatically from request for surgery [id number]     Osteoporosis 2/16/2005     OSTEOPOROSIS NOS 2/16/2005     Protein-calorie malnutrition (H) 5/18/2022     PVD (peripheral vascular disease) (H) 5/18/2022     Rheumatoid arthritis (H) 2/16/2005          Rheumatoid arthritis(714.0) 2/16/2005     Status post below-knee amputation (H) 12/26/2012           Past Surgical History:   Procedure Laterality Date     ---------INCISION AND DRAINAGE  03/05/2014     AMPUTATION BELOW KNEE RT/LT  01/01/2005    right lowwer leg.      APPENDECTOMY       ARTHROPLASTY KNEE  04/27/2011    Procedure:ARTHROPLASTY KNEE; Surgeon:JESSE HAWKINS; [facility]     COMPLEX WOUND CLOSURE (UPDATE) Right 12/08/2021    Procedure: Right stump wound debridment and closure;  Surgeon: RAISA Perea MD;  Location: [facility] OR     CORONARY STENT PLACEMENT       IRRIGATION AND DEBRIDEMENT LOWER EXTREMITY, COMBINED Right 10/09/2019    Procedure: Irrigation and debridement Right leg;  Surgeon: Doron Mott MD;  Location: [facility] OR     OTHER SURGICAL HISTORY  03/05/2014    I AND D      OTHER SURGICAL HISTORY Right 10/09/2019    IRRIGATION AND DEBRIDEMENT LOWER EXTREMITY, COMBINED     REVISE SCAR EXTREMITY Right 12/17/2020    Procedure: Right stump scar revision;  Surgeon: RAISA Perea MD;  Location: [facility] OR     Current Outpatient Medications   Medication Sig Dispense Refill     acetaminophen (TYLENOL) 325 MG tablet Take 2 tablets (650 mg) by mouth every 4 hours       amLODIPine (NORVASC) 5 MG tablet Take 1 tablet (5 mg) by mouth daily 90 tablet 3     aspirin (ASA) 81 MG tablet Take 1 tablet (81 mg) by mouth daily       calcium carbonate 600 mg-vitamin D 400 units (CALCIUM 600 + D) 600-400 MG-UNIT per tablet Take 2 tablets by mouth daily       ferrous sulfate (FEROSUL) 325 (65 Fe) MG tablet TAKE 1 TABLET BY MOUTH EVERY DAY WITH BREAKFAST 90 tablet 1     FOLIC ACID PO Take 1 mg by mouth daily       gabapentin (NEURONTIN) 300 MG capsule Take 2 capsules (600 mg) by mouth 3 times daily       ketorolac (ACULAR) 0.5 % ophthalmic solution Apply 1 drop to eye 4 times daily for 7 days, THEN 1 drop 3 times daily for 7 days, THEN 1 drop 2 times daily for 7 days, THEN 1 drop daily for 7 days. Start 2 days prior to surgery in operative eye. 4 mL 1     leflunomide (ARAVA) 20 MG tablet  Take 1 tablet by mouth every 24 hours       lisinopril (ZESTRIL) 20 MG tablet TAKE 1 TABLET BY MOUTH EVERY DAY 90 tablet 3     methotrexate 2.5 MG tablet Take 1 tablet by mouth       metoprolol tartrate (LOPRESSOR) 50 MG tablet Take 1 tablet (50 mg) by mouth 2 times daily 90 tablet 3     moxifloxacin (VIGAMOX) 0.5 % ophthalmic solution Apply 1 drop to eye 4 times daily for 7 days Start morning of surgery in operative eye 6 mL 1     multivitamin w/minerals (THERA-VIT-M) tablet Take 1 tablet by mouth daily       order for DME Equipment being ordered: New foam (custom shaped protective cover) for right below the knee prostheses 1 Units 0     oxyCODONE (ROXICODONE) 5 MG tablet Take 1 tablet (5 mg) by mouth every 6 hours as needed for severe pain 15 tablet 0     prednisoLONE acetate (PRED FORTE) 1 % ophthalmic suspension Apply 1 drop to eye 4 times daily for 7 days, THEN 1 drop 3 times daily for 7 days, THEN 1 drop 2 times daily for 7 days, THEN 1 drop daily for 7 days. Start after surgery in operative eye 4 mL 1     predniSONE (DELTASONE) 5 MG tablet Take 1 tablet (5 mg) by mouth daily  0     simvastatin (ZOCOR) 20 MG tablet Take 1 tablet (20 mg) by mouth At Bedtime 90 tablet 3     sulfamethoxazole-trimethoprim (BACTRIM DS) 800-160 MG tablet Take 1 tablet by mouth 2 times daily 20 tablet 1       No Known Allergies     Social History     Tobacco Use     Smoking status: Former Smoker     Packs/day: 0.50     Years: 45.00     Pack years: 22.50     Types: Cigarettes     Quit date: 2010     Years since quittin.5     Smokeless tobacco: Never Used   Substance Use Topics     Alcohol use: Yes     Comment: occsionally-3 -4 drinks a week       History   Drug Use No         Objective     LMP  (LMP Unknown)     Physical Exam    GENERAL APPEARANCE: healthy, alert and no distress     EYES: EOMI, PERRL     HENT: ear canals and TM's normal and nose and mouth without ulcers or lesions     NECK: no adenopathy, no asymmetry,  masses, or scars and thyroid normal to palpation     RESP: lungs clear to auscultation - no rales, rhonchi or wheezes     CV: regular rates and rhythm, normal S1 S2, no S3 or S4 and no murmur, click or rub     ABDOMEN:  soft, nontender, no HSM or masses and bowel sounds normal     MS:pt has right bka,  Bandage on stump  No edema left leg; radials symmetric      SKIN: no suspicious lesions or rashes     NEURO: Normal strength and tone, sensory exam grossly normal, mentation intact and speech normal     PSYCH: mentation appears normal. and affect normal/bright     LYMPHATICS: No cervical adenopathy    Recent Labs   Lab Test 08/24/22  1300 07/14/22  1659 05/18/22  1220 11/08/21  1055 10/27/21  0936   HGB 11.2*  --  11.9   < > 10.2*     --  378   < >  --    NA  --   --   --   --  133   POTASSIUM  --   --   --   --  3.9   CR  --  0.5  --   --  0.55    < > = values in this interval not displayed.        Diagnostics:  cmp done  Today; see  Recent cbc    Also see ekg from 10-27-21      ASSESSMENT / PLAN:  (Z01.818) Preop general physical exam  (primary encounter diagnosis)  Comment: okay for procedures if labs stable.    Plan: Comprehensive metabolic panel             (H26.9) Cataract of both eyes, unspecified cataract type  Comment: patient may need 2nd preop before the 2nd cataract ( this would done after amputation proceedure  Plan: as above    (S88.111A) Below-knee amputation of right lower extremity (H)  Comment: as above  Plan: as above    Hold aspirin and methotrexate for one week prior to amputation procedure  No need to hold prior to cataract        I reviewed the patient's medications, allergies, medical history, family history, and social history.    Harry Roberto MD        Revised Cardiac Risk Index (RCRI):  The patient has the following serious cardiovascular risks for perioperative complications:   - Coronary Artery Disease (MI, positive stress test, angina, Qs on EKG) = 1 point     RCRI  Interpretation: 1 point: Class II (low risk - 0.9% complication rate)           Signed Electronically by: Harry Roberto MD  Copy of this evaluation report is provided to requesting physician.

## 2022-09-02 NOTE — H&P (VIEW-ONLY)
[facility]  [address] [place] [address]  [place] [address]  Phone: [phone]  Primary Provider: Stefania King  Pre-op Performing Provider: LIZETTE SANTANA       PREOPERATIVE EVALUATION:  Today's date: 9/2/2022    Sakshi Jaeger is a 77 year old female who presents for a preoperative evaluation.    Surgical Information:  Surgery/Procedure: PHACOEMULSIFICATION, CATARACT, WITH INTRAOCULAR LENS IMPLANT.  One cataract next week, then amputation late Setpember, and then another cataract mid October.    Surgery Location:  OR  Surgeon: Flip Izaguirre MD  Surgery Date: 10/13/2022  Time of Surgery: 11:00 AM  Where patient plans to recover:Recovery 2 days in the hospital then  At home with family  Fax number for surgical facility: Note does not need to be faxed, will be available electronically in Epic.    Type of Anesthesia Anticipated: Local with MAC         Subjective     HPI related to upcoming procedure: patient needs  Both cataracts  Done  And also amputation.  Had BKA 2005 and now to have that taken off at the knee.        Preop Questions 9/2/2022   1. Have you ever had a heart attack or stroke? YES - just one  stent   2. Have you ever had surgery on your heart or blood vessels, such as a stent placement, a coronary artery bypass, or surgery on an artery in your head, neck, heart, or legs? YES -one stent, years ago 2009   3. Do you have chest pain with activity? No   4. Do you have a history of  heart failure? No   5. Do you currently have a cold, bronchitis or symptoms of other infection? No   6. Do you have a cough, shortness of breath, or wheezing? No   7. Do you or anyone in your family have previous history of blood clots? No   8. Do you or does anyone in your family have a serious bleeding problem such as prolonged bleeding following surgeries or cuts? No   9. Have you ever had problems with anemia or been told to take iron pills? YES - on iron  About every other  day    10. Have you had any abnormal blood loss such as black, tarry or bloody stools, or abnormal vaginal bleeding? No   11. Have you ever had a blood transfusion? No   11a. Have you ever had a transfusion reaction? -   12. Are you willing to have a blood transfusion if it is medically needed before, during, or after your surgery? Yes   13. Have you or any of your relatives ever had problems with anesthesia? No   14. Do you have sleep apnea, excessive snoring or daytime drowsiness? No   15. Do you have any artifical heart valves or other implanted medical devices like a pacemaker, defibrillator, or continuous glucose monitor? No   16. Do you have artificial joints? YES - left knee   17. Are you allergic to latex? No     Health Care Directive:  Patient does not have a Health Care Directive or Living Will:     Preoperative Review of :  No  Current  Controlled substances  0956}        Review of Systems  Constitutional, neuro, ENT, endocrine, pulmonary, cardiac, gastrointestinal, genitourinary, musculoskeletal, integument and psychiatric systems are negative, except as otherwise noted.    No  Recent  Illnesses    On meds for rheumatoid arthritis, doing well    Patient Active Problem List    Diagnosis Date Noted     Anemia 08/24/2022     Priority: Medium     Combined forms of age-related cataract of both eyes 08/22/2022     Priority: Medium     Added automatically from request for surgery [id number]       PVD (peripheral vascular disease) (H) 05/18/2022     Priority: Medium     Protein-calorie malnutrition (H) 05/18/2022     Priority: Medium     Sacroiliac joint pain 06/07/2018     Priority: Medium     Coronary artery disease involving native coronary artery of native heart without angina pectoris 09/27/2016     Priority: Medium     Essential hypertension with goal blood pressure less than 140/90 08/25/2016     Priority: Medium     Status post below-knee amputation (H) 12/26/2012     Priority: Medium             Employs  prosthetic leg 12/26/2012     Priority: Medium     Advanced directives, counseling/discussion 11/25/2011     Priority: Medium     Advance Care Planning 9/30/2016: ACP Review of Chart / Resources Provided:  Reviewed chart for advance care plan.  Sakshi Jaeger has no plan or code status on file. Discussed available resources and provided with information. Confirmed code status reflects current choices pending further ACP discussions.  Confirmed/documented legally designated decision makers.    Added by Mishel Wallace        Discussed advance care planning with patient; information given to patient to review. Jennifer Jaeger, Clinical Medical Assistant   11/25/2011          Hyperlipidemia LDL goal <100 11/12/2010     Priority: Medium     Osteoporosis 02/16/2005     Priority: Medium     Rheumatoid arthritis (H) 02/16/2005     Priority: Medium              Past Medical History:   Diagnosis Date     Anemia 8/24/2022     BENIGN HYPERTENSION 3/1/2005     CAD (coronary artery disease) 1/26/2011     Coronary artery disease involving native coronary artery of native heart without angina pectoris 9/27/2016     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     Essential hypertension with goal blood pressure less than 140/90 8/25/2016     Hyperlipidemia LDL goal <100 11/12/2010     Hypertension goal BP (blood pressure) < 130/80 1/26/2011     Infection of right below knee amputation (H) 10/1/2019     IRON DEFIC ANEMIA NOS 9/15/2005     Lower limb amputation, below knee 12/26/2012     MI (myocardial infarction) (H)     3/2010     Non-healing surgical wound, subsequent encounter 9/24/2020    Added automatically from request for surgery [id number]     Osteoporosis 2/16/2005     OSTEOPOROSIS NOS 2/16/2005     Protein-calorie malnutrition (H) 5/18/2022     PVD (peripheral vascular disease) (H) 5/18/2022     Rheumatoid arthritis (H) 2/16/2005          Rheumatoid arthritis(714.0) 2/16/2005     Status post below-knee amputation (H) 12/26/2012           Past Surgical History:   Procedure Laterality Date     ---------INCISION AND DRAINAGE  03/05/2014     AMPUTATION BELOW KNEE RT/LT  01/01/2005    right lowwer leg.      APPENDECTOMY       ARTHROPLASTY KNEE  04/27/2011    Procedure:ARTHROPLASTY KNEE; Surgeon:JESSE HAWKINS; [facility]     COMPLEX WOUND CLOSURE (UPDATE) Right 12/08/2021    Procedure: Right stump wound debridment and closure;  Surgeon: RAISA Perea MD;  Location: [facility] OR     CORONARY STENT PLACEMENT       IRRIGATION AND DEBRIDEMENT LOWER EXTREMITY, COMBINED Right 10/09/2019    Procedure: Irrigation and debridement Right leg;  Surgeon: Doron Mott MD;  Location: [facility] OR     OTHER SURGICAL HISTORY  03/05/2014    I AND D      OTHER SURGICAL HISTORY Right 10/09/2019    IRRIGATION AND DEBRIDEMENT LOWER EXTREMITY, COMBINED     REVISE SCAR EXTREMITY Right 12/17/2020    Procedure: Right stump scar revision;  Surgeon: RAISA Perea MD;  Location: [facility] OR     Current Outpatient Medications   Medication Sig Dispense Refill     acetaminophen (TYLENOL) 325 MG tablet Take 2 tablets (650 mg) by mouth every 4 hours       amLODIPine (NORVASC) 5 MG tablet Take 1 tablet (5 mg) by mouth daily 90 tablet 3     aspirin (ASA) 81 MG tablet Take 1 tablet (81 mg) by mouth daily       calcium carbonate 600 mg-vitamin D 400 units (CALCIUM 600 + D) 600-400 MG-UNIT per tablet Take 2 tablets by mouth daily       ferrous sulfate (FEROSUL) 325 (65 Fe) MG tablet TAKE 1 TABLET BY MOUTH EVERY DAY WITH BREAKFAST 90 tablet 1     FOLIC ACID PO Take 1 mg by mouth daily       gabapentin (NEURONTIN) 300 MG capsule Take 2 capsules (600 mg) by mouth 3 times daily       ketorolac (ACULAR) 0.5 % ophthalmic solution Apply 1 drop to eye 4 times daily for 7 days, THEN 1 drop 3 times daily for 7 days, THEN 1 drop 2 times daily for 7 days, THEN 1 drop daily for 7 days. Start 2 days prior to surgery in operative eye. 4 mL 1     leflunomide (ARAVA) 20 MG tablet  Take 1 tablet by mouth every 24 hours       lisinopril (ZESTRIL) 20 MG tablet TAKE 1 TABLET BY MOUTH EVERY DAY 90 tablet 3     methotrexate 2.5 MG tablet Take 1 tablet by mouth       metoprolol tartrate (LOPRESSOR) 50 MG tablet Take 1 tablet (50 mg) by mouth 2 times daily 90 tablet 3     moxifloxacin (VIGAMOX) 0.5 % ophthalmic solution Apply 1 drop to eye 4 times daily for 7 days Start morning of surgery in operative eye 6 mL 1     multivitamin w/minerals (THERA-VIT-M) tablet Take 1 tablet by mouth daily       order for DME Equipment being ordered: New foam (custom shaped protective cover) for right below the knee prostheses 1 Units 0     oxyCODONE (ROXICODONE) 5 MG tablet Take 1 tablet (5 mg) by mouth every 6 hours as needed for severe pain 15 tablet 0     prednisoLONE acetate (PRED FORTE) 1 % ophthalmic suspension Apply 1 drop to eye 4 times daily for 7 days, THEN 1 drop 3 times daily for 7 days, THEN 1 drop 2 times daily for 7 days, THEN 1 drop daily for 7 days. Start after surgery in operative eye 4 mL 1     predniSONE (DELTASONE) 5 MG tablet Take 1 tablet (5 mg) by mouth daily  0     simvastatin (ZOCOR) 20 MG tablet Take 1 tablet (20 mg) by mouth At Bedtime 90 tablet 3     sulfamethoxazole-trimethoprim (BACTRIM DS) 800-160 MG tablet Take 1 tablet by mouth 2 times daily 20 tablet 1       No Known Allergies     Social History     Tobacco Use     Smoking status: Former Smoker     Packs/day: 0.50     Years: 45.00     Pack years: 22.50     Types: Cigarettes     Quit date: 2010     Years since quittin.5     Smokeless tobacco: Never Used   Substance Use Topics     Alcohol use: Yes     Comment: occsionally-3 -4 drinks a week       History   Drug Use No         Objective     LMP  (LMP Unknown)     Physical Exam    GENERAL APPEARANCE: healthy, alert and no distress     EYES: EOMI, PERRL     HENT: ear canals and TM's normal and nose and mouth without ulcers or lesions     NECK: no adenopathy, no asymmetry,  masses, or scars and thyroid normal to palpation     RESP: lungs clear to auscultation - no rales, rhonchi or wheezes     CV: regular rates and rhythm, normal S1 S2, no S3 or S4 and no murmur, click or rub     ABDOMEN:  soft, nontender, no HSM or masses and bowel sounds normal     MS:pt has right bka,  Bandage on stump  No edema left leg; radials symmetric      SKIN: no suspicious lesions or rashes     NEURO: Normal strength and tone, sensory exam grossly normal, mentation intact and speech normal     PSYCH: mentation appears normal. and affect normal/bright     LYMPHATICS: No cervical adenopathy    Recent Labs   Lab Test 08/24/22  1300 07/14/22  1659 05/18/22  1220 11/08/21  1055 10/27/21  0936   HGB 11.2*  --  11.9   < > 10.2*     --  378   < >  --    NA  --   --   --   --  133   POTASSIUM  --   --   --   --  3.9   CR  --  0.5  --   --  0.55    < > = values in this interval not displayed.        Diagnostics:  cmp done  Today; see  Recent cbc    Also see ekg from 10-27-21      ASSESSMENT / PLAN:  (Z01.818) Preop general physical exam  (primary encounter diagnosis)  Comment: okay for procedures if labs stable.    Plan: Comprehensive metabolic panel             (H26.9) Cataract of both eyes, unspecified cataract type  Comment: patient may need 2nd preop before the 2nd cataract ( this would done after amputation proceedure  Plan: as above    (S88.111A) Below-knee amputation of right lower extremity (H)  Comment: as above  Plan: as above    Hold aspirin and methotrexate for one week prior to amputation procedure  No need to hold prior to cataract        I reviewed the patient's medications, allergies, medical history, family history, and social history.    Harry Roberto MD        Revised Cardiac Risk Index (RCRI):  The patient has the following serious cardiovascular risks for perioperative complications:   - Coronary Artery Disease (MI, positive stress test, angina, Qs on EKG) = 1 point     RCRI  Interpretation: 1 point: Class II (low risk - 0.9% complication rate)           Signed Electronically by: Harry Roberto MD  Copy of this evaluation report is provided to requesting physician.

## 2022-09-02 NOTE — PATIENT INSTRUCTIONS
We will send you lab results    Hold aspirin one week prior to amputation, not for the cataracts    Hold methotrexate also for amputation

## 2022-09-02 NOTE — LETTER
September 8, 2022    Sakshi Jaeger  [address] [id number]          Dear ,    We are writing to inform you of your test results.    These preop labs are okay.       Resulted Orders   Comprehensive metabolic panel   Result Value Ref Range    Sodium 133 133 - 144 mmol/L    Potassium 4.0 3.4 - 5.3 mmol/L    Chloride 103 94 - 109 mmol/L    Carbon Dioxide (CO2) 26 20 - 32 mmol/L    Anion Gap 4 3 - 14 mmol/L    Urea Nitrogen 8 7 - 30 mg/dL    Creatinine 0.55 0.52 - 1.04 mg/dL    Calcium 9.3 8.5 - 10.1 mg/dL    Glucose 89 70 - 99 mg/dL    Alkaline Phosphatase 64 40 - 150 U/L    AST 22 0 - 45 U/L    ALT 23 0 - 50 U/L    Protein Total 6.6 (L) 6.8 - 8.8 g/dL    Albumin 3.3 (L) 3.4 - 5.0 g/dL    Bilirubin Total 0.4 0.2 - 1.3 mg/dL    GFR Estimate >90 >60 mL/min/1.73m2      Comment:      Effective December 21, 2021 eGFRcr in adults is calculated using the 2021 CKD-EPI creatinine equation which includes age and gender (Berny et al., NEJM, DOI: 10.1056/QLDFdx2814844)               If you have any questions or concerns, please call the clinic at the number listed above.       Sincerely,      Harry Roberto MD

## 2022-09-03 ENCOUNTER — ANESTHESIA EVENT (OUTPATIENT)
Dept: SURGERY | Facility: AMBULATORY SURGERY CENTER | Age: 77
End: 2022-09-03
Payer: COMMERCIAL

## 2022-09-03 ASSESSMENT — LIFESTYLE VARIABLES: TOBACCO_USE: 0

## 2022-09-03 NOTE — ANESTHESIA PREPROCEDURE EVALUATION
Anesthesia Pre-Procedure Evaluation    Patient: Sakshi Jaeger   MRN: [id number] : 1945        Procedure : Procedure(s):  PHACOEMULSIFICATION, CATARACT, WITH INTRAOCULAR LENS IMPLANT          Past Medical History:   Diagnosis Date     Anemia 2022     BENIGN HYPERTENSION 3/1/2005     CAD (coronary artery disease) 2011     Coronary artery disease involving native coronary artery of native heart without angina pectoris 2016     Employs prosthetic leg 2012     Essential hypertension with goal blood pressure less than 140/90 2016     Hyperlipidemia LDL goal <100 2010     Hypertension goal BP (blood pressure) < 130/80 2011     Infection of right below knee amputation (H) 10/1/2019     IRON DEFIC ANEMIA NOS 9/15/2005     Lower limb amputation, below knee 2012     MI (myocardial infarction) (H)     3/2010     Non-healing surgical wound, subsequent encounter 2020    Added automatically from request for surgery [id number]     Osteoporosis 2005     OSTEOPOROSIS NOS 2005     Protein-calorie malnutrition (H) 2022     PVD (peripheral vascular disease) (H) 2022     Rheumatoid arthritis (H) 2005          Rheumatoid arthritis(714.0) 2005     Status post below-knee amputation (H) 2012           Past Surgical History:   Procedure Laterality Date     ---------INCISION AND DRAINAGE  2014     AMPUTATION BELOW KNEE RT/LT  2005    right lowwer leg.      APPENDECTOMY       ARTHROPLASTY KNEE  2011    Procedure:ARTHROPLASTY KNEE; Surgeon:JESSE HAWKINS; Location:[facility] OR     COMPLEX WOUND CLOSURE (UPDATE) Right 2021    Procedure: Right stump wound debridment and closure;  Surgeon: RAISA Perea MD;  Location: [facility] OR     CORONARY STENT PLACEMENT       IRRIGATION AND DEBRIDEMENT LOWER EXTREMITY, COMBINED Right 10/09/2019    Procedure: Irrigation and debridement Right leg;  Surgeon: Doron Mott MD;  Location:  "[facility] OR     OTHER SURGICAL HISTORY  2014    I AND D      OTHER SURGICAL HISTORY Right 10/09/2019    IRRIGATION AND DEBRIDEMENT LOWER EXTREMITY, COMBINED     REVISE SCAR EXTREMITY Right 2020    Procedure: Right stump scar revision;  Surgeon: RAISA Perea MD;  Location[place] [facility]      No Known Allergies   Social History     Tobacco Use     Smoking status: Former Smoker     Packs/day: 0.50     Years: 45.00     Pack years: 22.50     Types: Cigarettes     Quit date: 2010     Years since quittin.5     Smokeless tobacco: Never Used   Substance Use Topics     Alcohol use: Yes     Comment: occsionally-3 -4 drinks a week      Wt Readings from Last 1 Encounters:   22 47.6 kg (105 lb)        Anesthesia Evaluation   Pt has had prior anesthetic. Type: General.    History of anesthetic complications       ROS/MED HX  ENT/Pulmonary:    (-) tobacco use, asthma and recent URI   Neurologic:       Cardiovascular:     (+) hypertension-range: 100/60/ -CAD --stent-. Drug Eluting Stent. Previous cardiac testing   Echo: Date: 10/2019 Results:  \"Left Ventricle  Global and regional left ventricular function is normal with an EF of 60-65%.  Left ventricular wall thickness cannot evaluate. Left ventricular size is  normal. Left ventricular diastolic function is indeterminate.     Right Ventricle  Right ventricular function, chamber size, wall motion, and thickness are  normal.     Atria  The atria cannot be assessed.     Mitral Valve  The mitral valve is normal.        Aortic Valve  Aortic valve is normal in structure and function.     Tricuspid Valve  The tricuspid valve cannot be assessed. Trace tricuspid insufficiency is  present. The right ventricular systolic pressure is approximated at 25.5 mmHg  plus the right atrial pressure.     Pulmonic Valve  The pulmonic valve cannot be assessed.     Vessels  The aorta root cannot be assessed. The pulmonary artery cannot be assessed.  The inferior vena cava is " "normal.     Pericardium  No pericardial effusion is present.\"  Stress Test: Date: Results:    ECG Reviewed: Date: Results:    Cath: Date: Results:   (-) angina and angina   METS/Exercise Tolerance: 1 - Eating, dressing Comment: Uses walker and one leg for short distances, but mostly in electric scooter   Hematologic:       Musculoskeletal:       GI/Hepatic:    (-) GERD   Renal/Genitourinary:       Endo:     (+) Chronic steroid usage (prednisone 5mg daily, took it today) for     Psychiatric/Substance Use:       Infectious Disease:       Malignancy:       Other:            Physical Exam    Airway        Mallampati: II   TM distance: > 3 FB   Neck ROM: full   Mouth opening: > 3 cm    Respiratory Devices and Support         Dental  no notable dental history     (+) partials      Cardiovascular   cardiovascular exam normal          Pulmonary   pulmonary exam normal                OUTSIDE LABS:  CBC:   Lab Results   Component Value Date    WBC 8.8 08/24/2022    WBC 9.0 05/18/2022    WBC  05/18/2022      Comment:      This is a corrected result. Previous result was 8.5 10e3/uL on 5/18/2022 at 12:52 PM CDT    HGB 11.2 (L) 08/24/2022    HGB 11.9 05/18/2022    HGB  05/18/2022      Comment:      Test credited, duplicate  This is a corrected result. Previous result was 11.6 g/dL on 5/18/2022 at 12:52 PM CDT    HCT 34.6 (L) 08/24/2022    HCT 36.6 05/18/2022    HCT  05/18/2022      Comment:      Test credited, duplicate  This is a corrected result. Previous result was 35.8 % on 5/18/2022 at 12:52 PM CDT     08/24/2022     05/18/2022    PLT  05/18/2022      Comment:      This is a corrected result. Previous result was 378 10e3/uL on 5/18/2022 at 12:52 PM CDT     BMP:   Lab Results   Component Value Date     09/02/2022     10/27/2021    POTASSIUM 4.0 09/02/2022    POTASSIUM 3.9 10/27/2021    CHLORIDE 103 09/02/2022    CHLORIDE 102 10/27/2021    CO2 26 09/02/2022    CO2 25 10/27/2021    BUN 8 09/02/2022    " BUN 11 10/27/2021    CR 0.55 09/02/2022    CR 0.5 07/14/2022    GLC 89 09/02/2022    GLC 89 10/27/2021     COAGS:   Lab Results   Component Value Date    PTT 39 (H) 10/01/2019    INR 1.31 (H) 10/09/2019     POC:   Lab Results   Component Value Date     (H) 05/03/2011     HEPATIC:   Lab Results   Component Value Date    ALBUMIN 3.3 (L) 09/02/2022    PROTTOTAL 6.6 (L) 09/02/2022    ALT 23 09/02/2022    AST 22 09/02/2022    ALKPHOS 64 09/02/2022    BILITOTAL 0.4 09/02/2022     OTHER:   Lab Results   Component Value Date    LACT 0.9 10/01/2019    A1C 5.6 07/07/2008    ADRIAN 9.3 09/02/2022    PHOS 3.9 06/08/2016    MAG 1.7 04/02/2010    LIPASE 37 (L) 10/01/2019    TSH 4.55 10/17/2019    CRP 4.6 (H) 11/18/2019    SED 48 (H) 11/18/2019       Anesthesia Plan    ASA Status:  3   NPO Status:  NPO Appropriate    Anesthesia Type: MAC.     - Reason for MAC: straight local not clinically adequate, immobility needed   Induction: Intravenous.   Maintenance: TIVA.        Consents    Anesthesia Plan(s) and associated risks, benefits, and realistic alternatives discussed. Questions answered and patient/representative(s) expressed understanding.    - Discussed:     - Discussed with:  Patient (son )      - Extended Intubation/Ventilatory Support Discussed: No.      - Patient is DNR/DNI Status: No    Use of blood products discussed: No .     Postoperative Care    Pain management: Multi-modal analgesia.   PONV prophylaxis: Ondansetron (or other 5HT-3)     Comments:                George Mathias MD

## 2022-09-08 ENCOUNTER — HOSPITAL ENCOUNTER (OUTPATIENT)
Facility: AMBULATORY SURGERY CENTER | Age: 77
Discharge: HOME | End: 2022-09-08
Attending: OPHTHALMOLOGY
Payer: COMMERCIAL

## 2022-09-08 ENCOUNTER — OFFICE VISIT (OUTPATIENT)
Dept: OPHTHALMOLOGY | Facility: CLINIC | Age: 77
End: 2022-09-08
Payer: COMMERCIAL

## 2022-09-08 ENCOUNTER — ANESTHESIA (OUTPATIENT)
Dept: SURGERY | Facility: AMBULATORY SURGERY CENTER | Age: 77
End: 2022-09-08
Payer: COMMERCIAL

## 2022-09-08 VITALS
HEART RATE: 66 BPM | HEIGHT: 64 IN | RESPIRATION RATE: 16 BRPM | TEMPERATURE: 100 F | SYSTOLIC BLOOD PRESSURE: 133 MMHG | OXYGEN SATURATION: 96 % | BODY MASS INDEX: 17.93 KG/M2 | DIASTOLIC BLOOD PRESSURE: 55 MMHG | WEIGHT: 105 LBS

## 2022-09-08 DIAGNOSIS — Z96.1 PSEUDOPHAKIA, LEFT EYE: Primary | ICD-10-CM

## 2022-09-08 DIAGNOSIS — H25.812 COMBINED FORMS OF AGE-RELATED CATARACT OF LEFT EYE: Primary | ICD-10-CM

## 2022-09-08 DIAGNOSIS — H25.813 COMBINED FORMS OF AGE-RELATED CATARACT OF BOTH EYES: ICD-10-CM

## 2022-09-08 PROCEDURE — G8918 PT W/O PREOP ORDER IV AB PRO: HCPCS

## 2022-09-08 PROCEDURE — 66984 XCAPSL CTRC RMVL W/O ECP: CPT | Mod: LT

## 2022-09-08 PROCEDURE — 99024 POSTOP FOLLOW-UP VISIT: CPT | Performed by: OPHTHALMOLOGY

## 2022-09-08 PROCEDURE — G8907 PT DOC NO EVENTS ON DISCHARG: HCPCS

## 2022-09-08 PROCEDURE — 66984 XCAPSL CTRC RMVL W/O ECP: CPT | Mod: LT | Performed by: OPHTHALMOLOGY

## 2022-09-08 DEVICE — EYE IMP IOL AMO PCL TECNIS ZCB00 22.0: Type: IMPLANTABLE DEVICE | Site: EYE | Status: FUNCTIONAL

## 2022-09-08 RX ORDER — SODIUM CHLORIDE, SODIUM LACTATE, POTASSIUM CHLORIDE, CALCIUM CHLORIDE 600; 310; 30; 20 MG/100ML; MG/100ML; MG/100ML; MG/100ML
INJECTION, SOLUTION INTRAVENOUS CONTINUOUS
Status: DISCONTINUED | OUTPATIENT
Start: 2022-09-08 | End: 2022-09-09 | Stop reason: HOSPADM

## 2022-09-08 RX ORDER — PROPARACAINE HYDROCHLORIDE 5 MG/ML
1 SOLUTION/ DROPS OPHTHALMIC ONCE
Status: DISCONTINUED | OUTPATIENT
Start: 2022-09-08 | End: 2022-09-09 | Stop reason: HOSPADM

## 2022-09-08 RX ORDER — KETOROLAC TROMETHAMINE 5 MG/ML
1 SOLUTION OPHTHALMIC
Status: COMPLETED | OUTPATIENT
Start: 2022-09-08 | End: 2022-09-08

## 2022-09-08 RX ORDER — HYDRALAZINE HYDROCHLORIDE 20 MG/ML
2.5-5 INJECTION INTRAMUSCULAR; INTRAVENOUS EVERY 10 MIN PRN
Status: DISCONTINUED | OUTPATIENT
Start: 2022-09-08 | End: 2022-09-09 | Stop reason: HOSPADM

## 2022-09-08 RX ORDER — BALANCED SALT SOLUTION 6.4; .75; .48; .3; 3.9; 1.7 MG/ML; MG/ML; MG/ML; MG/ML; MG/ML; MG/ML
SOLUTION OPHTHALMIC PRN
Status: DISCONTINUED | OUTPATIENT
Start: 2022-09-08 | End: 2022-09-08 | Stop reason: HOSPADM

## 2022-09-08 RX ORDER — SODIUM CHLORIDE, SODIUM LACTATE, POTASSIUM CHLORIDE, CALCIUM CHLORIDE 600; 310; 30; 20 MG/100ML; MG/100ML; MG/100ML; MG/100ML
INJECTION, SOLUTION INTRAVENOUS CONTINUOUS PRN
Status: DISCONTINUED | OUTPATIENT
Start: 2022-09-08 | End: 2022-09-08

## 2022-09-08 RX ORDER — TETRACAINE HYDROCHLORIDE 5 MG/ML
SOLUTION OPHTHALMIC PRN
Status: DISCONTINUED | OUTPATIENT
Start: 2022-09-08 | End: 2022-09-08 | Stop reason: HOSPADM

## 2022-09-08 RX ORDER — MOXIFLOXACIN 5 MG/ML
1 SOLUTION/ DROPS OPHTHALMIC
Status: COMPLETED | OUTPATIENT
Start: 2022-09-08 | End: 2022-09-08

## 2022-09-08 RX ORDER — ONDANSETRON 4 MG/1
4 TABLET, ORALLY DISINTEGRATING ORAL EVERY 30 MIN PRN
Status: DISCONTINUED | OUTPATIENT
Start: 2022-09-08 | End: 2022-09-09 | Stop reason: HOSPADM

## 2022-09-08 RX ORDER — PROPARACAINE HYDROCHLORIDE 5 MG/ML
1 SOLUTION/ DROPS OPHTHALMIC
Status: DISCONTINUED | OUTPATIENT
Start: 2022-09-08 | End: 2022-09-09 | Stop reason: HOSPADM

## 2022-09-08 RX ORDER — FENTANYL CITRATE 50 UG/ML
INJECTION, SOLUTION INTRAMUSCULAR; INTRAVENOUS PRN
Status: DISCONTINUED | OUTPATIENT
Start: 2022-09-08 | End: 2022-09-08

## 2022-09-08 RX ORDER — MOXIFLOXACIN IN NACL,ISO-OS/PF 0.3MG/0.3
SYRINGE (ML) INTRAOCULAR PRN
Status: DISCONTINUED | OUTPATIENT
Start: 2022-09-08 | End: 2022-09-08 | Stop reason: HOSPADM

## 2022-09-08 RX ORDER — METOPROLOL TARTRATE 1 MG/ML
1-2 INJECTION, SOLUTION INTRAVENOUS EVERY 5 MIN PRN
Status: DISCONTINUED | OUTPATIENT
Start: 2022-09-08 | End: 2022-09-09 | Stop reason: HOSPADM

## 2022-09-08 RX ORDER — LIDOCAINE 40 MG/G
CREAM TOPICAL
Status: DISCONTINUED | OUTPATIENT
Start: 2022-09-08 | End: 2022-09-09 | Stop reason: HOSPADM

## 2022-09-08 RX ORDER — CYCLOPENTOLAT/TROPIC/PHENYLEPH 1%-1%-2.5%
1 DROPS (EA) OPHTHALMIC (EYE)
Status: COMPLETED | OUTPATIENT
Start: 2022-09-08 | End: 2022-09-08

## 2022-09-08 RX ORDER — ONDANSETRON 2 MG/ML
4 INJECTION INTRAMUSCULAR; INTRAVENOUS EVERY 30 MIN PRN
Status: DISCONTINUED | OUTPATIENT
Start: 2022-09-08 | End: 2022-09-09 | Stop reason: HOSPADM

## 2022-09-08 RX ADMIN — Medication 1 DROP: at 08:29

## 2022-09-08 RX ADMIN — KETOROLAC TROMETHAMINE 1 DROP: 5 SOLUTION OPHTHALMIC at 08:29

## 2022-09-08 RX ADMIN — SODIUM CHLORIDE, SODIUM LACTATE, POTASSIUM CHLORIDE, CALCIUM CHLORIDE: 600; 310; 30; 20 INJECTION, SOLUTION INTRAVENOUS at 09:42

## 2022-09-08 RX ADMIN — MOXIFLOXACIN 1 DROP: 5 SOLUTION/ DROPS OPHTHALMIC at 08:43

## 2022-09-08 RX ADMIN — MOXIFLOXACIN 1 DROP: 5 SOLUTION/ DROPS OPHTHALMIC at 08:29

## 2022-09-08 RX ADMIN — Medication 1 DROP: at 08:43

## 2022-09-08 RX ADMIN — KETOROLAC TROMETHAMINE 1 DROP: 5 SOLUTION OPHTHALMIC at 08:36

## 2022-09-08 RX ADMIN — FENTANYL CITRATE 25 MCG: 50 INJECTION, SOLUTION INTRAMUSCULAR; INTRAVENOUS at 09:47

## 2022-09-08 RX ADMIN — MOXIFLOXACIN 1 DROP: 5 SOLUTION/ DROPS OPHTHALMIC at 08:36

## 2022-09-08 RX ADMIN — KETOROLAC TROMETHAMINE 1 DROP: 5 SOLUTION OPHTHALMIC at 08:43

## 2022-09-08 RX ADMIN — PROPARACAINE HYDROCHLORIDE 1 DROP: 5 SOLUTION/ DROPS OPHTHALMIC at 08:28

## 2022-09-08 RX ADMIN — Medication 1 DROP: at 08:36

## 2022-09-08 ASSESSMENT — TONOMETRY
IOP_METHOD: TONOPEN
OS_IOP_MMHG: 6

## 2022-09-08 ASSESSMENT — VISUAL ACUITY
METHOD: SNELLEN - LINEAR
OS_SC: 20/70

## 2022-09-08 ASSESSMENT — SLIT LAMP EXAM - LIDS: COMMENTS: 3+ DERMATOCHALASIS

## 2022-09-08 NOTE — ANESTHESIA CARE TRANSFER NOTE
Patient: Sakshi Jaeger    Procedure: Procedure(s):  PHACOEMULSIFICATION, CATARACT, WITH INTRAOCULAR LENS IMPLANT       Diagnosis: Combined forms of age-related cataract of both eyes [H25.813]  Diagnosis Additional Information: No value filed.    Anesthesia Type:   MAC     Note:    Oropharynx: oropharynx clear of all foreign objects and spontaneously breathing  Level of Consciousness: drowsy  Oxygen Supplementation: room air    Independent Airway: airway patency satisfactory and stable  Dentition: dentition unchanged  Vital Signs Stable: post-procedure vital signs reviewed and stable  Report to RN Given: handoff report given  Patient transferred to: Phase II    Handoff Report: Identifed the Patient, Identified the Reponsible Provider, Reviewed the pertinent medical history, Discussed the surgical course, Reviewed Intra-OP anesthesia mangement and issues during anesthesia, Set expectations for post-procedure period and Allowed opportunity for questions and acknowledgement of understanding      Vitals:  Vitals Value Taken Time   BP     Temp     Pulse     Resp     SpO2         Electronically Signed By: DESIRE Marie CRNA  September 8, 2022  10:30 AM

## 2022-09-08 NOTE — INTERVAL H&P NOTE
"I have reviewed the surgical (or preoperative) H&P that is linked to this encounter, and examined the patient. There are no significant changes    Clinical Conditions Present on Arrival:  Clinically Significant Risk Factors Present on Admission                   # Cachexia: Estimated body mass index is 17.75 kg/m  as calculated from the following:    Height as of this encounter: 1.638 m (5' 4.49\").    Weight as of this encounter: 47.6 kg (105 lb).       "

## 2022-09-08 NOTE — ANESTHESIA POSTPROCEDURE EVALUATION
Patient: Sakshi Jaeger    Procedure: Procedure(s):  PHACOEMULSIFICATION, CATARACT, WITH INTRAOCULAR LENS IMPLANT       Anesthesia Type:  MAC    Note:  Disposition: Outpatient   Postop Pain Control: Uneventful            Sign Out: Well controlled pain   PONV:    Neuro/Psych: Uneventful            Sign Out: Acceptable/Baseline neuro status   Airway/Respiratory: Uneventful            Sign Out: Acceptable/Baseline resp. status   CV/Hemodynamics: Uneventful            Sign Out: Acceptable CV status; No obvious hypovolemia; No obvious fluid overload   Other NRE:    DID A NON-ROUTINE EVENT OCCUR?            Last vitals:  Vitals Value Taken Time   /55 09/08/22 1055   Temp     Pulse 66 09/08/22 1055   Resp 16 09/08/22 1055   SpO2 96 % 09/08/22 1055       Electronically Signed By: George Mathias MD  September 8, 2022  11:10 AM

## 2022-09-08 NOTE — PROGRESS NOTES
Review of systems for the eyes was negative other than the pertinent positives/negatives listed in the HPI.      Assessment & Plan    HPI:  Sakshi Jaeger is a 77 year old female with history of RA, osteoporosis, HTN, BKA, myopia with astigmatism here for Postoperative day 0    POHx: myopia with astigmatism and presbyopia, dermatochalasis  PMHx: osteoporosis, HTN, BKA  Current Medications: acetaminophen (TYLENOL) 325 MG tablet, Take 2 tablets (650 mg) by mouth every 4 hours  amLODIPine (NORVASC) 5 MG tablet, Take 1 tablet (5 mg) by mouth daily  aspirin (ASA) 81 MG tablet, Take 1 tablet (81 mg) by mouth daily  calcium carbonate 600 mg-vitamin D 400 units (CALCIUM 600 + D) 600-400 MG-UNIT per tablet, Take 2 tablets by mouth daily  ferrous sulfate (FEROSUL) 325 (65 Fe) MG tablet, TAKE 1 TABLET BY MOUTH EVERY DAY WITH BREAKFAST  FOLIC ACID PO, Take 1 mg by mouth daily  gabapentin (NEURONTIN) 300 MG capsule, Take 2 capsules (600 mg) by mouth 3 times daily  ketorolac (ACULAR) 0.5 % ophthalmic solution, Apply 1 drop to eye 4 times daily for 7 days, THEN 1 drop 3 times daily for 7 days, THEN 1 drop 2 times daily for 7 days, THEN 1 drop daily for 7 days. Start 2 days prior to surgery in operative eye.  leflunomide (ARAVA) 20 MG tablet, Take 1 tablet by mouth every 24 hours  lisinopril (ZESTRIL) 20 MG tablet, TAKE 1 TABLET BY MOUTH EVERY DAY  methotrexate 2.5 MG tablet, Take 1 tablet by mouth  metoprolol tartrate (LOPRESSOR) 50 MG tablet, Take 1 tablet (50 mg) by mouth 2 times daily  multivitamin w/minerals (THERA-VIT-M) tablet, Take 1 tablet by mouth daily  order for DME, Equipment being ordered: New foam (custom shaped protective cover) for right below the knee prostheses  prednisoLONE acetate (PRED FORTE) 1 % ophthalmic suspension, Apply 1 drop to eye 4 times daily for 7 days, THEN 1 drop 3 times daily for 7 days, THEN 1 drop 2 times daily for 7 days, THEN 1 drop daily for 7 days. Start after surgery in operative  eye  predniSONE (DELTASONE) 5 MG tablet, Take 1 tablet (5 mg) by mouth daily  simvastatin (ZOCOR) 20 MG tablet, Take 1 tablet (20 mg) by mouth At Bedtime  sulfamethoxazole-trimethoprim (BACTRIM DS) 800-160 MG tablet, Take 1 tablet by mouth 2 times daily    hydrALAZINE (APRESOLINE) injection 2.5-5 mg  [COMPLETED] ketorolac (ACULAR) 0.5 % ophthalmic solution 1 drop  lactated ringers infusion  lactated ringers infusion  lactated ringers infusion  lidocaine (LMX4) kit  lidocaine 1 % 0.1-1 mL  metoprolol (LOPRESSOR) injection 1-2 mg  [COMPLETED] moxifloxacin (VIGAMOX) 0.5 % ophthalmic solution 1 drop  ondansetron (ZOFRAN ODT) ODT tab 4 mg   Or  ondansetron (ZOFRAN) injection 4 mg  prochlorperazine (COMPAZINE) injection 5 mg  proparacaine (ALCAINE) 0.5 % ophthalmic solution 1 drop  proparacaine (ALCAINE) 0.5 % ophthalmic solution 1 drop  sodium chloride (PF) 0.9% PF flush 3 mL  sodium chloride (PF) 0.9% PF flush 3 mL  [COMPLETED] tropicamide 1 %-cyclopentolate 1 %-phenylephrine 2.5% 1-1-2.5 % ophthalmic solution 1 drop      FHx: cataract-mom  PSHx: denies history of ocular surgeries       Current Eye Medications:  Vigamox, pred forte, ketorolac four times a day  Left eye     Assessment & Plan:  Pseudophakia, left eye, day 0  Izaguirre left eye 9/8/22   Doing well  Keep patch in place at night for 7 days  Start post-operative drops and taper according to instructions  Post-operative do's and don'ts reviewed, questions answered    Recheck 1 week    (H25.073) Combined forms of age-related cataract of both eyes  (primary encounter diagnosis)  Special equipment/needs:  Eye: left  Anesthesia:topical   Dilates to: 7mm  Iris expansion:  No  Pseudoexfoliation: No  Trypan Blue: No  Trauma: No    Able lay to flat: Yes  Blood Thinner: Yes ASA 81  Tamsulosin: No  DM: No  Guttae: No    Dominant Eye: right    Plan: [place] each eye    (H52.13,  H52.203,  H52.4) Myopia of both eyes with astigmatism and presbyopia  Hold pending Cataract  extraction/iol     (H02.831,  H02.834,  H02.832,  H02.835) Dermatochalasis of upper and lower eyelids of both eyes  Discussed surgical options vs observation  Patient would like to observe for now      Return for as scheduled.        Flip Izaguirre MD     Attending Physician Attestation:  Complete documentation of historical and exam elements from today's encounter can be found in the full encounter summary report (not reduplicated in this progress note).  I personally obtained the chief complaint(s) and history of present illness.  I confirmed and edited as necessary the review of systems, past medical/surgical history, family history, social history, and examination findings as documented by others; and I examined the patient myself.  I personally reviewed the relevant tests, images, and reports as documented above.  I formulated and edited as necessary the assessment and plan and discussed the findings and management plan with the patient and family. - Flip Izaguirre MD

## 2022-09-08 NOTE — DISCHARGE INSTRUCTIONS
[facility]   Adult Discharge Orders & Instructions     For 24 hours after surgery    Get plenty of rest.  A responsible adult must stay with you for at least 24 hours after you leave the hospital.   Do not drive or use heavy equipment.  If you have weakness or tingling, don't drive or use heavy equipment until this feeling goes away.  Do not drink alcohol.  Avoid strenuous or risky activities.  Ask for help when climbing stairs.   You may feel lightheaded.  IF so, sit for a few minutes before standing.  Have someone help you get up.   If you have nausea (feel sick to your stomach): Drink only clear liquids such as apple juice, ginger ale, broth or 7-Up.  Rest may also help.  Be sure to drink enough fluids.  Move to a regular diet as you feel able.  You may have a slight fever. Call the doctor if your fever is over 100 F (37.7 C) (taken under the tongue) or lasts longer than 24 hours.  You may have a dry mouth, a sore throat, muscle aches or trouble sleeping.  These should go away after 24 hours.  Do not make important or legal decisions.     Call your doctor for any of the followin.  Signs of infection (fever, growing tenderness at the surgery site, a large amount of drainage or bleeding, severe pain, foul-smelling drainage, redness, swelling).    2. It has been over 8 to 10 hours since surgery and you are still not able to urinate (pass water).    3.  Headache for over 24 hours.    4.  Numbness, tingling or weakness the day after surgery (if you had spinal anesthesia).                  5. Signs of Covid-19 infection (temperature over 100 degrees, shortness of breath, cough, loss of taste/smell, generalized body aches, persistent headache,                  chills, sore throat, nausea/vomiting/diarrhea).    For questions or concerns regarding your procedure, please contact Dr. Izaguirre at [phone].

## 2022-09-08 NOTE — OP NOTE
PREOPERATIVE DIAGNOSIS:   1. Combined forms of age-related cataract of left eye    2. Combined forms of age-related cataract of both eyes     Left eye   POSTOPERATIVE DIAGNOSIS: Same   PROCEDURES:   1. Cataract extraction with intraocular lens implant Left eye.  SURGEON: Flip Izaguirre M.D.  INDICATIONS: The patient Sakshi Jaeger presented to the eye clinic with decreased vision secondary to cataract in the Left eye. The risks, benefits and alternatives to cataract extraction were discussed. The patient elected to proceed. All questions were answered to the patient's satisfaction.   DESCRIPTION OF PROCEDURE:   Prior to the procedure, appropriate cardiac and respiratory monitors were applied to the patient.  In the pre-operative holding area, a drop of topical tetracaine was placed in the operative eye.  The patient was brought to the operating room.  With adequate anesthesia, the Left eye was prepped and draped in the usual sterile fashion. A lid speculum was placed, and the operating microscope was rotated into position. A surgical pause was carried out to identify with all members of the surgical team the correct surgical site. A paracentesis was created.  Through this limbal paracentesis, the anterior chamber was filled with preservative-free lidocaine followed by viscoelastic.  A temporal wound was created at the limbus using a 2.6 mm blade. A capsulorrhexis was initiated using a bent 25-gauge needle and was completed in continuous and circular fashion using the capsulorrhexis forceps. The lens nucleus was hydrodissected using balanced salt solution.  The lens nucleus was rotated and removed using phacoemulsification in a stop and chop technique.  Residual cortical material was removed using irrigation-aspiration.  The capsular bag was reinflated to its maximal extent with cohesive viscoelastic.  A 22.0 diopter [id number] was inserted into the capsular bag.  The lens power selected was reviewed using the  intraocular lens power measurements that were obtained preoperatively to confirm that the correct lens was selected for the desired post-operative refractive state. The residual viscoelastic was removed in its entirety, the wound were hydrated and found to be self-sealing.  Intracameral moxifloxacin was administered. Tactile pressure was confirmed to be in a normal range.  The lid speculum was removed and a shield was applied.  The patient tolerated the procedure well, and there were no complications.    PLAN: The patient will be discharged to home and will follow up tomorrow morning in the eye clinic.  EBL:  None  Complications:  None  Implant Name Type Inv. Item Serial No.  Lot No. LRB No. Used Action   EYE IMP IOL MIGUEL PCL TECNIS ZCB00 22.0 - [id number] Lens/Eye Implant EYE IMP IOL MIGUEL PCL TECNIS ZCB00 22.0 [id number] [employer]  Left 1 Implanted

## 2022-09-08 NOTE — ANESTHESIA CARE TRANSFER NOTE
Patient: Sakshi Jaeger    Procedure: Procedure(s):  PHACOEMULSIFICATION, CATARACT, WITH INTRAOCULAR LENS IMPLANT       Diagnosis: Combined forms of age-related cataract of both eyes [H25.813]  Diagnosis Additional Information: No value filed.    Anesthesia Type:   MAC     Note:    Oropharynx: oropharynx clear of all foreign objects and spontaneously breathing  Level of Consciousness: drowsy  Oxygen Supplementation: room air    Independent Airway: airway patency satisfactory and stable  Dentition: dentition unchanged  Vital Signs Stable: post-procedure vital signs reviewed and stable  Report to RN Given: handoff report given  Patient transferred to: Phase II    Handoff Report: Identifed the Patient, Identified the Reponsible Provider, Reviewed the pertinent medical history, Discussed the surgical course, Reviewed Intra-OP anesthesia mangement and issues during anesthesia, Set expectations for post-procedure period and Allowed opportunity for questions and acknowledgement of understanding      Vitals:  Vitals Value Taken Time   /61 09/08/22 1028   Temp     Pulse 59 09/08/22 1028   Resp 16 09/08/22 1028   SpO2 96 % 09/08/22 1028       Electronically Signed By: DESIRE Marie CRNA  September 8, 2022  10:44 AM

## 2022-09-23 ENCOUNTER — OFFICE VISIT (OUTPATIENT)
Dept: OPHTHALMOLOGY | Facility: CLINIC | Age: 77
End: 2022-09-23
Payer: COMMERCIAL

## 2022-09-23 DIAGNOSIS — Z96.1 PSEUDOPHAKIA, LEFT EYE: Primary | ICD-10-CM

## 2022-09-23 PROCEDURE — 99024 POSTOP FOLLOW-UP VISIT: CPT | Performed by: OPHTHALMOLOGY

## 2022-09-23 ASSESSMENT — VISUAL ACUITY
OS_SC: 20/30
OD_SC: 20/150
METHOD: SNELLEN - LINEAR
OS_SC+: -2

## 2022-09-23 ASSESSMENT — SLIT LAMP EXAM - LIDS
COMMENTS: 3+ DERMATOCHALASIS
COMMENTS: 3+ DERMATOCHALASIS

## 2022-09-23 ASSESSMENT — TONOMETRY
IOP_METHOD: TONOPEN
OS_IOP_MMHG: 17
OD_IOP_MMHG: 17

## 2022-09-23 ASSESSMENT — EXTERNAL EXAM - RIGHT EYE: OD_EXAM: NORMAL

## 2022-09-23 ASSESSMENT — EXTERNAL EXAM - LEFT EYE: OS_EXAM: NORMAL

## 2022-09-23 NOTE — NURSING NOTE
Chief Complaints and History of Present Illnesses   Patient presents with     Post Op (Ophthalmology) Left Eye       Chief Complaint(s) and History of Present Illness(es)     Post Op (Ophthalmology) Left Eye     Laterality: left eye              Comments     S/p cataract surgery, left eye 9/8/22. Using PF and Acular BID left eye. No concerns today    Right eye scheduled 10/13/22.                Destiney Alonso, COA

## 2022-09-23 NOTE — PROGRESS NOTES
HPI     Post Op (Ophthalmology) Left Eye     In left eye.              Comments     S/p cataract surgery, left eye 9/8/22. Using PF and Acular BID left eye. No concerns today    Right eye scheduled 10/13/22.          Last edited by Destiney Alonso COA on 9/23/2022 11:03 AM. (History)         Review of systems for the eyes was negative other than the pertinent positives/negatives listed in the HPI.      Assessment & Plan    HPI:  Sakshi Jaeger is a 77 year old female with history of RA, osteoporosis, HTN, BKA, myopia with astigmatism here for Postoperative week 1    POHx: myopia with astigmatism and presbyopia, dermatochalasis  PMHx: osteoporosis, HTN, BKA  Current Medications: acetaminophen (TYLENOL) 325 MG tablet, Take 2 tablets (650 mg) by mouth every 4 hours  amLODIPine (NORVASC) 5 MG tablet, Take 1 tablet (5 mg) by mouth daily  aspirin (ASA) 81 MG tablet, Take 1 tablet (81 mg) by mouth daily  calcium carbonate 600 mg-vitamin D 400 units (CALCIUM 600 + D) 600-400 MG-UNIT per tablet, Take 2 tablets by mouth daily  ferrous sulfate (FEROSUL) 325 (65 Fe) MG tablet, TAKE 1 TABLET BY MOUTH EVERY DAY WITH BREAKFAST  FOLIC ACID PO, Take 1 mg by mouth daily  gabapentin (NEURONTIN) 300 MG capsule, Take 2 capsules (600 mg) by mouth 3 times daily  ketorolac (ACULAR) 0.5 % ophthalmic solution, Apply 1 drop to eye 4 times daily for 7 days, THEN 1 drop 3 times daily for 7 days, THEN 1 drop 2 times daily for 7 days, THEN 1 drop daily for 7 days. Start 2 days prior to surgery in operative eye.  leflunomide (ARAVA) 20 MG tablet, Take 1 tablet by mouth every 24 hours  lisinopril (ZESTRIL) 20 MG tablet, TAKE 1 TABLET BY MOUTH EVERY DAY  methotrexate 2.5 MG tablet, Take 1 tablet by mouth  metoprolol tartrate (LOPRESSOR) 50 MG tablet, Take 1 tablet (50 mg) by mouth 2 times daily  multivitamin w/minerals (THERA-VIT-M) tablet, Take 1 tablet by mouth daily  order for DME, Equipment being ordered: New foam (custom shaped protective  cover) for right below the knee prostheses  prednisoLONE acetate (PRED FORTE) 1 % ophthalmic suspension, Apply 1 drop to eye 4 times daily for 7 days, THEN 1 drop 3 times daily for 7 days, THEN 1 drop 2 times daily for 7 days, THEN 1 drop daily for 7 days. Start after surgery in operative eye  predniSONE (DELTASONE) 5 MG tablet, Take 1 tablet (5 mg) by mouth daily  simvastatin (ZOCOR) 20 MG tablet, Take 1 tablet (20 mg) by mouth At Bedtime  sulfamethoxazole-trimethoprim (BACTRIM DS) 800-160 MG tablet, Take 1 tablet by mouth 2 times daily    No current facility-administered medications on file prior to visit.    FHx: cataract-mom  PSHx: denies history of ocular surgeries       Current Eye Medications:  pred forte, ketorolac two times a day  Left eye     Assessment & Plan:  Pseudophakia, left eye, week 1  Izaguirre left eye 9/8/22   Doing well  Continue post-operative drops and taper according to instructions    (H25.813) Combined forms of age-related cataract of both eyes  (primary encounter diagnosis)  Special equipment/needs:  Eye: right  Anesthesia:topical   Dilates to: 7mm  Iris expansion:  No  Pseudoexfoliation: No  Trypan Blue: No  Trauma: No    Able lay to flat: Yes  Blood Thinner: Yes ASA 81  Tamsulosin: No  DM: No  Guttae: No    Dominant Eye: right    Plan: goal -2.00 OD    (H52.13,  H52.203,  H52.4) Myopia of both eyes with astigmatism and presbyopia  Hold pending Cataract extraction/iol     (H02.831,  H02.834,  H02.832,  H02.835) Dermatochalasis of upper and lower eyelids of both eyes  Discussed surgical options vs observation  Patient would like to observe for now      Return for POD0 OD.        Flip Izaguirre MD     Attending Physician Attestation:  Complete documentation of historical and exam elements from today's encounter can be found in the full encounter summary report (not reduplicated in this progress note).  I personally obtained the chief complaint(s) and history of present illness.  I  confirmed and edited as necessary the review of systems, past medical/surgical history, family history, social history, and examination findings as documented by others; and I examined the patient myself.  I personally reviewed the relevant tests, images, and reports as documented above.  I formulated and edited as necessary the assessment and plan and discussed the findings and management plan with the patient and family. - Flip Izaguirre MD

## 2022-09-26 ENCOUNTER — LAB (OUTPATIENT)
Dept: LAB | Facility: CLINIC | Age: 77
End: 2022-09-26
Payer: COMMERCIAL

## 2022-09-26 DIAGNOSIS — Z20.822 ENCOUNTER FOR LABORATORY TESTING FOR COVID-19 VIRUS: ICD-10-CM

## 2022-09-26 PROCEDURE — U0005 INFEC AGEN DETEC AMPLI PROBE: HCPCS

## 2022-09-26 PROCEDURE — U0003 INFECTIOUS AGENT DETECTION BY NUCLEIC ACID (DNA OR RNA); SEVERE ACUTE RESPIRATORY SYNDROME CORONAVIRUS 2 (SARS-COV-2) (CORONAVIRUS DISEASE [COVID-19]), AMPLIFIED PROBE TECHNIQUE, MAKING USE OF HIGH THROUGHPUT TECHNOLOGIES AS DESCRIBED BY CMS-2020-01-R: HCPCS

## 2022-09-26 RX ORDER — CEFAZOLIN SODIUM/WATER 2 G/20 ML
2 SYRINGE (ML) INTRAVENOUS
Status: CANCELLED | OUTPATIENT
Start: 2022-09-28

## 2022-09-26 RX ORDER — CEFAZOLIN SODIUM/WATER 2 G/20 ML
2 SYRINGE (ML) INTRAVENOUS SEE ADMIN INSTRUCTIONS
Status: CANCELLED | OUTPATIENT
Start: 2022-09-28

## 2022-09-27 ENCOUNTER — ANESTHESIA EVENT (OUTPATIENT)
Dept: SURGERY | Facility: CLINIC | Age: 77
DRG: 474 | End: 2022-09-27
Payer: COMMERCIAL

## 2022-09-27 ENCOUNTER — HOSPITAL ENCOUNTER (INPATIENT)
Facility: CLINIC | Age: 77
LOS: 3 days | Discharge: HOME | DRG: 474 | End: 2022-10-01
Attending: ORTHOPAEDIC SURGERY | Admitting: ORTHOPAEDIC SURGERY
Payer: COMMERCIAL

## 2022-09-27 ENCOUNTER — ANESTHESIA (OUTPATIENT)
Dept: SURGERY | Facility: CLINIC | Age: 77
DRG: 474 | End: 2022-09-27
Payer: COMMERCIAL

## 2022-09-27 DIAGNOSIS — M00.9 INFECTION OF RIGHT KNEE (H): ICD-10-CM

## 2022-09-27 DIAGNOSIS — H25.813 COMBINED FORMS OF AGE-RELATED CATARACT OF BOTH EYES: Primary | ICD-10-CM

## 2022-09-27 DIAGNOSIS — Z89.519: ICD-10-CM

## 2022-09-27 LAB
ABO/RH(D): NORMAL
ALBUMIN SERPL-MCNC: 3 G/DL (ref 3.4–5)
ALP SERPL-CCNC: 73 U/L (ref 40–150)
ALT SERPL W P-5'-P-CCNC: 18 U/L (ref 0–50)
ANION GAP SERPL CALCULATED.3IONS-SCNC: 7 MMOL/L (ref 3–14)
ANTIBODY SCREEN: NEGATIVE
AST SERPL W P-5'-P-CCNC: 18 U/L (ref 0–45)
BILIRUB SERPL-MCNC: 0.4 MG/DL (ref 0.2–1.3)
BUN SERPL-MCNC: 8 MG/DL (ref 7–30)
CALCIUM SERPL-MCNC: 8.8 MG/DL (ref 8.5–10.1)
CHLORIDE BLD-SCNC: 104 MMOL/L (ref 94–109)
CO2 SERPL-SCNC: 24 MMOL/L (ref 20–32)
CREAT SERPL-MCNC: 0.48 MG/DL (ref 0.52–1.04)
CRP SERPL-MCNC: <2.9 MG/L (ref 0–8)
ERYTHROCYTE [DISTWIDTH] IN BLOOD BY AUTOMATED COUNT: 17 % (ref 10–15)
ERYTHROCYTE [SEDIMENTATION RATE] IN BLOOD BY WESTERGREN METHOD: 24 MM/HR (ref 0–30)
GFR SERPL CREATININE-BSD FRML MDRD: >90 ML/MIN/1.73M2
GLUCOSE BLD-MCNC: 98 MG/DL (ref 70–99)
HCT VFR BLD AUTO: 31.8 % (ref 35–47)
HGB BLD-MCNC: 10.7 G/DL (ref 11.7–15.7)
MCH RBC QN AUTO: 32 PG (ref 26.5–33)
MCHC RBC AUTO-ENTMCNC: 33.6 G/DL (ref 31.5–36.5)
MCV RBC AUTO: 95 FL (ref 78–100)
NT-PROBNP SERPL-MCNC: 1030 PG/ML (ref 0–1800)
PLATELET # BLD AUTO: 245 10E3/UL (ref 150–450)
POTASSIUM BLD-SCNC: 4.1 MMOL/L (ref 3.4–5.3)
PROT SERPL-MCNC: 6.6 G/DL (ref 6.8–8.8)
RBC # BLD AUTO: 3.34 10E6/UL (ref 3.8–5.2)
SARS-COV-2 RNA RESP QL NAA+PROBE: NEGATIVE
SODIUM SERPL-SCNC: 135 MMOL/L (ref 133–144)
SPECIMEN EXPIRATION DATE: NORMAL
WBC # BLD AUTO: 7.8 10E3/UL (ref 4–11)

## 2022-09-27 PROCEDURE — 80053 COMPREHEN METABOLIC PANEL: CPT | Performed by: CLINICAL NURSE SPECIALIST

## 2022-09-27 PROCEDURE — 86140 C-REACTIVE PROTEIN: CPT | Performed by: CLINICAL NURSE SPECIALIST

## 2022-09-27 PROCEDURE — 85027 COMPLETE CBC AUTOMATED: CPT | Performed by: CLINICAL NURSE SPECIALIST

## 2022-09-27 PROCEDURE — 258N000003 HC RX IP 258 OP 636: Performed by: ANESTHESIOLOGY

## 2022-09-27 PROCEDURE — 86901 BLOOD TYPING SEROLOGIC RH(D): CPT | Performed by: CLINICAL NURSE SPECIALIST

## 2022-09-27 PROCEDURE — 36415 COLL VENOUS BLD VENIPUNCTURE: CPT | Performed by: CLINICAL NURSE SPECIALIST

## 2022-09-27 PROCEDURE — 83880 ASSAY OF NATRIURETIC PEPTIDE: CPT | Performed by: INTERNAL MEDICINE

## 2022-09-27 PROCEDURE — 258N000003 HC RX IP 258 OP 636: Performed by: CLINICAL NURSE SPECIALIST

## 2022-09-27 PROCEDURE — 99222 1ST HOSP IP/OBS MODERATE 55: CPT | Mod: 57 | Performed by: CLINICAL NURSE SPECIALIST

## 2022-09-27 PROCEDURE — 85652 RBC SED RATE AUTOMATED: CPT | Performed by: CLINICAL NURSE SPECIALIST

## 2022-09-27 PROCEDURE — 250N000013 HC RX MED GY IP 250 OP 250 PS 637: Performed by: INTERNAL MEDICINE

## 2022-09-27 PROCEDURE — 710N000012 HC RECOVERY PHASE 2, PER MINUTE

## 2022-09-27 PROCEDURE — 3E0R33Z INTRODUCTION OF ANTI-INFLAMMATORY INTO SPINAL CANAL, PERCUTANEOUS APPROACH: ICD-10-PCS | Performed by: ANESTHESIOLOGY

## 2022-09-27 PROCEDURE — 99222 1ST HOSP IP/OBS MODERATE 55: CPT | Performed by: INTERNAL MEDICINE

## 2022-09-27 PROCEDURE — 250N000011 HC RX IP 250 OP 636: Performed by: ANESTHESIOLOGY

## 2022-09-27 PROCEDURE — 3E0R3BZ INTRODUCTION OF ANESTHETIC AGENT INTO SPINAL CANAL, PERCUTANEOUS APPROACH: ICD-10-PCS | Performed by: ANESTHESIOLOGY

## 2022-09-27 PROCEDURE — 82040 ASSAY OF SERUM ALBUMIN: CPT | Performed by: CLINICAL NURSE SPECIALIST

## 2022-09-27 PROCEDURE — 370N000017 HC ANESTHESIA TECHNICAL FEE, PER MIN

## 2022-09-27 RX ORDER — MULTIPLE VITAMINS W/ MINERALS TAB 9MG-400MCG
1 TAB ORAL DAILY
Status: DISCONTINUED | OUTPATIENT
Start: 2022-09-28 | End: 2022-10-01 | Stop reason: HOSPADM

## 2022-09-27 RX ORDER — NALOXONE HYDROCHLORIDE 0.4 MG/ML
0.2 INJECTION, SOLUTION INTRAMUSCULAR; INTRAVENOUS; SUBCUTANEOUS
Status: DISCONTINUED | OUTPATIENT
Start: 2022-09-27 | End: 2022-10-01 | Stop reason: HOSPADM

## 2022-09-27 RX ORDER — NALBUPHINE HYDROCHLORIDE 10 MG/ML
2.5-5 INJECTION, SOLUTION INTRAMUSCULAR; INTRAVENOUS; SUBCUTANEOUS EVERY 6 HOURS PRN
Status: DISCONTINUED | OUTPATIENT
Start: 2022-09-27 | End: 2022-09-30

## 2022-09-27 RX ORDER — PREDNISONE 5 MG/1
5 TABLET ORAL DAILY
Status: DISCONTINUED | OUTPATIENT
Start: 2022-09-28 | End: 2022-10-01 | Stop reason: HOSPADM

## 2022-09-27 RX ORDER — NALOXONE HYDROCHLORIDE 0.4 MG/ML
0.4 INJECTION, SOLUTION INTRAMUSCULAR; INTRAVENOUS; SUBCUTANEOUS
Status: DISCONTINUED | OUTPATIENT
Start: 2022-09-27 | End: 2022-10-01 | Stop reason: HOSPADM

## 2022-09-27 RX ORDER — SODIUM CHLORIDE 9 MG/ML
INJECTION, SOLUTION INTRAVENOUS CONTINUOUS
Status: DISCONTINUED | OUTPATIENT
Start: 2022-09-27 | End: 2022-09-30

## 2022-09-27 RX ORDER — ACETAMINOPHEN 325 MG/1
650 TABLET ORAL EVERY 4 HOURS
Status: DISCONTINUED | OUTPATIENT
Start: 2022-09-27 | End: 2022-10-01 | Stop reason: HOSPADM

## 2022-09-27 RX ORDER — KETOROLAC TROMETHAMINE 5 MG/ML
1 SOLUTION OPHTHALMIC 2 TIMES DAILY
Status: DISCONTINUED | OUTPATIENT
Start: 2022-09-28 | End: 2022-09-30

## 2022-09-27 RX ORDER — FOLIC ACID 1 MG/1
1 TABLET ORAL DAILY
Status: DISCONTINUED | OUTPATIENT
Start: 2022-09-28 | End: 2022-10-01 | Stop reason: HOSPADM

## 2022-09-27 RX ORDER — SIMVASTATIN 20 MG
20 TABLET ORAL AT BEDTIME
Status: DISCONTINUED | OUTPATIENT
Start: 2022-09-27 | End: 2022-10-01 | Stop reason: HOSPADM

## 2022-09-27 RX ORDER — PREDNISOLONE ACETATE 10 MG/ML
1 SUSPENSION/ DROPS OPHTHALMIC 2 TIMES DAILY
Status: DISCONTINUED | OUTPATIENT
Start: 2022-09-27 | End: 2022-09-30

## 2022-09-27 RX ORDER — METOPROLOL TARTRATE 50 MG
50 TABLET ORAL 2 TIMES DAILY
Status: DISCONTINUED | OUTPATIENT
Start: 2022-09-27 | End: 2022-10-01 | Stop reason: HOSPADM

## 2022-09-27 RX ADMIN — SODIUM CHLORIDE: 9 INJECTION, SOLUTION INTRAVENOUS at 12:30

## 2022-09-27 RX ADMIN — ACETAMINOPHEN 650 MG: 325 TABLET, FILM COATED ORAL at 22:17

## 2022-09-27 RX ADMIN — SIMVASTATIN 20 MG: 20 TABLET, FILM COATED ORAL at 22:17

## 2022-09-27 RX ADMIN — BUPIVACAINE HYDROCHLORIDE: 7.5 INJECTION, SOLUTION EPIDURAL; RETROBULBAR at 14:32

## 2022-09-27 ASSESSMENT — ACTIVITIES OF DAILY LIVING (ADL)
ADLS_ACUITY_SCORE: 45
ADLS_ACUITY_SCORE: 43
ADLS_ACUITY_SCORE: 41

## 2022-09-27 ASSESSMENT — COLUMBIA-SUICIDE SEVERITY RATING SCALE - C-SSRS
2. HAVE YOU ACTUALLY HAD ANY THOUGHTS OF KILLING YOURSELF IN THE PAST MONTH?: NO
5. HAVE YOU STARTED TO WORK OUT OR WORKED OUT THE DETAILS OF HOW TO KILL YOURSELF? DO YOU INTEND TO CARRY OUT THIS PLAN?: NO
4. HAVE YOU HAD THESE THOUGHTS AND HAD SOME INTENTION OF ACTING ON THEM?: NO
1. IN THE PAST MONTH, HAVE YOU WISHED YOU WERE DEAD OR WISHED YOU COULD GO TO SLEEP AND NOT WAKE UP?: NO
6. HAVE YOU EVER DONE ANYTHING, STARTED TO DO ANYTHING, OR PREPARED TO DO ANYTHING TO END YOUR LIFE?: NO
3. HAVE YOU BEEN THINKING ABOUT HOW YOU MIGHT KILL YOURSELF?: NO

## 2022-09-27 NOTE — PHARMACY-ADMISSION MEDICATION HISTORY
"  Admission Medication History Completed by Pharmacy    See [facility] Admission Navigator for allergy information, preferred outpatient pharmacy, prior to admission medications and immunization status.     Medication History Sources:     Patient, SureScripts, home med bottles    Changes made to PTA medication list (reason):    Added: None    Deleted: removed gabapentin, patient has not filled in the past year and reports she hasn't used it \"in a long time\"    Changed: Patient reports she only takes her metoprolol tartrate once daily instead of twice daily as prescribed.     Additional Information:    Patient has her home ketorolac and prednisolone eye drops with her at bedside. She reports that she has 1 day left in the BID dosing for both eye drops, and will move to 1 drop in left eye once daily for both drops on 9/29/22. Both eye drops will end on 10/6/22.     Patient reports that she completed the moxifloxacin eye drops PTA.    Prior to Admission medications    Medication Sig Last Dose Taking? Auth Provider Long Term End Date   acetaminophen (TYLENOL) 325 MG tablet Take 2 tablets (650 mg) by mouth every 4 hours Past Week at Unknown time Yes Trish Sahni MD     amLODIPine (NORVASC) 5 MG tablet Take 1 tablet (5 mg) by mouth daily 9/27/2022 at AM Yes Stefania King APRN CNP Yes    aspirin (ASA) 81 MG tablet Take 1 tablet (81 mg) by mouth daily Past Week at holding PTA Yes Nick De La Cruz MD No    calcium carbonate 600 mg-vitamin D 400 units (CALCIUM 600 + D) 600-400 MG-UNIT per tablet Take 2 tablets by mouth daily Past Week at holding PTA Yes Trish Sahni MD     ferrous sulfate (FEROSUL) 325 (65 Fe) MG tablet TAKE 1 TABLET BY MOUTH EVERY DAY WITH BREAKFAST  Patient taking differently: Take 325 mg by mouth twice a week Past Week at holding PTA Yes Stefania King APRN CNP     FOLIC ACID PO Take 1 mg by mouth daily Past Week at holding PTA Yes Reported, Patient     ketorolac " (ACULAR) 0.5 % ophthalmic solution Apply 1 drop to eye 4 times daily for 7 days, THEN 1 drop 3 times daily for 7 days, THEN 1 drop 2 times daily for 7 days, THEN 1 drop daily for 7 days. Start 2 days prior to surgery in operative eye. 9/27/2022 at pt will start 1 drop daily for 7 days next week, 1 day left of BID Yes Flip Izaguirre MD  9/27/22   leflunomide (ARAVA) 20 MG tablet Take 1 tablet by mouth every 24 hours 9/27/2022 at AM Yes Reported, Patient No    lisinopril (ZESTRIL) 20 MG tablet TAKE 1 TABLET BY MOUTH EVERY DAY 9/27/2022 at AM Yes Nick De La Cruz MD Yes    methotrexate 2.5 MG tablet Take 20 mg by mouth every 7 days On Saturdays 9/17/2022 at holding PTA Yes Reported, Patient No    metoprolol tartrate (LOPRESSOR) 50 MG tablet Take 1 tablet (50 mg) by mouth 2 times daily  Patient taking differently: Take 50 mg by mouth daily 9/27/2022 at AM, reports she does not take BID at home Yes Stefania King APRN CNP Yes    multivitamin w/minerals (THERA-VIT-M) tablet Take 1 tablet by mouth daily Past Week at holding PTA Yes Trish Sahni MD     prednisoLONE acetate (PRED FORTE) 1 % ophthalmic suspension Apply 1 drop to eye 4 times daily for 7 days, THEN 1 drop 3 times daily for 7 days, THEN 1 drop 2 times daily for 7 days, THEN 1 drop daily for 7 days. Start after surgery in operative eye 9/27/2022 at pt will start 1 drop daily for 7 days next week, 1 day left of BID Yes Flip Izaguirre MD  9/27/22   predniSONE (DELTASONE) 5 MG tablet Take 1 tablet (5 mg) by mouth daily 9/27/2022 at AM Yes Trish Sahni MD Yes    simvastatin (ZOCOR) 20 MG tablet Take 1 tablet (20 mg) by mouth At Bedtime 9/26/2022 at HS Yes Stefania King APRN CNP Yes        Date completed: 09/27/22    Medication history completed by: Radhika Gaona [facility]

## 2022-09-27 NOTE — CONSULTS
[facility]  Consult Note - Hospitalist Service, GOLD TEAM 17  Date of Admission:  9/27/2022  Consult Requested by: DESIRE Wood  Reason for Consult: Pre-op assessment; medical Co-mgt    Assessment & Plan   Sakshi Jaeger is a 77 year old female admitted on 9/27/2022. 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  Patient presents for right through knee amputation on 9/28/22.       #Pre-op risk assessment  #History of chronic right distal tibia infection  #Right distal lower leg chronic ulcer  Scheduled for above surgery  No acute/active cardiac complaints at this time  RCRi 1. But given patient's age and cardiac co-morbidity,    - will recommend obtaining BNP   - if BNP elevated, patient will need post-op trop & EKG  -Okay to continue holding ASA (will recommend restarting as soon as possible given history of drug-eluting stent to RCA)  - cont PTA Metoprolol 50 mg BID  - hold PTA lisinopril, Amlodipine to minimize risk of intra-op hypotension  -Holding gabapentin, patient reports taking infrequently.  - PTA Simvastatin  - Cont to hold home MTX   - given hx of prednisone, check AM cortisol, might require stress dose steroids if   -Otherwise, no additional diagnostic/ therapeutic interventions indicated before proceeding with surgery.  -Rest of care per primary team.      #Osteoporosis  -Continue home calcium carbonate, vitamin D supplements    #Anemia  Hgb this admission 10.7  Hx of Fe-def  - okay to hold PTA ferrous sulfate given concern for diarrhea per discussion with patient  - trend hgb     #hx of CAD w DISHA to RCA  #HTN - currently normotensive  - mgt as above  - trend hgb     #Hx of RA  - cont PTA Prednisone 5 mg every day  - holding PTA methotrexate (qweekly) & leflulomide     #Recent left  "eye cataract surgery  -Continue PTA Prednisone ophthalmic suspension 1-drop bid in left eye  -Continue PTA Ketoralac suspension 1-drop in left eye BID    #Cachexia, malnutrition  -Nutrition consult       The patient's care was discussed with the Patient and Patient's Family.    MARY BUCK MD  [facility]  Securely message with the Vocera Web Console (learn more here)  Text page via [facility] Paging/Directory   Please see signed in provider for up to date coverage information    Hospitalist Service, [facility] TEAM 17    Clinically Significant Risk Factors Present on Admission                 # Hypertension: home medication list includes antihypertensive(s)    # Cachexia: Estimated body mass index is 15.46 kg/m  as calculated from the following:    Height as of this encounter: 1.664 m (5' 5.5\").    Weight as of this encounter: 42.8 kg (94 lb 5.7 oz).        ______________________________________________________________________    Chief Complaint   Left leg stump infection    History is obtained from the patient  History obtained from patient's spouse    History of Present Illness   Sakshi Jaeger is a 77 year old female with PMHx of CAD s/p DISHA to RCA, RA on MTX, hypertension, hyperlipidemia, osteoporosis, anemia, with orthopedic history including below-R-knee amputation complicated by infection with chronic ulcer.  Patient presents for right through knee amputation on 9/28/22.  According to patient, patient underwent right below-knee amputation in 2005.  However in the past 1.5 yrs, she has had multiple leg wounds with requiring several I&D. Patient also reports MRI about 3 yrs ago that showed infection in her bone; she hasn't been on any prolonged abx. Patient reports that for the last 2-3 months, there has been increased drainage from the knee stump and after consultation with Dr. Mott, she was scheduled for the above procedure. She denies any leg pain, chest pain, sob, " abdominal pain, fever or chills.    Patient denies cigarette smoke, rarely drinks alcohol and denies any recreational drug use.       Review of Systems   Complete ROS was obtained with the patient at bedside with pertinent positives and negatives as detailed in the HPI. All other systems reviewed and otherwise negative.     Past Medical History    I have reviewed this patient's medical history and updated it with pertinent information if needed.   Past Medical History:   Diagnosis Date     Anemia 8/24/2022     BENIGN HYPERTENSION 3/1/2005     CAD (coronary artery disease) 1/26/2011     Coronary artery disease involving native coronary artery of native heart without angina pectoris 9/27/2016     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     Essential hypertension with goal blood pressure less than 140/90 8/25/2016     Hyperlipidemia LDL goal <100 11/12/2010     Hypertension goal BP (blood pressure) < 130/80 1/26/2011     Infection of right below knee amputation (H) 10/1/2019     IRON DEFIC ANEMIA NOS 9/15/2005     Lower limb amputation, below knee 12/26/2012     MI (myocardial infarction) (H)     3/2010     Non-healing surgical wound, subsequent encounter 9/24/2020    Added automatically from request for surgery [id number]     Osteoporosis 2/16/2005     OSTEOPOROSIS NOS 2/16/2005     Protein-calorie malnutrition (H) 5/18/2022     PVD (peripheral vascular disease) (H) 5/18/2022     Rheumatoid arthritis (H) 2/16/2005          Rheumatoid arthritis(714.0) 2/16/2005     Status post below-knee amputation (H) 12/26/2012            Past Surgical History   I have reviewed this patient's surgical history and updated it with pertinent information if needed.  Past Surgical History:   Procedure Laterality Date     ---------INCISION AND DRAINAGE  03/05/2014     AMPUTATION BELOW KNEE RT/LT  01/01/2005    right lowwer leg.      APPENDECTOMY       ARTHROPLASTY KNEE  04/27/2011    Procedure:ARTHROPLASTY KNEE; Surgeon:JESSE HAWKINS;  Location:[facility] OR     COMPLEX WOUND CLOSURE (UPDATE) Right 2021    Procedure: Right stump wound debridment and closure;  Surgeon: RAISA Perea MD;  Location: [facility] OR     CORONARY STENT PLACEMENT       IRRIGATION AND DEBRIDEMENT LOWER EXTREMITY, COMBINED Right 10/09/2019    Procedure: Irrigation and debridement Right leg;  Surgeon: Doron Mott MD;  Location: [facility] OR     OTHER SURGICAL HISTORY  2014    I AND D      OTHER SURGICAL HISTORY Right 10/09/2019    IRRIGATION AND DEBRIDEMENT LOWER EXTREMITY, COMBINED     PHACOEMULSIFICATION CLEAR CORNEA WITH STANDARD INTRAOCULAR LENS IMPLANT Left 2022    Procedure: PHACOEMULSIFICATION, LEFT CATARACT, WITH INTRAOCULAR LENS IMPLANT;  Surgeon: Flip Izaguirre MD;  Location: [facility] OR     REVISE SCAR EXTREMITY Right 2020    Procedure: Right stump scar revision;  Surgeon: RAISA Perea MD;  Location: [facility]       Social History   I have reviewed this patient's social history and updated it with pertinent information if needed.  Social History     Tobacco Use     Smoking status: Former Smoker     Packs/day: 0.50     Years: 45.00     Pack years: 22.50     Types: Cigarettes     Quit date: 2010     Years since quittin.5     Smokeless tobacco: Never Used   Vaping Use     Vaping Use: Never used   Substance Use Topics     Alcohol use: Yes     Comment: occsionally-3 -4 drinks a week     Drug use: No       Family History   I have reviewed this patient's family history and updated it with pertinent information if needed.  Family History   Problem Relation Age of Onset     Cardiovascular Mother      Cancer Brother      Diabetes No family hx of      Hypertension No family hx of      Cerebrovascular Disease No family hx of      Alzheimer Disease No family hx of      Thyroid Disease No family hx of      Respiratory No family hx of      Other - See Comments Mother         CARDIOVASCULAR     Cancer Brother        Medications    Medications Prior to Admission   Medication Sig Dispense Refill Last Dose     acetaminophen (TYLENOL) 325 MG tablet Take 2 tablets (650 mg) by mouth every 4 hours   Past Week at Unknown time     amLODIPine (NORVASC) 5 MG tablet Take 1 tablet (5 mg) by mouth daily 90 tablet 3 9/27/2022 at AM     aspirin (ASA) 81 MG tablet Take 1 tablet (81 mg) by mouth daily   Past Week at holding [facility]     calcium carbonate 600 mg-vitamin D 400 units (CALCIUM 600 + D) 600-400 MG-UNIT per tablet Take 2 tablets by mouth daily   Past Week at holding [facility]     ferrous sulfate (FEROSUL) 325 (65 Fe) MG tablet TAKE 1 TABLET BY MOUTH EVERY DAY WITH BREAKFAST (Patient taking differently: Take 325 mg by mouth twice a week) 90 tablet 1 Past Week at holding [facility]     FOLIC ACID PO Take 1 mg by mouth daily   Past Week at holding PTA     ketorolac (ACULAR) 0.5 % ophthalmic solution Apply 1 drop to eye 4 times daily for 7 days, THEN 1 drop 3 times daily for 7 days, THEN 1 drop 2 times daily for 7 days, THEN 1 drop daily for 7 days. Start 2 days prior to surgery in operative eye. 4 mL 1 9/27/2022 at pt will start 1 drop daily for 7 days next week, 1 day left of BID     leflunomide (ARAVA) 20 MG tablet Take 1 tablet by mouth every 24 hours   9/27/2022 at AM     lisinopril (ZESTRIL) 20 MG tablet TAKE 1 TABLET BY MOUTH EVERY DAY 90 tablet 3 9/27/2022 at AM     methotrexate 2.5 MG tablet Take 20 mg by mouth every 7 days On Saturdays 9/17/2022 at holding PTA     metoprolol tartrate (LOPRESSOR) 50 MG tablet Take 1 tablet (50 mg) by mouth 2 times daily (Patient taking differently: Take 50 mg by mouth daily) 90 tablet 3 9/27/2022 at AM, reports she does not take BID at home     multivitamin w/minerals (THERA-VIT-M) tablet Take 1 tablet by mouth daily   Past Week at holding [facility]     prednisoLONE acetate (PRED FORTE) 1 % ophthalmic suspension Apply 1 drop to eye 4 times daily for 7 days, THEN 1 drop 3 times daily for 7 days, THEN 1 drop 2 times daily for 7  days, THEN 1 drop daily for 7 days. Start after surgery in operative eye 4 mL 1 9/27/2022 at pt will start 1 drop daily for 7 days next week, 1 day left of BID     predniSONE (DELTASONE) 5 MG tablet Take 1 tablet (5 mg) by mouth daily  0 9/27/2022 at AM     simvastatin (ZOCOR) 20 MG tablet Take 1 tablet (20 mg) by mouth At Bedtime 90 tablet 3 9/26/2022 at HS       Allergies   No Known Allergies    Physical Exam   Vital Signs: Temp: 97.5  F (36.4  C) Temp src: Oral BP: 136/58 Pulse: 54   Resp: 16 SpO2: 99 %      Weight: 94 lbs 5.71 oz    General Appearance: Sitting comfortably in bed, on room air, in no acute distress of discomfort  HEENT: PERRL: EOMI; moist mucous membrane w/o lesions  Neck: No JVD  Pulmonary: Clear to auscultation bilaterally, no wheezes or crackles  CVS: Regular rhythm, no murmurs, rubs or gallops  GI: BS (+), soft nontender, no rebound or guarding   Extremities: s/p R-BKA (see image below), no evidence of effusion, drainage; well heel scar.     Skin: No rashes or lesions  Neurologic: A&O x3      Data   Results for orders placed or performed during the hospital encounter of 09/27/22 (from the past 24 hour(s))   CRP inflammation   Result Value Ref Range    CRP Inflammation <2.9 0.0 - 8.0 mg/L   Comprehensive metabolic panel   Result Value Ref Range    Sodium 135 133 - 144 mmol/L    Potassium 4.1 3.4 - 5.3 mmol/L    Chloride 104 94 - 109 mmol/L    Carbon Dioxide (CO2) 24 20 - 32 mmol/L    Anion Gap 7 3 - 14 mmol/L    Urea Nitrogen 8 7 - 30 mg/dL    Creatinine 0.48 (L) 0.52 - 1.04 mg/dL    Calcium 8.8 8.5 - 10.1 mg/dL    Glucose 98 70 - 99 mg/dL    Alkaline Phosphatase 73 40 - 150 U/L    AST 18 0 - 45 U/L    ALT 18 0 - 50 U/L    Protein Total 6.6 (L) 6.8 - 8.8 g/dL    Albumin 3.0 (L) 3.4 - 5.0 g/dL    Bilirubin Total 0.4 0.2 - 1.3 mg/dL    GFR Estimate >90 >60 mL/min/1.73m2   CBC with platelets   Result Value Ref Range    WBC Count 7.8 4.0 - 11.0 10e3/uL    RBC Count 3.34 (L) 3.80 - 5.20 10e6/uL     Hemoglobin 10.7 (L) 11.7 - 15.7 g/dL    Hematocrit 31.8 (L) 35.0 - 47.0 %    MCV 95 78 - 100 fL    MCH 32.0 26.5 - 33.0 pg    MCHC 33.6 31.5 - 36.5 g/dL    RDW 17.0 (H) 10.0 - 15.0 %    Platelet Count 245 150 - 450 10e3/uL   ABO/Rh type and screen    Narrative    The following orders were created for panel order ABO/Rh type and screen.  Procedure                               Abnormality         Status                     ---------                               -----------         ------                     Adult Type and Screen[[id number]]                            In process                   Please view results for these tests on the individual orders.     Most Recent 3 CBC's:Recent Labs   Lab Test 09/27/22  1334 08/24/22  1300 05/18/22  1220   WBC 7.8 8.8 9.0   HGB 10.7* 11.2* 11.9   MCV 95 97 98    322 378     Most Recent 3 BMP's:Recent Labs   Lab Test 09/27/22  1334 09/02/22  1229 07/14/22  1659 10/27/21  0936    133  --  133   POTASSIUM 4.1 4.0  --  3.9   CHLORIDE 104 103  --  102   CO2 24 26  --  25   BUN 8 8  --  11   CR 0.48* 0.55 0.5 0.55   ANIONGAP 7 4  --  6   ADRIAN 8.8 9.3  --  9.3   GLC 98 89  --  89     Most Recent 2 LFT's:Recent Labs   Lab Test 09/27/22  1334 09/02/22  1229   AST 18 22   ALT 18 23   ALKPHOS 73 64   BILITOTAL 0.4 0.4

## 2022-09-27 NOTE — PLAN OF CARE
"Goal Outcome Evaluation:    Plan of Care Reviewed With: patient     Overall Patient Progress: no change    Outcome Evaluation: Pt admitted for observation this shift prior to a revision of a right below the knee amputation. Pt oriented and cooperative with cares. Pt has wounds on the right knee that have not healed well. Pt had epidural placed this shift for nerve block during surgery. Pt also has PIV with fluids running at 50 ml/hr.    /50   Pulse 67   Temp 97.5  F (36.4  C) (Oral)   Resp 18   Ht 1.664 m (5' 5.5\")   Wt 42.8 kg (94 lb 5.7 oz)   LMP  (LMP Unknown)   SpO2 100%   BMI 15.46 kg/m      "

## 2022-09-27 NOTE — ANESTHESIA POSTPROCEDURE EVALUATION
Patient: Sakshi Jaeger    Procedure: Procedure(s):  BLOCK, NERVE       Anesthesia Type:  No value filed.    Note:  Disposition: Inpatient   Postop Pain Control: Uneventful            Sign Out: Well controlled pain   PONV: No   Neuro/Psych: Uneventful            Sign Out: Acceptable/Baseline neuro status   Airway/Respiratory: Uneventful            Sign Out: Acceptable/Baseline resp. status   CV/Hemodynamics: Uneventful            Sign Out: Acceptable CV status; No obvious hypovolemia; No obvious fluid overload   Other NRE: NONE   DID A NON-ROUTINE EVENT OCCUR? No           Last vitals:  Vitals:    09/27/22 1140 09/27/22 1145 09/27/22 1200   BP: (!) 145/60 131/62 119/50   Pulse:  58 67   Resp: 18 16 18   Temp:      SpO2: 100%  100%       Electronically Signed By: Chris Omer MD  September 27, 2022  11:56 PM

## 2022-09-27 NOTE — ANESTHESIA PREPROCEDURE EVALUATION
Anesthesia Pre-Procedure Evaluation    Patient: Sakshi Jaeger   MRN: [id number] : 1945        Procedure : Procedure(s):  BLOCK, NERVE          Past Medical History:   Diagnosis Date     Anemia 2022     BENIGN HYPERTENSION 3/1/2005     CAD (coronary artery disease) 2011     Coronary artery disease involving native coronary artery of native heart without angina pectoris 2016     Employs prosthetic leg 2012     Essential hypertension with goal blood pressure less than 140/90 2016     Hyperlipidemia LDL goal <100 2010     Hypertension goal BP (blood pressure) < 130/80 2011     Infection of right below knee amputation (H) 10/1/2019     IRON DEFIC ANEMIA NOS 9/15/2005     Lower limb amputation, below knee 2012     MI (myocardial infarction) (H)     3/2010     Non-healing surgical wound, subsequent encounter 2020    Added automatically from request for surgery [id number]     Osteoporosis 2005     OSTEOPOROSIS NOS 2005     Protein-calorie malnutrition (H) 2022     PVD (peripheral vascular disease) (H) 2022     Rheumatoid arthritis (H) 2005          Rheumatoid arthritis(714.0) 2005     Status post below-knee amputation (H) 2012           Past Surgical History:   Procedure Laterality Date     ---------INCISION AND DRAINAGE  2014     AMPUTATION BELOW KNEE RT/LT  2005    right lowwer leg.      APPENDECTOMY       ARTHROPLASTY KNEE  2011    Procedure:ARTHROPLASTY KNEE; Surgeon:JESSE HAWKINS; [facility]     COMPLEX WOUND CLOSURE (UPDATE) Right 2021    Procedure: Right stump wound debridment and closure;  Surgeon: RAISA Perea MD;  Location: [facility] OR     CORONARY STENT PLACEMENT       IRRIGATION AND DEBRIDEMENT LOWER EXTREMITY, COMBINED Right 10/09/2019    Procedure: Irrigation and debridement Right leg;  Surgeon: Doron Mott MD;  Location: [facility]     OTHER SURGICAL HISTORY  2014    I  AND D      OTHER SURGICAL HISTORY Right 10/09/2019    IRRIGATION AND DEBRIDEMENT LOWER EXTREMITY, COMBINED     PHACOEMULSIFICATION CLEAR CORNEA WITH STANDARD INTRAOCULAR LENS IMPLANT Left 2022    Procedure: PHACOEMULSIFICATION, LEFT CATARACT, WITH INTRAOCULAR LENS IMPLANT;  Surgeon: Flip Izaguirre MD;  Location: [facility]     REVISE SCAR EXTREMITY Right 2020    Procedure: Right stump scar revision;  Surgeon: RAISA Perea MD;  Location: [facility] OR      No Known Allergies   Social History     Tobacco Use     Smoking status: Former Smoker     Packs/day: 0.50     Years: 45.00     Pack years: 22.50     Types: Cigarettes     Quit date: 2010     Years since quittin.5     Smokeless tobacco: Never Used   Substance Use Topics     Alcohol use: Yes     Comment: occsionally-3 -4 drinks a week      Wt Readings from Last 1 Encounters:   22 42.8 kg (94 lb 5.7 oz)           Physical Exam    Airway        Mallampati: II   TM distance: > 3 FB   Neck ROM: full   Mouth opening: > 3 cm    Respiratory Devices and Support         Dental  no notable dental history         Cardiovascular   cardiovascular exam normal          Pulmonary   pulmonary exam normal                OUTSIDE LABS:  CBC:   Lab Results   Component Value Date    WBC 8.8 2022    WBC 9.0 2022    WBC  2022      Comment:      This is a corrected result. Previous result was 8.5 10e3/uL on 2022 at 12:52 PM CDT    HGB 11.2 (L) 2022    HGB 11.9 2022    HGB  2022      Comment:      Test credited, duplicate  This is a corrected result. Previous result was 11.6 g/dL on 2022 at 12:52 PM CDT    HCT 34.6 (L) 2022    HCT 36.6 2022    HCT  2022      Comment:      Test credited, duplicate  This is a corrected result. Previous result was 35.8 % on 2022 at 12:52 PM CDT     2022     2022    PLT  2022      Comment:      This is a corrected result.  Previous result was 378 10e3/uL on 5/18/2022 at 12:52 PM CDT     BMP:   Lab Results   Component Value Date     09/02/2022     10/27/2021    POTASSIUM 4.0 09/02/2022    POTASSIUM 3.9 10/27/2021    CHLORIDE 103 09/02/2022    CHLORIDE 102 10/27/2021    CO2 26 09/02/2022    CO2 25 10/27/2021    BUN 8 09/02/2022    BUN 11 10/27/2021    CR 0.55 09/02/2022    CR 0.5 07/14/2022    GLC 89 09/02/2022    GLC 89 10/27/2021     COAGS:   Lab Results   Component Value Date    PTT 39 (H) 10/01/2019    INR 1.31 (H) 10/09/2019     POC:   Lab Results   Component Value Date     (H) 05/03/2011     HEPATIC:   Lab Results   Component Value Date    ALBUMIN 3.3 (L) 09/02/2022    PROTTOTAL 6.6 (L) 09/02/2022    ALT 23 09/02/2022    AST 22 09/02/2022    ALKPHOS 64 09/02/2022    BILITOTAL 0.4 09/02/2022     OTHER:   Lab Results   Component Value Date    LACT 0.9 10/01/2019    A1C 5.6 07/07/2008    ADRIAN 9.3 09/02/2022    PHOS 3.9 06/08/2016    MAG 1.7 04/02/2010    LIPASE 37 (L) 10/01/2019    TSH 4.55 10/17/2019    CRP 4.6 (H) 11/18/2019    SED 48 (H) 11/18/2019       Anesthesia Plan    ASA Status:  3   - Procedure: Procedure only, no anesthetic delivered      Anesthesia Type: Epidural.              Consents    Anesthesia Plan(s) and associated risks, benefits, and realistic alternatives discussed. Questions answered and patient/representative(s) expressed understanding.    - Discussed:     - Discussed with:  Patient         Postoperative Care            Comments:                Chris Omer MD

## 2022-09-27 NOTE — ANESTHESIA PROCEDURE NOTES
Epidural catheter Procedure Note    Pre-Procedure   Staff -        Anesthesiologist:  Jose L Mack MD       Performed By: anesthesiologist       Location: pre-op       Procedure Start/Stop Times: 9/27/2022 11:36 AM       Pre-Anesthestic Checklist: patient identified, IV checked, risks and benefits discussed, informed consent, monitors and equipment checked, pre-op evaluation, at physician/surgeon's request and post-op pain management  Timeout:       Correct Patient: Yes        Correct Procedure: Yes        Correct Site: Yes        Correct Position: Yes   Procedure Documentation  Procedure: epidural catheter       Diagnosis: Amputation lower extremity       Patient Position: sitting       Skin prep: Betadine       Local skin infiltrated with 4 mL of 1% lidocaine.  (midline approach).       Technique: LORT saline        EMILI at 5 cm.       Needle Type: To"[employer]"y needle       Needle Gauge: 17.        Needle Length (Inches): 3.5        Catheter: 19 G.          Catheter threaded easily.         4 cm epidural space.         Threaded 9 cm at skin.         # of attempts: 1 and  # of redirects:  0    Assessment/Narrative         Paresthesias: No.       Test dose of 3 mL lidocaine 1.5% w/ 1:200,000 epinephrine at 11:50 CDT.        .       Insertion/Infusion Method: LORT saline       Aspiration negative for Heme or CSF via Epidural Catheter.    Medication(s) Administered   Medication Administration Time: 9/27/2022 11:36 AM

## 2022-09-27 NOTE — PLAN OF CARE
[id number]  VS: VSS   O2: >90% on RA   Output: Voiding adequately in BSC   Last BM: 9/27   Activity: WBAT LLE; SBA to transfer to BSC. Pt need to be told multiple times that she needs to have staff next to her while she transfers.    Up for meals? Yes   Skin: Wounds on R stump   Pain: Denies   CMS: Sl numbness d/t epidural   Dressing: Gauze on stump CDI. Back dressing CDI to keep epidural in place   Diet: Regular; NPO at MN   LDA: PIV infusing   Plan: Surgery tomorrow; scheduled for 1245; pt notified of time   Additional Info:

## 2022-09-27 NOTE — ANESTHESIA CARE TRANSFER NOTE
Patient: Sakshi Jaeger    Procedure: Procedure(s):  BLOCK, NERVE       Diagnosis: Pain management [R52]  Diagnosis Additional Information: No value filed.    Anesthesia Type:   No value filed.     Note:      Level of Consciousness: awake  Oxygen Supplementation: room air    Independent Airway: airway patency satisfactory and stable        Patient transferred to: PACU    Handoff Report: Identifed the Patient, Identified the Reponsible Provider, Reviewed the pertinent medical history, Discussed the surgical course, Reviewed Intra-OP anesthesia mangement and issues during anesthesia, Set expectations for post-procedure period and Allowed opportunity for questions and acknowledgement of understanding      Vitals:  Vitals Value Taken Time   BP     Temp     Pulse     Resp     SpO2         Electronically Signed By: Chris Omer MD  September 27, 2022  11:56 AM

## 2022-09-27 NOTE — CONSULTS
Orthopaedic Surgery Consultation    Sakshi Jaeger MRN# [id number]   Age: 77 year old YOB: 1945   Date of Admission:  9/27/2022    Reason for consult: Admission prior to    Requesting physician: Doron Mott, *            Impression and Recommendation (Resident / Clinician):   Impression:  Sakshi Jaeger is a right hand dominant 77 year old female with a significant PMH of peripheral vascular disease, RA and osteoporosis who presents pre-operatively for placement of epidural block prior to scheduled for right through the knee amputation on 9/28/2022 by Dr Mott.       Recomendations:  Operative Plan:    - NPO at midnight   - Labs: CBC, BMP, INR/PTT, Type and ScreenCross   - Hold anticoaguation morning of planned surgery   - Consent: To be obtained by resident   - Case request placed.  To OR on 9/28 at 11 am   - Medical clearance for surgery to be performed by Primary Team    Activity: WBAT. ROM as tolerated. No restrictions.  Diet: Okay to begin regular diet following epidural block. NPO at midnight.    [place] [facility]  Orthopedics       Attestation:  This patient was discussed with Dr Mott who agrees with the above.         Chief Complaint:     Right distal tibia chronic infection          History of Present Illness (Resident / Clinician):     Ms. Jaeger is a 77-year-old female who presented in the company of her  for evaluation of her right lower leg.  The patient reports to have a history of a below-the-knee amputation, which eventually has gotten infected and now she presents with a chronic ulcer.  The patient has been evaluated by Dr. Walker who has diagnosed her with a fair amount of abscess cavity within the canal of the tibia, which is preventing her from accomplish any definitive healing.     History obtained from patient interview and chart review.        Past Medical History:     Past Medical History:   Diagnosis Date     Anemia 8/24/2022     BENIGN  HYPERTENSION 3/1/2005     CAD (coronary artery disease) 1/26/2011     Coronary artery disease involving native coronary artery of native heart without angina pectoris 9/27/2016     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     Essential hypertension with goal blood pressure less than 140/90 8/25/2016     Hyperlipidemia LDL goal <100 11/12/2010     Hypertension goal BP (blood pressure) < 130/80 1/26/2011     Infection of right below knee amputation (H) 10/1/2019     IRON DEFIC ANEMIA NOS 9/15/2005     Lower limb amputation, below knee 12/26/2012     MI (myocardial infarction) (H)     3/2010     Non-healing surgical wound, subsequent encounter 9/24/2020    Added automatically from request for surgery [id number]     Osteoporosis 2/16/2005     OSTEOPOROSIS NOS 2/16/2005     Protein-calorie malnutrition (H) 5/18/2022     PVD (peripheral vascular disease) (H) 5/18/2022     Rheumatoid arthritis (H) 2/16/2005          Rheumatoid arthritis(714.0) 2/16/2005     Status post below-knee amputation (H) 12/26/2012               Past Surgical History:     Past Surgical History:   Procedure Laterality Date     ---------INCISION AND DRAINAGE  03/05/2014     AMPUTATION BELOW KNEE RT/LT  01/01/2005    right lowwer leg.      APPENDECTOMY       ARTHROPLASTY KNEE  04/27/2011    Procedure:ARTHROPLASTY KNEE; Surgeon:JESSE HAWKINS; [facility]     COMPLEX WOUND CLOSURE (UPDATE) Right 12/08/2021    Procedure: Right stump wound debridment and closure;  Surgeon: RAISA Perea MD;  Location: [facility] OR     CORONARY STENT PLACEMENT       IRRIGATION AND DEBRIDEMENT LOWER EXTREMITY, COMBINED Right 10/09/2019    Procedure: Irrigation and debridement Right leg;  Surgeon: Doron Mott MD;  Location: [facility] OR     OTHER SURGICAL HISTORY  03/05/2014    I AND D      OTHER SURGICAL HISTORY Right 10/09/2019    IRRIGATION AND DEBRIDEMENT LOWER EXTREMITY, COMBINED     PHACOEMULSIFICATION CLEAR CORNEA WITH STANDARD INTRAOCULAR LENS IMPLANT Left  "9/8/2022    Procedure: PHACOEMULSIFICATION, LEFT CATARACT, WITH INTRAOCULAR LENS IMPLANT;  Surgeon: Flip Izaguirre MD;  Location: [facility] OR     REVISE SCAR EXTREMITY Right 12/17/2020    Procedure: Right stump scar revision;  Surgeon: RAISA Perea MD;  Location: [facility] OR       Reviewed with patient       Social History:     Living situation: Lives with           Family History:   No family history of anesthesia, bleeding or clotting complications.           Allergies:   No Known Allergies          Medications:   Medication reviewed with patient and in chart.  Anticoagulation: None  Antibiotics: None          Review of Systems:   A 12 point ROS was conducted and was otherwise negative except for HPI above.          Physical Exam:     /58 (BP Location: Left arm, Patient Position: Supine)   Pulse 54   Temp 97.5  F (36.4  C) (Oral)   Resp 16   Ht 1.664 m (5' 5.5\")   Wt 42.8 kg (94 lb 5.7 oz)   LMP  (LMP Unknown)   SpO2 99%   BMI 15.46 kg/m    General: Awake, alert, cooperative, no apparent distress, appears stated age  HEENT: Normocephalic, atraumatic, EOMI, no scleral icterus  Respiratory: Breathing non-labored, no wheezing  Cardiovascular: Nontachycardic  Skin: No rashes or lesions  Neurological: A&Ox3, CN II-XII grossly intact    Musculoskeletal:    RLE:     BKA with stump; small open hole with drainage (I personally did not see drainage but per  and patient continuous.  Small open medial and lateral open wound.         Imaging:     CT FEMUR    Impression:  1.Soft tissue ulceration most prominent centrally to the distal tibial  stump with air and soft-tissue density extending into the distal  tibial excision cavity. Osseous enhancement of the osseous margins of  the excision cavity with nonenhancing fluid and possibly infected  tissue within the cavity, highly suspicious for gas-forming infection  of necrotic tissue.  2. Alternatively, gas could be trapped through " communication with the  distal skin defect, recommend clinical correlation. However, on CT no  definite communication with sinus tract through of the skin  identified]        [Consider Follow Up: Distal stump cavity with nonenhancing necrotic  tissue and gas forming infection suspected. Alternatively, gas could  be trapped through communication with the distal skin defect,  recommend clinical correlation. However, on CT no definite  communication with sinus tract through from the skin identified.]     This report will be copied to the [facility] to ensure a  provider acknowledges the finding. Access Center is available Monday  through Friday 8am-3:30 pm.            Laboratory date:   CBC:  Lab Results   Component Value Date    WBC 8.8 08/24/2022    HGB 11.2 (L) 08/24/2022     08/24/2022       BMP:  Lab Results   Component Value Date     09/02/2022    POTASSIUM 4.0 09/02/2022    CHLORIDE 103 09/02/2022    CO2 26 09/02/2022    BUN 8 09/02/2022    CR 0.55 09/02/2022    ANIONGAP 4 09/02/2022    ADRIAN 9.3 09/02/2022    GLC 89 09/02/2022       Inflammatory Markers:  Lab Results   Component Value Date    WBC 8.8 08/24/2022    WBC 9.0 05/18/2022    WBC  05/18/2022      Comment:      This is a corrected result. Previous result was 8.5 10e3/uL on 5/18/2022 at 12:52 PM CDT    CRP 4.6 (H) 11/18/2019    CRP 2.2 (H) 11/16/2019    CRP 2.1 (H) 11/11/2019    SED 48 (H) 11/18/2019    SED 48 (H) 11/16/2019    SED 44 (H) 11/11/2019

## 2022-09-28 ENCOUNTER — ANESTHESIA (OUTPATIENT)
Dept: SURGERY | Facility: CLINIC | Age: 77
DRG: 474 | End: 2022-09-28
Payer: COMMERCIAL

## 2022-09-28 ENCOUNTER — DOCUMENTATION ONLY (OUTPATIENT)
Dept: OTHER | Facility: CLINIC | Age: 77
End: 2022-09-28

## 2022-09-28 PROBLEM — M00.9 INFECTION OF RIGHT KNEE (H): Status: ACTIVE | Noted: 2022-09-28

## 2022-09-28 LAB — CORTIS SERPL-MCNC: 5.4 UG/DL

## 2022-09-28 PROCEDURE — 258N000003 HC RX IP 258 OP 636: Performed by: CLINICAL NURSE SPECIALIST

## 2022-09-28 PROCEDURE — 250N000025 HC SEVOFLURANE, PER MIN: Performed by: ORTHOPAEDIC SURGERY

## 2022-09-28 PROCEDURE — 250N000009 HC RX 250

## 2022-09-28 PROCEDURE — 250N000013 HC RX MED GY IP 250 OP 250 PS 637: Performed by: ANESTHESIOLOGY

## 2022-09-28 PROCEDURE — 258N000003 HC RX IP 258 OP 636: Performed by: REGISTERED NURSE

## 2022-09-28 PROCEDURE — 27598 AMPUTATE LOWER LEG AT KNEE: CPT | Mod: RT | Performed by: ORTHOPAEDIC SURGERY

## 2022-09-28 PROCEDURE — 250N000011 HC RX IP 250 OP 636

## 2022-09-28 PROCEDURE — 250N000011 HC RX IP 250 OP 636: Performed by: PHYSICIAN ASSISTANT

## 2022-09-28 PROCEDURE — 99207 PR NO CHARGE LOS: CPT | Performed by: CLINICAL NURSE SPECIALIST

## 2022-09-28 PROCEDURE — 250N000013 HC RX MED GY IP 250 OP 250 PS 637

## 2022-09-28 PROCEDURE — 250N000013 HC RX MED GY IP 250 OP 250 PS 637: Performed by: STUDENT IN AN ORGANIZED HEALTH CARE EDUCATION/TRAINING PROGRAM

## 2022-09-28 PROCEDURE — 250N000009 HC RX 250: Performed by: INTERNAL MEDICINE

## 2022-09-28 PROCEDURE — 258N000003 HC RX IP 258 OP 636: Performed by: ANESTHESIOLOGY

## 2022-09-28 PROCEDURE — 36415 COLL VENOUS BLD VENIPUNCTURE: CPT | Performed by: INTERNAL MEDICINE

## 2022-09-28 PROCEDURE — 272N000001 HC OR GENERAL SUPPLY STERILE: Performed by: ORTHOPAEDIC SURGERY

## 2022-09-28 PROCEDURE — 710N000010 HC RECOVERY PHASE 1, LEVEL 2, PER MIN: Performed by: ORTHOPAEDIC SURGERY

## 2022-09-28 PROCEDURE — 250N000011 HC RX IP 250 OP 636: Performed by: REGISTERED NURSE

## 2022-09-28 PROCEDURE — 99232 SBSQ HOSP IP/OBS MODERATE 35: CPT | Performed by: INTERNAL MEDICINE

## 2022-09-28 PROCEDURE — 88300 SURGICAL PATH GROSS: CPT | Mod: TC | Performed by: ORTHOPAEDIC SURGERY

## 2022-09-28 PROCEDURE — 250N000011 HC RX IP 250 OP 636: Performed by: ANESTHESIOLOGY

## 2022-09-28 PROCEDURE — 250N000012 HC RX MED GY IP 250 OP 636 PS 637: Performed by: INTERNAL MEDICINE

## 2022-09-28 PROCEDURE — 999N000141 HC STATISTIC PRE-PROCEDURE NURSING ASSESSMENT: Performed by: ORTHOPAEDIC SURGERY

## 2022-09-28 PROCEDURE — 120N000002 HC R&B MED SURG/OB UMMC

## 2022-09-28 PROCEDURE — 250N000013 HC RX MED GY IP 250 OP 250 PS 637: Performed by: INTERNAL MEDICINE

## 2022-09-28 PROCEDURE — 250N000009 HC RX 250: Performed by: REGISTERED NURSE

## 2022-09-28 PROCEDURE — 250N000011 HC RX IP 250 OP 636: Performed by: STUDENT IN AN ORGANIZED HEALTH CARE EDUCATION/TRAINING PROGRAM

## 2022-09-28 PROCEDURE — 82533 TOTAL CORTISOL: CPT | Performed by: INTERNAL MEDICINE

## 2022-09-28 PROCEDURE — 370N000017 HC ANESTHESIA TECHNICAL FEE, PER MIN: Performed by: ORTHOPAEDIC SURGERY

## 2022-09-28 PROCEDURE — 360N000076 HC SURGERY LEVEL 3, PER MIN: Performed by: ORTHOPAEDIC SURGERY

## 2022-09-28 PROCEDURE — 0Y6H0Z1 DETACHMENT AT RIGHT LOWER LEG, HIGH, OPEN APPROACH: ICD-10-PCS | Performed by: ORTHOPAEDIC SURGERY

## 2022-09-28 RX ORDER — PROPOFOL 10 MG/ML
INJECTION, EMULSION INTRAVENOUS PRN
Status: DISCONTINUED | OUTPATIENT
Start: 2022-09-28 | End: 2022-09-28

## 2022-09-28 RX ORDER — CEFAZOLIN SODIUM/WATER 2 G/20 ML
2 SYRINGE (ML) INTRAVENOUS SEE ADMIN INSTRUCTIONS
Status: DISCONTINUED | OUTPATIENT
Start: 2022-09-28 | End: 2022-09-28 | Stop reason: HOSPADM

## 2022-09-28 RX ORDER — ONDANSETRON 2 MG/ML
INJECTION INTRAMUSCULAR; INTRAVENOUS PRN
Status: DISCONTINUED | OUTPATIENT
Start: 2022-09-28 | End: 2022-09-28

## 2022-09-28 RX ORDER — HYDROMORPHONE HCL IN WATER/PF 6 MG/30 ML
0.2 PATIENT CONTROLLED ANALGESIA SYRINGE INTRAVENOUS
Status: DISCONTINUED | OUTPATIENT
Start: 2022-09-28 | End: 2022-10-01 | Stop reason: HOSPADM

## 2022-09-28 RX ORDER — SODIUM CHLORIDE, SODIUM LACTATE, POTASSIUM CHLORIDE, CALCIUM CHLORIDE 600; 310; 30; 20 MG/100ML; MG/100ML; MG/100ML; MG/100ML
INJECTION, SOLUTION INTRAVENOUS
Status: DISCONTINUED
Start: 2022-09-28 | End: 2022-09-29 | Stop reason: HOSPADM

## 2022-09-28 RX ORDER — SODIUM CHLORIDE, SODIUM LACTATE, POTASSIUM CHLORIDE, CALCIUM CHLORIDE 600; 310; 30; 20 MG/100ML; MG/100ML; MG/100ML; MG/100ML
INJECTION, SOLUTION INTRAVENOUS CONTINUOUS
Status: DISCONTINUED | OUTPATIENT
Start: 2022-09-28 | End: 2022-09-30

## 2022-09-28 RX ORDER — CEFAZOLIN SODIUM/WATER 2 G/20 ML
2 SYRINGE (ML) INTRAVENOUS
Status: COMPLETED | OUTPATIENT
Start: 2022-09-28 | End: 2022-09-28

## 2022-09-28 RX ORDER — ONDANSETRON 2 MG/ML
4 INJECTION INTRAMUSCULAR; INTRAVENOUS EVERY 6 HOURS PRN
Status: DISCONTINUED | OUTPATIENT
Start: 2022-09-28 | End: 2022-10-01 | Stop reason: HOSPADM

## 2022-09-28 RX ORDER — GLYCOPYRROLATE 0.2 MG/ML
INJECTION, SOLUTION INTRAMUSCULAR; INTRAVENOUS PRN
Status: DISCONTINUED | OUTPATIENT
Start: 2022-09-28 | End: 2022-09-28

## 2022-09-28 RX ORDER — FENTANYL CITRATE 50 UG/ML
25 INJECTION, SOLUTION INTRAMUSCULAR; INTRAVENOUS EVERY 5 MIN PRN
Status: DISCONTINUED | OUTPATIENT
Start: 2022-09-28 | End: 2022-09-28

## 2022-09-28 RX ORDER — ONDANSETRON 4 MG/1
4 TABLET, ORALLY DISINTEGRATING ORAL EVERY 30 MIN PRN
Status: DISCONTINUED | OUTPATIENT
Start: 2022-09-28 | End: 2022-09-28

## 2022-09-28 RX ORDER — HYDROMORPHONE HYDROCHLORIDE 1 MG/ML
0.2 INJECTION, SOLUTION INTRAMUSCULAR; INTRAVENOUS; SUBCUTANEOUS EVERY 5 MIN PRN
Status: DISCONTINUED | OUTPATIENT
Start: 2022-09-28 | End: 2022-09-28

## 2022-09-28 RX ORDER — OXYCODONE HYDROCHLORIDE 5 MG/1
5 TABLET ORAL EVERY 4 HOURS PRN
Status: DISCONTINUED | OUTPATIENT
Start: 2022-09-28 | End: 2022-09-28 | Stop reason: HOSPADM

## 2022-09-28 RX ORDER — FENTANYL CITRATE 50 UG/ML
INJECTION, SOLUTION INTRAMUSCULAR; INTRAVENOUS PRN
Status: DISCONTINUED | OUTPATIENT
Start: 2022-09-28 | End: 2022-09-28

## 2022-09-28 RX ORDER — CEFAZOLIN SODIUM 2 G/100ML
2 INJECTION, SOLUTION INTRAVENOUS EVERY 8 HOURS
Status: COMPLETED | OUTPATIENT
Start: 2022-09-28 | End: 2022-09-29

## 2022-09-28 RX ORDER — EPHEDRINE SULFATE 50 MG/ML
INJECTION, SOLUTION INTRAMUSCULAR; INTRAVENOUS; SUBCUTANEOUS PRN
Status: DISCONTINUED | OUTPATIENT
Start: 2022-09-28 | End: 2022-09-28

## 2022-09-28 RX ORDER — OXYCODONE HYDROCHLORIDE 5 MG/1
5 TABLET ORAL EVERY 4 HOURS PRN
Status: DISCONTINUED | OUTPATIENT
Start: 2022-09-28 | End: 2022-10-01 | Stop reason: HOSPADM

## 2022-09-28 RX ORDER — ONDANSETRON 2 MG/ML
4 INJECTION INTRAMUSCULAR; INTRAVENOUS EVERY 30 MIN PRN
Status: DISCONTINUED | OUTPATIENT
Start: 2022-09-28 | End: 2022-09-28

## 2022-09-28 RX ORDER — SODIUM CHLORIDE 9 MG/ML
INJECTION, SOLUTION INTRAVENOUS
Status: DISCONTINUED
Start: 2022-09-28 | End: 2022-09-28 | Stop reason: HOSPADM

## 2022-09-28 RX ORDER — LIDOCAINE HYDROCHLORIDE 20 MG/ML
INJECTION, SOLUTION INFILTRATION; PERINEURAL PRN
Status: DISCONTINUED | OUTPATIENT
Start: 2022-09-28 | End: 2022-09-28

## 2022-09-28 RX ORDER — SODIUM CHLORIDE, SODIUM LACTATE, POTASSIUM CHLORIDE, CALCIUM CHLORIDE 600; 310; 30; 20 MG/100ML; MG/100ML; MG/100ML; MG/100ML
INJECTION, SOLUTION INTRAVENOUS CONTINUOUS PRN
Status: DISCONTINUED | OUTPATIENT
Start: 2022-09-28 | End: 2022-09-28

## 2022-09-28 RX ORDER — ONDANSETRON 4 MG/1
4 TABLET, FILM COATED ORAL EVERY 6 HOURS PRN
Status: DISCONTINUED | OUTPATIENT
Start: 2022-09-28 | End: 2022-09-29

## 2022-09-28 RX ADMIN — KETOROLAC TROMETHAMINE 1 DROP: 5 SOLUTION/ DROPS OPHTHALMIC at 19:26

## 2022-09-28 RX ADMIN — FENTANYL CITRATE 25 MCG: 0.05 INJECTION, SOLUTION INTRAMUSCULAR; INTRAVENOUS at 16:39

## 2022-09-28 RX ADMIN — FENTANYL CITRATE 50 MCG: 50 INJECTION, SOLUTION INTRAMUSCULAR; INTRAVENOUS at 14:22

## 2022-09-28 RX ADMIN — HYDROMORPHONE HYDROCHLORIDE 0.2 MG: 0.2 INJECTION, SOLUTION INTRAMUSCULAR; INTRAVENOUS; SUBCUTANEOUS at 19:11

## 2022-09-28 RX ADMIN — GLYCOPYRROLATE 0.1 MG: 0.2 INJECTION, SOLUTION INTRAMUSCULAR; INTRAVENOUS at 13:48

## 2022-09-28 RX ADMIN — FOLIC ACID 1 MG: 1 TABLET ORAL at 08:19

## 2022-09-28 RX ADMIN — Medication 40 MG: at 13:41

## 2022-09-28 RX ADMIN — FENTANYL CITRATE 50 MCG: 50 INJECTION, SOLUTION INTRAMUSCULAR; INTRAVENOUS at 15:58

## 2022-09-28 RX ADMIN — HYDROMORPHONE HYDROCHLORIDE 0.2 MG: 1 INJECTION, SOLUTION INTRAMUSCULAR; INTRAVENOUS; SUBCUTANEOUS at 16:58

## 2022-09-28 RX ADMIN — CALCIUM CARBONATE 600 MG (1,500 MG)-VITAMIN D3 400 UNIT TABLET 2 TABLET: at 08:19

## 2022-09-28 RX ADMIN — SIMVASTATIN 20 MG: 20 TABLET, FILM COATED ORAL at 21:16

## 2022-09-28 RX ADMIN — METOPROLOL TARTRATE 50 MG: 50 TABLET, FILM COATED ORAL at 08:19

## 2022-09-28 RX ADMIN — PREDNISOLONE ACETATE 1 DROP: 10 SUSPENSION/ DROPS OPHTHALMIC at 09:39

## 2022-09-28 RX ADMIN — FENTANYL CITRATE 100 MCG: 50 INJECTION, SOLUTION INTRAMUSCULAR; INTRAVENOUS at 13:41

## 2022-09-28 RX ADMIN — ACETAMINOPHEN 650 MG: 325 TABLET, FILM COATED ORAL at 19:12

## 2022-09-28 RX ADMIN — SODIUM CHLORIDE: 9 INJECTION, SOLUTION INTRAVENOUS at 02:38

## 2022-09-28 RX ADMIN — SODIUM CHLORIDE, POTASSIUM CHLORIDE, SODIUM LACTATE AND CALCIUM CHLORIDE: 600; 310; 30; 20 INJECTION, SOLUTION INTRAVENOUS at 14:13

## 2022-09-28 RX ADMIN — PROPOFOL 100 MG: 10 INJECTION, EMULSION INTRAVENOUS at 13:41

## 2022-09-28 RX ADMIN — SUGAMMADEX 100 MG: 100 INJECTION, SOLUTION INTRAVENOUS at 15:20

## 2022-09-28 RX ADMIN — PREDNISOLONE ACETATE 1 DROP: 10 SUSPENSION/ DROPS OPHTHALMIC at 19:26

## 2022-09-28 RX ADMIN — LIDOCAINE HYDROCHLORIDE 100 MG: 20 INJECTION, SOLUTION INFILTRATION; PERINEURAL at 13:41

## 2022-09-28 RX ADMIN — ACETAMINOPHEN 650 MG: 325 TABLET, FILM COATED ORAL at 02:27

## 2022-09-28 RX ADMIN — PREDNISONE 5 MG: 5 TABLET ORAL at 08:19

## 2022-09-28 RX ADMIN — FENTANYL CITRATE 25 MCG: 0.05 INJECTION, SOLUTION INTRAMUSCULAR; INTRAVENOUS at 16:18

## 2022-09-28 RX ADMIN — Medication 2 G: at 13:47

## 2022-09-28 RX ADMIN — PHENYLEPHRINE HYDROCHLORIDE 100 MCG: 10 INJECTION INTRAVENOUS at 13:50

## 2022-09-28 RX ADMIN — ONDANSETRON 4 MG: 2 INJECTION INTRAMUSCULAR; INTRAVENOUS at 14:59

## 2022-09-28 RX ADMIN — Medication 15 MG: at 13:52

## 2022-09-28 RX ADMIN — ACETAMINOPHEN 650 MG: 325 TABLET, FILM COATED ORAL at 23:27

## 2022-09-28 RX ADMIN — FENTANYL CITRATE 25 MCG: 0.05 INJECTION, SOLUTION INTRAMUSCULAR; INTRAVENOUS at 16:24

## 2022-09-28 RX ADMIN — OXYCODONE HYDROCHLORIDE 5 MG: 5 TABLET ORAL at 17:01

## 2022-09-28 RX ADMIN — OXYCODONE HYDROCHLORIDE 5 MG: 5 TABLET ORAL at 21:16

## 2022-09-28 RX ADMIN — SODIUM CHLORIDE, POTASSIUM CHLORIDE, SODIUM LACTATE AND CALCIUM CHLORIDE: 600; 310; 30; 20 INJECTION, SOLUTION INTRAVENOUS at 21:56

## 2022-09-28 RX ADMIN — MULTIPLE VITAMINS W/ MINERALS TAB 1 TABLET: TAB at 08:18

## 2022-09-28 RX ADMIN — KETOROLAC TROMETHAMINE 1 DROP: 5 SOLUTION/ DROPS OPHTHALMIC at 09:40

## 2022-09-28 RX ADMIN — FENTANYL CITRATE 25 MCG: 0.05 INJECTION, SOLUTION INTRAMUSCULAR; INTRAVENOUS at 16:35

## 2022-09-28 ASSESSMENT — ACTIVITIES OF DAILY LIVING (ADL)
WALKING_OR_CLIMBING_STAIRS: OTHER (SEE COMMENTS)
CONCENTRATING,_REMEMBERING_OR_MAKING_DECISIONS_DIFFICULTY: NO
ADLS_ACUITY_SCORE: 44
ADLS_ACUITY_SCORE: 38
ADLS_ACUITY_SCORE: 38
DOING_ERRANDS_INDEPENDENTLY_DIFFICULTY: YES
WALKING_OR_CLIMBING_STAIRS_DIFFICULTY: YES
DRESSING/BATHING_DIFFICULTY: NO
ADLS_ACUITY_SCORE: 45
ADLS_ACUITY_SCORE: 44
ADLS_ACUITY_SCORE: 36
TRANSFERRING: 1-->ASSISTANCE (EQUIPMENT/PERSON) NEEDED
ADLS_ACUITY_SCORE: 45
CHANGE_IN_FUNCTIONAL_STATUS_SINCE_ONSET_OF_CURRENT_ILLNESS/INJURY: NO
FALL_HISTORY_WITHIN_LAST_SIX_MONTHS: NO
DIFFICULTY_EATING/SWALLOWING: NO
DIFFICULTY_COMMUNICATING: NO
TRANSFERRING: 0-->ASSISTANCE NEEDED (DEVELOPMENTALLY APPROPRIATE)
ADLS_ACUITY_SCORE: 44
ADLS_ACUITY_SCORE: 38
EQUIPMENT_CURRENTLY_USED_AT_HOME: WALKER, ROLLING
ADLS_ACUITY_SCORE: 44
TOILETING_ISSUES: NO
HEARING_DIFFICULTY_OR_DEAF: NO
ADLS_ACUITY_SCORE: 41
WEAR_GLASSES_OR_BLIND: NO
ADLS_ACUITY_SCORE: 38

## 2022-09-28 NOTE — ANESTHESIA PREPROCEDURE EVALUATION
Anesthesia Pre-Procedure Evaluation    Patient: Sakshi Jaeger   MRN: [id number] : 1945        Procedure : Procedure(s):  RIGHT AMPUTATION, THROUGH KNEE          Past Medical History:   Diagnosis Date     Anemia 2022     BENIGN HYPERTENSION 3/1/2005     CAD (coronary artery disease) 2011     Coronary artery disease involving native coronary artery of native heart without angina pectoris 2016     Employs prosthetic leg 2012     Essential hypertension with goal blood pressure less than 140/90 2016     Hyperlipidemia LDL goal <100 2010     Hypertension goal BP (blood pressure) < 130/80 2011     Infection of right below knee amputation (H) 10/1/2019     IRON DEFIC ANEMIA NOS 9/15/2005     Lower limb amputation, below knee 2012     MI (myocardial infarction) (H)     3/2010     Non-healing surgical wound, subsequent encounter 2020    Added automatically from request for surgery [id number]     Osteoporosis 2005     OSTEOPOROSIS NOS 2005     Protein-calorie malnutrition (H) 2022     PVD (peripheral vascular disease) (H) 2022     Rheumatoid arthritis (H) 2005          Rheumatoid arthritis(714.0) 2005     Status post below-knee amputation (H) 2012           Past Surgical History:   Procedure Laterality Date     ---------INCISION AND DRAINAGE  2014     AMPUTATION BELOW KNEE RT/LT  2005    right lowwer leg.      APPENDECTOMY       ARTHROPLASTY KNEE  2011    Procedure:ARTHROPLASTY KNEE; Surgeon:JESSE HAWKINS; [facility]     COMPLEX WOUND CLOSURE (UPDATE) Right 2021    Procedure: Right stump wound debridment and closure;  Surgeon: RAISA Perea MD;  Location: [facility] OR     CORONARY STENT PLACEMENT       IRRIGATION AND DEBRIDEMENT LOWER EXTREMITY, COMBINED Right 10/09/2019    Procedure: Irrigation and debridement Right leg;  Surgeon: Doron Mott MD;  Location: [facility] OR     NERVE BLOCK  PERIPHERAL N/A 2022    Procedure: BLOCK, NERVE;  Surgeon: GENERIC ANESTHESIA PROVIDER;  Location: [facility] OR     OTHER SURGICAL HISTORY  2014    I AND D      OTHER SURGICAL HISTORY Right 10/09/2019    IRRIGATION AND DEBRIDEMENT LOWER EXTREMITY, COMBINED     PHACOEMULSIFICATION CLEAR CORNEA WITH STANDARD INTRAOCULAR LENS IMPLANT Left 2022    Procedure: PHACOEMULSIFICATION, LEFT CATARACT, WITH INTRAOCULAR LENS IMPLANT;  Surgeon: Flip Izaguirre MD;  Location: [facility] OR     REVISE SCAR EXTREMITY Right 2020    Procedure: Right stump scar revision;  Surgeon: RAISA Perea MD;  Location: [facility] OR      No Known Allergies   Social History     Tobacco Use     Smoking status: Former Smoker     Packs/day: 0.50     Years: 45.00     Pack years: 22.50     Types: Cigarettes     Quit date: 2010     Years since quittin.5     Smokeless tobacco: Never Used   Substance Use Topics     Alcohol use: Yes     Comment: occsionally-3 -4 drinks a week      Wt Readings from Last 1 Encounters:   22 42.8 kg (94 lb 5.7 oz)        Anesthesia Evaluation            ROS/MED HX  ENT/Pulmonary:       Neurologic:       Cardiovascular:     (+) Dyslipidemia hypertension--CAD ---    METS/Exercise Tolerance:     Hematologic:       Musculoskeletal:   (+) arthritis,     GI/Hepatic:       Renal/Genitourinary:       Endo:       Psychiatric/Substance Use:       Infectious Disease:       Malignancy:       Other:            Physical Exam    Airway        Mallampati: II       Respiratory Devices and Support         Dental       (+) partials      Cardiovascular          Rhythm and rate: regular     Pulmonary   pulmonary exam normal                OUTSIDE LABS:  CBC:   Lab Results   Component Value Date    WBC 7.8 2022    WBC 8.8 2022    HGB 10.7 (L) 2022    HGB 11.2 (L) 2022    HCT 31.8 (L) 2022    HCT 34.6 (L) 2022     2022     2022     BMP:   Lab Results    Component Value Date     09/27/2022     09/02/2022    POTASSIUM 4.1 09/27/2022    POTASSIUM 4.0 09/02/2022    CHLORIDE 104 09/27/2022    CHLORIDE 103 09/02/2022    CO2 24 09/27/2022    CO2 26 09/02/2022    BUN 8 09/27/2022    BUN 8 09/02/2022    CR 0.48 (L) 09/27/2022    CR 0.55 09/02/2022    GLC 98 09/27/2022    GLC 89 09/02/2022     COAGS:   Lab Results   Component Value Date    PTT 39 (H) 10/01/2019    INR 1.31 (H) 10/09/2019     POC:   Lab Results   Component Value Date     (H) 05/03/2011     HEPATIC:   Lab Results   Component Value Date    ALBUMIN 3.0 (L) 09/27/2022    PROTTOTAL 6.6 (L) 09/27/2022    ALT 18 09/27/2022    AST 18 09/27/2022    ALKPHOS 73 09/27/2022    BILITOTAL 0.4 09/27/2022     OTHER:   Lab Results   Component Value Date    LACT 0.9 10/01/2019    A1C 5.6 07/07/2008    ADRIAN 8.8 09/27/2022    PHOS 3.9 06/08/2016    MAG 1.7 04/02/2010    LIPASE 37 (L) 10/01/2019    TSH 4.55 10/17/2019    CRP <2.9 09/27/2022    SED 24 09/27/2022       Anesthesia Plan    ASA Status:  3      Anesthesia Type: General.     - Airway: ETT   Induction: Intravenous.   Maintenance: Balanced.   Techniques and Equipment:     - Lines/Monitors: 2nd IV     Consents    Anesthesia Plan(s) and associated risks, benefits, and realistic alternatives discussed. Questions answered and patient/representative(s) expressed understanding.    - Discussed:     - Discussed with:  Patient         Postoperative Care       PONV prophylaxis: Ondansetron (or other 5HT-3), Dexamethasone or Solumedrol     Comments:                Jeremiah Gamez DO

## 2022-09-28 NOTE — PLAN OF CARE
"VS: /59 (BP Location: Left arm, Patient Position: Supine)   Pulse 62   Temp (!) 96.5  F (35.8  C) (Oral)   Resp 18   Ht 1.664 m (5' 5.5\")   Wt 42.8 kg (94 lb 5.7 oz)   LMP  (LMP Unknown)   SpO2 96%   BMI 15.46 kg/m     O2: O2>95% ORA, denies SOB, lightheadedness, lungs clear, equal bilateral.    Output: Voids adequately in bathroom and BSC.    Last BM: 9/27   Activity: WBAT. Pt is able to pivot out of bed to transfer to bed. SBA. Rides on home walker to move in room, Ax1   Up for meals? NPO for surgery   Skin: Wound on R stump.    Pain: Denies, epidural in place   CMS: AOx4, denies numbness and tingling.    Dressing: Dressing on R stump, CDI.   Epidural dressing on lower back CDI   Diet: NPO for surgery - Revision to R BKA.   AM meds given with a sip of water, MD notified.    LDA: PIV R forearm.   Epidural infusing  ml/hr   Plan: Pt left unit for PACU at 1125 on bed. Fiance present and followed to PACU.   Author gave report to PACU nurse prior to leaving unit    Additional Info:  2nd CHG bath given in AM, linen changed        "

## 2022-09-28 NOTE — ANESTHESIA POSTPROCEDURE EVALUATION
Patient: Sakshi Jaeger    Procedure: Procedure(s):  RIGHT AMPUTATION, THROUGH KNEE       Anesthesia Type:  General    Note:     Postop Pain Control: Uneventful            Sign Out: Well controlled pain   PONV: No   Neuro/Psych: Uneventful            Sign Out: Acceptable/Baseline neuro status   Airway/Respiratory: Uneventful            Sign Out: Acceptable/Baseline resp. status   CV/Hemodynamics: Uneventful            Sign Out: Acceptable CV status; No obvious hypovolemia; No obvious fluid overload   Other NRE: NONE   DID A NON-ROUTINE EVENT OCCUR? No    Event details/Postop Comments:  Existing epidural catheter from yesterday. No pain relief or motor block with 10ccs of 2% lidocaine with epinephrine. Will continue 0.125% bupivicaine.            Last vitals:  Vitals Value Taken Time   /77 09/28/22 1645   Temp 36.5  C (97.7  F) 09/28/22 1550   Pulse 67 09/28/22 1655   Resp 12 09/28/22 1655   SpO2 98 % 09/28/22 1655   Vitals shown include unvalidated device data.    Electronically Signed By: Jeremiah Gamez DO  September 28, 2022  4:56 PM

## 2022-09-28 NOTE — PROGRESS NOTES
9/28/2022    Patient seen this AM.    Doing well.  Anticipating surgery today with Dr Mott at 11 am.    NPO since midnight.    Per Medicine team:    Pre-op risk assessment  #History of chronic right distal tibia infection  #Right distal lower leg chronic ulcer  Scheduled for above surgery  No acute/active cardiac complaints at this time  RCRi 1. But given patient's age and cardiac co-morbidity,      - will recommend obtaining BNP              - if BNP elevated, patient will need post-op trop & EKG  -Okay to continue holding ASA (will recommend restarting as soon as possible given history of drug-eluting stent to RCA)  - cont PTA Metoprolol 50 mg BID  - hold PTA lisinopril, Amlodipine to minimize risk of intra-op hypotension  -Holding gabapentin, patient reports taking infrequently.  - PTA Simvastatin  - Cont to hold home MTX   - given hx of prednisone, check AM cortisol, might require stress dose steroids if   -Otherwise, no additional diagnostic/ therapeutic interventions indicated before proceeding with surgery.  -Rest of care per primary team.    Will continue to follow post surgically.    Plan discharge to home pending therapies.    Italia Shields APRN CNS  Orthopedics

## 2022-09-28 NOTE — OR NURSING
PACU to Inpatient Nursing Handoff    Patient Sakshi Jaeger is a 77 year old female who speaks English.   Procedure Procedure(s):  RIGHT AMPUTATION, THROUGH KNEE   Surgeon(s) Primary: Doron Mott MD  Assisting: Stefania Madera PA-C     No Known Allergies    Isolation  [unfilled]     Past Medical History   has a past medical history of Anemia (8/24/2022), BENIGN HYPERTENSION (3/1/2005), CAD (coronary artery disease) (1/26/2011), Coronary artery disease involving native coronary artery of native heart without angina pectoris (9/27/2016), Employs prosthetic leg (12/26/2012), Essential hypertension with goal blood pressure less than 140/90 (8/25/2016), Hyperlipidemia LDL goal <100 (11/12/2010), Hypertension goal BP (blood pressure) < 130/80 (1/26/2011), Infection of right below knee amputation (H) (10/1/2019), IRON DEFIC ANEMIA NOS (9/15/2005), Lower limb amputation, below knee (12/26/2012), MI (myocardial infarction) (H), Non-healing surgical wound, subsequent encounter (9/24/2020), Osteoporosis (2/16/2005), OSTEOPOROSIS NOS (2/16/2005), Protein-calorie malnutrition (H) (5/18/2022), PVD (peripheral vascular disease) (H) (5/18/2022), Rheumatoid arthritis (H) (2/16/2005), Rheumatoid arthritis(714.0) (2/16/2005), and Status post below-knee amputation (H) (12/26/2012).    Anesthesia General   Dermatome Level Dermatomes Left:  (full sensation)  Dermatomes Right:  (full sensation)   Preop Meds Not applicable   Nerve block Not applicable   Intraop Meds fentanyl (Sublimaze): 200 mcg total  ondansetron (Zofran): last given at 1459  bupivicaqine epidural infusion   Local Meds No   Antibiotics cefazolin (Ancef) - last given at 1347     Pain Patient Currently in Pain: yes   PACU meds  fentanyl (Sublimaze): 100 mcg (total dose) last given at 1639   hydromorphone (Dilaudid): 0.2 mg (total dose) last given at 1658   oxycodone (Roxicodone): 5 mg (total dose) last given at 1701    PCA / epidural Yes. Epidural -  bupivacaine   Capnography     Telemetry ECG Rhythm: Sinus rhythm   Inpatient Telemetry Monitor Ordered? No        Labs Glucose Lab Results   Component Value Date    GLC 98 09/27/2022    GLC 77 11/11/2019       Hgb Lab Results   Component Value Date    HGB 10.7 09/27/2022    HGB 12.2 03/12/2020       INR Lab Results   Component Value Date    INR 1.31 10/09/2019      PACU Imaging Not applicable     Wound/Incision Wound 10/01/19 Anterior;Right Knee Amputation;Abrasion(s) (Active)   Number of days: 1093       Incision/Surgical Site 04/27/11 Midline Knee (Active)   Number of days: 4172       Incision/Surgical Site 10/09/19 Right Leg (Active)   Number of days: 1085       Incision/Surgical Site 12/17/20 Right Leg (Active)   Number of days: 650       Incision/Surgical Site 09/08/22 Left Face (Active)   Incision Assessment UTV 09/08/22 1045   Dressing Intervention Clean, dry, intact;Open to air / No Dressing 09/08/22 1045   Number of days: 20       Incision/Surgical Site 09/28/22 Right Knee (Active)   Incision Assessment [facility] 09/28/22 1509   Dressing Intervention Clean, dry, intact 09/28/22 1547   Number of days: 0      CMS        Equipment Not applicable   Other LDA       IV Access Peripheral IV 09/28/22 Right Lower forearm (Active)   Site Assessment [facility] 09/28/22 1547   Line Status Infusing 09/28/22 1547   Number of days: 0       Epidural/Intrathecal Catheter 09/27/22 Epidural (Active)   Site Assessment [facility] 09/28/22 1547   Line Status Infusing 09/28/22 1547   Dressing Type Securing device;Transparent 09/28/22 1547   Dressing Status Dressing  dry and intact 09/28/22 1547   Number of days: 1      Blood Products Not applicable EBL 20 mL   Intake/Output Date 09/28/22 0700 - 09/29/22 0659   Shift 7503-8937 3971-7464 4733-2052 24 Hour Total   INTAKE   P.O.  200  200   I.V. 150 500  650   Shift Total(mL/kg) 150(3.5) 700(16.36)  850(19.86)   OUTPUT   Blood 20   20   Shift Total(mL/kg) 20(0.47)   20(0.47)   Weight (kg) 42.8 42.8 42.8  42.8      Drains / Nolan Closed/Suction Drain  Right Leg Bulb 10 Greek medial - deep, lateral - superficial (Active)   Site Description WDL 09/28/22 1547   Dressing Status Normal: Clean, Dry & Intact 09/28/22 1547   Drainage Appearance Bloody/Bright Red 09/28/22 1547   Status To bulb suction 09/28/22 1547   Number of days: 0      Time of void PreOp Void Prior to Procedure: 0900 (09/28/22 1141)    PostOp Voided (mL): 250 mL (09/27/22 2200)  Urine Occurrence: 1 (09/27/22 2200)    Diapered? No   Bladder Scan Bladder Scan Volume (mL): 325 ml (09/28/22 1700)    mL (09/28/22 1700)  tolerating sips     Vitals    B/P: (!) 165/86  T: 97.7  F (36.5  C)    Temp src: Axillary  P:  Pulse: 68 (09/28/22 1700)          R: 10  O2:  SpO2: 96 %    O2 Device: None (Room air) (09/28/22 0937)    Oxygen Delivery: 2 LPM (09/28/22 1620)         Family/support present significant other   Patient belongings     Patient transported on bed   DC meds/scripts (obs/outpt) Not applicable   Inpatient Pain Meds Released? Yes       Special needs/considerations None   Tasks needing completion None       Isidra Cerda, RN  ASCOM [id number]

## 2022-09-28 NOTE — ANESTHESIA CARE TRANSFER NOTE
Patient: Sakshi Jaeger    Procedure: Procedure(s):  RIGHT AMPUTATION, THROUGH KNEE       Diagnosis: Arthralgia of right knee [M25.561]  Diagnosis Additional Information: No value filed.    Anesthesia Type:   General     Note:    Oropharynx: oropharynx clear of all foreign objects and spontaneously breathing  Level of Consciousness: awake  Oxygen Supplementation: face mask  Level of Supplemental Oxygen (L/min / FiO2): 6  Independent Airway: airway patency satisfactory and stable  Dentition: dentition unchanged  Vital Signs Stable: post-procedure vital signs reviewed and stable  Report to RN Given: handoff report given  Patient transferred to: PACU    Handoff Report: Identifed the Patient, Identified the Reponsible Provider, Reviewed the pertinent medical history, Discussed the surgical course, Reviewed Intra-OP anesthesia mangement and issues during anesthesia, Set expectations for post-procedure period and Allowed opportunity for questions and acknowledgement of understanding      Vitals:  Vitals Value Taken Time   /75 09/28/22 1600   Temp 36.5    Pulse 58 09/28/22 1603   Resp 24 09/28/22 1603   SpO2 100 % 09/28/22 1603   Vitals shown include unvalidated device data.    Electronically Signed By: DESIRE Jackson CRNA  September 28, 2022  4:03 PM

## 2022-09-28 NOTE — OR NURSING
Admit to pacu. Left arm iv pulled out. Inserted iv in right arm, discontinued iv in right arm that was no longer functioning. Complained of severe pain in right leg, said it was aching. Dr Domínguez injected lidocaine with epi into epidural catheter. Patient denies numbness or tingling. Left leg strong, able to  leg off the bed. Dr Domínguez said to go ahead and medicate for pain. Pain on discharge from pacu at 5 out of 10. Bladder scanned for 325cc and patient does not feel the need to void. Transported to room in bed.

## 2022-09-28 NOTE — PROGRESS NOTES
[facility]    Medicine Progress Note - Hospitalist Service, GOLD TEAM 17    Date of Admission:  9/27/2022    Assessment & Plan            Sakshi Jaeger is a 77 year old female admitted on 9/27/2022. 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  Patient presents for right through knee amputation on 9/28/22.       #Pre-op risk assessment  #History of chronic right distal tibia infection  #Right distal lower leg chronic ulcer  Scheduled for above surgery  No acute/active cardiac complaints at this time  RCRi 1. But given patient's age and cardiac co-morbidity,    BNP normal    - no indication for post-op trop or EKG  -Okay to continue holding ASA (will recommend restarting as soon as possible given history of drug-eluting stent to RCA)  - cont PTA Metoprolol 50 mg BID  - hold PTA lisinopril, Amlodipine to minimize risk of intra-op hypotension  -Holding gabapentin, patient reports taking infrequently.  - PTA Simvastatin  - Cont to hold home MTX   - AM cortisol yet to result, but low likelihood for depressed H-P-A axis; if intra-op or post-op hypotension, will recommend given stress dose steroids with IV hydrocortisone   -Rest of care per primary team.    #Osteoporosis  -Continue home calcium carbonate, vitamin D supplements    #Anemia  Hgb this admission 10.7  Hx of Fe-def  - okay to hold PTA ferrous sulfate given concern for diarrhea per discussion with patient  - trend hgb     #hx of CAD w DISHA to RCA  #HTN - currently normotensive  - mgt as above      #Hx of RA  - cont PTA Prednisone 5 mg every day  - holding PTA methotrexate (qweekly) & leflulomide     #Recent left eye cataract surgery  -Continue PTA Prednisone ophthalmic suspension 1-drop bid in left eye  -Continue PTA Ketoralac suspension 1-drop in left  "eye BID    #Low BMI  -Nutrition consult         Diet: NPO per Anesthesia Guidelines for Procedure/Surgery Except for: Meds    DVT Prophylaxis: Defer to primary service  Nolan Catheter: Not present  Central Lines: None  Cardiac Monitoring: None  Code Status:   Defer to Primary service, will presumed Full since she's scheduled for surgery today    Disposition Plan      Expected Discharge Date: 09/30/2022                The patient's care was discussed with the Patient.    MARY BUCK MD  Hospitalist Service, GOLD TEAM [address] [place] [address]  Securely message with the Vocera Web Console (learn more here)  Text page via [facility] Paging/Directory   Please see signed in provider for up to date coverage information      Clinically Significant Risk Factors Present on Admission             # Hypoalbuminemia: Albumin = 3.0 g/dL (Ref range: 3.4 - 5.0 g/dL) on admission, will monitor as appropriate     # Hypertension: home medication list includes antihypertensive(s)    # Cachexia: Estimated body mass index is 15.46 kg/m  as calculated from the following:    Height as of this encounter: 1.664 m (5' 5.5\").    Weight as of this encounter: 42.8 kg (94 lb 5.7 oz).        ______________________________________________________________________    Interval History   NAEON  Patient denies any complaints this morning     Data reviewed today: I reviewed all medications, new labs and imaging results over the last 24 hours. I personally reviewed no images or EKG's today.    Physical Exam   Vital Signs: Temp: (!) 96.5  F (35.8  C) Temp src: Oral BP: 128/59 Pulse: 62   Resp: 18 SpO2: 96 % O2 Device: None (Room air)    Weight: 94 lbs 5.71 oz    General Appearance: Sitting comfortably in bed, on room air, in no acute distress of discomfort  HEENT: PERRL: EOMI; moist mucous membrane w/o lesions  Neck: No JVD  Pulmonary: Clear to auscultation bilaterally, no wheezes or crackles  CVS: Regular rhythm, no " murmurs, rubs or gallops  GI: BS (+), soft nontender, no rebound or guarding   Extremities: s/p R-BKA, no evidence of effusion, drainage; well heel scar.   Skin: No rashes or lesions  Neurologic: A&O x3      Data   Recent Labs   Lab 09/27/22  1334   WBC 7.8   HGB 10.7*   MCV 95         POTASSIUM 4.1   CHLORIDE 104   CO2 24   BUN 8   CR 0.48*   ANIONGAP 7   ADRIAN 8.8   GLC 98   ALBUMIN 3.0*   PROTTOTAL 6.6*   BILITOTAL 0.4   ALKPHOS 73   ALT 18   AST 18     No results found for this or any previous visit (from the past 24 hour(s)).  Medications     EPIDURAL INFUSION 6 mL/hr at 09/27/22 1432     - MEDICATION INSTRUCTIONS -       sodium chloride Stopped (09/28/22 1413)       [Auto Hold] acetaminophen  650 mg Oral Q4H     [Auto Hold] calcium carbonate 600 mg-vitamin D 400 units  2 tablet Oral Daily     ceFAZolin  2 g Intravenous See Admin Instructions     [Auto Hold] folic acid  1 mg Oral Daily     [Auto Hold] ketorolac  1 drop Left Eye BID 09 12     [Auto Hold] metoprolol tartrate  50 mg Oral BID     [Auto Hold] multivitamin w/minerals  1 tablet Oral Daily     [Auto Hold] prednisoLONE acetate  1 drop Left Eye BID     [Auto Hold] predniSONE  5 mg Oral Daily     [Auto Hold] simvastatin  20 mg Oral At Bedtime

## 2022-09-28 NOTE — ANESTHESIA PROCEDURE NOTES
Airway       Patient location during procedure: OR       Procedure Start/Stop Times: 9/28/2022 1:43 PM  Staff -        CRNA: Justina Silva APRN CRNA       Performed By: CRNA  Consent for Airway        Urgency: elective  Indications and Patient Condition       Indications for airway management: sorin-procedural       Induction type:intravenous       Mask difficulty assessment: 1 - vent by mask    Final Airway Details       Final airway type: endotracheal airway       Successful airway: ETT - single  Endotracheal Airway Details        ETT size (mm): 6.5       Cuffed: yes       Successful intubation technique: direct laryngoscopy       DL Blade Type: Mann 2       Grade View of Cords: 1       Adjucts: stylet       Position: Right       Measured from: lips       Secured at (cm): 20       Bite block used: None    Post intubation assessment        Placement verified by: capnometry, equal breath sounds and chest rise        Number of attempts at approach: 1       Secured with: silk tape       Ease of procedure: easy       Dentition: Unchanged    Medication(s) Administered   Medication Administration Time: 9/28/2022 1:43 PM

## 2022-09-28 NOTE — PROGRESS NOTES
SPIRITUAL HEALTH SERVICES Progress Note  [facility] ([facility]) Ortho    Saw pt Sakshi Jaeger per Consult.    Illness Narrative - Sakshi is having an amputation surgery (below the knee) and requested a  visit beforehand.    Distress - Sakshi expressed feeling a little anxious about the surgery. A previous surgery led to sepsis and that was a scary/difficult experience.     Coping - Sakshi's significant other, Zoran, has been a huge support. Their participation in a Hinduism Mandaeism has been uplifting. Sakshi grew up Confucianist. She also grew up on a [place] in [place].     Meaning-Making - Sakshi hopes this procedure and following prosthetics will make it possible for her to no longer have to deal with infections and to be able to enjoy life again.     Plan - I provided reflective listening and prayed with Sakshi and Zoran.     Jason Hernandez MDiv  Chaplain Resident  Pager [phone]    * [facility] remains available 24/7 for emergent requests/referrals, either by having the switchboard page the on-call  or by entering an ASAP/STAT consult in Epic (this will also page the on-call ). Routine Epic consults receive an initial response within 24 hours.*

## 2022-09-28 NOTE — BRIEF OP NOTE
[facility]    Brief Operative Note    Pre-operative diagnosis: Arthralgia of right knee [M25.561]  Post-operative diagnosis Same as pre-operative diagnosis    Procedure: Procedure(s):  RIGHT AMPUTATION, THROUGH KNEE  Surgeon: Surgeon(s) and Role:     * Doron Mott MD - Primary     * Stefania Madera PA-C - Assisting  Anesthesia: Choice   Estimated Blood Loss: 200 ml    Drains: Edgardo-Silvestre x 2  Specimens: * No specimens in log *  Findings:   None.  Complications: None.  Implants: * No implants in log *    Plan:  Transfer back to Inpatient. Medicine Primary  Pain medication per primary team  Soft dressing to right leg.  No weight bearing left leg  Medial AYAZ drain = deep drain  Lateral AYAZ drain = superficial drain  Drain to be removed when output is less than 50cc in 24 hour shift. (tegaderm only thing holding drain under dressing)  Dressing change per ortho.  Soft dressing to be removed and light dressing applied once stump protector is available.  Prosthetic consult placed for stump protector.    F/U in clinic with Dr. Mott's team in 2 weeks for incision check and suture removal.       I was asked by Dr. Mott to assist with surgery. I positioned and prepped the patient. I retracted soft tissue.   I suctioned fluids when needed. I provided traction for dissection. I helped to ligate blood vessels. I helped Dr. Mott identify and protect important structures. The procedure was medically necessary for an assistant because Dr. Mott needed the operative exposure and assistance that I provided. This allowed him to safely and efficiently operate. It was also important that I help ligate blood vessels to maintain hemostasis and reduce the bleeding risk. I helped with the closure of the operative incisions as well as helping with the boot/cast/splint.  The assistance that I provided reduced operative time which meant less general anesthetic for the patient. No  qualified residents were available to assist.    Stefania Madera PA-C

## 2022-09-28 NOTE — PROGRESS NOTES
A/Ox's 4. Pt denied pain. Epidural infusing.  R-Stump dressing CDI. CMS to baseline. Tolerated regular diet. NPO at midnight for surgery. Bath #1 Done and pre-op checklist started. Denied any nausea, CP, SOB, lightheadedness or dizziness. Voiding without pain or difficulty. Passing flatus. Up with SBA to the bedside commode. Resting in bed at this time with call light in reach. Able to make needs known. Continue to monitor.

## 2022-09-28 NOTE — OP NOTE
Procedure Date: 09/28/2022    PREOPERATIVE DIAGNOSIS:    1.  Chronic proximal tibia infection.    2.  Chronic nonhealing ulcer distal short proximal tibia.    POSTOPERATIVE DIAGNOSIS:    1.  Chronic proximal tibia infection.  2.  Chronic nonhealing ulcer distal short proximal tibia.    PROCEDURES:  Right through the knee amputation. Revision amputation.    SURGEON:  Doron Mott MD    ASSISTANT:  Stefania Madera PA-C    COMPLICATIONS:  None.    DRAINS:  x2 medial drain for a deep drain, lateral drain for a superficial drain.    PATHOLOGY:  None.    COMPLICATIONS:  None.    ESTIMATED BLOOD LOSS:  20 mL    ANESTHESIA:  General endotracheal.    INDICATIONS FOR PROCEDURE:  Please refer to clinic note for further details discussing indications to Mrs. Jaeger's case.    DESCRIPTION OF PROCEDURE:  On 09/28/2022, patient was taken to surgery.  After successful induction of general endotracheal anesthesia, she was placed supine on the operating table.  The right lower extremity was prepped and draped in sterile fashion.  After exsanguination by gravity, tourniquet cuff was inflated to 300 mmHg on the proximal third of the right thigh.    The pause for the cause was performed according to institution's policy, which confirmed laterality of the procedure.    An incision was made in a fishmouth fashion, paying special attention to remove the 2 ulcerations that she presented.  We proceeded with dissection of subcutaneous tissues and eventually a resection of the proximal third of the tibia while respecting the neurovascular bundle.    We proceeded indication of the bundle.  The neurological structures were transected under full traction.  We proceeded with a double ligation for the arterial structures within the neurovascular bundle.  Tourniquet cuff was deflated.  We confirmed satisfactory hemostasis and we proceeded with a mild tenodesis of the posterior muscle into the distal aspect of the femoral condyle with a #2  FiberWire suture.    At this point, we struggled to proceed with closure of the wound therefore, we proceeded with treatment of the most lateral and medial edges respectively of the femoral condyle is in order to decrease the volume of the condyle.  Eventually closure was accomplished.    Sterile dressings were applied.  The patient was extubated in the OR and transferred in stable condition to PACU.    PLAN:  The patient will remain nonweightbearing x4 weeks.  She will return to clinic in 2 weeks for a wound check.  In the presence of satisfactory wound healing, she will be referred to PM and R for fitting for a prosthesis.  In the presence of any wound dehiscence or necrosis, she will be referred to Dr. Walker for wound management.    The patient is not expected to require any physical therapy or x-rays during the postoperative course.    Doron Mott MD        D: 2022   T: 2022   MT: DARINEL    Name:     CARLOS PEDRAZA  MRN:      [id number]        Account:        [id number]   :      1945           Procedure Date: 2022     Document: [id number]

## 2022-09-29 ENCOUNTER — ANESTHESIA EVENT (OUTPATIENT)
Dept: SURGERY | Facility: CLINIC | Age: 77
DRG: 474 | End: 2022-09-29
Payer: COMMERCIAL

## 2022-09-29 ENCOUNTER — APPOINTMENT (OUTPATIENT)
Dept: PHYSICAL THERAPY | Facility: CLINIC | Age: 77
DRG: 474 | End: 2022-09-29
Attending: ORTHOPAEDIC SURGERY
Payer: COMMERCIAL

## 2022-09-29 ENCOUNTER — ANESTHESIA (OUTPATIENT)
Dept: SURGERY | Facility: CLINIC | Age: 77
DRG: 474 | End: 2022-09-29
Payer: COMMERCIAL

## 2022-09-29 PROCEDURE — 01996 DLY HOSP MGMT EDRL RX ADMIN: CPT | Mod: AA | Performed by: ANESTHESIOLOGY

## 2022-09-29 PROCEDURE — 250N000013 HC RX MED GY IP 250 OP 250 PS 637

## 2022-09-29 PROCEDURE — L8460 SHRINKER ABOVE KNEE: HCPCS

## 2022-09-29 PROCEDURE — 710N000012 HC RECOVERY PHASE 2, PER MINUTE

## 2022-09-29 PROCEDURE — 99232 SBSQ HOSP IP/OBS MODERATE 35: CPT | Performed by: INTERNAL MEDICINE

## 2022-09-29 PROCEDURE — 370N000017 HC ANESTHESIA TECHNICAL FEE, PER MIN

## 2022-09-29 PROCEDURE — 250N000011 HC RX IP 250 OP 636

## 2022-09-29 PROCEDURE — L5460 POSTOP APP NON-WGT BEAR DSG: HCPCS

## 2022-09-29 PROCEDURE — 258N000003 HC RX IP 258 OP 636

## 2022-09-29 PROCEDURE — 250N000012 HC RX MED GY IP 250 OP 636 PS 637

## 2022-09-29 PROCEDURE — 97530 THERAPEUTIC ACTIVITIES: CPT | Mod: GP

## 2022-09-29 PROCEDURE — 120N000002 HC R&B MED SURG/OB UMMC

## 2022-09-29 PROCEDURE — 250N000009 HC RX 250

## 2022-09-29 PROCEDURE — 250N000011 HC RX IP 250 OP 636: Performed by: STUDENT IN AN ORGANIZED HEALTH CARE EDUCATION/TRAINING PROGRAM

## 2022-09-29 PROCEDURE — L5695 AK SLEEVE SUSP NEOPRENE/EQUA: HCPCS

## 2022-09-29 PROCEDURE — 97161 PT EVAL LOW COMPLEX 20 MIN: CPT | Mod: GP

## 2022-09-29 RX ORDER — BUPIVACAINE HYDROCHLORIDE 2.5 MG/ML
INJECTION, SOLUTION EPIDURAL; INFILTRATION; INTRACAUDAL
Status: COMPLETED | OUTPATIENT
Start: 2022-09-29 | End: 2022-09-29

## 2022-09-29 RX ORDER — FLUMAZENIL 0.1 MG/ML
0.2 INJECTION, SOLUTION INTRAVENOUS
Status: DISCONTINUED | OUTPATIENT
Start: 2022-09-29 | End: 2022-09-29 | Stop reason: HOSPADM

## 2022-09-29 RX ORDER — OXYCODONE HYDROCHLORIDE 5 MG/1
5-10 TABLET ORAL EVERY 4 HOURS PRN
Qty: 40 TABLET | Refills: 0 | Status: SHIPPED | OUTPATIENT
Start: 2022-09-29 | End: 2022-12-21

## 2022-09-29 RX ORDER — AMOXICILLIN 250 MG
1 CAPSULE ORAL DAILY
Qty: 30 TABLET | Refills: 0 | Status: SHIPPED | OUTPATIENT
Start: 2022-09-29 | End: 2022-12-21

## 2022-09-29 RX ORDER — POLYETHYLENE GLYCOL 3350 17 G/17G
1 POWDER, FOR SOLUTION ORAL DAILY
Qty: 7 PACKET | Refills: 0 | Status: SHIPPED | OUTPATIENT
Start: 2022-09-29 | End: 2022-12-21

## 2022-09-29 RX ORDER — FENTANYL CITRATE 50 UG/ML
25-50 INJECTION, SOLUTION INTRAMUSCULAR; INTRAVENOUS
Status: DISCONTINUED | OUTPATIENT
Start: 2022-09-29 | End: 2022-09-29 | Stop reason: HOSPADM

## 2022-09-29 RX ORDER — ONDANSETRON 4 MG/1
4 TABLET, ORALLY DISINTEGRATING ORAL EVERY 6 HOURS PRN
Status: DISCONTINUED | OUTPATIENT
Start: 2022-09-29 | End: 2022-10-01 | Stop reason: HOSPADM

## 2022-09-29 RX ORDER — NALOXONE HYDROCHLORIDE 0.4 MG/ML
0.2 INJECTION, SOLUTION INTRAMUSCULAR; INTRAVENOUS; SUBCUTANEOUS
Status: DISCONTINUED | OUTPATIENT
Start: 2022-09-29 | End: 2022-09-29

## 2022-09-29 RX ORDER — NALOXONE HYDROCHLORIDE 0.4 MG/ML
0.4 INJECTION, SOLUTION INTRAMUSCULAR; INTRAVENOUS; SUBCUTANEOUS
Status: DISCONTINUED | OUTPATIENT
Start: 2022-09-29 | End: 2022-09-29

## 2022-09-29 RX ADMIN — HYDROMORPHONE HYDROCHLORIDE 0.2 MG: 0.2 INJECTION, SOLUTION INTRAMUSCULAR; INTRAVENOUS; SUBCUTANEOUS at 08:44

## 2022-09-29 RX ADMIN — PREDNISOLONE ACETATE 1 DROP: 10 SUSPENSION/ DROPS OPHTHALMIC at 11:36

## 2022-09-29 RX ADMIN — ACETAMINOPHEN 650 MG: 325 TABLET, FILM COATED ORAL at 11:54

## 2022-09-29 RX ADMIN — OXYCODONE HYDROCHLORIDE 5 MG: 5 TABLET ORAL at 07:48

## 2022-09-29 RX ADMIN — HYDROMORPHONE HYDROCHLORIDE 0.2 MG: 0.2 INJECTION, SOLUTION INTRAMUSCULAR; INTRAVENOUS; SUBCUTANEOUS at 01:33

## 2022-09-29 RX ADMIN — CEFAZOLIN SODIUM 2 G: 2 INJECTION, SOLUTION INTRAVENOUS at 06:41

## 2022-09-29 RX ADMIN — CALCIUM CARBONATE 600 MG (1,500 MG)-VITAMIN D3 400 UNIT TABLET 2 TABLET: at 07:48

## 2022-09-29 RX ADMIN — OXYCODONE HYDROCHLORIDE 5 MG: 5 TABLET ORAL at 11:54

## 2022-09-29 RX ADMIN — FOLIC ACID 1 MG: 1 TABLET ORAL at 07:48

## 2022-09-29 RX ADMIN — ACETAMINOPHEN 650 MG: 325 TABLET, FILM COATED ORAL at 06:17

## 2022-09-29 RX ADMIN — METOPROLOL TARTRATE 50 MG: 50 TABLET, FILM COATED ORAL at 19:56

## 2022-09-29 RX ADMIN — FENTANYL CITRATE 50 MCG: 50 INJECTION, SOLUTION INTRAMUSCULAR; INTRAVENOUS at 09:48

## 2022-09-29 RX ADMIN — KETOROLAC TROMETHAMINE 1 DROP: 5 SOLUTION/ DROPS OPHTHALMIC at 11:38

## 2022-09-29 RX ADMIN — PREDNISONE 5 MG: 5 TABLET ORAL at 07:48

## 2022-09-29 RX ADMIN — BUPIVACAINE HYDROCHLORIDE 10 ML: 2.5 INJECTION, SOLUTION EPIDURAL; INFILTRATION; INTRACAUDAL at 10:31

## 2022-09-29 RX ADMIN — OXYCODONE HYDROCHLORIDE 5 MG: 5 TABLET ORAL at 02:49

## 2022-09-29 RX ADMIN — HYDROMORPHONE HYDROCHLORIDE 0.2 MG: 0.2 INJECTION, SOLUTION INTRAMUSCULAR; INTRAVENOUS; SUBCUTANEOUS at 06:17

## 2022-09-29 RX ADMIN — BUPIVACAINE HYDROCHLORIDE 10 ML: 2.5 INJECTION, SOLUTION EPIDURAL; INFILTRATION; INTRACAUDAL at 10:21

## 2022-09-29 RX ADMIN — BUPIVACAINE HYDROCHLORIDE: 7.5 INJECTION, SOLUTION EPIDURAL; RETROBULBAR at 08:55

## 2022-09-29 RX ADMIN — METOPROLOL TARTRATE 50 MG: 50 TABLET, FILM COATED ORAL at 07:48

## 2022-09-29 RX ADMIN — CEFAZOLIN SODIUM 2 G: 2 INJECTION, SOLUTION INTRAVENOUS at 00:42

## 2022-09-29 RX ADMIN — MULTIPLE VITAMINS W/ MINERALS TAB 1 TABLET: TAB at 07:48

## 2022-09-29 RX ADMIN — ACETAMINOPHEN 650 MG: 325 TABLET, FILM COATED ORAL at 19:56

## 2022-09-29 RX ADMIN — SIMVASTATIN 20 MG: 20 TABLET, FILM COATED ORAL at 20:10

## 2022-09-29 ASSESSMENT — ACTIVITIES OF DAILY LIVING (ADL)
ADLS_ACUITY_SCORE: 35
ADLS_ACUITY_SCORE: 40
ADLS_ACUITY_SCORE: 40
ADLS_ACUITY_SCORE: 38
ADLS_ACUITY_SCORE: 35
ADLS_ACUITY_SCORE: 38
ADLS_ACUITY_SCORE: 35
ADLS_ACUITY_SCORE: 35
ADLS_ACUITY_SCORE: 38
ADLS_ACUITY_SCORE: 38

## 2022-09-29 NOTE — PROGRESS NOTES
"Orthopaedic Surgery Progress Note     9/29/2022    E: No acute events overnight.    S: \"Rough night\".  Pain not well controlled.    O:   BP (!) 146/58 (BP Location: Left arm, Patient Position: Right side)   Pulse 69   Temp (!) 96.5  F (35.8  C) (Oral)   Resp 14   Ht 1.664 m (5' 5.5\")   Wt 42.8 kg (94 lb 5.7 oz)   LMP  (LMP Unknown)   SpO2 98%   BMI 15.46 kg/m      Drain:   #1-25/55  #2- 25/55    Exam:  Gen: NAD, A/O x 3  Resp: Comfortable, non-labored breathing  RLE:  -Wound dressed, c/d/i  -Sens: SILT dp/sp/s/s/t  -Motor: 5/5 TA/GSc    Recent Labs   Lab 09/27/22  1334      POTASSIUM 4.1   CHLORIDE 104   CO2 24   BUN 8   CR 0.48*   GLC 98     Recent Labs   Lab 09/27/22  1334   WBC 7.8   HGB 10.7*        Recent Labs   Lab 09/27/22  1334   CRP <2.9         Impression: 77 year old female s/p right amputation through knee on 9/28/2022 by Dr Mott.      Plan:  - Activity Up at tolerated  - Antibiotics: Ancef x 24 hours  - Diet: Regular  - DVT prophylaxis: Continue ASA  - Wound Care: Dressing change per ortho.  Soft dressing to be removed and light dressing applied once stump protector is available; prosthetics consult.  - Drain: Continue deep and superficial drain until output < 50 /shift  - Pain management: Oral and IV pain meds prn  - Physical Therapy: Evaluate and treat  - Occupational Therapy: Evaluate and treat  - Dispo: Discharge to home when cleared by Medicine and therapies.  - Follow up: With Dr Mott team in 2 weeks for incision check and suture removal.  Future Appointments   Date Time Provider Department Center   9/29/2022 10:30 AM Destiney Martinez PT [facility] [place]   9/29/2022  3:00 PM Destiney Martinez PT [facility] [place]   9/30/2022  8:45 AM Germaine Zamora OT [facility] [place]   10/13/2022 11:45 AM Flip Izaguirre MD [facility] [place]   10/14/2022 11:30 AM Nurse, Shena LOWERY [facility]   10/24/2022  1:45 PM Flip Izaguirre MD [facility] [place]   11/15/2022 " 11:30 AM Doron Mott MD [facility]        [place] PHIPPS [facility]  Department of Orthopedic Surgery  [facility]  [phone]    For any questions regarding this patient please page me at the above number prior to contacting the ortho resident on call.

## 2022-09-29 NOTE — PROGRESS NOTES
"CLINICAL NUTRITION SERVICES - ASSESSMENT NOTE     Nutrition Prescription    RECOMMENDATIONS FOR MDs/PROVIDERS TO ORDER:  Encourage oral intakes, Offer/provide snacks/supplements PRN between meals/pro    Malnutrition Status:    Severe malnutrition in the context of acute on chronic illness    Recommendations already ordered by Registered Dietitian (RD):  PRN snacks/supplements, nutrition education - increased protein needs post-op    Future/Additional Recommendations:  Monitor labs, intakes, and weight trends.     REASON FOR ASSESSMENT  Sakshi Jaeger is a/an 77 year old female assessed by the dietitian for Reason For Assessment: RN consult    CURRENT NUTRITION ORDERS  Diet: Regular    Pt ordering (on average) 1750 kcal and 57 g protein per day per [facility] from 9/27 and 9/29. Pt is likely meeting estimated energy and protein needs.    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  Patient presents for right through knee amputation on 9/28/22.     Patient states she is eating well. Has not eaten today due to not having the time but has been eating well otherwise. Pt reports no recent changes to intake, appetite, weight. Encouraged small frequent meals with an emphasis on protein. Enjoys boost but not Ensure. Provided with list of snacks/supplements for patient to order PRN. Dressing being changed during visit.     LABS  Labs reviewed: Creatinine 0.48 (L, may indicate low lean body mass)    MEDICATIONS  Medications reviewed: calcium-carbonate, folic acid, multivitamin, prednisone, hydromorphone, oxycodone    ANTHROPOMETRICS  Height: 166.4 cm (5' 5.5\")  Most Recent Weight: 42.8 kg (94 lb 5.7 oz)  - pre-op weight  IBW: 59.1 kg (72% IBW)  BMI: Underweight BMI <18.5  Weight History: Pt with 9 lb (9%) weight loss in 3 months.    Wt Readings from Last Encounters: "   09/27/22 42.8 kg (94 lb 5.7 oz) - pre-op weight   09/08/22 47.6 kg (105 lb)   09/02/22 47.6 kg (105 lb)   07/26/22 43.5 kg (96 lb)   05/18/22 43.5 kg (96 lb)   03/31/22 47.6 kg (105 lb)   01/19/22 47.6 kg (105 lb)   12/08/21 47.6 kg (105 lb)   10/27/21 46.3 kg (102 lb)   10/18/21 45.8 kg (101 lb)   10/06/21 45.8 kg (101 lb)   06/02/21 47 kg (103 lb 9.6 oz)   03/25/21 49.9 kg (110 lb)   03/23/21 49.9 kg (110 lb)     Dosing Weight: 43 kg - pre-op weight    ASSESSED NUTRITION NEEDS  Estimated Energy Needs: 3374-5707+ kcals/day (25 - 30+ kcals/kg)  Justification: Maintenance  Estimated Protein Needs: 52-65 grams protein/day (1.2 - 1.5 grams of pro/kg)  Justification: Increased needs  Estimated Fluid Needs: 1 mL/kcal or per provider pending fluid status    PHYSICAL FINDINGS  See malnutrition section below.  Surgical incision, through knee amputation    MALNUTRITION   % Intake: No decreased intake noted  % Weight Loss: > 7.5% in 3 months (severe)  Subcutaneous Fat Loss: Facial region and Upper arm:  Mild-moderate  Muscle Loss: None observed, Facial & jaw region, Upper leg (quadricep, hamstring) and Posterior calf:  Moderate  Fluid Accumulation/Edema: None noted  Malnutrition Diagnosis: Severe malnutrition in the context of acute on chronic illness    NUTRITION DIAGNOSIS  Increased nutrient needs (protein) related to post-op as evidenced by s/p R through the knee amputation on 9/28.       INTERVENTIONS  Implementation  Nutrition Interventions: Encouraged small frequent meals, increased protein, PRN snacks/supplements    Goals  Nutrition Goals:: PO, Wound  PO Goal:: PO >75%  Woundt Goal:: Would healing     Monitoring/Evaluation  Progress toward goals will be monitored and evaluated per protocol.    Kaitlin Abdi MS, RDN, LDN  RD pager: [phone]  WB Weekend/Holiday Pager: [phone]

## 2022-09-29 NOTE — ANESTHESIA PROCEDURE NOTES
Femoral Procedure Note    Pre-Procedure   Staff -        Anesthesiologist:  Rush Norton MD       Resident/Fellow: Adolph Balderas DO       Performed By: fellow       Location: pre-op       Procedure Start/Stop Times: 9/29/2022 10:21 AM and 9/29/2022 10:29 AM       Pre-Anesthestic Checklist: patient identified, IV checked, site marked, risks and benefits discussed, informed consent, monitors and equipment checked, pre-op evaluation, at physician/surgeon's request and post-op pain management  Timeout:       Correct Patient: Yes        Correct Procedure: Yes        Correct Site: Yes        Correct Position: Yes        Correct Laterality: Yes        Site Marked: Yes  Procedure Documentation  Procedure: Femoral       Diagnosis: POST OP PAIN       Laterality: right       Patient Position: supine       Patient Prep/Sterile Barriers: sterile gloves, mask, patient draped       Skin prep: Chloraprep       Local skin infiltrated with 5 mL of 2% lidocaine.        Needle Type: Touhy needle       Needle Gauge: 17.        Needle Length (millimeters): 100        Catheter: 19 G.          Catheter threaded easily.             Ultrasound guided       1. Ultrasound was used to identify targeted nerve, plexus, vascular marker, or fascial plane and place a needle adjacent to it in real-time.       2. Ultrasound was used to visualize the spread of anesthetic in close proximity to the above referenced structure.       3. A permanent image is entered into the patient's record.    Assessment/Narrative         The placement was negative for: blood aspirated, painful injection and site bleeding       Paresthesias: No.       Bolus given via catheter. no blood aspirated via catheter.        Secured via Tegaderm and Dermabond.        Insertion/Infusion Method: Single Shot       Complications: none    Medication(s) Administered   Bupivacaine 0.25% PF (Infiltration) - Infiltration   10 mL - 9/29/2022 10:21:00 AM  Medication  Administration Time: 9/29/2022 10:21 AM     Comments:  Right Femoral Nerve Catheter

## 2022-09-29 NOTE — ANESTHESIA PROCEDURE NOTES
Sciatic Procedure Note    Pre-Procedure   Staff -        Anesthesiologist:  Rush Norton MD       Resident/Fellow: Adolph Balderas DO       Performed By: fellow       Location: pre-op       Procedure Start/Stop Times: 9/29/2022 10:31 AM and 9/29/2022 10:38 AM       Pre-Anesthestic Checklist: patient identified, IV checked, site marked, risks and benefits discussed, informed consent, monitors and equipment checked, pre-op evaluation, at physician/surgeon's request and post-op pain management  Timeout:       Correct Patient: Yes        Correct Procedure: Yes        Correct Site: Yes        Correct Position: Yes        Correct Laterality: Yes        Site Marked: Yes  Procedure Documentation  Procedure: Sciatic       Diagnosis: POST OP PAIN       Laterality: right       Patient Position: lateral       Patient Prep/Sterile Barriers: sterile gloves, mask, patient draped       Skin prep: Chloraprep       Local skin infiltrated with 5 mL of 2% lidocaine.  (popliteal approach).       Needle Type: Touhy needle       Needle Gauge: 17.        Needle Length (millimeters): 100        Catheter: 19 G.          Catheter threaded easily.             Ultrasound guided       1. Ultrasound was used to identify targeted nerve, plexus, vascular marker, or fascial plane and place a needle adjacent to it in real-time.       2. Ultrasound was used to visualize the spread of anesthetic in close proximity to the above referenced structure.       3. A permanent image is entered into the patient's record.    Assessment/Narrative         The placement was negative for: blood aspirated, painful injection and site bleeding       Paresthesias: No.       Bolus given via catheter. no blood aspirated via catheter.        Secured via Tegaderm, tunneling and Dermabond.        Insertion/Infusion Method: Continuous Infusion and Single Shot       Complications: none    Medication(s) Administered   Bupivacaine 0.25% PF (Infiltration) -  Infiltration   10 mL - 9/29/2022 10:31:00 AM  Medication Administration Time: 9/29/2022 10:31 AM     Comments:  Right Sciatic Nerve Catheter

## 2022-09-29 NOTE — PROGRESS NOTES
"   09/29/22 1511   Quick Adds   Type of Visit Initial PT Evaluation   Living Environment   People in Home spouse   Current Living Arrangements house   Home Accessibility stairs to enter home;stairs within home   Number of Stairs, Main Entrance 3   Number of Stairs, Within Home, Primary greater than 10 stairs   Transportation Anticipated family or friend will provide   Living Environment Comments Pt lives with spouse, 3STE, 12 to basement. Pt previously navigating these several times a day on her bottom, returning to standing or to wc by bumping up to step stool then up to chair. Pt sponge bathes, does not get into shower/tub   Self-Care   Usual Activity Tolerance good   Current Activity Tolerance good   Equipment Currently Used at Home walker, rolling;wheelchair, power   Fall history within last six months no   Activity/Exercise/Self-Care Comment Pt using motorized scooter 100% of the time leading up to surgery, but prior to this was utilizing prosthetic and 4WW.  Pt navigates 12 stairs several times a day on her bottom without difficulty (then bumping up to step stool, then up to wc). Pt previously doing ADL's IND,  present for IADLs/household chores etc.   General Information   Onset of Illness/Injury or Date of Surgery 09/27/22   Referring Physician Tiffany Shields APRN CNS   Patient/Family Therapy Goals Statement (PT) To return home   Pertinent History of Current Problem (include personal factors and/or comorbidities that impact the POC) Per EMR: \"77 year old female admitted on 9/27/2022. 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  Patient presents for right through knee amputation on 9/28/22.\"   Existing Precautions/Restrictions weight bearing   Weight-Bearing Status - RLE nonweight-bearing   Cognition   Affect/Mental Status " (Cognition) WFL   Orientation Status (Cognition) oriented x 4   Pain Assessment   Patient Currently in Pain No   Integumentary/Edema   Integumentary/Edema Comments Pt with indwelling pain catheter in R LE, posoperative incision site not fully visualized d/t banadaging   Posture    Posture Protracted shoulders;Forward head position   Range of Motion (ROM)   Range of Motion ROM is WFL   Strength (Manual Muscle Testing)   Strength Comments Pt demonstrates L LE strength atleast 3+ as seen by squat pivot tx, good arm strength as seen by squat pivot and pt hx of bumping up stairs using UE   Bed Mobility   Comment, (Bed Mobility) IND supine<>sit   Transfers   Comment, (Transfers) Pt squat pivot SBA with UE support on bed rail   Gait/Stairs (Locomotion)   Comment, (Gait/Stairs) not yet assessed- pt declining OOR activity today. not amb at baseline.   Balance   Balance Comments Pt requires B UE support in standing. Sitting balance demonstrates sitting ID w/o UE support   Sensory Examination   Sensory Perception patient reports no sensory changes   Coordination   Coordination no deficits were identified   Muscle Tone   Muscle Tone no deficits were identified   Clinical Impression   Criteria for Skilled Therapeutic Intervention Yes, treatment indicated   PT Diagnosis (PT) Impaired functional mobility s/o R through knee amputation   Influenced by the following impairments pain, impaired dynamic balance requiring B UE support   Functional limitations due to impairments stairs, amb   Clinical Presentation (PT Evaluation Complexity) Stable/Uncomplicated   Clinical Presentation Rationale Pt with minimal impairments, predictable course of recovery   Clinical Decision Making (Complexity) low complexity   Planned Therapy Interventions (PT) balance training;gait training;patient/family education;stair training;strengthening;transfer training;wheelchair management/propulsion training;risk factor education;progressive  activity/exercise;home program guidelines;home exercise program   Risk & Benefits of therapy have been explained evaluation/treatment results reviewed;care plan/treatment goals reviewed;risks/benefits reviewed;current/potential barriers reviewed;participants voiced agreement with care plan;participants included;patient;spouse/significant other   Clinical Impression Comments Pt demonstrates functional mobility near-baseline level, pt will benefit from 1-3 more sessions for reinforced education on positioning of the new residual limb, isntruction in strengthening exercises, and trialing of stairs. Beyond inpatient stay pt will benefit from OP PT for preperation/strengthening if pt would like to persue prosthetic use in the future.   PT Discharge Planning   PT Discharge Recommendation (DC Rec) home with assist;home with outpatient physical therapy   PT Rationale for DC Rec Pt demonstrates functional mobility near-baseline level, pt will benefit from 1-3 more sessions for reinforced education on positioning of the new residual limb, isntruction in strengthening exercises, and trialing of stairs. Beyond inpatient stay pt will benefit from OP PT for preperation/strengthening if pt would like to persue prosthetic use in the future.   PT Brief overview of current status Setup assist only for squat pivot tx from bed>commode next to bed, with use of flotech limb protector.   Plan of Care Review   Plan of Care Reviewed With patient;spouse   Total Evaluation Time   Total Evaluation Time (Minutes) 8   Physical Therapy Goals   PT Frequency 2x/day   PT Predicted Duration/Target Date for Goal Attainment 10/01/22   PT Goals Transfers;Gait;Stairs;PT Goal 1;PT Goal 2   PT: Transfers Modified independent;Bed to/from chair;Assistive device  (4WW)   PT: Stairs Supervision/stand-by assist;Greater than 10 stairs;Within precautions   PT: Goal 1 Pt will recall recommendations for positioning of residual limb to avoid contractures   PT: Goal  2 Pt will demonstrate proper form of LE HEP

## 2022-09-29 NOTE — PLAN OF CARE
"Goal Outcome Evaluation:  Pt arrived to the unit at 1740 from PACU. Pt A&O X4. Oriented to room and call light. Pt accompanied by significant other.   VS: BP (!) 157/61   Pulse 64   Temp (!) 95.3  F (35.2  C) (Oral)   Resp 12   Ht 1.664 m (5' 5.5\")   Wt 42.8 kg (94 lb 5.7 oz)   LMP  (LMP Unknown)   SpO2 (P) 93%   BMI 15.46 kg/m       O2: SpO2 > 90% and stable on RA. Capnography stable, pt refused to use capno for most of the shift.  LS clear and equal bilaterally. Denies chest pain and SOB.    Output: Voids spontaneously without difficulty to bedside commode.   Last BM: LBM 9/28.   Activity: Up with SBA. Pt pivot to bedside commode independently.    Skin: WDL except, bruises right/ left forearm and incision on right leg.   Pain: Pain managed with prn oxycodone and IV dilaudid for breakthrough pain.    CMS: Intact, AOx4. Denies numbness and tingling.   Dressing: Right BKA ACE wrap CDI.   Diet: On regular diet. Intermittent nausea but pt declined antiemetics.     LDA: PIV infusing LR at 100 ml/hr into right forearm.On continuous  Epidural infusion to mid back at 6 ml/hr.  X2 AYAZ with minimal output.   Equipment: IV pole, personal belongings,    Plan: Continue with plan of care. Call light within reach, pt able to make needs known.    Additional Info:                            "

## 2022-09-29 NOTE — PROGRESS NOTES
"Pain Service Progress Note  [facility]  Date: 09/29/2022       Patient Name: aSkshi Jaeger  MRN: [id number]  Age: 77 year old  Sex: female      Assessment:  78 y/o female w/ proximal tibial infection s/p R through the knee amputation    Procedure: R through the knee amputation    Date of Surgery: 9/28/22    Date of Catheter Placement: 9/27/22    Plan/Recommendations:  1. Regional Anesthesia/Analgesia  -Continuous Catheter Type/Site: Lumbar Epidural (0.125% Bupivacaine @6 mL/hr)    Epidural dressing not intact with potential dislodgement observed this AM. Have consulted with [facility] attending to remove epidural and place peripheral nerve catheters as patient not amenable to replace epidural.    2. Anticoagulation  -Please contact Inpatient Pain Service before ordering or making any anticoagulation changes       3. Multimodal Analgesia  - Per primary service    Pain Service will continue to follow.    Please Page the Pain Team Via [employer]: \"PAIN MANAGEMENT ACUTE INPATIENT [facility]\"    Sergio Ramires MD  09/29/2022     Overnight Events: Issues with pain overnight    Subjective:  I had a rough night.   Nausea: No  Vomiting: No  Pruritus: No  Symptoms of LAST: No    Pain Location:  RLE    Pain Intensity:    Pain at Rest: 1/10   Pain with Activity: 8/10  Comfort Goal: 4/10   Satisfied with your level of pain control: No    Diet: Diet  Regular Diet Adult    Relevant Labs:  Recent Labs   Lab Test 09/27/22  1334 10/10/19  0512 10/09/19  0930 10/02/19  0637 10/01/19  1309   INR  --   --  1.31*  --  1.38*      < >  --    < > 357   PTT  --   --   --   --  39*   BUN 8   < >  --    < > 12    < > = values in this interval not displayed.       Physical Exam:  Vitals: BP (!) 146/58 (BP Location: Left arm, Patient Position: Right side)   Pulse 69   Temp (!) 96.5  F (35.8  C) (Oral)   Resp 14   Ht 1.664 m (5' 5.5\")   Wt 42.8 kg (94 lb 5.7 oz)   LMP  (LMP Unknown)   SpO2 98%   BMI 15.46 " kg/m      Physical Exam:   Orientation:  Alert, oriented, and in no acute distress: Yes  Sedation: No    Motor Examination:  5/5 Strength in lower extremities: Yes    Catheter Site:   Catheter entry site is clean/dry/intact: No    Tender: No      Relevant Medications:  Current Pain Medications:  Medications related to Pain Management (From now, onward)    Start     Dose/Rate Route Frequency Ordered Stop    09/29/22 0000  oxyCODONE (ROXICODONE) 5 MG tablet         5-10 mg Oral EVERY 4 HOURS PRN 09/29/22 0728      09/29/22 0000  senna-docusate (SENOKOT-S/PERICOLACE) 8.6-50 MG tablet         1 tablet Oral DAILY 09/29/22 0728      09/29/22 0000  polyethylene glycol (MIRALAX) 17 g packet         1 packet Oral DAILY 09/29/22 0728 09/28/22 1825  HYDROmorphone (DILAUDID) injection 0.2 mg         0.2 mg Intravenous EVERY 2 HOURS PRN 09/28/22 1826 09/28/22 1825  oxyCODONE (ROXICODONE) tablet 5 mg         5 mg Oral EVERY 4 HOURS PRN 09/28/22 1826 09/27/22 2230  acetaminophen (TYLENOL) tablet 650 mg        Note to Pharmacy: PTA Sig:Take 2 tablets (650 mg) by mouth every 4 hours      650 mg Oral EVERY 4 HOURS 09/27/22 2208 09/27/22 1300  bupivacaine (MARCAINE) 0.125 % in sodium chloride 0.9 % 250 mL EPIDURAL Infusion         6 mL/hr  EPIDURAL CONTINUOUS 09/27/22 1230      09/27/22 1230  nalbuphine (NUBAIN) injection 2.5-5 mg         2.5-5 mg Intravenous EVERY 6 HOURS PRN 09/27/22 1230            Primary Service Contacted with Recommendations? Yes      Time/Communication:  I personally spent 20 minutes with greater than 50% in consultation, education, counseling and coordination of care.  See note above for details on conversation.

## 2022-09-29 NOTE — PLAN OF CARE
"  VS: BP (!) 146/58 (BP Location: Left arm, Patient Position: Right side)   Pulse 69   Temp (!) 96.5  F (35.8  C) (Oral)   Resp 14   Ht 1.664 m (5' 5.5\")   Wt 42.8 kg (94 lb 5.7 oz)   LMP  (LMP Unknown)   SpO2 98%   BMI 15.46 kg/m     O2: SpO2 98% and stable on RA. Pt declined capno overnight  LS clear and equal bilaterally. Denies chest pain and SOB.    Output: Voids spontaneously without difficulty to bedside commode. Total output 510 mL   Last BM: LBM 9/28.   Activity: Up with SBA. Pt pivot to bedside commode independently.    Skin: Bruises noted on right and left forearm. Surgical incision on right leg (UTV due to occlusive dressing)   Pain: Pt endorses that pain is not well controlled with current pain medications. Reported 10/10 pain that decreased to 5/10 following med admin (see MAR). Exacerbates by movement/positioning. Having the blanket resting on the residual limb also aggravates pain.     CMS: Intact, AOx4. Denies numbness and tingling.   Dressing: Right BKA ACE wrap CDI. Small sanguineous drainage dried on dressing. Patient removed ACE bandage and site was rewrapped less tightly per her request; this reduced reported pain.   Diet: On regular diet. Intermittent nausea but pt declined antiemetics.     LDA: PIV infusing LR at 75 ml/hr into right forearm.On continuous  Epidural infusion to mid back at 6 ml/hr.  X2 AYAZ with minimal output (superficial 25 ml; deep 5 ml)   Equipment: IV pole, personal belongings   Plan: Evaluate effectiveness of current pain management plan for pt and continue supplementing non-pharm options for management   Additional Info:                            "

## 2022-09-29 NOTE — PROGRESS NOTES
S: Pt seen at [facility] hosp room 552 with  present for fitting/delivery of the Jayesh Tech softie AK/Knee disarticulation protector with waist belt. O: I see the EPIC order for the removeable dressing/jayesh tech for the RT Through knee amputation, she measured small. A: She was given the [employer]esh Tech AK/KD softie protector and waist belt though she choose not to apply it at this time due to the drain and other attachments on her limb. The information folder and two size 2  socks with waist belt were furnished. Instructions were given and seemed to be understood by her and her . P: FU PRN. Her prosthetic care is being furnished by [employer] which she told me several times. G: The goal is to protect her Through knee amputation during healing.  Electronically signed Leo Gates CPO, LPO.

## 2022-09-29 NOTE — PLAN OF CARE
VS: VSS   O2: >90% on RA   Output: Voiding adequately via the BSC   Last BM: 9/28   Activity: WBAT; SBA for pivot transfers to [facility]   Up for meals? Declined   Skin: Incision/drains/epidural   Pain: Pain immensely improved after nerve block this AM (epidural removed and 2 nerve block catheters placed on R thigh and R hip)   CMS: Intact   Dressing: CDI   Diet: Regular   LDA: R PIV SL, 2 nerve blocks intact   Plan: TBD   Additional Info:  Pt fit with limb protector today

## 2022-09-29 NOTE — PROGRESS NOTES
[facility]    Medicine Progress Note - Hospitalist Service, GOLD TEAM 17    Date of Admission:  9/27/2022    Assessment & Plan            Sakshi Jaeger is a 77 year old female admitted on 9/27/2022. 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  Patient presents for right through knee amputation on 9/28/22.       #History of chronic right distal tibia infection  #Right distal lower leg chronic ulcer  #S/p R through the knee amputation 9/228/22   Patient with increased pain overnight in post-surgical R-knee. Seen by Anesthesia today with concern for potential dislodgement of epidural. Now in OR  - appreciate Pain mgt for pain control & will defer rest to primary team  -  Will recommend restarting ASA as soon as possible given history of drug-eluting stent to RCA. Also see Anesthesia recs about contacting pain service before ordering or making changes to any anticoagulation.   -Rest of care per primary team.    #Osteoporosis  -Continue home calcium carbonate, vitamin D supplements    #Anemia  Hgb this admission 10.7  Hx of Fe-def  - okay to hold PTA ferrous sulfate given concern for diarrhea per discussion with patient  - trend hgb     #hx of CAD w DISHA to RCA  #HTN - currently normotensive  - mgt as above  - hold PTA lisinopril, Amlodipine to minimize risk of post-op hypotension  - PTA Simvastatin    #Hx of RA  - cont PTA Prednisone 5 mg every day  - holding PTA methotrexate (qweekly) & leflulomide     #Recent left eye cataract surgery  -Continue PTA Prednisone ophthalmic suspension 1-drop bid in left eye  -Continue PTA Ketoralac suspension 1-drop in left eye BID    #Low BMI  -Nutrition consult         Diet: Diet  Regular Diet Adult    DVT Prophylaxis: Defer to primary service  Nolan Catheter: Not  "present  Central Lines: None  Cardiac Monitoring: None  Code Status:   Defer to Primary service, will presumed Full     Disposition Plan   { TIP  Click here to update expected discharge date and refresh note (tipsheet)     :[id number]}  Expected Discharge Date: 09/30/2022                The patient's care was discussed with the Patient.    MARY BUCK MD  Hospitalist Service, GOLD TEAM 17  [facility]  Securely message with the Vocera Web Console (learn more here)  Text page via [employer] Paging/Directory   Please see signed in provider for up to date coverage information      Clinically Significant Risk Factors Present on Admission                 # Hypertension: home medication list includes antihypertensive(s)    # Cachexia: Estimated body mass index is 15.46 kg/m  as calculated from the following:    Height as of this encounter: 1.664 m (5' 5.5\").    Weight as of this encounter: 42.8 kg (94 lb 5.7 oz).        ______________________________________________________________________    Interval History   Reports having a very bad night due to constant pain following surgery yesterday.     Data reviewed today: I reviewed all medications, new labs and imaging results over the last 24 hours. I personally reviewed no images or EKG's today.    Physical Exam   Vital Signs: Temp: (!) 96.5  F (35.8  C) Temp src: Oral BP: 133/64 Pulse: 56   Resp: 22 SpO2: 100 % O2 Device: Nasal cannula Oxygen Delivery: 2 LPM  Weight: 94 lbs 5.71 oz    General Appearance: Sitting comfortably in bed, on room air, in no acute distress of discomfort  HEENT: PERRL: EOMI; moist mucous membrane w/o lesions  Neck: No JVD  Pulmonary: Clear to auscultation bilaterally, no wheezes or crackles  CVS: Regular rhythm, no murmurs, rubs or gallops  GI: BS (+), soft nontender, no rebound or guarding   Extremities: s/p R-BKA, in dressing  Back: epidural catheter in low mid back with dressing which is c/d/i  Neurologic: " A&O x3      Data   Recent Labs   Lab 09/27/22  1334   WBC 7.8   HGB 10.7*   MCV 95         POTASSIUM 4.1   CHLORIDE 104   CO2 24   BUN 8   CR 0.48*   ANIONGAP 7   ADRIAN 8.8   GLC 98   ALBUMIN 3.0*   PROTTOTAL 6.6*   BILITOTAL 0.4   ALKPHOS 73   ALT 18   AST 18     No results found for this or any previous visit (from the past 24 hour(s)).  Medications     - MEDICATION INSTRUCTIONS -       lactated ringers 100 mL/hr at 09/28/22 2156     - MEDICATION INSTRUCTIONS -       ropivacaine       ropivacaine       sodium chloride Stopped (09/28/22 1413)       [Auto Hold] acetaminophen  650 mg Oral Q4H     [Auto Hold] calcium carbonate 600 mg-vitamin D 400 units  2 tablet Oral Daily     [Auto Hold] folic acid  1 mg Oral Daily     [Auto Hold] ketorolac  1 drop Left Eye BID 09 12     [Auto Hold] metoprolol tartrate  50 mg Oral BID     [Auto Hold] multivitamin w/minerals  1 tablet Oral Daily     [Auto Hold] prednisoLONE acetate  1 drop Left Eye BID     [Auto Hold] predniSONE  5 mg Oral Daily     [Auto Hold] simvastatin  20 mg Oral At Bedtime

## 2022-09-30 ENCOUNTER — APPOINTMENT (OUTPATIENT)
Dept: PHYSICAL THERAPY | Facility: CLINIC | Age: 77
DRG: 474 | End: 2022-09-30
Attending: ORTHOPAEDIC SURGERY
Payer: COMMERCIAL

## 2022-09-30 LAB
HCT VFR BLD AUTO: 27.9 % (ref 35–47)
HGB BLD-MCNC: 9.3 G/DL (ref 11.7–15.7)

## 2022-09-30 PROCEDURE — 120N000002 HC R&B MED SURG/OB UMMC

## 2022-09-30 PROCEDURE — 250N000012 HC RX MED GY IP 250 OP 636 PS 637

## 2022-09-30 PROCEDURE — 250N000013 HC RX MED GY IP 250 OP 250 PS 637

## 2022-09-30 PROCEDURE — 999N000111 HC STATISTIC OT IP EVAL DEFER

## 2022-09-30 PROCEDURE — 97530 THERAPEUTIC ACTIVITIES: CPT | Mod: GP

## 2022-09-30 PROCEDURE — 250N000013 HC RX MED GY IP 250 OP 250 PS 637: Performed by: INTERNAL MEDICINE

## 2022-09-30 PROCEDURE — 36415 COLL VENOUS BLD VENIPUNCTURE: CPT | Performed by: INTERNAL MEDICINE

## 2022-09-30 PROCEDURE — 97110 THERAPEUTIC EXERCISES: CPT | Mod: GP

## 2022-09-30 PROCEDURE — 250N000011 HC RX IP 250 OP 636

## 2022-09-30 PROCEDURE — 85018 HEMOGLOBIN: CPT | Performed by: INTERNAL MEDICINE

## 2022-09-30 PROCEDURE — 99232 SBSQ HOSP IP/OBS MODERATE 35: CPT | Performed by: INTERNAL MEDICINE

## 2022-09-30 PROCEDURE — 99231 SBSQ HOSP IP/OBS SF/LOW 25: CPT | Performed by: ANESTHESIOLOGY

## 2022-09-30 RX ORDER — PREDNISOLONE ACETATE 10 MG/ML
1 SUSPENSION/ DROPS OPHTHALMIC DAILY
Status: DISCONTINUED | OUTPATIENT
Start: 2022-09-30 | End: 2022-10-01 | Stop reason: HOSPADM

## 2022-09-30 RX ORDER — ASPIRIN 81 MG/1
81 TABLET ORAL 2 TIMES DAILY
Status: DISCONTINUED | OUTPATIENT
Start: 2022-09-30 | End: 2022-10-01 | Stop reason: HOSPADM

## 2022-09-30 RX ORDER — PREDNISOLONE ACETATE 10 MG/ML
1 SUSPENSION/ DROPS OPHTHALMIC DAILY
Qty: 1 ML | Refills: 0 | Status: SHIPPED | OUTPATIENT
Start: 2022-10-01 | End: 2022-10-06

## 2022-09-30 RX ORDER — KETOROLAC TROMETHAMINE 5 MG/ML
1 SOLUTION OPHTHALMIC DAILY
Qty: 1 ML | Refills: 0 | Status: SHIPPED | OUTPATIENT
Start: 2022-10-01 | End: 2022-10-06

## 2022-09-30 RX ORDER — KETOROLAC TROMETHAMINE 5 MG/ML
1 SOLUTION OPHTHALMIC DAILY
Status: DISCONTINUED | OUTPATIENT
Start: 2022-09-30 | End: 2022-10-01 | Stop reason: HOSPADM

## 2022-09-30 RX ADMIN — ACETAMINOPHEN 650 MG: 325 TABLET, FILM COATED ORAL at 02:29

## 2022-09-30 RX ADMIN — CALCIUM CARBONATE 600 MG (1,500 MG)-VITAMIN D3 400 UNIT TABLET 2 TABLET: at 08:11

## 2022-09-30 RX ADMIN — ACETAMINOPHEN 650 MG: 325 TABLET, FILM COATED ORAL at 12:41

## 2022-09-30 RX ADMIN — KETOROLAC TROMETHAMINE 1 DROP: 5 SOLUTION OPHTHALMIC at 12:48

## 2022-09-30 RX ADMIN — HYDROMORPHONE HYDROCHLORIDE 0.2 MG: 0.2 INJECTION, SOLUTION INTRAMUSCULAR; INTRAVENOUS; SUBCUTANEOUS at 17:34

## 2022-09-30 RX ADMIN — PREDNISONE 5 MG: 5 TABLET ORAL at 08:11

## 2022-09-30 RX ADMIN — ASPIRIN 81 MG: 81 TABLET, COATED ORAL at 12:47

## 2022-09-30 RX ADMIN — OXYCODONE HYDROCHLORIDE 5 MG: 5 TABLET ORAL at 14:36

## 2022-09-30 RX ADMIN — OXYCODONE HYDROCHLORIDE 5 MG: 5 TABLET ORAL at 18:44

## 2022-09-30 RX ADMIN — ACETAMINOPHEN 650 MG: 325 TABLET, FILM COATED ORAL at 20:15

## 2022-09-30 RX ADMIN — METOPROLOL TARTRATE 50 MG: 50 TABLET, FILM COATED ORAL at 20:15

## 2022-09-30 RX ADMIN — SIMVASTATIN 20 MG: 20 TABLET, FILM COATED ORAL at 22:19

## 2022-09-30 RX ADMIN — METOPROLOL TARTRATE 50 MG: 50 TABLET, FILM COATED ORAL at 08:11

## 2022-09-30 RX ADMIN — PREDNISOLONE ACETATE 1 DROP: 10 SUSPENSION/ DROPS OPHTHALMIC at 12:50

## 2022-09-30 RX ADMIN — MULTIPLE VITAMINS W/ MINERALS TAB 1 TABLET: TAB at 08:11

## 2022-09-30 RX ADMIN — HYDROMORPHONE HYDROCHLORIDE 0.2 MG: 0.2 INJECTION, SOLUTION INTRAMUSCULAR; INTRAVENOUS; SUBCUTANEOUS at 20:16

## 2022-09-30 RX ADMIN — OXYCODONE HYDROCHLORIDE 5 MG: 5 TABLET ORAL at 22:19

## 2022-09-30 RX ADMIN — ACETAMINOPHEN 650 MG: 325 TABLET, FILM COATED ORAL at 08:11

## 2022-09-30 RX ADMIN — OXYCODONE HYDROCHLORIDE 5 MG: 5 TABLET ORAL at 02:29

## 2022-09-30 RX ADMIN — ACETAMINOPHEN 650 MG: 325 TABLET, FILM COATED ORAL at 16:46

## 2022-09-30 RX ADMIN — FOLIC ACID 1 MG: 1 TABLET ORAL at 08:11

## 2022-09-30 ASSESSMENT — ACTIVITIES OF DAILY LIVING (ADL)
ADLS_ACUITY_SCORE: 37
ADLS_ACUITY_SCORE: 37
ADLS_ACUITY_SCORE: 35
ADLS_ACUITY_SCORE: 37
ADLS_ACUITY_SCORE: 35
ADLS_ACUITY_SCORE: 37
ADLS_ACUITY_SCORE: 35

## 2022-09-30 NOTE — PROGRESS NOTES
"Orthopedic Surgery Progress Note   September 30, 2022    Subjective: No acute events overnight. Pain well controlled. Tolerating diet. Voiding spontaneously. +Flatus, no BM. Denies fever or chills, CP, SOB, numbness or tingling, motor dysfunction or weakness.     Objective: /56 (BP Location: Left arm)   Pulse 66   Temp 97.5  F (36.4  C) (Oral)   Resp 16   Ht 1.664 m (5' 5.5\")   Wt 42.8 kg (94 lb 5.7 oz)   LMP  (LMP Unknown)   SpO2 93%   BMI 15.46 kg/m      Drain: Deep drain removed.  Superficial drain in place.  Okay to remove when < 50 ml per 24 hours.    General: NAD, alert and oriented, cooperative with exam.   Cardio: RRR, extremities wwp.   Respiratory: Non-labored breathing.  Gen: NAD, A/O x 3  Resp: Comfortable, non-labored breathing  RLE:   -Wound dressed, c/d/i; anterior flap + capillary refill  -Sens: SILT dp/sp/s/s/t  -Motor: 5/5 TA/GSc      Impression: 77 year old female s/p right amputation through knee on 9/28/2022 by Dr Mott.       Plan:  - Activity Up at tolerated; elevate leg at all times except when OOB x 10 days x 21 hours per day.  - Antibiotics: Ancef x 24 hours  - Diet: Regular  - DVT prophylaxis: Continue ASA  - Wound Care: Dressing change per ortho.  Soft dressing to be removed and light dressing applied once stump protector is available; prosthetics consult.  - Drain: Continue superficial drain until output < 50 /shift  - Pain management: Oral pain meds prn; epidural will be removed today.  - Physical Therapy: Evaluate and treat  - Occupational Therapy: Evaluate and treat  - Dispo: Discharge to home when cleared by Medicine and therapies.  - Follow up: With Dr Mott team in 2 weeks for incision check and suture removal.    Discussed above with Dr Mott.  Weekend team updated.    Italia PHIPPS CNS  Orthopaedic Surgery  Pager: [phone]    "

## 2022-09-30 NOTE — PLAN OF CARE
Goal Outcome Evaluation:    Plan of Care Reviewed With: patient     Overall Patient Progress: no change    Outcome Evaluation: Patient with no change in appetite/intakes. Eating well per patient. Encouraged increased protein intakes, small freqent meals

## 2022-09-30 NOTE — PROGRESS NOTES
[facility]    Medicine Progress Note - Hospitalist Service, GOLD TEAM 17    Date of Admission:  9/27/2022    Assessment & Plan        Sakshi Jaeger is a 77 year old female admitted on 9/27/2022. 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  Patient presents for right through knee amputation on 9/28/22.       #History of chronic right distal tibia infection  #Right distal lower leg chronic ulcer  #S/p R through the knee amputation 9/228/22   Patient with increased pain overnight in post-surgical R-knee. Seen by Anesthesia today with concern for potential dislodgement of epidural. Now in OR  - pain control per primary team  -  Now on ASA 81 mg bid for the time being for dvt ppx   - patient to continue taking ASA 81 mg every day afterwards.   -Rest of care per primary team.    #Osteoporosis  -Continue home calcium carbonate, vitamin D supplements    #Anemia  Hgb this morning 9.3. Likely acute blood loss anemia on top of chronic anemia of chronic disease  Hx of Fe-def  - okay to hold PTA ferrous sulfate given concern for diarrhea per discussion with patient  - trend hgb if inpatient     #hx of CAD w DISHA to RCA  #HTN - currently normotensive  - mgt as above  - hold PTA lisinopril, Amlodipine for the time being but okay to resume if patient is being discharged  - PTA Simvastatin    #Hx of RA  - cont PTA Prednisone 5 mg every day  - holding PTA methotrexate (qweekly) & leflulomide     #Recent left eye cataract surgery  -Continue PTA Prednisone ophthalmic suspension 1-drop every day in left eye  -Continue PTA Ketoralac suspension 1-drop in left eye every day until 10/06/22    #Low BMI  -Nutrition consult, appreciate recs         Diet: Diet  Regular Diet Adult  Snacks/Supplements Adult: Other; Please allow pt/RN  "to order snacks/supplements PRN; Between Meals    DVT Prophylaxis:  mg every day   Nolan Catheter: Not present  Central Lines: None  Cardiac Monitoring: None  Code Status:   Defer to Primary service, will presumed Full     Disposition Plan  Ultimately, patient is clear for discharge from medicine standpoint.      Expected Discharge Date: 10/01/2022,  9:00 AM              The patient's care was discussed with the Patient.    MARY BUCK MD  Hospitalist Service, [address]  Securely message with the Vocera Web Console (learn more here)  Text page via [facility] Paging/Directory   Please see signed in provider for up to date coverage information      Clinically Significant Risk Factors Present on Admission                # Cachexia: Estimated body mass index is 15.46 kg/m  as calculated from the following:    Height as of this encounter: 1.664 m (5' 5.5\").    Weight as of this encounter: 42.8 kg (94 lb 5.7 oz).    # Severe Malnutrition: based on nutrition assessment     ______________________________________________________________________    Interval History   Patient denies any acute complaints today  Anesthesia removed femoral and sciatic catheter today    Data reviewed today: I reviewed all medications, new labs and imaging results over the last 24 hours. I personally reviewed no images or EKG's today.    Physical Exam   Vital Signs: Temp: (!) 96.5  F (35.8  C) Temp src: Oral BP: 137/54 Pulse: 58   Resp: 15 SpO2: 93 % O2 Device: None (Room air)    Weight: 94 lbs 5.71 oz    General Appearance: Lying comfortably in bed, on room air, in no acute distress of discomfort  HEENT: PERRL: EOMI; moist mucous membrane w/o lesions  Neck: No JVD  Pulmonary: Clear to auscultation bilaterally, no wheezes or crackles  CVS: Regular rhythm, no murmurs, rubs or gallops  GI: BS (+), soft nontender, no rebound or guarding   Extremities: s/p R-BKA, in dressing with drain " in place  Neurologic: A&O x3      Data   Recent Labs   Lab 09/30/22  0935 09/27/22  1334   WBC  --  7.8   HGB 9.3* 10.7*   MCV  --  95   PLT  --  245   NA  --  135   POTASSIUM  --  4.1   CHLORIDE  --  104   CO2  --  24   BUN  --  8   CR  --  0.48*   ANIONGAP  --  7   ADRIAN  --  8.8   GLC  --  98   ALBUMIN  --  3.0*   PROTTOTAL  --  6.6*   BILITOTAL  --  0.4   ALKPHOS  --  73   ALT  --  18   AST  --  18     No results found for this or any previous visit (from the past 24 hour(s)).  Medications     - MEDICATION INSTRUCTIONS -       lactated ringers 100 mL/hr at 09/30/22 0834     ropivacaine       ropivacaine       sodium chloride Stopped (09/28/22 1413)       acetaminophen  650 mg Oral Q4H     aspirin  81 mg Oral BID 09 12     calcium carbonate 600 mg-vitamin D 400 units  2 tablet Oral Daily     folic acid  1 mg Oral Daily     [START ON 10/1/2022] ketorolac  1 drop Left Eye Daily     metoprolol tartrate  50 mg Oral BID     multivitamin w/minerals  1 tablet Oral Daily     [START ON 10/1/2022] prednisoLONE acetate  1 drop Left Eye Daily     predniSONE  5 mg Oral Daily     simvastatin  20 mg Oral At Bedtime

## 2022-09-30 NOTE — PLAN OF CARE
Pt A/O X 4. Afebrile. VSS. Lungs- Clear bilaterally with both anterior and posterior. Bowels-Active in all four quadrants, last BM 9-28-22. Voids spontaneously without difficulty on the bedside commode. Denies nausea and vomiting. CMS and Neuro's are intact. Pt has numbness in the right leg/stump from the block that was in place. Has pain in the right stump area and given Tylenol, Oxycodone, and pain is manageable. Both nerve blocks removed by Anesthesia Dr. Pt is on a Regular diet and appetite was Good this shift. Right stump incisional dressing started to come off the pt, New dressings placed is C/D/I and ACE-wrapped. Pt up to the commode with Assist X 1 and pivot transfer. PIV infiltrated in the right arm and was removed. Writer received telephone orders to discontinue continuous IV fluids from REYES Shields. Right stump is elevated on pillows, and pt's left heel elevated off the bed with a pillow. Pt is able to make needs known, and call light is within reach. Continue to monitor.

## 2022-09-30 NOTE — PROGRESS NOTES
Care Management Discharge Note    Discharge Date: 10/01/2022       Discharge Disposition:  Home with outpatient physical therapy    Discharge Services:      Discharge DME:      Discharge Transportation: family or friend will provide    Handoff Referral Completed: Yes    Additional Information:  Outpatient physical therapy referral sent to centralized scheduling. No further discharge concerns at this time. RNCC available as needed.    Danika Soliz RN, BSN  Care Coordinator, [facility] Orth[facility]  Phone [phone]  Pager [phone]

## 2022-09-30 NOTE — PLAN OF CARE
Physical Therapy Discharge Summary    Reason for therapy discharge:    All goals and outcomes met, no further needs identified.    Progress towards therapy goal(s). See goals on Care Plan in [facility] electronic health record for goal details.  Goals met    Therapy recommendation(s):    Continued therapy is recommended.  Rationale/Recommendations:  outpatient PT recommended to continue to progress R limb strength and overall mobility/activity tolerance given pt's goals of returning to use of prosthesis for ambulation. Pt has met all inpatient PT goals, has safe discharge plan in place, and no longer has skilled IP needs. Will complete orders.

## 2022-09-30 NOTE — PROGRESS NOTES
"Pain Service Progress Note  [facility]  Date: 09/30/2022       Patient Name: Sakshi Jaeger  MRN: [id number]  Age: 77 year old  Sex: female      Assessment:  78 y/o female w/ proximal tibial infection s/p R through the knee amputation    Procedure: R through the knee amputation    Date of Surgery: 9/28/22    Date of Catheter Placement: 9/29/22    Plan/Recommendations:  1. Regional Anesthesia/Analgesia  -Continuous Catheter Type/Site: Femoral/Sciatic Catheter (0.2% Ropivacaine @5 mL/hr per catheter)    Continue at infusions.    2. Anticoagulation  -Please contact Inpatient Pain Service before ordering or making any anticoagulation changes       3. Multimodal Analgesia  - Per primary service    Pain Service will continue to follow.    Please Page the Pain Team Via [employer]: \"PAIN MANAGEMENT ACUTE INPATIENT [facility]\"    Sergio Ramires MD  09/30/2022     Overnight Events: Issues with pain overnight    Subjective:  I feel so much better today.   Nausea: No  Vomiting: No  Pruritus: No  Symptoms of LAST: No    Pain Location:  RLE    Pain Intensity:    Pain at Rest: 1/10   Pain with Activity: 5/10  Comfort Goal: 4/10   Satisfied with your level of pain control: No    Diet: Diet  Regular Diet Adult    Relevant Labs:  Recent Labs   Lab Test 09/27/22  1334 10/10/19  0512 10/09/19  0930 10/02/19  0637 10/01/19  1309   INR  --   --  1.31*  --  1.38*      < >  --    < > 357   PTT  --   --   --   --  39*   BUN 8   < >  --    < > 12    < > = values in this interval not displayed.       Physical Exam:  Vitals: /55 (BP Location: Left arm)   Pulse 62   Temp 98.8  F (37.1  C) (Oral)   Resp 18   Ht 1.664 m (5' 5.5\")   Wt 42.8 kg (94 lb 5.7 oz)   LMP  (LMP Unknown)   SpO2 98%   BMI 15.46 kg/m      Physical Exam:   Orientation:  Alert, oriented, and in no acute distress: Yes  Sedation: No    Motor Examination:  5/5 Strength in lower extremities: Yes    Catheter Site:   Catheter entry site " is clean/dry/intact: Yes    Tender: No      Relevant Medications:  Current Pain Medications:  Medications related to Pain Management (From now, onward)    Start     Dose/Rate Route Frequency Ordered Stop    09/29/22 0000  oxyCODONE (ROXICODONE) 5 MG tablet         5-10 mg Oral EVERY 4 HOURS PRN 09/29/22 0728      09/29/22 0000  senna-docusate (SENOKOT-S/PERICOLACE) 8.6-50 MG tablet         1 tablet Oral DAILY 09/29/22 0728      09/29/22 0000  polyethylene glycol (MIRALAX) 17 g packet         1 packet Oral DAILY 09/29/22 0728      09/28/22 1825  HYDROmorphone (DILAUDID) injection 0.2 mg         0.2 mg Intravenous EVERY 2 HOURS PRN 09/28/22 1826 09/28/22 1825  oxyCODONE (ROXICODONE) tablet 5 mg         5 mg Oral EVERY 4 HOURS PRN 09/28/22 1826 09/27/22 2230  acetaminophen (TYLENOL) tablet 650 mg        Note to Pharmacy: PTA Sig:Take 2 tablets (650 mg) by mouth every 4 hours      650 mg Oral EVERY 4 HOURS 09/27/22 2208 09/27/22 1300  bupivacaine (MARCAINE) 0.125 % in sodium chloride 0.9 % 250 mL EPIDURAL Infusion         6 mL/hr  EPIDURAL CONTINUOUS 09/27/22 1230      09/27/22 1230  nalbuphine (NUBAIN) injection 2.5-5 mg         2.5-5 mg Intravenous EVERY 6 HOURS PRN 09/27/22 1230            Primary Service Contacted with Recommendations? Yes      Time/Communication:  I personally spent 15 minutes with greater than 50% in consultation, education, counseling and coordination of care.  See note above for details on conversation.

## 2022-09-30 NOTE — PLAN OF CARE
Pt. A&Ox4. VSS. Afebrile. Lung sounds CTA. Maintaining sats on RA. IS encouraged. Bowel sounds active, LBM 9/28. CMS and neuro's are intact. Reports numbness to RLE d/t nerve blocks, pain is improved. Denies nausea, shortness of breath, and chest pain. RLE pain managed w/ scheduled Tylenol, nerve block infusion, prn Oxycodone. Voids spontaneously without difficulty in BSC. Tolerating regular diet. RLE incisional dressing is CDI. 2 JPs in place, minimal output. Pt pivot transfers w/ SBA. PIV is patent and SL. Call light is within reach, pt able to make needs known and is resting comfortably between cares. Will continue to monitor.

## 2022-10-01 VITALS
DIASTOLIC BLOOD PRESSURE: 53 MMHG | HEIGHT: 66 IN | RESPIRATION RATE: 16 BRPM | SYSTOLIC BLOOD PRESSURE: 126 MMHG | HEART RATE: 63 BPM | TEMPERATURE: 97 F | WEIGHT: 94.36 LBS | OXYGEN SATURATION: 92 % | BODY MASS INDEX: 15.16 KG/M2

## 2022-10-01 LAB
HCT VFR BLD AUTO: 28.2 % (ref 35–47)
HGB BLD-MCNC: 9.3 G/DL (ref 11.7–15.7)
HOLD SPECIMEN: NORMAL

## 2022-10-01 PROCEDURE — 99232 SBSQ HOSP IP/OBS MODERATE 35: CPT | Performed by: INTERNAL MEDICINE

## 2022-10-01 PROCEDURE — 250N000013 HC RX MED GY IP 250 OP 250 PS 637: Performed by: INTERNAL MEDICINE

## 2022-10-01 PROCEDURE — 250N000012 HC RX MED GY IP 250 OP 636 PS 637

## 2022-10-01 PROCEDURE — 250N000013 HC RX MED GY IP 250 OP 250 PS 637

## 2022-10-01 PROCEDURE — 85014 HEMATOCRIT: CPT | Performed by: INTERNAL MEDICINE

## 2022-10-01 PROCEDURE — 36415 COLL VENOUS BLD VENIPUNCTURE: CPT | Performed by: INTERNAL MEDICINE

## 2022-10-01 RX ADMIN — ACETAMINOPHEN 650 MG: 325 TABLET, FILM COATED ORAL at 06:57

## 2022-10-01 RX ADMIN — FOLIC ACID 1 MG: 1 TABLET ORAL at 08:23

## 2022-10-01 RX ADMIN — METOPROLOL TARTRATE 50 MG: 50 TABLET, FILM COATED ORAL at 08:23

## 2022-10-01 RX ADMIN — ACETAMINOPHEN 650 MG: 325 TABLET, FILM COATED ORAL at 02:34

## 2022-10-01 RX ADMIN — OXYCODONE HYDROCHLORIDE 5 MG: 5 TABLET ORAL at 15:00

## 2022-10-01 RX ADMIN — PREDNISOLONE ACETATE 1 DROP: 10 SUSPENSION/ DROPS OPHTHALMIC at 11:59

## 2022-10-01 RX ADMIN — ACETAMINOPHEN 650 MG: 325 TABLET, FILM COATED ORAL at 11:59

## 2022-10-01 RX ADMIN — CALCIUM CARBONATE 600 MG (1,500 MG)-VITAMIN D3 400 UNIT TABLET 2 TABLET: at 08:23

## 2022-10-01 RX ADMIN — ASPIRIN 81 MG: 81 TABLET, COATED ORAL at 08:23

## 2022-10-01 RX ADMIN — KETOROLAC TROMETHAMINE 1 DROP: 5 SOLUTION OPHTHALMIC at 11:59

## 2022-10-01 RX ADMIN — MULTIPLE VITAMINS W/ MINERALS TAB 1 TABLET: TAB at 08:23

## 2022-10-01 RX ADMIN — OXYCODONE HYDROCHLORIDE 5 MG: 5 TABLET ORAL at 02:34

## 2022-10-01 RX ADMIN — PREDNISONE 5 MG: 5 TABLET ORAL at 08:23

## 2022-10-01 RX ADMIN — OXYCODONE HYDROCHLORIDE 5 MG: 5 TABLET ORAL at 06:57

## 2022-10-01 RX ADMIN — ASPIRIN 81 MG: 81 TABLET, COATED ORAL at 11:59

## 2022-10-01 ASSESSMENT — ACTIVITIES OF DAILY LIVING (ADL)
ADLS_ACUITY_SCORE: 37

## 2022-10-01 NOTE — PROGRESS NOTES
"Orthopedic Surgery Progress Note   September 30, 2022    Subjective: No acute events overnight. Patient worked particularly hard with PT and had a lot more pain in her residual limb which required IV pain medication to control.  Tolerating diet. Voiding spontaneously.  Denies fever or chills, CP, SOB.   Objective: /45 (BP Location: Left arm)   Pulse 59   Temp 96.8  F (36  C) (Oral)   Resp 16   Ht 1.664 m (5' 5.5\")   Wt 42.8 kg (94 lb 5.7 oz)   LMP  (LMP Unknown)   SpO2 92%   BMI 15.46 kg/m      Drain:   Deep removed 9/30  Superficial drain removed 10/1    General: NAD, alert and oriented, cooperative with exam.   Cardio: RRR, extremities wwp.   Respiratory: Non-labored breathing.  Gen: NAD, A/O x 3  Resp: Comfortable, non-labored breathing  RLE:   -Wound dressed, c/d/i; anterior flap + capillary refill  -Sensation intact grossly about the stump  -motor: Hip abduction/adduction/flexion intact      Impression: 77 year old female s/p right amputation through knee on 9/28/2022 by Dr Mott.      Changes for today:  Will need to make sure pain is controlled.   Okay to discharge to home once pain is controlled on oral medications alone  Drain removed today     Plan:  - Activity Up at tolerated; elevate leg at all times except when OOB x 10 days x 21 hours per day.  - Antibiotics: Ancef x 24 hours  - Diet: Regular  - DVT prophylaxis: Continue ASA  - Wound Care: Dressing change per ortho.  Soft dressing to be removed and light dressing applied once stump protector is available; prosthetics consult.  - Drain: all removed  - Pain management: Oral pain meds prn;  IV for breakthrough  - Physical Therapy: Evaluate and treat  - Occupational Therapy: Evaluate and treat  - Dispo: Discharge to home when cleared by Medicine and therapies.  - Follow up: With Dr Mott team in 2 weeks for incision check and suture removal.    Discussed above with Dr Mott.     Herbert Wall MD  Orthopedic Surgery PGY4  [phone]    Please " page me directly with any questions/concerns during regular weekday hours before 5 pm. If there is no response, if it is a weekend, or if it is during evening hours then please page the orthopedic surgery resident on call.

## 2022-10-01 NOTE — PLAN OF CARE
Goal Outcome Evaluation:    VS: VSS. Afebrile    O2: 91% on RA.    Output: Voids adequately without difficulty    Last BM: 9/28   Activity: SBA, pivot transfers   Skin: Right leg incision (BKA)   Pain: Managed with PRN Oxy    CMS: A&Ox4. Denies numbness and tingling    Dressing: CDI   Diet: Regular   LDA: PIV R - SL.    Equipment: IV pole/pump, walker, patient belongings, call light within reach    Plan: Discharge home with outpatient physical therapy    Additional Info:

## 2022-10-01 NOTE — PROGRESS NOTES
DISCHARGE SUMMARY    Pt discharging to: Home with   Transportation:  driving   AVS given and discussed: yes  Stoplight Tool given and discussed:  yes  Medications given: yes  Belongings returned: yes  Comments: pt has no further questions this time.

## 2022-10-01 NOTE — PLAN OF CARE
VS: VSS   O2: >90% RA   Output: Voiding WNL on commode   Last BM: 9/28/2022   Activity: SBA w/ GB and walker   Skin: BKA RLE, scattered bruising   Pain: Controlled with orals   CMS: Intact   Dressing: BKA CDI   Diet: Regular   LDA: PIV LFA SL   Equipment: GB, walker, IV pole   Plan: Discharge when medically stable.    Additional Info: Pt upset about timing of eyedrops first thing in morning- rescheduled to noon once daily

## 2022-10-01 NOTE — PLAN OF CARE
"  VS: /54 (BP Location: Left arm)   Pulse 66   Temp 98  F (36.7  C) (Oral)   Resp 16   Ht 1.664 m (5' 5.5\")   Wt 42.8 kg (94 lb 5.7 oz)   LMP  (LMP Unknown)   SpO2 93%   BMI 15.46 kg/m      O2: Room air- lungs are clear,  Denies chest pain    Output: Voids adequately without difficulty    Last BM: 9/28/22   Activity: Pivot transfer to [facility]   Skin: BKA- right    Pain: Managed by oxycodone, dilaudid   CMS: AXO X4   Dressing: Right BKA Ace wrap CDI    Diet: Regular   LDA: AYAZ drain- 15 ml output, left piv saline locked    Equipment: Walker, iv pole, commode,  pt belonging   Plan: Pt going home  With outpatient PT. Potential discharge 10/01   Additional Info: No numbness or tinging                           "

## 2022-10-01 NOTE — PROGRESS NOTES
[facility]    Medicine Progress Note - Hospitalist Service, GOLD TEAM 17    Date of Admission:  9/27/2022    Assessment & Plan        Sakshi Jaeger is a 77 year old female admitted on 9/27/2022. 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  Patient presents for right through knee amputation on 9/28/22.       #History of chronic right distal tibia infection  #Right distal lower leg chronic ulcer  #S/p R through the knee amputation 9/228/22   Patient with increased pain overnight in post-surgical R-knee. Seen by Anesthesia today with concern for potential dislodgement of epidural. Now in OR  - pain control per primary team  -  Now on ASA 81 mg bid for the time being for dvt ppx   - patient to continue taking ASA 81 mg every day afterwards.   -Rest of care per primary team.    #Osteoporosis  -Continue home calcium carbonate, vitamin D supplements    #Anemia  Hgb post-op 9.3 from 10.7. Likely acute blood loss anemia on top of chronic anemia of chronic disease  Hx of Fe-def  - okay to hold PTA ferrous sulfate given concern for diarrhea per discussion with patient  - trend hgb if inpatient   Hemoglobin has been stable.       #hx of CAD w DISHA to RCA  #HTN - currently normotensive  - mgt as above  - hold PTA lisinopril, Amlodipine for the time being but okay to resume if patient is being discharged  - PTA Simvastatin    #Hx of RA  - cont PTA Prednisone 5 mg every day  - holding PTA methotrexate (qweekly) & leflulomide     #Recent left eye cataract surgery  -Continue PTA Prednisone ophthalmic suspension 1-drop every day in left eye  -Continue PTA Ketoralac suspension 1-drop in left eye every day until 10/06/22    #Low BMI  -Nutrition consult, appreciate recs         Diet: Diet  Regular Diet  "Adult  Snacks/Supplements Adult: Other; Please allow pt/RN to order snacks/supplements PRN; Between Meals    DVT Prophylaxis:  mg every day   Nolan Catheter: Not present  Central Lines: None  Cardiac Monitoring: None  Code Status:   Defer to Primary service, will presumed Full     Disposition Plan  Ultimately, patient is clear for discharge from medicine standpoint.   Expected Discharge Date: 10/01/2022,  9:00 AM              The patient's care was discussed with the Patient.    MARY BUCK MD  Hospitalist Service, GOLD TEAM [address] [place] [address]  Securely message with the Vocera Web Console (learn more here)  Text page via [facility] Paging/Directory   Please see signed in provider for up to date coverage information      Clinically Significant Risk Factors Present on Admission                # Cachexia: Estimated body mass index is 15.46 kg/m  as calculated from the following:    Height as of this encounter: 1.664 m (5' 5.5\").    Weight as of this encounter: 42.8 kg (94 lb 5.7 oz).    # Severe Malnutrition: based on nutrition assessment     ______________________________________________________________________    Interval History   Reports pain now well controlled. Patient reports good night's sleep. Denies any acute complaints at this time.     Data reviewed today: I reviewed all medications, new labs and imaging results over the last 24 hours. I personally reviewed no images or EKG's today.    Physical Exam   Vital Signs: Temp: (!) 95.9  F (35.5  C) Temp src: Oral BP: 133/67 Pulse: 61   Resp: 16 SpO2: 91 % O2 Device: None (Room air)    Weight: 94 lbs 5.71 oz    General Appearance: Lying comfortably in bed, on room air, in no acute distress of discomfort  HEENT: PERRL: EOMI; moist mucous membrane w/o lesions  Neck: No JVD  Pulmonary: Clear to auscultation bilaterally, no wheezes or crackles  CVS: Regular rhythm, no murmurs, rubs or gallops  GI: BS (+), soft nontender, " no rebound or guarding   Extremities: s/p R-BKA, in dressing in place; c/d/i  Neurologic: A&O x3      Data   Recent Labs   Lab 09/30/22  0935 09/27/22  1334   WBC  --  7.8   HGB 9.3* 10.7*   MCV  --  95   PLT  --  245   NA  --  135   POTASSIUM  --  4.1   CHLORIDE  --  104   CO2  --  24   BUN  --  8   CR  --  0.48*   ANIONGAP  --  7   ADRIAN  --  8.8   GLC  --  98   ALBUMIN  --  3.0*   PROTTOTAL  --  6.6*   BILITOTAL  --  0.4   ALKPHOS  --  73   ALT  --  18   AST  --  18     No results found for this or any previous visit (from the past 24 hour(s)).  Medications     - MEDICATION INSTRUCTIONS -       ropivacaine Stopped (09/30/22 1336)     ropivacaine Stopped (09/30/22 1335)       acetaminophen  650 mg Oral Q4H     aspirin  81 mg Oral BID 09 12     calcium carbonate 600 mg-vitamin D 400 units  2 tablet Oral Daily     folic acid  1 mg Oral Daily     ketorolac  1 drop Left Eye Daily     metoprolol tartrate  50 mg Oral BID     multivitamin w/minerals  1 tablet Oral Daily     prednisoLONE acetate  1 drop Left Eye Daily     predniSONE  5 mg Oral Daily     simvastatin  20 mg Oral At Bedtime

## 2022-10-03 NOTE — DISCHARGE SUMMARY
"ORTHOPAEDIC SURGERY DISCHARGE SUMMARY     Date of Admission: 9/27/2022  Date of Discharge: 10/1/2022  6:15 PM  Disposition: Home  Staff Physician: No att. providers found  Primary Care Provider: RAISA Curran [facility]    DISCHARGE DIAGNOSIS:  Pain [R52]    PROCEDURES: Procedure(s):  Right Through knee amputation with Dr. Mott on 9/28/22    BRIEF HISTORY:  Sakshi Jaeger is a 77 year old female admitted on 9/27/2022. 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  Patient presents for right through knee amputation on 9/28/22.     HOSPITAL COURSE:    The patient was admitted following the above listed procedures for pain control and rehabilitation. Sakshi Jaeger did well post-operatively. Medicine was consulted post operatively to aid in management of medical co-morbidities. The patient received routine nursing cares and at the time of discharge was medically stable. Vital signs were stable throughout admission. The patient is tolerating a regular diet and is voiding spontaneously. All PT/OT goals have been met for safe mobility. Pain is now controlled on oral medications which will be available on discharge. Stool softeners have been used while taking pain medications to help prevent constipation. Sakshi Jaeger is deemed medically safe to discharge.     Antibiotics:  Ancef given periop and 24 hours postop.  DVT prophylaxis:  ASA 162mg every day initiated after surgery and will be continued for 4 weeks.   PT Progress:  Has met PT/OT goals for safe mobility.    Pain Meds:  Weaned off all IV pain meds by discharge.  Inpatient Events: No significant events or complications.     PHYSICAL EXAM:    /45 (BP Location: Left arm)   Pulse 59   Temp 96.8  F (36  C) (Oral)   Resp 16   Ht 1.664 m (5' 5.5\")   Wt 42.8 kg (94 lb 5.7 oz)   LMP  " (LMP Unknown)   SpO2 92%   BMI 15.46 kg/m       Drain:   Deep removed 9/30  Superficial drain removed 10/1     General: NAD, alert and oriented, cooperative with exam.   Cardio: RRR, extremities wwp.   Respiratory: Non-labored breathing.  Gen: NAD, A/O x 3  Resp: Comfortable, non-labored breathing  RLE:   -Wound dressed, c/d/i; anterior flap + capillary refill  -Sensation intact grossly about the stump  -motor: Hip abduction/adduction/flexion intact    FOLLOWUP:    Follow up with Dr. Mott care team at 2 weeks postoperatively for wound check     Future Appointments   Date Time Provider Department Center   10/13/2022 11:45 AM Flip Izaguirre MD [facility]   10/14/2022 11:30 AM Nurse, [place]  [facility]   10/24/2022  1:45 PM Flip Izaguirre MD [facility]   11/15/2022 11:30 AM Doron Mott MD [facility]       Orthopaedic Surgery appointments are at the [facility] ([address] [place] [address]). Call [phone] to schedule a follow-up appointment at this location with your provider.     PLANNED DISCHARGE ORDERS:     DVT Prophylaxis: ASA 162mg every day x 4 weeks     Activity: Non weight bearing RLE     Wound Care: see Below      Discharge Medication List as of 10/1/2022  3:07 PM      START taking these medications    Details   aspirin (ASA) 81 MG EC tablet Take 1 tablet (81 mg) by mouth 2 times daily for 27 days, Disp-54 tablet, R-0, E-PrescribeContinue taking ASA 81 mg afterwards.      oxyCODONE (ROXICODONE) 5 MG tablet Take 1-2 tablets (5-10 mg) by mouth every 4 hours as needed for moderate to severe pain, Disp-40 tablet, R-0, E-Prescribe      polyethylene glycol (MIRALAX) 17 g packet Take 17 g by mouth daily, Disp-7 packet, R-0, E-Prescribe      senna-docusate (SENOKOT-S/PERICOLACE) 8.6-50 MG tablet Take 1 tablet by mouth daily, Disp-30 tablet, R-0, E-Prescribe         CONTINUE these medications which have CHANGED     Details   ketorolac (ACULAR) 0.5 % ophthalmic solution Place 1 drop Into the left eye daily for 5 days, Disp-1 mL, R-0, E-Prescribe      prednisoLONE acetate (PRED FORTE) 1 % ophthalmic suspension Place 1 drop Into the left eye daily for 5 days, Disp-1 mL, R-0, E-Prescribe         CONTINUE these medications which have NOT CHANGED    Details   acetaminophen (TYLENOL) 325 MG tablet Take 2 tablets (650 mg) by mouth every 4 hours, Transitional      amLODIPine (NORVASC) 5 MG tablet Take 1 tablet (5 mg) by mouth daily, Disp-90 tablet, R-3, E-Prescribe      aspirin (ASA) 81 MG tablet Take 1 tablet (81 mg) by mouth daily, Historical      calcium carbonate 600 mg-vitamin D 400 units (CALCIUM 600 + D) 600-400 MG-UNIT per tablet Take 2 tablets by mouth daily, Transitional      ferrous sulfate (FEROSUL) 325 (65 Fe) MG tablet TAKE 1 TABLET BY MOUTH EVERY DAY WITH BREAKFAST, Disp-90 tablet, R-1, E-Prescribe      FOLIC ACID PO Take 1 mg by mouth daily, Historical      leflunomide (ARAVA) 20 MG tablet Take 1 tablet by mouth every 24 hours, Historical      lisinopril (ZESTRIL) 20 MG tablet TAKE 1 TABLET BY MOUTH EVERY DAY, Disp-90 tablet, R-3, E-Prescribe      methotrexate 2.5 MG tablet Take 20 mg by mouth every 7 days On Saturdays, Historical      metoprolol tartrate (LOPRESSOR) 50 MG tablet Take 1 tablet (50 mg) by mouth 2 times daily, Disp-90 tablet, R-3, E-Prescribe      multivitamin w/minerals (THERA-VIT-M) tablet Take 1 tablet by mouth daily, Transitional      predniSONE (DELTASONE) 5 MG tablet Take 1 tablet (5 mg) by mouth daily, R-0, Transitional      simvastatin (ZOCOR) 20 MG tablet Take 1 tablet (20 mg) by mouth At Bedtime, Disp-90 tablet, R-3, E-Prescribe               Discharge Procedure Orders   Home Care Referral   Referral Priority: Routine: Next available opening Referral Type: Home Health Therapies & Aides   Number of Visits Requested: 1     Reason for your hospital stay   Order Comments: S/p RIGHT AMPUTATION,  THROUGH KNEE     Activity   Order Comments: Your activity upon discharge: activity as tolerated     Order Specific Question Answer Comments   Is discharge order? Yes      Adult [facility] Follow-up and recommended labs and tests   Order Comments: F/U in clinic with Dr. Mott's team in 2 weeks for incision check and suture removal.   Clinic phone number is     Appointments on [place] and/or [facility] (with [facility] or [facility] provider or service). Call [phone] if you haven't heard regarding these appointments within 7 days of discharge.     When to contact your care team   Order Comments: Call Dr Mott if you have any of the following: temperature greater than 101.3  or less than 96.5,  increased shortness of breath, increased drainage, increased swelling, or increased pain.     Wound care and dressings   Order Comments: Instructions to care for your wound at home: ice to area for comfort, keep wound clean and dry, may get incision wet in shower but do not soak or scrub, reinforce dressing as needed, and remove dressing in 7 days.     Diet   Order Comments: Follow this diet upon discharge: None     Order Specific Question Answer Comments   Is discharge order? Yes            Herbert Wall MD  Orthopaedic Surgery, PGY4

## 2022-10-04 LAB
PATH REPORT.COMMENTS IMP SPEC: NORMAL
PATH REPORT.COMMENTS IMP SPEC: NORMAL
PATH REPORT.GROSS SPEC: NORMAL
PATH REPORT.RELEVANT HX SPEC: NORMAL
PHOTO IMAGE: NORMAL

## 2022-10-04 PROCEDURE — 88300 SURGICAL PATH GROSS: CPT | Mod: 26 | Performed by: PATHOLOGY

## 2022-10-06 ENCOUNTER — TELEPHONE (OUTPATIENT)
Dept: OPHTHALMOLOGY | Facility: CLINIC | Age: 77
End: 2022-10-06

## 2022-10-06 NOTE — TELEPHONE ENCOUNTER
Health Call Center    Phone Message    May a detailed message be left on voicemail: yes     Reason for Call: Other: Santi called requesting a call back. He has questions regarding Sakshi's upcoming procedure as far as which medications she should stop taking and pre-op questions. Please call [phone].     Action Taken: Other: eye    Travel Screening: Not Applicable

## 2022-10-07 NOTE — TELEPHONE ENCOUNTER
Called Sakshi and left a voicemail explaining there is no consent to communicate protected health information with Santi on file. I asked her to call if she still had questions regarding surgery.    Santi picked up the phone and I asked for Sakshi. Sakshi is sleeping and Santi said he has always been able to get information about Sakshi's surgeries and appointments. I explained that without consent on file I cannot share any information with Santi and Sakshi can fill out a consent the next time she is in clinic if she would like.

## 2022-10-12 ENCOUNTER — ANESTHESIA EVENT (OUTPATIENT)
Dept: SURGERY | Facility: AMBULATORY SURGERY CENTER | Age: 77
End: 2022-10-12
Payer: COMMERCIAL

## 2022-10-12 ASSESSMENT — LIFESTYLE VARIABLES: TOBACCO_USE: 1

## 2022-10-13 ENCOUNTER — OFFICE VISIT (OUTPATIENT)
Dept: OPHTHALMOLOGY | Facility: CLINIC | Age: 77
End: 2022-10-13
Payer: COMMERCIAL

## 2022-10-13 ENCOUNTER — HOSPITAL ENCOUNTER (OUTPATIENT)
Facility: AMBULATORY SURGERY CENTER | Age: 77
Discharge: HOME | End: 2022-10-13
Attending: OPHTHALMOLOGY
Payer: COMMERCIAL

## 2022-10-13 ENCOUNTER — ANESTHESIA (OUTPATIENT)
Dept: SURGERY | Facility: AMBULATORY SURGERY CENTER | Age: 77
End: 2022-10-13
Payer: COMMERCIAL

## 2022-10-13 VITALS
SYSTOLIC BLOOD PRESSURE: 118 MMHG | OXYGEN SATURATION: 96 % | HEART RATE: 72 BPM | TEMPERATURE: 98.6 F | RESPIRATION RATE: 13 BRPM | DIASTOLIC BLOOD PRESSURE: 59 MMHG

## 2022-10-13 DIAGNOSIS — H25.811 COMBINED FORM OF AGE-RELATED CATARACT, RIGHT EYE: Primary | ICD-10-CM

## 2022-10-13 DIAGNOSIS — Z96.1 PSEUDOPHAKIA, RIGHT EYE: Primary | ICD-10-CM

## 2022-10-13 PROCEDURE — 66984 XCAPSL CTRC RMVL W/O ECP: CPT | Mod: 79 | Performed by: OPHTHALMOLOGY

## 2022-10-13 PROCEDURE — 99024 POSTOP FOLLOW-UP VISIT: CPT | Performed by: OPHTHALMOLOGY

## 2022-10-13 PROCEDURE — G8907 PT DOC NO EVENTS ON DISCHARG: HCPCS

## 2022-10-13 PROCEDURE — G8918 PT W/O PREOP ORDER IV AB PRO: HCPCS

## 2022-10-13 PROCEDURE — 66984 XCAPSL CTRC RMVL W/O ECP: CPT | Mod: RT

## 2022-10-13 DEVICE — EYE IMP IOL AMO PCL TECNIS ZCB00 24.0: Type: IMPLANTABLE DEVICE | Site: EYE | Status: FUNCTIONAL

## 2022-10-13 RX ORDER — SODIUM CHLORIDE, SODIUM LACTATE, POTASSIUM CHLORIDE, CALCIUM CHLORIDE 600; 310; 30; 20 MG/100ML; MG/100ML; MG/100ML; MG/100ML
INJECTION, SOLUTION INTRAVENOUS CONTINUOUS
Status: DISCONTINUED | OUTPATIENT
Start: 2022-10-13 | End: 2022-10-14 | Stop reason: HOSPADM

## 2022-10-13 RX ORDER — ONDANSETRON 2 MG/ML
4 INJECTION INTRAMUSCULAR; INTRAVENOUS EVERY 30 MIN PRN
Status: DISCONTINUED | OUTPATIENT
Start: 2022-10-13 | End: 2022-10-14 | Stop reason: HOSPADM

## 2022-10-13 RX ORDER — ONDANSETRON 4 MG/1
4 TABLET, ORALLY DISINTEGRATING ORAL EVERY 30 MIN PRN
Status: DISCONTINUED | OUTPATIENT
Start: 2022-10-13 | End: 2022-10-14 | Stop reason: HOSPADM

## 2022-10-13 RX ORDER — KETAMINE HYDROCHLORIDE 10 MG/ML
INJECTION INTRAMUSCULAR; INTRAVENOUS PRN
Status: DISCONTINUED | OUTPATIENT
Start: 2022-10-13 | End: 2022-10-13

## 2022-10-13 RX ORDER — FENTANYL CITRATE 50 UG/ML
25 INJECTION, SOLUTION INTRAMUSCULAR; INTRAVENOUS EVERY 5 MIN PRN
Status: DISCONTINUED | OUTPATIENT
Start: 2022-10-13 | End: 2022-10-14 | Stop reason: HOSPADM

## 2022-10-13 RX ORDER — ALBUTEROL SULFATE 0.83 MG/ML
2.5 SOLUTION RESPIRATORY (INHALATION) EVERY 4 HOURS PRN
Status: DISCONTINUED | OUTPATIENT
Start: 2022-10-13 | End: 2022-10-14 | Stop reason: HOSPADM

## 2022-10-13 RX ORDER — PROPARACAINE HYDROCHLORIDE 5 MG/ML
1 SOLUTION/ DROPS OPHTHALMIC
Status: DISCONTINUED | OUTPATIENT
Start: 2022-10-13 | End: 2022-10-14 | Stop reason: HOSPADM

## 2022-10-13 RX ORDER — LORAZEPAM 2 MG/ML
.5-1 INJECTION INTRAMUSCULAR
Status: DISCONTINUED | OUTPATIENT
Start: 2022-10-13 | End: 2022-10-14 | Stop reason: HOSPADM

## 2022-10-13 RX ORDER — TETRACAINE HYDROCHLORIDE 5 MG/ML
SOLUTION OPHTHALMIC PRN
Status: DISCONTINUED | OUTPATIENT
Start: 2022-10-13 | End: 2022-10-13 | Stop reason: HOSPADM

## 2022-10-13 RX ORDER — MOXIFLOXACIN 5 MG/ML
1 SOLUTION/ DROPS OPHTHALMIC
Status: COMPLETED | OUTPATIENT
Start: 2022-10-13 | End: 2022-10-13

## 2022-10-13 RX ORDER — ACETAMINOPHEN 325 MG/1
975 TABLET ORAL ONCE
Status: COMPLETED | OUTPATIENT
Start: 2022-10-13 | End: 2022-10-13

## 2022-10-13 RX ORDER — LIDOCAINE 40 MG/G
CREAM TOPICAL
Status: DISCONTINUED | OUTPATIENT
Start: 2022-10-13 | End: 2022-10-14 | Stop reason: HOSPADM

## 2022-10-13 RX ORDER — OXYCODONE HYDROCHLORIDE 5 MG/1
5 TABLET ORAL EVERY 4 HOURS PRN
Status: DISCONTINUED | OUTPATIENT
Start: 2022-10-13 | End: 2022-10-14 | Stop reason: HOSPADM

## 2022-10-13 RX ORDER — CYCLOPENTOLAT/TROPIC/PHENYLEPH 1%-1%-2.5%
1 DROPS (EA) OPHTHALMIC (EYE)
Status: COMPLETED | OUTPATIENT
Start: 2022-10-13 | End: 2022-10-13

## 2022-10-13 RX ORDER — HYDRALAZINE HYDROCHLORIDE 20 MG/ML
2.5-5 INJECTION INTRAMUSCULAR; INTRAVENOUS EVERY 10 MIN PRN
Status: DISCONTINUED | OUTPATIENT
Start: 2022-10-13 | End: 2022-10-14 | Stop reason: HOSPADM

## 2022-10-13 RX ORDER — BALANCED SALT SOLUTION 6.4; .75; .48; .3; 3.9; 1.7 MG/ML; MG/ML; MG/ML; MG/ML; MG/ML; MG/ML
SOLUTION OPHTHALMIC PRN
Status: DISCONTINUED | OUTPATIENT
Start: 2022-10-13 | End: 2022-10-13 | Stop reason: HOSPADM

## 2022-10-13 RX ORDER — MEPERIDINE HYDROCHLORIDE 25 MG/ML
12.5 INJECTION INTRAMUSCULAR; INTRAVENOUS; SUBCUTANEOUS
Status: DISCONTINUED | OUTPATIENT
Start: 2022-10-13 | End: 2022-10-14 | Stop reason: HOSPADM

## 2022-10-13 RX ORDER — KETOROLAC TROMETHAMINE 30 MG/ML
15 INJECTION, SOLUTION INTRAMUSCULAR; INTRAVENOUS EVERY 6 HOURS PRN
Status: DISCONTINUED | OUTPATIENT
Start: 2022-10-13 | End: 2022-10-14 | Stop reason: HOSPADM

## 2022-10-13 RX ORDER — PROPARACAINE HYDROCHLORIDE 5 MG/ML
1 SOLUTION/ DROPS OPHTHALMIC ONCE
Status: COMPLETED | OUTPATIENT
Start: 2022-10-13 | End: 2022-10-13

## 2022-10-13 RX ORDER — FENTANYL CITRATE 50 UG/ML
25 INJECTION, SOLUTION INTRAMUSCULAR; INTRAVENOUS
Status: DISCONTINUED | OUTPATIENT
Start: 2022-10-13 | End: 2022-10-14 | Stop reason: HOSPADM

## 2022-10-13 RX ORDER — KETOROLAC TROMETHAMINE 5 MG/ML
1 SOLUTION OPHTHALMIC
Status: COMPLETED | OUTPATIENT
Start: 2022-10-13 | End: 2022-10-13

## 2022-10-13 RX ADMIN — MOXIFLOXACIN 1 DROP: 5 SOLUTION/ DROPS OPHTHALMIC at 09:32

## 2022-10-13 RX ADMIN — PROPARACAINE HYDROCHLORIDE 1 DROP: 5 SOLUTION/ DROPS OPHTHALMIC at 09:20

## 2022-10-13 RX ADMIN — KETAMINE HYDROCHLORIDE 10 MG: 10 INJECTION INTRAMUSCULAR; INTRAVENOUS at 10:26

## 2022-10-13 RX ADMIN — Medication 1 DROP: at 09:26

## 2022-10-13 RX ADMIN — SODIUM CHLORIDE, SODIUM LACTATE, POTASSIUM CHLORIDE, CALCIUM CHLORIDE: 600; 310; 30; 20 INJECTION, SOLUTION INTRAVENOUS at 10:27

## 2022-10-13 RX ADMIN — Medication 1 DROP: at 09:32

## 2022-10-13 RX ADMIN — KETOROLAC TROMETHAMINE 1 DROP: 5 SOLUTION OPHTHALMIC at 09:26

## 2022-10-13 RX ADMIN — Medication 1 DROP: at 09:21

## 2022-10-13 RX ADMIN — MOXIFLOXACIN 1 DROP: 5 SOLUTION/ DROPS OPHTHALMIC at 09:21

## 2022-10-13 RX ADMIN — MOXIFLOXACIN 1 DROP: 5 SOLUTION/ DROPS OPHTHALMIC at 09:26

## 2022-10-13 RX ADMIN — KETOROLAC TROMETHAMINE 1 DROP: 5 SOLUTION OPHTHALMIC at 09:21

## 2022-10-13 RX ADMIN — ACETAMINOPHEN 975 MG: 325 TABLET ORAL at 09:22

## 2022-10-13 RX ADMIN — KETOROLAC TROMETHAMINE 1 DROP: 5 SOLUTION OPHTHALMIC at 09:32

## 2022-10-13 ASSESSMENT — TONOMETRY
IOP_METHOD: TONOPEN
OD_IOP_MMHG: 6

## 2022-10-13 ASSESSMENT — VISUAL ACUITY
METHOD: SNELLEN - LINEAR
OD_SC: 20/200

## 2022-10-13 ASSESSMENT — SLIT LAMP EXAM - LIDS: COMMENTS: NORMAL

## 2022-10-13 NOTE — ANESTHESIA POSTPROCEDURE EVALUATION
Patient: Sakshi Jaeger    Procedure: Procedure(s):  PHACOEMULSIFICATION, CATARACT, WITH INTRAOCULAR LENS IMPLANT       Anesthesia Type:  MAC    Note:  Disposition: Outpatient   Postop Pain Control: Uneventful            Sign Out: Well controlled pain   PONV: No   Neuro/Psych: Uneventful            Sign Out: Acceptable/Baseline neuro status   Airway/Respiratory: Uneventful            Sign Out: Acceptable/Baseline resp. status   CV/Hemodynamics: Uneventful            Sign Out: Acceptable CV status; No obvious hypovolemia; No obvious fluid overload   Other NRE: NONE   DID A NON-ROUTINE EVENT OCCUR? No           Last vitals:  Vitals Value Taken Time   /59 10/13/22 1110   Temp     Pulse 72 10/13/22 1110   Resp     SpO2 96 % 10/13/22 1110       Electronically Signed By: Uche Rust MD  October 13, 2022  1:25 PM

## 2022-10-13 NOTE — PROGRESS NOTES
Review of systems for the eyes was negative other than the pertinent positives/negatives listed in the HPI.      Assessment & Plan    HPI:  Sakshi Jaeger is a 77 year old female with history of RA, osteoporosis, HTN, BKA, myopia with astigmatism here for Postoperative day 0 right eye ,     POHx: myopia with astigmatism and presbyopia, dermatochalasis  PMHx: osteoporosis, HTN, BKA  Current Medications: acetaminophen (TYLENOL) 325 MG tablet, Take 2 tablets (650 mg) by mouth every 4 hours  amLODIPine (NORVASC) 5 MG tablet, Take 1 tablet (5 mg) by mouth daily  aspirin (ASA) 81 MG EC tablet, Take 1 tablet (81 mg) by mouth 2 times daily for 27 days  aspirin (ASA) 81 MG tablet, Take 1 tablet (81 mg) by mouth daily  calcium carbonate 600 mg-vitamin D 400 units (CALCIUM 600 + D) 600-400 MG-UNIT per tablet, Take 2 tablets by mouth daily  ferrous sulfate (FEROSUL) 325 (65 Fe) MG tablet, TAKE 1 TABLET BY MOUTH EVERY DAY WITH BREAKFAST (Patient taking differently: Take 325 mg by mouth twice a week)  FOLIC ACID PO, Take 1 mg by mouth daily  leflunomide (ARAVA) 20 MG tablet, Take 1 tablet by mouth every 24 hours  lisinopril (ZESTRIL) 20 MG tablet, TAKE 1 TABLET BY MOUTH EVERY DAY  methotrexate 2.5 MG tablet, Take 20 mg by mouth every 7 days On Saturdays  metoprolol tartrate (LOPRESSOR) 50 MG tablet, Take 1 tablet (50 mg) by mouth 2 times daily (Patient taking differently: Take 50 mg by mouth daily)  multivitamin w/minerals (THERA-VIT-M) tablet, Take 1 tablet by mouth daily  oxyCODONE (ROXICODONE) 5 MG tablet, Take 1-2 tablets (5-10 mg) by mouth every 4 hours as needed for moderate to severe pain  polyethylene glycol (MIRALAX) 17 g packet, Take 17 g by mouth daily  predniSONE (DELTASONE) 5 MG tablet, Take 1 tablet (5 mg) by mouth daily  senna-docusate (SENOKOT-S/PERICOLACE) 8.6-50 MG tablet, Take 1 tablet by mouth daily  simvastatin (ZOCOR) 20 MG tablet, Take 1 tablet (20 mg) by mouth At Bedtime    [COMPLETED] acetaminophen  (TYLENOL) tablet 975 mg  albuterol (PROVENTIL) neb solution 2.5 mg  fentaNYL (PF) (SUBLIMAZE) injection 25 mcg  fentaNYL (PF) (SUBLIMAZE) injection 25 mcg  hydrALAZINE (APRESOLINE) injection 2.5-5 mg  HYDROmorphone (DILAUDID) injection 0.2 mg  [COMPLETED] ketorolac (ACULAR) 0.5 % ophthalmic solution 1 drop  ketorolac (TORADOL) injection 15 mg  lactated ringers infusion  lactated ringers infusion  lactated ringers infusion  lidocaine (LMX4) kit  lidocaine 1 % 0.1-1 mL  LORazepam (ATIVAN) injection 0.5-1 mg  meperidine (DEMEROL) injection 12.5 mg  [COMPLETED] moxifloxacin (VIGAMOX) 0.5 % ophthalmic solution 1 drop  ondansetron (ZOFRAN ODT) ODT tab 4 mg   Or  ondansetron (ZOFRAN) injection 4 mg  oxyCODONE (ROXICODONE) tablet 5 mg  prochlorperazine (COMPAZINE) injection 5 mg  proparacaine (ALCAINE) 0.5 % ophthalmic solution 1 drop  [COMPLETED] proparacaine (ALCAINE) 0.5 % ophthalmic solution 1 drop  racEPINEPHrine neb solution 0.5 mL  sodium chloride (PF) 0.9% PF flush 3 mL  sodium chloride (PF) 0.9% PF flush 3 mL  [COMPLETED] tropicamide 1 %-cyclopentolate 1 %-phenylephrine 2.5% 1-1-2.5 % ophthalmic solution 1 drop      FHx: cataract-mom  PSHx: denies history of ocular surgeries       Current Eye Medications:  Moxifloxacin, ketorolac, pred forte four times a day  Right eye     Assessment & Plan:  Pseudophakia, right eye, day 0  Izaguirre right eye 10/13/22   Doing well  Keep patch in place at night for 7 days  Start post-operative drops and taper according to instructions  Post-operative do's and don'ts reviewed, questions answered    Recheck 1 week  Goal -2.00    Pseudophakia, left eye, week 1  Izaguirre left eye 9/8/22   Doing well  Continue post-operative drops and taper according to instructions    (H52.13,  H52.203,  H52.4) Myopia of both eyes with astigmatism and presbyopia  Hold pending Cataract extraction/iol     (H02.831,  H02.834,  H02.832,  H02.835) Dermatochalasis of upper and lower eyelids of both  eyes  Discussed surgical options vs observation  Patient would like to observe for now      Return for as scheduled.        Flip Izaguirre MD     Attending Physician Attestation:  Complete documentation of historical and exam elements from today's encounter can be found in the full encounter summary report (not reduplicated in this progress note).  I personally obtained the chief complaint(s) and history of present illness.  I confirmed and edited as necessary the review of systems, past medical/surgical history, family history, social history, and examination findings as documented by others; and I examined the patient myself.  I personally reviewed the relevant tests, images, and reports as documented above.  I formulated and edited as necessary the assessment and plan and discussed the findings and management plan with the patient and family. - Flip Izaguirre MD

## 2022-10-13 NOTE — ANESTHESIA PREPROCEDURE EVALUATION
Anesthesia Pre-Procedure Evaluation    Patient: Sakshi Jaeger   MRN: [id number] : 1945        Procedure : Procedure(s):  PHACOEMULSIFICATION, CATARACT, WITH INTRAOCULAR LENS IMPLANT          Past Medical History:   Diagnosis Date     Anemia 2022     BENIGN HYPERTENSION 3/1/2005     CAD (coronary artery disease) 2011     Coronary artery disease involving native coronary artery of native heart without angina pectoris 2016     Employs prosthetic leg 2012     Essential hypertension with goal blood pressure less than 140/90 2016     Hyperlipidemia LDL goal <100 2010     Hypertension goal BP (blood pressure) < 130/80 2011     Infection of right below knee amputation (H) 10/1/2019     IRON DEFIC ANEMIA NOS 9/15/2005     Lower limb amputation, below knee 2012     MI (myocardial infarction) (H)     3/2010     Non-healing surgical wound, subsequent encounter 2020    Added automatically from request for surgery [id number]     Osteoporosis 2005     OSTEOPOROSIS NOS 2005     Protein-calorie malnutrition (H) 2022     PVD (peripheral vascular disease) (H) 2022     Rheumatoid arthritis (H) 2005          Rheumatoid arthritis(714.0) 2005     Status post below-knee amputation (H) 2012           Past Surgical History:   Procedure Laterality Date     ---------INCISION AND DRAINAGE  2014     AMPUTATE LEG BELOW KNEE Right 2022    Procedure: Right through knee amputation;  Surgeon: Doron Mott MD;  Location: [facility]     AMPUTATION BELOW KNEE RT/LT  2005    right lowwer leg.      APPENDECTOMY       ARTHROPLASTY KNEE  2011    Procedure:ARTHROPLASTY KNEE; Surgeon:JESSE HAWKINS; Location:[facility]     COMPLEX WOUND CLOSURE (UPDATE) Right 2021    Procedure: Right stump wound debridment and closure;  Surgeon: RAISA Perea MD;  Location: [facility] OR     CORONARY STENT PLACEMENT       INJECT NERVE BLOCK  SPHENOPALATINE GANGLION N/A 2022    Procedure: Out of OR encounter for block to be done by ;  Surgeon: GENERIC ANESTHESIA PROVIDER;  Location: [facility]     IRRIGATION AND DEBRIDEMENT LOWER EXTREMITY, COMBINED Right 10/09/2019    Procedure: Irrigation and debridement Right leg;  Surgeon: Doron Mott MD;  Location: [facility] OR     NERVE BLOCK PERIPHERAL N/A 2022    Procedure: BLOCK, NERVE;  Surgeon: GENERIC ANESTHESIA PROVIDER;  Location: [facility]     OTHER SURGICAL HISTORY  2014    I AND D      OTHER SURGICAL HISTORY Right 10/09/2019    IRRIGATION AND DEBRIDEMENT LOWER EXTREMITY, COMBINED     PHACOEMULSIFICATION CLEAR CORNEA WITH STANDARD INTRAOCULAR LENS IMPLANT Left 2022    Procedure: PHACOEMULSIFICATION, LEFT CATARACT, WITH INTRAOCULAR LENS IMPLANT;  Surgeon: Flip Izaguirre MD;  Location: [facility]     REVISE SCAR EXTREMITY Right 2020    Procedure: Right stump scar revision;  Surgeon: RAISA Perea MD;  Location: [facility] OR      No Known Allergies   Social History     Tobacco Use     Smoking status: Former     Packs/day: 0.50     Years: 45.00     Pack years: 22.50     Types: Cigarettes     Quit date: 2010     Years since quittin.6     Smokeless tobacco: Never   Substance Use Topics     Alcohol use: Yes     Comment: occsionally-3 -4 drinks a week      Wt Readings from Last 1 Encounters:   22 42.8 kg (94 lb 5.7 oz)        Anesthesia Evaluation   Pt has had prior anesthetic. Type: General.        ROS/MED HX  ENT/Pulmonary:     (+) tobacco use, Past use,     Neurologic:       Cardiovascular:     (+) Dyslipidemia hypertension-Peripheral Vascular Disease (s/p BKA)-- Symptomatic. CAD -past MI -- (-) murmur   METS/Exercise Tolerance: 1 - Eating, dressing    Hematologic:       Musculoskeletal:       GI/Hepatic:    (-) GERD and liver disease   Renal/Genitourinary:    (-) renal disease   Endo:       Psychiatric/Substance Use:       Infectious Disease:        Malignancy:       Other:            Physical Exam    Airway        Mallampati: I   TM distance: > 3 FB   Neck ROM: full   Mouth opening: > 3 cm    Respiratory Devices and Support         Dental  no notable dental history         Cardiovascular          Rhythm and rate: regular and normal (-) no murmur    Pulmonary   pulmonary exam normal        breath sounds clear to auscultation           OUTSIDE LABS:  CBC:   Lab Results   Component Value Date    WBC 7.8 09/27/2022    WBC 8.8 08/24/2022    HGB 9.3 (L) 10/01/2022    HGB 9.3 (L) 09/30/2022    HCT 28.2 (L) 10/01/2022    HCT 27.9 (L) 09/30/2022     09/27/2022     08/24/2022     BMP:   Lab Results   Component Value Date     09/27/2022     09/02/2022    POTASSIUM 4.1 09/27/2022    POTASSIUM 4.0 09/02/2022    CHLORIDE 104 09/27/2022    CHLORIDE 103 09/02/2022    CO2 24 09/27/2022    CO2 26 09/02/2022    BUN 8 09/27/2022    BUN 8 09/02/2022    CR 0.48 (L) 09/27/2022    CR 0.55 09/02/2022    GLC 98 09/27/2022    GLC 89 09/02/2022     COAGS:   Lab Results   Component Value Date    PTT 39 (H) 10/01/2019    INR 1.31 (H) 10/09/2019     POC:   Lab Results   Component Value Date     (H) 05/03/2011     HEPATIC:   Lab Results   Component Value Date    ALBUMIN 3.0 (L) 09/27/2022    PROTTOTAL 6.6 (L) 09/27/2022    ALT 18 09/27/2022    AST 18 09/27/2022    ALKPHOS 73 09/27/2022    BILITOTAL 0.4 09/27/2022     OTHER:   Lab Results   Component Value Date    LACT 0.9 10/01/2019    A1C 5.6 07/07/2008    ADRIAN 8.8 09/27/2022    PHOS 3.9 06/08/2016    MAG 1.7 04/02/2010    LIPASE 37 (L) 10/01/2019    TSH 4.55 10/17/2019    CRP <2.9 09/27/2022    SED 24 09/27/2022       Anesthesia Plan    ASA Status:  3      Anesthesia Type: MAC.   Induction: Intravenous.           Consents    Anesthesia Plan(s) and associated risks, benefits, and realistic alternatives discussed. Questions answered and patient/representative(s) expressed understanding.    - Discussed:     -  Discussed with:  Patient      - Extended Intubation/Ventilatory Support Discussed: No.      - Patient is DNR/DNI Status: No    Use of blood products discussed: No .     Postoperative Care    Pain management: IV analgesics.        Comments:    Other Comments: Discussed plan for topical anesthetic, light  IV anesthetic with native airway. Discussed potential need for conversion to general anesthetic with airway management and likely recall.         H&P reviewed: Unable to attach H&P to encounter due to EHR limitations. H&P Update: appropriate H&P reviewed, patient examined. No interval changes since H&P (within 30 days).         Uche Rust MD

## 2022-10-13 NOTE — OP NOTE
PREOPERATIVE DIAGNOSIS:   1. Combined form of age-related cataract, right eye     Right eye   POSTOPERATIVE DIAGNOSIS: Same   PROCEDURES:   1. Cataract extraction with intraocular lens implant Right eye.  SURGEON: Flip Izaguirre M.D.  INDICATIONS: The patient Sakshi Jaeger presented to the eye clinic with decreased vision secondary to cataract in the Right eye. The risks, benefits and alternatives to cataract extraction were discussed. The patient elected to proceed. All questions were answered to the patient's satisfaction.   DESCRIPTION OF PROCEDURE:   Prior to the procedure, appropriate cardiac and respiratory monitors were applied to the patient.  In the pre-operative holding area, a drop of topical tetracaine was placed in the operative eye.  The patient was brought to the operating room.  With adequate anesthesia, the Right eye was prepped and draped in the usual sterile fashion. A lid speculum was placed, and the operating microscope was rotated into position. A surgical pause was carried out to identify with all members of the surgical team the correct surgical site. A paracentesis was created.  Through this limbal paracentesis, the anterior chamber was filled with preservative-free lidocaine followed by viscoelastic.  A temporal wound was created at the limbus using a 2.6 mm blade. A capsulorrhexis was initiated using a bent 25-gauge needle and was completed in continuous and circular fashion using the capsulorrhexis forceps. The lens nucleus was hydrodissected using balanced salt solution.  The lens nucleus was rotated and removed using phacoemulsification in a divide and conquer technique.  Residual cortical material was removed using irrigation-aspiration.  The capsular bag was reinflated to its maximal extent with cohesive viscoelastic.  A 24.0 diopter [id number] was inserted into the capsular bag.  The lens power selected was reviewed using the intraocular lens power measurements that were obtained  preoperatively to confirm that the correct lens was selected for the desired post-operative refractive state. The residual viscoelastic was removed in its entirety, the wound were hydrated and found to be self-sealing.  Intracameral moxifloxacin was administered. Tactile pressure was confirmed to be in a normal range.  The lid speculum was removed and a shield was applied.  The patient tolerated the procedure well, and there were no complications.    PLAN: The patient will be discharged to home and will follow up tomorrow morning in the eye clinic.  EBL:  None  Complications:  None  Implant Name Type Inv. Item Serial No.  Lot No. LRB No. Used Action   EYE IMP IOL MIGUEL PCL TECNIS ZCB00 24.0 - [id number] Lens/Eye Implant EYE IMP IOL MIGUEL PCL TECNIS ZCB00 24.0 [id number] [employer]  Right 1 Implanted

## 2022-10-13 NOTE — DISCHARGE INSTRUCTIONS
[facility]   Adult Discharge Orders & Instructions     For 24 hours after surgery    Get plenty of rest.  A responsible adult must stay with you for at least 24 hours after you leave the hospital.   Do not drive or use heavy equipment.  If you have weakness or tingling, don't drive or use heavy equipment until this feeling goes away.  Do not drink alcohol.  Avoid strenuous or risky activities.  Ask for help when climbing stairs.   You may feel lightheaded.  IF so, sit for a few minutes before standing.  Have someone help you get up.   If you have nausea (feel sick to your stomach): Drink only clear liquids such as apple juice, ginger ale, broth or 7-Up.  Rest may also help.  Be sure to drink enough fluids.  Move to a regular diet as you feel able.  You may have a slight fever. Call the doctor if your fever is over 100 F (37.7 C) (taken under the tongue) or lasts longer than 24 hours.  You may have a dry mouth, a sore throat, muscle aches or trouble sleeping.  These should go away after 24 hours.  Do not make important or legal decisions.     Call your doctor for any of the followin.  Signs of infection (fever, growing tenderness at the surgery site, a large amount of drainage or bleeding, severe pain, foul-smelling drainage, redness, swelling).    2. It has been over 8 to 10 hours since surgery and you are still not able to urinate (pass water).    3.  Headache for over 24 hours.    4.  Numbness, tingling or weakness the day after surgery (if you had spinal anesthesia).                  5. Signs of Covid-19 infection (temperature over 100 degrees, shortness of breath, cough, loss of taste/smell, generalized body aches, persistent headache,                  chills, sore throat, nausea/vomiting/diarrhea).  [facility]   Adult Discharge Orders & Instructions     For 24 hours after surgery    Get plenty of rest.  A responsible adult must stay with you for at least 24 hours after you  leave the hospital.   Do not drive or use heavy equipment.  If you have weakness or tingling, don't drive or use heavy equipment until this feeling goes away.  Do not drink alcohol.  Avoid strenuous or risky activities.  Ask for help when climbing stairs.   You may feel lightheaded.  IF so, sit for a few minutes before standing.  Have someone help you get up.   If you have nausea (feel sick to your stomach): Drink only clear liquids such as apple juice, ginger ale, broth or 7-Up.  Rest may also help.  Be sure to drink enough fluids.  Move to a regular diet as you feel able.  You may have a slight fever. Call the doctor if your fever is over 100 F (37.7 C) (taken under the tongue) or lasts longer than 24 hours.  You may have a dry mouth, a sore throat, muscle aches or trouble sleeping.  These should go away after 24 hours.  Do not make important or legal decisions.     Call your doctor for any of the followin.  Signs of infection (fever, growing tenderness at the surgery site, a large amount of drainage or bleeding, severe pain, foul-smelling drainage, redness, swelling).    2. It has been over 8 to 10 hours since surgery and you are still not able to urinate (pass water).    3.  Headache for over 24 hours.    4.  Numbness, tingling or weakness the day after surgery (if you had spinal anesthesia).                  5. Signs of Covid-19 infection (temperature over 100 degrees, shortness of breath, cough, loss of taste/smell, generalized body aches, persistent headache,                  chills, sore throat, nausea/vomiting/diarrhea).    Tylenol 975 mg was given at 0930.    You should not take more then 4,000 mg of tylenol/acetaminophen in a 24 hour period.

## 2022-10-13 NOTE — ANESTHESIA CARE TRANSFER NOTE
Patient: Sakshi Jaeger    Procedure: Procedure(s):  PHACOEMULSIFICATION, CATARACT, WITH INTRAOCULAR LENS IMPLANT       Diagnosis: Combined forms of age-related cataract of both eyes [H25.813]  Diagnosis Additional Information: No value filed.    Anesthesia Type:   MAC     Note:    Oropharynx: oropharynx clear of all foreign objects  Level of Consciousness: awake  Oxygen Supplementation: room air    Independent Airway: airway patency satisfactory and stable  Dentition: dentition unchanged  Vital Signs Stable: post-procedure vital signs reviewed and stable  Report to RN Given: handoff report given  Patient transferred to: Phase II    Handoff Report: Identifed the Patient, Identified the Reponsible Provider, Reviewed the pertinent medical history, Discussed the surgical course, Reviewed Intra-OP anesthesia mangement and issues during anesthesia, Set expectations for post-procedure period and Allowed opportunity for questions and acknowledgement of understanding      Vitals:  Vitals Value Taken Time   BP     Temp     Pulse     Resp     SpO2         Electronically Signed By: DESIRE Ken CRNA  October 13, 2022  11:02 AM

## 2022-10-13 NOTE — H&P
PREOPERATIVE EVALUATION:  Today's date: 10/13/22      Sakshi Jaeger is a 77 year old female who presents for a preoperative evaluation.     Surgical Information:  Surgery/Procedure: PHACOEMULSIFICATION, CATARACT, WITH INTRAOCULAR LENS IMPLANT.Surgery Location:  OR  Surgeon: Flip Izaguirre MD  Surgery Date: 10/13/2022  Time of Surgery: 10:25 AM  Where patient plans to recover:Recovery 2 days in the hospital then  At home with family  Fax number for surgical facility: Note does not need to be faxed, will be available electronically in Epic.     Type of Anesthesia Anticipated: Local with MAC              Subjective         HPI related to upcoming procedure: patient needs  cataract right eye  Done  And also amputation.  Had BKA 2005 and now to have that taken off at the knee.          Preop Questions 9/2/2022   1. Have you ever had a heart attack or stroke? YES - just one  stent   2. Have you ever had surgery on your heart or blood vessels, such as a stent placement, a coronary artery bypass, or surgery on an artery in your head, neck, heart, or legs? YES -one stent, years ago 2009   3. Do you have chest pain with activity? No   4. Do you have a history of  heart failure? No   5. Do you currently have a cold, bronchitis or symptoms of other infection? No   6. Do you have a cough, shortness of breath, or wheezing? No   7. Do you or anyone in your family have previous history of blood clots? No   8. Do you or does anyone in your family have a serious bleeding problem such as prolonged bleeding following surgeries or cuts? No   9. Have you ever had problems with anemia or been told to take iron pills? YES - on iron  About every other  day   10. Have you had any abnormal blood loss such as black, tarry or bloody stools, or abnormal vaginal bleeding? No   11. Have you ever had a blood transfusion? No   11a. Have you ever had a transfusion reaction? -   12. Are you willing to have a blood transfusion if it is  medically needed before, during, or after your surgery? Yes   13. Have you or any of your relatives ever had problems with anesthesia? No   14. Do you have sleep apnea, excessive snoring or daytime drowsiness? No   15. Do you have any artifical heart valves or other implanted medical devices like a pacemaker, defibrillator, or continuous glucose monitor? No   16. Do you have artificial joints? YES - left knee   17. Are you allergic to latex? No      Health Care Directive:  Patient does not have a Health Care Directive or Living Will:      Preoperative Review of :  No  Current  Controlled substances  0956}           Review of Systems  Constitutional, neuro, ENT, endocrine, pulmonary, cardiac, gastrointestinal, genitourinary, musculoskeletal, integument and psychiatric systems are negative, except as otherwise noted.     No  Recent  Illnesses     On meds for rheumatoid arthritis, doing well           Patient Active Problem List     Diagnosis Date Noted     Anemia 08/24/2022       Priority: Medium     Combined forms of age-related cataract of both eyes 08/22/2022       Priority: Medium       Added automatically from request for surgery [id number]        PVD (peripheral vascular disease) (H) 05/18/2022       Priority: Medium     Protein-calorie malnutrition (H) 05/18/2022       Priority: Medium     Sacroiliac joint pain 06/07/2018       Priority: Medium     Coronary artery disease involving native coronary artery of native heart without angina pectoris 09/27/2016       Priority: Medium     Essential hypertension with goal blood pressure less than 140/90 08/25/2016       Priority: Medium     Status post below-knee amputation (H) 12/26/2012       Priority: Medium                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       Priority: Medium     Advanced directives, counseling/discussion 11/25/2011       Priority: Medium       Advance Care Planning 9/30/2016: ACP Review of Chart / Resources Provided:  Reviewed chart for advance  care plan.  Sakshi Jaeger has no plan or code status on file. Discussed available resources and provided with information. Confirmed code status reflects current choices pending further ACP discussions.  Confirmed/documented legally designated decision makers.    Added by Mishel Wallace           Discussed advance care planning with patient; information given to patient to review. Jennifer Jaeger, Clinical Medical Assistant   11/25/2011            Hyperlipidemia LDL goal <100 11/12/2010       Priority: Medium     Osteoporosis 02/16/2005       Priority: Medium     Rheumatoid arthritis (H) 02/16/2005       Priority: Medium                 Past Medical History        Past Medical History:   Diagnosis Date     Anemia 8/24/2022     BENIGN HYPERTENSION 3/1/2005     CAD (coronary artery disease) 1/26/2011     Coronary artery disease involving native coronary artery of native heart without angina pectoris 9/27/2016     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     Essential hypertension with goal blood pressure less than 140/90 8/25/2016     Hyperlipidemia LDL goal <100 11/12/2010     Hypertension goal BP (blood pressure) < 130/80 1/26/2011     Infection of right below knee amputation (H) 10/1/2019     IRON DEFIC ANEMIA NOS 9/15/2005     Lower limb amputation, below knee 12/26/2012     MI (myocardial infarction) (H)       3/2010     Non-healing surgical wound, subsequent encounter 9/24/2020     Added automatically from request for surgery [id number]     Osteoporosis 2/16/2005     OSTEOPOROSIS NOS 2/16/2005     Protein-calorie malnutrition (H) 5/18/2022     PVD (peripheral vascular disease) (H) 5/18/2022     Rheumatoid arthritis (H) 2/16/2005           Rheumatoid arthritis(714.0) 2/16/2005     Status post below-knee amputation (H) 12/26/2012               Past Surgical History         Past Surgical History:   Procedure Laterality Date     ---------INCISION AND DRAINAGE   03/05/2014     AMPUTATION BELOW KNEE RT/LT   01/01/2005     right  lowwer leg.      APPENDECTOMY         ARTHROPLASTY KNEE   04/27/2011     Procedure:ARTHROPLASTY KNEE; Surgeon:JESSE HAWKINS; Location:[facility] OR     COMPLEX WOUND CLOSURE (UPDATE) Right 12/08/2021     Procedure: Right stump wound debridment and closure;  Surgeon: RAISA Perea MD;  Location: [facility] OR     CORONARY STENT PLACEMENT         IRRIGATION AND DEBRIDEMENT LOWER EXTREMITY, COMBINED Right 10/09/2019     Procedure: Irrigation and debridement Right leg;  Surgeon: Doron Mott MD;  Location: [facility] OR     OTHER SURGICAL HISTORY   03/05/2014     I AND D      OTHER SURGICAL HISTORY Right 10/09/2019     IRRIGATION AND DEBRIDEMENT LOWER EXTREMITY, COMBINED    Cataract extraction/IOL Left 9/8/22     REVISE SCAR EXTREMITY  Cataract extraction/IOL  Right 12/17/2020     Procedure: Right stump scar revision;  Surgeon: RAISA Perea MD;  Location: [facility] OR         Current Outpatient Prescriptions          Current Outpatient Medications   Medication Sig Dispense Refill     acetaminophen (TYLENOL) 325 MG tablet Take 2 tablets (650 mg) by mouth every 4 hours         amLODIPine (NORVASC) 5 MG tablet Take 1 tablet (5 mg) by mouth daily 90 tablet 3     aspirin (ASA) 81 MG tablet Take 1 tablet (81 mg) by mouth daily         calcium carbonate 600 mg-vitamin D 400 units (CALCIUM 600 + D) 600-400 MG-UNIT per tablet Take 2 tablets by mouth daily         ferrous sulfate (FEROSUL) 325 (65 Fe) MG tablet TAKE 1 TABLET BY MOUTH EVERY DAY WITH BREAKFAST 90 tablet 1     FOLIC ACID PO Take 1 mg by mouth daily         gabapentin (NEURONTIN) 300 MG capsule Take 2 capsules (600 mg) by mouth 3 times daily         ketorolac (ACULAR) 0.5 % ophthalmic solution Apply 1 drop to eye 4 times daily for 7 days, THEN 1 drop 3 times daily for 7 days, THEN 1 drop 2 times daily for 7 days, THEN 1 drop daily for 7 days. Start 2 days prior to surgery in operative eye. 4 mL 1     leflunomide (ARAVA) 20 MG tablet Take 1 tablet by mouth  every 24 hours         lisinopril (ZESTRIL) 20 MG tablet TAKE 1 TABLET BY MOUTH EVERY DAY 90 tablet 3     methotrexate 2.5 MG tablet Take 1 tablet by mouth         metoprolol tartrate (LOPRESSOR) 50 MG tablet Take 1 tablet (50 mg) by mouth 2 times daily 90 tablet 3     moxifloxacin (VIGAMOX) 0.5 % ophthalmic solution Apply 1 drop to eye 4 times daily for 7 days Start morning of surgery in operative eye 6 mL 1     multivitamin w/minerals (THERA-VIT-M) tablet Take 1 tablet by mouth daily         order for DME Equipment being ordered: New foam (custom shaped protective cover) for right below the knee prostheses 1 Units 0     oxyCODONE (ROXICODONE) 5 MG tablet Take 1 tablet (5 mg) by mouth every 6 hours as needed for severe pain 15 tablet 0     prednisoLONE acetate (PRED FORTE) 1 % ophthalmic suspension Apply 1 drop to eye 4 times daily for 7 days, THEN 1 drop 3 times daily for 7 days, THEN 1 drop 2 times daily for 7 days, THEN 1 drop daily for 7 days. Start after surgery in operative eye 4 mL 1     predniSONE (DELTASONE) 5 MG tablet Take 1 tablet (5 mg) by mouth daily   0     simvastatin (ZOCOR) 20 MG tablet Take 1 tablet (20 mg) by mouth At Bedtime 90 tablet 3     sulfamethoxazole-trimethoprim (BACTRIM DS) 800-160 MG tablet Take 1 tablet by mouth 2 times daily 20 tablet 1            No Known Allergies      Social History            Tobacco Use     Smoking status: Former Smoker       Packs/day: 0.50       Years: 45.00       Pack years: 22.50       Types: Cigarettes       Quit date: 2010       Years since quittin.5     Smokeless tobacco: Never Used   Substance Use Topics     Alcohol use: Yes       Comment: occsionally-3 -4 drinks a week             History   Drug Use No               Objective         LMP  (LMP Unknown)      Physical Exam    GENERAL APPEARANCE: healthy, alert and no distress     EYES: EOMI, PERRL     HENT: ear canals and TM's normal and nose and mouth without ulcers or lesions     NECK: no  adenopathy, no asymmetry, masses, or scars and thyroid normal to palpation     RESP: lungs clear to auscultation - no rales, rhonchi or wheezes     CV: regular rates and rhythm, normal S1 S2, no S3 or S4 and no murmur, click or rub     ABDOMEN:  soft, nontender, no HSM or masses and bowel sounds normal     MS:pt has right bka,  Bandage on stump  No edema left leg; radials symmetric      SKIN: no suspicious lesions or rashes     NEURO: Normal strength and tone, sensory exam grossly normal, mentation intact and speech normal     PSYCH: mentation appears normal. and affect normal/bright     LYMPHATICS: No cervical adenopathy             Recent Labs   Lab Test 08/24/22  1300 07/14/22  1659 05/18/22  1220 11/08/21  1055 10/27/21  0936   HGB 11.2*  --  11.9   < > 10.2*     --  378   < >  --    NA  --   --   --   --  133   POTASSIUM  --   --   --   --  3.9   CR  --  0.5  --   --  0.55    < > = values in this interval not displayed.         Diagnostics:  cmp done  ; see  Recent cbc     Also see ekg from 10-27-21       ASSESSMENT / PLAN:  (Z01.818) Preop general physical exam  (primary encounter diagnosis)  Comment: okay for procedure  Plan: Comprehensive metabolic panel    (S88.111A) Below-knee amputation of right lower extremity (H)  Comment: as above  Plan: as above     Hold aspirin and methotrexate for one week prior to amputation procedure  No need to hold prior to cataract           I reviewed the patient's medications, allergies, medical history, family history, and social history.              Revised Cardiac Risk Index (RCRI):  The patient has the following serious cardiovascular risks for perioperative complications:   - Coronary Artery Disease (MI, positive stress test, angina, Qs on EKG) = 1 point      RCRI Interpretation: 1 point: Class II (low risk - 0.9% complication rate)    Flip Izaguirre MD

## 2022-10-14 ENCOUNTER — OFFICE VISIT (OUTPATIENT)
Dept: ORTHOPEDICS | Facility: CLINIC | Age: 77
End: 2022-10-14
Payer: COMMERCIAL

## 2022-10-14 DIAGNOSIS — Z89.511 STATUS POST BELOW-KNEE AMPUTATION OF RIGHT LOWER EXTREMITY (H): Primary | ICD-10-CM

## 2022-10-14 PROCEDURE — 99207 PR NO CHARGE NURSE ONLY: CPT

## 2022-10-14 NOTE — PROGRESS NOTES
Reason for visit:    Sakshi Jaeger came in to the clinic for a two week post op check.    Her surgery was done 9/28/2022 by Dr Mott.  She had a right through knee amputation, revision amputation. 9/28/22     Assessment:    Sakshi came into the clinic in a wheelchair Non-WB    The Surgical wounds were exposed and found to be well-healed; so the sutures were removed. There was a golf ball sized pocket of either swelling or muscle belly just below the mid part of the incision. Dr. Walker confirmed this is nothing to worry about. The skin is c/d/i. The incision is well healed. Steri strips were applied. The stump looks great. Sakshi notes some areas of numbness at the distal end, above the mid point of the incision.    Plan:     She was placed into a stockinette for comfort.  She was told to continue Non-WB. She may get the leg wet for hygiene. There was some bleeding from suture removal. Told to keep these areas clean and dry until healed closed.    An order will be placed with PM and R for prothesis fitting.     She has an appointment to see Dr. Mott at 6 weeks post op and at that time Dr. Mott will determine further restrictions.    She has our phone number and will call with questions or problems.    Sol Prather ATC         no

## 2022-10-23 ENCOUNTER — TELEPHONE (OUTPATIENT)
Dept: OTHER | Facility: CLINIC | Age: 77
End: 2022-10-23

## 2022-10-24 ENCOUNTER — OFFICE VISIT (OUTPATIENT)
Dept: OPHTHALMOLOGY | Facility: CLINIC | Age: 77
End: 2022-10-24
Payer: COMMERCIAL

## 2022-10-24 DIAGNOSIS — Z96.1 PSEUDOPHAKIA OF BOTH EYES: Primary | ICD-10-CM

## 2022-10-24 PROCEDURE — 99024 POSTOP FOLLOW-UP VISIT: CPT | Performed by: OPHTHALMOLOGY

## 2022-10-24 RX ORDER — KETOROLAC TROMETHAMINE 4 MG/ML
1 SOLUTION/ DROPS OPHTHALMIC 3 TIMES DAILY
COMMUNITY
End: 2022-11-21

## 2022-10-24 ASSESSMENT — VISUAL ACUITY
OS_SC+: +2
METHOD: SNELLEN - LINEAR
OS_SC: 20/40
OD_SC: 20/100

## 2022-10-24 ASSESSMENT — TONOMETRY
OD_IOP_MMHG: 11
OS_IOP_MMHG: 13
IOP_METHOD: APPLANATION

## 2022-10-24 ASSESSMENT — SLIT LAMP EXAM - LIDS
COMMENTS: 3+ DERMATOCHALASIS
COMMENTS: 3+ DERMATOCHALASIS

## 2022-10-24 ASSESSMENT — EXTERNAL EXAM - LEFT EYE: OS_EXAM: NORMAL

## 2022-10-24 ASSESSMENT — EXTERNAL EXAM - RIGHT EYE: OD_EXAM: NORMAL

## 2022-10-24 NOTE — TELEPHONE ENCOUNTER
Received a phone call from patient's  that they have been performing dressing changes/stump cleanings as directed every 3 days.  Today, noticed purulence at stump edge that improved with typical cleaning; unable to express further purulence.  Base after cleaning appeared like granulation tissue.    Patient denies fevers, any increases in pain.  No acute trauma.     texted photos (see below, before and after cleaning)          Counseled patient and her  that it looked much improved following cleaning and that without further expressible drainage, increase pain, or fever, no need to present urgently to ED.  Instead, will attempt to expedite follow-up with Dr. Mott care team, hopefully to be seen in person as early as this week.    Educated patient and her  if any of the above red flag symptoms arose, to either call nursing triage line or consider presentation to ED.    Patient acknowledged understanding and was in agreement with the above.    Wolf Farnsworth MD  Orthopaedic Surgery PGY-4  [phone]    Please page me at [phone] with any questions/concerns. If there is no response, if it is a weekend, or if it is during evening hours then please page the orthopaedic surgery resident on call.

## 2022-10-24 NOTE — PROGRESS NOTES
HPI    Cataract surgery right eye 10/13/22, 09/08/222.  She notes her vision is good.   She denies pain/discomfort in either eye.  She notes floaters on the right eye, in the morning. Goes away fast.   Last edited by Dee Dee Brito on 10/24/2022  1:47 PM.         Review of systems for the eyes was negative other than the pertinent positives/negatives listed in the HPI.      Assessment & Plan    HPI:  Sakshi Jaeger is a 77 year old female with history of RA, osteoporosis, HTN, BKA, myopia with astigmatism here for Postoperative week 2 right eye.    POHx: myopia with astigmatism and presbyopia, dermatochalasis  PMHx: osteoporosis, HTN, BKA  Current Medications: ketorolac tromethamine (ACULAR-LS) 0.4 % SOLN ophthalmic solution, Place 1 drop into the right eye 3 times daily  acetaminophen (TYLENOL) 325 MG tablet, Take 2 tablets (650 mg) by mouth every 4 hours  amLODIPine (NORVASC) 5 MG tablet, Take 1 tablet (5 mg) by mouth daily  aspirin (ASA) 81 MG EC tablet, Take 1 tablet (81 mg) by mouth 2 times daily for 27 days  aspirin (ASA) 81 MG tablet, Take 1 tablet (81 mg) by mouth daily  calcium carbonate 600 mg-vitamin D 400 units (CALCIUM 600 + D) 600-400 MG-UNIT per tablet, Take 2 tablets by mouth daily  ferrous sulfate (FEROSUL) 325 (65 Fe) MG tablet, TAKE 1 TABLET BY MOUTH EVERY DAY WITH BREAKFAST (Patient taking differently: Take 325 mg by mouth twice a week)  FOLIC ACID PO, Take 1 mg by mouth daily  leflunomide (ARAVA) 20 MG tablet, Take 1 tablet by mouth every 24 hours  lisinopril (ZESTRIL) 20 MG tablet, TAKE 1 TABLET BY MOUTH EVERY DAY  methotrexate 2.5 MG tablet, Take 20 mg by mouth every 7 days On Saturdays  metoprolol tartrate (LOPRESSOR) 50 MG tablet, Take 1 tablet (50 mg) by mouth 2 times daily (Patient taking differently: Take 50 mg by mouth daily)  multivitamin w/minerals (THERA-VIT-M) tablet, Take 1 tablet by mouth daily  oxyCODONE (ROXICODONE) 5 MG tablet, Take 1-2 tablets (5-10 mg) by mouth every 4 hours as  needed for moderate to severe pain  polyethylene glycol (MIRALAX) 17 g packet, Take 17 g by mouth daily  predniSONE (DELTASONE) 5 MG tablet, Take 1 tablet (5 mg) by mouth daily  senna-docusate (SENOKOT-S/PERICOLACE) 8.6-50 MG tablet, Take 1 tablet by mouth daily  simvastatin (ZOCOR) 20 MG tablet, Take 1 tablet (20 mg) by mouth At Bedtime    No current facility-administered medications on file prior to visit.    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 adame provided, can place referral whenever desired      Return in about 3 weeks (around 11/14/2022) for Final post-op .        Flip Izaguirre MD     Attending Physician Attestation:  Complete documentation of historical and exam elements from today's encounter can be found in the full encounter summary report (not reduplicated in this progress note).  I personally obtained the chief complaint(s) and history of present illness.  I confirmed and edited as necessary the review of systems, past medical/surgical history, family history, social history, and examination findings as documented by others; and I examined the patient myself.  I personally reviewed the relevant tests, images, and reports as documented above.  I formulated and edited as necessary the assessment and plan and discussed the findings and management plan with the patient and family. - Flip Izaguirre MD

## 2022-10-24 NOTE — NURSING NOTE
Chief Complaints and History of Present Illnesses   Patient presents with     Post Op (Ophthalmology) Both Eyes       Chief Complaint(s) and History of Present Illness(es)     Post Op (Ophthalmology) Both Eyes           Comments    Cataract surgery right eye 10/13/22, 09/08/222.  She notes her vision is good.   She denies pain/discomfort in either eye.  She notes floaters on the right eye, in the morning. Goes away fast.                  LOLA Coates  1:47 PM 10/24/2022

## 2022-10-25 ENCOUNTER — TELEPHONE (OUTPATIENT)
Dept: ORTHOPEDICS | Facility: CLINIC | Age: 77
End: 2022-10-25

## 2022-10-25 NOTE — TELEPHONE ENCOUNTER
Patient had surgery with Dr Mott in September. Patient's significant other, Santi is stating that he is hoping for a call from Dr. Mott's team as soon as possible and is stating that patient's wound has opened and is very concerned for infection. Please call Santi at [phone].    Thank you!

## 2022-10-25 NOTE — TELEPHONE ENCOUNTER
ATC returned call to pt and Santi to gather more information. They stated that they covered the wound with a bandage. They did not see any leakage through the bandage. They reported no fever and informed that pt is doing well. They would like to know what's next for pt. ATC informed that ATC will write a note to Dr. Mott's team. They verified understanding.               -LATHA Stein- Orthopedics

## 2022-10-26 ENCOUNTER — TELEPHONE (OUTPATIENT)
Dept: PHYSICAL MEDICINE AND REHAB | Facility: CLINIC | Age: 77
End: 2022-10-26

## 2022-10-26 ENCOUNTER — HOSPITAL ENCOUNTER (EMERGENCY)
Facility: CLINIC | Age: 77
Discharge: HOME | End: 2022-10-26
Attending: EMERGENCY MEDICINE | Admitting: EMERGENCY MEDICINE
Payer: COMMERCIAL

## 2022-10-26 ENCOUNTER — APPOINTMENT (OUTPATIENT)
Dept: GENERAL RADIOLOGY | Facility: CLINIC | Age: 77
End: 2022-10-26
Attending: EMERGENCY MEDICINE
Payer: COMMERCIAL

## 2022-10-26 VITALS
OXYGEN SATURATION: 97 % | SYSTOLIC BLOOD PRESSURE: 164 MMHG | BODY MASS INDEX: 16.39 KG/M2 | DIASTOLIC BLOOD PRESSURE: 60 MMHG | TEMPERATURE: 98.4 F | RESPIRATION RATE: 16 BRPM | WEIGHT: 100 LBS | HEART RATE: 73 BPM

## 2022-10-26 DIAGNOSIS — L03.115 CELLULITIS OF RIGHT LOWER EXTREMITY: ICD-10-CM

## 2022-10-26 LAB
ALBUMIN SERPL BCG-MCNC: 3.7 G/DL (ref 3.5–5.2)
ALP SERPL-CCNC: 81 U/L (ref 35–104)
ALT SERPL W P-5'-P-CCNC: 14 U/L (ref 10–35)
ANION GAP SERPL CALCULATED.3IONS-SCNC: 13 MMOL/L (ref 7–15)
AST SERPL W P-5'-P-CCNC: 29 U/L (ref 10–35)
BASOPHILS # BLD AUTO: 0.1 10E3/UL (ref 0–0.2)
BASOPHILS NFR BLD AUTO: 1 %
BILIRUB SERPL-MCNC: 0.4 MG/DL
BUN SERPL-MCNC: 14.4 MG/DL (ref 8–23)
CALCIUM SERPL-MCNC: 9.6 MG/DL (ref 8.8–10.2)
CHLORIDE SERPL-SCNC: 100 MMOL/L (ref 98–107)
CREAT SERPL-MCNC: 0.58 MG/DL (ref 0.51–0.95)
CRP SERPL-MCNC: 25 MG/L
DEPRECATED HCO3 PLAS-SCNC: 19 MMOL/L (ref 22–29)
EOSINOPHIL # BLD AUTO: 0 10E3/UL (ref 0–0.7)
EOSINOPHIL NFR BLD AUTO: 0 %
ERYTHROCYTE [DISTWIDTH] IN BLOOD BY AUTOMATED COUNT: 17.2 % (ref 10–15)
ERYTHROCYTE [SEDIMENTATION RATE] IN BLOOD BY WESTERGREN METHOD: 50 MM/HR (ref 0–30)
GFR SERPL CREATININE-BSD FRML MDRD: >90 ML/MIN/1.73M2
GLUCOSE SERPL-MCNC: 104 MG/DL (ref 70–99)
HCT VFR BLD AUTO: 32.5 % (ref 35–47)
HGB BLD-MCNC: 10.4 G/DL (ref 11.7–15.7)
IMM GRANULOCYTES # BLD: 0.1 10E3/UL
IMM GRANULOCYTES NFR BLD: 1 %
LYMPHOCYTES # BLD AUTO: 1.3 10E3/UL (ref 0.8–5.3)
LYMPHOCYTES NFR BLD AUTO: 13 %
MCH RBC QN AUTO: 30.8 PG (ref 26.5–33)
MCHC RBC AUTO-ENTMCNC: 32 G/DL (ref 31.5–36.5)
MCV RBC AUTO: 96 FL (ref 78–100)
MONOCYTES # BLD AUTO: 0.4 10E3/UL (ref 0–1.3)
MONOCYTES NFR BLD AUTO: 4 %
NEUTROPHILS # BLD AUTO: 7.8 10E3/UL (ref 1.6–8.3)
NEUTROPHILS NFR BLD AUTO: 81 %
NRBC # BLD AUTO: 0 10E3/UL
NRBC BLD AUTO-RTO: 0 /100
PLATELET # BLD AUTO: 283 10E3/UL (ref 150–450)
POTASSIUM SERPL-SCNC: 4.8 MMOL/L (ref 3.4–5.3)
PROT SERPL-MCNC: 6.9 G/DL (ref 6.4–8.3)
RBC # BLD AUTO: 3.38 10E6/UL (ref 3.8–5.2)
SODIUM SERPL-SCNC: 132 MMOL/L (ref 136–145)
WBC # BLD AUTO: 9.6 10E3/UL (ref 4–11)

## 2022-10-26 PROCEDURE — 80053 COMPREHEN METABOLIC PANEL: CPT | Performed by: EMERGENCY MEDICINE

## 2022-10-26 PROCEDURE — 99284 EMERGENCY DEPT VISIT MOD MDM: CPT | Performed by: EMERGENCY MEDICINE

## 2022-10-26 PROCEDURE — 85652 RBC SED RATE AUTOMATED: CPT | Performed by: EMERGENCY MEDICINE

## 2022-10-26 PROCEDURE — 87040 BLOOD CULTURE FOR BACTERIA: CPT | Performed by: EMERGENCY MEDICINE

## 2022-10-26 PROCEDURE — 86140 C-REACTIVE PROTEIN: CPT | Performed by: EMERGENCY MEDICINE

## 2022-10-26 PROCEDURE — 36415 COLL VENOUS BLD VENIPUNCTURE: CPT | Performed by: EMERGENCY MEDICINE

## 2022-10-26 PROCEDURE — 87077 CULTURE AEROBIC IDENTIFY: CPT | Performed by: EMERGENCY MEDICINE

## 2022-10-26 PROCEDURE — 85025 COMPLETE CBC W/AUTO DIFF WBC: CPT | Performed by: EMERGENCY MEDICINE

## 2022-10-26 PROCEDURE — 250N000013 HC RX MED GY IP 250 OP 250 PS 637: Performed by: EMERGENCY MEDICINE

## 2022-10-26 PROCEDURE — 87205 SMEAR GRAM STAIN: CPT | Performed by: EMERGENCY MEDICINE

## 2022-10-26 PROCEDURE — 73562 X-RAY EXAM OF KNEE 3: CPT | Mod: 26 | Performed by: RADIOLOGY

## 2022-10-26 PROCEDURE — 73562 X-RAY EXAM OF KNEE 3: CPT | Mod: RT

## 2022-10-26 RX ADMIN — AMOXICILLIN AND CLAVULANATE POTASSIUM 1 TABLET: 875; 125 TABLET, FILM COATED ORAL at 22:27

## 2022-10-26 ASSESSMENT — ACTIVITIES OF DAILY LIVING (ADL): ADLS_ACUITY_SCORE: 43

## 2022-10-26 NOTE — TELEPHONE ENCOUNTER
Spoke to patient regarding the referral from Doron Mott MD for above knee amputation, pt. Declined appt and states she has her own person she sees for this and to decline the referral.     Thanks,   Shani

## 2022-10-27 ENCOUNTER — TELEPHONE (OUTPATIENT)
Dept: ORTHOPEDICS | Facility: CLINIC | Age: 77
End: 2022-10-27

## 2022-10-27 NOTE — RESULT ENCOUNTER NOTE
[facility] discharge antibiotic prescribed: Amoxicillin-Clavulanate (Augmentin) 875-125 mg PO tablet, 1 tablet by mouth 2 times daily for 10 days  Incision and Drainage performed in [facility] [Yes or No]: No  Recommendations in treatment per [facility] ED Lab Result culture protocol

## 2022-10-27 NOTE — ED TRIAGE NOTES
Presents with concerns over right BKA amputation wound check.   Had BKA 9/28, on Monday noticed drainage/redness. No fevers.      Triage Assessment     Row Name 10/26/22 2003       Triage Assessment (Adult)    Airway WDL WDL       Respiratory WDL    Respiratory WDL WDL       Skin Circulation/Temperature WDL    Skin Circulation/Temperature WDL WDL       Cardiac WDL    Cardiac WDL WDL       Peripheral/Neurovascular WDL    Peripheral Neurovascular WDL WDL       Cognitive/Neuro/Behavioral WDL    Cognitive/Neuro/Behavioral WDL WDL

## 2022-10-27 NOTE — ED PROVIDER NOTES
ED Provider Note  [facility]      History     Chief Complaint   Patient presents with     Wound Check     HPI  Sakshi Jaeger is a 77 year old female who presents emergency department with wounds, redness along the incisions at her right knee distal stump site, she is recently post through the knee amputation of her right lower extremity in September.  She is not currently on antibiotics.  There has been some drainage from the area.  They are putting only gauze on the area.  Patient and her family member had attempted to follow-up in clinic yesterday but were unable to get in to be seen, contacted the clinic again today and referred to the emergency department for further evaluation.  She denies any fevers or chills, no cough, no chest pain, no change in eating, bladder, or bowel habits.    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     Essential hypertension with goal blood pressure less than 140/90 8/25/2016     Hyperlipidemia LDL goal <100 11/12/2010     Hypertension goal BP (blood pressure) < 130/80 1/26/2011     Infection of right below knee amputation (H) 10/1/2019     IRON DEFIC ANEMIA NOS 9/15/2005     Lower limb amputation, below knee 12/26/2012     MI (myocardial infarction) (H)     3/2010     Non-healing surgical wound, subsequent encounter 9/24/2020    Added automatically from request for surgery [id number]     Osteoporosis 2/16/2005     OSTEOPOROSIS NOS 2/16/2005     Protein-calorie malnutrition (H) 5/18/2022     PVD (peripheral vascular disease) (H) 5/18/2022     Rheumatoid arthritis (H) 2/16/2005          Rheumatoid arthritis(714.0) 2/16/2005     Status post below-knee amputation (H) 12/26/2012          Past Surgical  History:   Procedure Laterality Date     ---------INCISION AND DRAINAGE  03/05/2014     AMPUTATE LEG BELOW KNEE Right 9/28/2022    Procedure: Right through knee amputation;  Surgeon: Doron Mott MD;  [facility]     AMPUTATION BELOW KNEE RT/LT  01/01/2005    right lowwer leg.      APPENDECTOMY       ARTHROPLASTY KNEE  04/27/2011    Procedure:ARTHROPLASTY KNEE; Surgeon:JESSE HAWKINS; [facility]     COMPLEX WOUND CLOSURE (UPDATE) Right 12/08/2021    Procedure: Right stump wound debridment and closure;  Surgeon: RAISA Perea MD;  Location: [facility] OR     CORONARY STENT PLACEMENT       INJECT NERVE BLOCK SPHENOPALATINE GANGLION N/A 9/29/2022    Procedure: Out of OR encounter for block to be done by ;  Surgeon: GENERIC ANESTHESIA PROVIDER;  [facility]     IRRIGATION AND DEBRIDEMENT LOWER EXTREMITY, COMBINED Right 10/09/2019    Procedure: Irrigation and debridement Right leg;  Surgeon: Doron Mott MD;  [facility] OR     NERVE BLOCK PERIPHERAL N/A 9/27/2022    Procedure: BLOCK, NERVE;  Surgeon: GENERIC ANESTHESIA PROVIDER;  [facility]     OTHER SURGICAL HISTORY  03/05/2014    I AND D      OTHER SURGICAL HISTORY Right 10/09/2019    IRRIGATION AND DEBRIDEMENT LOWER EXTREMITY, COMBINED     PHACOEMULSIFICATION CLEAR CORNEA WITH STANDARD INTRAOCULAR LENS IMPLANT Left 9/8/2022    Procedure: PHACOEMULSIFICATION, LEFT CATARACT, WITH INTRAOCULAR LENS IMPLANT;  Surgeon: Flip Izaguirre MD;  [facility]     PHACOEMULSIFICATION CLEAR CORNEA WITH STANDARD INTRAOCULAR LENS IMPLANT Right 10/13/2022    Procedure: PHACOEMULSIFICATION, RIGHT CATARACT, WITH INTRAOCULAR LENS IMPLANT;  Surgeon: Flip Izaguirre MD;  [facility]     REVISE SCAR EXTREMITY Right 12/17/2020    Procedure: Right stump scar revision;  Surgeon: RAISA Perea MD;  Location: [facility] OR     amoxicillin-clavulanate (AUGMENTIN) 875-125 MG tablet  acetaminophen  (TYLENOL) 325 MG tablet  amLODIPine (NORVASC) 5 MG tablet  aspirin (ASA) 81 MG EC tablet  aspirin (ASA) 81 MG tablet  calcium carbonate 600 mg-vitamin D 400 units (CALCIUM 600 + D) 600-400 MG-UNIT per tablet  ferrous sulfate (FEROSUL) 325 (65 Fe) MG tablet  FOLIC ACID PO  ketorolac tromethamine (ACULAR-LS) 0.4 % SOLN ophthalmic solution  leflunomide (ARAVA) 20 MG tablet  lisinopril (ZESTRIL) 20 MG tablet  methotrexate 2.5 MG tablet  metoprolol tartrate (LOPRESSOR) 50 MG tablet  multivitamin w/minerals (THERA-VIT-M) tablet  oxyCODONE (ROXICODONE) 5 MG tablet  polyethylene glycol (MIRALAX) 17 g packet  predniSONE (DELTASONE) 5 MG tablet  senna-docusate (SENOKOT-S/PERICOLACE) 8.6-50 MG tablet  simvastatin (ZOCOR) 20 MG tablet      No Known Allergies  Family History  Family History   Problem Relation Age of Onset     Cardiovascular Mother      Cancer Brother      Diabetes No family hx of      Hypertension No family hx of      Cerebrovascular Disease No family hx of      Alzheimer Disease No family hx of      Thyroid Disease No family hx of      Respiratory No family hx of      Other - See Comments Mother         CARDIOVASCULAR     Cancer Brother      Social History   Social History     Tobacco Use     Smoking status: Former     Packs/day: 0.50     Years: 45.00     Pack years: 22.50     Types: Cigarettes     Quit date: 2010     Years since quittin.6     Smokeless tobacco: Never   Vaping Use     Vaping Use: Never used   Substance Use Topics     Alcohol use: Yes     Comment: occsionally-3 -4 drinks a week     Drug use: No      Past medical history, past surgical history, medications, allergies, family history, and social history were reviewed with the patient. No additional pertinent items.       Review of Systems  A complete review of systems was performed with pertinent positives and negatives noted in the HPI, and all other systems negative.    Physical Exam   BP: (!) 151/65  Pulse: 88  Temp: 98.5  F (36.9   C)  Resp: 18  Weight: 45.4 kg (100 lb)  SpO2: 98 %  Physical Exam  Constitutional:       General: She is awake. She is not in acute distress.     Appearance: Normal appearance. She is well-developed. She is not ill-appearing or toxic-appearing.   HENT:      Head: Atraumatic.      Mouth/Throat:      Pharynx: No oropharyngeal exudate.   Eyes:      General: No scleral icterus.     Pupils: Pupils are equal, round, and reactive to light.   Cardiovascular:      Rate and Rhythm: Normal rate and regular rhythm.      Heart sounds: Normal heart sounds, S1 normal and S2 normal.   Pulmonary:      Effort: No respiratory distress.      Breath sounds: Normal breath sounds.   Abdominal:      General: Bowel sounds are normal.      Palpations: Abdomen is soft.      Tenderness: There is no abdominal tenderness.   Musculoskeletal:         General: No tenderness.      Comments: Right lower extremity: Right through the knee amputation site with inferior and posterior incision site with surrounding erythema, dried bloody discharge, does have an area along the medial aspect with some soupy discharge and surrounding maceration.  Along the anterior aspect, there is an approximately 8 mm ulcer that probes rather deep.  I stopped after approximately 2 cm and did not feel the base of it.   Skin:     General: Skin is warm.      Findings: No rash.   Neurological:      Mental Status: She is alert and oriented to person, place, and time.   Psychiatric:         Attention and Perception: Attention normal.         Mood and Affect: Mood normal.         Speech: Speech normal.         Behavior: Behavior normal. Behavior is cooperative.         ED Course      Procedures                    Results for orders placed or performed during the hospital encounter of 10/26/22   XR Knee Right 3 Views     Status: None    Narrative    EXAM: XR KNEE RIGHT 3 VIEWS  LOCATION: [facility]  DATE/TIME: 10/26/2022 9:11  PM    INDICATION: Infection status post amputation.  COMPARISON: None.      Impression    IMPRESSION: There is loss of bone cortex at the anterolateral aspect of the lateral femoral condyle and perhaps the anterior aspect of the medial femoral condyle. This could be postoperative from osseous debridement, but would otherwise be concerning for   osteomyelitis. MRI or CT could further evaluate if clinically warranted. There is also soft tissue swelling and extensive arterial calcification and there is diffuse bone demineralization.   Comprehensive metabolic panel     Status: Abnormal   Result Value Ref Range    Sodium 132 (L) 136 - 145 mmol/L    Potassium 4.8 3.4 - 5.3 mmol/L    Chloride 100 98 - 107 mmol/L    Carbon Dioxide (CO2) 19 (L) 22 - 29 mmol/L    Anion Gap 13 7 - 15 mmol/L    Urea Nitrogen 14.4 8.0 - 23.0 mg/dL    Creatinine 0.58 0.51 - 0.95 mg/dL    Calcium 9.6 8.8 - 10.2 mg/dL    Glucose 104 (H) 70 - 99 mg/dL    Alkaline Phosphatase 81 35 - 104 U/L    AST 29 10 - 35 U/L    ALT 14 10 - 35 U/L    Protein Total 6.9 6.4 - 8.3 g/dL    Albumin 3.7 3.5 - 5.2 g/dL    Bilirubin Total 0.4 <=1.2 mg/dL    GFR Estimate >90 >60 mL/min/1.73m2   Erythrocyte sedimentation rate auto     Status: Abnormal   Result Value Ref Range    Erythrocyte Sedimentation Rate 50 (H) 0 - 30 mm/hr   CRP inflammation     Status: Abnormal   Result Value Ref Range    CRP Inflammation 25.00 (H) <5.00 mg/L   CBC with platelets and differential     Status: Abnormal   Result Value Ref Range    WBC Count 9.6 4.0 - 11.0 10e3/uL    RBC Count 3.38 (L) 3.80 - 5.20 10e6/uL    Hemoglobin 10.4 (L) 11.7 - 15.7 g/dL    Hematocrit 32.5 (L) 35.0 - 47.0 %    MCV 96 78 - 100 fL    MCH 30.8 26.5 - 33.0 pg    MCHC 32.0 31.5 - 36.5 g/dL    RDW 17.2 (H) 10.0 - 15.0 %    Platelet Count 283 150 - 450 10e3/uL    % Neutrophils 81 %    % Lymphocytes 13 %    % Monocytes 4 %    % Eosinophils 0 %    % Basophils 1 %    % Immature Granulocytes 1 %    NRBCs per 100 WBC 0 <1  /100    Absolute Neutrophils 7.8 1.6 - 8.3 10e3/uL    Absolute Lymphocytes 1.3 0.8 - 5.3 10e3/uL    Absolute Monocytes 0.4 0.0 - 1.3 10e3/uL    Absolute Eosinophils 0.0 0.0 - 0.7 10e3/uL    Absolute Basophils 0.1 0.0 - 0.2 10e3/uL    Absolute Immature Granulocytes 0.1 <=0.4 10e3/uL    Absolute NRBCs 0.0 10e3/uL   CBC with platelets differential     Status: Abnormal    Narrative    The following orders were created for panel order CBC with platelets differential.  Procedure                               Abnormality         Status                     ---------                               -----------         ------                     CBC with platelets and d...[[id number]]  Abnormal            Final result                 Please view results for these tests on the individual orders.     Medications   amoxicillin-clavulanate (AUGMENTIN) 875-125 MG per tablet 1 tablet (1 tablet Oral Given 10/26/22 2227)        Assessments & Plan (with Medical Decision Making)   Sakshi Jaeger is a 77 year old female who presents emergency department with wounds, redness along the incisions at her right knee distal stump site, she is recently post through the knee amputation of her right lower extremity in September.  History and exam are most concerning for an infection of the distal stump site.  Given the fact that it probes so deeply, I am concerned about a deeper space infection.  We will obtain imaging, screening labs including blood cultures, wound culture.  Orthopedics consulted, appreciate their recommendations    I have reviewed the nursing notes. I have reviewed the findings, diagnosis, plan and need for follow up with the patient.    Labs with mild to moderate elevations in inflammatory markers, no leukocytosis but a small left shift is present.  X-ray without anais gas, does have some bony changes, however orthopedics reports that this is consistent with findings from her recent amputation.  Based on the totality of the  patient, they would like to manage her as an outpatient.  We will give a dose of Augmentin here, discharged with a 10-day course of the same with plan for outpatient follow-up closely.  Return precautions given.  Okay to discharge.     Discharge Medication List as of 10/26/2022 10:35 PM      START taking these medications    Details   amoxicillin-clavulanate (AUGMENTIN) 875-125 MG tablet Take 1 tablet by mouth 2 times daily for 10 days, Disp-20 tablet, R-0, Local Print             Final diagnoses:   Cellulitis of right lower extremity       --  Pito Ross MD PhD  [facility] EMERGENCY DEPARTMENT  10/26/2022     Pito Ross MD  10/26/22 [id number]

## 2022-10-27 NOTE — CONSULTS
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 Mott), CAD complicated by MI,  rheumatoid arthritis who presents to the [facility] ED on 10/26/2022 with concerns for surgical site infection     Superficial wound dehiscence first noted on 10/23/2022, improved with local wound cares, inability to express drainage from the site.  Newfound anterior ulceration 10/26/2022 with expressible purulence.  Patient afebrile with stable vital signs.  Denies pain, fevers, chills, other infectious symptoms.  Inflammatory markers mildly elevated; no leukocytosis, CRP 25, ESR 50)    Counseled patient that 4 weeks postoperatively, problematic incision and the ulceration is concerning for wound healing capacity without any intervention.  Patient would benefit from antibiotic therapy and likely reoperation in the form of irrigation and debridement with possible revision amputation to facilitate adequate closure.    Patient is stable and not requiring admission for antibiotic treatment while awaiting operative resources.  Had long discussion with patient and her  regarding strict adherence to nonweightbearing status.  Keeping incision clean, dry, intact.  Will coordinate close orthopedic follow-up for further discussion and operative planning.  Discussed return  precautions of fevers, chills, lightheadedness, worsening/profuse drainage    Recomendations:  Operative Plan: Outpatient irrigation or debridement with possible revision amputation.  No plans for operative intervention this hospitalization    Activity: NWB RLE  Wound Cares/Dressing/Splint: Keep stump well-padded with clean/dry dressings.  Reinforce as needed  DVT  PPx: None from orthopedics perspective  Antibiotic: 10 day course of Augmentin  Imaging: No further imaging needed at this time    Dispo: Okay to discharge from orthopaedics perspective with return precautions  Follow Up: Will coordinate close orthopaedic follow-up with Dr. Mott or one of his partners for discussion of I&D with possible revision amputation      Wolf Farnsworth MD  Orthopaedic Surgery PGY-4  [phone]      Please page me directly with any questions/concerns during regular weekday hours before 5pm. If there is no response, if it is a weekend, or if it is during evening hours then please page the orthopaedic surgery resident on call.    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, Dr. Mott), CAD complicated by MI,  rheumatoid arthritis who presents to the [facility] ED on 10/26/2022 with concerns for surgical site infection.  Patient states that she was doing well in the postoperative period and had some sutures removed in clinic on 10/14/2022; wound at that time was well-appearing with plans for continued wound cares and maintenance of nonweightbearing status.  Patient called  this writer while on call 3 days prior to arrival with concerns for new purulence about the medial aspect of the incision that improved with typical wound cares.  Counseled on coordination of close clinic follow-up for reevaluation and possible presentation to ED if red flag symptoms arose (systemic infectious symptoms).  Touch base with Dr. Lopes who evaluated ED evaluation.    At the bedside, patient confirms that she saw her first concerns related to her wound on Sunday, 3 days prior to arrival.  Improved with routine wound cares.  Today noticed new ulceration about the anterior lower leg with associated purulence.  Denies any pain.  No new numbness, tingling, weakness.  No fevers, chills.  Compliant with nonweightbearing status.    In the ED, patient afebrile with stable vital signs.  No leukocytosis.  CRP 25, ESR 50.  Right knee radiographs without signs of tracking subcutaneous air; radiology read of loss of cortex about anterolateral aspect of lateral femoral condyle and anterior aspect of medial femoral condyle concerning for possible osteomyelitis versus intraoperative osseous debridement.  Review of operative report confirmed that this is consistent with osseous management to facilitate wound closure.     History obtained from patient interview and chart review.        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     Essential hypertension with goal blood pressure less than 140/90 8/25/2016     Hyperlipidemia LDL goal <100 11/12/2010     Hypertension goal BP (blood pressure) < 130/80 1/26/2011     Infection of right below knee amputation (H) 10/1/2019     IRON DEFIC ANEMIA NOS 9/15/2005     Lower limb amputation, below knee 12/26/2012     MI  (myocardial infarction) (H)     3/2010     Non-healing surgical wound, subsequent encounter 9/24/2020    Added automatically from request for surgery [id number]     Osteoporosis 2/16/2005     OSTEOPOROSIS NOS 2/16/2005     Protein-calorie malnutrition (H) 5/18/2022     PVD (peripheral vascular disease) (H) 5/18/2022     Rheumatoid arthritis (H) 2/16/2005          Rheumatoid arthritis(714.0) 2/16/2005     Status post below-knee amputation (H) 12/26/2012               Past Surgical History:     Past Surgical History:   Procedure Laterality Date     ---------INCISION AND DRAINAGE  03/05/2014     AMPUTATE LEG BELOW KNEE Right 9/28/2022    Procedure: Right through knee amputation;  Surgeon: Doron Mott MD;  [facility]     AMPUTATION BELOW KNEE RT/LT  01/01/2005    right lowwer leg.      APPENDECTOMY       ARTHROPLASTY KNEE  04/27/2011    Procedure:ARTHROPLASTY KNEE; Surgeon:JESSE HAWKINS; [facility]     COMPLEX WOUND CLOSURE (UPDATE) Right 12/08/2021    Procedure: Right stump wound debridment and closure;  Surgeon: RAISA Perea MD;  Location: [facility] OR     CORONARY STENT PLACEMENT       INJECT NERVE BLOCK SPHENOPALATINE GANGLION N/A 9/29/2022    Procedure: Out of OR encounter for block to be done by ;  Surgeon: GENERIC ANESTHESIA PROVIDER;  [facility]     IRRIGATION AND DEBRIDEMENT LOWER EXTREMITY, COMBINED Right 10/09/2019    Procedure: Irrigation and debridement Right leg;  Surgeon: Doron Mott MD;  [facility]: [facility] OR     NERVE BLOCK PERIPHERAL N/A 9/27/2022    Procedure: BLOCK, NERVE;  Surgeon: GENERIC ANESTHESIA PROVIDER;  [facility]     OTHER SURGICAL HISTORY  03/05/2014    I AND D      OTHER SURGICAL HISTORY Right 10/09/2019    IRRIGATION AND DEBRIDEMENT LOWER EXTREMITY, COMBINED     PHACOEMULSIFICATION CLEAR CORNEA WITH STANDARD INTRAOCULAR LENS IMPLANT Left 9/8/2022    Procedure: PHACOEMULSIFICATION, LEFT CATARACT, WITH INTRAOCULAR LENS IMPLANT;   Surgeon: Flip Izaguirre MD;  [facility]     PHACOEMULSIFICATION CLEAR CORNEA WITH STANDARD INTRAOCULAR LENS IMPLANT Right 10/13/2022    Procedure: PHACOEMULSIFICATION, RIGHT CATARACT, WITH INTRAOCULAR LENS IMPLANT;  Surgeon: Flip Izaguirre MD;  Location: [facility] OR     REVISE SCAR EXTREMITY Right 12/17/2020    Procedure: Right stump scar revision;  Surgeon: RAISA Perea MD;  Location: [facility] OR          Social History:   Tobacco use: Denies  Alcohol use: Occasional drink every other week  Elicit drug use: Denies  Occupation: Retired  Living situation: Lives with  in [place], [place]          Family History:   No family history of anesthesia, bleeding or clotting complications.           Allergies:   No Known Allergies          Medications:   Medication reviewed with patient and in chart.  Anticoagulation: Aspirin  Antibiotics: None          Review of Systems:   A 12 point ROS was conducted and was otherwise negative except for HPI above.          Physical Exam:   BP (!) 151/65   Pulse 88   Temp 98.5  F (36.9  C) (Oral)   Resp 18   Wt 45.4 kg (100 lb)   LMP  (LMP Unknown)   SpO2 98%   BMI 16.39 kg/m    General: Awake, alert, cooperative, no apparent distress, appears stated age  HEENT: Normocephalic, atraumatic, EOMI, no scleral icterus  Respiratory: Breathing non-labored, no wheezing  Cardiovascular: Nontachycardic  Skin: No rashes or lesions  Neurological: A&Ox3, CN II-XII grossly intact    Musculoskeletal:  RLE:   - Healing through knee amputation incision with superficial wound dehiscence about the medial margin in addition to most caudal aspect.  No associated erythema.  Inability to express purulence  - 1 x 1 cm ulceration anteriorly that probes caudally superficially along the incision.  Outpatient along the nearby distal incision expresses purulence  - Full, active hip ROM  - Fire hip flexors  - SILT over stump  - Stump wwp                      Imaging:    Review of 2 views right knee from 10/26/2022 demonstrates history of through knee amputation with no signs of tracking subcutaneous air.  Osseous changes about the medial/lateral femoral condyles consistent with intraoperative debridement outlined in operative report, less likely to represent osteomyelitic change         Laboratory date:   CBC:  Lab Results   Component Value Date    WBC 9.6 10/26/2022    HGB 10.4 (L) 10/26/2022     10/26/2022       BMP:  Lab Results   Component Value Date     09/27/2022    POTASSIUM 4.1 09/27/2022    CHLORIDE 104 09/27/2022    CO2 24 09/27/2022    BUN 8 09/27/2022    CR 0.48 (L) 09/27/2022    ANIONGAP 7 09/27/2022    ADRIAN 8.8 09/27/2022    GLC 98 09/27/2022       Inflammatory Markers:  Lab Results   Component Value Date    WBC 9.6 10/26/2022    WBC 7.8 09/27/2022    WBC 8.8 08/24/2022    CRP 25.00 (H) 10/26/2022    CRP <2.9 09/27/2022    CRP 4.6 (H) 11/18/2019    SED 50 (H) 10/26/2022    SED 24 09/27/2022    SED 48 (H) 11/18/2019

## 2022-10-27 NOTE — TELEPHONE ENCOUNTER
Called and left voicemail for patient. I informed patient that I added an appointment on 11-1-2022 with Dr. Ackerman,  at the same time as Dr. Mott so that she can see both providers.    Advised patient to call back with any questions/concerns.

## 2022-10-30 LAB
BACTERIA WND CULT: ABNORMAL
BACTERIA WND CULT: ABNORMAL
GRAM STAIN RESULT: ABNORMAL

## 2022-10-31 LAB
BACTERIA BLD CULT: NO GROWTH
BACTERIA BLD CULT: NO GROWTH

## 2022-10-31 NOTE — RESULT ENCOUNTER NOTE
Final Wound Aerobic Bacterial Culture (specimen - right knee) report on 10/30/22  [facility] Emergency Dept discharge antibiotic prescribed: Amoxicillin-Clavulanate (Augmentin) 875-125 mg PO tablet, 1 tablet by mouth 2 times daily for 10 days  #1. Bacteria, Staphylococcus lugdenensis ,  is [SUSCEPTIBLE] to antibiotic.  #1. Bacteria, Corynebacterium,  iSusceptibilities not routinely done  Incision and Drainage performed in the [place] ED: No  Recommendations in treatment per [facility] ED lab result culture protocol

## 2022-11-01 ENCOUNTER — OFFICE VISIT (OUTPATIENT)
Dept: ORTHOPEDICS | Facility: CLINIC | Age: 77
End: 2022-11-01
Payer: COMMERCIAL

## 2022-11-01 DIAGNOSIS — Z89.511 STATUS POST BELOW-KNEE AMPUTATION OF RIGHT LOWER EXTREMITY (H): Primary | ICD-10-CM

## 2022-11-01 PROCEDURE — 99024 POSTOP FOLLOW-UP VISIT: CPT | Performed by: ORTHOPAEDIC SURGERY

## 2022-11-01 RX ORDER — CEPHALEXIN 500 MG/1
CAPSULE ORAL
Qty: 56 CAPSULE | Refills: 0 | Status: SHIPPED | OUTPATIENT
Start: 2022-11-01 | End: 2022-12-21

## 2022-11-01 NOTE — NURSING NOTE
Reason For Visit:   Chief Complaint   Patient presents with     Surgical Followup     Wound check       LMP  (LMP Unknown)          Sol Prather ATC

## 2022-11-01 NOTE — PROGRESS NOTES
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    HISTORY OF PRESENT ILLNESS:  Mrs. Jaeger presents today for evaluation in the company of her .  Reports to have struggled with some wound dehiscence.    PHYSICAL EXAMINATION:  On today's visit, she presents with some dehiscence along the most anterior portion of the anterior flap.  We can visualize some of the FiberWire holding the myotenodesis.  There is another wound dehiscence, which also is around 1.5 cm in diameter and slightly more lateral and distal.  Posteriorly, she presents with a skin crease, which I think it is healed and is holding some moisture.  I do not think overall there is any gross infection.  Currently, she is on Augmentin.    ASSESSMENT:  Status post right through-the-knee amputation.    PLAN:  I discussed with the patient that I would like her to be changed from Augmentin to Keflex given the fact that she is having some GI side effects.  We are also going to proceeded with dry-to-dry dressing changes and she will be evaluated by a local wound clinic, most likely to proceed with the use of VAC dressings to see if we can improve the healing of her wounds.    The patient will follow up between 4 and 6 weeks from now.  No x-rays will be obtained.    All questions were answered.  The patient was pleased with the discussion.

## 2022-11-01 NOTE — LETTER
11/1/2022         RE: Sakshi Jaeger  [address] [address]        Dear Colleague,    Thank you for referring your patient, Sakshi Jaeger, to the [facility] [place]. Please see a copy of my visit note below.    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    HISTORY OF PRESENT ILLNESS:  Mrs. Jaeger presents today for evaluation in the company of her .  Reports to have struggled with some wound dehiscence.    PHYSICAL EXAMINATION:  On today's visit, she presents with some dehiscence along the most anterior portion of the anterior flap.  We can visualize some of the FiberWire holding the myotenodesis.  There is another wound dehiscence, which also is around 1.5 cm in diameter and slightly more lateral and distal.  Posteriorly, she presents with a skin crease, which I think it is healed and is holding some moisture.  I do not think overall there is any gross infection.  Currently, she is on Augmentin.    ASSESSMENT:  Status post right through-the-knee amputation.    PLAN:  I discussed with the patient that I would like her to be changed from Augmentin to Keflex given the fact that she is having some GI side effects.  We are also going to proceeded with dry-to-dry dressing changes and she will be evaluated by a local wound clinic, most likely to proceed with the use of VAC dressings to see if we can improve the healing of her wounds.    The patient will follow up between 4 and 6 weeks from now.  No x-rays will be obtained.    All questions were answered.  The patient was pleased with the discussion.      Doron Mott MD

## 2022-11-21 ENCOUNTER — OFFICE VISIT (OUTPATIENT)
Dept: OPHTHALMOLOGY | Facility: CLINIC | Age: 77
End: 2022-11-21
Payer: COMMERCIAL

## 2022-11-21 DIAGNOSIS — H52.13 MYOPIA OF BOTH EYES WITH ASTIGMATISM AND PRESBYOPIA: ICD-10-CM

## 2022-11-21 DIAGNOSIS — H52.203 MYOPIA OF BOTH EYES WITH ASTIGMATISM AND PRESBYOPIA: ICD-10-CM

## 2022-11-21 DIAGNOSIS — H02.835 DERMATOCHALASIS OF UPPER AND LOWER EYELIDS OF BOTH EYES: ICD-10-CM

## 2022-11-21 DIAGNOSIS — H52.4 MYOPIA OF BOTH EYES WITH ASTIGMATISM AND PRESBYOPIA: ICD-10-CM

## 2022-11-21 DIAGNOSIS — Z96.1 PSEUDOPHAKIA OF BOTH EYES: Primary | ICD-10-CM

## 2022-11-21 DIAGNOSIS — H02.831 DERMATOCHALASIS OF UPPER AND LOWER EYELIDS OF BOTH EYES: ICD-10-CM

## 2022-11-21 DIAGNOSIS — H02.834 DERMATOCHALASIS OF UPPER AND LOWER EYELIDS OF BOTH EYES: ICD-10-CM

## 2022-11-21 DIAGNOSIS — H02.832 DERMATOCHALASIS OF UPPER AND LOWER EYELIDS OF BOTH EYES: ICD-10-CM

## 2022-11-21 PROCEDURE — 99024 POSTOP FOLLOW-UP VISIT: CPT | Performed by: OPHTHALMOLOGY

## 2022-11-21 ASSESSMENT — CONF VISUAL FIELD
OS_SUPERIOR_TEMPORAL_RESTRICTION: 0
OS_INFERIOR_NASAL_RESTRICTION: 0
OD_SUPERIOR_TEMPORAL_RESTRICTION: 0
OD_INFERIOR_NASAL_RESTRICTION: 0
OD_INFERIOR_TEMPORAL_RESTRICTION: 0
OS_INFERIOR_TEMPORAL_RESTRICTION: 0
METHOD: COUNTING FINGERS
OS_SUPERIOR_NASAL_RESTRICTION: 0
OD_NORMAL: 1
OS_NORMAL: 1
OD_SUPERIOR_NASAL_RESTRICTION: 0

## 2022-11-21 ASSESSMENT — EXTERNAL EXAM - RIGHT EYE: OD_EXAM: NORMAL

## 2022-11-21 ASSESSMENT — TONOMETRY
IOP_METHOD: TONOPEN
OS_IOP_MMHG: 17
OD_IOP_MMHG: 21

## 2022-11-21 ASSESSMENT — CUP TO DISC RATIO
OS_RATIO: 0.25
OD_RATIO: 0.25

## 2022-11-21 ASSESSMENT — SLIT LAMP EXAM - LIDS
COMMENTS: 3+ DERMATOCHALASIS
COMMENTS: 3+ DERMATOCHALASIS

## 2022-11-21 ASSESSMENT — VISUAL ACUITY
OD_SC: 20/70
METHOD: SNELLEN - LINEAR
OS_SC: 20/40
OS_SC+: -1

## 2022-11-21 ASSESSMENT — REFRACTION_MANIFEST
OS_AXIS: 165
OS_SPHERE: -1.50
OD_ADD: +2.50
OD_CYLINDER: SPHERE
OD_SPHERE: -2.50
OS_ADD: +2.50
OS_CYLINDER: +0.50

## 2022-11-21 ASSESSMENT — EXTERNAL EXAM - LEFT EYE: OS_EXAM: NORMAL

## 2022-11-21 NOTE — NURSING NOTE
Chief Complaints and History of Present Illnesses   Patient presents with     Post Op (Ophthalmology) Both Eyes       Chief Complaint(s) and History of Present Illness(es)     Post Op (Ophthalmology) Both Eyes            Laterality: both eyes          Comments    S/P Cataract extraction with intraocular lens implant Right eye, 10/13/22, Left eye, 09/08/22.   Patient has no concerns for today.   No Pain, No Flashes, No Floaters.   No longer using any drops.                  Mario Sow, Ophthalmic Assistant

## 2022-11-21 NOTE — PROGRESS NOTES
HPI     Post Op (Ophthalmology) Both Eyes    In both eyes.           Comments    S/P Cataract extraction with intraocular lens implant Right eye, 10/13/22, Left eye, 09/08/22.   Patient has no concerns for today.   No Pain, No Flashes, No Floaters.   No longer using any drops.           Last edited by Mario Sow on 11/21/2022 12:47 PM.         Review of systems for the eyes was negative other than the pertinent positives/negatives listed in the HPI.      Assessment & Plan    HPI:  Sakshi Jaeger is a 77 year old female with history of RA, osteoporosis, HTN, BKA, myopia with astigmatism here for final post-op. Return to Dr. Edwards for annual eye exams.    POHx: myopia with astigmatism and presbyopia, dermatochalasis  PMHx: osteoporosis, HTN, BKA  Current Medications: acetaminophen (TYLENOL) 325 MG tablet, Take 2 tablets (650 mg) by mouth every 4 hours  amLODIPine (NORVASC) 5 MG tablet, Take 1 tablet (5 mg) by mouth daily  aspirin (ASA) 81 MG tablet, Take 1 tablet (81 mg) by mouth daily  calcium carbonate 600 mg-vitamin D 400 units (CALCIUM 600 + D) 600-400 MG-UNIT per tablet, Take 2 tablets by mouth daily  cephALEXin (KEFLEX) 500 MG capsule, Take 1 cap 4 times per day for 14 days  ferrous sulfate (FEROSUL) 325 (65 Fe) MG tablet, TAKE 1 TABLET BY MOUTH EVERY DAY WITH BREAKFAST (Patient taking differently: Take 325 mg by mouth twice a week)  FOLIC ACID PO, Take 1 mg by mouth daily  leflunomide (ARAVA) 20 MG tablet, Take 1 tablet by mouth every 24 hours  lisinopril (ZESTRIL) 20 MG tablet, TAKE 1 TABLET BY MOUTH EVERY DAY  methotrexate 2.5 MG tablet, Take 20 mg by mouth every 7 days On Saturdays  metoprolol tartrate (LOPRESSOR) 50 MG tablet, Take 1 tablet (50 mg) by mouth 2 times daily (Patient taking differently: Take 50 mg by mouth daily)  multivitamin w/minerals (THERA-VIT-M) tablet, Take 1 tablet by mouth daily  oxyCODONE (ROXICODONE) 5 MG tablet, Take 1-2 tablets (5-10 mg) by mouth every 4 hours as needed for  moderate to severe pain  polyethylene glycol (MIRALAX) 17 g packet, Take 17 g by mouth daily  predniSONE (DELTASONE) 5 MG tablet, Take 1 tablet (5 mg) by mouth daily  senna-docusate (SENOKOT-S/PERICOLACE) 8.6-50 MG tablet, Take 1 tablet by mouth daily  simvastatin (ZOCOR) 20 MG tablet, Take 1 tablet (20 mg) by mouth At Bedtime    No current facility-administered medications on file prior to visit.    FHx: cataract-mom  PSHx: Cataract extraction/iol Izaguirre right eye 10/13/22, left eye 9/8/22      Current Eye Medications:      Assessment & Plan:  Pseudophakia, right eye  Izaguirre right eye 10/13/22   Goal -2.00  Izaguirre left eye 9/8/22, goal -1.00     (H52.13,  H52.203,  H52.4) Myopia of both eyes with astigmatism and presbyopia  Patient has minimal change in myopia but a copy of today's glasses prescription was given.  The patient may wish to update the glasses if the lenses are scratched or the frames are too small.  Presbyopia is difficulty seeing up close and is treated with bifocals or over the counter reading glasses      (H02.831,  H02.834,  H02.832,  H02.835) Dermatochalasis of upper and lower eyelids of both eyes  Discussed surgical options vs observation  Patient would like referral   Card for Dr. adame provided, can place referral whenever desired      Return in about 1 year (around 11/21/2023) for Annual Visit- Dr Edwards.        Flip Izaguirre MD     Attending Physician Attestation:  Complete documentation of historical and exam elements from today's encounter can be found in the full encounter summary report (not reduplicated in this progress note).  I personally obtained the chief complaint(s) and history of present illness.  I confirmed and edited as necessary the review of systems, past medical/surgical history, family history, social history, and examination findings as documented by others; and I examined the patient myself.  I personally reviewed the relevant tests, images, and  reports as documented above.  I formulated and edited as necessary the assessment and plan and discussed the findings and management plan with the patient and family. - Flip Izaguirre MD

## 2022-11-21 NOTE — LETTER
11/21/2022         RE: Sakshi Jaeger  [address] [address]        Dear Dr. Edwards,    Thank you for referring your patient, Sakshi Jaeger, to the [facility]. Please see a copy of my visit note below.    HPI     Post Op (Ophthalmology) Both Eyes    In both eyes.           Comments    S/P Cataract extraction with intraocular lens implant Right eye, 10/13/22, Left eye, 09/08/22.   Patient has no concerns for today.   No Pain, No Flashes, No Floaters.   No longer using any drops.           Last edited by Mario Sow on 11/21/2022 12:47 PM.         Review of systems for the eyes was negative other than the pertinent positives/negatives listed in the HPI.      Assessment & Plan   HPI:  Sakshi Jaeger is a 77 year old female with history of RA, osteoporosis, HTN, BKA, myopia with astigmatism here for final post-op. Return to Dr. Edwards for annual eye exams.    POHx: myopia with astigmatism and presbyopia, dermatochalasis  PMHx: osteoporosis, HTN, BKA  Current Medications: acetaminophen (TYLENOL) 325 MG tablet, Take 2 tablets (650 mg) by mouth every 4 hours  amLODIPine (NORVASC) 5 MG tablet, Take 1 tablet (5 mg) by mouth daily  aspirin (ASA) 81 MG tablet, Take 1 tablet (81 mg) by mouth daily  calcium carbonate 600 mg-vitamin D 400 units (CALCIUM 600 + D) 600-400 MG-UNIT per tablet, Take 2 tablets by mouth daily  cephALEXin (KEFLEX) 500 MG capsule, Take 1 cap 4 times per day for 14 days  ferrous sulfate (FEROSUL) 325 (65 Fe) MG tablet, TAKE 1 TABLET BY MOUTH EVERY DAY WITH BREAKFAST (Patient taking differently: Take 325 mg by mouth twice a week)  FOLIC ACID PO, Take 1 mg by mouth daily  leflunomide (ARAVA) 20 MG tablet, Take 1 tablet by mouth every 24 hours  lisinopril (ZESTRIL) 20 MG tablet, TAKE 1 TABLET BY MOUTH EVERY DAY  methotrexate 2.5 MG tablet, Take 20 mg by mouth every 7 days On Saturdays  metoprolol tartrate (LOPRESSOR) 50 MG tablet, Take 1 tablet (50  mg) by mouth 2 times daily (Patient taking differently: Take 50 mg by mouth daily)  multivitamin w/minerals (THERA-VIT-M) tablet, Take 1 tablet by mouth daily  oxyCODONE (ROXICODONE) 5 MG tablet, Take 1-2 tablets (5-10 mg) by mouth every 4 hours as needed for moderate to severe pain  polyethylene glycol (MIRALAX) 17 g packet, Take 17 g by mouth daily  predniSONE (DELTASONE) 5 MG tablet, Take 1 tablet (5 mg) by mouth daily  senna-docusate (SENOKOT-S/PERICOLACE) 8.6-50 MG tablet, Take 1 tablet by mouth daily  simvastatin (ZOCOR) 20 MG tablet, Take 1 tablet (20 mg) by mouth At Bedtime    No current facility-administered medications on file prior to visit.    FHx: cataract-mom  PSHx: Cataract extraction/iol Izaguirre right eye 10/13/22, left eye 9/8/22      Current Eye Medications:      Assessment & Plan:  Pseudophakia, right eye  Izaguirre right eye 10/13/22   Goal -2.00  Izaguirre left eye 9/8/22, goal -1.00     (H52.13,  H52.203,  H52.4) Myopia of both eyes with astigmatism and presbyopia  Patient has minimal change in myopia but a copy of today's glasses prescription was given.  The patient may wish to update the glasses if the lenses are scratched or the frames are too small.  Presbyopia is difficulty seeing up close and is treated with bifocals or over the counter reading glasses      (H02.831,  H02.834,  H02.832,  H02.835) Dermatochalasis of upper and lower eyelids of both eyes  Discussed surgical options vs observation  Patient would like referral   Card for Dr. adame provided, can place referral whenever desired      Return in about 1 year (around 11/21/2023) for Annual Visit- Dr Edwards.        Flip Izaguirre MD     Attending Physician Attestation:  Complete documentation of historical and exam elements from today's encounter can be found in the full encounter summary report (not reduplicated in this progress note).  I personally obtained the chief complaint(s) and history of present illness.  I  confirmed and edited as necessary the review of systems, past medical/surgical history, family history, social history, and examination findings as documented by others; and I examined the patient myself.  I personally reviewed the relevant tests, images, and reports as documented above.  I formulated and edited as necessary the assessment and plan and discussed the findings and management plan with the patient and family. - Flip Izaguirre MD         Again, thank you for allowing me to participate in the care of your patient.        Sincerely,        Flip Izaguirre MD

## 2022-12-06 ENCOUNTER — OFFICE VISIT (OUTPATIENT)
Dept: ORTHOPEDICS | Facility: CLINIC | Age: 77
End: 2022-12-06
Payer: COMMERCIAL

## 2022-12-06 DIAGNOSIS — Z89.511 STATUS POST BELOW-KNEE AMPUTATION OF RIGHT LOWER EXTREMITY (H): Primary | ICD-10-CM

## 2022-12-06 PROCEDURE — 99024 POSTOP FOLLOW-UP VISIT: CPT | Performed by: ORTHOPAEDIC SURGERY

## 2022-12-06 NOTE — NURSING NOTE
Reason For Visit:   Chief Complaint   Patient presents with     RECHECK     1 month follow up. Post op right through knee amputation.       LMP  (LMP Unknown)          Sol Prather ATC

## 2022-12-06 NOTE — LETTER
12/6/2022         RE: Sakshi Jaeger  [address] [facility] [address]        Dear Colleague,    Thank you for referring your patient, Sakshi Jaeger, to the [facility] [place]. Please see a copy of my visit note below.    CHIEF COMPLAINT:  Status post right through-the-knee amputation performed 09/2022.    HISTORY OF PRESENT ILLNESS:  Ms. Jaeger presents today in the company of her  for further evaluation.  Unfortunately, she is not doing well and she developed a new ulceration to the distal lower leg.    PHYSICAL EXAMINATION:  On today's exam, she presents with a few more ulcers, one medially and one dorsolaterally.  The FiberWire from the myotenodesis is exposed.  There is no odor.  There is no active drainage.    ASSESSMENT:  Status post right through-the-knee amputation.    PLAN:  Discussed with the patient that I suspect that she may not have appropriate blood flow to the distal end of the femur to provide with healing.  On top of this also, we have the skin graft and the thin skin that she had prior to the surgery.    RECOMMENDATIONS:  She has been referred to Vascular Surgery for assessment of patency of her femoral vessels and if this is satisfactory, we will recommend to undergo an above-the-knee amputation, which I would feel more comfortable if it is performed by one of my colleagues from the Oncology service, either Dr. Mcdaniel or Dr. Broderick.  If there is a long waiting time, we may consider the possibility of performing the surgery myself.    All questions were answered.  She was pleased with the discussion.  The patient will follow up accordingly.  In the meantime, she is going to proceed with a dry to dry and Betadine paint to the wound in order to dehydrate it as much as possible as I do not feel that they will accomplish satisfactory healing.        Sincerely,    Doron Mott MD

## 2022-12-06 NOTE — PROGRESS NOTES
CHIEF COMPLAINT:  Status post right through-the-knee amputation performed 09/2022.    HISTORY OF PRESENT ILLNESS:  Ms. Jaeger presents today in the company of her  for further evaluation.  Unfortunately, she is not doing well and she developed a new ulceration to the distal lower leg.    PHYSICAL EXAMINATION:  On today's exam, she presents with a few more ulcers, one medially and one dorsolaterally.  The FiberWire from the myotenodesis is exposed.  There is no odor.  There is no active drainage.    ASSESSMENT:  Status post right through-the-knee amputation.    PLAN:  Discussed with the patient that I suspect that she may not have appropriate blood flow to the distal end of the femur to provide with healing.  On top of this also, we have the skin graft and the thin skin that she had prior to the surgery.    RECOMMENDATIONS:  She has been referred to Vascular Surgery for assessment of patency of her femoral vessels and if this is satisfactory, we will recommend to undergo an above-the-knee amputation, which I would feel more comfortable if it is performed by one of my colleagues from the Oncology service, either Dr. Mcdaniel or Dr. Broderick.  If there is a long waiting time, we may consider the possibility of performing the surgery myself.    All questions were answered.  She was pleased with the discussion.  The patient will follow up accordingly.  In the meantime, she is going to proceed with a dry to dry and Betadine paint to the wound in order to dehydrate it as much as possible as I do not feel that they will accomplish satisfactory healing.

## 2022-12-07 ENCOUNTER — TELEPHONE (OUTPATIENT)
Dept: VASCULAR SURGERY | Facility: CLINIC | Age: 77
End: 2022-12-07

## 2022-12-07 DIAGNOSIS — I73.9 PVD (PERIPHERAL VASCULAR DISEASE) (H): ICD-10-CM

## 2022-12-07 DIAGNOSIS — Z48.812 ENCOUNTER FOR SURGICAL AFTERCARE FOLLOWING SURGERY ON THE CIRCULATORY SYSTEM: ICD-10-CM

## 2022-12-07 DIAGNOSIS — Z89.511 STATUS POST BELOW-KNEE AMPUTATION OF RIGHT LOWER EXTREMITY (H): ICD-10-CM

## 2022-12-07 DIAGNOSIS — T81.89XD NON-HEALING SURGICAL WOUND, SUBSEQUENT ENCOUNTER: Primary | ICD-10-CM

## 2022-12-07 NOTE — TELEPHONE ENCOUNTER
Referring providers name, location, phone number and fax: Doron Mott MD in [facility], ph: [phone]    Reason for visit: Assess arterial patency to R leg, 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)    Does patient have a referral: Yes    Referral to specific provider? No     Medical records or notes if possible: Epic    Pt's significant other called and would like to get this scheduled for the pt. I was unable to confirm the diagnosis. Please call them back. Thanks

## 2022-12-07 NOTE — TELEPHONE ENCOUNTER
New vascular referral   Referral Type:  Vascular Surgery  Preferred Location:  [facility]  Reason for Referral:  Assess arterial patency to R leg.    Referring provider: Norman Yañez, RN     Most recent imaging? No recent imaging. Pt had RLE arterial duplex in November 2021.    Additional info: 76 y/o female referred to vascular to assess blood flow of RLE stump (pt is s/p R BKA). She is being worked up for possible AKA dt poor healing wounds. Wound photos in chart under media tab.    Documentation routed to MD for review.    MILTON Davenport, RN  [facility]  Ph: [phone]  Fax: [phone]

## 2022-12-07 NOTE — TELEPHONE ENCOUNTER
Will place in clinic with imaging prior. WQ updated.    AMANDA DavepnortN, RN  [facility] Surgery  Ph: [phone]  Fax: [phone]

## 2022-12-08 ENCOUNTER — TELEPHONE (OUTPATIENT)
Dept: VASCULAR SURGERY | Facility: CLINIC | Age: 77
End: 2022-12-08

## 2022-12-08 NOTE — TELEPHONE ENCOUNTER
[facility] Call Center    Phone Message    May a detailed message be left on voicemail: yes     Reason for Call: Appointment Intake    Referring Provider Name: WALDO JESSICA CLEOPATRA  Diagnosis and/or Symptoms: Z89.511 (ICD-10-CM) - 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)    [facility]iance calling back to schedule referral. Started new TE as yesterday's is no longer active. [phone]    Action Taken: Message routed to:  [facility] ([facility]    Travel Screening: Not Applicable

## 2022-12-08 NOTE — TELEPHONE ENCOUNTER
Ready to schedule with US prior. WQ updated.    AMANDA DavenportN, RN  [facility] Surgery  Ph: [phone]  Fax: [phone]

## 2022-12-12 ENCOUNTER — TELEPHONE (OUTPATIENT)
Dept: ORTHOPEDICS | Facility: CLINIC | Age: 77
End: 2022-12-12

## 2022-12-12 NOTE — TELEPHONE ENCOUNTER
[facility] Call Center    Phone Message    May a detailed message be left on voicemail: yes     Reason for Call: Other: Pts significant other calling on behalf of pt regarding referral that Dr. Mott has placed for pt.  Pt very frustrated, she has left 2-3 messages for vascular surgery to call her back and has not heard back yet.      Pt asking for message to be sent to Dr. Mott to see if he can speed things up a bit and get in contact with someone from Vascular   to reach out to her.   Pt would -like call back ph# [phone]      Action Taken: Other: [facility]:  Dr. Mott    Travel Screening: Not Applicable

## 2022-12-13 NOTE — TELEPHONE ENCOUNTER
Pt scheduled as requested.    AMANDA DavenportN, RN  [facility]  Ph: [phone]  Fax: [phone]

## 2022-12-13 NOTE — TELEPHONE ENCOUNTER
Will route to scheduling team as a high priority request. Please schedule pt w/ Dr Oconnor with imaging prior.    I contacted pt's  and updated him on this referral.    AMANDA DavenportN, RN  [facility] Surgery  Ph: [phone]  Fax: [phone]

## 2022-12-15 ENCOUNTER — ANCILLARY PROCEDURE (OUTPATIENT)
Dept: ULTRASOUND IMAGING | Facility: CLINIC | Age: 77
End: 2022-12-15
Attending: SURGERY
Payer: COMMERCIAL

## 2022-12-15 DIAGNOSIS — I73.9 PVD (PERIPHERAL VASCULAR DISEASE) (H): ICD-10-CM

## 2022-12-15 DIAGNOSIS — T81.89XD NON-HEALING SURGICAL WOUND, SUBSEQUENT ENCOUNTER: ICD-10-CM

## 2022-12-15 DIAGNOSIS — Z48.812 ENCOUNTER FOR SURGICAL AFTERCARE FOLLOWING SURGERY ON THE CIRCULATORY SYSTEM: ICD-10-CM

## 2022-12-15 DIAGNOSIS — Z89.511 STATUS POST BELOW-KNEE AMPUTATION OF RIGHT LOWER EXTREMITY (H): ICD-10-CM

## 2022-12-15 PROCEDURE — 93926 LOWER EXTREMITY STUDY: CPT | Mod: RT | Performed by: RADIOLOGY

## 2022-12-15 PROCEDURE — 93979 VASCULAR STUDY: CPT | Mod: GC | Performed by: RADIOLOGY

## 2022-12-21 ENCOUNTER — VIRTUAL VISIT (OUTPATIENT)
Dept: VASCULAR SURGERY | Facility: CLINIC | Age: 77
End: 2022-12-21
Payer: COMMERCIAL

## 2022-12-21 DIAGNOSIS — Z89.511 STATUS POST BELOW-KNEE AMPUTATION OF RIGHT LOWER EXTREMITY (H): Primary | ICD-10-CM

## 2022-12-21 DIAGNOSIS — I70.299 ATHEROSCLEROSIS OF ARTERY OF EXTREMITY WITH ULCERATION (H): ICD-10-CM

## 2022-12-21 DIAGNOSIS — L97.909 ATHEROSCLEROSIS OF ARTERY OF EXTREMITY WITH ULCERATION (H): ICD-10-CM

## 2022-12-21 PROCEDURE — 99204 OFFICE O/P NEW MOD 45 MIN: CPT | Mod: 95 | Performed by: SURGERY

## 2022-12-21 RX ORDER — GABAPENTIN 300 MG/1
300 CAPSULE ORAL
Qty: 14 CAPSULE | Refills: 1 | Status: SHIPPED | OUTPATIENT
Start: 2022-12-21 | End: 2023-01-06

## 2022-12-21 NOTE — NURSING NOTE
Chief Complaint   Patient presents with     Consult       Patient confirms medications and allergies are accurate via patients echeck in completion, and or denies any changes since last reviewed/verified.       Nena Waters, Virtual Facilitator

## 2022-12-21 NOTE — LETTER
7/2/2020       RE: Sakshi Jaeger  [address] [address]     Dear Colleague,    Thank you for referring your patient, Sakshi Jaeger, to the [facility] at [facility]. Please see a copy of my visit note below.    Chief Complaint   Patient presents with     WOUND CARE     Pt here for wound care on right lower leg          No Known Allergies      Subjective: Sakshi is a 74 year old female who presents to the clinic today for a follow up of right stump wound. She relates that she has had the new prosthetic remolded, but she does not want to wear it until the wound is fully healed.     No n/v/d/f/c/ns/sob/cp    Objective  Data Unavailable Data Unavailable Data Unavailable Data Unavailable Data Unavailable 0 lbs 0 oz                A wound is noted at right  stump measuring 0.3cm x 0.7cm x 1cm.    Ly Classification: 2    Wound base: Pink/dermis    Edges: intact    Drainage: scant/serous    Odor: no    Undermining: no    Bone Exposure: No    Clinical Signs of Infection: No    After obtaining patient consent, the wound was irrigated with copious amounts of saline.       Assessment: Sakshi is a 75 YO with new right stump irritation. 2/2 new prosthetic. She wants to wait on getting the new prosthetic on.     Plan:   - Pt seen and evaluated  - Start MeSalt packing and gauze and a Tegaderm over the area and change daily.   - Pt to return to clinic in 3 weeks.     Again, thank you for allowing me to participate in the care of your patient.      Sincerely,    Joe Walker DPM       0

## 2022-12-21 NOTE — PROGRESS NOTES
"  Vascular Surgery Consultation Note     Patient:  Sakshi Jaeger   Date of birth 1945, Medical record number [id number]  Date of Visit:  12/21/2022  Consult Requester:No att. 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 There appears to be a high grade stenosis in the right common iliac artery as well as the distal right external iliac artery/proximal right common femoral artery. I have recommended right femoral artery cut-down with possible endarterectomy and right iliac artery angiogram with possible intervention for atherosclerosis with ulceration, non-healing. I believe we could accomplish the iliac and femoral intervention, and possible AKA under one general anesthetic with a femoral nerve block. I have also communicated with Dr Mott who is fine with Vascular Surgery doing the amputation. The femoral nerve block will need to be placed laterally or serve as a \"one shot\" as we will have a femoral incision and the above knee amputation. The patient has lived with the amputation many years and thus will be planning to return to home afterward - not a TCU or rehab. She will remain on aspirin, not stop it and importantly take it the morning of the planned operation. She had a chance to ask questions. I look forward to helping her in the new year.    Many thanks for involving me in the care of this very pleasant patient. Should any questions or concerns arise, please don't hesitate to contact me.    Warm Regards,    Emma Oconnor MD, DFSVS, RPVI  Director, [facility] Vascular " Services  Professor and Chief, Vascular and Endovascular Surgery  [facility]  Pager: 1. Send message or 10 digit call back number Securely via "[employer] with the "[employer] Web Console (learn more here)              2. Outside of [facility]? Call [phone]        60 minutes spent on the date of the encounter doing chart review, review of outside records, review of test results, interpretation of tests, patient visit and documentation           HPI: Sakshi is a 78 y/o female with rheumatoid arthritis who uses a motorized scooter at home who underwent a R BKA in the mid-2000s. She developed a pretibial infection and was debrided and underwent a through knee amputation with Dr Mott of Orthopedics in September of this year. Had a small opening at the incision which never healed then developed more breakdown over time. H/o MI. Denies issues with sores on LLE. Has developed significant restless in the right stump recently.      Review of Systems   Constitutional, HEENT, cardiovascular, pulmonary, GI, , musculoskeletal, neuro, skin, endocrine and psych systems are negative, except as otherwise noted.    Physical Exam   GENERAL: Healthy, alert and no distress  EYES: Eyes grossly normal to inspection.  No discharge or erythema, or obvious scleral/conjunctival abnormalities.  RESP: No audible wheeze, cough, or visible cyanosis.  No visible retractions or increased work of breathing.    SKIN: Visible skin clear. No significant rash, abnormal pigmentation or lesions.  NEURO: Cranial nerves grossly intact.  Mentation and speech appropriate for age.  PSYCH: Mentation appears normal, affect normal/bright, judgement and insight intact, normal speech and appearance well-groomed.    Imaging: Aorotiliac and RLE arterial duplex show     Aortoiliac duplex 12/15/22: IMPRESSION:  1. Velocity ratio 5.5 in the proximal right common iliac artery,  compatible with hemodynamically significant stenosis. There was  significant  calcified stenosis on CTA in this region on CTA from  10/19/2021.    Femoral Duplex 12/15/2022: Significant signal drop out due to heavy calcifications noted on CT as well with areas of greater than 70% stenosis.      Video Start Time: 330  Video End Time: 430       Media Information  Document Information    Photographic Image  (Mobile Photo)   Right INDIGO   12/01/2022   Attached To:   Office Visit on 12/1/2022 at [facility]      Source Information    [facility]   Last update on 12/21/2022   Wound Care             Sakshi is a 77 year old who is being evaluated via a billable video visit.      How would you like to obtain your AVS? Mail  If the video visit is dropped, the invitation should be resent by: Text to cell phone: [phone]  Will anyone else be joining your video visit?  Yes Santi Zaragoza states is currently in the [facility]: Yes     Ruth Ann Mclaughlin/CEFERINO, 12/21/2022   Originating Location (pt. Location): Home  Distant Location (provider location):  Off-site  Platform used for Video Visit: Sheree

## 2022-12-22 NOTE — PATIENT INSTRUCTIONS
Thank you so much for choosing us for your care. It was a pleasure to see you at the vascular clinic today.     Follow-up recommendations: We will plan for femoral endarterectomy surgery in the coming weeks. Our surgical scheduler Fouzia will get in touch with you soon to determine a surgery date and will provide you with detailed pre-op instructions.    Additional testing/imaging ordered today: None      Our scheduling team will get in touch with you to set up any follow-up testing/imaging and/or appointments. Please be aware that any testing/imaging recommended today will need to completed prior to your next visit with the provider. If testing/imaging is not completed prior to your next visit, your visit may be rescheduled.        If you have any questions, please contact our clinic directly at [phone] and ask for the nurse. We also encourage the use of [employer] to communicate with your HealthCare Provider.      If you have an urgent need after business hours (8:00 am to 4:30 pm) please call [phone], option 4, and ask for the vascular attending on call. For non-urgent after hours needs, please call the vascular nurse at [phone] and leave a detailed voicemail. For scheduling needs, please call our clinic directly at [phone].    Pre-op Instructions    You will be contacted with the official time and date of surgery by our surgery scheduler. Surgery will be at the [facility] at [address] in [place].    Please do not eat 8 hours prior to your surgery. You may have clear liquids until your check in time. You should have a physical done by your PCP no more than 30 days prior to surgery. You will also be contacted by the COVID testing team to coordinate a COVID test 72-96 hours prior to surgery. Please shower the night before with a non scented antibacterial soap to reduce your risk of surgical site infection. Let us know if you are not feeling well  the week of surgery as this would impact our decision to put you under general anesthesia.    You will receive a phone call 24 hours prior to your surgery from a pre-op nurse who will help answer any last minute questions and provide further instructions for your day of surgery.    If you are on anticoagulants (Eliquis, Plavix, Coumadin, etc), a nurse will be contacting you with instructions on holding these medications prior to surgery. If you take a daily baby aspirin, you will CONTINUE your aspirin, including on the day of your surgery.

## 2022-12-23 RX ORDER — CEFAZOLIN SODIUM 2 G/50ML
2 SOLUTION INTRAVENOUS SEE ADMIN INSTRUCTIONS
Status: CANCELLED | OUTPATIENT
Start: 2022-12-23

## 2022-12-23 RX ORDER — CEFAZOLIN SODIUM 2 G/50ML
2 SOLUTION INTRAVENOUS
Status: CANCELLED | OUTPATIENT
Start: 2022-12-23

## 2022-12-27 ENCOUNTER — TELEPHONE (OUTPATIENT)
Dept: ORTHOPEDICS | Facility: CLINIC | Age: 77
End: 2022-12-27

## 2022-12-27 NOTE — TELEPHONE ENCOUNTER
Phoned patient to get her scheduled for surgery with Dr. Broderick     Patient was unavailable,   Provided call back number in voicemail:   [phone].

## 2022-12-28 ENCOUNTER — TELEPHONE (OUTPATIENT)
Dept: VASCULAR SURGERY | Facility: CLINIC | Age: 77
End: 2022-12-28

## 2022-12-28 NOTE — TELEPHONE ENCOUNTER
Spoke with patient to schedule procedure with Dr. Oconnor   Procedure was scheduled on 01/13/23 at [facility] OR  Patient will have H&P with Dr. Roberto (PCP)     Patient is aware a COVID-19 test is needed before their procedure.   (Home test will be needed before surgery) yes     PO Visit scheduled on: 02/08/23      Patient is aware a / is needed day of surgery.   Surgery letter was sent via mail, patient has my direct contact information for any further questions.     Vendor requested no    Pt and  were both on the line stating they also ran out of the iodine they were given to clean the wound with. Stated pt's wound is now leaking a lot more now that it's healing. They need to speak with a nurse to find out if this is normal/ok, and want to get more iodine. Writer informed that a msg would be sent to the RN to follow up. They asked if someone could give them a call today?    Appointments in Next Year    Jan 11, 2023  1:40 PM  (Arrive by 1:20 PM)  Pre-Operative Physical with Harry Roberto MD  [facility] ) [phone]

## 2022-12-28 NOTE — TELEPHONE ENCOUNTER
Called and spoke with pt's  who stated pt was still sleeping. Santi stated another  called yesterday for Dr. Broderick, and they are a little confused as to who will be doing what. Writer informed Santi we would look into this and call back after 11:30 (per his request) with an update.    After speaking with Davie's schedule Barbara, confirmed surgery will be staged and Dr. Oconnor will go 1st with Dr. Broderick to follow a day or 2 later. Will offer surgery date 01/13/23 upon call back to pt.    Samantha Samson on 12/28/2022 at 9:14 AM

## 2022-12-29 ENCOUNTER — TELEPHONE (OUTPATIENT)
Dept: ORTHOPEDICS | Facility: CLINIC | Age: 77
End: 2022-12-29

## 2022-12-29 DIAGNOSIS — L97.916 ULCER OF RIGHT LOWER EXTREMITY WITH BONE INVOLVEMENT WITHOUT EVIDENCE OF NECROSIS (H): Primary | ICD-10-CM

## 2022-12-29 NOTE — TELEPHONE ENCOUNTER
Returned patient's call.  Patient would like to be able to  wound care supplies from Dr. Mott's office.  Patient was advised per the ATC that she should go to the local pharmacy to  her supplies. Patient stated that she does not have any additional drainage than usual.  She states that the drainage consistency is the same as it has been from the surgery.  Patient does not have a fever.  Patient just needed more supplies.  Patient will go to her local pharmacy.  She will give us a call if she has any additional questions or concerns.

## 2022-12-29 NOTE — TELEPHONE ENCOUNTER
Per message received from surgery scheduler:     Saurav Ko     Pt and  were both on the line stating they also ran out of the iodine they were given to clean the wound with. Stated pt's wound is now leaking a lot more now that it's healing. They need to speak with a nurse to find out if this is normal/ok, and want to get more iodine. Writer informed that a msg would be sent to the RN to follow up. They asked if someone could give them a call today?   They stated they need..   1. Iodine   2. Extra large gloves   3. Gauze wrap   4. Large blue pads for when changing bandages

## 2023-01-01 ENCOUNTER — OFFICE VISIT (OUTPATIENT)
Dept: OTOLARYNGOLOGY | Facility: CLINIC | Age: 78
End: 2023-01-01
Payer: COMMERCIAL

## 2023-01-01 ENCOUNTER — TELEPHONE (OUTPATIENT)
Dept: VASCULAR SURGERY | Facility: CLINIC | Age: 78
End: 2023-01-01

## 2023-01-01 ENCOUNTER — OFFICE VISIT (OUTPATIENT)
Dept: VASCULAR SURGERY | Facility: CLINIC | Age: 78
End: 2023-01-01
Payer: COMMERCIAL

## 2023-01-01 ENCOUNTER — TRANSFERRED RECORDS (OUTPATIENT)
Dept: HEALTH INFORMATION MANAGEMENT | Facility: CLINIC | Age: 78
End: 2023-01-01
Payer: COMMERCIAL

## 2023-01-01 ENCOUNTER — ANCILLARY PROCEDURE (OUTPATIENT)
Dept: ULTRASOUND IMAGING | Facility: CLINIC | Age: 78
End: 2023-01-01
Attending: NURSE PRACTITIONER
Payer: COMMERCIAL

## 2023-01-01 ENCOUNTER — TELEPHONE (OUTPATIENT)
Dept: VASCULAR SURGERY | Facility: CLINIC | Age: 78
End: 2023-01-01
Payer: COMMERCIAL

## 2023-01-01 ENCOUNTER — TRANSCRIBE ORDERS (OUTPATIENT)
Dept: OTHER | Age: 78
End: 2023-01-01

## 2023-01-01 VITALS — OXYGEN SATURATION: 95 % | SYSTOLIC BLOOD PRESSURE: 136 MMHG | DIASTOLIC BLOOD PRESSURE: 68 MMHG | HEART RATE: 73 BPM

## 2023-01-01 VITALS — OXYGEN SATURATION: 97 % | SYSTOLIC BLOOD PRESSURE: 167 MMHG | DIASTOLIC BLOOD PRESSURE: 74 MMHG | HEART RATE: 73 BPM

## 2023-01-01 VITALS
OXYGEN SATURATION: 95 % | HEART RATE: 71 BPM | BODY MASS INDEX: 16.64 KG/M2 | WEIGHT: 100 LBS | SYSTOLIC BLOOD PRESSURE: 144 MMHG | DIASTOLIC BLOOD PRESSURE: 73 MMHG

## 2023-01-01 DIAGNOSIS — I10 ESSENTIAL HYPERTENSION WITH GOAL BLOOD PRESSURE LESS THAN 140/90: ICD-10-CM

## 2023-01-01 DIAGNOSIS — T87.89 NON-HEALING WOUND OF AMPUTATION STUMP (H): ICD-10-CM

## 2023-01-01 DIAGNOSIS — R09.89 OTHER SPECIFIED SYMPTOMS AND SIGNS INVOLVING THE CIRCULATORY AND RESPIRATORY SYSTEMS: ICD-10-CM

## 2023-01-01 DIAGNOSIS — H61.23 BILATERAL IMPACTED CERUMEN: Primary | ICD-10-CM

## 2023-01-01 DIAGNOSIS — T87.89 NON-HEALING WOUND OF AMPUTATION STUMP (H): Primary | ICD-10-CM

## 2023-01-01 DIAGNOSIS — M25.511 PAIN IN RIGHT SHOULDER: Primary | ICD-10-CM

## 2023-01-01 DIAGNOSIS — T14.8XXA HEMATOMA OF SKIN: Primary | ICD-10-CM

## 2023-01-01 DIAGNOSIS — I25.10 CORONARY ARTERY DISEASE INVOLVING NATIVE CORONARY ARTERY OF NATIVE HEART WITHOUT ANGINA PECTORIS: ICD-10-CM

## 2023-01-01 PROCEDURE — 93922 UPR/L XTREMITY ART 2 LEVELS: CPT | Mod: 52 | Performed by: RADIOLOGY

## 2023-01-01 PROCEDURE — 99213 OFFICE O/P EST LOW 20 MIN: CPT | Mod: 25 | Performed by: OTOLARYNGOLOGY

## 2023-01-01 PROCEDURE — 99214 OFFICE O/P EST MOD 30 MIN: CPT | Performed by: NURSE PRACTITIONER

## 2023-01-01 PROCEDURE — 69210 REMOVE IMPACTED EAR WAX UNI: CPT | Performed by: OTOLARYNGOLOGY

## 2023-01-01 PROCEDURE — 99215 OFFICE O/P EST HI 40 MIN: CPT | Performed by: NURSE PRACTITIONER

## 2023-01-01 RX ORDER — LISINOPRIL 20 MG/1
20 TABLET ORAL DAILY
Qty: 90 TABLET | Refills: 0 | Status: SHIPPED | OUTPATIENT
Start: 2023-01-01 | End: 2024-01-01

## 2023-01-01 RX ORDER — LISINOPRIL 20 MG/1
20 TABLET ORAL DAILY
Qty: 90 TABLET | Refills: 0 | Status: SHIPPED | OUTPATIENT
Start: 2023-01-01 | End: 2023-01-01

## 2023-01-01 RX ORDER — METOPROLOL SUCCINATE 50 MG/1
50 TABLET, EXTENDED RELEASE ORAL DAILY
Qty: 90 TABLET | Refills: 1 | Status: SHIPPED | OUTPATIENT
Start: 2023-01-01 | End: 2024-01-01

## 2023-01-01 ASSESSMENT — PAIN SCALES - GENERAL: PAINLEVEL: MODERATE PAIN (5)

## 2023-01-06 ENCOUNTER — TELEPHONE (OUTPATIENT)
Dept: FAMILY MEDICINE | Facility: CLINIC | Age: 78
End: 2023-01-06

## 2023-01-06 DIAGNOSIS — I10 ESSENTIAL HYPERTENSION WITH GOAL BLOOD PRESSURE LESS THAN 140/90: ICD-10-CM

## 2023-01-06 DIAGNOSIS — Z89.511 STATUS POST BELOW-KNEE AMPUTATION OF RIGHT LOWER EXTREMITY (H): ICD-10-CM

## 2023-01-06 RX ORDER — LISINOPRIL 20 MG/1
TABLET ORAL
Qty: 90 TABLET | Refills: 3 | OUTPATIENT
Start: 2023-01-06

## 2023-01-06 RX ORDER — GABAPENTIN 300 MG/1
300 CAPSULE ORAL
Qty: 14 CAPSULE | Refills: 1 | Status: ON HOLD | OUTPATIENT
Start: 2023-01-06 | End: 2023-01-15

## 2023-01-06 RX ORDER — LISINOPRIL 20 MG/1
20 TABLET ORAL DAILY
Qty: 90 TABLET | Refills: 0 | Status: SHIPPED | OUTPATIENT
Start: 2023-01-06 | End: 2023-06-05

## 2023-01-06 NOTE — TELEPHONE ENCOUNTER
Refill sent as requested. Pt updated via phone.    AMANDA DavenportN, RN  [facility] - [facility] Vascular Surgery  Ph: [phone]  Fax: [phone]

## 2023-01-06 NOTE — TELEPHONE ENCOUNTER
"Requested Prescriptions   Pending Prescriptions Disp Refills     lisinopril (ZESTRIL) 20 MG tablet [Pharmacy Med Name: LISINOPRIL 20 MG TABLET] 90 tablet 3     Sig: TAKE 1 TABLET BY MOUTH EVERY DAY       ACE Inhibitors (Including Combos) Protocol Failed - 1/6/2023 12:31 PM        Failed - Blood pressure under 140/90 in past 12 months     BP Readings from Last 3 Encounters:   10/26/22 (!) 164/60   10/13/22 118/59   10/01/22 126/53                 Passed - Recent (12 mo) or future (30 days) visit within the authorizing provider's specialty     Patient has had an office visit with the authorizing provider or a provider within the authorizing providers department within the previous 12 mos or has a future within next 30 days. See \"Patient Info\" tab in inbasket, or \"Choose Columns\" in Meds & Orders section of the refill encounter.              Passed - Medication is active on med list        Passed - Patient is age 18 or older        Passed - No active pregnancy on record        Passed - Normal serum creatinine on file in past 12 months     Recent Labs   Lab Test 10/26/22  2018   CR 0.58       Ok to refill medication if creatinine is low          Passed - Normal serum potassium on file in past 12 months     Recent Labs   Lab Test 10/26/22  2018   POTASSIUM 4.8             Passed - No positive pregnancy test within past 12 months         Routing refill request to provider for review/approval because medication did not pass protocol.    Claritza Hernandez RN  [facility]   "

## 2023-01-06 NOTE — TELEPHONE ENCOUNTER
Received a call from pt stating she is needing to speak with Dr. Oconnor/nurse about refilling her Gabapentin. Pt stated the pharmacy informed her it was denied. Writer informed pt that nurses are currently working on getting this refilled and she requested to have someone reach out to keep her in the loop of what is happening because she stated she can not sleep without it due to her leg. Informed pt writer would send msg to have someone reach out to her.     Samantha Samson on 1/6/2023 at 1:12 PM

## 2023-01-11 ENCOUNTER — OFFICE VISIT (OUTPATIENT)
Dept: FAMILY MEDICINE | Facility: CLINIC | Age: 78
End: 2023-01-11
Payer: COMMERCIAL

## 2023-01-11 VITALS
WEIGHT: 100 LBS | DIASTOLIC BLOOD PRESSURE: 81 MMHG | OXYGEN SATURATION: 99 % | HEART RATE: 65 BPM | BODY MASS INDEX: 16.07 KG/M2 | SYSTOLIC BLOOD PRESSURE: 136 MMHG | RESPIRATION RATE: 14 BRPM | TEMPERATURE: 97.6 F | HEIGHT: 66 IN

## 2023-01-11 DIAGNOSIS — I73.9 PERIPHERAL ARTERIAL DISEASE (H): ICD-10-CM

## 2023-01-11 DIAGNOSIS — Z01.818 PREOP GENERAL PHYSICAL EXAM: Primary | ICD-10-CM

## 2023-01-11 LAB
BASOPHILS # BLD AUTO: 0.1 10E3/UL (ref 0–0.2)
BASOPHILS NFR BLD AUTO: 1 %
EOSINOPHIL # BLD AUTO: 0 10E3/UL (ref 0–0.7)
EOSINOPHIL NFR BLD AUTO: 0 %
ERYTHROCYTE [DISTWIDTH] IN BLOOD BY AUTOMATED COUNT: 17.5 % (ref 10–15)
HCT VFR BLD AUTO: 32.2 % (ref 35–47)
HGB BLD-MCNC: 10.2 G/DL (ref 11.7–15.7)
IMM GRANULOCYTES # BLD: 0 10E3/UL
IMM GRANULOCYTES NFR BLD: 1 %
LYMPHOCYTES # BLD AUTO: 0.5 10E3/UL (ref 0.8–5.3)
LYMPHOCYTES NFR BLD AUTO: 6 %
MCH RBC QN AUTO: 31.1 PG (ref 26.5–33)
MCHC RBC AUTO-ENTMCNC: 31.7 G/DL (ref 31.5–36.5)
MCV RBC AUTO: 98 FL (ref 78–100)
MONOCYTES # BLD AUTO: 0.2 10E3/UL (ref 0–1.3)
MONOCYTES NFR BLD AUTO: 3 %
NEUTROPHILS # BLD AUTO: 7.2 10E3/UL (ref 1.6–8.3)
NEUTROPHILS NFR BLD AUTO: 89 %
NRBC # BLD AUTO: 0 10E3/UL
NRBC BLD AUTO-RTO: 0 /100
PLATELET # BLD AUTO: 293 10E3/UL (ref 150–450)
RBC # BLD AUTO: 3.28 10E6/UL (ref 3.8–5.2)
WBC # BLD AUTO: 8 10E3/UL (ref 4–11)

## 2023-01-11 PROCEDURE — 85025 COMPLETE CBC W/AUTO DIFF WBC: CPT | Performed by: FAMILY MEDICINE

## 2023-01-11 PROCEDURE — 93000 ELECTROCARDIOGRAM COMPLETE: CPT | Performed by: FAMILY MEDICINE

## 2023-01-11 PROCEDURE — 80053 COMPREHEN METABOLIC PANEL: CPT | Performed by: FAMILY MEDICINE

## 2023-01-11 PROCEDURE — 36415 COLL VENOUS BLD VENIPUNCTURE: CPT | Performed by: FAMILY MEDICINE

## 2023-01-11 PROCEDURE — 99214 OFFICE O/P EST MOD 30 MIN: CPT | Performed by: FAMILY MEDICINE

## 2023-01-11 ASSESSMENT — PAIN SCALES - GENERAL: PAINLEVEL: NO PAIN (0)

## 2023-01-11 NOTE — LETTER
January 12, 2023    Sakshi Jaeger  [address] [address]          Dear ,    We are writing to inform you of your test results.  Preop labs are okay/ stable.       Resulted Orders   Comprehensive metabolic panel   Result Value Ref Range    Sodium 134 133 - 144 mmol/L    Potassium 4.2 3.4 - 5.3 mmol/L    Chloride 101 94 - 109 mmol/L    Carbon Dioxide (CO2) 25 20 - 32 mmol/L    Anion Gap 8 3 - 14 mmol/L    Urea Nitrogen 10 7 - 30 mg/dL    Creatinine 0.51 (L) 0.52 - 1.04 mg/dL    Calcium 8.6 8.5 - 10.1 mg/dL    Glucose 93 70 - 99 mg/dL    Alkaline Phosphatase 70 40 - 150 U/L    AST 19 0 - 45 U/L    ALT 15 0 - 50 U/L    Protein Total 6.7 (L) 6.8 - 8.8 g/dL    Albumin 3.1 (L) 3.4 - 5.0 g/dL    Bilirubin Total 0.5 0.2 - 1.3 mg/dL    GFR Estimate >90 >60 mL/min/1.73m2      Comment:      Effective December 21, 2021 eGFRcr in adults is calculated using the 2021 CKD-EPI creatinine equation which includes age and gender (Berny et al., NE, DOI: 10.1056/JYSSoq9334249)   CBC with platelets and differential   Result Value Ref Range    WBC Count 8.0 4.0 - 11.0 10e3/uL    RBC Count 3.28 (L) 3.80 - 5.20 10e6/uL    Hemoglobin 10.2 (L) 11.7 - 15.7 g/dL    Hematocrit 32.2 (L) 35.0 - 47.0 %    MCV 98 78 - 100 fL    MCH 31.1 26.5 - 33.0 pg    MCHC 31.7 31.5 - 36.5 g/dL    RDW 17.5 (H) 10.0 - 15.0 %    Platelet Count 293 150 - 450 10e3/uL    % Neutrophils 89 %    % Lymphocytes 6 %    % Monocytes 3 %    % Eosinophils 0 %    % Basophils 1 %    % Immature Granulocytes 1 %    NRBCs per 100 WBC 0 <1 /100    Absolute Neutrophils 7.2 1.6 - 8.3 10e3/uL    Absolute Lymphocytes 0.5 (L) 0.8 - 5.3 10e3/uL    Absolute Monocytes 0.2 0.0 - 1.3 10e3/uL    Absolute Eosinophils 0.0 0.0 - 0.7 10e3/uL    Absolute Basophils 0.1 0.0 - 0.2 10e3/uL    Absolute Immature Granulocytes 0.0 <=0.4 10e3/uL    Absolute NRBCs 0.0 10e3/uL       If you have any questions or concerns, please call the clinic at the number listed above.        Sincerely,      Harry Roberto MD

## 2023-01-11 NOTE — PROGRESS NOTES
[facility]  [address]  [place] [address]  Phone: [phone]  Primary Provider: Caleb - RAISA Nuenz [facility]  Pre-op Performing Provider: LIZETTE SANTANA       PREOPERATIVE EVALUATION:  Today's date: 1/11/2023    Sakshi Jaeger is a 77 year old female who presents for a preoperative evaluation.    Surgical Information:  Surgery/Procedure:   ENDARTERECTOMY, FEMORAL RIGHT Right General with Block   right iliac arteriogram with possible intervention Right General   AMPUTATION, ABOVE KNEE RIGHT       Surgery Location: [facility]  Surgeon: Elvin  Surgery Date: 01/13/2023  Time of Surgery: 08:30am arrival  Where patient plans to recover: At home with family  Fax number for surgical facility: Note does not need to be faxed, will be available electronically in Epic.    Type of Anesthesia Anticipated: General         Subjective     HPI related to upcoming procedure:  77year old female to had amputation and endarterectomy right leg, this Friday.       Preop Questions 1/11/2023   1. Have you ever had a heart attack or stroke? YES - MI 2010, no problems since    2. Have you ever had surgery on your heart or blood vessels, such as a stent placement, a coronary artery bypass, or surgery on an artery in your head, neck, heart, or legs? YES - stent in coronary 2010   3. Do you have chest pain with activity? No   4. Do you have a history of  heart failure? No   5. Do you currently have a cold, bronchitis or symptoms of other infection? No   6. Do you have a cough, shortness of breath, or wheezing? No   7. Do you or anyone in your family have previous history of blood clots? No   8. Do you or does anyone in your family have a serious bleeding problem such as prolonged bleeding following surgeries or cuts? No   9. Have you ever had problems with anemia or been told to take iron pills? YES -no iron pills currently   10. Have you had any abnormal blood loss such as black, tarry or bloody stools,  or abnormal vaginal bleeding? No   11. Have you ever had a blood transfusion? No   11a. Have you ever had a transfusion reaction? -   12. Are you willing to have a blood transfusion if it is medically needed before, during, or after your surgery? Yes   13. Have you or any of your relatives ever had problems with anesthesia? No   14. Do you have sleep apnea, excessive snoring or daytime drowsiness? No   15. Do you have any artifical heart valves or other implanted medical devices like a pacemaker, defibrillator, or continuous glucose monitor? No   16. Do you have artificial joints? YES - left tka    17. Are you allergic to latex? No     Health Care Directive:  Patient does not have a Health Care Directive on file      Preoperative Review of :  Patient not on any controlled substance  [id number]}        Review of Systems  Constitutional, neuro, ENT, endocrine, pulmonary, cardiac, gastrointestinal, genitourinary, musculoskeletal, integument and psychiatric systems are negative, except as otherwise noted.    No recent illnesses    Patient has complicated right leg history    Had lower leg amputation 2005 and multiple revisions since then     No med changes recently    Patient quit smoking 2010, but has second hand exposure    No anesthesia problems in past    No history of strokes    No chest pain/ breathing problems    No bleeding/ clotting disorders    Rheumatoid arthritis stable, on arava and methotrexate per rheumatology    And on very low dose prednisone for many years    Patient Active Problem List    Diagnosis Date Noted     Infection of right knee (H) 09/28/2022     Priority: Medium     Anemia 08/24/2022     Priority: Medium     Combined forms of age-related cataract of both eyes 08/22/2022     Priority: Medium     Added automatically from request for surgery [id number]       PVD (peripheral vascular disease) (H) 05/18/2022     Priority: Medium     Protein-calorie malnutrition (H) 05/18/2022     Priority: Medium      Sacroiliac joint pain 06/07/2018     Priority: Medium     Coronary artery disease involving native coronary artery of native heart without angina pectoris 09/27/2016     Priority: Medium     Essential hypertension with goal blood pressure less than 140/90 08/25/2016     Priority: Medium     Status post below-knee amputation (H) 12/26/2012     Priority: Medium             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     Priority: Medium     Advanced directives, counseling/discussion 11/25/2011     Priority: Medium     Advance Care Planning 9/30/2016: ACP Review of Chart / Resources Provided:  Reviewed chart for advance care plan.  Sakshi Jaeger has no plan or code status on file. Discussed available resources and provided with information. Confirmed code status reflects current choices pending further ACP discussions.  Confirmed/documented legally designated decision makers.    Added by Mishel Wallace        Discussed advance care planning with patient; information given to patient to review. Jennifer Jaeger, Clinical Medical Assistant   11/25/2011          Hyperlipidemia LDL goal <100 11/12/2010     Priority: Medium     Osteoporosis 02/16/2005     Priority: Medium     Rheumatoid arthritis (H) 02/16/2005     Priority: Medium              Past Medical History:   Diagnosis Date     Anemia 8/24/2022     BENIGN HYPERTENSION 3/1/2005     CAD (coronary artery disease) 1/26/2011     Coronary artery disease involving native coronary artery of native heart without angina pectoris 9/27/2016     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     Essential hypertension with goal blood pressure less than 140/90 8/25/2016     Hyperlipidemia LDL goal <100 11/12/2010     Hypertension goal BP (blood pressure) < 130/80 1/26/2011     Infection of right below knee amputation (H) 10/1/2019     IRON DEFIC ANEMIA NOS 9/15/2005     Lower limb amputation, below knee 12/26/2012     MI (myocardial infarction) (H)     3/2010     Non-healing surgical wound, subsequent  encounter 9/24/2020    Added automatically from request for surgery [id number]     Osteoporosis 2/16/2005     OSTEOPOROSIS NOS 2/16/2005     Protein-calorie malnutrition (H) 5/18/2022     PVD (peripheral vascular disease) (H) 5/18/2022     Rheumatoid arthritis (H) 2/16/2005          Rheumatoid arthritis(714.0) 2/16/2005     Status post below-knee amputation (H) 12/26/2012          Past Surgical History:   Procedure Laterality Date     ---------INCISION AND DRAINAGE  03/05/2014     AMPUTATE LEG BELOW KNEE Right 9/28/2022    Procedure: Right through knee amputation;  Surgeon: Doron Mott MD;  Location: [facility] OR     AMPUTATION BELOW KNEE RT/LT  01/01/2005    right lowwer leg.      APPENDECTOMY       ARTHROPLASTY KNEE  04/27/2011    Procedure:ARTHROPLASTY KNEE; Surgeon:JESSE HAWKINS; Location:[facility] OR     COMPLEX WOUND CLOSURE (UPDATE) Right 12/08/2021    Procedure: Right stump wound debridment and closure;  Surgeon: RAISA Perea MD;  Location: [facility] OR     CORONARY STENT PLACEMENT       INJECT NERVE BLOCK SPHENOPALATINE GANGLION N/A 9/29/2022    Procedure: Out of OR encounter for block to be done by ;  Surgeon: GENERIC ANESTHESIA PROVIDER;  Location: [facility]     IRRIGATION AND DEBRIDEMENT LOWER EXTREMITY, COMBINED Right 10/09/2019    Procedure: Irrigation and debridement Right leg;  Surgeon: Doron Mott MD;  Location: [facility] OR     NERVE BLOCK PERIPHERAL N/A 9/27/2022    Procedure: BLOCK, NERVE;  Surgeon: GENERIC ANESTHESIA PROVIDER;  Location: [facility]     OTHER SURGICAL HISTORY  03/05/2014    I AND D      OTHER SURGICAL HISTORY Right 10/09/2019    IRRIGATION AND DEBRIDEMENT LOWER EXTREMITY, COMBINED     PHACOEMULSIFICATION CLEAR CORNEA WITH STANDARD INTRAOCULAR LENS IMPLANT Left 9/8/2022    Procedure: PHACOEMULSIFICATION, LEFT CATARACT, WITH INTRAOCULAR LENS IMPLANT;  Surgeon: Flip Izaguirre MD;  Location: [facility] OR     PHACOEMULSIFICATION CLEAR CORNEA WITH STANDARD  INTRAOCULAR LENS IMPLANT Right 10/13/2022    Procedure: PHACOEMULSIFICATION, RIGHT CATARACT, WITH INTRAOCULAR LENS IMPLANT;  Surgeon: Flip Izaguirre MD;  Location: [facility] OR     REVISE SCAR EXTREMITY Right 2020    Procedure: Right stump scar revision;  Surgeon: RAISA Perea MD;  Location: [facility] OR     Current Outpatient Medications   Medication Sig Dispense Refill     acetaminophen (TYLENOL) 325 MG tablet Take 2 tablets (650 mg) by mouth every 4 hours       amLODIPine (NORVASC) 5 MG tablet Take 1 tablet (5 mg) by mouth daily 90 tablet 3     aspirin (ASA) 81 MG tablet Take 1 tablet (81 mg) by mouth daily       calcium carbonate 600 mg-vitamin D 400 units (CALCIUM 600 + D) 600-400 MG-UNIT per tablet Take 2 tablets by mouth daily       FOLIC ACID PO Take 1 mg by mouth daily       gabapentin (NEURONTIN) 300 MG capsule Take 1 capsule (300 mg) by mouth nightly as needed for neuropathic pain (1-2 tablets at bedtime prn restless right amputation (BKA)) 14 capsule 1     leflunomide (ARAVA) 20 MG tablet Take 1 tablet by mouth every 24 hours       lisinopril (ZESTRIL) 20 MG tablet Take 1 tablet (20 mg) by mouth daily 90 tablet 0     methotrexate 2.5 MG tablet Take 20 mg by mouth every 7 days On        metoprolol tartrate (LOPRESSOR) 50 MG tablet Take 1 tablet (50 mg) by mouth 2 times daily (Patient taking differently: Take 50 mg by mouth daily) 90 tablet 3     multivitamin w/minerals (THERA-VIT-M) tablet Take 1 tablet by mouth daily       predniSONE (DELTASONE) 5 MG tablet Take 1 tablet (5 mg) by mouth daily  0     simvastatin (ZOCOR) 20 MG tablet Take 1 tablet (20 mg) by mouth At Bedtime 90 tablet 3       No Known Allergies     Social History     Tobacco Use     Smoking status: Former     Packs/day: 0.50     Years: 45.00     Pack years: 22.50     Types: Cigarettes     Quit date: 2010     Years since quittin.8     Smokeless tobacco: Never   Substance Use Topics     Alcohol use: Yes     " Comment: occsionally-3 -4 drinks a week        History   Drug Use No         Objective     /81 (BP Location: Left arm, Patient Position: Chair, Cuff Size: Adult Small)   Pulse 65   Temp 97.6  F (36.4  C) (Temporal)   Resp 14   Ht 1.664 m (5' 5.5\")   Wt 45.4 kg (100 lb)   LMP  (LMP Unknown)   SpO2 99%   BMI 16.39 kg/m      Physical Exam    GENERAL APPEARANCE: healthy, alert and no distress     EYES: EOMI, PERRL     HENT: ear canals and TM's normal and nose and mouth without ulcers or lesions     NECK: no adenopathy, no asymmetry, masses, or scars and thyroid normal to palpation     RESP: lungs clear to auscultation - no rales, rhonchi or wheezes     CV: regular rates and rhythm, normal S1 S2, no S3 or S4 and no murmur, click or rub     ABDOMEN:  soft, nontender, no HSM or masses and bowel sounds normal     MS: no edema left lower leg. Patient has had right bka.     SKIN: no suspicious lesions or rashes     NEURO: Normal strength and tone, sensory exam grossly normal, mentation intact and speech normal     PSYCH: mentation appears normal. and affect normal/bright     LYMPHATICS: No cervical adenopathy    Recent Labs   Lab Test 10/26/22  2018 10/01/22  0740 09/30/22  0935 09/27/22  1334   HGB 10.4* 9.3*   < > 10.7*     --   --  245   *  --   --  135   POTASSIUM 4.8  --   --  4.1   CR 0.58  --   --  0.48*    < > = values in this interval not displayed.        Diagnostics:      ekg done today, no ischemia, mild bradycardia ( patient on beta blocker )    Labs pending      ASSESSMENT / PLAN:  (Z01.818) Preop general physical exam  (primary encounter diagnosis)  Comment: patient overall stable.  Needs right leg vascular procedure ( endarterectomy and amputation ).   Plan: CBC with Platelets & Differential,         Comprehensive metabolic panel, EKG 12-lead         complete w/read - Clinics        Labs pending but patient should be okay for procedure.  Patient has chronic anemia but no recent " bleeding.    (I73.9) Peripheral arterial disease (H)  Comment: as above   Plan: as above     Given vascular procedure, stay on aspirin this week    The am of procedure, take metoprolol, amlodipine, and aspirin.   Hold lisinopril and other meds that morning.      I reviewed the patient's medications, allergies, medical history, family history, and social history.    Harry Roberto MD            Revised Cardiac Risk Index (RCRI):  The patient has the following serious cardiovascular risks for perioperative complications:   - Coronary Artery Disease (MI, positive stress test, angina, Qs on EKG) = 1 point     RCRI Interpretation: 1 point: Class II (low risk - 0.9% complication rate)           Signed Electronically by: Harry Roberto MD  Copy of this evaluation report is provided to requesting physician.

## 2023-01-11 NOTE — PATIENT INSTRUCTIONS
Do take the amlodipine, metoprolol, and aspirin early am of procedure with sip of water    Hold other meds that day    We will send you lab results

## 2023-01-11 NOTE — H&P (VIEW-ONLY)
[facility]  [address]  [place] [address]  Phone: [phone]  Primary Provider: Caleb - RAISA Nunez [facility]  Pre-op Performing Provider: LIZETTE SANTANA       PREOPERATIVE EVALUATION:  Today's date: 1/11/2023    Sakshi Jaeger is a 77 year old female who presents for a preoperative evaluation.    Surgical Information:  Surgery/Procedure:   ENDARTERECTOMY, FEMORAL RIGHT Right General with Block   right iliac arteriogram with possible intervention Right General   AMPUTATION, ABOVE KNEE RIGHT       Surgery Location: [facility]  Surgeon: Elvin  Surgery Date: 01/13/2023  Time of Surgery: 08:30am arrival  Where patient plans to recover: At home with family  Fax number for surgical facility: Note does not need to be faxed, will be available electronically in Epic.    Type of Anesthesia Anticipated: General         Subjective     HPI related to upcoming procedure:  77year old female to had amputation and endarterectomy right leg, this Friday.       Preop Questions 1/11/2023   1. Have you ever had a heart attack or stroke? YES - MI 2010, no problems since    2. Have you ever had surgery on your heart or blood vessels, such as a stent placement, a coronary artery bypass, or surgery on an artery in your head, neck, heart, or legs? YES - stent in coronary 2010   3. Do you have chest pain with activity? No   4. Do you have a history of  heart failure? No   5. Do you currently have a cold, bronchitis or symptoms of other infection? No   6. Do you have a cough, shortness of breath, or wheezing? No   7. Do you or anyone in your family have previous history of blood clots? No   8. Do you or does anyone in your family have a serious bleeding problem such as prolonged bleeding following surgeries or cuts? No   9. Have you ever had problems with anemia or been told to take iron pills? YES -no iron pills currently   10. Have you had any abnormal blood loss such as black, tarry or bloody stools,  or abnormal vaginal bleeding? No   11. Have you ever had a blood transfusion? No   11a. Have you ever had a transfusion reaction? -   12. Are you willing to have a blood transfusion if it is medically needed before, during, or after your surgery? Yes   13. Have you or any of your relatives ever had problems with anesthesia? No   14. Do you have sleep apnea, excessive snoring or daytime drowsiness? No   15. Do you have any artifical heart valves or other implanted medical devices like a pacemaker, defibrillator, or continuous glucose monitor? No   16. Do you have artificial joints? YES - left tka    17. Are you allergic to latex? No     Health Care Directive:  Patient does not have a Health Care Directive on file      Preoperative Review of :  Patient not on any controlled substance  [id number]}        Review of Systems  Constitutional, neuro, ENT, endocrine, pulmonary, cardiac, gastrointestinal, genitourinary, musculoskeletal, integument and psychiatric systems are negative, except as otherwise noted.    No recent illnesses    Patient has complicated right leg history    Had lower leg amputation 2005 and multiple revisions since then     No med changes recently    Patient quit smoking 2010, but has second hand exposure    No anesthesia problems in past    No history of strokes    No chest pain/ breathing problems    No bleeding/ clotting disorders    Rheumatoid arthritis stable, on arava and methotrexate per rheumatology    And on very low dose prednisone for many years    Patient Active Problem List    Diagnosis Date Noted     Infection of right knee (H) 09/28/2022     Priority: Medium     Anemia 08/24/2022     Priority: Medium     Combined forms of age-related cataract of both eyes 08/22/2022     Priority: Medium     Added automatically from request for surgery [id number]       PVD (peripheral vascular disease) (H) 05/18/2022     Priority: Medium     Protein-calorie malnutrition (H) 05/18/2022     Priority: Medium      Sacroiliac joint pain 06/07/2018     Priority: Medium     Coronary artery disease involving native coronary artery of native heart without angina pectoris 09/27/2016     Priority: Medium     Essential hypertension with goal blood pressure less than 140/90 08/25/2016     Priority: Medium     Status post below-knee amputation (H) 12/26/2012     Priority: Medium             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     Priority: Medium     Advanced directives, counseling/discussion 11/25/2011     Priority: Medium     Advance Care Planning 9/30/2016: ACP Review of Chart / Resources Provided:  Reviewed chart for advance care plan.  Sakshi Jaeger has no plan or code status on file. Discussed available resources and provided with information. Confirmed code status reflects current choices pending further ACP discussions.  Confirmed/documented legally designated decision makers.    Added by Mishel Wallace        Discussed advance care planning with patient; information given to patient to review. Jennifer Jaeger, Clinical Medical Assistant   11/25/2011          Hyperlipidemia LDL goal <100 11/12/2010     Priority: Medium     Osteoporosis 02/16/2005     Priority: Medium     Rheumatoid arthritis (H) 02/16/2005     Priority: Medium              Past Medical History:   Diagnosis Date     Anemia 8/24/2022     BENIGN HYPERTENSION 3/1/2005     CAD (coronary artery disease) 1/26/2011     Coronary artery disease involving native coronary artery of native heart without angina pectoris 9/27/2016     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     Essential hypertension with goal blood pressure less than 140/90 8/25/2016     Hyperlipidemia LDL goal <100 11/12/2010     Hypertension goal BP (blood pressure) < 130/80 1/26/2011     Infection of right below knee amputation (H) 10/1/2019     IRON DEFIC ANEMIA NOS 9/15/2005     Lower limb amputation, below knee 12/26/2012     MI (myocardial infarction) (H)     3/2010     Non-healing surgical wound, subsequent  encounter 9/24/2020    Added automatically from request for surgery [id number]     Osteoporosis 2/16/2005     OSTEOPOROSIS NOS 2/16/2005     Protein-calorie malnutrition (H) 5/18/2022     PVD (peripheral vascular disease) (H) 5/18/2022     Rheumatoid arthritis (H) 2/16/2005          Rheumatoid arthritis(714.0) 2/16/2005     Status post below-knee amputation (H) 12/26/2012          Past Surgical History:   Procedure Laterality Date     ---------INCISION AND DRAINAGE  03/05/2014     AMPUTATE LEG BELOW KNEE Right 9/28/2022    Procedure: Right through knee amputation;  Surgeon: Doron Mott MD;  [facility]     AMPUTATION BELOW KNEE RT/LT  01/01/2005    right lowwer leg.      APPENDECTOMY       ARTHROPLASTY KNEE  04/27/2011    Procedure:ARTHROPLASTY KNEE; Surgeon:JESSE HAWKINS; Location[facility]     COMPLEX WOUND CLOSURE (UPDATE) Right 12/08/2021    Procedure: Right stump wound debridment and closure;  Surgeon: RAISA Perea MD;  Location: [facility] OR     CORONARY STENT PLACEMENT       INJECT NERVE BLOCK SPHENOPALATINE GANGLION N/A 9/29/2022    Procedure: Out of OR encounter for block to be done by ;  Surgeon: GENERIC ANESTHESIA PROVIDER;  [facility]     IRRIGATION AND DEBRIDEMENT LOWER EXTREMITY, COMBINED Right 10/09/2019    Procedure: Irrigation and debridement Right leg;  Surgeon: Doron Mott MD;  [facility]     NERVE BLOCK PERIPHERAL N/A 9/27/2022    Procedure: BLOCK, NERVE;  Surgeon: GENERIC ANESTHESIA PROVIDER;  [facility]     OTHER SURGICAL HISTORY  03/05/2014    I AND D      OTHER SURGICAL HISTORY Right 10/09/2019    IRRIGATION AND DEBRIDEMENT LOWER EXTREMITY, COMBINED     PHACOEMULSIFICATION CLEAR CORNEA WITH STANDARD INTRAOCULAR LENS IMPLANT Left 9/8/2022    Procedure: PHACOEMULSIFICATION, LEFT CATARACT, WITH INTRAOCULAR LENS IMPLANT;  Surgeon: Flip Izaguirre MD;  Location: [facility]     PHACOEMULSIFICATION CLEAR CORNEA WITH STANDARD  INTRAOCULAR LENS IMPLANT Right 10/13/2022    Procedure: PHACOEMULSIFICATION, RIGHT CATARACT, WITH INTRAOCULAR LENS IMPLANT;  Surgeon: Flip Izaguirre MD;  Location: [facility]     REVISE SCAR EXTREMITY Right 2020    Procedure: Right stump scar revision;  Surgeon: RAISA Perea MD;  Location: [facility] OR     Current Outpatient Medications   Medication Sig Dispense Refill     acetaminophen (TYLENOL) 325 MG tablet Take 2 tablets (650 mg) by mouth every 4 hours       amLODIPine (NORVASC) 5 MG tablet Take 1 tablet (5 mg) by mouth daily 90 tablet 3     aspirin (ASA) 81 MG tablet Take 1 tablet (81 mg) by mouth daily       calcium carbonate 600 mg-vitamin D 400 units (CALCIUM 600 + D) 600-400 MG-UNIT per tablet Take 2 tablets by mouth daily       FOLIC ACID PO Take 1 mg by mouth daily       gabapentin (NEURONTIN) 300 MG capsule Take 1 capsule (300 mg) by mouth nightly as needed for neuropathic pain (1-2 tablets at bedtime prn restless right amputation (BKA)) 14 capsule 1     leflunomide (ARAVA) 20 MG tablet Take 1 tablet by mouth every 24 hours       lisinopril (ZESTRIL) 20 MG tablet Take 1 tablet (20 mg) by mouth daily 90 tablet 0     methotrexate 2.5 MG tablet Take 20 mg by mouth every 7 days On        metoprolol tartrate (LOPRESSOR) 50 MG tablet Take 1 tablet (50 mg) by mouth 2 times daily (Patient taking differently: Take 50 mg by mouth daily) 90 tablet 3     multivitamin w/minerals (THERA-VIT-M) tablet Take 1 tablet by mouth daily       predniSONE (DELTASONE) 5 MG tablet Take 1 tablet (5 mg) by mouth daily  0     simvastatin (ZOCOR) 20 MG tablet Take 1 tablet (20 mg) by mouth At Bedtime 90 tablet 3       No Known Allergies     Social History     Tobacco Use     Smoking status: Former     Packs/day: 0.50     Years: 45.00     Pack years: 22.50     Types: Cigarettes     Quit date: 2010     Years since quittin.8     Smokeless tobacco: Never   Substance Use Topics     Alcohol use: Yes     " Comment: occsionally-3 -4 drinks a week        History   Drug Use No         Objective     /81 (BP Location: Left arm, Patient Position: Chair, Cuff Size: Adult Small)   Pulse 65   Temp 97.6  F (36.4  C) (Temporal)   Resp 14   Ht 1.664 m (5' 5.5\")   Wt 45.4 kg (100 lb)   LMP  (LMP Unknown)   SpO2 99%   BMI 16.39 kg/m      Physical Exam    GENERAL APPEARANCE: healthy, alert and no distress     EYES: EOMI, PERRL     HENT: ear canals and TM's normal and nose and mouth without ulcers or lesions     NECK: no adenopathy, no asymmetry, masses, or scars and thyroid normal to palpation     RESP: lungs clear to auscultation - no rales, rhonchi or wheezes     CV: regular rates and rhythm, normal S1 S2, no S3 or S4 and no murmur, click or rub     ABDOMEN:  soft, nontender, no HSM or masses and bowel sounds normal     MS: no edema left lower leg. Patient has had right bka.     SKIN: no suspicious lesions or rashes     NEURO: Normal strength and tone, sensory exam grossly normal, mentation intact and speech normal     PSYCH: mentation appears normal. and affect normal/bright     LYMPHATICS: No cervical adenopathy    Recent Labs   Lab Test 10/26/22  2018 10/01/22  0740 09/30/22  0935 09/27/22  1334   HGB 10.4* 9.3*   < > 10.7*     --   --  245   *  --   --  135   POTASSIUM 4.8  --   --  4.1   CR 0.58  --   --  0.48*    < > = values in this interval not displayed.        Diagnostics:      ekg done today, no ischemia, mild bradycardia ( patient on beta blocker )    Labs pending      ASSESSMENT / PLAN:  (Z01.818) Preop general physical exam  (primary encounter diagnosis)  Comment: patient overall stable.  Needs right leg vascular procedure ( endarterectomy and amputation ).   Plan: CBC with Platelets & Differential,         Comprehensive metabolic panel, EKG 12-lead         complete w/read - Clinics        Labs pending but patient should be okay for procedure.  Patient has chronic anemia but no recent " bleeding.    (I73.9) Peripheral arterial disease (H)  Comment: as above   Plan: as above     Given vascular procedure, stay on aspirin this week    The am of procedure, take metoprolol, amlodipine, and aspirin.   Hold lisinopril and other meds that morning.      I reviewed the patient's medications, allergies, medical history, family history, and social history.    Harry Roberto MD            Revised Cardiac Risk Index (RCRI):  The patient has the following serious cardiovascular risks for perioperative complications:   - Coronary Artery Disease (MI, positive stress test, angina, Qs on EKG) = 1 point     RCRI Interpretation: 1 point: Class II (low risk - 0.9% complication rate)           Signed Electronically by: Harry Roberto MD  Copy of this evaluation report is provided to requesting physician.

## 2023-01-12 ENCOUNTER — ANESTHESIA EVENT (OUTPATIENT)
Dept: SURGERY | Facility: CLINIC | Age: 78
DRG: 271 | End: 2023-01-12
Payer: COMMERCIAL

## 2023-01-12 LAB
ALBUMIN SERPL-MCNC: 3.1 G/DL (ref 3.4–5)
ALP SERPL-CCNC: 70 U/L (ref 40–150)
ALT SERPL W P-5'-P-CCNC: 15 U/L (ref 0–50)
ANION GAP SERPL CALCULATED.3IONS-SCNC: 8 MMOL/L (ref 3–14)
AST SERPL W P-5'-P-CCNC: 19 U/L (ref 0–45)
BILIRUB SERPL-MCNC: 0.5 MG/DL (ref 0.2–1.3)
BUN SERPL-MCNC: 10 MG/DL (ref 7–30)
CALCIUM SERPL-MCNC: 8.6 MG/DL (ref 8.5–10.1)
CHLORIDE BLD-SCNC: 101 MMOL/L (ref 94–109)
CO2 SERPL-SCNC: 25 MMOL/L (ref 20–32)
CREAT SERPL-MCNC: 0.51 MG/DL (ref 0.52–1.04)
GFR SERPL CREATININE-BSD FRML MDRD: >90 ML/MIN/1.73M2
GLUCOSE BLD-MCNC: 93 MG/DL (ref 70–99)
POTASSIUM BLD-SCNC: 4.2 MMOL/L (ref 3.4–5.3)
PROT SERPL-MCNC: 6.7 G/DL (ref 6.8–8.8)
SODIUM SERPL-SCNC: 134 MMOL/L (ref 133–144)

## 2023-01-13 ENCOUNTER — HOSPITAL ENCOUNTER (INPATIENT)
Facility: CLINIC | Age: 78
LOS: 4 days | Discharge: HOME | DRG: 271 | End: 2023-01-17
Attending: SURGERY | Admitting: SURGERY
Payer: COMMERCIAL

## 2023-01-13 ENCOUNTER — APPOINTMENT (OUTPATIENT)
Dept: INTERVENTIONAL RADIOLOGY/VASCULAR | Facility: CLINIC | Age: 78
DRG: 271 | End: 2023-01-13
Attending: SURGERY
Payer: COMMERCIAL

## 2023-01-13 ENCOUNTER — ANESTHESIA (OUTPATIENT)
Dept: SURGERY | Facility: CLINIC | Age: 78
DRG: 271 | End: 2023-01-13
Payer: COMMERCIAL

## 2023-01-13 DIAGNOSIS — I73.9 PVD (PERIPHERAL VASCULAR DISEASE) (H): ICD-10-CM

## 2023-01-13 DIAGNOSIS — Z89.511 STATUS POST BELOW-KNEE AMPUTATION OF RIGHT LOWER EXTREMITY (H): ICD-10-CM

## 2023-01-13 DIAGNOSIS — M00.9 INFECTION OF RIGHT KNEE (H): Primary | ICD-10-CM

## 2023-01-13 DIAGNOSIS — Z89.511 STATUS POST BELOW KNEE AMPUTATION OF RIGHT LOWER EXTREMITY (H): ICD-10-CM

## 2023-01-13 DIAGNOSIS — L97.909 ATHEROSCLEROSIS OF ARTERY OF EXTREMITY WITH ULCERATION (H): ICD-10-CM

## 2023-01-13 DIAGNOSIS — I70.299 ATHEROSCLEROSIS OF ARTERY OF EXTREMITY WITH ULCERATION (H): ICD-10-CM

## 2023-01-13 LAB
ABO/RH(D): NORMAL
ACT BLD: 199 SECONDS (ref 74–150)
ANION GAP SERPL CALCULATED.3IONS-SCNC: 14 MMOL/L (ref 7–15)
ANTIBODY SCREEN: NEGATIVE
BUN SERPL-MCNC: 10.2 MG/DL (ref 8–23)
CALCIUM SERPL-MCNC: 9 MG/DL (ref 8.8–10.2)
CHLORIDE SERPL-SCNC: 104 MMOL/L (ref 98–107)
CREAT SERPL-MCNC: 0.54 MG/DL (ref 0.51–0.95)
DEPRECATED HCO3 PLAS-SCNC: 21 MMOL/L (ref 22–29)
ERYTHROCYTE [DISTWIDTH] IN BLOOD BY AUTOMATED COUNT: 17.6 % (ref 10–15)
GFR SERPL CREATININE-BSD FRML MDRD: >90 ML/MIN/1.73M2
GLUCOSE BLDC GLUCOMTR-MCNC: 77 MG/DL (ref 70–99)
GLUCOSE SERPL-MCNC: 83 MG/DL (ref 70–99)
HCT VFR BLD AUTO: 32.7 % (ref 35–47)
HGB BLD-MCNC: 10.2 G/DL (ref 11.7–15.7)
MCH RBC QN AUTO: 31.3 PG (ref 26.5–33)
MCHC RBC AUTO-ENTMCNC: 31.2 G/DL (ref 31.5–36.5)
MCV RBC AUTO: 100 FL (ref 78–100)
PLATELET # BLD AUTO: 253 10E3/UL (ref 150–450)
POTASSIUM SERPL-SCNC: 3.9 MMOL/L (ref 3.4–5.3)
RBC # BLD AUTO: 3.26 10E6/UL (ref 3.8–5.2)
SARS-COV-2 RNA RESP QL NAA+PROBE: NEGATIVE
SODIUM SERPL-SCNC: 139 MMOL/L (ref 136–145)
SPECIMEN EXPIRATION DATE: NORMAL
WBC # BLD AUTO: 4.8 10E3/UL (ref 4–11)

## 2023-01-13 PROCEDURE — 250N000009 HC RX 250

## 2023-01-13 PROCEDURE — 85014 HEMATOCRIT: CPT | Performed by: SURGERY

## 2023-01-13 PROCEDURE — 120N000003 HC R&B IMCU UMMC

## 2023-01-13 PROCEDURE — 04UY0KZ SUPPLEMENT LOWER ARTERY WITH NONAUTOLOGOUS TISSUE SUBSTITUTE, OPEN APPROACH: ICD-10-PCS | Performed by: SURGERY

## 2023-01-13 PROCEDURE — B41D1ZZ FLUOROSCOPY OF AORTA AND BILATERAL LOWER EXTREMITY ARTERIES USING LOW OSMOLAR CONTRAST: ICD-10-PCS | Performed by: SURGERY

## 2023-01-13 PROCEDURE — 250N000009 HC RX 250: Performed by: STUDENT IN AN ORGANIZED HEALTH CARE EDUCATION/TRAINING PROGRAM

## 2023-01-13 PROCEDURE — 250N000011 HC RX IP 250 OP 636

## 2023-01-13 PROCEDURE — 250N000011 HC RX IP 250 OP 636: Performed by: SURGERY

## 2023-01-13 PROCEDURE — 250N000009 HC RX 250: Performed by: SURGERY

## 2023-01-13 PROCEDURE — 36415 COLL VENOUS BLD VENIPUNCTURE: CPT | Performed by: SURGERY

## 2023-01-13 PROCEDURE — 250N000025 HC SEVOFLURANE, PER MIN: Performed by: SURGERY

## 2023-01-13 PROCEDURE — 047C3DZ DILATION OF RIGHT COMMON ILIAC ARTERY WITH INTRALUMINAL DEVICE, PERCUTANEOUS APPROACH: ICD-10-PCS | Performed by: SURGERY

## 2023-01-13 PROCEDURE — 27596 AMPUTATION FOLLOW-UP SURGERY: CPT | Mod: 51 | Performed by: SURGERY

## 2023-01-13 PROCEDURE — 250N000013 HC RX MED GY IP 250 OP 250 PS 637: Performed by: SURGERY

## 2023-01-13 PROCEDURE — 86901 BLOOD TYPING SEROLOGIC RH(D): CPT | Performed by: SURGERY

## 2023-01-13 PROCEDURE — 250N000011 HC RX IP 250 OP 636: Performed by: STUDENT IN AN ORGANIZED HEALTH CARE EDUCATION/TRAINING PROGRAM

## 2023-01-13 PROCEDURE — 258N000003 HC RX IP 258 OP 636: Performed by: SURGERY

## 2023-01-13 PROCEDURE — 370N000017 HC ANESTHESIA TECHNICAL FEE, PER MIN: Performed by: SURGERY

## 2023-01-13 PROCEDURE — 37221 PR REVASC ILIAC ART, ANGIO/STENT, INIT VESSEL: CPT | Mod: 51 | Performed by: SURGERY

## 2023-01-13 PROCEDURE — C1763 CONN TISS, NON-HUMAN: HCPCS | Performed by: SURGERY

## 2023-01-13 PROCEDURE — 0Y6C0Z3 DETACHMENT AT RIGHT UPPER LEG, LOW, OPEN APPROACH: ICD-10-PCS | Performed by: SURGERY

## 2023-01-13 PROCEDURE — 250N000011 HC RX IP 250 OP 636: Performed by: ANESTHESIOLOGY

## 2023-01-13 PROCEDURE — C1769 GUIDE WIRE: HCPCS | Performed by: SURGERY

## 2023-01-13 PROCEDURE — 85347 COAGULATION TIME ACTIVATED: CPT

## 2023-01-13 PROCEDURE — 258N000003 HC RX IP 258 OP 636: Performed by: ANESTHESIOLOGY

## 2023-01-13 PROCEDURE — 710N000010 HC RECOVERY PHASE 1, LEVEL 2, PER MIN: Performed by: SURGERY

## 2023-01-13 PROCEDURE — 999N000141 HC STATISTIC PRE-PROCEDURE NURSING ASSESSMENT: Performed by: SURGERY

## 2023-01-13 PROCEDURE — C1730 CATH, EP, 19 OR FEW ELECT: HCPCS | Performed by: SURGERY

## 2023-01-13 PROCEDURE — 88307 TISSUE EXAM BY PATHOLOGIST: CPT | Mod: TC | Performed by: SURGERY

## 2023-01-13 PROCEDURE — C1894 INTRO/SHEATH, NON-LASER: HCPCS | Performed by: SURGERY

## 2023-01-13 PROCEDURE — C1874 STENT, COATED/COV W/DEL SYS: HCPCS | Performed by: SURGERY

## 2023-01-13 PROCEDURE — 80048 BASIC METABOLIC PNL TOTAL CA: CPT | Performed by: SURGERY

## 2023-01-13 PROCEDURE — 35372 RECHANNELING OF ARTERY: CPT | Mod: RT | Performed by: SURGERY

## 2023-01-13 PROCEDURE — U0005 INFEC AGEN DETEC AMPLI PROBE: HCPCS

## 2023-01-13 PROCEDURE — 255N000002 HC RX 255 OP 636: Performed by: SURGERY

## 2023-01-13 PROCEDURE — 271N000002 HC RX 271

## 2023-01-13 PROCEDURE — 999N000083 IR OR ANGIOGRAM: Mod: TC

## 2023-01-13 PROCEDURE — 360N000084 HC SURGERY LEVEL 4 W/ FLUORO, PER MIN: Performed by: SURGERY

## 2023-01-13 PROCEDURE — C1887 CATHETER, GUIDING: HCPCS | Performed by: SURGERY

## 2023-01-13 PROCEDURE — 272N000001 HC OR GENERAL SUPPLY STERILE: Performed by: SURGERY

## 2023-01-13 PROCEDURE — 250N000009 HC RX 250: Performed by: ANESTHESIOLOGY

## 2023-01-13 PROCEDURE — 04CC0ZZ EXTIRPATION OF MATTER FROM RIGHT COMMON ILIAC ARTERY, OPEN APPROACH: ICD-10-PCS | Performed by: SURGERY

## 2023-01-13 DEVICE — IMPLANTABLE DEVICE: Type: IMPLANTABLE DEVICE | Site: GROIN | Status: FUNCTIONAL

## 2023-01-13 DEVICE — GRAFT PERICARDIUM 6X8CM BOVINE E6P8: Type: IMPLANTABLE DEVICE | Site: GROIN | Status: FUNCTIONAL

## 2023-01-13 RX ORDER — DEXAMETHASONE SODIUM PHOSPHATE 10 MG/ML
INJECTION, SOLUTION INTRAMUSCULAR; INTRAVENOUS
Status: COMPLETED | OUTPATIENT
Start: 2023-01-13 | End: 2023-01-13

## 2023-01-13 RX ORDER — DEXMEDETOMIDINE HYDROCHLORIDE 4 UG/ML
INJECTION, SOLUTION INTRAVENOUS
Status: COMPLETED | OUTPATIENT
Start: 2023-01-13 | End: 2023-01-13

## 2023-01-13 RX ORDER — CLOPIDOGREL BISULFATE 75 MG/1
75 TABLET ORAL DAILY
Status: DISCONTINUED | OUTPATIENT
Start: 2023-01-14 | End: 2023-01-17 | Stop reason: HOSPADM

## 2023-01-13 RX ORDER — OXYCODONE HYDROCHLORIDE 10 MG/1
10 TABLET ORAL EVERY 4 HOURS PRN
Status: DISCONTINUED | OUTPATIENT
Start: 2023-01-13 | End: 2023-01-17 | Stop reason: HOSPADM

## 2023-01-13 RX ORDER — NALOXONE HYDROCHLORIDE 0.4 MG/ML
0.4 INJECTION, SOLUTION INTRAMUSCULAR; INTRAVENOUS; SUBCUTANEOUS
Status: DISCONTINUED | OUTPATIENT
Start: 2023-01-13 | End: 2023-01-13 | Stop reason: HOSPADM

## 2023-01-13 RX ORDER — NALOXONE HYDROCHLORIDE 0.4 MG/ML
0.4 INJECTION, SOLUTION INTRAMUSCULAR; INTRAVENOUS; SUBCUTANEOUS
Status: DISCONTINUED | OUTPATIENT
Start: 2023-01-13 | End: 2023-01-17 | Stop reason: HOSPADM

## 2023-01-13 RX ORDER — HYDROMORPHONE HCL IN WATER/PF 6 MG/30 ML
0.4 PATIENT CONTROLLED ANALGESIA SYRINGE INTRAVENOUS
Status: DISCONTINUED | OUTPATIENT
Start: 2023-01-13 | End: 2023-01-17 | Stop reason: HOSPADM

## 2023-01-13 RX ORDER — FENTANYL CITRATE 50 UG/ML
25-50 INJECTION, SOLUTION INTRAMUSCULAR; INTRAVENOUS
Status: DISCONTINUED | OUTPATIENT
Start: 2023-01-13 | End: 2023-01-13 | Stop reason: HOSPADM

## 2023-01-13 RX ORDER — PHENYLEPHRINE HCL IN 0.9% NACL 50MG/250ML
.5-1.25 PLASTIC BAG, INJECTION (ML) INTRAVENOUS CONTINUOUS
Status: DISCONTINUED | OUTPATIENT
Start: 2023-01-13 | End: 2023-01-13 | Stop reason: HOSPADM

## 2023-01-13 RX ORDER — LISINOPRIL 20 MG/1
20 TABLET ORAL DAILY
Status: DISCONTINUED | OUTPATIENT
Start: 2023-01-14 | End: 2023-01-17 | Stop reason: HOSPADM

## 2023-01-13 RX ORDER — ACETAMINOPHEN 325 MG/1
650 TABLET ORAL EVERY 4 HOURS PRN
Status: DISCONTINUED | OUTPATIENT
Start: 2023-01-16 | End: 2023-01-17 | Stop reason: HOSPADM

## 2023-01-13 RX ORDER — GABAPENTIN 300 MG/1
300 CAPSULE ORAL
Status: DISCONTINUED | OUTPATIENT
Start: 2023-01-13 | End: 2023-01-17 | Stop reason: HOSPADM

## 2023-01-13 RX ORDER — DEXAMETHASONE SODIUM PHOSPHATE 4 MG/ML
INJECTION, SOLUTION INTRA-ARTICULAR; INTRALESIONAL; INTRAMUSCULAR; INTRAVENOUS; SOFT TISSUE PRN
Status: DISCONTINUED | OUTPATIENT
Start: 2023-01-13 | End: 2023-01-13

## 2023-01-13 RX ORDER — ONDANSETRON 4 MG/1
4 TABLET, ORALLY DISINTEGRATING ORAL EVERY 6 HOURS PRN
Status: DISCONTINUED | OUTPATIENT
Start: 2023-01-13 | End: 2023-01-17 | Stop reason: HOSPADM

## 2023-01-13 RX ORDER — OXYCODONE HYDROCHLORIDE 5 MG/1
5 TABLET ORAL EVERY 6 HOURS PRN
Qty: 15 TABLET | Refills: 0 | Status: SHIPPED | OUTPATIENT
Start: 2023-01-13 | End: 2023-01-15

## 2023-01-13 RX ORDER — CLOPIDOGREL BISULFATE 75 MG/1
150 TABLET ORAL ONCE
Status: COMPLETED | OUTPATIENT
Start: 2023-01-13 | End: 2023-01-13

## 2023-01-13 RX ORDER — PROCHLORPERAZINE MALEATE 5 MG
5 TABLET ORAL EVERY 6 HOURS PRN
Status: DISCONTINUED | OUTPATIENT
Start: 2023-01-13 | End: 2023-01-17 | Stop reason: HOSPADM

## 2023-01-13 RX ORDER — NALOXONE HYDROCHLORIDE 0.4 MG/ML
0.2 INJECTION, SOLUTION INTRAMUSCULAR; INTRAVENOUS; SUBCUTANEOUS
Status: DISCONTINUED | OUTPATIENT
Start: 2023-01-13 | End: 2023-01-13 | Stop reason: HOSPADM

## 2023-01-13 RX ORDER — NALOXONE HYDROCHLORIDE 0.4 MG/ML
0.2 INJECTION, SOLUTION INTRAMUSCULAR; INTRAVENOUS; SUBCUTANEOUS
Status: DISCONTINUED | OUTPATIENT
Start: 2023-01-13 | End: 2023-01-17 | Stop reason: HOSPADM

## 2023-01-13 RX ORDER — AMLODIPINE BESYLATE 5 MG/1
5 TABLET ORAL DAILY
Status: DISCONTINUED | OUTPATIENT
Start: 2023-01-14 | End: 2023-01-17 | Stop reason: HOSPADM

## 2023-01-13 RX ORDER — OXYCODONE HYDROCHLORIDE 5 MG/1
5 TABLET ORAL EVERY 4 HOURS PRN
Status: DISCONTINUED | OUTPATIENT
Start: 2023-01-13 | End: 2023-01-17 | Stop reason: HOSPADM

## 2023-01-13 RX ORDER — CEFAZOLIN SODIUM 1 G/3ML
1 INJECTION, POWDER, FOR SOLUTION INTRAMUSCULAR; INTRAVENOUS EVERY 8 HOURS
Status: COMPLETED | OUTPATIENT
Start: 2023-01-13 | End: 2023-01-14

## 2023-01-13 RX ORDER — ONDANSETRON 2 MG/ML
INJECTION INTRAMUSCULAR; INTRAVENOUS PRN
Status: DISCONTINUED | OUTPATIENT
Start: 2023-01-13 | End: 2023-01-13

## 2023-01-13 RX ORDER — AMOXICILLIN 250 MG
1 CAPSULE ORAL 2 TIMES DAILY
Status: DISCONTINUED | OUTPATIENT
Start: 2023-01-13 | End: 2023-01-17 | Stop reason: HOSPADM

## 2023-01-13 RX ORDER — ASPIRIN 81 MG/1
81 TABLET, CHEWABLE ORAL DAILY
Status: DISCONTINUED | OUTPATIENT
Start: 2023-01-14 | End: 2023-01-17 | Stop reason: HOSPADM

## 2023-01-13 RX ORDER — LIDOCAINE HYDROCHLORIDE 20 MG/ML
INJECTION, SOLUTION INFILTRATION; PERINEURAL PRN
Status: DISCONTINUED | OUTPATIENT
Start: 2023-01-13 | End: 2023-01-13

## 2023-01-13 RX ORDER — FENTANYL CITRATE 50 UG/ML
50 INJECTION, SOLUTION INTRAMUSCULAR; INTRAVENOUS EVERY 5 MIN PRN
Status: DISCONTINUED | OUTPATIENT
Start: 2023-01-13 | End: 2023-01-13 | Stop reason: HOSPADM

## 2023-01-13 RX ORDER — CLOPIDOGREL BISULFATE 75 MG/1
75 TABLET ORAL DAILY
Qty: 90 TABLET | Refills: 0 | Status: SHIPPED | OUTPATIENT
Start: 2023-01-13 | End: 2023-01-18

## 2023-01-13 RX ORDER — SIMVASTATIN 20 MG
20 TABLET ORAL AT BEDTIME
Status: DISCONTINUED | OUTPATIENT
Start: 2023-01-13 | End: 2023-01-17 | Stop reason: HOSPADM

## 2023-01-13 RX ORDER — IODIXANOL 320 MG/ML
INJECTION, SOLUTION INTRAVASCULAR PRN
Status: DISCONTINUED | OUTPATIENT
Start: 2023-01-13 | End: 2023-01-13 | Stop reason: HOSPADM

## 2023-01-13 RX ORDER — ONDANSETRON 4 MG/1
4 TABLET, ORALLY DISINTEGRATING ORAL EVERY 30 MIN PRN
Status: DISCONTINUED | OUTPATIENT
Start: 2023-01-13 | End: 2023-01-13 | Stop reason: HOSPADM

## 2023-01-13 RX ORDER — AMOXICILLIN 250 MG
1 CAPSULE ORAL DAILY PRN
Qty: 30 TABLET | Refills: 0 | Status: SHIPPED | OUTPATIENT
Start: 2023-01-13 | End: 2023-02-25

## 2023-01-13 RX ORDER — ACETAMINOPHEN 325 MG/1
975 TABLET ORAL EVERY 8 HOURS
Status: COMPLETED | OUTPATIENT
Start: 2023-01-14 | End: 2023-01-16

## 2023-01-13 RX ORDER — FLUMAZENIL 0.1 MG/ML
0.2 INJECTION, SOLUTION INTRAVENOUS
Status: DISCONTINUED | OUTPATIENT
Start: 2023-01-13 | End: 2023-01-13 | Stop reason: HOSPADM

## 2023-01-13 RX ORDER — HEPARIN SODIUM 5000 [USP'U]/.5ML
5000 INJECTION, SOLUTION INTRAVENOUS; SUBCUTANEOUS EVERY 8 HOURS
Status: DISCONTINUED | OUTPATIENT
Start: 2023-01-14 | End: 2023-01-17 | Stop reason: HOSPADM

## 2023-01-13 RX ORDER — BUPIVACAINE HYDROCHLORIDE 2.5 MG/ML
INJECTION, SOLUTION EPIDURAL; INFILTRATION; INTRACAUDAL
Status: COMPLETED | OUTPATIENT
Start: 2023-01-13 | End: 2023-01-13

## 2023-01-13 RX ORDER — PREDNISONE 5 MG/1
5 TABLET ORAL DAILY
Status: DISCONTINUED | OUTPATIENT
Start: 2023-01-14 | End: 2023-01-17 | Stop reason: HOSPADM

## 2023-01-13 RX ORDER — HEPARIN SODIUM 1000 [USP'U]/ML
INJECTION, SOLUTION INTRAVENOUS; SUBCUTANEOUS PRN
Status: DISCONTINUED | OUTPATIENT
Start: 2023-01-13 | End: 2023-01-13

## 2023-01-13 RX ORDER — SODIUM CHLORIDE, SODIUM LACTATE, POTASSIUM CHLORIDE, CALCIUM CHLORIDE 600; 310; 30; 20 MG/100ML; MG/100ML; MG/100ML; MG/100ML
INJECTION, SOLUTION INTRAVENOUS CONTINUOUS
Status: DISCONTINUED | OUTPATIENT
Start: 2023-01-13 | End: 2023-01-13 | Stop reason: HOSPADM

## 2023-01-13 RX ORDER — EPHEDRINE SULFATE 50 MG/ML
INJECTION, SOLUTION INTRAMUSCULAR; INTRAVENOUS; SUBCUTANEOUS PRN
Status: DISCONTINUED | OUTPATIENT
Start: 2023-01-13 | End: 2023-01-13

## 2023-01-13 RX ORDER — CEFAZOLIN SODIUM/WATER 2 G/20 ML
2 SYRINGE (ML) INTRAVENOUS
Status: COMPLETED | OUTPATIENT
Start: 2023-01-13 | End: 2023-01-13

## 2023-01-13 RX ORDER — CEFAZOLIN SODIUM/WATER 2 G/20 ML
2 SYRINGE (ML) INTRAVENOUS SEE ADMIN INSTRUCTIONS
Status: DISCONTINUED | OUTPATIENT
Start: 2023-01-13 | End: 2023-01-13 | Stop reason: HOSPADM

## 2023-01-13 RX ORDER — SODIUM CHLORIDE, SODIUM LACTATE, POTASSIUM CHLORIDE, CALCIUM CHLORIDE 600; 310; 30; 20 MG/100ML; MG/100ML; MG/100ML; MG/100ML
INJECTION, SOLUTION INTRAVENOUS CONTINUOUS PRN
Status: DISCONTINUED | OUTPATIENT
Start: 2023-01-13 | End: 2023-01-13

## 2023-01-13 RX ORDER — BISACODYL 10 MG
10 SUPPOSITORY, RECTAL RECTAL DAILY PRN
Status: DISCONTINUED | OUTPATIENT
Start: 2023-01-13 | End: 2023-01-17 | Stop reason: HOSPADM

## 2023-01-13 RX ORDER — LIDOCAINE 40 MG/G
CREAM TOPICAL
Status: DISCONTINUED | OUTPATIENT
Start: 2023-01-13 | End: 2023-01-17 | Stop reason: HOSPADM

## 2023-01-13 RX ORDER — FOLIC ACID 1 MG/1
1 TABLET ORAL DAILY
Status: DISCONTINUED | OUTPATIENT
Start: 2023-01-14 | End: 2023-01-17 | Stop reason: HOSPADM

## 2023-01-13 RX ORDER — LIDOCAINE 40 MG/G
CREAM TOPICAL
Status: DISCONTINUED | OUTPATIENT
Start: 2023-01-13 | End: 2023-01-13 | Stop reason: HOSPADM

## 2023-01-13 RX ORDER — CALCIUM CARBONATE 500 MG/1
500 TABLET, CHEWABLE ORAL 4 TIMES DAILY PRN
Status: DISCONTINUED | OUTPATIENT
Start: 2023-01-13 | End: 2023-01-17 | Stop reason: HOSPADM

## 2023-01-13 RX ORDER — PROPOFOL 10 MG/ML
INJECTION, EMULSION INTRAVENOUS PRN
Status: DISCONTINUED | OUTPATIENT
Start: 2023-01-13 | End: 2023-01-13

## 2023-01-13 RX ORDER — POLYETHYLENE GLYCOL 3350 17 G/17G
17 POWDER, FOR SOLUTION ORAL DAILY
Status: DISCONTINUED | OUTPATIENT
Start: 2023-01-14 | End: 2023-01-17 | Stop reason: HOSPADM

## 2023-01-13 RX ORDER — FENTANYL CITRATE 50 UG/ML
25 INJECTION, SOLUTION INTRAMUSCULAR; INTRAVENOUS EVERY 5 MIN PRN
Status: DISCONTINUED | OUTPATIENT
Start: 2023-01-13 | End: 2023-01-13 | Stop reason: HOSPADM

## 2023-01-13 RX ORDER — ONDANSETRON 2 MG/ML
4 INJECTION INTRAMUSCULAR; INTRAVENOUS EVERY 6 HOURS PRN
Status: DISCONTINUED | OUTPATIENT
Start: 2023-01-13 | End: 2023-01-17 | Stop reason: HOSPADM

## 2023-01-13 RX ORDER — HYDROMORPHONE HYDROCHLORIDE 1 MG/ML
0.4 INJECTION, SOLUTION INTRAMUSCULAR; INTRAVENOUS; SUBCUTANEOUS EVERY 5 MIN PRN
Status: DISCONTINUED | OUTPATIENT
Start: 2023-01-13 | End: 2023-01-13 | Stop reason: HOSPADM

## 2023-01-13 RX ORDER — HYDROMORPHONE HYDROCHLORIDE 1 MG/ML
0.2 INJECTION, SOLUTION INTRAMUSCULAR; INTRAVENOUS; SUBCUTANEOUS EVERY 5 MIN PRN
Status: DISCONTINUED | OUTPATIENT
Start: 2023-01-13 | End: 2023-01-13 | Stop reason: HOSPADM

## 2023-01-13 RX ORDER — HYDROMORPHONE HCL IN WATER/PF 6 MG/30 ML
0.2 PATIENT CONTROLLED ANALGESIA SYRINGE INTRAVENOUS
Status: DISCONTINUED | OUTPATIENT
Start: 2023-01-13 | End: 2023-01-17 | Stop reason: HOSPADM

## 2023-01-13 RX ORDER — ONDANSETRON 2 MG/ML
4 INJECTION INTRAMUSCULAR; INTRAVENOUS EVERY 30 MIN PRN
Status: DISCONTINUED | OUTPATIENT
Start: 2023-01-13 | End: 2023-01-13 | Stop reason: HOSPADM

## 2023-01-13 RX ORDER — FENTANYL CITRATE 50 UG/ML
INJECTION, SOLUTION INTRAMUSCULAR; INTRAVENOUS PRN
Status: DISCONTINUED | OUTPATIENT
Start: 2023-01-13 | End: 2023-01-13

## 2023-01-13 RX ORDER — METHOCARBAMOL 500 MG/1
500 TABLET, FILM COATED ORAL EVERY 6 HOURS PRN
Status: DISCONTINUED | OUTPATIENT
Start: 2023-01-13 | End: 2023-01-17 | Stop reason: HOSPADM

## 2023-01-13 RX ORDER — METOPROLOL TARTRATE 25 MG/1
50 TABLET, FILM COATED ORAL DAILY
Status: DISCONTINUED | OUTPATIENT
Start: 2023-01-14 | End: 2023-01-17 | Stop reason: HOSPADM

## 2023-01-13 RX ADMIN — Medication 10 MG: at 13:23

## 2023-01-13 RX ADMIN — PHENYLEPHRINE HYDROCHLORIDE 200 MCG: 10 INJECTION INTRAVENOUS at 11:49

## 2023-01-13 RX ADMIN — FENTANYL CITRATE 25 MCG: 50 INJECTION, SOLUTION INTRAMUSCULAR; INTRAVENOUS at 17:04

## 2023-01-13 RX ADMIN — SODIUM CHLORIDE, POTASSIUM CHLORIDE, SODIUM LACTATE AND CALCIUM CHLORIDE: 600; 310; 30; 20 INJECTION, SOLUTION INTRAVENOUS at 15:15

## 2023-01-13 RX ADMIN — Medication 10 MG: at 14:20

## 2023-01-13 RX ADMIN — HEPARIN SODIUM 2000 UNITS: 1000 INJECTION INTRAVENOUS; SUBCUTANEOUS at 14:25

## 2023-01-13 RX ADMIN — HYDROMORPHONE HYDROCHLORIDE 0.2 MG: 1 INJECTION, SOLUTION INTRAMUSCULAR; INTRAVENOUS; SUBCUTANEOUS at 15:15

## 2023-01-13 RX ADMIN — HYDROMORPHONE HYDROCHLORIDE 0.2 MG: 1 INJECTION, SOLUTION INTRAMUSCULAR; INTRAVENOUS; SUBCUTANEOUS at 17:46

## 2023-01-13 RX ADMIN — FENTANYL CITRATE 50 MCG: 50 INJECTION, SOLUTION INTRAMUSCULAR; INTRAVENOUS at 11:42

## 2023-01-13 RX ADMIN — OXYCODONE HYDROCHLORIDE 5 MG: 5 TABLET ORAL at 23:18

## 2023-01-13 RX ADMIN — PHENYLEPHRINE HYDROCHLORIDE 100 MCG: 10 INJECTION INTRAVENOUS at 15:45

## 2023-01-13 RX ADMIN — FENTANYL CITRATE 25 MCG: 50 INJECTION, SOLUTION INTRAMUSCULAR; INTRAVENOUS at 17:10

## 2023-01-13 RX ADMIN — Medication 5 MG: at 13:24

## 2023-01-13 RX ADMIN — Medication: at 18:51

## 2023-01-13 RX ADMIN — Medication 2 G: at 11:55

## 2023-01-13 RX ADMIN — CLOPIDOGREL BISULFATE 150 MG: 75 TABLET ORAL at 18:54

## 2023-01-13 RX ADMIN — HYDROMORPHONE HYDROCHLORIDE 0.3 MG: 1 INJECTION, SOLUTION INTRAMUSCULAR; INTRAVENOUS; SUBCUTANEOUS at 14:54

## 2023-01-13 RX ADMIN — Medication 50 MG: at 11:42

## 2023-01-13 RX ADMIN — FENTANYL CITRATE 50 MCG: 50 INJECTION, SOLUTION INTRAMUSCULAR; INTRAVENOUS at 12:43

## 2023-01-13 RX ADMIN — PROPOFOL 40 MG: 10 INJECTION, EMULSION INTRAVENOUS at 15:40

## 2023-01-13 RX ADMIN — Medication 10 MG: at 14:11

## 2023-01-13 RX ADMIN — Medication 2 G: at 15:55

## 2023-01-13 RX ADMIN — HEPARIN SODIUM 5000 UNITS: 1000 INJECTION INTRAVENOUS; SUBCUTANEOUS at 13:10

## 2023-01-13 RX ADMIN — FENTANYL CITRATE 50 MCG: 50 INJECTION, SOLUTION INTRAMUSCULAR; INTRAVENOUS at 10:14

## 2023-01-13 RX ADMIN — ACETAMINOPHEN 650 MG: 325 TABLET, FILM COATED ORAL at 17:51

## 2023-01-13 RX ADMIN — SUGAMMADEX 200 MG: 100 INJECTION, SOLUTION INTRAVENOUS at 15:55

## 2023-01-13 RX ADMIN — FENTANYL CITRATE 25 MCG: 50 INJECTION, SOLUTION INTRAMUSCULAR; INTRAVENOUS at 17:37

## 2023-01-13 RX ADMIN — PHENYLEPHRINE HYDROCHLORIDE 200 MCG: 10 INJECTION INTRAVENOUS at 11:55

## 2023-01-13 RX ADMIN — PROPOFOL 90 MG: 10 INJECTION, EMULSION INTRAVENOUS at 11:42

## 2023-01-13 RX ADMIN — SENNOSIDES AND DOCUSATE SODIUM 1 TABLET: 50; 8.6 TABLET ORAL at 20:45

## 2023-01-13 RX ADMIN — FENTANYL CITRATE 25 MCG: 50 INJECTION, SOLUTION INTRAMUSCULAR; INTRAVENOUS at 17:22

## 2023-01-13 RX ADMIN — DEXAMETHASONE SODIUM PHOSPHATE 2 MG: 10 INJECTION, SOLUTION INTRAMUSCULAR; INTRAVENOUS at 10:27

## 2023-01-13 RX ADMIN — PROPOFOL 40 MG: 10 INJECTION, EMULSION INTRAVENOUS at 15:36

## 2023-01-13 RX ADMIN — PHENYLEPHRINE HYDROCHLORIDE 100 MCG: 10 INJECTION INTRAVENOUS at 15:21

## 2023-01-13 RX ADMIN — SIMVASTATIN 20 MG: 20 TABLET, FILM COATED ORAL at 21:38

## 2023-01-13 RX ADMIN — Medication 20 MCG: at 10:27

## 2023-01-13 RX ADMIN — HYDROMORPHONE HYDROCHLORIDE 0.3 MG: 1 INJECTION, SOLUTION INTRAMUSCULAR; INTRAVENOUS; SUBCUTANEOUS at 15:36

## 2023-01-13 RX ADMIN — Medication 10 MG: at 11:57

## 2023-01-13 RX ADMIN — SODIUM CHLORIDE, POTASSIUM CHLORIDE, SODIUM LACTATE AND CALCIUM CHLORIDE: 600; 310; 30; 20 INJECTION, SOLUTION INTRAVENOUS at 11:30

## 2023-01-13 RX ADMIN — LIDOCAINE HYDROCHLORIDE 60 MG: 20 INJECTION, SOLUTION INFILTRATION; PERINEURAL at 11:42

## 2023-01-13 RX ADMIN — DEXAMETHASONE SODIUM PHOSPHATE 4 MG: 4 INJECTION, SOLUTION INTRA-ARTICULAR; INTRALESIONAL; INTRAMUSCULAR; INTRAVENOUS; SOFT TISSUE at 12:43

## 2023-01-13 RX ADMIN — ONDANSETRON 4 MG: 2 INJECTION INTRAMUSCULAR; INTRAVENOUS at 15:55

## 2023-01-13 RX ADMIN — GABAPENTIN 300 MG: 300 CAPSULE ORAL at 20:45

## 2023-01-13 RX ADMIN — PHENYLEPHRINE HYDROCHLORIDE 50 MCG: 10 INJECTION INTRAVENOUS at 15:50

## 2023-01-13 RX ADMIN — PHENYLEPHRINE HYDROCHLORIDE 0.1 MCG/KG/MIN: 10 INJECTION INTRAVENOUS at 14:36

## 2023-01-13 RX ADMIN — Medication 10 MG: at 12:44

## 2023-01-13 RX ADMIN — PHENYLEPHRINE HYDROCHLORIDE 100 MCG: 10 INJECTION INTRAVENOUS at 13:09

## 2023-01-13 RX ADMIN — OXYCODONE HYDROCHLORIDE 5 MG: 5 TABLET ORAL at 17:51

## 2023-01-13 RX ADMIN — CEFAZOLIN 1 G: 1 INJECTION, POWDER, FOR SOLUTION INTRAMUSCULAR; INTRAVENOUS at 23:18

## 2023-01-13 RX ADMIN — Medication 10 MG: at 15:15

## 2023-01-13 RX ADMIN — BUPIVACAINE HYDROCHLORIDE 20 ML: 2.5 INJECTION, SOLUTION EPIDURAL; INFILTRATION; INTRACAUDAL; PERINEURAL at 10:27

## 2023-01-13 RX ADMIN — PHENYLEPHRINE HYDROCHLORIDE 50 MCG: 10 INJECTION INTRAVENOUS at 15:43

## 2023-01-13 RX ADMIN — PHENYLEPHRINE HYDROCHLORIDE 100 MCG: 10 INJECTION INTRAVENOUS at 13:23

## 2023-01-13 ASSESSMENT — ACTIVITIES OF DAILY LIVING (ADL)
ADLS_ACUITY_SCORE: 32
ADLS_ACUITY_SCORE: 31
ADLS_ACUITY_SCORE: 36
ADLS_ACUITY_SCORE: 31
ADLS_ACUITY_SCORE: 43
ADLS_ACUITY_SCORE: 32

## 2023-01-13 ASSESSMENT — LIFESTYLE VARIABLES: TOBACCO_USE: 1

## 2023-01-13 NOTE — ANESTHESIA PROCEDURE NOTES
Arterial Line Procedure Note    Pre-Procedure   Staff -        Anesthesiologist:  Jg Mathis MD       Performed By: anesthesiologist       Location: OR       Pre-Anesthestic Checklist: patient identified, IV checked, risks and benefits discussed, informed consent, monitors and equipment checked, pre-op evaluation and at physician/surgeon's request  Timeout:       Correct Patient: Yes        Correct Procedure: Yes        Correct Site: Yes        Correct Position: Yes   Line Placement:   This line was placed Post Induction starting at 1/13/2023 11:50 AM and ending at 1/13/2023 12:20 PM  Procedure   Procedure: arterial line       Laterality: left       Insertion Site: brachial.  Sterile Prep        Standard elements of sterile barrier followed       Skin prep: Chloraprep  Insertion/Injection        Technique: ultrasound guided        1. Ultrasound was used to evaluate the access site.       2. Artery evaluated via ultrasound for patency/adequacy.       3. Using real-time ultrasound the needle/catheter was observed entering the artery/vein.       5. The visualized structures were anatomically normal.       6. There were no apparent abnormal pathologic findings.       Catheter Type/Size: 20 G, 12 cm  Narrative        Tegaderm dressing used.       Complications: None apparent,        Arterial waveform: Yes        IBP within 10% of NIBP: Yes   Comments:  Left radial artery palpated without difficulty, however unable to advance guidewire from 20g quick cath.  Attempted again with US guidance, however wire again unable to be advanced with selinger technique.  Brachial artery accessed, wire able to be advanced on second attempt with US guidance.  Arterial lumen small, however was able to be identified reliably with US.  Patient tolerated procedure without incident.

## 2023-01-13 NOTE — ANESTHESIA PREPROCEDURE EVALUATION
Anesthesia Pre-Procedure Evaluation    Patient: Sakshi Jaeger   MRN: [id number] : 1945        Procedure : Procedure(s):  ENDARTERECTOMY, FEMORAL RIGHT  right iliac arteriogram with possible intervention  AMPUTATION, ABOVE KNEE RIGHT          Past Medical History:   Diagnosis Date     Anemia 2022     BENIGN HYPERTENSION 3/1/2005     CAD (coronary artery disease) 2011     Coronary artery disease involving native coronary artery of native heart without angina pectoris 2016     Employs prosthetic leg 2012     Essential hypertension with goal blood pressure less than 140/90 2016     Hyperlipidemia LDL goal <100 2010     Hypertension goal BP (blood pressure) < 130/80 2011     Infection of right below knee amputation (H) 10/1/2019     IRON DEFIC ANEMIA NOS 9/15/2005     Lower limb amputation, below knee 2012     MI (myocardial infarction) (H)     3/2010     Non-healing surgical wound, subsequent encounter 2020    Added automatically from request for surgery [id number]     Osteoporosis 2005     OSTEOPOROSIS NOS 2005     Protein-calorie malnutrition (H) 2022     PVD (peripheral vascular disease) (H) 2022     Rheumatoid arthritis (H) 2005          Rheumatoid arthritis(714.0) 2005     Status post below-knee amputation (H) 2012           Past Surgical History:   Procedure Laterality Date     ---------INCISION AND DRAINAGE  2014     AMPUTATE LEG BELOW KNEE Right 2022    Procedure: Right through knee amputation;  Surgeon: Doron Mott MD;  [facility]     AMPUTATION BELOW KNEE RT/LT  2005    right lowwer leg.      APPENDECTOMY       ARTHROPLASTY KNEE  2011    Procedure:ARTHROPLASTY KNEE; Surgeon:JESSE HAWKINS; [facility]     COMPLEX WOUND CLOSURE (UPDATE) Right 2021    Procedure: Right stump wound debridment and closure;  Surgeon: RAISA Perea MD;  Location: [facility] OR     CORONARY  STENT PLACEMENT       INJECT NERVE BLOCK SPHENOPALATINE GANGLION N/A 2022    Procedure: Out of OR encounter for block to be done by ;  Surgeon: GENERIC ANESTHESIA PROVIDER;  [facility]     IRRIGATION AND DEBRIDEMENT LOWER EXTREMITY, COMBINED Right 10/09/2019    Procedure: Irrigation and debridement Right leg;  Surgeon: Doron Mott MD;  [facility]: [facility]     NERVE BLOCK PERIPHERAL N/A 2022    Procedure: BLOCK, NERVE;  Surgeon: GENERIC ANESTHESIA PROVIDER;  [facility]: [facility]     OTHER SURGICAL HISTORY  2014    I AND D      OTHER SURGICAL HISTORY Right 10/09/2019    IRRIGATION AND DEBRIDEMENT LOWER EXTREMITY, COMBINED     PHACOEMULSIFICATION CLEAR CORNEA WITH STANDARD INTRAOCULAR LENS IMPLANT Left 2022    Procedure: PHACOEMULSIFICATION, LEFT CATARACT, WITH INTRAOCULAR LENS IMPLANT;  Surgeon: Flip Izaguirre MD;  [facility]: [facility]     PHACOEMULSIFICATION CLEAR CORNEA WITH STANDARD INTRAOCULAR LENS IMPLANT Right 10/13/2022    Procedure: PHACOEMULSIFICATION, RIGHT CATARACT, WITH INTRAOCULAR LENS IMPLANT;  Surgeon: Flip Izaguirre MD;  Location: [facility]     REVISE SCAR EXTREMITY Right 2020    Procedure: Right stump scar revision;  Surgeon: RAISA Perea MD;  Location: [facility] OR      No Known Allergies   Social History     Tobacco Use     Smoking status: Former     Packs/day: 0.50     Years: 45.00     Pack years: 22.50     Types: Cigarettes     Quit date: 2010     Years since quittin.8     Smokeless tobacco: Never   Substance Use Topics     Alcohol use: Yes     Comment: occsionally-3 -4 drinks a week      Wt Readings from Last 1 Encounters:   23 45.6 kg (100 lb 8.5 oz)        Anesthesia Evaluation   Pt has had prior anesthetic. Type: General.    No history of anesthetic complications       ROS/MED HX  ENT/Pulmonary:     (+) tobacco use, Past use,  (-) asthma and recent URI   Neurologic:       Cardiovascular:     (+) Dyslipidemia  "hypertension-range: 100/60/ Peripheral Vascular Disease-CAD --stent-2010. Drug Eluting Stent. Previous cardiac testing   Echo: Date: 10/2019 Results:  \"Left Ventricle  Global and regional left ventricular function is normal with an EF of 60-65%.  Left ventricular wall thickness cannot evaluate. Left ventricular size is  normal. Left ventricular diastolic function is indeterminate.     Right Ventricle  Right ventricular function, chamber size, wall motion, and thickness are  normal.     Atria  The atria cannot be assessed.     Mitral Valve  The mitral valve is normal.        Aortic Valve  Aortic valve is normal in structure and function.     Tricuspid Valve  The tricuspid valve cannot be assessed. Trace tricuspid insufficiency is  present. The right ventricular systolic pressure is approximated at 25.5 mmHg  plus the right atrial pressure.     Pulmonic Valve  The pulmonic valve cannot be assessed.     Vessels  The aorta root cannot be assessed. The pulmonary artery cannot be assessed.  The inferior vena cava is normal.     Pericardium  No pericardial effusion is present.\"  Stress Test: Date: Results:    ECG Reviewed: Date: Results:    Cath: Date: Results:   (-) angina and angina   METS/Exercise Tolerance: 1 - Eating, dressing Comment: Uses walker and one leg for short distances, but mostly in electric scooter   Hematologic:       Musculoskeletal: Comment: RA      GI/Hepatic:    (-) GERD   Renal/Genitourinary:       Endo:     (+) Chronic steroid usage (prednisone 5mg daily, took it today) for     Psychiatric/Substance Use:       Infectious Disease:       Malignancy:       Other:            Physical Exam    Airway  airway exam normal      Mallampati: II       Respiratory Devices and Support         Dental       (+) upper dentures and partials      Cardiovascular   cardiovascular exam normal          Pulmonary   pulmonary exam normal                OUTSIDE LABS:  CBC:   Lab Results   Component Value Date    WBC 4.8 " 01/13/2023    WBC 8.0 01/11/2023    HGB 10.2 (L) 01/13/2023    HGB 10.2 (L) 01/11/2023    HCT 32.7 (L) 01/13/2023    HCT 32.2 (L) 01/11/2023     01/13/2023     01/11/2023     BMP:   Lab Results   Component Value Date     01/13/2023     01/11/2023    POTASSIUM 3.9 01/13/2023    POTASSIUM 4.2 01/11/2023    CHLORIDE 104 01/13/2023    CHLORIDE 101 01/11/2023    CO2 21 (L) 01/13/2023    CO2 25 01/11/2023    BUN 10.2 01/13/2023    BUN 10 01/11/2023    CR 0.54 01/13/2023    CR 0.51 (L) 01/11/2023    GLC 83 01/13/2023    GLC 77 01/13/2023     COAGS:   Lab Results   Component Value Date    PTT 39 (H) 10/01/2019    INR 1.31 (H) 10/09/2019     POC:   Lab Results   Component Value Date     (H) 05/03/2011     HEPATIC:   Lab Results   Component Value Date    ALBUMIN 3.1 (L) 01/11/2023    PROTTOTAL 6.7 (L) 01/11/2023    ALT 15 01/11/2023    AST 19 01/11/2023    ALKPHOS 70 01/11/2023    BILITOTAL 0.5 01/11/2023     OTHER:   Lab Results   Component Value Date    LACT 0.9 10/01/2019    A1C 5.6 07/07/2008    ADRIAN 9.0 01/13/2023    PHOS 3.9 06/08/2016    MAG 1.7 04/02/2010    LIPASE 37 (L) 10/01/2019    TSH 4.55 10/17/2019    CRP 25.00 (H) 10/26/2022    SED 50 (H) 10/26/2022       Anesthesia Plan    ASA Status:  3   NPO Status:  NPO Appropriate    Anesthesia Type: General.     - Airway: ETT   Induction: Intravenous.   Maintenance: Balanced.   Techniques and Equipment:     - Lines/Monitors: 2nd IV, Arterial Line     Consents    Anesthesia Plan(s) and associated risks, benefits, and realistic alternatives discussed. Questions answered and patient/representative(s) expressed understanding.    - Discussed:     - Discussed with:  Patient      - Extended Intubation/Ventilatory Support Discussed: No.      - Patient is DNR/DNI Status: No    Use of blood products discussed: Yes.     - Discussed with: Patient.     - Consented: consented to blood products            Reason for refusal: other.     Postoperative  Care    Pain management: Multi-modal analgesia, Peripheral nerve block (Single Shot), IV analgesics.   PONV prophylaxis: Ondansetron (or other 5HT-3), Dexamethasone or Solumedrol     Comments:                Jg Mathis MD

## 2023-01-13 NOTE — INTERVAL H&P NOTE
"I have reviewed the surgical (or preoperative) H&P that is linked to this encounter, and examined the patient. There are no significant changes    Clinical Conditions Present on Arrival:  Clinically Significant Risk Factors Present on Admission           # Hyponatremia: Lowest Na = 134 mmol/L in last 30 days, will monitor as appropriate      # Hypoalbuminemia: Lowest albumin = 3.1 g/dL in the past 30 days , will monitor as appropriate     # Cachexia: Estimated body mass index is 16.39 kg/m  as calculated from the following:    Height as of 1/11/23: 1.664 m (5' 5.5\").    Weight as of 1/11/23: 45.4 kg (100 lb).       "

## 2023-01-13 NOTE — OP NOTE
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  Right common femoral and profunda femoris endarterectomy with bovine pericardial patch 2.  Right common iliac artery angioplasty and stenting with 9 mm x 38 mm balloon expandable covered I cast stent, 3.  Multiple right iliac and aortograms, 4.  Revision from through knee amputation to right above-knee amputation     Surgeon: Emma Oconnor MD     Assistant: Elizabet Catalan MD     Anesthesia:  General anesthesia with femoral nerve block and catheter     Indications: This 77 year old female had a through knee amputation by Dr. Lopes of orthopedic surgery after having a below-knee amputation many years ago.  This did not heal and vascular evaluation revealed extensive femoral occlusive disease with calcific plaque as well as inflow disease in the right common iliac artery with near total occlusion.  I recommended right femoral endarterectomy and iliac and aortogram with possible iliac intervention as well as revision to above-knee amputation given the nonhealing through knee amputation.  She understood the risks and benefits and wished to proceed.         Findings: Greater than 90% stenosis of the right common iliac artery, extensive atherosclerosis and plaque in the right common femoral and profunda femoris arteries.     Complications: None     Estimated Blood Loss:  50 mL     Drains: None     Specimens: Residual right lower extremity        Procedure Details:     Patient was brought to the operating placed in supine position.  After general anesthesia was achieved and timeout performed the right lower extremity was prepped and draped in a sterile fashion.  Through an oblique incision in the groin the common femoral, profunda femoris and superficial femoral arteries were dissected free from the surrounding structures and  looped with Vesseloops.  We were able to dissect to the distal external iliac artery and gain control with a umbilical tape.  Patient was systemically heparinized with 5000's of IV heparin.  After 3 minutes the branches were controlled and in the longitudinal arteriotomy created.  There is extensive calcific occlusive plaque present in the common femoral and origin of the profunda femoris arteries.  This was endarterectomized.  Once a clean plane was created a bovine pericardial patch was fashioned and anastomosed with 5-0 Prolene suture proximally and distally.  Prior to completion anastomosis antegrade and retrograde flushing was performed.  Next a micropuncture kit was used to gain access into the newly created patch and a 5 Tuvaluan sheath placed.  Iliac arteriogram revealed a near occlusion of the right common iliac artery.  The iliac bifurcation and external iliac artery were patent without significant occlusive disease.  With a 90 cm quick cross catheter and 035 angle-tip stiff Glidewire was able to get access into the aorta.  Antegrade flow and true lumen flow in the aorta was confirmed with advancement of the glide catheter into the aorta and antegrade aortogram performed.  This again highlighted the area greater than 90% stenosis in the right common iliac artery as well as a greater than 70% stenosis of the left common iliac artery.  Given the significant calcification which shows a 9 mm x 38 mm balloon expandable I cast stent which was placed through a 7 Tuvaluan sheath.  There was excellent flow once the stent was deployed.  A figure-of-eight suture was used to close the access site in the patch.  There was an excellent palpable pulse of the common femoral artery and excellent Doppler signal in the profunda femoris artery. The incision was then closed in layers with running 3.0 Vicryl and 4.0 monocril followed by skin glue and a glued Tegaderm.    Next an above-knee amputation was performed 1 handsbreadth  above the knee joint creating anterior and posterior flaps sharply.  The soft tissue was divided between the electrocautery and the neurovascular bundle divided between 2-0 silk sutures.  The distal femur was then transected with a power saw and muscle sewn over the distal end of the femur after it was smoothed with a rasp.  Once excellent hemostasis was achieved the fascia was closed with interrupted 2-0 Vicryl sutures followed by skin staples Telfa and Tegaderm to be removed on January 31.  Curlex and Coban was then applied.     The patient appeared to have tolerated the procedure well without immediate complication. Sponge, needle and instrument count was reported as correct at the end of the case. I was present throughout the entire portion of the procedure.              Emma Oconnor MD, DFSVS, RPVI  Director, [facility]  Chief, Vascular and Endovascular Surgery  [facility]  Pager: 1. Send message or 10 digit call back number Securely via [employer] with the [employer] Web Console (learn more here)              2. Outside of [facility]? Call [phone]

## 2023-01-13 NOTE — ANESTHESIA PROCEDURE NOTES
Airway       Patient location during procedure: OR       Procedure Start/Stop Times: 1/13/2023 11:45 AM  Staff -        Performed By: CRNA  Consent for Airway        Urgency: elective  Indications and Patient Condition       Indications for airway management: sorin-procedural       Induction type:intravenous       Mask difficulty assessment: 1 - vent by mask    Final Airway Details       Final airway type: endotracheal airway       Successful airway: ETT - single and Oral  Endotracheal Airway Details        ETT size (mm): 6.5       Cuffed: yes       Successful intubation technique: direct laryngoscopy       DL Blade Type: MAC 3       Grade View of Cords: 1       Adjucts: stylet       Position: Right       Measured from: lips       Secured at (cm): 21       Bite block used: None    Post intubation assessment        Placement verified by: capnometry, equal breath sounds and chest rise        Number of attempts at approach: 1       Number of other approaches attempted: 0       Secured with: pink tape       Ease of procedure: easy       Dentition: Intact and Unchanged    Medication(s) Administered   Medication Administration Time: 1/13/2023 11:45 AM

## 2023-01-13 NOTE — BRIEF OP NOTE
[facility]    Brief Operative Note    Pre-operative diagnosis: 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) [Z89.511]  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) [I70.309, T27.956]  Post-operative diagnosis Same as pre-operative diagnosis    Procedure: Procedure(s):  ENDARTERECTOMY, FEMORAL RIGHT  right iliac arteriogram aned right common iliac artery stent  AMPUTATION, ABOVE KNEE RIGHT  Surgeon: Surgeon(s) and Role:     * Emma Oconnor MD - Primary     * Elizabet Catalan MD - Assisting  Anesthesia: General with Block   Estimated Blood Loss: 50ml    Drains: None  Specimens:   ID Type Source Tests Collected by Time Destination   1 : Right Remaining Stump Tissue Other SURGICAL PATHOLOGY EXAM Emma Oconnor MD 1/13/2023  3:31 PM      Findings:   see op note for details.  Complications: None.  Implants:   Implant Name Type Inv. Item Serial No.  Lot No. LRB No. Used Action   GRAFT PERICARDIUM 6X8CM BOVINE E6P8 - [id number] Bone/Tissue/Biologic GRAFT PERICARDIUM 6X8CM BOVINE E6P8  [facility] [id number] Right 1 Implanted   STENT ENDOPROSTH PTFE ICAST 7FR [id number] - [id number] Stent STENT ENDOPROSTH PTFE ICAST 7FR [id number] [employer]  Right 1 Implanted         Elizabet Catalan MD  01/13/23  4:11 PM

## 2023-01-13 NOTE — ANESTHESIA PROCEDURE NOTES
"Femoral Procedure Note    Pre-Procedure   Staff -        Anesthesiologist:  Sunil Field MD       Resident/Fellow: Suzan Kan MD       Performed By: with residents       Procedure performed by resident/fellow/CRNA in presence of a teaching physician.         Location: pre-op       Procedure Start/Stop Times: 1/13/2023 10:27 AM       Pre-Anesthestic Checklist: patient identified, IV checked, site marked, risks and benefits discussed, informed consent, monitors and equipment checked, pre-op evaluation, at physician/surgeon's request and post-op pain management  Timeout:       Correct Patient: Yes        Correct Procedure: Yes        Correct Site: Yes        Correct Position: Yes        Correct Laterality: Yes        Site Marked: Yes  Procedure Documentation  Procedure: Femoral       Laterality: right       Patient Position: supine       Skin prep: Chloraprep       Needle Type: short bevel       Needle Gauge: 21.        Needle Length (millimeters): 110        Ultrasound guided       1. Ultrasound was used to identify targeted nerve, plexus, vascular marker, or fascial plane and place a needle adjacent to it in real-time.       2. Ultrasound was used to visualize the spread of anesthetic in close proximity to the above referenced structure.    Assessment/Narrative         The placement was negative for: blood aspirated, painful injection and site bleeding       Paresthesias: No.       Insertion/Infusion Method: Single Shot       Complications: none    Medication(s) Administered   Bupivacaine 0.25% PF (Infiltration) - Infiltration   20 mL - 1/13/2023 10:27:00 AM  Dexmedetomidine 4 mcg/mL (Perineural) - Perineural   20 mcg - 1/13/2023 10:27:00 AM  Dexamethasone 10 mg/mL PF (Perineural) - Perineural   2 mg - 1/13/2023 10:27:00 AM    FOR [facility] ([facility]) ONLY:   Pain Team Contact information: please page the Pain Team Via [employer]. Search \"Pain\". During daytime hours, please page the attending first. At night " please page the resident first.

## 2023-01-13 NOTE — ANESTHESIA CARE TRANSFER NOTE
Patient: Sakshi Jaeger    Procedure: Procedure(s):  ENDARTERECTOMY, FEMORAL RIGHT  right iliac arteriogram aned right common iliac artery stent  AMPUTATION, ABOVE KNEE RIGHT       Diagnosis: 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) [Z89.511]  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) [I70.299, U67.284]  Diagnosis Additional Information: No value filed.    Anesthesia Type:   General     Note:    Oropharynx: oropharynx clear of all foreign objects and spontaneously breathing  Level of Consciousness: awake  Oxygen Supplementation: face mask  Level of Supplemental Oxygen (L/min / FiO2): 6  Independent Airway: airway patency satisfactory and stable  Dentition: dentition unchanged  Vital Signs Stable: post-procedure vital signs reviewed and stable  Report to RN Given: handoff report given  Patient transferred to: PACU    Handoff Report: Identifed the Patient, Identified the Reponsible Provider, Reviewed the pertinent medical history, Discussed the surgical course, Reviewed Intra-OP anesthesia mangement and issues during anesthesia, Set expectations for post-procedure period and Allowed opportunity for questions and acknowledgement of understanding      Vitals:  Vitals Value Taken Time   /69 01/13/23 1650   Temp 36.2C    Pulse 85 01/13/23 1652   Resp 14 01/13/23 1652   SpO2 99 % 01/13/23 1652   Vitals shown include unvalidated device data.    Electronically Signed By: DESIRE Galeana CRNA  January 13, 2023  4:53 PM

## 2023-01-13 NOTE — ANESTHESIA PROCEDURE NOTES
"Femoral Procedure Note    Pre-Procedure   Staff -        Anesthesiologist:  Vanesa Okeefe MD       Resident/Fellow: Suzan Kan MD       Performed By: with residents       Procedure performed by resident/fellow/CRNA in presence of a teaching physician.         Location: OR       Procedure Start/Stop Times: 1/13/2023 4:30 PM       Pre-Anesthestic Checklist: patient identified, IV checked, site marked, risks and benefits discussed, informed consent, monitors and equipment checked, pre-op evaluation, at physician/surgeon's request and post-op pain management  Timeout:       Correct Patient: Yes        Correct Procedure: Yes        Correct Site: Yes        Correct Position: Yes        Correct Laterality: Yes        Site Marked: Yes  Procedure Documentation  Procedure: Femoral       Laterality: right       Patient Position: supine       Patient Prep/Sterile Barriers: patient draped       Skin prep: Chloraprep       Needle Type: [employer] needle       Needle Gauge: 17.        Catheter: 19 G.          Catheter threaded easily.           Threaded 5.5 cm at skin.         Ultrasound guided       1. Ultrasound was used to identify targeted nerve, plexus, vascular marker, or fascial plane and place a needle adjacent to it in real-time.       2. Ultrasound was used to visualize the spread of anesthetic in close proximity to the above referenced structure.    Assessment/Narrative         The placement was negative for: blood aspirated, painful injection and site bleeding       Paresthesias: No.       Bolus given via catheter. no blood aspirated via catheter.        Secured via Tegaderm.        Insertion/Infusion Method: Continuous Infusion       Complications: none      FOR [facility] ([facility]) ONLY:   Pain Team Contact information: please page the Pain Team Via [employer]. Search \"Pain\". During daytime hours, please page the attending first. At night please page the resident first.    "

## 2023-01-13 NOTE — OR NURSING
10:10 Time out done, Dr Suzan Kan and Dr Field at bs  10:12 Needle in for block  10:22 femoral block done, pt tolerated well.

## 2023-01-13 NOTE — DISCHARGE SUMMARY
[facility] Vascular Surgery Service Discharge Summary:     NAME: Sakshi Jaeger   MRN: [id number]   : 1945   PCP: [facility] [place]    DATE OF ADMISSION: 2023       Procedure/Surgery Information   Procedure: Procedure(s):  ENDARTERECTOMY, FEMORAL RIGHT  right iliac arteriogram aned right common iliac artery stent  AMPUTATION, ABOVE KNEE RIGHT   Surgeon(s): Surgeon(s) and Role:     * Emma Oconnor MD - Primary     * Elizabet Catalan MD - Assisting   Specimens: ID Type Source Tests Collected by Time Destination   1 : Right Remaining Stump Tissue Other SURGICAL PATHOLOGY EXAM Emma Oconnor MD 2023  3:31 PM             PRE/POSTOPERATIVE DIAGNOSES:    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  Right common femoral and profunda femoris endarterectomy with bovine pericardial patch   2.  Right common iliac artery angioplasty and stenting with 9 mm x 38 mm balloon expandable covered I cast stent,   3.  Multiple right iliac and aortograms,   4.  Revision from through knee amputation to right above-knee amputation    POSTOPERATIVE COMPLICATIONS: None    DATE OF DISCHARGE: 2023    ADMISSION HPI (from 2023):   This 77 year old female had a through knee amputation by Dr. Lopes of orthopedic surgery after having a below-knee amputation many years ago.  This did not heal and vascular evaluation revealed extensive femoral occlusive disease with calcific plaque as well as inflow disease in the right common iliac artery with near total occlusion.  I recommended right femoral endarterectomy and iliac and aortogram with possible iliac intervention as well as revision to above-knee amputation given the nonhealing through knee amputation.  She understood the risks and benefits and wished to proceed.    HOSPITAL COURSE: Sakshi Jaeger is a 77 year old female who was admitted on 2023 and underwent the above-named  procedures. She tolerated the procedure well and postoperatively was transferred to the general post-surgical unit. The remainder of her course was uncomplicated. Prior to discharge, her pain was controlled well with oral medications. She was able to perform ADLs and was at her functional baseline. On 1/17/2023, she was discharged to home in stable condition.    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     Essential hypertension with goal blood pressure less than 140/90 8/25/2016     Hyperlipidemia LDL goal <100 11/12/2010     Hypertension goal BP (blood pressure) < 130/80 1/26/2011     Infection of right below knee amputation (H) 10/1/2019     IRON DEFIC ANEMIA NOS 9/15/2005     Lower limb amputation, below knee 12/26/2012     MI (myocardial infarction) (H)     3/2010     Non-healing surgical wound, subsequent encounter 9/24/2020    Added automatically from request for surgery [id number]     Osteoporosis 2/16/2005     OSTEOPOROSIS NOS 2/16/2005     Protein-calorie malnutrition (H) 5/18/2022     PVD (peripheral vascular disease) (H) 5/18/2022     Rheumatoid arthritis (H) 2/16/2005          Rheumatoid arthritis(714.0) 2/16/2005     Status post below-knee amputation (H) 12/26/2012            PAST SURGICAL HISTORY:  Past Surgical History:   Procedure Laterality Date     ---------INCISION AND DRAINAGE  03/05/2014     AMPUTATE LEG ABOVE KNEE Right 1/13/2023    Procedure: AMPUTATION, ABOVE KNEE RIGHT;  Surgeon: Emma Oconnor MD;  Location: [facility] OR     AMPUTATE LEG BELOW KNEE Right 9/28/2022    Procedure: Right through knee amputation;  Surgeon: Doron Mott MD;   Location: [facility] OR     AMPUTATION BELOW KNEE RT/LT  01/01/2005    right lowwer leg.      ANGIOGRAM Right 1/13/2023    Procedure: right iliac arteriogram aned right common iliac artery stent;  Surgeon: Emma Oconnor MD;  Location: [facility] OR     APPENDECTOMY       ARTHROPLASTY KNEE  04/27/2011    Procedure:ARTHROPLASTY KNEE; Surgeon:JESSE HAWKINS; Location:[facility] OR     COMPLEX WOUND CLOSURE (UPDATE) Right 12/08/2021    Procedure: Right stump wound debridment and closure;  Surgeon: RAISA Perea MD;  Location: [facility] OR     CORONARY STENT PLACEMENT       ENDARTERECTOMY FEMORAL Right 1/13/2023    Procedure: ENDARTERECTOMY, FEMORAL RIGHT;  Surgeon: Emma Oconnor MD;  Location: [facility] OR     INJECT NERVE BLOCK SPHENOPALATINE GANGLION N/A 9/29/2022    Procedure: Out of OR encounter for block to be done by ;  Surgeon: GENERIC ANESTHESIA PROVIDER;  Location: [facility] OR     IR OR ANGIOGRAM  1/13/2023     IRRIGATION AND DEBRIDEMENT LOWER EXTREMITY, COMBINED Right 10/09/2019    Procedure: Irrigation and debridement Right leg;  Surgeon: Doron Mott MD;  Location: [facility] OR     NERVE BLOCK PERIPHERAL N/A 9/27/2022    Procedure: BLOCK, NERVE;  Surgeon: GENERIC ANESTHESIA PROVIDER;  Location: [facility]     OTHER SURGICAL HISTORY  03/05/2014    I AND D      OTHER SURGICAL HISTORY Right 10/09/2019    IRRIGATION AND DEBRIDEMENT LOWER EXTREMITY, COMBINED     PHACOEMULSIFICATION CLEAR CORNEA WITH STANDARD INTRAOCULAR LENS IMPLANT Left 9/8/2022    Procedure: PHACOEMULSIFICATION, LEFT CATARACT, WITH INTRAOCULAR LENS IMPLANT;  Surgeon: Flip Izaguirre MD;  Location: [facility] OR     PHACOEMULSIFICATION CLEAR CORNEA WITH STANDARD INTRAOCULAR LENS IMPLANT Right 10/13/2022    Procedure: PHACOEMULSIFICATION, RIGHT CATARACT, WITH INTRAOCULAR LENS IMPLANT;  Surgeon: Flip Izaguirre MD;  Location: [facility] OR     REVISE SCAR EXTREMITY Right 12/17/2020    Procedure: Right stump scar revision;  Surgeon: RAISA Perea MD;   Location: [facility] OR         Labs:  Recent Labs   Lab Test 01/16/23  0540 01/14/23  0600 01/13/23  0909   WBC  --  12.5* 4.8   RBC  --  2.61* 3.26*   HGB  --  8.0* 10.2*   HCT  --  25.6* 32.7*   MCV  --  98 100   MCH  --  30.7 31.3   MCHC  --  31.3* 31.2*    180 253     Recent Labs   Lab Test 01/14/23  0600 01/13/23  0909 01/11/23  1456   POTASSIUM 4.5 3.9 4.2   CHLORIDE 101 104 101   BUN 7.5* 10.2 10       DISCHARGE INSTRUCTIONS:  Discharge Orders      Reason for your hospital stay    Right lower extremity limb threatening ischemia with tissue loss     Activity    Your activity upon discharge: activity as tolerated     Adult [facility] Follow-up and recommended labs and tests    Follow up with Dr. Oconnor as scheduled on February 8    Appointments on [place] and/or [facility] (with [facility] or [facility] provider or service). Call [phone] if you haven't heard regarding these appointments within 7 days of discharge.     Diet    Follow this diet upon discharge: Regular     Discharge Medications   Current Discharge Medication List      START taking these medications    Details   clopidogrel (PLAVIX) 75 MG tablet Take 1 tablet (75 mg) by mouth daily  Qty: 90 tablet, Refills: 0    Associated Diagnoses: 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); Status post below knee amputation of right lower extremity (H); Infection of right knee (H); PVD (peripheral vascular disease) (H)      methocarbamol (ROBAXIN) 500 MG tablet Take 1 tablet (500 mg) by mouth 2 times daily for 10 doses  Qty: 10 tablet, Refills: 0    Associated Diagnoses: Infection of right knee (H)      oxyCODONE (ROXICODONE) 5 MG tablet Take 1 tablet (5 mg) by mouth every 6 hours as needed for moderate pain (4-6)  Qty: 20 tablet, Refills: 0    Associated Diagnoses: Infection of right knee (H); PVD (peripheral vascular disease) (H)      senna-docusate (SENOKOT-S/PERICOLACE) 8.6-50 MG tablet Take 1 tablet by mouth daily as needed for  constipation  Qty: 30 tablet, Refills: 0    Associated Diagnoses: 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); Status post below knee amputation of right lower extremity (H); Infection of right knee (H); PVD (peripheral vascular disease) (H)         CONTINUE these medications which have CHANGED    Details   gabapentin (NEURONTIN) 300 MG capsule Take 1 capsule (300 mg) by mouth nightly as needed for neuropathic pain (1-2 tablets at bedtime prn restless right amputation (BKA))  Qty: 14 capsule, Refills: 0    Associated Diagnoses: 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)         CONTINUE these medications which have NOT CHANGED    Details   acetaminophen (TYLENOL) 325 MG tablet Take 2 tablets (650 mg) by mouth every 4 hours    Associated Diagnoses: Bilateral low back pain, unspecified chronicity, unspecified whether sciatica present; Rheumatoid arthritis, involving unspecified site, unspecified rheumatoid factor presence      aspirin (ASA) 81 MG tablet Take 1 tablet (81 mg) by mouth daily  Qty:      Associated Diagnoses: Coronary artery disease involving native coronary artery of native heart without angina pectoris      calcium carbonate 600 mg-vitamin D 400 units (CALCIUM 600 + D) 600-400 MG-UNIT per tablet Take 2 tablets by mouth daily    Associated Diagnoses: Rheumatoid arthritis, involving unspecified site, unspecified rheumatoid factor presence      folic acid (FOLVITE) 1 MG tablet Take 1 mg by mouth daily      methotrexate 2.5 MG tablet Take 20 mg by mouth every 7 days On Saturdays      multivitamin w/minerals (THERA-VIT-M) tablet Take 1 tablet by mouth daily    Associated Diagnoses: Rheumatoid arthritis, involving unspecified site, unspecified rheumatoid factor presence      predniSONE (DELTASONE) 5 MG tablet Take 1 tablet (5 mg) by mouth daily  Refills: 0    Associated Diagnoses: Rheumatoid arthritis, involving unspecified site, unspecified rheumatoid factor presence       simvastatin (ZOCOR) 20 MG tablet Take 1 tablet (20 mg) by mouth At Bedtime  Qty: 90 tablet, Refills: 3    Associated Diagnoses: Hyperlipidemia LDL goal <100      amLODIPine (NORVASC) 5 MG tablet Take 1 tablet (5 mg) by mouth daily  Qty: 90 tablet, Refills: 3    Associated Diagnoses: Essential hypertension with goal blood pressure less than 140/90      leflunomide (ARAVA) 20 MG tablet Take 1 tablet by mouth every 24 hours      lisinopril (ZESTRIL) 20 MG tablet Take 1 tablet (20 mg) by mouth daily  Qty: 90 tablet, Refills: 0    Comments: No further refills until follow-up appointment  Associated Diagnoses: Essential hypertension with goal blood pressure less than 140/90      metoprolol tartrate (LOPRESSOR) 50 MG tablet Take 1 tablet (50 mg) by mouth 2 times daily  Qty: 90 tablet, Refills: 3    Associated Diagnoses: Essential hypertension with goal blood pressure less than 140/90; Coronary artery disease involving native coronary artery of native heart without angina pectoris           Allergies   No Known Allergies

## 2023-01-14 LAB
ANION GAP SERPL CALCULATED.3IONS-SCNC: 14 MMOL/L (ref 7–15)
BASOPHILS # BLD AUTO: 0 10E3/UL (ref 0–0.2)
BASOPHILS NFR BLD AUTO: 0 %
BUN SERPL-MCNC: 7.5 MG/DL (ref 8–23)
CALCIUM SERPL-MCNC: 8.8 MG/DL (ref 8.8–10.2)
CHLORIDE SERPL-SCNC: 101 MMOL/L (ref 98–107)
CREAT SERPL-MCNC: 0.47 MG/DL (ref 0.51–0.95)
DEPRECATED HCO3 PLAS-SCNC: 19 MMOL/L (ref 22–29)
EOSINOPHIL # BLD AUTO: 0 10E3/UL (ref 0–0.7)
EOSINOPHIL NFR BLD AUTO: 0 %
ERYTHROCYTE [DISTWIDTH] IN BLOOD BY AUTOMATED COUNT: 17.6 % (ref 10–15)
GFR SERPL CREATININE-BSD FRML MDRD: >90 ML/MIN/1.73M2
GLUCOSE BLDC GLUCOMTR-MCNC: 127 MG/DL (ref 70–99)
GLUCOSE SERPL-MCNC: 154 MG/DL (ref 70–99)
HCT VFR BLD AUTO: 25.6 % (ref 35–47)
HGB BLD-MCNC: 8 G/DL (ref 11.7–15.7)
IMM GRANULOCYTES # BLD: 0.1 10E3/UL
IMM GRANULOCYTES NFR BLD: 1 %
LYMPHOCYTES # BLD AUTO: 0.6 10E3/UL (ref 0.8–5.3)
LYMPHOCYTES NFR BLD AUTO: 5 %
MCH RBC QN AUTO: 30.7 PG (ref 26.5–33)
MCHC RBC AUTO-ENTMCNC: 31.3 G/DL (ref 31.5–36.5)
MCV RBC AUTO: 98 FL (ref 78–100)
MONOCYTES # BLD AUTO: 0.5 10E3/UL (ref 0–1.3)
MONOCYTES NFR BLD AUTO: 4 %
NEUTROPHILS # BLD AUTO: 11.3 10E3/UL (ref 1.6–8.3)
NEUTROPHILS NFR BLD AUTO: 90 %
NRBC # BLD AUTO: 0 10E3/UL
NRBC BLD AUTO-RTO: 0 /100
PLATELET # BLD AUTO: 180 10E3/UL (ref 150–450)
POTASSIUM SERPL-SCNC: 4.5 MMOL/L (ref 3.4–5.3)
RBC # BLD AUTO: 2.61 10E6/UL (ref 3.8–5.2)
SODIUM SERPL-SCNC: 134 MMOL/L (ref 136–145)
WBC # BLD AUTO: 12.5 10E3/UL (ref 4–11)

## 2023-01-14 PROCEDURE — 250N000012 HC RX MED GY IP 250 OP 636 PS 637: Performed by: SURGERY

## 2023-01-14 PROCEDURE — 85025 COMPLETE CBC W/AUTO DIFF WBC: CPT | Performed by: SURGERY

## 2023-01-14 PROCEDURE — 36415 COLL VENOUS BLD VENIPUNCTURE: CPT | Performed by: SURGERY

## 2023-01-14 PROCEDURE — 250N000011 HC RX IP 250 OP 636: Performed by: SURGERY

## 2023-01-14 PROCEDURE — 80048 BASIC METABOLIC PNL TOTAL CA: CPT | Performed by: SURGERY

## 2023-01-14 PROCEDURE — 120N000003 HC R&B IMCU UMMC

## 2023-01-14 PROCEDURE — 250N000013 HC RX MED GY IP 250 OP 250 PS 637: Performed by: SURGERY

## 2023-01-14 PROCEDURE — 999N000127 HC STATISTIC PERIPHERAL IV START W US GUIDANCE

## 2023-01-14 RX ADMIN — PREDNISONE 5 MG: 5 TABLET ORAL at 08:26

## 2023-01-14 RX ADMIN — METHOCARBAMOL 500 MG: 500 TABLET ORAL at 14:19

## 2023-01-14 RX ADMIN — OXYCODONE HYDROCHLORIDE 5 MG: 5 TABLET ORAL at 13:07

## 2023-01-14 RX ADMIN — OXYCODONE HYDROCHLORIDE 5 MG: 5 TABLET ORAL at 21:11

## 2023-01-14 RX ADMIN — HEPARIN SODIUM 5000 UNITS: 5000 INJECTION, SOLUTION INTRAVENOUS; SUBCUTANEOUS at 18:39

## 2023-01-14 RX ADMIN — AMLODIPINE BESYLATE 5 MG: 5 TABLET ORAL at 08:27

## 2023-01-14 RX ADMIN — GABAPENTIN 300 MG: 300 CAPSULE ORAL at 22:29

## 2023-01-14 RX ADMIN — ACETAMINOPHEN 975 MG: 325 TABLET, FILM COATED ORAL at 11:59

## 2023-01-14 RX ADMIN — LISINOPRIL 20 MG: 20 TABLET ORAL at 08:27

## 2023-01-14 RX ADMIN — METOPROLOL TARTRATE 50 MG: 25 TABLET, FILM COATED ORAL at 08:27

## 2023-01-14 RX ADMIN — ACETAMINOPHEN 975 MG: 325 TABLET, FILM COATED ORAL at 19:39

## 2023-01-14 RX ADMIN — SIMVASTATIN 20 MG: 20 TABLET, FILM COATED ORAL at 21:12

## 2023-01-14 RX ADMIN — ASPIRIN 81 MG: 81 TABLET, CHEWABLE ORAL at 08:27

## 2023-01-14 RX ADMIN — CEFAZOLIN 1 G: 1 INJECTION, POWDER, FOR SOLUTION INTRAMUSCULAR; INTRAVENOUS at 06:23

## 2023-01-14 RX ADMIN — OXYCODONE HYDROCHLORIDE 5 MG: 5 TABLET ORAL at 08:27

## 2023-01-14 RX ADMIN — FOLIC ACID 1 MG: 1 TABLET ORAL at 08:27

## 2023-01-14 RX ADMIN — CLOPIDOGREL BISULFATE 75 MG: 75 TABLET ORAL at 08:27

## 2023-01-14 RX ADMIN — METHOTREXATE 20 MG: 2.5 TABLET ORAL at 11:15

## 2023-01-14 RX ADMIN — HEPARIN SODIUM 5000 UNITS: 5000 INJECTION, SOLUTION INTRAVENOUS; SUBCUTANEOUS at 11:15

## 2023-01-14 RX ADMIN — METHOCARBAMOL 500 MG: 500 TABLET ORAL at 21:12

## 2023-01-14 RX ADMIN — ACETAMINOPHEN 975 MG: 325 TABLET, FILM COATED ORAL at 04:38

## 2023-01-14 ASSESSMENT — ACTIVITIES OF DAILY LIVING (ADL)
ADLS_ACUITY_SCORE: 37
ADLS_ACUITY_SCORE: 36
ADLS_ACUITY_SCORE: 37
ADLS_ACUITY_SCORE: 36

## 2023-01-14 NOTE — PROGRESS NOTES
"Vascular Surgery Note    S: Pain well controlled.     O  /78 (BP Location: Left arm)   Pulse 79   Temp 98.6  F (37  C) (Oral)   Resp 16   Ht 1.651 m (5' 5\")   Wt 40 kg (88 lb 2.9 oz)   LMP  (LMP Unknown)   SpO2 95%   BMI 14.67 kg/m    Gen Aox 3  Resp: NLb  Extremities: ecchymosis without hematoma over  R groin incision, pain catheters intact, AKA dressing intact     A/P  POD 1 s/p R femoral endarterectomy, TOO stent and R AKA amputation     - Reg diet  - DVT ppx  - Plan for removal of pain catheters tomorrow   - Dressing change tomorrow  - Likely discharge tomorrow to home      Mago Crawford MD  Fellow   "

## 2023-01-14 NOTE — PLAN OF CARE
"Neuro: A&Ox4. Phantom sensation at RLE. Calls appropriately, makes needs known. Open eyes spontaneously, clear logical speech.    Cardiac: No Tele orders.. VSS. HR: 60s SBP: 130s-120s DBP: 70s. Afebrile.     Respiratory: Sating >92% on RA. Denies SOB. Capno ongoing.   GI/: Adequate urine output using the commode and bedpan. No BM since procedure (active bowel sounds).  Diet/appetite: Regular low fat diet. Increased appetite.  Activity:  Assist of x2, up to chair and commode.  Pain: localized at lateral R-thigh. Pt states feels like \"vice .\" Team was notified about constricting pain. Improved pain rating 2/10 with x1 PRN Oxy, and x1 PRN BASHIR.   Skin: Blanchable redness: buttocks and behind ears. Scattered bruising on BUE. Incision sites on R-growing.   LDA's: x1 L-PIV (SL), x1 R-ON-Q Pump.     Plan: Continue with POC. Notify primary team with changes.      "

## 2023-01-14 NOTE — ANESTHESIA POSTPROCEDURE EVALUATION
Patient: Sakshi Jaeger    Procedure: Procedure(s):  ENDARTERECTOMY, FEMORAL RIGHT  right iliac arteriogram aned right common iliac artery stent  AMPUTATION, ABOVE KNEE RIGHT       Anesthesia Type:  General    Note:  Disposition: Admission; Inpatient   Postop Pain Control: Uneventful            Sign Out: Well controlled pain   PONV: No   Neuro/Psych: Uneventful            Sign Out: Acceptable/Baseline neuro status   Airway/Respiratory: Uneventful            Sign Out: Acceptable/Baseline resp. status   CV/Hemodynamics: Uneventful            Sign Out: Acceptable CV status; No obvious hypovolemia; No obvious fluid overload   Other NRE: NONE   DID A NON-ROUTINE EVENT OCCUR? No           Last vitals:  Vitals Value Taken Time   /79 01/13/23 1800   Temp 36.7  C (98.1  F) 01/13/23 1645   Pulse 73 01/13/23 1804   Resp 23 01/13/23 1804   SpO2 97 % 01/13/23 1804   Vitals shown include unvalidated device data.    Electronically Signed By: Ruslan Brice MD  January 13, 2023  6:05 PM

## 2023-01-14 NOTE — PROGRESS NOTES
[facility]AL ANESTHESIA PAIN SERVICE ([facility]APS) EVALUATION:    Summary: ENDARTERECTOMY, FEMORAL RIGHT  right iliac arteriogram aned right common iliac artery stent  AMPUTATION, ABOVE KNEE RIGHT  - Time: 08:45  - Subjective: Patient reports no pain with current therapy   - Objective:  General: healthy, alert and no distress  Catheter system integrity: WNL  Skin: Dressing clean, dry and intact    - Assessment/Plan    PAIN: Well controlled, 0/10    INTERVENTION: No Bolus administered    MEDICATION: Infusate- 0.2% Ropivacaine at 8mL/hr.     Contact RAPS if any of the following: patient experiencing any untoward effects, SBP< 90, P < 50 or > 120, MAP < 60. Patient can be evaluated to receive local anesthetic bolus Q 12 hr PRN pain not controlled with continuous infusion.  Bedside nurse must page RAPS to request bolus    Plan to maintain catheter for now    RAPS Contact Info (24 hour job code pager is the last 4 digits) For in-house use only:  [employer] phone: [place] [phone], [employer] [place] [phone], [facility] [phone], then enter call-back number.    Text: Use [id number] on the Intranet <Paging/Directory> tab and enter Jobcode ID.   If no call back at any time, contact the hospital  and ask for RAPS attending or backup     Tevin Skinner MD   Anesthesia resident CA-3  01/14/2023   Ascom [phone]

## 2023-01-14 NOTE — PROGRESS NOTES
Admission          1/13/2023  08:00 PM  -----------------------------------------------------------  Reason for admission:  Primary team notified of pt arrival.  Admitted from:  Via: stretcher  Accompanied by: family  Belongings: Placed in closet  Admission Profile: complete  Teaching: orientation to unit and call light- call light within reach, call don't fall, use of console, meal times, when to call for the RN, and enforced importance of safety   Access: x2PIV  Telemetry: No Tele orders  Ht./Wt.: complete  Code Status verified on armband: yes  2 RN Skin Assessment Completed By: Brayan RN and Conrado RN (blanchable redness: buttocks and behind ears, scattered bruising in BUE.    Med Rec completed: yes  Bed surface reassessed with algorithm and charted: yes  New bed surface ordered: no  Suction/Ambu bag/Flowmeter at bedside: yes  Is patient having diarrhea upon admission- if YES fill out testing algorithm : no    C. Diff Testing Algorithm (MUST be marked YES)   1. 3 or more loose stools in 24 hrs. [] Yes [] No       Additional symptoms:(At least ONE must be marked yes)   1. Abdominal pain/discomfort [] Yes [] No   2. Fever at least 38C (100.4 F) [] Yes [] No   3. Elevated WBC(>11,000) [] Yes [] No       Exclusion Criteria:  (MUST be marked YES)   1. Off laxatives for at least 48 hrs. [] Yes [] No       Pt status: A&Ox4, 2L NC, Localized pain at R-AKA and incisional. Nervomuscular and hemodynamics stable.      Temp:  [97.7  F (36.5  C)-98.1  F (36.7  C)] 97.8  F (36.6  C)  Pulse:  [54-82] 68  Resp:  [0-30] 22  BP: (114-153)/() 127/76  SpO2:  [89 %-100 %] 99 %

## 2023-01-14 NOTE — PROGRESS NOTES
"Surgery Crosscover Post-op Check    Patient seen at bedside. Asleep in bed, easily arousable. Pain overall well controlled but patient endorses pain at posterior aspect of right thigh. Otherwise doing well, denies n/v, cp, sob. Urinating appropriately.    /76 (BP Location: Left arm, Cuff Size: Adult Regular)   Pulse 68   Temp 97.8  F (36.6  C) (Oral)   Resp 22   Ht 1.651 m (5' 5\")   Wt 45.6 kg (100 lb 8.5 oz)   LMP  (LMP Unknown)   SpO2 99%   BMI 16.73 kg/m     Gen: sleepy, laying in bed, NAD  Resp: NLB on 1LNC  Abdo: soft, NT, ND  Extremities: right leg with AKA, minimal pain to palpation, no active bleeding, oozing, or oedema noted. Appropriate motor and sensory function    A/P: 78yo F now s/p right femoral endarterectomy, right iliac arteriogram, and revision of above knee amputation of right leg.    - multimodal pain control  - leg elevation  - IS  - rest of care per primary team    Raquel Ardon  General Surgery PGY1    "

## 2023-01-14 NOTE — PROVIDER NOTIFICATION
1747  6B 35-2. Sakshi Jaeger  Pt's right groin site is bleeding small to moderate amount from left corner. Gauze dressing applied. VSS  BREANNE Ko [id number]    No response received. Per pt, provider evaluated at bedside and said it looked okay.     2047  6B 35-2. Sakshi Jaeger  Ropivicaine nerve block came out after pt ambulated to bedside commode with NST. Pt denies pain. Site WDL.   BREANNE Ko [id number]    Per cross cover, okay to leave out for now and continue to monitor pain; if oral prns are not sufficient, please page again and will consider putting back in.

## 2023-01-14 NOTE — PLAN OF CARE
Major Shift Events:  Pt is POD 1 s/p R femoral endarterectomy, TOO stent and R AKA amputation. Doing well. Eating and drinking well. Adequate UOP and BM x1 1/14. VSS. Reports little pain in R AKA - resolved with nerve block, PRN medication, and reposition. Moves well pivoting to commode and in bed with supervision of nerve block lines.    Plan: Pain management, dressing change via vascular surgery 1/15, likely discharge tomorrow    For vital signs and complete assessments, please see documentation flowsheets.      Problem: Pain Acute  Goal: Optimal Pain Control and Function  Outcome: Progressing  Intervention: Develop Pain Management Plan  Reported pain in right AKA. Have provided PRN oxycodone with relief per patient. Did attempt non-pharm interventions as elevation and reposition.

## 2023-01-15 LAB — GLUCOSE BLDC GLUCOMTR-MCNC: 104 MG/DL (ref 70–99)

## 2023-01-15 PROCEDURE — 250N000011 HC RX IP 250 OP 636: Performed by: SURGERY

## 2023-01-15 PROCEDURE — 120N000003 HC R&B IMCU UMMC

## 2023-01-15 PROCEDURE — 999N000147 HC STATISTIC PT IP EVAL DEFER

## 2023-01-15 PROCEDURE — 250N000013 HC RX MED GY IP 250 OP 250 PS 637: Performed by: SURGERY

## 2023-01-15 PROCEDURE — 250N000012 HC RX MED GY IP 250 OP 636 PS 637: Performed by: SURGERY

## 2023-01-15 RX ORDER — OXYCODONE HYDROCHLORIDE 5 MG/1
5 TABLET ORAL EVERY 6 HOURS PRN
Qty: 20 TABLET | Refills: 0 | Status: SHIPPED | OUTPATIENT
Start: 2023-01-15 | End: 2023-02-25

## 2023-01-15 RX ORDER — METHOCARBAMOL 500 MG/1
500 TABLET, FILM COATED ORAL 2 TIMES DAILY
Qty: 10 TABLET | Refills: 0 | Status: SHIPPED | OUTPATIENT
Start: 2023-01-15 | End: 2023-01-20

## 2023-01-15 RX ORDER — GABAPENTIN 300 MG/1
300 CAPSULE ORAL
Qty: 14 CAPSULE | Refills: 0 | Status: SHIPPED | OUTPATIENT
Start: 2023-01-15 | End: 2023-01-23

## 2023-01-15 RX ADMIN — OXYCODONE HYDROCHLORIDE 10 MG: 10 TABLET ORAL at 15:13

## 2023-01-15 RX ADMIN — METOPROLOL TARTRATE 50 MG: 25 TABLET, FILM COATED ORAL at 08:04

## 2023-01-15 RX ADMIN — LISINOPRIL 20 MG: 20 TABLET ORAL at 08:04

## 2023-01-15 RX ADMIN — OXYCODONE HYDROCHLORIDE 10 MG: 10 TABLET ORAL at 09:47

## 2023-01-15 RX ADMIN — CLOPIDOGREL BISULFATE 75 MG: 75 TABLET ORAL at 08:04

## 2023-01-15 RX ADMIN — HEPARIN SODIUM 5000 UNITS: 5000 INJECTION, SOLUTION INTRAVENOUS; SUBCUTANEOUS at 09:47

## 2023-01-15 RX ADMIN — METHOCARBAMOL 500 MG: 500 TABLET ORAL at 12:39

## 2023-01-15 RX ADMIN — FOLIC ACID 1 MG: 1 TABLET ORAL at 08:03

## 2023-01-15 RX ADMIN — ACETAMINOPHEN 975 MG: 325 TABLET, FILM COATED ORAL at 04:17

## 2023-01-15 RX ADMIN — HEPARIN SODIUM 5000 UNITS: 5000 INJECTION, SOLUTION INTRAVENOUS; SUBCUTANEOUS at 19:10

## 2023-01-15 RX ADMIN — PREDNISONE 5 MG: 5 TABLET ORAL at 08:03

## 2023-01-15 RX ADMIN — ACETAMINOPHEN 975 MG: 325 TABLET, FILM COATED ORAL at 20:29

## 2023-01-15 RX ADMIN — ASPIRIN 81 MG: 81 TABLET, CHEWABLE ORAL at 08:04

## 2023-01-15 RX ADMIN — ACETAMINOPHEN 975 MG: 325 TABLET, FILM COATED ORAL at 11:31

## 2023-01-15 RX ADMIN — METHOCARBAMOL 500 MG: 500 TABLET ORAL at 19:13

## 2023-01-15 RX ADMIN — HEPARIN SODIUM 5000 UNITS: 5000 INJECTION, SOLUTION INTRAVENOUS; SUBCUTANEOUS at 01:08

## 2023-01-15 RX ADMIN — OXYCODONE HYDROCHLORIDE 10 MG: 10 TABLET ORAL at 01:09

## 2023-01-15 RX ADMIN — SIMVASTATIN 20 MG: 20 TABLET, FILM COATED ORAL at 21:48

## 2023-01-15 RX ADMIN — OXYCODONE HYDROCHLORIDE 5 MG: 5 TABLET ORAL at 19:13

## 2023-01-15 RX ADMIN — AMLODIPINE BESYLATE 5 MG: 5 TABLET ORAL at 08:04

## 2023-01-15 RX ADMIN — GABAPENTIN 300 MG: 300 CAPSULE ORAL at 22:35

## 2023-01-15 ASSESSMENT — ACTIVITIES OF DAILY LIVING (ADL)
ADLS_ACUITY_SCORE: 34

## 2023-01-15 NOTE — PLAN OF CARE
Neuro: A&Ox4.   Cardiac: No tele. VSS.   Respiratory: Sating 94%+ on RA.  GI/: Adequate urine output using bedside commode. No BM this shift.   Diet/appetite: Tolerating low saturated fat diet.   Activity:  Assist 1x.   Pain: Oral oxycodone, robaxin, and gabapentin given.   Skin: No new deficits noted.  LDA's:  -R PIV, SL    Plan: Continue with POC. Notify primary team with changes.

## 2023-01-15 NOTE — PLAN OF CARE
"/68 (BP Location: Left arm)   Pulse 59   Temp 98.5  F (36.9  C) (Oral)   Resp 20   Ht 1.651 m (5' 5\")   Wt 39.2 kg (86 lb 6.7 oz)   LMP  (LMP Unknown)   SpO2 95%   BMI 14.38 kg/m      Shift: 1574-9345  Reason for Admission: POD 2 s/p R femoral endarterectomy, TOO stent and R AKA amputation    VS/Tele: VSS on RA, no tele orders  Neuros: A/Ox4, able to make needs known. Neuros and CMS intact  Respiratory:  Sats >92% on RA  GI/: No BMs this shift, voiding adequately   Nutrition: Tolerating regular/low fat diet  Drains/Lines: R. PIV SL  Activity: A1 + Pivot to commode  Pain/Nausea: C/o of R. BKA pain- PRN Oxycodone and Robaxin given with little relief. Denies nausea  Skin: R. Knee- dressing CDI, reinforced dressing  Social: Significant other at bedside  Plan of care: Plan for potential discharge on Tuesday when pain becomes more manageable with oral medications. Will continue to monitor and follow POC.     "

## 2023-01-15 NOTE — PROGRESS NOTES
REGIONAL ANESTHESIA PAIN SERVICE ([facility]APS) EVALUATION:    Summary: ENDARTERECTOMY, FEMORAL RIGHT  right iliac arteriogram aned right common iliac artery stent  AMPUTATION, ABOVE KNEE RIGHT  - Time: 1400  - Subjective: Patient's catheter became accidentally dislodged overnight. She did develop worsening pain afterward, but under better control today with the multimodal regimen the primary team has in place, including acetaminophen 975 mg q8h + 650 mg q4h prn, gabapentin 300 mg at bedtime prn, hydromorphone 0.2-0.4 mg q2h prn, methocarbamol 500 mg q6h prn, oxycodone 5-10 mg PO q4h prn    - Objective:  General: healthy, alert and no distress  Catheter system integrity: WNL  Skin: Dressing clean, dry and intact    - Assessment/Plan  Continue multimodal pain regimen.     RAPS Contact Info (24 hour job code pager is the last 4 digits) For in-house use only:  [employer] phone: [place] [phone], [employer] [phone], Peds [phone], then enter call-back number.    Text: Use [email or web address] on the Intranet <Paging/Directory> tab and enter Jobcode ID.   If no call back at any time, contact the hospital  and ask for RAPS attending or backup     Lucia Saleem DO  Anesthesia resident CA-2  01/15/2023   Ascom [phone]

## 2023-01-15 NOTE — PLAN OF CARE
Neuro: A&Ox4.   Cardiac: No tele. VSS.   Respiratory: Sating 92%+ on RA.  GI/: No BM this shift. Voiding using bedside commode.   Diet/appetite: Tolerating low saturated fat diet.   Activity:  Assist of 1x.  Pain: Ropivacaine catheter came out during shift (see provider notification note). Oral oxycodone, robaxin, and gabapentin given.   Skin: R groin site bled during shift (see provider notification note). Gauze applied and bleeding stopped.   LDA's:  -New R PIV placed this shift    Plan: Monitor R groin site and pain management. Continue with POC. Notify primary team with changes.

## 2023-01-15 NOTE — PLAN OF CARE
Neuro: A&Ox4. Denied Phantom sensation at RLE. Calls appropriately, makes needs known. Open eyes spontaneously, clear logical speech.    Cardiac: No Tele orders.. VSS. HR: 60s-70s. Afebrile.         Respiratory: Sating >92% on RA. Denies SOB. 1-2L NC overnight to mentain >92% sats.    GI/: Adequate urine output using the commode. No BM (active bowel sounds).  Diet/appetite: Regular low fat diet. Increased appetite.  Activity:  Assist of x1, up to chair and commode.  Pain: localized at lateral R-thigh.x1 PRN Oxy with improvement.   Skin: No new deficits noted.    LDA's: x1 R-PIV (SL).      Plan: Expected discharge 01/18/2023. Monitor pain. Continue with POC. Notify primary team with changes.

## 2023-01-15 NOTE — PHARMACY-ADMISSION MEDICATION HISTORY
Admission Medication History Completed by Pharmacy    See [facility] Admission Navigator for allergy information, preferred outpatient pharmacy, prior to admission medications and immunization status.     Medication History Sources:     Patient reported last doses to RN, confirmed with fill history    Changes made to PTA medication list (reason):    Added: None    Deleted: None    Changed: None    Additional Information:    None    Prior to Admission medications    Medication Sig Last Dose Taking? Auth Provider Long Term End Date   acetaminophen (TYLENOL) 325 MG tablet Take 2 tablets (650 mg) by mouth every 4 hours Past Week Yes Trish Sahni MD     aspirin (ASA) 81 MG tablet Take 1 tablet (81 mg) by mouth daily 1/13/2023 at 0530 Yes Nick De La Cruz MD No    calcium carbonate 600 mg-vitamin D 400 units (CALCIUM 600 + D) 600-400 MG-UNIT per tablet Take 2 tablets by mouth daily 1/12/2023 Yes Trish Sahni MD     folic acid (FOLVITE) 1 MG tablet Take 1 mg by mouth daily 1/12/2023 Yes Reported, Patient     methotrexate 2.5 MG tablet Take 20 mg by mouth every 7 days On Saturdays Past Week Yes Reported, Patient No    multivitamin w/minerals (THERA-VIT-M) tablet Take 1 tablet by mouth daily 1/12/2023 Yes Trish Sahni MD     predniSONE (DELTASONE) 5 MG tablet Take 1 tablet (5 mg) by mouth daily 1/12/2023 Yes Trish Sahni MD Yes    simvastatin (ZOCOR) 20 MG tablet Take 1 tablet (20 mg) by mouth At Bedtime 1/12/2023 Yes Stefania King APRN CNP Yes    amLODIPine (NORVASC) 5 MG tablet Take 1 tablet (5 mg) by mouth daily 1/12/2023  Stefania King APRN CNP Yes    leflunomide (ARAVA) 20 MG tablet Take 1 tablet by mouth every 24 hours 1/12/2023  Reported, Patient No    lisinopril (ZESTRIL) 20 MG tablet Take 1 tablet (20 mg) by mouth daily 1/12/2023  Ruma Bergeron MD Yes    metoprolol tartrate (LOPRESSOR) 50 MG tablet Take 1 tablet (50 mg) by mouth 2 times  daily  Patient taking differently: Take 50 mg by mouth daily 1/12/2023  Stefania King, DESIRE CNP Yes        Date completed: 01/15/23    Medication history completed by: Timoteo Ho RP

## 2023-01-15 NOTE — PLAN OF CARE
6B PT Deferral: Patient familiar with PT services from recent hospitalization and declines acute needs. At baseline patient pivots to scooter and navigates short distances. Discussed concerns about potential for deconditioning during hospitalization but patient states that she plans to continue mobilizing IND in order to prevent worsening of weakness. Will defer PT orders at this time per patient request.

## 2023-01-16 LAB — PLATELET # BLD AUTO: 158 10E3/UL (ref 150–450)

## 2023-01-16 PROCEDURE — L5999 LOWR EXTREMITY PROSTHES NOS: HCPCS

## 2023-01-16 PROCEDURE — 36415 COLL VENOUS BLD VENIPUNCTURE: CPT | Performed by: SURGERY

## 2023-01-16 PROCEDURE — 120N000003 HC R&B IMCU UMMC

## 2023-01-16 PROCEDURE — 250N000013 HC RX MED GY IP 250 OP 250 PS 637: Performed by: SURGERY

## 2023-01-16 PROCEDURE — 250N000012 HC RX MED GY IP 250 OP 636 PS 637: Performed by: SURGERY

## 2023-01-16 PROCEDURE — 250N000011 HC RX IP 250 OP 636: Performed by: SURGERY

## 2023-01-16 PROCEDURE — L8440 SHRINKER BELOW KNEE: HCPCS

## 2023-01-16 PROCEDURE — 85049 AUTOMATED PLATELET COUNT: CPT | Performed by: SURGERY

## 2023-01-16 RX ADMIN — OXYCODONE HYDROCHLORIDE 10 MG: 10 TABLET ORAL at 13:41

## 2023-01-16 RX ADMIN — HEPARIN SODIUM 5000 UNITS: 5000 INJECTION, SOLUTION INTRAVENOUS; SUBCUTANEOUS at 11:36

## 2023-01-16 RX ADMIN — OXYCODONE HYDROCHLORIDE 5 MG: 5 TABLET ORAL at 01:41

## 2023-01-16 RX ADMIN — ACETAMINOPHEN 975 MG: 325 TABLET, FILM COATED ORAL at 04:16

## 2023-01-16 RX ADMIN — CLOPIDOGREL BISULFATE 75 MG: 75 TABLET ORAL at 08:37

## 2023-01-16 RX ADMIN — METOPROLOL TARTRATE 50 MG: 25 TABLET, FILM COATED ORAL at 08:36

## 2023-01-16 RX ADMIN — FOLIC ACID 1 MG: 1 TABLET ORAL at 08:36

## 2023-01-16 RX ADMIN — PREDNISONE 5 MG: 5 TABLET ORAL at 08:37

## 2023-01-16 RX ADMIN — SIMVASTATIN 20 MG: 20 TABLET, FILM COATED ORAL at 19:54

## 2023-01-16 RX ADMIN — AMLODIPINE BESYLATE 5 MG: 5 TABLET ORAL at 08:36

## 2023-01-16 RX ADMIN — LISINOPRIL 20 MG: 20 TABLET ORAL at 08:36

## 2023-01-16 RX ADMIN — METHOCARBAMOL 500 MG: 500 TABLET ORAL at 04:16

## 2023-01-16 RX ADMIN — HEPARIN SODIUM 5000 UNITS: 5000 INJECTION, SOLUTION INTRAVENOUS; SUBCUTANEOUS at 19:46

## 2023-01-16 RX ADMIN — GABAPENTIN 300 MG: 300 CAPSULE ORAL at 23:14

## 2023-01-16 RX ADMIN — HEPARIN SODIUM 5000 UNITS: 5000 INJECTION, SOLUTION INTRAVENOUS; SUBCUTANEOUS at 04:16

## 2023-01-16 RX ADMIN — OXYCODONE HYDROCHLORIDE 10 MG: 10 TABLET ORAL at 23:15

## 2023-01-16 RX ADMIN — ACETAMINOPHEN 975 MG: 325 TABLET, FILM COATED ORAL at 11:36

## 2023-01-16 RX ADMIN — OXYCODONE HYDROCHLORIDE 10 MG: 10 TABLET ORAL at 18:50

## 2023-01-16 RX ADMIN — ASPIRIN 81 MG: 81 TABLET, CHEWABLE ORAL at 08:36

## 2023-01-16 ASSESSMENT — ACTIVITIES OF DAILY LIVING (ADL)
ADLS_ACUITY_SCORE: 34
ADLS_ACUITY_SCORE: 34
DEPENDENT_IADLS:: CLEANING;SHOPPING;TRANSPORTATION
ADLS_ACUITY_SCORE: 34

## 2023-01-16 NOTE — PROGRESS NOTES
"Vascular Surgery Note    S: Reports good pain control. Does not want stump  if it feels tight.    O  BP (!) 156/84 (BP Location: Left arm)   Pulse 73   Temp 98.3  F (36.8  C) (Oral)   Resp 18   Ht 1.651 m (5' 5\")   Wt 39 kg (85 lb 15.7 oz)   LMP  (LMP Unknown)   SpO2 93%   BMI 14.31 kg/m    Gen: awake and alert  Resp: unlabored on room air  Extremities: right groin dressed with tegederm and dermabond, no hematoma, palpable femoral pulse, stump dressed c/d/i      A/P  POD3 s/p R femoral endarterectomy, TOO stent and R AKA     - stump  today  - dispo: discharge to home after placing stump       Elizabet Catalan MD  01/16/23  8:31 AM   "

## 2023-01-16 NOTE — PROGRESS NOTES
S: Pt seen at [facility] room 2778-02 with reports that her AKA is painful and she will allow me to try to put on the  socks but if it hurts to stop. O: I see the EPIC order for the RT AK  socks post revision from KD to AK. I see she still has a medium to long residual limb. She measured size 3, ( between size 2&3 so we went up in size), in the  sock with no waist belt. A: I showed her how to use the donning tube and then when we tried to apply the  sock over her curlex she stopped me and said it was too painful. She thanked me for trying and showing her how to use the donning tube and I set it on her window sill for future use. P: FU PRN. G: The goal of the  socks is to compress her limb.  Electronically signed Leo Gates , LPO.

## 2023-01-16 NOTE — PLAN OF CARE
Neuro: Patient alert and oriented x4.   Cardiac: No tele. VSS.             Respiratory: Sating 94% on RA.  GI/: Adequate urine output using bedside commode. No BM this shift.   Diet/appetite: Tolerating low saturated fat diet.   Activity: 1 assist pivot to commode.   Pain: Oral oxycodone and robaxin given.   Skin: No new deficits noted.  LDA's: R PIV, SL.    Plan: Possibly going home tomorrow. Will continue with plan of care.

## 2023-01-16 NOTE — PLAN OF CARE
Neuro: A&Ox4. Denied Phantom sensation at RLE. Calls appropriately, makes needs known. Open eyes spontaneously, clear logical speech.    Cardiac: No Tele orders.. VSS. Afebrile.         Respiratory: Sating >92% on RA. Denies SOB.     GI/: Adequate urine output using the commode. No BM this shift (active bowel sounds).  Diet/appetite: Regular low fat diet.  Activity:  Assist of x1 pivot, up to chair and commode.  Pain: localized at lateral R-thigh.x1 PRN Oxy and x1 PRN Robaxin with improvement.   Skin: No new deficits noted.    LDA's: x1 R-PIV (SL).      Plan: Expected discharge 01/16/2023. Monitor pain. Continue with POC. Notify primary team with changes.

## 2023-01-17 VITALS
OXYGEN SATURATION: 95 % | HEART RATE: 68 BPM | SYSTOLIC BLOOD PRESSURE: 109 MMHG | BODY MASS INDEX: 14.33 KG/M2 | WEIGHT: 85.98 LBS | TEMPERATURE: 98.8 F | DIASTOLIC BLOOD PRESSURE: 65 MMHG | RESPIRATION RATE: 18 BRPM | HEIGHT: 65 IN

## 2023-01-17 PROCEDURE — 250N000012 HC RX MED GY IP 250 OP 636 PS 637: Performed by: SURGERY

## 2023-01-17 PROCEDURE — 99024 POSTOP FOLLOW-UP VISIT: CPT | Mod: GC | Performed by: NURSE PRACTITIONER

## 2023-01-17 PROCEDURE — 250N000011 HC RX IP 250 OP 636: Performed by: SURGERY

## 2023-01-17 PROCEDURE — 999N000248 HC STATISTIC IV INSERT WITH US BY RN

## 2023-01-17 PROCEDURE — 250N000013 HC RX MED GY IP 250 OP 250 PS 637: Performed by: SURGERY

## 2023-01-17 RX ADMIN — ASPIRIN 81 MG: 81 TABLET, CHEWABLE ORAL at 07:57

## 2023-01-17 RX ADMIN — ACETAMINOPHEN 650 MG: 325 TABLET, FILM COATED ORAL at 07:55

## 2023-01-17 RX ADMIN — CLOPIDOGREL BISULFATE 75 MG: 75 TABLET ORAL at 07:58

## 2023-01-17 RX ADMIN — FOLIC ACID 1 MG: 1 TABLET ORAL at 07:57

## 2023-01-17 RX ADMIN — AMLODIPINE BESYLATE 5 MG: 5 TABLET ORAL at 07:58

## 2023-01-17 RX ADMIN — HEPARIN SODIUM 5000 UNITS: 5000 INJECTION, SOLUTION INTRAVENOUS; SUBCUTANEOUS at 12:37

## 2023-01-17 RX ADMIN — METOPROLOL TARTRATE 50 MG: 25 TABLET, FILM COATED ORAL at 07:57

## 2023-01-17 RX ADMIN — LISINOPRIL 20 MG: 20 TABLET ORAL at 07:58

## 2023-01-17 RX ADMIN — OXYCODONE HYDROCHLORIDE 10 MG: 10 TABLET ORAL at 04:55

## 2023-01-17 RX ADMIN — PREDNISONE 5 MG: 5 TABLET ORAL at 07:58

## 2023-01-17 RX ADMIN — METHOCARBAMOL 500 MG: 500 TABLET ORAL at 04:55

## 2023-01-17 RX ADMIN — OXYCODONE HYDROCHLORIDE 10 MG: 10 TABLET ORAL at 09:21

## 2023-01-17 RX ADMIN — OXYCODONE HYDROCHLORIDE 10 MG: 10 TABLET ORAL at 15:29

## 2023-01-17 ASSESSMENT — ACTIVITIES OF DAILY LIVING (ADL)
ADLS_ACUITY_SCORE: 34

## 2023-01-17 NOTE — PLAN OF CARE
"Neuro- A&O x4  Cardiac- SR no tele  VS Blood pressure 101/58, pulse 82, temperature 98.4  F (36.9  C), temperature source Oral, resp. rate 16, height 1.651 m (5' 5\"), weight 39 kg (85 lb 15.7 oz), SpO2 92 %, not currently breastfeeding.  Pain-co residual limb pain/stump pain. Received PRN Gabapentin and 10mg Oxycodone twice overnight with good effect, pt denied pain after.   O2- room air  GI/-  up to commode adequate UOP, no BM this shift. LBM 1/15. Regular diet.   Lines- right PIV flushed and saline locked, right groin site is SFD closed with liquid bandage. Right stump is wrapped with roll guaze, did not assess under dressing.   Activity-  pt moves independently (stand and pivot) to the commode. Otherwise uses a power wheelchair at home. Using a FWW while here. Pt hopes to go home today.     No other concerns will continue to monitor and follow POC.       Yasir Rodriguez RN on 1/17/2023 at 6:40 AM      "

## 2023-01-17 NOTE — CONSULTS
Care Management Initial Consult    General Information  Assessment completed with: Patient,    Type of CM/SW Visit: Initial Assessment    Primary Care Provider verified and updated as needed: Yes   Readmission within the last 30 days:           Advance Care Planning:            Communication Assessment  Patient's communication style: spoken language (English or Bilingual)    Hearing Difficulty or Deaf: no   Wear Glasses or Blind: yes    Cognitive  Cognitive/Neuro/Behavioral: WDL  Level of Consciousness: alert  Arousal Level: opens eyes spontaneously  Orientation: oriented x 4  Mood/Behavior: calm, cooperative  Best Language: 0 - No aphasia  Speech: clear, spontaneous    Living Environment:   People in home: significant other  Santi  Current living Arrangements: house      Able to return to prior arrangements: yes       Family/Social Support:  Care provided by: self, spouse/significant other  Provides care for: no one  Marital Status: Domestic Partnership  Significant Other, Children       Santi  Description of Support System: Supportive, Involved    Support Assessment: Adequate family and caregiver support    Current Resources:   Patient receiving home care services: No     Community Resources: None  Equipment currently used at home: walker, rolling, other (see comments) (scooter-in the house)  Supplies currently used at home:      Employment/Financial:  Employment Status: retired        Financial Concerns: No concerns identified           Lifestyle & Psychosocial Needs:  Social Determinants of Health     Tobacco Use: Medium Risk     Smoking Tobacco Use: Former     Smokeless Tobacco Use: Never     Passive Exposure: Not on file   Alcohol Use: Not on file   Financial Resource Strain: Not on file   Food Insecurity: Not on file   Transportation Needs: Not on file   Physical Activity: Not on file   Stress: Not on file   Social Connections: Not on file   Intimate Partner Violence: Not on file   Depression: Not at risk  "    PHQ-2 Score: 0   Housing Stability: Not on file       Functional Status:  Prior to admission patient needed assistance:   Dependent ADLs:: Ambulation-walker, Independent  Dependent IADLs:: Cleaning, Shopping, Transportation       Mental Health Status:  Mental Health Status: No Current Concerns       Chemical Dependency Status:  Chemical Dependency Status: No Current Concerns             Values/Beliefs:  Spiritual, Cultural Beliefs, [employer] Practices, Values that affect care:                 Additional Information:    Care management consult to assess for discharge needs. Pt is s/p R femoral endarterectomy, TOO stent and R AKA on 1/13/2023 Pt initially frustrated to talk to writer, but became very pleasant and engaged as conversation went on. Pt lives in a house with her SO, Santi. Pt reports she has 3 steps up and gets up those stairs on her butt. Once inside her home all needs met on first level. Pt has been able to remain independent with ADLs and most IADLs, SO helps with cleaning and transportation. Pt does not have any home services.     Pt is very eager to discharge home and does not feel she needs home care services. Pt jokingly says several times throughout conversation \"this isn't my first rodeo\".     Care Management to continue to be available if discharge needs arise.     Geena Sow, LICSW        "

## 2023-01-17 NOTE — DISCHARGE INSTRUCTIONS
"Follow up in 4-6 weeks for wound check, if healed will remove staples at this appointment.     Post op education:     -Wear your stump  about 23 hrs a day after surgery. Check your skin daily for any areas of redness or skin breakdown.  -Under no circumstances the tegaderm dressing should be removed from the stump (right amputated leg) incision. It should stay on until January 31st, or at follow up in 2 weeks.  -Do not immerse right amputation in water.  -Amputation leg should be supported straight to help reduce the risk of muscle contraction.   -Once staples are removed you can transition to the  sock and begin getting fitted for prosthetics.  Prosthetics can only begin once the incision is completely healed.   -You may have some degree of phantom sensation.  This is when you \"feel\" the missing part of your limb, it is often mild and not painful however there are several ways to treat it if it is severe/bothersome, if needed call our clinic to discuss further.     -Daily dressing changes - inspect wound for s/s of infection or ischemia.  Place gauze over stump, wrap with kerlix, and apply sock then stump protector.      Follow up with Yasmin Rob CNP Vascular Surgery Clinic in 2 weeks after discharge.    Activity:    Return to your activities gradually, as recommended by physical therapy.   "

## 2023-01-17 NOTE — PROVIDER NOTIFICATION
Provider notified: Hossein  Time: 2330    Message:     From BREANNE [place] [address]  [id number] CECILIA Jaeger--  her IV went bad, she requested to not have another one placed. likely discharge tomorrow.  is that okay?    Call returned: No she needs to have IV during the hospital stay just in case of emergency.      Vascular placed a new right  PIV.     Yasir Rodriguez RN on 1/17/2023 at 6:39 AM

## 2023-01-17 NOTE — PROGRESS NOTES
DISCHARGE                         No discharge date for patient encounter.  ----------------------------------------------------------------------------  Discharged to: Home  Via: private transportation  Accompanied by: Family  Discharge Instructions: Reg diet, Reg activity, medications, follow up appointments, when to call the MD, aftercare instructions.  Prescriptions: To be filled by [facility] pharmacy; medication list reviewed & sent with pt  Follow Up Appointments: arranged; information given  Belongings: All sent with pt  IV: d/c'd  Telemetry: d/c'd  Pt exhibits understanding of above discharge instructions; all questions answered.    Discharge Paperwork: Signed, copied, and sent home with patient.

## 2023-01-18 ENCOUNTER — TELEPHONE (OUTPATIENT)
Dept: VASCULAR SURGERY | Facility: CLINIC | Age: 78
End: 2023-01-18

## 2023-01-18 DIAGNOSIS — M00.9 INFECTION OF RIGHT KNEE (H): ICD-10-CM

## 2023-01-18 DIAGNOSIS — Z89.511 STATUS POST BELOW KNEE AMPUTATION OF RIGHT LOWER EXTREMITY (H): ICD-10-CM

## 2023-01-18 DIAGNOSIS — L97.909 ATHEROSCLEROSIS OF ARTERY OF EXTREMITY WITH ULCERATION (H): ICD-10-CM

## 2023-01-18 DIAGNOSIS — I73.9 PVD (PERIPHERAL VASCULAR DISEASE) (H): ICD-10-CM

## 2023-01-18 DIAGNOSIS — I70.299 ATHEROSCLEROSIS OF ARTERY OF EXTREMITY WITH ULCERATION (H): ICD-10-CM

## 2023-01-18 LAB
PATH REPORT.COMMENTS IMP SPEC: NORMAL
PATH REPORT.FINAL DX SPEC: NORMAL
PATH REPORT.GROSS SPEC: NORMAL
PATH REPORT.MICROSCOPIC SPEC OTHER STN: NORMAL
PATH REPORT.RELEVANT HX SPEC: NORMAL
PHOTO IMAGE: NORMAL

## 2023-01-18 PROCEDURE — 88311 DECALCIFY TISSUE: CPT | Mod: 26 | Performed by: PATHOLOGY

## 2023-01-18 PROCEDURE — 88307 TISSUE EXAM BY PATHOLOGIST: CPT | Mod: 26 | Performed by: PATHOLOGY

## 2023-01-18 RX ORDER — CLOPIDOGREL BISULFATE 75 MG/1
75 TABLET ORAL DAILY
Qty: 90 TABLET | Refills: 3 | Status: SHIPPED | OUTPATIENT
Start: 2023-01-18 | End: 2023-05-09

## 2023-01-18 NOTE — TELEPHONE ENCOUNTER
Attempted to contact Sakshi for her post-op follow-up call. I was unable to reach her but I did leave a detailed voicemail along with our clinic callback.    I will try her again tomorrow.    AMANDA DavenportN, RN  [facility] - [facility] Vascular Surgery  Ph: [phone]  Fax: [phone]

## 2023-01-19 NOTE — TELEPHONE ENCOUNTER
Spoke with Sakshi regarding how pt is doing s/p BKA.    Pt reports their pain is moderate and they are using PRN oxycodone, robaxin, and PRN tylenol to control their pain. The incision is WNL w/ tegaderm in place. I reminded her to leave this in place until her follow-up int he fellow clinic on 2/2. She is wearing her stump  23+ hrs/day. Pt reports they are eating and drinking w/o difficulty. She states her appetite is not fully back to baseline. Pt is voiding and has not had a bowel movement. She will start senna-colace today and will let us know if this is ineffective.    VQI measures: Pt has been prescribed Plavix, atorvastatin, and ASA. She has NOT started her Plavix dt it not being ready at the pharmacy when she picked up her discharge meds. I confirmed that the script was resent yesterday and received by the pharmacy. She will pick it up today. She will contact our clinic if there are issues with this.    Confirmed follow up appointments and provided callback number for further questions/concerns.    AMANDA DavenportN, RN  [facility] - [facility] Vascular Surgery  Ph: [phone]  Fax: [phone]

## 2023-01-23 ENCOUNTER — TELEPHONE (OUTPATIENT)
Dept: VASCULAR SURGERY | Facility: CLINIC | Age: 78
End: 2023-01-23
Payer: COMMERCIAL

## 2023-01-23 DIAGNOSIS — Z89.511 STATUS POST BELOW-KNEE AMPUTATION OF RIGHT LOWER EXTREMITY (H): ICD-10-CM

## 2023-01-23 RX ORDER — GABAPENTIN 300 MG/1
300 CAPSULE ORAL
Qty: 30 CAPSULE | Refills: 3 | Status: SHIPPED | OUTPATIENT
Start: 2023-01-23 | End: 2023-02-02

## 2023-01-23 NOTE — TELEPHONE ENCOUNTER
[facility] Call Center    Phone Message    May a detailed message be left on voicemail: yes     Reason for Call: Medication Refill Request    Has the patient contacted the pharmacy for the refill? Yes   Name of medication being requested: gabapentin (NEURONTIN) 300 MG capsule  Provider who prescribed the medication: Dr. Oconnor  Pharmacy: [facility]/PHARMACY #[id number] - [place], [place] - [address]  Date medication is needed: Now    Action Taken: Message routed to:  [facility] ([facility]    Travel Screening: Not Applicable

## 2023-01-23 NOTE — TELEPHONE ENCOUNTER
Sent as requested to pt pharmacy.    AMANDA DavenportN, RN  [facility] - [facility] Vascular Surgery  Ph: [phone]  Fax: [phone]

## 2023-01-26 ENCOUNTER — TELEPHONE (OUTPATIENT)
Dept: ORTHOPEDICS | Facility: CLINIC | Age: 78
End: 2023-01-26
Payer: COMMERCIAL

## 2023-01-27 ENCOUNTER — TELEPHONE (OUTPATIENT)
Dept: VASCULAR SURGERY | Facility: CLINIC | Age: 78
End: 2023-01-27
Payer: COMMERCIAL

## 2023-01-27 ENCOUNTER — TELEPHONE (OUTPATIENT)
Dept: ORTHOPEDICS | Facility: CLINIC | Age: 78
End: 2023-01-27
Payer: COMMERCIAL

## 2023-01-27 NOTE — TELEPHONE ENCOUNTER
I returned Sakshi's call and spoke with her  Santi. He states Sakshi is having intermittent loose stools, unsure if related to Plavix or not. I recommended OTC immodium or pepto-bismol PRN and trying a bland diet tonight. I instructed them to continue the Plavix and emphasized the importance of this medication for her dx.    I will check in with them on Monday.    MILTON Davenport, RN  [facility] Vascular Surgery  Ph: [phone]  Fax: [phone]

## 2023-01-27 NOTE — TELEPHONE ENCOUNTER
[facility] Call Center    Phone Message    May a detailed message be left on voicemail: yes     Reason for Call: Medication Question or concern regarding medication   Prescription Clarification  Name of Medication: clopidogrel (PLAVIX) 75 MG tablet  Prescribing Provider: Dr. Oconnor   Pharmacy: NA   What on the order needs clarification?   Pt wanted to report some side affects of the clopidogrel (PLAVIX) 75 MG tablet   1) Diarrhea   2) lethargic   3) sleepyness          Action Taken: Message routed to:  [facility] ([facility]      Travel Screening: Not Applicable

## 2023-02-02 ENCOUNTER — OFFICE VISIT (OUTPATIENT)
Dept: VASCULAR SURGERY | Facility: CLINIC | Age: 78
End: 2023-02-02
Payer: COMMERCIAL

## 2023-02-02 VITALS — DIASTOLIC BLOOD PRESSURE: 82 MMHG | SYSTOLIC BLOOD PRESSURE: 144 MMHG | OXYGEN SATURATION: 98 % | HEART RATE: 98 BPM

## 2023-02-02 DIAGNOSIS — Z89.511 STATUS POST BELOW-KNEE AMPUTATION OF RIGHT LOWER EXTREMITY (H): ICD-10-CM

## 2023-02-02 PROCEDURE — 99207 PR NO CHARGE LOS: CPT

## 2023-02-02 RX ORDER — GABAPENTIN 300 MG/1
300 CAPSULE ORAL 3 TIMES DAILY
Qty: 90 CAPSULE | Refills: 0 | Status: SHIPPED | OUTPATIENT
Start: 2023-02-02 | End: 2023-02-24

## 2023-02-02 ASSESSMENT — PAIN SCALES - GENERAL: PAINLEVEL: MILD PAIN (3)

## 2023-02-02 NOTE — NURSING NOTE
Chief Complaint   Patient presents with     Follow Up     2 week wound check       Vitals were taken and medications reconciled.    Jelani Alegre EMT  1:40 PM

## 2023-02-02 NOTE — PROGRESS NOTES
Vascular Surgery Progress Note    Ms. Sakshi Jaeger is a 77 year old female who previously underwent right through-knee amputation by Dr. Mott of orthopedic surgery after a prior right below-knee amputation years ago. Her through-knee amputation failed to heal due to extensive vascular disease. 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 Oconnor on 1/13/2023.    She has been recovering well at home.  Pain has been generally manageable but does have some shooting pains at night.  She has been taking gabapentin at night as needed.  She is very compliant with the stump  now and is using it 100% of the time.  Her appetite has been low and we discussed the importance of eating a high-protein diet to support adequate nutrition for wound healing.      Please see the nursing note for vital signs from today's visit    On exam   Sitting comfortably in chair, no distress  Nonlabored breathing on room air  Right groin wound incision is well-healed.  Tegaderm removed.  Picture of the wound is below.  Right AKA wound is healing well.  Approximately half the staples were removed today.  The remaining staples were left in place while the wound continues to heal.  Palpable right femoral pulse                A/P: 77 y.o. female with non-healing right through-knee amputation s/p right femoral endarterectomy, right external iliac stent, and revision to above-knee amputation on 1/13/23.  Healing well.  Some of the staples removed from the right AKA stump today.  About half the staples remain.    She will plan to return to the fellows clinic in 1 week for another wound check and removal of the remaining staples.  A new gabapentin prescription was sent to her pharmacy for 300 mg 3 times daily.    The patient was seen and discussed with staff, Dr. Elissa Giraldo MD

## 2023-02-09 ENCOUNTER — OFFICE VISIT (OUTPATIENT)
Dept: VASCULAR SURGERY | Facility: CLINIC | Age: 78
End: 2023-02-09
Payer: COMMERCIAL

## 2023-02-09 VITALS — SYSTOLIC BLOOD PRESSURE: 136 MMHG | DIASTOLIC BLOOD PRESSURE: 69 MMHG | HEART RATE: 82 BPM | OXYGEN SATURATION: 99 %

## 2023-02-09 DIAGNOSIS — I25.10 CORONARY ARTERY DISEASE INVOLVING NATIVE CORONARY ARTERY OF NATIVE HEART WITHOUT ANGINA PECTORIS: ICD-10-CM

## 2023-02-09 DIAGNOSIS — Z48.89 ENCOUNTER FOR POSTOPERATIVE WOUND CHECK: Primary | ICD-10-CM

## 2023-02-09 DIAGNOSIS — I10 ESSENTIAL HYPERTENSION WITH GOAL BLOOD PRESSURE LESS THAN 140/90: ICD-10-CM

## 2023-02-09 PROCEDURE — 99207 PR NO CHARGE LOS: CPT

## 2023-02-09 RX ORDER — METOPROLOL TARTRATE 50 MG
50 TABLET ORAL DAILY
Qty: 90 TABLET | Refills: 1 | Status: CANCELLED | OUTPATIENT
Start: 2023-02-09

## 2023-02-09 NOTE — TELEPHONE ENCOUNTER
Dr. Roberto,    Spoke with pt, stated only take one tablet daily.     Thanks,  BREANNE Apodaca  [facility]

## 2023-02-09 NOTE — PROGRESS NOTES
Vascular Surgery Clinic Note       Ms. Sakshi Jaeger is a 77 year old female who previously underwent right through-knee amputation by Dr. Mott of orthopedic surgery after a prior right below-knee amputation years ago. Her through-knee amputation failed to heal due to extensive vascular disease. 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 Oconnor on 1/13/2023.    About half of the staples were removed in clinic last week and she returned know for a wound check. Denies any issues with the wound. Continues to use the stump  100% of the time. Substantial imrpovement in pain since increased gabapentin dose to 300 mg TID last week.    /69 (BP Location: Right arm, Patient Position: Chair, Cuff Size: Adult Small)   Pulse 82   LMP  (LMP Unknown)   SpO2 99%     Exam  Sitting comfortably in exam chair, no distress  Nonlabored breathing on room air  Right AKA stump continues to heal well, incision is intact, scant amount of dried serous drainage on the medial steristrips.  All remaining staples removed and steristrips placed over the remaining exposed incision          Assessment & Plan:  77 y.o. female with non-healing right through-knee amputation s/p right femoral endarterectomy, right external iliac stent, and revision to above-knee amputation on 1/13/23.   - return to clinic in 3 weeks for final wound check before being fitted for prosthesis  - She is already has a prosthetist at [facility] who will see once the wound has completely healed      Patient was seen and discussed with staff, Dr. Bowers.    25 minutes spent on encounter.    Ariel Giraldo MD

## 2023-02-09 NOTE — TELEPHONE ENCOUNTER
Pending Prescriptions:                       Disp   Refills    metoprolol tartrate (LOPRESSOR) 50 MG tab*90 tab*3            Sig: Take 1 tablet (50 mg) by mouth 2 times daily

## 2023-02-10 RX ORDER — METOPROLOL SUCCINATE 50 MG/1
50 TABLET, EXTENDED RELEASE ORAL DAILY
Qty: 90 TABLET | Refills: 1 | Status: SHIPPED | OUTPATIENT
Start: 2023-02-10 | End: 2023-01-01

## 2023-02-20 NOTE — TELEPHONE ENCOUNTER
----- Message from Janeth Maynard ATC sent at 10/14/2019 11:37 AM CDT -----  No because its a week and a half and I am full on the 23. I want to give that incision just a little longer any ways. Thanks for double checking!  ----- Message -----  From: Eleanor Quinonez  Sent: 10/14/2019  10:42 AM CDT  To: Janeth Maynard ATC    Tawanna Janeth,    Just want to double check with you about his Post op with you 10/3019 would be 3 weeks do you want it then or sooner    Thanks, Eleanor  ----- Message -----  From: Janeth Maynard ATC  Sent: 10/14/2019   7:55 AM CDT  To: Clinic Coordinators-[facility]    Please schedule patient for 2 week pop visit with me on nurse schedule on 10/30/19 between 8am and noon. I currently have one patient at 9:00 so do not book between 9-9:30 then a 6 week follow up with Dr. Mott on 11/19/19.    Thanks  ----- Message -----  From: Nick Napoles MD  Sent: 10/11/2019   6:22 PM CDT  To: [facility]    Please schedule patient for 2 week postop clinic follow up with Tiffani Lora or Dr. Walker. Right BKA stump I&D on 10/10 with Dr. Mott. Open BKA stump wound with WOC dressing changes. Thanks!           Final Anesthesia Post-op Assessment    Patient: Racheal Rojas  Procedure(s) Performed: EXTRACTION, CATARACT, WITH IOL INSERTION RIGHT EYE - RIGHT  Anesthesia type: MAC    Vitals Value Taken Time   Temp 97.4 02/20/23 1534   Pulse 62 02/20/23 1502   Resp 18 02/20/23 1502   SpO2 95 % 02/20/23 1502   /82 02/20/23 1502         Patient Location: Phase II  Post-op Vital Signs:stable  Level of Consciousness: awake, participates in exam, alert and oriented  Respiratory Status: spontaneous ventilation and unassisted  Cardiovascular blood pressure returned to baseline  Hydration: euvolemic  Pain Management: well controlled and adequately managed  Handoff: Handoff to receiving clinician was performed and questions were answered  Vomiting: none  Nausea: None  Airway Patency:patent  Post-op Assessment: awake, alert, appropriately conversant, or baseline, no complications, patient tolerated procedure well with no complications, no evidence of recall, dentition within defined limits, moving all extremities and No Corneal Abrasion  Comments: Patient is awake, alert, comfortable. No anesthesia problem. Pain well controlled and denies nausea. Patient has been reassessed and is appropriate to be discharge to home at this time.      No notable events documented.

## 2023-02-24 DIAGNOSIS — Z89.511 STATUS POST BELOW-KNEE AMPUTATION OF RIGHT LOWER EXTREMITY (H): ICD-10-CM

## 2023-02-24 RX ORDER — GABAPENTIN 300 MG/1
300 CAPSULE ORAL 3 TIMES DAILY
Qty: 270 CAPSULE | Refills: 3 | Status: SHIPPED | OUTPATIENT
Start: 2023-02-24 | End: 2023-02-25

## 2023-02-24 NOTE — TELEPHONE ENCOUNTER
Faxed refill for gabapentin rec'd from pharmacy. Escript sent.    AMANDA DavenportN, RN  [facility] - [facility] Vascular Surgery  Ph: [phone]  Fax: [phone]

## 2023-02-25 DIAGNOSIS — Z89.511 STATUS POST BELOW-KNEE AMPUTATION OF RIGHT LOWER EXTREMITY (H): ICD-10-CM

## 2023-02-25 RX ORDER — GABAPENTIN 300 MG/1
300 CAPSULE ORAL 3 TIMES DAILY
Qty: 270 CAPSULE | Refills: 1 | Status: SHIPPED | OUTPATIENT
Start: 2023-02-25 | End: 2023-02-27

## 2023-02-27 DIAGNOSIS — Z89.511 STATUS POST BELOW-KNEE AMPUTATION OF RIGHT LOWER EXTREMITY (H): ICD-10-CM

## 2023-02-27 RX ORDER — GABAPENTIN 300 MG/1
300 CAPSULE ORAL 3 TIMES DAILY
Qty: 270 CAPSULE | Refills: 0 | Status: SHIPPED | OUTPATIENT
Start: 2023-02-27 | End: 2024-01-01

## 2023-03-02 ENCOUNTER — OFFICE VISIT (OUTPATIENT)
Dept: VASCULAR SURGERY | Facility: CLINIC | Age: 78
End: 2023-03-02
Payer: COMMERCIAL

## 2023-03-02 VITALS — DIASTOLIC BLOOD PRESSURE: 76 MMHG | SYSTOLIC BLOOD PRESSURE: 112 MMHG | HEART RATE: 69 BPM | OXYGEN SATURATION: 100 %

## 2023-03-02 DIAGNOSIS — L97.909 ATHEROSCLEROSIS OF ARTERY OF EXTREMITY WITH ULCERATION (H): ICD-10-CM

## 2023-03-02 DIAGNOSIS — Z48.812 ENCOUNTER FOR SURGICAL AFTERCARE FOLLOWING SURGERY ON THE CIRCULATORY SYSTEM: ICD-10-CM

## 2023-03-02 DIAGNOSIS — Z89.511 STATUS POST BELOW-KNEE AMPUTATION OF RIGHT LOWER EXTREMITY (H): Primary | ICD-10-CM

## 2023-03-02 DIAGNOSIS — I73.9 PVD (PERIPHERAL VASCULAR DISEASE) (H): ICD-10-CM

## 2023-03-02 DIAGNOSIS — I70.299 ATHEROSCLEROSIS OF ARTERY OF EXTREMITY WITH ULCERATION (H): ICD-10-CM

## 2023-03-02 PROCEDURE — 99207 PR NO CHARGE LOS: CPT

## 2023-03-02 NOTE — PATIENT INSTRUCTIONS
Thank you so much for choosing us for your care. It was a pleasure to see you at the vascular clinic today.     Follow-up recommendations: We will see you back in 1 year. Please do not hesitate to reach out to us in the meantime with any concerns. Our schedulers will get in touch with you to set up your follow-up visit.    Additional testing/imaging ordered today: Repeat imaging in 1 year.       Our scheduling team will get in touch with you to set up any follow-up testing/imaging and/or appointments. Please be aware that any testing/imaging recommended today will need to completed prior to your next visit with the provider. If testing/imaging is not completed prior to your next visit, your visit may be rescheduled.        If you have any questions, please contact our clinic directly at [phone] and ask for the nurse. We also encourage the use of [employer] to communicate with your HealthCare Provider.      If you have an urgent need after business hours (8:00 am to 4:30 pm) please call [phone], option 4, and ask for the vascular attending on call. For non-urgent after hours needs, please call the vascular clinic at [phone]. For scheduling needs, please call our clinic directly at [phone].

## 2023-03-02 NOTE — PROGRESS NOTES
Vascular Surgery Clinic Note    Ms. Jaeger presents today for a wound check after prior right above-knee amputation, right femoral endarterectomy, and right common iliac artery stent on 1/13/2023 with Dr. Oconnor. Staples were removed at her previous clinic visit.    /76 (BP Location: Left arm, Patient Position: Sitting, Cuff Size: Adult Regular)   Pulse 69   LMP  (LMP Unknown)   SpO2 100%     Exam  Sitting comofrtably in chair, no distress  Nonlaobred breathing on room air  Right AKA stump is well-healed. No erythema, drainage, or induration.          Assessment and Plan:  77 y.o. female with non-healing right through-knee amputation s/p right femoral endarterectomy, right external iliac stent, and revision to above-knee amputation on 1/13/23.    AKA wound has healed well and is ready to be fit with a prosthesis. She has a prosthetist whom she has worked with previously.    She will follow-up in vascular surgery clinic in one year for surveillance of her iliac stent.    Patient discussed with and images reviewed with staff, Dr. Bowers.    Ariel Giraldo MD    25 min spent on this encounter.

## 2023-03-02 NOTE — NURSING NOTE
Chief Complaint   Patient presents with     Follow Up     RLE amputation wound check. Pt reports pain at end of amputation when sitting in walker and when sleeve isn't worn.        Medications and allergies reconciled.  Vitals collected.    Destiney Sanford LPN

## 2023-03-14 DIAGNOSIS — I10 ESSENTIAL HYPERTENSION WITH GOAL BLOOD PRESSURE LESS THAN 140/90: ICD-10-CM

## 2023-03-14 RX ORDER — AMLODIPINE BESYLATE 5 MG/1
5 TABLET ORAL DAILY
Qty: 90 TABLET | Refills: 3 | Status: SHIPPED | OUTPATIENT
Start: 2023-03-14 | End: 2024-01-01

## 2023-04-03 ENCOUNTER — TELEPHONE (OUTPATIENT)
Dept: VASCULAR SURGERY | Facility: CLINIC | Age: 78
End: 2023-04-03
Payer: COMMERCIAL

## 2023-04-03 NOTE — TELEPHONE ENCOUNTER
Called and spoke with Isidra. Requested she fax forms to right fax at [phone]. They are awaiting this documentation to submit for prior authorization.     Destiney Sanford LPN

## 2023-04-03 NOTE — TELEPHONE ENCOUNTER
[facility] Call Center    Phone Message    May a detailed message be left on voicemail: yes     Reason for Call: Other: Isidra from [email or web address] inquiring about a needed signature that they faxed over. Please call Isidra to discuss at [phone]. Thanks     Action Taken: Other: vascular    Travel Screening: Not Applicable

## 2023-04-11 ENCOUNTER — MEDICAL CORRESPONDENCE (OUTPATIENT)
Dept: HEALTH INFORMATION MANAGEMENT | Facility: CLINIC | Age: 78
End: 2023-04-11
Payer: COMMERCIAL

## 2023-04-11 NOTE — TELEPHONE ENCOUNTER
[facility] Call Center    Phone Message    May a detailed message be left on voicemail: yes     Reason for Call: Other: Pt's EC Santi is calling back to discuss the prior authorization. [facility] states it is holding up her Rx. Please call back as soon as possible. Thank you.      Action Taken: Message routed to:  [facility] ([facility] Vascular    Travel Screening: Not Applicable

## 2023-04-11 NOTE — TELEPHONE ENCOUNTER
[facility] Call Center    Phone Message    May a detailed message be left on voicemail: yes     Reason for Call: Other:  [employer] called inquiring where prior auth was at. requested that finished prior auth be emailed to [email or web address] . Thanks!     Action Taken: Other: Vascular    Travel Screening: Not Applicable

## 2023-04-11 NOTE — TELEPHONE ENCOUNTER
Form sent to MD for signature.     AMANDA DavenportN, RN  [facility] - P Vascular Surgery  Ph: [phone]  Fax: [phone]

## 2023-04-19 ENCOUNTER — HOSPITAL ENCOUNTER (EMERGENCY)
Facility: CLINIC | Age: 78
Discharge: HOME | End: 2023-04-19
Attending: EMERGENCY MEDICINE | Admitting: EMERGENCY MEDICINE
Payer: COMMERCIAL

## 2023-04-19 ENCOUNTER — ANCILLARY PROCEDURE (OUTPATIENT)
Dept: ULTRASOUND IMAGING | Facility: CLINIC | Age: 78
End: 2023-04-19
Attending: INTERNAL MEDICINE
Payer: COMMERCIAL

## 2023-04-19 VITALS
SYSTOLIC BLOOD PRESSURE: 118 MMHG | TEMPERATURE: 98.3 F | DIASTOLIC BLOOD PRESSURE: 55 MMHG | OXYGEN SATURATION: 98 % | RESPIRATION RATE: 16 BRPM | HEART RATE: 69 BPM

## 2023-04-19 DIAGNOSIS — I82.4Z2 ACUTE DEEP VEIN THROMBOSIS (DVT) OF DISTAL END OF LEFT LOWER EXTREMITY (H): ICD-10-CM

## 2023-04-19 DIAGNOSIS — R79.1 ABNORMAL COAGULATION PROFILE: ICD-10-CM

## 2023-04-19 LAB
ANION GAP SERPL CALCULATED.3IONS-SCNC: 14 MMOL/L (ref 7–15)
BASOPHILS # BLD AUTO: 0 10E3/UL (ref 0–0.2)
BASOPHILS NFR BLD AUTO: 1 %
BUN SERPL-MCNC: 11.8 MG/DL (ref 8–23)
CALCIUM SERPL-MCNC: 9.7 MG/DL (ref 8.8–10.2)
CHLORIDE SERPL-SCNC: 99 MMOL/L (ref 98–107)
CREAT SERPL-MCNC: 0.54 MG/DL (ref 0.51–0.95)
DEPRECATED HCO3 PLAS-SCNC: 18 MMOL/L (ref 22–29)
EOSINOPHIL # BLD AUTO: 0 10E3/UL (ref 0–0.7)
EOSINOPHIL NFR BLD AUTO: 0 %
ERYTHROCYTE [DISTWIDTH] IN BLOOD BY AUTOMATED COUNT: 18.8 % (ref 10–15)
GFR SERPL CREATININE-BSD FRML MDRD: >90 ML/MIN/1.73M2
GLUCOSE SERPL-MCNC: 98 MG/DL (ref 70–99)
HCT VFR BLD AUTO: 34.6 % (ref 35–47)
HGB BLD-MCNC: 11 G/DL (ref 11.7–15.7)
IMM GRANULOCYTES # BLD: 0 10E3/UL
IMM GRANULOCYTES NFR BLD: 1 %
LYMPHOCYTES # BLD AUTO: 0.3 10E3/UL (ref 0.8–5.3)
LYMPHOCYTES NFR BLD AUTO: 9 %
MCH RBC QN AUTO: 34.1 PG (ref 26.5–33)
MCHC RBC AUTO-ENTMCNC: 31.8 G/DL (ref 31.5–36.5)
MCV RBC AUTO: 107 FL (ref 78–100)
MONOCYTES # BLD AUTO: 0.1 10E3/UL (ref 0–1.3)
MONOCYTES NFR BLD AUTO: 3 %
NEUTROPHILS # BLD AUTO: 3.2 10E3/UL (ref 1.6–8.3)
NEUTROPHILS NFR BLD AUTO: 86 %
NRBC # BLD AUTO: 0 10E3/UL
NRBC BLD AUTO-RTO: 0 /100
PLATELET # BLD AUTO: 222 10E3/UL (ref 150–450)
POTASSIUM SERPL-SCNC: 5 MMOL/L (ref 3.4–5.3)
RADIOLOGIST FLAGS: ABNORMAL
RBC # BLD AUTO: 3.23 10E6/UL (ref 3.8–5.2)
SODIUM SERPL-SCNC: 131 MMOL/L (ref 136–145)
WBC # BLD AUTO: 3.7 10E3/UL (ref 4–11)

## 2023-04-19 PROCEDURE — 99283 EMERGENCY DEPT VISIT LOW MDM: CPT | Performed by: EMERGENCY MEDICINE

## 2023-04-19 PROCEDURE — 93971 EXTREMITY STUDY: CPT | Mod: LT | Performed by: RADIOLOGY

## 2023-04-19 PROCEDURE — 36415 COLL VENOUS BLD VENIPUNCTURE: CPT | Performed by: EMERGENCY MEDICINE

## 2023-04-19 PROCEDURE — 80048 BASIC METABOLIC PNL TOTAL CA: CPT | Performed by: EMERGENCY MEDICINE

## 2023-04-19 PROCEDURE — 85025 COMPLETE CBC W/AUTO DIFF WBC: CPT | Performed by: EMERGENCY MEDICINE

## 2023-04-19 PROCEDURE — 99284 EMERGENCY DEPT VISIT MOD MDM: CPT | Performed by: EMERGENCY MEDICINE

## 2023-04-19 RX ORDER — APIXABAN 5 MG (74)
KIT ORAL
Qty: 74 EACH | Refills: 0 | Status: SHIPPED | OUTPATIENT
Start: 2023-04-19 | End: 2023-05-19

## 2023-04-19 ASSESSMENT — ACTIVITIES OF DAILY LIVING (ADL)
ADLS_ACUITY_SCORE: 41
ADLS_ACUITY_SCORE: 41

## 2023-04-19 NOTE — ED TRIAGE NOTES
"Directed to the ED from clinic due to having an US today that showed \" a blood clot in the leg.\" Currently has swelling of the left ankle.  Not currently on blood thinning medications.      Triage Assessment     Row Name 04/19/23 1829       Triage Assessment (Adult)    Airway WDL WDL       Respiratory WDL    Respiratory WDL WDL       Skin Circulation/Temperature WDL    Skin Circulation/Temperature WDL WDL       Cardiac WDL    Cardiac WDL WDL       Peripheral/Neurovascular WDL    Peripheral Neurovascular WDL X;neurovascular assessment lower       Cognitive/Neuro/Behavioral WDL    Cognitive/Neuro/Behavioral WDL WDL              "

## 2023-04-20 NOTE — DISCHARGE INSTRUCTIONS
Start blood thinner medication as directed this was called into your pharmacy  Use extra caution to prevent from falling or injuring your head  If you develop any abnormal bleeding, severe headache or other concern return to the ER  Contact your doctor to let them know that you were started on this new medication they will need to manage it going forward

## 2023-05-09 ENCOUNTER — OFFICE VISIT (OUTPATIENT)
Dept: FAMILY MEDICINE | Facility: CLINIC | Age: 78
End: 2023-05-09
Payer: COMMERCIAL

## 2023-05-09 VITALS
SYSTOLIC BLOOD PRESSURE: 130 MMHG | WEIGHT: 87 LBS | TEMPERATURE: 98.5 F | HEART RATE: 80 BPM | OXYGEN SATURATION: 97 % | BODY MASS INDEX: 14.48 KG/M2 | DIASTOLIC BLOOD PRESSURE: 68 MMHG

## 2023-05-09 DIAGNOSIS — Z89.511 STATUS POST BELOW-KNEE AMPUTATION OF RIGHT LOWER EXTREMITY (H): ICD-10-CM

## 2023-05-09 DIAGNOSIS — I82.4Z2 DEEP VEIN THROMBOSIS (DVT) OF DISTAL VEIN OF LEFT LOWER EXTREMITY, UNSPECIFIED CHRONICITY (H): Primary | ICD-10-CM

## 2023-05-09 PROCEDURE — 99214 OFFICE O/P EST MOD 30 MIN: CPT | Performed by: PHYSICIAN ASSISTANT

## 2023-05-09 ASSESSMENT — PAIN SCALES - GENERAL: PAINLEVEL: NO PAIN (0)

## 2023-05-09 NOTE — PROGRESS NOTES
Assessment & Plan     Deep vein thrombosis (DVT) of distal vein of left lower extremity, unspecified chronicity (H)  Will continue the Eliquis x 3 months. Questionable whether this is provoked or unprovoked, no factor other than she has been sitting more in the last 5 months since BKA- getting prothesis soon. Repeat ultrasound in 3 months.   - apixaban ANTICOAGULANT (ELIQUIS) 5 MG tablet; Take 1 tablet (5 mg) by mouth 2 times daily  - US Lower Extremity Venous Duplex Left; Future    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)               MED REC REQUIRED  Post Medication Reconciliation Status: patient was not discharged from an inpatient facility or TCU      Emma Dorsey PA-C  [facility] TOYA Cantor is a 77 year old, presenting for the following health issues:  ER F/U and Health Maintenance        5/9/2023    11:59 AM   Additional Questions   Roomed by osei OCHOA     ED/UC Followup:    Facility:  [facility] Emergency Department  Date of visit: 4/19/2023  Reason for visit: leg swelling/blood clot  Current Status: Pt stated that the left ankle still swollen. Pt has compression sock.     Dr. Lantigua rheumatology.     Does not check blood pressure at home.   Has not had any travel- car or plane.   No changes to medications,   Sedentary since the surgery.     Review of Systems         Objective    /68   Pulse 80   Temp 98.5  F (36.9  C) (Oral)   Wt 39.5 kg (87 lb)   LMP  (LMP Unknown)   SpO2 97%   BMI 14.48 kg/m    Body mass index is 14.48 kg/m .  Physical Exam   GENERAL: healthy, alert and no distress  MS: right leg with BKA, incision with some superficial abrasion, no signs of infection or wound. Left lower leg is swollen around the ankle and non tender.   SKIN: no suspicious lesions or rashes  NEURO: Normal strength and tone, mentation intact and speech normal  PSYCH: mentation appears normal, affect normal/bright

## 2023-05-09 NOTE — PATIENT INSTRUCTIONS
.refill request for :lantus solostar   Last dispense :05/01/2020    Last prescribed by:     Last office visit : 02/04/2020  Future office visit : n/a      Please sign if approved, patient's preferred pharmacy is: [facility]       Schedule your venous ultrasound in Mid July.   Continue the Eliquis until after the ultrasound.       Blood pressure should be less than 140/90 when checked.

## 2023-06-02 DIAGNOSIS — I10 ESSENTIAL HYPERTENSION WITH GOAL BLOOD PRESSURE LESS THAN 140/90: ICD-10-CM

## 2023-06-02 NOTE — TELEPHONE ENCOUNTER
[facility] pharmacy response requested  Lisinopril   [facility] pharmacy   [address] [facility] pending  Luis Alberto Wolf MA on 6/2/2023 at 3:53 PM

## 2023-06-05 RX ORDER — LISINOPRIL 20 MG/1
20 TABLET ORAL DAILY
Qty: 90 TABLET | Refills: 0 | Status: SHIPPED | OUTPATIENT
Start: 2023-06-05 | End: 2023-01-01

## 2023-06-06 DIAGNOSIS — I10 ESSENTIAL HYPERTENSION WITH GOAL BLOOD PRESSURE LESS THAN 140/90: ICD-10-CM

## 2023-06-06 RX ORDER — LISINOPRIL 20 MG/1
20 TABLET ORAL DAILY
Qty: 90 TABLET | Refills: 0 | OUTPATIENT
Start: 2023-06-06

## 2023-07-12 NOTE — PLAN OF CARE
[id number]. Vitals stable on room air. Alert and oriented x4. Ax2.     Incontinent of stool this shift. Purwick in place and working well.     C/o pain in lower back. MD notified and said ok to give 1x dose tylenol for pain (exception to strict NPO status).     Patient will have IND at 1330 today and is strict npo. Volteron gel used for pain in back.     Intermittent abx through piv. Patient slept well through night potassium replaced 10/8- will see recheck.     Gena Villa RN on 10/9/2019 at 7:42 AM     PAST MEDICAL HISTORY:  Anemia     Asthma     Peripheral neuropathy     Sickle cell trait

## 2023-10-19 NOTE — PROGRESS NOTES
History of Present Illness - Sakshi Jaeger is a wonderfully nice 78 year old female last seen on 3/31/2022    To review, she has had issues with ear wax, more on the RIGHT than LEFT, and for many years Dr. OSMAN has helped her with alligators or irrigations.  However, over the last year prior to first seeing me in January 2019, it seems much more tenacious and sticky.  The other day the LEFT ear got some water in it from the shower and had been totally clogged since then.    No previous ear surgery, no chronic ear disease, no bleeding or drainage from the ears.    I had to procedurally clear tremendous cerumen from both ears.  And after the initial visit I asked her to come back in four months and she was again impacted severely.  So at this point I have asked her to come back at least every 4-6 months for debridement.    Past Medical History -   Patient Active Problem List   Diagnosis    Osteoporosis    Rheumatoid arthritis (H)    Hyperlipidemia LDL goal <100    Advanced directives, counseling/discussion    Status post below-knee amputation (H)    Employs prosthetic leg    Essential hypertension with goal blood pressure less than 140/90    Coronary artery disease involving native coronary artery of native heart without angina pectoris    Sacroiliac joint pain    PVD (peripheral vascular disease) (H24)    Protein-calorie malnutrition (H24)    Combined forms of age-related cataract of both eyes    Anemia    Infection of right knee (H)    Status post below knee amputation of right lower extremity (H)    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)    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)       Current Medications -   Current Outpatient Medications:     lisinopril (ZESTRIL) 20 MG tablet, TAKE 1 TABLET BY MOUTH EVERY DAY, Disp: 90 tablet, Rfl: 0    acetaminophen (TYLENOL) 325 MG tablet, Take 2 tablets (650 mg) by mouth every 4 hours, Disp: , Rfl:     amLODIPine (NORVASC) 5 MG tablet,  Take 1 tablet (5 mg) by mouth daily, Disp: 90 tablet, Rfl: 3    aspirin (ASA) 81 MG tablet, Take 1 tablet (81 mg) by mouth daily (Patient not taking: Reported on 3/2/2023), Disp:  , Rfl:     calcium carbonate 600 mg-vitamin D 400 units (CALCIUM 600 + D) 600-400 MG-UNIT per tablet, Take 2 tablets by mouth daily, Disp: , Rfl:     ELIQUIS ANTICOAGULANT 5 MG tablet, TAKE 1 TABLET BY MOUTH TWICE A DAY, Disp: 60 tablet, Rfl: 2    folic acid (FOLVITE) 1 MG tablet, Take 1 mg by mouth daily, Disp: , Rfl:     gabapentin (NEURONTIN) 300 MG capsule, Take 1 capsule (300 mg) by mouth 3 times daily, Disp: 270 capsule, Rfl: 0    leflunomide (ARAVA) 20 MG tablet, Take 1 tablet by mouth every 24 hours, Disp: , Rfl:     methotrexate 2.5 MG tablet, Take 20 mg by mouth every 7 days On , Disp: , Rfl:     metoprolol succinate ER (TOPROL XL) 50 MG 24 hr tablet, TAKE ONE TABLET BY MOUTH EVERY DAY, Disp: 90 tablet, Rfl: 1    multivitamin w/minerals (THERA-VIT-M) tablet, Take 1 tablet by mouth daily, Disp: , Rfl:     predniSONE (DELTASONE) 5 MG tablet, Take 1 tablet (5 mg) by mouth daily, Disp: , Rfl: 0    simvastatin (ZOCOR) 20 MG tablet, Take 1 tablet (20 mg) by mouth At Bedtime, Disp: 90 tablet, Rfl: 1    Allergies -   No Known Allergies    Social History -   Social History     Socioeconomic History    Marital status: Single     Spouse name: None    Number of children: None    Years of education: None    Highest education level: None   Social Needs    Financial resource strain: None    Food insecurity - worry: None    Food insecurity - inability: None    Transportation needs - medical: None    Transportation needs - non-medical: None   Occupational History    None   Tobacco Use    Smoking status: Former Smoker     Packs/day: 0.50     Years: 45.00     Pack years: 22.50     Types: Cigarettes     Last attempt to quit: 2010     Years since quittin.9    Smokeless tobacco: Never Used   Substance and Sexual Activity    Alcohol  use: Yes     Comment: occsionally-3 -4 drinks a week    Drug use: No    Sexual activity: Yes     Partners: Male   Other Topics Concern    Parent/sibling w/ CABG, MI or angioplasty before 65F 55M? No   Social History Narrative    None       Family History -   Family History   Problem Relation Age of Onset    Cardiovascular Mother     Cancer Brother     Diabetes No family hx of     Hypertension No family hx of     Cerebrovascular Disease No family hx of     Alzheimer Disease No family hx of     Thyroid Disease No family hx of     Respiratory No family hx of     Other - See Comments Mother         CARDIOVASCULAR    Cancer Brother        Review of Systems - As per HPI and PMHx, otherwise 10+ system review of the head and neck, and general constitution is negative.    Physical Exam  LMP  (LMP Unknown)     General - The patient is well nourished and well developed, and appears to have good nutritional status.  Alert and oriented to person and place, answers questions and cooperates with examination appropriately.   Head and Face - Normocephalic and atraumatic, with no gross asymmetry noted of the contour of the facial features.  The facial nerve is intact, with strong symmetric movements.  Voice and Breathing - The patient was breathing comfortably without the use of accessory muscles. There was no wheezing, stridor, or stertor.  The patients voice was clear and strong, and had appropriate pitch and quality.  Eyes - Extraocular movements intact, and the pupils were reactive to light.  Sclera were not icteric or injected, conjunctiva were pink and moist.  Mouth - Examination of the oral cavity showed pink, healthy oral mucosa. No lesions or ulcerations noted.  The tongue was mobile and midline, and the dentition were in good condition.        Cerumen Removal    Physical Exam and Procedure  Ears - On examination of the ears, I found that the BOTH sides had cerumen impaction.  Therefore, I positioned them in the examination  chair in a semi-supine position, beginning with the right side.  I used the binocular surgical microscope to perform cerumen removal.  I began by using a cerumen loop to gently lift the edges of the cerumen mass away from the walls of the external canal.  Once I did this, I was able to suction away fragments of wax and debris using suction.  Once the mass was loose enough, the entire plug was pulled from the canal.  The tympanic membrane was intact, no sign of perforation or middle ear effusion.    I turned my attention to the contralateral side once again using the binocular surgical microscope to perform cerumen removal.  I began by using a cerumen loop to gently lift the edges of the cerumen mass away from the walls of the external canal.  Once I did this, I was able to suction away fragments of wax and debris using suction.  Once the mass was loose enough, the entire plug was pulled from the canal.  The tympanic membrane was intact, no sign of perforation or middle ear effusion.        A/P - Sakshi Jaeger is a 78 year old female  (H61.23) Bilateral impacted cerumen  (primary encounter diagnosis)  (H93.8X3) Ear fullness, bilateral    The patient has had their cerumen procedurally removed today.  I have discussed ear care at home, including avoiding qtips or any other object placed in the canal.  I have also discussed that over the counter cerumen kits like Debrox or Cerumenex can be useful.    See me in another 4-6 months.  She has my medical clearance for hearing aid fitting

## 2023-10-26 NOTE — LETTER
10/26/2023         RE: Sakshi Jaeger  [address] [facility] [id number]        Dear Colleague,    Thank you for referring your patient, Sakshi Jaeger, to the [facility]. Please see a copy of my visit note below.    History of Present Illness - Sakshi Jaeger is a wonderfully nice 78 year old female last seen on 3/31/2022    To review, she has had issues with ear wax, more on the RIGHT than LEFT, and for many years Dr. OSMAN has helped her with alligators or irrigations.  However, over the last year prior to first seeing me in January 2019, it seems much more tenacious and sticky.  The other day the LEFT ear got some water in it from the shower and had been totally clogged since then.    No previous ear surgery, no chronic ear disease, no bleeding or drainage from the ears.    I had to procedurally clear tremendous cerumen from both ears.  And after the initial visit I asked her to come back in four months and she was again impacted severely.  So at this point I have asked her to come back at least every 4-6 months for debridement.    Past Medical History -   Patient Active Problem List   Diagnosis     Osteoporosis     Rheumatoid arthritis (H)     Hyperlipidemia LDL goal <100     Advanced directives, counseling/discussion     Status post below-knee amputation (H)     Employs prosthetic leg     Essential hypertension with goal blood pressure less than 140/90     Coronary artery disease involving native coronary artery of native heart without angina pectoris     Sacroiliac joint pain     PVD (peripheral vascular disease) (H24)     Protein-calorie malnutrition (H24)     Combined forms of age-related cataract of both eyes     Anemia     Infection of right knee (H)     Status post below knee amputation of right lower extremity (H)     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)     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)       Current Medications -    Current Outpatient Medications:      lisinopril (ZESTRIL) 20 MG tablet, TAKE 1 TABLET BY MOUTH EVERY DAY, Disp: 90 tablet, Rfl: 0     acetaminophen (TYLENOL) 325 MG tablet, Take 2 tablets (650 mg) by mouth every 4 hours, Disp: , Rfl:      amLODIPine (NORVASC) 5 MG tablet, Take 1 tablet (5 mg) by mouth daily, Disp: 90 tablet, Rfl: 3     aspirin (ASA) 81 MG tablet, Take 1 tablet (81 mg) by mouth daily (Patient not taking: Reported on 3/2/2023), Disp:  , Rfl:      calcium carbonate 600 mg-vitamin D 400 units (CALCIUM 600 + D) 600-400 MG-UNIT per tablet, Take 2 tablets by mouth daily, Disp: , Rfl:      ELIQUIS ANTICOAGULANT 5 MG tablet, TAKE 1 TABLET BY MOUTH TWICE A DAY, Disp: 60 tablet, Rfl: 2     folic acid (FOLVITE) 1 MG tablet, Take 1 mg by mouth daily, Disp: , Rfl:      gabapentin (NEURONTIN) 300 MG capsule, Take 1 capsule (300 mg) by mouth 3 times daily, Disp: 270 capsule, Rfl: 0     leflunomide (ARAVA) 20 MG tablet, Take 1 tablet by mouth every 24 hours, Disp: , Rfl:      methotrexate 2.5 MG tablet, Take 20 mg by mouth every 7 days On Saturdays, Disp: , Rfl:      metoprolol succinate ER (TOPROL XL) 50 MG 24 hr tablet, TAKE ONE TABLET BY MOUTH EVERY DAY, Disp: 90 tablet, Rfl: 1     multivitamin w/minerals (THERA-VIT-M) tablet, Take 1 tablet by mouth daily, Disp: , Rfl:      predniSONE (DELTASONE) 5 MG tablet, Take 1 tablet (5 mg) by mouth daily, Disp: , Rfl: 0     simvastatin (ZOCOR) 20 MG tablet, Take 1 tablet (20 mg) by mouth At Bedtime, Disp: 90 tablet, Rfl: 1    Allergies -   No Known Allergies    Social History -   Social History     Socioeconomic History     Marital status: Single     Spouse name: None     Number of children: None     Years of education: None     Highest education level: None   Social Needs     Financial resource strain: None     Food insecurity - worry: None     Food insecurity - inability: None     Transportation needs - medical: None     Transportation needs - non-medical: None    Occupational History     None   Tobacco Use     Smoking status: Former Smoker     Packs/day: 0.50     Years: 45.00     Pack years: 22.50     Types: Cigarettes     Last attempt to quit: 2010     Years since quittin.9     Smokeless tobacco: Never Used   Substance and Sexual Activity     Alcohol use: Yes     Comment: occsionally-3 -4 drinks a week     Drug use: No     Sexual activity: Yes     Partners: Male   Other Topics Concern     Parent/sibling w/ CABG, MI or angioplasty before 65F 55M? No   Social History Narrative     None       Family History -   Family History   Problem Relation Age of Onset     Cardiovascular Mother      Cancer Brother      Diabetes No family hx of      Hypertension No family hx of      Cerebrovascular Disease No family hx of      Alzheimer Disease No family hx of      Thyroid Disease No family hx of      Respiratory No family hx of      Other - See Comments Mother         CARDIOVASCULAR     Cancer Brother        Review of Systems - As per HPI and PMHx, otherwise 10+ system review of the head and neck, and general constitution is negative.    Physical Exam  LMP  (LMP Unknown)     General - The patient is well nourished and well developed, and appears to have good nutritional status.  Alert and oriented to person and place, answers questions and cooperates with examination appropriately.   Head and Face - Normocephalic and atraumatic, with no gross asymmetry noted of the contour of the facial features.  The facial nerve is intact, with strong symmetric movements.  Voice and Breathing - The patient was breathing comfortably without the use of accessory muscles. There was no wheezing, stridor, or stertor.  The patients voice was clear and strong, and had appropriate pitch and quality.  Eyes - Extraocular movements intact, and the pupils were reactive to light.  Sclera were not icteric or injected, conjunctiva were pink and moist.  Mouth - Examination of the oral cavity showed pink,  healthy oral mucosa. No lesions or ulcerations noted.  The tongue was mobile and midline, and the dentition were in good condition.        Cerumen Removal    Physical Exam and Procedure  Ears - On examination of the ears, I found that the BOTH sides had cerumen impaction.  Therefore, I positioned them in the examination chair in a semi-supine position, beginning with the right side.  I used the binocular surgical microscope to perform cerumen removal.  I began by using a cerumen loop to gently lift the edges of the cerumen mass away from the walls of the external canal.  Once I did this, I was able to suction away fragments of wax and debris using suction.  Once the mass was loose enough, the entire plug was pulled from the canal.  The tympanic membrane was intact, no sign of perforation or middle ear effusion.    I turned my attention to the contralateral side once again using the binocular surgical microscope to perform cerumen removal.  I began by using a cerumen loop to gently lift the edges of the cerumen mass away from the walls of the external canal.  Once I did this, I was able to suction away fragments of wax and debris using suction.  Once the mass was loose enough, the entire plug was pulled from the canal.  The tympanic membrane was intact, no sign of perforation or middle ear effusion.        A/P - Sakshi Jaeger is a 78 year old female  (H61.23) Bilateral impacted cerumen  (primary encounter diagnosis)  (H93.8X3) Ear fullness, bilateral    The patient has had their cerumen procedurally removed today.  I have discussed ear care at home, including avoiding qtips or any other object placed in the canal.  I have also discussed that over the counter cerumen kits like Debrox or Cerumenex can be useful.    See me in another 4-6 months.  She has my medical clearance for hearing aid fitting      Again, thank you for allowing me to participate in the care of your patient.        Sincerely,        Pito Arthur  MD Vidhi

## 2023-11-07 NOTE — TELEPHONE ENCOUNTER
Okay refill but see us in the next 2-3 months in clinic     Please inform patient    Harry Roberto MD

## 2023-11-21 NOTE — PATIENT INSTRUCTIONS
Thank you so much for choosing us for your care. It was a pleasure to see you at the vascular clinic today.     Follow-up recommendations: We will see you back on December 19th. Please do not hesitate to reach out to us in the meantime with any concerns. Our schedulers will get in touch with you to set up your follow-up visit.    - ACE wrap or compression stocking on RLE stump at all times  - Continue aspirin and simvastatin  - Follow up with me on 12/19/23   - Hold on wearing the orthotics until follow up       Our scheduling team will get in touch with you to set up any follow-up testing/imaging and/or appointments. Please be aware that any testing/imaging recommended today will need to completed prior to your next visit with the provider. If testing/imaging is not completed prior to your next visit, your visit may be rescheduled.        If you have any questions, please call Yeimy Rogel RN at [phone] or contact our clinic at [phone]. We also encourage the use of [employer] to communicate with your HealthCare Provider.        If you have an urgent need after business hours (8:00 am to 4:30 pm) please call [phone], option 4, and ask for the vascular attending on call. For non-urgent after hours needs, please call the vascular nurse at [phone] and leave a detailed voicemail. For scheduling needs, please call our clinic directly at [phone].

## 2023-11-21 NOTE — TELEPHONE ENCOUNTER
Yasmin Keita check out notes state to schedule the pt for a 4 week stump hematoma follow up.   Yasmin has no in person availability until 1/8/24 according to the template.  Please advise.  Jaziel Amezquita on 11/21/2023 at 3:33 PM

## 2023-11-21 NOTE — PROGRESS NOTES
VASCULAR SURGERY PROGRESS NOTE    LOCATION: [facility]     Sakshi Jaeger  Medical Record #:  [id number]  YOB: 1945  Age:  78 year old     Date of Service: 11/21/2023    PRIMARY CARE PROVIDER: Harry Roberto    Reason for visit: New bruising on RLE stump    IMPRESSION / RECOMMENDATION:   Ms. 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 Oconnor on 1/13/2023.  Now presents for new bruise/hematoma on stump (see attached pictures in media).  Patient started utilizing her prosthesis and recently developed this hematoma at stump incision. Was on Eliquis for DVT however this was completed a few months ago. RLE stump with bruising on lateral and medial aspect per patient this is improving, patient has right femoral +2 palpable pulse.  Continues to smoke approximately half a pack per day.    - ACE wrap or compression stocking on RLE stump at all times  - Continue aspirin and simvastatin  - Follow up with me on 12/19/23   - Hold off for utilizing prosthetics for now, until hematoma resolves.    All questions were answered and support provided. She has our contact information and knows to reach out with any additional questions or concerns.     Yasmin Rob, [facility]  Vascular Surgery  Pager: [phone]  [email or web address]  Send message or 10 digit call back number Securely via [employer] with the [employer] Web Console (learn more here)    30 minutes spent on the date of the encounter doing chart review, review of outside records, review of test results, interpretation of tests, patient visit, documentation and discussion with other provider(s)          HPI:  Sakshi Jaeger is a 78 year old female who previously underwent right through-knee amputation by Dr. Mott of orthopedic surgery after a prior right below-knee amputation years ago. Her through-knee  amputation failed to heal due to extensive vascular disease. 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 Oconnor on 1/13/2023.  Postoperatively she healed well and subsequently started working with orthotics over the summer for her new prosthesis.  More recently noted that after utilizing her prosthesis she developed a hematoma on her stump. Improving according to patient.  RLE stump with slight firmness of medial aspect, without signs or symptoms of infection, no drainage. Patient appears to have hematoma at site.  Has not been on any other types of anticoagulation except aspirin. Denies fevers chills, plans a trip to out of state in the next few weeks.    REVIEW OF SYSTEMS:    A 12 point ROS was reviewed and is negative except for what is listed above in HPI.    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    Essential hypertension with goal blood pressure less than 140/90 8/25/2016    Hyperlipidemia LDL goal <100 11/12/2010    Hypertension goal BP (blood pressure) < 130/80 1/26/2011    Infection of right below knee amputation (H) 10/1/2019    IRON DEFIC ANEMIA NOS 9/15/2005    Lower limb amputation, below knee 12/26/2012    MI (myocardial infarction) (H)     3/2010    Non-healing surgical wound, subsequent encounter 9/24/2020    Added automatically from request for surgery [id number]    Osteoporosis 2/16/2005    OSTEOPOROSIS NOS 2/16/2005    Protein-calorie malnutrition (H24) 5/18/2022    PVD (peripheral vascular disease) (H24) 5/18/2022    Rheumatoid arthritis (H) 2/16/2005         Rheumatoid arthritis(714.0) 2/16/2005    Status post below-knee amputation (H) 12/26/2012                Past Surgical History:   Procedure Laterality Date    ---------INCISION AND DRAINAGE  03/05/2014    AMPUTATE LEG ABOVE KNEE Right 1/13/2023    Procedure: AMPUTATION, ABOVE KNEE RIGHT;  Surgeon: Emma Oconnor MD;  [facility]    AMPUTATE LEG BELOW KNEE Right 9/28/2022    Procedure: Right through knee amputation;  Surgeon: Doron Mott MD;  [facility]    AMPUTATION BELOW KNEE RT/LT  01/01/2005    right lowwer leg.     ANGIOGRAM Right 1/13/2023    Procedure: right iliac arteriogram aned right common iliac artery stent;  Surgeon: Emma Oconnor MD;  [facility]    APPENDECTOMY      ARTHROPLASTY KNEE  04/27/2011    Procedure:ARTHROPLASTY KNEE; Surgeon:JESSE HAWKINS; [facility]    COMPLEX WOUND CLOSURE (UPDATE) Right 12/08/2021    Procedure: Right stump wound debridment and closure;  Surgeon: RAISA Perea MD;  [facility]: [facility]    CORONARY STENT PLACEMENT      ENDARTERECTOMY FEMORAL Right 1/13/2023    Procedure: ENDARTERECTOMY, FEMORAL RIGHT;  Surgeon: Emma Oconnor MD;  [facility]    INJECT NERVE BLOCK SPHENOPALATINE GANGLION N/A 9/29/2022    Procedure: Out of OR encounter for block to be done by ;  Surgeon: GENERIC ANESTHESIA PROVIDER;  [facility]    IR OR ANGIOGRAM  1/13/2023    IRRIGATION AND DEBRIDEMENT LOWER EXTREMITY, COMBINED Right 10/09/2019    Procedure: Irrigation and debridement Right leg;  Surgeon: Doron Mott MD;  [facility]    NERVE BLOCK PERIPHERAL N/A 9/27/2022    Procedure: BLOCK, NERVE;  Surgeon: GENERIC ANESTHESIA PROVIDER;  [facility]    OTHER SURGICAL HISTORY  03/05/2014    I AND D     OTHER SURGICAL HISTORY Right 10/09/2019    IRRIGATION AND DEBRIDEMENT LOWER EXTREMITY, COMBINED    PHACOEMULSIFICATION CLEAR CORNEA WITH STANDARD INTRAOCULAR LENS IMPLANT Left 9/8/2022    Procedure: PHACOEMULSIFICATION, LEFT CATARACT, WITH INTRAOCULAR LENS IMPLANT;  Surgeon: Flip Izaguirre MD;  [facility]     PHACOEMULSIFICATION CLEAR CORNEA WITH STANDARD INTRAOCULAR LENS IMPLANT Right 10/13/2022    Procedure: PHACOEMULSIFICATION, RIGHT CATARACT, WITH INTRAOCULAR LENS IMPLANT;  Surgeon: Flip Izaguirre MD;  [facility]    REVISE SCAR EXTREMITY Right 12/17/2020    Procedure: Right stump scar revision;  Surgeon: RAISA Perea MD;  Location: [facility] OR       ALLERGIES:  Patient has no known allergies.    MEDS:    Current Outpatient Medications:     acetaminophen (TYLENOL) 325 MG tablet, Take 2 tablets (650 mg) by mouth every 4 hours, Disp: , Rfl:     amLODIPine (NORVASC) 5 MG tablet, Take 1 tablet (5 mg) by mouth daily, Disp: 90 tablet, Rfl: 3    aspirin (ASA) 81 MG tablet, Take 81 mg by mouth daily, Disp:  , Rfl:     calcium carbonate 600 mg-vitamin D 400 units (CALCIUM 600 + D) 600-400 MG-UNIT per tablet, Take 2 tablets by mouth daily, Disp: , Rfl:     ELIQUIS ANTICOAGULANT 5 MG tablet, TAKE 1 TABLET BY MOUTH TWICE A DAY, Disp: 60 tablet, Rfl: 2    folic acid (FOLVITE) 1 MG tablet, Take 1 mg by mouth daily, Disp: , Rfl:     gabapentin (NEURONTIN) 300 MG capsule, Take 1 capsule (300 mg) by mouth 3 times daily, Disp: 270 capsule, Rfl: 0    leflunomide (ARAVA) 20 MG tablet, Take 1 tablet by mouth every 24 hours, Disp: , Rfl:     lisinopril (ZESTRIL) 20 MG tablet, TAKE 1 TABLET BY MOUTH EVERY DAY, Disp: 90 tablet, Rfl: 0    methotrexate 2.5 MG tablet, Take 20 mg by mouth every 7 days On Saturdays, Disp: , Rfl:     metoprolol succinate ER (TOPROL XL) 50 MG 24 hr tablet, TAKE ONE TABLET BY MOUTH EVERY DAY, Disp: 90 tablet, Rfl: 1    multivitamin w/minerals (THERA-VIT-M) tablet, Take 1 tablet by mouth daily, Disp: , Rfl:     predniSONE (DELTASONE) 5 MG tablet, Take 1 tablet (5 mg) by mouth daily, Disp: , Rfl: 0    simvastatin (ZOCOR) 20 MG tablet, Take 1 tablet (20 mg) by mouth At Bedtime, Disp: 90 tablet, Rfl: 1    SOCIAL HABITS:    History   Smoking Status    Some Days    Packs/day: 0.50    Years:  45.00    Types: Cigarettes    Last attempt to quit: 2/26/2010   Smokeless Tobacco    Never     Social History    Substance and Sexual Activity      Alcohol use: Yes        Comment: occsionally-3 -4 drinks a week      History   Drug Use No       FAMILY HISTORY:    Family History   Problem Relation Age of Onset    Cardiovascular Mother     Cancer Brother     Diabetes No family hx of     Hypertension No family hx of     Cerebrovascular Disease No family hx of     Alzheimer Disease No family hx of     Thyroid Disease No family hx of     Respiratory No family hx of     Other - See Comments Mother         CARDIOVASCULAR    Cancer Brother        PE:  /68 (BP Location: Left arm, Patient Position: Sitting, Cuff Size: Adult Regular)   Pulse 73   LMP  (LMP Unknown)   SpO2 95%   Wt Readings from Last 1 Encounters:   10/26/23 100 lb     There is no height or weight on file to calculate BMI.    EXAM:  GENERAL: well-developed 78 year old female who appears her stated age  CARDIAC: normal   CHEST/LUNG: normal respiratory effort   MUSCULOSKELETAL: grossly normal and both lower extremities are intact, no lower extremity edema  NEUROLOGIC: focally intact, alert and oriented x 3  PSYCH: appropriate affect  VASCULAR: Palpable femoral pulse +2. Hematoma on RLE stump.

## 2023-11-21 NOTE — LETTER
11/21/2023       RE: Sakshi Jaeger  [address] [id number]     Dear Colleague,    Thank you for referring your patient, Sakshi Jaeger, to the [facility] [place] at [facility]. Please see a copy of my visit note below.        VASCULAR SURGERY PROGRESS NOTE    LOCATION: [facility]     Sakshi Jaeger  Medical Record #:  [id number]  YOB: 1945  Age:  78 year old     Date of Service: 11/21/2023    PRIMARY CARE PROVIDER: Harry Roberto    Reason for visit: New bruising on RLE stump    IMPRESSION / RECOMMENDATION:   Ms. 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 Oconnor on 1/13/2023.  Now presents for new bruise/hematoma on stump (see attached pictures in media).  Patient started utilizing her prosthesis and recently developed this hematoma at stump incision. Was on Eliquis for DVT however this was completed a few months ago. RLE stump with bruising on lateral and medial aspect per patient this is improving, patient has right femoral +2 palpable pulse.  Continues to smoke approximately half a pack per day.    - ACE wrap or compression stocking on RLE stump at all times  - Continue aspirin and simvastatin  - Follow up with me on 12/19/23   - Hold off for utilizing prosthetics for now, until hematoma resolves.    All questions were answered and support provided. She has our contact information and knows to reach out with any additional questions or concerns.     Yasmin Rob, CNP  Vascular Surgery  Pager: [phone]  [email or web address]  Send message or 10 digit call back number Securely via [email or web address] with the [email or web address] Web Console (learn more here)    30 minutes spent on the date of the encounter doing chart review, review of outside records, review of test  results, interpretation of tests, patient visit, documentation and discussion with other provider(s)          HPI:  Sakshi Jaeger is a 78 year old female who previously underwent right through-knee amputation by Dr. Mott of orthopedic surgery after a prior right below-knee amputation years ago. Her through-knee amputation failed to heal due to extensive vascular disease. 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 Oconnor on 1/13/2023.  Postoperatively she healed well and subsequently started working with orthotics over the summer for her new prosthesis.  More recently noted that after utilizing her prosthesis she developed a hematoma on her stump. Improving according to patient.  RLE stump with slight firmness of medial aspect, without signs or symptoms of infection, no drainage. Patient appears to have hematoma at site.  Has not been on any other types of anticoagulation except aspirin. Denies fevers chills, plans a trip to out of state in the next few weeks.    REVIEW OF SYSTEMS:    A 12 point ROS was reviewed and is negative except for what is listed above in HPI.    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    Essential hypertension with goal blood pressure less than 140/90 8/25/2016    Hyperlipidemia LDL goal <100 11/12/2010    Hypertension goal BP (blood pressure) < 130/80 1/26/2011    Infection of right below knee amputation (H) 10/1/2019    IRON DEFIC ANEMIA NOS 9/15/2005    Lower limb amputation, below knee 12/26/2012    MI (myocardial infarction) (H)     3/2010    Non-healing surgical wound, subsequent encounter 9/24/2020    Added automatically from  request for surgery [id number]    Osteoporosis 2/16/2005    OSTEOPOROSIS NOS 2/16/2005    Protein-calorie malnutrition (H24) 5/18/2022    PVD (peripheral vascular disease) (H24) 5/18/2022    Rheumatoid arthritis (H) 2/16/2005         Rheumatoid arthritis(714.0) 2/16/2005    Status post below-knee amputation (H) 12/26/2012               Past Surgical History:   Procedure Laterality Date    ---------INCISION AND DRAINAGE  03/05/2014    AMPUTATE LEG ABOVE KNEE Right 1/13/2023    Procedure: AMPUTATION, ABOVE KNEE RIGHT;  Surgeon: Emma Oconnor MD;  [facility]    AMPUTATE LEG BELOW KNEE Right 9/28/2022    Procedure: Right through knee amputation;  Surgeon: Doron Mott MD;  [facility]    AMPUTATION BELOW KNEE RT/LT  01/01/2005    right lowwer leg.     ANGIOGRAM Right 1/13/2023    Procedure: right iliac arteriogram aned right common iliac artery stent;  Surgeon: Emma Oconnor MD;  [facility]    APPENDECTOMY      ARTHROPLASTY KNEE  04/27/2011    Procedure:ARTHROPLASTY KNEE; Surgeon:JESSE HAWKINS; [facility]    COMPLEX WOUND CLOSURE (UPDATE) Right 12/08/2021    Procedure: Right stump wound debridment and closure;  Surgeon: RAISA Perea MD;  Location: [facility] OR    CORONARY STENT PLACEMENT      ENDARTERECTOMY FEMORAL Right 1/13/2023    Procedure: ENDARTERECTOMY, FEMORAL RIGHT;  Surgeon: Emma Oconnor MD;  [facility]    INJECT NERVE BLOCK SPHENOPALATINE GANGLION N/A 9/29/2022    Procedure: Out of OR encounter for block to be done by ;  Surgeon: GENERIC ANESTHESIA PROVIDER;  [facility]    IR OR ANGIOGRAM  1/13/2023    IRRIGATION AND DEBRIDEMENT LOWER EXTREMITY, COMBINED Right 10/09/2019    Procedure: Irrigation and debridement Right leg;  Surgeon: Doron Mott MD;  [facility]    NERVE BLOCK PERIPHERAL N/A 9/27/2022    Procedure: BLOCK, NERVE;  Surgeon: GENERIC ANESTHESIA PROVIDER;  [facility]    OTHER SURGICAL HISTORY  03/05/2014    I AND D      OTHER SURGICAL HISTORY Right 10/09/2019    IRRIGATION AND DEBRIDEMENT LOWER EXTREMITY, COMBINED    PHACOEMULSIFICATION CLEAR CORNEA WITH STANDARD INTRAOCULAR LENS IMPLANT Left 9/8/2022    Procedure: PHACOEMULSIFICATION, LEFT CATARACT, WITH INTRAOCULAR LENS IMPLANT;  Surgeon: Flip Izaguirre MD;  [facility]    PHACOEMULSIFICATION CLEAR CORNEA WITH STANDARD INTRAOCULAR LENS IMPLANT Right 10/13/2022    Procedure: PHACOEMULSIFICATION, RIGHT CATARACT, WITH INTRAOCULAR LENS IMPLANT;  Surgeon: Flip Izaguirre MD;  [facility]    REVISE SCAR EXTREMITY Right 12/17/2020    Procedure: Right stump scar revision;  Surgeon: RAISA Perea MD;  Location: [facility] OR       ALLERGIES:  Patient has no known allergies.    MEDS:    Current Outpatient Medications:     acetaminophen (TYLENOL) 325 MG tablet, Take 2 tablets (650 mg) by mouth every 4 hours, Disp: , Rfl:     amLODIPine (NORVASC) 5 MG tablet, Take 1 tablet (5 mg) by mouth daily, Disp: 90 tablet, Rfl: 3    aspirin (ASA) 81 MG tablet, Take 81 mg by mouth daily, Disp:  , Rfl:     calcium carbonate 600 mg-vitamin D 400 units (CALCIUM 600 + D) 600-400 MG-UNIT per tablet, Take 2 tablets by mouth daily, Disp: , Rfl:     ELIQUIS ANTICOAGULANT 5 MG tablet, TAKE 1 TABLET BY MOUTH TWICE A DAY, Disp: 60 tablet, Rfl: 2    folic acid (FOLVITE) 1 MG tablet, Take 1 mg by mouth daily, Disp: , Rfl:     gabapentin (NEURONTIN) 300 MG capsule, Take 1 capsule (300 mg) by mouth 3 times daily, Disp: 270 capsule, Rfl: 0    leflunomide (ARAVA) 20 MG tablet, Take 1 tablet by mouth every 24 hours, Disp: , Rfl:     lisinopril (ZESTRIL) 20 MG tablet, TAKE 1 TABLET BY MOUTH EVERY DAY, Disp: 90 tablet, Rfl: 0    methotrexate 2.5 MG tablet, Take 20 mg by mouth every 7 days On Saturdays, Disp: , Rfl:     metoprolol succinate ER (TOPROL XL) 50 MG 24 hr tablet, TAKE ONE TABLET BY MOUTH EVERY DAY, Disp: 90 tablet, Rfl: 1    multivitamin w/minerals (THERA-VIT-M) tablet, Take 1  tablet by mouth daily, Disp: , Rfl:     predniSONE (DELTASONE) 5 MG tablet, Take 1 tablet (5 mg) by mouth daily, Disp: , Rfl: 0    simvastatin (ZOCOR) 20 MG tablet, Take 1 tablet (20 mg) by mouth At Bedtime, Disp: 90 tablet, Rfl: 1    SOCIAL HABITS:    History   Smoking Status    Some Days    Packs/day: 0.50    Years: 45.00    Types: Cigarettes    Last attempt to quit: 2/26/2010   Smokeless Tobacco    Never     Social History    Substance and Sexual Activity      Alcohol use: Yes        Comment: occsionally-3 -4 drinks a week      History   Drug Use No       FAMILY HISTORY:    Family History   Problem Relation Age of Onset    Cardiovascular Mother     Cancer Brother     Diabetes No family hx of     Hypertension No family hx of     Cerebrovascular Disease No family hx of     Alzheimer Disease No family hx of     Thyroid Disease No family hx of     Respiratory No family hx of     Other - See Comments Mother         CARDIOVASCULAR    Cancer Brother        PE:  /68 (BP Location: Left arm, Patient Position: Sitting, Cuff Size: Adult Regular)   Pulse 73   LMP  (LMP Unknown)   SpO2 95%   Wt Readings from Last 1 Encounters:   10/26/23 100 lb     There is no height or weight on file to calculate BMI.    EXAM:  GENERAL: well-developed 78 year old female who appears her stated age  CARDIAC: normal   CHEST/LUNG: normal respiratory effort   MUSCULOSKELETAL: grossly normal and both lower extremities are intact, no lower extremity edema  NEUROLOGIC: focally intact, alert and oriented x 3  PSYCH: appropriate affect  VASCULAR: Palpable femoral pulse +2. Hematoma on RLE stump.                     Again, thank you for allowing me to participate in the care of your patient.      Sincerely,    DESIRE Ventura CNP

## 2023-11-22 NOTE — TELEPHONE ENCOUNTER
The pt is scheduled on 12/14 at 2:00 pm at the [facility] with Whitney Amezquita on 11/22/2023 at 10:27 AM     The pt was on the phone during scheduling of the above appointments and agreed to the dates times and locations of the appointments.

## 2023-11-22 NOTE — TELEPHONE ENCOUNTER
The pt is stating that 12/11 is not a good day 12/18 is better. If 12/11 is all we have available they will make that date work.  Jaziel Amezquita on 11/22/2023 at 9:30 AM

## 2023-12-11 NOTE — TELEPHONE ENCOUNTER
[facility] Call Center    Phone Message    May a detailed message be left on voicemail: yes     Reason for Call: Other: Pt has the flu, canceled today's visit.  Pt states she will call back to reschedule.     Writer canceled visit in Epic.     Thank you.     Action Taken: Message routed to:  [facility] Vascular    Travel Screening: Not Applicable

## 2023-12-11 NOTE — TELEPHONE ENCOUNTER
[facility] Call Center    Phone Message    May a detailed message be left on voicemail: yes     Reason for Call: Other: Pt called regarding below. Pt is ready to r/s her appt. Please call her back. Thanks      Action Taken: Message routed to:  [facility] VAS    Travel Screening: Not Applicable

## 2023-12-13 NOTE — TELEPHONE ENCOUNTER
[facility] Santi is stating that the pts hematoma is a big scab is this normal?  Jaziel Amezquita on 12/13/2023 at 11:01 AM     Santi was on the phone during scheduling of the above appointments and agreed to the dates times and locations of the appointments.

## 2023-12-14 NOTE — TELEPHONE ENCOUNTER
"Returned Sakshi's call. She feels hematoma area is overall improving. There are some areas of \"firmness\", but otherwise no change list last visit. She denies any open sores, drainage, fever, etc. She states she only has pain on the stump when she uses her scooter. Plan to see MINERVA on Monday 12/18 as scheduled.    MILTON Davenport, RN  RN Care Coordinator  [facility] Vascular Surgery - [facility] phone: [phone]  Fax: [phone]    "

## 2023-12-14 NOTE — TELEPHONE ENCOUNTER
I returned pt's call to discuss sx. LVM w/ callback.    AMANDA DavenportN, RN  RN Care Coordinator  [facility] phone: [phone]  Fax: [phone]     Ca Spencer MD  PGY-1 Internal Medicine Resident  [facility] Pager: [phone]    Patient is a 77y old  Male who presents with a chief complaint of Airway protection (21 Jun 2018 12:00)    SUBJECTIVE / OVERNIGHT EVENTS:  No overnight events. No fever/chills. Rash seems to improve per patient.    MEDICATIONS  (STANDING):  ampicillin/sulbactam  IVPB 3 Gram(s) IV Intermittent every 6 hours  clobetasol 0.05% Ointment 1 Application(s) Topical two times a day  dexamethasone  Injectable 4 milliGRAM(s) IV Push every 8 hours  dextrose 5%. 1000 milliLiter(s) (50 mL/Hr) IV Continuous <Continuous>  dextrose 50% Injectable 12.5 Gram(s) IV Push once  dextrose 50% Injectable 25 Gram(s) IV Push once  dextrose 50% Injectable 25 Gram(s) IV Push once  doxycycline IVPB 100 milliGRAM(s) IV Intermittent every 12 hours  doxycycline IVPB      enoxaparin Injectable 40 milliGRAM(s) SubCutaneous daily  insulin glargine Injectable (LANTUS) 12 Unit(s) SubCutaneous at bedtime  insulin lispro (HumaLOG) corrective regimen sliding scale   SubCutaneous three times a day before meals  insulin lispro (HumaLOG) corrective regimen sliding scale   SubCutaneous at bedtime  insulin lispro Injectable (HumaLOG) 4 Unit(s) SubCutaneous three times a day before meals  lisinopril 5 milliGRAM(s) Oral daily    MEDICATIONS  (PRN):  acetaminophen   Tablet. 650 milliGRAM(s) Oral every 6 hours PRN moderate to severe pain  dextrose 40% Gel 15 Gram(s) Oral once PRN Blood Glucose LESS THAN 70 milliGRAM(s)/deciliter  glucagon  Injectable 1 milliGRAM(s) IntraMuscular once PRN Glucose LESS THAN 70 milligrams/deciliter    Vital Signs Last 24 Hrs  T(C): 36.6 (24 Jun 2018 04:53), Max: 37.7 (23 Jun 2018 12:42)  T(F): 97.9 (24 Jun 2018 04:53), Max: 99.8 (23 Jun 2018 12:42)  HR: 82 (24 Jun 2018 06:00) (65 - 82)  BP: 128/69 (24 Jun 2018 06:00) (103/57 - 176/83)  RR: 17 (24 Jun 2018 04:53) (17 - 18)  SpO2: 98% (24 Jun 2018 04:53) (98% - 99%)    CAPILLARY BLOOD GLUCOSE  POCT Blood Glucose.: 249 mg/dL (24 Jun 2018 07:50)  POCT Blood Glucose.: 303 mg/dL (23 Jun 2018 21:41)  POCT Blood Glucose.: 305 mg/dL (23 Jun 2018 16:42)  POCT Blood Glucose.: 268 mg/dL (23 Jun 2018 12:26)    I&O's Summary    23 Jun 2018 07:01  -  24 Jun 2018 07:00  --------------------------------------------------------  IN: 1260 mL / OUT: 1750 mL / NET: -490 mL    PHYSICAL EXAM:  General: Comfortable, no apparent distress  HEENT: Atraumatic; EOMI,   Neck: Supple; no JVD;  Chest/Lungs: Clear to auscultation B/L; no rales, rhonchi or wheezing  Heart: Regular rate and rhythm; normal S1/S2; no murmurs, rubs, or gallops  Abdomen: Soft, nontender, nondistended; bowel sounds present  Extremities: PT/DP pulses 2+ B/L; no LE edema  Skin: Rash on palms b/l, eschar or left wrist   Neurological: A&O x3    LABS:                        11.1   19.26 )-----------( 234      ( 24 Jun 2018 08:18 )             32.8     Auto Eosinophil # 0.00  / Auto Eosinophil % 0.0   / Auto Neutrophil # 16.78 / Auto Neutrophil % 87.1  / BANDS % x                            11.8   21.41 )-----------( 218      ( 23 Jun 2018 08:16 )             34.4     Auto Eosinophil # 0.00  / Auto Eosinophil % 0.0   / Auto Neutrophil # 18.56 / Auto Neutrophil % 81.4  / BANDS % 5.3      06-24    138  |  102  |  29<H>  ----------------------------<  219<H>  4.0   |  23  |  0.77  06-23    137  |  100  |  29<H>  ----------------------------<  299<H>  4.0   |  24  |  0.80    Ca    9.1      24 Jun 2018 06:43  Mg     1.9     06-24  Phos  2.9     06-23  TPro  7.2  /  Alb  3.7  /  TBili  1.1  /  DBili  x   /  AST  25  /  ALT  18  /  AlkPhos  73  06-23    Labs and imaging personally reviewed and interpreted [  ]  Consult notes reviewed -derm, id  Case discussed with consultants Ca Spencer MD  PGY-1 Internal Medicine Resident  [facility] Pager: [phone]    Patient is a 77y old  Male who presents with a chief complaint of Airway protection (21 Jun 2018 12:00)    SUBJECTIVE / OVERNIGHT EVENTS:  No overnight events. No fever/chills. Rash seems to improve per patient.    MEDICATIONS  (STANDING):  ampicillin/sulbactam  IVPB 3 Gram(s) IV Intermittent every 6 hours  clobetasol 0.05% Ointment 1 Application(s) Topical two times a day  dexamethasone  Injectable 4 milliGRAM(s) IV Push every 8 hours  dextrose 5%. 1000 milliLiter(s) (50 mL/Hr) IV Continuous <Continuous>  dextrose 50% Injectable 12.5 Gram(s) IV Push once  dextrose 50% Injectable 25 Gram(s) IV Push once  dextrose 50% Injectable 25 Gram(s) IV Push once  doxycycline IVPB 100 milliGRAM(s) IV Intermittent every 12 hours  doxycycline IVPB      enoxaparin Injectable 40 milliGRAM(s) SubCutaneous daily  insulin glargine Injectable (LANTUS) 12 Unit(s) SubCutaneous at bedtime  insulin lispro (HumaLOG) corrective regimen sliding scale   SubCutaneous three times a day before meals  insulin lispro (HumaLOG) corrective regimen sliding scale   SubCutaneous at bedtime  insulin lispro Injectable (HumaLOG) 4 Unit(s) SubCutaneous three times a day before meals  lisinopril 5 milliGRAM(s) Oral daily    MEDICATIONS  (PRN):  acetaminophen   Tablet. 650 milliGRAM(s) Oral every 6 hours PRN moderate to severe pain  dextrose 40% Gel 15 Gram(s) Oral once PRN Blood Glucose LESS THAN 70 milliGRAM(s)/deciliter  glucagon  Injectable 1 milliGRAM(s) IntraMuscular once PRN Glucose LESS THAN 70 milligrams/deciliter    Vital Signs Last 24 Hrs  T(C): 36.6 (24 Jun 2018 04:53), Max: 37.7 (23 Jun 2018 12:42)  T(F): 97.9 (24 Jun 2018 04:53), Max: 99.8 (23 Jun 2018 12:42)  HR: 82 (24 Jun 2018 06:00) (65 - 82)  BP: 128/69 (24 Jun 2018 06:00) (103/57 - 176/83)  RR: 17 (24 Jun 2018 04:53) (17 - 18)  SpO2: 98% (24 Jun 2018 04:53) (98% - 99%)    CAPILLARY BLOOD GLUCOSE  POCT Blood Glucose.: 249 mg/dL (24 Jun 2018 07:50)  POCT Blood Glucose.: 303 mg/dL (23 Jun 2018 21:41)  POCT Blood Glucose.: 305 mg/dL (23 Jun 2018 16:42)  POCT Blood Glucose.: 268 mg/dL (23 Jun 2018 12:26)    I&O's Summary    23 Jun 2018 07:01  -  24 Jun 2018 07:00  --------------------------------------------------------  IN: 1260 mL / OUT: 1750 mL / NET: -490 mL    PHYSICAL EXAM:  General: Comfortable, no apparent distress  HEENT: Atraumatic; EOMI,   Neck: Supple; no JVD;  Chest/Lungs: Clear to auscultation B/L; no rales, rhonchi or wheezing  Heart: Regular rate and rhythm; normal S1/S2; no murmurs, rubs, or gallops  Abdomen: Soft, nontender, nondistended; bowel sounds present  Extremities: PT/DP pulses 2+ B/L; no LE edema  Skin: erythematous, vesicular rash on palms b/l, eschar on left wrist   Neurological: A&O x3    LABS:                        11.1   19.26 )-----------( 234      ( 24 Jun 2018 08:18 )             32.8     Auto Eosinophil # 0.00  / Auto Eosinophil % 0.0   / Auto Neutrophil # 16.78 / Auto Neutrophil % 87.1  / BANDS % x                            11.8   21.41 )-----------( 218      ( 23 Jun 2018 08:16 )             34.4     Auto Eosinophil # 0.00  / Auto Eosinophil % 0.0   / Auto Neutrophil # 18.56 / Auto Neutrophil % 81.4  / BANDS % 5.3      06-24    138  |  102  |  29<H>  ----------------------------<  219<H>  4.0   |  23  |  0.77  06-23    137  |  100  |  29<H>  ----------------------------<  299<H>  4.0   |  24  |  0.80    Ca    9.1      24 Jun 2018 06:43  Mg     1.9     06-24  Phos  2.9     06-23  TPro  7.2  /  Alb  3.7  /  TBili  1.1  /  DBili  x   /  AST  25  /  ALT  18  /  AlkPhos  73  06-23    Labs and imaging personally reviewed and interpreted [  ]  Consult notes reviewed -derm, id  Case discussed with consultants

## 2023-12-14 NOTE — TELEPHONE ENCOUNTER
[facility] Call Center    Phone Message    May a detailed message be left on voicemail: yes     Reason for Call: Other: pt missed call to discuss regarding concerns about Hematoma. Please call back - before 2 is best. Thanks     Action Taken: Message routed to:  [facility] VS/IR    Travel Screening: Not Applicable

## 2023-12-16 NOTE — PROGRESS NOTES
[facility]        Visit Type: H & P (Right tibial osteomyelitis)    Code Status:  FULL CODE  Facility:  [facility] [[id number]]          PCP: Javan Pepe MD       PHONE: [phone]     FAX:[phone]        ASSESSMENT/PLAN:  1. Chronic osteomyelitis of right tibia (H)   status post I&D care of Dr. Lopes.  The patient has follow-up with Dr. Lopes on 10/30.  The patient will continue on IV nafcillin until 11/17/2019 and will have ID follow-up on 11/11.  The patient will need dressing daily dressing changes and weekly CBC, CMP, ESR, CRP   2. Essential hypertension   we will continue to monitor for now since intermittently high blood pressures could be secondary to the patient's pain.  Decrease KCl to 20 M EQ daily and recheck potassium levels next week.  Patient also has mild hyponatremia with sodium on 10/14 at 133.  Repeat BMP as already ordered by infectious disease   3. Spinal stenosis of lumbar region without neurogenic claudication   continue pain medications, discussed narcotics risks and benefits   4. Chronic anemia   will repeat hemoglobin, continue to monitor, consider PT if less than 8 since the patient has a history of CAD   5. Other rheumatoid arthritis with rheumatoid factor of unspecified site (H)   continue prednisone 5 mg daily, the patient's DMARDs and methotrexate are being held until after IV antibiotics and the patient will need to follow-up with rheumatology to determine best time to resume these medications   6. Age-related osteoporosis without current pathological fracture   continue alendronate.  Check TSH and vitamin D levels         HISTORY OF PRESENT ILLNESS:   Sakshi Jaeger is a 74 y.o. female with a history of hypertension, CAD status post PCI in 2009, depression, RA on DMARDs and prednisone, lumbar spinal stenosis with chronic pain on narcotics, osteoporosis and chronic osteomyelitis of the right lower extremity after the  patient sustained an ankle fracture with placement of screws in early 2005 status post foot amputation in 2005 but continued to have intermittent right leg stump infection.  1 month ago, there was worsening of pain of leg wound with associated drainage for which she was started on antibiotics 1 day prior to admission.  She then became incoherent and lethargic and was brought to the emergency room for further evaluation.  CT scan findings showed early onset osteomyelitis and purulent drainage was positive for MSSA on wound cultures taken at Dr. Hernandez's office.  She was seen by orthopedic surgery and was started on IV vancomycin, underwent I&D care of Dr. Lopes on 10/9/2019.  LINDA was done which was negative for IE.  After it was determined that the patient is able to tolerate amoxicillin via trial since patient is allergic to penicillin, the patient then was switched from vancomycin to IV nafcillin.  The patient also was noted to have a right pleural effusion on x-ray but no intervention was needed and the patient remained asymptomatic.  Postoperatively the patient had acute blood loss anemia with the patient needing 2 units of packed RBC in the hospital.  On discharge, the patient's hemoglobin was at 7.5.    Currently, the patient states that she is still having a lot of back pain despite being on multiple medications for pain management including gabapentin, oxycodone, lidocaine patch, APAP.  She also still feels weak with decrease in energy.  Her appetite is improving.  She also does not have any problems with sleep.  She denies any nausea or vomiting, abdominal pain, diarrhea or constipation.  She does not have any urinary problems.  Her most recent blood pressures are slightly high with range 124-164/52-84.  Her metoprolol has been discontinued in the hospital and she continued on lisinopril and amlodipine.  Her most recent metabolic panel is normal.  She denies any dizziness or lightheadedness.  Not have any chest  pains or shortness of breath, fevers or chills.    Other review of systems are negative.      PAST MEDICAL/SURGICAL HISTORY:  Past Medical History:   Diagnosis Date     Acute osteomyelitis of right tibia (H)     Acute right tibial osteomyelitis with abscess and MSSA bacteremia, now s/p debridement     Benign hypertension      CAD (coronary artery disease)      Depression      Hyponatremia      Iron deficiency anemia      Lumbar spinal stenosis      MI (myocardial infarction) (H) 2010     Osteoporosis      Pleural effusion, right      Rheumatoid arthritis (H)      Thrombocytosis (H)      Past Surgical History:   Procedure Laterality Date     APPENDECTOMY       BELOW KNEE LEG AMPUTATION Right      CORONARY STENT PLACEMENT       I AND D   2014     IRRIGATION AND DEBRIDEMENT LOWER EXTREMITY, COMBINED Right 10/09/2019     KNEE ARTHROPLASTY Left 2011       SOCIAL HISTORY:  Social History     Socioeconomic History     Marital status:      Spouse name: Not on file     Number of children: Not on file     Years of education: Not on file     Highest education level: Not on file   Occupational History     Not on file   Social Needs     Financial resource strain: Not on file     Food insecurity:     Worry: Not on file     Inability: Not on file     Transportation needs:     Medical: Not on file     Non-medical: Not on file   Tobacco Use     Smoking status: Former Smoker     Packs/day: 0.50     Years: 45.00     Pack years: 22.50     Types: Cigarettes     Last attempt to quit: 2010     Years since quittin.6     Smokeless tobacco: Never Used   Substance and Sexual Activity     Alcohol use: Yes     Alcohol/week: 3.0 - 4.0 standard drinks     Types: 3 - 4 Standard drinks or equivalent per week     Drug use: Not on file     Sexual activity: Not on file   Lifestyle     Physical activity:     Days per week: Not on file     Minutes per session: Not on file     Stress: Not on file   Relationships      Social connections:     Talks on phone: Not on file     Gets together: Not on file     Attends Gnosticism service: Not on file     Active member of club or organization: Not on file     Attends meetings of clubs or organizations: Not on file     Relationship status: Not on file     Intimate partner violence:     Fear of current or ex partner: Not on file     Emotionally abused: Not on file     Physically abused: Not on file     Forced sexual activity: Not on file   Other Topics Concern     Not on file   Social History Narrative     Not on file       FAMILY HISTORY:  Family History   Problem Relation Age of Onset     Other Mother         CARDIOVASCULAR     Cancer Brother        MEDICATIONS:  Current Outpatient Medications on File Prior to Visit   Medication Sig     acetaminophen (TYLENOL) 325 MG tablet Take 650 mg by mouth every 4 (four) hours.     alendronate (FOSAMAX) 35 MG tablet Take 35 mg by mouth every 7 days. Take in the morning on an empty stomach with a full glass of water 30 minutes before food     amLODIPine (NORVASC) 5 MG tablet Take 5 mg by mouth daily.     calcium carbonate-vit D3-min 600 mg calcium- 400 unit Tab Take 2 tablets by mouth daily.     diclofenac sodium (VOLTAREN) 1 % Gel Apply 2 g topically 4 (four) times a day as needed.     gabapentin (NEURONTIN) 300 MG capsule Take 600 mg by mouth 3 (three) times a day.     Lactobacillus rhamnosus GG (CULTURELLE) 10-15 Billion cell capsule Take 1 capsule by mouth 2 (two) times a day.     lidocaine (LIDOCARE) 4 % patch Place 1 patch on the skin daily. Remove and discard patch with 12 hours or as directed by MD.     lisinopril (PRINIVIL,ZESTRIL) 20 MG tablet Take 20 mg by mouth daily.     multivitamin therapeutic tablet Take 1 tablet by mouth daily.     nafcillin 1 gram/50 mL IVPB Infuse 2 g into a venous catheter every 4 (four) hours.     niacin 500 MG tablet Take 500 mg by mouth daily with breakfast.     ondansetron (ZOFRAN) 4 MG tablet Take 4 mg by mouth  every 6 (six) hours as needed for nausea.     oxyCODONE (ROXICODONE) 5 MG immediate release tablet Take 5-10 mg by mouth every 4 (four) hours as needed for pain.     polyethylene glycol (MIRALAX) 17 gram packet Take 17 g by mouth 2 (two) times a day as needed.     potassium chloride (K-DUR,KLOR-CON) 20 MEQ tablet Take 40 mEq by mouth daily.     predniSONE (DELTASONE) 5 MG tablet Take 5 mg by mouth daily.     senna-docusate (PERICOLACE) 8.6-50 mg tablet Take 1 tablet by mouth 2 (two) times a day.     simvastatin (ZOCOR) 20 MG tablet Take 20 mg by mouth at bedtime.     sulindac (CLINORIL) 200 MG tablet Take 200 mg by mouth 2 (two) times a day.     No current facility-administered medications on file prior to visit.        ALLERGIES:  Allergies   Allergen Reactions     Penicillins Rash     Tolerated nafcillin and amoxicillin without rash (2019). Has tolerated cefazolin (2005, 2011), cephalexin (2015, 2014, 2013, 2012), ceftriaxone (2019)           PHYSICAL EXAMINATION:  Vital signs 114/67, 97.3, 66, 109.2 pounds, 16, 97% room air  General: Awake, Alert, oriented x3, not in any form of acute distress, answers questions appropriately, follows simple commands, conversant slightly anxious and thin looking,  pale  HEENT: Pale conjunctiva, anicteric sclerae, oral mucosa is moist  NECK: Supple, without any lymphadenopathy, thyromegaly or any masses  LUNG: Clear to auscultation, good chest expansion. There are no crackles, no wheezes, normal AP diameter  BACK:  kyphosis of the thoracic spine  CVS: There is good S1  S2, there are no murmurs, no heaves, rhythm is regular occasional premature beats  ABDOMEN: Globular and soft, nontender to palpation, no organomegaly, good bowel sounds left buttock area with very small blanchable reddish area, no wound or opening noted  EXTREMITIES: Good range of motion on both upper and lower extremities kept on the right knee where there is decreased range of motion with some tenderness, right  BKA area with clean sutures with a 1inch surgical opening with extra fiber Ag pack that is filled with blood, no pedal edema, no cyanosis or clubbing, no calf tenderness PICC line on right arm is clean  SKIN: Warm and dry, no rashes or erythema noted    LABS:    Lab Results   Component Value Date    CREATININE 0.57 (L) 10/14/2019    BUN 6 (L) 10/14/2019     (L) 10/14/2019    K 4.0 10/17/2019     10/14/2019    CO2 22 10/14/2019         I reviewed the patient's available medical records, labs and medications.    >45 minutes of total time spent, greater than 55% of the time spent in coordination of care and counseling regarding the above medical issues and plan of care including a very long discussion with the patient and her  regarding current medications, hypertension and adjustment of medications if blood pressure persists, lumbar spinal stenosis with discussion on narcotics with risks and benefits, anemia and possibly the need for blood transfusion if hemoglobin persist to be low since patient has a history of CAD.      Electronically signed by:Frieda Dickens MD     negative...

## 2023-12-18 NOTE — LETTER
12/18/2023       RE: Sakshi Jaeger  [address] [id number]       Dear Colleague,    Thank you for referring your patient, Sakshi Jaeger, to the [facility] [place] at [facility]. Please see a copy of my visit note below.        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 Oconnor. Postoperatively recovering well, was briefly on Eliquis for DVT however this was completed several months ago. With newly found bruise and redness on RLE stump November 2023. Area now necrosed, without drainage, does not appear to be infected. Patient continues to smoke at baseline. Was due to be seen last week however patient had the flu clinic canceled, thus follows up this week.    We discussed the importance of smoking cessation and how it impairs wound healing. We also discussed extensively methods of offloading pressure from RLE stump. Ms. Jaeger's case was also discussed with Dr. Ramon Arellano: Ideally she would undergo stump revision and smoking cessation, however patient declines surgery and continues to smoke.     - Keep half leg/right leg warm  - Smoking cessation  - Apply iodine on RLE stump, at least daily, leave to dry, no need to apply dressings. Once iodine is dried, cover with warming clothing.  - Keep RLE stump wound elevated on pillow, off load pressure from right leg   - Call if new drainage is noted  - Call if worsening redness on RLE stump  -  Follow up on 1/2/24 with TCpO2 prior to visit to determine healing capabilities  - Follow-up with me in 2 weeks in person, on 1/2/2024.    All questions were answered and support provided. She has our contact information and knows to reach out with any additional questions or concerns.     Yasmin Rob [facility]  Vascular Surgery  Pager: [phone]  [email or web address]  Send message or 10 digit call back number Securely via [employer] with the [employer] Web Console (learn more here)    60 minutes spent on the date of the encounter doing chart review, review of outside records, review of test results, interpretation of tests, patient visit, documentation and discussion with other provider(s)        HPI:  Sakshi Jaeger is a 78 year old female with past medical history significant for Right through-knee amputation by Dr. Mott of orthopedic surgery after a prior right below-knee amputation years ago. Her through-knee amputation failed to heal due to extensive vascular disease. 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 Oconnor on 1/13/2023.  Postoperatively he recovered well, started working with orthotics however more recently noted hematoma on her stump. Unclear exact etiology. Patient noted at last visit that her RLE hematoma and overall redness was improving. Today patient is found to have with necrotic area over posterior aspect of her stump. There is no drainage, no purulent collections, no increased swelling.  Some appropriate redness surrounding the necrotic area, see uploaded picture.  RLE stump cool to touch, patient does have palpable femoral pulses +2. RLE cool to touch likely secondary to poorly dressed, with thin 1 layer pants.  Denies fevers or chills.    REVIEW OF SYSTEMS:    A 12 point ROS was reviewed and is negative except for what is listed above in HPI.    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    Essential hypertension with goal blood pressure less than 140/90 8/25/2016    Hyperlipidemia LDL goal <100 11/12/2010    Hypertension goal BP (blood pressure) < 130/80 1/26/2011    Infection of right below knee amputation (H) 10/1/2019    IRON DEFIC ANEMIA NOS 9/15/2005    Lower limb amputation, below knee 12/26/2012    MI (myocardial infarction) (H)     3/2010    Non-healing surgical wound, subsequent encounter 9/24/2020    Added automatically from request for surgery [id number]    Osteoporosis 2/16/2005    OSTEOPOROSIS NOS 2/16/2005    Protein-calorie malnutrition (H24) 5/18/2022    PVD (peripheral vascular disease) (H24) 5/18/2022    Rheumatoid arthritis (H) 2/16/2005         Rheumatoid arthritis(714.0) 2/16/2005    Status post below-knee amputation (H) 12/26/2012               Past Surgical History:   Procedure Laterality Date    ---------INCISION AND DRAINAGE  03/05/2014    AMPUTATE LEG ABOVE KNEE Right 1/13/2023    Procedure: AMPUTATION, ABOVE KNEE RIGHT;  Surgeon: Emma Oconnor MD;  [facility]    AMPUTATE LEG BELOW KNEE Right 9/28/2022    Procedure: Right through knee amputation;  Surgeon: Doron Mott MD;  [facility]    AMPUTATION BELOW KNEE RT/LT  01/01/2005    right lowwer leg.     ANGIOGRAM Right 1/13/2023    Procedure: right iliac arteriogram aned right common iliac artery stent;  Surgeon: Emma Oconnor MD;  [facility]    APPENDECTOMY      ARTHROPLASTY KNEE  04/27/2011    Procedure:ARTHROPLASTY KNEE; Surgeon:JESSE HAWKINS; [facility]    COMPLEX WOUND CLOSURE (UPDATE) Right 12/08/2021    Procedure: Right stump wound debridment and closure;  Surgeon: RAISA Perea MD;  Location: [facility] OR    CORONARY STENT  PLACEMENT      ENDARTERECTOMY FEMORAL Right 1/13/2023    Procedure: ENDARTERECTOMY, FEMORAL RIGHT;  Surgeon: Emma Oconnor MD;  [facility]    INJECT NERVE BLOCK SPHENOPALATINE GANGLION N/A 9/29/2022    Procedure: Out of OR encounter for block to be done by ;  Surgeon: GENERIC ANESTHESIA PROVIDER;  [facility]    IR OR ANGIOGRAM  1/13/2023    IRRIGATION AND DEBRIDEMENT LOWER EXTREMITY, COMBINED Right 10/09/2019    Procedure: Irrigation and debridement Right leg;  Surgeon: Doron Mott MD;  [facility]    NERVE BLOCK PERIPHERAL N/A 9/27/2022    Procedure: BLOCK, NERVE;  Surgeon: GENERIC ANESTHESIA PROVIDER;  [facility]    OTHER SURGICAL HISTORY  03/05/2014    I AND D     OTHER SURGICAL HISTORY Right 10/09/2019    IRRIGATION AND DEBRIDEMENT LOWER EXTREMITY, COMBINED    PHACOEMULSIFICATION CLEAR CORNEA WITH STANDARD INTRAOCULAR LENS IMPLANT Left 9/8/2022    Procedure: PHACOEMULSIFICATION, LEFT CATARACT, WITH INTRAOCULAR LENS IMPLANT;  Surgeon: Flip Izaguirre MD;  [facility]    PHACOEMULSIFICATION CLEAR CORNEA WITH STANDARD INTRAOCULAR LENS IMPLANT Right 10/13/2022    Procedure: PHACOEMULSIFICATION, RIGHT CATARACT, WITH INTRAOCULAR LENS IMPLANT;  Surgeon: Flip Izaguirre MD;  [facility]: [facility]    REVISE SCAR EXTREMITY Right 12/17/2020    Procedure: Right stump scar revision;  Surgeon: RAISA Perea MD;  Location: [facility] OR       ALLERGIES:  Patient has no known allergies.    MEDS:    Current Outpatient Medications:     acetaminophen (TYLENOL) 325 MG tablet, Take 2 tablets (650 mg) by mouth every 4 hours, Disp: , Rfl:     amLODIPine (NORVASC) 5 MG tablet, Take 1 tablet (5 mg) by mouth daily, Disp: 90 tablet, Rfl: 3    aspirin (ASA) 81 MG tablet, Take 81 mg by mouth daily, Disp:  , Rfl:     calcium carbonate 600 mg-vitamin D 400 units (CALCIUM 600 + D) 600-400 MG-UNIT per tablet, Take 2 tablets by mouth daily, Disp: , Rfl:     folic acid (FOLVITE) 1  MG tablet, Take 1 mg by mouth daily, Disp: , Rfl:     gabapentin (NEURONTIN) 300 MG capsule, Take 1 capsule (300 mg) by mouth 3 times daily, Disp: 270 capsule, Rfl: 0    leflunomide (ARAVA) 20 MG tablet, Take 1 tablet by mouth every 24 hours, Disp: , Rfl:     lisinopril (ZESTRIL) 20 MG tablet, TAKE 1 TABLET BY MOUTH EVERY DAY, Disp: 90 tablet, Rfl: 0    methotrexate 2.5 MG tablet, Take 20 mg by mouth every 7 days On Saturdays, Disp: , Rfl:     metoprolol succinate ER (TOPROL XL) 50 MG 24 hr tablet, TAKE ONE TABLET BY MOUTH EVERY DAY, Disp: 90 tablet, Rfl: 1    multivitamin w/minerals (THERA-VIT-M) tablet, Take 1 tablet by mouth daily, Disp: , Rfl:     predniSONE (DELTASONE) 5 MG tablet, Take 1 tablet (5 mg) by mouth daily, Disp: , Rfl: 0    simvastatin (ZOCOR) 20 MG tablet, Take 1 tablet (20 mg) by mouth At Bedtime, Disp: 90 tablet, Rfl: 1    ELIQUIS ANTICOAGULANT 5 MG tablet, TAKE 1 TABLET BY MOUTH TWICE A DAY (Patient not taking: Reported on 12/18/2023), Disp: 60 tablet, Rfl: 2    SOCIAL HABITS:    History   Smoking Status    Some Days    Packs/day: 0.50    Years: 45.00    Types: Cigarettes    Last attempt to quit: 2/26/2010   Smokeless Tobacco    Never     Social History    Substance and Sexual Activity      Alcohol use: Yes        Comment: occsionally-3 -4 drinks a week      History   Drug Use No       FAMILY HISTORY:    Family History   Problem Relation Age of Onset    Cardiovascular Mother     Cancer Brother     Diabetes No family hx of     Hypertension No family hx of     Cerebrovascular Disease No family hx of     Alzheimer Disease No family hx of     Thyroid Disease No family hx of     Respiratory No family hx of     Other - See Comments Mother         CARDIOVASCULAR    Cancer Brother        PE:  BP (!) 167/74 (BP Location: Left arm, Patient Position: Sitting, Cuff Size: Adult Small)   Pulse 73   LMP  (LMP Unknown)   SpO2 97%   Wt Readings from Last 1 Encounters:   10/26/23 100 lb     There is no height  or weight on file to calculate BMI.    EXAM:  GENERAL: well-developed 78 year old female who appears her stated age  CARDIAC: normal   CHEST/LUNG: normal respiratory effort   MUSCULOSKELETAL: grossly normal and both lower extremities are intact, no lower extremity edema  NEUROLOGIC: focally intact, alert and oriented x 3  PSYCH: appropriate affect  VASCULAR: Right femoral pulse palpable.  Posterior aspect of RLE stump with necrotic area.  Appropriate erythematous surrounding images, no drainage, no purulent collections visible or palpable.            Again, thank you for allowing me to participate in the care of your patient.      Sincerely,    DESIRE Ventura CNP

## 2023-12-18 NOTE — PATIENT INSTRUCTIONS
Thank you so much for choosing us for your care. It was a pleasure to see you at the vascular clinic today.     Follow-up recommendations: We will see you back in 2 weeks. Please do not hesitate to reach out to us in the meantime with any concerns. Our schedulers will get in touch with you to set up your follow-up visit.    - Keep half leg/right leg warm  - Smoking cessation  - Apply iodine on half leg wound (RLE), at least daily, leave to dry, no need to apply dressings. Once iodine is dried, cover with warming clothing.  - Keep half leg wound elevated on pillow, off load pressure from right leg   - Call if new drainage is noted  - Call if worsening redness on half leg (RLE)  - Follow up on 1/2/24 with TCpO2 prior to visit     Additional testing/imaging ordered today: TCpO2 to be done within 1-2 weeks        Our scheduling team will get in touch with you to set up any follow-up testing/imaging and/or appointments. Please be aware that any testing/imaging recommended today will need to completed prior to your next visit with the provider. If testing/imaging is not completed prior to your next visit, your visit may be rescheduled.        If you have any questions, please call our clinic at [phone]. We also encourage the use of [employer] to communicate with your HealthCare Provider.        If you have an urgent need after business hours (8:00 am to 4:30 pm) please call [phone], option 4, and ask for the vascular attending on call. For non-urgent after hours needs, please call the vascular nurse at [phone] and leave a detailed voicemail. For scheduling needs, please call our clinic directly at [phone].     Negative

## 2023-12-18 NOTE — NURSING NOTE
Chief Complaint   Patient presents with    Follow Up     Return vascular - 4 week stump hematoma follow-up         Vitals were taken, medications reconciled.    Kaitlin Cooper CMA  11:29 AM

## 2023-12-18 NOTE — PROGRESS NOTES
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 Oconnor. Postoperatively recovering well, was briefly on Eliquis for DVT however this was completed several months ago. With newly found bruise and redness on RLE stump November 2023. Area now necrosed, without drainage, does not appear to be infected. Patient continues to smoke at baseline. Was due to be seen last week however patient had the flu clinic canceled, thus follows up this week.    We discussed the importance of smoking cessation and how it impairs wound healing. We also discussed extensively methods of offloading pressure from RLE stump. Ms. Jaeger's case was also discussed with Dr. Ramon Arellano: Ideally she would undergo stump revision and smoking cessation, however patient declines surgery and continues to smoke.     - Keep half leg/right leg warm  - Smoking cessation  - Apply iodine on RLE stump, at least daily, leave to dry, no need to apply dressings. Once iodine is dried, cover with warming clothing.  - Keep RLE stump wound elevated on pillow, off load pressure from right leg   - Call if new drainage is noted  - Call if worsening redness on RLE stump  - Follow up on 1/2/24 with TCpO2 prior to visit to determine healing capabilities  - Follow-up with me in 2 weeks in person, on 1/2/2024.    All questions were answered and support provided. She has our contact information and knows to reach out with any additional questions or concerns.     Yasmin Rob, CNP  Vascular  Surgery  Pager: [phone]  [email or web address]  Send message or 10 digit call back number Securely via [employer] with the [employer] Web Console (learn more here)    60 minutes spent on the date of the encounter doing chart review, review of outside records, review of test results, interpretation of tests, patient visit, documentation and discussion with other provider(s)        HPI:  Sakshi Jaeger is a 78 year old female with past medical history significant for Right through-knee amputation by Dr. Mott of orthopedic surgery after a prior right below-knee amputation years ago. Her through-knee amputation failed to heal due to extensive vascular disease. 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 Oconnor on 1/13/2023.  Postoperatively he recovered well, started working with orthotics however more recently noted hematoma on her stump. Unclear exact etiology. Patient noted at last visit that her RLE hematoma and overall redness was improving. Today patient is found to have with necrotic area over posterior aspect of her stump. There is no drainage, no purulent collections, no increased swelling.  Some appropriate redness surrounding the necrotic area, see uploaded picture.  RLE stump cool to touch, patient does have palpable femoral pulses +2. RLE cool to touch likely secondary to poorly dressed, with thin 1 layer pants.  Denies fevers or chills.    REVIEW OF SYSTEMS:    A 12 point ROS was reviewed and is negative except for what is listed above in HPI.    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    Essential hypertension with goal blood pressure less than  140/90 8/25/2016    Hyperlipidemia LDL goal <100 11/12/2010    Hypertension goal BP (blood pressure) < 130/80 1/26/2011    Infection of right below knee amputation (H) 10/1/2019    IRON DEFIC ANEMIA NOS 9/15/2005    Lower limb amputation, below knee 12/26/2012    MI (myocardial infarction) (H)     3/2010    Non-healing surgical wound, subsequent encounter 9/24/2020    Added automatically from request for surgery [id number]    Osteoporosis 2/16/2005    OSTEOPOROSIS NOS 2/16/2005    Protein-calorie malnutrition (H24) 5/18/2022    PVD (peripheral vascular disease) (H24) 5/18/2022    Rheumatoid arthritis (H) 2/16/2005         Rheumatoid arthritis(714.0) 2/16/2005    Status post below-knee amputation (H) 12/26/2012               Past Surgical History:   Procedure Laterality Date    ---------INCISION AND DRAINAGE  03/05/2014    AMPUTATE LEG ABOVE KNEE Right 1/13/2023    Procedure: AMPUTATION, ABOVE KNEE RIGHT;  Surgeon: Emma Oconnor MD;  [facility]    AMPUTATE LEG BELOW KNEE Right 9/28/2022    Procedure: Right through knee amputation;  Surgeon: Doron Mott MD;  [facility]    AMPUTATION BELOW KNEE RT/LT  01/01/2005    right lowwer leg.     ANGIOGRAM Right 1/13/2023    Procedure: right iliac arteriogram aned right common iliac artery stent;  Surgeon: Emma Oconnor MD;  [facility]    APPENDECTOMY      ARTHROPLASTY KNEE  04/27/2011    Procedure:ARTHROPLASTY KNEE; Surgeon:JESSE HAWKINS; [facility]    COMPLEX WOUND CLOSURE (UPDATE) Right 12/08/2021    Procedure: Right stump wound debridment and closure;  Surgeon: RAISA Perea MD;  Location: [facility] OR    CORONARY STENT PLACEMENT      ENDARTERECTOMY FEMORAL Right 1/13/2023    Procedure: ENDARTERECTOMY, FEMORAL RIGHT;  Surgeon: Emma Oconnor MD;  [facility]    INJECT NERVE BLOCK SPHENOPALATINE GANGLION N/A 9/29/2022    Procedure: Out of OR encounter for block to be done by ;  Surgeon: GENERIC ANESTHESIA PROVIDER;  [facility]    IR OR ANGIOGRAM  1/13/2023    IRRIGATION AND DEBRIDEMENT LOWER EXTREMITY, COMBINED Right 10/09/2019    Procedure: Irrigation and debridement Right leg;  Surgeon: Doron Mott MD;  [facility]    NERVE BLOCK PERIPHERAL N/A 9/27/2022    Procedure: BLOCK, NERVE;  Surgeon: GENERIC ANESTHESIA PROVIDER;  [facility]    OTHER SURGICAL HISTORY  03/05/2014    I AND D     OTHER SURGICAL HISTORY Right 10/09/2019    IRRIGATION AND DEBRIDEMENT LOWER EXTREMITY, COMBINED    PHACOEMULSIFICATION CLEAR CORNEA WITH STANDARD INTRAOCULAR LENS IMPLANT Left 9/8/2022    Procedure: PHACOEMULSIFICATION, LEFT CATARACT, WITH INTRAOCULAR LENS IMPLANT;  Surgeon: Flip Izaguirre MD;  [facility]: [facility]    PHACOEMULSIFICATION CLEAR CORNEA WITH STANDARD INTRAOCULAR LENS IMPLANT Right 10/13/2022    Procedure: PHACOEMULSIFICATION, RIGHT CATARACT, WITH INTRAOCULAR LENS IMPLANT;  Surgeon: Flip Izaguirre MD;  [facility]: [facility]    REVISE SCAR EXTREMITY Right 12/17/2020    Procedure: Right stump scar revision;  Surgeon: RAISA Perea MD;  Location: [facility] OR       ALLERGIES:  Patient has no known allergies.    MEDS:    Current Outpatient Medications:     acetaminophen (TYLENOL) 325 MG tablet, Take 2 tablets (650 mg) by mouth every 4 hours, Disp: , Rfl:     amLODIPine (NORVASC) 5 MG tablet, Take 1 tablet (5 mg) by mouth daily, Disp: 90 tablet, Rfl: 3    aspirin (ASA) 81 MG tablet, Take 81 mg by mouth daily, Disp:  , Rfl:     calcium carbonate 600 mg-vitamin D 400 units (CALCIUM 600 + D) 600-400 MG-UNIT per tablet, Take 2 tablets by mouth daily, Disp: , Rfl:     folic acid (FOLVITE) 1 MG tablet, Take 1 mg by mouth daily, Disp: , Rfl:     gabapentin (NEURONTIN) 300 MG capsule, Take 1 capsule (300 mg) by mouth 3 times daily, Disp: 270 capsule, Rfl: 0    leflunomide (ARAVA) 20 MG tablet, Take 1 tablet by mouth every 24 hours, Disp: , Rfl:     lisinopril (ZESTRIL) 20 MG tablet, TAKE 1 TABLET BY MOUTH EVERY  DAY, Disp: 90 tablet, Rfl: 0    methotrexate 2.5 MG tablet, Take 20 mg by mouth every 7 days On Saturdays, Disp: , Rfl:     metoprolol succinate ER (TOPROL XL) 50 MG 24 hr tablet, TAKE ONE TABLET BY MOUTH EVERY DAY, Disp: 90 tablet, Rfl: 1    multivitamin w/minerals (THERA-VIT-M) tablet, Take 1 tablet by mouth daily, Disp: , Rfl:     predniSONE (DELTASONE) 5 MG tablet, Take 1 tablet (5 mg) by mouth daily, Disp: , Rfl: 0    simvastatin (ZOCOR) 20 MG tablet, Take 1 tablet (20 mg) by mouth At Bedtime, Disp: 90 tablet, Rfl: 1    ELIQUIS ANTICOAGULANT 5 MG tablet, TAKE 1 TABLET BY MOUTH TWICE A DAY (Patient not taking: Reported on 12/18/2023), Disp: 60 tablet, Rfl: 2    SOCIAL HABITS:    History   Smoking Status    Some Days    Packs/day: 0.50    Years: 45.00    Types: Cigarettes    Last attempt to quit: 2/26/2010   Smokeless Tobacco    Never     Social History    Substance and Sexual Activity      Alcohol use: Yes        Comment: occsionally-3 -4 drinks a week      History   Drug Use No       FAMILY HISTORY:    Family History   Problem Relation Age of Onset    Cardiovascular Mother     Cancer Brother     Diabetes No family hx of     Hypertension No family hx of     Cerebrovascular Disease No family hx of     Alzheimer Disease No family hx of     Thyroid Disease No family hx of     Respiratory No family hx of     Other - See Comments Mother         CARDIOVASCULAR    Cancer Brother        PE:  BP (!) 167/74 (BP Location: Left arm, Patient Position: Sitting, Cuff Size: Adult Small)   Pulse 73   LMP  (LMP Unknown)   SpO2 97%   Wt Readings from Last 1 Encounters:   10/26/23 100 lb     There is no height or weight on file to calculate BMI.    EXAM:  GENERAL: well-developed 78 year old female who appears her stated age  CARDIAC: normal   CHEST/LUNG: normal respiratory effort   MUSCULOSKELETAL: grossly normal and both lower extremities are intact, no lower extremity edema  NEUROLOGIC: focally intact, alert and oriented x  3  PSYCH: appropriate affect  VASCULAR: Right femoral pulse palpable.  Posterior aspect of RLE stump with necrotic area.  Appropriate erythematous surrounding images, no drainage, no purulent collections visible or palpable.

## 2024-01-01 ENCOUNTER — TRANSITIONAL CARE UNIT VISIT (OUTPATIENT)
Dept: GERIATRICS | Facility: CLINIC | Age: 79
End: 2024-01-01
Payer: COMMERCIAL

## 2024-01-01 ENCOUNTER — TELEPHONE (OUTPATIENT)
Dept: GASTROENTEROLOGY | Facility: CLINIC | Age: 79
End: 2024-01-01

## 2024-01-01 ENCOUNTER — APPOINTMENT (OUTPATIENT)
Dept: PHYSICAL THERAPY | Facility: CLINIC | Age: 79
DRG: 475 | End: 2024-01-01
Attending: SURGERY
Payer: COMMERCIAL

## 2024-01-01 ENCOUNTER — TELEPHONE (OUTPATIENT)
Dept: VASCULAR SURGERY | Facility: CLINIC | Age: 79
End: 2024-01-01
Payer: COMMERCIAL

## 2024-01-01 ENCOUNTER — PATIENT OUTREACH (OUTPATIENT)
Dept: CARE COORDINATION | Facility: CLINIC | Age: 79
End: 2024-01-01
Payer: COMMERCIAL

## 2024-01-01 ENCOUNTER — DISCHARGE SUMMARY NURSING HOME (OUTPATIENT)
Dept: GERIATRICS | Facility: CLINIC | Age: 79
End: 2024-01-01
Payer: COMMERCIAL

## 2024-01-01 ENCOUNTER — TELEPHONE (OUTPATIENT)
Dept: INTERNAL MEDICINE | Facility: CLINIC | Age: 79
End: 2024-01-01
Payer: COMMERCIAL

## 2024-01-01 ENCOUNTER — APPOINTMENT (OUTPATIENT)
Dept: OCCUPATIONAL THERAPY | Facility: CLINIC | Age: 79
DRG: 475 | End: 2024-01-01
Attending: SURGERY
Payer: COMMERCIAL

## 2024-01-01 ENCOUNTER — LAB REQUISITION (OUTPATIENT)
Dept: LAB | Facility: CLINIC | Age: 79
End: 2024-01-01
Payer: COMMERCIAL

## 2024-01-01 ENCOUNTER — ANESTHESIA (OUTPATIENT)
Dept: SURGERY | Facility: CLINIC | Age: 79
DRG: 475 | End: 2024-01-01
Payer: COMMERCIAL

## 2024-01-01 ENCOUNTER — APPOINTMENT (OUTPATIENT)
Dept: PHYSICAL THERAPY | Facility: CLINIC | Age: 79
DRG: 380 | End: 2024-01-01
Payer: COMMERCIAL

## 2024-01-01 ENCOUNTER — TRANSFERRED RECORDS (OUTPATIENT)
Dept: HEALTH INFORMATION MANAGEMENT | Facility: CLINIC | Age: 79
End: 2024-01-01

## 2024-01-01 ENCOUNTER — HOSPITAL ENCOUNTER (INPATIENT)
Facility: CLINIC | Age: 79
LOS: 7 days | Discharge: NURSING, REHAB OR LONG-TERM CARE | DRG: 475 | End: 2024-02-09
Attending: SURGERY | Admitting: SURGERY
Payer: COMMERCIAL

## 2024-01-01 ENCOUNTER — APPOINTMENT (OUTPATIENT)
Dept: SPEECH THERAPY | Facility: CLINIC | Age: 79
DRG: 380 | End: 2024-01-01
Payer: COMMERCIAL

## 2024-01-01 ENCOUNTER — APPOINTMENT (OUTPATIENT)
Dept: GENERAL RADIOLOGY | Facility: CLINIC | Age: 79
DRG: 380 | End: 2024-01-01
Attending: INTERNAL MEDICINE
Payer: COMMERCIAL

## 2024-01-01 ENCOUNTER — APPOINTMENT (OUTPATIENT)
Dept: GENERAL RADIOLOGY | Facility: CLINIC | Age: 79
DRG: 299 | End: 2024-01-01
Attending: EMERGENCY MEDICINE
Payer: COMMERCIAL

## 2024-01-01 ENCOUNTER — OFFICE VISIT (OUTPATIENT)
Dept: VASCULAR SURGERY | Facility: CLINIC | Age: 79
End: 2024-01-01
Payer: COMMERCIAL

## 2024-01-01 ENCOUNTER — OFFICE VISIT (OUTPATIENT)
Dept: OTOLARYNGOLOGY | Facility: CLINIC | Age: 79
End: 2024-01-01
Payer: COMMERCIAL

## 2024-01-01 ENCOUNTER — APPOINTMENT (OUTPATIENT)
Dept: OCCUPATIONAL THERAPY | Facility: CLINIC | Age: 79
DRG: 380 | End: 2024-01-01
Payer: COMMERCIAL

## 2024-01-01 ENCOUNTER — TELEPHONE (OUTPATIENT)
Dept: VASCULAR SURGERY | Facility: CLINIC | Age: 79
End: 2024-01-01

## 2024-01-01 ENCOUNTER — APPOINTMENT (OUTPATIENT)
Dept: CT IMAGING | Facility: CLINIC | Age: 79
DRG: 299 | End: 2024-01-01
Attending: EMERGENCY MEDICINE
Payer: COMMERCIAL

## 2024-01-01 ENCOUNTER — APPOINTMENT (OUTPATIENT)
Dept: ULTRASOUND IMAGING | Facility: CLINIC | Age: 79
DRG: 299 | End: 2024-01-01
Attending: PHYSICIAN ASSISTANT
Payer: COMMERCIAL

## 2024-01-01 ENCOUNTER — APPOINTMENT (OUTPATIENT)
Dept: SPEECH THERAPY | Facility: CLINIC | Age: 79
DRG: 475 | End: 2024-01-01
Attending: NURSE PRACTITIONER
Payer: COMMERCIAL

## 2024-01-01 ENCOUNTER — DOCUMENTATION ONLY (OUTPATIENT)
Dept: GERIATRICS | Facility: CLINIC | Age: 79
End: 2024-01-01
Payer: COMMERCIAL

## 2024-01-01 ENCOUNTER — APPOINTMENT (OUTPATIENT)
Dept: OCCUPATIONAL THERAPY | Facility: CLINIC | Age: 79
DRG: 299 | End: 2024-01-01
Attending: PHYSICIAN ASSISTANT
Payer: COMMERCIAL

## 2024-01-01 ENCOUNTER — OFFICE VISIT (OUTPATIENT)
Dept: INTERNAL MEDICINE | Facility: CLINIC | Age: 79
End: 2024-01-01
Payer: COMMERCIAL

## 2024-01-01 ENCOUNTER — OFFICE VISIT (OUTPATIENT)
Dept: PLASTIC SURGERY | Facility: CLINIC | Age: 79
DRG: 475 | End: 2024-01-01
Attending: PLASTIC SURGERY
Payer: COMMERCIAL

## 2024-01-01 ENCOUNTER — APPOINTMENT (OUTPATIENT)
Dept: SPEECH THERAPY | Facility: CLINIC | Age: 79
DRG: 380 | End: 2024-01-01
Attending: STUDENT IN AN ORGANIZED HEALTH CARE EDUCATION/TRAINING PROGRAM
Payer: COMMERCIAL

## 2024-01-01 ENCOUNTER — ANESTHESIA EVENT (OUTPATIENT)
Dept: SURGERY | Facility: CLINIC | Age: 79
DRG: 475 | End: 2024-01-01
Payer: COMMERCIAL

## 2024-01-01 ENCOUNTER — PREP FOR PROCEDURE (OUTPATIENT)
Dept: SURGERY | Facility: CLINIC | Age: 79
End: 2024-01-01
Payer: COMMERCIAL

## 2024-01-01 ENCOUNTER — TELEPHONE (OUTPATIENT)
Dept: PLASTIC SURGERY | Facility: CLINIC | Age: 79
End: 2024-01-01
Payer: COMMERCIAL

## 2024-01-01 ENCOUNTER — APPOINTMENT (OUTPATIENT)
Dept: CT IMAGING | Facility: CLINIC | Age: 79
DRG: 299 | End: 2024-01-01
Attending: HOSPITALIST
Payer: COMMERCIAL

## 2024-01-01 ENCOUNTER — APPOINTMENT (OUTPATIENT)
Dept: CT IMAGING | Facility: CLINIC | Age: 79
DRG: 380 | End: 2024-01-01
Attending: EMERGENCY MEDICINE
Payer: COMMERCIAL

## 2024-01-01 ENCOUNTER — PREP FOR PROCEDURE (OUTPATIENT)
Dept: VASCULAR SURGERY | Facility: CLINIC | Age: 79
End: 2024-01-01
Payer: COMMERCIAL

## 2024-01-01 ENCOUNTER — MEDICAL CORRESPONDENCE (OUTPATIENT)
Dept: HEALTH INFORMATION MANAGEMENT | Facility: CLINIC | Age: 79
End: 2024-01-01
Payer: COMMERCIAL

## 2024-01-01 ENCOUNTER — HOSPITAL ENCOUNTER (INPATIENT)
Facility: CLINIC | Age: 79
LOS: 6 days | DRG: 380 | End: 2024-07-09
Attending: EMERGENCY MEDICINE | Admitting: INTERNAL MEDICINE
Payer: COMMERCIAL

## 2024-01-01 ENCOUNTER — APPOINTMENT (OUTPATIENT)
Dept: PHYSICAL THERAPY | Facility: CLINIC | Age: 79
DRG: 380 | End: 2024-01-01
Attending: EMERGENCY MEDICINE
Payer: COMMERCIAL

## 2024-01-01 ENCOUNTER — TELEPHONE (OUTPATIENT)
Dept: INTERNAL MEDICINE | Facility: CLINIC | Age: 79
End: 2024-01-01

## 2024-01-01 ENCOUNTER — TRANSCRIBE ORDERS (OUTPATIENT)
Dept: OTHER | Age: 79
End: 2024-01-01

## 2024-01-01 ENCOUNTER — HOSPITAL ENCOUNTER (INPATIENT)
Facility: CLINIC | Age: 79
LOS: 2 days | Discharge: HOME HEALTH | DRG: 299 | End: 2024-05-19
Attending: EMERGENCY MEDICINE | Admitting: STUDENT IN AN ORGANIZED HEALTH CARE EDUCATION/TRAINING PROGRAM
Payer: COMMERCIAL

## 2024-01-01 ENCOUNTER — DOCUMENTATION ONLY (OUTPATIENT)
Dept: VASCULAR SURGERY | Facility: CLINIC | Age: 79
End: 2024-01-01
Payer: COMMERCIAL

## 2024-01-01 ENCOUNTER — HOSPITAL ENCOUNTER (INPATIENT)
Facility: CLINIC | Age: 79
LOS: 12 days | Discharge: HOME | DRG: 380 | End: 2024-06-05
Attending: EMERGENCY MEDICINE | Admitting: PEDIATRICS
Payer: COMMERCIAL

## 2024-01-01 VITALS
DIASTOLIC BLOOD PRESSURE: 72 MMHG | RESPIRATION RATE: 18 BRPM | BODY MASS INDEX: 14.45 KG/M2 | SYSTOLIC BLOOD PRESSURE: 126 MMHG | TEMPERATURE: 97.6 F | HEART RATE: 70 BPM | WEIGHT: 84.2 LBS | OXYGEN SATURATION: 97 %

## 2024-01-01 VITALS
RESPIRATION RATE: 18 BRPM | WEIGHT: 84.2 LBS | TEMPERATURE: 97 F | HEART RATE: 58 BPM | DIASTOLIC BLOOD PRESSURE: 84 MMHG | OXYGEN SATURATION: 93 % | BODY MASS INDEX: 14.45 KG/M2 | SYSTOLIC BLOOD PRESSURE: 170 MMHG

## 2024-01-01 VITALS
RESPIRATION RATE: 16 BRPM | WEIGHT: 89.29 LBS | SYSTOLIC BLOOD PRESSURE: 115 MMHG | HEART RATE: 68 BPM | OXYGEN SATURATION: 97 % | DIASTOLIC BLOOD PRESSURE: 62 MMHG | BODY MASS INDEX: 15.33 KG/M2 | TEMPERATURE: 97.8 F

## 2024-01-01 VITALS
HEART RATE: 83 BPM | SYSTOLIC BLOOD PRESSURE: 146 MMHG | OXYGEN SATURATION: 97 % | DIASTOLIC BLOOD PRESSURE: 79 MMHG | TEMPERATURE: 97.9 F

## 2024-01-01 VITALS — HEART RATE: 70 BPM | DIASTOLIC BLOOD PRESSURE: 68 MMHG | SYSTOLIC BLOOD PRESSURE: 108 MMHG | OXYGEN SATURATION: 90 %

## 2024-01-01 VITALS
SYSTOLIC BLOOD PRESSURE: 145 MMHG | OXYGEN SATURATION: 94 % | HEART RATE: 71 BPM | TEMPERATURE: 98.5 F | RESPIRATION RATE: 18 BRPM | WEIGHT: 84.2 LBS | BODY MASS INDEX: 14.45 KG/M2 | DIASTOLIC BLOOD PRESSURE: 80 MMHG

## 2024-01-01 VITALS — SYSTOLIC BLOOD PRESSURE: 152 MMHG | HEART RATE: 75 BPM | DIASTOLIC BLOOD PRESSURE: 68 MMHG | OXYGEN SATURATION: 97 %

## 2024-01-01 VITALS
SYSTOLIC BLOOD PRESSURE: 153 MMHG | HEART RATE: 68 BPM | HEIGHT: 64 IN | DIASTOLIC BLOOD PRESSURE: 69 MMHG | BODY MASS INDEX: 14.04 KG/M2 | TEMPERATURE: 98.4 F | OXYGEN SATURATION: 93 % | DIASTOLIC BLOOD PRESSURE: 78 MMHG | SYSTOLIC BLOOD PRESSURE: 110 MMHG | RESPIRATION RATE: 18 BRPM | RESPIRATION RATE: 20 BRPM | TEMPERATURE: 99.6 F | HEART RATE: 83 BPM

## 2024-01-01 VITALS
SYSTOLIC BLOOD PRESSURE: 125 MMHG | HEART RATE: 113 BPM | TEMPERATURE: 99.5 F | RESPIRATION RATE: 20 BRPM | HEIGHT: 66 IN | OXYGEN SATURATION: 92 % | WEIGHT: 85 LBS | DIASTOLIC BLOOD PRESSURE: 87 MMHG | BODY MASS INDEX: 13.66 KG/M2

## 2024-01-01 VITALS
HEART RATE: 74 BPM | SYSTOLIC BLOOD PRESSURE: 145 MMHG | OXYGEN SATURATION: 94 % | RESPIRATION RATE: 18 BRPM | DIASTOLIC BLOOD PRESSURE: 76 MMHG | BODY MASS INDEX: 14.45 KG/M2 | WEIGHT: 84.2 LBS | TEMPERATURE: 98.1 F

## 2024-01-01 VITALS
HEART RATE: 84 BPM | WEIGHT: 81.79 LBS | TEMPERATURE: 97.5 F | HEIGHT: 64 IN | SYSTOLIC BLOOD PRESSURE: 163 MMHG | OXYGEN SATURATION: 91 % | RESPIRATION RATE: 18 BRPM | BODY MASS INDEX: 13.96 KG/M2 | DIASTOLIC BLOOD PRESSURE: 79 MMHG

## 2024-01-01 VITALS
RESPIRATION RATE: 12 BRPM | SYSTOLIC BLOOD PRESSURE: 128 MMHG | BODY MASS INDEX: 16.07 KG/M2 | HEIGHT: 64 IN | HEART RATE: 89 BPM | OXYGEN SATURATION: 93 % | DIASTOLIC BLOOD PRESSURE: 57 MMHG | WEIGHT: 94.14 LBS | TEMPERATURE: 98.3 F

## 2024-01-01 VITALS
BODY MASS INDEX: 14.45 KG/M2 | DIASTOLIC BLOOD PRESSURE: 70 MMHG | WEIGHT: 84.2 LBS | SYSTOLIC BLOOD PRESSURE: 118 MMHG | RESPIRATION RATE: 18 BRPM | HEART RATE: 75 BPM | OXYGEN SATURATION: 95 % | TEMPERATURE: 98.2 F

## 2024-01-01 VITALS
HEART RATE: 71 BPM | DIASTOLIC BLOOD PRESSURE: 65 MMHG | WEIGHT: 100 LBS | BODY MASS INDEX: 17.16 KG/M2 | OXYGEN SATURATION: 95 % | SYSTOLIC BLOOD PRESSURE: 113 MMHG

## 2024-01-01 VITALS
HEART RATE: 65 BPM | SYSTOLIC BLOOD PRESSURE: 116 MMHG | DIASTOLIC BLOOD PRESSURE: 70 MMHG | SYSTOLIC BLOOD PRESSURE: 177 MMHG | OXYGEN SATURATION: 94 % | HEART RATE: 70 BPM | OXYGEN SATURATION: 96 % | DIASTOLIC BLOOD PRESSURE: 63 MMHG

## 2024-01-01 VITALS — HEART RATE: 61 BPM | SYSTOLIC BLOOD PRESSURE: 107 MMHG | DIASTOLIC BLOOD PRESSURE: 66 MMHG | OXYGEN SATURATION: 93 %

## 2024-01-01 VITALS — DIASTOLIC BLOOD PRESSURE: 63 MMHG | OXYGEN SATURATION: 97 % | SYSTOLIC BLOOD PRESSURE: 118 MMHG | HEART RATE: 66 BPM

## 2024-01-01 VITALS
OXYGEN SATURATION: 97 % | SYSTOLIC BLOOD PRESSURE: 163 MMHG | RESPIRATION RATE: 16 BRPM | DIASTOLIC BLOOD PRESSURE: 76 MMHG | HEART RATE: 75 BPM | TEMPERATURE: 97.5 F

## 2024-01-01 VITALS — OXYGEN SATURATION: 97 % | DIASTOLIC BLOOD PRESSURE: 73 MMHG | SYSTOLIC BLOOD PRESSURE: 129 MMHG | HEART RATE: 68 BPM

## 2024-01-01 VITALS — DIASTOLIC BLOOD PRESSURE: 64 MMHG | OXYGEN SATURATION: 95 % | SYSTOLIC BLOOD PRESSURE: 112 MMHG | HEART RATE: 63 BPM

## 2024-01-01 VITALS — OXYGEN SATURATION: 96 % | DIASTOLIC BLOOD PRESSURE: 78 MMHG | HEART RATE: 73 BPM | SYSTOLIC BLOOD PRESSURE: 135 MMHG

## 2024-01-01 VITALS — DIASTOLIC BLOOD PRESSURE: 75 MMHG | OXYGEN SATURATION: 93 % | SYSTOLIC BLOOD PRESSURE: 128 MMHG | HEART RATE: 66 BPM

## 2024-01-01 DIAGNOSIS — K92.1 GASTROINTESTINAL HEMORRHAGE WITH MELENA: ICD-10-CM

## 2024-01-01 DIAGNOSIS — I73.9 PVD (PERIPHERAL VASCULAR DISEASE) (H): ICD-10-CM

## 2024-01-01 DIAGNOSIS — I25.10 CORONARY ARTERY DISEASE INVOLVING NATIVE CORONARY ARTERY OF NATIVE HEART WITHOUT ANGINA PECTORIS: ICD-10-CM

## 2024-01-01 DIAGNOSIS — D64.9 LOW HEMOGLOBIN: ICD-10-CM

## 2024-01-01 DIAGNOSIS — E46 PROTEIN-CALORIE MALNUTRITION, UNSPECIFIED SEVERITY (H): ICD-10-CM

## 2024-01-01 DIAGNOSIS — Z89.611 S/P AKA (ABOVE KNEE AMPUTATION) UNILATERAL, RIGHT (H): Primary | ICD-10-CM

## 2024-01-01 DIAGNOSIS — E87.6 HYPOKALEMIA: ICD-10-CM

## 2024-01-01 DIAGNOSIS — D64.9 ANEMIA, UNSPECIFIED TYPE: ICD-10-CM

## 2024-01-01 DIAGNOSIS — I10 ESSENTIAL HYPERTENSION WITH GOAL BLOOD PRESSURE LESS THAN 140/90: ICD-10-CM

## 2024-01-01 DIAGNOSIS — E83.42 HYPOMAGNESEMIA: ICD-10-CM

## 2024-01-01 DIAGNOSIS — T14.8XXA OPEN WOUND: ICD-10-CM

## 2024-01-01 DIAGNOSIS — T87.89 NON-HEALING WOUND OF AMPUTATION STUMP (H): ICD-10-CM

## 2024-01-01 DIAGNOSIS — F32.0 CURRENT MILD EPISODE OF MAJOR DEPRESSIVE DISORDER, UNSPECIFIED WHETHER RECURRENT (H): ICD-10-CM

## 2024-01-01 DIAGNOSIS — Z01.818 PREOP GENERAL PHYSICAL EXAM: Primary | ICD-10-CM

## 2024-01-01 DIAGNOSIS — Z89.611 STATUS POST ABOVE-KNEE AMPUTATION OF RIGHT LOWER EXTREMITY (H): ICD-10-CM

## 2024-01-01 DIAGNOSIS — I70.299 ATHEROSCLEROSIS OF ARTERY OF EXTREMITY WITH ULCERATION (H): Primary | ICD-10-CM

## 2024-01-01 DIAGNOSIS — R53.1 WEAKNESS: ICD-10-CM

## 2024-01-01 DIAGNOSIS — Z71.89 ACP (ADVANCE CARE PLANNING): ICD-10-CM

## 2024-01-01 DIAGNOSIS — Z23 NEED FOR SHINGLES VACCINE: ICD-10-CM

## 2024-01-01 DIAGNOSIS — R53.1 GENERALIZED WEAKNESS: Primary | ICD-10-CM

## 2024-01-01 DIAGNOSIS — L97.816: ICD-10-CM

## 2024-01-01 DIAGNOSIS — F32.0 CURRENT MILD EPISODE OF MAJOR DEPRESSIVE DISORDER, UNSPECIFIED WHETHER RECURRENT (H): Primary | ICD-10-CM

## 2024-01-01 DIAGNOSIS — Z29.11 NEED FOR VACCINATION AGAINST RESPIRATORY SYNCYTIAL VIRUS: ICD-10-CM

## 2024-01-01 DIAGNOSIS — K92.1 GASTROINTESTINAL HEMORRHAGE WITH MELENA: Primary | ICD-10-CM

## 2024-01-01 DIAGNOSIS — E46 PROTEIN-CALORIE MALNUTRITION, UNSPECIFIED SEVERITY (H): Primary | ICD-10-CM

## 2024-01-01 DIAGNOSIS — K22.11 ULCER OF ESOPHAGUS WITH BLEEDING: ICD-10-CM

## 2024-01-01 DIAGNOSIS — R41.0 CONFUSION: ICD-10-CM

## 2024-01-01 DIAGNOSIS — K26.9 DUODENAL ULCER: ICD-10-CM

## 2024-01-01 DIAGNOSIS — Z09 HOSPITAL DISCHARGE FOLLOW-UP: ICD-10-CM

## 2024-01-01 DIAGNOSIS — F32.A ANXIETY AND DEPRESSION: ICD-10-CM

## 2024-01-01 DIAGNOSIS — L89.152 PRESSURE INJURY OF COCCYGEAL REGION, STAGE 2 (H): Primary | ICD-10-CM

## 2024-01-01 DIAGNOSIS — T87.89 NON-HEALING WOUND OF AMPUTATION STUMP (H): Primary | ICD-10-CM

## 2024-01-01 DIAGNOSIS — E43 SEVERE PROTEIN-CALORIE MALNUTRITION (H): ICD-10-CM

## 2024-01-01 DIAGNOSIS — K92.2 UPPER GI BLEED: ICD-10-CM

## 2024-01-01 DIAGNOSIS — S88.919A AMPUTATION OF LEG (H): ICD-10-CM

## 2024-01-01 DIAGNOSIS — Z48.89 ENCOUNTER FOR POSTOPERATIVE WOUND CHECK: ICD-10-CM

## 2024-01-01 DIAGNOSIS — H61.23 BILATERAL IMPACTED CERUMEN: Primary | ICD-10-CM

## 2024-01-01 DIAGNOSIS — I25.10 ATHEROSCLEROTIC HEART DISEASE OF NATIVE CORONARY ARTERY WITHOUT ANGINA PECTORIS: ICD-10-CM

## 2024-01-01 DIAGNOSIS — F17.200 CURRENT SMOKER: ICD-10-CM

## 2024-01-01 DIAGNOSIS — T14.8XXA OPEN WOUND: Primary | ICD-10-CM

## 2024-01-01 DIAGNOSIS — Z09 HOSPITAL DISCHARGE FOLLOW-UP: Primary | ICD-10-CM

## 2024-01-01 DIAGNOSIS — K20.80 HERPES SIMPLEX ESOPHAGITIS: ICD-10-CM

## 2024-01-01 DIAGNOSIS — R10.84 ABDOMINAL PAIN, GENERALIZED: ICD-10-CM

## 2024-01-01 DIAGNOSIS — I82.412 ACUTE DEEP VEIN THROMBOSIS (DVT) OF FEMORAL VEIN OF LEFT LOWER EXTREMITY (H): Primary | ICD-10-CM

## 2024-01-01 DIAGNOSIS — I73.9 PAD (PERIPHERAL ARTERY DISEASE) (H): ICD-10-CM

## 2024-01-01 DIAGNOSIS — H93.8X3 EAR FULLNESS, BILATERAL: ICD-10-CM

## 2024-01-01 DIAGNOSIS — R53.83 OTHER FATIGUE: ICD-10-CM

## 2024-01-01 DIAGNOSIS — S81.801A OPEN WOUND OF RIGHT LOWER EXTREMITY, INITIAL ENCOUNTER: Primary | ICD-10-CM

## 2024-01-01 DIAGNOSIS — M06.9 RHEUMATOID ARTHRITIS, INVOLVING UNSPECIFIED SITE, UNSPECIFIED WHETHER RHEUMATOID FACTOR PRESENT (H): ICD-10-CM

## 2024-01-01 DIAGNOSIS — S78.111A AMPUTATION OF RIGHT LOWER EXTREMITY ABOVE KNEE UPON EXAMINATION (H): ICD-10-CM

## 2024-01-01 DIAGNOSIS — B00.89 HERPES SIMPLEX ESOPHAGITIS: ICD-10-CM

## 2024-01-01 DIAGNOSIS — F41.9 ANXIETY AND DEPRESSION: ICD-10-CM

## 2024-01-01 DIAGNOSIS — R63.0 LOSS OF APPETITE: ICD-10-CM

## 2024-01-01 DIAGNOSIS — R53.1 GENERALIZED WEAKNESS: ICD-10-CM

## 2024-01-01 DIAGNOSIS — L97.909 ATHEROSCLEROSIS OF ARTERY OF EXTREMITY WITH ULCERATION (H): Primary | ICD-10-CM

## 2024-01-01 DIAGNOSIS — Z89.511 STATUS POST BELOW-KNEE AMPUTATION OF RIGHT LOWER EXTREMITY (H): ICD-10-CM

## 2024-01-01 DIAGNOSIS — L89.152 PRESSURE INJURY OF COCCYGEAL REGION, STAGE 2 (H): ICD-10-CM

## 2024-01-01 DIAGNOSIS — E87.1 HYPONATREMIA: ICD-10-CM

## 2024-01-01 DIAGNOSIS — T87.81 DEHISCENCE OF AMPUTATION STUMP (H): ICD-10-CM

## 2024-01-01 DIAGNOSIS — S88.919A AMPUTATION OF LEG (H): Primary | ICD-10-CM

## 2024-01-01 DIAGNOSIS — Z89.611 STATUS POST ABOVE-KNEE AMPUTATION OF RIGHT LOWER EXTREMITY (H): Primary | ICD-10-CM

## 2024-01-01 LAB
ABO/RH(D): NORMAL
ACANTHOCYTES BLD QL SMEAR: ABNORMAL
ALBUMIN SERPL BCG-MCNC: 2.3 G/DL (ref 3.5–5.2)
ALBUMIN SERPL BCG-MCNC: 2.3 G/DL (ref 3.5–5.2)
ALBUMIN SERPL BCG-MCNC: 2.4 G/DL (ref 3.5–5.2)
ALBUMIN SERPL BCG-MCNC: 2.5 G/DL (ref 3.5–5.2)
ALBUMIN SERPL BCG-MCNC: 2.5 G/DL (ref 3.5–5.2)
ALBUMIN SERPL BCG-MCNC: 2.6 G/DL (ref 3.5–5.2)
ALBUMIN SERPL BCG-MCNC: 2.8 G/DL (ref 3.5–5.2)
ALBUMIN SERPL BCG-MCNC: 3 G/DL (ref 3.5–5.2)
ALBUMIN SERPL BCG-MCNC: 3.4 G/DL (ref 3.5–5.2)
ALBUMIN UR-MCNC: 10 MG/DL
ALBUMIN UR-MCNC: NEGATIVE MG/DL
ALP SERPL-CCNC: 100 U/L (ref 40–150)
ALP SERPL-CCNC: 126 U/L (ref 40–150)
ALP SERPL-CCNC: 130 U/L (ref 40–150)
ALP SERPL-CCNC: 133 U/L (ref 40–150)
ALP SERPL-CCNC: 135 U/L (ref 40–150)
ALP SERPL-CCNC: 80 U/L (ref 40–150)
ALP SERPL-CCNC: 85 U/L (ref 40–150)
ALP SERPL-CCNC: 88 U/L (ref 40–150)
ALP SERPL-CCNC: 97 U/L (ref 40–150)
ALT SERPL W P-5'-P-CCNC: 11 U/L (ref 0–50)
ALT SERPL W P-5'-P-CCNC: 11 U/L (ref 0–50)
ALT SERPL W P-5'-P-CCNC: 16 U/L (ref 0–50)
ALT SERPL W P-5'-P-CCNC: 19 U/L (ref 0–50)
ALT SERPL W P-5'-P-CCNC: 21 U/L (ref 0–50)
ALT SERPL W P-5'-P-CCNC: 24 U/L (ref 0–50)
ALT SERPL W P-5'-P-CCNC: 28 U/L (ref 0–50)
ALT SERPL W P-5'-P-CCNC: 29 U/L (ref 0–50)
ALT SERPL W P-5'-P-CCNC: 32 U/L (ref 0–50)
ANION GAP SERPL CALCULATED.3IONS-SCNC: 10 MMOL/L (ref 7–15)
ANION GAP SERPL CALCULATED.3IONS-SCNC: 11 MMOL/L (ref 7–15)
ANION GAP SERPL CALCULATED.3IONS-SCNC: 12 MMOL/L (ref 7–15)
ANION GAP SERPL CALCULATED.3IONS-SCNC: 13 MMOL/L (ref 7–15)
ANION GAP SERPL CALCULATED.3IONS-SCNC: 13 MMOL/L (ref 7–15)
ANION GAP SERPL CALCULATED.3IONS-SCNC: 14 MMOL/L (ref 7–15)
ANION GAP SERPL CALCULATED.3IONS-SCNC: 14 MMOL/L (ref 7–15)
ANION GAP SERPL CALCULATED.3IONS-SCNC: 8 MMOL/L (ref 7–15)
ANION GAP SERPL CALCULATED.3IONS-SCNC: 9 MMOL/L (ref 7–15)
ANTIBODY SCREEN: NEGATIVE
APPEARANCE UR: CLEAR
APTT PPP: 40 SECONDS (ref 22–38)
APTT PPP: 51 SECONDS (ref 22–38)
APTT PPP: 53 SECONDS (ref 22–38)
AST SERPL W P-5'-P-CCNC: 18 U/L (ref 0–45)
AST SERPL W P-5'-P-CCNC: 24 U/L (ref 0–45)
AST SERPL W P-5'-P-CCNC: 24 U/L (ref 0–45)
AST SERPL W P-5'-P-CCNC: 30 U/L (ref 0–45)
AST SERPL W P-5'-P-CCNC: 32 U/L (ref 0–45)
AST SERPL W P-5'-P-CCNC: 33 U/L (ref 0–45)
AST SERPL W P-5'-P-CCNC: 34 U/L (ref 0–45)
AST SERPL W P-5'-P-CCNC: 35 U/L (ref 0–45)
AST SERPL W P-5'-P-CCNC: 87 U/L (ref 0–45)
ATRIAL RATE - MUSE: 83 BPM
BACTERIA BLD CULT: NO GROWTH
BACTERIA BLD CULT: NO GROWTH
BACTERIA SPT CULT: NORMAL
BACTERIA WND CULT: ABNORMAL
BASE EXCESS BLDV CALC-SCNC: 1.4 MMOL/L (ref -3–3)
BASOPHILS # BLD AUTO: 0 10E3/UL (ref 0–0.2)
BASOPHILS # BLD AUTO: 0.1 10E3/UL (ref 0–0.2)
BASOPHILS # BLD AUTO: 0.1 10E3/UL (ref 0–0.2)
BASOPHILS # BLD AUTO: ABNORMAL 10*3/UL
BASOPHILS # BLD MANUAL: 0 10E3/UL (ref 0–0.2)
BASOPHILS # BLD MANUAL: 0 10E3/UL (ref 0–0.2)
BASOPHILS # BLD MANUAL: 0.1 10E3/UL (ref 0–0.2)
BASOPHILS NFR BLD AUTO: 0 %
BASOPHILS NFR BLD AUTO: 1 %
BASOPHILS NFR BLD AUTO: 1 %
BASOPHILS NFR BLD AUTO: ABNORMAL %
BASOPHILS NFR BLD MANUAL: 0 %
BASOPHILS NFR BLD MANUAL: 2 %
BASOPHILS NFR BLD MANUAL: 2 %
BILIRUB DIRECT SERPL-MCNC: 0.46 MG/DL (ref 0–0.3)
BILIRUB SERPL-MCNC: 0.3 MG/DL
BILIRUB SERPL-MCNC: 0.3 MG/DL
BILIRUB SERPL-MCNC: 0.4 MG/DL
BILIRUB SERPL-MCNC: 0.5 MG/DL
BILIRUB SERPL-MCNC: 0.5 MG/DL
BILIRUB SERPL-MCNC: 0.6 MG/DL
BILIRUB SERPL-MCNC: 0.6 MG/DL
BILIRUB SERPL-MCNC: 0.7 MG/DL
BILIRUB SERPL-MCNC: 0.8 MG/DL
BILIRUB UR QL STRIP: NEGATIVE
BLD PROD TYP BPU: NORMAL
BLD PROD TYP BPU: NORMAL
BLOOD COMPONENT TYPE: NORMAL
BLOOD COMPONENT TYPE: NORMAL
BUN SERPL-MCNC: 10.7 MG/DL (ref 8–23)
BUN SERPL-MCNC: 11.8 MG/DL (ref 8–23)
BUN SERPL-MCNC: 11.9 MG/DL (ref 8–23)
BUN SERPL-MCNC: 13 MG/DL (ref 8–23)
BUN SERPL-MCNC: 13.6 MG/DL (ref 8–23)
BUN SERPL-MCNC: 14.7 MG/DL (ref 8–23)
BUN SERPL-MCNC: 5.1 MG/DL (ref 8–23)
BUN SERPL-MCNC: 5.5 MG/DL (ref 8–23)
BUN SERPL-MCNC: 5.6 MG/DL (ref 8–23)
BUN SERPL-MCNC: 5.8 MG/DL (ref 8–23)
BUN SERPL-MCNC: 5.9 MG/DL (ref 8–23)
BUN SERPL-MCNC: 6.2 MG/DL (ref 8–23)
BUN SERPL-MCNC: 6.2 MG/DL (ref 8–23)
BUN SERPL-MCNC: 6.3 MG/DL (ref 8–23)
BUN SERPL-MCNC: 6.5 MG/DL (ref 8–23)
BUN SERPL-MCNC: 7.2 MG/DL (ref 8–23)
BUN SERPL-MCNC: 7.5 MG/DL (ref 8–23)
BUN SERPL-MCNC: 7.7 MG/DL (ref 8–23)
BUN SERPL-MCNC: 7.8 MG/DL (ref 8–23)
BUN SERPL-MCNC: 8 MG/DL (ref 8–23)
BUN SERPL-MCNC: 8 MG/DL (ref 8–23)
BUN SERPL-MCNC: 8.2 MG/DL (ref 8–23)
BUN SERPL-MCNC: 8.3 MG/DL (ref 8–23)
BUN SERPL-MCNC: 8.6 MG/DL (ref 8–23)
BUN SERPL-MCNC: 9.1 MG/DL (ref 8–23)
BUN SERPL-MCNC: 9.8 MG/DL (ref 8–23)
BURR CELLS BLD QL SMEAR: ABNORMAL
C DIFF TOX B STL QL: NEGATIVE
C PNEUM DNA SPEC QL NAA+PROBE: NOT DETECTED
CALCIUM SERPL-MCNC: 7.3 MG/DL (ref 8.8–10.2)
CALCIUM SERPL-MCNC: 7.5 MG/DL (ref 8.8–10.2)
CALCIUM SERPL-MCNC: 7.5 MG/DL (ref 8.8–10.2)
CALCIUM SERPL-MCNC: 7.6 MG/DL (ref 8.8–10.2)
CALCIUM SERPL-MCNC: 7.6 MG/DL (ref 8.8–10.2)
CALCIUM SERPL-MCNC: 7.7 MG/DL (ref 8.8–10.2)
CALCIUM SERPL-MCNC: 7.8 MG/DL (ref 8.8–10.2)
CALCIUM SERPL-MCNC: 7.9 MG/DL (ref 8.8–10.2)
CALCIUM SERPL-MCNC: 7.9 MG/DL (ref 8.8–10.2)
CALCIUM SERPL-MCNC: 8 MG/DL (ref 8.8–10.2)
CALCIUM SERPL-MCNC: 8.1 MG/DL (ref 8.8–10.2)
CALCIUM SERPL-MCNC: 8.2 MG/DL (ref 8.8–10.2)
CALCIUM SERPL-MCNC: 8.3 MG/DL (ref 8.8–10.2)
CALCIUM SERPL-MCNC: 8.4 MG/DL (ref 8.8–10.2)
CALCIUM SERPL-MCNC: 8.4 MG/DL (ref 8.8–10.2)
CALCIUM SERPL-MCNC: 8.5 MG/DL (ref 8.8–10.2)
CALCIUM SERPL-MCNC: 8.6 MG/DL (ref 8.8–10.2)
CALCIUM SERPL-MCNC: 8.9 MG/DL (ref 8.8–10.2)
CHLORIDE SERPL-SCNC: 100 MMOL/L (ref 98–107)
CHLORIDE SERPL-SCNC: 100 MMOL/L (ref 98–107)
CHLORIDE SERPL-SCNC: 101 MMOL/L (ref 98–107)
CHLORIDE SERPL-SCNC: 90 MMOL/L (ref 98–107)
CHLORIDE SERPL-SCNC: 91 MMOL/L (ref 98–107)
CHLORIDE SERPL-SCNC: 93 MMOL/L (ref 98–107)
CHLORIDE SERPL-SCNC: 94 MMOL/L (ref 98–107)
CHLORIDE SERPL-SCNC: 95 MMOL/L (ref 98–107)
CHLORIDE SERPL-SCNC: 95 MMOL/L (ref 98–107)
CHLORIDE SERPL-SCNC: 96 MMOL/L (ref 98–107)
CHLORIDE SERPL-SCNC: 97 MMOL/L (ref 98–107)
CHLORIDE SERPL-SCNC: 98 MMOL/L (ref 98–107)
CHLORIDE SERPL-SCNC: 99 MMOL/L (ref 98–107)
CHOLEST SERPL-MCNC: 150 MG/DL
CHOLEST SERPL-MCNC: 78 MG/DL
CMV DNA SPEC NAA+PROBE-ACNC: NOT DETECTED IU/ML
CODING SYSTEM: NORMAL
CODING SYSTEM: NORMAL
COLOR UR AUTO: ABNORMAL
COLOR UR AUTO: NORMAL
COLOR UR AUTO: YELLOW
COLOR UR AUTO: YELLOW
COPPER SERPL-MCNC: 134.4 UG/DL
CORTIS SERPL-MCNC: 15.4 UG/DL
CREAT SERPL-MCNC: 0.27 MG/DL (ref 0.51–0.95)
CREAT SERPL-MCNC: 0.3 MG/DL (ref 0.51–0.95)
CREAT SERPL-MCNC: 0.31 MG/DL (ref 0.51–0.95)
CREAT SERPL-MCNC: 0.33 MG/DL (ref 0.51–0.95)
CREAT SERPL-MCNC: 0.33 MG/DL (ref 0.51–0.95)
CREAT SERPL-MCNC: 0.34 MG/DL (ref 0.51–0.95)
CREAT SERPL-MCNC: 0.35 MG/DL (ref 0.51–0.95)
CREAT SERPL-MCNC: 0.36 MG/DL (ref 0.51–0.95)
CREAT SERPL-MCNC: 0.37 MG/DL (ref 0.51–0.95)
CREAT SERPL-MCNC: 0.38 MG/DL (ref 0.51–0.95)
CREAT SERPL-MCNC: 0.39 MG/DL (ref 0.51–0.95)
CREAT SERPL-MCNC: 0.4 MG/DL (ref 0.51–0.95)
CREAT SERPL-MCNC: 0.41 MG/DL (ref 0.51–0.95)
CREAT SERPL-MCNC: 0.42 MG/DL (ref 0.51–0.95)
CREAT SERPL-MCNC: 0.44 MG/DL (ref 0.51–0.95)
CREAT SERPL-MCNC: 0.46 MG/DL (ref 0.51–0.95)
CREAT SERPL-MCNC: 0.46 MG/DL (ref 0.51–0.95)
CREAT SERPL-MCNC: 0.47 MG/DL (ref 0.51–0.95)
CREAT UR-MCNC: 15.4 MG/DL
CROSSMATCH: NORMAL
CROSSMATCH: NORMAL
CRP SERPL-MCNC: 132 MG/L
CRP SERPL-MCNC: 71.5 MG/L
CRP SERPL-MCNC: 73 MG/L
CYSTATIN C (ROCHE): 1.2 MG/L (ref 0.6–1)
DACRYOCYTES BLD QL SMEAR: SLIGHT
DEPRECATED HCO3 PLAS-SCNC: 17 MMOL/L (ref 22–29)
DEPRECATED HCO3 PLAS-SCNC: 19 MMOL/L (ref 22–29)
DEPRECATED HCO3 PLAS-SCNC: 20 MMOL/L (ref 22–29)
DEPRECATED HCO3 PLAS-SCNC: 20 MMOL/L (ref 22–29)
DEPRECATED HCO3 PLAS-SCNC: 21 MMOL/L (ref 22–29)
DEPRECATED HCO3 PLAS-SCNC: 22 MMOL/L (ref 22–29)
DEPRECATED HCO3 PLAS-SCNC: 23 MMOL/L (ref 22–29)
DEPRECATED HCO3 PLAS-SCNC: 24 MMOL/L (ref 22–29)
DEPRECATED HCO3 PLAS-SCNC: 24 MMOL/L (ref 22–29)
DEPRECATED HCO3 PLAS-SCNC: 25 MMOL/L (ref 22–29)
DEPRECATED HCO3 PLAS-SCNC: 26 MMOL/L (ref 22–29)
DIASTOLIC BLOOD PRESSURE - MUSE: NORMAL MMHG
EBV DNA SERPL NAA+PROBE-ACNC: <35 IU/ML
EBV DNA SERPL NAA+PROBE-LOG#: <1.5 {LOG_COPIES}/ML
EGFRCR SERPLBLD CKD-EPI 2021: >90 ML/MIN/1.73M2
ELLIPTOCYTES BLD QL SMEAR: ABNORMAL
ELLIPTOCYTES BLD QL SMEAR: SLIGHT
EOSINOPHIL # BLD AUTO: 0 10E3/UL (ref 0–0.7)
EOSINOPHIL # BLD AUTO: 0.1 10E3/UL (ref 0–0.7)
EOSINOPHIL # BLD AUTO: 0.2 10E3/UL (ref 0–0.7)
EOSINOPHIL # BLD AUTO: ABNORMAL 10*3/UL
EOSINOPHIL # BLD MANUAL: 0 10E3/UL (ref 0–0.7)
EOSINOPHIL # BLD MANUAL: 0 10E3/UL (ref 0–0.7)
EOSINOPHIL # BLD MANUAL: 0.1 10E3/UL (ref 0–0.7)
EOSINOPHIL NFR BLD AUTO: 0 %
EOSINOPHIL NFR BLD AUTO: 0 %
EOSINOPHIL NFR BLD AUTO: 1 %
EOSINOPHIL NFR BLD AUTO: 1 %
EOSINOPHIL NFR BLD AUTO: 4 %
EOSINOPHIL NFR BLD AUTO: ABNORMAL %
EOSINOPHIL NFR BLD MANUAL: 0 %
EOSINOPHIL NFR BLD MANUAL: 0 %
EOSINOPHIL NFR BLD MANUAL: 3 %
ERYTHROCYTE [DISTWIDTH] IN BLOOD BY AUTOMATED COUNT: 17.8 % (ref 10–15)
ERYTHROCYTE [DISTWIDTH] IN BLOOD BY AUTOMATED COUNT: 17.9 % (ref 10–15)
ERYTHROCYTE [DISTWIDTH] IN BLOOD BY AUTOMATED COUNT: 18 % (ref 10–15)
ERYTHROCYTE [DISTWIDTH] IN BLOOD BY AUTOMATED COUNT: 18.2 % (ref 10–15)
ERYTHROCYTE [DISTWIDTH] IN BLOOD BY AUTOMATED COUNT: 18.3 % (ref 10–15)
ERYTHROCYTE [DISTWIDTH] IN BLOOD BY AUTOMATED COUNT: 18.6 % (ref 10–15)
ERYTHROCYTE [DISTWIDTH] IN BLOOD BY AUTOMATED COUNT: 19.2 % (ref 10–15)
ERYTHROCYTE [DISTWIDTH] IN BLOOD BY AUTOMATED COUNT: 19.4 % (ref 10–15)
ERYTHROCYTE [DISTWIDTH] IN BLOOD BY AUTOMATED COUNT: 19.7 % (ref 10–15)
ERYTHROCYTE [DISTWIDTH] IN BLOOD BY AUTOMATED COUNT: 19.8 % (ref 10–15)
ERYTHROCYTE [DISTWIDTH] IN BLOOD BY AUTOMATED COUNT: 20 % (ref 10–15)
ERYTHROCYTE [DISTWIDTH] IN BLOOD BY AUTOMATED COUNT: 20.1 % (ref 10–15)
ERYTHROCYTE [DISTWIDTH] IN BLOOD BY AUTOMATED COUNT: 20.4 % (ref 10–15)
ERYTHROCYTE [DISTWIDTH] IN BLOOD BY AUTOMATED COUNT: 21 % (ref 10–15)
ERYTHROCYTE [DISTWIDTH] IN BLOOD BY AUTOMATED COUNT: 21.2 % (ref 10–15)
ERYTHROCYTE [DISTWIDTH] IN BLOOD BY AUTOMATED COUNT: 21.2 % (ref 10–15)
ERYTHROCYTE [DISTWIDTH] IN BLOOD BY AUTOMATED COUNT: 22.5 % (ref 10–15)
ERYTHROCYTE [DISTWIDTH] IN BLOOD BY AUTOMATED COUNT: 23.5 % (ref 10–15)
ERYTHROCYTE [DISTWIDTH] IN BLOOD BY AUTOMATED COUNT: 23.7 % (ref 10–15)
ERYTHROCYTE [DISTWIDTH] IN BLOOD BY AUTOMATED COUNT: 23.9 % (ref 10–15)
ERYTHROCYTE [DISTWIDTH] IN BLOOD BY AUTOMATED COUNT: 24.2 % (ref 10–15)
ERYTHROCYTE [DISTWIDTH] IN BLOOD BY AUTOMATED COUNT: 27.8 % (ref 10–15)
ERYTHROCYTE [SEDIMENTATION RATE] IN BLOOD BY WESTERGREN METHOD: 34 MM/HR (ref 0–30)
FASTING STATUS PATIENT QL REPORTED: YES
FERRITIN SERPL-MCNC: 1052 NG/ML (ref 11–328)
FLUAV H1 2009 PAND RNA SPEC QL NAA+PROBE: NOT DETECTED
FLUAV H1 RNA SPEC QL NAA+PROBE: NOT DETECTED
FLUAV H3 RNA SPEC QL NAA+PROBE: NOT DETECTED
FLUAV RNA SPEC QL NAA+PROBE: NOT DETECTED
FLUBV RNA SPEC QL NAA+PROBE: NOT DETECTED
FOLATE SERPL-MCNC: 13.8 NG/ML (ref 4.6–34.8)
FRAGMENTS BLD QL SMEAR: SLIGHT
GFR SERPL CREATININE-BSD FRML MDRD: 53 ML/MIN/1.73M2
GLUCOSE BLDC GLUCOMTR-MCNC: 104 MG/DL (ref 70–99)
GLUCOSE BLDC GLUCOMTR-MCNC: 84 MG/DL (ref 70–99)
GLUCOSE BLDC GLUCOMTR-MCNC: 85 MG/DL (ref 70–99)
GLUCOSE SERPL-MCNC: 102 MG/DL (ref 70–99)
GLUCOSE SERPL-MCNC: 103 MG/DL (ref 70–99)
GLUCOSE SERPL-MCNC: 103 MG/DL (ref 70–99)
GLUCOSE SERPL-MCNC: 105 MG/DL (ref 70–99)
GLUCOSE SERPL-MCNC: 120 MG/DL (ref 70–99)
GLUCOSE SERPL-MCNC: 69 MG/DL (ref 70–99)
GLUCOSE SERPL-MCNC: 70 MG/DL (ref 70–99)
GLUCOSE SERPL-MCNC: 72 MG/DL (ref 70–99)
GLUCOSE SERPL-MCNC: 73 MG/DL (ref 70–99)
GLUCOSE SERPL-MCNC: 73 MG/DL (ref 70–99)
GLUCOSE SERPL-MCNC: 75 MG/DL (ref 70–99)
GLUCOSE SERPL-MCNC: 76 MG/DL (ref 70–99)
GLUCOSE SERPL-MCNC: 76 MG/DL (ref 70–99)
GLUCOSE SERPL-MCNC: 77 MG/DL (ref 70–99)
GLUCOSE SERPL-MCNC: 77 MG/DL (ref 70–99)
GLUCOSE SERPL-MCNC: 78 MG/DL (ref 70–99)
GLUCOSE SERPL-MCNC: 80 MG/DL (ref 70–99)
GLUCOSE SERPL-MCNC: 81 MG/DL (ref 70–99)
GLUCOSE SERPL-MCNC: 83 MG/DL (ref 70–99)
GLUCOSE SERPL-MCNC: 85 MG/DL (ref 70–99)
GLUCOSE SERPL-MCNC: 85 MG/DL (ref 70–99)
GLUCOSE SERPL-MCNC: 86 MG/DL (ref 70–99)
GLUCOSE SERPL-MCNC: 89 MG/DL (ref 70–99)
GLUCOSE SERPL-MCNC: 91 MG/DL (ref 70–99)
GLUCOSE SERPL-MCNC: 92 MG/DL (ref 70–99)
GLUCOSE SERPL-MCNC: 93 MG/DL (ref 70–99)
GLUCOSE SERPL-MCNC: 93 MG/DL (ref 70–99)
GLUCOSE SERPL-MCNC: 94 MG/DL (ref 70–99)
GLUCOSE UR STRIP-MCNC: NEGATIVE MG/DL
GRAM STAIN RESULT: ABNORMAL
GRAM STAIN RESULT: ABNORMAL
GRAM STAIN RESULT: NORMAL
HADV DNA SPEC QL NAA+PROBE: NOT DETECTED
HCO3 BLDV-SCNC: 26 MMOL/L (ref 21–28)
HCOV PNL SPEC NAA+PROBE: NOT DETECTED
HCT VFR BLD AUTO: 20.4 % (ref 35–47)
HCT VFR BLD AUTO: 20.9 % (ref 35–47)
HCT VFR BLD AUTO: 24.3 % (ref 35–47)
HCT VFR BLD AUTO: 24.9 % (ref 35–47)
HCT VFR BLD AUTO: 25.1 % (ref 35–47)
HCT VFR BLD AUTO: 25.2 % (ref 35–47)
HCT VFR BLD AUTO: 25.6 % (ref 35–47)
HCT VFR BLD AUTO: 25.7 % (ref 35–47)
HCT VFR BLD AUTO: 25.7 % (ref 35–47)
HCT VFR BLD AUTO: 26.2 % (ref 35–47)
HCT VFR BLD AUTO: 26.9 % (ref 35–47)
HCT VFR BLD AUTO: 27 % (ref 35–47)
HCT VFR BLD AUTO: 27.1 % (ref 35–47)
HCT VFR BLD AUTO: 27.1 % (ref 35–47)
HCT VFR BLD AUTO: 28.3 % (ref 35–47)
HCT VFR BLD AUTO: 28.3 % (ref 35–47)
HCT VFR BLD AUTO: 28.7 % (ref 35–47)
HCT VFR BLD AUTO: 28.7 % (ref 35–47)
HCT VFR BLD AUTO: 29.3 % (ref 35–47)
HCT VFR BLD AUTO: 29.3 % (ref 35–47)
HCT VFR BLD AUTO: 29.7 % (ref 35–47)
HCT VFR BLD AUTO: 30.5 % (ref 35–47)
HCT VFR BLD AUTO: 30.8 % (ref 35–47)
HCT VFR BLD AUTO: 31.3 % (ref 35–47)
HCT VFR BLD AUTO: 31.6 % (ref 35–47)
HCT VFR BLD AUTO: 31.9 % (ref 35–47)
HDLC SERPL-MCNC: 36 MG/DL
HDLC SERPL-MCNC: 46 MG/DL
HGB BLD-MCNC: 10 G/DL (ref 11.7–15.7)
HGB BLD-MCNC: 10.4 G/DL (ref 11.7–15.7)
HGB BLD-MCNC: 10.7 G/DL (ref 11.7–15.7)
HGB BLD-MCNC: 6.4 G/DL (ref 11.7–15.7)
HGB BLD-MCNC: 6.9 G/DL (ref 11.7–15.7)
HGB BLD-MCNC: 8.1 G/DL (ref 11.7–15.7)
HGB BLD-MCNC: 8.1 G/DL (ref 11.7–15.7)
HGB BLD-MCNC: 8.2 G/DL (ref 11.7–15.7)
HGB BLD-MCNC: 8.4 G/DL (ref 11.7–15.7)
HGB BLD-MCNC: 8.5 G/DL (ref 11.7–15.7)
HGB BLD-MCNC: 8.5 G/DL (ref 11.7–15.7)
HGB BLD-MCNC: 8.7 G/DL (ref 11.7–15.7)
HGB BLD-MCNC: 8.8 G/DL (ref 11.7–15.7)
HGB BLD-MCNC: 9 G/DL (ref 11.7–15.7)
HGB BLD-MCNC: 9 G/DL (ref 11.7–15.7)
HGB BLD-MCNC: 9.2 G/DL (ref 11.7–15.7)
HGB BLD-MCNC: 9.3 G/DL (ref 11.7–15.7)
HGB BLD-MCNC: 9.3 G/DL (ref 11.7–15.7)
HGB BLD-MCNC: 9.5 G/DL (ref 11.7–15.7)
HGB BLD-MCNC: 9.5 G/DL (ref 11.7–15.7)
HGB BLD-MCNC: 9.6 G/DL (ref 11.7–15.7)
HGB BLD-MCNC: 9.8 G/DL (ref 11.7–15.7)
HGB BLD-MCNC: 9.9 G/DL (ref 11.7–15.7)
HGB BLD-MCNC: 9.9 G/DL (ref 11.7–15.7)
HGB UR QL STRIP: NEGATIVE
HIV 1+2 AB+HIV1 P24 AG SERPL QL IA: NONREACTIVE
HMPV RNA SPEC QL NAA+PROBE: NOT DETECTED
HOLD SPECIMEN: NORMAL
HPIV1 RNA SPEC QL NAA+PROBE: NOT DETECTED
HPIV2 RNA SPEC QL NAA+PROBE: NOT DETECTED
HPIV3 RNA SPEC QL NAA+PROBE: NOT DETECTED
HPIV4 RNA SPEC QL NAA+PROBE: NOT DETECTED
HSV1 DNA SPEC QL NAA+PROBE: NEGATIVE
HSV2 DNA SPEC QL NAA+PROBE: NEGATIVE
IMM GRANULOCYTES # BLD: 0 10E3/UL
IMM GRANULOCYTES # BLD: 0.1 10E3/UL
IMM GRANULOCYTES # BLD: ABNORMAL 10*3/UL
IMM GRANULOCYTES NFR BLD: 1 %
IMM GRANULOCYTES NFR BLD: 2 %
IMM GRANULOCYTES NFR BLD: ABNORMAL %
INR PPP: 1.2 (ref 0.85–1.15)
INR PPP: 1.86 (ref 0.85–1.15)
INR PPP: 2.53 (ref 0.85–1.15)
INR PPP: 3.06 (ref 0.85–1.15)
INTERPRETATION ECG - MUSE: NORMAL
IRON BINDING CAPACITY (ROCHE): 138 UG/DL (ref 240–430)
IRON SATN MFR SERPL: 17 % (ref 15–46)
IRON SERPL-MCNC: 24 UG/DL (ref 37–145)
IRON SERPL-MCNC: 53 UG/DL (ref 37–145)
ISSUE DATE AND TIME: NORMAL
ISSUE DATE AND TIME: NORMAL
KETONES UR STRIP-MCNC: NEGATIVE MG/DL
LACTATE SERPL-SCNC: 1.6 MMOL/L (ref 0.7–2)
LACTATE SERPL-SCNC: 2.6 MMOL/L (ref 0.7–2)
LDLC SERPL CALC-MCNC: 17 MG/DL
LDLC SERPL CALC-MCNC: 80 MG/DL
LEUKOCYTE ESTERASE UR QL STRIP: NEGATIVE
LYMPHOCYTES # BLD AUTO: 0.2 10E3/UL (ref 0.8–5.3)
LYMPHOCYTES # BLD AUTO: 0.2 10E3/UL (ref 0.8–5.3)
LYMPHOCYTES # BLD AUTO: 0.3 10E3/UL (ref 0.8–5.3)
LYMPHOCYTES # BLD AUTO: ABNORMAL 10*3/UL
LYMPHOCYTES # BLD MANUAL: 0 10E3/UL (ref 0.8–5.3)
LYMPHOCYTES # BLD MANUAL: 0.1 10E3/UL (ref 0.8–5.3)
LYMPHOCYTES # BLD MANUAL: 0.1 10E3/UL (ref 0.8–5.3)
LYMPHOCYTES NFR BLD AUTO: 2 %
LYMPHOCYTES NFR BLD AUTO: 2 %
LYMPHOCYTES NFR BLD AUTO: 3 %
LYMPHOCYTES NFR BLD AUTO: 4 %
LYMPHOCYTES NFR BLD AUTO: 5 %
LYMPHOCYTES NFR BLD AUTO: ABNORMAL %
LYMPHOCYTES NFR BLD MANUAL: 0 %
LYMPHOCYTES NFR BLD MANUAL: 3 %
LYMPHOCYTES NFR BLD MANUAL: 5 %
M PNEUMO DNA SPEC QL NAA+PROBE: NOT DETECTED
MAGNESIUM SERPL-MCNC: 1.3 MG/DL (ref 1.7–2.3)
MAGNESIUM SERPL-MCNC: 1.4 MG/DL (ref 1.7–2.3)
MAGNESIUM SERPL-MCNC: 1.5 MG/DL (ref 1.7–2.3)
MAGNESIUM SERPL-MCNC: 1.6 MG/DL (ref 1.7–2.3)
MAGNESIUM SERPL-MCNC: 1.6 MG/DL (ref 1.7–2.3)
MAGNESIUM SERPL-MCNC: 1.7 MG/DL (ref 1.7–2.3)
MAGNESIUM SERPL-MCNC: 1.7 MG/DL (ref 1.7–2.3)
MAGNESIUM SERPL-MCNC: 1.8 MG/DL (ref 1.7–2.3)
MAGNESIUM SERPL-MCNC: 1.8 MG/DL (ref 1.7–2.3)
MAGNESIUM SERPL-MCNC: 1.9 MG/DL (ref 1.7–2.3)
MAGNESIUM SERPL-MCNC: 2 MG/DL (ref 1.7–2.3)
MAGNESIUM SERPL-MCNC: 2 MG/DL (ref 1.7–2.3)
MAGNESIUM SERPL-MCNC: 2.1 MG/DL (ref 1.7–2.3)
MAGNESIUM SERPL-MCNC: 2.2 MG/DL (ref 1.7–2.3)
MAGNESIUM SERPL-MCNC: 2.3 MG/DL (ref 1.7–2.3)
MAGNESIUM UR-MCNC: 15 MG/DL
MAGNESIUM URINE MG/MG CR: 0.97 MG/MG CR (ref 0.04–0.12)
MCH RBC QN AUTO: 28.2 PG (ref 26.5–33)
MCH RBC QN AUTO: 28.3 PG (ref 26.5–33)
MCH RBC QN AUTO: 28.6 PG (ref 26.5–33)
MCH RBC QN AUTO: 28.7 PG (ref 26.5–33)
MCH RBC QN AUTO: 28.8 PG (ref 26.5–33)
MCH RBC QN AUTO: 28.8 PG (ref 26.5–33)
MCH RBC QN AUTO: 28.9 PG (ref 26.5–33)
MCH RBC QN AUTO: 29.1 PG (ref 26.5–33)
MCH RBC QN AUTO: 29.2 PG (ref 26.5–33)
MCH RBC QN AUTO: 29.3 PG (ref 26.5–33)
MCH RBC QN AUTO: 29.4 PG (ref 26.5–33)
MCH RBC QN AUTO: 30.2 PG (ref 26.5–33)
MCH RBC QN AUTO: 30.3 PG (ref 26.5–33)
MCH RBC QN AUTO: 30.5 PG (ref 26.5–33)
MCH RBC QN AUTO: 30.6 PG (ref 26.5–33)
MCH RBC QN AUTO: 30.6 PG (ref 26.5–33)
MCH RBC QN AUTO: 30.7 PG (ref 26.5–33)
MCH RBC QN AUTO: 31.1 PG (ref 26.5–33)
MCH RBC QN AUTO: 31.3 PG (ref 26.5–33)
MCH RBC QN AUTO: 31.4 PG (ref 26.5–33)
MCH RBC QN AUTO: 31.8 PG (ref 26.5–33)
MCH RBC QN AUTO: 31.9 PG (ref 26.5–33)
MCH RBC QN AUTO: 32 PG (ref 26.5–33)
MCHC RBC AUTO-ENTMCNC: 30.7 G/DL (ref 31.5–36.5)
MCHC RBC AUTO-ENTMCNC: 31.4 G/DL (ref 31.5–36.5)
MCHC RBC AUTO-ENTMCNC: 31.6 G/DL (ref 31.5–36.5)
MCHC RBC AUTO-ENTMCNC: 32.1 G/DL (ref 31.5–36.5)
MCHC RBC AUTO-ENTMCNC: 32.3 G/DL (ref 31.5–36.5)
MCHC RBC AUTO-ENTMCNC: 32.4 G/DL (ref 31.5–36.5)
MCHC RBC AUTO-ENTMCNC: 32.4 G/DL (ref 31.5–36.5)
MCHC RBC AUTO-ENTMCNC: 32.5 G/DL (ref 31.5–36.5)
MCHC RBC AUTO-ENTMCNC: 32.6 G/DL (ref 31.5–36.5)
MCHC RBC AUTO-ENTMCNC: 32.7 G/DL (ref 31.5–36.5)
MCHC RBC AUTO-ENTMCNC: 33 G/DL (ref 31.5–36.5)
MCHC RBC AUTO-ENTMCNC: 33 G/DL (ref 31.5–36.5)
MCHC RBC AUTO-ENTMCNC: 33.2 G/DL (ref 31.5–36.5)
MCHC RBC AUTO-ENTMCNC: 33.2 G/DL (ref 31.5–36.5)
MCHC RBC AUTO-ENTMCNC: 33.3 G/DL (ref 31.5–36.5)
MCHC RBC AUTO-ENTMCNC: 33.3 G/DL (ref 31.5–36.5)
MCHC RBC AUTO-ENTMCNC: 33.6 G/DL (ref 31.5–36.5)
MCHC RBC AUTO-ENTMCNC: 33.6 G/DL (ref 31.5–36.5)
MCHC RBC AUTO-ENTMCNC: 33.8 G/DL (ref 31.5–36.5)
MCHC RBC AUTO-ENTMCNC: 33.8 G/DL (ref 31.5–36.5)
MCHC RBC AUTO-ENTMCNC: 34.1 G/DL (ref 31.5–36.5)
MCHC RBC AUTO-ENTMCNC: 34.1 G/DL (ref 31.5–36.5)
MCV RBC AUTO: 85 FL (ref 78–100)
MCV RBC AUTO: 85 FL (ref 78–100)
MCV RBC AUTO: 86 FL (ref 78–100)
MCV RBC AUTO: 87 FL (ref 78–100)
MCV RBC AUTO: 89 FL (ref 78–100)
MCV RBC AUTO: 90 FL (ref 78–100)
MCV RBC AUTO: 93 FL (ref 78–100)
MCV RBC AUTO: 94 FL (ref 78–100)
MCV RBC AUTO: 95 FL (ref 78–100)
MCV RBC AUTO: 95 FL (ref 78–100)
MCV RBC AUTO: 96 FL (ref 78–100)
MCV RBC AUTO: 97 FL (ref 78–100)
MCV RBC AUTO: 97 FL (ref 78–100)
MCV RBC AUTO: 98 FL (ref 78–100)
MCV RBC AUTO: 99 FL (ref 78–100)
METAMYELOCYTES # BLD MANUAL: 0.1 10E3/UL
METAMYELOCYTES NFR BLD MANUAL: 4 %
MONOCYTES # BLD AUTO: 0.1 10E3/UL (ref 0–1.3)
MONOCYTES # BLD AUTO: 0.2 10E3/UL (ref 0–1.3)
MONOCYTES # BLD AUTO: 0.3 10E3/UL (ref 0–1.3)
MONOCYTES # BLD AUTO: 0.4 10E3/UL (ref 0–1.3)
MONOCYTES # BLD AUTO: 0.5 10E3/UL (ref 0–1.3)
MONOCYTES # BLD AUTO: ABNORMAL 10*3/UL
MONOCYTES # BLD MANUAL: 0 10E3/UL (ref 0–1.3)
MONOCYTES # BLD MANUAL: 0.2 10E3/UL (ref 0–1.3)
MONOCYTES # BLD MANUAL: 0.4 10E3/UL (ref 0–1.3)
MONOCYTES NFR BLD AUTO: 1 %
MONOCYTES NFR BLD AUTO: 4 %
MONOCYTES NFR BLD AUTO: 4 %
MONOCYTES NFR BLD AUTO: 5 %
MONOCYTES NFR BLD AUTO: 5 %
MONOCYTES NFR BLD AUTO: ABNORMAL %
MONOCYTES NFR BLD MANUAL: 0 %
MONOCYTES NFR BLD MANUAL: 2 %
MONOCYTES NFR BLD MANUAL: 9 %
MUCOUS THREADS #/AREA URNS LPF: PRESENT /LPF
MUCOUS THREADS #/AREA URNS LPF: PRESENT /LPF
MYELOCYTES # BLD MANUAL: 0.1 10E3/UL
MYELOCYTES NFR BLD MANUAL: 4 %
NEUTROPHILS # BLD AUTO: 10.6 10E3/UL (ref 1.6–8.3)
NEUTROPHILS # BLD AUTO: 3.5 10E3/UL (ref 1.6–8.3)
NEUTROPHILS # BLD AUTO: 5.8 10E3/UL (ref 1.6–8.3)
NEUTROPHILS # BLD AUTO: 7.8 10E3/UL (ref 1.6–8.3)
NEUTROPHILS # BLD AUTO: 8.9 10E3/UL (ref 1.6–8.3)
NEUTROPHILS # BLD AUTO: ABNORMAL 10*3/UL
NEUTROPHILS # BLD MANUAL: 1.4 10E3/UL (ref 1.6–8.3)
NEUTROPHILS # BLD MANUAL: 21.9 10E3/UL (ref 1.6–8.3)
NEUTROPHILS # BLD MANUAL: 3.7 10E3/UL (ref 1.6–8.3)
NEUTROPHILS NFR BLD AUTO: 85 %
NEUTROPHILS NFR BLD AUTO: 89 %
NEUTROPHILS NFR BLD AUTO: 90 %
NEUTROPHILS NFR BLD AUTO: 93 %
NEUTROPHILS NFR BLD AUTO: 94 %
NEUTROPHILS NFR BLD AUTO: ABNORMAL %
NEUTROPHILS NFR BLD MANUAL: 75 %
NEUTROPHILS NFR BLD MANUAL: 92 %
NEUTROPHILS NFR BLD MANUAL: 98 %
NITRATE UR QL: NEGATIVE
NONHDLC SERPL-MCNC: 104 MG/DL
NONHDLC SERPL-MCNC: 42 MG/DL
NRBC # BLD AUTO: 0 10E3/UL
NRBC BLD AUTO-RTO: 0 /100
NRBC BLD AUTO-RTO: 1 /100
O2/TOTAL GAS SETTING VFR VENT: 21 %
OSMOLALITY SERPL: 261 MMOL/KG (ref 280–301)
OSMOLALITY SERPL: 261 MMOL/KG (ref 280–301)
OSMOLALITY SERPL: 266 MMOL/KG (ref 280–301)
OSMOLALITY SERPL: 269 MMOL/KG (ref 280–301)
OSMOLALITY UR: 335 MMOL/KG (ref 100–1200)
OSMOLALITY UR: 363 MMOL/KG (ref 100–1200)
OSMOLALITY UR: 368 MMOL/KG (ref 100–1200)
OSMOLALITY UR: 395 MMOL/KG (ref 100–1200)
OSMOLALITY UR: 458 MMOL/KG (ref 100–1200)
OXYHGB MFR BLDV: 29 % (ref 70–75)
P AXIS - MUSE: 36 DEGREES
PATH REPORT.ADDENDUM SPEC: NORMAL
PATH REPORT.COMMENTS IMP SPEC: NORMAL
PATH REPORT.FINAL DX SPEC: NORMAL
PATH REPORT.FINAL DX SPEC: NORMAL
PATH REPORT.GROSS SPEC: NORMAL
PATH REPORT.GROSS SPEC: NORMAL
PATH REPORT.MICROSCOPIC SPEC OTHER STN: NORMAL
PATH REPORT.MICROSCOPIC SPEC OTHER STN: NORMAL
PATH REPORT.RELEVANT HX SPEC: NORMAL
PATH REPORT.RELEVANT HX SPEC: NORMAL
PCO2 BLDV: 39 MM HG (ref 40–50)
PH BLDV: 7.43 [PH] (ref 7.32–7.43)
PH UR STRIP: 6.5 [PH] (ref 5–7)
PH UR STRIP: 6.5 [PH] (ref 5–7)
PH UR STRIP: 7 [PH] (ref 5–7)
PH UR STRIP: 7 [PH] (ref 5–7)
PHOSPHATE SERPL-MCNC: 1.7 MG/DL (ref 2.5–4.5)
PHOSPHATE SERPL-MCNC: 1.9 MG/DL (ref 2.5–4.5)
PHOSPHATE SERPL-MCNC: 1.9 MG/DL (ref 2.5–4.5)
PHOSPHATE SERPL-MCNC: 2 MG/DL (ref 2.5–4.5)
PHOSPHATE SERPL-MCNC: 2 MG/DL (ref 2.5–4.5)
PHOSPHATE SERPL-MCNC: 2.1 MG/DL (ref 2.5–4.5)
PHOSPHATE SERPL-MCNC: 2.3 MG/DL (ref 2.5–4.5)
PHOSPHATE SERPL-MCNC: 2.3 MG/DL (ref 2.5–4.5)
PHOSPHATE SERPL-MCNC: 2.4 MG/DL (ref 2.5–4.5)
PHOSPHATE SERPL-MCNC: 2.4 MG/DL (ref 2.5–4.5)
PHOSPHATE SERPL-MCNC: 2.5 MG/DL (ref 2.5–4.5)
PHOSPHATE SERPL-MCNC: 2.7 MG/DL (ref 2.5–4.5)
PHOSPHATE SERPL-MCNC: 2.9 MG/DL (ref 2.5–4.5)
PHOSPHATE SERPL-MCNC: 3 MG/DL (ref 2.5–4.5)
PHOSPHATE SERPL-MCNC: 3.3 MG/DL (ref 2.5–4.5)
PHOTO IMAGE: NORMAL
PHOTO IMAGE: NORMAL
PLAT MORPH BLD: ABNORMAL
PLATELET # BLD AUTO: 170 10E3/UL (ref 150–450)
PLATELET # BLD AUTO: 185 10E3/UL (ref 150–450)
PLATELET # BLD AUTO: 189 10E3/UL (ref 150–450)
PLATELET # BLD AUTO: 205 10E3/UL (ref 150–450)
PLATELET # BLD AUTO: 211 10E3/UL (ref 150–450)
PLATELET # BLD AUTO: 215 10E3/UL (ref 150–450)
PLATELET # BLD AUTO: 220 10E3/UL (ref 150–450)
PLATELET # BLD AUTO: 230 10E3/UL (ref 150–450)
PLATELET # BLD AUTO: 237 10E3/UL (ref 150–450)
PLATELET # BLD AUTO: 241 10E3/UL (ref 150–450)
PLATELET # BLD AUTO: 241 10E3/UL (ref 150–450)
PLATELET # BLD AUTO: 250 10E3/UL (ref 150–450)
PLATELET # BLD AUTO: 258 10E3/UL (ref 150–450)
PLATELET # BLD AUTO: 266 10E3/UL (ref 150–450)
PLATELET # BLD AUTO: 267 10E3/UL (ref 150–450)
PLATELET # BLD AUTO: 274 10E3/UL (ref 150–450)
PLATELET # BLD AUTO: 296 10E3/UL (ref 150–450)
PLATELET # BLD AUTO: 303 10E3/UL (ref 150–450)
PLATELET # BLD AUTO: 306 10E3/UL (ref 150–450)
PLATELET # BLD AUTO: 312 10E3/UL (ref 150–450)
PLATELET # BLD AUTO: 317 10E3/UL (ref 150–450)
PLATELET # BLD AUTO: 326 10E3/UL (ref 150–450)
PLATELET # BLD AUTO: 335 10E3/UL (ref 150–450)
PLATELET # BLD AUTO: 345 10E3/UL (ref 150–450)
PLATELET # BLD AUTO: 360 10E3/UL (ref 150–450)
PO2 BLDV: 20 MM HG (ref 25–47)
POLYCHROMASIA BLD QL SMEAR: ABNORMAL
POLYCHROMASIA BLD QL SMEAR: SLIGHT
POLYCHROMASIA BLD QL SMEAR: SLIGHT
POTASSIUM SERPL-SCNC: 2.8 MMOL/L (ref 3.4–5.3)
POTASSIUM SERPL-SCNC: 3 MMOL/L (ref 3.4–5.3)
POTASSIUM SERPL-SCNC: 3.2 MMOL/L (ref 3.4–5.3)
POTASSIUM SERPL-SCNC: 3.3 MMOL/L (ref 3.4–5.3)
POTASSIUM SERPL-SCNC: 3.4 MMOL/L (ref 3.4–5.3)
POTASSIUM SERPL-SCNC: 3.5 MMOL/L (ref 3.4–5.3)
POTASSIUM SERPL-SCNC: 3.6 MMOL/L (ref 3.4–5.3)
POTASSIUM SERPL-SCNC: 3.7 MMOL/L (ref 3.4–5.3)
POTASSIUM SERPL-SCNC: 3.8 MMOL/L (ref 3.4–5.3)
POTASSIUM SERPL-SCNC: 3.9 MMOL/L (ref 3.4–5.3)
POTASSIUM SERPL-SCNC: 4 MMOL/L (ref 3.4–5.3)
POTASSIUM SERPL-SCNC: 4.1 MMOL/L (ref 3.4–5.3)
POTASSIUM SERPL-SCNC: 4.2 MMOL/L (ref 3.4–5.3)
POTASSIUM SERPL-SCNC: 4.3 MMOL/L (ref 3.4–5.3)
POTASSIUM SERPL-SCNC: 4.7 MMOL/L (ref 3.4–5.3)
PR INTERVAL - MUSE: 146 MS
PROCALCITONIN SERPL IA-MCNC: 0.22 NG/ML
PROCALCITONIN SERPL IA-MCNC: 0.75 NG/ML
PROMYELOCYTES # BLD MANUAL: 0 10E3/UL
PROMYELOCYTES NFR BLD MANUAL: 1 %
PROT SERPL-MCNC: 4.2 G/DL (ref 6.4–8.3)
PROT SERPL-MCNC: 4.4 G/DL (ref 6.4–8.3)
PROT SERPL-MCNC: 4.5 G/DL (ref 6.4–8.3)
PROT SERPL-MCNC: 4.6 G/DL (ref 6.4–8.3)
PROT SERPL-MCNC: 4.6 G/DL (ref 6.4–8.3)
PROT SERPL-MCNC: 4.9 G/DL (ref 6.4–8.3)
PROT SERPL-MCNC: 5.1 G/DL (ref 6.4–8.3)
PROT SERPL-MCNC: 5.3 G/DL (ref 6.4–8.3)
PROT SERPL-MCNC: 5.9 G/DL (ref 6.4–8.3)
QRS DURATION - MUSE: 74 MS
QT - MUSE: 340 MS
QTC - MUSE: 399 MS
R AXIS - MUSE: 31 DEGREES
RADIOLOGIST FLAGS: ABNORMAL
RADIOLOGIST FLAGS: NORMAL
RBC # BLD AUTO: 2.11 10E6/UL (ref 3.8–5.2)
RBC # BLD AUTO: 2.41 10E6/UL (ref 3.8–5.2)
RBC # BLD AUTO: 2.58 10E6/UL (ref 3.8–5.2)
RBC # BLD AUTO: 2.59 10E6/UL (ref 3.8–5.2)
RBC # BLD AUTO: 2.66 10E6/UL (ref 3.8–5.2)
RBC # BLD AUTO: 2.78 10E6/UL (ref 3.8–5.2)
RBC # BLD AUTO: 2.81 10E6/UL (ref 3.8–5.2)
RBC # BLD AUTO: 2.84 10E6/UL (ref 3.8–5.2)
RBC # BLD AUTO: 2.86 10E6/UL (ref 3.8–5.2)
RBC # BLD AUTO: 2.92 10E6/UL (ref 3.8–5.2)
RBC # BLD AUTO: 2.97 10E6/UL (ref 3.8–5.2)
RBC # BLD AUTO: 3.04 10E6/UL (ref 3.8–5.2)
RBC # BLD AUTO: 3.1 10E6/UL (ref 3.8–5.2)
RBC # BLD AUTO: 3.12 10E6/UL (ref 3.8–5.2)
RBC # BLD AUTO: 3.15 10E6/UL (ref 3.8–5.2)
RBC # BLD AUTO: 3.18 10E6/UL (ref 3.8–5.2)
RBC # BLD AUTO: 3.19 10E6/UL (ref 3.8–5.2)
RBC # BLD AUTO: 3.22 10E6/UL (ref 3.8–5.2)
RBC # BLD AUTO: 3.27 10E6/UL (ref 3.8–5.2)
RBC # BLD AUTO: 3.29 10E6/UL (ref 3.8–5.2)
RBC # BLD AUTO: 3.29 10E6/UL (ref 3.8–5.2)
RBC # BLD AUTO: 3.3 10E6/UL (ref 3.8–5.2)
RBC # BLD AUTO: 3.31 10E6/UL (ref 3.8–5.2)
RBC # BLD AUTO: 3.36 10E6/UL (ref 3.8–5.2)
RBC # BLD AUTO: 3.38 10E6/UL (ref 3.8–5.2)
RBC # BLD AUTO: 3.42 10E6/UL (ref 3.8–5.2)
RBC MORPH BLD: ABNORMAL
RBC URINE: 0 /HPF
RBC URINE: 0 /HPF
RBC URINE: <1 /HPF
RBC URINE: <1 /HPF
RETICS # AUTO: 0.12 10E6/UL (ref 0.03–0.1)
RETICS/RBC NFR AUTO: 5.8 % (ref 0.5–2)
RSV RNA SPEC QL NAA+PROBE: NOT DETECTED
RSV RNA SPEC QL NAA+PROBE: NOT DETECTED
RV+EV RNA SPEC QL NAA+PROBE: NOT DETECTED
SAO2 % BLDV: 29.5 % (ref 70–75)
SODIUM SERPL-SCNC: 124 MMOL/L (ref 135–145)
SODIUM SERPL-SCNC: 126 MMOL/L (ref 135–145)
SODIUM SERPL-SCNC: 126 MMOL/L (ref 135–145)
SODIUM SERPL-SCNC: 127 MMOL/L (ref 135–145)
SODIUM SERPL-SCNC: 128 MMOL/L (ref 135–145)
SODIUM SERPL-SCNC: 129 MMOL/L (ref 135–145)
SODIUM SERPL-SCNC: 130 MMOL/L (ref 135–145)
SODIUM SERPL-SCNC: 131 MMOL/L (ref 135–145)
SODIUM SERPL-SCNC: 132 MMOL/L (ref 135–145)
SODIUM SERPL-SCNC: 133 MMOL/L (ref 135–145)
SODIUM SERPL-SCNC: 133 MMOL/L (ref 135–145)
SODIUM SERPL-SCNC: 134 MMOL/L (ref 135–145)
SODIUM UR-SCNC: 139 MMOL/L
SODIUM UR-SCNC: 39 MMOL/L
SODIUM UR-SCNC: 64 MMOL/L
SODIUM UR-SCNC: 73 MMOL/L
SODIUM UR-SCNC: 76 MMOL/L
SP GR UR STRIP: 1.01 (ref 1–1.03)
SPECIMEN EXPIRATION DATE: NORMAL
SQUAMOUS EPITHELIAL: <1 /HPF
SQUAMOUS EPITHELIAL: <1 /HPF
SYSTOLIC BLOOD PRESSURE - MUSE: NORMAL MMHG
T AXIS - MUSE: -25 DEGREES
T4 FREE SERPL-MCNC: 1 NG/DL (ref 0.9–1.7)
TRANSFERRIN SERPL-MCNC: 117 MG/DL (ref 200–360)
TRANSITIONAL EPI: <1 /HPF
TRANSITIONAL EPI: <1 /HPF
TRIGL SERPL-MCNC: 119 MG/DL
TRIGL SERPL-MCNC: 123 MG/DL
TSH SERPL DL<=0.005 MIU/L-ACNC: 6.82 UIU/ML (ref 0.3–4.2)
UFH PPP CHRO-ACNC: 0.71 IU/ML
UNIT ABO/RH: NORMAL
UNIT ABO/RH: NORMAL
UNIT NUMBER: NORMAL
UNIT NUMBER: NORMAL
UNIT STATUS: NORMAL
UNIT STATUS: NORMAL
UNIT TYPE ISBT: 7300
UNIT TYPE ISBT: 7300
UPPER GI ENDOSCOPY: NORMAL
UROBILINOGEN UR STRIP-MCNC: 4 MG/DL
UROBILINOGEN UR STRIP-MCNC: NORMAL MG/DL
VENTRICULAR RATE- MUSE: 83 BPM
VIT B12 SERPL-MCNC: 2676 PG/ML (ref 232–1245)
WBC # BLD AUTO: 1.8 10E3/UL (ref 4–11)
WBC # BLD AUTO: 11.4 10E3/UL (ref 4–11)
WBC # BLD AUTO: 12.7 10E3/UL (ref 4–11)
WBC # BLD AUTO: 13.2 10E3/UL (ref 4–11)
WBC # BLD AUTO: 14 10E3/UL (ref 4–11)
WBC # BLD AUTO: 2.5 10E3/UL (ref 4–11)
WBC # BLD AUTO: 21 10E3/UL (ref 4–11)
WBC # BLD AUTO: 22.3 10E3/UL (ref 4–11)
WBC # BLD AUTO: 3.1 10E3/UL (ref 4–11)
WBC # BLD AUTO: 3.2 10E3/UL (ref 4–11)
WBC # BLD AUTO: 3.5 10E3/UL (ref 4–11)
WBC # BLD AUTO: 4 10E3/UL (ref 4–11)
WBC # BLD AUTO: 4.1 10E3/UL (ref 4–11)
WBC # BLD AUTO: 4.2 10E3/UL (ref 4–11)
WBC # BLD AUTO: 41.7 10E3/UL (ref 4–11)
WBC # BLD AUTO: 5.2 10E3/UL (ref 4–11)
WBC # BLD AUTO: 5.7 10E3/UL (ref 4–11)
WBC # BLD AUTO: 6.6 10E3/UL (ref 4–11)
WBC # BLD AUTO: 6.6 10E3/UL (ref 4–11)
WBC # BLD AUTO: 7.1 10E3/UL (ref 4–11)
WBC # BLD AUTO: 7.3 10E3/UL (ref 4–11)
WBC # BLD AUTO: 8.1 10E3/UL (ref 4–11)
WBC # BLD AUTO: 8.3 10E3/UL (ref 4–11)
WBC # BLD AUTO: 8.3 10E3/UL (ref 4–11)
WBC # BLD AUTO: 8.7 10E3/UL (ref 4–11)
WBC # BLD AUTO: 9.4 10E3/UL (ref 4–11)
WBC URINE: 0 /HPF
WBC URINE: 1 /HPF
WBC URINE: 1 /HPF
WBC URINE: 2 /HPF
ZINC SERPL-MCNC: 28.9 UG/DL

## 2024-01-01 PROCEDURE — 82374 ASSAY BLOOD CARBON DIOXIDE: CPT | Performed by: STUDENT IN AN ORGANIZED HEALTH CARE EDUCATION/TRAINING PROGRAM

## 2024-01-01 PROCEDURE — 250N000012 HC RX MED GY IP 250 OP 636 PS 637: Performed by: NURSE PRACTITIONER

## 2024-01-01 PROCEDURE — 250N000012 HC RX MED GY IP 250 OP 636 PS 637: Performed by: PHYSICIAN ASSISTANT

## 2024-01-01 PROCEDURE — 99232 SBSQ HOSP IP/OBS MODERATE 35: CPT | Performed by: STUDENT IN AN ORGANIZED HEALTH CARE EDUCATION/TRAINING PROGRAM

## 2024-01-01 PROCEDURE — 258N000001 HC RX 258: Performed by: INTERNAL MEDICINE

## 2024-01-01 PROCEDURE — 36415 COLL VENOUS BLD VENIPUNCTURE: CPT | Performed by: EMERGENCY MEDICINE

## 2024-01-01 PROCEDURE — 258N000003 HC RX IP 258 OP 636

## 2024-01-01 PROCEDURE — 97110 THERAPEUTIC EXERCISES: CPT | Mod: GO

## 2024-01-01 PROCEDURE — 82248 BILIRUBIN DIRECT: CPT | Performed by: INTERNAL MEDICINE

## 2024-01-01 PROCEDURE — 250N000013 HC RX MED GY IP 250 OP 250 PS 637

## 2024-01-01 PROCEDURE — 99231 SBSQ HOSP IP/OBS SF/LOW 25: CPT | Mod: GC | Performed by: ANESTHESIOLOGY

## 2024-01-01 PROCEDURE — 999N000147 HC STATISTIC PT IP EVAL DEFER

## 2024-01-01 PROCEDURE — 258N000003 HC RX IP 258 OP 636: Performed by: PEDIATRICS

## 2024-01-01 PROCEDURE — 97530 THERAPEUTIC ACTIVITIES: CPT | Mod: GP

## 2024-01-01 PROCEDURE — 82565 ASSAY OF CREATININE: CPT | Performed by: STUDENT IN AN ORGANIZED HEALTH CARE EDUCATION/TRAINING PROGRAM

## 2024-01-01 PROCEDURE — 97110 THERAPEUTIC EXERCISES: CPT | Mod: GP

## 2024-01-01 PROCEDURE — 73701 CT LOWER EXTREMITY W/DYE: CPT | Mod: RT

## 2024-01-01 PROCEDURE — 99316 NF DSCHRG MGMT 30 MIN+: CPT | Performed by: NURSE PRACTITIONER

## 2024-01-01 PROCEDURE — 250N000013 HC RX MED GY IP 250 OP 250 PS 637: Performed by: INTERNAL MEDICINE

## 2024-01-01 PROCEDURE — 80061 LIPID PANEL: CPT

## 2024-01-01 PROCEDURE — 84100 ASSAY OF PHOSPHORUS: CPT | Performed by: INTERNAL MEDICINE

## 2024-01-01 PROCEDURE — 83930 ASSAY OF BLOOD OSMOLALITY: CPT | Performed by: NURSE PRACTITIONER

## 2024-01-01 PROCEDURE — 82607 VITAMIN B-12: CPT | Performed by: INTERNAL MEDICINE

## 2024-01-01 PROCEDURE — 250N000013 HC RX MED GY IP 250 OP 250 PS 637: Performed by: STUDENT IN AN ORGANIZED HEALTH CARE EDUCATION/TRAINING PROGRAM

## 2024-01-01 PROCEDURE — 83735 ASSAY OF MAGNESIUM: CPT

## 2024-01-01 PROCEDURE — 27596 AMPUTATION FOLLOW-UP SURGERY: CPT | Mod: RT | Performed by: SURGERY

## 2024-01-01 PROCEDURE — 85025 COMPLETE CBC W/AUTO DIFF WBC: CPT | Performed by: INTERNAL MEDICINE

## 2024-01-01 PROCEDURE — 85027 COMPLETE CBC AUTOMATED: CPT | Performed by: EMERGENCY MEDICINE

## 2024-01-01 PROCEDURE — G0463 HOSPITAL OUTPT CLINIC VISIT: HCPCS

## 2024-01-01 PROCEDURE — 36415 COLL VENOUS BLD VENIPUNCTURE: CPT | Performed by: SURGERY

## 2024-01-01 PROCEDURE — 84630 ASSAY OF ZINC: CPT

## 2024-01-01 PROCEDURE — 99207 PR APP CREDIT; MD BILLING SHARED VISIT: CPT | Mod: FS | Performed by: PHYSICIAN ASSISTANT

## 2024-01-01 PROCEDURE — 92526 ORAL FUNCTION THERAPY: CPT | Mod: GN

## 2024-01-01 PROCEDURE — 85014 HEMATOCRIT: CPT | Performed by: INTERNAL MEDICINE

## 2024-01-01 PROCEDURE — 84100 ASSAY OF PHOSPHORUS: CPT | Performed by: EMERGENCY MEDICINE

## 2024-01-01 PROCEDURE — 250N000013 HC RX MED GY IP 250 OP 250 PS 637: Performed by: EMERGENCY MEDICINE

## 2024-01-01 PROCEDURE — 93005 ELECTROCARDIOGRAM TRACING: CPT

## 2024-01-01 PROCEDURE — 999N000141 HC STATISTIC PRE-PROCEDURE NURSING ASSESSMENT: Performed by: SURGERY

## 2024-01-01 PROCEDURE — 250N000011 HC RX IP 250 OP 636: Performed by: HOSPITALIST

## 2024-01-01 PROCEDURE — 999N000128 HC STATISTIC PERIPHERAL IV START W/O US GUIDANCE

## 2024-01-01 PROCEDURE — 84132 ASSAY OF SERUM POTASSIUM: CPT | Performed by: SURGERY

## 2024-01-01 PROCEDURE — 84145 PROCALCITONIN (PCT): CPT | Performed by: INTERNAL MEDICINE

## 2024-01-01 PROCEDURE — 250N000009 HC RX 250: Performed by: PEDIATRICS

## 2024-01-01 PROCEDURE — 250N000011 HC RX IP 250 OP 636: Performed by: PEDIATRICS

## 2024-01-01 PROCEDURE — 71275 CT ANGIOGRAPHY CHEST: CPT

## 2024-01-01 PROCEDURE — 85045 AUTOMATED RETICULOCYTE COUNT: CPT

## 2024-01-01 PROCEDURE — 250N000013 HC RX MED GY IP 250 OP 250 PS 637: Performed by: NURSE PRACTITIONER

## 2024-01-01 PROCEDURE — 82533 TOTAL CORTISOL: CPT | Performed by: INTERNAL MEDICINE

## 2024-01-01 PROCEDURE — 99222 1ST HOSP IP/OBS MODERATE 55: CPT | Mod: AI | Performed by: INTERNAL MEDICINE

## 2024-01-01 PROCEDURE — 96360 HYDRATION IV INFUSION INIT: CPT | Performed by: EMERGENCY MEDICINE

## 2024-01-01 PROCEDURE — 85018 HEMOGLOBIN: CPT | Performed by: STUDENT IN AN ORGANIZED HEALTH CARE EDUCATION/TRAINING PROGRAM

## 2024-01-01 PROCEDURE — 250N000012 HC RX MED GY IP 250 OP 636 PS 637: Performed by: STUDENT IN AN ORGANIZED HEALTH CARE EDUCATION/TRAINING PROGRAM

## 2024-01-01 PROCEDURE — 36415 COLL VENOUS BLD VENIPUNCTURE: CPT | Performed by: INTERNAL MEDICINE

## 2024-01-01 PROCEDURE — 83735 ASSAY OF MAGNESIUM: CPT | Performed by: INTERNAL MEDICINE

## 2024-01-01 PROCEDURE — 36415 COLL VENOUS BLD VENIPUNCTURE: CPT | Performed by: NURSE PRACTITIONER

## 2024-01-01 PROCEDURE — 80048 BASIC METABOLIC PNL TOTAL CA: CPT

## 2024-01-01 PROCEDURE — 99024 POSTOP FOLLOW-UP VISIT: CPT | Performed by: NURSE PRACTITIONER

## 2024-01-01 PROCEDURE — 71046 X-RAY EXAM CHEST 2 VIEWS: CPT | Mod: 26 | Performed by: STUDENT IN AN ORGANIZED HEALTH CARE EDUCATION/TRAINING PROGRAM

## 2024-01-01 PROCEDURE — 82040 ASSAY OF SERUM ALBUMIN: CPT | Performed by: EMERGENCY MEDICINE

## 2024-01-01 PROCEDURE — 97161 PT EVAL LOW COMPLEX 20 MIN: CPT | Mod: GP

## 2024-01-01 PROCEDURE — C9113 INJ PANTOPRAZOLE SODIUM, VIA: HCPCS | Performed by: EMERGENCY MEDICINE

## 2024-01-01 PROCEDURE — 250N000013 HC RX MED GY IP 250 OP 250 PS 637: Performed by: PHYSICIAN ASSISTANT

## 2024-01-01 PROCEDURE — 250N000009 HC RX 250: Performed by: STUDENT IN AN ORGANIZED HEALTH CARE EDUCATION/TRAINING PROGRAM

## 2024-01-01 PROCEDURE — 85041 AUTOMATED RBC COUNT: CPT | Performed by: STUDENT IN AN ORGANIZED HEALTH CARE EDUCATION/TRAINING PROGRAM

## 2024-01-01 PROCEDURE — 258N000003 HC RX IP 258 OP 636: Performed by: NURSE PRACTITIONER

## 2024-01-01 PROCEDURE — C9113 INJ PANTOPRAZOLE SODIUM, VIA: HCPCS | Performed by: NURSE PRACTITIONER

## 2024-01-01 PROCEDURE — 99232 SBSQ HOSP IP/OBS MODERATE 35: CPT | Mod: GC | Performed by: INTERNAL MEDICINE

## 2024-01-01 PROCEDURE — 250N000011 HC RX IP 250 OP 636: Performed by: INTERNAL MEDICINE

## 2024-01-01 PROCEDURE — 36415 COLL VENOUS BLD VENIPUNCTURE: CPT | Performed by: STUDENT IN AN ORGANIZED HEALTH CARE EDUCATION/TRAINING PROGRAM

## 2024-01-01 PROCEDURE — 120N000002 HC R&B MED SURG/OB UMMC

## 2024-01-01 PROCEDURE — 83735 ASSAY OF MAGNESIUM: CPT | Performed by: STUDENT IN AN ORGANIZED HEALTH CARE EDUCATION/TRAINING PROGRAM

## 2024-01-01 PROCEDURE — 85027 COMPLETE CBC AUTOMATED: CPT | Performed by: STUDENT IN AN ORGANIZED HEALTH CARE EDUCATION/TRAINING PROGRAM

## 2024-01-01 PROCEDURE — 93971 EXTREMITY STUDY: CPT | Mod: LT

## 2024-01-01 PROCEDURE — 250N000009 HC RX 250: Performed by: INTERNAL MEDICINE

## 2024-01-01 PROCEDURE — 82040 ASSAY OF SERUM ALBUMIN: CPT | Performed by: STUDENT IN AN ORGANIZED HEALTH CARE EDUCATION/TRAINING PROGRAM

## 2024-01-01 PROCEDURE — 99497 ADVNCD CARE PLAN 30 MIN: CPT | Performed by: NURSE PRACTITIONER

## 2024-01-01 PROCEDURE — 99233 SBSQ HOSP IP/OBS HIGH 50: CPT

## 2024-01-01 PROCEDURE — 88341 IMHCHEM/IMCYTCHM EA ADD ANTB: CPT | Mod: 26 | Performed by: PATHOLOGY

## 2024-01-01 PROCEDURE — 80053 COMPREHEN METABOLIC PANEL: CPT | Performed by: INTERNAL MEDICINE

## 2024-01-01 PROCEDURE — 36415 COLL VENOUS BLD VENIPUNCTURE: CPT

## 2024-01-01 PROCEDURE — 85027 COMPLETE CBC AUTOMATED: CPT | Mod: ORL | Performed by: FAMILY MEDICINE

## 2024-01-01 PROCEDURE — 250N000012 HC RX MED GY IP 250 OP 636 PS 637

## 2024-01-01 PROCEDURE — 83735 ASSAY OF MAGNESIUM: CPT | Performed by: PEDIATRICS

## 2024-01-01 PROCEDURE — 250N000011 HC RX IP 250 OP 636: Performed by: STUDENT IN AN ORGANIZED HEALTH CARE EDUCATION/TRAINING PROGRAM

## 2024-01-01 PROCEDURE — 84132 ASSAY OF SERUM POTASSIUM: CPT | Performed by: STUDENT IN AN ORGANIZED HEALTH CARE EDUCATION/TRAINING PROGRAM

## 2024-01-01 PROCEDURE — 0DB68ZX EXCISION OF STOMACH, VIA NATURAL OR ARTIFICIAL OPENING ENDOSCOPIC, DIAGNOSTIC: ICD-10-PCS | Performed by: INTERNAL MEDICINE

## 2024-01-01 PROCEDURE — 80048 BASIC METABOLIC PNL TOTAL CA: CPT | Performed by: NURSE PRACTITIONER

## 2024-01-01 PROCEDURE — 99214 OFFICE O/P EST MOD 30 MIN: CPT | Performed by: PLASTIC SURGERY

## 2024-01-01 PROCEDURE — G0500 MOD SEDAT ENDO SERVICE >5YRS: HCPCS | Performed by: INTERNAL MEDICINE

## 2024-01-01 PROCEDURE — 258N000003 HC RX IP 258 OP 636: Performed by: EMERGENCY MEDICINE

## 2024-01-01 PROCEDURE — 80048 BASIC METABOLIC PNL TOTAL CA: CPT | Performed by: STUDENT IN AN ORGANIZED HEALTH CARE EDUCATION/TRAINING PROGRAM

## 2024-01-01 PROCEDURE — 80053 COMPREHEN METABOLIC PANEL: CPT | Performed by: STUDENT IN AN ORGANIZED HEALTH CARE EDUCATION/TRAINING PROGRAM

## 2024-01-01 PROCEDURE — 85025 COMPLETE CBC W/AUTO DIFF WBC: CPT | Performed by: EMERGENCY MEDICINE

## 2024-01-01 PROCEDURE — 83735 ASSAY OF MAGNESIUM: CPT | Performed by: EMERGENCY MEDICINE

## 2024-01-01 PROCEDURE — G0463 HOSPITAL OUTPT CLINIC VISIT: HCPCS | Performed by: PLASTIC SURGERY

## 2024-01-01 PROCEDURE — C9113 INJ PANTOPRAZOLE SODIUM, VIA: HCPCS | Performed by: STUDENT IN AN ORGANIZED HEALTH CARE EDUCATION/TRAINING PROGRAM

## 2024-01-01 PROCEDURE — 84100 ASSAY OF PHOSPHORUS: CPT | Performed by: PEDIATRICS

## 2024-01-01 PROCEDURE — 85652 RBC SED RATE AUTOMATED: CPT | Performed by: EMERGENCY MEDICINE

## 2024-01-01 PROCEDURE — 99232 SBSQ HOSP IP/OBS MODERATE 35: CPT | Performed by: INTERNAL MEDICINE

## 2024-01-01 PROCEDURE — 710N000010 HC RECOVERY PHASE 1, LEVEL 2, PER MIN: Performed by: SURGERY

## 2024-01-01 PROCEDURE — 97530 THERAPEUTIC ACTIVITIES: CPT | Mod: GO | Performed by: OCCUPATIONAL THERAPIST

## 2024-01-01 PROCEDURE — 0DB38ZX EXCISION OF LOWER ESOPHAGUS, VIA NATURAL OR ARTIFICIAL OPENING ENDOSCOPIC, DIAGNOSTIC: ICD-10-PCS | Performed by: INTERNAL MEDICINE

## 2024-01-01 PROCEDURE — 85730 THROMBOPLASTIN TIME PARTIAL: CPT | Performed by: EMERGENCY MEDICINE

## 2024-01-01 PROCEDURE — 250N000011 HC RX IP 250 OP 636

## 2024-01-01 PROCEDURE — 250N000013 HC RX MED GY IP 250 OP 250 PS 637: Performed by: SURGERY

## 2024-01-01 PROCEDURE — 99214 OFFICE O/P EST MOD 30 MIN: CPT | Mod: 25

## 2024-01-01 PROCEDURE — 70450 CT HEAD/BRAIN W/O DYE: CPT

## 2024-01-01 PROCEDURE — 93010 ELECTROCARDIOGRAM REPORT: CPT | Performed by: INTERNAL MEDICINE

## 2024-01-01 PROCEDURE — 71045 X-RAY EXAM CHEST 1 VIEW: CPT | Mod: 26 | Performed by: RADIOLOGY

## 2024-01-01 PROCEDURE — 97530 THERAPEUTIC ACTIVITIES: CPT | Mod: GO

## 2024-01-01 PROCEDURE — 36415 COLL VENOUS BLD VENIPUNCTURE: CPT | Performed by: PHYSICIAN ASSISTANT

## 2024-01-01 PROCEDURE — 97535 SELF CARE MNGMENT TRAINING: CPT | Mod: GO

## 2024-01-01 PROCEDURE — 83935 ASSAY OF URINE OSMOLALITY: CPT | Performed by: STUDENT IN AN ORGANIZED HEALTH CARE EDUCATION/TRAINING PROGRAM

## 2024-01-01 PROCEDURE — 85027 COMPLETE CBC AUTOMATED: CPT

## 2024-01-01 PROCEDURE — P9016 RBC LEUKOCYTES REDUCED: HCPCS | Performed by: EMERGENCY MEDICINE

## 2024-01-01 PROCEDURE — 85027 COMPLETE CBC AUTOMATED: CPT | Performed by: NURSE PRACTITIONER

## 2024-01-01 PROCEDURE — 99222 1ST HOSP IP/OBS MODERATE 55: CPT | Performed by: INTERNAL MEDICINE

## 2024-01-01 PROCEDURE — 92610 EVALUATE SWALLOWING FUNCTION: CPT | Mod: GN

## 2024-01-01 PROCEDURE — 82728 ASSAY OF FERRITIN: CPT | Performed by: INTERNAL MEDICINE

## 2024-01-01 PROCEDURE — 84300 ASSAY OF URINE SODIUM: CPT | Performed by: PHYSICIAN ASSISTANT

## 2024-01-01 PROCEDURE — 36415 COLL VENOUS BLD VENIPUNCTURE: CPT | Performed by: PEDIATRICS

## 2024-01-01 PROCEDURE — 73701 CT LOWER EXTREMITY W/DYE: CPT | Mod: 26 | Performed by: RADIOLOGY

## 2024-01-01 PROCEDURE — 97165 OT EVAL LOW COMPLEX 30 MIN: CPT | Mod: GO

## 2024-01-01 PROCEDURE — 99285 EMERGENCY DEPT VISIT HI MDM: CPT | Performed by: EMERGENCY MEDICINE

## 2024-01-01 PROCEDURE — 258N000003 HC RX IP 258 OP 636: Performed by: INTERNAL MEDICINE

## 2024-01-01 PROCEDURE — 99223 1ST HOSP IP/OBS HIGH 75: CPT

## 2024-01-01 PROCEDURE — 86140 C-REACTIVE PROTEIN: CPT | Performed by: INTERNAL MEDICINE

## 2024-01-01 PROCEDURE — 86900 BLOOD TYPING SEROLOGIC ABO: CPT | Performed by: EMERGENCY MEDICINE

## 2024-01-01 PROCEDURE — 82962 GLUCOSE BLOOD TEST: CPT

## 2024-01-01 PROCEDURE — 99239 HOSP IP/OBS DSCHRG MGMT >30: CPT | Performed by: STUDENT IN AN ORGANIZED HEALTH CARE EDUCATION/TRAINING PROGRAM

## 2024-01-01 PROCEDURE — 250N000009 HC RX 250

## 2024-01-01 PROCEDURE — 83550 IRON BINDING TEST: CPT | Performed by: INTERNAL MEDICINE

## 2024-01-01 PROCEDURE — 250N000011 HC RX IP 250 OP 636: Performed by: EMERGENCY MEDICINE

## 2024-01-01 PROCEDURE — 250N000009 HC RX 250: Performed by: ANESTHESIOLOGY

## 2024-01-01 PROCEDURE — 370N000017 HC ANESTHESIA TECHNICAL FEE, PER MIN: Performed by: SURGERY

## 2024-01-01 PROCEDURE — 86923 COMPATIBILITY TEST ELECTRIC: CPT | Performed by: STUDENT IN AN ORGANIZED HEALTH CARE EDUCATION/TRAINING PROGRAM

## 2024-01-01 PROCEDURE — 84100 ASSAY OF PHOSPHORUS: CPT

## 2024-01-01 PROCEDURE — 84132 ASSAY OF SERUM POTASSIUM: CPT | Performed by: INTERNAL MEDICINE

## 2024-01-01 PROCEDURE — 250N000011 HC RX IP 250 OP 636: Performed by: NURSE PRACTITIONER

## 2024-01-01 PROCEDURE — 82565 ASSAY OF CREATININE: CPT | Performed by: PHYSICIAN ASSISTANT

## 2024-01-01 PROCEDURE — 99222 1ST HOSP IP/OBS MODERATE 55: CPT

## 2024-01-01 PROCEDURE — 88342 IMHCHEM/IMCYTCHM 1ST ANTB: CPT | Mod: TC | Performed by: INTERNAL MEDICINE

## 2024-01-01 PROCEDURE — 82610 CYSTATIN C: CPT | Performed by: NURSE PRACTITIONER

## 2024-01-01 PROCEDURE — 83935 ASSAY OF URINE OSMOLALITY: CPT | Performed by: INTERNAL MEDICINE

## 2024-01-01 PROCEDURE — 84300 ASSAY OF URINE SODIUM: CPT | Performed by: INTERNAL MEDICINE

## 2024-01-01 PROCEDURE — 85027 COMPLETE CBC AUTOMATED: CPT | Performed by: INTERNAL MEDICINE

## 2024-01-01 PROCEDURE — 85610 PROTHROMBIN TIME: CPT | Performed by: NURSE PRACTITIONER

## 2024-01-01 PROCEDURE — 84100 ASSAY OF PHOSPHORUS: CPT | Performed by: NURSE PRACTITIONER

## 2024-01-01 PROCEDURE — 87633 RESP VIRUS 12-25 TARGETS: CPT | Performed by: PHYSICIAN ASSISTANT

## 2024-01-01 PROCEDURE — 250N000013 HC RX MED GY IP 250 OP 250 PS 637: Performed by: HOSPITALIST

## 2024-01-01 PROCEDURE — 84132 ASSAY OF SERUM POTASSIUM: CPT | Performed by: PEDIATRICS

## 2024-01-01 PROCEDURE — 99309 SBSQ NF CARE MODERATE MDM 30: CPT | Performed by: NURSE PRACTITIONER

## 2024-01-01 PROCEDURE — 99285 EMERGENCY DEPT VISIT HI MDM: CPT | Mod: 25 | Performed by: EMERGENCY MEDICINE

## 2024-01-01 PROCEDURE — 360N000076 HC SURGERY LEVEL 3, PER MIN: Performed by: SURGERY

## 2024-01-01 PROCEDURE — 71275 CT ANGIOGRAPHY CHEST: CPT | Mod: 26 | Performed by: STUDENT IN AN ORGANIZED HEALTH CARE EDUCATION/TRAINING PROGRAM

## 2024-01-01 PROCEDURE — 87581 M.PNEUMON DNA AMP PROBE: CPT | Performed by: PHYSICIAN ASSISTANT

## 2024-01-01 PROCEDURE — 99222 1ST HOSP IP/OBS MODERATE 55: CPT | Performed by: NURSE PRACTITIONER

## 2024-01-01 PROCEDURE — 82805 BLOOD GASES W/O2 SATURATION: CPT | Performed by: EMERGENCY MEDICINE

## 2024-01-01 PROCEDURE — 87389 HIV-1 AG W/HIV-1&-2 AB AG IA: CPT

## 2024-01-01 PROCEDURE — 250N000012 HC RX MED GY IP 250 OP 636 PS 637: Performed by: INTERNAL MEDICINE

## 2024-01-01 PROCEDURE — 87040 BLOOD CULTURE FOR BACTERIA: CPT | Performed by: EMERGENCY MEDICINE

## 2024-01-01 PROCEDURE — 84466 ASSAY OF TRANSFERRIN: CPT | Performed by: INTERNAL MEDICINE

## 2024-01-01 PROCEDURE — 250N000011 HC RX IP 250 OP 636: Performed by: SURGERY

## 2024-01-01 PROCEDURE — 0DB78ZX EXCISION OF STOMACH, PYLORUS, VIA NATURAL OR ARTIFICIAL OPENING ENDOSCOPIC, DIAGNOSTIC: ICD-10-PCS | Performed by: INTERNAL MEDICINE

## 2024-01-01 PROCEDURE — 69210 REMOVE IMPACTED EAR WAX UNI: CPT | Mod: 50 | Performed by: OTOLARYNGOLOGY

## 2024-01-01 PROCEDURE — 84300 ASSAY OF URINE SODIUM: CPT | Performed by: NURSE PRACTITIONER

## 2024-01-01 PROCEDURE — 85730 THROMBOPLASTIN TIME PARTIAL: CPT | Performed by: NURSE PRACTITIONER

## 2024-01-01 PROCEDURE — 88307 TISSUE EXAM BY PATHOLOGIST: CPT | Mod: 26 | Performed by: PATHOLOGY

## 2024-01-01 PROCEDURE — 80053 COMPREHEN METABOLIC PANEL: CPT | Performed by: EMERGENCY MEDICINE

## 2024-01-01 PROCEDURE — 85007 BL SMEAR W/DIFF WBC COUNT: CPT | Performed by: EMERGENCY MEDICINE

## 2024-01-01 PROCEDURE — 80053 COMPREHEN METABOLIC PANEL: CPT

## 2024-01-01 PROCEDURE — 99213 OFFICE O/P EST LOW 20 MIN: CPT | Performed by: SURGERY

## 2024-01-01 PROCEDURE — 71045 X-RAY EXAM CHEST 1 VIEW: CPT

## 2024-01-01 PROCEDURE — 83540 ASSAY OF IRON: CPT | Mod: ORL | Performed by: FAMILY MEDICINE

## 2024-01-01 PROCEDURE — 81001 URINALYSIS AUTO W/SCOPE: CPT | Performed by: EMERGENCY MEDICINE

## 2024-01-01 PROCEDURE — 93000 ELECTROCARDIOGRAM COMPLETE: CPT

## 2024-01-01 PROCEDURE — 83735 ASSAY OF MAGNESIUM: CPT | Performed by: PHYSICIAN ASSISTANT

## 2024-01-01 PROCEDURE — 88305 TISSUE EXAM BY PATHOLOGIST: CPT | Mod: 26 | Performed by: PATHOLOGY

## 2024-01-01 PROCEDURE — 83930 ASSAY OF BLOOD OSMOLALITY: CPT | Performed by: STUDENT IN AN ORGANIZED HEALTH CARE EDUCATION/TRAINING PROGRAM

## 2024-01-01 PROCEDURE — 82525 ASSAY OF COPPER: CPT

## 2024-01-01 PROCEDURE — 83930 ASSAY OF BLOOD OSMOLALITY: CPT | Performed by: INTERNAL MEDICINE

## 2024-01-01 PROCEDURE — 99223 1ST HOSP IP/OBS HIGH 75: CPT | Performed by: NURSE PRACTITIONER

## 2024-01-01 PROCEDURE — 93971 EXTREMITY STUDY: CPT | Mod: 26 | Performed by: STUDENT IN AN ORGANIZED HEALTH CARE EDUCATION/TRAINING PROGRAM

## 2024-01-01 PROCEDURE — 87205 SMEAR GRAM STAIN: CPT | Performed by: EMERGENCY MEDICINE

## 2024-01-01 PROCEDURE — 99024 POSTOP FOLLOW-UP VISIT: CPT | Mod: GC | Performed by: SURGERY

## 2024-01-01 PROCEDURE — 85610 PROTHROMBIN TIME: CPT | Performed by: EMERGENCY MEDICINE

## 2024-01-01 PROCEDURE — 99239 HOSP IP/OBS DSCHRG MGMT >30: CPT | Mod: FS | Performed by: PHYSICIAN ASSISTANT

## 2024-01-01 PROCEDURE — 43239 EGD BIOPSY SINGLE/MULTIPLE: CPT | Performed by: INTERNAL MEDICINE

## 2024-01-01 PROCEDURE — 0DB98ZX EXCISION OF DUODENUM, VIA NATURAL OR ARTIFICIAL OPENING ENDOSCOPIC, DIAGNOSTIC: ICD-10-PCS | Performed by: INTERNAL MEDICINE

## 2024-01-01 PROCEDURE — 99207 PR NO BILLABLE SERVICE THIS VISIT: CPT | Performed by: DIETITIAN, REGISTERED

## 2024-01-01 PROCEDURE — 87070 CULTURE OTHR SPECIMN AEROBIC: CPT | Performed by: EMERGENCY MEDICINE

## 2024-01-01 PROCEDURE — 85027 COMPLETE CBC AUTOMATED: CPT | Performed by: PHYSICIAN ASSISTANT

## 2024-01-01 PROCEDURE — 83935 ASSAY OF URINE OSMOLALITY: CPT | Performed by: PHYSICIAN ASSISTANT

## 2024-01-01 PROCEDURE — 85007 BL SMEAR W/DIFF WBC COUNT: CPT

## 2024-01-01 PROCEDURE — 92610 EVALUATE SWALLOWING FUNCTION: CPT | Mod: GN | Performed by: SPEECH-LANGUAGE PATHOLOGIST

## 2024-01-01 PROCEDURE — 84145 PROCALCITONIN (PCT): CPT | Performed by: EMERGENCY MEDICINE

## 2024-01-01 PROCEDURE — 84100 ASSAY OF PHOSPHORUS: CPT | Performed by: STUDENT IN AN ORGANIZED HEALTH CARE EDUCATION/TRAINING PROGRAM

## 2024-01-01 PROCEDURE — 84300 ASSAY OF URINE SODIUM: CPT | Performed by: STUDENT IN AN ORGANIZED HEALTH CARE EDUCATION/TRAINING PROGRAM

## 2024-01-01 PROCEDURE — 97166 OT EVAL MOD COMPLEX 45 MIN: CPT | Mod: GO

## 2024-01-01 PROCEDURE — 86923 COMPATIBILITY TEST ELECTRIC: CPT | Performed by: EMERGENCY MEDICINE

## 2024-01-01 PROCEDURE — 258N000003 HC RX IP 258 OP 636: Performed by: STUDENT IN AN ORGANIZED HEALTH CARE EDUCATION/TRAINING PROGRAM

## 2024-01-01 PROCEDURE — 99207 PR NO CHARGE FOLLOW UP PS: CPT | Performed by: STUDENT IN AN ORGANIZED HEALTH CARE EDUCATION/TRAINING PROGRAM

## 2024-01-01 PROCEDURE — 87529 HSV DNA AMP PROBE: CPT

## 2024-01-01 PROCEDURE — 250N000011 HC RX IP 250 OP 636: Performed by: PHYSICIAN ASSISTANT

## 2024-01-01 PROCEDURE — 86140 C-REACTIVE PROTEIN: CPT | Performed by: EMERGENCY MEDICINE

## 2024-01-01 PROCEDURE — 99207 PR NO BILLABLE SERVICE THIS VISIT: CPT | Performed by: NURSE PRACTITIONER

## 2024-01-01 PROCEDURE — 88342 IMHCHEM/IMCYTCHM 1ST ANTB: CPT | Mod: 26 | Performed by: PATHOLOGY

## 2024-01-01 PROCEDURE — 81001 URINALYSIS AUTO W/SCOPE: CPT | Performed by: INTERNAL MEDICINE

## 2024-01-01 PROCEDURE — 99239 HOSP IP/OBS DSCHRG MGMT >30: CPT | Mod: GC | Performed by: STUDENT IN AN ORGANIZED HEALTH CARE EDUCATION/TRAINING PROGRAM

## 2024-01-01 PROCEDURE — 99222 1ST HOSP IP/OBS MODERATE 55: CPT | Mod: FS | Performed by: STUDENT IN AN ORGANIZED HEALTH CARE EDUCATION/TRAINING PROGRAM

## 2024-01-01 PROCEDURE — 80061 LIPID PANEL: CPT | Performed by: INTERNAL MEDICINE

## 2024-01-01 PROCEDURE — 83735 ASSAY OF MAGNESIUM: CPT | Performed by: NURSE PRACTITIONER

## 2024-01-01 PROCEDURE — 87493 C DIFF AMPLIFIED PROBE: CPT | Performed by: INTERNAL MEDICINE

## 2024-01-01 PROCEDURE — 83605 ASSAY OF LACTIC ACID: CPT | Performed by: INTERNAL MEDICINE

## 2024-01-01 PROCEDURE — 85018 HEMOGLOBIN: CPT | Performed by: PEDIATRICS

## 2024-01-01 PROCEDURE — 85048 AUTOMATED LEUKOCYTE COUNT: CPT | Performed by: EMERGENCY MEDICINE

## 2024-01-01 PROCEDURE — 84443 ASSAY THYROID STIM HORMONE: CPT | Performed by: INTERNAL MEDICINE

## 2024-01-01 PROCEDURE — 87070 CULTURE OTHR SPECIMN AEROBIC: CPT | Performed by: INTERNAL MEDICINE

## 2024-01-01 PROCEDURE — 250N000025 HC SEVOFLURANE, PER MIN: Performed by: SURGERY

## 2024-01-01 PROCEDURE — 87205 SMEAR GRAM STAIN: CPT | Performed by: INTERNAL MEDICINE

## 2024-01-01 PROCEDURE — 85520 HEPARIN ASSAY: CPT | Performed by: HOSPITALIST

## 2024-01-01 PROCEDURE — 80048 BASIC METABOLIC PNL TOTAL CA: CPT | Mod: ORL | Performed by: FAMILY MEDICINE

## 2024-01-01 PROCEDURE — 0Y6C0Z1 DETACHMENT AT RIGHT UPPER LEG, HIGH, OPEN APPROACH: ICD-10-PCS | Performed by: SURGERY

## 2024-01-01 PROCEDURE — 99232 SBSQ HOSP IP/OBS MODERATE 35: CPT

## 2024-01-01 PROCEDURE — 272N000001 HC OR GENERAL SUPPLY STERILE: Performed by: SURGERY

## 2024-01-01 PROCEDURE — 999N000248 HC STATISTIC IV INSERT WITH US BY RN

## 2024-01-01 PROCEDURE — 99207 PR NEG PRESSURE WOUND THERAPY NON DME </= 50 SQ CM: CPT | Performed by: NURSE PRACTITIONER

## 2024-01-01 PROCEDURE — 85025 COMPLETE CBC W/AUTO DIFF WBC: CPT

## 2024-01-01 PROCEDURE — P9016 RBC LEUKOCYTES REDUCED: HCPCS

## 2024-01-01 PROCEDURE — 250N000013 HC RX MED GY IP 250 OP 250 PS 637: Performed by: PEDIATRICS

## 2024-01-01 PROCEDURE — 99214 OFFICE O/P EST MOD 30 MIN: CPT

## 2024-01-01 PROCEDURE — 99233 SBSQ HOSP IP/OBS HIGH 50: CPT | Mod: FS | Performed by: INTERNAL MEDICINE

## 2024-01-01 PROCEDURE — 84132 ASSAY OF SERUM POTASSIUM: CPT | Performed by: PHYSICIAN ASSISTANT

## 2024-01-01 PROCEDURE — 83935 ASSAY OF URINE OSMOLALITY: CPT | Performed by: NURSE PRACTITIONER

## 2024-01-01 PROCEDURE — 99233 SBSQ HOSP IP/OBS HIGH 50: CPT | Performed by: STUDENT IN AN ORGANIZED HEALTH CARE EDUCATION/TRAINING PROGRAM

## 2024-01-01 PROCEDURE — 96361 HYDRATE IV INFUSION ADD-ON: CPT | Performed by: EMERGENCY MEDICINE

## 2024-01-01 PROCEDURE — 81001 URINALYSIS AUTO W/SCOPE: CPT | Performed by: PHYSICIAN ASSISTANT

## 2024-01-01 PROCEDURE — 99207 PR APP CREDIT; MD BILLING SHARED VISIT: CPT | Performed by: INTERNAL MEDICINE

## 2024-01-01 PROCEDURE — 84295 ASSAY OF SERUM SODIUM: CPT | Performed by: INTERNAL MEDICINE

## 2024-01-01 PROCEDURE — 84439 ASSAY OF FREE THYROXINE: CPT | Performed by: INTERNAL MEDICINE

## 2024-01-01 PROCEDURE — 85049 AUTOMATED PLATELET COUNT: CPT | Performed by: STUDENT IN AN ORGANIZED HEALTH CARE EDUCATION/TRAINING PROGRAM

## 2024-01-01 PROCEDURE — 99214 OFFICE O/P EST MOD 30 MIN: CPT | Performed by: NURSE PRACTITIONER

## 2024-01-01 PROCEDURE — 71046 X-RAY EXAM CHEST 2 VIEWS: CPT

## 2024-01-01 PROCEDURE — 85007 BL SMEAR W/DIFF WBC COUNT: CPT | Performed by: INTERNAL MEDICINE

## 2024-01-01 PROCEDURE — P9016 RBC LEUKOCYTES REDUCED: HCPCS | Performed by: STUDENT IN AN ORGANIZED HEALTH CARE EDUCATION/TRAINING PROGRAM

## 2024-01-01 PROCEDURE — 88307 TISSUE EXAM BY PATHOLOGIST: CPT | Mod: TC | Performed by: SURGERY

## 2024-01-01 PROCEDURE — 82040 ASSAY OF SERUM ALBUMIN: CPT | Performed by: INTERNAL MEDICINE

## 2024-01-01 PROCEDURE — 70450 CT HEAD/BRAIN W/O DYE: CPT | Mod: 26 | Performed by: RADIOLOGY

## 2024-01-01 PROCEDURE — 87799 DETECT AGENT NOS DNA QUANT: CPT

## 2024-01-01 PROCEDURE — G0378 HOSPITAL OBSERVATION PER HR: HCPCS

## 2024-01-01 PROCEDURE — 88312 SPECIAL STAINS GROUP 1: CPT | Mod: 26 | Performed by: PATHOLOGY

## 2024-01-01 PROCEDURE — 86900 BLOOD TYPING SEROLOGIC ABO: CPT

## 2024-01-01 PROCEDURE — 97110 THERAPEUTIC EXERCISES: CPT | Mod: GO | Performed by: OCCUPATIONAL THERAPIST

## 2024-01-01 PROCEDURE — 86923 COMPATIBILITY TEST ELECTRIC: CPT

## 2024-01-01 PROCEDURE — 99222 1ST HOSP IP/OBS MODERATE 55: CPT | Mod: GC | Performed by: INTERNAL MEDICINE

## 2024-01-01 PROCEDURE — F08G5YZ WOUND MANAGEMENT TREATMENT OF INTEGUMENTARY SYSTEM - LOWER BACK / LOWER EXTREMITY USING OTHER EQUIPMENT: ICD-10-PCS | Performed by: SURGERY

## 2024-01-01 PROCEDURE — 85041 AUTOMATED RBC COUNT: CPT

## 2024-01-01 PROCEDURE — 83930 ASSAY OF BLOOD OSMOLALITY: CPT | Performed by: PHYSICIAN ASSISTANT

## 2024-01-01 PROCEDURE — 82746 ASSAY OF FOLIC ACID SERUM: CPT | Performed by: INTERNAL MEDICINE

## 2024-01-01 PROCEDURE — 250N000011 HC RX IP 250 OP 636: Performed by: ANESTHESIOLOGY

## 2024-01-01 RX ORDER — HYDROMORPHONE HYDROCHLORIDE 1 MG/ML
0.3 INJECTION, SOLUTION INTRAMUSCULAR; INTRAVENOUS; SUBCUTANEOUS
Status: DISCONTINUED | OUTPATIENT
Start: 2024-01-01 | End: 2024-01-01 | Stop reason: HOSPADM

## 2024-01-01 RX ORDER — FLUMAZENIL 0.1 MG/ML
0.2 INJECTION, SOLUTION INTRAVENOUS
Status: DISCONTINUED | OUTPATIENT
Start: 2024-01-01 | End: 2024-01-01 | Stop reason: HOSPADM

## 2024-01-01 RX ORDER — ONDANSETRON 4 MG/1
4 TABLET, ORALLY DISINTEGRATING ORAL EVERY 6 HOURS PRN
Status: DISCONTINUED | OUTPATIENT
Start: 2024-01-01 | End: 2024-01-01 | Stop reason: HOSPADM

## 2024-01-01 RX ORDER — MAGNESIUM OXIDE 400 MG/1
400 TABLET ORAL DAILY
Status: DISCONTINUED | OUTPATIENT
Start: 2024-01-01 | End: 2024-01-01

## 2024-01-01 RX ORDER — POTASSIUM CHLORIDE 7.45 MG/ML
10 INJECTION INTRAVENOUS ONCE
Status: COMPLETED | OUTPATIENT
Start: 2024-01-01 | End: 2024-01-01

## 2024-01-01 RX ORDER — OLANZAPINE 5 MG/1
5 TABLET, ORALLY DISINTEGRATING ORAL EVERY 6 HOURS PRN
Status: DISCONTINUED | OUTPATIENT
Start: 2024-01-01 | End: 2024-01-01 | Stop reason: HOSPADM

## 2024-01-01 RX ORDER — SIMVASTATIN 20 MG
20 TABLET ORAL AT BEDTIME
Status: DISCONTINUED | OUTPATIENT
Start: 2024-01-01 | End: 2024-01-01

## 2024-01-01 RX ORDER — FENTANYL CITRATE 50 UG/ML
25-50 INJECTION, SOLUTION INTRAMUSCULAR; INTRAVENOUS
Status: DISCONTINUED | OUTPATIENT
Start: 2024-01-01 | End: 2024-01-01 | Stop reason: HOSPADM

## 2024-01-01 RX ORDER — MAGNESIUM SULFATE HEPTAHYDRATE 40 MG/ML
2 INJECTION, SOLUTION INTRAVENOUS ONCE
Status: COMPLETED | OUTPATIENT
Start: 2024-01-01 | End: 2024-01-01

## 2024-01-01 RX ORDER — MORPHINE SULFATE 10 MG/5ML
5 SOLUTION ORAL
Status: DISCONTINUED | OUTPATIENT
Start: 2024-01-01 | End: 2024-01-01

## 2024-01-01 RX ORDER — ACETAMINOPHEN 325 MG/1
650 TABLET ORAL EVERY 6 HOURS
Qty: 24 TABLET | Refills: 0 | Status: SHIPPED | OUTPATIENT
Start: 2024-01-01

## 2024-01-01 RX ORDER — FAMOTIDINE 20 MG/1
40 TABLET, FILM COATED ORAL AT BEDTIME
Status: DISCONTINUED | OUTPATIENT
Start: 2024-01-01 | End: 2024-01-01

## 2024-01-01 RX ORDER — LISINOPRIL 20 MG/1
20 TABLET ORAL DAILY
Status: DISCONTINUED | OUTPATIENT
Start: 2024-01-01 | End: 2024-01-01

## 2024-01-01 RX ORDER — OXYCODONE HYDROCHLORIDE 5 MG/1
5 TABLET ORAL
Status: DISCONTINUED | OUTPATIENT
Start: 2024-01-01 | End: 2024-01-01

## 2024-01-01 RX ORDER — MINERAL OIL/HYDROPHIL PETROLAT
OINTMENT (GRAM) TOPICAL
Status: DISCONTINUED | OUTPATIENT
Start: 2024-01-01 | End: 2024-01-01 | Stop reason: HOSPADM

## 2024-01-01 RX ORDER — LIDOCAINE 40 MG/G
CREAM TOPICAL
Status: DISCONTINUED | OUTPATIENT
Start: 2024-01-01 | End: 2024-01-01 | Stop reason: HOSPADM

## 2024-01-01 RX ORDER — POTASSIUM CHLORIDE 750 MG/1
20 TABLET, EXTENDED RELEASE ORAL ONCE
Status: COMPLETED | OUTPATIENT
Start: 2024-01-01 | End: 2024-01-01

## 2024-01-01 RX ORDER — OXYCODONE HYDROCHLORIDE 5 MG/1
5 TABLET ORAL 2 TIMES DAILY PRN
Qty: 14 TABLET | Refills: 0 | Status: SHIPPED | OUTPATIENT
Start: 2024-01-01 | End: 2024-01-01

## 2024-01-01 RX ORDER — NALOXONE HYDROCHLORIDE 0.4 MG/ML
0.4 INJECTION, SOLUTION INTRAMUSCULAR; INTRAVENOUS; SUBCUTANEOUS
Status: DISCONTINUED | OUTPATIENT
Start: 2024-01-01 | End: 2024-01-01 | Stop reason: HOSPADM

## 2024-01-01 RX ORDER — FOLIC ACID 1 MG/1
1 TABLET ORAL DAILY
Status: DISCONTINUED | OUTPATIENT
Start: 2024-01-01 | End: 2024-01-01 | Stop reason: HOSPADM

## 2024-01-01 RX ORDER — SODIUM CHLORIDE, SODIUM LACTATE, POTASSIUM CHLORIDE, CALCIUM CHLORIDE 600; 310; 30; 20 MG/100ML; MG/100ML; MG/100ML; MG/100ML
INJECTION, SOLUTION INTRAVENOUS CONTINUOUS
Status: DISCONTINUED | OUTPATIENT
Start: 2024-01-01 | End: 2024-01-01 | Stop reason: HOSPADM

## 2024-01-01 RX ORDER — LIDOCAINE 4 G/G
1 PATCH TOPICAL EVERY 24 HOURS
Status: DISCONTINUED | OUTPATIENT
Start: 2024-01-01 | End: 2024-01-01 | Stop reason: HOSPADM

## 2024-01-01 RX ORDER — HYDROMORPHONE HCL IN WATER/PF 6 MG/30 ML
0.4 PATIENT CONTROLLED ANALGESIA SYRINGE INTRAVENOUS EVERY 5 MIN PRN
Status: DISCONTINUED | OUTPATIENT
Start: 2024-01-01 | End: 2024-01-01 | Stop reason: HOSPADM

## 2024-01-01 RX ORDER — AMLODIPINE BESYLATE 5 MG/1
5 TABLET ORAL DAILY
Status: DISCONTINUED | OUTPATIENT
Start: 2024-01-01 | End: 2024-01-01 | Stop reason: HOSPADM

## 2024-01-01 RX ORDER — ACETAMINOPHEN 325 MG/1
975 TABLET ORAL ONCE
Status: DISCONTINUED | OUTPATIENT
Start: 2024-01-01 | End: 2024-01-01 | Stop reason: HOSPADM

## 2024-01-01 RX ORDER — VALACYCLOVIR HYDROCHLORIDE 1 G/1
1000 TABLET, FILM COATED ORAL EVERY 12 HOURS
Qty: 18 TABLET | Refills: 0 | Status: SHIPPED | OUTPATIENT
Start: 2024-01-01 | End: 2024-01-01

## 2024-01-01 RX ORDER — SIMVASTATIN 20 MG
20 TABLET ORAL AT BEDTIME
Status: CANCELLED | OUTPATIENT
Start: 2024-01-01

## 2024-01-01 RX ORDER — LISINOPRIL 5 MG/1
20 TABLET ORAL DAILY
Status: CANCELLED | OUTPATIENT
Start: 2024-01-01

## 2024-01-01 RX ORDER — PREDNISONE 5 MG/1
5 TABLET ORAL DAILY
Status: CANCELLED | OUTPATIENT
Start: 2024-01-01

## 2024-01-01 RX ORDER — NALOXONE HYDROCHLORIDE 0.4 MG/ML
0.2 INJECTION, SOLUTION INTRAMUSCULAR; INTRAVENOUS; SUBCUTANEOUS
Status: DISCONTINUED | OUTPATIENT
Start: 2024-01-01 | End: 2024-01-01 | Stop reason: HOSPADM

## 2024-01-01 RX ORDER — AMOXICILLIN 250 MG
2 CAPSULE ORAL 2 TIMES DAILY PRN
Status: DISCONTINUED | OUTPATIENT
Start: 2024-01-01 | End: 2024-01-01 | Stop reason: HOSPADM

## 2024-01-01 RX ORDER — MAGNESIUM SULFATE HEPTAHYDRATE 40 MG/ML
2 INJECTION, SOLUTION INTRAVENOUS ONCE
Status: DISCONTINUED | OUTPATIENT
Start: 2024-01-01 | End: 2024-01-01

## 2024-01-01 RX ORDER — MIRTAZAPINE 7.5 MG/1
30 TABLET, FILM COATED ORAL AT BEDTIME
Qty: 120 TABLET | Refills: 3 | Status: SHIPPED | OUTPATIENT
Start: 2024-01-01 | End: 2024-01-01

## 2024-01-01 RX ORDER — OXYCODONE HYDROCHLORIDE 5 MG/1
5 TABLET ORAL 2 TIMES DAILY PRN
Status: CANCELLED | OUTPATIENT
Start: 2024-01-01

## 2024-01-01 RX ORDER — MULTIPLE VITAMINS W/ MINERALS TAB 9MG-400MCG
1 TAB ORAL DAILY
Status: DISCONTINUED | OUTPATIENT
Start: 2024-01-01 | End: 2024-01-01 | Stop reason: HOSPADM

## 2024-01-01 RX ORDER — POTASSIUM CHLORIDE 20MEQ/15ML
10 LIQUID (ML) ORAL ONCE
Status: COMPLETED | OUTPATIENT
Start: 2024-01-01 | End: 2024-01-01

## 2024-01-01 RX ORDER — FAMOTIDINE 40 MG/1
40 TABLET, FILM COATED ORAL AT BEDTIME
Status: SHIPPED
Start: 2024-01-01 | End: 2024-01-01

## 2024-01-01 RX ORDER — PIPERACILLIN SODIUM, TAZOBACTAM SODIUM 4; .5 G/20ML; G/20ML
4.5 INJECTION, POWDER, LYOPHILIZED, FOR SOLUTION INTRAVENOUS EVERY 6 HOURS
Status: DISCONTINUED | OUTPATIENT
Start: 2024-01-01 | End: 2024-01-01

## 2024-01-01 RX ORDER — MIRTAZAPINE 15 MG/1
15 TABLET, FILM COATED ORAL AT BEDTIME
Status: DISCONTINUED | OUTPATIENT
Start: 2024-01-01 | End: 2024-01-01

## 2024-01-01 RX ORDER — ACETAMINOPHEN 325 MG/1
650 TABLET ORAL EVERY 6 HOURS
Status: CANCELLED | OUTPATIENT
Start: 2024-01-01

## 2024-01-01 RX ORDER — CEFAZOLIN SODIUM 2 G/100ML
2 INJECTION, SOLUTION INTRAVENOUS EVERY 8 HOURS
Status: DISCONTINUED | OUTPATIENT
Start: 2024-01-01 | End: 2024-01-01

## 2024-01-01 RX ORDER — MIRTAZAPINE 7.5 MG/1
15 TABLET, FILM COATED ORAL AT BEDTIME
Status: SHIPPED
Start: 2024-01-01 | End: 2024-01-01

## 2024-01-01 RX ORDER — LISINOPRIL 20 MG/1
20 TABLET ORAL DAILY
Status: DISCONTINUED | OUTPATIENT
Start: 2024-01-01 | End: 2024-01-01 | Stop reason: HOSPADM

## 2024-01-01 RX ORDER — ACETAMINOPHEN 650 MG/1
650 SUPPOSITORY RECTAL EVERY 6 HOURS PRN
Status: DISCONTINUED | OUTPATIENT
Start: 2024-01-01 | End: 2024-01-01 | Stop reason: HOSPADM

## 2024-01-01 RX ORDER — POTASSIUM PHOS IN 0.9 % NACL 15MMOL/250
15 PLASTIC BAG, INJECTION (ML) INTRAVENOUS ONCE
Status: COMPLETED | OUTPATIENT
Start: 2024-01-01 | End: 2024-01-01

## 2024-01-01 RX ORDER — SIMVASTATIN 10 MG
20 TABLET ORAL AT BEDTIME
Status: DISCONTINUED | OUTPATIENT
Start: 2024-01-01 | End: 2024-01-01 | Stop reason: HOSPADM

## 2024-01-01 RX ORDER — PROPOFOL 10 MG/ML
INJECTION, EMULSION INTRAVENOUS PRN
Status: DISCONTINUED | OUTPATIENT
Start: 2024-01-01 | End: 2024-01-01

## 2024-01-01 RX ORDER — LIDOCAINE 4 G/G
1 PATCH TOPICAL EVERY 24 HOURS
COMMUNITY
End: 2024-01-01

## 2024-01-01 RX ORDER — IOPAMIDOL 755 MG/ML
41 INJECTION, SOLUTION INTRAVASCULAR ONCE
Status: COMPLETED | OUTPATIENT
Start: 2024-01-01 | End: 2024-01-01

## 2024-01-01 RX ORDER — OXYCODONE HYDROCHLORIDE 5 MG/1
5 TABLET ORAL 4 TIMES DAILY
Qty: 30 TABLET | Refills: 0 | Status: SHIPPED | OUTPATIENT
Start: 2024-01-01

## 2024-01-01 RX ORDER — BISACODYL 10 MG
10 SUPPOSITORY, RECTAL RECTAL
Status: DISCONTINUED | OUTPATIENT
Start: 2024-07-10 | End: 2024-01-01 | Stop reason: HOSPADM

## 2024-01-01 RX ORDER — OXYCODONE HYDROCHLORIDE 5 MG/1
5 TABLET ORAL EVERY 4 HOURS PRN
Status: DISCONTINUED | OUTPATIENT
Start: 2024-01-01 | End: 2024-01-01

## 2024-01-01 RX ORDER — SENNOSIDES 8.6 MG
8.6 TABLET ORAL 2 TIMES DAILY PRN
Status: DISCONTINUED | OUTPATIENT
Start: 2024-01-01 | End: 2024-01-01 | Stop reason: HOSPADM

## 2024-01-01 RX ORDER — ESCITALOPRAM OXALATE 5 MG/1
5 TABLET ORAL DAILY
Qty: 30 TABLET | Refills: 0 | Status: SHIPPED | OUTPATIENT
Start: 2024-01-01 | End: 2024-01-01

## 2024-01-01 RX ORDER — FOLIC ACID 1 MG/1
1 TABLET ORAL DAILY
Status: DISCONTINUED | OUTPATIENT
Start: 2024-01-01 | End: 2024-01-01

## 2024-01-01 RX ORDER — FENTANYL CITRATE 50 UG/ML
50 INJECTION, SOLUTION INTRAMUSCULAR; INTRAVENOUS EVERY 5 MIN PRN
Status: DISCONTINUED | OUTPATIENT
Start: 2024-01-01 | End: 2024-01-01 | Stop reason: HOSPADM

## 2024-01-01 RX ORDER — OXYCODONE HYDROCHLORIDE 5 MG/1
5 TABLET ORAL 2 TIMES DAILY PRN
Status: DISCONTINUED | OUTPATIENT
Start: 2024-01-01 | End: 2024-01-01 | Stop reason: HOSPADM

## 2024-01-01 RX ORDER — MAGNESIUM OXIDE 400 MG/1
400 TABLET ORAL EVERY 4 HOURS
Status: COMPLETED | OUTPATIENT
Start: 2024-01-01 | End: 2024-01-01

## 2024-01-01 RX ORDER — GABAPENTIN 300 MG/1
300 CAPSULE ORAL EVERY 8 HOURS PRN
Status: DISCONTINUED | OUTPATIENT
Start: 2024-01-01 | End: 2024-01-01 | Stop reason: HOSPADM

## 2024-01-01 RX ORDER — MIRTAZAPINE 7.5 MG/1
7.5 TABLET, FILM COATED ORAL AT BEDTIME
Status: SHIPPED
Start: 2024-01-01 | End: 2024-01-01

## 2024-01-01 RX ORDER — METOPROLOL SUCCINATE 50 MG/1
50 TABLET, EXTENDED RELEASE ORAL DAILY
Status: DISCONTINUED | OUTPATIENT
Start: 2024-01-01 | End: 2024-01-01

## 2024-01-01 RX ORDER — POTASSIUM CHLORIDE 20MEQ/15ML
20 LIQUID (ML) ORAL ONCE
Qty: 15 ML | Refills: 0 | Status: COMPLETED | OUTPATIENT
Start: 2024-01-01 | End: 2024-01-01

## 2024-01-01 RX ORDER — POTASSIUM CHLORIDE 20MEQ/15ML
40 LIQUID (ML) ORAL ONCE
Status: COMPLETED | OUTPATIENT
Start: 2024-01-01 | End: 2024-01-01

## 2024-01-01 RX ORDER — ONDANSETRON 2 MG/ML
4 INJECTION INTRAMUSCULAR; INTRAVENOUS EVERY 30 MIN PRN
Status: DISCONTINUED | OUTPATIENT
Start: 2024-01-01 | End: 2024-01-01 | Stop reason: HOSPADM

## 2024-01-01 RX ORDER — OXYCODONE HYDROCHLORIDE 5 MG/1
5 TABLET ORAL 2 TIMES DAILY PRN
Status: DISCONTINUED | OUTPATIENT
Start: 2024-01-01 | End: 2024-01-01

## 2024-01-01 RX ORDER — HYDROMORPHONE HCL IN WATER/PF 6 MG/30 ML
0.2 PATIENT CONTROLLED ANALGESIA SYRINGE INTRAVENOUS EVERY 5 MIN PRN
Status: DISCONTINUED | OUTPATIENT
Start: 2024-01-01 | End: 2024-01-01 | Stop reason: HOSPADM

## 2024-01-01 RX ORDER — POLYETHYLENE GLYCOL 3350 17 G/17G
17 POWDER, FOR SOLUTION ORAL DAILY
Status: DISCONTINUED | OUTPATIENT
Start: 2024-01-01 | End: 2024-01-01 | Stop reason: HOSPADM

## 2024-01-01 RX ORDER — METOPROLOL SUCCINATE 50 MG/1
50 TABLET, EXTENDED RELEASE ORAL DAILY
Status: DISCONTINUED | OUTPATIENT
Start: 2024-01-01 | End: 2024-01-01 | Stop reason: HOSPADM

## 2024-01-01 RX ORDER — METHOTREXATE 2.5 MG/1
20 TABLET ORAL
Status: DISCONTINUED | OUTPATIENT
Start: 2024-01-01 | End: 2024-01-01 | Stop reason: HOSPADM

## 2024-01-01 RX ORDER — LIDOCAINE HYDROCHLORIDE 10 MG/ML
INJECTION, SOLUTION EPIDURAL; INFILTRATION; INTRACAUDAL; PERINEURAL PRN
Status: DISCONTINUED | OUTPATIENT
Start: 2024-01-01 | End: 2024-01-01

## 2024-01-01 RX ORDER — CEFAZOLIN SODIUM/WATER 2 G/20 ML
2 SYRINGE (ML) INTRAVENOUS SEE ADMIN INSTRUCTIONS
Status: DISCONTINUED | OUTPATIENT
Start: 2024-01-01 | End: 2024-01-01 | Stop reason: HOSPADM

## 2024-01-01 RX ORDER — NALOXONE HYDROCHLORIDE 0.4 MG/ML
0.4 INJECTION, SOLUTION INTRAMUSCULAR; INTRAVENOUS; SUBCUTANEOUS
Status: DISCONTINUED | OUTPATIENT
Start: 2024-01-01 | End: 2024-01-01

## 2024-01-01 RX ORDER — HEPARIN SODIUM 10000 [USP'U]/100ML
0-5000 INJECTION, SOLUTION INTRAVENOUS CONTINUOUS
Status: DISCONTINUED | OUTPATIENT
Start: 2024-01-01 | End: 2024-01-01

## 2024-01-01 RX ORDER — VALACYCLOVIR HYDROCHLORIDE 500 MG/1
1000 TABLET, FILM COATED ORAL EVERY 12 HOURS SCHEDULED
Status: DISCONTINUED | OUTPATIENT
Start: 2024-01-01 | End: 2024-01-01 | Stop reason: HOSPADM

## 2024-01-01 RX ORDER — MORPHINE SULFATE 20 MG/ML
20 SOLUTION ORAL
Status: DISCONTINUED | OUTPATIENT
Start: 2024-01-01 | End: 2024-01-01 | Stop reason: HOSPADM

## 2024-01-01 RX ORDER — ACETAMINOPHEN 325 MG/1
650 TABLET ORAL EVERY 6 HOURS
Status: DISCONTINUED | OUTPATIENT
Start: 2024-01-01 | End: 2024-01-01 | Stop reason: HOSPADM

## 2024-01-01 RX ORDER — PANTOPRAZOLE SODIUM 40 MG/1
40 TABLET, DELAYED RELEASE ORAL
Status: DISCONTINUED | OUTPATIENT
Start: 2024-01-01 | End: 2024-01-01 | Stop reason: HOSPADM

## 2024-01-01 RX ORDER — MIRTAZAPINE 7.5 MG/1
30 TABLET, FILM COATED ORAL AT BEDTIME
Qty: 120 TABLET | Refills: 3 | OUTPATIENT
Start: 2024-01-01

## 2024-01-01 RX ORDER — RESPIRATORY SYNCYTIAL VIRUS VACCINE 120MCG/0.5
0.5 KIT INTRAMUSCULAR ONCE
Qty: 1 EACH | Refills: 0 | Status: CANCELLED | OUTPATIENT
Start: 2024-01-01 | End: 2024-01-01

## 2024-01-01 RX ORDER — ENOXAPARIN SODIUM 100 MG/ML
30 INJECTION SUBCUTANEOUS EVERY 24 HOURS
Status: DISCONTINUED | OUTPATIENT
Start: 2024-01-01 | End: 2024-01-01

## 2024-01-01 RX ORDER — VALACYCLOVIR HYDROCHLORIDE 500 MG/1
1000 TABLET, FILM COATED ORAL EVERY 12 HOURS SCHEDULED
Status: DISCONTINUED | OUTPATIENT
Start: 2024-01-01 | End: 2024-01-01

## 2024-01-01 RX ORDER — LEFLUNOMIDE 20 MG/1
20 TABLET ORAL DAILY
Status: DISCONTINUED | OUTPATIENT
Start: 2024-01-01 | End: 2024-01-01 | Stop reason: HOSPADM

## 2024-01-01 RX ORDER — SENNOSIDES 8.6 MG
1 TABLET ORAL 2 TIMES DAILY PRN
Status: DISCONTINUED | OUTPATIENT
Start: 2024-01-01 | End: 2024-01-01 | Stop reason: HOSPADM

## 2024-01-01 RX ORDER — GABAPENTIN 300 MG/1
300 CAPSULE ORAL 3 TIMES DAILY
Status: DISCONTINUED | OUTPATIENT
Start: 2024-01-01 | End: 2024-01-01

## 2024-01-01 RX ORDER — ALBUTEROL SULFATE 5 MG/ML
2.5 SOLUTION RESPIRATORY (INHALATION) ONCE
Qty: 0.5 ML | Refills: 0 | Status: DISCONTINUED | OUTPATIENT
Start: 2024-01-01 | End: 2024-01-01

## 2024-01-01 RX ORDER — AMLODIPINE BESYLATE 5 MG/1
5 TABLET ORAL DAILY
Qty: 90 TABLET | Refills: 3 | Status: SHIPPED | OUTPATIENT
Start: 2024-01-01

## 2024-01-01 RX ORDER — GABAPENTIN 300 MG/1
300 CAPSULE ORAL 2 TIMES DAILY PRN
Status: DISCONTINUED | OUTPATIENT
Start: 2024-01-01 | End: 2024-01-01 | Stop reason: HOSPADM

## 2024-01-01 RX ORDER — PREDNISONE 5 MG/1
5 TABLET ORAL DAILY
Status: DISCONTINUED | OUTPATIENT
Start: 2024-01-01 | End: 2024-01-01 | Stop reason: HOSPADM

## 2024-01-01 RX ORDER — LEFLUNOMIDE 20 MG/1
20 TABLET ORAL EVERY 24 HOURS
Status: DISCONTINUED | OUTPATIENT
Start: 2024-01-01 | End: 2024-01-01

## 2024-01-01 RX ORDER — MORPHINE SULFATE 20 MG/ML
10 SOLUTION ORAL
Status: DISCONTINUED | OUTPATIENT
Start: 2024-01-01 | End: 2024-01-01 | Stop reason: HOSPADM

## 2024-01-01 RX ORDER — ACETAMINOPHEN 325 MG/1
975 TABLET ORAL EVERY 8 HOURS
Status: DISCONTINUED | OUTPATIENT
Start: 2024-01-01 | End: 2024-01-01 | Stop reason: DRUGHIGH

## 2024-01-01 RX ORDER — ACETAMINOPHEN 325 MG/1
650 TABLET ORAL EVERY 4 HOURS PRN
Status: DISCONTINUED | OUTPATIENT
Start: 2024-01-01 | End: 2024-01-01

## 2024-01-01 RX ORDER — OXYCODONE HYDROCHLORIDE 5 MG/1
5 TABLET ORAL EVERY 8 HOURS PRN
Qty: 20 TABLET | Refills: 0 | Status: SHIPPED | OUTPATIENT
Start: 2024-01-01 | End: 2024-01-01

## 2024-01-01 RX ORDER — ESCITALOPRAM OXALATE 5 MG/1
5 TABLET ORAL DAILY
Status: DISCONTINUED | OUTPATIENT
Start: 2024-01-01 | End: 2024-01-01 | Stop reason: HOSPADM

## 2024-01-01 RX ORDER — POTASSIUM CHLORIDE 1.5 G/1.58G
20 POWDER, FOR SOLUTION ORAL ONCE
Status: COMPLETED | OUTPATIENT
Start: 2024-01-01 | End: 2024-01-01

## 2024-01-01 RX ORDER — OXYCODONE HYDROCHLORIDE 10 MG/1
10 TABLET ORAL
Status: DISCONTINUED | OUTPATIENT
Start: 2024-01-01 | End: 2024-01-01

## 2024-01-01 RX ORDER — DOXYCYCLINE 100 MG/1
100 CAPSULE ORAL 2 TIMES DAILY
Qty: 28 CAPSULE | Refills: 0 | Status: SHIPPED | OUTPATIENT
Start: 2024-01-01 | End: 2024-01-01

## 2024-01-01 RX ORDER — HYDROMORPHONE HYDROCHLORIDE 1 MG/ML
2 SOLUTION ORAL EVERY 4 HOURS PRN
Status: DISCONTINUED | OUTPATIENT
Start: 2024-01-01 | End: 2024-01-01

## 2024-01-01 RX ORDER — FENTANYL CITRATE 50 UG/ML
25 INJECTION, SOLUTION INTRAMUSCULAR; INTRAVENOUS EVERY 5 MIN PRN
Status: DISCONTINUED | OUTPATIENT
Start: 2024-01-01 | End: 2024-01-01 | Stop reason: HOSPADM

## 2024-01-01 RX ORDER — SIMETHICONE 80 MG
160 TABLET,CHEWABLE ORAL 3 TIMES DAILY
Status: DISCONTINUED | OUTPATIENT
Start: 2024-01-01 | End: 2024-01-01

## 2024-01-01 RX ORDER — LABETALOL HYDROCHLORIDE 5 MG/ML
10 INJECTION, SOLUTION INTRAVENOUS
Status: DISCONTINUED | OUTPATIENT
Start: 2024-01-01 | End: 2024-01-01 | Stop reason: HOSPADM

## 2024-01-01 RX ORDER — FENTANYL CITRATE 50 UG/ML
INJECTION, SOLUTION INTRAMUSCULAR; INTRAVENOUS PRN
Status: DISCONTINUED | OUTPATIENT
Start: 2024-01-01 | End: 2024-01-01

## 2024-01-01 RX ORDER — POTASSIUM CHLORIDE 7.45 MG/ML
10 INJECTION INTRAVENOUS
Status: COMPLETED | OUTPATIENT
Start: 2024-01-01 | End: 2024-01-01

## 2024-01-01 RX ORDER — SIMETHICONE 125 MG
125 TABLET,CHEWABLE ORAL 3 TIMES DAILY
Status: DISCONTINUED | OUTPATIENT
Start: 2024-01-01 | End: 2024-01-01

## 2024-01-01 RX ORDER — ONDANSETRON 4 MG/1
4 TABLET, ORALLY DISINTEGRATING ORAL EVERY 30 MIN PRN
Status: DISCONTINUED | OUTPATIENT
Start: 2024-01-01 | End: 2024-01-01 | Stop reason: HOSPADM

## 2024-01-01 RX ORDER — ESCITALOPRAM OXALATE 5 MG/1
5 TABLET ORAL DAILY
Status: DISCONTINUED | OUTPATIENT
Start: 2024-01-01 | End: 2024-01-01

## 2024-01-01 RX ORDER — ENOXAPARIN SODIUM 100 MG/ML
30 INJECTION SUBCUTANEOUS EVERY 24 HOURS
Status: DISCONTINUED | OUTPATIENT
Start: 2024-01-01 | End: 2024-01-01 | Stop reason: ALTCHOICE

## 2024-01-01 RX ORDER — AMLODIPINE BESYLATE 5 MG/1
5 TABLET ORAL DAILY
Status: DISCONTINUED | OUTPATIENT
Start: 2024-01-01 | End: 2024-01-01

## 2024-01-01 RX ORDER — LEFLUNOMIDE 20 MG/1
20 TABLET ORAL EVERY 24 HOURS
Status: DISCONTINUED | OUTPATIENT
Start: 2024-01-01 | End: 2024-01-01 | Stop reason: HOSPADM

## 2024-01-01 RX ORDER — OXYCODONE HYDROCHLORIDE 5 MG/1
5 TABLET ORAL EVERY 6 HOURS PRN
Status: DISCONTINUED | OUTPATIENT
Start: 2024-01-01 | End: 2024-01-01

## 2024-01-01 RX ORDER — PANTOPRAZOLE SODIUM 40 MG/1
40 TABLET, DELAYED RELEASE ORAL
Status: DISCONTINUED | OUTPATIENT
Start: 2024-01-01 | End: 2024-01-01

## 2024-01-01 RX ORDER — AMPICILLIN AND SULBACTAM 2; 1 G/1; G/1
3 INJECTION, POWDER, FOR SOLUTION INTRAMUSCULAR; INTRAVENOUS EVERY 6 HOURS
Qty: 48 G | Refills: 0 | Status: DISCONTINUED | OUTPATIENT
Start: 2024-01-01 | End: 2024-01-01

## 2024-01-01 RX ORDER — GUAIFENESIN 200 MG/10ML
200 LIQUID ORAL EVERY 4 HOURS PRN
Status: DISCONTINUED | OUTPATIENT
Start: 2024-01-01 | End: 2024-01-01

## 2024-01-01 RX ORDER — PIPERACILLIN SODIUM, TAZOBACTAM SODIUM 4; .5 G/20ML; G/20ML
4.5 INJECTION, POWDER, LYOPHILIZED, FOR SOLUTION INTRAVENOUS EVERY 8 HOURS
Status: DISCONTINUED | OUTPATIENT
Start: 2024-01-01 | End: 2024-01-01

## 2024-01-01 RX ORDER — CEFAZOLIN SODIUM/WATER 2 G/20 ML
2 SYRINGE (ML) INTRAVENOUS
Status: COMPLETED | OUTPATIENT
Start: 2024-01-01 | End: 2024-01-01

## 2024-01-01 RX ORDER — ASPIRIN 81 MG/1
81 TABLET ORAL DAILY
Status: DISCONTINUED | OUTPATIENT
Start: 2024-01-01 | End: 2024-01-01 | Stop reason: HOSPADM

## 2024-01-01 RX ORDER — OXYCODONE HYDROCHLORIDE 5 MG/1
5 TABLET ORAL 4 TIMES DAILY
Status: DISCONTINUED | OUTPATIENT
Start: 2024-01-01 | End: 2024-01-01

## 2024-01-01 RX ORDER — ALBUTEROL SULFATE 90 UG/1
AEROSOL, METERED RESPIRATORY (INHALATION) PRN
Status: DISCONTINUED | OUTPATIENT
Start: 2024-01-01 | End: 2024-01-01

## 2024-01-01 RX ORDER — IOPAMIDOL 755 MG/ML
61 INJECTION, SOLUTION INTRAVASCULAR ONCE
Status: COMPLETED | OUTPATIENT
Start: 2024-01-01 | End: 2024-01-01

## 2024-01-01 RX ORDER — AMOXICILLIN 250 MG
1 CAPSULE ORAL 2 TIMES DAILY PRN
Status: DISCONTINUED | OUTPATIENT
Start: 2024-01-01 | End: 2024-01-01 | Stop reason: HOSPADM

## 2024-01-01 RX ORDER — GABAPENTIN 300 MG/1
300 CAPSULE ORAL 3 TIMES DAILY PRN
Status: DISCONTINUED | OUTPATIENT
Start: 2024-01-01 | End: 2024-01-01

## 2024-01-01 RX ORDER — POLYETHYLENE GLYCOL 3350 17 G/17G
17 POWDER, FOR SOLUTION ORAL 2 TIMES DAILY PRN
Status: DISCONTINUED | OUTPATIENT
Start: 2024-01-01 | End: 2024-01-01 | Stop reason: HOSPADM

## 2024-01-01 RX ORDER — SODIUM CHLORIDE 9 MG/ML
INJECTION, SOLUTION INTRAVENOUS CONTINUOUS
Status: DISCONTINUED | OUTPATIENT
Start: 2024-01-01 | End: 2024-01-01

## 2024-01-01 RX ORDER — POTASSIUM CHLORIDE 750 MG/1
40 TABLET, EXTENDED RELEASE ORAL ONCE
Status: COMPLETED | OUTPATIENT
Start: 2024-01-01 | End: 2024-01-01

## 2024-01-01 RX ORDER — CALCIUM CARBONATE 500 MG/1
1000 TABLET, CHEWABLE ORAL 4 TIMES DAILY PRN
Status: DISCONTINUED | OUTPATIENT
Start: 2024-01-01 | End: 2024-01-01 | Stop reason: HOSPADM

## 2024-01-01 RX ORDER — AMLODIPINE BESYLATE 5 MG/1
5 TABLET ORAL DAILY
Qty: 90 TABLET | Refills: 3 | OUTPATIENT
Start: 2024-01-01

## 2024-01-01 RX ORDER — OXYCODONE HYDROCHLORIDE 5 MG/1
5 TABLET ORAL 4 TIMES DAILY
Status: SHIPPED
Start: 2024-01-01 | End: 2024-01-01

## 2024-01-01 RX ORDER — PREDNISONE 5 MG/1
5 TABLET ORAL DAILY
Status: DISCONTINUED | OUTPATIENT
Start: 2024-01-01 | End: 2024-01-01

## 2024-01-01 RX ORDER — ENOXAPARIN SODIUM 100 MG/ML
40 INJECTION SUBCUTANEOUS EVERY 24 HOURS
Status: DISCONTINUED | OUTPATIENT
Start: 2024-01-01 | End: 2024-01-01 | Stop reason: DRUGHIGH

## 2024-01-01 RX ORDER — METOPROLOL SUCCINATE 50 MG/1
50 TABLET, EXTENDED RELEASE ORAL DAILY
Status: CANCELLED | OUTPATIENT
Start: 2024-01-01

## 2024-01-01 RX ORDER — MORPHINE SULFATE 10 MG/5ML
10 SOLUTION ORAL
Status: DISCONTINUED | OUTPATIENT
Start: 2024-01-01 | End: 2024-01-01

## 2024-01-01 RX ORDER — ENOXAPARIN SODIUM 100 MG/ML
30 INJECTION SUBCUTANEOUS EVERY 24 HOURS
Status: DISCONTINUED | OUTPATIENT
Start: 2024-01-01 | End: 2024-01-01 | Stop reason: HOSPADM

## 2024-01-01 RX ORDER — ACETAMINOPHEN 325 MG/10.15ML
975 LIQUID ORAL EVERY 8 HOURS
Status: DISCONTINUED | OUTPATIENT
Start: 2024-01-01 | End: 2024-01-01 | Stop reason: HOSPADM

## 2024-01-01 RX ORDER — NALOXONE HYDROCHLORIDE 0.4 MG/ML
0.1 INJECTION, SOLUTION INTRAMUSCULAR; INTRAVENOUS; SUBCUTANEOUS
Status: DISCONTINUED | OUTPATIENT
Start: 2024-01-01 | End: 2024-01-01 | Stop reason: HOSPADM

## 2024-01-01 RX ORDER — MIRTAZAPINE 15 MG/1
15 TABLET, ORALLY DISINTEGRATING ORAL AT BEDTIME
Status: DISCONTINUED | OUTPATIENT
Start: 2024-01-01 | End: 2024-01-01 | Stop reason: HOSPADM

## 2024-01-01 RX ORDER — FLUOCINOLONE ACETONIDE 0.11 MG/ML
5 OIL AURICULAR (OTIC)
Status: DISCONTINUED | OUTPATIENT
Start: 2024-01-01 | End: 2024-01-01 | Stop reason: HOSPADM

## 2024-01-01 RX ORDER — LISINOPRIL 40 MG/1
40 TABLET ORAL DAILY
Status: DISCONTINUED | OUTPATIENT
Start: 2024-01-01 | End: 2024-01-01 | Stop reason: HOSPADM

## 2024-01-01 RX ORDER — ACETAMINOPHEN 325 MG/1
975 TABLET ORAL ONCE
Status: CANCELLED | OUTPATIENT
Start: 2024-01-01 | End: 2024-01-01

## 2024-01-01 RX ORDER — CARBOXYMETHYLCELLULOSE SODIUM 5 MG/ML
1-2 SOLUTION/ DROPS OPHTHALMIC
Status: DISCONTINUED | OUTPATIENT
Start: 2024-01-01 | End: 2024-01-01 | Stop reason: HOSPADM

## 2024-01-01 RX ORDER — ENOXAPARIN SODIUM 100 MG/ML
40 INJECTION SUBCUTANEOUS EVERY 24 HOURS
Status: DISCONTINUED | OUTPATIENT
Start: 2024-01-01 | End: 2024-01-01

## 2024-01-01 RX ORDER — OXYCODONE HYDROCHLORIDE 5 MG/1
5 TABLET ORAL 2 TIMES DAILY PRN
Qty: 20 TABLET | Refills: 0 | Status: SHIPPED | OUTPATIENT
Start: 2024-01-01 | End: 2024-01-01

## 2024-01-01 RX ORDER — SIMETHICONE 125 MG
125 TABLET,CHEWABLE ORAL 3 TIMES DAILY
Status: SHIPPED
Start: 2024-01-01

## 2024-01-01 RX ORDER — FLUOCINOLONE ACETONIDE 0.11 MG/ML
5 OIL AURICULAR (OTIC)
Qty: 20 ML | Refills: 3 | Status: SHIPPED | OUTPATIENT
Start: 2024-01-01

## 2024-01-01 RX ORDER — OXYCODONE HYDROCHLORIDE 5 MG/1
5-10 TABLET ORAL EVERY 4 HOURS PRN
Status: DISCONTINUED | OUTPATIENT
Start: 2024-01-01 | End: 2024-01-01 | Stop reason: HOSPADM

## 2024-01-01 RX ORDER — SODIUM CHLORIDE, SODIUM LACTATE, POTASSIUM CHLORIDE, CALCIUM CHLORIDE 600; 310; 30; 20 MG/100ML; MG/100ML; MG/100ML; MG/100ML
INJECTION, SOLUTION INTRAVENOUS CONTINUOUS
Status: DISCONTINUED | OUTPATIENT
Start: 2024-01-01 | End: 2024-01-01

## 2024-01-01 RX ORDER — DEXAMETHASONE SODIUM PHOSPHATE 4 MG/ML
INJECTION, SOLUTION INTRA-ARTICULAR; INTRALESIONAL; INTRAMUSCULAR; INTRAVENOUS; SOFT TISSUE PRN
Status: DISCONTINUED | OUTPATIENT
Start: 2024-01-01 | End: 2024-01-01

## 2024-01-01 RX ORDER — HYDROMORPHONE HCL IN WATER/PF 6 MG/30 ML
0.2 PATIENT CONTROLLED ANALGESIA SYRINGE INTRAVENOUS EVERY 6 HOURS PRN
Status: DISCONTINUED | OUTPATIENT
Start: 2024-01-01 | End: 2024-01-01

## 2024-01-01 RX ORDER — SODIUM CHLORIDE, SODIUM LACTATE, POTASSIUM CHLORIDE, CALCIUM CHLORIDE 600; 310; 30; 20 MG/100ML; MG/100ML; MG/100ML; MG/100ML
INJECTION, SOLUTION INTRAVENOUS CONTINUOUS PRN
Status: DISCONTINUED | OUTPATIENT
Start: 2024-01-01 | End: 2024-01-01

## 2024-01-01 RX ORDER — ACETAMINOPHEN 650 MG/1
650 SUPPOSITORY RECTAL EVERY 4 HOURS PRN
Status: DISCONTINUED | OUTPATIENT
Start: 2024-01-01 | End: 2024-01-01

## 2024-01-01 RX ORDER — GABAPENTIN 300 MG/1
300 CAPSULE ORAL 3 TIMES DAILY PRN
Status: DISCONTINUED | OUTPATIENT
Start: 2024-01-01 | End: 2024-01-01 | Stop reason: HOSPADM

## 2024-01-01 RX ORDER — ONDANSETRON 2 MG/ML
4 INJECTION INTRAMUSCULAR; INTRAVENOUS EVERY 6 HOURS PRN
Status: DISCONTINUED | OUTPATIENT
Start: 2024-01-01 | End: 2024-01-01 | Stop reason: HOSPADM

## 2024-01-01 RX ORDER — LISINOPRIL 20 MG/1
20 TABLET ORAL ONCE
Status: COMPLETED | OUTPATIENT
Start: 2024-01-01 | End: 2024-01-01

## 2024-01-01 RX ORDER — GABAPENTIN 300 MG/1
300 CAPSULE ORAL 3 TIMES DAILY PRN
COMMUNITY
End: 2024-01-01

## 2024-01-01 RX ORDER — ONDANSETRON 2 MG/ML
INJECTION INTRAMUSCULAR; INTRAVENOUS PRN
Status: DISCONTINUED | OUTPATIENT
Start: 2024-01-01 | End: 2024-01-01

## 2024-01-01 RX ORDER — ACETAMINOPHEN 325 MG/1
650 TABLET ORAL EVERY 6 HOURS PRN
Status: DISCONTINUED | OUTPATIENT
Start: 2024-01-01 | End: 2024-01-01 | Stop reason: HOSPADM

## 2024-01-01 RX ORDER — FLUOCINOLONE ACETONIDE 0.11 MG/ML
5 OIL AURICULAR (OTIC)
Status: DISCONTINUED | OUTPATIENT
Start: 2024-01-01 | End: 2024-01-01

## 2024-01-01 RX ORDER — NALOXONE HYDROCHLORIDE 0.4 MG/ML
0.2 INJECTION, SOLUTION INTRAMUSCULAR; INTRAVENOUS; SUBCUTANEOUS
Status: DISCONTINUED | OUTPATIENT
Start: 2024-01-01 | End: 2024-01-01

## 2024-01-01 RX ORDER — ACETAMINOPHEN 325 MG/1
650 TABLET ORAL EVERY 6 HOURS
Status: DISCONTINUED | OUTPATIENT
Start: 2024-01-01 | End: 2024-01-01

## 2024-01-01 RX ORDER — LISINOPRIL 20 MG/1
20 TABLET ORAL DAILY
Qty: 90 TABLET | Refills: 0 | Status: SHIPPED | OUTPATIENT
Start: 2024-01-01

## 2024-01-01 RX ORDER — OXYCODONE HYDROCHLORIDE 5 MG/1
5 TABLET ORAL EVERY 4 HOURS PRN
Status: DISCONTINUED | OUTPATIENT
Start: 2024-01-01 | End: 2024-01-01 | Stop reason: HOSPADM

## 2024-01-01 RX ORDER — LIDOCAINE HYDROCHLORIDE 20 MG/ML
INJECTION, SOLUTION INFILTRATION; PERINEURAL PRN
Status: DISCONTINUED | OUTPATIENT
Start: 2024-01-01 | End: 2024-01-01

## 2024-01-01 RX ORDER — ACETAMINOPHEN 325 MG/1
975 TABLET ORAL ONCE
Status: COMPLETED | OUTPATIENT
Start: 2024-01-01 | End: 2024-01-01

## 2024-01-01 RX ORDER — HYDRALAZINE HYDROCHLORIDE 20 MG/ML
2.5-5 INJECTION INTRAMUSCULAR; INTRAVENOUS EVERY 10 MIN PRN
Status: DISCONTINUED | OUTPATIENT
Start: 2024-01-01 | End: 2024-01-01 | Stop reason: HOSPADM

## 2024-01-01 RX ORDER — FAMOTIDINE 20 MG/1
40 TABLET, FILM COATED ORAL 2 TIMES DAILY
Status: DISCONTINUED | OUTPATIENT
Start: 2024-01-01 | End: 2024-01-01 | Stop reason: HOSPADM

## 2024-01-01 RX ORDER — MAGNESIUM OXIDE 400 MG/1
400 TABLET ORAL DAILY
Status: DISCONTINUED | OUTPATIENT
Start: 2024-01-01 | End: 2024-01-01 | Stop reason: HOSPADM

## 2024-01-01 RX ORDER — DEXTROSE MONOHYDRATE, SODIUM CHLORIDE, SODIUM LACTATE, POTASSIUM CHLORIDE, CALCIUM CHLORIDE 5; 600; 310; 179; 20 G/100ML; MG/100ML; MG/100ML; MG/100ML; MG/100ML
INJECTION, SOLUTION INTRAVENOUS CONTINUOUS
Status: DISCONTINUED | OUTPATIENT
Start: 2024-01-01 | End: 2024-01-01

## 2024-01-01 RX ORDER — LEFLUNOMIDE 20 MG/1
20 TABLET ORAL DAILY
Status: CANCELLED | OUTPATIENT
Start: 2024-01-01

## 2024-01-01 RX ORDER — FAMOTIDINE 40 MG/1
40 TABLET, FILM COATED ORAL AT BEDTIME
Qty: 30 TABLET | Refills: 3 | Status: SHIPPED | OUTPATIENT
Start: 2024-01-01

## 2024-01-01 RX ORDER — POLYETHYLENE GLYCOL 3350 17 G/17G
17 POWDER, FOR SOLUTION ORAL DAILY
Qty: 510 G | Refills: 0 | Status: SHIPPED | OUTPATIENT
Start: 2024-01-01

## 2024-01-01 RX ORDER — AMLODIPINE BESYLATE 5 MG/1
5 TABLET ORAL DAILY
Status: CANCELLED | OUTPATIENT
Start: 2024-01-01

## 2024-01-01 RX ORDER — FOLIC ACID 1 MG/1
1 TABLET ORAL DAILY
Status: CANCELLED | OUTPATIENT
Start: 2024-01-01

## 2024-01-01 RX ORDER — MORPHINE SULFATE 20 MG/ML
20 SOLUTION ORAL
Status: DISCONTINUED | OUTPATIENT
Start: 2024-01-01 | End: 2024-01-01

## 2024-01-01 RX ORDER — GABAPENTIN 300 MG/1
300 CAPSULE ORAL AT BEDTIME
Status: DISCONTINUED | OUTPATIENT
Start: 2024-01-01 | End: 2024-01-01 | Stop reason: HOSPADM

## 2024-01-01 RX ORDER — METOPROLOL SUCCINATE 50 MG/1
50 TABLET, EXTENDED RELEASE ORAL DAILY
Qty: 90 TABLET | Refills: 0 | Status: SHIPPED | OUTPATIENT
Start: 2024-01-01

## 2024-01-01 RX ORDER — LISINOPRIL 10 MG/1
10 TABLET ORAL DAILY
Status: DISCONTINUED | OUTPATIENT
Start: 2024-01-01 | End: 2024-01-01

## 2024-01-01 RX ORDER — MIRTAZAPINE 15 MG/1
15 TABLET, FILM COATED ORAL AT BEDTIME
Qty: 30 TABLET | Refills: 4 | Status: SHIPPED | OUTPATIENT
Start: 2024-01-01

## 2024-01-01 RX ORDER — MIRTAZAPINE 7.5 MG/1
30 TABLET, FILM COATED ORAL AT BEDTIME
Status: SHIPPED
Start: 2024-01-01 | End: 2024-01-01

## 2024-01-01 RX ORDER — NALBUPHINE HYDROCHLORIDE 10 MG/ML
2.5-5 INJECTION, SOLUTION INTRAMUSCULAR; INTRAVENOUS; SUBCUTANEOUS EVERY 6 HOURS PRN
Status: DISCONTINUED | OUTPATIENT
Start: 2024-01-01 | End: 2024-01-01 | Stop reason: HOSPADM

## 2024-01-01 RX ORDER — OXYCODONE HYDROCHLORIDE 5 MG/1
5 TABLET ORAL EVERY 6 HOURS PRN
Qty: 12 TABLET | Refills: 0 | Status: SHIPPED | OUTPATIENT
Start: 2024-01-01 | End: 2024-01-01

## 2024-01-01 RX ADMIN — PREDNISONE 5 MG: 5 TABLET ORAL at 09:40

## 2024-01-01 RX ADMIN — AMLODIPINE BESYLATE 5 MG: 5 TABLET ORAL at 07:56

## 2024-01-01 RX ADMIN — ACETAMINOPHEN 650 MG: 325 TABLET, FILM COATED ORAL at 08:23

## 2024-01-01 RX ADMIN — Medication 5 ML: at 16:21

## 2024-01-01 RX ADMIN — HYDROMORPHONE HYDROCHLORIDE 0.2 MG: 0.2 INJECTION, SOLUTION INTRAMUSCULAR; INTRAVENOUS; SUBCUTANEOUS at 18:17

## 2024-01-01 RX ADMIN — ACETAMINOPHEN 650 MG: 325 TABLET, FILM COATED ORAL at 16:49

## 2024-01-01 RX ADMIN — APIXABAN 2.5 MG: 2.5 TABLET, FILM COATED ORAL at 20:07

## 2024-01-01 RX ADMIN — VALACYCLOVIR HYDROCHLORIDE 1000 MG: 500 TABLET, FILM COATED ORAL at 20:15

## 2024-01-01 RX ADMIN — ACETAMINOPHEN 650 MG: 325 TABLET, FILM COATED ORAL at 15:54

## 2024-01-01 RX ADMIN — PANTOPRAZOLE SODIUM 40 MG: 40 INJECTION, POWDER, FOR SOLUTION INTRAVENOUS at 20:31

## 2024-01-01 RX ADMIN — GABAPENTIN 300 MG: 300 CAPSULE ORAL at 22:24

## 2024-01-01 RX ADMIN — SODIUM CHLORIDE, POTASSIUM CHLORIDE, SODIUM LACTATE AND CALCIUM CHLORIDE: 600; 310; 30; 20 INJECTION, SOLUTION INTRAVENOUS at 13:35

## 2024-01-01 RX ADMIN — HYDROMORPHONE HYDROCHLORIDE 0.4 MG: 0.2 INJECTION, SOLUTION INTRAMUSCULAR; INTRAVENOUS; SUBCUTANEOUS at 18:34

## 2024-01-01 RX ADMIN — HYDROMORPHONE HYDROCHLORIDE 0.2 MG: 0.2 INJECTION, SOLUTION INTRAMUSCULAR; INTRAVENOUS; SUBCUTANEOUS at 20:47

## 2024-01-01 RX ADMIN — CEFAZOLIN SODIUM 2 G: 2 INJECTION, SOLUTION INTRAVENOUS at 01:35

## 2024-01-01 RX ADMIN — APIXABAN 2.5 MG: 2.5 TABLET, FILM COATED ORAL at 08:31

## 2024-01-01 RX ADMIN — Medication 1 DROP: at 05:07

## 2024-01-01 RX ADMIN — ACETAMINOPHEN 650 MG: 325 TABLET, FILM COATED ORAL at 09:45

## 2024-01-01 RX ADMIN — LIDOCAINE HYDROCHLORIDE 2 MG: 10 INJECTION, SOLUTION EPIDURAL; INFILTRATION; INTRACAUDAL; PERINEURAL at 13:59

## 2024-01-01 RX ADMIN — GABAPENTIN 300 MG: 300 CAPSULE ORAL at 21:17

## 2024-01-01 RX ADMIN — OXYCODONE HYDROCHLORIDE 5 MG: 5 TABLET ORAL at 21:33

## 2024-01-01 RX ADMIN — METOPROLOL SUCCINATE 50 MG: 50 TABLET, EXTENDED RELEASE ORAL at 09:55

## 2024-01-01 RX ADMIN — POTASSIUM & SODIUM PHOSPHATES POWDER PACK 280-160-250 MG 1 PACKET: 280-160-250 PACK at 20:48

## 2024-01-01 RX ADMIN — VALACYCLOVIR HYDROCHLORIDE 1000 MG: 500 TABLET, FILM COATED ORAL at 08:34

## 2024-01-01 RX ADMIN — AMLODIPINE BESYLATE 5 MG: 5 TABLET ORAL at 08:23

## 2024-01-01 RX ADMIN — Medication 10 MG: at 15:12

## 2024-01-01 RX ADMIN — POTASSIUM PHOSPHATE, MONOBASIC AND POTASSIUM PHOSPHATE, DIBASIC 9 MMOL: 224; 236 INJECTION, SOLUTION, CONCENTRATE INTRAVENOUS at 11:10

## 2024-01-01 RX ADMIN — SIMETHICONE 160 MG: 80 TABLET, CHEWABLE ORAL at 08:54

## 2024-01-01 RX ADMIN — METHOTREXATE 20 MG: 2.5 TABLET ORAL at 09:41

## 2024-01-01 RX ADMIN — ACETAMINOPHEN 650 MG: 325 TABLET, FILM COATED ORAL at 04:39

## 2024-01-01 RX ADMIN — ACETAMINOPHEN 650 MG: 325 TABLET, FILM COATED ORAL at 20:45

## 2024-01-01 RX ADMIN — ACETAMINOPHEN 650 MG: 325 TABLET, FILM COATED ORAL at 09:10

## 2024-01-01 RX ADMIN — PREDNISONE 5 MG: 5 TABLET ORAL at 09:55

## 2024-01-01 RX ADMIN — ASPIRIN 81 MG: 81 TABLET ORAL at 08:59

## 2024-01-01 RX ADMIN — ACETAMINOPHEN 650 MG: 325 TABLET, FILM COATED ORAL at 22:11

## 2024-01-01 RX ADMIN — ACETAMINOPHEN 650 MG: 325 TABLET, FILM COATED ORAL at 09:09

## 2024-01-01 RX ADMIN — MAGNESIUM SULFATE HEPTAHYDRATE 2 G: 2 INJECTION, SOLUTION INTRAVENOUS at 09:09

## 2024-01-01 RX ADMIN — Medication 1 TABLET: at 10:07

## 2024-01-01 RX ADMIN — SODIUM CHLORIDE, POTASSIUM CHLORIDE, SODIUM LACTATE AND CALCIUM CHLORIDE 500 ML: 600; 310; 30; 20 INJECTION, SOLUTION INTRAVENOUS at 20:58

## 2024-01-01 RX ADMIN — PANTOPRAZOLE SODIUM 40 MG: 40 TABLET, DELAYED RELEASE ORAL at 09:53

## 2024-01-01 RX ADMIN — FOLIC ACID 1 MG: 1 TABLET ORAL at 08:22

## 2024-01-01 RX ADMIN — Medication 1 MG: at 21:01

## 2024-01-01 RX ADMIN — LEFLUNOMIDE 20 MG: 20 TABLET ORAL at 09:38

## 2024-01-01 RX ADMIN — ACETAMINOPHEN 650 MG: 325 TABLET, FILM COATED ORAL at 21:46

## 2024-01-01 RX ADMIN — SIMVASTATIN 20 MG: 10 TABLET, FILM COATED ORAL at 21:07

## 2024-01-01 RX ADMIN — PIPERACILLIN AND TAZOBACTAM 4.5 G: 4; .5 INJECTION, POWDER, LYOPHILIZED, FOR SOLUTION INTRAVENOUS at 12:37

## 2024-01-01 RX ADMIN — ACETAMINOPHEN 650 MG: 325 TABLET, FILM COATED ORAL at 22:20

## 2024-01-01 RX ADMIN — OXYCODONE HYDROCHLORIDE 5 MG: 5 TABLET ORAL at 16:15

## 2024-01-01 RX ADMIN — SIMETHICONE 160 MG: 80 TABLET, CHEWABLE ORAL at 20:45

## 2024-01-01 RX ADMIN — PANTOPRAZOLE SODIUM 40 MG: 40 TABLET, DELAYED RELEASE ORAL at 15:58

## 2024-01-01 RX ADMIN — POTASSIUM CHLORIDE 20 MEQ: 1.5 POWDER, FOR SOLUTION ORAL at 12:56

## 2024-01-01 RX ADMIN — METOPROLOL SUCCINATE 50 MG: 50 TABLET, EXTENDED RELEASE ORAL at 08:30

## 2024-01-01 RX ADMIN — FENTANYL CITRATE 25 MCG: 50 INJECTION INTRAMUSCULAR; INTRAVENOUS at 16:30

## 2024-01-01 RX ADMIN — LEFLUNOMIDE 20 MG: 20 TABLET ORAL at 08:23

## 2024-01-01 RX ADMIN — MAGNESIUM OXIDE TAB 400 MG (241.3 MG ELEMENTAL MG) 400 MG: 400 (241.3 MG) TAB at 08:16

## 2024-01-01 RX ADMIN — ACETAMINOPHEN 650 MG: 325 TABLET, FILM COATED ORAL at 18:08

## 2024-01-01 RX ADMIN — LISINOPRIL 20 MG: 20 TABLET ORAL at 08:22

## 2024-01-01 RX ADMIN — LISINOPRIL 20 MG: 20 TABLET ORAL at 16:49

## 2024-01-01 RX ADMIN — LEFLUNOMIDE 20 MG: 20 TABLET ORAL at 09:56

## 2024-01-01 RX ADMIN — FAMOTIDINE 40 MG: 20 TABLET ORAL at 22:00

## 2024-01-01 RX ADMIN — ACETAMINOPHEN 650 MG: 325 TABLET, FILM COATED ORAL at 09:54

## 2024-01-01 RX ADMIN — ACETAMINOPHEN 650 MG: 325 TABLET, FILM COATED ORAL at 16:34

## 2024-01-01 RX ADMIN — POTASSIUM & SODIUM PHOSPHATES POWDER PACK 280-160-250 MG 1 PACKET: 280-160-250 PACK at 23:45

## 2024-01-01 RX ADMIN — FOLIC ACID 1 MG: 1 TABLET ORAL at 08:31

## 2024-01-01 RX ADMIN — ACETAMINOPHEN 650 MG: 325 TABLET, FILM COATED ORAL at 06:02

## 2024-01-01 RX ADMIN — GABAPENTIN 300 MG: 300 CAPSULE ORAL at 22:53

## 2024-01-01 RX ADMIN — LEFLUNOMIDE 20 MG: 20 TABLET ORAL at 09:54

## 2024-01-01 RX ADMIN — POTASSIUM & SODIUM PHOSPHATES POWDER PACK 280-160-250 MG 2 PACKET: 280-160-250 PACK at 03:08

## 2024-01-01 RX ADMIN — AMLODIPINE BESYLATE 5 MG: 5 TABLET ORAL at 10:07

## 2024-01-01 RX ADMIN — LISINOPRIL 40 MG: 40 TABLET ORAL at 08:14

## 2024-01-01 RX ADMIN — OXYCODONE HYDROCHLORIDE 5 MG: 5 TABLET ORAL at 12:55

## 2024-01-01 RX ADMIN — APIXABAN 2.5 MG: 2.5 TABLET, FILM COATED ORAL at 09:38

## 2024-01-01 RX ADMIN — SIMVASTATIN 20 MG: 20 TABLET, FILM COATED ORAL at 19:50

## 2024-01-01 RX ADMIN — ACETAMINOPHEN 650 MG: 325 TABLET, FILM COATED ORAL at 15:51

## 2024-01-01 RX ADMIN — OXYCODONE HYDROCHLORIDE 5 MG: 5 TABLET ORAL at 20:06

## 2024-01-01 RX ADMIN — SUGAMMADEX 100 MG: 100 INJECTION, SOLUTION INTRAVENOUS at 16:06

## 2024-01-01 RX ADMIN — PANTOPRAZOLE SODIUM 40 MG: 40 TABLET, DELAYED RELEASE ORAL at 08:59

## 2024-01-01 RX ADMIN — ASPIRIN 81 MG: 81 TABLET ORAL at 08:30

## 2024-01-01 RX ADMIN — POTASSIUM PHOSPHATE, MONOBASIC AND POTASSIUM PHOSPHATE, DIBASIC 9 MMOL: 224; 236 INJECTION, SOLUTION, CONCENTRATE INTRAVENOUS at 13:34

## 2024-01-01 RX ADMIN — LISINOPRIL 20 MG: 20 TABLET ORAL at 08:56

## 2024-01-01 RX ADMIN — PREDNISONE 5 MG: 5 TABLET ORAL at 09:38

## 2024-01-01 RX ADMIN — AMLODIPINE BESYLATE 5 MG: 5 TABLET ORAL at 09:00

## 2024-01-01 RX ADMIN — LISINOPRIL 20 MG: 20 TABLET ORAL at 08:42

## 2024-01-01 RX ADMIN — AMPICILLIN SODIUM AND SULBACTAM SODIUM 3 G: 2; 1 INJECTION, POWDER, FOR SOLUTION INTRAMUSCULAR; INTRAVENOUS at 08:58

## 2024-01-01 RX ADMIN — CEFAZOLIN SODIUM 2 G: 2 INJECTION, SOLUTION INTRAVENOUS at 16:48

## 2024-01-01 RX ADMIN — SIMVASTATIN 20 MG: 20 TABLET, FILM COATED ORAL at 21:01

## 2024-01-01 RX ADMIN — ENOXAPARIN SODIUM 30 MG: 30 INJECTION SUBCUTANEOUS at 17:47

## 2024-01-01 RX ADMIN — MIRTAZAPINE 15 MG: 15 TABLET, FILM COATED ORAL at 22:00

## 2024-01-01 RX ADMIN — LEFLUNOMIDE 20 MG: 20 TABLET ORAL at 08:24

## 2024-01-01 RX ADMIN — GABAPENTIN 300 MG: 300 CAPSULE ORAL at 19:29

## 2024-01-01 RX ADMIN — GABAPENTIN 300 MG: 300 CAPSULE ORAL at 22:21

## 2024-01-01 RX ADMIN — FOLIC ACID 1 MG: 1 TABLET ORAL at 08:57

## 2024-01-01 RX ADMIN — POTASSIUM PHOSPHATE, MONOBASIC AND POTASSIUM PHOSPHATE, DIBASIC 15 MMOL: 224; 236 INJECTION, SOLUTION, CONCENTRATE INTRAVENOUS at 12:57

## 2024-01-01 RX ADMIN — FENTANYL CITRATE 25 MCG: 50 INJECTION INTRAMUSCULAR; INTRAVENOUS at 16:35

## 2024-01-01 RX ADMIN — PANTOPRAZOLE SODIUM 40 MG: 40 TABLET, DELAYED RELEASE ORAL at 08:16

## 2024-01-01 RX ADMIN — METOPROLOL SUCCINATE 50 MG: 50 TABLET, EXTENDED RELEASE ORAL at 10:02

## 2024-01-01 RX ADMIN — PANTOPRAZOLE SODIUM 40 MG: 40 TABLET, DELAYED RELEASE ORAL at 16:49

## 2024-01-01 RX ADMIN — OXYCODONE HYDROCHLORIDE 5 MG: 5 TABLET ORAL at 13:00

## 2024-01-01 RX ADMIN — ACETAMINOPHEN 650 MG: 325 TABLET, FILM COATED ORAL at 11:29

## 2024-01-01 RX ADMIN — SIMVASTATIN 20 MG: 20 TABLET, FILM COATED ORAL at 23:09

## 2024-01-01 RX ADMIN — ACETAMINOPHEN 650 MG: 325 TABLET, FILM COATED ORAL at 04:29

## 2024-01-01 RX ADMIN — Medication 1 MG: at 23:09

## 2024-01-01 RX ADMIN — LISINOPRIL 20 MG: 20 TABLET ORAL at 08:23

## 2024-01-01 RX ADMIN — FOLIC ACID 1 MG: 1 TABLET ORAL at 08:30

## 2024-01-01 RX ADMIN — HYDROMORPHONE HYDROCHLORIDE 0.3 MG: 1 INJECTION, SOLUTION INTRAMUSCULAR; INTRAVENOUS; SUBCUTANEOUS at 21:46

## 2024-01-01 RX ADMIN — OXYCODONE HYDROCHLORIDE 5 MG: 5 TABLET ORAL at 21:07

## 2024-01-01 RX ADMIN — Medication 10 MG: at 14:40

## 2024-01-01 RX ADMIN — MAGNESIUM SULFATE HEPTAHYDRATE 2 G: 2 INJECTION, SOLUTION INTRAVENOUS at 14:24

## 2024-01-01 RX ADMIN — FAMOTIDINE 40 MG: 20 TABLET ORAL at 08:35

## 2024-01-01 RX ADMIN — APIXABAN 2.5 MG: 2.5 TABLET, FILM COATED ORAL at 20:45

## 2024-01-01 RX ADMIN — AMPICILLIN SODIUM AND SULBACTAM SODIUM 3 G: 2; 1 INJECTION, POWDER, FOR SOLUTION INTRAMUSCULAR; INTRAVENOUS at 20:20

## 2024-01-01 RX ADMIN — FOLIC ACID 1 MG: 1 TABLET ORAL at 10:02

## 2024-01-01 RX ADMIN — FOLIC ACID 1 MG: 1 TABLET ORAL at 08:59

## 2024-01-01 RX ADMIN — LEFLUNOMIDE 20 MG: 20 TABLET ORAL at 12:55

## 2024-01-01 RX ADMIN — AMOXICILLIN AND CLAVULANATE POTASSIUM 1 TABLET: 875; 125 TABLET, FILM COATED ORAL at 10:26

## 2024-01-01 RX ADMIN — ACETAMINOPHEN 650 MG: 325 TABLET, FILM COATED ORAL at 22:51

## 2024-01-01 RX ADMIN — LISINOPRIL 20 MG: 20 TABLET ORAL at 10:26

## 2024-01-01 RX ADMIN — MAGNESIUM SULFATE HEPTAHYDRATE 2 G: 2 INJECTION, SOLUTION INTRAVENOUS at 11:48

## 2024-01-01 RX ADMIN — POTASSIUM CHLORIDE 40 MEQ: 750 TABLET, EXTENDED RELEASE ORAL at 22:05

## 2024-01-01 RX ADMIN — LISINOPRIL 20 MG: 20 TABLET ORAL at 08:30

## 2024-01-01 RX ADMIN — ENOXAPARIN SODIUM 30 MG: 30 INJECTION SUBCUTANEOUS at 09:56

## 2024-01-01 RX ADMIN — VALACYCLOVIR HYDROCHLORIDE 1000 MG: 500 TABLET, FILM COATED ORAL at 19:57

## 2024-01-01 RX ADMIN — ACETAMINOPHEN 650 MG: 325 TABLET, FILM COATED ORAL at 20:11

## 2024-01-01 RX ADMIN — IOPAMIDOL 41 ML: 755 INJECTION, SOLUTION INTRAVENOUS at 10:08

## 2024-01-01 RX ADMIN — MAGNESIUM SULFATE HEPTAHYDRATE 2 G: 2 INJECTION, SOLUTION INTRAVENOUS at 22:50

## 2024-01-01 RX ADMIN — PANTOPRAZOLE SODIUM 40 MG: 40 INJECTION, POWDER, FOR SOLUTION INTRAVENOUS at 19:49

## 2024-01-01 RX ADMIN — APIXABAN 5 MG: 5 TABLET, FILM COATED ORAL at 08:16

## 2024-01-01 RX ADMIN — ACETAMINOPHEN 650 MG: 325 TABLET, FILM COATED ORAL at 06:34

## 2024-01-01 RX ADMIN — ACETAMINOPHEN 650 MG: 325 TABLET, FILM COATED ORAL at 04:11

## 2024-01-01 RX ADMIN — OXYCODONE HYDROCHLORIDE 5 MG: 5 TABLET ORAL at 02:29

## 2024-01-01 RX ADMIN — PREDNISONE 5 MG: 5 TABLET ORAL at 09:41

## 2024-01-01 RX ADMIN — METOPROLOL SUCCINATE 50 MG: 50 TABLET, EXTENDED RELEASE ORAL at 10:07

## 2024-01-01 RX ADMIN — FOLIC ACID 1 MG: 1 TABLET ORAL at 09:09

## 2024-01-01 RX ADMIN — SIMVASTATIN 20 MG: 10 TABLET, FILM COATED ORAL at 21:36

## 2024-01-01 RX ADMIN — POTASSIUM CHLORIDE 20 MEQ: 750 TABLET, EXTENDED RELEASE ORAL at 10:02

## 2024-01-01 RX ADMIN — APIXABAN 2.5 MG: 2.5 TABLET, FILM COATED ORAL at 20:31

## 2024-01-01 RX ADMIN — LEFLUNOMIDE 20 MG: 20 TABLET ORAL at 10:26

## 2024-01-01 RX ADMIN — SIMVASTATIN 20 MG: 20 TABLET, FILM COATED ORAL at 23:30

## 2024-01-01 RX ADMIN — CEFAZOLIN SODIUM 2 G: 2 INJECTION, SOLUTION INTRAVENOUS at 16:57

## 2024-01-01 RX ADMIN — MAGNESIUM SULFATE HEPTAHYDRATE 2 G: 2 INJECTION, SOLUTION INTRAVENOUS at 12:08

## 2024-01-01 RX ADMIN — POTASSIUM & SODIUM PHOSPHATES POWDER PACK 280-160-250 MG 1 PACKET: 280-160-250 PACK at 12:11

## 2024-01-01 RX ADMIN — OXYCODONE HYDROCHLORIDE 5 MG: 5 TABLET ORAL at 08:57

## 2024-01-01 RX ADMIN — ACETAMINOPHEN 650 MG: 325 TABLET, FILM COATED ORAL at 21:45

## 2024-01-01 RX ADMIN — AMLODIPINE BESYLATE 5 MG: 5 TABLET ORAL at 11:10

## 2024-01-01 RX ADMIN — ACETAMINOPHEN 975 MG: 325 TABLET, FILM COATED ORAL at 12:59

## 2024-01-01 RX ADMIN — POTASSIUM & SODIUM PHOSPHATES POWDER PACK 280-160-250 MG 1 PACKET: 280-160-250 PACK at 16:01

## 2024-01-01 RX ADMIN — ACETAMINOPHEN 650 MG: 325 TABLET, FILM COATED ORAL at 15:48

## 2024-01-01 RX ADMIN — PANTOPRAZOLE SODIUM 40 MG: 40 TABLET, DELAYED RELEASE ORAL at 09:56

## 2024-01-01 RX ADMIN — PANTOPRAZOLE SODIUM 40 MG: 40 TABLET, DELAYED RELEASE ORAL at 09:41

## 2024-01-01 RX ADMIN — ACETAMINOPHEN 650 MG: 325 TABLET, FILM COATED ORAL at 16:38

## 2024-01-01 RX ADMIN — MAGNESIUM SULFATE HEPTAHYDRATE 2 G: 2 INJECTION, SOLUTION INTRAVENOUS at 16:49

## 2024-01-01 RX ADMIN — OXYCODONE HYDROCHLORIDE 10 MG: 5 TABLET ORAL at 20:36

## 2024-01-01 RX ADMIN — APIXABAN 5 MG: 5 TABLET, FILM COATED ORAL at 20:03

## 2024-01-01 RX ADMIN — ACETAMINOPHEN 650 MG: 325 TABLET, FILM COATED ORAL at 21:01

## 2024-01-01 RX ADMIN — HYDROMORPHONE HYDROCHLORIDE 0.2 MG: 0.2 INJECTION, SOLUTION INTRAMUSCULAR; INTRAVENOUS; SUBCUTANEOUS at 17:57

## 2024-01-01 RX ADMIN — Medication 1 TABLET: at 08:30

## 2024-01-01 RX ADMIN — Medication 1 TABLET: at 08:56

## 2024-01-01 RX ADMIN — AMOXICILLIN AND CLAVULANATE POTASSIUM 1 TABLET: 875; 125 TABLET, FILM COATED ORAL at 09:41

## 2024-01-01 RX ADMIN — ACETAMINOPHEN 650 MG: 325 TABLET, FILM COATED ORAL at 08:59

## 2024-01-01 RX ADMIN — ACETAMINOPHEN 650 MG: 325 TABLET, FILM COATED ORAL at 09:38

## 2024-01-01 RX ADMIN — APIXABAN 2.5 MG: 2.5 TABLET, FILM COATED ORAL at 10:02

## 2024-01-01 RX ADMIN — METOPROLOL SUCCINATE 50 MG: 50 TABLET, EXTENDED RELEASE ORAL at 08:56

## 2024-01-01 RX ADMIN — ACETAMINOPHEN 650 MG: 325 TABLET, FILM COATED ORAL at 10:07

## 2024-01-01 RX ADMIN — ESCITALOPRAM 5 MG: 5 TABLET, FILM COATED ORAL at 18:03

## 2024-01-01 RX ADMIN — ACETAMINOPHEN 650 MG: 325 TABLET, FILM COATED ORAL at 03:19

## 2024-01-01 RX ADMIN — PREDNISONE 5 MG: 5 TABLET ORAL at 07:56

## 2024-01-01 RX ADMIN — FENTANYL CITRATE 50 MCG: 50 INJECTION INTRAMUSCULAR; INTRAVENOUS at 14:16

## 2024-01-01 RX ADMIN — METOPROLOL SUCCINATE 50 MG: 50 TABLET, EXTENDED RELEASE ORAL at 07:56

## 2024-01-01 RX ADMIN — MAGNESIUM SULFATE HEPTAHYDRATE 2 G: 2 INJECTION, SOLUTION INTRAVENOUS at 04:45

## 2024-01-01 RX ADMIN — CEFAZOLIN SODIUM 2 G: 2 INJECTION, SOLUTION INTRAVENOUS at 10:16

## 2024-01-01 RX ADMIN — PANTOPRAZOLE SODIUM 40 MG: 40 INJECTION, POWDER, FOR SOLUTION INTRAVENOUS at 20:58

## 2024-01-01 RX ADMIN — OXYCODONE HYDROCHLORIDE 5 MG: 5 TABLET ORAL at 08:25

## 2024-01-01 RX ADMIN — MORPHINE SULFATE 10 MG: 10 SOLUTION ORAL at 05:07

## 2024-01-01 RX ADMIN — LIDOCAINE HYDROCHLORIDE 3 MG: 10 INJECTION, SOLUTION EPIDURAL; INFILTRATION; INTRACAUDAL; PERINEURAL at 14:02

## 2024-01-01 RX ADMIN — FOLIC ACID 1 MG: 1 TABLET ORAL at 09:00

## 2024-01-01 RX ADMIN — ESCITALOPRAM 5 MG: 5 TABLET, FILM COATED ORAL at 09:55

## 2024-01-01 RX ADMIN — FAMOTIDINE 40 MG: 20 TABLET ORAL at 20:03

## 2024-01-01 RX ADMIN — OXYCODONE HYDROCHLORIDE 10 MG: 5 TABLET ORAL at 19:05

## 2024-01-01 RX ADMIN — PANTOPRAZOLE SODIUM 40 MG: 40 TABLET, DELAYED RELEASE ORAL at 08:34

## 2024-01-01 RX ADMIN — PANTOPRAZOLE SODIUM 40 MG: 40 TABLET, DELAYED RELEASE ORAL at 08:31

## 2024-01-01 RX ADMIN — LISINOPRIL 20 MG: 20 TABLET ORAL at 10:06

## 2024-01-01 RX ADMIN — OXYCODONE HYDROCHLORIDE 10 MG: 5 TABLET ORAL at 11:41

## 2024-01-01 RX ADMIN — AMPICILLIN SODIUM AND SULBACTAM SODIUM 3 G: 2; 1 INJECTION, POWDER, FOR SOLUTION INTRAMUSCULAR; INTRAVENOUS at 13:31

## 2024-01-01 RX ADMIN — SIMETHICONE 160 MG: 80 TABLET, CHEWABLE ORAL at 08:23

## 2024-01-01 RX ADMIN — PANTOPRAZOLE SODIUM 40 MG: 40 TABLET, DELAYED RELEASE ORAL at 16:21

## 2024-01-01 RX ADMIN — APIXABAN 5 MG: 5 TABLET, FILM COATED ORAL at 08:31

## 2024-01-01 RX ADMIN — GABAPENTIN 300 MG: 300 CAPSULE ORAL at 13:53

## 2024-01-01 RX ADMIN — GABAPENTIN 300 MG: 300 CAPSULE ORAL at 07:56

## 2024-01-01 RX ADMIN — OXYCODONE HYDROCHLORIDE 5 MG: 5 TABLET ORAL at 09:09

## 2024-01-01 RX ADMIN — LISINOPRIL 40 MG: 40 TABLET ORAL at 09:09

## 2024-01-01 RX ADMIN — LISINOPRIL 40 MG: 40 TABLET ORAL at 09:41

## 2024-01-01 RX ADMIN — HYDROMORPHONE HYDROCHLORIDE 0.2 MG: 0.2 INJECTION, SOLUTION INTRAMUSCULAR; INTRAVENOUS; SUBCUTANEOUS at 18:08

## 2024-01-01 RX ADMIN — APIXABAN 5 MG: 5 TABLET, FILM COATED ORAL at 09:09

## 2024-01-01 RX ADMIN — MAGNESIUM SULFATE HEPTAHYDRATE 2 G: 2 INJECTION, SOLUTION INTRAVENOUS at 22:05

## 2024-01-01 RX ADMIN — SODIUM CHLORIDE 1000 ML: 9 INJECTION, SOLUTION INTRAVENOUS at 14:40

## 2024-01-01 RX ADMIN — APIXABAN 5 MG: 5 TABLET, FILM COATED ORAL at 09:41

## 2024-01-01 RX ADMIN — POTASSIUM & SODIUM PHOSPHATES POWDER PACK 280-160-250 MG 2 PACKET: 280-160-250 PACK at 00:12

## 2024-01-01 RX ADMIN — OXYCODONE HYDROCHLORIDE 5 MG: 5 TABLET ORAL at 13:20

## 2024-01-01 RX ADMIN — POTASSIUM CHLORIDE 10 MEQ: 7.46 INJECTION, SOLUTION INTRAVENOUS at 00:19

## 2024-01-01 RX ADMIN — LEFLUNOMIDE 20 MG: 20 TABLET ORAL at 08:37

## 2024-01-01 RX ADMIN — POTASSIUM PHOSPHATE, MONOBASIC AND POTASSIUM PHOSPHATE, DIBASIC 9 MMOL: 224; 236 INJECTION, SOLUTION, CONCENTRATE INTRAVENOUS at 13:14

## 2024-01-01 RX ADMIN — AMLODIPINE BESYLATE 5 MG: 5 TABLET ORAL at 08:55

## 2024-01-01 RX ADMIN — LISINOPRIL 40 MG: 40 TABLET ORAL at 09:56

## 2024-01-01 RX ADMIN — FOLIC ACID 1 MG: 1 TABLET ORAL at 09:40

## 2024-01-01 RX ADMIN — APIXABAN 2.5 MG: 2.5 TABLET, FILM COATED ORAL at 20:39

## 2024-01-01 RX ADMIN — VALACYCLOVIR HYDROCHLORIDE 1000 MG: 500 TABLET, FILM COATED ORAL at 08:31

## 2024-01-01 RX ADMIN — ALBUTEROL SULFATE 6 PUFF: 108 INHALANT RESPIRATORY (INHALATION) at 14:54

## 2024-01-01 RX ADMIN — LEFLUNOMIDE 20 MG: 20 TABLET ORAL at 08:56

## 2024-01-01 RX ADMIN — OXYCODONE HYDROCHLORIDE 5 MG: 5 TABLET ORAL at 07:53

## 2024-01-01 RX ADMIN — PREDNISONE 5 MG: 5 TABLET ORAL at 08:30

## 2024-01-01 RX ADMIN — MAGNESIUM OXIDE TAB 400 MG (241.3 MG ELEMENTAL MG) 400 MG: 400 (241.3 MG) TAB at 08:55

## 2024-01-01 RX ADMIN — ASPIRIN 81 MG: 81 TABLET ORAL at 08:29

## 2024-01-01 RX ADMIN — METHOTREXATE 20 MG: 2.5 TABLET ORAL at 10:52

## 2024-01-01 RX ADMIN — FOLIC ACID 1 MG: 1 TABLET ORAL at 09:53

## 2024-01-01 RX ADMIN — FOLIC ACID 1 MG: 1 TABLET ORAL at 10:07

## 2024-01-01 RX ADMIN — ACETAMINOPHEN 650 MG: 325 TABLET, FILM COATED ORAL at 03:43

## 2024-01-01 RX ADMIN — FENTANYL CITRATE 50 MCG: 50 INJECTION INTRAMUSCULAR; INTRAVENOUS at 15:00

## 2024-01-01 RX ADMIN — SIMETHICONE 160 MG: 80 TABLET, CHEWABLE ORAL at 20:07

## 2024-01-01 RX ADMIN — POTASSIUM CHLORIDE 10 MEQ: 7.46 INJECTION, SOLUTION INTRAVENOUS at 09:58

## 2024-01-01 RX ADMIN — VALACYCLOVIR HYDROCHLORIDE 1000 MG: 500 TABLET, FILM COATED ORAL at 08:15

## 2024-01-01 RX ADMIN — AMLODIPINE BESYLATE 5 MG: 5 TABLET ORAL at 10:02

## 2024-01-01 RX ADMIN — CEFAZOLIN SODIUM 2 G: 2 INJECTION, SOLUTION INTRAVENOUS at 16:35

## 2024-01-01 RX ADMIN — HYDROMORPHONE HYDROCHLORIDE 0.3 MG: 1 INJECTION, SOLUTION INTRAMUSCULAR; INTRAVENOUS; SUBCUTANEOUS at 15:51

## 2024-01-01 RX ADMIN — DEXTROSE MONOHYDRATE, SODIUM CHLORIDE, SODIUM LACTATE, POTASSIUM CHLORIDE, CALCIUM CHLORIDE: 5; 600; 310; 179; 20 INJECTION, SOLUTION INTRAVENOUS at 13:35

## 2024-01-01 RX ADMIN — AMLODIPINE BESYLATE 5 MG: 5 TABLET ORAL at 09:38

## 2024-01-01 RX ADMIN — CEFAZOLIN SODIUM 2 G: 2 INJECTION, SOLUTION INTRAVENOUS at 01:41

## 2024-01-01 RX ADMIN — APIXABAN 2.5 MG: 2.5 TABLET, FILM COATED ORAL at 09:36

## 2024-01-01 RX ADMIN — MIRTAZAPINE 15 MG: 15 TABLET, ORALLY DISINTEGRATING ORAL at 21:54

## 2024-01-01 RX ADMIN — POTASSIUM PHOSPHATE, MONOBASIC AND POTASSIUM PHOSPHATE, DIBASIC 9 MMOL: 224; 236 INJECTION, SOLUTION, CONCENTRATE INTRAVENOUS at 12:56

## 2024-01-01 RX ADMIN — METOPROLOL SUCCINATE 50 MG: 50 TABLET, EXTENDED RELEASE ORAL at 08:22

## 2024-01-01 RX ADMIN — LISINOPRIL 20 MG: 20 TABLET ORAL at 09:00

## 2024-01-01 RX ADMIN — APIXABAN 5 MG: 5 TABLET, FILM COATED ORAL at 20:15

## 2024-01-01 RX ADMIN — PREDNISONE 5 MG: 5 TABLET ORAL at 08:42

## 2024-01-01 RX ADMIN — SODIUM CHLORIDE, POTASSIUM CHLORIDE, SODIUM LACTATE AND CALCIUM CHLORIDE: 600; 310; 30; 20 INJECTION, SOLUTION INTRAVENOUS at 23:31

## 2024-01-01 RX ADMIN — ONDANSETRON 4 MG: 2 INJECTION INTRAMUSCULAR; INTRAVENOUS at 15:13

## 2024-01-01 RX ADMIN — FOLIC ACID 1 MG: 1 TABLET ORAL at 09:55

## 2024-01-01 RX ADMIN — SIMETHICONE 160 MG: 80 TABLET, CHEWABLE ORAL at 13:21

## 2024-01-01 RX ADMIN — HYDRALAZINE HYDROCHLORIDE 5 MG: 20 INJECTION INTRAMUSCULAR; INTRAVENOUS at 18:47

## 2024-01-01 RX ADMIN — PREDNISONE 5 MG: 5 TABLET ORAL at 08:17

## 2024-01-01 RX ADMIN — ACETAMINOPHEN 650 MG: 325 TABLET, FILM COATED ORAL at 09:40

## 2024-01-01 RX ADMIN — MAGNESIUM SULFATE HEPTAHYDRATE 2 G: 2 INJECTION, SOLUTION INTRAVENOUS at 12:15

## 2024-01-01 RX ADMIN — LEFLUNOMIDE 20 MG: 20 TABLET ORAL at 09:09

## 2024-01-01 RX ADMIN — SIMVASTATIN 20 MG: 20 TABLET, FILM COATED ORAL at 21:23

## 2024-01-01 RX ADMIN — ESCITALOPRAM 5 MG: 5 TABLET, FILM COATED ORAL at 09:09

## 2024-01-01 RX ADMIN — FENTANYL CITRATE 50 MCG: 50 INJECTION INTRAMUSCULAR; INTRAVENOUS at 15:29

## 2024-01-01 RX ADMIN — LISINOPRIL 20 MG: 20 TABLET ORAL at 09:09

## 2024-01-01 RX ADMIN — LISINOPRIL 20 MG: 20 TABLET ORAL at 08:31

## 2024-01-01 RX ADMIN — ACETAMINOPHEN 650 MG: 325 TABLET, FILM COATED ORAL at 21:36

## 2024-01-01 RX ADMIN — ASPIRIN 81 MG: 81 TABLET ORAL at 08:56

## 2024-01-01 RX ADMIN — MORPHINE SULFATE 10 MG: 10 SOLUTION ORAL at 00:41

## 2024-01-01 RX ADMIN — MAGNESIUM OXIDE TAB 400 MG (241.3 MG ELEMENTAL MG) 400 MG: 400 (241.3 MG) TAB at 08:42

## 2024-01-01 RX ADMIN — MORPHINE SULFATE 10 MG: 10 SOLUTION ORAL at 08:50

## 2024-01-01 RX ADMIN — POTASSIUM PHOSPHATE, MONOBASIC AND POTASSIUM PHOSPHATE, DIBASIC 9 MMOL: 224; 236 INJECTION, SOLUTION, CONCENTRATE INTRAVENOUS at 10:31

## 2024-01-01 RX ADMIN — PREDNISONE 5 MG: 5 TABLET ORAL at 08:55

## 2024-01-01 RX ADMIN — GABAPENTIN 300 MG: 300 CAPSULE ORAL at 20:12

## 2024-01-01 RX ADMIN — ASPIRIN 81 MG: 81 TABLET ORAL at 10:06

## 2024-01-01 RX ADMIN — SIMVASTATIN 20 MG: 10 TABLET, FILM COATED ORAL at 21:46

## 2024-01-01 RX ADMIN — OXYCODONE HYDROCHLORIDE 5 MG: 5 TABLET ORAL at 06:34

## 2024-01-01 RX ADMIN — ACETAMINOPHEN 650 MG: 325 TABLET, FILM COATED ORAL at 15:57

## 2024-01-01 RX ADMIN — LISINOPRIL 20 MG: 20 TABLET ORAL at 07:56

## 2024-01-01 RX ADMIN — OXYCODONE HYDROCHLORIDE 5 MG: 5 TABLET ORAL at 10:06

## 2024-01-01 RX ADMIN — WHITE PETROLATUM: 1.75 OINTMENT TOPICAL at 05:13

## 2024-01-01 RX ADMIN — SIMVASTATIN 20 MG: 10 TABLET, FILM COATED ORAL at 21:44

## 2024-01-01 RX ADMIN — PANTOPRAZOLE SODIUM 40 MG: 40 TABLET, DELAYED RELEASE ORAL at 16:02

## 2024-01-01 RX ADMIN — PROPOFOL 20 MG: 10 INJECTION, EMULSION INTRAVENOUS at 16:33

## 2024-01-01 RX ADMIN — VALACYCLOVIR HYDROCHLORIDE 1000 MG: 500 TABLET, FILM COATED ORAL at 20:16

## 2024-01-01 RX ADMIN — POTASSIUM CHLORIDE 20 MEQ: 750 TABLET, EXTENDED RELEASE ORAL at 17:34

## 2024-01-01 RX ADMIN — PREDNISONE 5 MG: 5 TABLET ORAL at 09:54

## 2024-01-01 RX ADMIN — OXYCODONE HYDROCHLORIDE 5 MG: 5 TABLET ORAL at 12:32

## 2024-01-01 RX ADMIN — APIXABAN 10 MG: 5 TABLET, FILM COATED ORAL at 09:09

## 2024-01-01 RX ADMIN — ESCITALOPRAM 5 MG: 5 TABLET, FILM COATED ORAL at 12:56

## 2024-01-01 RX ADMIN — ACETAMINOPHEN 650 MG: 325 TABLET, FILM COATED ORAL at 20:59

## 2024-01-01 RX ADMIN — OXYCODONE HYDROCHLORIDE 5 MG: 5 TABLET ORAL at 20:16

## 2024-01-01 RX ADMIN — HYDROMORPHONE HYDROCHLORIDE 0.2 MG: 0.2 INJECTION, SOLUTION INTRAMUSCULAR; INTRAVENOUS; SUBCUTANEOUS at 05:11

## 2024-01-01 RX ADMIN — ACETAMINOPHEN 650 MG: 325 TABLET, FILM COATED ORAL at 09:35

## 2024-01-01 RX ADMIN — METOPROLOL SUCCINATE 50 MG: 50 TABLET, EXTENDED RELEASE ORAL at 09:38

## 2024-01-01 RX ADMIN — LEFLUNOMIDE 20 MG: 20 TABLET ORAL at 08:30

## 2024-01-01 RX ADMIN — AMOXICILLIN AND CLAVULANATE POTASSIUM 1 TABLET: 875; 125 TABLET, FILM COATED ORAL at 21:02

## 2024-01-01 RX ADMIN — ASPIRIN 81 MG: 81 TABLET ORAL at 09:09

## 2024-01-01 RX ADMIN — ESCITALOPRAM 5 MG: 5 TABLET, FILM COATED ORAL at 08:34

## 2024-01-01 RX ADMIN — SIMVASTATIN 20 MG: 10 TABLET, FILM COATED ORAL at 22:52

## 2024-01-01 RX ADMIN — ACETAMINOPHEN 650 MG: 325 TABLET, FILM COATED ORAL at 21:02

## 2024-01-01 RX ADMIN — POTASSIUM CHLORIDE 20 MEQ: 1.5 SOLUTION ORAL at 17:46

## 2024-01-01 RX ADMIN — FENTANYL CITRATE 25 MCG: 50 INJECTION, SOLUTION INTRAMUSCULAR; INTRAVENOUS at 17:36

## 2024-01-01 RX ADMIN — Medication 1 TABLET: at 08:59

## 2024-01-01 RX ADMIN — ACETAMINOPHEN 650 MG: 325 TABLET, FILM COATED ORAL at 21:06

## 2024-01-01 RX ADMIN — FOLIC ACID 1 MG: 1 TABLET ORAL at 09:54

## 2024-01-01 RX ADMIN — MIRTAZAPINE 15 MG: 15 TABLET, FILM COATED ORAL at 22:41

## 2024-01-01 RX ADMIN — PREDNISONE 5 MG: 5 TABLET ORAL at 09:10

## 2024-01-01 RX ADMIN — Medication 5 ML: at 16:11

## 2024-01-01 RX ADMIN — POTASSIUM PHOSPHATE, MONOBASIC AND POTASSIUM PHOSPHATE, DIBASIC 15 MMOL: 224; 236 INJECTION, SOLUTION, CONCENTRATE INTRAVENOUS at 12:56

## 2024-01-01 RX ADMIN — LEFLUNOMIDE 20 MG: 20 TABLET ORAL at 07:58

## 2024-01-01 RX ADMIN — Medication 1 TABLET: at 08:23

## 2024-01-01 RX ADMIN — APIXABAN 10 MG: 5 TABLET, FILM COATED ORAL at 12:19

## 2024-01-01 RX ADMIN — FAMOTIDINE 40 MG: 20 TABLET ORAL at 20:15

## 2024-01-01 RX ADMIN — SIMETHICONE 160 MG: 80 TABLET, CHEWABLE ORAL at 08:42

## 2024-01-01 RX ADMIN — ENOXAPARIN SODIUM 30 MG: 30 INJECTION SUBCUTANEOUS at 16:25

## 2024-01-01 RX ADMIN — APIXABAN 5 MG: 5 TABLET, FILM COATED ORAL at 21:02

## 2024-01-01 RX ADMIN — PANTOPRAZOLE SODIUM 40 MG: 40 TABLET, DELAYED RELEASE ORAL at 09:38

## 2024-01-01 RX ADMIN — PREDNISONE 5 MG: 5 TABLET ORAL at 08:24

## 2024-01-01 RX ADMIN — LEFLUNOMIDE 20 MG: 20 TABLET ORAL at 08:17

## 2024-01-01 RX ADMIN — LEFLUNOMIDE 20 MG: 20 TABLET ORAL at 09:40

## 2024-01-01 RX ADMIN — ACETAMINOPHEN 650 MG: 325 TABLET, FILM COATED ORAL at 17:45

## 2024-01-01 RX ADMIN — POTASSIUM CHLORIDE 20 MEQ: 750 TABLET, EXTENDED RELEASE ORAL at 11:50

## 2024-01-01 RX ADMIN — PREDNISONE 5 MG: 5 TABLET ORAL at 09:09

## 2024-01-01 RX ADMIN — AMLODIPINE BESYLATE 5 MG: 5 TABLET ORAL at 09:54

## 2024-01-01 RX ADMIN — LEFLUNOMIDE 20 MG: 20 TABLET ORAL at 08:59

## 2024-01-01 RX ADMIN — SIMETHICONE 160 MG: 80 TABLET, CHEWABLE ORAL at 20:39

## 2024-01-01 RX ADMIN — SIMVASTATIN 20 MG: 20 TABLET, FILM COATED ORAL at 22:11

## 2024-01-01 RX ADMIN — MAGNESIUM SULFATE HEPTAHYDRATE 2 G: 2 INJECTION, SOLUTION INTRAVENOUS at 09:14

## 2024-01-01 RX ADMIN — Medication 1 TABLET: at 09:09

## 2024-01-01 RX ADMIN — METOPROLOL SUCCINATE 50 MG: 50 TABLET, EXTENDED RELEASE ORAL at 08:59

## 2024-01-01 RX ADMIN — GABAPENTIN 300 MG: 300 CAPSULE ORAL at 08:56

## 2024-01-01 RX ADMIN — ACETAMINOPHEN 975 MG: 325 TABLET, FILM COATED ORAL at 09:09

## 2024-01-01 RX ADMIN — PREDNISONE 5 MG: 5 TABLET ORAL at 07:59

## 2024-01-01 RX ADMIN — SODIUM CHLORIDE: 9 INJECTION, SOLUTION INTRAVENOUS at 12:46

## 2024-01-01 RX ADMIN — APIXABAN 2.5 MG: 2.5 TABLET, FILM COATED ORAL at 19:00

## 2024-01-01 RX ADMIN — ACETAMINOPHEN 650 MG: 325 TABLET, FILM COATED ORAL at 18:33

## 2024-01-01 RX ADMIN — SODIUM CHLORIDE, POTASSIUM CHLORIDE, SODIUM LACTATE AND CALCIUM CHLORIDE: 600; 310; 30; 20 INJECTION, SOLUTION INTRAVENOUS at 14:09

## 2024-01-01 RX ADMIN — FOLIC ACID 1 MG: 1 TABLET ORAL at 09:38

## 2024-01-01 RX ADMIN — ACETAMINOPHEN 650 MG: 325 TABLET, FILM COATED ORAL at 22:05

## 2024-01-01 RX ADMIN — ACETAMINOPHEN 650 MG: 325 TABLET, FILM COATED ORAL at 04:26

## 2024-01-01 RX ADMIN — METOPROLOL SUCCINATE 50 MG: 50 TABLET, EXTENDED RELEASE ORAL at 08:31

## 2024-01-01 RX ADMIN — POTASSIUM PHOSPHATE, MONOBASIC AND POTASSIUM PHOSPHATE, DIBASIC 9 MMOL: 224; 236 INJECTION, SOLUTION, CONCENTRATE INTRAVENOUS at 11:15

## 2024-01-01 RX ADMIN — PREDNISONE 5 MG: 5 TABLET ORAL at 08:56

## 2024-01-01 RX ADMIN — AMPICILLIN SODIUM AND SULBACTAM SODIUM 3 G: 2; 1 INJECTION, POWDER, FOR SOLUTION INTRAMUSCULAR; INTRAVENOUS at 01:53

## 2024-01-01 RX ADMIN — POTASSIUM CHLORIDE 10 MEQ: 7.46 INJECTION, SOLUTION INTRAVENOUS at 13:55

## 2024-01-01 RX ADMIN — VALACYCLOVIR HYDROCHLORIDE 1000 MG: 500 TABLET, FILM COATED ORAL at 09:55

## 2024-01-01 RX ADMIN — ESCITALOPRAM 5 MG: 5 TABLET, FILM COATED ORAL at 08:17

## 2024-01-01 RX ADMIN — LISINOPRIL 20 MG: 20 TABLET ORAL at 09:54

## 2024-01-01 RX ADMIN — CEFAZOLIN SODIUM 2 G: 2 INJECTION, SOLUTION INTRAVENOUS at 17:36

## 2024-01-01 RX ADMIN — AMLODIPINE BESYLATE 5 MG: 5 TABLET ORAL at 08:31

## 2024-01-01 RX ADMIN — APIXABAN 2.5 MG: 2.5 TABLET, FILM COATED ORAL at 09:54

## 2024-01-01 RX ADMIN — FAMOTIDINE 40 MG: 20 TABLET ORAL at 22:41

## 2024-01-01 RX ADMIN — ASPIRIN 81 MG: 81 TABLET ORAL at 08:23

## 2024-01-01 RX ADMIN — LISINOPRIL 20 MG: 20 TABLET ORAL at 08:54

## 2024-01-01 RX ADMIN — Medication 1 TABLET: at 07:56

## 2024-01-01 RX ADMIN — PIPERACILLIN AND TAZOBACTAM 4.5 G: 4; .5 INJECTION, POWDER, LYOPHILIZED, FOR SOLUTION INTRAVENOUS at 06:47

## 2024-01-01 RX ADMIN — PREDNISONE 5 MG: 5 TABLET ORAL at 08:31

## 2024-01-01 RX ADMIN — OXYCODONE HYDROCHLORIDE 10 MG: 5 TABLET ORAL at 03:35

## 2024-01-01 RX ADMIN — LEFLUNOMIDE 20 MG: 20 TABLET ORAL at 10:07

## 2024-01-01 RX ADMIN — CEFAZOLIN SODIUM 2 G: 2 INJECTION, SOLUTION INTRAVENOUS at 08:26

## 2024-01-01 RX ADMIN — ACETAMINOPHEN 650 MG: 325 TABLET, FILM COATED ORAL at 09:56

## 2024-01-01 RX ADMIN — ACETAMINOPHEN 650 MG: 325 TABLET, FILM COATED ORAL at 23:09

## 2024-01-01 RX ADMIN — HEPARIN SODIUM 700 UNITS/HR: 10000 INJECTION, SOLUTION INTRAVENOUS at 01:40

## 2024-01-01 RX ADMIN — OXYCODONE HYDROCHLORIDE 5 MG: 5 TABLET ORAL at 18:34

## 2024-01-01 RX ADMIN — VALACYCLOVIR HYDROCHLORIDE 1000 MG: 500 TABLET, FILM COATED ORAL at 09:40

## 2024-01-01 RX ADMIN — AMLODIPINE BESYLATE 5 MG: 5 TABLET ORAL at 08:30

## 2024-01-01 RX ADMIN — SODIUM CHLORIDE, POTASSIUM CHLORIDE, SODIUM LACTATE AND CALCIUM CHLORIDE: 600; 310; 30; 20 INJECTION, SOLUTION INTRAVENOUS at 17:48

## 2024-01-01 RX ADMIN — OXYCODONE HYDROCHLORIDE 5 MG: 5 TABLET ORAL at 20:45

## 2024-01-01 RX ADMIN — POTASSIUM CHLORIDE 10 MEQ: 1.5 SOLUTION ORAL at 08:16

## 2024-01-01 RX ADMIN — PANTOPRAZOLE SODIUM 40 MG: 40 INJECTION, POWDER, FOR SOLUTION INTRAVENOUS at 07:59

## 2024-01-01 RX ADMIN — MAGNESIUM OXIDE TAB 400 MG (241.3 MG ELEMENTAL MG) 400 MG: 400 (241.3 MG) TAB at 20:48

## 2024-01-01 RX ADMIN — LEFLUNOMIDE 20 MG: 20 TABLET ORAL at 08:38

## 2024-01-01 RX ADMIN — PREDNISONE 5 MG: 5 TABLET ORAL at 08:16

## 2024-01-01 RX ADMIN — BUPIVACAINE HYDROCHLORIDE: 7.5 INJECTION, SOLUTION EPIDURAL; RETROBULBAR at 22:21

## 2024-01-01 RX ADMIN — PREDNISONE 5 MG: 5 TABLET ORAL at 08:23

## 2024-01-01 RX ADMIN — AMPICILLIN SODIUM AND SULBACTAM SODIUM 3 G: 2; 1 INJECTION, POWDER, FOR SOLUTION INTRAMUSCULAR; INTRAVENOUS at 19:56

## 2024-01-01 RX ADMIN — GABAPENTIN 300 MG: 300 CAPSULE ORAL at 20:48

## 2024-01-01 RX ADMIN — LIDOCAINE HYDROCHLORIDE 40 MG: 20 INJECTION, SOLUTION INFILTRATION; PERINEURAL at 14:16

## 2024-01-01 RX ADMIN — MAGNESIUM OXIDE TAB 400 MG (241.3 MG ELEMENTAL MG) 400 MG: 400 (241.3 MG) TAB at 08:35

## 2024-01-01 RX ADMIN — SIMVASTATIN 20 MG: 20 TABLET, FILM COATED ORAL at 23:03

## 2024-01-01 RX ADMIN — OXYCODONE HYDROCHLORIDE 10 MG: 5 TABLET ORAL at 04:36

## 2024-01-01 RX ADMIN — AMOXICILLIN AND CLAVULANATE POTASSIUM 1 TABLET: 875; 125 TABLET, FILM COATED ORAL at 20:09

## 2024-01-01 RX ADMIN — IOPAMIDOL 61 ML: 755 INJECTION, SOLUTION INTRAVENOUS at 17:59

## 2024-01-01 RX ADMIN — PREDNISONE 5 MG: 5 TABLET ORAL at 08:35

## 2024-01-01 RX ADMIN — ACETAMINOPHEN 975 MG: 325 TABLET, FILM COATED ORAL at 10:33

## 2024-01-01 RX ADMIN — VALACYCLOVIR HYDROCHLORIDE 1000 MG: 500 TABLET, FILM COATED ORAL at 09:09

## 2024-01-01 RX ADMIN — ACETAMINOPHEN 650 MG: 325 TABLET, FILM COATED ORAL at 19:51

## 2024-01-01 RX ADMIN — OXYCODONE HYDROCHLORIDE 5 MG: 5 TABLET ORAL at 16:37

## 2024-01-01 RX ADMIN — APIXABAN 2.5 MG: 2.5 TABLET, FILM COATED ORAL at 19:54

## 2024-01-01 RX ADMIN — PREDNISONE 5 MG: 5 TABLET ORAL at 08:59

## 2024-01-01 RX ADMIN — OXYCODONE HYDROCHLORIDE 5 MG: 5 TABLET ORAL at 08:32

## 2024-01-01 RX ADMIN — POTASSIUM CHLORIDE 20 MEQ: 750 TABLET, EXTENDED RELEASE ORAL at 09:43

## 2024-01-01 RX ADMIN — SIMVASTATIN 20 MG: 20 TABLET, FILM COATED ORAL at 21:06

## 2024-01-01 RX ADMIN — SIMVASTATIN 20 MG: 10 TABLET, FILM COATED ORAL at 21:17

## 2024-01-01 RX ADMIN — FOLIC ACID 1 MG: 1 TABLET ORAL at 07:59

## 2024-01-01 RX ADMIN — POTASSIUM & SODIUM PHOSPHATES POWDER PACK 280-160-250 MG 1 PACKET: 280-160-250 PACK at 18:05

## 2024-01-01 RX ADMIN — DEXTROSE MONOHYDRATE, SODIUM CHLORIDE, SODIUM LACTATE, POTASSIUM CHLORIDE, CALCIUM CHLORIDE: 5; 600; 310; 179; 20 INJECTION, SOLUTION INTRAVENOUS at 18:33

## 2024-01-01 RX ADMIN — ACETAMINOPHEN 975 MG: 325 TABLET, FILM COATED ORAL at 18:17

## 2024-01-01 RX ADMIN — MAGNESIUM OXIDE TAB 400 MG (241.3 MG ELEMENTAL MG) 400 MG: 400 (241.3 MG) TAB at 16:49

## 2024-01-01 RX ADMIN — ACETAMINOPHEN 650 MG: 325 TABLET, FILM COATED ORAL at 16:00

## 2024-01-01 RX ADMIN — MIRTAZAPINE 15 MG: 15 TABLET, FILM COATED ORAL at 22:25

## 2024-01-01 RX ADMIN — METOPROLOL SUCCINATE 50 MG: 50 TABLET, EXTENDED RELEASE ORAL at 09:54

## 2024-01-01 RX ADMIN — Medication 1 MG: at 21:55

## 2024-01-01 RX ADMIN — Medication 1 TABLET: at 09:54

## 2024-01-01 RX ADMIN — OXYCODONE HYDROCHLORIDE 5 MG: 5 TABLET ORAL at 20:04

## 2024-01-01 RX ADMIN — LIDOCAINE 1 PATCH: 4 PATCH TOPICAL at 08:32

## 2024-01-01 RX ADMIN — MORPHINE SULFATE 5 MG: 10 SOLUTION ORAL at 19:11

## 2024-01-01 RX ADMIN — ACETAMINOPHEN 650 MG: 325 TABLET, FILM COATED ORAL at 16:57

## 2024-01-01 RX ADMIN — GABAPENTIN 300 MG: 300 CAPSULE ORAL at 22:04

## 2024-01-01 RX ADMIN — PREDNISONE 5 MG: 5 TABLET ORAL at 09:00

## 2024-01-01 RX ADMIN — APIXABAN 2.5 MG: 2.5 TABLET, FILM COATED ORAL at 08:55

## 2024-01-01 RX ADMIN — APIXABAN 2.5 MG: 2.5 TABLET, FILM COATED ORAL at 08:23

## 2024-01-01 RX ADMIN — PREDNISONE 5 MG: 5 TABLET ORAL at 10:02

## 2024-01-01 RX ADMIN — PREDNISONE 5 MG: 5 TABLET ORAL at 10:06

## 2024-01-01 RX ADMIN — GABAPENTIN 300 MG: 300 CAPSULE ORAL at 09:54

## 2024-01-01 RX ADMIN — ACETAMINOPHEN 650 MG: 325 TABLET, FILM COATED ORAL at 10:58

## 2024-01-01 RX ADMIN — FENTANYL CITRATE 25 MCG: 50 INJECTION, SOLUTION INTRAMUSCULAR; INTRAVENOUS at 17:45

## 2024-01-01 RX ADMIN — FENTANYL CITRATE 50 MCG: 50 INJECTION, SOLUTION INTRAMUSCULAR; INTRAVENOUS at 17:54

## 2024-01-01 RX ADMIN — POTASSIUM CHLORIDE: 2 INJECTION, SOLUTION, CONCENTRATE INTRAVENOUS at 23:55

## 2024-01-01 RX ADMIN — POTASSIUM CHLORIDE 40 MEQ: 20 SOLUTION ORAL at 16:49

## 2024-01-01 RX ADMIN — POTASSIUM CHLORIDE 10 MEQ: 7.46 INJECTION, SOLUTION INTRAVENOUS at 11:14

## 2024-01-01 RX ADMIN — ACETAMINOPHEN 650 MG: 325 TABLET, FILM COATED ORAL at 21:23

## 2024-01-01 RX ADMIN — PROPOFOL 60 MG: 10 INJECTION, EMULSION INTRAVENOUS at 14:20

## 2024-01-01 RX ADMIN — ACETAMINOPHEN 650 MG: 325 TABLET, FILM COATED ORAL at 16:21

## 2024-01-01 RX ADMIN — APIXABAN 2.5 MG: 2.5 TABLET, FILM COATED ORAL at 20:16

## 2024-01-01 RX ADMIN — PANTOPRAZOLE SODIUM 40 MG: 40 TABLET, DELAYED RELEASE ORAL at 09:09

## 2024-01-01 RX ADMIN — LIDOCAINE 1 PATCH: 4 PATCH TOPICAL at 09:11

## 2024-01-01 RX ADMIN — AMOXICILLIN AND CLAVULANATE POTASSIUM 1 TABLET: 875; 125 TABLET, FILM COATED ORAL at 09:56

## 2024-01-01 RX ADMIN — LISINOPRIL 40 MG: 40 TABLET ORAL at 08:35

## 2024-01-01 RX ADMIN — OXYCODONE HYDROCHLORIDE 10 MG: 5 TABLET ORAL at 22:17

## 2024-01-01 RX ADMIN — PANTOPRAZOLE SODIUM 40 MG: 40 TABLET, DELAYED RELEASE ORAL at 15:54

## 2024-01-01 RX ADMIN — Medication 30 MG: at 14:16

## 2024-01-01 RX ADMIN — GABAPENTIN 300 MG: 300 CAPSULE ORAL at 15:51

## 2024-01-01 RX ADMIN — GABAPENTIN 300 MG: 300 CAPSULE ORAL at 08:30

## 2024-01-01 RX ADMIN — PIPERACILLIN AND TAZOBACTAM 4.5 G: 4; .5 INJECTION, POWDER, LYOPHILIZED, FOR SOLUTION INTRAVENOUS at 18:37

## 2024-01-01 RX ADMIN — ENOXAPARIN SODIUM 30 MG: 30 INJECTION SUBCUTANEOUS at 18:23

## 2024-01-01 RX ADMIN — HYDROMORPHONE HYDROCHLORIDE 0.3 MG: 1 INJECTION, SOLUTION INTRAMUSCULAR; INTRAVENOUS; SUBCUTANEOUS at 21:18

## 2024-01-01 RX ADMIN — ACETAMINOPHEN 650 MG: 325 TABLET, FILM COATED ORAL at 13:48

## 2024-01-01 RX ADMIN — POTASSIUM CHLORIDE 10 MEQ: 7.46 INJECTION, SOLUTION INTRAVENOUS at 03:05

## 2024-01-01 RX ADMIN — ACETAMINOPHEN 650 MG: 325 TABLET, FILM COATED ORAL at 06:37

## 2024-01-01 RX ADMIN — APIXABAN 5 MG: 5 TABLET, FILM COATED ORAL at 08:35

## 2024-01-01 RX ADMIN — Medication 2 G: at 14:39

## 2024-01-01 RX ADMIN — LEFLUNOMIDE 20 MG: 20 TABLET ORAL at 10:12

## 2024-01-01 RX ADMIN — ACETAMINOPHEN 650 MG: 325 TABLET, FILM COATED ORAL at 02:45

## 2024-01-01 RX ADMIN — POTASSIUM CHLORIDE 10 MEQ: 7.46 INJECTION, SOLUTION INTRAVENOUS at 13:52

## 2024-01-01 RX ADMIN — ACETAMINOPHEN 650 MG: 325 TABLET, FILM COATED ORAL at 10:16

## 2024-01-01 RX ADMIN — Medication 1 TABLET: at 08:55

## 2024-01-01 RX ADMIN — PANTOPRAZOLE SODIUM 40 MG: 40 TABLET, DELAYED RELEASE ORAL at 15:48

## 2024-01-01 RX ADMIN — ACETAMINOPHEN 650 MG: 325 TABLET, FILM COATED ORAL at 21:35

## 2024-01-01 RX ADMIN — PREDNISONE 5 MG: 5 TABLET ORAL at 09:53

## 2024-01-01 RX ADMIN — POTASSIUM & SODIUM PHOSPHATES POWDER PACK 280-160-250 MG 1 PACKET: 280-160-250 PACK at 20:17

## 2024-01-01 RX ADMIN — LEFLUNOMIDE 20 MG: 20 TABLET ORAL at 09:00

## 2024-01-01 RX ADMIN — CEFAZOLIN SODIUM 2 G: 2 INJECTION, SOLUTION INTRAVENOUS at 09:51

## 2024-01-01 RX ADMIN — PIPERACILLIN AND TAZOBACTAM 4.5 G: 4; .5 INJECTION, POWDER, LYOPHILIZED, FOR SOLUTION INTRAVENOUS at 00:01

## 2024-01-01 RX ADMIN — PANTOPRAZOLE SODIUM 40 MG: 40 TABLET, DELAYED RELEASE ORAL at 16:26

## 2024-01-01 RX ADMIN — ESCITALOPRAM 5 MG: 5 TABLET, FILM COATED ORAL at 08:55

## 2024-01-01 RX ADMIN — METOPROLOL SUCCINATE 50 MG: 50 TABLET, EXTENDED RELEASE ORAL at 07:59

## 2024-01-01 RX ADMIN — SIMVASTATIN 20 MG: 20 TABLET, FILM COATED ORAL at 21:55

## 2024-01-01 RX ADMIN — MAGNESIUM SULFATE HEPTAHYDRATE 2 G: 2 INJECTION, SOLUTION INTRAVENOUS at 06:45

## 2024-01-01 RX ADMIN — MAGNESIUM SULFATE HEPTAHYDRATE 2 G: 2 INJECTION, SOLUTION INTRAVENOUS at 11:50

## 2024-01-01 RX ADMIN — FOLIC ACID 1 MG: 1 TABLET ORAL at 07:56

## 2024-01-01 RX ADMIN — MAGNESIUM SULFATE HEPTAHYDRATE 2 G: 2 INJECTION, SOLUTION INTRAVENOUS at 18:07

## 2024-01-01 RX ADMIN — VALACYCLOVIR HYDROCHLORIDE 1000 MG: 500 TABLET, FILM COATED ORAL at 21:01

## 2024-01-01 RX ADMIN — PANTOPRAZOLE SODIUM 40 MG: 40 TABLET, DELAYED RELEASE ORAL at 16:00

## 2024-01-01 RX ADMIN — Medication 1 TABLET: at 09:00

## 2024-01-01 RX ADMIN — APIXABAN 5 MG: 5 TABLET, FILM COATED ORAL at 20:48

## 2024-01-01 RX ADMIN — OXYCODONE HYDROCHLORIDE 5 MG: 5 TABLET ORAL at 19:00

## 2024-01-01 RX ADMIN — MAGNESIUM SULFATE HEPTAHYDRATE 2 G: 2 INJECTION, SOLUTION INTRAVENOUS at 09:55

## 2024-01-01 RX ADMIN — FOLIC ACID 1 MG: 1 TABLET ORAL at 08:17

## 2024-01-01 RX ADMIN — ACETAMINOPHEN 650 MG: 325 TABLET, FILM COATED ORAL at 13:59

## 2024-01-01 RX ADMIN — OXYCODONE HYDROCHLORIDE 10 MG: 5 TABLET ORAL at 18:34

## 2024-01-01 RX ADMIN — APIXABAN 2.5 MG: 2.5 TABLET, FILM COATED ORAL at 09:40

## 2024-01-01 RX ADMIN — AMPICILLIN SODIUM AND SULBACTAM SODIUM 3 G: 2; 1 INJECTION, POWDER, FOR SOLUTION INTRAMUSCULAR; INTRAVENOUS at 08:31

## 2024-01-01 RX ADMIN — SODIUM PHOSPHATE, MONOBASIC, MONOHYDRATE AND SODIUM PHOSPHATE, DIBASIC, ANHYDROUS 9 MMOL: 142; 276 INJECTION, SOLUTION INTRAVENOUS at 16:34

## 2024-01-01 RX ADMIN — OXYCODONE HYDROCHLORIDE 5 MG: 5 TABLET ORAL at 20:22

## 2024-01-01 RX ADMIN — AMPICILLIN SODIUM AND SULBACTAM SODIUM 3 G: 2; 1 INJECTION, POWDER, FOR SOLUTION INTRAMUSCULAR; INTRAVENOUS at 02:29

## 2024-01-01 RX ADMIN — LEFLUNOMIDE 20 MG: 20 TABLET ORAL at 08:35

## 2024-01-01 RX ADMIN — APIXABAN 5 MG: 5 TABLET, FILM COATED ORAL at 09:55

## 2024-01-01 RX ADMIN — APIXABAN 5 MG: 5 TABLET, FILM COATED ORAL at 19:58

## 2024-01-01 RX ADMIN — ACETAMINOPHEN 650 MG: 325 TABLET, FILM COATED ORAL at 22:53

## 2024-01-01 RX ADMIN — VALACYCLOVIR HYDROCHLORIDE 1000 MG: 500 TABLET, FILM COATED ORAL at 10:01

## 2024-01-01 RX ADMIN — ACETAMINOPHEN 650 MG: 325 TABLET, FILM COATED ORAL at 16:26

## 2024-01-01 RX ADMIN — METOPROLOL SUCCINATE 50 MG: 50 TABLET, EXTENDED RELEASE ORAL at 09:41

## 2024-01-01 RX ADMIN — MAGNESIUM SULFATE HEPTAHYDRATE 2 G: 40 INJECTION, SOLUTION INTRAVENOUS at 12:40

## 2024-01-01 RX ADMIN — FAMOTIDINE 40 MG: 20 TABLET ORAL at 08:16

## 2024-01-01 RX ADMIN — ACETAMINOPHEN 650 MG: 325 TABLET, FILM COATED ORAL at 21:55

## 2024-01-01 RX ADMIN — CEFAZOLIN SODIUM 2 G: 2 INJECTION, SOLUTION INTRAVENOUS at 01:23

## 2024-01-01 RX ADMIN — DEXAMETHASONE SODIUM PHOSPHATE 4 MG: 4 INJECTION, SOLUTION INTRA-ARTICULAR; INTRALESIONAL; INTRAMUSCULAR; INTRAVENOUS; SOFT TISSUE at 15:13

## 2024-01-01 RX ADMIN — APIXABAN 5 MG: 5 TABLET, FILM COATED ORAL at 20:09

## 2024-01-01 RX ADMIN — ACETAMINOPHEN 650 MG: 325 TABLET, FILM COATED ORAL at 16:25

## 2024-01-01 RX ADMIN — ENOXAPARIN SODIUM 30 MG: 30 INJECTION SUBCUTANEOUS at 17:45

## 2024-01-01 RX ADMIN — CEFAZOLIN SODIUM 2 G: 2 INJECTION, SOLUTION INTRAVENOUS at 08:42

## 2024-01-01 RX ADMIN — APIXABAN 10 MG: 5 TABLET, FILM COATED ORAL at 19:50

## 2024-01-01 RX ADMIN — VALACYCLOVIR HYDROCHLORIDE 1000 MG: 500 TABLET, FILM COATED ORAL at 20:03

## 2024-01-01 RX ADMIN — OXYCODONE HYDROCHLORIDE 5 MG: 5 TABLET ORAL at 14:18

## 2024-01-01 RX ADMIN — OXYCODONE HYDROCHLORIDE 5 MG: 5 TABLET ORAL at 00:26

## 2024-01-01 RX ADMIN — MAGNESIUM OXIDE TAB 400 MG (241.3 MG ELEMENTAL MG) 400 MG: 400 (241.3 MG) TAB at 08:23

## 2024-01-01 RX ADMIN — ESCITALOPRAM 5 MG: 5 TABLET, FILM COATED ORAL at 09:40

## 2024-01-01 RX ADMIN — PANTOPRAZOLE SODIUM 40 MG: 40 INJECTION, POWDER, FOR SOLUTION INTRAVENOUS at 18:02

## 2024-01-01 RX ADMIN — ACETAMINOPHEN 975 MG: 325 TABLET, FILM COATED ORAL at 20:15

## 2024-01-01 RX ADMIN — AMLODIPINE BESYLATE 5 MG: 5 TABLET ORAL at 09:10

## 2024-01-01 RX ADMIN — METOPROLOL SUCCINATE 50 MG: 50 TABLET, EXTENDED RELEASE ORAL at 09:53

## 2024-01-01 RX ADMIN — LEFLUNOMIDE 20 MG: 20 TABLET ORAL at 08:18

## 2024-01-01 RX ADMIN — ACETAMINOPHEN 650 MG: 325 TABLET, FILM COATED ORAL at 10:02

## 2024-01-01 RX ADMIN — PANTOPRAZOLE SODIUM 40 MG: 40 INJECTION, POWDER, FOR SOLUTION INTRAVENOUS at 08:17

## 2024-01-01 ASSESSMENT — ACTIVITIES OF DAILY LIVING (ADL)
ADLS_ACUITY_SCORE: 66
ADLS_ACUITY_SCORE: 47
ADLS_ACUITY_SCORE: 64
ADLS_ACUITY_SCORE: 57
ADLS_ACUITY_SCORE: 66
ADLS_ACUITY_SCORE: 59
ADLS_ACUITY_SCORE: 59
ADLS_ACUITY_SCORE: 63
ADLS_ACUITY_SCORE: 69
ADLS_ACUITY_SCORE: 67
ADLS_ACUITY_SCORE: 65
ADLS_ACUITY_SCORE: 65
ADLS_ACUITY_SCORE: 61
ADLS_ACUITY_SCORE: 55
ADLS_ACUITY_SCORE: 66
ADLS_ACUITY_SCORE: 66
ADLS_ACUITY_SCORE: 65
ADLS_ACUITY_SCORE: 59
ADLS_ACUITY_SCORE: 57
ADLS_ACUITY_SCORE: 66
ADLS_ACUITY_SCORE: 61
ADLS_ACUITY_SCORE: 65
ADLS_ACUITY_SCORE: 67
ADLS_ACUITY_SCORE: 59
ADLS_ACUITY_SCORE: 63
ADLS_ACUITY_SCORE: 59
ADLS_ACUITY_SCORE: 67
ADLS_ACUITY_SCORE: 57
ADLS_ACUITY_SCORE: 64
ADLS_ACUITY_SCORE: 66
DEPENDENT_IADLS:: CLEANING;COOKING;LAUNDRY;SHOPPING;MEDICATION MANAGEMENT;MEAL PREPARATION;TRANSPORTATION
ADLS_ACUITY_SCORE: 66
ADLS_ACUITY_SCORE: 66
ADLS_ACUITY_SCORE: 65
ADLS_ACUITY_SCORE: 66
ADLS_ACUITY_SCORE: 57
ADLS_ACUITY_SCORE: 58
ADLS_ACUITY_SCORE: 47
ADLS_ACUITY_SCORE: 47
ADLS_ACUITY_SCORE: 66
ADLS_ACUITY_SCORE: 55
ADLS_ACUITY_SCORE: 58
ADLS_ACUITY_SCORE: 61
ADLS_ACUITY_SCORE: 59
ADLS_ACUITY_SCORE: 59
ADLS_ACUITY_SCORE: 45
ADLS_ACUITY_SCORE: 61
ADLS_ACUITY_SCORE: 64
ADLS_ACUITY_SCORE: 58
ADLS_ACUITY_SCORE: 61
ADLS_ACUITY_SCORE: 47
ADLS_ACUITY_SCORE: 57
ADLS_ACUITY_SCORE: 64
ADLS_ACUITY_SCORE: 65
DEPENDENT_IADLS:: CLEANING;COOKING;LAUNDRY;SHOPPING;MEDICATION MANAGEMENT;MEAL PREPARATION;TRANSPORTATION
ADLS_ACUITY_SCORE: 66
ADLS_ACUITY_SCORE: 67
ADLS_ACUITY_SCORE: 65
ADLS_ACUITY_SCORE: 59
ADLS_ACUITY_SCORE: 67
ADLS_ACUITY_SCORE: 58
ADLS_ACUITY_SCORE: 53
ADLS_ACUITY_SCORE: 59
ADLS_ACUITY_SCORE: 69
ADLS_ACUITY_SCORE: 66
ADLS_ACUITY_SCORE: 61
ADLS_ACUITY_SCORE: 64
ADLS_ACUITY_SCORE: 67
ADLS_ACUITY_SCORE: 63
ADLS_ACUITY_SCORE: 64
ADLS_ACUITY_SCORE: 47
ADLS_ACUITY_SCORE: 61
ADLS_ACUITY_SCORE: 67
ADLS_ACUITY_SCORE: 59
ADLS_ACUITY_SCORE: 57
ADLS_ACUITY_SCORE: 53
ADLS_ACUITY_SCORE: 58
ADLS_ACUITY_SCORE: 66
ADLS_ACUITY_SCORE: 45
ADLS_ACUITY_SCORE: 59
ADLS_ACUITY_SCORE: 59
ADLS_ACUITY_SCORE: 69
ADLS_ACUITY_SCORE: 65
ADLS_ACUITY_SCORE: 52
ADLS_ACUITY_SCORE: 63
ADLS_ACUITY_SCORE: 61
ADLS_ACUITY_SCORE: 66
ADLS_ACUITY_SCORE: 59
ADLS_ACUITY_SCORE: 58
ADLS_ACUITY_SCORE: 63
ADLS_ACUITY_SCORE: 65
ADLS_ACUITY_SCORE: 53
ADLS_ACUITY_SCORE: 66
ADLS_ACUITY_SCORE: 66
ADLS_ACUITY_SCORE: 67
ADLS_ACUITY_SCORE: 53
ADLS_ACUITY_SCORE: 47
ADLS_ACUITY_SCORE: 59
ADLS_ACUITY_SCORE: 47
ADLS_ACUITY_SCORE: 58
ADLS_ACUITY_SCORE: 66
ADLS_ACUITY_SCORE: 59
ADLS_ACUITY_SCORE: 66
ADLS_ACUITY_SCORE: 61
ADLS_ACUITY_SCORE: 59
ADLS_ACUITY_SCORE: 53
ADLS_ACUITY_SCORE: 57
ADLS_ACUITY_SCORE: 53
ADLS_ACUITY_SCORE: 67
ADLS_ACUITY_SCORE: 66
ADLS_ACUITY_SCORE: 66
ADLS_ACUITY_SCORE: 47
ADLS_ACUITY_SCORE: 59
ADLS_ACUITY_SCORE: 65
ADLS_ACUITY_SCORE: 66
ADLS_ACUITY_SCORE: 67
ADLS_ACUITY_SCORE: 57
ADLS_ACUITY_SCORE: 61
ADLS_ACUITY_SCORE: 47
ADLS_ACUITY_SCORE: 69
ADLS_ACUITY_SCORE: 61
ADLS_ACUITY_SCORE: 66
ADLS_ACUITY_SCORE: 47
ADLS_ACUITY_SCORE: 59
ADLS_ACUITY_SCORE: 47
ADLS_ACUITY_SCORE: 63
ADLS_ACUITY_SCORE: 64
ADLS_ACUITY_SCORE: 63
ADLS_ACUITY_SCORE: 58
ADLS_ACUITY_SCORE: 67
ADLS_ACUITY_SCORE: 66
ADLS_ACUITY_SCORE: 55
ADLS_ACUITY_SCORE: 66
ADLS_ACUITY_SCORE: 61
ADLS_ACUITY_SCORE: 61
ADLS_ACUITY_SCORE: 66
ADLS_ACUITY_SCORE: 47
ADLS_ACUITY_SCORE: 57
ADLS_ACUITY_SCORE: 66
ADLS_ACUITY_SCORE: 61
ADLS_ACUITY_SCORE: 66
ADLS_ACUITY_SCORE: 65
ADLS_ACUITY_SCORE: 69
ADLS_ACUITY_SCORE: 53
ADLS_ACUITY_SCORE: 59
ADLS_ACUITY_SCORE: 67
ADLS_ACUITY_SCORE: 65
ADLS_ACUITY_SCORE: 61
ADLS_ACUITY_SCORE: 58
ADLS_ACUITY_SCORE: 55
ADLS_ACUITY_SCORE: 67
ADLS_ACUITY_SCORE: 58
ADLS_ACUITY_SCORE: 63
ADLS_ACUITY_SCORE: 47
ADLS_ACUITY_SCORE: 51
ADLS_ACUITY_SCORE: 55
ADLS_ACUITY_SCORE: 53
ADLS_ACUITY_SCORE: 59
ADLS_ACUITY_SCORE: 59
ADLS_ACUITY_SCORE: 58
ADLS_ACUITY_SCORE: 61
ADLS_ACUITY_SCORE: 61
ADLS_ACUITY_SCORE: 66
ADLS_ACUITY_SCORE: 67
ADLS_ACUITY_SCORE: 66
ADLS_ACUITY_SCORE: 67
ADLS_ACUITY_SCORE: 61
ADLS_ACUITY_SCORE: 64
ADLS_ACUITY_SCORE: 61
ADLS_ACUITY_SCORE: 67
ADLS_ACUITY_SCORE: 67
ADLS_ACUITY_SCORE: 53
ADLS_ACUITY_SCORE: 47
ADLS_ACUITY_SCORE: 63
ADLS_ACUITY_SCORE: 67
ADLS_ACUITY_SCORE: 59
ADLS_ACUITY_SCORE: 53
ADLS_ACUITY_SCORE: 58
ADLS_ACUITY_SCORE: 53
ADLS_ACUITY_SCORE: 66
ADLS_ACUITY_SCORE: 66
ADLS_ACUITY_SCORE: 59
ADLS_ACUITY_SCORE: 57
ADLS_ACUITY_SCORE: 59
ADLS_ACUITY_SCORE: 67
ADLS_ACUITY_SCORE: 61
ADLS_ACUITY_SCORE: 63
ADLS_ACUITY_SCORE: 45
ADLS_ACUITY_SCORE: 55
ADLS_ACUITY_SCORE: 67
ADLS_ACUITY_SCORE: 57
ADLS_ACUITY_SCORE: 68
ADLS_ACUITY_SCORE: 66
ADLS_ACUITY_SCORE: 61
ADLS_ACUITY_SCORE: 69
ADLS_ACUITY_SCORE: 61
ADLS_ACUITY_SCORE: 58
ADLS_ACUITY_SCORE: 63
ADLS_ACUITY_SCORE: 65
ADLS_ACUITY_SCORE: 59
ADLS_ACUITY_SCORE: 64
ADLS_ACUITY_SCORE: 65
ADLS_ACUITY_SCORE: 66
ADLS_ACUITY_SCORE: 65
ADLS_ACUITY_SCORE: 59
ADLS_ACUITY_SCORE: 63
ADLS_ACUITY_SCORE: 53
ADLS_ACUITY_SCORE: 65
ADLS_ACUITY_SCORE: 61
ADLS_ACUITY_SCORE: 59
ADLS_ACUITY_SCORE: 57
ADLS_ACUITY_SCORE: 58
ADLS_ACUITY_SCORE: 53
ADLS_ACUITY_SCORE: 61
ADLS_ACUITY_SCORE: 66
ADLS_ACUITY_SCORE: 61
ADLS_ACUITY_SCORE: 66
ADLS_ACUITY_SCORE: 67
ADLS_ACUITY_SCORE: 53
ADLS_ACUITY_SCORE: 59
ADLS_ACUITY_SCORE: 64
ADLS_ACUITY_SCORE: 61
ADLS_ACUITY_SCORE: 59
ADLS_ACUITY_SCORE: 66
ADLS_ACUITY_SCORE: 47
ADLS_ACUITY_SCORE: 59
ADLS_ACUITY_SCORE: 61
ADLS_ACUITY_SCORE: 67
ADLS_ACUITY_SCORE: 53
ADLS_ACUITY_SCORE: 66
ADLS_ACUITY_SCORE: 47
ADLS_ACUITY_SCORE: 63
ADLS_ACUITY_SCORE: 59
ADLS_ACUITY_SCORE: 63
ADLS_ACUITY_SCORE: 53
ADLS_ACUITY_SCORE: 63
ADLS_ACUITY_SCORE: 61
ADLS_ACUITY_SCORE: 66
ADLS_ACUITY_SCORE: 59
ADLS_ACUITY_SCORE: 61
ADLS_ACUITY_SCORE: 67
ADLS_ACUITY_SCORE: 65
ADLS_ACUITY_SCORE: 61
ADLS_ACUITY_SCORE: 66
ADLS_ACUITY_SCORE: 53
ADLS_ACUITY_SCORE: 65
ADLS_ACUITY_SCORE: 59
ADLS_ACUITY_SCORE: 66
ADLS_ACUITY_SCORE: 61
ADLS_ACUITY_SCORE: 53
ADLS_ACUITY_SCORE: 67
ADLS_ACUITY_SCORE: 61
ADLS_ACUITY_SCORE: 47
ADLS_ACUITY_SCORE: 45
ADLS_ACUITY_SCORE: 66
ADLS_ACUITY_SCORE: 59
ADLS_ACUITY_SCORE: 68
ADLS_ACUITY_SCORE: 65
DEPENDENT_IADLS:: CLEANING;COOKING;LAUNDRY;SHOPPING;MEDICATION MANAGEMENT;MEAL PREPARATION;TRANSPORTATION
ADLS_ACUITY_SCORE: 64
ADLS_ACUITY_SCORE: 61
ADLS_ACUITY_SCORE: 58
ADLS_ACUITY_SCORE: 59
ADLS_ACUITY_SCORE: 59
ADLS_ACUITY_SCORE: 66
ADLS_ACUITY_SCORE: 58
ADLS_ACUITY_SCORE: 57
ADLS_ACUITY_SCORE: 59
ADLS_ACUITY_SCORE: 55
ADLS_ACUITY_SCORE: 65
ADLS_ACUITY_SCORE: 53
ADLS_ACUITY_SCORE: 66
ADLS_ACUITY_SCORE: 65
ADLS_ACUITY_SCORE: 59
ADLS_ACUITY_SCORE: 66
ADLS_ACUITY_SCORE: 58
ADLS_ACUITY_SCORE: 59
ADLS_ACUITY_SCORE: 57
ADLS_ACUITY_SCORE: 61
ADLS_ACUITY_SCORE: 61
ADLS_ACUITY_SCORE: 58
ADLS_ACUITY_SCORE: 61
ADLS_ACUITY_SCORE: 59
ADLS_ACUITY_SCORE: 57
ADLS_ACUITY_SCORE: 66
ADLS_ACUITY_SCORE: 67
ADLS_ACUITY_SCORE: 57
ADLS_ACUITY_SCORE: 67
ADLS_ACUITY_SCORE: 65
ADLS_ACUITY_SCORE: 66
ADLS_ACUITY_SCORE: 59
ADLS_ACUITY_SCORE: 59
ADLS_ACUITY_SCORE: 58
ADLS_ACUITY_SCORE: 47
ADLS_ACUITY_SCORE: 64
ADLS_ACUITY_SCORE: 65
ADLS_ACUITY_SCORE: 58
ADLS_ACUITY_SCORE: 66
ADLS_ACUITY_SCORE: 59
ADLS_ACUITY_SCORE: 67
ADLS_ACUITY_SCORE: 59
ADLS_ACUITY_SCORE: 68
ADLS_ACUITY_SCORE: 65
ADLS_ACUITY_SCORE: 65
ADLS_ACUITY_SCORE: 53
ADLS_ACUITY_SCORE: 61
ADLS_ACUITY_SCORE: 67
ADLS_ACUITY_SCORE: 69
ADLS_ACUITY_SCORE: 63
ADLS_ACUITY_SCORE: 66
ADLS_ACUITY_SCORE: 66
ADLS_ACUITY_SCORE: 61
ADLS_ACUITY_SCORE: 61
ADLS_ACUITY_SCORE: 58
ADLS_ACUITY_SCORE: 63
ADLS_ACUITY_SCORE: 66
ADLS_ACUITY_SCORE: 65
ADLS_ACUITY_SCORE: 65
ADLS_ACUITY_SCORE: 67
ADLS_ACUITY_SCORE: 57
ADLS_ACUITY_SCORE: 65
ADLS_ACUITY_SCORE: 61
ADLS_ACUITY_SCORE: 59
ADLS_ACUITY_SCORE: 66
ADLS_ACUITY_SCORE: 61
ADLS_ACUITY_SCORE: 66
ADLS_ACUITY_SCORE: 64
ADLS_ACUITY_SCORE: 51
ADLS_ACUITY_SCORE: 62
ADLS_ACUITY_SCORE: 59
ADLS_ACUITY_SCORE: 66
ADLS_ACUITY_SCORE: 59
ADLS_ACUITY_SCORE: 68
ADLS_ACUITY_SCORE: 59
ADLS_ACUITY_SCORE: 61
ADLS_ACUITY_SCORE: 47
ADLS_ACUITY_SCORE: 61
ADLS_ACUITY_SCORE: 59
ADLS_ACUITY_SCORE: 47
ADLS_ACUITY_SCORE: 61
ADLS_ACUITY_SCORE: 69
ADLS_ACUITY_SCORE: 61
ADLS_ACUITY_SCORE: 67
ADLS_ACUITY_SCORE: 58
ADLS_ACUITY_SCORE: 66
ADLS_ACUITY_SCORE: 47
ADLS_ACUITY_SCORE: 59
ADLS_ACUITY_SCORE: 45
ADLS_ACUITY_SCORE: 57
ADLS_ACUITY_SCORE: 59
ADLS_ACUITY_SCORE: 63
ADLS_ACUITY_SCORE: 59
ADLS_ACUITY_SCORE: 57
ADLS_ACUITY_SCORE: 57
ADLS_ACUITY_SCORE: 66
ADLS_ACUITY_SCORE: 55
ADLS_ACUITY_SCORE: 53
ADLS_ACUITY_SCORE: 66
ADLS_ACUITY_SCORE: 59
ADLS_ACUITY_SCORE: 66
ADLS_ACUITY_SCORE: 69
ADLS_ACUITY_SCORE: 64
ADLS_ACUITY_SCORE: 59
ADLS_ACUITY_SCORE: 61
ADLS_ACUITY_SCORE: 58
ADLS_ACUITY_SCORE: 64
ADLS_ACUITY_SCORE: 65
ADLS_ACUITY_SCORE: 59
ADLS_ACUITY_SCORE: 66
ADLS_ACUITY_SCORE: 69
ADLS_ACUITY_SCORE: 65
ADLS_ACUITY_SCORE: 67
ADLS_ACUITY_SCORE: 67
ADLS_ACUITY_SCORE: 47
ADLS_ACUITY_SCORE: 63
ADLS_ACUITY_SCORE: 66
ADLS_ACUITY_SCORE: 47
ADLS_ACUITY_SCORE: 47
ADLS_ACUITY_SCORE: 64
ADLS_ACUITY_SCORE: 61
ADLS_ACUITY_SCORE: 59
ADLS_ACUITY_SCORE: 67
ADLS_ACUITY_SCORE: 57
ADLS_ACUITY_SCORE: 61
ADLS_ACUITY_SCORE: 61
ADLS_ACUITY_SCORE: 45
ADLS_ACUITY_SCORE: 68
ADLS_ACUITY_SCORE: 59
ADLS_ACUITY_SCORE: 47
DEPENDENT_IADLS:: CLEANING;COOKING;LAUNDRY;SHOPPING;MEDICATION MANAGEMENT;MEAL PREPARATION;TRANSPORTATION
ADLS_ACUITY_SCORE: 58
ADLS_ACUITY_SCORE: 65
ADLS_ACUITY_SCORE: 57
ADLS_ACUITY_SCORE: 65
ADLS_ACUITY_SCORE: 59
ADLS_ACUITY_SCORE: 58
ADLS_ACUITY_SCORE: 59
ADLS_ACUITY_SCORE: 63
ADLS_ACUITY_SCORE: 59
ADLS_ACUITY_SCORE: 67
ADLS_ACUITY_SCORE: 58
ADLS_ACUITY_SCORE: 61
ADLS_ACUITY_SCORE: 58
ADLS_ACUITY_SCORE: 53
ADLS_ACUITY_SCORE: 64
ADLS_ACUITY_SCORE: 59
ADLS_ACUITY_SCORE: 63
ADLS_ACUITY_SCORE: 63
ADLS_ACUITY_SCORE: 61
ADLS_ACUITY_SCORE: 68
ADLS_ACUITY_SCORE: 61
ADLS_ACUITY_SCORE: 53
ADLS_ACUITY_SCORE: 61
ADLS_ACUITY_SCORE: 67
ADLS_ACUITY_SCORE: 47
ADLS_ACUITY_SCORE: 55
ADLS_ACUITY_SCORE: 67
ADLS_ACUITY_SCORE: 65
ADLS_ACUITY_SCORE: 68
ADLS_ACUITY_SCORE: 58
ADLS_ACUITY_SCORE: 66
ADLS_ACUITY_SCORE: 58
ADLS_ACUITY_SCORE: 61
ADLS_ACUITY_SCORE: 47
ADLS_ACUITY_SCORE: 58
ADLS_ACUITY_SCORE: 67
ADLS_ACUITY_SCORE: 59
ADLS_ACUITY_SCORE: 66
ADLS_ACUITY_SCORE: 53
ADLS_ACUITY_SCORE: 59
ADLS_ACUITY_SCORE: 66
ADLS_ACUITY_SCORE: 67
ADLS_ACUITY_SCORE: 63
ADLS_ACUITY_SCORE: 61
ADLS_ACUITY_SCORE: 57
ADLS_ACUITY_SCORE: 59
ADLS_ACUITY_SCORE: 59
ADLS_ACUITY_SCORE: 64
ADLS_ACUITY_SCORE: 66
ADLS_ACUITY_SCORE: 59
ADLS_ACUITY_SCORE: 61
ADLS_ACUITY_SCORE: 59
ADLS_ACUITY_SCORE: 65
ADLS_ACUITY_SCORE: 47
ADLS_ACUITY_SCORE: 63
ADLS_ACUITY_SCORE: 64
ADLS_ACUITY_SCORE: 59
ADLS_ACUITY_SCORE: 59

## 2024-01-01 ASSESSMENT — COLUMBIA-SUICIDE SEVERITY RATING SCALE - C-SSRS
2. HAVE YOU ACTUALLY HAD ANY THOUGHTS OF KILLING YOURSELF IN THE PAST MONTH?: NO
6. HAVE YOU EVER DONE ANYTHING, STARTED TO DO ANYTHING, OR PREPARED TO DO ANYTHING TO END YOUR LIFE?: NO
1. IN THE PAST MONTH, HAVE YOU WISHED YOU WERE DEAD OR WISHED YOU COULD GO TO SLEEP AND NOT WAKE UP?: NO
1. IN THE PAST MONTH, HAVE YOU WISHED YOU WERE DEAD OR WISHED YOU COULD GO TO SLEEP AND NOT WAKE UP?: NO
2. HAVE YOU ACTUALLY HAD ANY THOUGHTS OF KILLING YOURSELF IN THE PAST MONTH?: NO
1. IN THE PAST MONTH, HAVE YOU WISHED YOU WERE DEAD OR WISHED YOU COULD GO TO SLEEP AND NOT WAKE UP?: NO
6. HAVE YOU EVER DONE ANYTHING, STARTED TO DO ANYTHING, OR PREPARED TO DO ANYTHING TO END YOUR LIFE?: NO
6. HAVE YOU EVER DONE ANYTHING, STARTED TO DO ANYTHING, OR PREPARED TO DO ANYTHING TO END YOUR LIFE?: NO
2. HAVE YOU ACTUALLY HAD ANY THOUGHTS OF KILLING YOURSELF IN THE PAST MONTH?: NO

## 2024-01-01 ASSESSMENT — PATIENT HEALTH QUESTIONNAIRE - PHQ9
SUM OF ALL RESPONSES TO PHQ QUESTIONS 1-9: 0
SUM OF ALL RESPONSES TO PHQ QUESTIONS 1-9: 0
10. IF YOU CHECKED OFF ANY PROBLEMS, HOW DIFFICULT HAVE THESE PROBLEMS MADE IT FOR YOU TO DO YOUR WORK, TAKE CARE OF THINGS AT HOME, OR GET ALONG WITH OTHER PEOPLE: NOT DIFFICULT AT ALL

## 2024-01-01 ASSESSMENT — PAIN SCALES - GENERAL
PAINLEVEL: NO PAIN (0)
PAINLEVEL: MODERATE PAIN (5)

## 2024-01-01 ASSESSMENT — LIFESTYLE VARIABLES: TOBACCO_USE: 1

## 2024-01-02 NOTE — PROGRESS NOTES
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 Recovered well.     With newly found bruise and redness on RLE stump November 2023. Area now necrosed, without drainage, does not appear to be infected. Patient continues to smoke at baseline, now at 1.5 cigarettes/day. TCO2 from 12/28/2023 demonstrated adequate wound healing capability in the right thigh by transcutaneous oxygen determination.  Wound continues to very slowly heal. Two days ago patient accidentally hurt the anterior aspect of RLE stump with now open wound and exposed bone. Patient is upset to need another surgery. Overall patient is hesitant about having to undergo another surgery however should this be absolutely warranted, she would like to discuss with Dr. Mitchell options. She is also concerned for having to come in for clinic visits as she relies on her son for traveling. Given the newly found wound, patient is now scheduled to see Dr. Mitchell on 1/8/2024.    - Recommend meticulous, daily wound care with iodine swabs followed by clean gauze on new wound.  - Keep RLE stump wound elevated on pillow, off load pressure from right leg   - Call if new drainage is noted  - Call if worsening redness on RLE stump  - Go to the ED if you develop fevers or chills  - Follow-up with me next week on 1/9/2023    All questions were answered and support provided. She has our contact information and knows to reach out with any additional questions or concerns.       Yasmin  Bacu, CNP  Vascular Surgery  Pager: [phone]  [email or web address]  Send message or 10 digit call back number Securely via [employer] with the [employer] Web Console (learn more here)      30 minutes spent on the date of the encounter doing chart review, review of outside records, review of test results, interpretation of tests, patient visit, documentation and discussion with other provider(s)          HPI:  Sakshi Jaeger is a very pleasant 78 year old female with past medical history significant for extensive PAD. 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  Postoperatively recovered well, her wounds have healed and she was able to enjoy summer and fall.  More recently patient developed a hematoma on her stump, unclear if it was related to orthotics injury or pressure ulcer given limited activity with RLE stump. Her RLE stump now is slowly improving, she does have a significant area of necrosis however redness and swelling has significantly improved.  No drainage noted. Approximately 2 days ago patient hurt the anterior aspect of her stump on the kitchen table creating an open area. Denies fevers chills, no nausea or vomiting.  No drainage found in the new wound.  Patient has been hesitating about undergoing more surgeries.  She is also concerned with traveling from home to clinic visits, here with her son who is very supportive.    REVIEW OF SYSTEMS:    A 12 point ROS was reviewed and is negative except for what is listed above in HPI.    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     Essential hypertension with goal blood pressure  less than 140/90 8/25/2016     Hyperlipidemia LDL goal <100 11/12/2010     Hypertension goal BP (blood pressure) < 130/80 1/26/2011     Infection of right below knee amputation (H) 10/1/2019     IRON DEFIC ANEMIA NOS 9/15/2005     Lower limb amputation, below knee 12/26/2012     MI (myocardial infarction) (H)     3/2010     Non-healing surgical wound, subsequent encounter 9/24/2020    Added automatically from request for surgery [id number]     Osteoporosis 2/16/2005     OSTEOPOROSIS NOS 2/16/2005     Protein-calorie malnutrition (H24) 5/18/2022     PVD (peripheral vascular disease) (H24) 5/18/2022     Rheumatoid arthritis (H) 2/16/2005          Rheumatoid arthritis(714.0) 2/16/2005     Status post below-knee amputation (H) 12/26/2012               Past Surgical History:   Procedure Laterality Date     ---------INCISION AND DRAINAGE  03/05/2014     AMPUTATE LEG ABOVE KNEE Right 1/13/2023    Procedure: AMPUTATION, ABOVE KNEE RIGHT;  Surgeon: Emma Oconnor MD;  Location: [facility] OR     AMPUTATE LEG BELOW KNEE Right 9/28/2022    Procedure: Right through knee amputation;  Surgeon: Doron Mott MD;  Location: [facility] OR     AMPUTATION BELOW KNEE RT/LT  01/01/2005    right lowwer leg.      ANGIOGRAM Right 1/13/2023    Procedure: right iliac arteriogram aned right common iliac artery stent;  Surgeon: Emma Oconnor MD;  Location: [facility] OR     APPENDECTOMY       ARTHROPLASTY KNEE  04/27/2011    Procedure:ARTHROPLASTY KNEE; Surgeon:JESSE HAWKINS; Location:[facility]     COMPLEX WOUND CLOSURE (UPDATE) Right 12/08/2021    Procedure: Right stump wound debridment and closure;  Surgeon: RAISA Perea MD;  Location: [facility] OR     CORONARY STENT PLACEMENT       ENDARTERECTOMY FEMORAL Right 1/13/2023    Procedure: ENDARTERECTOMY, FEMORAL RIGHT;  Surgeon: Emma Oconnor MD;  Location: [facility] OR     INJECT NERVE BLOCK SPHENOPALATINE GANGLION N/A 9/29/2022    Procedure: Out of OR encounter for block to be done by ;  Surgeon:  GENERIC ANESTHESIA PROVIDER;  Location: [facility] OR     IR OR ANGIOGRAM  1/13/2023     IRRIGATION AND DEBRIDEMENT LOWER EXTREMITY, COMBINED Right 10/09/2019    Procedure: Irrigation and debridement Right leg;  Surgeon: Doron Mott MD;  Location: [facility] OR     NERVE BLOCK PERIPHERAL N/A 9/27/2022    Procedure: BLOCK, NERVE;  Surgeon: GENERIC ANESTHESIA PROVIDER;  [facility]: [facility]     OTHER SURGICAL HISTORY  03/05/2014    I AND D      OTHER SURGICAL HISTORY Right 10/09/2019    IRRIGATION AND DEBRIDEMENT LOWER EXTREMITY, COMBINED     PHACOEMULSIFICATION CLEAR CORNEA WITH STANDARD INTRAOCULAR LENS IMPLANT Left 9/8/2022    Procedure: PHACOEMULSIFICATION, LEFT CATARACT, WITH INTRAOCULAR LENS IMPLANT;  Surgeon: Flip Izaguirre MD;  Location: [facility] OR     PHACOEMULSIFICATION CLEAR CORNEA WITH STANDARD INTRAOCULAR LENS IMPLANT Right 10/13/2022    Procedure: PHACOEMULSIFICATION, RIGHT CATARACT, WITH INTRAOCULAR LENS IMPLANT;  Surgeon: Flip Izaguirre MD;  Location: [facility] OR     REVISE SCAR EXTREMITY Right 12/17/2020    Procedure: Right stump scar revision;  Surgeon: RAISA Perea MD;  Location: [facility] OR       ALLERGIES:  Patient has no known allergies.    MEDS:    Current Outpatient Medications:      acetaminophen (TYLENOL) 325 MG tablet, Take 2 tablets (650 mg) by mouth every 4 hours, Disp: , Rfl:      amLODIPine (NORVASC) 5 MG tablet, Take 1 tablet (5 mg) by mouth daily, Disp: 90 tablet, Rfl: 3     aspirin (ASA) 81 MG tablet, Take 81 mg by mouth daily, Disp:  , Rfl:      calcium carbonate 600 mg-vitamin D 400 units (CALCIUM 600 + D) 600-400 MG-UNIT per tablet, Take 2 tablets by mouth daily, Disp: , Rfl:      folic acid (FOLVITE) 1 MG tablet, Take 1 mg by mouth daily, Disp: , Rfl:      gabapentin (NEURONTIN) 300 MG capsule, Take 1 capsule (300 mg) by mouth 3 times daily, Disp: 270 capsule, Rfl: 0     leflunomide (ARAVA) 20 MG tablet, Take 1 tablet by mouth every 24 hours, Disp: , Rfl:       lisinopril (ZESTRIL) 20 MG tablet, TAKE 1 TABLET BY MOUTH EVERY DAY, Disp: 90 tablet, Rfl: 0     methotrexate 2.5 MG tablet, Take 20 mg by mouth every 7 days On Saturdays, Disp: , Rfl:      metoprolol succinate ER (TOPROL XL) 50 MG 24 hr tablet, TAKE ONE TABLET BY MOUTH EVERY DAY, Disp: 90 tablet, Rfl: 1     multivitamin w/minerals (THERA-VIT-M) tablet, Take 1 tablet by mouth daily, Disp: , Rfl:      predniSONE (DELTASONE) 5 MG tablet, Take 1 tablet (5 mg) by mouth daily, Disp: , Rfl: 0     simvastatin (ZOCOR) 20 MG tablet, Take 1 tablet (20 mg) by mouth At Bedtime, Disp: 90 tablet, Rfl: 1     ELIQUIS ANTICOAGULANT 5 MG tablet, TAKE 1 TABLET BY MOUTH TWICE A DAY (Patient not taking: Reported on 12/18/2023), Disp: 60 tablet, Rfl: 2    SOCIAL HABITS:    History   Smoking Status     Some Days     Packs/day: 0.50     Years: 45.00     Types: Cigarettes     Last attempt to quit: 2/26/2010   Smokeless Tobacco     Never     Social History    Substance and Sexual Activity      Alcohol use: Yes        Comment: occsionally-3 -4 drinks a week      History   Drug Use No       FAMILY HISTORY:    Family History   Problem Relation Age of Onset     Cardiovascular Mother      Cancer Brother      Diabetes No family hx of      Hypertension No family hx of      Cerebrovascular Disease No family hx of      Alzheimer Disease No family hx of      Thyroid Disease No family hx of      Respiratory No family hx of      Other - See Comments Mother         CARDIOVASCULAR     Cancer Brother        PE:  BP (!) 177/70 (BP Location: Left arm, Patient Position: Chair, Cuff Size: Adult Small)   Pulse 70   LMP  (LMP Unknown)   SpO2 96%   Wt Readings from Last 1 Encounters:   10/26/23 45.4 kg (100 lb)     There is no height or weight on file to calculate BMI.    EXAM:  GENERAL: well-developed 78 year old female who appears her stated age  CARDIAC: normal   CHEST/LUNG: normal respiratory effort   MUSCULOSKELETAL: grossly normal and both lower  extremities are intact, no lower extremity edema  NEUROLOGIC: focally intact, alert and oriented x 3  PSYCH: appropriate affect  Extremities: RLE stump with newly found wound, length 20 mm x width 10 mm.  No drainage.      DIAGNOSTIC STUDIES:     Images:  US TCO2 Unilateral    Result Date: 12/29/2023  Lower extremity transcutaneous oximetry (TCO2) dated 12/28/2023 12:47 PM. Comparison study: 7/21/2021. Clinical history: Non healing wound Right AKA Technique: Electrodes were placed on: 1. left chest 116 mmHg 2. right lateral upper thigh 50 mmHg 3. right medial mid thigh 79 mmHg 4. right medial lower thigh 71 mmHg     Impression: Adequate wound healing capability in the right thigh by transcutaneous oxygen determination. Diagnostic criteria for TcpO2s measurements: > 40 mmHg - adequate wound healing capability 20-40 mmHg - marginal impairment of wound healing capability < 20 mmHg - marked impairment of wound healing capability I have personally reviewed the examination and initial interpretation and I agree with the findings. MARGARETTE PALMER MD   SYSTEM ID:  [id number]        LABS:      Sodium   Date Value Ref Range Status   04/19/2023 131 (L) 136 - 145 mmol/L Final   01/14/2023 134 (L) 136 - 145 mmol/L Final   01/13/2023 139 136 - 145 mmol/L Final   10/13/2019 136 133 - 144 mmol/L Final   10/11/2019 136 133 - 144 mmol/L Final   10/10/2019 136 133 - 144 mmol/L Final     Urea Nitrogen   Date Value Ref Range Status   04/19/2023 11.8 8.0 - 23.0 mg/dL Final   01/14/2023 7.5 (L) 8.0 - 23.0 mg/dL Final   01/13/2023 10.2 8.0 - 23.0 mg/dL Final   01/11/2023 10 7 - 30 mg/dL Final   09/27/2022 8 7 - 30 mg/dL Final   09/02/2022 8 7 - 30 mg/dL Final   10/13/2019 6 (L) 7 - 30 mg/dL Final   10/11/2019 6 (L) 7 - 30 mg/dL Final   10/10/2019 7 7 - 30 mg/dL Final     Hemoglobin   Date Value Ref Range Status   04/19/2023 11.0 (L) 11.7 - 15.7 g/dL Final   01/14/2023 8.0 (L) 11.7 - 15.7 g/dL Final   01/13/2023 10.2 (L) 11.7 - 15.7 g/dL Final    03/12/2020 12.2 11.7 - 15.7 g/dL Final   10/13/2019 7.5 (L) 11.7 - 15.7 g/dL Final   10/11/2019 7.8 (L) 11.7 - 15.7 g/dL Final     Platelet Count   Date Value Ref Range Status   04/19/2023 222 150 - 450 10e3/uL Final   01/16/2023 158 150 - 450 10e3/uL Final   01/14/2023 180 150 - 450 10e3/uL Final   03/12/2020 356 150 - 450 10e9/L Final   10/13/2019 563 (H) 150 - 450 10e9/L Final   10/11/2019 505 (H) 150 - 450 10e9/L Final     INR   Date Value Ref Range Status   10/09/2019 1.31 (H) 0.86 - 1.14 Final   10/01/2019 1.38 (H) 0.86 - 1.14 Final

## 2024-01-02 NOTE — PATIENT INSTRUCTIONS
Thank you so much for choosing us for your care. It was a pleasure to see you at the vascular clinic today.     Follow-up recommendations: We will see you next week.    Additional testing/imaging ordered today: None      Our scheduling team will get in touch with you to set up any follow-up testing/imaging and/or appointments. Please be aware that any testing/imaging recommended today will need to completed prior to your next visit with the provider. If testing/imaging is not completed prior to your next visit, your visit may be rescheduled.        If you have any questions, please contact our clinic directly at [phone] and ask for the nurse. We also encourage the use of [employer] to communicate with your HealthCare Provider.      If you have an urgent need after business hours (8:00 am to 4:30 pm) please call [phone], option 4, and ask for the vascular attending on call. For non-urgent after hours needs, please call the vascular clinic at [phone]. For scheduling needs, please call our clinic directly at [phone].    Learning About BE FAST: Stroke Warning Signs  BE FAST is a simple way to remember the main symptoms of stroke. These symptoms happen suddenly. So learning what to look for helps you know when to call for medical help. BE FAST stands for:    B - Balance.  Loss of balance or trouble walking.     E - Eyes.  Trouble seeing out of one or both eyes.     F - Face.  Weakness or drooping on one side of the face.     A - Arm.  Weakness or numbness in an arm or leg.     S - Speech.  Trouble speaking.     T - Time to call 911.  Also call 911 if you have other stroke symptoms. They include:  Sudden confusion.  Sudden trouble understanding simple statements.  Fainting.  A seizure.  A sudden, severe headache.     A stroke happens when a blood vessel in the brain bursts or is blocked by a blood clot. The blood supply to part of the brain--and the oxygen the blood carries--is reduced. This damages the  "brain.   If you have a stroke, quick treatment may save your life. And it may reduce the damage in your brain so that you have fewer problems after the stroke.   Where can you learn more?  Go to https://www.[employer].net/patiented  Enter E640 in the search box to learn more about \"Learning About BE FAST: Stroke Warning Signs.\"  Current as of: December 18, 2022               Content Version: 13.8    8417-7023 [employer].   Care instructions adapted under license by your healthcare professional. If you have questions about a medical condition or this instruction, always ask your healthcare professional. [employer] disclaims any warranty or liability for your use of this information.    Learning About How to Prevent a Stroke  What is a stroke?  A stroke is damage to the brain that occurs when a blood vessel in the brain bursts or is blocked by a blood clot. Without blood and the oxygen it carries, part of the brain starts to die. The part of the body controlled by the damaged area of the brain can't work properly.  Brain damage can start within minutes of a stroke. But quick treatment can help limit the damage and increase the chance of a full recovery.  What puts you at risk for stroke?  A risk factor is anything that makes you more likely to have a particular health problem.  Risk factors for stroke that you can manage or change include:  Health problems like atrial fibrillation, diabetes, high blood pressure, high cholesterol, hardening of the arteries (atherosclerosis), and sickle cell disease.  Smoking.  Drinking more than 2 alcoholic drinks a day for men and 1 drink a day for women.  Being overweight.  Not eating healthy foods.  Not getting enough physical activity.  Risk factors you can't change include:  Having a previous stroke.  Family history of stroke.  Being older.  Being , [place] Native, , or South  American.  Being female.  Having certain " problems during pregnancy, such as preeclampsia.  Being past menopause.  Your doctor can help you know your risk. Then you and your doctor can talk about whether to take steps to lower it.  How can you help prevent a stroke?  Here are some things you can do to help prevent a stroke.  Manage health problems that raise your risk. These include atrial fibrillation, diabetes, high blood pressure, and high cholesterol.  Have a heart-healthy lifestyle.  Don't smoke. If you need help quitting, talk to your doctor about stop-smoking programs and medicines. These can increase your chances of quitting for good.  Limit alcohol to 2 drinks a day for men and 1 drink a day for women.  Stay at a healthy weight. Lose weight if you need to.  Be active. Get at least 30 minutes of exercise on most days of the week. Walking is a good choice. You also may want to do other activities, such as running, swimming, cycling, or playing tennis or team sports.  Eat heart-healthy foods. These include vegetables, fruits, nuts, beans, lean meat, fish, and whole grains. Limit sodium and sugar.  If you think you may have a problem with alcohol or drug use, talk to your doctor.  If you use hormone therapy for menopause or hormonal birth control, talk with your doctor. Ask if these are right for you. They may raise the risk of stroke in some people.  Decide with your doctor whether you will also take medicines to help lower your risk. For example, you and your doctor may decide you will take a medicine that prevents blood clots.  What are the BE FAST stroke warning signs?  BE FAST is a simple way to remember the main symptoms of stroke. These symptoms happen suddenly. So knowing what to look for helps you know when to call for medical help.  BE FAST stands for:  B alance. Loss of balance or trouble walking.  E yes. Trouble seeing out of one or both eyes.  F ace. Weakness or drooping on one side of the face.  A rm. Weakness or numbness in an arm or  "leg.  S peech. Trouble speaking.  T tarun to call 911.  Other stroke symptoms include sudden confusion, sudden trouble understanding simple statements, fainting, a seizure, and a sudden, severe headache.  What are the symptoms of a stroke?  Symptoms of a stroke happen quickly. A stroke may cause:  Sudden numbness, tingling, weakness, or loss of movement in your face, arm, or leg, especially on only one side of your body.  Sudden vision changes.  Sudden trouble speaking.  Sudden confusion or trouble understanding simple statements.  Sudden problems with walking or balance.  A sudden, severe headache that is different from past headaches.  Fainting.  A seizure.  It's important to call for medical help if you have stroke symptoms. Quick treatment may save your life. And it may reduce the damage in your brain so that you have fewer problems after the stroke.  Follow-up care is a key part of your treatment and safety. Be sure to make and go to all appointments, and call your doctor if you are having problems. It's also a good idea to know your test results and keep a list of the medicines you take.  Where can you learn more?  Go to https://www.[employer].net/patiented  Enter G757 in the search box to learn more about \"Learning About How to Prevent a Stroke.\"  Current as of: December 18, 2022               Content Version: 13.8    3960-9175 [employer]   Care instructions adapted under license by your healthcare professional. If you have questions about a medical condition or this instruction, always ask your healthcare professional. [employer] disclaims any warranty or liability for your use of this information.          "

## 2024-01-02 NOTE — LETTER
1/2/2024       RE: Sakshi Jaeger  [address] [facility] [facility] [address]       Dear Colleague,    Thank you for referring your patient, Sakshi Jaeger, to the [facility]ULAR [facility] [place] at [facility]. Please see a copy of my visit note below.        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 Recovered well.     With newly found bruise and redness on RLE stump November 2023. Area now necrosed, without drainage, does not appear to be infected. Patient continues to smoke at baseline, now at 1.5 cigarettes/day. TCO2 from 12/28/2023 demonstrated adequate wound healing capability in the right thigh by transcutaneous oxygen determination.  Wound continues to very slowly heal. Two days ago patient accidentally hurt the anterior aspect of RLE stump with now open wound and exposed bone. Patient is upset to need another surgery. Overall patient is hesitant about having to undergo another surgery however should this be absolutely warranted, she would like to discuss with Dr. Mitchell options. She is also concerned for having to come in for clinic visits as she relies on her son for traveling. Given the newly found wound, patient is now scheduled to see Dr. Mitchell on 1/8/2024.    - Recommend meticulous, daily wound care with iodine swabs followed by clean gauze on new wound.  - Keep RLE stump wound elevated on pillow, off load pressure from right leg   - Call if new  drainage is noted  - Call if worsening redness on RLE stump  - Go to the ED if you develop fevers or chills  - Follow-up with me next week on 1/9/2023    All questions were answered and support provided. She has our contact information and knows to reach out with any additional questions or concerns.       Yasmin Rob CNP  Vascular Surgery  Pager: [phone]  [email or web address]  Send message or 10 digit call back number Securely via Nu[employer] with the Nu[employer] Web Console (learn more here)      30 minutes spent on the date of the encounter doing chart review, review of outside records, review of test results, interpretation of tests, patient visit, documentation and discussion with other provider(s)          HPI:  Sakshi Jaeger is a very pleasant 78 year old female with past medical history significant for extensive PAD. 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  Postoperatively recovered well, her wounds have healed and she was able to enjoy summer and fall.  More recently patient developed a hematoma on her stump, unclear if it was related to orthotics injury or pressure ulcer given limited activity with RLE stump. Her RLE stump now is slowly improving, she does have a significant area of necrosis however redness and swelling has significantly improved.  No drainage noted. Approximately 2 days ago patient hurt the anterior aspect of her stump on the kitchen table creating an open area. Denies fevers chills, no nausea or vomiting.  No drainage found in the new wound.  Patient has been hesitating about undergoing more surgeries.  She is also concerned with traveling from home to clinic visits, here with her son who is very supportive.    REVIEW OF SYSTEMS:    A 12 point ROS was reviewed and is negative except for what is listed above in HPI.    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    Essential hypertension with goal blood pressure less than 140/90 8/25/2016    Hyperlipidemia LDL goal <100 11/12/2010    Hypertension goal BP (blood pressure) < 130/80 1/26/2011    Infection of right below knee amputation (H) 10/1/2019    IRON DEFIC ANEMIA NOS 9/15/2005    Lower limb amputation, below knee 12/26/2012    MI (myocardial infarction) (H)     3/2010    Non-healing surgical wound, subsequent encounter 9/24/2020    Added automatically from request for surgery [id number]    Osteoporosis 2/16/2005    OSTEOPOROSIS NOS 2/16/2005    Protein-calorie malnutrition (H24) 5/18/2022    PVD (peripheral vascular disease) (H24) 5/18/2022    Rheumatoid arthritis (H) 2/16/2005         Rheumatoid arthritis(714.0) 2/16/2005    Status post below-knee amputation (H) 12/26/2012               Past Surgical History:   Procedure Laterality Date    ---------INCISION AND DRAINAGE  03/05/2014    AMPUTATE LEG ABOVE KNEE Right 1/13/2023    Procedure: AMPUTATION, ABOVE KNEE RIGHT;  Surgeon: Emma Oconnor MD;  [facility]    AMPUTATE LEG BELOW KNEE Right 9/28/2022    Procedure: Right through knee amputation;  Surgeon: Doron Mott MD;  [facility]    AMPUTATION BELOW KNEE RT/LT  01/01/2005    right lowwer leg.     ANGIOGRAM Right 1/13/2023    Procedure: right iliac arteriogram aned right common iliac artery stent;  Surgeon: Emma Oconnor MD;  [facility]    APPENDECTOMY      ARTHROPLASTY KNEE  04/27/2011    Procedure:ARTHROPLASTY KNEE; Surgeon:JESSE HAWKINS; [facility]    COMPLEX WOUND CLOSURE (UPDATE) Right 12/08/2021    Procedure: Right stump wound debridment and closure;  Surgeon: RAISA Perea MD;  Location: [facility] OR    CORONARY STENT  PLACEMENT      ENDARTERECTOMY FEMORAL Right 1/13/2023    Procedure: ENDARTERECTOMY, FEMORAL RIGHT;  Surgeon: Emma Oconnor MD;  [facility]    INJECT NERVE BLOCK SPHENOPALATINE GANGLION N/A 9/29/2022    Procedure: Out of OR encounter for block to be done by ;  Surgeon: GENERIC ANESTHESIA PROVIDER;  [facility]    IR OR ANGIOGRAM  1/13/2023    IRRIGATION AND DEBRIDEMENT LOWER EXTREMITY, COMBINED Right 10/09/2019    Procedure: Irrigation and debridement Right leg;  Surgeon: Doron Mott MD;  [facility]    NERVE BLOCK PERIPHERAL N/A 9/27/2022    Procedure: BLOCK, NERVE;  Surgeon: GENERIC ANESTHESIA PROVIDER;  [facility]    OTHER SURGICAL HISTORY  03/05/2014    I AND D     OTHER SURGICAL HISTORY Right 10/09/2019    IRRIGATION AND DEBRIDEMENT LOWER EXTREMITY, COMBINED    PHACOEMULSIFICATION CLEAR CORNEA WITH STANDARD INTRAOCULAR LENS IMPLANT Left 9/8/2022    Procedure: PHACOEMULSIFICATION, LEFT CATARACT, WITH INTRAOCULAR LENS IMPLANT;  Surgeon: Flip Izaguirre MD;  [facility]    PHACOEMULSIFICATION CLEAR CORNEA WITH STANDARD INTRAOCULAR LENS IMPLANT Right 10/13/2022    Procedure: PHACOEMULSIFICATION, RIGHT CATARACT, WITH INTRAOCULAR LENS IMPLANT;  Surgeon: Flip Izaguirre MD;  [facility]    REVISE SCAR EXTREMITY Right 12/17/2020    Procedure: Right stump scar revision;  Surgeon: RAISA Perea MD;  Location: [facility] OR       ALLERGIES:  Patient has no known allergies.    MEDS:    Current Outpatient Medications:     acetaminophen (TYLENOL) 325 MG tablet, Take 2 tablets (650 mg) by mouth every 4 hours, Disp: , Rfl:     amLODIPine (NORVASC) 5 MG tablet, Take 1 tablet (5 mg) by mouth daily, Disp: 90 tablet, Rfl: 3    aspirin (ASA) 81 MG tablet, Take 81 mg by mouth daily, Disp:  , Rfl:     calcium carbonate 600 mg-vitamin D 400 units (CALCIUM 600 + D) 600-400 MG-UNIT per tablet, Take 2 tablets by mouth daily, Disp: , Rfl:     folic acid (FOLVITE) 1  MG tablet, Take 1 mg by mouth daily, Disp: , Rfl:     gabapentin (NEURONTIN) 300 MG capsule, Take 1 capsule (300 mg) by mouth 3 times daily, Disp: 270 capsule, Rfl: 0    leflunomide (ARAVA) 20 MG tablet, Take 1 tablet by mouth every 24 hours, Disp: , Rfl:     lisinopril (ZESTRIL) 20 MG tablet, TAKE 1 TABLET BY MOUTH EVERY DAY, Disp: 90 tablet, Rfl: 0    methotrexate 2.5 MG tablet, Take 20 mg by mouth every 7 days On Saturdays, Disp: , Rfl:     metoprolol succinate ER (TOPROL XL) 50 MG 24 hr tablet, TAKE ONE TABLET BY MOUTH EVERY DAY, Disp: 90 tablet, Rfl: 1    multivitamin w/minerals (THERA-VIT-M) tablet, Take 1 tablet by mouth daily, Disp: , Rfl:     predniSONE (DELTASONE) 5 MG tablet, Take 1 tablet (5 mg) by mouth daily, Disp: , Rfl: 0    simvastatin (ZOCOR) 20 MG tablet, Take 1 tablet (20 mg) by mouth At Bedtime, Disp: 90 tablet, Rfl: 1    ELIQUIS ANTICOAGULANT 5 MG tablet, TAKE 1 TABLET BY MOUTH TWICE A DAY (Patient not taking: Reported on 12/18/2023), Disp: 60 tablet, Rfl: 2    SOCIAL HABITS:    History   Smoking Status    Some Days    Packs/day: 0.50    Years: 45.00    Types: Cigarettes    Last attempt to quit: 2/26/2010   Smokeless Tobacco    Never     Social History    Substance and Sexual Activity      Alcohol use: Yes        Comment: occsionally-3 -4 drinks a week      History   Drug Use No       FAMILY HISTORY:    Family History   Problem Relation Age of Onset    Cardiovascular Mother     Cancer Brother     Diabetes No family hx of     Hypertension No family hx of     Cerebrovascular Disease No family hx of     Alzheimer Disease No family hx of     Thyroid Disease No family hx of     Respiratory No family hx of     Other - See Comments Mother         CARDIOVASCULAR    Cancer Brother        PE:  BP (!) 177/70 (BP Location: Left arm, Patient Position: Chair, Cuff Size: Adult Small)   Pulse 70   LMP  (LMP Unknown)   SpO2 96%   Wt Readings from Last 1 Encounters:   10/26/23 45.4 kg (100 lb)     There is no  height or weight on file to calculate BMI.    EXAM:  GENERAL: well-developed 78 year old female who appears her stated age  CARDIAC: normal   CHEST/LUNG: normal respiratory effort   MUSCULOSKELETAL: grossly normal and both lower extremities are intact, no lower extremity edema  NEUROLOGIC: focally intact, alert and oriented x 3  PSYCH: appropriate affect  Extremities: RLE stump with newly found wound, length 20 mm x width 10 mm.  No drainage.      DIAGNOSTIC STUDIES:     Images:  US TCO2 Unilateral    Result Date: 12/29/2023  Lower extremity transcutaneous oximetry (TCO2) dated 12/28/2023 12:47 PM. Comparison study: 7/21/2021. Clinical history: Non healing wound Right AKA Technique: Electrodes were placed on: 1. left chest 116 mmHg 2. right lateral upper thigh 50 mmHg 3. right medial mid thigh 79 mmHg 4. right medial lower thigh 71 mmHg     Impression: Adequate wound healing capability in the right thigh by transcutaneous oxygen determination. Diagnostic criteria for TcpO2s measurements: > 40 mmHg - adequate wound healing capability 20-40 mmHg - marginal impairment of wound healing capability < 20 mmHg - marked impairment of wound healing capability I have personally reviewed the examination and initial interpretation and I agree with the findings. MARGARETTE PALMER MD   SYSTEM ID:  [id number]        LABS:      Sodium   Date Value Ref Range Status   04/19/2023 131 (L) 136 - 145 mmol/L Final   01/14/2023 134 (L) 136 - 145 mmol/L Final   01/13/2023 139 136 - 145 mmol/L Final   10/13/2019 136 133 - 144 mmol/L Final   10/11/2019 136 133 - 144 mmol/L Final   10/10/2019 136 133 - 144 mmol/L Final     Urea Nitrogen   Date Value Ref Range Status   04/19/2023 11.8 8.0 - 23.0 mg/dL Final   01/14/2023 7.5 (L) 8.0 - 23.0 mg/dL Final   01/13/2023 10.2 8.0 - 23.0 mg/dL Final   01/11/2023 10 7 - 30 mg/dL Final   09/27/2022 8 7 - 30 mg/dL Final   09/02/2022 8 7 - 30 mg/dL Final   10/13/2019 6 (L) 7 - 30 mg/dL Final   10/11/2019 6 (L) 7 -  30 mg/dL Final   10/10/2019 7 7 - 30 mg/dL Final     Hemoglobin   Date Value Ref Range Status   04/19/2023 11.0 (L) 11.7 - 15.7 g/dL Final   01/14/2023 8.0 (L) 11.7 - 15.7 g/dL Final   01/13/2023 10.2 (L) 11.7 - 15.7 g/dL Final   03/12/2020 12.2 11.7 - 15.7 g/dL Final   10/13/2019 7.5 (L) 11.7 - 15.7 g/dL Final   10/11/2019 7.8 (L) 11.7 - 15.7 g/dL Final     Platelet Count   Date Value Ref Range Status   04/19/2023 222 150 - 450 10e3/uL Final   01/16/2023 158 150 - 450 10e3/uL Final   01/14/2023 180 150 - 450 10e3/uL Final   03/12/2020 356 150 - 450 10e9/L Final   10/13/2019 563 (H) 150 - 450 10e9/L Final   10/11/2019 505 (H) 150 - 450 10e9/L Final     INR   Date Value Ref Range Status   10/09/2019 1.31 (H) 0.86 - 1.14 Final   10/01/2019 1.38 (H) 0.86 - 1.14 Final         Again, thank you for allowing me to participate in the care of your patient.      Sincerely,    DESIRE Ventura CNP

## 2024-01-02 NOTE — NURSING NOTE
Chief Complaint   Patient presents with    Follow Up     The patient denies any pain to the wound. They are wondering if the wound looks like it is healing well.     Vitals were taken and medications reconciled.    Kwadwo Pfeiffer, EMT  12:22 PM

## 2024-01-08 NOTE — LETTER
1/8/2024       RE: Sakshi Jaeger  [address] [facility] [address]       Dear Colleague,    Thank you for referring your patient, Sakshi Jaeger, to the [facility] VASCULAR CLINIC [place] at [facility]. Please see a copy of my visit note below.    Vascular & Endovascular Surgery Clinic Consultation   Vascular Surgeon: Bryon Mitchell MD, RPVI       Location:  [facility]    Sakshi Jaeger  Medical Record #:  [id number]  YOB: 1945  Age:  78 year old     Date of Service: 1/8/2024    Referring Provider: No ref. provider found.  Primary Care Provider: Harry Roberto    Chief Complaint/Reason for Visit: Exposed femur    HPI:  Ms. Jaeger is a pleasant 78 year old female seen today with her  for amputation stump breakdown now with exposed femur.  She has been previously followed by Yasmin Rob our nurse practitioner.  She was previously offered to visit with me for surgical revision of the above knee amputation site after escar developed but declined surgical therapy. She accidentally bumped her residual limb one week ago exposing the femur. Otherwise she feels well.  She continues to smoke and is pre contemplative.    Review of Systems:    A 12 point ROS was reviewed and is negative except for symptoms noted in HPI.     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    Essential hypertension with goal blood pressure less than 140/90 8/25/2016    Hyperlipidemia LDL goal <100 11/12/2010    Hypertension goal BP (blood pressure) < 130/80 1/26/2011    Infection of right below knee amputation (H) 10/1/2019    IRON DEFIC ANEMIA NOS 9/15/2005     Lower limb amputation, below knee 12/26/2012    MI (myocardial infarction) (H)     3/2010    Non-healing surgical wound, subsequent encounter 9/24/2020    Added automatically from request for surgery [id number]    Osteoporosis 2/16/2005    OSTEOPOROSIS NOS 2/16/2005    Protein-calorie malnutrition (H24) 5/18/2022    PVD (peripheral vascular disease) (H24) 5/18/2022    Rheumatoid arthritis (H) 2/16/2005         Rheumatoid arthritis(714.0) 2/16/2005    Status post below-knee amputation (H) 12/26/2012              Past Surgical History:   Procedure Laterality Date    ---------INCISION AND DRAINAGE  03/05/2014    AMPUTATE LEG ABOVE KNEE Right 1/13/2023    Procedure: AMPUTATION, ABOVE KNEE RIGHT;  Surgeon: Emma Oconnor MD;  [facility]    AMPUTATE LEG BELOW KNEE Right 9/28/2022    Procedure: Right through knee amputation;  Surgeon: Doron Mott MD;  [facility]    AMPUTATION BELOW KNEE RT/LT  01/01/2005    right lowwer leg.     ANGIOGRAM Right 1/13/2023    Procedure: right iliac arteriogram aned right common iliac artery stent;  Surgeon: Emma Oconnor MD;  [facility]    APPENDECTOMY      ARTHROPLASTY KNEE  04/27/2011    Procedure:ARTHROPLASTY KNEE; Surgeon:JESSE HAWKINS; [facility]    COMPLEX WOUND CLOSURE (UPDATE) Right 12/08/2021    Procedure: Right stump wound debridment and closure;  Surgeon: RAISA Perea MD;  Location: [facility] OR    CORONARY STENT PLACEMENT      ENDARTERECTOMY FEMORAL Right 1/13/2023    Procedure: ENDARTERECTOMY, FEMORAL RIGHT;  Surgeon: Emma Oconnor MD;  [facility]    INJECT NERVE BLOCK SPHENOPALATINE GANGLION N/A 9/29/2022    Procedure: Out of OR encounter for block to be done by ;  Surgeon: GENERIC ANESTHESIA PROVIDER;  [facility]    IR OR ANGIOGRAM  1/13/2023    IRRIGATION AND DEBRIDEMENT LOWER EXTREMITY, COMBINED Right 10/09/2019    Procedure: Irrigation and debridement Right leg;  Surgeon: Doron Mott MD;  [facility]     NERVE BLOCK PERIPHERAL N/A 2022    Procedure: BLOCK, NERVE;  Surgeon: GENERIC ANESTHESIA PROVIDER;  [facility]    OTHER SURGICAL HISTORY  2014    I AND D     OTHER SURGICAL HISTORY Right 10/09/2019    IRRIGATION AND DEBRIDEMENT LOWER EXTREMITY, COMBINED    PHACOEMULSIFICATION CLEAR CORNEA WITH STANDARD INTRAOCULAR LENS IMPLANT Left 2022    Procedure: PHACOEMULSIFICATION, LEFT CATARACT, WITH INTRAOCULAR LENS IMPLANT;  Surgeon: Flip Izaguirre MD;  [facility]    PHACOEMULSIFICATION CLEAR CORNEA WITH STANDARD INTRAOCULAR LENS IMPLANT Right 10/13/2022    Procedure: PHACOEMULSIFICATION, RIGHT CATARACT, WITH INTRAOCULAR LENS IMPLANT;  Surgeon: Flip Izaguirre MD;  [facility]    REVISE SCAR EXTREMITY Right 2020    Procedure: Right stump scar revision;  Surgeon: RAISA Perea MD;  Location: [facility] OR        Allergies:   No Known Allergies     Medications:    Current Outpatient Medications   Medication    acetaminophen (TYLENOL) 325 MG tablet    amLODIPine (NORVASC) 5 MG tablet    aspirin (ASA) 81 MG tablet    calcium carbonate 600 mg-vitamin D 400 units (CALCIUM 600 + D) 600-400 MG-UNIT per tablet    folic acid (FOLVITE) 1 MG tablet    gabapentin (NEURONTIN) 300 MG capsule    leflunomide (ARAVA) 20 MG tablet    lisinopril (ZESTRIL) 20 MG tablet    methotrexate 2.5 MG tablet    metoprolol succinate ER (TOPROL XL) 50 MG 24 hr tablet    multivitamin w/minerals (THERA-VIT-M) tablet    predniSONE (DELTASONE) 5 MG tablet    simvastatin (ZOCOR) 20 MG tablet    ELIQUIS ANTICOAGULANT 5 MG tablet     No current facility-administered medications for this visit.        Social Habits:    Tobacco Use      Smoking status: Some Days        Packs/day: 0.50        Years: 45.00        Additional pack years: 0.00        Total pack years: 22.50        Types: Cigarettes        Last attempt to quit: 2010        Years since quittin.8        Passive exposure: Past       Smokeless tobacco: Never       Alcohol Use: Not on file       History   Drug Use No        Family History:    Family History   Problem Relation Age of Onset    Cardiovascular Mother     Cancer Brother     Diabetes No family hx of     Hypertension No family hx of     Cerebrovascular Disease No family hx of     Alzheimer Disease No family hx of     Thyroid Disease No family hx of     Respiratory No family hx of     Other - See Comments Mother         CARDIOVASCULAR    Cancer Brother        Physical Examination:  BP (!) 152/68 (BP Location: Left arm, Patient Position: Sitting, Cuff Size: Adult Regular)   Pulse 75   LMP  (LMP Unknown)   SpO2 97%   Wt Readings from Last 1 Encounters:   10/26/23 100 lb     There is no height or weight on file to calculate BMI.    Exam:  General: No apparent distress. Answers questions appropriately   Neurologic: Focally intact, Alert and oriented x 3.   Eyes: Grossly normal.   Cardiac:  RRR  Pulmonary:  Non labored breathing with normal respiratory effort  Abdominal: Soft, non distended, non tender to palpation. Musculoskeletal/Extremity: Exposed femur and large escar on amputation site.      Laboratory Findings:  Hemoglobin   Date Value Ref Range Status   04/19/2023 11.0 (L) 11.7 - 15.7 g/dL Final   01/14/2023 8.0 (L) 11.7 - 15.7 g/dL Final   03/12/2020 12.2 11.7 - 15.7 g/dL Final   10/13/2019 7.5 (L) 11.7 - 15.7 g/dL Final     WBC   Date Value Ref Range Status   03/12/2020 13.0 (H) 4.0 - 11.0 10e9/L Final   10/13/2019 11.3 (H) 4.0 - 11.0 10e9/L Final     WBC Count   Date Value Ref Range Status   04/19/2023 3.7 (L) 4.0 - 11.0 10e3/uL Final   01/14/2023 12.5 (H) 4.0 - 11.0 10e3/uL Final     Platelet Count   Date Value Ref Range Status   04/19/2023 222 150 - 450 10e3/uL Final   01/16/2023 158 150 - 450 10e3/uL Final   03/12/2020 356 150 - 450 10e9/L Final   10/13/2019 563 (H) 150 - 450 10e9/L Final     Creatinine   Date Value Ref Range Status   04/19/2023 0.54 0.51 - 0.95 mg/dL Final    11/11/2019 0.69 0.60 - 1.10 mg/dL Final     Sodium   Date Value Ref Range Status   04/19/2023 131 (L) 136 - 145 mmol/L Final   10/13/2019 136 133 - 144 mmol/L Final     Glucose   Date Value Ref Range Status   04/19/2023 98 70 - 99 mg/dL Final   01/14/2023 154 (H) 70 - 99 mg/dL Final   01/11/2023 93 70 - 99 mg/dL Final   09/27/2022 98 70 - 99 mg/dL Final   11/11/2019 77 70 - 125 mg/dL Final   10/13/2019 81 70 - 99 mg/dL Final     GLUCOSE BY METER POCT   Date Value Ref Range Status   01/15/2023 104 (H) 70 - 99 mg/dL Final   01/14/2023 127 (H) 70 - 99 mg/dL Final     Hemoglobin A1C   Date Value Ref Range Status   07/07/2008 5.6 4.3 - 6.0 % Final     INR   Date Value Ref Range Status   10/09/2019 1.31 (H) 0.86 - 1.14 Final       Imaging:    I personally reviewed the TcPO2s from 12/28/2024 which shows adequate oxygen tension for wound healing.    Impression/Shared Medical Decision Making:    #1- Traumatic injury to above knee amputation with exposed femur  #2- Numerous surgical procedures  #3- Peripheral arterial disease status post right common iliac stenting and femoral endarterectomy  #4- Current nicotine dependence  Ms. Jaeger is a pleasant 78 year old female evaluated for amputation revision.  Unfortunately she has exposed bone and escar which will need to be resected.  They are hesitant to undergo higher above knee amputation although that is my recommendation.  Admittedly she is at high risk of wound healing issues due to her smoking, however, TcPO2s suggest healing potential.  I am quite concerned that she will have worsening infection if left untreated.  Given their concerns about high level amputation and wanting to explore options to keep the amputation site as long as possible, I will engage colleagues from plastic surgery and orthopedic surgery to weigh in to see if they have any additional solutions to maximize length over traditional amputation flaps.  They understand risks of waiting while I coordinate  this care including worsening infection.    Recommendations/Plan:  Will engage my orthopedic and plastic surgery colleagues given patient concerns with length of her residual leg with recommended revision.      20 minutes spent on the day of encounter doing chart review from our system and care everywhere, history and exam, documentation, coordinating care, and further activities as noted with over half spent counseling.       Again, thank you for allowing me to participate in the care of your patient.      Sincerely,    Bryon Mitchell MD

## 2024-01-08 NOTE — PATIENT INSTRUCTIONS
Thank you so much for choosing us for your care. It was a pleasure to see you at the vascular clinic today.     Follow-up recommendations: We will discuss your case with your care team and contact you regarding the plan for moving forward.    Additional testing/imaging ordered today: None.      Our scheduling team will get in touch with you to set up any follow-up testing/imaging and/or appointments. Please be aware that any testing/imaging recommended today will need to completed prior to your next visit with the provider. If testing/imaging is not completed prior to your next visit, your visit may be rescheduled.        If you have any questions, please contact our clinic directly at [phone] and ask for the nurse. We also encourage the use of [email or web address] to communicate with your HealthCare Provider.      If you have an urgent need after business hours (8:00 am to 4:30 pm) please call [phone], option 4, and ask for the vascular attending on call. For non-urgent after hours needs, please call the vascular clinic at [phone]. For scheduling needs, please call our clinic directly at [phone].    Learning About BE FAST: Stroke Warning Signs  BE FAST is a simple way to remember the main symptoms of stroke. These symptoms happen suddenly. So learning what to look for helps you know when to call for medical help. BE FAST stands for:    B - Balance.  Loss of balance or trouble walking.     E - Eyes.  Trouble seeing out of one or both eyes.     F - Face.  Weakness or drooping on one side of the face.     A - Arm.  Weakness or numbness in an arm or leg.     S - Speech.  Trouble speaking.     T - Time to call 911.  Also call 911 if you have other stroke symptoms. They include:  Sudden confusion.  Sudden trouble understanding simple statements.  Fainting.  A seizure.  A sudden, severe headache.     A stroke happens when a blood vessel in the brain bursts or is blocked by a blood clot. The blood supply to part  "of the brain--and the oxygen the blood carries--is reduced. This damages the brain.   If you have a stroke, quick treatment may save your life. And it may reduce the damage in your brain so that you have fewer problems after the stroke.   Where can you learn more?  Go to https://www.[employer].net/patiented  Enter E640 in the search box to learn more about \"Learning About BE FAST: Stroke Warning Signs.\"  Current as of: December 18, 2022               Content Version: 13.8    3562-1962 [employer].   Care instructions adapted under license by your healthcare professional. If you have questions about a medical condition or this instruction, always ask your healthcare professional. [employer] disclaims any warranty or liability for your use of this information.    Learning About How to Prevent a Stroke  What is a stroke?  A stroke is damage to the brain that occurs when a blood vessel in the brain bursts or is blocked by a blood clot. Without blood and the oxygen it carries, part of the brain starts to die. The part of the body controlled by the damaged area of the brain can't work properly.  Brain damage can start within minutes of a stroke. But quick treatment can help limit the damage and increase the chance of a full recovery.  What puts you at risk for stroke?  A risk factor is anything that makes you more likely to have a particular health problem.  Risk factors for stroke that you can manage or change include:  Health problems like atrial fibrillation, diabetes, high blood pressure, high cholesterol, hardening of the arteries (atherosclerosis), and sickle cell disease.  Smoking.  Drinking more than 2 alcoholic drinks a day for men and 1 drink a day for women.  Being overweight.  Not eating healthy foods.  Not getting enough physical activity.  Risk factors you can't change include:  Having a previous stroke.  Family history of stroke.  Being older.  Being , [place] " Native, , or South  American.  Being female.  Having certain problems during pregnancy, such as preeclampsia.  Being past menopause.  Your doctor can help you know your risk. Then you and your doctor can talk about whether to take steps to lower it.  How can you help prevent a stroke?  Here are some things you can do to help prevent a stroke.  Manage health problems that raise your risk. These include atrial fibrillation, diabetes, high blood pressure, and high cholesterol.  Have a heart-healthy lifestyle.  Don't smoke. If you need help quitting, talk to your doctor about stop-smoking programs and medicines. These can increase your chances of quitting for good.  Limit alcohol to 2 drinks a day for men and 1 drink a day for women.  Stay at a healthy weight. Lose weight if you need to.  Be active. Get at least 30 minutes of exercise on most days of the week. Walking is a good choice. You also may want to do other activities, such as running, swimming, cycling, or playing tennis or team sports.  Eat heart-healthy foods. These include vegetables, fruits, nuts, beans, lean meat, fish, and whole grains. Limit sodium and sugar.  If you think you may have a problem with alcohol or drug use, talk to your doctor.  If you use hormone therapy for menopause or hormonal birth control, talk with your doctor. Ask if these are right for you. They may raise the risk of stroke in some people.  Decide with your doctor whether you will also take medicines to help lower your risk. For example, you and your doctor may decide you will take a medicine that prevents blood clots.  What are the BE FAST stroke warning signs?  BE FAST is a simple way to remember the main symptoms of stroke. These symptoms happen suddenly. So knowing what to look for helps you know when to call for medical help.  BE FAST stands for:  B alance. Loss of balance or trouble walking.  E yes. Trouble seeing out of one or both eyes.  F ace. Weakness  "or drooping on one side of the face.  A rm. Weakness or numbness in an arm or leg.  S peech. Trouble speaking.  T tarun to call 911.  Other stroke symptoms include sudden confusion, sudden trouble understanding simple statements, fainting, a seizure, and a sudden, severe headache.  What are the symptoms of a stroke?  Symptoms of a stroke happen quickly. A stroke may cause:  Sudden numbness, tingling, weakness, or loss of movement in your face, arm, or leg, especially on only one side of your body.  Sudden vision changes.  Sudden trouble speaking.  Sudden confusion or trouble understanding simple statements.  Sudden problems with walking or balance.  A sudden, severe headache that is different from past headaches.  Fainting.  A seizure.  It's important to call for medical help if you have stroke symptoms. Quick treatment may save your life. And it may reduce the damage in your brain so that you have fewer problems after the stroke.  Follow-up care is a key part of your treatment and safety. Be sure to make and go to all appointments, and call your doctor if you are having problems. It's also a good idea to know your test results and keep a list of the medicines you take.  Where can you learn more?  Go to https://www.[employer].net/patiented  Enter G757 in the search box to learn more about \"Learning About How to Prevent a Stroke.\"  Current as of: December 18, 2022               Content Version: 13.8    1563-7451 [employer].   Care instructions adapted under license by your healthcare professional. If you have questions about a medical condition or this instruction, always ask your healthcare professional. [employer] disclaims any warranty or liability for your use of this information.         "

## 2024-01-08 NOTE — PROGRESS NOTES
Vascular & Endovascular Surgery Clinic Consultation   Vascular Surgeon: Bryon Mitchell MD, RPVI       Location:  [facility]    Sakshi Jaeger  Medical Record #:  [id number]  YOB: 1945  Age:  78 year old     Date of Service: 1/8/2024    Referring Provider: No ref. provider found.  Primary Care Provider: Harry Roberto    Chief Complaint/Reason for Visit: Exposed femur    HPI:  Ms. Jaeger is a pleasant 78 year old female seen today with her  for amputation stump breakdown now with exposed femur.  She has been previously followed by Yasmin Rob our nurse practitioner.  She was previously offered to visit with me for surgical revision of the above knee amputation site after escar developed but declined surgical therapy. She accidentally bumped her residual limb one week ago exposing the femur. Otherwise she feels well.  She continues to smoke and is pre contemplative.    Review of Systems:    A 12 point ROS was reviewed and is negative except for symptoms noted in HPI.     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    Essential hypertension with goal blood pressure less than 140/90 8/25/2016    Hyperlipidemia LDL goal <100 11/12/2010    Hypertension goal BP (blood pressure) < 130/80 1/26/2011    Infection of right below knee amputation (H) 10/1/2019    IRON DEFIC ANEMIA NOS 9/15/2005    Lower limb amputation, below knee 12/26/2012    MI (myocardial infarction) (H)     3/2010    Non-healing surgical wound, subsequent encounter 9/24/2020    Added automatically from request for surgery [id number]    Osteoporosis 2/16/2005    OSTEOPOROSIS NOS 2/16/2005    Protein-calorie malnutrition (H24) 5/18/2022     PVD (peripheral vascular disease) (H24) 5/18/2022    Rheumatoid arthritis (H) 2/16/2005         Rheumatoid arthritis(714.0) 2/16/2005    Status post below-knee amputation (H) 12/26/2012              Past Surgical History:   Procedure Laterality Date    ---------INCISION AND DRAINAGE  03/05/2014    AMPUTATE LEG ABOVE KNEE Right 1/13/2023    Procedure: AMPUTATION, ABOVE KNEE RIGHT;  Surgeon: Emma Oconnor MD;  Location: [facility] OR    AMPUTATE LEG BELOW KNEE Right 9/28/2022    Procedure: Right through knee amputation;  Surgeon: Doron Mott MD;  Location: [facility]    AMPUTATION BELOW KNEE RT/LT  01/01/2005    right lowwer leg.     ANGIOGRAM Right 1/13/2023    Procedure: right iliac arteriogram aned right common iliac artery stent;  Surgeon: Emma Oconnor MD;  Location: [facility] OR    APPENDECTOMY      ARTHROPLASTY KNEE  04/27/2011    Procedure:ARTHROPLASTY KNEE; Surgeon:JESSE HAWKINS; Location:[facility]    COMPLEX WOUND CLOSURE (UPDATE) Right 12/08/2021    Procedure: Right stump wound debridment and closure;  Surgeon: RAISA Perea MD;  Location: [facility] OR    CORONARY STENT PLACEMENT      ENDARTERECTOMY FEMORAL Right 1/13/2023    Procedure: ENDARTERECTOMY, FEMORAL RIGHT;  Surgeon: Emma Oconnor MD;  Location: [facility] OR    INJECT NERVE BLOCK SPHENOPALATINE GANGLION N/A 9/29/2022    Procedure: Out of OR encounter for block to be done by ;  Surgeon: GENERIC ANESTHESIA PROVIDER;  Location: [facility]    IR OR ANGIOGRAM  1/13/2023    IRRIGATION AND DEBRIDEMENT LOWER EXTREMITY, COMBINED Right 10/09/2019    Procedure: Irrigation and debridement Right leg;  Surgeon: Doron Mott MD;  Location: [facility]    NERVE BLOCK PERIPHERAL N/A 9/27/2022    Procedure: BLOCK, NERVE;  Surgeon: GENERIC ANESTHESIA PROVIDER;  [facility]: [facility]    OTHER SURGICAL HISTORY  03/05/2014    I AND D     OTHER SURGICAL HISTORY Right 10/09/2019    IRRIGATION AND DEBRIDEMENT LOWER EXTREMITY, COMBINED    PHACOEMULSIFICATION CLEAR CORNEA WITH  STANDARD INTRAOCULAR LENS IMPLANT Left 2022    Procedure: PHACOEMULSIFICATION, LEFT CATARACT, WITH INTRAOCULAR LENS IMPLANT;  Surgeon: Flip Izaguirre MD;  Location: [facility] OR    PHACOEMULSIFICATION CLEAR CORNEA WITH STANDARD INTRAOCULAR LENS IMPLANT Right 10/13/2022    Procedure: PHACOEMULSIFICATION, RIGHT CATARACT, WITH INTRAOCULAR LENS IMPLANT;  Surgeon: Flip Izaguirre MD;  Location: [facility] OR    REVISE SCAR EXTREMITY Right 2020    Procedure: Right stump scar revision;  Surgeon: RAISA Perea MD;  Location: [facility] OR        Allergies:   No Known Allergies     Medications:    Current Outpatient Medications   Medication    acetaminophen (TYLENOL) 325 MG tablet    amLODIPine (NORVASC) 5 MG tablet    aspirin (ASA) 81 MG tablet    calcium carbonate 600 mg-vitamin D 400 units (CALCIUM 600 + D) 600-400 MG-UNIT per tablet    folic acid (FOLVITE) 1 MG tablet    gabapentin (NEURONTIN) 300 MG capsule    leflunomide (ARAVA) 20 MG tablet    lisinopril (ZESTRIL) 20 MG tablet    methotrexate 2.5 MG tablet    metoprolol succinate ER (TOPROL XL) 50 MG 24 hr tablet    multivitamin w/minerals (THERA-VIT-M) tablet    predniSONE (DELTASONE) 5 MG tablet    simvastatin (ZOCOR) 20 MG tablet    ELIQUIS ANTICOAGULANT 5 MG tablet     No current facility-administered medications for this visit.        Social Habits:    Tobacco Use      Smoking status: Some Days        Packs/day: 0.50        Years: 45.00        Additional pack years: 0.00        Total pack years: 22.50        Types: Cigarettes        Last attempt to quit: 2010        Years since quittin.8        Passive exposure: Past      Smokeless tobacco: Never       Alcohol Use: Not on file       History   Drug Use No        Family History:    Family History   Problem Relation Age of Onset    Cardiovascular Mother     Cancer Brother     Diabetes No family hx of     Hypertension No family hx of     Cerebrovascular Disease No family hx of      Alzheimer Disease No family hx of     Thyroid Disease No family hx of     Respiratory No family hx of     Other - See Comments Mother         CARDIOVASCULAR    Cancer Brother        Physical Examination:  BP (!) 152/68 (BP Location: Left arm, Patient Position: Sitting, Cuff Size: Adult Regular)   Pulse 75   LMP  (LMP Unknown)   SpO2 97%   Wt Readings from Last 1 Encounters:   10/26/23 100 lb     There is no height or weight on file to calculate BMI.    Exam:  General: No apparent distress. Answers questions appropriately   Neurologic: Focally intact, Alert and oriented x 3.   Eyes: Grossly normal.   Cardiac:  RRR  Pulmonary:  Non labored breathing with normal respiratory effort  Abdominal: Soft, non distended, non tender to palpation. Musculoskeletal/Extremity: Exposed femur and large escar on amputation site.      Laboratory Findings:  Hemoglobin   Date Value Ref Range Status   04/19/2023 11.0 (L) 11.7 - 15.7 g/dL Final   01/14/2023 8.0 (L) 11.7 - 15.7 g/dL Final   03/12/2020 12.2 11.7 - 15.7 g/dL Final   10/13/2019 7.5 (L) 11.7 - 15.7 g/dL Final     WBC   Date Value Ref Range Status   03/12/2020 13.0 (H) 4.0 - 11.0 10e9/L Final   10/13/2019 11.3 (H) 4.0 - 11.0 10e9/L Final     WBC Count   Date Value Ref Range Status   04/19/2023 3.7 (L) 4.0 - 11.0 10e3/uL Final   01/14/2023 12.5 (H) 4.0 - 11.0 10e3/uL Final     Platelet Count   Date Value Ref Range Status   04/19/2023 222 150 - 450 10e3/uL Final   01/16/2023 158 150 - 450 10e3/uL Final   03/12/2020 356 150 - 450 10e9/L Final   10/13/2019 563 (H) 150 - 450 10e9/L Final     Creatinine   Date Value Ref Range Status   04/19/2023 0.54 0.51 - 0.95 mg/dL Final   11/11/2019 0.69 0.60 - 1.10 mg/dL Final     Sodium   Date Value Ref Range Status   04/19/2023 131 (L) 136 - 145 mmol/L Final   10/13/2019 136 133 - 144 mmol/L Final     Glucose   Date Value Ref Range Status   04/19/2023 98 70 - 99 mg/dL Final   01/14/2023 154 (H) 70 - 99 mg/dL Final   01/11/2023 93 70 - 99  mg/dL Final   09/27/2022 98 70 - 99 mg/dL Final   11/11/2019 77 70 - 125 mg/dL Final   10/13/2019 81 70 - 99 mg/dL Final     GLUCOSE BY METER POCT   Date Value Ref Range Status   01/15/2023 104 (H) 70 - 99 mg/dL Final   01/14/2023 127 (H) 70 - 99 mg/dL Final     Hemoglobin A1C   Date Value Ref Range Status   07/07/2008 5.6 4.3 - 6.0 % Final     INR   Date Value Ref Range Status   10/09/2019 1.31 (H) 0.86 - 1.14 Final       Imaging:    I personally reviewed the TcPO2s from 12/28/2024 which shows adequate oxygen tension for wound healing.    Impression/Shared Medical Decision Making:    #1- Traumatic injury to above knee amputation with exposed femur  #2- Numerous surgical procedures  #3- Peripheral arterial disease status post right common iliac stenting and femoral endarterectomy  #4- Current nicotine dependence  Ms. Jaeger is a pleasant 78 year old female evaluated for amputation revision.  Unfortunately she has exposed bone and escar which will need to be resected.  They are hesitant to undergo higher above knee amputation although that is my recommendation.  Admittedly she is at high risk of wound healing issues due to her smoking, however, TcPO2s suggest healing potential.  I am quite concerned that she will have worsening infection if left untreated.  Given their concerns about high level amputation and wanting to explore options to keep the amputation site as long as possible, I will engage colleagues from plastic surgery and orthopedic surgery to weigh in to see if they have any additional solutions to maximize length over traditional amputation flaps.  They understand risks of waiting while I coordinate this care including worsening infection.    Recommendations/Plan:  Will engage my orthopedic and plastic surgery colleagues given patient concerns with length of her residual leg with recommended revision.    Bryon Mitchell MD, [facility]  Vascular & Endovascular Surgery      20 minutes spent on the day of  encounter doing chart review from our system and care everywhere, history and exam, documentation, coordinating care, and further activities as noted with over half spent counseling.

## 2024-01-16 NOTE — TELEPHONE ENCOUNTER
[facility] Call Center    Phone Message    May a detailed message be left on voicemail: no     Reason for Call: Other: Santi called and was wondering about a call back they were supposed to receive last week. Santi stated it was an update after the patients team talked to a plastic surgeon. Please call back to follow up.      Action Taken: Other: Vascular surgery    Travel Screening: Not Applicable

## 2024-01-16 NOTE — TELEPHONE ENCOUNTER
Called and spoke with Pt son, Santi. Discussed that as of current, we do not have any additional information. Provider sent message to orth and plastics teams to discuss her case. No update as of yet. MD is out of clinic this week. Back on Monday. Will discuss with him then and return call to Pt on Monday or Tuesday. Santi verbalized understanding, agreed to current plan and denied any further questions.    Destiney Sanford LPN

## 2024-01-22 NOTE — TELEPHONE ENCOUNTER
Called and LVM for pt with an update. I let them know that unfortunately Dr. Mitchell was called for an aortic emergency this morning and so was unable to address pt's plan in clinic today. I let pt know I will get in touch with them by Wednesday at the latest with an update. Clinic callback provided.    AMANDA DavenportN, RN  RN Care Coordinator  [facility] Vascular Surgery - [facility] phone: [phone]  Fax: [phone]

## 2024-01-23 NOTE — TELEPHONE ENCOUNTER
Pt SO, Santi came into clinic and provided wound photos of Pt's Rt amp wound. They do not use MyChart and he was unable to email. There is a small area of what he describes as scabbing directly underneath where bone is protruding. Noted photos would be shared with VS NP and they would be contacted with recommendations. They are continuing with previously recommended wound care. Santi verbalized understanding, agreed to current plan and denied any further questions.                    Destiney Sanford LPN

## 2024-01-24 NOTE — PATIENT INSTRUCTIONS
Preparing for Your Surgery  Getting started  A nurse will call you to review your health history and instructions. They will give you an arrival time based on your scheduled surgery time. Please be ready to share:  Your doctor's clinic name and phone number  Your medical, surgical, and anesthesia history  A list of allergies and sensitivities  A list of medicines, including herbal treatments and over-the-counter drugs  Whether the patient has a legal guardian (ask how to send us the papers in advance)  Please tell us if you're pregnant--or if there's any chance you might be pregnant. Some surgeries may injure a fetus (unborn baby), so they require a pregnancy test. Surgeries that are safe for a fetus don't always need a test, and you can choose whether to have one.   If you have a child who's having surgery, please ask for a copy of Preparing for Your Child's Surgery.    Preparing for surgery  Within 10 to 30 days of surgery: Have a pre-op exam (sometimes called an H&P, or History and Physical). This can be done at a clinic or pre-operative center.  If you're having a , you may not need this exam. Talk to your care team.  At your pre-op exam, talk to your care team about all medicines you take. If you need to stop any medicines before surgery, ask when to start taking them again.  We do this for your safety. Many medicines can make you bleed too much during surgery. Some change how well surgery (anesthesia) drugs work.  Call your insurance company to let them know you're having surgery. (If you don't have insurance, call [phone].)  Call your clinic if there's any change in your health. This includes signs of a cold or flu (sore throat, runny nose, cough, rash, fever). It also includes a scrape or scratch near the surgery site.  If you have questions on the day of surgery, call your hospital or surgery center.  Eating and drinking guidelines  For your safety: Unless your surgeon tells you otherwise,  follow the guidelines below.  Eat and drink as usual until 8 hours before you arrive for surgery. After that, no food or milk.  Drink clear liquids until 2 hours before you arrive. These are liquids you can see through, like water, Gatorade, and Propel Water. They also include plain black coffee and tea (no cream or milk), candy, and breath mints. You can spit out gum when you arrive.  If you drink alcohol: Stop drinking it the night before surgery.  If your care team tells you to take medicine on the morning of surgery, it's okay to take it with a sip of water.  Preventing infection  Shower or bathe the night before and morning of your surgery. Follow the instructions your clinic gave you. (If no instructions, use regular soap.)  Don't shave or clip hair near your surgery site. We'll remove the hair if needed.  Don't smoke or vape the morning of surgery. You may chew nicotine gum up to 2 hours before surgery. A nicotine patch is okay.  Note: Some surgeries require you to completely quit smoking and nicotine. Check with your surgeon.  Your care team will make every effort to keep you safe from infection. We will:  Clean our hands often with soap and water (or an alcohol-based hand rub).  Clean the skin at your surgery site with a special soap that kills germs.  Give you a special gown to keep you warm. (Cold raises the risk of infection.)  Wear special hair covers, masks, gowns and gloves during surgery.  Give antibiotic medicine, if prescribed. Not all surgeries need antibiotics.  What to bring on the day of surgery  Photo ID and insurance card  Copy of your health care directive, if you have one  Glasses and hearing aids (bring cases)  You can't wear contacts during surgery  Inhaler and eye drops, if you use them (tell us about these when you arrive)  CPAP machine or breathing device, if you use them  A few personal items, if spending the night  If you have . . .  A pacemaker, ICD (cardiac defibrillator) or other  implant: Bring the ID card.  An implanted stimulator: Bring the remote control.  A legal guardian: Bring a copy of the certified (court-stamped) guardianship papers.  Please remove any jewelry, including body piercings. Leave jewelry and other valuables at home.  If you're going home the day of surgery  You must have a responsible adult drive you home. They should stay with you overnight as well.  If you don't have someone to stay with you, and you aren't safe to go home alone, we may keep you overnight. Insurance often won't pay for this.  After surgery  If it's hard to control your pain or you need more pain medicine, please call your surgeon's office.  Questions?   If you have any questions for your care team, list them here: _________________________________________________________________________________________________________________________________________________________________________ ____________________________________ ____________________________________ ____________________________________  For informational purposes only. Not to replace the advice of your health care provider. Copyright   2003, 2019 [employer]. All rights reserved. Clinically reviewed by Marie Montano MD. [employer] 435799 - REV 12/22.    How to Take Your Medication Before Surgery  - HOLD (do not take) Aspirin for 7 days  - STOP taking all vitamins and herbal supplements 14 days before surgery.

## 2024-01-24 NOTE — PROGRESS NOTES
Preoperative Evaluation  [facility]  [address]  [place] [address]  Phone: [phone]  Primary Provider: Harry Roberto  Pre-op Performing Provider: TAN MCCAULEY  Jan 24, 2024   {Sakshi is a 78 year old, presenting for the following:  Pre-Op Exam        1/24/2024     9:52 AM   Additional Questions   Roomed by Nereida ARAGON     Surgical Information  Surgery/Procedure:   Surgery Location: Unknown   Surgeon: Dr. Mitchell  Surgery Date: Unknown  Time of Surgery: Unknown  Where patient plans to recover: At home with family ? Or TCU  Fax number for surgical facility:     Assessment & Plan     The proposed surgical procedure is considered INTERMEDIATE risk.      (Z01.818) Preop general physical exam  Comment: Patient is being seen in clinic today for pre op for right RLE amputation.  Plan: REVIEW OF HEALTH MAINTENANCE PROTOCOL ORDERS,         CBC with platelets and differential,         Comprehensive metabolic panel (BMP + Alb, Alk         Phos, ALT, AST, Total. Bili, TP),Pending   EKG 12-lead complete w/read - Clinics No concerns noted on tracing    (S78.111A) Amputation of right lower extremity above knee.   Comment: Patient had wound on stump that would not heal is now needing further amputation surgery.     (I25.10) Coronary artery disease involving native coronary artery of native heart without angina pectoris  Comment: Chronic stable  Plan: Lipid panel reflex to direct LDL Non-fasting        Pending            - No identified additional risk factors other than previously addressed    Antiplatelet or Anticoagulation Medication Instructions   - aspirin: Discontinue aspirin 7-10 days prior to procedure to reduce bleeding risk. It should be resumed postoperatively.     Additional Medication Instructions   - Herbal medications and vitamins: HOLD 14 days prior to surgery.    Recommendation  APPROVAL GIVEN to proceed with proposed procedure, without further diagnostic  evaluation.          Subjective       HPI related to upcoming procedure: No concerns noted on exam good to proceed with procedure          1/24/2024    10:03 AM   Preop Questions   1. Have you ever had a heart attack or stroke? YES -    2. Have you ever had surgery on your heart or blood vessels, such as a stent placement, a coronary artery bypass, or surgery on an artery in your head, neck, heart, or legs? YES -    3. Do you have chest pain with activity? No   4. Do you have a history of  heart failure? No   5. Do you currently have a cold, bronchitis or symptoms of other infection? No   6. Do you have a cough, shortness of breath, or wheezing? No   7. Do you or anyone in your family have previous history of blood clots? No   8. Do you or does anyone in your family have a serious bleeding problem such as prolonged bleeding following surgeries or cuts? No   9. Have you ever had problems with anemia or been told to take iron pills? YES -    10. Have you had any abnormal blood loss such as black, tarry or bloody stools, or abnormal vaginal bleeding? No   11. Have you ever had a blood transfusion? No   12. Are you willing to have a blood transfusion if it is medically needed before, during, or after your surgery? Yes   13. Have you or any of your relatives ever had problems with anesthesia? No   14. Do you have sleep apnea, excessive snoring or daytime drowsiness? No   15. Do you have any artifical heart valves or other implanted medical devices like a pacemaker, defibrillator, or continuous glucose monitor? No   16. Do you have artificial joints? YES -    17. Are you allergic to latex? No     Health Care Directive  Patient has a Health Care Directive on file      Preoperative Review of    reviewed - no record of controlled substances prescribed.  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) 01/13/2023     Priority: Medium    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) 01/13/2023     Priority: Medium    Infection of right knee (H) 09/28/2022     Priority: Medium    Anemia 08/24/2022     Priority: Medium    Combined forms of age-related cataract of both eyes 08/22/2022     Priority: Medium     Added automatically from request for surgery [id number]      PVD (peripheral vascular disease) (H24) 05/18/2022     Priority: Medium    Protein-calorie malnutrition (H24) 05/18/2022     Priority: Medium    Sacroiliac joint pain 06/07/2018     Priority: Medium    Coronary artery disease involving native coronary artery of native heart without angina pectoris 09/27/2016     Priority: Medium    Essential hypertension with goal blood pressure less than 140/90 08/25/2016     Priority: Medium    Status post below-knee amputation (H) 12/26/2012     Priority: Medium            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     Priority: Medium    Advanced directives, counseling/discussion 11/25/2011     Priority: Medium     Advance Care Planning 9/30/2016: ACP Review of Chart / Resources Provided:  Reviewed chart for advance care plan.  Sakshi Jaeger has no plan or code status on file. Discussed available resources and provided with information. Confirmed code status reflects current choices pending further ACP discussions.  Confirmed/documented legally designated decision makers.    Added by Mishel Wallace        Discussed advance care planning with patient; information given to patient to review. 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    Essential hypertension with goal blood pressure less than 140/90 8/25/2016    Hyperlipidemia LDL goal <100 11/12/2010    Hypertension goal BP (blood pressure) < 130/80 1/26/2011    Infection of right below knee amputation (H) 10/1/2019    IRON DEFIC ANEMIA NOS 9/15/2005    Lower limb amputation, below knee 12/26/2012    MI (myocardial infarction) (H)     3/2010    Non-healing surgical wound, subsequent encounter 9/24/2020    Added automatically from request for surgery [id number]    Osteoporosis 2/16/2005    OSTEOPOROSIS NOS 2/16/2005    Protein-calorie malnutrition (H24) 5/18/2022    PVD (peripheral vascular disease) (H24) 5/18/2022    Rheumatoid arthritis (H) 2/16/2005         Rheumatoid arthritis(714.0) 2/16/2005    Status post below-knee amputation (H) 12/26/2012          Past Surgical History:   Procedure Laterality Date    ---------INCISION AND DRAINAGE  03/05/2014    AMPUTATE LEG ABOVE KNEE Right 1/13/2023    Procedure: AMPUTATION, ABOVE KNEE RIGHT;  Surgeon: Emma Oconnor MD;  Location: [facility]    AMPUTATE LEG BELOW KNEE Right 9/28/2022    Procedure: Right through knee amputation;  Surgeon: Doron Mott MD;  Location: [facility]    AMPUTATION BELOW KNEE RT/LT  01/01/2005    right lowwer leg.     ANGIOGRAM Right 1/13/2023    Procedure: right iliac arteriogram aned right common iliac artery stent;  Surgeon: Emma Oconnor MD;  Location: [facility] OR    APPENDECTOMY      ARTHROPLASTY KNEE  04/27/2011    Procedure:ARTHROPLASTY KNEE; Surgeon:JESSE HAWKINS; [facility]    COMPLEX WOUND CLOSURE (UPDATE) Right 12/08/2021    Procedure: Right stump wound debridment and closure;  Surgeon: RAISA Perea MD;  Location: [facility] OR    CORONARY STENT PLACEMENT      ENDARTERECTOMY FEMORAL Right 1/13/2023    Procedure: ENDARTERECTOMY, FEMORAL RIGHT;  Surgeon:  Emma Oconnor MD;  Location: [facility]    INJECT NERVE BLOCK SPHENOPALATINE GANGLION N/A 9/29/2022    Procedure: Out of OR encounter for block to be done by ;  Surgeon: GENERIC ANESTHESIA PROVIDER;  Location: [facility]    IR OR ANGIOGRAM  1/13/2023    IRRIGATION AND DEBRIDEMENT LOWER EXTREMITY, COMBINED Right 10/09/2019    Procedure: Irrigation and debridement Right leg;  Surgeon: Doron Mott MD;  Location: [facility]    NERVE BLOCK PERIPHERAL N/A 9/27/2022    Procedure: BLOCK, NERVE;  Surgeon: GENERIC ANESTHESIA PROVIDER;  [facility]: [facility]    OTHER SURGICAL HISTORY  03/05/2014    I AND D     OTHER SURGICAL HISTORY Right 10/09/2019    IRRIGATION AND DEBRIDEMENT LOWER EXTREMITY, COMBINED    PHACOEMULSIFICATION CLEAR CORNEA WITH STANDARD INTRAOCULAR LENS IMPLANT Left 9/8/2022    Procedure: PHACOEMULSIFICATION, LEFT CATARACT, WITH INTRAOCULAR LENS IMPLANT;  Surgeon: Flip Izaguirre MD;  Location: [facility]    PHACOEMULSIFICATION CLEAR CORNEA WITH STANDARD INTRAOCULAR LENS IMPLANT Right 10/13/2022    Procedure: PHACOEMULSIFICATION, RIGHT CATARACT, WITH INTRAOCULAR LENS IMPLANT;  Surgeon: Flip Izaguirre MD;  Location: [facility] OR    REVISE SCAR EXTREMITY Right 12/17/2020    Procedure: Right stump scar revision;  Surgeon: RAISA Perea MD;  Location: [facility] OR     Current Outpatient Medications   Medication Sig Dispense Refill    acetaminophen (TYLENOL) 325 MG tablet Take 2 tablets (650 mg) by mouth every 4 hours      amLODIPine (NORVASC) 5 MG tablet Take 1 tablet (5 mg) by mouth daily 90 tablet 3    aspirin (ASA) 81 MG tablet Take 81 mg by mouth daily      calcium carbonate 600 mg-vitamin D 400 units (CALCIUM 600 + D) 600-400 MG-UNIT per tablet Take 2 tablets by mouth daily      folic acid (FOLVITE) 1 MG tablet Take 1 mg by mouth daily      gabapentin (NEURONTIN) 300 MG capsule Take 1 capsule (300 mg) by mouth 3 times daily 270 capsule 0    leflunomide (ARAVA) 20 MG tablet Take 1 tablet by  "mouth every 24 hours      lisinopril (ZESTRIL) 20 MG tablet TAKE 1 TABLET BY MOUTH EVERY DAY 90 tablet 0    methotrexate 2.5 MG tablet Take 20 mg by mouth every 7 days On       metoprolol succinate ER (TOPROL XL) 50 MG 24 hr tablet TAKE ONE TABLET BY MOUTH EVERY DAY 90 tablet 1    multivitamin w/minerals (THERA-VIT-M) tablet Take 1 tablet by mouth daily      predniSONE (DELTASONE) 5 MG tablet Take 1 tablet (5 mg) by mouth daily  0    simvastatin (ZOCOR) 20 MG tablet Take 1 tablet (20 mg) by mouth At Bedtime 90 tablet 1       No Known Allergies     Social History     Tobacco Use    Smoking status: Some Days     Packs/day: 0.50     Years: 45.00     Additional pack years: 0.00     Total pack years: 22.50     Types: Cigarettes     Last attempt to quit: 2010     Years since quittin.9     Passive exposure: Past    Smokeless tobacco: Never   Substance Use Topics    Alcohol use: Yes     Comment: occsionally-3 -4 drinks a week       History   Drug Use No         Review of Systems    Review of Systems  Constitutional, HEENT, cardiovascular, pulmonary, gi and gu systems are negative, except as otherwise noted.  Objective    BP (!) 146/79 (BP Location: Right arm, Patient Position: Sitting, Cuff Size: Adult Small)   Pulse 83   Temp 97.9  F (36.6  C) (Temporal)   LMP  (LMP Unknown)   SpO2 97%    Estimated body mass index is 16.64 kg/m  as calculated from the following:    Height as of 23: 1.651 m (5' 5\").    Weight as of 10/26/23: 45.4 kg (100 lb).  Physical Exam  HENT:      Head: Normocephalic.      Nose: Nose normal.      Mouth/Throat:      Mouth: Mucous membranes are moist.   Eyes:      Pupils: Pupils are equal, round, and reactive to light.   Cardiovascular:      Rate and Rhythm: Normal rate and regular rhythm.      Pulses: Normal pulses.      Heart sounds: Normal heart sounds. No murmur heard.     No friction rub.   Pulmonary:      Effort: Pulmonary effort is normal. No respiratory distress.      " Breath sounds: Normal breath sounds. No wheezing.   Abdominal:      General: Bowel sounds are normal. There is no distension.      Tenderness: There is no abdominal tenderness. There is no guarding.   Musculoskeletal:         General: No swelling, tenderness or deformity. Normal range of motion.   Skin:     General: Skin is warm.      Coloration: Skin is not jaundiced or pale.      Findings: Lesion present. No bruising.      Comments: Two wounds noted on left elbow.    Neurological:      General: No focal deficit present.      Mental Status: She is alert and oriented to person, place, and time.   Psychiatric:         Mood and Affect: Mood normal.         Behavior: Behavior normal.         Thought Content: Thought content normal.         Judgment: Judgment normal.           Recent Labs   Lab Test 04/19/23  1930 01/16/23  0540 01/14/23  0600   HGB 11.0*  --  8.0*    158 180   *  --  134*   POTASSIUM 5.0  --  4.5   CR 0.54  --  0.47*        Diagnostics  Recent Results (from the past 24 hour(s))   CBC with platelets and differential    Collection Time: 01/24/24 11:15 AM   Result Value Ref Range    WBC Count 8.7 4.0 - 11.0 10e3/uL    RBC Count 3.10 (L) 3.80 - 5.20 10e6/uL    Hemoglobin 9.9 (L) 11.7 - 15.7 g/dL    Hematocrit 29.3 (L) 35.0 - 47.0 %    MCV 95 78 - 100 fL    MCH 31.9 26.5 - 33.0 pg    MCHC 33.8 31.5 - 36.5 g/dL    RDW 18.6 (H) 10.0 - 15.0 %    Platelet Count 326 150 - 450 10e3/uL    % Neutrophils 90 %    % Lymphocytes 2 %    % Monocytes 5 %    % Eosinophils 1 %    % Basophils 1 %    % Immature Granulocytes 1 %    NRBCs per 100 WBC 0 <1 /100    Absolute Neutrophils 7.8 1.6 - 8.3 10e3/uL    Absolute Lymphocytes 0.2 (L) 0.8 - 5.3 10e3/uL    Absolute Monocytes 0.5 0.0 - 1.3 10e3/uL    Absolute Eosinophils 0.1 0.0 - 0.7 10e3/uL    Absolute Basophils 0.1 0.0 - 0.2 10e3/uL    Absolute Immature Granulocytes 0.1 <=0.4 10e3/uL    Absolute NRBCs 0.0 10e3/uL      EKG: Normal Sinus Rhythm, normal axis,  normal intervals, no acute ST/T changes c/w ischemia, no LVH by voltage criteria, Improved from last EKG    Revised Cardiac Risk Index (RCRI)  The patient has the following serious cardiovascular risks for perioperative complications:   - No serious cardiac risks = 0 points     RCRI Interpretation: 1 point: Class II (low risk - 0.9% complication rate)         Signed Electronically by: DESIRE Barnes CNP  Copy of this evaluation report is provided to requesting physician.

## 2024-01-25 NOTE — TELEPHONE ENCOUNTER
Health Call Center    Phone Message    May a detailed message be left on voicemail: yes     Reason for Call: Appointment Intake    Referring Provider Name: DSEIRE Ventura  Diagnosis and/or Symptoms: Open wound of right lower extremity, initial encounter [S81.801A] 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) [Z89.511]     Patient is being referred emergently to Dr. Perea for above diagnosis. Dr. Perea's first available is in May. Please review and follow up with patient to schedule due to emergent referral. Thanks!     Action Taken: Message routed to:  [facility]astics    Travel Screening: Not Applicable

## 2024-01-30 NOTE — LETTER
1/30/2024         RE: Sakshi Jaeger  [address] [address]        Dear Colleague,    Thank you for referring your patient, Sakshi Jaeger, to the [facility]. Please see a copy of my visit note below.       PRESENTING COMPLAINT:  Follow up visit for right BKA and wound issues last surgery done December 2021.     HISTORY OF PRESENTING COMPLAINT: The patient is here for follow up.  The patient is being seen in the presence of my nurse.     Patient well-known to my service.  Last saw her in 2022.  At that time everything was healed.  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  She now has a large wound in the distal end of the stump with exposed bone and it extensive area in the posterior thigh that has a scab.  She is not overtly infected.  She is being followed by vascular surgery.  She has extensive vascular disease in the area.  She has extensive calcifications within the vessels.  She continues to smoke 3 to 5 cigarettes a day.    On exam: Vital signs stable afebrile.    Her stump has exposed bone at the distal end and a large 10 x 10 cm eschar on the posterior thigh.    Recent TC pO2's show enough capability of the thigh skin to heal.     ASSESSMENT AND PLAN:  Based upon the above findings, the patient is here for follow up.     Given the patient's findings today, her extremely calcified vessels in the groin and thigh, and poor inflow, along with her continued active smoking and her previous history of poor wound healing, I do not think she is a candidate for free flap reconstruction.  In my opinion the AKA has to be revised to get primary closure of noninvolved healthy skin and if that means removing the entire femur then that may be necessary.  I do not think preserving the length of the stump and getting soft tissue coverage of the free flap is indicated in her case.  She is at a very high risk  for failing of the free flap leading to a high AKA versus hip disarticulation.    I will discuss the case with vascular surgery and orthopedic surgery.  I am happy to help out in any way.    All questions were answered.  The patient was happy with the visit.    Total time spent in the encounter including chart review visit itself and postvisit paperwork was 30 minutes.    Sincerely,        RAISA Perea MD

## 2024-01-30 NOTE — NURSING NOTE
"Oncology Rooming Note    January 30, 2024 10:41 AM   Sakshi Jaeger is a 78 year old female who presents for:    Chief Complaint   Patient presents with    Oncology Clinic Visit     Assess new wound on stump      Initial Vitals: BP (!) 163/76 (BP Location: Right arm, Patient Position: Sitting, Cuff Size: Adult Small)   Pulse 75   Temp 97.5  F (36.4  C) (Oral)   Resp 16   LMP  (LMP Unknown)   SpO2 97%  Estimated body mass index is 16.64 kg/m  as calculated from the following:    Height as of 1/13/23: 1.651 m (5' 5\").    Weight as of 10/26/23: 45.4 kg (100 lb). There is no height or weight on file to calculate BSA.  Moderate Pain (5) Comment: Data Unavailable   No LMP recorded (lmp unknown). Patient is postmenopausal.  Allergies reviewed: Yes  Medications reviewed: Yes    Medications: Medication refills not needed today.  Pharmacy name entered into [employer]:    [id number] - [place], [place] - [address] OF [address] [place] [address] - [place], [place] - [address] [place] [address]    Frailty Screening:   Is the patient here for a new oncology consult visit in cancer care? 2. No      Clinical concerns: none    Dinorah Goodwin              "

## 2024-01-30 NOTE — PROGRESS NOTES
PRESENTING COMPLAINT:  Follow up visit for right BKA and wound issues last surgery done December 2021.     HISTORY OF PRESENTING COMPLAINT: The patient is here for follow up.  The patient is being seen in the presence of my nurse.     Patient well-known to my service.  Last saw her in 2022.  At that time everything was healed.  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  She now has a large wound in the distal end of the stump with exposed bone and it extensive area in the posterior thigh that has a scab.  She is not overtly infected.  She is being followed by vascular surgery.  She has extensive vascular disease in the area.  She has extensive calcifications within the vessels.  She continues to smoke 3 to 5 cigarettes a day.    On exam: Vital signs stable afebrile.    Her stump has exposed bone at the distal end and a large 10 x 10 cm eschar on the posterior thigh.    Recent TC pO2's show enough capability of the thigh skin to heal.     ASSESSMENT AND PLAN:  Based upon the above findings, the patient is here for follow up.     Given the patient's findings today, her extremely calcified vessels in the groin and thigh, and poor inflow, along with her continued active smoking and her previous history of poor wound healing, I do not think she is a candidate for free flap reconstruction.  In my opinion the AKA has to be revised to get primary closure of noninvolved healthy skin and if that means removing the entire femur then that may be necessary.  I do not think preserving the length of the stump and getting soft tissue coverage of the free flap is indicated in her case.  She is at a very high risk for failing of the free flap leading to a high AKA versus hip disarticulation.    I will discuss the case with vascular surgery and orthopedic surgery.  I am happy to help out in any way.    All questions were answered.  The patient was happy with the  visit.    Total time spent in the encounter including chart review visit itself and postvisit paperwork was 30 minutes.

## 2024-01-31 NOTE — TELEPHONE ENCOUNTER
Brief Vascular Surgery Note:    I called Sakshi today and discussed the need for surgery and openness to proceed on Friday, 2/2/2024.  Sakshi Jaeger is a very pleasant 78 years old female who has extensive vascular disease, with impaired healing process, cachectic, current smoker and compliant with medications. She has had RLE complications requiring amputation starting in 2005 per her significant. Ms. Jaeger unfortunately developed new wound on right lower extremity stump, initially hesitant about undergoing surgery however given progression of wound with now exposed bone we will plan for surgery on Friday afternoon. Patient agreed to have surgery. Considering extensive necrotic area on posterior thigh, and already previous AKA, patient was recommended to be seen by plastic surgery for potential options of stump preservation. Please see progress note from plastics team. She and her significant other expressed/verbalized clear understanding of current situation. She understands the importance of smoking cessation. At our last visit Sakshi asked me not to ask her to stop smoking. She noted smoking was a comfort to her. Provided emotional support and our team has been diligent with providing supplies for wound care at home.  Further technical details of surgery and amount of bone required for amputation to be discussed by Dr. Ramon Arellano and patient.     Case request has been submitted and preparations for surgeriy have been started. Please see nursing notes for details.    Thank you for involving vascular surgery in the care of Sakshi Jaeger. Should any questions or concerns arise, please don't hesitate to contact us.    Yasmin Rob CNP  Vascular Surgery  Pager: [phone]  [email or web address]  Send message or 10 digit call back number Securely via Ext[employer] Reality with the Vocera Web Console (learn more here)

## 2024-02-01 NOTE — PROGRESS NOTES
Surgery Scheduled    Emergency contact Santi was given arrival time and location.    Surgery/Procedure: Above knee amputation, right    Special Equipment: Power saw     Location: [facility] [place]:  [address] [place] [address] (Fax: [phone])    Date: 02/02/2024    Time: 1:30pm    Admission Type: Inpatient    Surgeon:  Dr. Mitchell    OR Confirmed & :  Yes with George on 2/1/2024    Entered on provider calendar:  Yes    Post Op: 03/05/2024    Blood Thinners Addressed: Yes

## 2024-02-01 NOTE — ANESTHESIA PREPROCEDURE EVALUATION
Anesthesia Pre-Procedure Evaluation    Patient: Sakshi Jaeger   MRN: [id number] : 1945        Procedure : Procedure(s):  ABOVE KNEE AMPUTATION          Past Medical History:   Diagnosis Date    Anemia 2022    BENIGN HYPERTENSION 3/1/2005    CAD (coronary artery disease) 2011    Coronary artery disease involving native coronary artery of native heart without angina pectoris 2016    Employs prosthetic leg 2012    Essential hypertension with goal blood pressure less than 140/90 2016    Hyperlipidemia LDL goal <100 2010    Hypertension goal BP (blood pressure) < 130/80 2011    Infection of right below knee amputation (H) 10/1/2019    IRON DEFIC ANEMIA NOS 9/15/2005    Lower limb amputation, below knee 2012    MI (myocardial infarction) (H)     3/2010    Non-healing surgical wound, subsequent encounter 2020    Added automatically from request for surgery [id number]    Osteoporosis 2005    OSTEOPOROSIS NOS 2005    Protein-calorie malnutrition (H24) 2022    PVD (peripheral vascular disease) (H24) 2022    Rheumatoid arthritis (H) 2005         Rheumatoid arthritis(714.0) 2005    Status post below-knee amputation (H) 2012           Past Surgical History:   Procedure Laterality Date    ---------INCISION AND DRAINAGE  2014    AMPUTATE LEG ABOVE KNEE Right 2023    Procedure: AMPUTATION, ABOVE KNEE RIGHT;  Surgeon: Emma Oconnor MD;  Location: [facility]    AMPUTATE LEG BELOW KNEE Right 2022    Procedure: Right through knee amputation;  Surgeon: Doron Mott MD;  [facility]: [facility]    AMPUTATION BELOW KNEE RT/LT  2005    right lowwer leg.     ANGIOGRAM Right 2023    Procedure: right iliac arteriogram aned right common iliac artery stent;  Surgeon: Emma Oconnor MD;  Location: [facility] OR    APPENDECTOMY      ARTHROPLASTY KNEE  2011    Procedure:ARTHROPLASTY KNEE; Surgeon:JESSE HAWKINS; [facility]     COMPLEX WOUND CLOSURE (UPDATE) Right 2021    Procedure: Right stump wound debridment and closure;  Surgeon: RAISA Perea MD;  Location: [facility] OR    CORONARY STENT PLACEMENT      ENDARTERECTOMY FEMORAL Right 2023    Procedure: ENDARTERECTOMY, FEMORAL RIGHT;  Surgeon: Emma Oconnor MD;  [facility]    INJECT NERVE BLOCK SPHENOPALATINE GANGLION N/A 2022    Procedure: Out of OR encounter for block to be done by ;  Surgeon: GENERIC ANESTHESIA PROVIDER;  Location: [facility]    IR OR ANGIOGRAM  2023    IRRIGATION AND DEBRIDEMENT LOWER EXTREMITY, COMBINED Right 10/09/2019    Procedure: Irrigation and debridement Right leg;  Surgeon: Doron Mott MD;  Location: [facility] OR    NERVE BLOCK PERIPHERAL N/A 2022    Procedure: BLOCK, NERVE;  Surgeon: GENERIC ANESTHESIA PROVIDER;  [facility]: [facility]    OTHER SURGICAL HISTORY  2014    I AND D     OTHER SURGICAL HISTORY Right 10/09/2019    IRRIGATION AND DEBRIDEMENT LOWER EXTREMITY, COMBINED    PHACOEMULSIFICATION CLEAR CORNEA WITH STANDARD INTRAOCULAR LENS IMPLANT Left 2022    Procedure: PHACOEMULSIFICATION, LEFT CATARACT, WITH INTRAOCULAR LENS IMPLANT;  Surgeon: Flip Izaguirre MD;  Location: [facility] OR    PHACOEMULSIFICATION CLEAR CORNEA WITH STANDARD INTRAOCULAR LENS IMPLANT Right 10/13/2022    Procedure: PHACOEMULSIFICATION, RIGHT CATARACT, WITH INTRAOCULAR LENS IMPLANT;  Surgeon: Flip Izaguirre MD;  Location: [facility] OR    REVISE SCAR EXTREMITY Right 2020    Procedure: Right stump scar revision;  Surgeon: RAISA Perea MD;  Location: [facility] OR      No Known Allergies   Social History     Tobacco Use    Smoking status: Some Days     Packs/day: 0.50     Years: 45.00     Additional pack years: 0.00     Total pack years: 22.50     Types: Cigarettes     Last attempt to quit: 2010     Years since quittin.9     Passive exposure: Past    Smokeless tobacco: Never   Substance Use Topics     Alcohol use: Yes     Comment: occsionally-3 -4 drinks a week      Wt Readings from Last 1 Encounters:   10/26/23 45.4 kg (100 lb)        Anesthesia Evaluation   Pt has had prior anesthetic. Type: General.    No history of anesthetic complications       ROS/MED HX  ENT/Pulmonary:     (+)                tobacco use, Past use,                    (-) asthma and recent URI   Neurologic:       Cardiovascular:     (+) Dyslipidemia hypertension-range: 100/60/ Peripheral Vascular Disease-  CAD -  - stent-2010.  Drug Eluting Stent.                               Previous cardiac testing   Echo: Date: 10/2019 Results:  Left Ventricle  Global and regional left ventricular function is normal with an EF of 60-65%.  Left ventricular wall thickness cannot evaluate. Left ventricular size is  normal. Left ventricular diastolic function is indeterminate.     Right Ventricle  Right ventricular function, chamber size, wall motion, and thickness are  normal.     Atria  The atria cannot be assessed.     Mitral Valve  The mitral valve is normal.        Aortic Valve  Aortic valve is normal in structure and function.     Tricuspid Valve  The tricuspid valve cannot be assessed. Trace tricuspid insufficiency is  present. The right ventricular systolic pressure is approximated at 25.5 mmHg  plus the right atrial pressure.     Pulmonic Valve  The pulmonic valve cannot be assessed.     Vessels  The aorta root cannot be assessed. The pulmonary artery cannot be assessed.  The inferior vena cava is normal.     Pericardium  No pericardial effusion is present.  Stress Test:  Date: Results:    ECG Reviewed:  Date: Results:    Cath:  Date: Results:   (-) angina and angina   METS/Exercise Tolerance: 1 - Eating, dressing Comment: Uses walker and one leg for short distances, but mostly in electric scooter   Hematologic:       Musculoskeletal: Comment: RA      GI/Hepatic:    (-) GERD   Renal/Genitourinary:  - neg Renal ROS     Endo:     (+)             Chronic steroid usage (prednisone 5mg daily) for          Psychiatric/Substance Use:       Infectious Disease:       Malignancy:       Other:            Physical Exam    Airway        Mallampati: III   TM distance: > 3 FB   Neck ROM: full   Mouth opening: < 3 cm    Respiratory Devices and Support         Dental       (+) Multiple visibly decayed, broken teeth and Removable bridges or other hardware      Cardiovascular   cardiovascular exam normal       Rhythm and rate: regular and normal     Pulmonary   pulmonary exam normal        (+) decreased breath sounds           OUTSIDE LABS:  CBC:   Lab Results   Component Value Date    WBC 8.7 01/24/2024    WBC 3.7 (L) 04/19/2023    HGB 9.9 (L) 01/24/2024    HGB 11.0 (L) 04/19/2023    HCT 29.3 (L) 01/24/2024    HCT 34.6 (L) 04/19/2023     01/24/2024     04/19/2023     BMP:   Lab Results   Component Value Date     (L) 01/24/2024     (L) 04/19/2023    POTASSIUM 3.3 (L) 01/24/2024    POTASSIUM 5.0 04/19/2023    CHLORIDE 90 (L) 01/24/2024    CHLORIDE 99 04/19/2023    CO2 22 01/24/2024    CO2 18 (L) 04/19/2023    BUN 5.6 (L) 01/24/2024    BUN 11.8 04/19/2023    CR 0.34 (L) 01/24/2024    CR 0.54 04/19/2023     (H) 01/24/2024    GLC 98 04/19/2023     COAGS:   Lab Results   Component Value Date    PTT 39 (H) 10/01/2019    INR 1.31 (H) 10/09/2019     POC:   Lab Results   Component Value Date     (H) 05/03/2011     HEPATIC:   Lab Results   Component Value Date    ALBUMIN 3.4 (L) 01/24/2024    PROTTOTAL 5.9 (L) 01/24/2024    ALT 19 01/24/2024    AST 30 01/24/2024    ALKPHOS 133 01/24/2024    BILITOTAL 0.6 01/24/2024     OTHER:   Lab Results   Component Value Date    LACT 0.9 10/01/2019    A1C 5.6 07/07/2008    ADRIAN 8.6 (L) 01/24/2024    PHOS 3.9 06/08/2016    MAG 1.7 04/02/2010    LIPASE 37 (L) 10/01/2019    TSH 4.55 10/17/2019    CRP <2.9 09/27/2022    SED 50 (H) 10/26/2022       Anesthesia Plan    ASA Status:  3    NPO Status:  NPO Appropriate     Anesthesia Type: General.     - Airway: ETT   Induction: Intravenous, Propofol.   Maintenance: Balanced.   Techniques and Equipment:     - Lines/Monitors: 2nd IV, BIS     Consents    Anesthesia Plan(s) and associated risks, benefits, and realistic alternatives discussed. Questions answered and patient/representative(s) expressed understanding.     - Discussed: Risks, Benefits and Alternatives for BOTH SEDATION and the PROCEDURE were discussed     - Discussed with:  Patient       - Patient is DNR/DNI Status: No     Use of blood products discussed: Yes.     - Discussed with: Patient.     - Consented: consented to blood products     Postoperative Care    Pain management: IV analgesics, Oral pain medications, Multi-modal analgesia.   PONV prophylaxis: Ondansetron (or other 5HT-3), Dexamethasone or Solumedrol     Comments:    Other Comments: Sakshi is a 79 yo female PMHx HTN, CAD, PVD with previous AKA of the right leg presenting for stump revision. Plan pre-op epidural, GETA with standard ASA monitors. Risks and benefits of anesthesia/procedure explained including but not limited to allergic reaction, aspiration, need for invasive airway, somnolence, delirium, vocal cord/dental trauma, nausea/vomiting, arrhythmia, stroke, bleeding, need for blood transfusion, myocardial infarction, and death.              Joe Duarte MD    I have reviewed the pertinent notes and labs in the chart from the past 30 days and (re)examined the patient.  Any updates or changes from those notes are reflected in this note.    # Hypokalemia: Lowest K = 3.3 mmol/L in last 30 days, will replace as needed  # Hyponatremia: Lowest Na = 124 mmol/L in last 30 days, will monitor as appropriate      # Hypoalbuminemia: Lowest albumin = 3.4 g/dL in the past 30 days , will monitor as appropriate

## 2024-02-02 PROBLEM — S88.919A AMPUTATION OF LEG (H): Status: ACTIVE | Noted: 2024-01-01

## 2024-02-02 NOTE — BRIEF OP NOTE
[facility]    Brief Operative Note    Pre-operative diagnosis: Non-healing wound of amputation stump (H) [T87.89]  PAD (peripheral artery disease) (H24) [I73.9]  Post-operative diagnosis Same as pre-operative diagnosis    Procedure: Redo ABOVE KNEE AMPUTATION, Right - Leg    Surgeon: Surgeon(s) and Role:     * Bryon Mitchell MD - Primary     * Ariel Giraldo MD - Resident - Assisting     * Denis Fox MD - Resident - Assisting  Anesthesia: General with Block   Estimated Blood Loss: Less than 10 ml    Drains: Edgardo-Silvestre and Incisional VAC  Specimens:   ID Type Source Tests Collected by Time Destination   1 : Right femur Tissue Thigh, Right SURGICAL PATHOLOGY EXAM Ariel Giraldo MD 2/2/2024  3:40 PM      Findings:   14.5 cm femur removed .  Complications: None.  Implants: * No implants in log *

## 2024-02-02 NOTE — OR NURSING
Epidural placement performed without complications.  VSS.  Pt tolerated well.  Will continue to monitor.   Burak RAYMUNDO

## 2024-02-02 NOTE — CONSULTS
SPIRITUAL HEALTH SERVICES Consult Note  [facility] Pre-op    I visited Sakshi and her significant other Santi per routine consult.  Sakshi was distressed by all the poking and prodding that was going on. I provided emotional support and prayer.  They would like a visit after surgery if possible.  I will follow up either later today or next week.    Sharri Og  Chaplain Resident  Pager [phone]    * [facility] remains available 24/7 for emergent requests/referrals, either by having the switchboard page the on-call  or by entering an ASAP/STAT consult in Epic (this will also page the on-call ). Routine Epic consults receive an initial response within 24 hours.*

## 2024-02-02 NOTE — ANESTHESIA PROCEDURE NOTES
"Epidural catheter Procedure Note    Pre-Procedure   Staff -        Anesthesiologist:  Osiel Villagran MD       Resident/Fellow: Prasanna Banda MD       Performed By: resident       Location: pre-op       Procedure Start/Stop Times: 2/2/2024 1:45 PM and 2/2/2024 1:56 PM       Pre-Anesthestic Checklist: patient identified, IV checked, risks and benefits discussed, informed consent, monitors and equipment checked, pre-op evaluation, at physician/surgeon's request and post-op pain management  Timeout:       Correct Patient: Yes        Correct Procedure: Yes        Correct Site: Yes        Correct Position: Yes   Procedure Documentation  Procedure: epidural catheter       Patient Position: RLD       Patient Prep/Sterile Barriers: sterile gloves, mask, patient draped       Skin prep: Chloraprep       Local skin infiltrated with 2 mL of 1% lidocaine.        Insertion Site: L4-5. (midline approach).       Technique: LORT saline        EMILI at 2 cm.       Needle Type: ToFlashpointy needle       Needle Gauge: 17.        Needle Length (Inches): 3.5        Catheter: 19 G.          Catheter threaded easily.           Threaded 10 cm at skin.         # of attempts: 1 and  # of redirects:  0    Assessment/Narrative         Paresthesias: No.       Test dose of 3 mL lidocaine 1.5% w/ 1:200,000 epinephrine at 13:55 CST.         Test dose negative, 3 minutes after injection, for signs of intravascular, subdural, or intrathecal injection.       Insertion/Infusion Method: LORT saline       Aspiration negative for Heme or CSF via Epidural Catheter.    Medication(s) Administered   Medication Administration Time: 2/2/2024 1:45 PM      FOR [facility] ([facility] [place]) ONLY:   Pain Team Contact information: please page the Pain Team Via EmbedStore. Search \"Pain\". During daytime hours, please page the attending first. At night please page the resident first.      "

## 2024-02-02 NOTE — ANESTHESIA PROCEDURE NOTES
Airway       Patient location during procedure: OR       Procedure Start/Stop Times: 2/2/2024 2:23 PM  Staff -        Anesthesiologist:  Ondina Maradiaga MD       Resident/Fellow: Joe Duarte MD       Performed By: resident  Consent for Airway        Urgency: elective  Indications and Patient Condition       Indications for airway management: sorin-procedural and airway protection       Induction type:intravenous       Mask difficulty assessment: 1 - vent by mask    Final Airway Details       Final airway type: endotracheal airway       Successful airway: ETT - single  Endotracheal Airway Details        ETT size (mm): 6.0       Cuffed: yes       Successful intubation technique: direct laryngoscopy       DL Blade Type: MAC 3       Grade View of Cords: 1       Adjucts: stylet       Position: Right       Measured from: gums/teeth       Secured at (cm): 21       Bite block used: None    Post intubation assessment        Placement verified by: capnometry, equal breath sounds and chest rise        Number of attempts at approach: 1       Number of other approaches attempted: 0       Secured with: pink tape       Ease of procedure: easy       Dentition: Unchanged and Intact    Medication(s) Administered   Medication Administration Time: 2/2/2024 2:23 PM

## 2024-02-02 NOTE — ANESTHESIA POSTPROCEDURE EVALUATION
Patient: Sakshi Jaeger    Procedure: Procedure(s):  Redo ABOVE KNEE AMPUTATION       Anesthesia Type:  General    Note:  Disposition: Inpatient   Postop Pain Control: Uneventful            Sign Out: ONGOING pain issues   PONV: No   Neuro/Psych: Uneventful            Sign Out: Acceptable/Baseline neuro status   Airway/Respiratory: Uneventful            Sign Out: Acceptable/Baseline resp. status   CV/Hemodynamics: Uneventful            Sign Out: Acceptable CV status; No obvious hypovolemia; No obvious fluid overload   Other NRE: NONE   DID A NON-ROUTINE EVENT OCCUR? No    Event details/Postop Comments:  - Pain should be will be better controlled as epidural is started           Last vitals:  Vitals Value Taken Time   /103 02/02/24 1745   Temp     Pulse 68 02/02/24 1752   Resp 16 02/02/24 1713   SpO2 95 % 02/02/24 1752   Vitals shown include unfiled device data.    Electronically Signed By: Kimberlee Lincoln MD  February 2, 2024  5:53 PM

## 2024-02-02 NOTE — ANESTHESIA CARE TRANSFER NOTE
Patient: Sakshi Jaeger    Procedure: Procedure(s):  Redo ABOVE KNEE AMPUTATION       Diagnosis: Non-healing wound of amputation stump (H) [T87.89]  PAD (peripheral artery disease) (H24) [I73.9]  Diagnosis Additional Information: No value filed.    Anesthesia Type:   General     Note:    Oropharynx: oropharynx clear of all foreign objects and spontaneously breathing  Level of Consciousness: awake  Oxygen Supplementation: nasal cannula  Level of Supplemental Oxygen (L/min / FiO2): 4  Independent Airway: airway patency satisfactory and stable  Dentition: dentition unchanged  Vital Signs Stable: post-procedure vital signs reviewed and stable  Report to RN Given: handoff report given  Patient transferred to: PACU    Handoff Report: Identifed the Patient, Identified the Reponsible Provider, Reviewed the pertinent medical history, Discussed the surgical course, Reviewed Intra-OP anesthesia mangement and issues during anesthesia, Set expectations for post-procedure period and Allowed opportunity for questions and acknowledgement of understanding      Vitals:  Vitals Value Taken Time   /88    Temp     Pulse 67 02/02/24 1713   Resp 14    SpO2 99 % 02/02/24 1714   Vitals shown include unfiled device data.    Electronically Signed By: DESIRE Miller CRNA  February 2, 2024  5:15 PM

## 2024-02-03 NOTE — PROGRESS NOTES
"   02/03/24 9791   Appointment Info   Signing Clinician's Name / Credentials (PT) Whitney Oleary PT, DPT   Living Environment   People in Home significant other   Current Living Arrangements house   Home Accessibility stairs within home;wheelchair accessible   Number of Stairs, Within Home, Primary two   Stair Railings, Within Home, Primary railings safe and in good condition   Transportation Anticipated family or friend will provide   Living Environment Comments Pt has ramp going into house, small \"step\" to cross threshold into home. All needs met on main level   Self-Care   Usual Activity Tolerance good   Current Activity Tolerance fair   Regular Exercise No   Equipment Currently Used at Home walker, rolling;wheelchair, power;commode chair  (pt uses electric scooter for longer distance amb)   Fall history within last six months yes   Number of times patient has fallen within last six months 1   Activity/Exercise/Self-Care Comment Pt received assistance from BISI Hancock, he assisted with ADLs and some mobility. Primarily uses 4WW for short distance ambulation and electric scooter for longer distances   General Information   Onset of Illness/Injury or Date of Surgery 02/02/24   Referring Physician Denis Fox MD   Patient/Family Therapy Goals Statement (PT) wants to return home   Pertinent History of Current Problem (include personal factors and/or comorbidities that impact the POC) Per pt's chart, \"78-year-old female with severe deconditioning and previous right above-knee amputation and right femoral endarterectomy and iliac stenting.  Over the last year she developed right thigh stump wound with exposed femur.  She is now postop day 1 status post redo right below-knee amputation.\"   Existing Precautions/Restrictions fall   Weight-Bearing Status - LLE weight-bearing as tolerated   Weight-Bearing Status - RLE nonweight-bearing   General Observations activity, up ad nabil   Cognition   Affect/Mental Status " (Cognition) WFL   Pain Assessment   Patient Currently in Pain Yes, see Vital Sign flowsheet   Integumentary/Edema   Integumentary/Edema Comments slight pitting edema noted in LLE   Posture    Posture Forward head position;Protracted shoulders   Range of Motion (ROM)   Range of Motion ROM is WFL   Strength (Manual Muscle Testing)   Strength Comments unable to complete L SLR, unable to hold LLE up in SLR position with assistance   Bed Mobility   Comment, (Bed Mobility) supine<>sit Yanni to manage LLE onto/off of bed   Transfers   Comment, (Transfers) did not assess   Balance   Balance Comments good seated EOB balance   Sensory Examination   Sensory Perception patient reports no sensory changes   Clinical Impression   Criteria for Skilled Therapeutic Intervention Yes, treatment indicated   PT Diagnosis (PT) impaired functional mobility   Influenced by the following impairments decreased strength, decreased activity tolerance, wound vac to RLE   Functional limitations due to impairments bed mobility, transfers, gait   Clinical Presentation (PT Evaluation Complexity) stable   Clinical Presentation Rationale clinical reasoning   Clinical Decision Making (Complexity) low complexity   Planned Therapy Interventions (PT) balance training;bed mobility training;gait training;home exercise program;motor coordination training;neuromuscular re-education;patient/family education;postural re-education;ROM (range of motion);stair training;strengthening;stretching;transfer training;progressive activity/exercise;risk factor education;home program guidelines   Risk & Benefits of therapy have been explained evaluation/treatment results reviewed;care plan/treatment goals reviewed;risks/benefits reviewed;current/potential barriers reviewed;participants voiced agreement with care plan;participants included;patient   Clinical Impression Comments Pt is significantly below her functional mobility baseline, is limited by decreased strength and  activity tolerance along with management of wound vac to RLE. Will benefit from skilled PT during LOS to improve impairments and maximize IND.   PT Total Evaluation Time   PT Yung Low Complexity Minutes (32650) 10   Physical Therapy Goals   PT Frequency 5x/week   PT Predicted Duration/Target Date for Goal Attainment 02/16/24   PT Goals Bed Mobility;Transfers;Gait   PT: Bed Mobility Supine to/from sit;Rolling;Within precautions;Supervision/stand-by assist   PT: Transfers Supervision/stand-by assist;Sit to/from stand;Bed to/from chair;Assistive device;Within precautions   PT: Gait Supervision/stand-by assist;Rolling walker;Within precautions;25 feet   Interventions   Interventions Quick Adds Therapeutic Activity   PT Discharge Planning   PT Plan progress bed mobility, STS   PT Discharge Recommendation (DC Rec) Transitional Care Facility   PT Rationale for DC Rec Pt is significantly below her functional mobility baseline, limited by decreased strength and activity tolerance along with management of wound vac to RLE. Recommend TCU to improve impairments prior to returning home to maximize IND and reduce caregiver burden.   PT Brief overview of current status Yanni bed mobility, encourage sitting EOB as able   Total Session Time   Total Session Time (sum of timed and untimed services) 10

## 2024-02-03 NOTE — PROGRESS NOTES
"Vascular surgery progress note    No new issues overnight, pain generally controlled    BP (!) 152/72 (BP Location: Left arm)   Pulse 65   Temp 97.5  F (36.4  C) (Oral)   Resp 12   Ht 1.626 m (5' 4\")   Wt 43.1 kg (95 lb 0.3 oz)   LMP  (LMP Unknown)   SpO2 95%   BMI 16.31 kg/m      Exam  Resting company in bed  Right above-knee amputation stump is soft, appropriately tender, no appreciable hematoma  Incisional wound VAC is intact with no output in the VAC canister  Scant serosanguineous output in drain    Na 127, K 4.2, Cr 0.27  Wbc 21.0, Hgb 9.8, plts 345    Assessment/plan: 78-year-old female with severe deconditioning and previous right above-knee amputation and right femoral endarterectomy and iliac stenting.  Over the last year she developed right thigh stump wound with exposed femur.  She is now postop day 1 status post redo right below-knee amputation.    -Continue multimodal pain control, epidural managed by anesthesia  -Regular diet  -Continue incisional wound VAC on the left AKA stump and AYAZ drain, monitor output, encouraged the left drain output is largely serous  - stump wrapped and elevated  -Appreciate physical therapy and occupational Therapy  -Will start DVT prophylaxis and resume home aspirin therapy once cleared to do so by anesthesia in the setting of epidural catheter.  - plan for 4 days of perioperative prophylactic antibiotics given her exposed femur presumed osteomyelitis with large necrotic wound over the medial thigh, now resected     Patient was seen and discussed with staff, Dr. Mitchell.     Ariel Giraldo MD  "

## 2024-02-03 NOTE — OP NOTE
VASCULAR SURGERY OPERATIVE REPORT     LOCATION:    [facility]    Sakshi Jaeger  Medical Record #:  [id number]  YOB: 1945  Age:  78 year old       Date of Service: 2/2/2024     Preoperative diagnosis    -Status post right femoral endarterectomy and common iliac artery stenting  -Status post right revision above knee amputation  -Current smoker  -Peripheral arterial disease    Postoperative diagnosis    -Status post right femoral endarterectomy and common iliac artery stenting  -Status post right revision above knee amputation  -Current smoker  -Peripheral arterial disease    Surgeon: Bryon Mitchell MD  First Assistant: Ariel Giraldo MD (Pgy 4 Vascular Surgery Resident)  A first assistant actively participated and was necessary for one or more of the following: opening, exposure and visualization during the case, maintaining hemostasis, wound closure resulting in its safe and expeditious completion.   Other Assistant: Denis Fox MD (Pgy 2 Vascular Surgery Resident)    Procedures:  Redo High Right Transfemoral amputation    Findings:   Exposed femur, resected back into incorporated tissue  No purulence  Necrotic skin resected  Viable muscle although pale    Indication for procedure:    Ms. Jaeger is a 78 year old female who presented to our return clinic under the [facility] with an escar of the amputation stump.  She did not want to undergo revision surgery at that time.  She subsequently fell resulting in exposed femur.  During our clinic visit they requested evalutation for alternative to a high above knee amputation.  The case was reviewed with orthopedic and plastic surgery colleagues, who did not have additional solution.  She was offered redo above knee amputation and was now willing to proceed.  Risks, benefits, alternatives and indications including but not limited to death, anesthetic or cardiopulmonary complications, bleeding, infection,  lymphatic leak, vascular injury resulting in higher level limb loss, acute renal insufficiency due to contrast exposure requiring temporary or permanent dialysis, failure of the amputation to heal particularly in setting of active smoking despite adequate TcPO2s for healing, phantom pain, and mobility difficulties after the operation.  We discussed operative conduct including team approach, potential need for blood transfusion, and possibility of performing medical photography.  The patient and her  Santi understand the risks and would like to proceed with right transfemoral amputation          Description of procedure:    Operative details:    The patient was brought to the operating room.  Under satisfactory general anesthesia with a epidural catheter performed by our anesthesia colleagues, she was placed in supine position with all pressure points padded in standard fashion.  The leg was widely prepped circumferentially and draped in sterile, standard fashion.  A surgical pause was performed with all members of the surgical team to verify patient, medical record number, birth date, planned surgical procedure, surgical site, allergies, fire risk and perioperative antibiotics.    The anterior and posterior skin flaps were marked.  Sharp dissection was carried through skin, subcutaneous tissue, and deep fascia.  The quadriceps tendon was transected just above its patellar insertion and retracted proximally.  The adductor zoe tendon was detached at its insertion, tagged, and retracted proximally.  The femoral artery and vein were doubly ligated and divided.  The femur was transected approximately 14 cm above previous bone cut in an incorporated segment and at least 5 cm above the exposed portion to assure removal of contaminated bone.  The sciatic nerve was isolated and transected under light tension to allow proximal retraction.  The remaining muscles were transected with the cutting cautery.  The limb  was removed. Hemostasis was assured.  A drain was placed The wound was closed in multiple layers with 0 PDS and 3-0 nylon for skin. Small diameter fast-absorbing chromic gut suture was utilized as needed to anatomically oppose the epidermis in order to optimize conditions for healing.  An incisional VAC was placed followed by wrap and placement of a Rooke style AKA protector.    she was transferred to the recovery unit in stable condition    Estimated blood loss: 10 ml     Specimens: none     Complications: none apparent    Plan:  -Bedrest today  -Leg elevation  -Site checks, maintain wrap          Bryon Mitchell MD, VI  VASCULAR AND ENDOVASCULAR SURGERY   [facility] Vascular Surgery

## 2024-02-03 NOTE — PLAN OF CARE
"Goal Outcome Evaluation:      Plan of Care Reviewed With: patient, spouse    Overall Patient Progress: improving    Outcome Evaluation: admitted from PACU to inpatient unit, see detailed note below.    Time: 1074-5533    Admission/Diagnosis:  Non-healing wound of amputation stump (H) [T87.89]  PAD (peripheral artery disease) (H24) [I73.9]  Amputation of leg (H) [S88.919A]    BP (!) 165/77 (BP Location: Left arm)   Pulse 77   Temp 97  F (36.1  C) (Oral)   Resp 13   Ht 1.626 m (5' 4\")   LMP  (LMP Unknown)   SpO2 96%   BMI 17.16 kg/m      Shift Updates: Arrived from PACU at 2130. A&Ox4, VSS on RA ex HTNsive, lift dependent and A2 with turn/repo. C/o pain, anesthesiologist able to connect her to epidural upon arrival to unit and scheduled Tylenol and gabapentin given. Wound vac intact to R AKA revision site. AYAZ drain on R leg. Scattered bruising on extremities w/ mepilex covering skin tears/severe bruises. Non-blanchable redness on coccyx, mepilex intact. All belongings accounted for. Purewick applied for urinary frequency, no BM post-op yet. SO Santi at bedside as pt settled into unit.     Plan: Continue to monitor and with POC.     "

## 2024-02-03 NOTE — PROGRESS NOTES
"  Vascular Surgery Progress Note  Surgery Cross-Cover  Post Op Check    02/02/2024    Sakshi Jaeger is a 78 year old female POD#0 s/p Procedure(s):  Redo ABOVE KNEE AMPUTATION for Pre-Op Diagnosis Codes:     * Non-healing wound of amputation stump (H) [T87.89]     * PAD (peripheral artery disease) (H24) [I73.9]    Pt reports their pain is well controlled. Denies chest pain, shortness of breath, nausea, vomiting, dizziness. Voiding via external urinary cath.     BP (!) 147/77   Pulse 73   Temp 97.8  F (36.6  C) (Oral)   Resp 12   Ht 1.626 m (5' 4\")   LMP  (LMP Unknown)   SpO2 96%   BMI 17.16 kg/m      Gen: A&O x4, NAD   Chest: breathing non-labored on 2L  Abdomen: soft, non-tender, non-distended  Ext: RLE AKA stump with wound vac holding strong suction. Wound edges are non-erythematous. Minimally tender to palpation, no fluctuance.     A/P: No acute post-op issues.    Nick Ferro MD PGY1  General Surgery Resident    "

## 2024-02-03 NOTE — PLAN OF CARE
Goal Outcome Evaluation:      Plan of Care Reviewed With: patient    Overall Patient Progress: improving    Outcome Evaluation: IV ABX continued.  PRN oxycodone given for shoulder and right leg pain with relief, wound vac intact with minimal output and stump elevated.  Repositioned q2h. Pt using PW, good urine output. Marcaine epidural infusing at 6 mL/hr. Regular diet, fair appetite.  Pt worked with therapy today.  AYAZ drain to bulb suction with small amount of sanguinous output.  Family at bedside, calling appropriately and able to make needs known.

## 2024-02-03 NOTE — PLAN OF CARE
Goal Outcome Evaluation: Ongoing, progressing     Plan of Care Reviewed With: patient    Overall Patient Progress: no change    Outcome Evaluation: Admitted from PACU. Pt is alert and oriented and able to make needs known. Pain is managed with oxy PRN and a continuous epidural. No significant changes, slept well between cares. Continue to monitor.

## 2024-02-03 NOTE — SUMMARY OF CARE
Summary of care: patient belonging     - walker  - shoe  - sweatshirt  - jacket  - pants  - black tote  - glasses  - denture/denture cup  - inhaler (pt will have SO bring home tomorrow)

## 2024-02-04 NOTE — PLAN OF CARE
Goal Outcome Evaluation:      Plan of Care Reviewed With: patient    Overall Patient Progress: no change    Outcome Evaluation: Epidural removed, pt c/o of some worsening right leg pain today.  PRN oxycodone and IV dilaudid given with relief.  Repositioned q2h.  Wound vac in place and AYAZ drain to bulb suction, both with minimal output.  Call light within reach, able to make needs known.

## 2024-02-04 NOTE — PLAN OF CARE
Goal Outcome Evaluation: Ongoing, progressing    Plan of Care Reviewed With: patient    Overall Patient Progress: no change    Outcome Evaluation: No significant changes. Edema in LLE wrapped with ACE bandage. IV removed and replaced for infiltration. Reposition q2h. Pt is alert and oriented and able to make needs known. Slept well between cares. Continue to monitor.

## 2024-02-04 NOTE — PROGRESS NOTES
"Vascular surgery progress note    No complaints this morning. Pain overall controlled but feels her epidural catheter is not helping and would like this removed. Tolerating regular diet. Improved LLE edema.    BP (!) 142/60 (BP Location: Right arm)   Pulse 79   Temp 98.3  F (36.8  C) (Oral)   Resp 16   Ht 1.626 m (5' 4\")   Wt 45.5 kg (100 lb 5 oz)   LMP  (LMP Unknown)   SpO2 92%   BMI 17.22 kg/m      Resting in bed  No acute distress  Right above-knee amputation stump with vac intact, scant serosang output in drain    Na 127, K 3.4, Cr 0.34  Wbc 12.2 (21.0), hgb 8.5, plts 274      Assessment/plan: 78-year-old female with severe deconditioning and previous right above-knee amputation and right femoral endarterectomy and iliac stenting.  Over the last year she developed right thigh stump wound with exposed femur.  Now POD#2 s/p re-do right above-knee amputation.      -Continue multimodal pain control,  -Patient's request to remove epidural has been passed on to anesthesia team  -Regular diet  -Continue incisional wound VAC on the left AKA stump and AYAZ drain, monitor output  -Appreciate physical therapy and occupational Therapy  -Hold dvt ppx until epidural removed  - plan for 4 days of perioperative prophylactic antibiotics given her exposed femur presumed osteomyelitis with large necrotic wound over the medial thigh, now resected     Patient was seen and discussed with staff, Dr. Mitchell.     Ariel Giraldo MD  "

## 2024-02-04 NOTE — PROGRESS NOTES
"Pain Service Progress Note  [facility]  Date: 02/03/2024       Patient Name: Sakshi Jaeger  MRN: [id number]  Age: 78 year old  Sex: female      Assessment:  78-year-old female with severe deconditioning and previous right above-knee amputation and right femoral endarterectomy and iliac stenting.  Over the last year she developed right thigh stump wound with exposed femur.  She is now postop day 1 status post redo right below-knee amputation.     Date of Catheter Placement: 2/2/24    Plan/Recommendations:  1. Regional Anesthesia/Analgesia  L4/L5 epidural catheter with 0.0625% bupi at 6ml/hr    Plan to maintain catheter maximum of 7 days    2. Anticoagulation  - Per primary service  -Please contact Inpatient Pain Service before ordering or making any anticoagulation changes       3. Multimodal Analgesia  - Per primary service      Discussed with attending anesthesiologist    Please Page the Pain Team Via [employer]: Adult acute inpatient pain management/[facility]      Subjective: Doing well from pain standpoint    Diet: Snacks/Supplements Adult: Ensure Enlive; With Meals  Advance Diet as Tolerated: Regular Diet Adult; Regular Diet Adult    Relevant Labs:  Recent Labs   Lab Test 02/03/24  0543 10/10/19  0512 10/09/19  0930 10/02/19  0637 10/01/19  1309   INR  --   --  1.31*  --  1.38*      < >  --    < > 357   PTT  --   --   --   --  39*   BUN 5.5*   < >  --    < > 12    < > = values in this interval not displayed.       Physical Exam:  Vitals: /70 (BP Location: Right arm)   Pulse 84   Temp 37  C (98.6  F) (Axillary)   Resp 16   Ht 1.626 m (5' 4\")   Wt 43.1 kg (95 lb 0.3 oz)   LMP  (LMP Unknown)   SpO2 94%   BMI 16.31 kg/m      Physical Exam:   Orientation:  Alert, oriented, and in no acute distress: yes  Sedation: no    Motor Examination:  5/5 Strength in lower extremities: yes    Sensory Level:   Decrease in sensation: no    Catheter Site:   Catheter entry site is " clean/dry/intact: yes    Tender: no      Relevant Medications:  Current Pain Medications:  Medications related to Pain Management (From now, onward)      Start     Dose/Rate Route Frequency Ordered Stop    02/03/24 0800  [Held by provider]  aspirin EC tablet 81 mg        (On hold since yesterday at 2224 until manually unheld; held by Ariel Giraldo MDHold Reason: OtherHold Comments: Hold while epidural in place unless cleared by anesthesia)    81 mg Oral DAILY 02/02/24 2224 02/02/24 2230  acetaminophen (TYLENOL) tablet 650 mg        See [facility] for full Linked Orders Report.    650 mg Oral EVERY 6 HOURS 02/02/24 2219 02/02/24 2230  gabapentin (NEURONTIN) capsule 300 mg        Note to Pharmacy: PTA Sig:Take 1 capsule (300 mg) by mouth 3 times daily      300 mg Oral or Feeding Tube 3 TIMES DAILY 02/02/24 2222 02/02/24 2218  oxyCODONE (ROXICODONE) tablet 5-10 mg         5-10 mg Oral EVERY 4 HOURS PRN 02/02/24 2219 02/02/24 2216  lidocaine 1 % 0.1-1 mL         0.1-1 mL Other EVERY 1 HOUR PRN 02/02/24 2216 02/02/24 2216  lidocaine (LMX4) cream          Topical EVERY 1 HOUR PRN 02/02/24 2216 02/02/24 1659  HYDROmorphone (PF) (DILAUDID) injection 0.3 mg         0.3 mg Intravenous EVERY 3 HOURS PRN 02/02/24 1659 02/02/24 1500  BUPivacaine (MARCAINE) 0.0625 % in sodium chloride 0.9 % 250 mL EPIDURAL Infusion         6 mL/hr  EPIDURAL CONTINUOUS 02/02/24 1439 02/02/24 1439  nalbuphine (NUBAIN) injection 2.5-5 mg         2.5-5 mg Intravenous EVERY 6 HOURS PRN 02/02/24 1439                  Damian Arreguin DO  PGY-4, Anesthesiology  [phone]

## 2024-02-04 NOTE — PROGRESS NOTES
"Pain Service Progress Note  [facility]  Date: 02/04/2024       Patient Name: Sakshi Jaeger  MRN: [id number]  Age: 78 year old  Sex: female      Assessment:  78-year-old female with severe deconditioning and previous right above-knee amputation and right femoral endarterectomy and iliac stenting.  Over the last year she developed right thigh stump wound with exposed femur.  She is now postop day 2 status post redo right below-knee amputation.     Date of Catheter Placement: 2/2/24    Plan/Recommendations:  1. Regional Anesthesia/Analgesia  L4/L5 epidural catheter removed on 2/4/24 at 10:05am; RN notified      2. Anticoagulation  - Per primary service  -Please contact Inpatient Pain Service before ordering or making any anticoagulation changes       3. Multimodal Analgesia  - Per primary service      Discussed with attending anesthesiologist    Please Page the Pain Team Via Amcom: Adult acute inpatient pain management/[facility]      Subjective: Doing well from pain standpoint    Diet: Snacks/Supplements Adult: Ensure Enlive; With Meals  Advance Diet as Tolerated: Regular Diet Adult; Regular Diet Adult    Relevant Labs:  Recent Labs   Lab Test 02/03/24  0543 10/10/19  0512 10/09/19  0930 10/02/19  0637 10/01/19  1309   INR  --   --  1.31*  --  1.38*      < >  --    < > 357   PTT  --   --   --   --  39*   BUN 5.5*   < >  --    < > 12    < > = values in this interval not displayed.       Physical Exam:  Vitals: BP (!) 142/60 (BP Location: Right arm)   Pulse 79   Temp 36.8  C (98.3  F) (Oral)   Resp 16   Ht 1.626 m (5' 4\")   Wt 45.5 kg (100 lb 5 oz)   LMP  (LMP Unknown)   SpO2 92%   BMI 17.22 kg/m      Physical Exam:   Orientation:  Alert, oriented, and in no acute distress: yes  Sedation: no    Motor Examination:  5/5 Strength in lower extremities: yes    Sensory Level:   Decrease in sensation: no    Catheter Site:   Catheter entry site is clean/dry/intact: yes    Tender: " no      Relevant Medications:  Current Pain Medications:  Medications related to Pain Management (From now, onward)      Start     Dose/Rate Route Frequency Ordered Stop    02/03/24 0800  [Held by provider]  aspirin EC tablet 81 mg        (On hold since yesterday at 2224 until manually unheld; held by Ariel Giraldo MDHold Reason: OtherHold Comments: Hold while epidural in place unless cleared by anesthesia)    81 mg Oral DAILY 02/02/24 2224 02/02/24 2230  acetaminophen (TYLENOL) tablet 650 mg        See Hyperspace for full Linked Orders Report.    650 mg Oral EVERY 6 HOURS 02/02/24 2219 02/02/24 2230  gabapentin (NEURONTIN) capsule 300 mg        Note to Pharmacy: PTA Sig:Take 1 capsule (300 mg) by mouth 3 times daily      300 mg Oral or Feeding Tube 3 TIMES DAILY 02/02/24 2222 02/02/24 2218  oxyCODONE (ROXICODONE) tablet 5-10 mg         5-10 mg Oral EVERY 4 HOURS PRN 02/02/24 2219 02/02/24 2216  lidocaine 1 % 0.1-1 mL         0.1-1 mL Other EVERY 1 HOUR PRN 02/02/24 2216 02/02/24 2216  lidocaine (LMX4) cream          Topical EVERY 1 HOUR PRN 02/02/24 2216 02/02/24 1659  HYDROmorphone (PF) (DILAUDID) injection 0.3 mg         0.3 mg Intravenous EVERY 3 HOURS PRN 02/02/24 1659      02/02/24 1500  BUPivacaine (MARCAINE) 0.0625 % in sodium chloride 0.9 % 250 mL EPIDURAL Infusion         6 mL/hr  EPIDURAL CONTINUOUS 02/02/24 1439      02/02/24 1439  nalbuphine (NUBAIN) injection 2.5-5 mg         2.5-5 mg Intravenous EVERY 6 HOURS PRN 02/02/24 1439                Leslie Goldberg, MD   PGY-4, Anesthesiology  [phone]

## 2024-02-05 NOTE — CONSULTS
"SPIRITUAL HEALTH SERVICES Consult Note  [facility] 7C    Referral Source/Reason for Visit: Routine Visit    Summary and Recommendations -   ~Sakshi benefits from talking about her process. I recommend [facility] continuing to follow up to provide support.     Plan: Sakshi does not have a local donnie community and requested [facility] to continue to follow up with her. Myself or another  will follow up with Sakshi upon request and as Sakshi's stay extends.    Rev. BETTY Holly.  Chaplain Resident  Pager [phone]    * [facility] remains available 24/7 for emergent requests/referrals, either by having the switchboard page the on-call  or by entering an ASAP/STAT consult in Epic (this will also page the on-call ). Routine Epic consults receive an initial response within 24 hours.*    Assessment      Patient/Family Understanding of Illness and Goals of Care -   ~Sakshi recently had part of her leg amputated and is in the process of recovering.        Distress and Loss -   ~Sakshi stated, \"I feel better today than yesterday but not sure why.\"  ~Sakshi is processing through the loss of part of her leg and there is appropriately a sense of loss and distress.      Strengths, Coping, and Resources -   ~Sakshi receives her primary support from Santi. She does appreciate visits from [facility] and requested follow up from myself or another .      Meaning, Beliefs, and Spirituality -   ~Sakshi grew up Jainism - Jewish. She said, \"Santi is the more Jainism one and he is Methodist.\"   ~Sakshi has not been actively involved with a [employer] since Covid.  "

## 2024-02-05 NOTE — PROGRESS NOTES
02/05/24 1400   Appointment Info   Signing Clinician's Name / Credentials (SLP) Kaitlin Casanova MS CCC SLP   General Information   Onset of Illness/Injury or Date of Surgery 02/02/24   Referring Physician Yasmin Rob APRN CNP   Pertinent History of Current Problem 78-year-old female with severe deconditioning and previous right above-knee amputation and right femoral endarterectomy and iliac stenting.  Over the last year she developed right thigh stump wound with exposed femur.  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. Clinical swallow evaluation completed per NP order.   Type of Evaluation   Type of Evaluation Swallow Evaluation   Oral Motor   Oral Musculature generally intact   Structural Abnormalities none present   Mucosal Quality adequate   Dentition (Oral Motor)   Dentition (Oral Motor) adequate dentition   Facial Symmetry (Oral Motor)   Facial Symmetry (Oral Motor) WNL   Lip Function (Oral Motor)   Lip Range of Motion (Oral Motor) WNL   Tongue Function (Oral Motor)   Tongue ROM (Oral Motor) WNL   Cough/Swallow/Gag Reflex (Oral Motor)   Volitional Throat Clear/Cough (Oral Motor) WNL   Volitional Swallow (Oral Motor) WNL   Vocal Quality/Secretion Management (Oral Motor)   Vocal Quality (Oral Motor) WFL   Secretion Management (Oral Motor) WNL   General Swallowing Observations   Past History of Dysphagia None per EMR review.   Current Diet/Method of Nutritional Intake (General Swallowing Observations, NIS) regular diet;thin liquids (level 0)   Swallowing Evaluation Clinical swallow evaluation   Clinical Swallow Evaluation   Feeding Assistance no assistance needed   Clinical Swallow Evaluation Textures Trialed thin liquids;solid foods   Clinical Swallow Eval: Thin Liquid Texture Trial   Mode of Presentation, Thin Liquids cup;straw;self-fed   Volume of Liquid or Food Presented 6 oz   Oral Phase of Swallow WFL   Pharyngeal Phase of Swallow intact   Diagnostic Statement Adequate oral phase and no overt  s/s aspiration   Clinical Swallow Evaluation: Solid Food Texture Trial   Mode of Presentation self-fed   Volume Presented 1/2 bagle and oranges   Oral Phase WFL  (slow but functional and complete)   Pharyngeal Phase intact   Diagnostic Statement Slow but functional and complete oral phase and no overt s/s aspiration   Esophageal Phase of Swallow   Patient reports or presents with symptoms of esophageal dysphagia No   Swallowing Recommendations   Diet Consistency Recommendations regular diet;thin liquids (level 0)   Supervision Level for Intake patient independent   Mode of Delivery Recommendations bolus size, small;slow rate of intake   Postural Recommendations none   Swallowing Maneuver Recommendations alternate food and liquid intake   Monitoring/Assistance Required (Eating/Swallowing) stop eating activities when fatigue is present   Recommended Feeding/Eating Techniques (Swallow Eval) maintain upright sitting position for eating   Medication Administration Recommendations, Swallowing (SLP) per pt preference, okay for whole with thin liquid  (pt may benefit from taking larger meds whole in applesauce/ puree texture)   Instrumental Assessment Recommendations instrumental evaluation not recommended at this time   General Therapy Interventions   Planned Therapy Interventions Dysphagia Treatment   Dysphagia treatment Instruction of safe swallow strategies   Clinical Impression   Criteria for Skilled Therapeutic Interventions Met (SLP Eval) Evaluation only   SLP Diagnosis Functional oropharyngeal swallowing mechanism   Risks & Benefits of therapy have been explained evaluation/treatment results reviewed;care plan/treatment goals reviewed;risks/benefits reviewed;current/potential barriers reviewed;participants voiced agreement with care plan;participants included;patient   Clinical Impression Comments Bedside swallow evaluation completed per NP orders. Oral mechanism was unremarkable. Pt presents with functional  oropharyngeal swallowing mechanism and regular/thin liquid diet is recommended. Pt was assessed with regular solids and thin liquids. Pt demonstrated slow but functional and complete mastication, adequate bolus control, minimal oral residuals that adequately cleared with liquid wash, was able to clear bolus with single swallow, no report of sticking sensation in throat, no change in vocal quality following PO trials, and no overt s/s aspiration noted. RN present and pt took medication whole with thin liquid, pt tolerated well without overt s/sx aspiration. RN reports that occasionally pt coughing following medication administration, discussed with pt that she may benefit from taking larger meds whole in applesauce/ puree texture. Recommend pt continue on regular solids/thin liquid diet. Medication okay per pt preference, okay for whole with thin liquids but pt may benefit from taking larger meds whole in applesauce/ puree texture . Pt should be fully alert and upright with all PO. No ongoing speech therapy is recommended. SLP will complete orders. Please re-consult should pt have a change in status or if concerns arise.   SLP Total Evaluation Time   Eval: oral/pharyngeal swallow function, clinical swallow Minutes (17447) 20   SLP Goals   Therapy Frequency (SLP Eval) one time eval and treatment only   SLP Goals Swallow   SLP: Safely tolerate diet without signs/symptoms of aspiration Regular diet;Thin liquids   Interventions   Interventions Quick Adds Swallowing Dysfunction   Swallowing Dysfunction &/or Oral Function for Feeding   Treatment of Swallowing Dysfunction &/or Oral Function for Feeding Minutes (42773) 5   Symptoms Noted During/After Treatment None   Treatment Detail/Skilled Intervention Bedside swallow evaluation complete. Functional oropharyngeal swallowing mechanism. See POC note for further details. Following evaluation SLP provided training on safe swallow strategies, medication administration (puree vs  thin), and aspiration signs/symptoms. Pt verbalized agreement and understanding with training provided. No ongoing speech therapy warranted.   SLP Discharge Planning   SLP Plan sign off   SLP Rationale for DC Rec Functional oropharyngeal swallowing mechanism, no ongoing speech therapy warranted   SLP Brief overview of current status  Recommend pt continue on regular solids/thin liquid diet. Medication okay per pt preference, okay for whole with thin liquids but pt may benefit from taking larger meds whole in applesauce/ puree texture . Pt should be fully alert and upright with all PO. No ongoing speech therapy is recommended. SLP will complete orders. Please re-consult should pt have a change in status or if concerns arise.   Total Session Time   Total Session Time (sum of timed and untimed services) 25   Psychosocial Support   Trust Relationship/Rapport care explained;choices provided

## 2024-02-05 NOTE — PHARMACY-ADMISSION MEDICATION HISTORY
Pharmacist Admission Medication History    Admission medication history is complete. The information provided in this note is only as accurate as the sources available at the time of the update.    Information Source(s): Patient and CareEverywhere/SureScripts via in-person    Pertinent Information:   -Pt is a moderate historian and manages own medications. RN initially collected most last doses on day of surgery. Filled in the gaps with medication history interview.  -Pt stated that she uses SALONPAS lidocaine patches at home for back pain.  -Fill hx was not uptodate with Simvastatin, but pt insisted that they use this every night.  -Pt uses Gabapentin as needed despite RX stating schd. Left medication note to flag prescribers.    Changes made to PTA medication list:  Added:   Lidocaine patch     Allergies reviewed with patient and updates made in EHR: yes    Medication History Completed By: Navi Negro [facility] 2/5/2024 3:56 PM    PTA Med List   Medication Sig Note Last Dose    amLODIPine (NORVASC) 5 MG tablet Take 1 tablet (5 mg) by mouth daily 2/1/2024: Hold DOS 2/1/2024 at AM    aspirin (ASA) 81 MG tablet Take 81 mg by mouth daily 2/1/2024: Will continue per Vascular  2/2/2024 at AM    calcium carbonate 600 mg-vitamin D 400 units (CALCIUM 600 + D) 600-400 MG-UNIT per tablet Take 2 tablets by mouth daily 2/1/2024: Hold for surgery Past Week at AM    folic acid (FOLVITE) 1 MG tablet Take 1 mg by mouth daily 2/1/2024: Hold for surgery Past Week at AM    gabapentin (NEURONTIN) 300 MG capsule Take 1 capsule (300 mg) by mouth 3 times daily 2/5/2024: Pt uses as PRN despite being Rx'd as schd Past Week at PRN    leflunomide (ARAVA) 20 MG tablet Take 1 tablet by mouth every 24 hours  Past Week at AM    Lidocaine (LIDOCARE) 4 % Patch Place 1 patch onto the skin every 24 hours To prevent lidocaine toxicity, patient should be patch free for 12 hrs daily.  Past Week at PRN    lisinopril (ZESTRIL) 20 MG tablet TAKE 1 TABLET  BY MOUTH EVERY DAY 2/1/2024: Hold DOS 2/1/2024    methotrexate 2.5 MG tablet Take 20 mg by mouth every 7 days On Saturdays 1/27/2024 at AM    metoprolol succinate ER (TOPROL XL) 50 MG 24 hr tablet TAKE ONE TABLET BY MOUTH EVERY DAY  2/2/2024 at AM    multivitamin w/minerals (THERA-VIT-M) tablet Take 1 tablet by mouth daily 2/1/2024: Hold for surgery Past Week at AM    predniSONE (DELTASONE) 5 MG tablet Take 1 tablet (5 mg) by mouth daily  2/1/2024 at AM    simvastatin (ZOCOR) 20 MG tablet Take 1 tablet (20 mg) by mouth At Bedtime  2/1/2024 at AM     Navi Negro PharmJosé MiguelD., R.Ph., PGY1 Resident

## 2024-02-05 NOTE — CONSULTS
Care Management Initial Consult    General Information  Assessment completed with: Patient, VM-chart review,    Type of CM/SW Visit: Initial Assessment    Primary Care Provider verified and updated as needed: Yes (PauloLucia [phone] [address] [facility], [place] [address])   Readmission within the last 30 days: current reason for admission unrelated to previous admission      Reason for Consult: discharge planning, facility placement  Advance Care Planning: Advance Care Planning Reviewed: present on chart        Communication Assessment  Patient's communication style: spoken language (English or Bilingual)    Hearing Difficulty or Deaf: no   Wear Glasses or Blind: no    Cognitive  Cognitive/Neuro/Behavioral: WDL  Level of Consciousness: lethargic  Arousal Level: opens eyes spontaneously  Orientation: oriented x 4  Mood/Behavior: anxious, excitable  Best Language: 0 - No aphasia  Speech: logical, spontaneous, clear    Living Environment:   People in home: significant other  Sanit MercedesBISI  Current living Arrangements: house (3 steps upstair and get down on her butt)      Able to return to prior arrangements: yes     Family/Social Support:  Care provided by: spouse/significant other  Provides care for: no one  Marital Status: Single             Description of Support System:    involved, supportive     Current Resources:   Patient receiving home care services: No     Community Resources: None  Equipment currently used at home: walker, rolling, wheelchair, power, commode chair  Supplies currently used at home:      Employment/Financial:  Employment Status: retired        Financial Concerns: none   Referral to Financial Worker: No     Does the patient's insurance plan have a 3 day qualifying hospital stay waiver?  No    Lifestyle & Psychosocial Needs:  Social Determinants of Health     Food Insecurity: Not on file   Depression: Not at risk (1/8/2024)    PHQ-2     PHQ-2 Score: 1   Housing  Stability: Not on file   Tobacco Use: High Risk (1/30/2024)    Patient History     Smoking Tobacco Use: Some Days     Smokeless Tobacco Use: Never     Passive Exposure: Past   Financial Resource Strain: Not on file   Alcohol Use: Not on file   Transportation Needs: Not on file   Physical Activity: Not on file   Interpersonal Safety: Not on file   Stress: Not on file   Social Connections: Not on file     Functional Status:  Prior to admission patient needed assistance:   Dependent ADLs:: Ambulation-walker  Dependent IADLs:: Cleaning, Shopping, Transportation, Cooking, Laundry, Meal Preparation     Mental Health Status:  Mental Health Status: No Current Concerns       Chemical Dependency Status:  Chemical Dependency Status: No Current Concerns           Values/Beliefs:  Spiritual, Cultural Beliefs, Islam Practices, Values that affect care: yes       Cultural/Islam Practices Patient Routinely Participates In: prayer       Additional Information:    Background: 78-year-old female with severe deconditioning and previous right above-knee amputation and right femoral endarterectomy and iliac stenting.  Over the last year she developed right thigh stump wound with exposed femur.  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.      Patient's status reviewed by chart. A CMA is needed d/t discharge planning is needed. Writer met with the patient, Sakshi, at the bedside to complete a care management assessment. Writer introduced self, the role in care, and the nature of the care management assessment. The patient's PCP, address, and health insurance are all correct.    Sakshi lives in the house with her significant other, Santijovita Mercedes. Santi is her main care taker at home. The house has 3 steps and she normally move up and downstairs on her butt. Everything she needs on the first main level. Sine her right leg wound is not healing, she needs more help from Santi. She doesn't think she wants to go to TCU as  "current recommendation. Sakshi would like to wait couple more days and make decision on where to go at discharge. She doesn't want any home care of this time but \"I may change my mind.\"    Continue to monitor.    Kiana Rogel RN  7C RN Care Coordinator  Phone: [phone]  Pager: [phone]  [place] Saturday & Sunday and [facility] Recognized Holidays ([phone])   Units: 7A, 7B, 7C    Pager: [phone]            "

## 2024-02-05 NOTE — PROGRESS NOTES
"Vascular surgery progress note    MELANIE. States that pain this morning is improved compared to yesterday. Required several doses of IV pain meds yesterday since getting epidural out    /59 (BP Location: Right arm)   Pulse 99   Temp 98.9  F (37.2  C) (Oral)   Resp 18   Ht 1.626 m (5' 4\")   Wt 45.5 kg (100 lb 5 oz)   LMP  (LMP Unknown)   SpO2 92%   BMI 17.22 kg/m      Resting in bed  No acute distress  Right above-knee amputation stump with vac intact, scant serosang output in drain    CBC RESULTS: Recent Labs   Lab Test 02/05/24  0555   WBC 12.7*   RBC 2.59*   HGB 8.1*   HCT 24.9*   MCV 96   MCH 31.3   MCHC 32.5   RDW 17.9*        Last Comprehensive Metabolic Panel:  Lab Results   Component Value Date     (L) 02/05/2024    POTASSIUM 3.2 (L) 02/05/2024    CHLORIDE 96 (L) 02/05/2024    CO2 23 02/05/2024    ANIONGAP 9 02/05/2024    GLC 89 02/05/2024    BUN 5.1 (L) 02/05/2024    CR 0.34 (L) 02/05/2024    GFRESTIMATED >90 02/05/2024    ADRIAN 7.9 (L) 02/05/2024       Assessment/plan: 78-year-old female with severe deconditioning and previous right above-knee amputation and right femoral endarterectomy and iliac stenting.  Over the last year she developed right thigh stump wound with exposed femur.  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.      -Continue multimodal pain control  -Regular diet  -Continue incisional wound VAC on the left AKA stump and AYAZ drain, monitor output  -Appreciate physical therapy and occupational Therapy  -DVT ppx resumed  - plan for 4 days of perioperative prophylactic antibiotics given her exposed femur presumed osteomyelitis with large necrotic wound over the medial thigh, now resected     Patient was seen and discussed with staff, Dr. Mitchell.     Denis Fox MD  Vascular Surgery Resident  "

## 2024-02-05 NOTE — PLAN OF CARE
Goal Outcome Evaluation: Ongoing, progressing    Plan of Care Reviewed With: patient    Overall Patient Progress: no change    Outcome Evaluation: Talked to pts son on room phone who inquired about pain regimen. Writer explained that in absence of the epidural that was removed in AM pain has increased and that the team is currently managing pain by alternating oxycodone and dilaudid. Pt refused methotrexate and oxycodone before bed, stating that she just wanted to sleep. Pt asked writer not to disturb overnight to inquire about pain. Pt is alert and oriented and able to make needs known. Slept well between cares. Continue to monitor.

## 2024-02-05 NOTE — PLAN OF CARE
Goal Outcome Evaluation:      Plan of Care Reviewed With: patient    Overall Patient Progress: no changeOverall Patient Progress: no change    Outcome Evaluation: see RD note 2/5    Kirsten Abdi MS, RD, LD, [facility]    6C (beds 6150-2406) + 7C (beds 2211-6260) + ED   RD pager: [phone]  Weekend/Holiday RD pager: [phone]

## 2024-02-05 NOTE — PLAN OF CARE
Goal Outcome Evaluation:      Plan of Care Reviewed With: patient    Overall Patient Progress: improvingOverall Patient Progress: improving     Kiana Rogel RNCC

## 2024-02-05 NOTE — PLAN OF CARE
Goal Outcome Evaluation:      Plan of Care Reviewed With: patient    Overall Patient Progress: improving    Outcome Evaluation: PRN oxycodone x 1 given for right AKA pain with relief.  Pt reported improvement in pain today.  Repositioned q2h.  Tolerating regular diet, assist with tray set up.  IV ABX continued.  AYAZ drain to bulb suction with minimal bright red output.  Wound vac in place. Potassium replaced PO, recheck tomorrow.  Call light within reach, pt able to make needs known.

## 2024-02-05 NOTE — PROGRESS NOTES
"CLINICAL NUTRITION SERVICES - ASSESSMENT NOTE     Nutrition Prescription    RECOMMENDATIONS FOR MDs/PROVIDERS TO ORDER:  None currently    Malnutrition Status:    Unable to determine due to unable to complete NFPE and history, will follow up    Recommendations already ordered by Registered Dietitian (RD):  Continue regular diet to promote adequate intake  Encourage 3 meals/day with high protein sources  Continue Ensure Enlive TID with meals  Continue daily MVI + minerals    Future/Additional Recommendations:  Consider calcium carbonate + vit D if pt will be on prednisone long-term     REASON FOR ASSESSMENT  Sakshi Jaeger is a/an 78 year old female assessed by the dietitian for Positive Admission Nutrition Risk Screen     Patient admitted for  POD#3 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 Pt declined to speak with RD at this time stating she would like to rest. However, pt agreeable to RD looking at breakfast tray. Breakfast tray with ~50% consumed. Pt states she plans to eat some of the fruit from tray still. Pt drank ~50-75% of Ensure Enlive. Pt states she typically consumes Boost at home. Encouraged continued PO efforts with protein sources/ONS.    CURRENT NUTRITION ORDERS  Diet: Regular    Intake/Tolerance: Patient consuming % % of 2-3 meal(s) daily.    LABS  Na 128  K+ 3.2  BUN 5.1  Cre .34  HDL 46    MEDICATIONS  Folic acid  Arava  Thera-vit  prednisone    ANTHROPOMETRICS  Height: 162.6 cm (5' 4\")  Most Recent Weight: 45.5 kg (100 lb 5 oz)    IBW: 49 kg with adjustment for AKA  Body mass index is 17.22 kg/m . BMI Category: Normal BMI(basked on adjusted BMI for AKA)  Weight History: No significant weight loss noted.  Wt Readings from Last 20 Encounters:   02/04/24 45.5 kg (100 lb 5 oz)   10/26/23 45.4 kg (100 lb)   05/09/23 39.5 kg (87 lb) "   01/16/23 39 kg (85 lb 15.7 oz)   01/11/23 45.4 kg (100 lb)   10/26/22 45.4 kg (100 lb)   09/27/22 42.8 kg (94 lb 5.7 oz)   09/08/22 47.6 kg (105 lb)   09/02/22 47.6 kg (105 lb)   07/26/22 43.5 kg (96 lb)   05/18/22 43.5 kg (96 lb)   03/31/22 47.6 kg (105 lb)   01/19/22 47.6 kg (105 lb)   12/08/21 47.6 kg (105 lb)   10/27/21 46.3 kg (102 lb)   10/18/21 45.8 kg (101 lb)   10/06/21 45.8 kg (101 lb)   06/02/21 47 kg (103 lb 9.6 oz)   03/25/21 49.9 kg (110 lb)   03/23/21 49.9 kg (110 lb)         ASSESSED NUTRITION NEEDS  Dosing Weight: 45.4 kg (Admission weight)   Estimated Energy Needs: 3529-6778 kcals/day (30 - 35 kcals/kg )  Justification: Increased needs  Estimated Protein Needs: 50-64 grams protein/day (1.1 - 1.4 grams of pro/kg)  Justification: Increased needs  Estimated Fluid Needs: ~1362 mL/day (1 mL/kcal)   Justification: Maintenance    PHYSICAL FINDINGS  See malnutrition section below.    Alexandro Score: 16  Per EMR: Skin  Skin WDL: .WDL except  Skin WDL: .WDL except, integrity, color, elasticity  Skin Color: redness blanchable  Redness blanchable location: Sacrum/Coccyx  Skin Elasticity: slow return to original state  Skin Integrity: scab  Abrasion / Excoriation: Right, Left, Fore Arm  Bruised (ecchymotic) location: Right, Left, Fore Arm, Upper Arm  Cracked: Right, Fore Arm  Peeling:  (Generalized throughout)  Scab: Left, Fore Arm  Device Skin Interventions Taken: Skin barrier applied    MALNUTRITION  % Intake: Unable to assess  % Weight Loss: None noted  Subcutaneous Fat Loss: Unable to assess   Muscle Loss: Unable to assess  Fluid Accumulation/Edema: None noted  Malnutrition Diagnosis: Unable to determine due to unable to complete NFPE and history, will follow up    NUTRITION DIAGNOSIS  Increased nutrient needs  related to increased metabolic demands as evidenced by POD 3 from re-do AKA       INTERVENTIONS  Implementation  Nutrition Education: will be provided if nutrition education needs arise.    Medical  food supplement therapy     Goals  Patient to consume % of nutritionally adequate meal trays TID, or the equivalent with supplements/snacks.        Monitoring/Evaluation  Progress toward goals will be monitored and evaluated per protocol.  Kirsten Abdi MS, RD, LD, [facility]    6C (beds 3074-0403) + 7C (beds 6993-1733) + ED   RD pager: [phone]  Weekend/Holiday RD pager: [phone]

## 2024-02-05 NOTE — PROGRESS NOTES
"   Occupational Therapy Evaluation: 02/05/24 1139   Appointment Info   Signing Clinician's Name / Credentials (OT) Denice Hampton, CHARLETTED, OTR/L   Living Environment   People in Home significant other   Current Living Arrangements house   Home Accessibility wheelchair accessible   Transportation Anticipated family or friend will provide   Living Environment Comments Pt states has a ramp to enter home, no steps within home. Pt states does not use shower/tub, instead washes up seated in chair at sink.   Self-Care   Usual Activity Tolerance fair   Current Activity Tolerance poor   Equipment Currently Used at Home walker, rolling;wheelchair, power;commode chair   Fall history within last six months yes   Number of times patient has fallen within last six months 1  (Fell off scooter, pt states \"don't ask me how I did it\".)   Activity/Exercise/Self-Care Comment Pt reports SO assists with ADLs, however unclear as to what extend pt completes ind/level of assist provided.   Instrumental Activities of Daily Living (IADL)   Previous Responsibilities medication management;finances   IADL Comments Pt states ind with managing finances and meds, pt reports SO assists with all other IADLs.   General Information   Onset of Illness/Injury or Date of Surgery 02/02/24   Referring Physician Denis Fox MD   Patient/Family Therapy Goal Statement (OT) Return home with SO   Additional Occupational Profile Info/Pertinent History of Current Problem \"78-year-old female with severe deconditioning and previous right above-knee amputation and right femoral endarterectomy and iliac stenting.  Over the last year she developed right thigh stump wound with exposed femur.  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.\"   Existing Precautions/Restrictions weight bearing;fall  (NWB RLE)   Left Upper Extremity (Weight-bearing Status) full weight-bearing (FWB)   Right Upper Extremity (Weight-bearing Status) full weight-bearing (FWB)   Left Lower " Extremity (Weight-bearing Status) full weight-bearing (FWB)   Right Lower Extremity (Weight-bearing Status) non weight-bearing (NWB)   General Observations and Info Activity: up with assist   Cognitive Status Examination   Orientation Status person;place   Cognitive Status Comments Pt appearing intermittently confused, requiring additional cues and instructions for sequencing tasks. Will continue to monitor.   Visual Perception   Visual Impairment/Limitations corrective lenses for reading   Sensory   Sensory Comments Pt reporting intermittent n/t bilateral hands   Posture   Posture forward head position;protracted shoulders   Range of Motion Comprehensive   General Range of Motion upper extremity range of motion deficits identified   Strength Comprehensive (MMT)   General Manual Muscle Testing (MMT) Assessment upper extremity strength deficits identified;neck/trunk strength deficits identified   Coordination   Upper Extremity Coordination Left UE impaired;Right UE impaired   Bed Mobility   Bed Mobility supine-sit   Comment (Bed Mobility) Mod A, increased time   Transfers   Transfers toilet transfer   Transfer Comments Max A per clinical judgement   Activities of Daily Living   BADL Assessment/Intervention toileting;grooming;upper body dressing;lower body dressing;bathing   Bathing Assessment/Intervention   [place] Level (Bathing) moderate assist (50% patient effort)   Comment, (Bathing) Per clinical judgement   Upper Body Dressing Assessment/Training   Comment, (Upper Body Dressing) Per clinical judgement   [place] Level (Upper Body Dressing) minimum assist (75% patient effort)   Lower Body Dressing Assessment/Training   Comment, (Lower Body Dressing) Per clinical judgement   [place] Level (Lower Body Dressing) dependent (less than 25% patient effort)   Grooming Assessment/Training   [place] Level (Grooming) minimum assist (75% patient effort)   Comment, (Grooming) Per clinical judgement    Toileting   Comment, (Toileting) Per clinical judgement   [place] Level (Toileting) moderate assist (50% patient effort)   Clinical Impression   Criteria for Skilled Therapeutic Interventions Met (OT) Yes, treatment indicated   OT Diagnosis Decreased ind with ADL/IADLs, functional mobility/transfers, decreased activity tolerance   OT Problem List-Impairments impacting ADL problems related to;activity tolerance impaired;cognition;mobility;range of motion (ROM);strength;sensation;pain   Assessment of Occupational Performance 5 or more Performance Deficits   Identified Performance Deficits bathing, dressing, toileting, g/h, decreased FB strength/activity tolerance   Planned Therapy Interventions (OT) ADL retraining;IADL retraining;ROM;stretching;visual perception;home program guidelines;progressive activity/exercise;risk factor education;fine motor coordination training;cognition   Clinical Decision Making Complexity (OT) problem focused assessment/low complexity   Risk & Benefits of therapy have been explained evaluation/treatment results reviewed;care plan/treatment goals reviewed;risks/benefits reviewed;current/potential barriers reviewed;participants voiced agreement with care plan;participants included;patient   Clinical Impression Comments Pt would benefit from skilled IP OT services to increase ind and safety with ADL/IADL completion as well as increase UE strength/ROM prior to discharge from IP.   OT Total Evaluation Time   OT Eval, Low Complexity Minutes (41087) 5   OT Goals   Therapy Frequency (OT) 5 times/week   OT Predicted Duration/Target Date for Goal Attainment 03/04/24   OT Goals Hygiene/Grooming;Toilet Transfer/Toileting;OT Goal 1;OT Goal 2   OT: Hygiene/Grooming supervision/stand-by assist   OT: Toilet Transfer/Toileting Minimal assist   OT: Goal 1 Pt will demo safe med management set up prior to discharge.   OT: Goal 2 Pt will ind demonstrate understanding og UE HEP (strength/ROM) prior to  discharge.   OT Discharge Planning   OT Plan UE HEP (ROM/strength), BSC transfer, FB dressing   OT Discharge Recommendation (DC Rec) Transitional Care Facility   OT Rationale for DC Rec Pt limited this date by fatigue, pain, and decreased activity tolerance. Pt would benefit from continued skilled OT at TCU to progress ind and safety with ADLs/IADLs and functional mobility/transfers prior to returning home.   OT Brief overview of current status Mod-Max A bed mobility

## 2024-02-06 NOTE — PLAN OF CARE
Goal Outcome Evaluation:      Plan of Care Reviewed With: patient    Overall Patient Progress: improving    Outcome Evaluation: AOx4, int forgetful.  in concerned that oxy is contrubuting to pt's confusion. Delirium precautions utilized this shift. VSS on RA. L armPIV WDL SL bwteen ancef. Tolerating reg diet. Refused stool softener on night but agreed to take today, passed on. Purewick in place with adequate urine output. Wound vac WDL and AYAZ drain with minimal output. Tylenol used for R AKA pain. MD to address code status today and provide update for partner, Santi.

## 2024-02-06 NOTE — PROVIDER NOTIFICATION
"Provider notified via amcom and [employer]: \"Pt does not have an active code status. Can someone address this with her? Last updated Jan 2023.\"    Provider returned page and stated that day team will address tomorrow.  "

## 2024-02-06 NOTE — PROGRESS NOTES
"Care Management Follow Up    Length of Stay (days): 4    Expected Discharge Date: 02/07/2024     Concerns to be Addressed:  Discharge planning      Patient plan of care discussed at interdisciplinary rounds: Yes    Anticipated Discharge Disposition: Transitional care     Anticipated Discharge Services: None  Anticipated Discharge DME:  Wound vac    Patient/family educated on Medicare website which has current facility and service quality ratings: yes  Education Provided on the Discharge Plan: Yes  Patient/Family in Agreement with the Plan: yes    Referrals Placed by CM/SW: External Care Coordination, Senior Linkage Line, Post Acute Facilities    [facility]  [address] [place] [address]  P: [phone]  F: [phone]  2/6: Initial referral sent via Epic    Private pay costs discussed: Not applicable    Additional Information:  Provider provided an update stating that the patient stated earlier today that she is agreeable to TCU.  Patient will discharge with a AYAZ drain and wound vac    SW met with the patient at bedside to provide further information regarding TCU, answer any questions, and provide a list of TCU facilities near her home address form the Medicare.[employer] [place] web site.   Patient stated that as of right now she is interested in TCU. She provided SW with one facility she would be willing to have a referral sent to. Patient was addiment that she did not want to keep the Medicare.gov document SW provided or the bridge form hospital to home document. She stated she does not want to keep the documents because \"I might change my mind about [facility].\" Patient asked if she can change her mind about TCU after the referral was sent. SW stated they can pivot the discharge plan as needed. SW stated the goal is the ensure the discharge plan is safe and her needs can be met once discharged from the hospital. SW stated they will allow the patient time to think of more questions and meet with her again " tomorrow, SW will also discuss getting more TCU choices. Patient agreed to this and declined having any additional questions or concerns at this time.    SW will continue to follow for discharge needs and placement.     GREY Duffy  [address]   Office: [phone]  Pager: [phone]  [email or web address]

## 2024-02-06 NOTE — PROGRESS NOTES
"Vascular Surgery Progress Note    NAEON. Pain well controlled this AM. Did not get out of bed yesterday. Open to the idea of going to rehab.    BP (!) 141/69 (BP Location: Right arm, Patient Position: Semi-Weaver's)   Pulse 75   Temp 98.2  F (36.8  C) (Oral)   Resp 16   Ht 1.626 m (5' 4\")   Wt 45.5 kg (100 lb 5 oz)   LMP  (LMP Unknown)   SpO2 97%   BMI 17.22 kg/m      Resting in bed  No acute distress  Right above-knee amputation stump with vac intact, scant serosang output in drain      Intake/Output Summary (Last 24 hours) at 2/6/2024 0804  Last data filed at 2/6/2024 0643  Gross per 24 hour   Intake --   Output 1067 ml   Net -1067 ml       CBC RESULTS: Recent Labs   Lab Test 02/05/24  0555   WBC 12.7*   RBC 2.59*   HGB 8.1*   HCT 24.9*   MCV 96   MCH 31.3   MCHC 32.5   RDW 17.9*        Last Comprehensive Metabolic Panel:  Lab Results   Component Value Date     (L) 02/06/2024    POTASSIUM 3.9 02/06/2024    CHLORIDE 99 02/06/2024    CO2 25 02/06/2024    ANIONGAP 8 02/06/2024    GLC 85 02/06/2024    BUN 6.2 (L) 02/06/2024    CR 0.35 (L) 02/06/2024    GFRESTIMATED >90 02/06/2024    ADRIAN 8.2 (L) 02/06/2024       Assessment/plan: 78-year-old female with severe deconditioning and previous right above-knee amputation and right femoral endarterectomy and iliac stenting.  Over the last year she developed right thigh stump wound with exposed femur.  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      -Continue multimodal pain control  -Regular diet  -Continue incisional wound VAC on the left AKA stump and AYAZ drain, monitor output. Will change prior to discharge  -Appreciate physical therapy and occupational Therapy  -DVT ppx resumed  - plan for 4 days of perioperative prophylactic antibiotics given her exposed femur presumed osteomyelitis with large necrotic wound over the medial thigh, now resected  -Appreciate care coordinator assistance with finding placement  - Hyponatremia resolving, will continue " to monitor     Patient was seen and discussed with staff, Dr. Mitchell.     Denis Fox MD  Vascular Surgery Resident

## 2024-02-06 NOTE — PROGRESS NOTES
"1900-2330    Vitals: AVSS on RA    /61 (BP Location: Left arm)   Pulse 73   Temp 99.1  F (37.3  C) (Oral)   Resp 17   Ht 1.626 m (5' 4\")   Wt 45.5 kg (100 lb 5 oz)   LMP  (LMP Unknown)   SpO2 96%   BMI 17.22 kg/m      Mobility: A2, turn q2  Pain: 10mg oxy x1 for R hip pain  Nausea: denies    Turn q2. Incontinence x1; purwick now in place. No BM.   "

## 2024-02-06 NOTE — PROGRESS NOTES
Sakshi is alert and oriented x4. She is assist of one with bathroom activities. She is independent with bed mobility. She needs tray setup with meal. She denies pain throughout the entire shift. AYAZ drains was checked for Zero output. Report given to evening nurse to continue with POC.

## 2024-02-07 NOTE — PROGRESS NOTES
Status: Full Code   VS: VSS  Neuros: A&OX4  Cardiac: WNL  Respiratory: WNL Pt on RA  GI/: pure wick in place, sufficient amount of urine. No BM this shift  Diet/Nausea: pt denies nausea, reg diet    Skin:  R AKA wound vac in place and AYAZ drain. Scattered scab and bruises    LDA: L piv SL  Pain: Denies  Activity: not oob, bed bound  New changes this shift: no new change this shift   Plan: continues plan of care

## 2024-02-07 NOTE — PLAN OF CARE
"Nursing Care Plan Note:    Assumed care 1500 to 2330    /68 (BP Location: Right arm)   Pulse 67   Temp 98  F (36.7  C) (Oral)   Resp 17   Ht 1.626 m (5' 4\")   Wt 42.7 kg (94 lb 2.2 oz)   LMP  (LMP Unknown)   SpO2 95%   BMI 16.16 kg/m      Active/Admitting Problems:  amputation of R leg AKA  Pt rounded on hourly  [place]:  alert and oriented   Pain: pain 7/10 in shoulder PRN oxycodone given   GI/:  Denies nausea. Bowel sounds present. Good urine output clear yellow urine via purewick. Pt reports not having a BM in 4 days. Provider paged for loni or miralx.   How Pt Takes Meds:  oral  Cardiac:  WDL  Respiratory:  denies SOB lung sounds clear bilaterally  Skin:  R AKA wound vac in place and AYAZ drain. Scattered scab and bruising   Lines:  L PIV saline locked  Labs (Electrolyte protocol/DM):  Electrolyte protocol WNL Rechecks ordered for tomorrow morning   Activity/mobility:  Not OOB this shift   Events:  PRN oxycodone given for pain. Pt has not had BM in 4 days provider paged for loni or miralx   Plan:  Needs to take stool softner. Please pass on to day team.     Call light in reach, Pt able to make needs known, Will continue to monitor and follow plan of care.     Goal Outcome Evaluation:     Plan of Care Reviewed With: Pt     Overall Patient Progress: Progressing      Outcome Evaluation: Needs to take stool softner. Please pass on to day team.       "

## 2024-02-07 NOTE — PLAN OF CARE
"Goal Outcome Evaluation:      Plan of Care Reviewed With: patient, significant other    Overall Patient Progress: improving    Outcome Evaluation: Still waiting for TCU placement. Pt up with A2 & pivot to chair. Pt requesting stool softener as no BM in 4+ days, however had an incont. BM this AM and now doesn't want stool meds. Purewick in place with adequate output, barrier cream applied to reddened coccyx & perineum. Sacral mepilex replaced. Wound vac in place with minimal drainage. Pain managed with scheduled tylenol and PRN oxy x2. VSS on RA ex. slighty hypertensive. Postop abx completed yesterday. Continue to monitor and prepare for discharge.    /67 (BP Location: Right arm)   Pulse 66   Temp 98.7  F (37.1  C) (Oral)   Resp 20   Ht 1.626 m (5' 4\")   Wt 42.7 kg (94 lb 2.2 oz)   LMP  (LMP Unknown)   SpO2 97%   BMI 16.16 kg/m       Rosa Elena Simmons RN on 2/7/2024 at 6:44 PM     "

## 2024-02-07 NOTE — PROVIDER NOTIFICATION
Provider notified (name): JEOVANNY GHOTRA  Reason for notification: Low [address] 7409 pt has not had BM in 4 days can PRN loni or miralax be ordered? [phone]  Recommendation/request given to provider:  Response from provider:

## 2024-02-07 NOTE — PLAN OF CARE
Goal Outcome Evaluation:      Plan of Care Reviewed With: patient, significant other    Overall Patient Progress: improvingOverall Patient Progress: improving    Outcome Evaluation: Remains med ready for discharge. D/c pending TCU placement.    Kiana Rogel RNCC  7C RN Care Coordinator  Phone: [phone]  Pager: [phone]  [place] Saturday & Sunday and [facility] Recognized Holidays ([phone])   Units: 7A, 7B, 7C    Pager: [phone]

## 2024-02-07 NOTE — PROGRESS NOTES
Care Management Follow Up    Length of Stay (days): 5    Expected Discharge Date: 02/09/2024     Concerns to be Addressed: discharge planning     Patient plan of care discussed at interdisciplinary rounds: Yes    Anticipated Discharge Disposition: Transitional Care     Anticipated Discharge Services: Transportation Services  Anticipated Discharge DME: Wound Vac, AYAZ drain    Patient/family educated on Medicare website which has current facility and service quality ratings: yes  Education Provided on the Discharge Plan: Yes  Patient/Family in Agreement with the Plan: yes    Referrals Placed by CM/SW: External Care Coordination, Senior Linkage Line, Post Acute Facilities  Private pay costs discussed: Not applicable    Additional Information:    By vascular, the pt is medically ready for discharge. Therapists recommended TCU rehab. SW sent a referral to [facility] yesterday 2/6 and got declined d/t the facility can't care for wound vac pt.     Writer discussed with the pt and her SO, Rich today about [facility] declined. They opened up for more choices of TCUs today from the Medicare.gov list. Writer sent out the referrals. See below.    Continue to follow.    Pending TCUs:      TCU, sent referral 2/7 via Teams to  TCU [facility]    [facility]  P: [phone]  F: [phone]  Referral sent 2/7 via [facility]  P: [phone]  F: [phone]  Referral sent 2/7 via [facility]  P: [phone]  F: [phone]  Referral sent 2/7 via [facility]  [facility] Quiros  P: [phone]  Referral sent 2/7 via Epic    Declined TCU:  [facility] d/t not able to meet the pt's needs.      Kiana Rogel, RN  7C RN Care Coordinator  Phone: [phone]  Pager: [phone]  [place] Saturday & Sunday and  Recognized Holidays ([phone])   Units: 7A, 7B, 7C    Pager: [phone]

## 2024-02-07 NOTE — PROGRESS NOTES
"Vascular surgery progress note    No issues overnight.  Pain well-controlled.    /60   Pulse 83   Temp 98.4  F (36.9  C) (Oral)   Resp 16   Ht 1.626 m (5' 4\")   Wt 42.7 kg (94 lb 2.2 oz)   LMP  (LMP Unknown)   SpO2 94%   BMI 16.16 kg/m      Right AKA stump drain: 2.5 mL    Exam  Resting company in bed, nonlabored in room air  Right above-knee amputation stump with intact incisional wound VAC, 0 output in the VAC canister, and scant serosanguineous output in the drain bulb  Stump is soft, appropriately tender, no appreciable hematoma    K 4.0  Cr 0.31  Plt 250      Assessment/plan: 78-year-old female with severe deconditioning and previous right above-knee amputation and right femoral endarterectomy and iliac stenting.  Over the last year she developed right thigh stump wound with exposed femur.  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       -Continue multimodal pain control  -Regular diet  -Continue incisional wound VAC on the left AKA stump and AYAZ drain, monitor output. Will change prior to discharge  -Appreciate physical therapy and occupational Therapy  -DVT ppx  -4 days of perioperative antibiotics completed  -Appreciate care coordinator assistance with finding placement    Dispo: Awaiting TCU placement    Patient was seen and discussed with staff, Dr. Mitchell.    Ariel Giraldo MD    "

## 2024-02-08 NOTE — PROGRESS NOTES
Care Management Follow Up    Length of Stay (days): 6    Expected Discharge Date: 02/09/2024     Concerns to be Addressed: discharge planning     Patient plan of care discussed at interdisciplinary rounds: Yes    Anticipated Discharge Disposition: Transitional Care     Anticipated Discharge Services: Transportation Services  Anticipated Discharge DME: Wound Vac, AYAZ drain    Patient/family educated on Medicare website which has current facility and service quality ratings: yes  Education Provided on the Discharge Plan: Yes  Patient/Family in Agreement with the Plan: yes    Referrals Placed by CM/SW: External Care Coordination, Senior Linkage Line, Post Acute Facilities  Private pay costs discussed: Not applicable    Additional Information:    Pt remains medically ready for discharge to TCU with Prevena wound vac and possible AYAZ drain if not pulled prior to discharge. Pt will meet with vascular surgery in the clinic every 7 days to have wound vac/drain dressing change.    [facility] accepted the pt and requested for an [employer] Prior Auth. Writer called Esteban at [phone] to initiate the prio auth. Case number is [id number]; approval number is [id number] for 2/9/24 to 2/15/24.    Writer informed the pt and Zoran her SO about TCU place. They both are pleased and ready for the pt to transfer to [facility] tomorrow. Writer arranged for a WC transport tomorrow 2/9/24 at  window 13:05-13:50.     Writer informed Norman Ventura to inform about discharge planning. She acknowledged and will prepare for discharge tomorrow.    When fax the discharge tomorrow, will need to fax the approval of [employer] approval prior auth. [id number].     Accepted TCU:  [facility]  P: [phone]  F: [phone]  Referral sent 2/7 via Epic  Accepted 2/8/2024    Kiana Rogel RN  7C RN Care Coordinator  Phone: [phone]  Pager: [phone]  [place] Saturday & Sunday and [place]  Recognized Holidays ([phone])   Units: 7A, 7B, 7C    Pager: [phone]

## 2024-02-08 NOTE — PLAN OF CARE
Goal Outcome Evaluation:      Plan of Care Reviewed With: patient, significant other          Outcome Evaluation: Incontinent of stool times one.  purewick in place.  Wound Vac in place.  Vital signs stable on room air.    A: patient moves self some in bed.  Pain meds given times one with relief.  Incontinent of stool.    R:  continue to monitor and treat per plan of care.

## 2024-02-08 NOTE — PROGRESS NOTES
"Vascular surgery progress note    No issues overnight.    /56 (BP Location: Left arm, Cuff Size: Adult Regular)   Pulse 83   Temp 98.2  F (36.8  C) (Oral)   Resp 19   Ht 1.626 m (5' 4\")   Wt 42.7 kg (94 lb 2.2 oz)   LMP  (LMP Unknown)   SpO2 95%   BMI 16.16 kg/m      Right AKA stump drain: 0 mL    Exam  Resting company in bed, nonlabored in room air  Right above-knee amputation stump with intact incisional wound VAC, 0 output in the VAC canister, and scant serosanguineous output in the drain bulb  Stump is soft, appropriately tender, no appreciable hematoma    Assessment/plan: 78-year-old female with severe deconditioning and previous right above-knee amputation and right femoral endarterectomy and iliac stenting.  Over the last year she developed right thigh stump wound with exposed femur. 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    -Continue multimodal pain control  -Regular diet  -Continue incisional wound VAC on the left AKA stump and AYAZ drain, monitor output. Will change prior to discharge  -Appreciate physical therapy and occupational therapy  -DVT ppx  -4 days of perioperative antibiotics completed  -Appreciate care coordinator assistance with finding placement    Dispo: Awaiting TCU placement    Addendum: Patient will have prevena at discharge. This should not be a barrier to TCU. Prevena to stay on until follow up with vascular surgery. If prevena fails, ok to place regular dressing on stump.    Discussed pt history, exam, assessment and plan with Dr. Mitchell of the vascular surgery service, who is in agreement with the above.    Yasmin Rob, [facility]  Vascular Surgery  Pager: [phone]  [email or web address]  Send message or 10 digit call back number Securely via [employer] with the Vocera Web Console (learn more here)    "

## 2024-02-08 NOTE — PLAN OF CARE
Goal Outcome Evaluation:      Plan of Care Reviewed With: patient, family    Overall Patient Progress: improvingOverall Patient Progress: improving    Outcome Evaluation: Remains med ready, d/c to TCU tomorrow 2/9/24    Kiana Rogel RNCC

## 2024-02-09 NOTE — PLAN OF CARE
Occupational Therapy Discharge Summary    Reason for therapy discharge:    Discharged to transitional care facility.    Progress towards therapy goal(s). See goals on Care Plan in [facility] electronic health record for goal details.  Goals partially met.  Barriers to achieving goals:   discharge from facility.    Therapy recommendation(s):    Continued therapy is recommended.  Rationale/Recommendations:  recommend continued skilled OT to progress IND and safety with ADLs/IADLs.

## 2024-02-09 NOTE — PROGRESS NOTES
"Vascular surgery progress note    No issues overnight.    /57 (BP Location: Left arm, Patient Position: Right side, Cuff Size: Adult Small)   Pulse 89   Temp 98.3  F (36.8  C) (Oral)   Resp 12   Ht 1.626 m (5' 4\")   Wt 42.7 kg (94 lb 2.2 oz)   LMP  (LMP Unknown)   SpO2 93%   BMI 16.16 kg/m      Right AKA stump drain: 2.5 ml - removed drain 2/9/24.    Exam  Resting company in bed, nonlabored in room air  Right above-knee amputation stump with intact incisional wound VAC, 2.5ml output in the VAC canister, and scant serosanguineous output in the drain bulb  Stump is soft, appropriately tender, no appreciable hematoma    Assessment/plan: 78-year-old female with severe deconditioning and previous right above-knee amputation and right femoral endarterectomy and iliac stenting. Over the last year she developed right thigh stump wound with exposed femur. 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    -Continue multimodal pain control  -Regular diet  -Continue incisional wound VAC on the left AKA stump: changed to prevena this morning (incision length 18cm, no depth or width due to incision being closed)  -Appreciate PT and OT  -DVT ppx  -4 days of perioperative antibiotics completed    Dispo: Discharge this afternoon to TCU at [facility]    Discussed pt history, exam, assessment and plan with fellow who will discuss with staff.     Yasmin Rob CNP  Vascular Surgery  Pager: [phone]  [email or web address]  Send message or 10 digit call back number Securely via [employer] with the Vocera Web Console (learn more here)    "

## 2024-02-09 NOTE — PLAN OF CARE
Goal Outcome Evaluation:      Plan of Care Reviewed With: patient    Overall Patient Progress: improvingOverall Patient Progress: improving    Outcome Evaluation: A&Ox4, VSS on RA. Declines repositioning overnight, small BM x3. Purewick in place & changed, unmeasured urine x2. PIV-SL. Reports 8-9/10 shoulder & residual limb pain, given PRN oxy & gabapentin x1, IV dilaudid x1. Tylenol given for pain 3/10. Wound vac & AYAZ drain removed this AM by vascular team; will d/c to TCU today.

## 2024-02-09 NOTE — PROGRESS NOTES
Care Management Discharge Note    Discharge Date: 02/09/2024       Discharge Disposition: Transitional Care    Discharge Services: Transportation Services    Discharge DME: Wound Vac    Discharge Transportation: family or friend will provide, agency    Private pay costs discussed: transportation costs    Does the patient's insurance plan have a 3 day qualifying hospital stay waiver?  No    PAS Confirmation Code: [id number] ([id number])  Patient/family educated on Medicare website which has current facility and service quality ratings: yes    Education Provided on the Discharge Plan: Yes  Persons Notified of Discharge Plans: Yes  Patient/Family in Agreement with the Plan: yes    Handoff Referral Completed: Yes, IHO    Additional Information:   Patient to discharge to TCU with W/C transportation arranged today between 13:05 and 13:50.  AYAZ drain removed 02/09/2024.    IMM completed by care coordination SW today at 10:20 AM. IHO completed.     This writer faxed, verified proper fax transmittal via opentext RightFax tracking, then called and left Sepideh, the Healthcare Navigator for [facility], a voicemail to follow-up. Discharge orders, face sheet, and [facility] insurance prior authorization approval for [employer]. Sepideh called back and reported that they are prepared for the patient today and received report from the bedside nurse.     Yesterday, 02/08/2024, Kiana QIU (care coordinator), informed Norman Ventura to inform about discharge planning, and spoke to both the patient and Zoran (her SO) about TCU place, reported that both are pleased and ready for the pt to transfer to [facility]. Pt. Will discharge to TCU with Prevena wound vac. Pt to meet with vascular surgery in the clinic every 7 days to have wound vac/drain dressing change.     [facility] accepted the pt and requested for an [employer] Prior Auth. [employer] phone: [phone] Case#[id number]; approval# [id number] for  2/9/24 to 2/15/24. [facility] approval prior auth. [id number].     [facility]  P: [phone]  F: [phone]  Referral sent 2/7 via Epic  Accepted 2/8/2024  Copies of discharge orders, Facesheet, and [employer] PA sent 2/9     This writer, along with the primary RNCC, Kiana, informed bedside nurse that transportation would arrive between 13:05 and 13:50, and spoke to the patient and family (spouse Zoran) regarding the discharge plans. They both acknowledged and agreed to the POC.       Rina Medel, RN, BSN, PHN  Nurse Coordinator (RNASHLYN) Casual      SEARCHABLE in [facility] - search CARE COORDINATOR       [place] ([phone]) Saturday & Sunday; ([phone]) [place] Recognized Holidays     Units: 5A [id number] Vocera, 5A [phone] Vocera & 5C Vocera Pager: [phone]    Units: 6B Vocera, 6C Vocera [phone], 6C Vocera 7029-3186, & 6C Vocera [phone] Vocera  Pager: [phone]    Units: 7A SOT RNCC Vocera, 7B Med Surg Vocera, & 7C Med Surg Vocera  Pager: [phone]    Units: 6A Vocera & 4A CVICU Vocera, 4C MICU Vocera, and 4E SICU Vocera   Pager: [phone]    Units: 5 Ortho Vocera & 5 Med Surg Vocera  Pager: [phone]    Units: 6 Med Surg Vocera & 8 Med Surg Vocera  Pager [phone]

## 2024-02-09 NOTE — PROGRESS NOTES
Discharge to: [facility]  Transportation: Transportation Services  Time: 1400  Prescriptions: none  Belongings: remain with patient  Access: none  Care plan and education discontinued: done  Paperwork:  Discussed and given.

## 2024-02-09 NOTE — PLAN OF CARE
"Goal Outcome Evaluation:      Plan of Care Reviewed With: patient     Overall Patient Progress: improving    Vitals; /73 (BP Location: Left arm)   Pulse 82   Temp 98.2  F (36.8  C) (Oral)   Resp 20   Ht 1.626 m (5' 4\")   Wt 42.7 kg (94 lb 2.2 oz)   LMP  (LMP Unknown)   SpO2 96%   BMI 16.16 kg/m        Outcome Evaluation: AO x4.VSS on RA. Up with A2 & pivot  with OT today.  2x BM this AM and eric. Barrier cream applied to reddened coccyx & perineum.Purewick in place with adequate output.  Sacral mepilex in place. Wound vac  to 125mmhg negative pressure in place with no drainage. Pain managed with scheduled tylenol and PRN oxy x1. Continue to monitor and prepare for discharge. Pt awaiting TCU discharge.                "

## 2024-02-09 NOTE — DISCHARGE INSTRUCTIONS
Discharge Instructions    Diet  - You may return to your regular diet. We always encourage taking fiber as well as staying well-hydrated.   - Be sure to drink plenty of fluids.     Activity  - No lifting, pushing, or pulling greater than 10 pounds and no strenuous exercise for 4-6 weeks.   - We encourage throat lozenges or cough drops if you develop a cough.  - No restrictions on activity as patient has right AKA. Activity as tolerated per PT recommendations for safety.    Wound Care  - Inspect your wounds daily for signs of infection (increased redness, drainage, pain)  - Keep your wound clean and dry, inspect it daily for the above signs.    Right AKA: Has prevena (VAC like dressing) DO NOT remove. If the pump stops suctioning, contact vascular surgery. Pump should be on suction at all times. This is to stay on at all times until seen in clinic by vascular surgery.  Ensure ACE wrap is on at all times on RLE stump.     Call Vascular surgery [facility] for:  - Temperature > 101F, chills, rigors, dizziness  - Redness around or purulent drainage from wound  - Inability to tolerate diet, nausea or vomiting  - You stop passing gas, develop significant bloating, abdominal pain  - Have blood in stools/vomit  - Have severe diarrhea/constipation  - Any other questions or concerns.    Medication Instructions  Some of your medications may have changed. Please take only prescribed and resumed medications.     We recommend you take Tylenol around the clock for baseline pain control (this was prescribed for you). Then take narcotic pain medication only as needed if you were prescribed any. Once you are no longer needing narcotics, you may take the Tylenol as needed. Do not take more than 4,000 mg of acetaminophen (Tylenol) from all sources to reduce the risk of liver damage.     Follow up:    PCP:  Follow up with PCP in 5-7 days for post-hospitalization follow up. You may call to set this up - most clinics will be able to get you an  appointment within the week if you let them know you were recently hospitalized and need to see your PCP.     Vascular:  Future Appointments 2/9/2024 - 8/7/2024        Date Visit Type Length Department Provider              2/16/2024 12:00 PM RETURN VASCULAR PATIENT 30 min [facility] Yasmin Rob APRN CNP    Location Instructions:     Located in the [facility] at [address] [place] [address]. For parking options, enter the [facility] /arrival plaza from [facility] and attendants can assist you based on your needs.  parking is available for those with limited mobility M-F from 7 a.m. to 5 p.m. Due to short staffing, we are unable to offer  to all patients/visitors. Visit [email or web address] for more details.  Self-parking:&nbsp;  West Lot: Located across from the main entrance, this is a convenient option for patients. Enter on [address]. Parking attendants available most hours to assist.&nbsp;     GIVVER Ramp: Enter at the [facility] entrance (one block north of the [facility] main entrance). Do not enter the ramp from [facility] - this entrance is not staffed and is further from the [facility] main entrance.              3/5/2024 12:30 PM POST-OP 30 min [facility] VASCULAR [facility] Yasmin Rob APRN CNP    Location Instructions:     Located in the [facility] at [address] [place] [address]. For parking options, enter the [facility] /arrival plaza from [facility] and attendants can assist you based on your needs.  parking is available for those with limited mobility M-F from 7 a.m. to 5 p.m. Due to short staffing, we are unable to offer  to all patients/visitors. Visit [email or web address] for more details.  Self-parking:&nbsp;  West Lot: Located across from the main entrance, this is a convenient option for patients. Enter on [address]. Parking attendants available most hours to assist.&nbsp;     GIVVER Ramp: Enter at the [address] entrance (one block north of the [facility] main entrance). Do not enter the ramp from [facility] - this entrance is not staffed and is further from the [facility] main entrance.              3/14/2024 12:00 PM RETURN EAR 15 min FK Pito Vieira MD    Location Instructions:     [facility] has 2 buildings on its campus. Please visit us at [address] [place] [address] and enter the building with the numbers [id number] on it.                    With general vascular surgery questions, concerns, or to request/change an appointment, please call:      Vascular Call Center  [phone]    To contact someone after 5 pm, on a weekend, or on a Holiday, please call:  [facility]  [phone], option 4 to have the Attending Physician for Vascular Surgery Service paged.

## 2024-02-09 NOTE — PLAN OF CARE
Goal Outcome Evaluation:      Plan of Care Reviewed With: patient, family    Overall Patient Progress: improvingOverall Patient Progress: improving         Rina Medel, RN, BSN, PHN  RN Care Coordinator (RNCC) Casual    SEARCHABLE in [facility] - search CARE COORDINATOR     [place] ([phone]) Saturday & Sunday; ([phone]) [place] Recognized Holidays     Units: 5A & 5C  Pager: [phone]    Units: 6B, 6C & 6D    Pager: [phone]    Units: 7A, 7B, & 7C   Pager: [phone]    Units: 6A & ICU   Pager: [phone]    Units: 5 Ortho, 5MS & WB ED Pager: [phone]    Units: 6MS, 8A & 10 ICU  Pager: [phone]

## 2024-02-09 NOTE — DISCHARGE SUMMARY
Vascular Surgery Discharge Summary    NAME: Sakshi Jaeger   MRN: [id number]   : 1945     DATE OF ADMISSION: 2024     PRE/POSTOPERATIVE DIAGNOSES:    -Status post right femoral endarterectomy and common iliac artery stenting  -Status post right revision above knee amputation  -Current smoker  -Peripheral arterial disease    PROCEDURES PERFORMED, 2024:   1. Redo High Right Transfemoral amputation    INTRAOPERATIVE FINDINGS:  Exposed femur, resected back into incorporated tissue  No purulence  Necrotic skin resected  Viable muscle although pale    POSTOPERATIVE COMPLICATIONS: None     DATE OF DISCHARGE:  24    HOSPITAL COURSE: Sakshi Jaeger is a 78 year old female who on 2024 underwent the above-named procedures. She tolerated the procedure well and postoperatively was transferred to the general post-surgical unit. The remainder of her course was essentiallly uncomplicated. Prior to discharge, her pain was controlled well with Tylenol and oxycodone. Given her need to learn on how to perform ADL independently she required discharge to rehab.  On 24, she was discharged to [facility] in stable condition.    Physical Exam:  Constitutional: healthy, alert, no acute distress and cooperative   Cardiovascular: pulses regular  Respiratory: breathing unlabored without secondary muscle use  Psychiatric: mentation appears normal and affect normal/bright  Neck: no asymmetry  GI/Abdomen: abdomen soft, non-tender. No palpable masses  Extremities: no open lesions, extremities warm and well perfused  Hematology: no bruising on visible skin  Pulses: Palpable femoral pulses bilaterally.    DISCHARGE INSTRUCTIONS:    Diet  - You may return to your regular diet. We always encourage taking fiber as well as staying well-hydrated.   - Be sure to drink plenty of fluids.     Activity  - No lifting, pushing, or pulling greater than 10 pounds and no strenuous exercise for 4-6  weeks.   - We encourage throat lozenges or cough drops if you develop a cough.  - No restrictions on activity as patient has right AKA. Activity as tolerated per PT recommendations for safety.    Wound Care  - Inspect your wounds daily for signs of infection (increased redness, drainage, pain)  - Keep your wound clean and dry, inspect it daily for the above signs.    Right AKA: Has prevena (VAC like dressing) DO NOT remove. If the pump stops suctioning, contact vascular surgery. Pump should be on suction at all times. This is to stay on at all times until seen in clinic by vascular surgery.  Ensure ACE wrap is on at all times on RLE stump.     Call Vascular surgery [facility] for:  - Temperature > 101F, chills, rigors, dizziness  - Redness around or purulent drainage from wound  - Inability to tolerate diet, nausea or vomiting  - You stop passing gas, develop significant bloating, abdominal pain  - Have blood in stools/vomit  - Have severe diarrhea/constipation  - Any other questions or concerns.    Medication Instructions  Some of your medications may have changed. Please take only prescribed and resumed medications.     We recommend you take Tylenol around the clock for baseline pain control (this was prescribed for you). Then take narcotic pain medication only as needed if you were prescribed any. Once you are no longer needing narcotics, you may take the Tylenol as needed. Do not take more than 4,000 mg of acetaminophen (Tylenol) from all sources to reduce the risk of liver damage.     Follow up:    PCP:  Follow up with PCP in 5-7 days for post-hospitalization follow up. You may call to set this up - most clinics will be able to get you an appointment within the week if you let them know you were recently hospitalized and need to see your PCP.     Vascular:  Future Appointments 2/9/2024 - 8/7/2024        Date Visit Type Length Department Provider              2/16/2024 12:00 PM RETURN VASCULAR PATIENT 30 min [facility]  VASCULAR SURGERY Yasmin Rob APRN CNP    Location Instructions:     Located in the [facility] at [address] [place] [address]. For parking options, enter the [facility] /arrival plaza from [facility] and attendants can assist you based on your needs.  parking is available for those with limited mobility M-F from 7 a.m. to 5 p.m. Due to short staffing, we are unable to offer  to all patients/visitors. Visit [email or web address] for more details.  Self-parking:&nbsp;  West Lot: Located across from the main entrance, this is a convenient option for patients. Enter on [address]. Parking attendants available most hours to assist.&nbsp;     [address] Ramp: Enter at the [facility] SE entrance (one block north of the [facility] main entrance). Do not enter the ramp from [facility] - this entrance is not staffed and is further from the [facility] main entrance.              3/5/2024 12:30 PM POST-OP 30 min [facility] VASCULAR SURGERY Yasmin Rob APRN CNP    Location Instructions:     Located in the [facility] at [address] [place] [address]. For parking options, enter the [facility] /arrival plaza from [facility] and attendants can assist you based on your needs.  parking is available for those with limited mobility M-F from 7 a.m. to 5 p.m. Due to short staffing, we are unable to offer  to all patients/visitors. Visit [email or web address] for more details.  Self-parking:&nbsp;  West Lot: Located across from the main entrance, this is a convenient option for patients. Enter on [address]. Parking attendants available most hours to assist.&nbsp;     [address] Ramp: Enter at the [facility] SE entrance (one block north of the [facility] main entrance). Do not enter the ramp from [facility] - this entrance is not staffed and is further from the [facility] main entrance.              3/14/2024 12:00 PM RETURN EAR 15 min FK Pito Vieira MD    Location Instructions:      [facility] has 2 buildings on its campus. Please visit us at [address], [place] and enter the building with the numbers [id number] on it.                    With general vascular surgery questions, concerns, or to request/change an appointment, please call:      Vascular Call Center  [phone]    To contact someone after 5 pm, on a weekend, or on a Holiday, please call:  [facility]  [phone], option 4 to have the Attending Physician for Vascular Surgery Service paged.         DISCHARGE MEDICATIONS:     Review of your medicines      START taking      Dose / Directions   acetaminophen 325 MG tablet  Commonly known as: TYLENOL      Dose: 650 mg  Take 2 tablets (650 mg) by mouth every 6 hours  Quantity: 24 tablet  Refills: 0     oxyCODONE 5 MG tablet  Commonly known as: ROXICODONE      Dose: 5 mg  Take 1 tablet (5 mg) by mouth every 6 hours as needed for pain  Quantity: 12 tablet  Refills: 0     polyethylene glycol 17 GM/Dose powder  Commonly known as: MIRALAX      Dose: 17 g  Take 17 g by mouth daily  Quantity: 510 g  Refills: 0        CONTINUE these medicines which have NOT CHANGED      Dose / Directions   amLODIPine 5 MG tablet  Commonly known as: NORVASC  Used for: Essential hypertension with goal blood pressure less than 140/90      Dose: 5 mg  Take 1 tablet (5 mg) by mouth daily  Quantity: 90 tablet  Refills: 3     aspirin 81 MG tablet  Commonly known as: ASA  Used for: Coronary artery disease involving native coronary artery of native heart without angina pectoris      Dose: 81 mg  Take 81 mg by mouth daily  Quantity:    Refills: 0     calcium carbonate 600 mg-vitamin D 400 units 600-400 MG-UNIT per tablet  Commonly known as: calcium 600 + D  Used for: Rheumatoid arthritis, involving unspecified site, unspecified rheumatoid factor presence      Dose: 2 tablet  Take 2 tablets by mouth daily  Refills: 0     folic acid 1 MG tablet  Commonly  known as: FOLVITE      Dose: 1 mg  Take 1 mg by mouth daily  Refills: 0     gabapentin 300 MG capsule  Commonly known as: NEURONTIN  Used for: 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)      Dose: 300 mg  Take 1 capsule (300 mg) by mouth 3 times daily  Quantity: 270 capsule  Refills: 0     leflunomide 20 MG tablet  Commonly known as: ARAVA      Dose: 1 tablet  Take 1 tablet by mouth every 24 hours  Refills: 0     Lidocaine 4 % Patch  Commonly known as: LIDOCARE      Dose: 1 patch  Place 1 patch onto the skin every 24 hours To prevent lidocaine toxicity, patient should be patch free for 12 hrs daily.  Refills: 0     lisinopril 20 MG tablet  Commonly known as: ZESTRIL  Used for: Essential hypertension with goal blood pressure less than 140/90      Dose: 20 mg  TAKE 1 TABLET BY MOUTH EVERY DAY  Quantity: 90 tablet  Refills: 0     methotrexate 2.5 MG tablet      Dose: 20 mg  Take 20 mg by mouth every 7 days On Saturdays  Refills: 0     metoprolol succinate ER 50 MG 24 hr tablet  Commonly known as: TOPROL XL  Used for: Essential hypertension with goal blood pressure less than 140/90, Coronary artery disease involving native coronary artery of native heart without angina pectoris      Dose: 50 mg  TAKE ONE TABLET BY MOUTH EVERY DAY  Quantity: 90 tablet  Refills: 1     multivitamin w/minerals tablet  Used for: Rheumatoid arthritis, involving unspecified site, unspecified rheumatoid factor presence      Dose: 1 tablet  Take 1 tablet by mouth daily  Refills: 0     predniSONE 5 MG tablet  Commonly known as: DELTASONE  Used for: Rheumatoid arthritis, involving unspecified site, unspecified rheumatoid factor presence      Dose: 5 mg  Take 1 tablet (5 mg) by mouth daily  Refills: 0     simvastatin 20 MG tablet  Commonly known as: ZOCOR  Used for: Hyperlipidemia LDL goal <100      Dose: 20 mg  Take 1 tablet (20 mg) by mouth At Bedtime  Quantity: 90 tablet  Refills: 1           Where to get your medicines       These medications were sent to [facility] - [place], [place] - [address]  [address], [facility] [address]    Phone: [phone]     acetaminophen 325 MG tablet    oxyCODONE 5 MG tablet    polyethylene glycol 17 GM/Dose powder         Yasmin Bacu, CNP  Vascular Surgery  Pager: [phone]  [email or web address]  Send message or 10 digit call back number Securely via [employer] with the [employer] Web Console (learn more here)'

## 2024-02-12 NOTE — PROGRESS NOTES
Clinic Care Coordination Contact  Care Coordination Transition Communication    Clinical Data: Patient was hospitalized at [facility] from 2/2/24 to 2/9/24 with diagnosis of Amputation of leg, redo above knee amputation, right leg    Assessment: Patient has transitioned to TCU/ARU for short term rehabilitation:    Facility Name:   Accepted TCU:  [facility]  P: [phone]  F: [phone]  Referral sent 2/7 via Epic  Accepted 2/8/2024  Copies of discharge orders, Facesheet, and Esteban PA sent 2/9  Transition Communication:  Notified facility of Primary Care- Care Coordination support via  phone.    Plan: Care Coordinator will await notification from facility staff informing of patient's discharge plans/needs. Care Coordinator will review chart and outreach to facility staff every 4 weeks and as needed.     Deepak Walker MSN, RN, PHN, [facility]   Primary Care Clinical RN Care Coordinator  [facility]  2/12/2024   8:04 AM  [email or web address]  Office: [phone]

## 2024-02-16 NOTE — PATIENT INSTRUCTIONS
Thank you so much for choosing us for your care. It was a pleasure to see you at the vascular clinic today.     Follow-up recommendations: We will see you back in 7 days. Please do not hesitate to reach out to us in the meantime with any concerns. Our schedulers will get in touch with you to set up your follow-up visit.        Our scheduling team will get in touch with you to set up any follow-up testing/imaging and/or appointments. Please be aware that any testing/imaging recommended today will need to completed prior to your next visit with the provider. If testing/imaging is not completed prior to your next visit, your visit may be rescheduled.        If you have any questions, please contact our clinic directly at [phone] and ask for the nurse. We also encourage the use of [employer] to communicate with your HealthCare Provider.        If you have an urgent need after business hours (8:00 am to 4:30 pm) please call [phone], option 4, and ask for the vascular attending on call. For non-urgent after hours needs, please call the vascular nurse at [phone] and leave a detailed voicemail. For scheduling needs, please call our clinic directly at [phone].    Future Appointments 2/16/2024 - 8/14/2024        Date Visit Type Length Department Provider     2/23/2024 12:00 PM RETURN VASCULAR PATIENT 30 min [facility] VASCULAR SURGERY Yasmin Rob APRN CNP    Location Instructions:     Located in the Clinics and Surgery Center at [address] [place] [address]. For parking options, enter the [facility] /arrival deena from [facility] and attendants can assist you based on your needs.  parking is available for those with limited mobility M-F from 7 a.m. to 5 p.m. Due to short staffing, we are unable to offer  to all patients/visitors. Visit [email or web address] for more details.  Self-parking:&nbsp;  West Lot: Located across from the main entrance, this is a convenient option for patients. Enter on  [facility]. Parking attendants available most hours to assist.&nbsp;     [facility] Ramp: Enter at the [facility] SE entrance (one block north of the [facility] main entrance). Do not enter the ramp from [facility] - this entrance is not staffed and is further from the [facility] main entrance.              3/5/2024 12:30 PM POST-OP 30 min [facility] VASCULAR SURGERY Yasmin Rob APRN CNP    Location Instructions:     Located in the Clinics and Surgery Center at [address] [place] [address]. For parking options, enter the [facility] /arrival plaza from [facility] and attendants can assist you based on your needs.  parking is available for those with limited mobility M-F from 7 a.m. to 5 p.m. Due to short staffing, we are unable to offer  to all patients/visitors. Visit [email or web address] for more details.  Self-parking:&nbsp;  West Lot: Located across from the main entrance, this is a convenient option for patients. Enter on [facility]. Parking attendants available most hours to assist.&nbsp;     [facility] Ramp: Enter at the [facility] SE entrance (one block north of the [facility] main entrance). Do not enter the ramp from [facility] - this entrance is not staffed and is further from the [facility] main entrance.              3/14/2024 12:00 PM RETURN EAR 15 min FK Pito Vieira MD    Location Instructions:     [facility] has 2 buildings on its campus. Please visit us at [address] [place] [address] [place], [place] and enter the building with the numbers 6401 on it.

## 2024-02-16 NOTE — PROGRESS NOTES
VASCULAR SURGERY PROGRESS NOTE    LOCATION: [facility]     Sakshi Jaeger  Medical Record #:  [id number]  YOB: 1945  Age:  78 year old     Date of Service: 2/16/2024    PRIMARY CARE PROVIDER: Lucia Bates    Reason for visit:  Wound check     IMPRESSION / RECOMMENDATION:   Sakshi Jaeger is a 78-year-old female with severe deconditioning and previous right above-knee amputation and right femoral endarterectomy and iliac stenting. Over the last year she developed right thigh stump wound with exposed femur. 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 Presents to clinic for wound check and prevena exchange. Prevena was exchanged and her incision length is about 18cm. RLE incision well approximated, no drainage, no redness, no signs or symptoms of infection.    Plan for follow up next week for wound assessment and Prevena exchange.       All questions were answered and support provided. She has our contact information and knows to reach out with any additional questions or concerns.     Yasmin Rob, [facility]  Vascular Surgery  Pager: [phone]  [email or web address]  Send message or 10 digit call back number Securely via [employer] with the [employer] Web Console (learn more here)      HPI:  Sakshi Jaeger is a 78 year old female with past medical history significant for PAD. 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 Postoperatively recovered well, her wounds have healed and she was able to enjoy summer and fall.  More recently patient developed a hematoma on her stump, unclear if it was related to orthotics injury or pressure ulcer given limited activity with RLE stump. She also accidentally hurt the anterior aspect of her stump on the kitchen table creating an open area with exposed femur. Patient subsequently underwent revision of AKA on  right. Now at rehab, recovering. Denies nausea, vomiting, no fevers or chills.     REVIEW OF SYSTEMS:    A 12 point ROS was reviewed and is negative except for what is listed above in HPI.    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     IRON DEFIC ANEMIA NOS 9/15/2005     Lower limb amputation, below knee 12/26/2012     MI (myocardial infarction) (H)     3/2010     Non-healing surgical wound, subsequent encounter 9/24/2020    Added automatically from request for surgery [id number]     Osteoporosis 2/16/2005     OSTEOPOROSIS NOS 2/16/2005     Protein-calorie malnutrition (H24) 5/18/2022     PVD (peripheral vascular disease) (H24) 5/18/2022     Rheumatoid arthritis (H) 2/16/2005          Rheumatoid arthritis(714.0) 2/16/2005     Status post below-knee amputation (H) 12/26/2012               Past Surgical History:   Procedure Laterality Date     ---------INCISION AND DRAINAGE  03/05/2014     AMPUTATE LEG ABOVE KNEE Right 1/13/2023    Procedure: AMPUTATION, ABOVE KNEE RIGHT;  Surgeon: Emma Oconnor MD;  [facility]     AMPUTATE LEG ABOVE KNEE Right 2/2/2024    Procedure: Redo ABOVE KNEE AMPUTATION;  Surgeon: Bryon Mitchell MD;  [facility]     AMPUTATE LEG BELOW KNEE Right 9/28/2022    Procedure: Right through knee amputation;  Surgeon: Doron Mott MD;  [facility]     AMPUTATION BELOW KNEE RT/LT  01/01/2005    right lowwer leg.      ANGIOGRAM Right 1/13/2023    Procedure: right iliac arteriogram aned  right common iliac artery stent;  Surgeon: Emma Oconnor MD;  [facility]     APPENDECTOMY       ARTHROPLASTY KNEE  04/27/2011    Procedure:ARTHROPLASTY KNEE; Surgeon:JESSE HAWKINS; [facility]     COMPLEX WOUND CLOSURE (UPDATE) Right 12/08/2021    Procedure: Right stump wound debridment and closure;  Surgeon: RAISA Perea MD;  Location[facility]     CORONARY STENT PLACEMENT       ENDARTERECTOMY FEMORAL Right 1/13/2023    Procedure: ENDARTERECTOMY, FEMORAL RIGHT;  Surgeon: Emma Oconnor MD;  [facility]     INJECT NERVE BLOCK SPHENOPALATINE GANGLION N/A 9/29/2022    Procedure: Out of OR encounter for block to be done by ;  Surgeon: GENERIC ANESTHESIA PROVIDER;  [facility]     IR OR ANGIOGRAM  1/13/2023     IRRIGATION AND DEBRIDEMENT LOWER EXTREMITY, COMBINED Right 10/09/2019    Procedure: Irrigation and debridement Right leg;  Surgeon: Doron Mott MD;  [facility]     NERVE BLOCK PERIPHERAL N/A 9/27/2022    Procedure: BLOCK, NERVE;  Surgeon: GENERIC ANESTHESIA PROVIDER;  [facility]     OTHER SURGICAL HISTORY  03/05/2014    I AND D      OTHER SURGICAL HISTORY Right 10/09/2019    IRRIGATION AND DEBRIDEMENT LOWER EXTREMITY, COMBINED     PHACOEMULSIFICATION CLEAR CORNEA WITH STANDARD INTRAOCULAR LENS IMPLANT Left 9/8/2022    Procedure: PHACOEMULSIFICATION, LEFT CATARACT, WITH INTRAOCULAR LENS IMPLANT;  Surgeon: Flip Izaguirre MD;  [facility]     PHACOEMULSIFICATION CLEAR CORNEA WITH STANDARD INTRAOCULAR LENS IMPLANT Right 10/13/2022    Procedure: PHACOEMULSIFICATION, RIGHT CATARACT, WITH INTRAOCULAR LENS IMPLANT;  Surgeon: Flip Izaguirre MD;  [facility]     REVISE SCAR EXTREMITY Right 12/17/2020    Procedure: Right stump scar revision;  Surgeon: RAISA Perea MD;  Location: [facility] OR       ALLERGIES:  Adhesive tape    MEDS:    Current Outpatient Medications:      acetaminophen (TYLENOL) 325 MG tablet, Take 2 tablets  (650 mg) by mouth every 6 hours, Disp: 24 tablet, Rfl: 0     amLODIPine (NORVASC) 5 MG tablet, Take 1 tablet (5 mg) by mouth daily, Disp: 90 tablet, Rfl: 3     aspirin (ASA) 81 MG tablet, Take 81 mg by mouth daily, Disp:  , Rfl:      calcium carbonate 600 mg-vitamin D 400 units (CALCIUM 600 + D) 600-400 MG-UNIT per tablet, Take 2 tablets by mouth daily, Disp: , Rfl:      folic acid (FOLVITE) 1 MG tablet, Take 1 mg by mouth daily, Disp: , Rfl:      gabapentin (NEURONTIN) 300 MG capsule, Take 1 capsule (300 mg) by mouth 3 times daily, Disp: 270 capsule, Rfl: 0     leflunomide (ARAVA) 20 MG tablet, Take 1 tablet by mouth every 24 hours, Disp: , Rfl:      Lidocaine (LIDOCARE) 4 % Patch, Place 1 patch onto the skin every 24 hours To prevent lidocaine toxicity, patient should be patch free for 12 hrs daily., Disp: , Rfl:      lisinopril (ZESTRIL) 20 MG tablet, TAKE 1 TABLET BY MOUTH EVERY DAY, Disp: 90 tablet, Rfl: 0     methotrexate 2.5 MG tablet, Take 20 mg by mouth every 7 days On Saturdays, Disp: , Rfl:      metoprolol succinate ER (TOPROL XL) 50 MG 24 hr tablet, TAKE ONE TABLET BY MOUTH EVERY DAY, Disp: 90 tablet, Rfl: 1     multivitamin w/minerals (THERA-VIT-M) tablet, Take 1 tablet by mouth daily, Disp: , Rfl:      polyethylene glycol (MIRALAX) 17 GM/Dose powder, Take 17 g by mouth daily, Disp: 510 g, Rfl: 0     predniSONE (DELTASONE) 5 MG tablet, Take 1 tablet (5 mg) by mouth daily, Disp: , Rfl: 0     simvastatin (ZOCOR) 20 MG tablet, Take 1 tablet (20 mg) by mouth At Bedtime, Disp: 90 tablet, Rfl: 1    SOCIAL HABITS:    History   Smoking Status     Some Days     Packs/day: 0.50     Years: 45.00     Types: Cigarettes     Last attempt to quit: 2/26/2010   Smokeless Tobacco     Never     Social History    Substance and Sexual Activity      Alcohol use: Yes        Comment: occsionally-3 -4 drinks a week      History   Drug Use No       FAMILY HISTORY:    Family History   Problem Relation Age of Onset      Cardiovascular Mother      Cancer Brother      Diabetes No family hx of      Hypertension No family hx of      Cerebrovascular Disease No family hx of      Alzheimer Disease No family hx of      Thyroid Disease No family hx of      Respiratory No family hx of      Other - See Comments Mother         CARDIOVASCULAR     Cancer Brother        PE:  /73 (BP Location: Right arm, Patient Position: Sitting, Cuff Size: Adult Small)   Pulse 68   LMP  (LMP Unknown)   SpO2 97%   Wt Readings from Last 1 Encounters:   02/06/24 42.7 kg (94 lb 2.2 oz)     There is no height or weight on file to calculate BMI.    EXAM:  GENERAL: well-developed 78 year old female who appears her stated age  CARDIAC: normal   CHEST/LUNG: normal respiratory effort   MUSCULOSKELETAL: grossly normal and both lower extremities are intact, no lower extremity edema  NEUROLOGIC: focally intact, alert and oriented x 3  PSYCH: appropriate affect  VASCULAR: Palpable femoral pulses bilaterally                LABS:      Sodium   Date Value Ref Range Status   02/06/2024 132 (L) 135 - 145 mmol/L Final     Comment:     Reference intervals for this test were updated on 09/26/2023 to more accurately reflect our healthy population. There may be differences in the flagging of prior results with similar values performed with this method. Interpretation of those prior results can be made in the context of the updated reference intervals.    02/05/2024 128 (L) 135 - 145 mmol/L Final     Comment:     Reference intervals for this test were updated on 09/26/2023 to more accurately reflect our healthy population. There may be differences in the flagging of prior results with similar values performed with this method. Interpretation of those prior results can be made in the context of the updated reference intervals.    02/04/2024 127 (L) 135 - 145 mmol/L Final     Comment:     Reference intervals for this test were updated on 09/26/2023 to more accurately reflect our  healthy population. There may be differences in the flagging of prior results with similar values performed with this method. Interpretation of those prior results can be made in the context of the updated reference intervals.    10/13/2019 136 133 - 144 mmol/L Final   10/11/2019 136 133 - 144 mmol/L Final   10/10/2019 136 133 - 144 mmol/L Final     Urea Nitrogen   Date Value Ref Range Status   02/06/2024 6.2 (L) 8.0 - 23.0 mg/dL Final   02/05/2024 5.1 (L) 8.0 - 23.0 mg/dL Final   02/04/2024 7.5 (L) 8.0 - 23.0 mg/dL Final   01/11/2023 10 7 - 30 mg/dL Final   09/27/2022 8 7 - 30 mg/dL Final   09/02/2022 8 7 - 30 mg/dL Final   10/13/2019 6 (L) 7 - 30 mg/dL Final   10/11/2019 6 (L) 7 - 30 mg/dL Final   10/10/2019 7 7 - 30 mg/dL Final     Hemoglobin   Date Value Ref Range Status   02/06/2024 8.2 (L) 11.7 - 15.7 g/dL Final   02/05/2024 8.1 (L) 11.7 - 15.7 g/dL Final   02/04/2024 8.5 (L) 11.7 - 15.7 g/dL Final   03/12/2020 12.2 11.7 - 15.7 g/dL Final   10/13/2019 7.5 (L) 11.7 - 15.7 g/dL Final   10/11/2019 7.8 (L) 11.7 - 15.7 g/dL Final     Platelet Count   Date Value Ref Range Status   02/07/2024 250 150 - 450 10e3/uL Final   02/06/2024 266 150 - 450 10e3/uL Final   02/05/2024 267 150 - 450 10e3/uL Final   03/12/2020 356 150 - 450 10e9/L Final   10/13/2019 563 (H) 150 - 450 10e9/L Final   10/11/2019 505 (H) 150 - 450 10e9/L Final     INR   Date Value Ref Range Status   10/09/2019 1.31 (H) 0.86 - 1.14 Final   10/01/2019 1.38 (H) 0.86 - 1.14 Final

## 2024-02-16 NOTE — LETTER
2/16/2024       RE: Sakshi Jaeger  [address] [id number]       Dear Colleague,    Thank you for referring your patient, Sakshi Jaeger, to the [facility] VASCULAR [facility] [place] at [facility]. Please see a copy of my visit note below.        VASCULAR SURGERY PROGRESS NOTE    LOCATION: [facility]     Sakshi Jaeger  Medical Record #:  [id number]  YOB: 1945  Age:  78 year old     Date of Service: 2/16/2024    PRIMARY CARE PROVIDER: Lucia Bates    Reason for visit:  Wound check     IMPRESSION / RECOMMENDATION:   Sakshi Jaeger is a 78-year-old female with severe deconditioning and previous right above-knee amputation and right femoral endarterectomy and iliac stenting. Over the last year she developed right thigh stump wound with exposed femur. 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 Presents to clinic for wound check and prevena exchange. Prevena was exchanged and her incision length is about 18cm. RLE incision well approximated, no drainage, no redness, no signs or symptoms of infection.    Plan for follow up next week for wound assessment and Prevena exchange.       All questions were answered and support provided. She has our contact information and knows to reach out with any additional questions or concerns.     Yasmin Rob, CNP  Vascular Surgery  Pager: [phone]  [email or web address]  Send message or 10 digit call back number Securely via [employer] with the [employer] Web Console (learn more here)      HPI:  Sakshi Jaeger is a 78 year old female with past medical history significant for PAD. 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 Postoperatively recovered well, her wounds have healed and she was able to enjoy summer and fall.  More recently patient  developed a hematoma on her stump, unclear if it was related to orthotics injury or pressure ulcer given limited activity with RLE stump. She also accidentally hurt the anterior aspect of her stump on the kitchen table creating an open area with exposed femur. Patient subsequently underwent revision of AKA on right. Now at rehab, recovering. Denies nausea, vomiting, no fevers or chills.     REVIEW OF SYSTEMS:    A 12 point ROS was reviewed and is negative except for what is listed above in HPI.    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    Essential hypertension with goal blood pressure less than 140/90 8/25/2016    Hyperlipidemia LDL goal <100 11/12/2010    Hypertension goal BP (blood pressure) < 130/80 1/26/2011    Infection of right below knee amputation (H) 10/1/2019    IRON DEFIC ANEMIA NOS 9/15/2005    Lower limb amputation, below knee 12/26/2012    MI (myocardial infarction) (H)     3/2010    Non-healing surgical wound, subsequent encounter 9/24/2020    Added automatically from request for surgery [id number]    Osteoporosis 2/16/2005    OSTEOPOROSIS NOS 2/16/2005    Protein-calorie malnutrition (H24) 5/18/2022    PVD (peripheral vascular disease) (H24) 5/18/2022    Rheumatoid arthritis (H) 2/16/2005         Rheumatoid arthritis(714.0) 2/16/2005    Status post below-knee amputation (H) 12/26/2012               Past Surgical History:   Procedure Laterality Date    ---------INCISION AND DRAINAGE  03/05/2014    AMPUTATE LEG ABOVE KNEE Right 1/13/2023    Procedure: AMPUTATION, ABOVE KNEE RIGHT;  Surgeon: Emma Oconnor MD;  [facility]    AMPUTATE LEG ABOVE KNEE Right 2/2/2024    Procedure: Redo ABOVE KNEE AMPUTATION;  Surgeon: Bryon Mitchell MD;  [facility]     AMPUTATE LEG BELOW KNEE Right 9/28/2022    Procedure: Right through knee amputation;  Surgeon: Doron Mott MD;  [facility]    AMPUTATION BELOW KNEE RT/LT  01/01/2005    right lowwer leg.     ANGIOGRAM Right 1/13/2023    Procedure: right iliac arteriogram aned right common iliac artery stent;  Surgeon: Emma Oconnor MD;  [facility]    APPENDECTOMY      ARTHROPLASTY KNEE  04/27/2011    Procedure:ARTHROPLASTY KNEE; Surgeon:JESSE HAWKINS; [facility]    COMPLEX WOUND CLOSURE (UPDATE) Right 12/08/2021    Procedure: Right stump wound debridment and closure;  Surgeon: RAISA Perea MD;  [facility]    CORONARY STENT PLACEMENT      ENDARTERECTOMY FEMORAL Right 1/13/2023    Procedure: ENDARTERECTOMY, FEMORAL RIGHT;  Surgeon: Emma Oconnor MD;  [facility]    INJECT NERVE BLOCK SPHENOPALATINE GANGLION N/A 9/29/2022    Procedure: Out of OR encounter for block to be done by ;  Surgeon: GENERIC ANESTHESIA PROVIDER;  [facility]    IR OR ANGIOGRAM  1/13/2023    IRRIGATION AND DEBRIDEMENT LOWER EXTREMITY, COMBINED Right 10/09/2019    Procedure: Irrigation and debridement Right leg;  Surgeon: Doron Mott MD;  [facility]    NERVE BLOCK PERIPHERAL N/A 9/27/2022    Procedure: BLOCK, NERVE;  Surgeon: GENERIC ANESTHESIA PROVIDER;  [facility]    OTHER SURGICAL HISTORY  03/05/2014    I AND D     OTHER SURGICAL HISTORY Right 10/09/2019    IRRIGATION AND DEBRIDEMENT LOWER EXTREMITY, COMBINED    PHACOEMULSIFICATION CLEAR CORNEA WITH STANDARD INTRAOCULAR LENS IMPLANT Left 9/8/2022    Procedure: PHACOEMULSIFICATION, LEFT CATARACT, WITH INTRAOCULAR LENS IMPLANT;  Surgeon: Flip Izaguirre MD;  [facility]    PHACOEMULSIFICATION CLEAR CORNEA WITH STANDARD INTRAOCULAR LENS IMPLANT Right 10/13/2022    Procedure: PHACOEMULSIFICATION, RIGHT CATARACT, WITH INTRAOCULAR LENS IMPLANT;  Surgeon: Flip Izaguirre MD;  [facility]     REVISE SCAR EXTREMITY Right 12/17/2020    Procedure: Right stump scar revision;  Surgeon: RAISA Perea MD;  Location: [facility]       ALLERGIES:  Adhesive tape    MEDS:    Current Outpatient Medications:     acetaminophen (TYLENOL) 325 MG tablet, Take 2 tablets (650 mg) by mouth every 6 hours, Disp: 24 tablet, Rfl: 0    amLODIPine (NORVASC) 5 MG tablet, Take 1 tablet (5 mg) by mouth daily, Disp: 90 tablet, Rfl: 3    aspirin (ASA) 81 MG tablet, Take 81 mg by mouth daily, Disp:  , Rfl:     calcium carbonate 600 mg-vitamin D 400 units (CALCIUM 600 + D) 600-400 MG-UNIT per tablet, Take 2 tablets by mouth daily, Disp: , Rfl:     folic acid (FOLVITE) 1 MG tablet, Take 1 mg by mouth daily, Disp: , Rfl:     gabapentin (NEURONTIN) 300 MG capsule, Take 1 capsule (300 mg) by mouth 3 times daily, Disp: 270 capsule, Rfl: 0    leflunomide (ARAVA) 20 MG tablet, Take 1 tablet by mouth every 24 hours, Disp: , Rfl:     Lidocaine (LIDOCARE) 4 % Patch, Place 1 patch onto the skin every 24 hours To prevent lidocaine toxicity, patient should be patch free for 12 hrs daily., Disp: , Rfl:     lisinopril (ZESTRIL) 20 MG tablet, TAKE 1 TABLET BY MOUTH EVERY DAY, Disp: 90 tablet, Rfl: 0    methotrexate 2.5 MG tablet, Take 20 mg by mouth every 7 days On Saturdays, Disp: , Rfl:     metoprolol succinate ER (TOPROL XL) 50 MG 24 hr tablet, TAKE ONE TABLET BY MOUTH EVERY DAY, Disp: 90 tablet, Rfl: 1    multivitamin w/minerals (THERA-VIT-M) tablet, Take 1 tablet by mouth daily, Disp: , Rfl:     polyethylene glycol (MIRALAX) 17 GM/Dose powder, Take 17 g by mouth daily, Disp: 510 g, Rfl: 0    predniSONE (DELTASONE) 5 MG tablet, Take 1 tablet (5 mg) by mouth daily, Disp: , Rfl: 0    simvastatin (ZOCOR) 20 MG tablet, Take 1 tablet (20 mg) by mouth At Bedtime, Disp: 90 tablet, Rfl: 1    SOCIAL HABITS:    History   Smoking Status    Some Days    Packs/day: 0.50    Years: 45.00    Types: Cigarettes    Last attempt to quit: 2/26/2010   Smokeless  Tobacco    Never     Social History    Substance and Sexual Activity      Alcohol use: Yes        Comment: occsionally-3 -4 drinks a week      History   Drug Use No       FAMILY HISTORY:    Family History   Problem Relation Age of Onset    Cardiovascular Mother     Cancer Brother     Diabetes No family hx of     Hypertension No family hx of     Cerebrovascular Disease No family hx of     Alzheimer Disease No family hx of     Thyroid Disease No family hx of     Respiratory No family hx of     Other - See Comments Mother         CARDIOVASCULAR    Cancer Brother        PE:  /73 (BP Location: Right arm, Patient Position: Sitting, Cuff Size: Adult Small)   Pulse 68   LMP  (LMP Unknown)   SpO2 97%   Wt Readings from Last 1 Encounters:   02/06/24 42.7 kg (94 lb 2.2 oz)     There is no height or weight on file to calculate BMI.    EXAM:  GENERAL: well-developed 78 year old female who appears her stated age  CARDIAC: normal   CHEST/LUNG: normal respiratory effort   MUSCULOSKELETAL: grossly normal and both lower extremities are intact, no lower extremity edema  NEUROLOGIC: focally intact, alert and oriented x 3  PSYCH: appropriate affect  VASCULAR: Palpable femoral pulses bilaterally            LABS:      Sodium   Date Value Ref Range Status   02/06/2024 132 (L) 135 - 145 mmol/L Final     Comment:     Reference intervals for this test were updated on 09/26/2023 to more accurately reflect our healthy population. There may be differences in the flagging of prior results with similar values performed with this method. Interpretation of those prior results can be made in the context of the updated reference intervals.    02/05/2024 128 (L) 135 - 145 mmol/L Final     Comment:     Reference intervals for this test were updated on 09/26/2023 to more accurately reflect our healthy population. There may be differences in the flagging of prior results with similar values performed with this method. Interpretation of those  prior results can be made in the context of the updated reference intervals.    02/04/2024 127 (L) 135 - 145 mmol/L Final     Comment:     Reference intervals for this test were updated on 09/26/2023 to more accurately reflect our healthy population. There may be differences in the flagging of prior results with similar values performed with this method. Interpretation of those prior results can be made in the context of the updated reference intervals.    10/13/2019 136 133 - 144 mmol/L Final   10/11/2019 136 133 - 144 mmol/L Final   10/10/2019 136 133 - 144 mmol/L Final     Urea Nitrogen   Date Value Ref Range Status   02/06/2024 6.2 (L) 8.0 - 23.0 mg/dL Final   02/05/2024 5.1 (L) 8.0 - 23.0 mg/dL Final   02/04/2024 7.5 (L) 8.0 - 23.0 mg/dL Final   01/11/2023 10 7 - 30 mg/dL Final   09/27/2022 8 7 - 30 mg/dL Final   09/02/2022 8 7 - 30 mg/dL Final   10/13/2019 6 (L) 7 - 30 mg/dL Final   10/11/2019 6 (L) 7 - 30 mg/dL Final   10/10/2019 7 7 - 30 mg/dL Final     Hemoglobin   Date Value Ref Range Status   02/06/2024 8.2 (L) 11.7 - 15.7 g/dL Final   02/05/2024 8.1 (L) 11.7 - 15.7 g/dL Final   02/04/2024 8.5 (L) 11.7 - 15.7 g/dL Final   03/12/2020 12.2 11.7 - 15.7 g/dL Final   10/13/2019 7.5 (L) 11.7 - 15.7 g/dL Final   10/11/2019 7.8 (L) 11.7 - 15.7 g/dL Final     Platelet Count   Date Value Ref Range Status   02/07/2024 250 150 - 450 10e3/uL Final   02/06/2024 266 150 - 450 10e3/uL Final   02/05/2024 267 150 - 450 10e3/uL Final   03/12/2020 356 150 - 450 10e9/L Final   10/13/2019 563 (H) 150 - 450 10e9/L Final   10/11/2019 505 (H) 150 - 450 10e9/L Final     INR   Date Value Ref Range Status   10/09/2019 1.31 (H) 0.86 - 1.14 Final   10/01/2019 1.38 (H) 0.86 - 1.14 Final       Again, thank you for allowing me to participate in the care of your patient.      Sincerely,    DESIRE Ventura CNP

## 2024-02-23 NOTE — PROGRESS NOTES
VASCULAR SURGERY PROGRESS NOTE    LOCATION: [facility]     Sakshi Jaeger  Medical Record #:  [id number]  YOB: 1945  Age:  78 year old     Date of Service: 2/23/2024    PRIMARY CARE PROVIDER: Lucia Bates    Reason for visit: Wound check    IMPRESSION / RECOMMENDATION:   Sakshi Jaeger is a very pleasant 78-year-old patient who underwent revision of her right upper extremity amputation due to accidental injury to stump. Postoperatively has been healing well, RLE incision sutures intact.  On presentation Prevena was off.  Mid incision segment with slight redness, no drainage present.    Extensive wound cleaning was performed, with generous amount of alcohol followed by generous amount of iodine. This was allowed to dry.  Upon review of incision images with Dr. Mitchell, we decided that we are not concerned for infection, however we will follow the slight redness closely. Removed Prevena, new dressing to include the below.  Instructions were sent with patient to rehab.    RLE dressing changes must occur at least daily, change dressing daily or twice per day if soaked/drainage present:   1. Remove old dressing from incision  2. Apply generous betadine/iodine on incision, paint incision with iodine. Allow to dry.   3. Once iodine/betadine dried, apply sterile dressing on incision. Cover entire incision. After dressing is placed on incision, please apply tape (Prefer primpore tape, breathable, fabric tape)  4. If drainage is present on dressing, please change at least twice per day.   5. Please keep incision on RLE dry at all times.  6. Please do not allow RLE dressing to become soiled at any time.  7. Please do not apply plastic tape/tegaderm, incision must be allowed to breathe through dressing and primapore tape.  8. If bandage is wet, please change dressing and Apply generous betadine/iodine on incision, paint incision with iodine. Allow to dry. Once iodine/betadine dried,  apply sterile dressing on incision. Cover entire incision. After dressing is placed on incision, please apply tape (Prefer primpore tape, breathable, fabric tape)  9. Please call us if concern for infection occur.    Patient is scheduled to follow-up on Monday, 2/26/2024 at 1:30 PM    All questions were answered and support provided. She has our contact information and knows to reach out with any additional questions or concerns.     Yasmin Rob, [facility]  Vascular Surgery  Pager: [phone]  [email or web address]  Send message or 10 digit call back number Securely via [employer] with the [employer] Web Console (learn more here)        HPI:  Sakshi presents for scheduled wound check from her right upper extremity stump revision. Slight redness in the midsegment noted during this clinic visit. Patient noted that Prevena has been on most of the time, however this has been off since yesterday. Slight area of redness present, no drainage, no odor, sutures intact. No fevers or chills. Instructions on wound care provided to patient and a copy was printed for rehab.    REVIEW OF SYSTEMS:    A 12 point ROS was reviewed and is negative except for what is listed above in HPI.    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     IRON DEFIC ANEMIA NOS 9/15/2005     Lower limb amputation, below knee 12/26/2012     MI (myocardial infarction) (H)     3/2010     Non-healing surgical wound, subsequent encounter 9/24/2020    Added automatically from request  for surgery [id number]     Osteoporosis 2/16/2005     OSTEOPOROSIS NOS 2/16/2005     Protein-calorie malnutrition (H24) 5/18/2022     PVD (peripheral vascular disease) (H24) 5/18/2022     Rheumatoid arthritis (H) 2/16/2005          Rheumatoid arthritis(714.0) 2/16/2005     Status post below-knee amputation (H) 12/26/2012               Past Surgical History:   Procedure Laterality Date     ---------INCISION AND DRAINAGE  03/05/2014     AMPUTATE LEG ABOVE KNEE Right 1/13/2023    Procedure: AMPUTATION, ABOVE KNEE RIGHT;  Surgeon: Emma Oconnor MD;  [facility]     AMPUTATE LEG ABOVE KNEE Right 2/2/2024    Procedure: Redo ABOVE KNEE AMPUTATION;  Surgeon: Bryon Mitchell MD;  [facility]     AMPUTATE LEG BELOW KNEE Right 9/28/2022    Procedure: Right through knee amputation;  Surgeon: Doron Mott MD;  [facility]     AMPUTATION BELOW KNEE RT/LT  01/01/2005    right lowwer leg.      ANGIOGRAM Right 1/13/2023    Procedure: right iliac arteriogram aned right common iliac artery stent;  Surgeon: Emma Oconnor MD;  [facility]     APPENDECTOMY       ARTHROPLASTY KNEE  04/27/2011    Procedure:ARTHROPLASTY KNEE; Surgeon:JESSE HAWKINS; [facility]     COMPLEX WOUND CLOSURE (UPDATE) Right 12/08/2021    Procedure: Right stump wound debridment and closure;  Surgeon: RAISA Perea MD;  Location: [facility]     CORONARY STENT PLACEMENT       ENDARTERECTOMY FEMORAL Right 1/13/2023    Procedure: ENDARTERECTOMY, FEMORAL RIGHT;  Surgeon: Emma Oconnor MD;  [facility]     INJECT NERVE BLOCK SPHENOPALATINE GANGLION N/A 9/29/2022    Procedure: Out of OR encounter for block to be done by ;  Surgeon: GENERIC ANESTHESIA PROVIDER;  [facility]     IR OR ANGIOGRAM  1/13/2023     IRRIGATION AND DEBRIDEMENT LOWER EXTREMITY, COMBINED Right 10/09/2019    Procedure: Irrigation and debridement Right leg;  Surgeon: Doron Mott MD;  [facility]     NERVE BLOCK PERIPHERAL  N/A 9/27/2022    Procedure: BLOCK, NERVE;  Surgeon: GENERIC ANESTHESIA PROVIDER;  [facility]     OTHER SURGICAL HISTORY  03/05/2014    I AND D      OTHER SURGICAL HISTORY Right 10/09/2019    IRRIGATION AND DEBRIDEMENT LOWER EXTREMITY, COMBINED     PHACOEMULSIFICATION CLEAR CORNEA WITH STANDARD INTRAOCULAR LENS IMPLANT Left 9/8/2022    Procedure: PHACOEMULSIFICATION, LEFT CATARACT, WITH INTRAOCULAR LENS IMPLANT;  Surgeon: Flip Izaguirre MD;  [facility]     PHACOEMULSIFICATION CLEAR CORNEA WITH STANDARD INTRAOCULAR LENS IMPLANT Right 10/13/2022    Procedure: PHACOEMULSIFICATION, RIGHT CATARACT, WITH INTRAOCULAR LENS IMPLANT;  Surgeon: Flip Izaguirre MD;  [facility]     REVISE SCAR EXTREMITY Right 12/17/2020    Procedure: Right stump scar revision;  Surgeon: RAISA Perea MD;  Location: [facility] OR       ALLERGIES:  Adhesive tape    MEDS:    Current Outpatient Medications:      acetaminophen (TYLENOL) 325 MG tablet, Take 2 tablets (650 mg) by mouth every 6 hours, Disp: 24 tablet, Rfl: 0     amLODIPine (NORVASC) 5 MG tablet, Take 1 tablet (5 mg) by mouth daily, Disp: 90 tablet, Rfl: 3     aspirin (ASA) 81 MG tablet, Take 81 mg by mouth daily, Disp:  , Rfl:      calcium carbonate 600 mg-vitamin D 400 units (CALCIUM 600 + D) 600-400 MG-UNIT per tablet, Take 2 tablets by mouth daily, Disp: , Rfl:      folic acid (FOLVITE) 1 MG tablet, Take 1 mg by mouth daily, Disp: , Rfl:      gabapentin (NEURONTIN) 300 MG capsule, Take 1 capsule (300 mg) by mouth 3 times daily, Disp: 270 capsule, Rfl: 0     leflunomide (ARAVA) 20 MG tablet, Take 1 tablet by mouth every 24 hours, Disp: , Rfl:      Lidocaine (LIDOCARE) 4 % Patch, Place 1 patch onto the skin every 24 hours To prevent lidocaine toxicity, patient should be patch free for 12 hrs daily., Disp: , Rfl:      lisinopril (ZESTRIL) 20 MG tablet, TAKE 1 TABLET BY MOUTH EVERY DAY, Disp: 90 tablet, Rfl: 0     methotrexate 2.5 MG tablet, Take  20 mg by mouth every 7 days On Saturdays, Disp: , Rfl:      metoprolol succinate ER (TOPROL XL) 50 MG 24 hr tablet, TAKE ONE TABLET BY MOUTH EVERY DAY, Disp: 90 tablet, Rfl: 1     multivitamin w/minerals (THERA-VIT-M) tablet, Take 1 tablet by mouth daily, Disp: , Rfl:      oxyCODONE (ROXICODONE) 5 MG tablet, Take 1 tablet (5 mg) by mouth every 8 hours as needed for pain, Disp: 20 tablet, Rfl: 0     polyethylene glycol (MIRALAX) 17 GM/Dose powder, Take 17 g by mouth daily, Disp: 510 g, Rfl: 0     predniSONE (DELTASONE) 5 MG tablet, Take 1 tablet (5 mg) by mouth daily, Disp: , Rfl: 0     simvastatin (ZOCOR) 20 MG tablet, Take 1 tablet (20 mg) by mouth At Bedtime, Disp: 90 tablet, Rfl: 1    SOCIAL HABITS:    History   Smoking Status     Some Days     Packs/day: 0.50     Years: 45.00     Types: Cigarettes     Last attempt to quit: 2/26/2010   Smokeless Tobacco     Never     Social History    Substance and Sexual Activity      Alcohol use: Yes        Comment: occsionally-3 -4 drinks a week      History   Drug Use No       FAMILY HISTORY:    Family History   Problem Relation Age of Onset     Cardiovascular Mother      Cancer Brother      Diabetes No family hx of      Hypertension No family hx of      Cerebrovascular Disease No family hx of      Alzheimer Disease No family hx of      Thyroid Disease No family hx of      Respiratory No family hx of      Other - See Comments Mother         CARDIOVASCULAR     Cancer Brother        PE:  /63 (BP Location: Right arm, Patient Position: Sitting, Cuff Size: Adult Small)   Pulse 65   LMP  (LMP Unknown)   SpO2 94%   Wt Readings from Last 1 Encounters:   02/06/24 42.7 kg (94 lb 2.2 oz)     There is no height or weight on file to calculate BMI.    EXAM:  GENERAL: well-developed 78 year old female who appears her stated age  CARDIAC: normal   CHEST/LUNG: normal respiratory effort   MUSCULOSKELETAL: grossly normal and both lower extremities are intact, no lower extremity  edema  NEUROLOGIC: focally intact, alert and oriented x 3  PSYCH: appropriate affect  VASCULAR: RLE incision with intact sutures, no drainage, no odor, mid incision segment with slight erythema and skin dehiscence.

## 2024-02-23 NOTE — LETTER
2/23/2024       RE: Sakshi Jaeger  [address] [id number]       Dear Colleague,    Thank you for referring your patient, Sakshi Jaeger, to the [facility] [place] at [facility]. Please see a copy of my visit note below.        VASCULAR SURGERY PROGRESS NOTE    LOCATION: [facility]     Sakshi Jaeger  Medical Record #:  [id number]  YOB: 1945  Age:  78 year old     Date of Service: 2/23/2024    PRIMARY CARE PROVIDER: Lucia Bates    Reason for visit: Wound check    IMPRESSION / RECOMMENDATION:   Sakshi Jaeger is a very pleasant 78-year-old patient who underwent revision of her right upper extremity amputation due to accidental injury to stump. Postoperatively has been healing well, RLE incision sutures intact.  On presentation Prevena was off.  Mid incision segment with slight redness, no drainage present.    Extensive wound cleaning was performed, with generous amount of alcohol followed by generous amount of iodine. This was allowed to dry.  Upon review of incision images with Dr. Mitchell, we decided that we are not concerned for infection, however we will follow the slight redness closely. Removed Prevena, new dressing to include the below.  Instructions were sent with patient to rehab.    RLE dressing changes must occur at least daily, change dressing daily or twice per day if soaked/drainage present:   Remove old dressing from incision  Apply generous betadine/iodine on incision, paint incision with iodine. Allow to dry.   Once iodine/betadine dried, apply sterile dressing on incision. Cover entire incision. After dressing is placed on incision, please apply tape (Prefer primpore tape, breathable, fabric tape)  If drainage is present on dressing, please change at least twice per day.   Please keep incision on RLE dry at all times.  Please do not allow RLE dressing to become soiled at  any time.  Please do not apply plastic tape/tegaderm, incision must be allowed to breathe through dressing and primapore tape.  If bandage is wet, please change dressing and Apply generous betadine/iodine on incision, paint incision with iodine. Allow to dry. Once iodine/betadine dried, apply sterile dressing on incision. Cover entire incision. After dressing is placed on incision, please apply tape (Prefer primpore tape, breathable, fabric tape)  Please call us if concern for infection occur.    Patient is scheduled to follow-up on Monday, 2/26/2024 at 1:30 PM    All questions were answered and support provided. She has our contact information and knows to reach out with any additional questions or concerns.     Yasmin Rob [facility]  Vascular Surgery  Pager: [phone]  [email or web address]  Send message or 10 digit call back number Securely via [employer] with the [employer] Web Console (learn more here)        HPI:  Sakshi presents for scheduled wound check from her right upper extremity stump revision. Slight redness in the midsegment noted during this clinic visit. Patient noted that Prevena has been on most of the time, however this has been off since yesterday. Slight area of redness present, no drainage, no odor, sutures intact. No fevers or chills. Instructions on wound care provided to patient and a copy was printed for rehab.    REVIEW OF SYSTEMS:    A 12 point ROS was reviewed and is negative except for what is listed above in HPI.    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    Essential hypertension with goal blood pressure less than 140/90 8/25/2016    Hyperlipidemia LDL goal <100 11/12/2010    Hypertension goal BP (blood pressure) < 130/80 1/26/2011    Infection  of right below knee amputation (H) 10/1/2019    IRON DEFIC ANEMIA NOS 9/15/2005    Lower limb amputation, below knee 12/26/2012    MI (myocardial infarction) (H)     3/2010    Non-healing surgical wound, subsequent encounter 9/24/2020    Added automatically from request for surgery [id number]    Osteoporosis 2/16/2005    OSTEOPOROSIS NOS 2/16/2005    Protein-calorie malnutrition (H24) 5/18/2022    PVD (peripheral vascular disease) (H24) 5/18/2022    Rheumatoid arthritis (H) 2/16/2005         Rheumatoid arthritis(714.0) 2/16/2005    Status post below-knee amputation (H) 12/26/2012               Past Surgical History:   Procedure Laterality Date    ---------INCISION AND DRAINAGE  03/05/2014    AMPUTATE LEG ABOVE KNEE Right 1/13/2023    Procedure: AMPUTATION, ABOVE KNEE RIGHT;  Surgeon: Emma Oconnor MD;  [facility]    AMPUTATE LEG ABOVE KNEE Right 2/2/2024    Procedure: Redo ABOVE KNEE AMPUTATION;  Surgeon: Bryon Mitchell MD;  [facility]    AMPUTATE LEG BELOW KNEE Right 9/28/2022    Procedure: Right through knee amputation;  Surgeon: Doron Mott MD;  [facility]    AMPUTATION BELOW KNEE RT/LT  01/01/2005    right lowwer leg.     ANGIOGRAM Right 1/13/2023    Procedure: right iliac arteriogram aned right common iliac artery stent;  Surgeon: Emma Oconnor MD;  [facility]    APPENDECTOMY      ARTHROPLASTY KNEE  04/27/2011    Procedure:ARTHROPLASTY KNEE; Surgeon:JESSE HAWKINS; [facility]    COMPLEX WOUND CLOSURE (UPDATE) Right 12/08/2021    Procedure: Right stump wound debridment and closure;  Surgeon: RAISA Perea MD;  Location: [facility]    CORONARY STENT PLACEMENT      ENDARTERECTOMY FEMORAL Right 1/13/2023    Procedure: ENDARTERECTOMY, FEMORAL RIGHT;  Surgeon: Emma Oconnor MD;  [facility]    INJECT NERVE BLOCK SPHENOPALATINE GANGLION N/A 9/29/2022    Procedure: Out of OR encounter for block to be done by ;  Surgeon: GENERIC ANESTHESIA PROVIDER;   [facility]    IR OR ANGIOGRAM  1/13/2023    IRRIGATION AND DEBRIDEMENT LOWER EXTREMITY, COMBINED Right 10/09/2019    Procedure: Irrigation and debridement Right leg;  Surgeon: Doron Mott MD;  [facility]    NERVE BLOCK PERIPHERAL N/A 9/27/2022    Procedure: BLOCK, NERVE;  Surgeon: GENERIC ANESTHESIA PROVIDER;  [facility]    OTHER SURGICAL HISTORY  03/05/2014    I AND D     OTHER SURGICAL HISTORY Right 10/09/2019    IRRIGATION AND DEBRIDEMENT LOWER EXTREMITY, COMBINED    PHACOEMULSIFICATION CLEAR CORNEA WITH STANDARD INTRAOCULAR LENS IMPLANT Left 9/8/2022    Procedure: PHACOEMULSIFICATION, LEFT CATARACT, WITH INTRAOCULAR LENS IMPLANT;  Surgeon: Flip Izaguirre MD;  [facility]    PHACOEMULSIFICATION CLEAR CORNEA WITH STANDARD INTRAOCULAR LENS IMPLANT Right 10/13/2022    Procedure: PHACOEMULSIFICATION, RIGHT CATARACT, WITH INTRAOCULAR LENS IMPLANT;  Surgeon: Flip Izaguirre MD;  [facility]    REVISE SCAR EXTREMITY Right 12/17/2020    Procedure: Right stump scar revision;  Surgeon: RAISA Perea MD;  Location: [facility] OR       ALLERGIES:  Adhesive tape    MEDS:    Current Outpatient Medications:     acetaminophen (TYLENOL) 325 MG tablet, Take 2 tablets (650 mg) by mouth every 6 hours, Disp: 24 tablet, Rfl: 0    amLODIPine (NORVASC) 5 MG tablet, Take 1 tablet (5 mg) by mouth daily, Disp: 90 tablet, Rfl: 3    aspirin (ASA) 81 MG tablet, Take 81 mg by mouth daily, Disp:  , Rfl:     calcium carbonate 600 mg-vitamin D 400 units (CALCIUM 600 + D) 600-400 MG-UNIT per tablet, Take 2 tablets by mouth daily, Disp: , Rfl:     folic acid (FOLVITE) 1 MG tablet, Take 1 mg by mouth daily, Disp: , Rfl:     gabapentin (NEURONTIN) 300 MG capsule, Take 1 capsule (300 mg) by mouth 3 times daily, Disp: 270 capsule, Rfl: 0    leflunomide (ARAVA) 20 MG tablet, Take 1 tablet by mouth every 24 hours, Disp: , Rfl:     Lidocaine (LIDOCARE) 4 % Patch, Place 1 patch onto the skin  every 24 hours To prevent lidocaine toxicity, patient should be patch free for 12 hrs daily., Disp: , Rfl:     lisinopril (ZESTRIL) 20 MG tablet, TAKE 1 TABLET BY MOUTH EVERY DAY, Disp: 90 tablet, Rfl: 0    methotrexate 2.5 MG tablet, Take 20 mg by mouth every 7 days On Saturdays, Disp: , Rfl:     metoprolol succinate ER (TOPROL XL) 50 MG 24 hr tablet, TAKE ONE TABLET BY MOUTH EVERY DAY, Disp: 90 tablet, Rfl: 1    multivitamin w/minerals (THERA-VIT-M) tablet, Take 1 tablet by mouth daily, Disp: , Rfl:     oxyCODONE (ROXICODONE) 5 MG tablet, Take 1 tablet (5 mg) by mouth every 8 hours as needed for pain, Disp: 20 tablet, Rfl: 0    polyethylene glycol (MIRALAX) 17 GM/Dose powder, Take 17 g by mouth daily, Disp: 510 g, Rfl: 0    predniSONE (DELTASONE) 5 MG tablet, Take 1 tablet (5 mg) by mouth daily, Disp: , Rfl: 0    simvastatin (ZOCOR) 20 MG tablet, Take 1 tablet (20 mg) by mouth At Bedtime, Disp: 90 tablet, Rfl: 1    SOCIAL HABITS:    History   Smoking Status    Some Days    Packs/day: 0.50    Years: 45.00    Types: Cigarettes    Last attempt to quit: 2/26/2010   Smokeless Tobacco    Never     Social History    Substance and Sexual Activity      Alcohol use: Yes        Comment: occsionally-3 -4 drinks a week      History   Drug Use No       FAMILY HISTORY:    Family History   Problem Relation Age of Onset    Cardiovascular Mother     Cancer Brother     Diabetes No family hx of     Hypertension No family hx of     Cerebrovascular Disease No family hx of     Alzheimer Disease No family hx of     Thyroid Disease No family hx of     Respiratory No family hx of     Other - See Comments Mother         CARDIOVASCULAR    Cancer Brother        PE:  /63 (BP Location: Right arm, Patient Position: Sitting, Cuff Size: Adult Small)   Pulse 65   LMP  (LMP Unknown)   SpO2 94%   Wt Readings from Last 1 Encounters:   02/06/24 42.7 kg (94 lb 2.2 oz)     There is no height or weight on file to calculate BMI.    EXAM:  GENERAL:  well-developed 78 year old female who appears her stated age  CARDIAC: normal   CHEST/LUNG: normal respiratory effort   MUSCULOSKELETAL: grossly normal and both lower extremities are intact, no lower extremity edema  NEUROLOGIC: focally intact, alert and oriented x 3  PSYCH: appropriate affect  VASCULAR: RLE incision with intact sutures, no drainage, no odor, mid incision segment with slight erythema and skin dehiscence.                   Again, thank you for allowing me to participate in the care of your patient.      Sincerely,    DESIRE Ventura CNP

## 2024-02-23 NOTE — PATIENT INSTRUCTIONS
Thank you so much for choosing us for your care. It was a pleasure to see you at the vascular clinic today.     Follow-up recommendations:     RLE dressing changes must occur at least daily, change dressing daily or twice per day if soaked/drainage present:   Remove old dressing from incision  Apply generous betadine/iodine on incision, paint incision with iodine. Allow to dry.   Once iodine/betadine dried, apply sterile dressing on incision. Cover entire incision. After dressing is placed on incision, please apply tape (Prefer primpore tape, breathable, fabric tape)  If drainage is present on dressing, please change at least twice per day.   Please keep incision on RLE dry at all times.  Please do not allow RLE dressing to become soiled at any time.  Please do not apply plastic tape/tegaderm, incision must be allowed to breathe through dressing and primapore tape  If bandage is wet, please change dressing and Apply generous betadine/iodine on incision, paint incision with iodine. Allow to dry. Once iodine/betadine dried, apply sterile dressing on incision. Cover entire incision. After dressing is placed on incision, please apply tape (Prefer primpore tape, breathable, fabric tape)  Please call us if concern for infection occur.     Our scheduling team will get in touch with you to set up any follow-up testing/imaging and/or appointments. Please be aware that any testing/imaging recommended today will need to completed prior to your next visit with the provider. If testing/imaging is not completed prior to your next visit, your visit may be rescheduled.        If you have any questions, please contact our clinic directly at [phone] and ask for the nurse. We also encourage the use of [employer] to communicate with your HealthCare Provider.        If you have an urgent need after business hours (8:00 am to 4:30 pm) please call [phone], option 4, and ask for the vascular attending on call. For non-urgent after  hours needs, please call the vascular nurse at [phone] and leave a detailed voicemail. For scheduling needs, please call our clinic directly at [phone].

## 2024-02-26 NOTE — PATIENT INSTRUCTIONS
Thank you so much for choosing us for your care. It was a pleasure to see you at the vascular clinic today.     Follow-up recommendations:     You were started on antibiotics today, please take twice per day, with food. Do not take on empty stomach. If you start having any symptoms such as rashes, nausea, vomiting, or any other kind of new symptoms, please call vascular surgery immediately. Must start taking antibiotic daily, starting tonight. If unable to take antibiotic tonight, please call us immediately. We are more lilian happy to send the prescription to your pharmacy. This was printed at the request of Sakshi and Talon to be taken to rehab.       RLE dressing changes must occur twice per day, morning and evening (8AM and 8PM). Keep incision dry at all times:   Remove old dressing from incision  Apply generous betadine/iodine on incision, paint incision with iodine. Allow to dry.   Once iodine/betadine dried, apply sterile dressing on incision. Cover entire incision. After dressing is placed on incision, please apply tape (Prefer primpore tape, breathable, fabric tape)  Please keep incision on RLE dry at all times.  Please do not allow RLE dressing to become soiled at any time.  Please do not apply plastic tape/tegaderm, incision must be allowed to breathe through dressing and primapore tape  If bandage is wet, please change dressing and Apply generous betadine/iodine on incision, paint incision with iodine. Allow to dry. Once iodine/betadine dried, apply sterile dressing on incision. Cover entire incision. After dressing is placed on incision, please apply tape (Prefer primpore tape, breathable, fabric tape)  Please call us immediately with any concerns        Our scheduling team will get in touch with you to set up any follow-up testing/imaging and/or appointments. Please be aware that any testing/imaging recommended today will need to completed prior to your next visit with the provider. If testing/imaging is not  completed prior to your next visit, your visit may be rescheduled.        If you have any questions, please contact our clinic directly at [phone] and ask for the nurse. We also encourage the use of Finicity to communicate with your HealthCare Provider.        If you have an urgent need after business hours (8:00 am to 4:30 pm) please call [phone], option 4, and ask for the vascular attending on call. For non-urgent after hours needs, please call the vascular nurse at [phone] and leave a detailed voicemail. For scheduling needs, please call our clinic directly at [phone].

## 2024-02-26 NOTE — LETTER
2/26/2024       RE: Sakshi Jaeger  [address] [address]       Dear Colleague,    Thank you for referring your patient, Sakshi Jaeger, to the [facility]ULAR [facility] [place] at [facility]. Please see a copy of my visit note below.        VASCULAR SURGERY PROGRESS NOTE    LOCATION: [facility]     Sakshi Jaeger  Medical Record #:  [id number]  YOB: 1945  Age:  78 year old     Date of Service: 2/26/2024    PRIMARY CARE PROVIDER: Lucia Bates    Reason for visit: Wound check    IMPRESSION / RECOMMENDATION:   Sakshi Jaeger is a very pleasant 78-year-old patient who underwent revision of her right lower extremity amputation due to accidental injury to stump. Postoperatively has been healing well, RLE incision sutures intact. Continues to have mid incision segment with slight redness and now serous drainage, reviewed with Dr. Mitchell and although we are not concerned for infection, as this appears to be part of the healing process, we will start Sakshi on antibiotics.    Initially planned to start Bactrim however patient is also on methotrexate which would have a drug interaction thus started on amoxicillin/clavulanate twice per day. Do not take on empty stomach. If you start having any symptoms such as rashes, nausea, vomiting, or any other kind of new symptoms, please call vascular surgery immediately. Must start taking antibiotic daily, starting tonight. If unable to take antibiotic tonight, please call us immediately. We are more than happy to send the prescription to your pharmacy. This was printed at the request of Sakshi and Talon to be taken to rehab.     RLE dressing changes must occur twice per day, morning and evening (8AM and 8PM). Keep incision dry at all times:   Remove old dressing from incision  Apply generous betadine/iodine on incision, paint incision with iodine. Allow to dry.   Once  iodine/betadine dried, apply sterile dressing on incision. Cover entire incision. After dressing is placed on incision, please apply tape (Prefer primapore tape, breathable, fabric tape)  Please keep incision on RLE dry at all times.  Please do not allow RLE dressing to become soiled at any time.  Please do not apply plastic tape/tegaderm, incision must be allowed to breathe through dressing and primapore tape  If bandage is wet, please change dressing and Apply generous betadine/iodine on incision, paint incision with iodine. Allow to dry. Once iodine/betadine dried, apply sterile dressing on incision. Cover entire incision. After dressing is placed on incision, please apply tape (Prefer primpore tape, breathable, fabric tape)  Please call us immediately with any concerns       Detailed instructions were provided to Sakshi and her significant other, both verbalized clear understanding of instructions. At each clinic visit, we also printed to copies of AVS.    We have follow-up scheduled for next Tuesday. All questions were answered and support provided. She has our contact information and knows to reach out with any additional questions or concerns.     Yasmin Rob, [facility]  Vascular Surgery  Pager: [phone]  [email or web address]  Send message or 10 digit call back number Securely via Reveal with the Reveal Web Console (learn more here)    HPI:  Sakshi Jaeger is a 78 year old female with past medical history significant for extensive PAD, cachexia, poor healing process. 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 She continues to have slight redness in the midsegment, and slight drainage to incision. Sutures intact, no odor is present, slight serous drainage is observed. No fevers or chills, patient noted her dressings are changed daily at rehab.    REVIEW OF SYSTEMS:    A 12 point ROS was reviewed and is negative except for what is listed above in HPI.    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    Essential hypertension with goal blood pressure less than 140/90 8/25/2016    Hyperlipidemia LDL goal <100 11/12/2010    Hypertension goal BP (blood pressure) < 130/80 1/26/2011    Infection of right below knee amputation (H) 10/1/2019    IRON DEFIC ANEMIA NOS 9/15/2005    Lower limb amputation, below knee 12/26/2012    MI (myocardial infarction) (H)     3/2010    Non-healing surgical wound, subsequent encounter 9/24/2020    Added automatically from request for surgery [id number]    Osteoporosis 2/16/2005    OSTEOPOROSIS NOS 2/16/2005    Protein-calorie malnutrition (H24) 5/18/2022    PVD (peripheral vascular disease) (H24) 5/18/2022    Rheumatoid arthritis (H) 2/16/2005         Rheumatoid arthritis(714.0) 2/16/2005    Status post below-knee amputation (H) 12/26/2012               Past Surgical History:   Procedure Laterality Date    ---------INCISION AND DRAINAGE  03/05/2014    AMPUTATE LEG ABOVE KNEE Right 1/13/2023    Procedure: AMPUTATION, ABOVE KNEE RIGHT;  Surgeon: Emma Oconnor MD;  [facility]    AMPUTATE LEG ABOVE KNEE Right 2/2/2024    Procedure: Redo ABOVE KNEE AMPUTATION;  Surgeon: Bryon Mitchell MD;  [facility]    AMPUTATE LEG BELOW KNEE Right 9/28/2022    Procedure: Right through knee amputation;  Surgeon: Doron Mott MD;  [facility]    AMPUTATION BELOW KNEE RT/LT  01/01/2005    right lowwer leg.     ANGIOGRAM Right 1/13/2023    Procedure: right iliac arteriogram aned right common iliac artery stent;  Surgeon: Emma Oconnor MD;  [facility]    APPENDECTOMY      ARTHROPLASTY KNEE  04/27/2011    Procedure:ARTHROPLASTY KNEE; Surgeon:JESSE HAWKINS; [facility]    COMPLEX WOUND CLOSURE (UPDATE) Right  12/08/2021    Procedure: Right stump wound debridment and closure;  Surgeon: RAISA Perea MD;  Location: [facility]    CORONARY STENT PLACEMENT      ENDARTERECTOMY FEMORAL Right 1/13/2023    Procedure: ENDARTERECTOMY, FEMORAL RIGHT;  Surgeon: Emma Oconnor MD;  [facility]    INJECT NERVE BLOCK SPHENOPALATINE GANGLION N/A 9/29/2022    Procedure: Out of OR encounter for block to be done by ;  Surgeon: GENERIC ANESTHESIA PROVIDER;  [facility]    IR OR ANGIOGRAM  1/13/2023    IRRIGATION AND DEBRIDEMENT LOWER EXTREMITY, COMBINED Right 10/09/2019    Procedure: Irrigation and debridement Right leg;  Surgeon: Doron Mott MD;  [facility]    NERVE BLOCK PERIPHERAL N/A 9/27/2022    Procedure: BLOCK, NERVE;  Surgeon: GENERIC ANESTHESIA PROVIDER;  [facility]    OTHER SURGICAL HISTORY  03/05/2014    I AND D     OTHER SURGICAL HISTORY Right 10/09/2019    IRRIGATION AND DEBRIDEMENT LOWER EXTREMITY, COMBINED    PHACOEMULSIFICATION CLEAR CORNEA WITH STANDARD INTRAOCULAR LENS IMPLANT Left 9/8/2022    Procedure: PHACOEMULSIFICATION, LEFT CATARACT, WITH INTRAOCULAR LENS IMPLANT;  Surgeon: Flip Izaguirre MD;  [facility]    PHACOEMULSIFICATION CLEAR CORNEA WITH STANDARD INTRAOCULAR LENS IMPLANT Right 10/13/2022    Procedure: PHACOEMULSIFICATION, RIGHT CATARACT, WITH INTRAOCULAR LENS IMPLANT;  Surgeon: Flip Izaguirre MD;  [facility]    REVISE SCAR EXTREMITY Right 12/17/2020    Procedure: Right stump scar revision;  Surgeon: RAISA Perea MD;  Location: [facility] OR       ALLERGIES:  Adhesive tape    MEDS:    Current Outpatient Medications:     acetaminophen (TYLENOL) 325 MG tablet, Take 2 tablets (650 mg) by mouth every 6 hours, Disp: 24 tablet, Rfl: 0    amLODIPine (NORVASC) 5 MG tablet, Take 1 tablet (5 mg) by mouth daily, Disp: 90 tablet, Rfl: 3    aspirin (ASA) 81 MG tablet, Take 81 mg by mouth daily, Disp:  , Rfl:     calcium carbonate 600  mg-vitamin D 400 units (CALCIUM 600 + D) 600-400 MG-UNIT per tablet, Take 2 tablets by mouth daily, Disp: , Rfl:     folic acid (FOLVITE) 1 MG tablet, Take 1 mg by mouth daily, Disp: , Rfl:     gabapentin (NEURONTIN) 300 MG capsule, Take 1 capsule (300 mg) by mouth 3 times daily, Disp: 270 capsule, Rfl: 0    leflunomide (ARAVA) 20 MG tablet, Take 1 tablet by mouth every 24 hours, Disp: , Rfl:     Lidocaine (LIDOCARE) 4 % Patch, Place 1 patch onto the skin every 24 hours To prevent lidocaine toxicity, patient should be patch free for 12 hrs daily., Disp: , Rfl:     lisinopril (ZESTRIL) 20 MG tablet, TAKE 1 TABLET BY MOUTH EVERY DAY, Disp: 90 tablet, Rfl: 0    methotrexate 2.5 MG tablet, Take 20 mg by mouth every 7 days On Saturdays, Disp: , Rfl:     metoprolol succinate ER (TOPROL XL) 50 MG 24 hr tablet, TAKE ONE TABLET BY MOUTH EVERY DAY, Disp: 90 tablet, Rfl: 1    multivitamin w/minerals (THERA-VIT-M) tablet, Take 1 tablet by mouth daily, Disp: , Rfl:     oxyCODONE (ROXICODONE) 5 MG tablet, Take 1 tablet (5 mg) by mouth every 8 hours as needed for pain, Disp: 20 tablet, Rfl: 0    polyethylene glycol (MIRALAX) 17 GM/Dose powder, Take 17 g by mouth daily, Disp: 510 g, Rfl: 0    predniSONE (DELTASONE) 5 MG tablet, Take 1 tablet (5 mg) by mouth daily, Disp: , Rfl: 0    simvastatin (ZOCOR) 20 MG tablet, Take 1 tablet (20 mg) by mouth At Bedtime, Disp: 90 tablet, Rfl: 1    SOCIAL HABITS:    History   Smoking Status    Some Days    Packs/day: 0.50    Years: 45.00    Types: Cigarettes    Last attempt to quit: 2/26/2010   Smokeless Tobacco    Never     Social History    Substance and Sexual Activity      Alcohol use: Yes        Comment: occsionally-3 -4 drinks a week      History   Drug Use No       FAMILY HISTORY:    Family History   Problem Relation Age of Onset    Cardiovascular Mother     Cancer Brother     Diabetes No family hx of     Hypertension No family hx of     Cerebrovascular Disease No family hx of      Alzheimer Disease No family hx of     Thyroid Disease No family hx of     Respiratory No family hx of     Other - See Comments Mother         CARDIOVASCULAR    Cancer Brother        PE:  /78 (BP Location: Right arm, Patient Position: Sitting, Cuff Size: Adult Small)   Pulse 73   LMP  (LMP Unknown)   SpO2 96%   Wt Readings from Last 1 Encounters:   02/06/24 42.7 kg (94 lb 2.2 oz)     There is no height or weight on file to calculate BMI.    EXAM:  GENERAL: well-developed 78 year old female who appears her stated age  CARDIAC: normal   CHEST/LUNG: normal respiratory effort   MUSCULOSKELETAL: grossly normal and both lower extremities are intact, no lower extremity edema  NEUROLOGIC: focally intact, alert and oriented x 3  PSYCH: appropriate affect  SKIN: Continues to have slight redness need stump incision, minimal serous drainage noted, whitish discoloration is skin not fibrin.               Again, thank you for allowing me to participate in the care of your patient.      Sincerely,    DESIRE Ventura CNP

## 2024-02-26 NOTE — PROGRESS NOTES
VASCULAR SURGERY PROGRESS NOTE    LOCATION: [facility]     Sakshi Jaeger  Medical Record #:  [id number]  YOB: 1945  Age:  78 year old     Date of Service: 2/26/2024    PRIMARY CARE PROVIDER: Lucia Bates    Reason for visit: Wound check    IMPRESSION / RECOMMENDATION:   Sakshi Jaeger is a very pleasant 78-year-old patient who underwent revision of her right lower extremity amputation due to accidental injury to stump. Postoperatively has been healing well, RLE incision sutures intact. Continues to have mid incision segment with slight redness and now serous drainage, reviewed with Dr. Mitchell and although we are not concerned for infection, as this appears to be part of the healing process, we will start Sakshi on antibiotics.    Initially planned to start Bactrim however patient is also on methotrexate which would have a drug interaction thus started on amoxicillin/clavulanate twice per day. Do not take on empty stomach. If you start having any symptoms such as rashes, nausea, vomiting, or any other kind of new symptoms, please call vascular surgery immediately. Must start taking antibiotic daily, starting tonight. If unable to take antibiotic tonight, please call us immediately. We are more than happy to send the prescription to your pharmacy. This was printed at the request of Sakshi and Talon to be taken to rehab.     RLE dressing changes must occur twice per day, morning and evening (8AM and 8PM). Keep incision dry at all times:   Remove old dressing from incision  Apply generous betadine/iodine on incision, paint incision with iodine. Allow to dry.   Once iodine/betadine dried, apply sterile dressing on incision. Cover entire incision. After dressing is placed on incision, please apply tape (Prefer primapore tape, breathable, fabric tape)  Please keep incision on RLE dry at all times.  Please do not allow RLE dressing to become soiled at any time.  Please do not apply  plastic tape/tegaderm, incision must be allowed to breathe through dressing and primapore tape  If bandage is wet, please change dressing and Apply generous betadine/iodine on incision, paint incision with iodine. Allow to dry. Once iodine/betadine dried, apply sterile dressing on incision. Cover entire incision. After dressing is placed on incision, please apply tape (Prefer primpore tape, breathable, fabric tape)  Please call us immediately with any concerns       Detailed instructions were provided to Sakshi and her significant other, both verbalized clear understanding of instructions. At each clinic visit, we also printed to copies of AVS.    We have follow-up scheduled for next Tuesday. All questions were answered and support provided. She has our contact information and knows to reach out with any additional questions or concerns.     Yasmin Rob [facility]  Vascular Surgery  Pager: [phone]  [email or web address]  Send message or 10 digit call back number Securely via Satiety with the Satiety Web Console (learn more here)        HPI:  Sakshi Jaeger is a 78 year old female with past medical history significant for extensive PAD, cachexia, poor healing process. 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 She continues to have slight redness in the midsegment, and slight drainage to incision. Sutures intact, no odor is present, slight serous drainage is observed. No fevers or chills, patient noted her dressings are changed daily at rehab.    REVIEW OF SYSTEMS:    A 12 point ROS was reviewed and is negative except for what is listed above in HPI.    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    Essential hypertension with goal blood pressure less than  140/90 8/25/2016    Hyperlipidemia LDL goal <100 11/12/2010    Hypertension goal BP (blood pressure) < 130/80 1/26/2011    Infection of right below knee amputation (H) 10/1/2019    IRON DEFIC ANEMIA NOS 9/15/2005    Lower limb amputation, below knee 12/26/2012    MI (myocardial infarction) (H)     3/2010    Non-healing surgical wound, subsequent encounter 9/24/2020    Added automatically from request for surgery [id number]    Osteoporosis 2/16/2005    OSTEOPOROSIS NOS 2/16/2005    Protein-calorie malnutrition (H24) 5/18/2022    PVD (peripheral vascular disease) (H24) 5/18/2022    Rheumatoid arthritis (H) 2/16/2005         Rheumatoid arthritis(714.0) 2/16/2005    Status post below-knee amputation (H) 12/26/2012               Past Surgical History:   Procedure Laterality Date    ---------INCISION AND DRAINAGE  03/05/2014    AMPUTATE LEG ABOVE KNEE Right 1/13/2023    Procedure: AMPUTATION, ABOVE KNEE RIGHT;  Surgeon: Emma Oconnor MD;  [facility]    AMPUTATE LEG ABOVE KNEE Right 2/2/2024    Procedure: Redo ABOVE KNEE AMPUTATION;  Surgeon: Bryon Mitchell MD;  [facility]    AMPUTATE LEG BELOW KNEE Right 9/28/2022    Procedure: Right through knee amputation;  Surgeon: Doron Mott MD;  [facility]    AMPUTATION BELOW KNEE RT/LT  01/01/2005    right lowwer leg.     ANGIOGRAM Right 1/13/2023    Procedure: right iliac arteriogram aned right common iliac artery stent;  Surgeon: Emma Oconnor MD;  [facility]    APPENDECTOMY      ARTHROPLASTY KNEE  04/27/2011    Procedure:ARTHROPLASTY KNEE; Surgeon:JESSE HAWKINS; [facility]    COMPLEX WOUND CLOSURE (UPDATE) Right 12/08/2021    Procedure: Right stump wound debridment and closure;  Surgeon: RAISA Perea MD;  Location: [facility] OR    CORONARY STENT PLACEMENT      ENDARTERECTOMY FEMORAL Right 1/13/2023    Procedure: ENDARTERECTOMY, FEMORAL RIGHT;  Surgeon: Emma Oconnor MD;  [facility]    INJECT NERVE BLOCK SPHENOPALATINE  GANGLION N/A 9/29/2022    Procedure: Out of OR encounter for block to be done by ;  Surgeon: GENERIC ANESTHESIA PROVIDER;  [facility]    IR OR ANGIOGRAM  1/13/2023    IRRIGATION AND DEBRIDEMENT LOWER EXTREMITY, COMBINED Right 10/09/2019    Procedure: Irrigation and debridement Right leg;  Surgeon: Doron Mott MD;  [facility]    NERVE BLOCK PERIPHERAL N/A 9/27/2022    Procedure: BLOCK, NERVE;  Surgeon: GENERIC ANESTHESIA PROVIDER;  [facility]    OTHER SURGICAL HISTORY  03/05/2014    I AND D     OTHER SURGICAL HISTORY Right 10/09/2019    IRRIGATION AND DEBRIDEMENT LOWER EXTREMITY, COMBINED    PHACOEMULSIFICATION CLEAR CORNEA WITH STANDARD INTRAOCULAR LENS IMPLANT Left 9/8/2022    Procedure: PHACOEMULSIFICATION, LEFT CATARACT, WITH INTRAOCULAR LENS IMPLANT;  Surgeon: Flip Izaguirre MD;  [facility]    PHACOEMULSIFICATION CLEAR CORNEA WITH STANDARD INTRAOCULAR LENS IMPLANT Right 10/13/2022    Procedure: PHACOEMULSIFICATION, RIGHT CATARACT, WITH INTRAOCULAR LENS IMPLANT;  Surgeon: Flip Izaguirre MD;  [facility]    REVISE SCAR EXTREMITY Right 12/17/2020    Procedure: Right stump scar revision;  Surgeon: RAISA Perea MD;  Location: [facility] OR       ALLERGIES:  Adhesive tape    MEDS:    Current Outpatient Medications:     acetaminophen (TYLENOL) 325 MG tablet, Take 2 tablets (650 mg) by mouth every 6 hours, Disp: 24 tablet, Rfl: 0    amLODIPine (NORVASC) 5 MG tablet, Take 1 tablet (5 mg) by mouth daily, Disp: 90 tablet, Rfl: 3    aspirin (ASA) 81 MG tablet, Take 81 mg by mouth daily, Disp:  , Rfl:     calcium carbonate 600 mg-vitamin D 400 units (CALCIUM 600 + D) 600-400 MG-UNIT per tablet, Take 2 tablets by mouth daily, Disp: , Rfl:     folic acid (FOLVITE) 1 MG tablet, Take 1 mg by mouth daily, Disp: , Rfl:     gabapentin (NEURONTIN) 300 MG capsule, Take 1 capsule (300 mg) by mouth 3 times daily, Disp: 270 capsule, Rfl: 0    leflunomide  (ARAVA) 20 MG tablet, Take 1 tablet by mouth every 24 hours, Disp: , Rfl:     Lidocaine (LIDOCARE) 4 % Patch, Place 1 patch onto the skin every 24 hours To prevent lidocaine toxicity, patient should be patch free for 12 hrs daily., Disp: , Rfl:     lisinopril (ZESTRIL) 20 MG tablet, TAKE 1 TABLET BY MOUTH EVERY DAY, Disp: 90 tablet, Rfl: 0    methotrexate 2.5 MG tablet, Take 20 mg by mouth every 7 days On Saturdays, Disp: , Rfl:     metoprolol succinate ER (TOPROL XL) 50 MG 24 hr tablet, TAKE ONE TABLET BY MOUTH EVERY DAY, Disp: 90 tablet, Rfl: 1    multivitamin w/minerals (THERA-VIT-M) tablet, Take 1 tablet by mouth daily, Disp: , Rfl:     oxyCODONE (ROXICODONE) 5 MG tablet, Take 1 tablet (5 mg) by mouth every 8 hours as needed for pain, Disp: 20 tablet, Rfl: 0    polyethylene glycol (MIRALAX) 17 GM/Dose powder, Take 17 g by mouth daily, Disp: 510 g, Rfl: 0    predniSONE (DELTASONE) 5 MG tablet, Take 1 tablet (5 mg) by mouth daily, Disp: , Rfl: 0    simvastatin (ZOCOR) 20 MG tablet, Take 1 tablet (20 mg) by mouth At Bedtime, Disp: 90 tablet, Rfl: 1    SOCIAL HABITS:    History   Smoking Status    Some Days    Packs/day: 0.50    Years: 45.00    Types: Cigarettes    Last attempt to quit: 2/26/2010   Smokeless Tobacco    Never     Social History    Substance and Sexual Activity      Alcohol use: Yes        Comment: occsionally-3 -4 drinks a week      History   Drug Use No       FAMILY HISTORY:    Family History   Problem Relation Age of Onset    Cardiovascular Mother     Cancer Brother     Diabetes No family hx of     Hypertension No family hx of     Cerebrovascular Disease No family hx of     Alzheimer Disease No family hx of     Thyroid Disease No family hx of     Respiratory No family hx of     Other - See Comments Mother         CARDIOVASCULAR    Cancer Brother        PE:  /78 (BP Location: Right arm, Patient Position: Sitting, Cuff Size: Adult Small)   Pulse 73   LMP  (LMP Unknown)   SpO2 96%   Wt  Readings from Last 1 Encounters:   02/06/24 42.7 kg (94 lb 2.2 oz)     There is no height or weight on file to calculate BMI.    EXAM:  GENERAL: well-developed 78 year old female who appears her stated age  CARDIAC: normal   CHEST/LUNG: normal respiratory effort   MUSCULOSKELETAL: grossly normal and both lower extremities are intact, no lower extremity edema  NEUROLOGIC: focally intact, alert and oriented x 3  PSYCH: appropriate affect  SKIN: Continues to have slight redness need stump incision, minimal serous drainage noted, whitish discoloration is skin not fibrin.

## 2024-02-27 NOTE — PROGRESS NOTES
Sent oxycodone for pain management while on treatment of RLE revision of AKA at [facility] in [place].    Yasmin Rob, CNP  Vascular Surgery  Pager: [phone]  [email or web address]  Send message or 10 digit call back number Securely via Linio with the Linio Web Console (learn more here)

## 2024-03-04 NOTE — TELEPHONE ENCOUNTER
I returned Kaitlin's call.    Pt is doing excellent in her rehab program and is getting ready to discharge home. Main barrier to discharge is her dressing change frequency. They are having difficulty finding home care that can accomodate BID dressing changes. Pt is seeing our MINERVA Yasmin Rob tomorrow for a wound check. Will pass this message along to her to see if wound care plan can be reassessed tomorrow.    MILTON Davenport, RN  RN Care Coordinator  [facility] Vascular Surgery - [facility] phone: [phone]  Fax: [phone]

## 2024-03-04 NOTE — TELEPHONE ENCOUNTER
Patient's primary NP, Kaitlin, is requesting a call back from Dr. Mitchell or his nurse to get an update on patient's plan of care and incision. Kaitlin states that patient has been to a few appointments with vascular, but does not seem to remember any information given at these appointments and is planning to discharge from Rehab Center in the near future.   Kaitlin can be reached any time at: [phone]

## 2024-03-05 NOTE — PATIENT INSTRUCTIONS
Thank you so much for choosing us for your care. It was a pleasure to see you at the vascular clinic today.     Follow-up recommendations:     RLE dressing changes must occur twice per day, morning and evening (8AM and 8PM). Keep incision dry at all times:   Remove old dressing from incision  Apply generous betadine/iodine on incision, paint incision with iodine. Allow to dry.   Once iodine/betadine dried, apply sterile dressing on incision. Cover entire incision. After dressing is placed on incision, please apply tape (Prefer primpore tape, breathable, fabric tape)  Please keep incision on RLE dry at all times.  Please do not allow RLE dressing to become soiled at any time.  Please do not apply plastic tape/tegaderm, incision must be allowed to breathe through dressing and primapore tape  If bandage is wet, please change dressing and Apply generous betadine/iodine on incision, paint incision with iodine. Allow to dry. Once iodine/betadine dried, apply sterile dressing on incision. Cover entire incision. After dressing is placed on incision, please apply tape (Prefer primpore tape, breathable, fabric tape)  Please call us immediately with any concerns    From a dressing change perspective, I had a very good discussion with patient and significant other, Santi (Zoran) who expressed clearly that they feel comfortable performing dressing changes at home.     Continue with antibiotics for another 7 days, prescription was printed and handed to Sakshi and Zoran.      If you have any questions, please contact our clinic directly at [phone] and ask for the nurse. We also encourage the use of TRIBAX to communicate with your HealthCare Provider.        If you have an urgent need after business hours (8:00 am to 4:30 pm) please call [phone], option 4, and ask for the vascular attending on call. For non-urgent after hours needs, please call the vascular nurse at [phone] and leave a detailed voicemail. For  scheduling needs, please call our clinic directly at [phone].

## 2024-03-05 NOTE — LETTER
3/5/2024       RE: Sakshi Jaeger  [address] [address]       Dear Colleague,    Thank you for referring your patient, Sakshi Jaeger, to the [facility] [place] [facility]. Please see a copy of my visit note below.        VASCULAR SURGERY PROGRESS NOTE    LOCATION: [facility]     Sakshi Jaeger  Medical Record #:  [id number]  YOB: 1945  Age:  78 year old     Date of Service: 3/5/2024    PRIMARY CARE PROVIDER: Lucia Bates    Reason for visit: Wound check    IMPRESSION / RECOMMENDATION:   Sakshi Jaeger is a very pleasant 78-year-old patient who presents for wound check of RLE stump incision. [facility], with wound dressing changes twice per day.  Incision continues to slowly heal, lateral segment skin is closed and completely healed, midsegment and approximately once centimeter on medial incision with moderate erythema and small amount of drainage. Plan to continue with dressing changes and antibiotics for another 7 days. Close if no disfiguration from TCU the patient would be ready for discharge, except for dressing changes. Upon communicating with Sakshi and her significant other, Zoran, they feel comfortable completing/performing wound care at home however unclear if patient is ready for safe discharge yet.    Recommend:  RLE dressing changes must occur twice per day, morning and evening (8AM and 8PM). Keep incision dry at all times:   Remove old dressing from incision  Apply generous betadine/iodine on incision, paint incision with iodine. Allow to dry.   Once iodine/betadine dried, apply sterile dressing on incision. Cover entire incision. After dressing is placed on incision, please apply tape (Prefer primpore tape, breathable, fabric tape)  Please keep incision on RLE dry at all times.  Please do not allow RLE dressing to become soiled at any time.  Please do not apply  plastic tape/tegaderm, incision must be allowed to breathe through dressing and primapore tape  If bandage is wet, please change dressing and Apply generous betadine/iodine on incision, paint incision with iodine. Allow to dry. Once iodine/betadine dried, apply sterile dressing on incision. Cover entire incision. After dressing is placed on incision, please apply tape (Prefer primpore tape, breathable, fabric tape)  Please call us immediately with any concerns    From a dressing change perspective, it is important to underline that I had a very good discussion with patient and significant other, Santi (Zoran) who expressed clearly that they feel comfortable performing dressing changes at home.     Continue with antibiotics for another 7 days, prescription was printed and handed to Sakshi and Zoran.     Follow-up on 3/19/2024 in person. They verbalized clear understanding of calling us with any worsening symptoms or other concerns. All questions were answered and support provided. She has our contact information and knows to reach out with any additional questions or concerns.       Yasmin Rob, CNP  Vascular Surgery  Pager: [phone]  [email or web address]  Send message or 10 digit call back number Securely via Ibercheck with the Ibercheck Web Console (learn more here)        HPI:  Sakshi Jaeger is a 78 year old female with past medical history significant for RLE stump revision and left poor wound healing.  Patient continues to have slight erythema in the midsegment of incision as well as a 1 cm erythematous area on medial aspect of incision, minimal drainage however present.  No odor is present.  No fevers no chills, patient appears to be in greater spirits today, hoping to be discharged home when safe to do so.    REVIEW OF SYSTEMS:    A 12 point ROS was reviewed and is negative except for what is listed above in HPI.    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    Essential hypertension with goal blood pressure less than 140/90 8/25/2016    Hyperlipidemia LDL goal <100 11/12/2010    Hypertension goal BP (blood pressure) < 130/80 1/26/2011    Infection of right below knee amputation (H) 10/1/2019    IRON DEFIC ANEMIA NOS 9/15/2005    Lower limb amputation, below knee 12/26/2012    MI (myocardial infarction) (H)     3/2010    Non-healing surgical wound, subsequent encounter 9/24/2020    Added automatically from request for surgery [id number]    Osteoporosis 2/16/2005    OSTEOPOROSIS NOS 2/16/2005    Protein-calorie malnutrition (H24) 5/18/2022    PVD (peripheral vascular disease) (H24) 5/18/2022    Rheumatoid arthritis (H) 2/16/2005         Rheumatoid arthritis(714.0) 2/16/2005    Status post below-knee amputation (H) 12/26/2012               Past Surgical History:   Procedure Laterality Date    ---------INCISION AND DRAINAGE  03/05/2014    AMPUTATE LEG ABOVE KNEE Right 1/13/2023    Procedure: AMPUTATION, ABOVE KNEE RIGHT;  Surgeon: Emma Oconnor MD;  [facility]    AMPUTATE LEG ABOVE KNEE Right 2/2/2024    Procedure: Redo ABOVE KNEE AMPUTATION;  Surgeon: Bryon Mitchell MD;  [facility]    AMPUTATE LEG BELOW KNEE Right 9/28/2022    Procedure: Right through knee amputation;  Surgeon: Doron Mott MD;  [facility]: [facility] OR    AMPUTATION BELOW KNEE RT/LT  01/01/2005    right lowwer leg.     ANGIOGRAM Right 1/13/2023    Procedure: right iliac arteriogram aned right common iliac artery stent;  Surgeon: Emma Oconnor MD;  [facility]    APPENDECTOMY      ARTHROPLASTY KNEE  04/27/2011    Procedure:ARTHROPLASTY KNEE; Surgeon:JESSE HAWKINS; Location:[facility] OR    COMPLEX WOUND CLOSURE (UPDATE) Right 12/08/2021    Procedure: Right stump wound debridment and closure;   Surgeon: RAISA Perea MD;  [facility]    CORONARY STENT PLACEMENT      ENDARTERECTOMY FEMORAL Right 1/13/2023    Procedure: ENDARTERECTOMY, FEMORAL RIGHT;  Surgeon: Emma Oconnor MD;  [facility]    INJECT NERVE BLOCK SPHENOPALATINE GANGLION N/A 9/29/2022    Procedure: Out of OR encounter for block to be done by ;  Surgeon: GENERIC ANESTHESIA PROVIDER;  Location: [facility] OR    IR OR ANGIOGRAM  1/13/2023    IRRIGATION AND DEBRIDEMENT LOWER EXTREMITY, COMBINED Right 10/09/2019    Procedure: Irrigation and debridement Right leg;  Surgeon: Doron Mott MD;  [facility]    NERVE BLOCK PERIPHERAL N/A 9/27/2022    Procedure: BLOCK, NERVE;  Surgeon: GENERIC ANESTHESIA PROVIDER;  [facility]: [facility]    OTHER SURGICAL HISTORY  03/05/2014    I AND D     OTHER SURGICAL HISTORY Right 10/09/2019    IRRIGATION AND DEBRIDEMENT LOWER EXTREMITY, COMBINED    PHACOEMULSIFICATION CLEAR CORNEA WITH STANDARD INTRAOCULAR LENS IMPLANT Left 9/8/2022    Procedure: PHACOEMULSIFICATION, LEFT CATARACT, WITH INTRAOCULAR LENS IMPLANT;  Surgeon: Flip Izaguirre MD;  Location: [facility] OR    PHACOEMULSIFICATION CLEAR CORNEA WITH STANDARD INTRAOCULAR LENS IMPLANT Right 10/13/2022    Procedure: PHACOEMULSIFICATION, RIGHT CATARACT, WITH INTRAOCULAR LENS IMPLANT;  Surgeon: Flip Izaguirre MD;  Location: [facility] OR    REVISE SCAR EXTREMITY Right 12/17/2020    Procedure: Right stump scar revision;  Surgeon: RAISA Perea MD;  [facility]: [facility]       ALLERGIES:  Adhesive tape    MEDS:    Current Outpatient Medications:     amoxicillin-clavulanate (AUGMENTIN) 875-125 MG tablet, Take 1 tablet by mouth 2 times daily for 7 days, Disp: 14 tablet, Rfl: 0    acetaminophen (TYLENOL) 325 MG tablet, Take 2 tablets (650 mg) by mouth every 6 hours, Disp: 24 tablet, Rfl: 0    amLODIPine (NORVASC) 5 MG tablet, Take 1 tablet (5 mg) by mouth daily, Disp: 90 tablet, Rfl: 3    aspirin (ASA) 81 MG tablet, Take 81 mg  by mouth daily, Disp:  , Rfl:     calcium carbonate 600 mg-vitamin D 400 units (CALCIUM 600 + D) 600-400 MG-UNIT per tablet, Take 2 tablets by mouth daily, Disp: , Rfl:     folic acid (FOLVITE) 1 MG tablet, Take 1 mg by mouth daily, Disp: , Rfl:     gabapentin (NEURONTIN) 300 MG capsule, Take 1 capsule (300 mg) by mouth 3 times daily, Disp: 270 capsule, Rfl: 0    leflunomide (ARAVA) 20 MG tablet, Take 1 tablet by mouth every 24 hours, Disp: , Rfl:     Lidocaine (LIDOCARE) 4 % Patch, Place 1 patch onto the skin every 24 hours To prevent lidocaine toxicity, patient should be patch free for 12 hrs daily., Disp: , Rfl:     lisinopril (ZESTRIL) 20 MG tablet, TAKE 1 TABLET BY MOUTH EVERY DAY, Disp: 90 tablet, Rfl: 0    methotrexate 2.5 MG tablet, Take 20 mg by mouth every 7 days On Saturdays, Disp: , Rfl:     metoprolol succinate ER (TOPROL XL) 50 MG 24 hr tablet, TAKE ONE TABLET BY MOUTH EVERY DAY, Disp: 90 tablet, Rfl: 1    multivitamin w/minerals (THERA-VIT-M) tablet, Take 1 tablet by mouth daily, Disp: , Rfl:     oxyCODONE (ROXICODONE) 5 MG tablet, Take 1 tablet (5 mg) by mouth 2 times daily as needed for pain, Disp: 14 tablet, Rfl: 0    polyethylene glycol (MIRALAX) 17 GM/Dose powder, Take 17 g by mouth daily, Disp: 510 g, Rfl: 0    predniSONE (DELTASONE) 5 MG tablet, Take 1 tablet (5 mg) by mouth daily, Disp: , Rfl: 0    simvastatin (ZOCOR) 20 MG tablet, Take 1 tablet (20 mg) by mouth At Bedtime, Disp: 90 tablet, Rfl: 1    SOCIAL HABITS:    History   Smoking Status    Some Days    Packs/day: 0.50    Years: 45.00    Types: Cigarettes    Last attempt to quit: 2/26/2010   Smokeless Tobacco    Never     Social History    Substance and Sexual Activity      Alcohol use: Yes        Comment: occsionally-3 -4 drinks a week      History   Drug Use No       FAMILY HISTORY:    Family History   Problem Relation Age of Onset    Cardiovascular Mother     Cancer Brother     Diabetes No family hx of     Hypertension No family hx of      Cerebrovascular Disease No family hx of     Alzheimer Disease No family hx of     Thyroid Disease No family hx of     Respiratory No family hx of     Other - See Comments Mother         CARDIOVASCULAR    Cancer Brother        PE:  /66 (BP Location: Right arm, Patient Position: Sitting, Cuff Size: Adult Small)   Pulse 61   LMP  (LMP Unknown)   SpO2 93%   Wt Readings from Last 1 Encounters:   02/06/24 94 lb 2.2 oz     There is no height or weight on file to calculate BMI.    EXAM:  GENERAL: well-developed 78 year old female who appears her stated age  CARDIAC: normal   CHEST/LUNG: normal respiratory effort   MUSCULOSKELETAL: grossly normal and both lower extremities are intact, no lower extremity edema  NEUROLOGIC: focally intact, alert and oriented x 3  PSYCH: appropriate affect  VASCULAR:                 Again, thank you for allowing me to participate in the care of your patient.      Sincerely,    DESIRE Ventura CNP

## 2024-03-05 NOTE — PROGRESS NOTES
VASCULAR SURGERY PROGRESS NOTE    LOCATION: [facility]     Sakshi Jaeger  Medical Record #:  [id number]  YOB: 1945  Age:  78 year old     Date of Service: 3/5/2024    PRIMARY CARE PROVIDER: Lucia Bates    Reason for visit: Wound check    IMPRESSION / RECOMMENDATION:   Sakshi Jaeger is a very pleasant 78-year-old patient who presents for wound check of RLE stump incision. [facility], with wound dressing changes twice per day.  Incision continues to slowly heal, lateral segment skin is closed and completely healed, midsegment and approximately once centimeter on medial incision with moderate erythema and small amount of drainage. Plan to continue with dressing changes and antibiotics for another 7 days. Close if no disfiguration from TCU the patient would be ready for discharge, except for dressing changes. Upon communicating with Sakshi and her significant other, Zoran, they feel comfortable completing/performing wound care at home however unclear if patient is ready for safe discharge yet.    Recommend:  RLE dressing changes must occur twice per day, morning and evening (8AM and 8PM). Keep incision dry at all times:   Remove old dressing from incision  Apply generous betadine/iodine on incision, paint incision with iodine. Allow to dry.   Once iodine/betadine dried, apply sterile dressing on incision. Cover entire incision. After dressing is placed on incision, please apply tape (Prefer primpore tape, breathable, fabric tape)  Please keep incision on RLE dry at all times.  Please do not allow RLE dressing to become soiled at any time.  Please do not apply plastic tape/tegaderm, incision must be allowed to breathe through dressing and primapore tape  If bandage is wet, please change dressing and Apply generous betadine/iodine on incision, paint incision with iodine. Allow to dry. Once iodine/betadine dried, apply sterile dressing on incision. Cover entire incision.  After dressing is placed on incision, please apply tape (Prefer primpore tape, breathable, fabric tape)  Please call us immediately with any concerns    From a dressing change perspective, it is important to underline that I had a very good discussion with patient and significant other, Santi (Zoran) who expressed clearly that they feel comfortable performing dressing changes at home.     Continue with antibiotics for another 7 days, prescription was printed and handed to Sakshi and Zoran.     Follow-up on 3/19/2024 in person. They verbalized clear understanding of calling us with any worsening symptoms or other concerns. All questions were answered and support provided. She has our contact information and knows to reach out with any additional questions or concerns.       Yasmin Rob, CNP  Vascular Surgery  Pager: [phone]  [email or web address]  Send message or 10 digit call back number Securely via [employer] with the [employer] Web Console (learn more here)        HPI:  Sakshi Jaeger is a 78 year old female with past medical history significant for RLE stump revision and left poor wound healing.  Patient continues to have slight erythema in the midsegment of incision as well as a 1 cm erythematous area on medial aspect of incision, minimal drainage however present.  No odor is present.  No fevers no chills, patient appears to be in greater spirits today, hoping to be discharged home when safe to do so.    REVIEW OF SYSTEMS:    A 12 point ROS was reviewed and is negative except for what is listed above in HPI.    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    Essential hypertension with goal blood pressure less than 140/90 8/25/2016    Hyperlipidemia LDL goal <100  11/12/2010    Hypertension goal BP (blood pressure) < 130/80 1/26/2011    Infection of right below knee amputation (H) 10/1/2019    IRON DEFIC ANEMIA NOS 9/15/2005    Lower limb amputation, below knee 12/26/2012    MI (myocardial infarction) (H)     3/2010    Non-healing surgical wound, subsequent encounter 9/24/2020    Added automatically from request for surgery [id number]    Osteoporosis 2/16/2005    OSTEOPOROSIS NOS 2/16/2005    Protein-calorie malnutrition (H24) 5/18/2022    PVD (peripheral vascular disease) (H24) 5/18/2022    Rheumatoid arthritis (H) 2/16/2005         Rheumatoid arthritis(714.0) 2/16/2005    Status post below-knee amputation (H) 12/26/2012               Past Surgical History:   Procedure Laterality Date    ---------INCISION AND DRAINAGE  03/05/2014    AMPUTATE LEG ABOVE KNEE Right 1/13/2023    Procedure: AMPUTATION, ABOVE KNEE RIGHT;  Surgeon: Emma Oconnor MD;  [facility]    AMPUTATE LEG ABOVE KNEE Right 2/2/2024    Procedure: Redo ABOVE KNEE AMPUTATION;  Surgeon: Bryon Mitchell MD;  [facility]    AMPUTATE LEG BELOW KNEE Right 9/28/2022    Procedure: Right through knee amputation;  Surgeon: Doron Mott MD;  [facility]    AMPUTATION BELOW KNEE RT/LT  01/01/2005    right lowwer leg.     ANGIOGRAM Right 1/13/2023    Procedure: right iliac arteriogram aned right common iliac artery stent;  Surgeon: Emma Oconnor MD;  [facility]    APPENDECTOMY      ARTHROPLASTY KNEE  04/27/2011    Procedure:ARTHROPLASTY KNEE; Surgeon:JESSE HAWKINS; [facility] OR    COMPLEX WOUND CLOSURE (UPDATE) Right 12/08/2021    Procedure: Right stump wound debridment and closure;  Surgeon: RAISA Perea MD;  [facility]    CORONARY STENT PLACEMENT      ENDARTERECTOMY FEMORAL Right 1/13/2023    Procedure: ENDARTERECTOMY, FEMORAL RIGHT;  Surgeon: Emma Oconnor MD;  [facility]    INJECT NERVE BLOCK SPHENOPALATINE GANGLION N/A 9/29/2022    Procedure: Out of OR encounter  for block to be done by ;  Surgeon: GENERIC ANESTHESIA PROVIDER;  Location: [facility] OR    IR OR ANGIOGRAM  1/13/2023    IRRIGATION AND DEBRIDEMENT LOWER EXTREMITY, COMBINED Right 10/09/2019    Procedure: Irrigation and debridement Right leg;  Surgeon: Doron Mott MD;  [facility]    NERVE BLOCK PERIPHERAL N/A 9/27/2022    Procedure: BLOCK, NERVE;  Surgeon: GENERIC ANESTHESIA PROVIDER;  [facility]    OTHER SURGICAL HISTORY  03/05/2014    I AND D     OTHER SURGICAL HISTORY Right 10/09/2019    IRRIGATION AND DEBRIDEMENT LOWER EXTREMITY, COMBINED    PHACOEMULSIFICATION CLEAR CORNEA WITH STANDARD INTRAOCULAR LENS IMPLANT Left 9/8/2022    Procedure: PHACOEMULSIFICATION, LEFT CATARACT, WITH INTRAOCULAR LENS IMPLANT;  Surgeon: Flip Izaguirre MD;  Location: [facility]    PHACOEMULSIFICATION CLEAR CORNEA WITH STANDARD INTRAOCULAR LENS IMPLANT Right 10/13/2022    Procedure: PHACOEMULSIFICATION, RIGHT CATARACT, WITH INTRAOCULAR LENS IMPLANT;  Surgeon: Flip Izaguirre MD;  Location: [facility] OR    REVISE SCAR EXTREMITY Right 12/17/2020    Procedure: Right stump scar revision;  Surgeon: RAISA Perea MD;  Location: [facility] OR       ALLERGIES:  Adhesive tape    MEDS:    Current Outpatient Medications:     amoxicillin-clavulanate (AUGMENTIN) 875-125 MG tablet, Take 1 tablet by mouth 2 times daily for 7 days, Disp: 14 tablet, Rfl: 0    acetaminophen (TYLENOL) 325 MG tablet, Take 2 tablets (650 mg) by mouth every 6 hours, Disp: 24 tablet, Rfl: 0    amLODIPine (NORVASC) 5 MG tablet, Take 1 tablet (5 mg) by mouth daily, Disp: 90 tablet, Rfl: 3    aspirin (ASA) 81 MG tablet, Take 81 mg by mouth daily, Disp:  , Rfl:     calcium carbonate 600 mg-vitamin D 400 units (CALCIUM 600 + D) 600-400 MG-UNIT per tablet, Take 2 tablets by mouth daily, Disp: , Rfl:     folic acid (FOLVITE) 1 MG tablet, Take 1 mg by mouth daily, Disp: , Rfl:     gabapentin (NEURONTIN) 300 MG capsule, Take 1 capsule (300  mg) by mouth 3 times daily, Disp: 270 capsule, Rfl: 0    leflunomide (ARAVA) 20 MG tablet, Take 1 tablet by mouth every 24 hours, Disp: , Rfl:     Lidocaine (LIDOCARE) 4 % Patch, Place 1 patch onto the skin every 24 hours To prevent lidocaine toxicity, patient should be patch free for 12 hrs daily., Disp: , Rfl:     lisinopril (ZESTRIL) 20 MG tablet, TAKE 1 TABLET BY MOUTH EVERY DAY, Disp: 90 tablet, Rfl: 0    methotrexate 2.5 MG tablet, Take 20 mg by mouth every 7 days On Saturdays, Disp: , Rfl:     metoprolol succinate ER (TOPROL XL) 50 MG 24 hr tablet, TAKE ONE TABLET BY MOUTH EVERY DAY, Disp: 90 tablet, Rfl: 1    multivitamin w/minerals (THERA-VIT-M) tablet, Take 1 tablet by mouth daily, Disp: , Rfl:     oxyCODONE (ROXICODONE) 5 MG tablet, Take 1 tablet (5 mg) by mouth 2 times daily as needed for pain, Disp: 14 tablet, Rfl: 0    polyethylene glycol (MIRALAX) 17 GM/Dose powder, Take 17 g by mouth daily, Disp: 510 g, Rfl: 0    predniSONE (DELTASONE) 5 MG tablet, Take 1 tablet (5 mg) by mouth daily, Disp: , Rfl: 0    simvastatin (ZOCOR) 20 MG tablet, Take 1 tablet (20 mg) by mouth At Bedtime, Disp: 90 tablet, Rfl: 1    SOCIAL HABITS:    History   Smoking Status    Some Days    Packs/day: 0.50    Years: 45.00    Types: Cigarettes    Last attempt to quit: 2/26/2010   Smokeless Tobacco    Never     Social History    Substance and Sexual Activity      Alcohol use: Yes        Comment: occsionally-3 -4 drinks a week      History   Drug Use No       FAMILY HISTORY:    Family History   Problem Relation Age of Onset    Cardiovascular Mother     Cancer Brother     Diabetes No family hx of     Hypertension No family hx of     Cerebrovascular Disease No family hx of     Alzheimer Disease No family hx of     Thyroid Disease No family hx of     Respiratory No family hx of     Other - See Comments Mother         CARDIOVASCULAR    Cancer Brother        PE:  /66 (BP Location: Right arm, Patient Position: Sitting, Cuff Size:  Adult Small)   Pulse 61   LMP  (LMP Unknown)   SpO2 93%   Wt Readings from Last 1 Encounters:   02/06/24 94 lb 2.2 oz     There is no height or weight on file to calculate BMI.    EXAM:  GENERAL: well-developed 78 year old female who appears her stated age  CARDIAC: normal   CHEST/LUNG: normal respiratory effort   MUSCULOSKELETAL: grossly normal and both lower extremities are intact, no lower extremity edema  NEUROLOGIC: focally intact, alert and oriented x 3  PSYCH: appropriate affect  VASCULAR:

## 2024-03-06 NOTE — TELEPHONE ENCOUNTER
Pt seen in [facility] clinic yesterday - recommend continuing BID dressing changes (see note). I faxed the visit note to pt's [facility] at her [facility], [facility]. Also provided her with our callback number should there be additional questions/concerns.    AMANDA DavenportN, RN  RN Care Coordinator  [facility] Vascular Surgery - [facility] phone: [phone]  Fax: [phone]

## 2024-03-07 NOTE — PROGRESS NOTES
History of Present Illness - Sakshi Jaeger is a wonderfully nice 78 year old female last seen on 10/26/2023    To review, she has had issues with ear wax, more on the RIGHT than LEFT, and for many years Dr. OSMAN has helped her with alligators or irrigations.  However, over the last year prior to first seeing me in January 2019, it seems much more tenacious and sticky.  The other day the LEFT ear got some water in it from the shower and had been totally clogged since then.    No previous ear surgery, no chronic ear disease, no bleeding or drainage from the ears.    I had to procedurally clear tremendous cerumen from both ears.  And after the initial visit I asked her to come back in four months and she was again impacted severely.  So at this point I have asked her to come back at least every 4-6 months for debridement.    Past Medical History -   Patient Active Problem List   Diagnosis    Osteoporosis    Rheumatoid arthritis (H)    Hyperlipidemia LDL goal <100    Advanced directives, counseling/discussion    Status post below-knee amputation (H)    Employs prosthetic leg    Essential hypertension with goal blood pressure less than 140/90    Coronary artery disease involving native coronary artery of native heart without angina pectoris    Sacroiliac joint pain    PVD (peripheral vascular disease) (H24)    Protein-calorie malnutrition (H24)    Combined forms of age-related cataract of both eyes    Anemia    Infection of right knee (H)    Status post below knee amputation of right lower extremity (H)    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)    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)    Amputation of leg (H)       Current Medications -   Current Outpatient Medications:     acetaminophen (TYLENOL) 325 MG tablet, Take 2 tablets (650 mg) by mouth every 6 hours, Disp: 24 tablet, Rfl: 0    amLODIPine (NORVASC) 5 MG tablet, Take 1 tablet (5 mg) by mouth daily, Disp: 90 tablet, Rfl:  3    amoxicillin-clavulanate (AUGMENTIN) 875-125 MG tablet, Take 1 tablet by mouth 2 times daily for 7 days, Disp: 14 tablet, Rfl: 0    aspirin (ASA) 81 MG tablet, Take 81 mg by mouth daily, Disp:  , Rfl:     calcium carbonate 600 mg-vitamin D 400 units (CALCIUM 600 + D) 600-400 MG-UNIT per tablet, Take 2 tablets by mouth daily, Disp: , Rfl:     folic acid (FOLVITE) 1 MG tablet, Take 1 mg by mouth daily, Disp: , Rfl:     gabapentin (NEURONTIN) 300 MG capsule, Take 1 capsule (300 mg) by mouth 3 times daily, Disp: 270 capsule, Rfl: 0    leflunomide (ARAVA) 20 MG tablet, Take 1 tablet by mouth every 24 hours, Disp: , Rfl:     Lidocaine (LIDOCARE) 4 % Patch, Place 1 patch onto the skin every 24 hours To prevent lidocaine toxicity, patient should be patch free for 12 hrs daily., Disp: , Rfl:     lisinopril (ZESTRIL) 20 MG tablet, TAKE 1 TABLET BY MOUTH EVERY DAY, Disp: 90 tablet, Rfl: 0    methotrexate 2.5 MG tablet, Take 20 mg by mouth every 7 days On Saturdays, Disp: , Rfl:     metoprolol succinate ER (TOPROL XL) 50 MG 24 hr tablet, TAKE ONE TABLET BY MOUTH EVERY DAY, Disp: 90 tablet, Rfl: 1    multivitamin w/minerals (THERA-VIT-M) tablet, Take 1 tablet by mouth daily, Disp: , Rfl:     oxyCODONE (ROXICODONE) 5 MG tablet, Take 1 tablet (5 mg) by mouth 2 times daily as needed for pain, Disp: 14 tablet, Rfl: 0    polyethylene glycol (MIRALAX) 17 GM/Dose powder, Take 17 g by mouth daily, Disp: 510 g, Rfl: 0    predniSONE (DELTASONE) 5 MG tablet, Take 1 tablet (5 mg) by mouth daily, Disp: , Rfl: 0    simvastatin (ZOCOR) 20 MG tablet, Take 1 tablet (20 mg) by mouth At Bedtime, Disp: 90 tablet, Rfl: 1    Allergies -   Allergies   Allergen Reactions    Adhesive Tape Other (See Comments)     Fragile skin       Social History -   Social History     Socioeconomic History    Marital status: Single     Spouse name: None    Number of children: None    Years of education: None    Highest education level: None   Social Needs     Financial resource strain: None    Food insecurity - worry: None    Food insecurity - inability: None    Transportation needs - medical: None    Transportation needs - non-medical: None   Occupational History    None   Tobacco Use    Smoking status: Former Smoker     Packs/day: 0.50     Years: 45.00     Pack years: 22.50     Types: Cigarettes     Last attempt to quit: 2010     Years since quittin.9    Smokeless tobacco: Never Used   Substance and Sexual Activity    Alcohol use: Yes     Comment: occsionally-3 -4 drinks a week    Drug use: No    Sexual activity: Yes     Partners: Male   Other Topics Concern    Parent/sibling w/ CABG, MI or angioplasty before 65F 55M? No   Social History Narrative    None       Family History -   Family History   Problem Relation Age of Onset    Cardiovascular Mother     Cancer Brother     Diabetes No family hx of     Hypertension No family hx of     Cerebrovascular Disease No family hx of     Alzheimer Disease No family hx of     Thyroid Disease No family hx of     Respiratory No family hx of     Other - See Comments Mother         CARDIOVASCULAR    Cancer Brother        Review of Systems - As per HPI and PMHx, otherwise 10+ system review of the head and neck, and general constitution is negative.    Physical Exam  /65   Pulse 71   Wt 45.4 kg (100 lb)   LMP  (LMP Unknown)   SpO2 95%   BMI 17.16 kg/m      General - The patient is well nourished and well developed, and appears to have good nutritional status.  Alert and oriented to person and place, answers questions and cooperates with examination appropriately.   Head and Face - Normocephalic and atraumatic, with no gross asymmetry noted of the contour of the facial features.  The facial nerve is intact, with strong symmetric movements.  Voice and Breathing - The patient was breathing comfortably without the use of accessory muscles. There was no wheezing, stridor, or stertor.  The patients voice was clear and  strong, and had appropriate pitch and quality.  Eyes - Extraocular movements intact, and the pupils were reactive to light.  Sclera were not icteric or injected, conjunctiva were pink and moist.  Mouth - Examination of the oral cavity showed pink, healthy oral mucosa. No lesions or ulcerations noted.  The tongue was mobile and midline, and the dentition were in good condition.        Cerumen Removal    Physical Exam and Procedure  Ears - On examination of the ears, I found that the BOTH sides had cerumen impaction.  Therefore, I positioned them in the examination chair in a semi-supine position, beginning with the right side.  I used the binocular surgical microscope to perform cerumen removal.  I began by using a cerumen loop to gently lift the edges of the cerumen mass away from the walls of the external canal.  Once I did this, I was able to suction away fragments of wax and debris using suction.  Once the mass was loose enough, the entire plug was pulled from the canal.  The tympanic membrane was intact, no sign of perforation or middle ear effusion.    I turned my attention to the contralateral side once again using the binocular surgical microscope to perform cerumen removal.  I began by using a cerumen loop to gently lift the edges of the cerumen mass away from the walls of the external canal.  Once I did this, I was able to suction away fragments of wax and debris using suction.  Once the mass was loose enough, the entire plug was pulled from the canal.  The tympanic membrane was intact, no sign of perforation or middle ear effusion.        A/P - Sakshi Jaeger is a 78 year old female  (H61.23) Bilateral impacted cerumen  (primary encounter diagnosis)  (H93.8X3) Ear fullness, bilateral    The patient has had their cerumen procedurally removed today.  I have discussed ear care at home, including avoiding qtips or any other object placed in the canal.  I have also discussed that over the counter cerumen kits  like Debrox or Cerumenex can be useful.    See me in another 4-6 months.  I have also prescribed dermotic for her dry skin in the canals. She has my medical clearance for hearing aid fitting

## 2024-03-12 NOTE — TELEPHONE ENCOUNTER
[facility] Call Center    Phone Message    May a detailed message be left on voicemail: yes     Reason for Call: Other: Sakshi might be released from TCU end of this week or next week, pt would like to get physical therapy at home.     Please call to discuss.     Thank you.     Action Taken: Message routed to:  [facility] ([facility] Vascular    Travel Screening: Not Applicable

## 2024-03-13 NOTE — TELEPHONE ENCOUNTER
I returned Santi's call. I suggested he bring this request to Sakshi's  and/or  at her TCU who can better assist with discharge planning, including home care. He will let us know if there are issues with this and we would be happy to assist.    I also scheduled Sakshi's follow-up on 3/19.     AMANDA DavenportN, RN  RN Care Coordinator  [facility] Vascular Surgery - [facility] phone: [phone]  Fax: [phone]

## 2024-03-14 NOTE — LETTER
3/14/2024         RE: Sakshi Jaeger  [address] [facility] [address]        Dear Colleague,    Thank you for referring your patient, Sakshi Jaeger, to the [facility]. Please see a copy of my visit note below.    History of Present Illness - Sakshi Jaeger is a wonderfully nice 78 year old female last seen on 10/26/2023    To review, she has had issues with ear wax, more on the RIGHT than LEFT, and for many years Dr. OSMAN has helped her with alligators or irrigations.  However, over the last year prior to first seeing me in January 2019, it seems much more tenacious and sticky.  The other day the LEFT ear got some water in it from the shower and had been totally clogged since then.    No previous ear surgery, no chronic ear disease, no bleeding or drainage from the ears.    I had to procedurally clear tremendous cerumen from both ears.  And after the initial visit I asked her to come back in four months and she was again impacted severely.  So at this point I have asked her to come back at least every 4-6 months for debridement.    Past Medical History -   Patient Active Problem List   Diagnosis     Osteoporosis     Rheumatoid arthritis (H)     Hyperlipidemia LDL goal <100     Advanced directives, counseling/discussion     Status post below-knee amputation (H)     Employs prosthetic leg     Essential hypertension with goal blood pressure less than 140/90     Coronary artery disease involving native coronary artery of native heart without angina pectoris     Sacroiliac joint pain     PVD (peripheral vascular disease) (H24)     Protein-calorie malnutrition (H24)     Combined forms of age-related cataract of both eyes     Anemia     Infection of right knee (H)     Status post below knee amputation of right lower extremity (H)     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)     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)     Amputation of leg (H)        Current Medications -   Current Outpatient Medications:      acetaminophen (TYLENOL) 325 MG tablet, Take 2 tablets (650 mg) by mouth every 6 hours, Disp: 24 tablet, Rfl: 0     amLODIPine (NORVASC) 5 MG tablet, Take 1 tablet (5 mg) by mouth daily, Disp: 90 tablet, Rfl: 3     amoxicillin-clavulanate (AUGMENTIN) 875-125 MG tablet, Take 1 tablet by mouth 2 times daily for 7 days, Disp: 14 tablet, Rfl: 0     aspirin (ASA) 81 MG tablet, Take 81 mg by mouth daily, Disp:  , Rfl:      calcium carbonate 600 mg-vitamin D 400 units (CALCIUM 600 + D) 600-400 MG-UNIT per tablet, Take 2 tablets by mouth daily, Disp: , Rfl:      folic acid (FOLVITE) 1 MG tablet, Take 1 mg by mouth daily, Disp: , Rfl:      gabapentin (NEURONTIN) 300 MG capsule, Take 1 capsule (300 mg) by mouth 3 times daily, Disp: 270 capsule, Rfl: 0     leflunomide (ARAVA) 20 MG tablet, Take 1 tablet by mouth every 24 hours, Disp: , Rfl:      Lidocaine (LIDOCARE) 4 % Patch, Place 1 patch onto the skin every 24 hours To prevent lidocaine toxicity, patient should be patch free for 12 hrs daily., Disp: , Rfl:      lisinopril (ZESTRIL) 20 MG tablet, TAKE 1 TABLET BY MOUTH EVERY DAY, Disp: 90 tablet, Rfl: 0     methotrexate 2.5 MG tablet, Take 20 mg by mouth every 7 days On Saturdays, Disp: , Rfl:      metoprolol succinate ER (TOPROL XL) 50 MG 24 hr tablet, TAKE ONE TABLET BY MOUTH EVERY DAY, Disp: 90 tablet, Rfl: 1     multivitamin w/minerals (THERA-VIT-M) tablet, Take 1 tablet by mouth daily, Disp: , Rfl:      oxyCODONE (ROXICODONE) 5 MG tablet, Take 1 tablet (5 mg) by mouth 2 times daily as needed for pain, Disp: 14 tablet, Rfl: 0     polyethylene glycol (MIRALAX) 17 GM/Dose powder, Take 17 g by mouth daily, Disp: 510 g, Rfl: 0     predniSONE (DELTASONE) 5 MG tablet, Take 1 tablet (5 mg) by mouth daily, Disp: , Rfl: 0     simvastatin (ZOCOR) 20 MG tablet, Take 1 tablet (20 mg) by mouth At Bedtime, Disp: 90 tablet, Rfl: 1    Allergies -   Allergies   Allergen  Reactions     Adhesive Tape Other (See Comments)     Fragile skin       Social History -   Social History     Socioeconomic History     Marital status: Single     Spouse name: None     Number of children: None     Years of education: None     Highest education level: None   Social Needs     Financial resource strain: None     Food insecurity - worry: None     Food insecurity - inability: None     Transportation needs - medical: None     Transportation needs - non-medical: None   Occupational History     None   Tobacco Use     Smoking status: Former Smoker     Packs/day: 0.50     Years: 45.00     Pack years: 22.50     Types: Cigarettes     Last attempt to quit: 2010     Years since quittin.9     Smokeless tobacco: Never Used   Substance and Sexual Activity     Alcohol use: Yes     Comment: occsionally-3 -4 drinks a week     Drug use: No     Sexual activity: Yes     Partners: Male   Other Topics Concern     Parent/sibling w/ CABG, MI or angioplasty before 65F 55M? No   Social History Narrative     None       Family History -   Family History   Problem Relation Age of Onset     Cardiovascular Mother      Cancer Brother      Diabetes No family hx of      Hypertension No family hx of      Cerebrovascular Disease No family hx of      Alzheimer Disease No family hx of      Thyroid Disease No family hx of      Respiratory No family hx of      Other - See Comments Mother         CARDIOVASCULAR     Cancer Brother        Review of Systems - As per HPI and PMHx, otherwise 10+ system review of the head and neck, and general constitution is negative.    Physical Exam  /65   Pulse 71   Wt 45.4 kg (100 lb)   LMP  (LMP Unknown)   SpO2 95%   BMI 17.16 kg/m      General - The patient is well nourished and well developed, and appears to have good nutritional status.  Alert and oriented to person and place, answers questions and cooperates with examination appropriately.   Head and Face - Normocephalic and  atraumatic, with no gross asymmetry noted of the contour of the facial features.  The facial nerve is intact, with strong symmetric movements.  Voice and Breathing - The patient was breathing comfortably without the use of accessory muscles. There was no wheezing, stridor, or stertor.  The patients voice was clear and strong, and had appropriate pitch and quality.  Eyes - Extraocular movements intact, and the pupils were reactive to light.  Sclera were not icteric or injected, conjunctiva were pink and moist.  Mouth - Examination of the oral cavity showed pink, healthy oral mucosa. No lesions or ulcerations noted.  The tongue was mobile and midline, and the dentition were in good condition.        Cerumen Removal    Physical Exam and Procedure  Ears - On examination of the ears, I found that the BOTH sides had cerumen impaction.  Therefore, I positioned them in the examination chair in a semi-supine position, beginning with the right side.  I used the binocular surgical microscope to perform cerumen removal.  I began by using a cerumen loop to gently lift the edges of the cerumen mass away from the walls of the external canal.  Once I did this, I was able to suction away fragments of wax and debris using suction.  Once the mass was loose enough, the entire plug was pulled from the canal.  The tympanic membrane was intact, no sign of perforation or middle ear effusion.    I turned my attention to the contralateral side once again using the binocular surgical microscope to perform cerumen removal.  I began by using a cerumen loop to gently lift the edges of the cerumen mass away from the walls of the external canal.  Once I did this, I was able to suction away fragments of wax and debris using suction.  Once the mass was loose enough, the entire plug was pulled from the canal.  The tympanic membrane was intact, no sign of perforation or middle ear effusion.        A/P - Sakshi Jaeger is a 78 year old female  (H61.23)  Bilateral impacted cerumen  (primary encounter diagnosis)  (H93.8X3) Ear fullness, bilateral    The patient has had their cerumen procedurally removed today.  I have discussed ear care at home, including avoiding qtips or any other object placed in the canal.  I have also discussed that over the counter cerumen kits like Debrox or Cerumenex can be useful.    See me in another 4-6 months.  I have also prescribed dermotic for her dry skin in the canals. She has my medical clearance for hearing aid fitting      Again, thank you for allowing me to participate in the care of your patient.        Sincerely,        Pito Mosher MD

## 2024-03-19 NOTE — LETTER
3/19/2024       RE: Sakshi Jaeger  [address] [facility] [address]       Dear Colleague,    Thank you for referring your patient, Sakshi Jaeger, to the [facility] [place] [facility]. Please see a copy of my visit note below.        VASCULAR SURGERY PROGRESS NOTE    LOCATION: [facility]     Sakshi Jaeger  Medical Record #:  [id number]  YOB: 1945  Age:  78 year old     Date of Service: 3/19/2024    PRIMARY CARE PROVIDER: Lucia Bates    Reason for visit: Wound check    IMPRESSION / RECOMMENDATION:   Sakshi Jaeger is a very pleasant 78-year-old patient who presents for wound check of RLE stump incision. Currently at TCU for rehab, with wound dressing changes twice per day. RLE lateral segment completely healed, ingrown sutures removed. Mid and inner incision sutures left intact due to ongoing drainage and skin sloughing.  Patient completed antibiotic regimen however with known poor wound healing. Given ongoing drainage with redness at site and poor healing, plan for changing antibiotics to doxycycline, for 14 days.  Patient and her significant other were encouraged to have high-protein meals along with vitamin C. I also recommend that patient is seen by our vascular surgeon, Dr. Mitchell on Monday 3/25/24 as I will be out of town, for healing and wound assessment.    Continue with RLE dressing changes twice per day, Sakshi and her significant other, Zoran, feel comfortable completing/performing wound care at home should she be discharged this upcoming weekend.    Wound care  RLE dressing changes must occur twice per day, morning and evening (8AM and 8PM). Keep incision dry at all times:   Remove old dressing from incision  Apply generous betadine/iodine on incision, paint incision with iodine. Allow to dry.   Once iodine/betadine dried, apply sterile dressing on incision. Cover entire incision.  After dressing is placed on incision, please apply tape (Prefer primpore tape, breathable, fabric tape)  Please keep incision on RLE dry at all times.  Please do not allow RLE dressing to become soiled at any time.  Please do not apply plastic tape/tegaderm, incision must be allowed to breathe through dressing and primapore tape  If bandage is wet, please change dressing and Apply generous betadine/iodine on incision, paint incision with iodine. Allow to dry. Once iodine/betadine dried, apply sterile dressing on incision. Cover entire incision. After dressing is placed on incision, please apply tape (Prefer primpore tape, breathable, fabric tape)  Please call us immediately with any concerns    Plan to follow-up in 14 days with me once antibiotics completed for ongoing close wound assessment.    They verbalized clear understanding of calling us with any worsening symptoms or other concerns. All questions were answered and support provided. She has our contact information and knows to reach out with any additional questions or concerns.     Yasmin Rob, [facility]  Vascular Surgery  Pager: [phone]  [email or web address]  Send message or 10 digit call back number Securely via [employer] with the [employer] Web Console (learn more here)        HPI:  Sakshi Jaeger is a 78 year old female with past medical history significant for RLE stump revision and left poor wound healing.  Denies fevers or chills. Notes her wound care continues to occur twice per day as instructed. No odor present at wound site. Small amount of drainage is observed, appears purulent in nature however unclear if this is skin sloughing with serous drainage. She continues to have poor healing of RLE stump wound, however previously noted mid segment incision as well as medial aspect of incision continue to have drainage, despite completing antibiotic regiment. Denies smoking although clothes saturated with smoking odor. Patient will potentially discharge  from rehab over the weekend and her significant other feels comfortable with completing wound care at home.    REVIEW OF SYSTEMS:    A 12 point ROS was reviewed and is negative except for what is listed above in HPI.    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    Essential hypertension with goal blood pressure less than 140/90 8/25/2016    Hyperlipidemia LDL goal <100 11/12/2010    Hypertension goal BP (blood pressure) < 130/80 1/26/2011    Infection of right below knee amputation (H) 10/1/2019    IRON DEFIC ANEMIA NOS 9/15/2005    Lower limb amputation, below knee 12/26/2012    MI (myocardial infarction) (H)     3/2010    Non-healing surgical wound, subsequent encounter 9/24/2020    Added automatically from request for surgery [id number]    Osteoporosis 2/16/2005    OSTEOPOROSIS NOS 2/16/2005    Protein-calorie malnutrition (H24) 5/18/2022    PVD (peripheral vascular disease) (H24) 5/18/2022    Rheumatoid arthritis (H) 2/16/2005         Rheumatoid arthritis(714.0) 2/16/2005    Status post below-knee amputation (H) 12/26/2012               Past Surgical History:   Procedure Laterality Date    ---------INCISION AND DRAINAGE  03/05/2014    AMPUTATE LEG ABOVE KNEE Right 1/13/2023    Procedure: AMPUTATION, ABOVE KNEE RIGHT;  Surgeon: Emma Oconnor MD;  Location: [facility]    AMPUTATE LEG ABOVE KNEE Right 2/2/2024    Procedure: Redo ABOVE KNEE AMPUTATION;  Surgeon: Bryon Mitchell MD;  Location: [facility]    AMPUTATE LEG BELOW KNEE Right 9/28/2022    Procedure: Right through knee amputation;  Surgeon: Doron Mott MD;  Location: [facility]    AMPUTATION BELOW KNEE RT/LT  01/01/2005    right lowwer leg.     ANGIOGRAM Right 1/13/2023    Procedure: right iliac arteriogram aned  right common iliac artery stent;  Surgeon: Emma Oconnor MD;  Location: [facility] OR    APPENDECTOMY      ARTHROPLASTY KNEE  04/27/2011    Procedure:ARTHROPLASTY KNEE; Surgeon:JESSE HAWKINS; Location:[facility]    COMPLEX WOUND CLOSURE (UPDATE) Right 12/08/2021    Procedure: Right stump wound debridment and closure;  Surgeon: RAISA Perea MD;  Location: [facility] OR    CORONARY STENT PLACEMENT      ENDARTERECTOMY FEMORAL Right 1/13/2023    Procedure: ENDARTERECTOMY, FEMORAL RIGHT;  Surgeon: Emma Oconnor MD;  Location: [facility] OR    INJECT NERVE BLOCK SPHENOPALATINE GANGLION N/A 9/29/2022    Procedure: Out of OR encounter for block to be done by ;  Surgeon: GENERIC ANESTHESIA PROVIDER;  Location: [facility]    IR OR ANGIOGRAM  1/13/2023    IRRIGATION AND DEBRIDEMENT LOWER EXTREMITY, COMBINED Right 10/09/2019    Procedure: Irrigation and debridement Right leg;  Surgeon: Doron Mott MD;  Location: [facility] OR    NERVE BLOCK PERIPHERAL N/A 9/27/2022    Procedure: BLOCK, NERVE;  Surgeon: GENERIC ANESTHESIA PROVIDER;  [facility]: [facility]    OTHER SURGICAL HISTORY  03/05/2014    I AND D     OTHER SURGICAL HISTORY Right 10/09/2019    IRRIGATION AND DEBRIDEMENT LOWER EXTREMITY, COMBINED    PHACOEMULSIFICATION CLEAR CORNEA WITH STANDARD INTRAOCULAR LENS IMPLANT Left 9/8/2022    Procedure: PHACOEMULSIFICATION, LEFT CATARACT, WITH INTRAOCULAR LENS IMPLANT;  Surgeon: Flip Izaguirre MD;  Location: [facility] OR    PHACOEMULSIFICATION CLEAR CORNEA WITH STANDARD INTRAOCULAR LENS IMPLANT Right 10/13/2022    Procedure: PHACOEMULSIFICATION, RIGHT CATARACT, WITH INTRAOCULAR LENS IMPLANT;  Surgeon: Flip Izaguirre MD;  Location: [facility] OR    REVISE SCAR EXTREMITY Right 12/17/2020    Procedure: Right stump scar revision;  Surgeon: RAISA Perea MD;  Location: [facility] OR       ALLERGIES:  Adhesive tape    MEDS:    Current Outpatient Medications:     doxycycline hyclate (VIBRAMYCIN) 100 MG capsule, Take 1 capsule (100  mg) by mouth 2 times daily for 14 days, Disp: 28 capsule, Rfl: 0    acetaminophen (TYLENOL) 325 MG tablet, Take 2 tablets (650 mg) by mouth every 6 hours, Disp: 24 tablet, Rfl: 0    amLODIPine (NORVASC) 5 MG tablet, Take 1 tablet (5 mg) by mouth daily, Disp: 90 tablet, Rfl: 3    aspirin (ASA) 81 MG tablet, Take 81 mg by mouth daily, Disp:  , Rfl:     calcium carbonate 600 mg-vitamin D 400 units (CALCIUM 600 + D) 600-400 MG-UNIT per tablet, Take 2 tablets by mouth daily, Disp: , Rfl:     fluocinolone acetonide oil 0.01 % ear drops, Place 0.25 mLs (5 drops) into both ears twice a week, Disp: 20 mL, Rfl: 3    folic acid (FOLVITE) 1 MG tablet, Take 1 mg by mouth daily, Disp: , Rfl:     gabapentin (NEURONTIN) 300 MG capsule, Take 1 capsule (300 mg) by mouth 3 times daily, Disp: 270 capsule, Rfl: 0    leflunomide (ARAVA) 20 MG tablet, Take 1 tablet by mouth every 24 hours, Disp: , Rfl:     Lidocaine (LIDOCARE) 4 % Patch, Place 1 patch onto the skin every 24 hours To prevent lidocaine toxicity, patient should be patch free for 12 hrs daily., Disp: , Rfl:     lisinopril (ZESTRIL) 20 MG tablet, TAKE 1 TABLET BY MOUTH EVERY DAY, Disp: 90 tablet, Rfl: 0    methotrexate 2.5 MG tablet, Take 20 mg by mouth every 7 days On Saturdays, Disp: , Rfl:     metoprolol succinate ER (TOPROL XL) 50 MG 24 hr tablet, TAKE ONE TABLET BY MOUTH EVERY DAY, Disp: 90 tablet, Rfl: 1    multivitamin w/minerals (THERA-VIT-M) tablet, Take 1 tablet by mouth daily, Disp: , Rfl:     oxyCODONE (ROXICODONE) 5 MG tablet, Take 1 tablet (5 mg) by mouth 2 times daily as needed for pain, Disp: 14 tablet, Rfl: 0    polyethylene glycol (MIRALAX) 17 GM/Dose powder, Take 17 g by mouth daily, Disp: 510 g, Rfl: 0    predniSONE (DELTASONE) 5 MG tablet, Take 1 tablet (5 mg) by mouth daily, Disp: , Rfl: 0    simvastatin (ZOCOR) 20 MG tablet, Take 1 tablet (20 mg) by mouth At Bedtime, Disp: 90 tablet, Rfl: 1    SOCIAL HABITS:    History   Smoking Status    Former     Packs/day: 0.50    Years: 45.00    Types: Cigarettes    Quit date: 2/26/2010   Smokeless Tobacco    Never     Social History    Substance and Sexual Activity      Alcohol use: Yes        Comment: occsionally-3 -4 drinks a week      History   Drug Use No       FAMILY HISTORY:    Family History   Problem Relation Age of Onset    Cardiovascular Mother     Cancer Brother     Diabetes No family hx of     Hypertension No family hx of     Cerebrovascular Disease No family hx of     Alzheimer Disease No family hx of     Thyroid Disease No family hx of     Respiratory No family hx of     Other - See Comments Mother         CARDIOVASCULAR    Cancer Brother        PE:  /64 (BP Location: Right arm, Patient Position: Sitting, Cuff Size: Adult Small)   Pulse 63   LMP  (LMP Unknown)   SpO2 95%   Wt Readings from Last 1 Encounters:   03/14/24 45.4 kg (100 lb)     There is no height or weight on file to calculate BMI.    EXAM:  GENERAL: well-developed 78 year old female who appears her stated age  CARDIAC: normal   CHEST/LUNG: normal respiratory effort   MUSCULOSKELETAL: grossly normal and both lower extremities are intact, no lower extremity edema  NEUROLOGIC: focally intact, alert and oriented x 3  PSYCH: appropriate affect  SKIN: RLE stump incision continues to have mid to inner segment with redness and yellowish drainage. No odor. No swelling.        Again, thank you for allowing me to participate in the care of your patient.      Sincerely,    DESIRE Ventura CNP

## 2024-03-19 NOTE — PATIENT INSTRUCTIONS
Thank you so much for choosing us for your care. It was a pleasure to see you at the vascular clinic today.     Follow-up recommendations: Follow up in 14 days.  You have a follow up scheduled with Dr. Mitchell Next Monday.      Recommend:  RLE dressing changes must occur twice per day, morning and evening (8AM and 8PM). Keep incision dry at all times:   Remove old dressing from incision  Apply generous betadine/iodine on incision, paint incision with iodine. Allow to dry.   Once iodine/betadine dried, apply sterile dressing on incision. Cover entire incision. After dressing is placed on incision, please apply tape (Prefer primpore tape, breathable, fabric tape)  Please keep incision on RLE dry at all times.  Please do not allow RLE dressing to become soiled at any time.  Please do not apply plastic tape/tegaderm, incision must be allowed to breathe through dressing and primapore tape  If bandage is wet, please change dressing and Apply generous betadine/iodine on incision, paint incision with iodine. Allow to dry. Once iodine/betadine dried, apply sterile dressing on incision. Cover entire incision. After dressing is placed on incision, please apply tape (Prefer primpore tape, breathable, fabric tape)  Please call us immediately with any concerns     Take Doxycycline as prescribed.      Our scheduling team will get in touch with you to set up any follow-up testing/imaging and/or appointments. Please be aware that any testing/imaging recommended today will need to completed prior to your next visit with the provider. If testing/imaging is not completed prior to your next visit, your visit may be rescheduled.        If you have any questions, please contact our clinic directly at [phone] and ask for the nurse. We also encourage the use of [email or web address] to communicate with your HealthCare Provider.        If you have an urgent need after business hours (8:00 am to 4:30 pm) please call [phone], option 4, and  ask for the vascular attending on call. For non-urgent after hours needs, please call the vascular nurse at [phone] and leave a detailed voicemail. For scheduling needs, please call our clinic directly at [phone].

## 2024-03-19 NOTE — PROGRESS NOTES
VASCULAR SURGERY PROGRESS NOTE    LOCATION: [facility]     Sakshi Jaeger  Medical Record #:  [id number]  YOB: 1945  Age:  78 year old     Date of Service: 3/19/2024    PRIMARY CARE PROVIDER: Lucia Bates    Reason for visit: Wound check    IMPRESSION / RECOMMENDATION:   Sakshi Jaeger is a very pleasant 78-year-old patient who presents for wound check of RLE stump incision. Currently at TCU for rehab, with wound dressing changes twice per day. RLE lateral segment completely healed, ingrown sutures removed. Mid and inner incision sutures left intact due to ongoing drainage and skin sloughing.  Patient completed antibiotic regimen however with known poor wound healing. Given ongoing drainage with redness at site and poor healing, plan for changing antibiotics to doxycycline, for 14 days.  Patient and her significant other were encouraged to have high-protein meals along with vitamin C. I also recommend that patient is seen by our vascular surgeon, Dr. Mitchell on Monday 3/25/24 as I will be out of town, for healing and wound assessment.    Continue with RLE dressing changes twice per day, Sakshi and her significant other, Zoran, feel comfortable completing/performing wound care at home should she be discharged this upcoming weekend.    Wound care  RLE dressing changes must occur twice per day, morning and evening (8AM and 8PM). Keep incision dry at all times:   Remove old dressing from incision  Apply generous betadine/iodine on incision, paint incision with iodine. Allow to dry.   Once iodine/betadine dried, apply sterile dressing on incision. Cover entire incision. After dressing is placed on incision, please apply tape (Prefer primpore tape, breathable, fabric tape)  Please keep incision on RLE dry at all times.  Please do not allow RLE dressing to become soiled at any time.  Please do not apply plastic tape/tegaderm, incision must be allowed to breathe through dressing and  primapore tape  If bandage is wet, please change dressing and Apply generous betadine/iodine on incision, paint incision with iodine. Allow to dry. Once iodine/betadine dried, apply sterile dressing on incision. Cover entire incision. After dressing is placed on incision, please apply tape (Prefer primpore tape, breathable, fabric tape)  Please call us immediately with any concerns    Plan to follow-up in 14 days with me once antibiotics completed for ongoing close wound assessment.    They verbalized clear understanding of calling us with any worsening symptoms or other concerns. All questions were answered and support provided. She has our contact information and knows to reach out with any additional questions or concerns.     Yasmin Rob, [facility]  Vascular Surgery  Pager: [phone]  [email or web address]  Send message or 10 digit call back number Securely via [employer] with the Vocera Web Console (learn more here)        HPI:  Sakshi Jaeger is a 78 year old female with past medical history significant for RLE stump revision and left poor wound healing.  Denies fevers or chills. Notes her wound care continues to occur twice per day as instructed. No odor present at wound site. Small amount of drainage is observed, appears purulent in nature however unclear if this is skin sloughing with serous drainage. She continues to have poor healing of RLE stump wound, however previously noted mid segment incision as well as medial aspect of incision continue to have drainage, despite completing antibiotic regiment. Denies smoking although clothes saturated with smoking odor. Patient will potentially discharge from rehab over the weekend and her significant other feels comfortable with completing wound care at home.    REVIEW OF SYSTEMS:    A 12 point ROS was reviewed and is negative except for what is listed above in HPI.    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    Essential hypertension with goal blood pressure less than 140/90 8/25/2016    Hyperlipidemia LDL goal <100 11/12/2010    Hypertension goal BP (blood pressure) < 130/80 1/26/2011    Infection of right below knee amputation (H) 10/1/2019    IRON DEFIC ANEMIA NOS 9/15/2005    Lower limb amputation, below knee 12/26/2012    MI (myocardial infarction) (H)     3/2010    Non-healing surgical wound, subsequent encounter 9/24/2020    Added automatically from request for surgery [id number]    Osteoporosis 2/16/2005    OSTEOPOROSIS NOS 2/16/2005    Protein-calorie malnutrition (H24) 5/18/2022    PVD (peripheral vascular disease) (H24) 5/18/2022    Rheumatoid arthritis (H) 2/16/2005         Rheumatoid arthritis(714.0) 2/16/2005    Status post below-knee amputation (H) 12/26/2012               Past Surgical History:   Procedure Laterality Date    ---------INCISION AND DRAINAGE  03/05/2014    AMPUTATE LEG ABOVE KNEE Right 1/13/2023    Procedure: AMPUTATION, ABOVE KNEE RIGHT;  Surgeon: Emma Oconnor MD;  [facility]    AMPUTATE LEG ABOVE KNEE Right 2/2/2024    Procedure: Redo ABOVE KNEE AMPUTATION;  Surgeon: Bryon Mitchell MD;  [facility]    AMPUTATE LEG BELOW KNEE Right 9/28/2022    Procedure: Right through knee amputation;  Surgeon: Doron Mott MD;  [facility]    AMPUTATION BELOW KNEE RT/LT  01/01/2005    right lowwer leg.     ANGIOGRAM Right 1/13/2023    Procedure: right iliac arteriogram aned right common iliac artery stent;  Surgeon: Emma Oconnor MD;  [facility]    APPENDECTOMY      ARTHROPLASTY KNEE  04/27/2011    Procedure:ARTHROPLASTY KNEE; Surgeon:JESSE HAWKINS; [facility]    COMPLEX WOUND CLOSURE (UPDATE) Right 12/08/2021    Procedure: Right stump wound debridment and closure;   Surgeon: RAISA Perea MD;  Location: [facility]    CORONARY STENT PLACEMENT      ENDARTERECTOMY FEMORAL Right 1/13/2023    Procedure: ENDARTERECTOMY, FEMORAL RIGHT;  Surgeon: Emma Oconnor MD;  [facility]    INJECT NERVE BLOCK SPHENOPALATINE GANGLION N/A 9/29/2022    Procedure: Out of OR encounter for block to be done by ;  Surgeon: GENERIC ANESTHESIA PROVIDER;  [facility]: [facility]    IR OR ANGIOGRAM  1/13/2023    IRRIGATION AND DEBRIDEMENT LOWER EXTREMITY, COMBINED Right 10/09/2019    Procedure: Irrigation and debridement Right leg;  Surgeon: Doron Mott MD;  [facility]    NERVE BLOCK PERIPHERAL N/A 9/27/2022    Procedure: BLOCK, NERVE;  Surgeon: GENERIC ANESTHESIA PROVIDER;  [facility]    OTHER SURGICAL HISTORY  03/05/2014    I AND D     OTHER SURGICAL HISTORY Right 10/09/2019    IRRIGATION AND DEBRIDEMENT LOWER EXTREMITY, COMBINED    PHACOEMULSIFICATION CLEAR CORNEA WITH STANDARD INTRAOCULAR LENS IMPLANT Left 9/8/2022    Procedure: PHACOEMULSIFICATION, LEFT CATARACT, WITH INTRAOCULAR LENS IMPLANT;  Surgeon: Flip Izaguirre MD;  Location: [facility] OR    PHACOEMULSIFICATION CLEAR CORNEA WITH STANDARD INTRAOCULAR LENS IMPLANT Right 10/13/2022    Procedure: PHACOEMULSIFICATION, RIGHT CATARACT, WITH INTRAOCULAR LENS IMPLANT;  Surgeon: Flip Izaguirre MD;  Location: [facility] OR    REVISE SCAR EXTREMITY Right 12/17/2020    Procedure: Right stump scar revision;  Surgeon: RAISA Perea MD;  Location: [facility] OR       ALLERGIES:  Adhesive tape    MEDS:    Current Outpatient Medications:     doxycycline hyclate (VIBRAMYCIN) 100 MG capsule, Take 1 capsule (100 mg) by mouth 2 times daily for 14 days, Disp: 28 capsule, Rfl: 0    acetaminophen (TYLENOL) 325 MG tablet, Take 2 tablets (650 mg) by mouth every 6 hours, Disp: 24 tablet, Rfl: 0    amLODIPine (NORVASC) 5 MG tablet, Take 1 tablet (5 mg) by mouth daily, Disp: 90 tablet, Rfl: 3    aspirin (ASA) 81 MG tablet, Take  81 mg by mouth daily, Disp:  , Rfl:     calcium carbonate 600 mg-vitamin D 400 units (CALCIUM 600 + D) 600-400 MG-UNIT per tablet, Take 2 tablets by mouth daily, Disp: , Rfl:     fluocinolone acetonide oil 0.01 % ear drops, Place 0.25 mLs (5 drops) into both ears twice a week, Disp: 20 mL, Rfl: 3    folic acid (FOLVITE) 1 MG tablet, Take 1 mg by mouth daily, Disp: , Rfl:     gabapentin (NEURONTIN) 300 MG capsule, Take 1 capsule (300 mg) by mouth 3 times daily, Disp: 270 capsule, Rfl: 0    leflunomide (ARAVA) 20 MG tablet, Take 1 tablet by mouth every 24 hours, Disp: , Rfl:     Lidocaine (LIDOCARE) 4 % Patch, Place 1 patch onto the skin every 24 hours To prevent lidocaine toxicity, patient should be patch free for 12 hrs daily., Disp: , Rfl:     lisinopril (ZESTRIL) 20 MG tablet, TAKE 1 TABLET BY MOUTH EVERY DAY, Disp: 90 tablet, Rfl: 0    methotrexate 2.5 MG tablet, Take 20 mg by mouth every 7 days On Saturdays, Disp: , Rfl:     metoprolol succinate ER (TOPROL XL) 50 MG 24 hr tablet, TAKE ONE TABLET BY MOUTH EVERY DAY, Disp: 90 tablet, Rfl: 1    multivitamin w/minerals (THERA-VIT-M) tablet, Take 1 tablet by mouth daily, Disp: , Rfl:     oxyCODONE (ROXICODONE) 5 MG tablet, Take 1 tablet (5 mg) by mouth 2 times daily as needed for pain, Disp: 14 tablet, Rfl: 0    polyethylene glycol (MIRALAX) 17 GM/Dose powder, Take 17 g by mouth daily, Disp: 510 g, Rfl: 0    predniSONE (DELTASONE) 5 MG tablet, Take 1 tablet (5 mg) by mouth daily, Disp: , Rfl: 0    simvastatin (ZOCOR) 20 MG tablet, Take 1 tablet (20 mg) by mouth At Bedtime, Disp: 90 tablet, Rfl: 1    SOCIAL HABITS:    History   Smoking Status    Former    Packs/day: 0.50    Years: 45.00    Types: Cigarettes    Quit date: 2/26/2010   Smokeless Tobacco    Never     Social History    Substance and Sexual Activity      Alcohol use: Yes        Comment: occsionally-3 -4 drinks a week      History   Drug Use No       FAMILY HISTORY:    Family History   Problem Relation Age  of Onset    Cardiovascular Mother     Cancer Brother     Diabetes No family hx of     Hypertension No family hx of     Cerebrovascular Disease No family hx of     Alzheimer Disease No family hx of     Thyroid Disease No family hx of     Respiratory No family hx of     Other - See Comments Mother         CARDIOVASCULAR    Cancer Brother        PE:  /64 (BP Location: Right arm, Patient Position: Sitting, Cuff Size: Adult Small)   Pulse 63   LMP  (LMP Unknown)   SpO2 95%   Wt Readings from Last 1 Encounters:   03/14/24 45.4 kg (100 lb)     There is no height or weight on file to calculate BMI.    EXAM:  GENERAL: well-developed 78 year old female who appears her stated age  CARDIAC: normal   CHEST/LUNG: normal respiratory effort   MUSCULOSKELETAL: grossly normal and both lower extremities are intact, no lower extremity edema  NEUROLOGIC: focally intact, alert and oriented x 3  PSYCH: appropriate affect  SKIN: RLE stump incision continues to have mid to inner segment with redness and yellowish drainage. No odor. No swelling.

## 2024-03-19 NOTE — TELEPHONE ENCOUNTER
----- Message from DESIRE Ventura CNP sent at 3/19/2024  5:56 PM CDT -----  That should work, 4/8 sounds good. Thank you Jaziel!    ----- Message -----  From: Jaziel Amezquita  Sent: 3/19/2024   5:55 PM CDT  To: DESIRE Ventura CNP    The is no availability on your schedule until 4/8 please advise.  Jaziel Amezquita on 3/19/2024 at 5:55 PM   ----- Message -----  From: Yasmin Rob APRN CNP  Sent: 3/19/2024   5:40 PM CDT  To: Yeimy Rogel RN; Destiney Sanford LPN; #    Hi team,  Can you please schedule Mrs. Jaeger for follow-up with me in 14 days.  Her stump incision is not healing well and I need to keep a close eye on it.  Thank you,  Yasmin

## 2024-03-19 NOTE — TELEPHONE ENCOUNTER
Left Voicemail (1st Attempt) Letting the patient know that she is scheduled for and appointment and to call back and confirm if the appointment is or is not okay with the pts schedule.     Appointment type:Return Vas Pt   Provider: Yasmin Rob   Return date:April 8 2024   Specialty phone number: [phone]  Additional appointment(s) needed: No  Additonal Notes:Please confirm the appointment with the pt.  Jaziel Amezquita on 3/19/2024 at 6:19 PM

## 2024-03-26 NOTE — TELEPHONE ENCOUNTER
The pt is scheduled on 4/1 with CP the pts caregiver Santi is requesting that the pt be seen on 4/1 in the afternoon by Yasmin tesfaye.  The pt is schedule on 4/8 and Santi is requesting that the pt be seen in the pm. Unfortunately there is no availably in the afternoon   only either date.   Jaziel Amezquita on 3/26/2024 at 3:26 PM

## 2024-03-26 NOTE — TELEPHONE ENCOUNTER
I explained this to Santi but he seems to think that the pt needs to be seen by both providers he is also stating that he mentioned that he could not come in on Mondays unless they were afternoons.  Jaziel Amezquita on 3/26/2024 at 4:16 PM

## 2024-03-26 NOTE — TELEPHONE ENCOUNTER
Attempted to call Sakshi to check in s/p NCNS for yesterday's clinic visit. LVM w/ callback. I will request our schedulers reach back out to r/s her missed appointment.    AMANDA DavenportN, RN  RN Care Coordinator  [facility] Vascular Surgery - [facility] phone: [phone]  Fax: [phone]

## 2024-03-27 NOTE — PROGRESS NOTES
Clinic Care Coordination Contact  Care Coordination Clinician Chart Review    Situation: Patient chart reviewed by care coordinator.    Background:  Hospital to TCU and now home with homecare.      Assessment: Upon chart review, patient is not a candidate for Primary Care Clinic Care Coordination enrollment due to reason stated below:  Declined the services of Primary care-care coordination    Plan/Recommendations: Clinic Care Coordination Referral/order cancelled. RN/SW CC will perform no further monitoring/outreaches at this time and will remain available as needed. If new needs arise, a new Care Coordination Referral may be placed.    Deepak Walker MSN, RN, PHN, CCM   Primary Care Clinical RN Care Coordinator  [facility]  3/27/2024   2:36 PM  [email or web address]  Office: [phone]

## 2024-03-27 NOTE — TELEPHONE ENCOUNTER
I called and spoke with Santi and clarified Sakshi's upcoming appts.    AMANDA DavenportN, RN  RN Care Coordinator  [facility] Vascular Surgery - [facility] phone: [phone]  Fax: [phone]

## 2024-03-27 NOTE — LETTER
[facility] CARE COORDINATION  [address] [address]    March 27, 2024    Sakshi Jaeger  [address] [address]      Dear Sakshi,    I am a clinic care coordinator who works with DESIRE Barnes CNP with the [employer]. I wanted to introduce myself and provide you with my contact information for you to be able to call me with any questions or concerns. I wanted to thank you for spending the time to talk with me.  Below is a description of clinic care coordination and how I can further assist you.       The clinic care coordination team is made up of a registered nurse, , financial resource worker and community health worker who understand the health care system. The goal of clinic care coordination is to help you manage your health and improve access to the health care system. Our team works alongside your provider to assist you in determining your health and social needs. We can help you obtain health care and community resources, providing you with necessary information and education. We can work with you through any barriers and develop a care plan that helps coordinate and strengthen the communication between you and your care team.  Our services are voluntary and are offered without charge to you personally.    Please feel free to contact me with any questions or concerns regarding care coordination and what we can offer.      We are focused on providing you with the highest-quality healthcare experience possible.    Sincerely,     Deepak Walker MSN, RN, PHN, [facility]   Primary Care Clinical RN Care Coordinator  [employer]  3/27/2024   2:32 PM  [email or web address]  Office: [phone]

## 2024-03-28 NOTE — TELEPHONE ENCOUNTER
[facility] Call Center    Phone Message    May a detailed message be left on voicemail: yes     Reason for Call: Patient's home health care nurse Fab needs home care orders for a PT eval - please call [phone] okay to leave VM  Thank you    Action Taken: Message routed to:  [facility] ([facility]    Travel Screening: Not Applicable

## 2024-03-29 NOTE — TELEPHONE ENCOUNTER
Returned Fab's call. PT order placed and faxed to [facility].    [facility] Fax: [phone]    MILTON Davenport, RN  RN Care Coordinator  [facility] Vascular Surgery - [facility] phone: [phone]  Fax: [phone]

## 2024-03-29 NOTE — TELEPHONE ENCOUNTER
I returned Fab's call and answered his questions. Pt is scheduled for follow-up with our team on Monday 4/1.    AMANDA DavenportN, RN  RN Care Coordinator  [facility] Vascular Surgery - [facility] phone: [phone]  Fax: [phone]

## 2024-03-29 NOTE — PROGRESS NOTES
Vascular & Endovascular Surgery Clinic Return Visit   Vascular Surgeon: Bryon Mitchell MD, RPVI      Location:  [facility]    Sakshi Jaeger  Medical Record #:  [id number]  YOB: 1945  Age:  78 year old     Date of Service: 3/25/2024    Primary Care Provider: Lucia Bates    Chief Complaint/Reason for Visit: Follow up of above knee amputation wound    Interval History:  Ms. Jaeger is a pleasant 78 year old female who returns today with her *** for evaluation of her above knee amputation wound.  The patient has a significant history of PAD, poor wound healing, and cachexia.  The patient is status post redo AKA that was performed on February 2, 2024.  At her prior appointment on March 19, 2024, the patient was noted to have mid incision segment that continued to have drainage.  Given the ongoing drainage and redness at the site, antibiotics were changed from Augmentin to doxycycline for 14 days.  At that time recommendations for daily wound changing were given; primarily to paint the incision with Betadine and cover with a dressing.  Today the patient ***      Review of Systems:    A 12 point ROS was reviewed and is negative except for symptoms noted in HPI.     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    Essential hypertension with goal blood pressure less than 140/90 8/25/2016    Hyperlipidemia LDL goal <100 11/12/2010    Hypertension goal BP (blood pressure) < 130/80 1/26/2011    Infection of right below knee amputation (H) 10/1/2019    IRON DEFIC ANEMIA NOS 9/15/2005    Lower limb amputation, below knee 12/26/2012    MI (myocardial infarction) (H)     3/2010    Non-healing surgical  wound, subsequent encounter 9/24/2020    Added automatically from request for surgery [id number]    Osteoporosis 2/16/2005    OSTEOPOROSIS NOS 2/16/2005    Protein-calorie malnutrition (H24) 5/18/2022    PVD (peripheral vascular disease) (H24) 5/18/2022    Rheumatoid arthritis (H) 2/16/2005         Rheumatoid arthritis(714.0) 2/16/2005    Status post below-knee amputation (H) 12/26/2012              Past Surgical History:   Procedure Laterality Date    ---------INCISION AND DRAINAGE  03/05/2014    AMPUTATE LEG ABOVE KNEE Right 1/13/2023    Procedure: AMPUTATION, ABOVE KNEE RIGHT;  Surgeon: Emma Oconnor MD;  [facility]    AMPUTATE LEG ABOVE KNEE Right 2/2/2024    Procedure: Redo ABOVE KNEE AMPUTATION;  Surgeon: Bryon Mitchell MD;  [facility]    AMPUTATE LEG BELOW KNEE Right 9/28/2022    Procedure: Right through knee amputation;  Surgeon: Doron Mott MD;  [facility]: [facility]    AMPUTATION BELOW KNEE RT/LT  01/01/2005    right lowwer leg.     ANGIOGRAM Right 1/13/2023    Procedure: right iliac arteriogram aned right common iliac artery stent;  Surgeon: Emma Oconnor MD;  [facility]    APPENDECTOMY      ARTHROPLASTY KNEE  04/27/2011    Procedure:ARTHROPLASTY KNEE; Surgeon:JESSE HAWKINS; [facility]    COMPLEX WOUND CLOSURE (UPDATE) Right 12/08/2021    Procedure: Right stump wound debridment and closure;  Surgeon: RAISA Perea MD;  Location: [facility] OR    CORONARY STENT PLACEMENT      ENDARTERECTOMY FEMORAL Right 1/13/2023    Procedure: ENDARTERECTOMY, FEMORAL RIGHT;  Surgeon: Emma Oconnor MD;  [facility]    INJECT NERVE BLOCK SPHENOPALATINE GANGLION N/A 9/29/2022    Procedure: Out of OR encounter for block to be done by ;  Surgeon: GENERIC ANESTHESIA PROVIDER;  [facility]    IR OR ANGIOGRAM  1/13/2023    IRRIGATION AND DEBRIDEMENT LOWER EXTREMITY, COMBINED Right 10/09/2019    Procedure: Irrigation and debridement Right leg;  Surgeon: Doron Mott  MD Zeus;  [facility]    NERVE BLOCK PERIPHERAL N/A 9/27/2022    Procedure: BLOCK, NERVE;  Surgeon: GENERIC ANESTHESIA PROVIDER;  [facility]    OTHER SURGICAL HISTORY  03/05/2014    I AND D     OTHER SURGICAL HISTORY Right 10/09/2019    IRRIGATION AND DEBRIDEMENT LOWER EXTREMITY, COMBINED    PHACOEMULSIFICATION CLEAR CORNEA WITH STANDARD INTRAOCULAR LENS IMPLANT Left 9/8/2022    Procedure: PHACOEMULSIFICATION, LEFT CATARACT, WITH INTRAOCULAR LENS IMPLANT;  Surgeon: Flip Izaguirre MD;  Location: [facility]    PHACOEMULSIFICATION CLEAR CORNEA WITH STANDARD INTRAOCULAR LENS IMPLANT Right 10/13/2022    Procedure: PHACOEMULSIFICATION, RIGHT CATARACT, WITH INTRAOCULAR LENS IMPLANT;  Surgeon: Flip Izaguirre MD;  Location: [facility]    REVISE SCAR EXTREMITY Right 12/17/2020    Procedure: Right stump scar revision;  Surgeon: RAISA Perea MD;  Location: [facility] OR        Allergies:     Allergies   Allergen Reactions    Adhesive Tape Other (See Comments)     Fragile skin        Medications:    Current Outpatient Medications   Medication    acetaminophen (TYLENOL) 325 MG tablet    amLODIPine (NORVASC) 5 MG tablet    aspirin (ASA) 81 MG tablet    calcium carbonate 600 mg-vitamin D 400 units (CALCIUM 600 + D) 600-400 MG-UNIT per tablet    doxycycline hyclate (VIBRAMYCIN) 100 MG capsule    fluocinolone acetonide oil 0.01 % ear drops    folic acid (FOLVITE) 1 MG tablet    gabapentin (NEURONTIN) 300 MG capsule    leflunomide (ARAVA) 20 MG tablet    Lidocaine (LIDOCARE) 4 % Patch    lisinopril (ZESTRIL) 20 MG tablet    methotrexate 2.5 MG tablet    metoprolol succinate ER (TOPROL XL) 50 MG 24 hr tablet    multivitamin w/minerals (THERA-VIT-M) tablet    oxyCODONE (ROXICODONE) 5 MG tablet    polyethylene glycol (MIRALAX) 17 GM/Dose powder    predniSONE (DELTASONE) 5 MG tablet    simvastatin (ZOCOR) 20 MG tablet     No current facility-administered medications for this visit.        Social Habits:     "Tobacco Use      Smoking status: Former        Packs/day: 0.50        Years: 45.00        Additional pack years: 0.00        Total pack years: 22.50        Types: Cigarettes        Quit date: 2010        Years since quittin.0        Passive exposure: Past      Smokeless tobacco: Never       Alcohol Use: Not on file       History   Drug Use No        Family History:    Family History   Problem Relation Age of Onset    Cardiovascular Mother     Cancer Brother     Diabetes No family hx of     Hypertension No family hx of     Cerebrovascular Disease No family hx of     Alzheimer Disease No family hx of     Thyroid Disease No family hx of     Respiratory No family hx of     Other - See Comments Mother         CARDIOVASCULAR    Cancer Brother        Physical Examination:  LMP  (LMP Unknown)   Wt Readings from Last 1 Encounters:   24 45.4 kg (100 lb)     There is no height or weight on file to calculate BMI.    Exam:  General: No apparent distress. Answers questions appropriately ***  Neurologic: Focally intact, Alert and oriented x 3. ***  Eyes: Grossly normal. ***  Cardiac:  ***  Pulmonary:  Non labored breathing with normal respiratory effort  Abdominal: Soft, non distended, non tender to palpation.  *** pulsatile mass.   Musculoskeletal/Extremity: ***/5 gross *** extremity strength. *** edema.  *** open wounds or abrasions. ***    Vascular Exam:   Carotid: {BLANK MULTIPLE NO CONNECTOR:43881::\"Bruit\",\"No Bruit\"}    Radial: Left: ***   Right: ***    Femoral: Left: ***   Right: ***    Popliteal: Left: ***   Right: ***    DP: Left: ***   Right: ***     PT:   Left: ***   Right: ***    Laboratory Findings:  Hemoglobin   Date Value Ref Range Status   2024 8.2 (L) 11.7 - 15.7 g/dL Final   2024 8.1 (L) 11.7 - 15.7 g/dL Final   2020 12.2 11.7 - 15.7 g/dL Final   10/13/2019 7.5 (L) 11.7 - 15.7 g/dL Final     WBC   Date Value Ref Range Status   2020 13.0 (H) 4.0 - 11.0 10e9/L Final "   10/13/2019 11.3 (H) 4.0 - 11.0 10e9/L Final     WBC Count   Date Value Ref Range Status   02/06/2024 8.1 4.0 - 11.0 10e3/uL Final   02/05/2024 12.7 (H) 4.0 - 11.0 10e3/uL Final     Platelet Count   Date Value Ref Range Status   02/07/2024 250 150 - 450 10e3/uL Final   02/06/2024 266 150 - 450 10e3/uL Final   03/12/2020 356 150 - 450 10e9/L Final   10/13/2019 563 (H) 150 - 450 10e9/L Final     Creatinine   Date Value Ref Range Status   02/07/2024 0.31 (L) 0.51 - 0.95 mg/dL Final   11/11/2019 0.69 0.60 - 1.10 mg/dL Final     Sodium   Date Value Ref Range Status   02/06/2024 132 (L) 135 - 145 mmol/L Final     Comment:     Reference intervals for this test were updated on 09/26/2023 to more accurately reflect our healthy population. There may be differences in the flagging of prior results with similar values performed with this method. Interpretation of those prior results can be made in the context of the updated reference intervals.    10/13/2019 136 133 - 144 mmol/L Final     Glucose   Date Value Ref Range Status   02/06/2024 85 70 - 99 mg/dL Final   02/05/2024 89 70 - 99 mg/dL Final   01/11/2023 93 70 - 99 mg/dL Final   09/27/2022 98 70 - 99 mg/dL Final   11/11/2019 77 70 - 125 mg/dL Final   10/13/2019 81 70 - 99 mg/dL Final     GLUCOSE BY METER POCT   Date Value Ref Range Status   02/02/2024 104 (H) 70 - 99 mg/dL Final   01/15/2023 104 (H) 70 - 99 mg/dL Final     Hemoglobin A1C   Date Value Ref Range Status   07/07/2008 5.6 4.3 - 6.0 % Final     INR   Date Value Ref Range Status   10/09/2019 1.31 (H) 0.86 - 1.14 Final     ***    Imaging:    I personally reviewed the *** from *** which shows ***    Impression/Shared Medical Decision Making:    #1- ***  Ms. Jaeger is a pleasant 78 year old female seen for *** follow up of ***.    Risks, benefits, and alternatives ***.    Discussed warning signs including, but not limited to, ***.  If she experiences any of these, he has been advised to seek treatment and contact  my team.    Ms. Jaeger *** and her *** are in agreement with this plan as outlined.    Recommendations/Plan:  Return to the vascular surgery clinic in 1 week for wound check.  Keep sutures in place    Hima Ramirez MD  Vascular & Endovascular Surgery      Co-sign will include time spent on the day of encounter doing chart review from our system and care everywhere, history and exam, documentation, coordinating care, and further activities as noted with over half spent counseling.

## 2024-03-29 NOTE — TELEPHONE ENCOUNTER
Please return bens call about Skilled nursing orders 1 time a week for 8 weeks and also a verbal PT order. Thank you!      Thank you!  [facility]

## 2024-03-29 NOTE — TELEPHONE ENCOUNTER
[facility] Call Center    Phone Message    May a detailed message be left on voicemail: yes     Reason for Call: Other: Fab called and wanted to know if Dr. Batista will be the provider who will the sign the on going order for the pt. Fab also wanted to let you know that the pt is refusing skilled nursing visit and said that her spouse will manage the wound. The pt will only allow a clinician to come once a week for PT. Please call them back. Thanks      Action Taken: Message routed to:  [facility] VAS    Travel Screening: Not Applicable

## 2024-03-29 NOTE — TELEPHONE ENCOUNTER
I returned Fab's call. Pt is refusing skilled nursing home care visits more than 1x/week. They are wondering about getting new orders to reflect 1x/wk visits. Will discuss with our team. I let him know I will follow-up on Monday.    AMANDA DavenportN, RN  RN Care Coordinator  [facility] Vascular Surgery - [facility] phone: [phone]  Fax: [phone]     Subjective   History of Present Illness  67-year-old female past medical history significant for chronic abdominal pain and problems to include chronic abdominal pain chronic constipation gastritis gastrointestinal hypomotility disorder intestinal autonomic neuropathy mixed anxiety and depressive disorder moderate malnutrition osteoporosis and insomnia presents the emergency room with complaint of ongoing problems with her abdomen and asking for help with pain and nausea.  Upon reviewing patient's chart she was seen by Dr. Speedy AVENDAÑO on December 19, 2022 who concluded that patient needed to be referred to pain management and that she should follow-up with her primary care physician for such in addition to ongoing treatment with her colorectal surgeon at [facility].  Patient had a telemedicine appointment with her doctor at the [facility] on December 20, 2022 and at that time that physician wrote a note for plan of having accomplish some of her motility tests but needed to accomplish that the remainder into schedule those through that office.  Patient has not seen her primary care physician or any other since that time and presents emergency room today complaining of several weeks worth of worsening symptoms.  She states that she has an appoint with her primary care physician on February 4.  She has not contacted that office for referrals or to inform of her ongoing care.  Patient states that she has never seen a colorectal surgeon in [place] as that the when she was referred to canceled her appointment told her that she could not take care of her into see someone else.  Patient is requesting that I give her name number of colorectal surgeon and local.  Patient states that she has chronic anorexia and has had weight loss over the last 2 years.  She states she has chronic nausea but no fever shortness of breath chest pain vomiting or diarrhea.        Review of Systems   Constitutional: Positive for  appetite change. Negative for activity change, chills, diaphoresis, fatigue and fever.   HENT: Negative for congestion, rhinorrhea and sore throat.    Eyes: Negative for photophobia and visual disturbance.   Respiratory: Negative for cough and shortness of breath.    Cardiovascular: Negative for chest pain, palpitations and leg swelling.   Gastrointestinal: Positive for abdominal pain and nausea. Negative for abdominal distention, diarrhea and vomiting.   Genitourinary: Negative for dysuria and flank pain.   Musculoskeletal: Negative for arthralgias and back pain.   Skin: Negative for rash.   Neurological: Negative for dizziness, weakness and headaches.   Psychiatric/Behavioral: Negative for agitation and behavioral problems.       Past Medical History:   Diagnosis Date   • Arthritis    • Autoantibody titer positive    • Bloating    • Chronic abdominal pain    • Chronic constipation    • Encounter for preventive health examination    • Endometriosis    • Gastritis    • Gastrointestinal hypomotility    • GERD (gastroesophageal reflux disease)    • Headache, tension-type    • Hypertension    • Hyponatremia    • Insomnia    • Intestinal autonomic neuropathy    • Migraine    • Mixed anxiety and depressive disorder    • Moderate malnutrition (HCC)    • Noninfectious gastroenteritis    • OP (osteoporosis)    • Osteoporosis    • Poor sleep    • Psoriasis    • Seasonal allergies    • Visceral hyperalgesia        Allergies   Allergen Reactions   • Hydrocodone Nausea And Vomiting   • Sulfa Antibiotics Nausea And Vomiting       Past Surgical History:   Procedure Laterality Date   • APPENDECTOMY     • CHOLECYSTECTOMY N/A 8/17/2018    Procedure: CHOLECYSTECTOMY LAPAROSCOPIC converted to open procedure;  Surgeon: Hernan Hinds MD;  Location: [facility] OR;  Service: General   • COLON SURGERY     • COLONOSCOPY     • COLONOSCOPY N/A 12/12/2018    Procedure: COLONOSCOPY;  Surgeon: Darien Ruff MD;  Location: [facility] OR;   Service: Gastroenterology   • DIAGNOSTIC LAPAROSCOPY  1990   • DILATATION AND CURETTAGE  1985   • ENDOSCOPY N/A 5/13/2016    Procedure: ESOPHAGOGASTRODUODENOSCOPY ;  Surgeon: Darien Ruff MD;  Location: [facility] OR;  Service:    • HYSTERECTOMY     • REVISION / TAKEDOWN COLOSTOMY         Family History   Problem Relation Age of Onset   • Hyperlipidemia Mother    • Macular degeneration Mother    • Heart disease Father    • Hyperlipidemia Father    • Cancer Father    • Depression Father    • Depression Sister    • Hyperlipidemia Brother    • Depression Brother    • Breast cancer Maternal Aunt    • Breast cancer Cousin    • Breast cancer Cousin    • Colon cancer Neg Hx    • Colon polyps Neg Hx        Social History     Socioeconomic History   • Marital status:    Tobacco Use   • Smoking status: Never   • Smokeless tobacco: Never   Vaping Use   • Vaping Use: Never used   Substance and Sexual Activity   • Alcohol use: Yes     Comment: rare   • Drug use: No   • Sexual activity: Defer           Objective   Physical Exam  Constitutional:       General: She is not in acute distress.     Appearance: Normal appearance. She is not ill-appearing, toxic-appearing or diaphoretic.      Comments: Patient is thin, but not acutely ill   HENT:      Head: Normocephalic and atraumatic.      Nose: Nose normal.      Mouth/Throat:      Mouth: Mucous membranes are moist.   Eyes:      Conjunctiva/sclera: Conjunctivae normal.   Cardiovascular:      Rate and Rhythm: Normal rate and regular rhythm.      Pulses: Normal pulses.   Pulmonary:      Effort: Pulmonary effort is normal.      Breath sounds: Normal breath sounds.   Abdominal:      General: Abdomen is flat. There is no distension.      Tenderness: There is no abdominal tenderness.   Musculoskeletal:         General: No swelling. Normal range of motion.      Cervical back: Normal range of motion and neck supple.   Skin:     General: Skin is warm and dry.   Neurological:       General: No focal deficit present.      Mental Status: She is alert and oriented to person, place, and time.   Psychiatric:         Mood and Affect: Mood normal.         Procedures           ED Course  ED Course as of 01/02/23 1834 Mon Jan 02, 2023 1743 Patient's labs show a lactic acid of 0.9 glucose of 108 sodium of 121 and chloride of 89.  White count is 8.36 hemoglobin is 13.7 hematocrit 39.9 lipase is 59. []   1743 When reviewing patient's past lab results the hyponatremia is chronic but slightly worse today than before as well as the hypochloremia is also chronic in nature.    Will give patient infusion of normal saline with thiamine and folic acid for her sodium as well as her nutrition.  Patient has been requesting more Phenergan suppositories which I we will gladly prescribe for her in addition to some Phenergan today in the emergency room as well as some Toradol for pain. []   1754 Urinalysis With Microscopic If Indicated (No Culture) - Urine, Clean Catch(!) []   1754 Urinalysis, Microscopic Only - Urine, Clean Catch(!) []   1831 Patient rechecked she states mild improvement with Phenergan and Toradol.  When IV fluids finished will discharge patient home with follow-up with primary care as discussed earlier.  Patient states she understands agrees with plan. []      ED Course User Index  [] Myke Romero MD                                           Medical Decision Making  My differential diagnosis for abdominal pain includes but is not limited to:  Gastritis, gastroenteritis, peptic ulcer disease, GERD, esophageal perforation, acute appendicitis, mesenteric adenitis, Meckel’s diverticulum, epiploic appendagitis, diverticulitis, colon cancer, ulcerative colitis, Crohn’s disease, intussusception, small bowel obstruction, adhesions, ischemic bowel, perforated viscus, ileus, obstipation, biliary colic, cholecystitis, cholelithiasis, Cabrera-Jose Angel Ralf, hepatitis, pancreatitis, common  bile duct obstruction, cholangitis, bile leak, splenic trauma, splenic rupture, splenic infarction, splenic abscess, abdominal wall hematoma, abdominal wall abscess, intra-abdominal abscess, ascites, spontaneous bacterial peritonitis, hernia, UTI, cystitis, prostatitis, ureterolithiasis, urinary obstruction, AAA, myocardial infarction, pneumonia, cancer, porphyria, DKA, medications, sickle cell, viral syndrome, zoster    Amount and/or Complexity of Data Reviewed  Independent Historian: spouse  External Data Reviewed: notes.     Details: Reviewed Dr. Ruff's GI office note from December 19 as well as reviewed the telemedicine note from [facility] on December 20  Labs: ordered. Decision-making details documented in ED Course.      Risk  Prescription drug management.          Final diagnoses:   Chronic abdominal pain       ED Disposition  ED Disposition     ED Disposition   Discharge    Condition   Stable    Comment   --             Ildefonso Dubois MD  [address]  [facility] [address] [phone]    Schedule an appointment as soon as possible for a visit       [facility] Emergency Department  [address] [address] [phone]  Go to   As needed    Hannah Almeida MD  [address] [place] [address] [phone]    Call            Medication List      Changed    famotidine 40 MG tablet  Commonly known as: PEPCID  Take 1 tablet by mouth 2 (Two) Times a Day. With lunch and at bedtime  What changed: when to take this     * Phenergan 25 MG suppository  Generic drug: promethazine  Insert 1 suppository into the rectum Every 6 (Six) Hours As Needed for Nausea or Vomiting.  What changed: when to take this     * promethazine 25 MG suppository  Commonly known as: PHENERGAN  Insert 1 suppository into the rectum Every 6 (Six) Hours As Needed for Nausea or Vomiting.  What changed: You were already taking a medication with the same name,  and this prescription was added. Make sure you understand how and when to take each.     * promethazine 25 MG tablet  Commonly known as: PHENERGAN  Take 1 tablet by mouth Every 8 (Eight) Hours As Needed for Nausea or Vomiting (Do not use at the same time as Phenergan suppositories).  What changed: You were already taking a medication with the same name, and this prescription was added. Make sure you understand how and when to take each.     Probiotic Daily capsule  Once daily  What changed:   · how much to take  · how to take this  · when to take this     SUMAtriptan 100 MG tablet  Commonly known as: IMITREX  Take 1 tablet by mouth 1 (One) Time As Needed for Migraine for up to 1 dose. Take one tablet at onset of headache. May repeat dose one time in 2 hours.  What changed: when to take this         * This list has 3 medication(s) that are the same as other medications prescribed for you. Read the directions carefully, and ask your doctor or other care provider to review them with you.               Where to Get Your Medications      These medications were sent to [id number] - [place] - [address] - [phone]  - [phone]   [address], [place] [address]    Phone: [phone]   · promethazine 25 MG suppository  · promethazine 25 MG tablet          Myke Romero MD  01/02/23 8720

## 2024-04-01 NOTE — PATIENT INSTRUCTIONS
Thank you so much for choosing us for your care. It was a pleasure to see you at the vascular clinic today.     Follow-up recommendations: We will see you back in 1 week. Please do not hesitate to reach out to us in the meantime with any concerns. Our schedulers will get in touch with you to set up your follow-up visit.    Additional testing/imaging ordered today: None      Our scheduling team will get in touch with you to set up any follow-up testing/imaging and/or appointments. Please be aware that any testing/imaging recommended today will need to completed prior to your next visit with the provider. If testing/imaging is not completed prior to your next visit, your visit may be rescheduled.     If you have any questions, please contact our clinic directly at [phone] and ask for the nurse. We also encourage the use of [employer] to communicate with your healthcare provider.    If you have an urgent need after business hours (8:00 am to 4:30 pm) please call [phone], option 4, and ask for the vascular attending on call. For non-urgent after hours needs, please call the vascular clinic at [phone]. For scheduling needs, please call our clinic directly at [phone].    =====================================================================    [facility] is recognized by the [facility] as a comprehensive stroke center. As part of our commitment to better patient outcomes and excellent stroke education, we attach the below stroke education materials to ALL of the after visit summaries in our vascular clinic.        Learning About BE FAST: Stroke Warning Signs  BE FAST is a simple way to remember the main symptoms of stroke. These symptoms happen suddenly. So learning what to look for helps you know when to call for medical help. BE FAST stands for:    B - Balance.  Loss of balance or trouble walking.     E - Eyes.  Trouble seeing out of one or both eyes.     F - Face.   Weakness or drooping on one side of the face.     A - Arm.  Weakness or numbness in an arm or leg.     S - Speech.  Trouble speaking.     T - Time to call 911.  Also call 911 if you have other stroke symptoms. They include:  Sudden confusion.  Sudden trouble understanding simple statements.  Fainting.  A seizure.  A sudden, severe headache.     A stroke happens when a blood vessel in the brain bursts or is blocked by a blood clot. The blood supply to part of the brain--and the oxygen the blood carries--is reduced. This damages the brain.   If you have a stroke, quick treatment may save your life. And it may reduce the damage in your brain so that you have fewer problems after the stroke.   Current as of: December 18, 2022               Content Version: 13.8    3765-7670 [employer].   Care instructions adapted under license by your healthcare professional. If you have questions about a medical condition or this instruction, always ask your healthcare professional. [employer] disclaims any warranty or liability for your use of this information.    Learning About How to Prevent a Stroke  What is a stroke?  A stroke is damage to the brain that occurs when a blood vessel in the brain bursts or is blocked by a blood clot. Without blood and the oxygen it carries, part of the brain starts to die. The part of the body controlled by the damaged area of the brain can't work properly.  Brain damage can start within minutes of a stroke. But quick treatment can help limit the damage and increase the chance of a full recovery.  What puts you at risk for stroke?  A risk factor is anything that makes you more likely to have a particular health problem.  Risk factors for stroke that you can manage or change include:  Health problems like atrial fibrillation, diabetes, high blood pressure, high cholesterol, hardening of the arteries (atherosclerosis), and sickle cell disease.  Smoking.  Drinking more than 2  alcoholic drinks a day for men and 1 drink a day for women.  Being overweight.  Not eating healthy foods.  Not getting enough physical activity.  Risk factors you can't change include:  Having a previous stroke.  Family history of stroke.  Being older.  Being , [place] Native, , or South  American.  Being female.  Having certain problems during pregnancy, such as preeclampsia.  Being past menopause.  Your doctor can help you know your risk. Then you and your doctor can talk about whether to take steps to lower it.  How can you help prevent a stroke?  Here are some things you can do to help prevent a stroke.  Manage health problems that raise your risk. These include atrial fibrillation, diabetes, high blood pressure, and high cholesterol.  Have a heart-healthy lifestyle.  Don't smoke. If you need help quitting, talk to your doctor about stop-smoking programs and medicines. These can increase your chances of quitting for good.  Limit alcohol to 2 drinks a day for men and 1 drink a day for women.  Stay at a healthy weight. Lose weight if you need to.  Be active. Get at least 30 minutes of exercise on most days of the week. Walking is a good choice. You also may want to do other activities, such as running, swimming, cycling, or playing tennis or team sports.  Eat heart-healthy foods. These include vegetables, fruits, nuts, beans, lean meat, fish, and whole grains. Limit sodium and sugar.  If you think you may have a problem with alcohol or drug use, talk to your doctor.  If you use hormone therapy for menopause or hormonal birth control, talk with your doctor. Ask if these are right for you. They may raise the risk of stroke in some people.  Decide with your doctor whether you will also take medicines to help lower your risk. For example, you and your doctor may decide you will take a medicine that prevents blood clots.  What are the symptoms of a stroke?  Symptoms of a stroke  happen quickly. A stroke may cause:  Sudden numbness, tingling, weakness, or loss of movement in your face, arm, or leg, especially on only one side of your body.  Sudden vision changes.  Sudden trouble speaking.  Sudden confusion or trouble understanding simple statements.  Sudden problems with walking or balance.  A sudden, severe headache that is different from past headaches.  Fainting.  A seizure.  It's important to call for medical help if you have stroke symptoms. Quick treatment may save your life. And it may reduce the damage in your brain so that you have fewer problems after the stroke.  Follow-up care is a key part of your treatment and safety. Be sure to make and go to all appointments, and call your doctor if you are having problems. It's also a good idea to know your test results and keep a list of the medicines you take.    Current as of: December 18, 2022               Content Version: 13.8    [id number] [employer].   Care instructions adapted under license by your healthcare professional. If you have questions about a medical condition or this instruction, always ask your healthcare professional. [employer] disclaims any warranty or liability for your use of this information.\

## 2024-04-01 NOTE — TELEPHONE ENCOUNTER
Discussed with MINERVA and sent new order to [facility] agency via fax.    MILTON Davenport, RN  RN Care Coordinator  [facility] phone: [phone]  Fax: [phone]

## 2024-04-02 NOTE — PROGRESS NOTES
Vascular Surgery Clinic Followup Note    LOCATION:    [facility] Surgery [facility]      Sakshi Jaeger  Medical Record #:  [id number]  YOB: 1945  Age:  78 year old     Date of Service: 4/1/2024         Assessment and Plan:    78 year old female smoker with history of right common femoral moderate recommend an retrograde iliac stenting, multiple right leg amputations with recurrent wound complications, most recently exposed right femur, s/p revision with more proximal above-knee amputation on 2/2/2024.  Lateral aspect of the wound is healed however the mid segment of the wound is dehisced but does not appear infected and is unchanged from her last visit a week ago.    1 nylon suture removed and the wound was packed with half-inch packing strip.  She will continue to pack the wound twice daily at home.  Home wound care nurse has been arranged.  We again discussed the importance of diligent wound care and smoking cessation.  Will plan for to return to clinic for wound check next week      Patient was seen and discussed with staff, Dr. Mitchell.    Ariel Giraldo MD           Interval History:   Sakshi Jaeger is a 78 year old female with a history of right femoral endarterectomy and iliac stenting, multiple right leg amputations with recurrent wound complications, most recently had exposed femur following an above-knee amputation to be revised but the more proximal above-knee amputation on 2/2/24.  This wound has been slow to heal and she is was recently seen in our nurse practitioner office and was started on a course of doxycycline for concern for wound infection.  She reports no changes to the wound and has been cleaning it daily with Betadine and covering with canal dressing.  Previous times of admitted arranged for home care which her and her  have previously refused though now returns for a home wound care nurse to come once a week.  She denies any fevers or chills or  drainage from the wound.         Medications:     Current Outpatient Medications:     acetaminophen (TYLENOL) 325 MG tablet, Take 2 tablets (650 mg) by mouth every 6 hours, Disp: 24 tablet, Rfl: 0    amLODIPine (NORVASC) 5 MG tablet, Take 1 tablet (5 mg) by mouth daily, Disp: 90 tablet, Rfl: 3    aspirin (ASA) 81 MG tablet, Take 81 mg by mouth daily, Disp:  , Rfl:     calcium carbonate 600 mg-vitamin D 400 units (CALCIUM 600 + D) 600-400 MG-UNIT per tablet, Take 2 tablets by mouth daily, Disp: , Rfl:     doxycycline hyclate (VIBRAMYCIN) 100 MG capsule, Take 1 capsule (100 mg) by mouth 2 times daily for 14 days, Disp: 28 capsule, Rfl: 0    fluocinolone acetonide oil 0.01 % ear drops, Place 0.25 mLs (5 drops) into both ears twice a week, Disp: 20 mL, Rfl: 3    folic acid (FOLVITE) 1 MG tablet, Take 1 mg by mouth daily, Disp: , Rfl:     gabapentin (NEURONTIN) 300 MG capsule, Take 1 capsule (300 mg) by mouth 3 times daily, Disp: 270 capsule, Rfl: 0    leflunomide (ARAVA) 20 MG tablet, Take 1 tablet by mouth every 24 hours, Disp: , Rfl:     Lidocaine (LIDOCARE) 4 % Patch, Place 1 patch onto the skin every 24 hours To prevent lidocaine toxicity, patient should be patch free for 12 hrs daily., Disp: , Rfl:     lisinopril (ZESTRIL) 20 MG tablet, TAKE 1 TABLET BY MOUTH EVERY DAY, Disp: 90 tablet, Rfl: 0    methotrexate 2.5 MG tablet, Take 20 mg by mouth every 7 days On Saturdays, Disp: , Rfl:     metoprolol succinate ER (TOPROL XL) 50 MG 24 hr tablet, TAKE ONE TABLET BY MOUTH EVERY DAY, Disp: 90 tablet, Rfl: 0    multivitamin w/minerals (THERA-VIT-M) tablet, Take 1 tablet by mouth daily, Disp: , Rfl:     oxyCODONE (ROXICODONE) 5 MG tablet, Take 1 tablet (5 mg) by mouth 2 times daily as needed for pain, Disp: 14 tablet, Rfl: 0    polyethylene glycol (MIRALAX) 17 GM/Dose powder, Take 17 g by mouth daily, Disp: 510 g, Rfl: 0    predniSONE (DELTASONE) 5 MG tablet, Take 1 tablet (5 mg) by mouth daily, Disp: , Rfl: 0     simvastatin (ZOCOR) 20 MG tablet, Take 1 tablet (20 mg) by mouth At Bedtime, Disp: 90 tablet, Rfl: 1         Physical Exam:   /75 (BP Location: Left arm, Patient Position: Sitting, Cuff Size: Adult Small)   Pulse 66   LMP  (LMP Unknown)   SpO2 93%   Wt Readings from Last 1 Encounters:   03/14/24 45.4 kg (100 lb)     There is no height or weight on file to calculate BMI.    EXAM:  Frail, elderly female appears older than stated age, resting comfortably in exam chair  Nonlabored breathing on room air  Palpable right femoral pulse  Right above-knee amputation wound has healed over the lateral aspect, the mid segment and medial aspects of areas of superficial dehiscence where nylon sutures remain.  Largest area of dehiscence is the middle of the wound, approximately 1.5 cm with fibrinous slough at the base, no drainage, induration, minimally tender.                     Data:   Imaging:  No pertinent recent imaging  TcPO2 measurements from 12/20 assessment 3 manage 71-79 in the right thigh, adequate for wound healing

## 2024-04-02 NOTE — TELEPHONE ENCOUNTER
Faxed as requested.    AMANDA DavenportN, RN  RN Care Coordinator  [facility] Vascular Surgery - [facility] phone: [phone]  Fax: [phone]

## 2024-04-02 NOTE — TELEPHONE ENCOUNTER
Per Fab they still need the physical therapy and wound care orders as they only received partial orders. Fax [phone] fax andrae Brown

## 2024-04-09 NOTE — PROGRESS NOTES
VASCULAR SURGERY PROGRESS NOTE    LOCATION: [facility]     Sakshi Jaeger  Medical Record #:  [id number]  YOB: 1945  Age:  78 year old     Date of Service: 4/9/2024    PRIMARY CARE PROVIDER: Lucia Bates    Reason for visit: Wound check    IMPRESSION / RECOMMENDATION:   Sakshi Jaeger is a very pleasant 78 year old female smoker with history of right common femoral moderate recommend an retrograde iliac stenting, multiple right leg amputations with recurrent wound complications, most recently exposed right femur, s/p revision with more proximal above-knee amputation on 2/2/2024. Lateral aspect of the wound is healed however the mid segment of the wound is dehisced but does not appear infected and is unchanged from her last visit a week ago.    Four nylon sutures removed, and the wound was packed with half-inch packing strip.    Wound care of RLE incision at home:  1. Remove old dressing  2. Clean wound with Vashe   3. Pack incision with packing strip  4. Clean wound surroundings with iodine   5. Apply clean dressing over incision  6. Change dressing twice per day  7. Call us if new drainage, worsening redness occurs or worse swelling. Call us is fevers or chills occur.    Information/Education: patient able to teach back. Patient agreeable to plan of care: yes    All questions were answered and support provided. She has our contact information and knows to reach out with any additional questions or concerns.     Yasmin Rob, [employer]  Vascular Surgery  Pager: [phone]  [email or web address]  Send message or 10 digit call back number Securely via [employer] with the [employer] Web Console (learn more here)        HPI:  Sakshi Jaeger is a 78 year old female with a history of right femoral endarterectomy and iliac stenting, multiple right leg amputations with recurrent wound complications, most recently had exposed femur following an above-knee amputation to be revised but the more  proximal above-knee amputation on 2/2/24. This wound has been slow to heal, reports no changes to the wound and has been cleaning it daily with Betadine and covering with canal dressing. She denies any fevers or chills or drainage from the wound.    REVIEW OF SYSTEMS:    A 12 point ROS was reviewed and is negative except for what is listed above in HPI.    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     IRON DEFIC ANEMIA NOS 9/15/2005     Lower limb amputation, below knee 12/26/2012     MI (myocardial infarction) (H)     3/2010     Non-healing surgical wound, subsequent encounter 9/24/2020    Added automatically from request for surgery [id number]     Osteoporosis 2/16/2005     OSTEOPOROSIS NOS 2/16/2005     Protein-calorie malnutrition (H24) 5/18/2022     PVD (peripheral vascular disease) (H24) 5/18/2022     Rheumatoid arthritis (H) 2/16/2005          Rheumatoid arthritis(714.0) 2/16/2005     Status post below-knee amputation (H) 12/26/2012               Past Surgical History:   Procedure Laterality Date     ---------INCISION AND DRAINAGE  03/05/2014     AMPUTATE LEG ABOVE KNEE Right 1/13/2023    Procedure: AMPUTATION, ABOVE KNEE RIGHT;  Surgeon: Emma Oconnor MD;  Location: [facility]     AMPUTATE LEG ABOVE KNEE Right 2/2/2024    Procedure: Redo ABOVE KNEE AMPUTATION;  Surgeon: Bryon Mitchell MD;  Location: [facility] OR     AMPUTATE LEG BELOW KNEE Right 9/28/2022    Procedure: Right through knee amputation;  Surgeon: Doron Mott  MD Zeus;  Location: [facility] OR     AMPUTATION BELOW KNEE RT/LT  01/01/2005    right lowwer leg.      ANGIOGRAM Right 1/13/2023    Procedure: right iliac arteriogram aned right common iliac artery stent;  Surgeon: Emma Oconnor MD;  Location: [facility] OR     APPENDECTOMY       ARTHROPLASTY KNEE  04/27/2011    Procedure:ARTHROPLASTY KNEE; Surgeon:JESSE HAWKINS; [facility] OR     COMPLEX WOUND CLOSURE (UPDATE) Right 12/08/2021    Procedure: Right stump wound debridment and closure;  Surgeon: RAISA Perea MD;  Location: [facility] OR     CORONARY STENT PLACEMENT       ENDARTERECTOMY FEMORAL Right 1/13/2023    Procedure: ENDARTERECTOMY, FEMORAL RIGHT;  Surgeon: Emma Oconnor MD;  Location: [facility] OR     INJECT NERVE BLOCK SPHENOPALATINE GANGLION N/A 9/29/2022    Procedure: Out of OR encounter for block to be done by ;  Surgeon: GENERIC ANESTHESIA PROVIDER;  Location: [facility]     IR OR ANGIOGRAM  1/13/2023     IRRIGATION AND DEBRIDEMENT LOWER EXTREMITY, COMBINED Right 10/09/2019    Procedure: Irrigation and debridement Right leg;  Surgeon: Doron Mott MD;  Location: [facility] OR     NERVE BLOCK PERIPHERAL N/A 9/27/2022    Procedure: BLOCK, NERVE;  Surgeon: GENERIC ANESTHESIA PROVIDER;  [facility]: [facility]     OTHER SURGICAL HISTORY  03/05/2014    I AND D      OTHER SURGICAL HISTORY Right 10/09/2019    IRRIGATION AND DEBRIDEMENT LOWER EXTREMITY, COMBINED     PHACOEMULSIFICATION CLEAR CORNEA WITH STANDARD INTRAOCULAR LENS IMPLANT Left 9/8/2022    Procedure: PHACOEMULSIFICATION, LEFT CATARACT, WITH INTRAOCULAR LENS IMPLANT;  Surgeon: Flip Izaguirre MD;  Location: [facility] OR     PHACOEMULSIFICATION CLEAR CORNEA WITH STANDARD INTRAOCULAR LENS IMPLANT Right 10/13/2022    Procedure: PHACOEMULSIFICATION, RIGHT CATARACT, WITH INTRAOCULAR LENS IMPLANT;  Surgeon: Flip Izaguirre MD;  Location: [facility] OR     REVISE SCAR EXTREMITY Right 12/17/2020    Procedure: Right stump scar revision;  Surgeon: RAISA Perea  Terell Calle MD;  Location: [facility] OR       ALLERGIES:  Adhesive tape    MEDS:    Current Outpatient Medications:      acetaminophen (TYLENOL) 325 MG tablet, Take 2 tablets (650 mg) by mouth every 6 hours, Disp: 24 tablet, Rfl: 0     amLODIPine (NORVASC) 5 MG tablet, Take 1 tablet (5 mg) by mouth daily, Disp: 90 tablet, Rfl: 3     aspirin (ASA) 81 MG tablet, Take 81 mg by mouth daily, Disp:  , Rfl:      calcium carbonate 600 mg-vitamin D 400 units (CALCIUM 600 + D) 600-400 MG-UNIT per tablet, Take 2 tablets by mouth daily, Disp: , Rfl:      fluocinolone acetonide oil 0.01 % ear drops, Place 0.25 mLs (5 drops) into both ears twice a week, Disp: 20 mL, Rfl: 3     folic acid (FOLVITE) 1 MG tablet, Take 1 mg by mouth daily, Disp: , Rfl:      gabapentin (NEURONTIN) 300 MG capsule, Take 1 capsule (300 mg) by mouth 3 times daily, Disp: 270 capsule, Rfl: 0     leflunomide (ARAVA) 20 MG tablet, Take 1 tablet by mouth every 24 hours, Disp: , Rfl:      Lidocaine (LIDOCARE) 4 % Patch, Place 1 patch onto the skin every 24 hours To prevent lidocaine toxicity, patient should be patch free for 12 hrs daily., Disp: , Rfl:      lisinopril (ZESTRIL) 20 MG tablet, TAKE 1 TABLET BY MOUTH EVERY DAY, Disp: 90 tablet, Rfl: 0     methotrexate 2.5 MG tablet, Take 20 mg by mouth every 7 days On Saturdays, Disp: , Rfl:      metoprolol succinate ER (TOPROL XL) 50 MG 24 hr tablet, TAKE ONE TABLET BY MOUTH EVERY DAY, Disp: 90 tablet, Rfl: 0     multivitamin w/minerals (THERA-VIT-M) tablet, Take 1 tablet by mouth daily, Disp: , Rfl:      oxyCODONE (ROXICODONE) 5 MG tablet, Take 1 tablet (5 mg) by mouth 2 times daily as needed for pain, Disp: 14 tablet, Rfl: 0     polyethylene glycol (MIRALAX) 17 GM/Dose powder, Take 17 g by mouth daily, Disp: 510 g, Rfl: 0     predniSONE (DELTASONE) 5 MG tablet, Take 1 tablet (5 mg) by mouth daily, Disp: , Rfl: 0     simvastatin (ZOCOR) 20 MG tablet, Take 1 tablet (20 mg) by mouth At Bedtime, Disp: 90 tablet, Rfl:  1    SOCIAL HABITS:    History   Smoking Status     Former     Packs/day: 0.50     Years: 45.00     Types: Cigarettes     Quit date: 2/26/2010   Smokeless Tobacco     Never     Social History    Substance and Sexual Activity      Alcohol use: Yes        Comment: occsionally-3 -4 drinks a week      History   Drug Use No       FAMILY HISTORY:    Family History   Problem Relation Age of Onset     Cardiovascular Mother      Cancer Brother      Diabetes No family hx of      Hypertension No family hx of      Cerebrovascular Disease No family hx of      Alzheimer Disease No family hx of      Thyroid Disease No family hx of      Respiratory No family hx of      Other - See Comments Mother         CARDIOVASCULAR     Cancer Brother        PE:  /68 (BP Location: Left arm, Patient Position: Sitting, Cuff Size: Adult Small)   Pulse 70   LMP  (LMP Unknown)   SpO2 90%   Wt Readings from Last 1 Encounters:   03/14/24 100 lb     There is no height or weight on file to calculate BMI.    EXAM:  GENERAL: well-developed 78 year old female who appears her stated age  CARDIAC: normal   CHEST/LUNG: normal respiratory effort   MUSCULOSKELETAL: grossly normal and both lower extremities are intact, no lower extremity edema  NEUROLOGIC: focally intact, alert and oriented x 3  PSYCH: appropriate affect

## 2024-04-09 NOTE — LETTER
4/9/2024       RE: Sakshi Jaeger  [address] [address]       Dear Colleague,    Thank you for referring your patient, Sakshi Jaeger, to the [facility]ASCULAR [facility] [place] at [facility]. Please see a copy of my visit note below.        VASCULAR SURGERY PROGRESS NOTE    LOCATION: [facility]     Sakshi Jaeger  Medical Record #:  [id number]  YOB: 1945  Age:  78 year old     Date of Service: 4/9/2024    PRIMARY CARE PROVIDER: Lucia Bates    Reason for visit: Wound check    IMPRESSION / RECOMMENDATION:   Sakshi Jaeger is a very pleasant 78 year old female smoker with history of right common femoral moderate recommend an retrograde iliac stenting, multiple right leg amputations with recurrent wound complications, most recently exposed right femur, s/p revision with more proximal above-knee amputation on 2/2/2024. Lateral aspect of the wound is healed however the mid segment of the wound is dehisced but does not appear infected and is unchanged from her last visit a week ago.    Four nylon sutures removed, and the wound was packed with half-inch packing strip.    Wound care of RLE incision at home:  Remove old dressing  Clean wound with Vashe   Pack incision with packing strip  Clean wound surroundings with iodine   Apply clean dressing over incision  Change dressing twice per day  Call us if new drainage, worsening redness occurs or worse swelling. Call us is fevers or chills occur.    Information/Education: patient able to teach back. Patient agreeable to plan of care: yes    All questions were answered and support provided. She has our contact information and knows to reach out with any additional questions or concerns.     Yasmin Rob, CNP  Vascular Surgery  Pager: [phone]  [email or web address]  Send message or 10 digit call back number Securely via eduClipper with the Vocera Web Console (learn  more here)        HPI:  Sakshi Jaeger is a 78 year old female with a history of right femoral endarterectomy and iliac stenting, multiple right leg amputations with recurrent wound complications, most recently had exposed femur following an above-knee amputation to be revised but the more proximal above-knee amputation on 2/2/24. This wound has been slow to heal, reports no changes to the wound and has been cleaning it daily with Betadine and covering with canal dressing. She denies any fevers or chills or drainage from the wound.    REVIEW OF SYSTEMS:    A 12 point ROS was reviewed and is negative except for what is listed above in HPI.    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    Essential hypertension with goal blood pressure less than 140/90 8/25/2016    Hyperlipidemia LDL goal <100 11/12/2010    Hypertension goal BP (blood pressure) < 130/80 1/26/2011    Infection of right below knee amputation (H) 10/1/2019    IRON DEFIC ANEMIA NOS 9/15/2005    Lower limb amputation, below knee 12/26/2012    MI (myocardial infarction) (H)     3/2010    Non-healing surgical wound, subsequent encounter 9/24/2020    Added automatically from request for surgery [id number]    Osteoporosis 2/16/2005    OSTEOPOROSIS NOS 2/16/2005    Protein-calorie malnutrition (H24) 5/18/2022    PVD (peripheral vascular disease) (H24) 5/18/2022    Rheumatoid arthritis (H) 2/16/2005         Rheumatoid arthritis(714.0) 2/16/2005    Status post below-knee amputation (H) 12/26/2012               Past Surgical History:   Procedure Laterality Date    ---------INCISION AND DRAINAGE  03/05/2014    AMPUTATE LEG ABOVE KNEE Right 1/13/2023    Procedure: AMPUTATION, ABOVE KNEE RIGHT;  Surgeon: Emma Oconnor MD;   [facility]    AMPUTATE LEG ABOVE KNEE Right 2/2/2024    Procedure: Redo ABOVE KNEE AMPUTATION;  Surgeon: Bryon Mitchell MD;  [facility]    AMPUTATE LEG BELOW KNEE Right 9/28/2022    Procedure: Right through knee amputation;  Surgeon: Doron Mott MD;  [facility]    AMPUTATION BELOW KNEE RT/LT  01/01/2005    right lowwer leg.     ANGIOGRAM Right 1/13/2023    Procedure: right iliac arteriogram aned right common iliac artery stent;  Surgeon: Emma Oconnor MD;  [facility]    APPENDECTOMY      ARTHROPLASTY KNEE  04/27/2011    Procedure:ARTHROPLASTY KNEE; Surgeon:JESSE HAWKINS; [facility]    COMPLEX WOUND CLOSURE (UPDATE) Right 12/08/2021    Procedure: Right stump wound debridment and closure;  Surgeon: RAISA Perea MD;  [facility]    CORONARY STENT PLACEMENT      ENDARTERECTOMY FEMORAL Right 1/13/2023    Procedure: ENDARTERECTOMY, FEMORAL RIGHT;  Surgeon: Emma Oconnor MD;  [facility]    INJECT NERVE BLOCK SPHENOPALATINE GANGLION N/A 9/29/2022    Procedure: Out of OR encounter for block to be done by ;  Surgeon: GENERIC ANESTHESIA PROVIDER;  [facility]    IR OR ANGIOGRAM  1/13/2023    IRRIGATION AND DEBRIDEMENT LOWER EXTREMITY, COMBINED Right 10/09/2019    Procedure: Irrigation and debridement Right leg;  Surgeon: Doron Mott MD;  [facility]    NERVE BLOCK PERIPHERAL N/A 9/27/2022    Procedure: BLOCK, NERVE;  Surgeon: GENERIC ANESTHESIA PROVIDER;  [facility]    OTHER SURGICAL HISTORY  03/05/2014    I AND D     OTHER SURGICAL HISTORY Right 10/09/2019    IRRIGATION AND DEBRIDEMENT LOWER EXTREMITY, COMBINED    PHACOEMULSIFICATION CLEAR CORNEA WITH STANDARD INTRAOCULAR LENS IMPLANT Left 9/8/2022    Procedure: PHACOEMULSIFICATION, LEFT CATARACT, WITH INTRAOCULAR LENS IMPLANT;  Surgeon: Flip Izaguirre MD;  [facility]    PHACOEMULSIFICATION CLEAR CORNEA WITH STANDARD INTRAOCULAR LENS IMPLANT Right  10/13/2022    Procedure: PHACOEMULSIFICATION, RIGHT CATARACT, WITH INTRAOCULAR LENS IMPLANT;  Surgeon: Flip Izaguirre MD;  [facility]    REVISE SCAR EXTREMITY Right 12/17/2020    Procedure: Right stump scar revision;  Surgeon: RAISA Perea MD;  Location: [facility]       ALLERGIES:  Adhesive tape    MEDS:    Current Outpatient Medications:     acetaminophen (TYLENOL) 325 MG tablet, Take 2 tablets (650 mg) by mouth every 6 hours, Disp: 24 tablet, Rfl: 0    amLODIPine (NORVASC) 5 MG tablet, Take 1 tablet (5 mg) by mouth daily, Disp: 90 tablet, Rfl: 3    aspirin (ASA) 81 MG tablet, Take 81 mg by mouth daily, Disp:  , Rfl:     calcium carbonate 600 mg-vitamin D 400 units (CALCIUM 600 + D) 600-400 MG-UNIT per tablet, Take 2 tablets by mouth daily, Disp: , Rfl:     fluocinolone acetonide oil 0.01 % ear drops, Place 0.25 mLs (5 drops) into both ears twice a week, Disp: 20 mL, Rfl: 3    folic acid (FOLVITE) 1 MG tablet, Take 1 mg by mouth daily, Disp: , Rfl:     gabapentin (NEURONTIN) 300 MG capsule, Take 1 capsule (300 mg) by mouth 3 times daily, Disp: 270 capsule, Rfl: 0    leflunomide (ARAVA) 20 MG tablet, Take 1 tablet by mouth every 24 hours, Disp: , Rfl:     Lidocaine (LIDOCARE) 4 % Patch, Place 1 patch onto the skin every 24 hours To prevent lidocaine toxicity, patient should be patch free for 12 hrs daily., Disp: , Rfl:     lisinopril (ZESTRIL) 20 MG tablet, TAKE 1 TABLET BY MOUTH EVERY DAY, Disp: 90 tablet, Rfl: 0    methotrexate 2.5 MG tablet, Take 20 mg by mouth every 7 days On Saturdays, Disp: , Rfl:     metoprolol succinate ER (TOPROL XL) 50 MG 24 hr tablet, TAKE ONE TABLET BY MOUTH EVERY DAY, Disp: 90 tablet, Rfl: 0    multivitamin w/minerals (THERA-VIT-M) tablet, Take 1 tablet by mouth daily, Disp: , Rfl:     oxyCODONE (ROXICODONE) 5 MG tablet, Take 1 tablet (5 mg) by mouth 2 times daily as needed for pain, Disp: 14 tablet, Rfl: 0    polyethylene glycol (MIRALAX) 17 GM/Dose powder,  Take 17 g by mouth daily, Disp: 510 g, Rfl: 0    predniSONE (DELTASONE) 5 MG tablet, Take 1 tablet (5 mg) by mouth daily, Disp: , Rfl: 0    simvastatin (ZOCOR) 20 MG tablet, Take 1 tablet (20 mg) by mouth At Bedtime, Disp: 90 tablet, Rfl: 1    SOCIAL HABITS:    History   Smoking Status    Former    Packs/day: 0.50    Years: 45.00    Types: Cigarettes    Quit date: 2/26/2010   Smokeless Tobacco    Never     Social History    Substance and Sexual Activity      Alcohol use: Yes        Comment: occsionally-3 -4 drinks a week      History   Drug Use No       FAMILY HISTORY:    Family History   Problem Relation Age of Onset    Cardiovascular Mother     Cancer Brother     Diabetes No family hx of     Hypertension No family hx of     Cerebrovascular Disease No family hx of     Alzheimer Disease No family hx of     Thyroid Disease No family hx of     Respiratory No family hx of     Other - See Comments Mother         CARDIOVASCULAR    Cancer Brother        PE:  /68 (BP Location: Left arm, Patient Position: Sitting, Cuff Size: Adult Small)   Pulse 70   LMP  (LMP Unknown)   SpO2 90%   Wt Readings from Last 1 Encounters:   03/14/24 100 lb     There is no height or weight on file to calculate BMI.    EXAM:  GENERAL: well-developed 78 year old female who appears her stated age  CARDIAC: normal   CHEST/LUNG: normal respiratory effort   MUSCULOSKELETAL: grossly normal and both lower extremities are intact, no lower extremity edema  NEUROLOGIC: focally intact, alert and oriented x 3  PSYCH: appropriate affect                Again, thank you for allowing me to participate in the care of your patient.      Sincerely,    DESIRE Ventura CNP

## 2024-04-09 NOTE — PATIENT INSTRUCTIONS
Thank you so much for choosing us for your care. It was a pleasure to see you at the vascular clinic today.     Follow-up recommendations: We will see you back in 2-3 weeks. Please do not hesitate to reach out to us in the meantime with any concerns. Our schedulers will get in touch with you to set up your follow-up visit.     Wound care of RLE incision:  Remove old dressing  Clean wound with Vashe   Pack incision with packing strip  Clean wound surroundings with iodine   Apply clean dressing over incision  Change dressing twice per day  Call us if new drainage, worsening redness occurs or worse swelling. Call us is fevers or chills occur.     Our scheduling team will get in touch with you to set up any follow-up testing/imaging and/or appointments. Please be aware that any testing/imaging recommended today will need to completed prior to your next visit with the provider. If testing/imaging is not completed prior to your next visit, your visit may be rescheduled.        If you have any questions, please contact our clinic directly at [phone] and ask for the nurse. We also encourage the use of [employer] to communicate with your HealthCare Provider.        If you have an urgent need after business hours (8:00 am to 4:30 pm) please call [phone], option 4, and ask for the vascular attending on call. For non-urgent after hours needs, please call the vascular nurse at [phone] and leave a detailed voicemail. For scheduling needs, please call our clinic directly at [phone].

## 2024-04-12 NOTE — TELEPHONE ENCOUNTER
Received transferred call from Bibi, physical therapist with [facility]. She was requesting verbal orders for Pt for PT 1x /week for 6 weeks. Noted this would be discussed with provider and Bibi would receive a return call.     Called and LVM for Bibi. Stated Yasmin Rob, REYES has approved verbal orders for PT as noted above. Requested return call to clinic to discuss further if needed. Clinic telephone provided.     Destiney Sanford LPN

## 2024-04-26 NOTE — TELEPHONE ENCOUNTER
I returned Josselin's call - unfortunately they had the incorrect fax number for our clinic. Provided them with correct #. Will watch for forms and have them signed by provider.    AMANDA DavenportN, RN  RN Care Coordinator  [facility] Vascular Surgery - [facility] phone: [phone]  Fax: [phone]

## 2024-04-26 NOTE — TELEPHONE ENCOUNTER
[facility] Call Center    Phone Message    May a detailed message be left on voicemail: yes     Reason for Call: Other: [facility] called and said that they have been faxing the pt's plan of care to the clinic but has not received anything back from us. They will need us to sign it and date it and sent back. Please call them back if you have any questions. They will refax it again today. Thanks      Fax: [phone]    Action Taken: Message routed to:  [facility] VAS    Travel Screening: Not Applicable

## 2024-04-30 NOTE — PATIENT INSTRUCTIONS
Thank you so much for choosing us for your care. It was a pleasure to see you at the vascular clinic today.     Follow-up recommendations: We will see you back in 3 weeks. Please do not hesitate to reach out to us in the meantime with any concerns. Our schedulers will get in touch with you to set up your follow-up visit.     Wound care of RLE incision at home:  Remove old dressing  Clean wound with Vashe   Pack incision with packing strip  Clean wound surroundings with iodine   Apply clean dressing over incision  Change dressing daily  Call us if new drainage, worsening redness occurs or worse swelling. Call us is fevers or chills occur.    Plan for follow-up in 3 weeks for wound assessment.     Our scheduling team will get in touch with you to set up any follow-up testing/imaging and/or appointments. Please be aware that any testing/imaging recommended today will need to completed prior to your next visit with the provider. If testing/imaging is not completed prior to your next visit, your visit may be rescheduled.        If you have any questions, please contact our clinic directly at [phone] and ask for the nurse. We also encourage the use of [employer] to communicate with your HealthCare Provider.        If you have an urgent need after business hours (8:00 am to 4:30 pm) please call [phone], option 4, and ask for the vascular attending on call. For non-urgent after hours needs, please call the vascular nurse at [phone] and leave a detailed voicemail. For scheduling needs, please call our clinic directly at [phone].

## 2024-04-30 NOTE — PROGRESS NOTES
VASCULAR SURGERY PROGRESS NOTE    LOCATION: [facility]     Sakshi Jaeger  Medical Record #:  [id number]  YOB: 1945  Age:  78 year old     Date of Service: 4/30/2024    PRIMARY CARE PROVIDER: Lucia Bates    Reason for visit:  Wound assessment    IMPRESSION / RECOMMENDATION:   Sakshi Jaeger is a very pleasant 78 year old female with a history of right common femoral moderate recommend an retrograde iliac stenting, multiple right leg amputations with recurrent wound complications, most recently exposed right femur, s/p revision with more proximal above-knee amputation on 2/2/2024. Lateral aspect of the wound is healed however the mid segment of the wound remains open but does not appear infected and is improving compared her last visit from 4/9/2024.     Four nylon sutures removed, and the wound was packed with half-inch packing strip.  No sutures are present any further.     Wound care of RLE incision at home:  Remove old dressing  Clean wound with Vashe   Pack incision with packing strip  Clean wound surroundings with iodine   Apply clean dressing over incision  Change dressing daily  Call us if new drainage, worsening redness occurs or worse swelling. Call us is fevers or chills occur.    Plan for follow-up in 3 weeks for wound assessment.    Information/Education: patient able to teach back. Patient agreeable to plan of care: yes. All questions were answered and support provided. She has our contact information and knows to reach out with any additional questions or concerns.     Yasmin Rob CNP  Vascular Surgery  Pager: [phone]  [email or web address]  Send message or 10 digit call back number Securely via Lightera with the Lightera Web Console (learn more here)      HPI:  Sakshi Jaeger is a 78 year old female with past medical history significant for right femoral endarterectomy and iliac stenting, multiple RLE amputations with multiple complications, most recent  had exposed femur following an above-knee amputation which was revised with a more proximal above-knee amputation on 2/2/2024. The wound has been healing slowly. Upon retrospective review of uploaded pictures from wound assessments, there is clear evidence that her incision continues to slowly heal. The amount of packing continues to decrease, and the quantity of drainage continues to decrease. She has been receiving home care and PT on a weekly basis and is well supported by her partner.     REVIEW OF SYSTEMS:    A 12 point ROS was reviewed and is negative except for what is listed above in HPI.    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    Essential hypertension with goal blood pressure less than 140/90 8/25/2016    Hyperlipidemia LDL goal <100 11/12/2010    Hypertension goal BP (blood pressure) < 130/80 1/26/2011    Infection of right below knee amputation (H) 10/1/2019    IRON DEFIC ANEMIA NOS 9/15/2005    Lower limb amputation, below knee 12/26/2012    MI (myocardial infarction) (H)     3/2010    Non-healing surgical wound, subsequent encounter 9/24/2020    Added automatically from request for surgery [id number]    Osteoporosis 2/16/2005    OSTEOPOROSIS NOS 2/16/2005    Protein-calorie malnutrition (H24) 5/18/2022    PVD (peripheral vascular disease) (H24) 5/18/2022    Rheumatoid arthritis (H) 2/16/2005         Rheumatoid arthritis(714.0) 2/16/2005    Status post below-knee amputation (H) 12/26/2012               Past Surgical History:   Procedure Laterality Date    ---------INCISION AND DRAINAGE  03/05/2014    AMPUTATE LEG ABOVE KNEE Right 1/13/2023    Procedure: AMPUTATION, ABOVE KNEE RIGHT;  Surgeon: Emma Oconnor MD;  [facility]    AMPUTATE LEG ABOVE KNEE Right  2/2/2024    Procedure: Redo ABOVE KNEE AMPUTATION;  Surgeon: Bryon Mitchell MD;  [facility]    AMPUTATE LEG BELOW KNEE Right 9/28/2022    Procedure: Right through knee amputation;  Surgeon: Doron Mott MD;  [facility]    AMPUTATION BELOW KNEE RT/LT  01/01/2005    right lowwer leg.     ANGIOGRAM Right 1/13/2023    Procedure: right iliac arteriogram aned right common iliac artery stent;  Surgeon: Emma Oconnor MD;  [facility]    APPENDECTOMY      ARTHROPLASTY KNEE  04/27/2011    Procedure:ARTHROPLASTY KNEE; Surgeon:JESSE HAWKINS; [facility]    COMPLEX WOUND CLOSURE (UPDATE) Right 12/08/2021    Procedure: Right stump wound debridment and closure;  Surgeon: RAISA Perea MD;  [facility]    CORONARY STENT PLACEMENT      ENDARTERECTOMY FEMORAL Right 1/13/2023    Procedure: ENDARTERECTOMY, FEMORAL RIGHT;  Surgeon: Emma Oconnor MD;  [facility]    INJECT NERVE BLOCK SPHENOPALATINE GANGLION N/A 9/29/2022    Procedure: Out of OR encounter for block to be done by ;  Surgeon: GENERIC ANESTHESIA PROVIDER;  [facility]    IR OR ANGIOGRAM  1/13/2023    IRRIGATION AND DEBRIDEMENT LOWER EXTREMITY, COMBINED Right 10/09/2019    Procedure: Irrigation and debridement Right leg;  Surgeon: Doron Mott MD;  [facility]    NERVE BLOCK PERIPHERAL N/A 9/27/2022    Procedure: BLOCK, NERVE;  Surgeon: GENERIC ANESTHESIA PROVIDER;  [facility]    OTHER SURGICAL HISTORY  03/05/2014    I AND D     OTHER SURGICAL HISTORY Right 10/09/2019    IRRIGATION AND DEBRIDEMENT LOWER EXTREMITY, COMBINED    PHACOEMULSIFICATION CLEAR CORNEA WITH STANDARD INTRAOCULAR LENS IMPLANT Left 9/8/2022    Procedure: PHACOEMULSIFICATION, LEFT CATARACT, WITH INTRAOCULAR LENS IMPLANT;  Surgeon: Flip Izaguirre MD;  [facility]    PHACOEMULSIFICATION CLEAR CORNEA WITH STANDARD INTRAOCULAR LENS IMPLANT Right 10/13/2022    Procedure: PHACOEMULSIFICATION, RIGHT  CATARACT, WITH INTRAOCULAR LENS IMPLANT;  Surgeon: Flip Izaguirre MD;  [facility]    REVISE SCAR EXTREMITY Right 12/17/2020    Procedure: Right stump scar revision;  Surgeon: RAISA Perea MD;  Location: [facility] OR       ALLERGIES:  Adhesive tape    MEDS:    Current Outpatient Medications:     acetaminophen (TYLENOL) 325 MG tablet, Take 2 tablets (650 mg) by mouth every 6 hours, Disp: 24 tablet, Rfl: 0    amLODIPine (NORVASC) 5 MG tablet, TAKE 1 TABLET BY MOUTH EVERY DAY, Disp: 90 tablet, Rfl: 3    aspirin (ASA) 81 MG tablet, Take 81 mg by mouth daily, Disp:  , Rfl:     Elemental iron 65 mg Vitamin C 125 mg (VITRON C)  MG TABS tablet, Take 1 tablet by mouth daily, Disp: , Rfl:     leflunomide (ARAVA) 20 MG tablet, Take 1 tablet by mouth every 24 hours, Disp: , Rfl:     Lidocaine (LIDOCARE) 4 % Patch, Place 1 patch onto the skin every 24 hours To prevent lidocaine toxicity, patient should be patch free for 12 hrs daily., Disp: , Rfl:     lisinopril (ZESTRIL) 20 MG tablet, TAKE 1 TABLET BY MOUTH EVERY DAY, Disp: 90 tablet, Rfl: 0    methotrexate 2.5 MG tablet, Take 20 mg by mouth every 7 days On Saturdays, Disp: , Rfl:     metoprolol succinate ER (TOPROL XL) 50 MG 24 hr tablet, TAKE ONE TABLET BY MOUTH EVERY DAY, Disp: 90 tablet, Rfl: 0    oxyCODONE (ROXICODONE) 5 MG tablet, Take 1 tablet (5 mg) by mouth 2 times daily as needed for pain, Disp: 14 tablet, Rfl: 0    polyethylene glycol (MIRALAX) 17 GM/Dose powder, Take 17 g by mouth daily, Disp: 510 g, Rfl: 0    predniSONE (DELTASONE) 5 MG tablet, Take 1 tablet (5 mg) by mouth daily, Disp: , Rfl: 0    simvastatin (ZOCOR) 20 MG tablet, Take 1 tablet (20 mg) by mouth At Bedtime, Disp: 90 tablet, Rfl: 1    calcium carbonate 600 mg-vitamin D 400 units (CALCIUM 600 + D) 600-400 MG-UNIT per tablet, Take 2 tablets by mouth daily, Disp: , Rfl:     fluocinolone acetonide oil 0.01 % ear drops, Place 0.25 mLs (5 drops) into both ears twice a week,  Disp: 20 mL, Rfl: 3    folic acid (FOLVITE) 1 MG tablet, Take 1 mg by mouth daily, Disp: , Rfl:     gabapentin (NEURONTIN) 300 MG capsule, Take 1 capsule (300 mg) by mouth 3 times daily, Disp: 270 capsule, Rfl: 0    multivitamin w/minerals (THERA-VIT-M) tablet, Take 1 tablet by mouth daily (Patient not taking: Reported on 4/30/2024), Disp: , Rfl:     SOCIAL HABITS:    History   Smoking Status    Former    Packs/day: 0.50    Years: 45.00    Types: Cigarettes    Quit date: 2/26/2010   Smokeless Tobacco    Never     Social History    Substance and Sexual Activity      Alcohol use: Yes        Comment: occsionally-3 -4 drinks a week      History   Drug Use No       FAMILY HISTORY:    Family History   Problem Relation Age of Onset    Cardiovascular Mother     Cancer Brother     Diabetes No family hx of     Hypertension No family hx of     Cerebrovascular Disease No family hx of     Alzheimer Disease No family hx of     Thyroid Disease No family hx of     Respiratory No family hx of     Other - See Comments Mother         CARDIOVASCULAR    Cancer Brother        PE:  /63 (BP Location: Right arm, Patient Position: Sitting, Cuff Size: Adult Small)   Pulse 66   LMP  (LMP Unknown)   SpO2 97%   Wt Readings from Last 1 Encounters:   03/14/24 45.4 kg (100 lb)     There is no height or weight on file to calculate BMI.    EXAM:  GENERAL: well-developed 78 year old female who appears her stated age  CARDIAC: normal   CHEST/LUNG: normal respiratory effort   MUSCULOSKELETAL: grossly normal and both lower extremities are intact, no lower extremity edema  NEUROLOGIC: focally intact, alert and oriented x 3  PSYCH: appropriate affect  VASCULAR: RLE wound size continues to decrease, length now approximately 1 cm.  Depth continues to decrease as well and the quantity of drainage continues to subside.  No signs or symptoms of infection.

## 2024-04-30 NOTE — LETTER
4/30/2024       RE: Sakshi Jaeger  [address] [id number]       Dear Colleague,    Thank you for referring your patient, Sakshi Jaeger, to the [facility]ASCULAR [facility] [place] at [facility]. Please see a copy of my visit note below.        VASCULAR SURGERY PROGRESS NOTE    LOCATION: [facility]     Sakshi Jaeger  Medical Record #:  [id number]  YOB: 1945  Age:  78 year old     Date of Service: 4/30/2024    PRIMARY CARE PROVIDER: Lucia Bates    Reason for visit:  Wound assessment    IMPRESSION / RECOMMENDATION:   Sakshi Jaeger is a very pleasant 78 year old female with a history of right common femoral moderate recommend an retrograde iliac stenting, multiple right leg amputations with recurrent wound complications, most recently exposed right femur, s/p revision with more proximal above-knee amputation on 2/2/2024. Lateral aspect of the wound is healed however the mid segment of the wound remains open but does not appear infected and is improving compared her last visit from 4/9/2024.     Four nylon sutures removed, and the wound was packed with half-inch packing strip.  No sutures are present any further.     Wound care of RLE incision at home:  Remove old dressing  Clean wound with Vashe   Pack incision with packing strip  Clean wound surroundings with iodine   Apply clean dressing over incision  Change dressing daily  Call us if new drainage, worsening redness occurs or worse swelling. Call us is fevers or chills occur.    Plan for follow-up in 3 weeks for wound assessment.    Information/Education: patient able to teach back. Patient agreeable to plan of care: yes. All questions were answered and support provided. She has our contact information and knows to reach out with any additional questions or concerns.     Yasmin Rob, CNP  Vascular Surgery  Pager:  [phone]  [email or web address]  Send message or 10 digit call back number Securely via [employer] with the [employer] Web Console (learn more here)      HPI:  Sakshi Jaeger is a 78 year old female with past medical history significant for right femoral endarterectomy and iliac stenting, multiple RLE amputations with multiple complications, most recent had exposed femur following an above-knee amputation which was revised with a more proximal above-knee amputation on 2/2/2024. The wound has been healing slowly. Upon retrospective review of uploaded pictures from wound assessments, there is clear evidence that her incision continues to slowly heal. The amount of packing continues to decrease, and the quantity of drainage continues to decrease. She has been receiving home care and PT on a weekly basis and is well supported by her partner.     REVIEW OF SYSTEMS:    A 12 point ROS was reviewed and is negative except for what is listed above in HPI.    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    Essential hypertension with goal blood pressure less than 140/90 8/25/2016    Hyperlipidemia LDL goal <100 11/12/2010    Hypertension goal BP (blood pressure) < 130/80 1/26/2011    Infection of right below knee amputation (H) 10/1/2019    IRON DEFIC ANEMIA NOS 9/15/2005    Lower limb amputation, below knee 12/26/2012    MI (myocardial infarction) (H)     3/2010    Non-healing surgical wound, subsequent encounter 9/24/2020    Added automatically from request for surgery [id number]    Osteoporosis 2/16/2005    OSTEOPOROSIS NOS 2/16/2005    Protein-calorie malnutrition (H24) 5/18/2022    PVD (peripheral vascular disease) (H24) 5/18/2022    Rheumatoid arthritis (H) 2/16/2005          Rheumatoid arthritis(714.0) 2/16/2005    Status post below-knee amputation (H) 12/26/2012               Past Surgical History:   Procedure Laterality Date    ---------INCISION AND DRAINAGE  03/05/2014    AMPUTATE LEG ABOVE KNEE Right 1/13/2023    Procedure: AMPUTATION, ABOVE KNEE RIGHT;  Surgeon: Emma Oconnor MD;  Location: [facility]    AMPUTATE LEG ABOVE KNEE Right 2/2/2024    Procedure: Redo ABOVE KNEE AMPUTATION;  Surgeon: Bryon Mitchell MD;  [facility]: [facility]    AMPUTATE LEG BELOW KNEE Right 9/28/2022    Procedure: Right through knee amputation;  Surgeon: Doron Mott MD;  Location: [facility]    AMPUTATION BELOW KNEE RT/LT  01/01/2005    right lowwer leg.     ANGIOGRAM Right 1/13/2023    Procedure: right iliac arteriogram aned right common iliac artery stent;  Surgeon: Emma Oconnor MD;  Location: [facility] OR    APPENDECTOMY      ARTHROPLASTY KNEE  04/27/2011    Procedure:ARTHROPLASTY KNEE; Surgeon:JESSE HAWKINS; Location:[facility]    COMPLEX WOUND CLOSURE (UPDATE) Right 12/08/2021    Procedure: Right stump wound debridment and closure;  Surgeon: RAISA Perea MD;  Location: [facility] OR    CORONARY STENT PLACEMENT      ENDARTERECTOMY FEMORAL Right 1/13/2023    Procedure: ENDARTERECTOMY, FEMORAL RIGHT;  Surgeon: Emma Oconnor MD;  [facility]    INJECT NERVE BLOCK SPHENOPALATINE GANGLION N/A 9/29/2022    Procedure: Out of OR encounter for block to be done by ;  Surgeon: GENERIC ANESTHESIA PROVIDER;  Location: [facility]    IR OR ANGIOGRAM  1/13/2023    IRRIGATION AND DEBRIDEMENT LOWER EXTREMITY, COMBINED Right 10/09/2019    Procedure: Irrigation and debridement Right leg;  Surgeon: Doron Mott MD;  Location: [facility]    NERVE BLOCK PERIPHERAL N/A 9/27/2022    Procedure: BLOCK, NERVE;  Surgeon: GENERIC ANESTHESIA PROVIDER;  [facility]    OTHER SURGICAL HISTORY  03/05/2014    I AND D     OTHER SURGICAL HISTORY Right 10/09/2019    IRRIGATION AND DEBRIDEMENT LOWER EXTREMITY, COMBINED     PHACOEMULSIFICATION CLEAR CORNEA WITH STANDARD INTRAOCULAR LENS IMPLANT Left 9/8/2022    Procedure: PHACOEMULSIFICATION, LEFT CATARACT, WITH INTRAOCULAR LENS IMPLANT;  Surgeon: Flip Izaguirre MD;  Location: [facility] OR    PHACOEMULSIFICATION CLEAR CORNEA WITH STANDARD INTRAOCULAR LENS IMPLANT Right 10/13/2022    Procedure: PHACOEMULSIFICATION, RIGHT CATARACT, WITH INTRAOCULAR LENS IMPLANT;  Surgeon: Flip Izaguirre MD;  Location: [facility] OR    REVISE SCAR EXTREMITY Right 12/17/2020    Procedure: Right stump scar revision;  Surgeon: RAISA Perea MD;  Location: [facility] OR       ALLERGIES:  Adhesive tape    MEDS:    Current Outpatient Medications:     acetaminophen (TYLENOL) 325 MG tablet, Take 2 tablets (650 mg) by mouth every 6 hours, Disp: 24 tablet, Rfl: 0    amLODIPine (NORVASC) 5 MG tablet, TAKE 1 TABLET BY MOUTH EVERY DAY, Disp: 90 tablet, Rfl: 3    aspirin (ASA) 81 MG tablet, Take 81 mg by mouth daily, Disp:  , Rfl:     Elemental iron 65 mg Vitamin C 125 mg (VITRON C)  MG TABS tablet, Take 1 tablet by mouth daily, Disp: , Rfl:     leflunomide (ARAVA) 20 MG tablet, Take 1 tablet by mouth every 24 hours, Disp: , Rfl:     Lidocaine (LIDOCARE) 4 % Patch, Place 1 patch onto the skin every 24 hours To prevent lidocaine toxicity, patient should be patch free for 12 hrs daily., Disp: , Rfl:     lisinopril (ZESTRIL) 20 MG tablet, TAKE 1 TABLET BY MOUTH EVERY DAY, Disp: 90 tablet, Rfl: 0    methotrexate 2.5 MG tablet, Take 20 mg by mouth every 7 days On Saturdays, Disp: , Rfl:     metoprolol succinate ER (TOPROL XL) 50 MG 24 hr tablet, TAKE ONE TABLET BY MOUTH EVERY DAY, Disp: 90 tablet, Rfl: 0    oxyCODONE (ROXICODONE) 5 MG tablet, Take 1 tablet (5 mg) by mouth 2 times daily as needed for pain, Disp: 14 tablet, Rfl: 0    polyethylene glycol (MIRALAX) 17 GM/Dose powder, Take 17 g by mouth daily, Disp: 510 g, Rfl: 0    predniSONE (DELTASONE) 5 MG tablet, Take 1 tablet (5 mg) by mouth daily,  Disp: , Rfl: 0    simvastatin (ZOCOR) 20 MG tablet, Take 1 tablet (20 mg) by mouth At Bedtime, Disp: 90 tablet, Rfl: 1    calcium carbonate 600 mg-vitamin D 400 units (CALCIUM 600 + D) 600-400 MG-UNIT per tablet, Take 2 tablets by mouth daily, Disp: , Rfl:     fluocinolone acetonide oil 0.01 % ear drops, Place 0.25 mLs (5 drops) into both ears twice a week, Disp: 20 mL, Rfl: 3    folic acid (FOLVITE) 1 MG tablet, Take 1 mg by mouth daily, Disp: , Rfl:     gabapentin (NEURONTIN) 300 MG capsule, Take 1 capsule (300 mg) by mouth 3 times daily, Disp: 270 capsule, Rfl: 0    multivitamin w/minerals (THERA-VIT-M) tablet, Take 1 tablet by mouth daily (Patient not taking: Reported on 4/30/2024), Disp: , Rfl:     SOCIAL HABITS:    History   Smoking Status    Former    Packs/day: 0.50    Years: 45.00    Types: Cigarettes    Quit date: 2/26/2010   Smokeless Tobacco    Never     Social History    Substance and Sexual Activity      Alcohol use: Yes        Comment: occsionally-3 -4 drinks a week      History   Drug Use No       FAMILY HISTORY:    Family History   Problem Relation Age of Onset    Cardiovascular Mother     Cancer Brother     Diabetes No family hx of     Hypertension No family hx of     Cerebrovascular Disease No family hx of     Alzheimer Disease No family hx of     Thyroid Disease No family hx of     Respiratory No family hx of     Other - See Comments Mother         CARDIOVASCULAR    Cancer Brother        PE:  /63 (BP Location: Right arm, Patient Position: Sitting, Cuff Size: Adult Small)   Pulse 66   LMP  (LMP Unknown)   SpO2 97%   Wt Readings from Last 1 Encounters:   03/14/24 45.4 kg (100 lb)     There is no height or weight on file to calculate BMI.    EXAM:  GENERAL: well-developed 78 year old female who appears her stated age  CARDIAC: normal   CHEST/LUNG: normal respiratory effort   MUSCULOSKELETAL: grossly normal and both lower extremities are intact, no lower extremity edema  NEUROLOGIC: focally  intact, alert and oriented x 3  PSYCH: appropriate affect  VASCULAR: RLE wound size continues to decrease, length now approximately 1 cm.  Depth continues to decrease as well and the quantity of drainage continues to subside.  No signs or symptoms of infection.                      Again, thank you for allowing me to participate in the care of your patient.      Sincerely,    DESIRE Ventura CNP

## 2024-05-08 NOTE — TELEPHONE ENCOUNTER
Left Voicemail (1st Attempt) for the patient to call back and confirm the appointment that is scheduled on 5/28/24 for the following:Follow up Stump Check     Location: [facility] [place] [facility]  Provider: Yasmin Rob   Appointment type: Return Vas Pt   Appointment mode in person  Return date: Scheduled on 5/28/24 @ 12:30 pm.        Is Imaging Needed: No  Imaging Phone Number to provide to patient: No    Additional Notes: WILY Amezquita on 5/8/2024 at 11:59 AM

## 2024-05-08 NOTE — TELEPHONE ENCOUNTER
----- Message from Jaziel Amezquita sent at 5/7/2024  6:12 PM CDT -----  Regarding: call pt  Call pt and Santi make sure appt is ok

## 2024-05-17 PROBLEM — E87.1 HYPONATREMIA: Status: ACTIVE | Noted: 2024-01-01

## 2024-05-17 NOTE — ED TRIAGE NOTES
Pt ambulatory to triage with c/o wound check. Pt referred to ED for wound check on her right stump. Pt states they are doing daily wound dressings, but not healing as they expected.      Triage Assessment (Adult)       Row Name 05/17/24 [id number]          Triage Assessment    Airway WDL WDL        Respiratory WDL    Respiratory WDL WDL        Skin Circulation/Temperature WDL    Skin Circulation/Temperature WDL X  Right stump wound        Cardiac WDL    Cardiac WDL WDL        Peripheral/Neurovascular WDL    Peripheral Neurovascular WDL WDL        Cognitive/Neuro/Behavioral WDL    Cognitive/Neuro/Behavioral WDL WDL

## 2024-05-17 NOTE — ED PROVIDER NOTES
[place] EMERGENCY DEPARTMENT ([facility])    5/17/24       ED PROVIDER NOTE       History     Chief Complaint   Patient presents with    Wound Check     HPI  Sakshi Jaeger is a 78 year old female with a PMH of right common femoral moderate recommend an retrograde iliac stenting, multiple right leg amputations with recurrent wound complications, most recently exposed right femur, s/p revision with more proximal above-knee amputation on 2/2/2024 who presents to the ED for generalized weakness, fatigue and confusion, as well as change in drainage from right-sided AKA stump/incision.     PRIOR HISTORY REVIEW:  Patient underwent most recent right-sided AKA in Feb 2024. Was admitted at a TCU, where she reportedly was doing well after her AKA, and was able to be transitioned home. Since being at home, has had a little bit harder time w/ home services.     CURRENT PRESENTATION:  Patient presents today w/ her significant other, in concern for multiple symptoms, including generalized fatigue and generalized weakness and some changes of drainage from the right AKA stump site, and SO has also noted a degree of confusion. No traumas or falls. No fevers. No HAs, vision/hearing sx's or changes, no HEENT sxs from baseline. No CP or breathing sx's. No abdominal pain. No N/V. No change to bowel/bladder. Right AKA site has been packed w/ ribbon, which SO has been performing w/ home health assisting/monitoring at least once a week. Most recently there was concern for a bit more and slightly more greenish appearing discharge from surgical site. No bleeding. No other surrounding skin changes. No sx's to other extremities. No other new symptoms or complaints at this time. Full ROS completed w/o additional findings.       Past Medical History  Past Medical History:   Diagnosis Date    Anemia 8/24/2022    BENIGN HYPERTENSION 3/1/2005    CAD (coronary artery disease) 1/26/2011    Coronary artery disease involving native  coronary artery of native heart without angina pectoris 9/27/2016    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    Essential hypertension with goal blood pressure less than 140/90 8/25/2016    Hyperlipidemia LDL goal <100 11/12/2010    Hypertension goal BP (blood pressure) < 130/80 1/26/2011    Infection of right below knee amputation (H) 10/1/2019    IRON DEFIC ANEMIA NOS 9/15/2005    Lower limb amputation, below knee 12/26/2012    MI (myocardial infarction) (H)     3/2010    Non-healing surgical wound, subsequent encounter 9/24/2020    Added automatically from request for surgery [id number]    Osteoporosis 2/16/2005    OSTEOPOROSIS NOS 2/16/2005    Protein-calorie malnutrition (H24) 5/18/2022    PVD (peripheral vascular disease) (H24) 5/18/2022    Rheumatoid arthritis (H) 2/16/2005         Rheumatoid arthritis(714.0) 2/16/2005    Status post below-knee amputation (H) 12/26/2012          Past Surgical History:   Procedure Laterality Date    ---------INCISION AND DRAINAGE  03/05/2014    AMPUTATE LEG ABOVE KNEE Right 1/13/2023    Procedure: AMPUTATION, ABOVE KNEE RIGHT;  Surgeon: Emma Oconnor MD;  Location: [facility]    AMPUTATE LEG ABOVE KNEE Right 2/2/2024    Procedure: Redo ABOVE KNEE AMPUTATION;  Surgeon: Bryon Mitchell MD;  Location: [facility]    AMPUTATE LEG BELOW KNEE Right 9/28/2022    Procedure: Right through knee amputation;  Surgeon: Doron Mott MD;  Location: [facility] OR    AMPUTATION BELOW KNEE RT/LT  01/01/2005    right lowwer leg.     ANGIOGRAM Right 1/13/2023    Procedure: right iliac arteriogram aned right common iliac artery stent;  Surgeon: Emma Oconnor MD;  Location: [facility]    APPENDECTOMY      ARTHROPLASTY KNEE  04/27/2011    Procedure:ARTHROPLASTY KNEE; Surgeon:JESSE HAWKINS; Location:[facility]    COMPLEX WOUND CLOSURE (UPDATE) Right 12/08/2021    Procedure: Right stump wound debridment and closure;  Surgeon: RAISA Perea MD;  Location: [facility] OR    CORONARY STENT PLACEMENT       ENDARTERECTOMY FEMORAL Right 1/13/2023    Procedure: ENDARTERECTOMY, FEMORAL RIGHT;  Surgeon: Emma Oconnor MD;  [facility]    INJECT NERVE BLOCK SPHENOPALATINE GANGLION N/A 9/29/2022    Procedure: Out of OR encounter for block to be done by ;  Surgeon: GENERIC ANESTHESIA PROVIDER;  Location: [facility] OR    IR OR ANGIOGRAM  1/13/2023    IRRIGATION AND DEBRIDEMENT LOWER EXTREMITY, COMBINED Right 10/09/2019    Procedure: Irrigation and debridement Right leg;  Surgeon: Doron Mott MD;  [facility]: [facility]    NERVE BLOCK PERIPHERAL N/A 9/27/2022    Procedure: BLOCK, NERVE;  Surgeon: GENERIC ANESTHESIA PROVIDER;  [facility]    OTHER SURGICAL HISTORY  03/05/2014    I AND D     OTHER SURGICAL HISTORY Right 10/09/2019    IRRIGATION AND DEBRIDEMENT LOWER EXTREMITY, COMBINED    PHACOEMULSIFICATION CLEAR CORNEA WITH STANDARD INTRAOCULAR LENS IMPLANT Left 9/8/2022    Procedure: PHACOEMULSIFICATION, LEFT CATARACT, WITH INTRAOCULAR LENS IMPLANT;  Surgeon: Flip Izaguirre MD;  Location: [facility]    PHACOEMULSIFICATION CLEAR CORNEA WITH STANDARD INTRAOCULAR LENS IMPLANT Right 10/13/2022    Procedure: PHACOEMULSIFICATION, RIGHT CATARACT, WITH INTRAOCULAR LENS IMPLANT;  Surgeon: Flip Izaguirre MD;  Location: [facility]    REVISE SCAR EXTREMITY Right 12/17/2020    Procedure: Right stump scar revision;  Surgeon: RAISA Perea MD;  Location: [facility] OR     acetaminophen (TYLENOL) 325 MG tablet  amLODIPine (NORVASC) 5 MG tablet  aspirin (ASA) 81 MG tablet  calcium carbonate 600 mg-vitamin D 400 units (CALCIUM 600 + D) 600-400 MG-UNIT per tablet  Elemental iron 65 mg Vitamin C 125 mg (VITRON C)  MG TABS tablet  fluocinolone acetonide oil 0.01 % ear drops  folic acid (FOLVITE) 1 MG tablet  gabapentin (NEURONTIN) 300 MG capsule  leflunomide (ARAVA) 20 MG tablet  Lidocaine (LIDOCARE) 4 % Patch  lisinopril (ZESTRIL) 20 MG tablet  methotrexate 2.5 MG tablet  metoprolol succinate ER (TOPROL  XL) 50 MG 24 hr tablet  multivitamin w/minerals (THERA-VIT-M) tablet  oxyCODONE (ROXICODONE) 5 MG tablet  polyethylene glycol (MIRALAX) 17 GM/Dose powder  predniSONE (DELTASONE) 5 MG tablet  simvastatin (ZOCOR) 20 MG tablet      Allergies   Allergen Reactions    Adhesive Tape Other (See Comments)     Fragile skin     Family History  Family History   Problem Relation Age of Onset    Cardiovascular Mother     Cancer Brother     Diabetes No family hx of     Hypertension No family hx of     Cerebrovascular Disease No family hx of     Alzheimer Disease No family hx of     Thyroid Disease No family hx of     Respiratory No family hx of     Other - See Comments Mother         CARDIOVASCULAR    Cancer Brother      Social History   Social History     Tobacco Use    Smoking status: Former     Current packs/day: 0.00     Average packs/day: 0.5 packs/day for 45.0 years (22.5 ttl pk-yrs)     Types: Cigarettes     Start date: 1965     Quit date: 2010     Years since quittin.2     Passive exposure: Past    Smokeless tobacco: Never   Vaping Use    Vaping status: Never Used   Substance Use Topics    Alcohol use: Yes     Comment: occsionally-3 -4 drinks a week    Drug use: No      Past medical history, past surgical history, medications, allergies, family history, and social history were reviewed with the patient. No additional pertinent items.      A complete review of systems was performed with pertinent positives and negatives noted in the HPI, and all other systems negative.    Physical Exam   BP: 121/78  Pulse: 92  Temp: 98.1  F (36.7  C)  Resp: 15  SpO2: 92 %  Physical Exam  CONSTITUTIONAL: Well-developed and well-nourished. Awake and alert. Non-toxic appearance. No acute distress.   HENT:   - Head: Normocephalic and atraumatic.   - Ears: External ear grossly normal.   - Nose: Nose normal. No rhinorrhea. No epistaxis.   - Mouth/Throat: MMM  EYES: Conjunctivae and lids are normal. No scleral icterus.   NECK:  Normal range of motion and phonation normal. Neck supple.  No tracheal deviation, no stridor. No edema or erythema noted.  CARDIOVASCULAR: Normal rate, regular rhythm and no appreciable abnormal heart sounds.  PULMONARY/CHEST: Normal work of breathing. No accessory muscle usage or stridor. No respiratory distress.  No appreciable abnormal breath sounds.  ABDOMEN: Soft, non-distended. No tenderness. No peritoneal findings, no rigidity, rebound or guarding.  No palpable masses or abnormal pulsatility appreciated.  MUSCULOSKELETAL: Extremities warm and seemingly well perfused. Right sided AKA. Incision has open part w/ packing, slight discharge in some areas, but not frankly purulent looking. More serosanginous. No surrounding skin changes. No erythema, no induration, no fullness or fluctuance, no crepitous. No findings for SSTI or necrotizing SSTI. No obvious numness,tingling or weakness.   NEUROLOGIC: Awake, alert. Not disoriented. No seizure activity. GCS 15. CNs intact. No obvious strength/sensory deficits appreciated  SKIN: Skin is warm and dry. No rash noted. No diaphoresis. No pallor. AKA wound as a above. No petechiae or purpura noted.   PSYCHIATRIC: Normal mood and affect. Speech and behavior normal. Thought processes linear. Cognition and memory are normal. No SI/HI reported.     ED Course, Procedures, & Data     Critical care was not performed.     Medical Decision Making  The patient's presentation was of moderate complexity (an acute illness with systemic symptoms).    The patient's evaluation involved:  ordering and/or review of 3+ test(s) in this encounter (see separate area of note for details)  discussion of management or test interpretation with another health professional (see separate area of note for details)    The patient's management necessitated high risk (a decision regarding hospitalization) and further care after sign-out to admitting IM team and evening EM physician (see their note for  further management).    Assessment & Plan    IMPRESSION:   78 year old female w/ PMH notable for right common femoral moderate recommend an retrograde iliac stenting, multiple right leg amputations with recurrent wound complications, most recently exposed right femur, s/p revision with more proximal above-knee amputation on 2/2/2024 who presents to the ED for generalized weakness, fatigue and confusion, as well as change in drainage from right-sided AKA stump/incision, as described further above and in EMR.    Clinically, patient appears nontoxic, NAD.  Otherwise on examination, has AKA on the right. Incision is packed. No obvious infectious findings though did swab the small amount of liquid that was visualized, though did not appear frankly purulent and no blood. No findings for SSTI or necrotizing SSTI on exam. No clear findings for infectious or inflammatory arthritis. No other obvious neurovascular deficits. Sounds to have delirium in report from SO, but not focal neuro exam and seems to be mentating appropriately currently.     Ddx includes, but not limited to, wound dehiscence, AKA surgical site infection (though outwardly looks ok, can't exclude an occult or deeper infection), UTI, electrolyte abnormality, PNA, DVT, et al. Does not sound as consistent w/ ACS, PE, HAs, has no clear stroke sx's. No hx of traumas or falls, no chronically anticoagulated, etc.     PLAN:   - Labs, urine studies, screening chest imaging (for PNA, et al), imaging of the AKA/stump given they have concern for drainage changes. Risks/benefits of pursuing imaging reviewed and accepted.   - Dispo pending ED course, likely at least Obs admit for delirium workup    RESULTS:  Labs:   - Na 126, down from 132  - K 3.0, Mg 1.4  -  (up from 25), ESR 34, Procalcitonin 0.75, no leukocytosis, Hgb 9.5 is up from previous  - Wound/stump incision gram stain is negative at this point  Urine:   - No sample yet provided, pt declining rec for cath  "spec  Imaging: Written preliminary reports reviewed:  - CXR: \"1. Streaky bibasilar opacities representing atelectasis versus atypical infection.  2. Small right pleural effusion. Pulmonary venous hypertension/edema.  3. Kyphosis of the thoracolumbar spine with age indeterminate /possibly nonacute compression deformities.\"  - CT Thigh pending  Results/reports reviewed w/ patient who expresses understanding of findings and F/U recommendations.    INTERVENTIONS:   - Held off on Abx so far as was trying to get a urine sample prior to initiating as is otherwise hemodynamically stable and nontoxic in apperance    RE-EVALUATION:  - Pt otherwise continues to do well here in the ED, no acute issues or apparent concerning changes in vitals or clinical appearance.    DISCUSSIONS:  - w/ IM: Reviewed patient/presentation, current state of workup/any pending studies. They will admit for further evaluation/management, F/U pending studies as needed, coordinate w/ consulting services as needed. No additional requests/recommendations for workup/management for in the ED at this time.   - w/ Patient: I have reviewed the available findings, plan with the patient. She expressed understanding and agreement with this plan. All questions answered to the best of our ability at this time.       DISPOSITION/PLANNING:  IMPRESSION:   - Fatigue, generalized weakness, confusion x 1-2 weeks (new from baseline)  - Hyponatremia, Hypomagnesemia, hypokalemia  - Elevated   - Right AKA w/ dehiscense and wound packing/change in drainage (culture pending)  DISPOSITION:  - Admit to IM for further evaluation/management  PENDING:   - CT thigh  - Cultures  - Urine studies  - Osmolalities, urine Na and urine osm  OTHER RECOMMENDATIONS:   - F/U pending studies  - PT/OT  - additional imaging as needed (US's, MR, etc.)      ______________________________________________________________________             Radhika Escobar MD  [facility] EMERGENCY " DEPARTMENT  5/17/2024     Radhika Escobar MD  05/19/24 [id number]

## 2024-05-18 NOTE — PROGRESS NOTES
Neuro: A&Ox4.   Cardiac: SR. VSS.   Respiratory: Sating 100% on 2L nasal cannula.   GI/: Small amount of urine output throughout shift. No bowel movement during shift.   Diet/appetite: Tolerating regular diet. Only wanted buttered toast today.   Activity:  Bed bound d/t amputation of right leg. Position changed in bed every 2 hours.   Pain: At acceptable level on current regimen of tylenol and oxy.   Skin: Redness noted on coxxyx area. Position changed routinely. Bruising noted on patients arms. LLE +2 edema noted.    LDA's: RPIV saline locked.     Pt received heparin during the AM but discontinued and switched to eliquis.     Plan: Continue with POC. Notify primary team with changes.

## 2024-05-18 NOTE — MEDICATION SCRIBE - ADMISSION MEDICATION HISTORY
Medication Scribe Admission Medication History    Admission medication history is complete. The information provided in this note is only as accurate as the sources available at the time of the update.    Information Source(s): Clinic records and Hospital records via N/A    Pertinent Information:   Reported on patient's PTA list that the multivitamin w/minerals is not being taken  Changes made to PTA medication list:  Added: None  Deleted: multivitamin w/minerals take 1 tablet by mouth daily  Changed: From gabapentin (NEURONTIN) 300 MG capsule take 300 mg by mouth 3 times a day To gabapentin (NEURONTIN) 300 MG capsule  Take 300 mg by mouth 3 times daily as needed for neuropathic pain     Allergies reviewed with patient and updates made in EHR: yes    Medication History Completed By: Germaine Vicente 5/17/2024 10:16 PM    Prior to Admission medications    Medication Sig Last Dose Taking? Auth Provider Long Term End Date   acetaminophen (TYLENOL) 325 MG tablet Take 2 tablets (650 mg) by mouth every 6 hours 5/17/2024 at am Yes Denis Fox MD No    amLODIPine (NORVASC) 5 MG tablet TAKE 1 TABLET BY MOUTH EVERY DAY 5/17/2024 at am Yes Lucia Bates, APRN CNP Yes    aspirin (ASA) 81 MG tablet Take 81 mg by mouth daily 5/17/2024 at am Yes Nick De La Cruz MD No    calcium carbonate 600 mg-vitamin D 400 units (CALCIUM 600 + D) 600-400 MG-UNIT per tablet Take 2 tablets by mouth daily 5/17/2024 at am Yes Trish Sahni MD     Elemental iron 65 mg Vitamin C 125 mg (VITRON C)  MG TABS tablet Take 1 tablet by mouth daily 5/17/2024 at am Yes Reported, Patient     fluocinolone acetonide oil 0.01 % ear drops Place 0.25 mLs (5 drops) into both ears twice a week 5/16/2024 at am Yes Pito Mosher MD     folic acid (FOLVITE) 1 MG tablet Take 1 mg by mouth daily 5/17/2024 at am Yes Reported, Patient     gabapentin (NEURONTIN) 300 MG capsule Take 300 mg by mouth 3 times daily as needed for  neuropathic pain Unknown at asa needed Yes Reported, Patient Yes    leflunomide (ARAVA) 20 MG tablet Take 1 tablet by mouth every 24 hours 5/17/2024 at am Yes Reported, Patient No    Lidocaine (LIDOCARE) 4 % Patch Place 1 patch onto the skin every 24 hours To prevent lidocaine toxicity, patient should be patch free for 12 hrs daily. 5/17/2024 at am Yes Unknown, Entered By History     lisinopril (ZESTRIL) 20 MG tablet TAKE 1 TABLET BY MOUTH EVERY DAY 5/17/2024 Yes Emma Dorsey PA-C Yes    methotrexate 2.5 MG tablet Take 20 mg by mouth every 7 days On Saturdays 5/10/2024 at am Yes Reported, Patient No    metoprolol succinate ER (TOPROL XL) 50 MG 24 hr tablet TAKE ONE TABLET BY MOUTH EVERY DAY 5/17/2024 at am Yes Harry Roberto MD Yes    oxyCODONE (ROXICODONE) 5 MG tablet Take 1 tablet (5 mg) by mouth 2 times daily as needed for pain Unknown at as needed Yes Yasmin Rob, APRN CNP No    polyethylene glycol (MIRALAX) 17 GM/Dose powder Take 17 g by mouth daily 5/17/2024 at am Yes Denis oFx MD     predniSONE (DELTASONE) 5 MG tablet Take 1 tablet (5 mg) by mouth daily 5/17/2024 at am Yes Trish Sahni MD Yes    simvastatin (ZOCOR) 20 MG tablet Take 1 tablet (20 mg) by mouth At Bedtime 5/16/2024 at bedtime Yes Harry Roberto MD Yes

## 2024-05-18 NOTE — PLAN OF CARE
Reason for admission: generalized weakness  Admitted from: [facility]  Report received from: Omi SWAIN RN  2 RN skin assessment completed by: pt refused at this time, wanted to eat first and then skin assessment after, writer will pass onto next RN      - Findings (add LDA if needed):   Bed algorithm reevaluated: yes  Was airflow pump ordered?: no  Suction set up in room? yes  Care plan (primary problem) and education initiated: yes  MDRO education done if applicable: n/a  Pt informed about policy regarding no IV pumps off unit: yes  Flu shot ordered? (October-April only): n/a  Detailed Belongings: All belongings remain w patient: clothing, a pair of earrings that she is wearing, one pillow. NO hearing aid/ cash/ wallet/ cellphone.      Goal Outcome Evaluation:    Plan of Care Reviewed With: patient    Overall Patient Progress: no change    Outcome Evaluation: waiting for PT & OT consult     no

## 2024-05-18 NOTE — H&P
[facility]    History and Physical - Hospitalist Service       Date of Admission:  5/17/2024    Assessment & Plan      Sakshi Jaeger is a 78 year old with PMH right femoral endarterectomy and common iliac artery stenting with previous right revision above-knee amputation with frequent infections who is admitted on 5/17/2024 for generalized weakness, concern for infection with elevated inflammatory markers, and electrolyte abnormalities including hyponatremia, hypomagnesemia, and hypokalemia.    Metabolic encephalopathy   Elevated inflammatory markers  Concern for infection  Patient with intermittent confusion since discharge from TCU in March.  Not significantly worse in the past week according to significant other.  Concern for infection due to elevated CRP, procalcitonin, and sed rate.  She remains afebrile and no leukocytosis. No source iof infection identified at this time.  On exam, right AKA wound without discharge or evidence of cellulitis.  -Will hold off on starting antibiotics at this time given no source of infection.  However, low threshold to start antibiotics if becomes febrile or clinically worsening.  -UA with reflex to culture (patient refused straight cath in ED)  -Blood cultures pending    Right AKA wound dehiscence   Previous right femoral endarterectomy and common iliac artery stenting status post right revision above-knee amputation   Patient with history of right BKA in 2005 with subsequent right AKA with multiple revisions and previous wound infections.  Most recent admission to the hospital 2/2024 for redo right transfemoral amputation with necrotic skin resected.  Patient was subsequently transition to TCU and returned home in 3/2024.  She continues to have home wound care nurse evaluation weekly.  According to the significant other, the wound continues to improve.  The open area is requiring less packing but noticed clear discharge recently.   On exam, small open area of the lateral wound (approx 1 cm) with packing and no evidence of erythema or discharge.  Wound culture obtained in the ED with no organisms or white blood cells.  Right CT femur/thigh without evidence of osteomyelitis, scattered foci of soft tissue gas within the subcutaneous fat at the lateral margin of the surgical incision, no enhancing gas and fluid collection to suggest abscess.  -WOCN  -Monitor clinically for signs of infection  -Cont neurontin 300 mg three times daily (patient takes PRN)    Chronic hyponatremia  Review of previous labs shows chronic hyponatremia with sodium ranging from 124-132 since 2/2024. Current sodium 126.  Due to the chronicity of hyponatremia, doubt it is playing a role in her altered mental status.  However, could be causing cognitive impairment given recent change from 132 on 2/6/2020 to  126 today.  Difficult to assess volume status on exam to narrow differential of etiology.   Left lower extremity with edema but no other signs of hypervolemia.  Patient with decreased oral intake but no other signs of hypovolemia.  Blood pressure stable. Will await further labs  -Sodium osmolality, urine sodium pending    Hypomagnesemia  Hypokalemia  Suspect secondary to nutritional deficiencies  -Potassium and magnesium standard replacement protocols    Left lower extremity edema   Patient is sedentary with left lower extremity edema on exam  -U/S to evaluate for DVT    Generalized weakness  Worsening weakness since recent discharge from TCU.  -PT/OT evaluation and treatment    Small pleural effusion on CXR  No respiratory symptoms   -Monitor    Hypoalbuminemia  -Monitor    Chronic Stable Medical Problems:  Rheumatoid arthritis   -Cont PTA chronic prednisone 5 mg, Leflunomide 20 mg daily, methotrexate 20 mg daily, oxycodone 5 mg BID PRN    Coronary disease with history of MI  Hypertension  Hyperlipidemia  -Cont PTA asprin 81 mg daily  -Cont PTA amlodipine 5 mg daily,  lisinopril 20 mg daily and simvastatin 20 mg daily    Tobocco use  -Declined nicotine patch or gum    Chronic iron deficiency anemia  Stable for       Diet:  Regular  DVT Prophylaxis: Enoxaparin (Lovenox) SQ  Nolan Catheter: Not present  Lines: None     Cardiac Monitoring: None  Code Status:  Full Code. Discussed with patient and significant other, Santi.     Clinically Significant Risk Factors Present on Admission        # Hypokalemia: Lowest K = 3 mmol/L in last 2 days, will replace as needed  # Hyponatremia: Lowest Na = 126 mmol/L in last 2 days, will monitor as appropriate    # Hypomagnesemia: Lowest Mg = 1.4 mg/dL in last 2 days, will replace as needed   # Hypoalbuminemia: Lowest albumin = 3 g/dL at 5/17/2024  3:47 PM, will monitor as appropriate  # Coagulation Defect: INR = 1.20 (Ref range: 0.85 - 1.15) and/or PTT = N/A, will monitor for bleeding  # Drug Induced Platelet Defect: home medication list includes an antiplatelet medication   # Hypertension: Noted on problem list          # Financial/Environmental Concerns:           Disposition Plan     Medically Ready for Discharge: Anticipated in 2-4 Days    The patient's care was discussed with the patient, significant other, Santi and Attending Physician, Dr. Torrez.    Claritza Sanchez PA-C  Hospitalist Service  [facility]  Securely message with [employer] (more info)  Text page via [facility] Paging/Directory     ______________________________________________________________________    Chief Complaint   Weakness and confusion    History obtained from patient and significant other along with review of previous medical records  Okay    History of Present Illness   Sakshi Jaeger is a 78 year old with PMH right femoral endarterectomy and common iliac artery stenting with previous right revision above-knee amputation with frequent wound infections who presents with her significant other for generalized weakness check.   Patient was recently hospitalized 2/2024 for redo right transfemoral amputation with necrotic skin resected.  She was subsequently transition to TCU and return home in March/2024.  Since that time, her significant other reports she has had intermittent confusion where at times she does not know how to use the commode.  Confusion is not getting worse but persists.  She continues to have wound nurse visit every week to check her wound.  Overall, significant other reports that the wound is getting better and less packing is needed for the open area on the lateral aspect.  He has noticed clear discharge which is a little bit worse than previous but no foul-smelling discharge, recent fevers.  Patient continues to eat and drink well and her appetite is  improved over the last week.  Since discharge from the TCU, patient has also had progressive generalized weakness.  At baseline, she gets around with a scooter.  No shortness of breath, cough, dysuria, urinary frequency, nausea or vomiting, diarrhea, no other complaints at this time.  Patient requesting the history be kept brief send she wanted to leave for a cigarette.    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    Essential hypertension with goal blood pressure less than 140/90 8/25/2016    Hyperlipidemia LDL goal <100 11/12/2010    Hypertension goal BP (blood pressure) < 130/80 1/26/2011    Infection of right below knee amputation (H) 10/1/2019    IRON DEFIC ANEMIA NOS 9/15/2005    Lower limb amputation, below knee 12/26/2012    MI (myocardial infarction) (H)     3/2010    Non-healing surgical wound, subsequent encounter 9/24/2020    Added automatically from request for surgery [id number]    Osteoporosis 2/16/2005     OSTEOPOROSIS NOS 2/16/2005    Protein-calorie malnutrition (H24) 5/18/2022    PVD (peripheral vascular disease) (H24) 5/18/2022    Rheumatoid arthritis (H) 2/16/2005         Rheumatoid arthritis(714.0) 2/16/2005    Status post below-knee amputation (H) 12/26/2012            Past Surgical History   Past Surgical History:   Procedure Laterality Date    ---------INCISION AND DRAINAGE  03/05/2014    AMPUTATE LEG ABOVE KNEE Right 1/13/2023    Procedure: AMPUTATION, ABOVE KNEE RIGHT;  Surgeon: Emma Oconnor MD;  Location: [facility]    AMPUTATE LEG ABOVE KNEE Right 2/2/2024    Procedure: Redo ABOVE KNEE AMPUTATION;  Surgeon: Bryon Mitchell MD;  Location: [facility]    AMPUTATE LEG BELOW KNEE Right 9/28/2022    Procedure: Right through knee amputation;  Surgeon: Doron Mott MD;  Location: [facility]    AMPUTATION BELOW KNEE RT/LT  01/01/2005    right lowwer leg.     ANGIOGRAM Right 1/13/2023    Procedure: right iliac arteriogram aned right common iliac artery stent;  Surgeon: Emma Oconnor MD;  Location: [facility] OR    APPENDECTOMY      ARTHROPLASTY KNEE  04/27/2011    Procedure:ARTHROPLASTY KNEE; Surgeon:JESSE HAWKINS; Location:[facility]    COMPLEX WOUND CLOSURE (UPDATE) Right 12/08/2021    Procedure: Right stump wound debridment and closure;  Surgeon: RAISA Perea MD;  Location: [facility] OR    CORONARY STENT PLACEMENT      ENDARTERECTOMY FEMORAL Right 1/13/2023    Procedure: ENDARTERECTOMY, FEMORAL RIGHT;  Surgeon: Emma Oconnor MD;  Location: [facility]    INJECT NERVE BLOCK SPHENOPALATINE GANGLION N/A 9/29/2022    Procedure: Out of OR encounter for block to be done by ;  Surgeon: GENERIC ANESTHESIA PROVIDER;  Location: [facility] OR    IR OR ANGIOGRAM  1/13/2023    IRRIGATION AND DEBRIDEMENT LOWER EXTREMITY, COMBINED Right 10/09/2019    Procedure: Irrigation and debridement Right leg;  Surgeon: Doron Mott MD;  Location: [facility]    NERVE BLOCK PERIPHERAL N/A 9/27/2022    Procedure: BLOCK, NERVE;   Surgeon: GENERIC ANESTHESIA PROVIDER;  [facility]: [facility]    OTHER SURGICAL HISTORY  03/05/2014    I AND D     OTHER SURGICAL HISTORY Right 10/09/2019    IRRIGATION AND DEBRIDEMENT LOWER EXTREMITY, COMBINED    PHACOEMULSIFICATION CLEAR CORNEA WITH STANDARD INTRAOCULAR LENS IMPLANT Left 9/8/2022    Procedure: PHACOEMULSIFICATION, LEFT CATARACT, WITH INTRAOCULAR LENS IMPLANT;  Surgeon: Flip Izaguirre MD;  Location: [facility]    PHACOEMULSIFICATION CLEAR CORNEA WITH STANDARD INTRAOCULAR LENS IMPLANT Right 10/13/2022    Procedure: PHACOEMULSIFICATION, RIGHT CATARACT, WITH INTRAOCULAR LENS IMPLANT;  Surgeon: Flip Izaguirre MD;  Location: [facility]    REVISE SCAR EXTREMITY Right 12/17/2020    Procedure: Right stump scar revision;  Surgeon: RAISA Perea MD;  Location: [facility] OR       Prior to Admission Medications   Prior to Admission Medications   Prescriptions Last Dose Informant Patient Reported? Taking?   Elemental iron 65 mg Vitamin C 125 mg (VITRON C)  MG TABS tablet   Yes No   Sig: Take 1 tablet by mouth daily   Lidocaine (LIDOCARE) 4 % Patch  Self Yes No   Sig: Place 1 patch onto the skin every 24 hours To prevent lidocaine toxicity, patient should be patch free for 12 hrs daily.   acetaminophen (TYLENOL) 325 MG tablet   No No   Sig: Take 2 tablets (650 mg) by mouth every 6 hours   amLODIPine (NORVASC) 5 MG tablet   No No   Sig: TAKE 1 TABLET BY MOUTH EVERY DAY   aspirin (ASA) 81 MG tablet  Self Yes No   Sig: Take 81 mg by mouth daily   calcium carbonate 600 mg-vitamin D 400 units (CALCIUM 600 + D) 600-400 MG-UNIT per tablet  Self No No   Sig: Take 2 tablets by mouth daily   fluocinolone acetonide oil 0.01 % ear drops   No No   Sig: Place 0.25 mLs (5 drops) into both ears twice a week   folic acid (FOLVITE) 1 MG tablet  Self Yes No   Sig: Take 1 mg by mouth daily   gabapentin (NEURONTIN) 300 MG capsule  Self No No   Sig: Take 1 capsule (300 mg) by mouth 3 times daily   leflunomide  (ARAVA) 20 MG tablet  Self Yes No   Sig: Take 1 tablet by mouth every 24 hours   lisinopril (ZESTRIL) 20 MG tablet   No No   Sig: TAKE 1 TABLET BY MOUTH EVERY DAY   methotrexate 2.5 MG tablet  Self Yes No   Sig: Take 20 mg by mouth every 7 days On Saturdays   metoprolol succinate ER (TOPROL XL) 50 MG 24 hr tablet   No No   Sig: TAKE ONE TABLET BY MOUTH EVERY DAY   multivitamin w/minerals (THERA-VIT-M) tablet  Self No No   Sig: Take 1 tablet by mouth daily   Patient not taking: Reported on 4/30/2024   oxyCODONE (ROXICODONE) 5 MG tablet   No No   Sig: Take 1 tablet (5 mg) by mouth 2 times daily as needed for pain   polyethylene glycol (MIRALAX) 17 GM/Dose powder   No No   Sig: Take 17 g by mouth daily   predniSONE (DELTASONE) 5 MG tablet  Self No No   Sig: Take 1 tablet (5 mg) by mouth daily   simvastatin (ZOCOR) 20 MG tablet  Self No No   Sig: Take 1 tablet (20 mg) by mouth At Bedtime      Facility-Administered Medications: None           Physical Exam   Vital Signs: Temp: 97.8  F (36.6  C) Temp src: Oral BP: 133/83 Pulse: 78   Resp: 14 SpO2: 93 % O2 Device: None (Room air)    Weight: 0 lbs 0 oz    General: Alert and oriented x 3.  Knows self, place, time, and current president.  Frail-appearing. NAD. Appears comfortable   Resp: No signs of respiratory distress. Lungs clear to ausculation bilaterally without rales, rhonchi or wheezes.   Cardiac: RRR. S1 and S2 normal intensity. No murmurs, rubs or gallops.   GI: Abdomen soft, non-tender, non-distended.   : No le  Neuro:  No focal deficits. Gait not tested.   Extremities: Right AKA with small open area lateral aspect of the wound (approximately 1 cm) with packing      Medical Decision Making       55 MINUTES SPENT BY ME on the date of service doing chart review, history, exam, documentation & further activities per the note.      Data     I have personally reviewed the following data over the past 24 hrs:    6.6  \   9.5 (L)   / 326     126 (L) 91 (L) 11.9 /  80    3.0 (L) 21 (L) 0.46 (L) \     ALT: 29 AST: 87 (H) AP: 88 TBILI: 0.5   ALB: 3.0 (L) TOT PROTEIN: 5.3 (L) LIPASE: N/A     Procal: 0.75 (H) CRP: 132.00 (H) Lactic Acid: N/A       INR:  1.20 (H) PTT:  N/A   D-dimer:  N/A Fibrinogen:  N/A       Imaging results reviewed over the past 24 hrs:   Recent Results (from the past 24 hour(s))   XR Chest 2 Views    Narrative    Exam: XR CHEST 2 VIEWS, 5/17/2024 4:42 PM    Indication: possible sepsis - ? PNA or other abnormality    Comparison: 10/12/2019 chest x-ray    Findings:   2 views of the chest. Heart size within normal limits. Aortic  atherosclerosis. No focal consolidation. Increased distinct pulmonary  vascularity. Small right pleural effusion. Probable basilar reticular  opacity on the left with associated loss. Kyphosis of the  thoracolumbar spine with age indeterminate /old appearing compression  deformities.      Impression    Impression:   1. Streaky bibasilar opacities representing atelectasis versus  atypical infection.  2. Small right pleural effusion. Pulmonary venous hypertension/edema.  3. Kyphosis of the thoracolumbar spine with age indeterminate  /possibly nonacute compression deformities.   CT Femur Thigh Right w Contrast    Narrative    EXAM: CT FEMUR THIGH RIGHT W CONTRAST  LOCATION: [facility]  DATE: 5/17/2024    INDICATION: ? infection from stump near previous AKA  COMPARISON: CT 7/14/2022  TECHNIQUE: IV contrast. Axial, sagittal and coronal thin-section reconstruction. Dose reduction techniques were used.   CONTRAST: Isovue 370    FINDINGS:     BONES:  -Above-knee amputation. No focal demineralization, cortical bone loss, or periostitis to suggest osteomyelitis.  -Age indeterminant , though new since the comparison CT of 7/14/2022, mildly impacted and comminuted fracture of the right pubic body. Nondisplaced fracture of the right inferior pubic ramus. Additional fractures of the left pubic root, inferior  pubic   ramus, and at the left ischiopubic junction.  -Chronic appearing right sacral insufficiency fracture.  -Moderate degenerative arthrosis of the right hip with a small hip joint effusion. Degenerative arthrosis of the right SI joint.    SOFT TISSUES:  -Soft tissue edema about the amputation stump. Scattered foci of gas within the subcutaneous fat adjacent to the lateral margin of the surgical incision (series 2 image 135 and series 6 image 153). No peripherally enhancing measurable fluid collection.  -Subcutaneous soft tissue edema about the right lower extremity and visualized medial left thigh.  -Arterial vascular calcifications. Metallic vascular stent within the right common iliac artery.      Impression    IMPRESSION:  1.  Prior above-knee amputation. No evidence of osteomyelitis.  2.  Scattered foci of soft tissue gas within the subcutaneous fat at the lateral margin of the surgical incision. Correlation with wound dehiscence suggested. No peripherally enhancing gas and fluid collection to suggest abscess.  3.  Age indeterminant, though new since the comparison CT of 7/14/2022, fractures of the right pubic body and inferior pubic ramus, and the left pubic root, inferior pubic ramus, and fascial pubic junction.  4.  Moderate degenerative arthrosis of the right hip with a small hip joint effusion, likely degenerative in etiology.

## 2024-05-18 NOTE — PROGRESS NOTES
[facility]    Medicine Progress Note - Hospitalist Service, GOLD TEAM 1    Date of Admission:  5/17/2024    Assessment & Plan   Sakshi Jaeger is a 78 year old woman with a history of R femoral endarterectomy and common iliac artery stenting s/p R AKA who was admitted with generalized weakness.     Generalized weakness: Had several week stay at TCU and discharged home in March. Was receiving home PT services and improving but had significant functional decline after a 3-week lapse in home services. Extensive work-up completed since admission with no clear metabolic, infectious, or other organic etiology to explain her weakness. Her weakness is most consistent with general physical deconditioning. Initial concern for encephalopathy but she is mentating at baseline today.   - PT, OT consults   - Discussed with Santi mirza, he is comfortable taking her home if PT/OT clears her   - She is medically ready for discharge pending a safe disposition plan    Acute LLE DVT  LLL subsegmental PE  Elevated CRP in ED prompted work-up including LLE ultrasound. Ultrasound revealed non-occlusive DVT in left common femoral vein and near occlusive DVT in left femoral vein. CT chest today with LLL subsegmental PE. Started on heparin gtt overnight. Clots most likely provoked by recent immobility. Of note, LLE US in April 2023 revealed similar non-occlusive DVT in left common femoral vein for which she completed a course of apixaban   - Discontinue heparin gtt   - Start apixaban. Will need to discuss long-term AC with PCP given her immobility.     Elevated CRP:  in ED. Procal slightly elevated at 0.75. Elevated CRP most likely secondary to acute DVT/PE. Infectious work-up otherwise unrevealing to date. She has no focal s/s suggestive of a particular infectious process. R AKA wound healing nicely.   - Continue to monitor    Right AKA wound dehiscence  S/p right AKA (2/2024)  Follows  with Vascular Surgery, last evaluated on 4/30. Wound healing nicely per OP visit notes and patient/family report. No concerns for infection on exam today. CT RLE completed on admission, findings c/w known wound dehiscence.    - Continue to monitor   - PTA gabapentin 300 mg TID    Chronic hyponatremia: Na ranges ~124 - 132 at baseline, currently within her baseline range.     Hypokalemia  Hypomagnesemia  Secondary to nutrition deficiencies. Labs normalized with replacement.     Other Chronic Medical Issues:  CAD, HTN, HLD: Continue PTA ASA, amlodipine, lisinopril, statin.   Rheumatoid arthritis: Continue PTA prednisone, leflunomide, methotrexate, oxycodone.          Diet: Combination Diet Regular Diet Adult    DVT Prophylaxis: DOAC  Nolan Catheter: Not present  Lines: None     Cardiac Monitoring: None  Code Status: Full Code      Clinically Significant Risk Factors Present on Admission        # Hypokalemia: Lowest K = 3 mmol/L in last 2 days, will replace as needed  # Hyponatremia: Lowest Na = 126 mmol/L in last 2 days, will monitor as appropriate    # Hypomagnesemia: Lowest Mg = 1.4 mg/dL in last 2 days, will replace as needed   # Hypoalbuminemia: Lowest albumin = 3 g/dL at 5/17/2024  3:47 PM, will monitor as appropriate  # Coagulation Defect: INR = 1.20 (Ref range: 0.85 - 1.15) and/or PTT = N/A, will monitor for bleeding  # Drug Induced Platelet Defect: home medication list includes an antiplatelet medication   # Hypertension: Noted on problem list          # Financial/Environmental Concerns:           Disposition Plan     Medically Ready for Discharge: Ready Now           The patient's care was discussed with the Attending Physician, Dr. Sahni, Patient, and Patient's Family.    Niecy Shields PA-C  Hospitalist Service, GOLD TEAM 1  [facility]  Securely message with i[employer] (more info)  Text page via [id number] Paging/Directory   See signed in provider for up to date  coverage information  ______________________________________________________________________    Interval History   Patient expresses general irritation with being hospitalized. She is eager to go home. She denies any dyspnea, cough, chest pain, abdominal pain, nausea, vomiting. She is on 2 liters of O2 during my visit but this was turned off with maintenance of oxygen saturations in the mid to high 90s. Discussed with her fiance - he notes Sakshi was doing well until her home PT services lapsed. She recently had a negative experience at TCU and Santi is worried she would not want to go to another TCU.    Physical Exam   Vital Signs: Temp: 98.3  F (36.8  C) Temp src: Oral BP: 135/76 Pulse: 80   Resp: 14 SpO2: 97 % O2 Device: Nasal cannula Oxygen Delivery: 3 LPM  Weight: 83 lbs 8.87 oz    Constitutional: Awake and alert, in no apparent distress.   Eyes: Sclera clear, anicteric   Respiratory: Breathing non-labored. CTAB.   Cardiovascular:  RRR, normal S1/S2. No rubs or murmurs. 2+ pitting edema of LLE  GI: Soft, non-tender, non-distended. Normoactive bowel sounds.   Skin: R AKA wound site with mild dehiscence, overall healing nicely with no purulence, warmth, erythema noted. Good color. No jaundice. No visible rashes, lesions, or bruising of concern.   Neurologic: Alert and oriented.      Medical Decision Making       50 MINUTES SPENT BY ME on the date of service doing chart review, history, exam, documentation & further activities per the note.      Data   ------------------------- PAST 24 HR DATA REVIEWED -----------------------------------------------    I have personally reviewed the following data over the past 24 hrs:    5.7  \   8.4 (L)   / 335     131 (L) 98 10.7 /  103 (H)   3.7 22 0.36 (L) \     ALT: 29 AST: 87 (H) AP: 88 TBILI: 0.5   ALB: 3.0 (L) TOT PROTEIN: 5.3 (L) LIPASE: N/A     Procal: 0.75 (H) CRP: 132.00 (H) Lactic Acid: N/A       INR:  1.20 (H) PTT:  N/A   D-dimer:  N/A Fibrinogen:  N/A        Imaging results reviewed over the past 24 hrs:   Recent Results (from the past 24 hour(s))   XR Chest 2 Views    Narrative    Exam: XR CHEST 2 VIEWS, 5/17/2024 4:42 PM    Indication: possible sepsis - ? PNA or other abnormality    Comparison: 10/12/2019 chest x-ray    Findings:   2 views of the chest. Heart size within normal limits. Aortic  atherosclerosis. No focal consolidation. Increased distinct pulmonary  vascularity. Small right pleural effusion. Probable basilar reticular  opacity on the left with associated loss. Kyphosis of the  thoracolumbar spine with age indeterminate /old appearing compression  deformities.      Impression    Impression:   1. Streaky bibasilar opacities representing atelectasis versus  atypical infection.  2. Small right pleural effusion. Pulmonary venous hypertension/edema.  3. Kyphosis of the thoracolumbar spine with age indeterminate  /possibly nonacute compression deformities.   CT Femur Thigh Right w Contrast    Narrative    EXAM: CT FEMUR THIGH RIGHT W CONTRAST  LOCATION: [facility]  DATE: 5/17/2024    INDICATION: ? infection from stump near previous AKA  COMPARISON: CT 7/14/2022  TECHNIQUE: IV contrast. Axial, sagittal and coronal thin-section reconstruction. Dose reduction techniques were used.   CONTRAST: Isovue 370    FINDINGS:     BONES:  -Above-knee amputation. No focal demineralization, cortical bone loss, or periostitis to suggest osteomyelitis.  -Age indeterminant , though new since the comparison CT of 7/14/2022, mildly impacted and comminuted fracture of the right pubic body. Nondisplaced fracture of the right inferior pubic ramus. Additional fractures of the left pubic root, inferior pubic   ramus, and at the left ischiopubic junction.  -Chronic appearing right sacral insufficiency fracture.  -Moderate degenerative arthrosis of the right hip with a small hip joint effusion. Degenerative arthrosis of the right SI  joint.    SOFT TISSUES:  -Soft tissue edema about the amputation stump. Scattered foci of gas within the subcutaneous fat adjacent to the lateral margin of the surgical incision (series 2 image 135 and series 6 image 153). No peripherally enhancing measurable fluid collection.  -Subcutaneous soft tissue edema about the right lower extremity and visualized medial left thigh.  -Arterial vascular calcifications. Metallic vascular stent within the right common iliac artery.      Impression    IMPRESSION:  1.  Prior above-knee amputation. No evidence of osteomyelitis.  2.  Scattered foci of soft tissue gas within the subcutaneous fat at the lateral margin of the surgical incision. Correlation with wound dehiscence suggested. No peripherally enhancing gas and fluid collection to suggest abscess.  3.  Age indeterminant, though new since the comparison CT of 7/14/2022, fractures of the right pubic body and inferior pubic ramus, and the left pubic root, inferior pubic ramus, and fascial pubic junction.  4.  Moderate degenerative arthrosis of the right hip with a small hip joint effusion, likely degenerative in etiology.     US Lower Extremity Venous Duplex Left   Result Value    Radiologist flags DVT (Urgent)    Narrative    EXAMINATION: DOPPLER VENOUS ULTRASOUND OF THE LEFT LOWER EXTREMITY,  5/17/2024 10:54 PM     COMPARISON: Left lower extremity DVT 4/20/2023, right lower extremity  ultrasound 12/15/2022    HISTORY: edema, sedentary    TECHNIQUE:  Gray-scale evaluation with compression, spectral flow, and  color Doppler assessment of the deep venous system of the left leg  from groin to knee, and then at the ankle.    FINDINGS: The left common femoral vein is partially compressible with  nonocclusive echogenic thrombus and color Doppler flow.    The left femoral vein is partially compressible  with thrombus filling  most of the brain with no spectral flow demonstrated.    The left popliteal, peroneal and posterior tibial  veins demonstrate  normal compressibility and blood flow.    Incidental thrombus in the right  common femoral artery with no  significant color flow or spectral flow demonstrated. Normal blood  flow to the right common femoral vein.      Impression    IMPRESSION:    1. Nonocclusive DVT in the left common femoral vein.  2. Near occlusive DVT in the left femoral vein.  3. Incidental noted thrombus in the right common femoral artery,  without detectable flow on Doppler,  consider dedicated arterial  duplex right lower extremity as clinically indicated.    [Urgent Result: DVT]    Finding was identified on 5/17/2024 11:05 PM.     Dr Lopez was contacted by Dr. Cason at 5/17/2024 11:22 PM and  verbalized understanding of the urgent finding.     I have personally reviewed the examination and initial interpretation  and I agree with the findings.    JEFF VELAZQUEZ MD         SYSTEM ID:  [id number]   CT Chest Pulmonary Embolism w Contrast    Narrative    EXAMINATION: CTA pulmonary angiogram, 5/18/2024 10:21 AM     COMPARISON: Chest x-ray 5/17/2024    HISTORY: Positive DVT, short of breath, rule out PE    TECHNIQUE: Volumetric helical acquisition of CT images of the chest  from the lung apices to the kidneys were acquired after the  administration of 41 mL of Isovue-370 IV contrast. Flash technique  with free breathing acquisition.  Post-processed multiplanar and/or  MIP reformations were obtained, archived to PACS and used in  interpretation of this study.     FINDINGS:  Contrast bolus is: excellent. Filling defect is seen in the  left lower subsegmental pulmonary artery (6/143) and 8/46)    Lungs: The trachea and central airways are patent. No pneumothorax or  pleural effusion. Dependent bibasilar consolidative opacities. Diffuse  interlobular septal thickening. Mild mosaic attenuation. Mild  paraseptal emphysematous change. Trace right pleural effusion. No  suspicious pulmonary nodule.    Mediastinum: The visualized  thyroid gland is unremarkable. The heart  size is within normal limits. No pericardial effusion. The ascending  aorta and main pulmonary artery diameters are within normal limits.  Normal appearance and configuration of the great vessels off of the  aortic arch. No suspicious mediastinal, hilar, or axillary lymph  nodes.    Upper abdomen: Limited. Atherosclerotic calcification of the abdominal  aorta. Contrast within the renal collecting system.    Bones: Degenerative changes of the spine with thoracic  dextroscoliosis. T12 wedge compression deformity with near complete  vertebral body height loss. Chronic appearing sternal fracture with  incomplete osseous union. Multiple chronic bilateral rib fractures.      Impression    IMPRESSION:     1. Filling defect in the left lower lobe subsegmental artery,  concerning for possible subsegmental PE.  2. Distally there is patchy groundglass density in the left lower lobe  may represent pneumonitis or evolving small infarct  3. Trace effusions with associated posterior bibasilar subsegmental  densities likely atelectatic.      In the event of a positive result for acute pulmonary embolism  resulting in right heart strain, please activate the PERT  Multidisciplinary group for consultation by paging [phone]  [phone]).     PERT -- Pulmonary Embolism Response Team (Multidisciplinary team  including cardiology, interventional radiology, critical care,  hematology)

## 2024-05-19 NOTE — DISCHARGE INSTRUCTIONS
"ELECTROLYTE MANAGEMENT:   - Your sodium is chronically low. I suspect this is due to a \"low solute\" diet (eating bland and more simple foods that often lack salt and electrolytes). It may also in part be due to dehydration.   - Your potassium and magnesium levels were slightly low in the hospital. This is also likely due to dietary deficiencies.   - I recommend eating a more well-rounded diet and staying hydrated. You could also try a daily electrolyte beverage. There are many many electrolyte beverages available to try. Examples of electrolyte beverages include Pedialyte or Gatorade.     "

## 2024-05-19 NOTE — PROGRESS NOTES
Admission/Transfer from: [facility]  2 RN skin assessment completed. Yes Gena CHRISTIAN RN  Significant findings include: PTA wound changed this AM on R leg stump (mepilex CDI).  Generalized bruising, especially on BUE.  Scabbing scattered on BUE.  Skin tear on L back & L elbow (mepilex placed).  Blanchable redness on R IT & coccyx (mepilex placed). L leg pale with +2 edema, skin intact.   WOC Nurse Consult Ordered? Yes WOC consult already in place for R AKA

## 2024-05-19 NOTE — PLAN OF CARE
Goal Outcome Evaluation:      Plan of Care Reviewed With: patient    Overall Patient Progress: improving    Outcome Evaluation: [id number]  A&Ox4, pt irritable & frustrated frequently.  VSS ex HTN this /89, weaning O2 down from 3L to 1L.  Denies SOB.  C/o intermittent back pain, tolerating weight shifting as pt allows but refuses regular q2h T&R.  Mepilex in place for skin tears on L back & L elbow, mepilex in place on R IT & coccyx for blanchable redness.  Wound on R AKA has alfredo CDI, WOC to see Monday.  PT/OT to see hopefully today. L PIV SL.

## 2024-05-19 NOTE — CONSULTS
Care Management Initial Consult    General Information  Assessment completed with: Patient, Spouse or significant other, -chart review,    Type of CM/SW Visit: Initial Assessment    Primary Care Provider verified and updated as needed: Yes   Readmission within the last 30 days: no previous admission in last 30 days      Reason for Consult: discharge planning  Advance Care Planning: Advance Care Planning Reviewed: present on chart          Communication Assessment  Patient's communication style: spoken language (English or Bilingual)    Hearing Difficulty or Deaf: no   Wear Glasses or Blind: no    Cognitive  Cognitive/Neuro/Behavioral: WDL                      Living Environment:   People in home: significant other     Current living Arrangements: house      Able to return to prior arrangements: yes       Family/Social Support:  Care provided by: spouse/significant other  Provides care for: no one, unable/limited ability to care for self  Marital Status: Lives with Significant Other  Significant Other          Description of Support System: Supportive, Involved    Support Assessment: Adequate family and caregiver support    Current Resources:   Patient receiving home care services: Yes  Skilled Home Care Services: Skilled Nursing, Physical Therapy  Community Resources: None  Equipment currently used at home: walker, rolling, wheelchair, power, commode chair  Supplies currently used at home: Wound Care Supplies    Employment/Financial:  Employment Status: retired        Financial Concerns: none   Referral to Financial Worker: No       Does the patient's insurance plan have a 3 day qualifying hospital stay waiver?  No    Lifestyle & Psychosocial Needs:  Social Determinants of Health     Food Insecurity: Not on file   Depression: Not at risk (1/8/2024)    PHQ-2     PHQ-2 Score: 1   Housing Stability: Not on file   Tobacco Use: Medium Risk (4/30/2024)    Patient History     Smoking Tobacco Use: Former     Smokeless  Tobacco Use: Never     Passive Exposure: Past   Financial Resource Strain: Not on file   Alcohol Use: Not on file   Transportation Needs: Not on file   Physical Activity: Not on file   Interpersonal Safety: Not on file   Stress: Not on file   Social Connections: Unknown (11/18/2022)    [facility]    Social Connections     Frequency of Communication with Friends and Family: Not on file   Health Literacy: Not on file       Functional Status:  Prior to admission patient needed assistance:   Dependent ADLs:: Wheelchair-with assist, Transfers, Dressing, Bathing, Toileting  Dependent IADLs:: Cleaning, Cooking, Laundry, Shopping, Medication Management, Meal Preparation, Transportation       Mental Health Status:  Mental Health Status: No Current Concerns       Chemical Dependency Status:  Chemical Dependency Status: No Current Concerns             Values/Beliefs:  Spiritual, Cultural Beliefs, Latter day Practices, Values that affect care: yes       Cultural/Latter day Practices Patient Routinely Participates In: prayer  Values/Beliefs Comment: Declined wantitng to see spritual health while hospitalized    Additional Information:  Sakshi Jaeger is a 78 year old woman with a history of R femoral endarterectomy and common iliac artery stenting s/p R AKA who was admitted with generalized weakness.     SW met with the patient and her significant other Santi at bedside to complete initial assessment. SW introduced self and explained their role in the patient's care. SW verified the patient's PCP, address, and insurance.    Patient lives in a house with her significant other Santi. There are 3 steps entering the house. The patient reported that Santi will help with most ADLs and all IADLs. Patient has a walker, wheelchair, power scooter, and commode at home. The patient and Santi denied concerns regarding the patient returning  home. The patient has home care through [facility], She has a nurse visit 1x/week for wound cares and receives PT 1x/week. They want services resumed at the time of discharge. Santi will provide transportation at the time of discharge. The patient and Santi denied having any questions or concerns at this time.    Care Management Discharge Note    Discharge Date: 05/21/2024       Discharge Disposition: Home, Home Care    Discharge Services:  Home care (RN, PT)    Discharge DME: None    Discharge Transportation: family or friend will provide    Private pay costs discussed: Not applicable    Does the patient's insurance plan have a 3 day qualifying hospital stay waiver?  No    PAS Confirmation Code:    Patient/family educated on Medicare website which has current facility and service quality ratings: no    Education Provided on the Discharge Plan: Yes  Persons Notified of Discharge Plans: Patient[facility] patients significant other, [facility]  Patient/Family in Agreement with the Plan: yes    Handoff Referral Completed: Yes    Additional Information:  Patient will discharge home today. Home care for 1x/weekly RN and PT will be resumed.    [facility] liaison updated.    Gary EDMONDS  5/19/2024       Social Work and Care Management Department     SEARCHABLE in [employer] - search SOCIAL WORK     [place] (0800 - 1630) Saturday and Sunday     Units: 4A Vocera, 4C Vocera, & 4E Vocera        Units: 5A [id number] Vocera, 5A 2280-5179 Vocera , BMT SW 1 BMT SW 2, BMT SW 3 & BMT SW 4  5C Off Service 5401 - 5416  5C Off Service [id number]     Units: 6A Vocera & 6B Vocera      Units: 6C Vocera     Units: 7A Vocera & 7B Vocera      Units: 7C Med Surg 7401 thru 7418 and 7C Med Surg 7502 thru 7521      Unit: [place] ED Vocera & [place] Obs Vocera

## 2024-05-19 NOTE — PLAN OF CARE
Physical Therapy defer - Orders received, chart reviewed, discussed with care team. No IP PT needs indicated. Per OT patient at functional baseline, anticipate will be able to return home with continued assist from spouse and HH PT. Plan for OT to continue to follow to address functional mobility as needed. Will complete consult and defer evaluation, please reconsult as appropriate if patient has decline in functional mobility requiring further skilled inpatient PT needs. Defer Discharge recommendations to OT/medical team.

## 2024-05-19 NOTE — CONSULTS
[facility]  WO Nurse Inpatient Assessment     Consulted for: Right AKA    Summary: Being followed by vascular with home care nurse already set up. No changes with plan of care for wounds.     Patient History (according to provider note(s):      Sakshi Jaeger is a 78 year old woman with a history of R femoral endarterectomy and common iliac artery stenting s/p R AKA who was admitted with generalized weakness.     Assessment:      Areas visualized during today's visit: Lower extremities     Wound location: Right AKA stump site    Last photo: 5/19/24  Wound due to: Surgical Wound  Wound history/plan of care: Follows with Vascular Surgery, last evaluated on 4/30. Wound healing nicely per OP visit notes and patient/family report. No concerns for infection on exam today. CT RLE completed on admission, findings c/w known wound dehiscence.    - Continue to monitor   - PTA gabapentin 300 mg TID  Wound base: 100 % non-granular tissue,      Palpation of the wound bed: normal      Drainage: small     Description of drainage: cloudy and yellow     Measurements (length x width x depth, in cm): 1  x 0.3  x  1 cm      Tunneling: N/A     Undermining: N/A  Periwound skin: Intact      Color: normal and consistent with surrounding tissue      Temperature: normal   Odor: none  Pain: moderate, sharp when packing or assessing wound with applicator   Pain interventions prior to dressing change: slow and gentle cares   Treatment goal: Maintain (prevention of deterioration)  STATUS: initial assessment  Supplies ordered: supplies stored on unit      Treatment Plan:     Per Vascular Surgery    Wound care of RLE incision at home:  Remove old dressing  Clean wound with Vashe   Pack incision with packing strip  Clean wound surroundings with iodine   Apply clean dressing over incision  Change dressing daily    Orders: Reviewed    RECOMMEND PRIMARY TEAM ORDER: None, at this time  Education provided:  wound progress and Moisture management  Discussed plan of care with: Patient and Family  [facility] nurse follow-up plan: weekly  Notify [facility] if wound(s) deteriorate.  Nursing to notify the Provider(s) and re-consult the [facility] Nurse if new skin concern.    DATA:     Current support surface: Standard  Standard Isoflex gel  Containment of urine/stool: Incontinence Protocol  BMI: Body mass index is 15.33 kg/m .   Active diet order: Orders Placed This Encounter      Combination Diet Regular Diet Adult      Diet     Output: I/O last 3 completed shifts:  In: 340 [P.O.:340]  Out: 400 [Urine:400]     Labs:   Recent Labs   Lab 05/18/24  0833 05/17/24  1548 05/17/24  1547   ALBUMIN  --   --  3.0*   HGB 8.4*  --  9.5*  9.5*   INR  --  1.20*  --    WBC 5.7  --  6.6  6.6     Pressure injury risk assessment:   Sensory Perception: 3-->slightly limited  Moisture: 3-->occasionally moist  Activity: 2-->chairfast  Mobility: 2-->very limited  Nutrition: 2-->probably inadequate  Friction and Shear: 2-->potential problem  Alexandro Score: 14    Kaitlin Brannon RN CWOCN  Please contact through [employer] group: [facility] Nurse  Dept. Office Number: [phone]

## 2024-05-19 NOTE — PLAN OF CARE
Discharge to: home  Transportation:  present to provide ride  Time: 1445  Prescriptions: picked up from discharge pharmacy and brought to  at bedside  Belongings: all belongings accounted for and sent w/ patient   Access: PIV removed  Care plan and education discontinued: yes  Paperwork: AVS printed and gone through w/ patient and  at bedside. Education provided and all questions answered at this time

## 2024-05-19 NOTE — DISCHARGE SUMMARY
[facility]  Hospitalist Discharge Summary      Date of Admission:  5/17/2024  Date of Discharge:  5/19/2024  Discharging Provider: Niecy Shields PA-C  Discharge Service: Hospitalist Service, GOLD TEAM 1    Discharge Diagnoses   Physical deconditioning  Acute LLE DVT  LLL subsegmental PE   Elevated CRP  Right AKA wound dehiscence  S/p right AKA  Chronic hyponatremia  Hypokalemia  Hypomagnesemia      Clinically Significant Risk Factors          Follow-ups Needed After Discharge   Follow-up with PCP for hospital follow-up and AC planning    Unresulted Labs Ordered in the Past 30 Days of this Admission       Date and Time Order Name Status Description    5/17/2024  7:00 PM Blood Culture Arm, Right Preliminary     5/17/2024  3:46 PM Blood Culture Peripheral Blood Preliminary         These results will be followed up by PCP    Discharge Disposition   Discharged to home  Condition at discharge: Stable    Hospital Course   Sakshi Jaeger is a 78 year old woman with a history of R femoral endarterectomy and common iliac artery stenting s/p R AKA who was admitted with generalized weakness.      Generalized weakness: Had several week stay at TCU and discharged home in March. Was receiving home PT services and improving but had significant functional decline after a 3-week lapse in home services. Extensive work-up completed since admission with no clear metabolic, infectious, or other organic etiology to explain her weakness. Her weakness is most consistent with general physical deconditioning. Initial concern for encephalopathy but she is mentating at baseline.   - PT, OT consulted - recommended discharge home with continued home therapy services    Acute LLE DVT  LLL subsegmental PE  Elevated CRP in ED prompted work-up including LLE ultrasound. Ultrasound revealed non-occlusive DVT in left common femoral vein and near occlusive DVT in left femoral vein. CT chest with LLL  subsegmental PE. Started on heparin gtt on admission. Clots most likely provoked by immobility. Of note, LLE US in April 2023 revealed similar non-occlusive DVT in left common femoral vein for which she completed a course of apixaban.   - Discharged on apixaban. Will need to discuss long-term AC with PCP given her ongoing immobility.     Elevated CRP:  in ED. Procal slightly elevated at 0.75. Elevated CRP most likely secondary to acute DVT/PE. Infectious work-up otherwise unrevealing to date. She has no focal s/s suggestive of a particular infectious process. R AKA wound healing nicely.     Right AKA wound dehiscence  S/p right AKA (2/2024)  Follows with Vascular Surgery, last evaluated on 4/30. Wound healing nicely per OP visit notes and patient/family report. No concerns for infection on exam. CT RLE completed on admission, findings c/w known wound dehiscence.     Chronic hyponatremia: Na ranges ~124 - 132 at baseline, currently within her baseline range.      Hypokalemia  Hypomagnesemia  Secondary to nutrition deficiencies. Labs normalized with replacement.      Other Chronic Medical Issues:  CAD, HTN, HLD: Continue PTA ASA, amlodipine, lisinopril, statin.   Rheumatoid arthritis: Continue PTA prednisone, leflunomide, methotrexate, oxycodone.    Consultations This Hospital Stay   MEDICATION HISTORY IP PHARMACY CONSULT  PHYSICAL THERAPY ADULT IP CONSULT  OCCUPATIONAL THERAPY ADULT IP CONSULT  WOUND OSTOMY CONTINENCE NURSE  IP CONSULT  PHARMACY IP CONSULT  SOCIAL WORK IP CONSULT    Code Status   Full Code    Time Spent on this Encounter   I, Niecy Shields PA-C, personally saw the patient today and spent greater than 30 minutes discharging this patient.       Niecy Shields PA-C  [facility] 7C MED SURG  [address] [address]  Phone: [phone]  ______________________________________________________________________    Physical Exam   Vital Signs: Temp: 97.8  F (36.6  C)  Temp src: Oral BP: 115/62 Pulse: 68   Resp: 16 SpO2: 97 % O2 Device: Nasal cannula Oxygen Delivery: 1 LPM  Weight: 89 lbs 4.58 oz  Constitutional: Awake and alert, in no apparent distress.   Eyes: Sclera clear, anicteric   Respiratory: Breathing non-labored. CTAB.   Cardiovascular:  RRR, normal S1/S2. No rubs or murmurs. 2+ pitting edema of LLE  GI: Soft, non-tender, non-distended. Normoactive bowel sounds.   Skin: R AKA wound site with mild dehiscence, overall healing nicely with no purulence, warmth, erythema noted. Good color. No jaundice. No visible rashes, lesions, or bruising of concern.   Neurologic: Alert and oriented.    Primary Care Physician   Lucia Bates    Discharge Orders      Primary Care - Care Coordination Referral      Activity    Your activity upon discharge: activity as tolerated     Resume Home Care Services     Reason for your hospital stay    Dear Sakshi Jaeger,    You were hospitalized at [facility] with weakness. You were found to have a blood clot in your left leg and also in your left lung. You will need to take a blood thinning medication to treat these blood clots. Today you are ready to be discharged home with continued home therapy services. You should continue to improve but if you develop fever, shortness of breath, light headedness, chest pain or otherwise worsen please seek medical attention.      Take care!    Niecy Shields PA-C   Hospitalist Service     Adult [facility] Follow-up and recommended labs and tests    Follow up with primary care provider, Lucia Bates, within 1 month for hospital follow-up and discussion of blood thinning medication plan. This can be a virtual visit.    Appointments on [place] and/or [place] campus (with [facility] or [facility] provider or service). Call [phone] if you haven't heard regarding these appointments within 7 days of discharge.     Diet    Follow this diet upon discharge: regular diet        Significant Results and Procedures   ROUTINE IP LABS (Last four results)  Recent Labs   Lab 05/19/24  1315 05/19/24  0640 05/18/24  0833 05/18/24  0242 05/17/24  1548 05/17/24  1548 05/17/24  1547   NA  --   --  131*  --   --   --  126*   POTASSIUM  --  4.1 3.7 3.5  --   --  3.0*   CHLORIDE  --   --  98  --   --   --  91*   CO2  --   --  22  --   --   --  21*   ANIONGAP  --   --  11  --   --   --  14   GLC  --   --  103*  --   --   --  80   BUN  --   --  10.7  --   --   --  11.9   CR  --   --  0.36*  --   --  0.47* 0.46*   ADRIAN  --   --  8.1*  --   --   --  8.4*   MAG 2.2 1.5* 1.9 1.9   < > 1.4*  --    PROTTOTAL  --   --   --   --   --   --  5.3*   ALBUMIN  --   --   --   --   --   --  3.0*   BILITOTAL  --   --   --   --   --   --  0.5   ALKPHOS  --   --   --   --   --   --  88   AST  --   --   --   --   --   --  87*   ALT  --   --   --   --   --   --  29    < > = values in this interval not displayed.     Recent Labs   Lab 05/18/24  0833 05/17/24  1547   WBC 5.7 6.6  6.6   RBC 2.97* 3.29*  3.29*   HGB 8.4* 9.5*  9.5*   HCT 25.7* 28.3*  28.3*   MCV 87 86  86   MCH 28.3 28.9  28.9   MCHC 32.7 33.6  33.6   RDW 21.2* 21.0*  21.0*    326  326     Recent Labs   Lab 05/17/24  1548   INR 1.20*            Discharge Medications   Current Discharge Medication List        START taking these medications    Details   apixaban ANTICOAGULANT (ELIQUIS) 5 MG tablet Take 2 tablets (10 mg) by mouth 2 times daily for 7 days, THEN 1 tablet (5 mg) 2 times daily for 83 days.  Qty: 194 tablet, Refills: 0    Associated Diagnoses: Acute deep vein thrombosis (DVT) of femoral vein of left lower extremity (H)           CONTINUE these medications which have NOT CHANGED    Details   acetaminophen (TYLENOL) 325 MG tablet Take 2 tablets (650 mg) by mouth every 6 hours  Qty: 24 tablet, Refills: 0    Associated Diagnoses: Amputation of leg (H)      amLODIPine (NORVASC) 5 MG tablet TAKE 1 TABLET BY MOUTH EVERY DAY  Qty: 90 tablet,  Refills: 3    Associated Diagnoses: Essential hypertension with goal blood pressure less than 140/90      aspirin (ASA) 81 MG tablet Take 81 mg by mouth daily  Qty:      Associated Diagnoses: Coronary artery disease involving native coronary artery of native heart without angina pectoris      calcium carbonate 600 mg-vitamin D 400 units (CALCIUM 600 + D) 600-400 MG-UNIT per tablet Take 2 tablets by mouth daily    Associated Diagnoses: Rheumatoid arthritis, involving unspecified site, unspecified rheumatoid factor presence      Elemental iron 65 mg Vitamin C 125 mg (VITRON C)  MG TABS tablet Take 1 tablet by mouth daily      fluocinolone acetonide oil 0.01 % ear drops Place 0.25 mLs (5 drops) into both ears twice a week  Qty: 20 mL, Refills: 3    Associated Diagnoses: Bilateral impacted cerumen; Ear fullness, bilateral      folic acid (FOLVITE) 1 MG tablet Take 1 mg by mouth daily      gabapentin (NEURONTIN) 300 MG capsule Take 300 mg by mouth 3 times daily as needed for neuropathic pain      leflunomide (ARAVA) 20 MG tablet Take 1 tablet by mouth every 24 hours      Lidocaine (LIDOCARE) 4 % Patch Place 1 patch onto the skin every 24 hours To prevent lidocaine toxicity, patient should be patch free for 12 hrs daily.      lisinopril (ZESTRIL) 20 MG tablet TAKE 1 TABLET BY MOUTH EVERY DAY  Qty: 90 tablet, Refills: 0    Associated Diagnoses: Essential hypertension with goal blood pressure less than 140/90      methotrexate 2.5 MG tablet Take 20 mg by mouth every 7 days On Saturdays      metoprolol succinate ER (TOPROL XL) 50 MG 24 hr tablet TAKE ONE TABLET BY MOUTH EVERY DAY  Qty: 90 tablet, Refills: 0    Comments: Due for visit  Associated Diagnoses: Essential hypertension with goal blood pressure less than 140/90; Coronary artery disease involving native coronary artery of native heart without angina pectoris      oxyCODONE (ROXICODONE) 5 MG tablet Take 1 tablet (5 mg) by mouth 2 times daily as needed for  pain  Qty: 14 tablet, Refills: 0    Associated Diagnoses: Status post above-knee amputation of right lower extremity (H)      polyethylene glycol (MIRALAX) 17 GM/Dose powder Take 17 g by mouth daily  Qty: 510 g, Refills: 0    Associated Diagnoses: Amputation of leg (H)      predniSONE (DELTASONE) 5 MG tablet Take 1 tablet (5 mg) by mouth daily  Refills: 0    Associated Diagnoses: Rheumatoid arthritis, involving unspecified site, unspecified rheumatoid factor presence      simvastatin (ZOCOR) 20 MG tablet Take 1 tablet (20 mg) by mouth At Bedtime  Qty: 90 tablet, Refills: 1    Associated Diagnoses: Hyperlipidemia LDL goal <100           Allergies   Allergies   Allergen Reactions    Adhesive Tape Other (See Comments)     Fragile skin

## 2024-05-20 NOTE — LETTER
[facility] CARE COORDINATION  [address]  [place] [address]    May 20, 2024    Sakshi Jaeger  [address] [address]      Dear Sakshi,    I am a clinic care coordinator who works with DESIRE Barnes CNP with the [facility]. I wanted to introduce myself and provide you with my contact information for you to be able to call me with any questions or concerns. Below is a description of clinic care coordination and how I can further assist you.       The clinic care coordination team is made up of a registered nurse, , financial resource worker and community health worker who understand the health care system. The goal of clinic care coordination is to help you manage your health and improve access to the health care system. Our team works alongside your provider to assist you in determining your health and social needs. We can help you obtain health care and community resources, providing you with necessary information and education. We can work with you through any barriers and develop a care plan that helps coordinate and strengthen the communication between you and your care team.  Our services are voluntary and are offered without charge to you personally.    Please feel free to contact me with any questions or concerns regarding care coordination and what we can offer.      We are focused on providing you with the highest-quality healthcare experience possible.    Sincerely,     Deepak Walker MSN, RN, PHN, CCM   Primary Care Clinical RN Care Coordinator  [facility]  5/20/2024   12:27 PM  [email or web address]  Office: [phone]

## 2024-05-20 NOTE — PLAN OF CARE
Occupational Therapy Discharge Summary    Reason for therapy discharge:    Discharged to home with home therapy.    Progress towards therapy goal(s). See goals on Care Plan in [facility] electronic health record for goal details.  Goals partially met.  Barriers to achieving goals:   discharge from facility.    Therapy recommendation(s):    Pt close to baseline. Rec home w/ assist.  able/willing to provide assist with all I/ADLs. Pt's home is accessible. Rec to continue HH PT. May benefit from HH OT to address cognition.  may benefit from PCA/assistance to prevent caregiver burnout.

## 2024-05-20 NOTE — PROGRESS NOTES
Clinic Care Coordination Contact  [facility]/Voicemail    Clinical Data: Care Coordinator Outreach    Outreach Documentation Number of Outreach Attempt   5/20/2024  12:27 PM 1       Left message on patient's voicemail with call back information and requested return call.    Plan: Care Coordinator will send care coordination introduction letter with care coordinator contact information and explanation of care coordination services via mail. Care Coordinator will try to reach patient again in 1-2 business days.    Deepak Walker MSN, RN, PHN, CCM   Primary Care Clinical RN Care Coordinator  [facility]  5/20/2024   12:28 PM  [email or web address]  Office: [phone]

## 2024-05-22 NOTE — PROGRESS NOTES
Clinic Care Coordination Contact  Care Coordination Clinician Chart Review    Situation: Patient chart reviewed by care coordinator.    Background: Clinic Care Coordination Referral received from inpatient care team for transition handoff communication following hospital admission.    Assessment: Upon chart review, patient is not a candidate for Primary Care Clinic Care Coordination enrollment due to reason stated below:  No answer to the calls made by the RN CC.    Plan/Recommendations: Clinic Care Coordination Referral/order cancelled. RN/SW CC will perform no further monitoring/outreaches at this time and will remain available as needed. If new needs arise, a new Care Coordination Referral may be placed.    Deepak Walker MSN, RN, PHN, CCM   Primary Care Clinical RN Care Coordinator  [facility]  5/22/2024   8:59 AM  [email or web address]  Office: [phone]

## 2024-05-23 NOTE — TELEPHONE ENCOUNTER
LON Barry with [facility] home care.    Says patient has been with [facility] for a couple of months.    Patient recently hospitalized for possible wound infection and dehydration.   She came  home on Sunday.    Hospital originally advised patient should go to TCU to get stronger, patient and  declined but now  is struggling to care for Sakshi and they want to have her go to TCU to get stronger.    I advised we cannot admit or arrange TCU, she needs to go back to ER for that.    Asha will let family know.    Isidra CHRISTIAN RN  [facility] Triage

## 2024-05-23 NOTE — TELEPHONE ENCOUNTER
Received call from [facility] RN Asha regarding patient. Sakshi is experiencing more weakness at home and her  is concerned about being able to provide care for her. They are now interested in pt going to a TCU / SNF for care due to her deconditioning. Unfortunately home PT has had limited success.    I instructed them to bring this to their PCP. I will also update your team to see if we are able to assist.    MILTON Davenport, RN  RN Care Coordinator  [facility] Vascular Surgery - [facility] phone: [phone]  Fax: [phone]

## 2024-05-24 PROBLEM — K92.1 GASTROINTESTINAL HEMORRHAGE WITH MELENA: Status: ACTIVE | Noted: 2024-01-01

## 2024-05-24 PROBLEM — R53.1 GENERALIZED WEAKNESS: Status: ACTIVE | Noted: 2024-01-01

## 2024-05-24 NOTE — ED TRIAGE NOTES
Arrives by w/c with generalized weakness and swelling in left hand. Per family patient was recently discharged on 5/19 declined to go to TCU. Family is unable to care for her and wanting her to go to TCU.     Triage Assessment (Adult)       Row Name 05/24/24 1320          Triage Assessment    Airway WDL WDL        Respiratory WDL    Respiratory WDL WDL        Skin Circulation/Temperature WDL    Skin Circulation/Temperature WDL WDL        Cardiac WDL    Cardiac WDL WDL        Peripheral/Neurovascular WDL    Peripheral Neurovascular WDL WDL        Cognitive/Neuro/Behavioral WDL    Cognitive/Neuro/Behavioral WDL WDL

## 2024-05-24 NOTE — TELEPHONE ENCOUNTER
PCP to manage. Updated [facility] RN.    AMANDA DavenportN, RN  RN Care Coordinator  [facility] Vascular Surgery - [facility] phone: [phone]  Fax: [phone]

## 2024-05-24 NOTE — PROGRESS NOTES
ED PT Eval     05/24/24 1600   Appointment Info   Signing Clinician's Name / Credentials (PT) Fab Alfaro, PT, DPT   Quick Adds   Quick Adds Certification   Living Environment   People in Home spouse   Current Living Arrangements house   Home Accessibility no concerns   Transportation Anticipated family or friend will provide   Living Environment Comments Pt lives with  in a house. Ramp installed to enter. Stairs to basement but pt does not need to go to basement.  assists with sponge bathing and commode for toileting.   Self-Care   Usual Activity Tolerance poor   Current Activity Tolerance poor   Regular Exercise No   Equipment Currently Used at Home walker, rolling;wheelchair, power;commode chair   Fall history within last six months no   Activity/Exercise/Self-Care Comment Partner states that patient could stand IND several months ago and pivot mod I but now requires near dependent assist for all transfers/mobility.   General Information   Onset of Illness/Injury or Date of Surgery 05/24/24   Referring Physician Zaheer Estrada, DO   Patient/Family Therapy Goals Statement (PT) To return home   Pertinent History of Current Problem (include personal factors and/or comorbidities that impact the POC) FTT   Existing Precautions/Restrictions fall   General Observations PT consult requested by MD   Cognition   Affect/Mental Status (Cognition) WFL   Range of Motion (ROM)   ROM Comment L knee limited into flexion/extension 2/2 edema   Strength (Manual Muscle Testing)   Strength Comments grossly deconditioned, L knee extension L <3/5   Transfers   Transfers sit-stand transfer;bed-chair transfer   Bed-Chair Transfer   Bed-Chair [place] (Transfers) maximum assist (25% patient effort)   Sit-Stand Transfer   Sit-Stand [place] (Transfers) maximum assist (25% patient effort)   Gait/Stairs (Locomotion)   Comment, (Gait/Stairs) unable to ambulate at baseline   Balance   Balance Comments CGA sitting on  rollator with noted progressive anterior sliding   Clinical Impression   Criteria for Skilled Therapeutic Intervention Yes, treatment indicated   PT Diagnosis (PT) impaired functional mobility   Influenced by the following impairments functional weakness, pain   Functional limitations due to impairments transfers, bed mobility, toileting, standing   Clinical Presentation (PT Evaluation Complexity) stable   Clinical Presentation Rationale clinical judgement   Clinical Decision Making (Complexity) low complexity   Planned Therapy Interventions (PT) balance training;bed mobility training;gait training;ROM (range of motion);stair training;strengthening;transfer training;manual therapy techniques   Risk & Benefits of therapy have been explained evaluation/treatment results reviewed;care plan/treatment goals reviewed;risks/benefits reviewed;current/potential barriers reviewed;participants voiced agreement with care plan;participants included;patient   PT Total Evaluation Time   PT Eval, Low Complexity Minutes (79345) 15   Therapy Certification   Start of care date 05/24/24   Certification date from 05/24/24   Certification date to 06/07/24   Medical Diagnosis FTT   Physical Therapy Goals   PT Frequency 5x/week   PT Predicted Duration/Target Date for Goal Attainment 06/07/24   PT Goals Bed Mobility;Transfers   PT: Bed Mobility Modified independent;Supine to/from sit   PT: Transfers Modified independent;Sit to/from stand;Assistive device   Interventions   Interventions Quick Adds Therapeutic Activity   PT Discharge Planning   PT Plan Sit<>stand, pivot transfers   PT Discharge Recommendation (DC Rec) Transitional Care Facility   PT Rationale for DC Rec At this time patient is functioning below reported baseline.  Patient currently requires max assist for all mobility and transfers and caregiver states he is unable to provide this level of support at home.  Recommend TCU stay to maximize strength and mobility before eventual  return home with continued 24-hour caregiver support.  Patient may benefit from consideration of eventual long-term care placement pending progress following this TCU bout.   PT Brief overview of current status Ax1 for pivot   Total Session Time   Total Session Time (sum of timed and untimed services) 15         Fab Alfaro, PT, DPT      [facility]  OUTPATIENT PHYSICAL THERAPY EVALUATION  PLAN OF TREATMENT FOR OUTPATIENT REHABILITATION  (COMPLETE FOR INITIAL CLAIMS ONLY)  Patient's Last Name, First Name, M.I.  YOB: 1945  MilSakshi                        Provider's Name  [facility] Medical Record No.  [id number]                             Onset Date:  05/24/24   Start of Care Date:  05/24/24   Type:     _X_PT   ___OT   ___SLP Medical Diagnosis:  FTT              PT Diagnosis:  impaired functional mobility Visits from SOC:  1     See note for plan of treatment, functional goals and certification details    I CERTIFY THE NEED FOR THESE SERVICES FURNISHED UNDER        THIS PLAN OF TREATMENT AND WHILE UNDER MY CARE     (Physician co-signature of this document indicates review and certification of the therapy plan).

## 2024-05-24 NOTE — CONSULTS
Care Management Initial Consult    General Information  Assessment completed with: Patient, Spouse or significant other,    Type of CM/SW Visit: Initial Assessment    Primary Care Provider verified and updated as needed: Yes   Readmission within the last 30 days: previous discharge plan unsuccessful   Return Category:  (Pt declined TCU)  Reason for Consult: discharge planning  Advance Care Planning:            Communication Assessment  Patient's communication style: spoken language (English or Bilingual)             Cognitive  Cognitive/Neuro/Behavioral: WDL                      Living Environment:   People in home: significant other     Current living Arrangements: house      Able to return to prior arrangements: no       Family/Social Support:  Care provided by: spouse/significant other  Provides care for: no one, unable/limited ability to care for self  Marital Status: Single  Significant Other          Description of Support System: Supportive, Involved    Support Assessment: Adequate social supports    Current Resources:   Patient receiving home care services: Yes  Skilled Home Care Services: Skilled Nursing, Physical Therapy  Community Resources: None  Equipment currently used at home: walker, rolling, wheelchair, power, commode chair  Supplies currently used at home: Wound Care Supplies    Employment/Financial:  Employment Status: retired        Financial Concerns: none   Referral to Financial Worker: No       Does the patient's insurance plan have a 3 day qualifying hospital stay waiver?  Yes     Which insurance plan 3 day waiver is available? Alternative insurance waiver    Will the waiver be used for post-acute placement? Undetermined at this time    Lifestyle & Psychosocial Needs:  Social Determinants of Health     Food Insecurity: Not on file   Depression: Not at risk (1/8/2024)    PHQ-2     PHQ-2 Score: 1   Housing Stability: Not on file   Tobacco Use: Medium Risk (4/30/2024)    Patient History      Smoking Tobacco Use: Former     Smokeless Tobacco Use: Never     Passive Exposure: Past   Financial Resource Strain: Not on file   Alcohol Use: Not on file   Transportation Needs: Not on file   Physical Activity: Not on file   Interpersonal Safety: Not on file   Stress: Not on file   Social Connections: Unknown (11/18/2022)    [facility]    Social Connections     Frequency of Communication with Friends and Family: Not on file   Health Literacy: Not on file       Functional Status:  Prior to admission patient needed assistance:   Dependent ADLs:: Wheelchair-with assist, Transfers, Dressing, Bathing, Toileting  Dependent IADLs:: Cleaning, Cooking, Laundry, Shopping, Medication Management, Meal Preparation, Transportation  Assesssment of Functional Status: Needs placement in a SNF/[facility] for rehabilitation    Mental Health Status:  Mental Health Status: No Current Concerns       Chemical Dependency Status:  Chemical Dependency Status: No Current Concerns             Values/Beliefs:  Spiritual, Cultural Beliefs, Hinduism Practices, Values that affect care: yes               Additional Information:  Writer received bobbiide consult from ED Ashley and ED Lora. Pt and hermes are reporting pt is too weak to be managed at home. Pt was recently discharged home 5/19/2024 with [facility] services through [facility].     Paged PT for eval. Case reviewed with PT. Pt could benefit from TCU, but may be near around her baseline. PT reports he started trying to frame a conversation about pt considering [facility] in the future but pt and fiance report they could not afford it and were not interested in it at this time.    Writer met with pt and hermes in ED lobby. Confirmed desire for TCU placement. Discussed potential discharge needs to include TCU. List of Medicare certified options reviewed with patient/family/caregiver. Offered list and patient's right  to choose among available options. Explained any financial ties to [facility]. Pt and fiance had questions about cost and coverage for TCU. Writer explained that pt would have to have had a break in cares from TCU for 60 days, pt's medicare days will reset. If pt had not had a break in 60 days in care, it would resume from whatever day she left the facility. Pt and Fiance expressed their frustration with the expenses of copays for both the TCU and for her current ED visit.    Family gave writer a list of preferences and are listed below.     Writer received update from ED Ashley. Pt has a GI bleed and is anticipated to be admitted. Due to the acuity of needs in the ED, writer will defer to the weekend SW to meet with pt/fiance to discuss financial concerns. Unclear if pt would qualify for MA and to address if these will be a barrier to placement/coverage at SNF.       [facility] - #1 choice  P: [phone]  P: [phone] - Admissions  F: [phone]     [facility] - #2   [address] [place]  [address]  P: [phone]  F: [phone]    [facility] - #3  [address] [place]  [address]  P: [phone]  P: [phone] - Admissions  F: [phone]    [facility]  [address] [place]  [address]  P: [phone]  P: [phone] - Admissions  F: [phone]    [facility]  [address] [place] [address]  [address]  P: [phone]  F: [phone]    [facility] [place] [facility]  P: [phone] - [place] Weekend Liaison  F: [phone]    [facility]   [address] [place]  [address]  P: [phone]  P: [phone] - Admissions  F: [phone]    ________________    DEBI Celeste, [facility]  ED/Observation   [facility]  Phone: [phone]  Fax: [phone]    After hours [email or web address] [place] and After Hours fitkit  Available from 4:00pm - Midnight

## 2024-05-24 NOTE — TELEPHONE ENCOUNTER
Patient Quality Outreach    Patient is due for the following:   Physical Annual Wellness Visit    Next Steps:   Schedule a Annual Wellness Visit    Type of outreach:    Sent letter.    Next Steps:  Reach out within 90 days via Letter.    Max number of attempts reached: Yes. Will try again in 90 days if patient still on fail list.    Questions for provider review:    None           Nereida Hernandez [facility]  Chart routed to closing chart  .

## 2024-05-24 NOTE — ED PROVIDER NOTES
"    [facility] ([facility])    5/24/24       ED PROVIDER NOTE       History     Chief Complaint   Patient presents with    Generalized Weakness     HPI  Sakshi Jaeger is a 78 year old female with a history of R femoral endarterectomy and common iliac artery stenting s/p R AKA who presents to the ED for evaluation of generalized weakness.    Patient was admitted on 5/17/24 for evaluation of a DVT of femoral vein of left lower extremity and discharged on 5/19/24. She started Eliquis on 5/18. Patient thinks she needs to go to a TCU facility to get with with daily cares and regain strength in her extremities. Patient states she is very tired. Patient notes dark and \"muddy\" stool since she was discharged. She states she is not urinating as frequently and didn't have urinary voiding or a bowel movement all day on Wednesday (5/22). Patient has had poor appetite. Denies history of GI bleed. Patient also endorses left hand swelling and an open wound on her right leg from amputation. Denies chest pain or shortness of breath. Denies dizziness or lightheadedness.     Past Medical History  Past Medical History:   Diagnosis Date    Anemia 8/24/2022    BENIGN HYPERTENSION 3/1/2005    CAD (coronary artery disease) 1/26/2011    Coronary artery disease involving native coronary artery of native heart without angina pectoris 9/27/2016    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    Essential hypertension with goal blood pressure less than 140/90 8/25/2016    Hyperlipidemia LDL goal <100 11/12/2010    Hypertension goal BP (blood pressure) < 130/80 1/26/2011    Infection of right below knee amputation (H) 10/1/2019    IRON DEFIC ANEMIA NOS 9/15/2005    Lower limb amputation, below knee 12/26/2012    MI (myocardial infarction) (H)     3/2010    Non-healing surgical wound, subsequent encounter 9/24/2020    Added automatically from request for surgery [id number]    Osteoporosis 2/16/2005    OSTEOPOROSIS NOS 2/16/2005    " Protein-calorie malnutrition (H24) 5/18/2022    PVD (peripheral vascular disease) (H24) 5/18/2022    Rheumatoid arthritis (H) 2/16/2005         Rheumatoid arthritis(714.0) 2/16/2005    Status post below-knee amputation (H) 12/26/2012          Past Surgical History:   Procedure Laterality Date    ---------INCISION AND DRAINAGE  03/05/2014    AMPUTATE LEG ABOVE KNEE Right 1/13/2023    Procedure: AMPUTATION, ABOVE KNEE RIGHT;  Surgeon: Emma Oconnor MD;  [facility]    AMPUTATE LEG ABOVE KNEE Right 2/2/2024    Procedure: Redo ABOVE KNEE AMPUTATION;  Surgeon: Bryon Mitchell MD;  [facility]    AMPUTATE LEG BELOW KNEE Right 9/28/2022    Procedure: Right through knee amputation;  Surgeon: Doron Mott MD;  [facility]    AMPUTATION BELOW KNEE RT/LT  01/01/2005    right lowwer leg.     ANGIOGRAM Right 1/13/2023    Procedure: right iliac arteriogram aned right common iliac artery stent;  Surgeon: Emma Oconnor MD;  [facility]    APPENDECTOMY      ARTHROPLASTY KNEE  04/27/2011    Procedure:ARTHROPLASTY KNEE; Surgeon:JESSE HAWKINS; [facility]    COMPLEX WOUND CLOSURE (UPDATE) Right 12/08/2021    Procedure: Right stump wound debridment and closure;  Surgeon: RAISA Perea MD;  Location: [facility]    CORONARY STENT PLACEMENT      ENDARTERECTOMY FEMORAL Right 1/13/2023    Procedure: ENDARTERECTOMY, FEMORAL RIGHT;  Surgeon: Emma Oconnor MD;  [facility]    INJECT NERVE BLOCK SPHENOPALATINE GANGLION N/A 9/29/2022    Procedure: Out of OR encounter for block to be done by ;  Surgeon: GENERIC ANESTHESIA PROVIDER;  [facility]: [facility]    IR OR ANGIOGRAM  1/13/2023    IRRIGATION AND DEBRIDEMENT LOWER EXTREMITY, COMBINED Right 10/09/2019    Procedure: Irrigation and debridement Right leg;  Surgeon: Doron Mott MD;  [facility]    NERVE BLOCK PERIPHERAL N/A 9/27/2022    Procedure: BLOCK, NERVE;  Surgeon: GENERIC ANESTHESIA PROVIDER;  [facility]    OTHER  SURGICAL HISTORY  03/05/2014    I AND D     OTHER SURGICAL HISTORY Right 10/09/2019    IRRIGATION AND DEBRIDEMENT LOWER EXTREMITY, COMBINED    PHACOEMULSIFICATION CLEAR CORNEA WITH STANDARD INTRAOCULAR LENS IMPLANT Left 9/8/2022    Procedure: PHACOEMULSIFICATION, LEFT CATARACT, WITH INTRAOCULAR LENS IMPLANT;  Surgeon: Flip Izaguirre MD;  Location: [facility]    PHACOEMULSIFICATION CLEAR CORNEA WITH STANDARD INTRAOCULAR LENS IMPLANT Right 10/13/2022    Procedure: PHACOEMULSIFICATION, RIGHT CATARACT, WITH INTRAOCULAR LENS IMPLANT;  Surgeon: Flip Izaguirre MD;  Location: [facility]    REVISE SCAR EXTREMITY Right 12/17/2020    Procedure: Right stump scar revision;  Surgeon: RAISA Perea MD;  Location: [facility] OR     acetaminophen (TYLENOL) 325 MG tablet  amLODIPine (NORVASC) 5 MG tablet  apixaban ANTICOAGULANT (ELIQUIS) 5 MG tablet  aspirin (ASA) 81 MG tablet  calcium carbonate 600 mg-vitamin D 400 units (CALCIUM 600 + D) 600-400 MG-UNIT per tablet  Elemental iron 65 mg Vitamin C 125 mg (VITRON C)  MG TABS tablet  fluocinolone acetonide oil 0.01 % ear drops  folic acid (FOLVITE) 1 MG tablet  gabapentin (NEURONTIN) 300 MG capsule  leflunomide (ARAVA) 20 MG tablet  Lidocaine (LIDOCARE) 4 % Patch  lisinopril (ZESTRIL) 20 MG tablet  methotrexate 2.5 MG tablet  metoprolol succinate ER (TOPROL XL) 50 MG 24 hr tablet  oxyCODONE (ROXICODONE) 5 MG tablet  polyethylene glycol (MIRALAX) 17 GM/Dose powder  predniSONE (DELTASONE) 5 MG tablet  simvastatin (ZOCOR) 20 MG tablet      Allergies   Allergen Reactions    Adhesive Tape Other (See Comments)     Fragile skin     Family History  Family History   Problem Relation Age of Onset    Cardiovascular Mother     Cancer Brother     Diabetes No family hx of     Hypertension No family hx of     Cerebrovascular Disease No family hx of     Alzheimer Disease No family hx of     Thyroid Disease No family hx of     Respiratory No family hx of     Other - See  Comments Mother         CARDIOVASCULAR    Cancer Brother      Social History   Social History     Tobacco Use    Smoking status: Former     Current packs/day: 0.00     Average packs/day: 0.5 packs/day for 45.0 years (22.5 ttl pk-yrs)     Types: Cigarettes     Start date: 1965     Quit date: 2010     Years since quittin.2     Passive exposure: Past    Smokeless tobacco: Never   Vaping Use    Vaping status: Never Used   Substance Use Topics    Alcohol use: Yes     Comment: occsionally-3 -4 drinks a week    Drug use: No      Past medical history, past surgical history, medications, allergies, family history, and social history were reviewed with the patient. No additional pertinent items.   A medically appropriate review of systems was performed with pertinent positives and negatives noted in the HPI, and all other systems negative.    Physical Exam   BP: 116/67  Pulse: 75  Temp: 97.3  F (36.3  C)  Resp: 20  SpO2: 96 %  Physical Exam  Vitals and nursing note reviewed.   Constitutional:       General: She is not in acute distress.     Appearance: Normal appearance. She is ill-appearing.   HENT:      Head: Normocephalic.      Nose: Nose normal.   Eyes:      Pupils: Pupils are equal, round, and reactive to light.   Cardiovascular:      Rate and Rhythm: Normal rate and regular rhythm.   Pulmonary:      Effort: Pulmonary effort is normal.   Abdominal:      General: There is no distension.      Palpations: Abdomen is soft.      Tenderness: There is no abdominal tenderness. There is no guarding.   Genitourinary:     Rectum: Guaiac result positive.      Comments: Black tarry stool  Musculoskeletal:         General: No deformity. Normal range of motion.      Cervical back: Normal range of motion.   Skin:     General: Skin is warm.      Coloration: Skin is pale.   Neurological:      Mental Status: She is alert and oriented to person, place, and time.   Psychiatric:         Mood and Affect: Mood normal.            ED Course, Procedures, & Data           Results for orders placed or performed during the hospital encounter of 05/24/24   Comprehensive metabolic panel     Status: Abnormal   Result Value Ref Range    Sodium 127 (L) 135 - 145 mmol/L    Potassium 4.1 3.4 - 5.3 mmol/L    Carbon Dioxide (CO2) 22 22 - 29 mmol/L    Anion Gap 11 7 - 15 mmol/L    Urea Nitrogen 14.7 8.0 - 23.0 mg/dL    Creatinine 0.42 (L) 0.51 - 0.95 mg/dL    GFR Estimate >90 >60 mL/min/1.73m2    Calcium 8.9 8.8 - 10.2 mg/dL    Chloride 94 (L) 98 - 107 mmol/L    Glucose 102 (H) 70 - 99 mg/dL    Alkaline Phosphatase 100 40 - 150 U/L    AST 32 0 - 45 U/L    ALT 21 0 - 50 U/L    Protein Total 5.1 (L) 6.4 - 8.3 g/dL    Albumin 2.8 (L) 3.5 - 5.2 g/dL    Bilirubin Total 0.6 <=1.2 mg/dL   CBC with platelets and differential     Status: Abnormal   Result Value Ref Range    WBC Count 9.4 4.0 - 11.0 10e3/uL    RBC Count 2.78 (L) 3.80 - 5.20 10e6/uL    Hemoglobin 8.1 (L) 11.7 - 15.7 g/dL    Hematocrit 24.3 (L) 35.0 - 47.0 %    MCV 87 78 - 100 fL    MCH 29.1 26.5 - 33.0 pg    MCHC 33.3 31.5 - 36.5 g/dL    RDW 21.2 (H) 10.0 - 15.0 %    Platelet Count 312 150 - 450 10e3/uL    % Neutrophils 94 %    % Lymphocytes 3 %    % Monocytes 1 %    % Eosinophils 0 %    % Basophils 0 %    % Immature Granulocytes 2 %    NRBCs per 100 WBC 0 <1 /100    Absolute Neutrophils 8.9 (H) 1.6 - 8.3 10e3/uL    Absolute Lymphocytes 0.3 (L) 0.8 - 5.3 10e3/uL    Absolute Monocytes 0.1 0.0 - 1.3 10e3/uL    Absolute Eosinophils 0.0 0.0 - 0.7 10e3/uL    Absolute Basophils 0.0 0.0 - 0.2 10e3/uL    Absolute Immature Granulocytes 0.1 <=0.4 10e3/uL    Absolute NRBCs 0.0 10e3/uL   CBC with platelets differential     Status: Abnormal    Narrative    The following orders were created for panel order CBC with platelets differential.  Procedure                               Abnormality         Status                     ---------                               -----------         ------                      CBC with platelets and d...[[id number]]  Abnormal            Final result                 Please view results for these tests on the individual orders.   ABO/Rh type and screen     Status: None (In process)    Narrative    The following orders were created for panel order ABO/Rh type and screen.  Procedure                               Abnormality         Status                     ---------                               -----------         ------                     Adult Type and Screen[[id number]]                            In process                   Please view results for these tests on the individual orders.     Medications   pantoprazole (PROTONIX) IV push injection 40 mg (has no administration in time range)     Labs Ordered and Resulted from Time of ED Arrival to Time of ED Departure   COMPREHENSIVE METABOLIC PANEL - Abnormal       Result Value    Sodium 127 (*)     Potassium 4.1      Carbon Dioxide (CO2) 22      Anion Gap 11      Urea Nitrogen 14.7      Creatinine 0.42 (*)     GFR Estimate >90      Calcium 8.9      Chloride 94 (*)     Glucose 102 (*)     Alkaline Phosphatase 100      AST 32      ALT 21      Protein Total 5.1 (*)     Albumin 2.8 (*)     Bilirubin Total 0.6     CBC WITH PLATELETS AND DIFFERENTIAL - Abnormal    WBC Count 9.4      RBC Count 2.78 (*)     Hemoglobin 8.1 (*)     Hematocrit 24.3 (*)     MCV 87      MCH 29.1      MCHC 33.3      RDW 21.2 (*)     Platelet Count 312      % Neutrophils 94      % Lymphocytes 3      % Monocytes 1      % Eosinophils 0      % Basophils 0      % Immature Granulocytes 2      NRBCs per 100 WBC 0      Absolute Neutrophils 8.9 (*)     Absolute Lymphocytes 0.3 (*)     Absolute Monocytes 0.1      Absolute Eosinophils 0.0      Absolute Basophils 0.0      Absolute Immature Granulocytes 0.1      Absolute NRBCs 0.0     ROUTINE [facility] WITH MICROSCOPIC REFLEX TO CULTURE   INR   PARTIAL THROMBOPLASTIN TIME   TYPE AND SCREEN, ADULT   ABO/RH TYPE AND SCREEN     No  orders to display          Critical care was not performed.     Medical Decision Making  The patient's presentation was of high complexity (an acute health issue posing potential threat to life or bodily function).    The patient's evaluation involved:  review of 2 test result(s) ordered prior to this encounter (see separate area of note for details)  ordering and/or review of 3+ test(s) in this encounter (see separate area of note for details)    The patient's management necessitated high risk (a decision regarding hospitalization).    Assessment & Plan    Patient presents the ED for evaluation of generalized weakness.  Was recently admitted for DVT and started on Eliquis.  Over the past few days she has noted dark stools.  On arrival, patient is normal vital signs.  Overall appears chronically ill but not acutely toxic.  She has a nonfocal neuroexam.  Low suspicion for ischemic/hemorrhagic CVA or other primary neurologic pathology.  Abdomen is soft and nontender without signs of peritonitis.  Rectal exam does reveal black tarry stool, strongly guaiac positive.  Patient was started on IV Protonix.  No history of variceal bleed to require octreotide.  GI consult was placed.  CBC shows a hemoglobin of 8.1 which is downtrending from 8.4 on 5/18/2024 and 9.5 on 5/17/2024.  Metabolic panel shows sodium of 127.  Plan for admission to the hospital for management of GI bleed, hyponatremia, generalized weakness.  Patient will likely need discharge to TCU which she states she is agreeable with.  Was seen by social work while in the ED.    I have reviewed the nursing notes. I have reviewed the findings, diagnosis, plan and need for follow up with the patient.    New Prescriptions    No medications on file       Final diagnoses:   Gastrointestinal hemorrhage with melena   Generalized weakness   Hyponatremia   I, Vicky Michaud, am serving as a trained medical scribe to document services personally performed by Zaheer Estrada,  , based on the provider's statements to me.     I, Zaheer Estrada DO, was physically present and have reviewed and verified the accuracy of this note documented by Vicky Michaud.     Zaheer Estrada DO  [facility] EMERGENCY DEPARTMENT  5/24/2024     Zaheer Estrada DO  05/24/24 [id number]

## 2024-05-24 NOTE — TELEPHONE ENCOUNTER
See Primary care care coordination note on 5/24/24.  The RN CC contacted the caregiver and recommended that the patient go to the ED for evaluation.  The patient could be dehydrated and have electrolytes that are not normal.  The caregiver agreed to take the patient to the ED at this time.        Deepak Walker MSN, RN, PHN, [facility]   Primary Care Clinical RN Care Coordinator  [facility]  5/24/2024   9:53 AM  [email or web address]  Office: [phone]

## 2024-05-24 NOTE — PROGRESS NOTES
Clinic Care Coordination Contact    Situation: Patient chart reviewed by care coordinator.    Background: The patient was referred to a TCU when she was being discharged from the hospital 5/20/24.  The patient and her caregiver declined and went home with home care.    Assessment: At this time the primary caregiver is unable to take care of the patient and she is unable to take care of herself.  Per the caregiver she does not have enough strength to stand on her 1 leg and pivot to the commode.  Therefore she is doing all of her bowel movements and urination in the diaper that she has on.  The primary care care coordinator RN CC told the caregiver that she needs to be taking to the ER to have the situation evaluated to see if there is more going on such as dehydration and electrolyte imbalance.  In order to get into a TCU the patient would need a updated face-to-face visit with a provider.  The RN CC encouraged the caregiver to reach out to the  in the ER for assistance in getting back into a TCU.    Plan/Recommendations: The caregiver will take the patient to the ER for further evaluation of the situation.  To ensure that the patient is not dehydrated or that her electrolytes are not off.  Then the  in the ER hopefully will be able to help them in finding a TCU.    Deepak Walker MSN, RN, PHN, CCM   Primary Care Clinical RN Care Coordinator  [facility]  5/24/2024   9:46 AM  [email or web address]  Office: [phone]

## 2024-05-24 NOTE — PROGRESS NOTES
[facility]  Patient is currently open to home care services with [facility]. The patient has  RN PTservices.  [facility]  and team have been notified of patient admission.  No  need to send a  referral to the HUB.   [facility] liaison will continue to follow patient during stay.  If appropriate provide orders to resume home care at time of discharge.

## 2024-05-24 NOTE — LETTER
May 24, 2024      Sakshi PARKER [place]  [address] [id number]        May 24, 2024      Sakshi Jaeger  [address] [address]    Your team at [facility] cares about your health. We have reviewed your chart and based on our findings; we are making the following recommendations to better manage your health.     You are in particular need of attention regarding the following:     PREVENTATIVE VISIT: Annual Medicare Wellness:Schedule an Annual Medicare Wellness Exam. Please call your [facility] [place] clinic to set up your appointment.    If you have already completed these items, please contact the clinic via phone or   MyChart so your care team can review and update your records. Thank you for   choosing [facility] for your healthcare needs. For any questions,   concerns, or to schedule an appointment please contact our clinic.    Healthy Regards,      Your [facility] Care Team              Sincerely,        DESIRE Barnes CNP

## 2024-05-25 NOTE — PLAN OF CARE
"Goal Outcome Evaluation:         Shift:[id number]    V/S & Pain: VSS on RA. Pain managed with tylenol  Neuro: AOx4, calm and cooperative  Respiratory: Stable on RA,  Cardiac:WDL  Skin: Left wrist swollen  reports its due to arthritis  GI/: bryan present, BM 5/24  Nutrition: clear liquids , denies N/V.  L/D: R PIV  Activity: Had EGD  Labs: Results pending    Events this shift: no acute events this shift. Pt remains vitally stable on RA. Call light within reach, calls appropriately            /84   Pulse 71   Temp 97.6  F (36.4  C) (Oral)   Resp 12   Ht 1.626 m (5' 4.02\")   Wt 40.4 kg (89 lb)   LMP  (LMP Unknown)   SpO2 98%   BMI 15.27 kg/m         "

## 2024-05-25 NOTE — PROGRESS NOTES
Notified that patient is a difficult lab draw and lab tried 4 times to get INR/PTT, hemoglobin. Please try again as soon as able.    DESIRE Anaya, [email or web address]

## 2024-05-25 NOTE — H&P
[facility]    History and Physical - Hospitalist Service, GOLD TEAM        Date of Admission:  5/24/2024    Assessment & Plan      Sakshi Jaeger is a 78 year old female admitted on 5/24/2024. 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  Presented to ED with complaint of weakness and dark stools and is admitted to hospital medicine for further evaluation and management.    #melena  Patient complaining of dark stool, guaiac positive . Hemoglobin 8.1 on admission though suspect is hemo-concentrated in context of poor PO intake. No history of GIB though just started on anticoagulation, see next    -Pantoprazole 40 mg twice daily    -Consulted gastroenterology in ED    -N.p.o. except for meds    -Type and screen    -Every 6 hour hemoglobin check    #Left lower extremity DVT  #Left lower lobe subsegmental PE  DVT/PE diagnosed 5/17 and patient was loaded with apixaban 10 mg twice daily for 1 week.  Now developed GI bleed.  Elevated INR, see next.    -Hold 1 dose of apixaban this evening while awaiting repeat coags    -May need long-term anticoagulation giving immobility, multiple clots    #Elevated INR, PTT  Initial labs with INR 3.06 and PTT 53.  Labs from 5/17 with INR of 1.2.    -Repeat coags and reassess    #Acute on chronic hyponatremia  Outpatient Na labs ranging approximately 124-132 at baseline.    -Urine osmolality and urine sodium    -Isotonic fluids    #R AKA wound dehiscence  #S/p right AKA (02/2024)  Follows with vascular surgery.    -Wound RN consult    #generalized weakness  Had several week stay at TCU following amputation and discharged home in March. Was receiving home PT services and improving but had significant functional decline after a 3-week lapse in home services. Extensive work-up completed during previous admission with no clear metabolic, infectious, or  other organic etiology to explain her weakness. Currently, experiencing generalized weakness in setting of GIB though also has general physical deconditioning.    -physical and occupational therapies       Diet:  NPO  DVT Prophylaxis: Pneumatic Compression Devices  Nolan Catheter: Not present  Lines: None     Cardiac Monitoring: None  Code Status:  Full    Clinically Significant Risk Factors Present on Admission         # Hyponatremia: Lowest Na = 127 mmol/L in last 2 days, will monitor as appropriate    # Hypomagnesemia: Lowest Mg = 1.4 mg/dL in last 2 days, will replace as needed   # Hypoalbuminemia: Lowest albumin = 2.8 g/dL at 5/24/2024  4:42 PM, will monitor as appropriate  # Drug Induced Coagulation Defect: home medication list includes an anticoagulant medication  # Drug Induced Platelet Defect: home medication list includes an antiplatelet medication   # Hypertension: Noted on problem list          # Financial/Environmental Concerns: none         Disposition Plan     Medically Ready for Discharge: Anticipated in 2-4 Days       The patient's care was discussed with the Attending Physician, Dr. Veronica and Patient.    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 She was recently admitted for weakness.  Workup on 5/17/2024 identified acute DVT and PE, and she was started on Eliquis loading dose 10 mg twice daily.  She  "was discharged with home PT.    On 5/24, patient returned to ED with ongoing weakness and fatigue.  In addition to this, she has had poor oral intake.  She is experiencing \"dark\" stools. Patient states she is 'tired of all this'.     Plans to admit to the hospital for further workup of GI bleed and for assessment of generalized weakness with PT, OT. Family would like to consider TCU.    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    Essential hypertension with goal blood pressure less than 140/90 8/25/2016    Hyperlipidemia LDL goal <100 11/12/2010    Hypertension goal BP (blood pressure) < 130/80 1/26/2011    Infection of right below knee amputation (H) 10/1/2019    IRON DEFIC ANEMIA NOS 9/15/2005    Lower limb amputation, below knee 12/26/2012    MI (myocardial infarction) (H)     3/2010    Non-healing surgical wound, subsequent encounter 9/24/2020    Added automatically from request for surgery [id number]    Osteoporosis 2/16/2005    OSTEOPOROSIS NOS 2/16/2005    Protein-calorie malnutrition (H24) 5/18/2022    PVD (peripheral vascular disease) (H24) 5/18/2022    Rheumatoid arthritis (H) 2/16/2005         Rheumatoid arthritis(714.0) 2/16/2005    Status post below-knee amputation (H) 12/26/2012            Past Surgical History   Past Surgical History:   Procedure Laterality Date    ---------INCISION AND DRAINAGE  03/05/2014    AMPUTATE LEG ABOVE KNEE Right 1/13/2023    Procedure: AMPUTATION, ABOVE KNEE RIGHT;  Surgeon: Emma Oconnor MD;  Location: [facility]    AMPUTATE LEG ABOVE KNEE Right 2/2/2024    Procedure: Redo ABOVE KNEE AMPUTATION;  Surgeon: Bryon Mitchell MD;  [facility]: [facility]    AMPUTATE LEG BELOW KNEE Right 9/28/2022    Procedure: Right through knee " amputation;  Surgeon: Doron Mott MD;  Location: [facility]    AMPUTATION BELOW KNEE RT/LT  01/01/2005    right lowwer leg.     ANGIOGRAM Right 1/13/2023    Procedure: right iliac arteriogram aned right common iliac artery stent;  Surgeon: Emma Oconnor MD;  Location: [facility] OR    APPENDECTOMY      ARTHROPLASTY KNEE  04/27/2011    Procedure:ARTHROPLASTY KNEE; Surgeon:JESSE HAWKINS; Location:[facility]    COMPLEX WOUND CLOSURE (UPDATE) Right 12/08/2021    Procedure: Right stump wound debridment and closure;  Surgeon: RAISA Perea MD;  Location: [facility] OR    CORONARY STENT PLACEMENT      ENDARTERECTOMY FEMORAL Right 1/13/2023    Procedure: ENDARTERECTOMY, FEMORAL RIGHT;  Surgeon: Emma Oconnor MD;  [facility]: [facility]    INJECT NERVE BLOCK SPHENOPALATINE GANGLION N/A 9/29/2022    Procedure: Out of OR encounter for block to be done by ;  Surgeon: GENERIC ANESTHESIA PROVIDER;  Location: [facility]    IR OR ANGIOGRAM  1/13/2023    IRRIGATION AND DEBRIDEMENT LOWER EXTREMITY, COMBINED Right 10/09/2019    Procedure: Irrigation and debridement Right leg;  Surgeon: Doron Mott MD;  Location: [facility]    NERVE BLOCK PERIPHERAL N/A 9/27/2022    Procedure: BLOCK, NERVE;  Surgeon: GENERIC ANESTHESIA PROVIDER;  [facility]: [facility]    OTHER SURGICAL HISTORY  03/05/2014    I AND D     OTHER SURGICAL HISTORY Right 10/09/2019    IRRIGATION AND DEBRIDEMENT LOWER EXTREMITY, COMBINED    PHACOEMULSIFICATION CLEAR CORNEA WITH STANDARD INTRAOCULAR LENS IMPLANT Left 9/8/2022    Procedure: PHACOEMULSIFICATION, LEFT CATARACT, WITH INTRAOCULAR LENS IMPLANT;  Surgeon: Flip Izaguirre MD;  Location: [facility] OR    PHACOEMULSIFICATION CLEAR CORNEA WITH STANDARD INTRAOCULAR LENS IMPLANT Right 10/13/2022    Procedure: PHACOEMULSIFICATION, RIGHT CATARACT, WITH INTRAOCULAR LENS IMPLANT;  Surgeon: Flip Izaguirre MD;  Location: [facility] OR    REVISE SCAR EXTREMITY Right 12/17/2020    Procedure: Right stump scar revision;   Surgeon: RAISA Perea MD;  Location: [facility] OR       Prior to Admission Medications   Prior to Admission Medications   Prescriptions Last Dose Informant Patient Reported? Taking?   Elemental iron 65 mg Vitamin C 125 mg (VITRON C)  MG TABS tablet Unknown at unknown Other Yes Yes   Sig: Take 1 tablet by mouth daily   Lidocaine (LIDOCARE) 4 % Patch Unknown at unknown Other Yes Yes   Sig: Place 1 patch onto the skin every 24 hours To prevent lidocaine toxicity, patient should be patch free for 12 hrs daily.   acetaminophen (TYLENOL) 325 MG tablet Unknown at unknown Other No Yes   Sig: Take 2 tablets (650 mg) by mouth every 6 hours   amLODIPine (NORVASC) 5 MG tablet Unknown at unknown Other No Yes   Sig: TAKE 1 TABLET BY MOUTH EVERY DAY   apixaban ANTICOAGULANT (ELIQUIS) 5 MG tablet Unknown at unknown  No Yes   Sig: Take 2 tablets (10 mg) by mouth 2 times daily for 7 days, THEN 1 tablet (5 mg) 2 times daily for 83 days.   aspirin (ASA) 81 MG tablet Unknown at unknown Other Yes Yes   Sig: Take 81 mg by mouth daily   calcium carbonate 600 mg-vitamin D 400 units (CALCIUM 600 + D) 600-400 MG-UNIT per tablet Unknown at unknown Other No Yes   Sig: Take 2 tablets by mouth daily   fluocinolone acetonide oil 0.01 % ear drops Unknown at unknown Other No Yes   Sig: Place 0.25 mLs (5 drops) into both ears twice a week   folic acid (FOLVITE) 1 MG tablet Unknown at unknown Other Yes Yes   Sig: Take 1 mg by mouth daily   gabapentin (NEURONTIN) 300 MG capsule Unknown at unknown  Yes Yes   Sig: Take 300 mg by mouth 3 times daily as needed for neuropathic pain   leflunomide (ARAVA) 20 MG tablet Unknown at unknown Other Yes Yes   Sig: Take 1 tablet by mouth every 24 hours   lisinopril (ZESTRIL) 20 MG tablet Unknown at unknown Other No Yes   Sig: TAKE 1 TABLET BY MOUTH EVERY DAY   methotrexate 2.5 MG tablet Unknown at unknown Other Yes Yes   Sig: Take 20 mg by mouth every 7 days On Saturdays   metoprolol succinate ER (TOPROL  XL) 50 MG 24 hr tablet Unknown at unknown Other No Yes   Sig: TAKE ONE TABLET BY MOUTH EVERY DAY   oxyCODONE (ROXICODONE) 5 MG tablet Unknown at unknown Other No Yes   Sig: Take 1 tablet (5 mg) by mouth 2 times daily as needed for pain   polyethylene glycol (MIRALAX) 17 GM/Dose powder Unknown at unknown Other No Yes   Sig: Take 17 g by mouth daily   predniSONE (DELTASONE) 5 MG tablet Unknown at unknown Other No Yes   Sig: Take 1 tablet (5 mg) by mouth daily   simvastatin (ZOCOR) 20 MG tablet Unknown at unknown Other No Yes   Sig: Take 1 tablet (20 mg) by mouth At Bedtime      Facility-Administered Medications: None           Physical Exam   Vital Signs: Temp: 97.3  F (36.3  C) Temp src: Oral BP: 116/67 Pulse: 75   Resp: 20 SpO2: 96 % O2 Device: None (Room air)       Physical Exam  Vitals and nursing note reviewed.   Constitutional:       General: She is not in acute distress.     Appearance: She is ill-appearing.   Cardiovascular:      Rate and Rhythm: Normal rate and regular rhythm.   Pulmonary:      Effort: Pulmonary effort is normal. No respiratory distress.      Breath sounds: Normal breath sounds.   Abdominal:      Tenderness: There is no abdominal tenderness. There is no guarding.   Skin:     Findings: Bruising present.   Neurological:      Mental Status: Mental status is at baseline.   Psychiatric:         Mood and Affect: Affect is angry.         Speech: Speech normal.           Medical Decision Making       75 MINUTES SPENT BY ME on the date of service doing chart review, history, exam, documentation & further activities per the note.      Data     I have personally reviewed the following data over the past 24 hrs:    9.4  \   8.1 (L)   / 312     127 (L) 94 (L) 14.7 /  102 (H)   4.1 22 0.42 (L) \     ALT: 21 AST: 32 AP: 100 TBILI: 0.6   ALB: 2.8 (L) TOT PROTEIN: 5.1 (L) LIPASE: N/A     INR:  3.06 (H) PTT:  53 (H)   D-dimer:  N/A Fibrinogen:  N/A

## 2024-05-25 NOTE — OR NURSING
Procedure: EGD with biopsies  Sedation: conscious sedation   Specimens: x 4 jars, sent to lab.   O2: 2L/NC  Tolerated procedure: well  Other:  Report called to D, pt returning to ED with transporter.    Shweta Denson RN

## 2024-05-25 NOTE — PROGRESS NOTES
[facility]    Medicine Progress Note - Hospitalist Service, GOLD TEAM 9    Date of Admission:  5/24/2024    Assessment & Plan      Sakshi Jaeger is a 78 year old female admitted on 5/24/2024. 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  Presented to ED with complaint of weakness and dark stools and is admitted to hospital medicine for further evaluation and management.    Acute blood loss anemia due to melena in setting of therapeutic anticoagulation   Non bleeding duodenal and esophageal ulcer  Patient presents with dark stools and anemia with downtrending hemoglobin with Hb - 6.9 requriing 1uPRBC in AM.   GI consulted, reviewed recommendations and plan for EGD on 5/25   - 2 large bore IV access   - Trend H/H s/p 1uPRBC  - Active type and screen   - IV PPI BID   - NPO for EGD       #Left lower extremity DVT  #Left lower lobe subsegmental PE  DVT/PE diagnosed 5/17 and patient was loaded with apixaban 10 mg twice daily for 1 week.  Now developed GI bleed.  Elevated INR,  - Hold anticoagulation, can resume on 5/26 if stable and melena improved   - Can discuss IVC filter       #Acute on chronic hyponatremia  Outpatient Na labs ranging approximately 124-132 at baseline.    -Urine osmolality and urine sodium    -Isotonic fluids  - Trend daily     #R AKA wound dehiscence  #S/p right AKA (02/2024)  Follows with vascular surgery.    -Wound RN consult    #generalized weakness  Had several week stay at TCU following amputation and discharged home in March. Was receiving home PT services and improving but had significant functional decline after a 3-week lapse in home services. Extensive work-up completed during previous admission with no clear metabolic, infectious, or other organic etiology to explain her weakness. Currently, experiencing generalized weakness in setting of GIB though  "also has general physical deconditioning.    -physical and occupational therapies       Diet:  NPO  DVT Prophylaxis: Pneumatic Compression Devices  Nolan Catheter: Not present  Lines: None     Cardiac Monitoring: None  Code Status:  Full          Diet: Advance Diet as Tolerated: Clear Liquid Diet    DVT Prophylaxis: VTE Prophylaxis contraindicated due to bleeding   Nolan Catheter: Not present  Lines: None     Cardiac Monitoring: None  Code Status: Full Code      Clinically Significant Risk Factors Present on Admission         # Hyponatremia: Lowest Na = 127 mmol/L in last 2 days, will monitor as appropriate    # Hypomagnesemia: Lowest Mg = 1.4 mg/dL in last 2 days, will replace as needed   # Hypoalbuminemia: Lowest albumin = 2.8 g/dL at 5/24/2024  4:42 PM, will monitor as appropriate  # Drug Induced Coagulation Defect: home medication list includes an anticoagulant medication  # Drug Induced Platelet Defect: home medication list includes an antiplatelet medication   # Hypertension: Noted on problem list      # Cachexia: Estimated body mass index is 15.27 kg/m  as calculated from the following:    Height as of this encounter: 1.626 m (5' 4.02\").    Weight as of this encounter: 40.4 kg (89 lb).       # Financial/Environmental Concerns: none         Disposition Plan     Medically Ready for Discharge: Anticipated in 2-4 Days             Vinay Jeffrey MD  Hospitalist Service, [address]  Securely message with [employer] (more info)  Text page via [employer] Paging/Directory   See signed in provider for up to date coverage information  ______________________________________________________________________    Interval History   Patient notes feeling tired due to lack of sleep and requested a deferred / short visit. Denies any chest pain, dyspnea, dizziness, light headedness. 1x melena this morning, No hematemesis     Physical Exam   Vital Signs: Temp: 97.6  F (36.4  C) Temp " src: Oral BP: 137/84 Pulse: 71   Resp: 12 SpO2: 98 % O2 Device: None (Room air)    Weight: 89 lbs 0 oz    Gen: Awake and alert   Resp: Unlabored   CVS: No murmur   Abd: Non tender         Medical Decision Making             Data     I have personally reviewed the following data over the past 24 hrs:    7.1  \   10.0 (L)   / 237     127 (L) 96 (L) 13.0 /  85   3.9 21 (L) 0.41 (L) \     ALT: 21 AST: 32 AP: 100 TBILI: 0.6   ALB: 2.8 (L) TOT PROTEIN: 5.1 (L) LIPASE: N/A     INR:  2.53 (H) PTT:  51 (H)   D-dimer:  N/A Fibrinogen:  N/A       Imaging results reviewed over the past 24 hrs:   No results found for this or any previous visit (from the past 24 hour(s)).

## 2024-05-25 NOTE — CONSULTS
GASTROENTEROLOGY CONSULTATION      Date of Admission:  5/24/2024          ASSESSMENT AND RECOMMENDATIONS:     78 year old female with a history of DVT/PE on Eliquis, PVD s/p R AKA, RA who presents with melena, weakness. GI consulted for evaluation of possible GI bleed.     #. Melena  #. Acute on chronic anemia    Presents with melena, fatigue. Recently started on Eliquis on 05/17 after recent DVT/PE diagnosis. Reports melena, but also rectal exam performed by ER provider revealed black tarry stool. Started on IV Protonix BID. Hg down to 6.9 this AM. No prior EGD. Normal colonoscopy in 2018. Takes ASA, Eliquis at home.     Overall concern for upper GI bleed in the setting of recent AC initiation with differential including peptic ulcer disease, gastritis, duodenitis. Less likely Dieulafoy lesion, neoplasia, angiodysplasia.          RECOMMENDATIONS    - Plan for EGD today  - Ensure 2 large bore IVs (18G or larger)  - Ensure active type&screen  - Transfuse if Hg<7  - CBC q12h  - IV PPIs BID  - Keep NPO at this time.   - Continue IVFs per primary team  - Hold anticoagulants, NSAIDs    Gastroenterology follow up recommendations: TBD    Thank you for involving us in this patient's care. Please do not hesitate to contact the GI service with any questions or concerns.     Patient care plan discussed with Dr. Gomez, GI staff physician.    Wilberto Arguelles MD  Gastroenterology Fellow  Pager             Chief Complaint:   We were asked by ER service to evaluate this patient with melena    History is obtained from the patient and the medical record.          History of Present Illness:   Sakshi Jaeger is a 78 year old female with a history of DVT/PE on Eliquis, PVD s/p R AKA, RA who presents with melena, weakness. GI consulted for evaluation of possible GI bleed.       Presents with melena, fatigue. Recently started on Eliquis on 05/17 after recent DVT/PE diagnosis. Reports melena, but also rectal exam  performed by ER provider revealed black tarry stool. Started on IV Protonix BID. Hg down to 6.9 this AM. No prior EGD. Normal colonoscopy in 2018. Takes ASA, Eliquis at home.     Patient seen in the ER this morning, did not want to chat with me a lot and wanted to be left alone. Says she doesn't remember much, doesn't remember why she is here. When asked about bleeding, she says no, but then she goes on saying she saw some blood in her stool lately. Denies other symptoms.         Past Medical History:   Reviewed and edited as appropriate  Past Medical History:   Diagnosis Date    Anemia 8/24/2022    BENIGN HYPERTENSION 3/1/2005    CAD (coronary artery disease) 1/26/2011    Coronary artery disease involving native coronary artery of native heart without angina pectoris 9/27/2016    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    Essential hypertension with goal blood pressure less than 140/90 8/25/2016    Hyperlipidemia LDL goal <100 11/12/2010    Hypertension goal BP (blood pressure) < 130/80 1/26/2011    Infection of right below knee amputation (H) 10/1/2019    IRON DEFIC ANEMIA NOS 9/15/2005    Lower limb amputation, below knee 12/26/2012    MI (myocardial infarction) (H)     3/2010    Non-healing surgical wound, subsequent encounter 9/24/2020    Added automatically from request for surgery [id number]    Osteoporosis 2/16/2005    OSTEOPOROSIS NOS 2/16/2005    Protein-calorie malnutrition (H24) 5/18/2022    PVD (peripheral vascular disease) (H24) 5/18/2022    Rheumatoid arthritis (H) 2/16/2005         Rheumatoid arthritis(714.0) 2/16/2005    Status post below-knee amputation (H) 12/26/2012                 Past Surgical History:   Reviewed and edited as appropriate   Past Surgical History:   Procedure Laterality Date    ---------INCISION AND DRAINAGE  03/05/2014    AMPUTATE LEG ABOVE KNEE Right 1/13/2023    Procedure: AMPUTATION, ABOVE KNEE RIGHT;  Surgeon: Emma Oconnor MD;  Location: [facility]    AMPUTATE LEG ABOVE KNEE Right  2/2/2024    Procedure: Redo ABOVE KNEE AMPUTATION;  Surgeon: Bryon Mitchell MD;  [facility]: [facility]    AMPUTATE LEG BELOW KNEE Right 9/28/2022    Procedure: Right through knee amputation;  Surgeon: Doron Mott MD;  [facility]: [facility]    AMPUTATION BELOW KNEE RT/LT  01/01/2005    right lowwer leg.     ANGIOGRAM Right 1/13/2023    Procedure: right iliac arteriogram aned right common iliac artery stent;  Surgeon: Emma Oconnor MD;  Location: [facility]    APPENDECTOMY      ARTHROPLASTY KNEE  04/27/2011    Procedure:ARTHROPLASTY KNEE; Surgeon:JESSE HAWKINS; [facility]    COMPLEX WOUND CLOSURE (UPDATE) Right 12/08/2021    Procedure: Right stump wound debridment and closure;  Surgeon: RAISA Perea MD;  Location: [facility]    CORONARY STENT PLACEMENT      ENDARTERECTOMY FEMORAL Right 1/13/2023    Procedure: ENDARTERECTOMY, FEMORAL RIGHT;  Surgeon: Emma Oconnor MD;  [facility]    INJECT NERVE BLOCK SPHENOPALATINE GANGLION N/A 9/29/2022    Procedure: Out of OR encounter for block to be done by ;  Surgeon: GENERIC ANESTHESIA PROVIDER;  [facility]: [facility]    IR OR ANGIOGRAM  1/13/2023    IRRIGATION AND DEBRIDEMENT LOWER EXTREMITY, COMBINED Right 10/09/2019    Procedure: Irrigation and debridement Right leg;  Surgeon: Doron Mott MD;  Location: [facility]    NERVE BLOCK PERIPHERAL N/A 9/27/2022    Procedure: BLOCK, NERVE;  Surgeon: GENERIC ANESTHESIA PROVIDER;  [facility]    OTHER SURGICAL HISTORY  03/05/2014    I AND D     OTHER SURGICAL HISTORY Right 10/09/2019    IRRIGATION AND DEBRIDEMENT LOWER EXTREMITY, COMBINED    PHACOEMULSIFICATION CLEAR CORNEA WITH STANDARD INTRAOCULAR LENS IMPLANT Left 9/8/2022    Procedure: PHACOEMULSIFICATION, LEFT CATARACT, WITH INTRAOCULAR LENS IMPLANT;  Surgeon: Flip Izaguirre MD;  Location: [facility]    PHACOEMULSIFICATION CLEAR CORNEA WITH STANDARD INTRAOCULAR LENS IMPLANT Right 10/13/2022    Procedure: PHACOEMULSIFICATION, RIGHT  CATARACT, WITH INTRAOCULAR LENS IMPLANT;  Surgeon: Flip Izaguirre MD;  Location: [facility]    REVISE SCAR EXTREMITY Right 2020    Procedure: Right stump scar revision;  Surgeon: RAISA Perea MD;  Location: [facility] OR            Previous Endoscopy:   No results found. However, due to the size of the patient record, not all encounters were searched. Please check Results Review for a complete set of results.         Social History:   Reviewed and edited as appropriate  Social History     Socioeconomic History    Marital status: Single     Spouse name: Not on file    Number of children: 3    Years of education: Not on file    Highest education level: Not on file   Occupational History    Not on file   Tobacco Use    Smoking status: Former     Current packs/day: 0.00     Average packs/day: 0.5 packs/day for 45.0 years (22.5 ttl pk-yrs)     Types: Cigarettes     Start date: 1965     Quit date: 2010     Years since quittin.2     Passive exposure: Past    Smokeless tobacco: Never   Vaping Use    Vaping status: Never Used   Substance and Sexual Activity    Alcohol use: Yes     Comment: occsionally-3 -4 drinks a week    Drug use: No    Sexual activity: Yes     Partners: Male     Birth control/protection: Post-menopausal   Other Topics Concern    Parent/sibling w/ CABG, MI or angioplasty before 65F 55M? No   Social History Narrative    Live with Santi.  Together since .       Social Determinants of Health     Financial Resource Strain: Not on file   Food Insecurity: Not on file   Transportation Needs: Not on file   Physical Activity: Not on file   Stress: Not on file   Social Connections: Unknown (2022)    [facility]    Social Connections     Frequency of Communication with Friends and Family: Not on file   Interpersonal Safety: Not on file   Housing Stability: Not on file             Family History:   Reviewed and edited as appropriate  No known history of gastrointestinal/liver disease or  gastrointestinal malignancies       Allergies:   Reviewed and edited as appropriate     Allergies   Allergen Reactions    Adhesive Tape Other (See Comments)     Fragile skin            Medications:     Current Facility-Administered Medications   Medication Dose Route Frequency Provider Last Rate Last Admin    acetaminophen (TYLENOL) tablet 650 mg  650 mg Oral Q6H Marta Denton APRN CNP   650 mg at 05/25/24 0945    [Held by provider] amLODIPine (NORVASC) tablet 5 mg  5 mg Oral Daily Marta Denton APRN CNP        [Held by provider] apixaban ANTICOAGULANT (ELIQUIS) tablet 5 mg  5 mg Oral BID Marta Denton APRN CNP        calcium carbonate (TUMS) chewable tablet 1,000 mg  1,000 mg Oral 4x Daily PRN Marta Denton APRN CNP        [Held by provider] fluocinolone acetonide oil 0.01 % ear drops 5 drop  5 drop Both Ears Once per day on Monday Thursday Marta Denton APRN CNP        folic acid (FOLVITE) tablet 1 mg  1 mg Oral Daily Marta Denton APRN CNP   1 mg at 05/25/24 0817    lactated ringers infusion   Intravenous Continuous Marta Denton APRN  mL/hr at 05/25/24 1234 Restarted at 05/25/24 1234    leflunomide (ARAVA) tablet 20 mg  20 mg Oral Daily Marta Denton APRN CNP   20 mg at 05/25/24 0817    lidocaine (LMX4) cream   Topical Q1H PRN Marta Denton APRN CNP        lidocaine 1 % 0.1-1 mL  0.1-1 mL Other Q1H PRN Marta Denton APRN CNP        [Held by provider] lisinopril (ZESTRIL) tablet 20 mg  20 mg Oral Daily Marta Denton APRN CNP        melatonin tablet 1 mg  1 mg Oral At Bedtime PRN Marta Denton APRN CNP        [Held by provider] metoprolol succinate ER (TOPROL XL) 24 hr tablet 50 mg  50 mg Oral Daily Marta Denton APRN CNP        ondansetron (ZOFRAN ODT) ODT  tab 4 mg  4 mg Oral Q6H PRN Marta Denton APRN CNP        Or    ondansetron (ZOFRAN) injection 4 mg  4 mg Intravenous Q6H PRN Marta Denton APRN CNP        oxyCODONE (ROXICODONE) tablet 5 mg  5 mg Oral BID PRN Marta Denton APRN CNP        pantoprazole (PROTONIX) IV push injection 40 mg  40 mg Intravenous BID Marta Denton APRN CNP   40 mg at 05/25/24 0817    predniSONE (DELTASONE) tablet 5 mg  5 mg Oral Daily Marta Denton APRN CNP   5 mg at 05/25/24 0817    senna-docusate (SENOKOT-S/PERICOLACE) 8.6-50 MG per tablet 1 tablet  1 tablet Oral BID PRN Marta Denton APRN CNP        Or    senna-docusate (SENOKOT-S/PERICOLACE) 8.6-50 MG per tablet 2 tablet  2 tablet Oral BID PRN Marta Denton APRN CNP        simvastatin (ZOCOR) tablet 20 mg  20 mg Oral At Bedtime Marta Denton APRN CNP   20 mg at 05/24/24 2330    sodium chloride (PF) 0.9% PF flush 3 mL  3 mL Intracatheter Q8H aMrta Denton APRN CNP        sodium chloride (PF) 0.9% PF flush 3 mL  3 mL Intracatheter q1 min prn Marta Denton APRN CNP         Current Outpatient Medications   Medication Sig Dispense Refill    acetaminophen (TYLENOL) 325 MG tablet Take 2 tablets (650 mg) by mouth every 6 hours 24 tablet 0    amLODIPine (NORVASC) 5 MG tablet TAKE 1 TABLET BY MOUTH EVERY DAY 90 tablet 3    apixaban ANTICOAGULANT (ELIQUIS) 5 MG tablet Take 2 tablets (10 mg) by mouth 2 times daily for 7 days, THEN 1 tablet (5 mg) 2 times daily for 83 days. 194 tablet 0    aspirin (ASA) 81 MG tablet Take 81 mg by mouth daily      calcium carbonate 600 mg-vitamin D 400 units (CALCIUM 600 + D) 600-400 MG-UNIT per tablet Take 2 tablets by mouth daily      Elemental iron 65 mg Vitamin C 125 mg (VITRON C)  MG TABS tablet Take 1 tablet by mouth daily      fluocinolone acetonide oil 0.01 % ear drops Place 0.25 mLs (5 drops) into both ears twice a week 20 mL 3    folic acid  "(FOLVITE) 1 MG tablet Take 1 mg by mouth daily      gabapentin (NEURONTIN) 300 MG capsule Take 300 mg by mouth 3 times daily as needed for neuropathic pain      leflunomide (ARAVA) 20 MG tablet Take 1 tablet by mouth every 24 hours      Lidocaine (LIDOCARE) 4 % Patch Place 1 patch onto the skin every 24 hours To prevent lidocaine toxicity, patient should be patch free for 12 hrs daily.      lisinopril (ZESTRIL) 20 MG tablet TAKE 1 TABLET BY MOUTH EVERY DAY 90 tablet 0    methotrexate 2.5 MG tablet Take 20 mg by mouth every 7 days On Saturdays      metoprolol succinate ER (TOPROL XL) 50 MG 24 hr tablet TAKE ONE TABLET BY MOUTH EVERY DAY 90 tablet 0    oxyCODONE (ROXICODONE) 5 MG tablet Take 1 tablet (5 mg) by mouth 2 times daily as needed for pain 14 tablet 0    polyethylene glycol (MIRALAX) 17 GM/Dose powder Take 17 g by mouth daily 510 g 0    predniSONE (DELTASONE) 5 MG tablet Take 1 tablet (5 mg) by mouth daily  0    simvastatin (ZOCOR) 20 MG tablet Take 1 tablet (20 mg) by mouth At Bedtime 90 tablet 1             Review of Systems:     A complete review of systems was performed and is negative except as noted in the HPI           Physical Exam:   /67 (BP Location: Right arm, Patient Position: Supine, Cuff Size: Adult Regular)   Pulse 75   Temp 97.9  F (36.6  C)   Resp 20   Ht 1.626 m (5' 4.02\")   Wt 40.4 kg (89 lb)   LMP  (LMP Unknown)   SpO2 96%   BMI 15.27 kg/m    Wt:   Wt Readings from Last 2 Encounters:   05/25/24 40.4 kg (89 lb)   05/18/24 40.5 kg (89 lb 4.6 oz)      Constitutional: cooperative, pleasant, not dyspneic/diaphoretic, no acute distress  Eyes: Sclera anicteric/injected  Ears/nose/mouth/throat: Normal oropharynx without ulcers or exudate, mucus membranes moist  Neck: supple  CV: RRR, No edema  Respiratory: Unlabored breathing  Abdomen: No scars, Nondistended, +bs, no hepatosplenomegaly, nontender, no peritoneal signs  Skin: warm, perfused, no jaundice  Neuro: AAO x 3, No " asterixis  Psych: Normal affect  MSK: Normal gait         Data:   Labs and imaging below were independently reviewed and interpreted    BMP  Recent Labs   Lab 05/25/24  0632 05/24/24  1642 05/19/24  0640 05/18/24  0833   * 127*  --  131*   POTASSIUM 3.4 4.1 4.1 3.7   CHLORIDE 96* 94*  --  98   ADRIAN 8.2* 8.9  --  8.1*   CO2 21* 22  --  22   BUN 13.0 14.7  --  10.7   CR 0.41* 0.42*  --  0.36*   GLC 77 102*  --  103*     CBC  Recent Labs   Lab 05/25/24  0632 05/24/24  1642 05/18/24  0833   WBC 7.1 9.4 5.7   RBC 2.41* 2.78* 2.97*   HGB 6.9* 8.1* 8.4*   HCT 20.9* 24.3* 25.7*   MCV 87 87 87   MCH 28.6 29.1 28.3   MCHC 33.0 33.3 32.7   RDW 21.0* 21.2* 21.2*    312 335     INR  Recent Labs   Lab 05/25/24  0004 05/24/24  1803   INR 2.53* 3.06*     LFTs  Recent Labs   Lab 05/24/24  1642   ALKPHOS 100   AST 32   ALT 21   BILITOTAL 0.6   PROTTOTAL 5.1*   ALBUMIN 2.8*      PANCNo lab results found in last 7 days.    Imaging:    No recent abdominal imaging

## 2024-05-25 NOTE — MEDICATION SCRIBE - ADMISSION MEDICATION HISTORY
Medication Scribe Admission Medication History    Admission medication history is complete. The information provided in this note is only as accurate as the sources available at the time of the update.    Information Source(s): [facility] records and [facility]    Pertinent Information:   -Pt was recently discharged on 5/19/24, Pt also had a medication history done on 5/17/24  -Per Dispense history and discharge summary Pt started her Eliquis 2 tab BID on 5/19/24    Changes made to PTA medication list:  Added: None  Deleted: None  Changed: None    Allergies reviewed with patient and updates made in EHR: no    Medication History Completed By: Mali Brasher 5/24/2024 7:56 PM    PTA Med List   Medication Sig Last Dose    acetaminophen (TYLENOL) 325 MG tablet Take 2 tablets (650 mg) by mouth every 6 hours Unknown at unknown    amLODIPine (NORVASC) 5 MG tablet TAKE 1 TABLET BY MOUTH EVERY DAY Unknown at unknown    apixaban ANTICOAGULANT (ELIQUIS) 5 MG tablet Take 2 tablets (10 mg) by mouth 2 times daily for 7 days, THEN 1 tablet (5 mg) 2 times daily for 83 days. Unknown at unknown    aspirin (ASA) 81 MG tablet Take 81 mg by mouth daily Unknown at unknown    calcium carbonate 600 mg-vitamin D 400 units (CALCIUM 600 + D) 600-400 MG-UNIT per tablet Take 2 tablets by mouth daily Unknown at unknown    Elemental iron 65 mg Vitamin C 125 mg (VITRON C)  MG TABS tablet Take 1 tablet by mouth daily Unknown at unknown    fluocinolone acetonide oil 0.01 % ear drops Place 0.25 mLs (5 drops) into both ears twice a week Unknown at unknown    folic acid (FOLVITE) 1 MG tablet Take 1 mg by mouth daily Unknown at unknown    gabapentin (NEURONTIN) 300 MG capsule Take 300 mg by mouth 3 times daily as needed for neuropathic pain Unknown at unknown    leflunomide (ARAVA) 20 MG tablet Take 1 tablet by mouth every 24 hours Unknown at unknown    Lidocaine (LIDOCARE) 4 % Patch Place 1 patch onto the skin every 24 hours  To prevent lidocaine toxicity, patient should be patch free for 12 hrs daily. Unknown at unknown    lisinopril (ZESTRIL) 20 MG tablet TAKE 1 TABLET BY MOUTH EVERY DAY Unknown at unknown    methotrexate 2.5 MG tablet Take 20 mg by mouth every 7 days On Saturdays Unknown at unknown    metoprolol succinate ER (TOPROL XL) 50 MG 24 hr tablet TAKE ONE TABLET BY MOUTH EVERY DAY Unknown at unknown    oxyCODONE (ROXICODONE) 5 MG tablet Take 1 tablet (5 mg) by mouth 2 times daily as needed for pain Unknown at unknown    polyethylene glycol (MIRALAX) 17 GM/Dose powder Take 17 g by mouth daily Unknown at unknown    predniSONE (DELTASONE) 5 MG tablet Take 1 tablet (5 mg) by mouth daily Unknown at unknown    simvastatin (ZOCOR) 20 MG tablet Take 1 tablet (20 mg) by mouth At Bedtime Unknown at unknown

## 2024-05-26 NOTE — PROGRESS NOTES
"   05/26/24 0900   Appointment Info   Signing Clinician's Name / Credentials (SLP) Alexandra Deras MA CCC-SLP   General Information   Onset of Illness/Injury or Date of Surgery 05/24/24   Referring Physician Vinay Jeffrey MD   Pertinent History of Current Problem Per medical chart review, \"Sakshi Jaeger is a 78 year old female admitted on 5/24/2024. 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       She presented to ED with complaint of weakness and dark stools and is admitted to hospital medicine for GI bleed in setting of therapeutic anticoagulation, now s/p EGD with non bleeding duodenal and esophageal ulcers.\"  Clinical swallow evaluation completed per MD order.   Pain Assessment   Patient Currently in Pain No   Type of Evaluation   Type of Evaluation Swallow Evaluation   Oral Motor   Oral Musculature generally intact   Structural Abnormalities none present   Mucosal Quality good   Oral Motor Deficits Observed Dentition (Oral Motor) (Group)   Dentition (Oral Motor)   Comment, Dentition (Oral Motor) Pt uses upper partials but they are not present at bedside   Dentition (Oral Motor) significant number of missing teeth   Facial Symmetry (Oral Motor)   Facial Symmetry (Oral Motor) WNL   Lip Function (Oral Motor)   Lip Range of Motion (Oral Motor) WNL   Tongue Function (Oral Motor)   Tongue Coordination/Speed (Oral Motor) WNL   Tongue ROM (Oral Motor) WNL   Cough/Swallow/Gag Reflex (Oral Motor)   Volitional Throat Clear/Cough (Oral Motor) WNL   Volitional Swallow (Oral Motor) WNL   Vocal Quality/Secretion Management (Oral Motor)   Vocal Quality (Oral Motor) WNL   Secretion Management (Oral Motor) WNL   General Swallowing Observations   Past History of Dysphagia None per pt report or chart review   Respiratory Support room air   Current Diet/Method of Nutritional Intake (General Swallowing Observations, NIS) NPO   Swallowing " Evaluation Clinical swallow evaluation   Clinical Swallow Evaluation   Feeding Assistance dependent   Clinical Swallow Evaluation Textures Trialed thin liquids;pureed;soft & bite-sized;solid foods   Clinical Swallow Eval: Thin Liquid Texture Trial   Mode of Presentation, Thin Liquids fed by clinician;spoon;cup;straw   Volume of Liquid or Food Presented 3 oz   Oral Phase of Swallow WFL   Pharyngeal Phase of Swallow impaired;coughing/choking   Successful Strategies Trialed During Procedure other (see comments)  (Small, single sips)   Diagnostic Statement Overt s/sx of aspiration marked by coughing following consecutive straw sips.  No overt s/sx of aspiration following single, straw sips   Clinical Swallow Evaluation: Puree Solid Texture Trial   Mode of Presentation, Puree fed by clinician;spoon   Volume of Puree Presented 2 tablespoons   Oral Phase, Puree WFL   Pharyngeal Phase, Puree intact   Diagnostic Statement No overt s/sx of aspiration   Clinical Swallow Eval: Soft & Bite Sized   Mode of Presentation fed by clinician   Volume Presented 1/2 boris cracker dipped in puree   Oral Phase WFL   Pharyngeal Phase intact   Diagnostic Statement No overt s/sx of aspiration   Clinical Swallow Evaluation: Solid Food Texture Trial   Mode of Presentation fed by clinician   Volume Presented 1/2 brois cracker   Oral Phase impaired mastication;delayed AP movement   Oral Residue mid posterior tongue   Pharyngeal Phase intact   Diagnostic Statement No overt s/sx of aspiration.  Moderate oral residue that was cleared with 1 liquid wash   Esophageal Phase of Swallow   Patient reports or presents with symptoms of esophageal dysphagia Yes   Esophageal comments Esophagitis, esophageal ulcers, esophageal ring with ulceration.   Swallowing Recommendations   Diet Consistency Recommendations thin liquids (level 0);soft & bite-sized (level 6)   Supervision Level for Intake 1:1 supervision needed   Mode of Delivery Recommendations bolus  size, small;slow rate of intake   Swallowing Maneuver Recommendations alternate food and liquid intake   Recommended Feeding/Eating Techniques (Swallow Eval) provide assist with feeding   Medication Administration Recommendations, Swallowing (SLP) As tolerated   Instrumental Assessment Recommendations instrumental evaluation not recommended at this time   General Therapy Interventions   Planned Therapy Interventions Dysphagia Treatment   Dysphagia treatment Modified diet education   Clinical Impression   Criteria for Skilled Therapeutic Interventions Met (SLP Eval) Yes, treatment indicated   SLP Diagnosis Moderate oropharyngeal swallowing deficits   Risks & Benefits of therapy have been explained evaluation/treatment results reviewed;care plan/treatment goals reviewed;risks/benefits reviewed;current/potential barriers reviewed;participants voiced agreement with care plan;participants included;patient   Clinical Impression Comments Clinical swallow evaluation completed per MD order.  Pt presents with mild oropharyngeal swallowing abilities in setting of weakness d/t esophagitis, esophageal ulcers, esophageal ring with ulceration.  Oral mech remarkable for significant number of missing teeth (pt has top partials but they are not present at bedside).  Of note, pt reports back pain and declined sitting upright to 90 degrees but did agree to sit upright to 45 degrees. Pt assessed with ice chips, thin liquids via spoon and straw, pureed foods, soft solids and regular solids.  Oral phase remarkable for moderately prolonged mastication with regular solids resulting in moderate oral residue that was cleared with 1 liquid wash.  Pharyngeal phase remarkable for s/sx of aspiration marked by coughing x1 following consecutive straw sips but no overt s/sx of aspiration with single straw sips.      Recommend soft and bite sized textures (IDDSI 6) and thin liquids (IDDSI 0) with 1:1 feeding assist.  Please ensure pt is upright to 90  degrees for all PO intake, takes small, single bites/sips at a slow rate and alternates solids and liquids.  Speech to follow for short course.   Swallowing Dysfunction &/or Oral Function for Feeding   Treatment of Swallowing Dysfunction &/or Oral Function for Feeding Minutes (04796) 5   Symptoms Noted During/After Treatment None   Treatment Detail/Skilled Intervention Clinical swallow evaluation completed.  Pt educated on anatomy and physiology of swallowing mechanism, s/sx of aspiration and aspiration-related complications and safe swallowing strategies.  Pt verbalized understanding.   SLP Discharge Planning   SLP Plan Assess diet tolerance, upgrade, train strategies   SLP Discharge Recommendation home with assist   SLP Rationale for DC Rec Suspect pt will meet swallowing goals prior to d/c   SLP Brief overview of current status  Recommend soft and bite sized textures (IDDSI 6) and thin liquids (IDDSI 0) with 1:1 feeding assist. Please ensure pt is upright to 90 degrees for all PO intake, takes small, single bites/sips at a slow rate and alternates solids and liquids. Speech to follow for short course.

## 2024-05-26 NOTE — PROVIDER NOTIFICATION
Provider Cristo Griggs (Gold 9 team) informed pt is NPO, is it appropriate to place her on IV fluids?

## 2024-05-26 NOTE — PROGRESS NOTES
Gastroenterology Follow up Note         Assessment and Plan:     Sakshi Jaeger is a 78 year old female with a history of DVT/PE on Eliquis, PVD s/p R AKA, RA who presents with melena, weakness. GI consulted for evaluation of possible GI bleed.     #. Esophageal ulcers  #. Duodenal ulcers  #. Melena    Underwent EGD on 05/25 with evidence of esophagitis, esophageal ulcers, esophageal ring with ulceration. Biopsies obtained to rule out CMV, malignancy. Also found to have duodenal ulcers. Gastric biopsies obtained to rule out H.pylori, also duodenal biopsies to rule out CMV.            Recommendations:     - Follow-up biopsy results.   - Advance diet as tolerated.  - Okay to resume anticoagulation today, although there is a higher risk to re-bleed. Consider IVC filter instead to reduce risk of bleeding.  - Continue high dose BID PO PPI therapy.   - GI will sign off at this time.     It has been a pleasure to participate in the care of this patient.  Patient discussed with GI staff, Dr. Gomez.  Please do not hesitate to contact the GI service with any questions or concerns.     Wilberto Arguelles MD  Gastroenterology Fellow  Pager [phone]         Subjective/Objective:   - No acute events overnight         Medications:     Current Facility-Administered Medications   Medication Dose Route Frequency Provider Last Rate Last Admin    acetaminophen (TYLENOL) tablet 650 mg  650 mg Oral Q6H Marta Denton APRN CNP   650 mg at 05/26/24 0956    albuterol (PROVENTIL) neb solution 2.5 mg  2.5 mg Nebulization Once Marta Denton APRN CNP        [Held by provider] amLODIPine (NORVASC) tablet 5 mg  5 mg Oral Daily Marta Denton APRN CNP        [Held by provider] apixaban ANTICOAGULANT (ELIQUIS) tablet 5 mg  5 mg Oral BID Marta Denton APRN CNP        benzocaine 20% (HURRICAINE/TOPEX) 20 % spray   Mouth/Throat PRN Xander Gomez MD   1 spray at 05/25/24 8460    calcium carbonate  (TUMS) chewable tablet 1,000 mg  1,000 mg Oral 4x Daily PRN Marta Denton APRN CNP        enoxaparin ANTICOAGULANT (LOVENOX) injection 30 mg  30 mg Subcutaneous Q24H Vinay Jeffrey MD   30 mg at 05/26/24 0956    fentaNYL (PF) (SUBLIMAZE) injection   Intravenous PRN Xander Gomez MD   50 mcg at 05/25/24 1524    [START ON 5/27/2024] fluocinolone acetonide oil 0.01 % ear drops 5 drop  5 drop Both Ears Once per day on Monday Thursday Vinay Jeffrey MD        folic acid (FOLVITE) tablet 1 mg  1 mg Oral Daily Marta Denton APRN CNP   1 mg at 05/26/24 0759    leflunomide (ARAVA) tablet 20 mg  20 mg Oral Daily Marta Denton APRN CNP   20 mg at 05/26/24 0956    lidocaine (LMX4) cream   Topical Q1H PRN Marta Denton APRN CNP        lidocaine 1 % 0.1-1 mL  0.1-1 mL Other Q1H PRN Marta Denton APRN CNP        [Held by provider] lisinopril (ZESTRIL) tablet 20 mg  20 mg Oral Daily Marta Denton APRN CNP        melatonin tablet 1 mg  1 mg Oral At Bedtime PRN Marta Denton APRN CNP        metoprolol succinate ER (TOPROL XL) 24 hr tablet 50 mg  50 mg Oral Daily Vinay Jeffrey MD   50 mg at 05/26/24 0759    midazolam (VERSED) injection   Intravenous PRN Xander Gomez MD   1 mg at 05/25/24 1524    naloxone (NARCAN) injection 0.2 mg  0.2 mg Intravenous Q2 Min PRN Diaz Dubose DO        Or    naloxone (NARCAN) injection 0.4 mg  0.4 mg Intravenous Q2 Min PRN Diaz Dubose DO        Or    naloxone (NARCAN) injection 0.2 mg  0.2 mg Intramuscular Q2 Min PRN Diaz Dubose DO        Or    naloxone (NARCAN) injection 0.4 mg  0.4 mg Intramuscular Q2 Min PRN [place], Diaz, DO        ondansetron (ZOFRAN ODT) ODT tab 4 mg  4 mg Oral Q6H PRN Marta Denton APRN CNP        Or    ondansetron (ZOFRAN) injection 4 mg  4 mg Intravenous Q6H PRN Marta Denton APRN CNP        oxyCODONE (ROXICODONE) tablet 5 mg  5 mg Oral BID  "PRN Marta Denton APRN CNP        [START ON 5/27/2024] pantoprazole (PROTONIX) EC tablet 40 mg  40 mg Oral BID AC Vinay Jeffrey MD        pantoprazole (PROTONIX) IV push injection 40 mg  40 mg Intravenous BID Vinay Jeffrey MD        potassium phosphate 9 mmol in 250 mL NS intermittent infusion  9 mmol Intravenous Once Vinay Jeffrey MD   9 mmol at 05/26/24 1256    predniSONE (DELTASONE) tablet 5 mg  5 mg Oral Daily Marta Denton APRN CNP   5 mg at 05/26/24 0759    senna-docusate (SENOKOT-S/PERICOLACE) 8.6-50 MG per tablet 1 tablet  1 tablet Oral BID PRN Marta Denton APRN CNP        Or    senna-docusate (SENOKOT-S/PERICOLACE) 8.6-50 MG per tablet 2 tablet  2 tablet Oral BID PRN Marta Denton APRN CNP        simvastatin (ZOCOR) tablet 20 mg  20 mg Oral At Bedtime Marta Denton APRN CNP   20 mg at 05/25/24 2123    sodium chloride (PF) 0.9% PF flush 3 mL  3 mL Intracatheter Q8H Marta Denton APRN CNP   3 mL at 05/26/24 0806    sodium chloride (PF) 0.9% PF flush 3 mL  3 mL Intracatheter q1 min prn Marta Denton APRN CNP                Physical Exam:   VS:  BP (!) 150/72 (BP Location: Right arm)   Pulse 66   Temp 97.8  F (36.6  C) (Oral)   Resp 16   Ht 1.626 m (5' 4.02\")   Wt 40.4 kg (89 lb)   LMP  (LMP Unknown)   SpO2 95%   BMI 15.27 kg/m      Wt:   Wt Readings from Last 2 Encounters:   05/25/24 40.4 kg (89 lb)   05/18/24 40.5 kg (89 lb 4.6 oz)      Constitutional: cooperative, pleasant, no acute distress  Eyes: Conjunctiva anicteric  HEENT: Normal oropharynx without ulcers or exudate, mucus membranes moist  CV: RRR, no m/r/g  Respiratory: CTAB  Abd:  Nondistended, +bs, no hepatosplenomegaly, nontender, no rebound or guarding   Skin: warm, perfused, no jaundice  Neuro: AAO x 3, No asterixis         Laboratory:   [facility]  Recent Labs   Lab 05/26/24  0657 05/25/24  1353 05/25/24  0632 05/24/24  1642   *  --  127* 127*   POTASSIUM 3.7 3.9 3.4 " 4.1   CHLORIDE 100  --  96* 94*   ADRIAN 7.8*  --  8.2* 8.9   CO2 20*  --  21* 22   BUN 9.8  --  13.0 14.7   CR 0.38*  --  0.41* 0.42*   GLC 70 85 77 102*     CBC  Recent Labs   Lab 05/26/24  0657 05/25/24  1720 05/25/24  1353 05/25/24  0632 05/24/24  1642   WBC 8.3  --   --  7.1 9.4   RBC 3.36*  --   --  2.41* 2.78*   HGB 9.8*  9.8* 10.7* 10.0* 6.9* 8.1*   HCT 28.7*  --   --  20.9* 24.3*   MCV 85  --   --  87 87   MCH 29.2  --   --  28.6 29.1   MCHC 34.1  --   --  33.0 33.3   RDW 19.2*  --   --  21.0* 21.2*     --   --  237 312     INR  Recent Labs   Lab 05/25/24  0004 05/24/24  1803   INR 2.53* 3.06*     LFTs  Recent Labs   Lab 05/24/24  1642   ALKPHOS 100   AST 32   ALT 21   BILITOTAL 0.6   PROTTOTAL 5.1*   ALBUMIN 2.8*      PANCNo lab results found in last 7 days.

## 2024-05-26 NOTE — PLAN OF CARE
"/55 (BP Location: Left arm, Patient Position: Supine, Cuff Size: Adult Regular)   Pulse 74   Temp 98.4  F (36.9  C) (Oral)   Resp 16   Ht 1.626 m (5' 4.02\")   Wt 40.4 kg (89 lb)   LMP  (LMP Unknown)   SpO2 95%   BMI 15.27 kg/m      A&O x 4. Reports throat pain 2/10. Offered PRN benzocaine-patient refused. Incontinent of bowel and bladder. Last BM 5/24/2024. Hemal on. R BKA. Lift, assist of 2. PIV SL. No BM this shift.       Goal Outcome Evaluation: ongoing.       Admitted/transferred from: ED   2 RN  skin assessment NOT completed during this shift-patient stated she was sleepy and would be agreeable for a skin check later this morning.   Skin assessment finding: Bruising on right upper extremity near PIV site. Right AKA.   Interventions/actions: other Encouraged frequent repositioning in bed.       Bedside Emergency Equipment Present:  Suction Regulator: Yes  Suction Canister: Yes  Tubing between Regulator and Canister: Yes  O2 Regulator with Tree: Yes  Ambu Bag: Yes     "

## 2024-05-26 NOTE — PROGRESS NOTES
Patient was NPO this morning and was able to swallow her meds with no difficulty, then had swallow study done and soft moist diet was ordered,  Tolerated her meals, spouse in the room.  Mg=1.4 replaced and recheck will be this afternoon.  Phos = 2.3 and is being replaced.  Alert and oriented , incontinent of urine, has R AkA and pure wick in place but needs adjustments. Patient was on 2 liters of oxygen sating in 100% and has been weaned off the oxygen. Left arm is swollen and right arm has some bruises on it. Continue to monitor.

## 2024-05-26 NOTE — PROGRESS NOTES
Pt given chicken broth today, started coughing a lot while drinking it. RN took away soup and encouraged coughing/deep breathing. Pt unable to get O2 sats up on her own. Placed pt on 3L O2 which brought O2 up to 90%. MD paged and ordered neb, pt declining neb at this time. Down to 1L O2 and satting around 92% at this time.

## 2024-05-26 NOTE — PROGRESS NOTES
[facility]    Medicine Progress Note - Hospitalist Service, GOLD TEAM 9    Date of Admission:  5/24/2024    Assessment & Plan   Sakshi Jaeger is a 78 year old female admitted on 5/24/2024. 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      She presented to ED with complaint of weakness and dark stools and is admitted to hospital medicine for GI bleed in setting of therapeutic anticoagulation, now s/p EGD with non bleeding duodenal and esophageal ulcers     Acute blood loss anemia due to melena in setting of therapeutic anticoagulation, resolving   Non bleeding duodenal and esophageal ulcer  Patient presents with dark stools and anemia with downtrending hemoglobin with Hb - 6.9 requriing 1uPRBC in AM, stable on repeat. Underwent EGD on 5/25 - esophagitis, esophageal ulcers, esophageal ring with ulceration, duodenal ulcers. Biopsies obtained - path pending   - GI consulted and following, appreciate recommendations   - Will switch to PO PPI BID  - Resumed DVT prophylaxis - if stable and without bleeding, will resume therapeutic anticoagulation   - Check H/H Q12h   - Resumed diet     Concern for aspiration on broth overnight   Patient noted to be coughing while resuming PO diet after EGD  - SLP consulted and discussed recommendation at bedside   Resume soft and bite sized diet with thin liquids   Monitor for signs of pneumonia     #Left lower extremity DVT  #Left lower lobe subsegmental PE  DVT and PE diagnosed 5/17 and patient was loaded with apixaban 10 mg twice daily for 1 week.  Now developed GI bleed.  Elevated INR  - Resumed DVT prophylaxis dose lovenox - adjusted for weight, if stable and no recurrence of bleeding will resume therapeutic anticoagulation   - Of note, due to age and weight, will likely need lower dose of apixaban for therapeutic effect   - Can discuss IVC filter  if not tolerating therapeutic anticoagulation       Hypotonic hyponatremia ,resolving   Patient noted to have mild chronic hyponatremia with outpatient Na labs ranging approximately 124-132 at baseline. On admission, noted to have Na 127, with low serum osm - confirming hypotonic hyponatremia likely due to hypovolemia in setting of GI bleed. She received IV hydration and transfusion with improvement in Na - 130.   Urine Osm and Urine collected after hydration     Continue to monitor Na     #R AKA wound dehiscence  #S/p right AKA (02/2024)  Follows with vascular surgery.    -Wound RN consult    #generalized weakness  Had several week stay at [facility] following amputation and discharged home in March. Was receiving home PT services and improving but had significant functional decline after a 3-week lapse in home services. Extensive work-up completed during previous admission with no clear metabolic, infectious, or other organic etiology to explain her weakness. Currently, experiencing generalized weakness in setting of GIB though also has general physical deconditioning.    -physical and occupational therapies              Diet: Soft & Bite Sized Diet (level 6) Thin Liquids (level 0)    DVT Prophylaxis: Enoxaparin (Lovenox) SQ  Nolan Catheter: Not present  Lines: None     Cardiac Monitoring: None  Code Status: Full Code      Clinically Significant Risk Factors         # Hyponatremia: Lowest Na = 127 mmol/L in last 2 days, will monitor as appropriate    # Hypomagnesemia: Lowest Mg = 1.4 mg/dL in last 2 days, will replace as needed   # Hypoalbuminemia: Lowest albumin = 2.8 g/dL at 5/24/2024  4:42 PM, will monitor as appropriate  # Coagulation Defect: INR = 2.53 (Ref range: 0.85 - 1.15) and/or PTT = 51 Seconds (Ref range: 22 - 38 Seconds), will monitor for bleeding    # Hypertension: Noted on problem list        # Cachexia: Estimated body mass index is 15.27 kg/m  as calculated from the following:    Height as of this  "encounter: 1.626 m (5' 4.02\").    Weight as of this encounter: 40.4 kg (89 lb)., PRESENT ON ADMISSION     # Financial/Environmental Concerns: none         Disposition Plan     Medically Ready for Discharge: Anticipated Tomorrow             Vinay Jeffrey MD  Hospitalist Service, GOLD TEAM 9  [facility]  Securely message with [employer] (more info)  Text page via [employer] Paging/Directory   See signed in provider for up to date coverage information  ______________________________________________________________________    Interval History   Sakshi notes sore throat but otherwise feels well. No epigastric pain. No dyspnea. No cough    Physical Exam   Vital Signs: Temp: 97.8  F (36.6  C) Temp src: Oral BP: (!) 150/72 Pulse: 66   Resp: 16 SpO2: 95 % O2 Device: Nasal cannula Oxygen Delivery: 2 LPM  Weight: 89 lbs 0 oz      Gen: Awake and alert   Resp; bilateral clear, no rales or crackles   CVS: Normal S1 S2  Abd: Non tender         Medical Decision Making             Data     I have personally reviewed the following data over the past 24 hrs:    8.3  \   9.8 (L); 9.8 (L)   / 205     130 (L) 100 9.8 /  70   3.7 20 (L) 0.38 (L) \       Imaging results reviewed over the past 24 hrs:   No results found for this or any previous visit (from the past 24 hour(s)).  "

## 2024-05-27 NOTE — PLAN OF CARE
Goal Outcome Evaluation:      Plan of Care Reviewed With: patient, spouse    Overall Patient Progress: no changeOverall Patient Progress: no change

## 2024-05-27 NOTE — PLAN OF CARE
Goal Outcome Evaluation:Patient reports general discomfort,relief with Tylenol. Increased pain to joints with activity.Incontinent stool,soft medium/brown.Void via purewic.Right AKA area dressing changed.Declined lift to recliner.Tolerated food/fluid,swallowed without apparent difficulty.Received Phos replacement per protocol.Continue to monitor

## 2024-05-27 NOTE — PROGRESS NOTES
[facility]    Medicine Progress Note - Hospitalist Service, GOLD TEAM 9    Date of Admission:  5/24/2024    Assessment & Plan   Sakshi Jaeger is a 78 year old female admitted on 5/24/2024. 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      She presented to ED with complaint of weakness and dark stools and is admitted to hospital medicine for GI bleed in setting of therapeutic anticoagulation, now s/p EGD with non bleeding duodenal and esophageal ulcers, now gradually resuming anticoagulation     Acute blood loss anemia due to melena in setting of therapeutic anticoagulation, resolving   Non bleeding duodenal and esophageal ulcer  Patient presents with dark stools and anemia with downtrending hemoglobin with Hb - 6.9 requriing 1uPRBC in AM, stable on repeat. Underwent EGD on 5/25 - esophagitis, esophageal ulcers, esophageal ring with ulceration, duodenal ulcers. Biopsies obtained - path pending   - GI consulted and following, appreciate recommendations   - switched to PO PPI BID  - Check H/H Q12h   - Resumed diet     Concern for aspiration on broth overnight, resolved   Patient noted to be coughing while resuming PO diet after EGD  - SLP consulted and discussed recommendation at bedside   Resume soft and bite sized diet with thin liquids   Monitor for signs of pneumonia     #Left lower extremity DVT  #Left lower lobe subsegmental PE  DVT and PE diagnosed 5/17 and patient was loaded with apixaban 10 mg twice daily for 1 week.  Now developed GI bleed.  Elevated INR  - Resumed Apixaban 2.5 BID in view of age and wt ( though age 78, given high risk of significant bleeding, would consider discharge on 2.5 BID vs increase to 5 mg BID if hemodynamically stable and without bleeding on current dose)   - Can discuss IVC filter if not tolerating therapeutic anticoagulation       Hypotonic  "hyponatremia ,resolving   Patient noted to have mild chronic hyponatremia with outpatient Na labs ranging approximately 124-132 at baseline. On admission, noted to have Na 127, with low serum osm - confirming hypotonic hyponatremia likely due to hypovolemia in setting of GI bleed. She received IV hydration and transfusion with improvement in Na - 130.   Urine Osm and Urine collected after hydration     Continue to monitor Na     #R AKA wound dehiscence  #S/p right AKA (02/2024)  Follows with vascular surgery.    -WOC consult requested     #generalized weakness  Had several week stay at TCU following amputation and discharged home in March. Was receiving home PT services and improving but had significant functional decline after a 3-week lapse in home services. Extensive work-up completed during previous admission with no clear metabolic, infectious, or other organic etiology to explain her weakness. Currently, experiencing generalized weakness in setting of GIB though also has general physical deconditioning.    -physical and occupational therapies              Diet: Soft & Bite Sized Diet (level 6) Thin Liquids (level 0)  Room Service    DVT Prophylaxis: DOAC  Nolan Catheter: Not present  Lines: None     Cardiac Monitoring: None  Code Status: Full Code      Clinically Significant Risk Factors            # Hypomagnesemia: Lowest Mg = 1.4 mg/dL in last 2 days, will replace as needed   # Hypoalbuminemia: Lowest albumin = 2.8 g/dL at 5/24/2024  4:42 PM, will monitor as appropriate  # Coagulation Defect: INR = 2.53 (Ref range: 0.85 - 1.15) and/or PTT = 51 Seconds (Ref range: 22 - 38 Seconds), will monitor for bleeding    # Hypertension: Noted on problem list        # Cachexia: Estimated body mass index is 15.27 kg/m  as calculated from the following:    Height as of this encounter: 1.626 m (5' 4.02\").    Weight as of this encounter: 40.4 kg (89 lb)., PRESENT ON ADMISSION     # Financial/Environmental Concerns: none     "     Disposition Plan     Medically Ready for Discharge: Anticipated in 2-4 Days             Vinay Jeffrey MD  Hospitalist Service, GOLD TEAM 9  [facility]  Securely message with [employer] (more info)  Text page via [employer] Paging/Directory   See signed in provider for up to date coverage information  ______________________________________________________________________    Interval History   Sakshi notes no new symptoms. Has not had any bleeding or bruising to her knowledge.   No dyspnea or cough.       Physical Exam   Vital Signs: Temp: 98.7  F (37.1  C) Temp src: Oral BP: (!) 141/74 Pulse: 65   Resp: 18 SpO2: 95 % O2 Device: None (Room air)    Weight: 89 lbs 0 oz    Gen: Awake and alert, in bed   Resp: bilateral clear   CVS: Normal S1 S2  Abd: Non tender         Medical Decision Making             Data     I have personally reviewed the following data over the past 24 hrs:    5.2  \   9.3 (L)   / 170     130 (L) 100 7.7 (L) /  78   4.7 20 (L) 0.37 (L) \       Imaging results reviewed over the past 24 hrs:   No results found for this or any previous visit (from the past 24 hour(s)).

## 2024-05-27 NOTE — PROVIDER NOTIFICATION
Message sent to provider for a WOC consult for a pre existing RLE stump wound per ER notes on 5/24.  Pt is s/p right AKA. Currently no active orders for wound care. Wound was dressed by day nurse and packed.

## 2024-05-27 NOTE — PLAN OF CARE
Shift: [id number]    Neuro: A&Ox4, pleasant and cooperative with cares, bed alarm on during the shift.  VS: VSS  Respiratory: RA, lung sounds clear, no complaints of SOB  Cardiac: Apical pulse regular, no complaints of chest pain, afebrile.  Pain: Pt have some pain, scheduled tylenol given for adequate pain relief.  GI/: Pt had purewick placed over night with adequate output. No BM overnight. No complaints of N/V.  Diet: Soft, Bite size food, thin liquids.  IV/Drips: Left PIV SL  Activity: Lift device  Skin/Drains: Generalized bruising, blanchable redness on coccyx mepilex on.     P: Report Changes to treatment team Gold 9

## 2024-05-27 NOTE — PROGRESS NOTES
Activity: A1, lift   Neuros:  A&O x4.   Cardiac:  WDL, refusing overnight plse ox monitoring  Respiratory:  WDL on RA. Denies SOB.  GI/: Incontinent urine. +BS, passing flatus.   Diet: Bite sized, Minced/moist, poor PO  Skin: generalized bruising  Lines: PIV SL  Incisions/Drains: Pure Wick  Labs: Reviewed  Pain/nausea: mild generalized pain 2/10 managed with scheduled Tylenol  New changes this shift: No new changes  Plan:  Continue to monitor, advance diet as tolerated

## 2024-05-28 NOTE — PLAN OF CARE
"Goal Outcome Evaluation:      Plan of Care Reviewed With: patient    Overall Patient Progress: no change    Blood pressure (!) 144/63, pulse 66, temperature 98.3  F (36.8  C), temperature source Oral, resp. rate 18, height 1.626 m (5' 4.02\"), weight 40.4 kg (89 lb), SpO2 96%, not currently breastfeeding.       Patient asked to prioritize sleep and refused meds and interventions beside one reposition and chux change overnight  Turn and reposition with assist of 1-2 - last at 0500.  Pure wick in place with good uop. Chux changed at 0500  PIV - sl   No new changes. Continue with POC.            "

## 2024-05-28 NOTE — PROGRESS NOTES
[facility]    Medicine Progress Note - Hospitalist Service, GOLD TEAM 9    Date of Admission:  5/24/2024    Assessment & Plan   78 year old female admitted on 5/24/2024. 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  She presented to ED with complaint of weakness and dark stools and is admitted to hospital medicine for GI bleed in setting of therapeutic anticoagulation, now s/p EGD with non bleeding duodenal and esophageal ulcers, now gradually resuming anticoagulation     Acute blood loss anemia secondary to duodenal and esophageal ulcer without active bleed during endoscope, POA  Patient presents with dark stools and anemia with downtrending hemoglobin with Hb - 6.9 requriing 1uPRBC in AM, stable on repeat. Underwent EGD on 5/25 - esophagitis, esophageal ulcers, esophageal ring with ulceration, duodenal ulcers. Biopsies obtained   -Awaiting pathology  - GI consulted and following, appreciate recommendations   - switched to PO PPI BID    Concern for aspiration pneumonia  -Ordered chest x-ray  -Continue soft and bite sized diet with thin liquids     Hypotonic hyponatremia, POA  Urine sodium is elevated even the setting of hyponatremia.  Will need to rule out adrenal insufficiency in the setting of chronic steroids.  -Repeat urine sodium, urine anxiety, and serum osmolality  -Ordered add on cortisol and TSH  -Review Cystatin C  -BMP in a.m.    Left lower extremity DVT and left lower lobe subsegmental PE  DVT and PE diagnosed 5/17 and patient was loaded with apixaban 10 mg twice daily for 1 week.  Now developed GI bleed.  Elevated INR  -Continue apixaban 2.5 mg twice daily, with consideration for full dose within the next 24 hours  - Can discuss IVC filter if not tolerating therapeutic anticoagulation     Uncontrolled hypertension, POA  -Resumed amlodipine 5 mg daily  -Continue  "holding lisinopril    R AKA wound dehiscence  #S/p right AKA (02/2024)  Follows with vascular surgery.    -WOC consult requested     Generalized weakness  Had several week stay at TCU following amputation and discharged home in March. Was receiving home PT services and improving but had significant functional decline after a 3-week lapse in home services. Extensive work-up completed during previous admission with no clear metabolic, infectious, or other organic etiology to explain her weakness. Currently, experiencing generalized weakness in setting of GIB though also has general physical deconditioning.    -physical and occupational therapies    Chronic medical problems  -Rheumatoid arthritis, continue prednisone 5 mg daily and will consider resumption of methotrexate 20 mg p.o. weekly    Addendum dated 5/28 at 530  -Chest x-ray with possible pneumonia likely secondary to aspiration, ordered sputum culture and started Zosyn with low threshold to de-escalate within the next 24 hours          Diet: Soft & Bite Sized Diet (level 6) Thin Liquids (level 0)  Room Service    DVT Prophylaxis: DOAC  Nolan Catheter: Not present  Lines: None     Cardiac Monitoring: None  Code Status: Full Code      Clinically Significant Risk Factors         # Hyponatremia: Lowest Na = 128 mmol/L in last 2 days, will monitor as appropriate    # Hypomagnesemia: Lowest Mg = 1.3 mg/dL in last 2 days, will replace as needed   # Hypoalbuminemia: Lowest albumin = 2.8 g/dL at 5/24/2024  4:42 PM, will monitor as appropriate     # Hypertension: Noted on problem list        # Cachexia: Estimated body mass index is 15.27 kg/m  as calculated from the following:    Height as of this encounter: 1.626 m (5' 4.02\").    Weight as of this encounter: 40.4 kg (89 lb)., PRESENT ON ADMISSION     # Financial/Environmental Concerns: none         Disposition Plan     Medically Ready for Discharge: Anticipated in 2-4 Days             Cynthia Tao MD  Hospitalist " Service, GOLD TEAM 9  [facility]  Securely message with [employer] #[email or web address] (more info)  Text page via [email or web address] Paging/Directory   See signed in provider for up to date coverage information  ______________________________________________________________________    Interval History   The patient denies any active symptoms.  No subjective fever or chills.  Nursing staff reported that the patient is coughing with increased frequency after having any nutrition    Physical Exam   Vital Signs: Temp: 98.8  F (37.1  C) Temp src: Axillary BP: (!) 153/74 Pulse: 80   Resp: 18 SpO2: 93 % O2 Device: None (Room air)    Weight: 89 lbs 0 oz    Constitutional: Awake, alert, cooperative, no apparent distress.  Eyes: Conjunctiva and pupils examined and normal.  HEENT: Moist mucous membranes, normal dentition.  Respiratory: Clear to auscultation bilaterally, no crackles or wheezing.  Cardiovascular: Regular rate and rhythm, normal S1 and S2, and no murmur noted.  GI: Soft, non-distended, non-tender, normal bowel sounds.  Lymph/Hematologic: No anterior cervical or supraclavicular adenopathy.  Skin: No rashes, no cyanosis, no edema.  Musculoskeletal: No joint swelling, erythema or tenderness.  Neurologic: Cranial nerves 2-12 intact, normal strength and sensation.  Psychiatric: Alert, oriented to person, place and time, no obvious anxiety or depression.     Medical Decision Making       45 MINUTES SPENT BY ME on the date of service doing chart review, history, exam, documentation & further activities per the note.      Data     I have personally reviewed the following data over the past 24 hrs:    4.2  \   9.0 (L); 9.0 (L)   / 185     128 (L) 98 6.2 (L) /  73   3.5 21 (L) 0.33 (L) \       Imaging results reviewed over the past 24 hrs:   No results found for this or any previous visit (from the past 24 hour(s)).

## 2024-05-28 NOTE — PLAN OF CARE
Goal Outcome Evaluation:Patient reports general discomfort,relief with repositioning/Tylenol.Noted cough with swallow activity, MD aware,CXR attained.Encouraged IS,OOB to recliner X 1 hour.Urine sent via cath route per orders.Received Magnesium/Phos replacement per protocol.Recheck labs at 1600. Tolerated food intake,incontinent urine,no stool episode. WOC at bedside session for right AKA area.Continue to mmonitor

## 2024-05-28 NOTE — CONSULTS
[facility] Nurse Inpatient Assessment     Consulted for: Right AKA, known incisional dehiscence     Patient History (according to provider note(s):      78 year old female admitted on 5/24/2024. 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  She presented to ED with complaint of weakness and dark stools and is admitted to hospital medicine for GI bleed in setting of therapeutic anticoagulation, now s/p EGD with non bleeding duodenal and esophageal ulcers, now gradually resuming anticoagulation     Assessment:      Areas visualized during today's visit: Lower extremities     Wound location: Right AKA stump site    Last photo: 5/19/24 and 5/28  Wound due to: Surgical Wound  Wound history/plan of care: Follows with Vascular Surgery, last evaluated on 4/30. Wound healing nicely per OP visit notes and patient/family report. No concerns for infection on exam today. CT RLE completed on admission, findings c/w known wound dehiscence.    - Continue to monitor   - PTA gabapentin 300 mg TID  Wound base: 100 % non-granular tissue,      Palpation of the wound bed: normal      Drainage: small     Description of drainage: cloudy and yellow     Measurements (length x width x depth, in cm): 2  x 0.5  x  1.8 cm      Tunneling: N/A     Undermining: up to 1cm at 3 o clock   Periwound skin: Intact      Color: normal and consistent with surrounding tissue      Temperature: normal   Odor: none  Pain: moderate, sharp when packing or assessing wound with applicator   Pain interventions prior to dressing change: slow and gentle cares   Treatment goal: Maintain (prevention of deterioration)  STATUS: initial assessment  Supplies ordered: supplies stored on unit      Treatment Plan:     R AKA incisional dehiscence: Daily and as needed if soiled/saturated cleanse wound and entire incision with Vashe wound  cleanser, then moisten 1/2 inch nugauze with Vashe and pack into open areas, ensuring to pack undermined area at 3 o clock, ensure long tail for easy removal, apply Cavilon barrier film to skin around wound and where tape will be placed and let dry, cover with primapore dressing     Orders: Reviewed    RECOMMEND PRIMARY TEAM ORDER: None, at this time  Education provided: wound progress and Moisture management  Discussed plan of care with: Patient and Family  [facility] nurse follow-up plan: weekly  Notify WOC if wound(s) deteriorate.  Nursing to notify the Provider(s) and re-consult the WO Nurse if new skin concern.    DATA:     Current support surface: Standard  Standard Isoflex gel  Containment of urine/stool: Incontinence Protocol  BMI: Body mass index is 15.27 kg/m .   Active diet order: Orders Placed This Encounter      Soft & Bite Sized Diet (level 6) Thin Liquids (level 0)     Output: I/O last 3 completed shifts:  In: 470 [P.O.:470]  Out: 1000 [Urine:1000]     Labs:   Recent Labs   Lab 05/28/24  0615 05/25/24  0632 05/25/24  0004   ALBUMIN 2.3*  --   --    HGB 9.0*  9.0*   < >  --    INR  --   --  2.53*   WBC 4.2   < >  --     < > = values in this interval not displayed.     Pressure injury risk assessment:   Sensory Perception: 3-->slightly limited  Moisture: 3-->occasionally moist  Activity: 2-->chairfast  Mobility: 2-->very limited  Nutrition: 2-->probably inadequate  Friction and Shear: 2-->potential problem  Alexandro Score: 14      Please contact through Mariano Quintana group: [facility] Nurse  Dept. Office Number: [phone]

## 2024-05-29 NOTE — TELEPHONE ENCOUNTER
Returned Santi's call.     He is tearful and expresses being concerned about Sakshi's condition, particularly the fact that her stump incision has opened back up. Went over recent WOC note with him in detail. He also expresses concern about pt's ongoing nursing needs - does not feel he can care for her at home any longer. I let him know social work has been consulted and I will send a message to their assigned  as well. Spoke with our MINERVA Yasmin Rob who will make a social visit to pt in the hospital to provide additional support. Pt's  is in agreement with this. Encouraged him to call us back with any additional concerns. Will defer POC to inpatient team.    MILTON Davenport, RN  RN Care Coordinator  [facility] Vascular Surgery - [facility] phone: [phone]  Fax: [phone]

## 2024-05-29 NOTE — TELEPHONE ENCOUNTER
[facility] Call Center    Phone Message    May a detailed message be left on voicemail: yes     Reason for Call: Other: Pt's partner Santi would lke to speak with someone regarding Sakshi's wound. He states they got some bad news regarding it while she is in the hospital currently. Please call Santi back at [phone]. Thank you.      Action Taken: Message routed to:  [facility] Vascular    Travel Screening: Not Applicable

## 2024-05-29 NOTE — PROGRESS NOTES
"Care Management Follow Up    Length of Stay (days): 5    Expected Discharge Date: 05/31/2024     Concerns to be Addressed: discharge planning   Patient plan of care discussed at interdisciplinary rounds: Yes    Anticipated Discharge Disposition: Skilled Nursing Facility, Transitional Care     Anticipated Discharge Services: None  Anticipated Discharge DME: None    Patient/family educated on Medicare website which has current facility and service quality ratings: yes  Education Provided on the Discharge Plan: Yes  Patient/Family in Agreement with the Plan: yes    Referrals Placed by CM/SW:  yes  Private pay costs discussed: insurance costs co-pays - discussed with pt's fiance by ED SW on 5/24/24.    Additional Information:    , covering for 7B, completed chart review and attended rounds.   TCU referrals sent on behalf of pt by SW. SW also completed the Prior Auth ([employer] form) - required for those patients with BC/BS Medicare Advantage. Prior Auth faxed to [employer] 5/29.   [employer] follow up number is # [phone].    Per ED , who met with pt on 5/24, \"Met with pt and fiance in ED lobby. Confirmed desire for TCU placement. Discussed potential discharge needs to include TCU. List of Medicare certified options reviewed with patient/family/caregiver. Pt and fiance had questions about cost and coverage for TCU. Writer explained that pt would have to have had a break in cares from TCU for 60 days, pt's medicare days will reset. If pt had not had a break in 60 days in care, it would resume from whatever day she left the facility. Pt and Fiance expressed their frustration with the expenses of copays for both the TCU and for her current ED visit.\"    TCU referrals sent 5/29 to facilities below.     Pending TCUs:    [facility] - #1 choice  P: [phone]  P: [phone] - Admissions  F: [phone]      [facility] - #2   [address] [place]  [address]  P: [phone]  F: " [phone]     [facility] - #3  [address] [place]  [address]  P: [phone]  P: [phone] - Admissions  F: [phone]     [facility]  [address] [place]  [address]  P: [phone]  P: [phone] - Admissions  F: [phone]     [facility]  [address]  [address]  P: [phone]  F: [phone]     [facility] [place]  P: [phone] - [place] [facility]  F: [phone]     [facility]   [address] [place]  [address]  P: [phone]  P: [phone] - Admissions  F: [phone]    SW/RNCC will continue to follow up on TCU referrals, and assist in any other discharge needs that may arise.    DEBI Levy, LGSW  Coverage   [facility]  [email or web address]

## 2024-05-29 NOTE — PLAN OF CARE
Goal Outcome Evaluation:  Care from 3 pm to 7 pm  A&Ox4. AVSS. Pt denied pain; pt is on scheduled tylenol and PRN oxycodone.Mag 1.5, phos 2.1 ; replacement started. No Bm today. Incontinent of urine. Poor appetite. PIV got infiltrated; consult is placed for new PIV.  is at bed side. Continue with POC

## 2024-05-29 NOTE — PROGRESS NOTES
[facility]    Medicine Progress Note - Hospitalist Service, GOLD TEAM 9    Date of Admission:  5/24/2024    Assessment & Plan   78 year old female admitted on 5/24/2024. 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  She presented to ED with complaint of weakness and dark stools and is admitted to hospital medicine for GI bleed in setting of therapeutic anticoagulation, now s/p EGD with non bleeding duodenal and esophageal ulcers, now gradually resuming anticoagulation     Acute blood loss anemia secondary to duodenal and esophageal ulcer without active bleed during endoscope, POA  Patient presents with dark stools and anemia with downtrending hemoglobin with Hb - 6.9 requriing 1uPRBC in AM, stable on repeat. Underwent EGD on 5/25 - esophagitis, esophageal ulcers, esophageal ring with ulceration, duodenal ulcers. 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  Total duration of antibiotics is 5 days.    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  Now developed GI bleed.  Elevated INR  -Continue apixaban 2.5 mg twice daily, with consideration for full dose within the next 24 hours  - Can discuss IVC filter if not tolerating therapeutic anticoagulation     controlled hypertension, POA  -Continue amlodipine 5 mg daily  -Continue holding lisinopril    R AKA wound  "dehiscence  #S/p right AKA (02/2024)  Follows with vascular surgery.    -WOC consult requested     Generalized weakness  Had several week stay at TCU following amputation and discharged home in March. Was receiving home PT services and improving but had significant functional decline after a 3-week lapse in home services. Extensive work-up completed during previous admission with no clear metabolic, infectious, or other organic etiology to explain her weakness. Currently, experiencing generalized weakness in setting of GIB though also has general physical deconditioning.    -physical and occupational therapies    Chronic medical problems  -Rheumatoid arthritis, continue prednisone 5 mg daily and will consider resumption of methotrexate 20 mg p.o. weekly            Diet: Soft & Bite Sized Diet (level 6) Thin Liquids (level 0)  Room Service  Fluid restriction 1800 ML FLUID    DVT Prophylaxis: DOAC  Nolan Catheter: Not present  Lines: None     Cardiac Monitoring: None  Code Status: Full Code      Clinically Significant Risk Factors         # Hyponatremia: Lowest Na = 128 mmol/L in last 2 days, will monitor as appropriate    # Hypomagnesemia: Lowest Mg = 1.3 mg/dL in last 2 days, will replace as needed   # Hypoalbuminemia: Lowest albumin = 2.3 g/dL at 5/28/2024  6:15 AM, will monitor as appropriate     # Hypertension: Noted on problem list        # Cachexia: Estimated body mass index is 15.27 kg/m  as calculated from the following:    Height as of this encounter: 1.626 m (5' 4.02\").    Weight as of this encounter: 40.4 kg (89 lb).        # Financial/Environmental Concerns: none         Disposition Plan     Medically Ready for Discharge: Anticipated in 2-4 Days             Cynthia Tao MD  Hospitalist Service, [address]  Securely message with [employer] (more info)  Text page via [facility] Paging/Directory   See signed in provider for up to date coverage " information  ______________________________________________________________________    Interval History   The patient denies any active symptoms.  She reported being sick of being in the hospital.    Physical Exam   Vital Signs: Temp: 98  F (36.7  C) Temp src: Oral BP: 139/66 Pulse: 62   Resp: 18 SpO2: 94 % O2 Device: None (Room air)    Weight: 89 lbs 0 oz    Constitutional: Awake, alert, cooperative, no apparent distress.  Eyes: Conjunctiva and pupils examined and normal.  HEENT: Moist mucous membranes, normal dentition.  Respiratory: Clear to auscultation bilaterally, no crackles or wheezing.  Cardiovascular: Regular rate and rhythm, normal S1 and S2, and no murmur noted.  GI: Soft, non-distended, non-tender, normal bowel sounds.  Lymph/Hematologic: No anterior cervical or supraclavicular adenopathy.  Skin: No rashes, no cyanosis, no edema.  Musculoskeletal: No joint swelling, erythema or tenderness.  Neurologic: Cranial nerves 2-12 intact, normal strength and sensation.  Psychiatric: Alert, oriented to person, place and time, no obvious anxiety or depression.     Medical Decision Making       45 MINUTES SPENT BY ME on the date of service doing chart review, history, exam, documentation & further activities per the note.      Data     I have personally reviewed the following data over the past 24 hrs:    3.2 (L)  \   9.9 (L)   / 211     129 (L) 98 5.8 (L) /  72   4.3 22 0.44 (L) \       Imaging results reviewed over the past 24 hrs:   No results found for this or any previous visit (from the past 24 hour(s)).

## 2024-05-29 NOTE — PLAN OF CARE
Goal Outcome Evaluation:    Temp: 98.8  F (37.1  C) Temp src: Oral BP: 124/70 Pulse: 69   Resp: 18 SpO2: 97 % O2 Device: None (Room air)      Pt A&O X4, able to communicate needs. VSS, on RA. Denies SOB at rest. Generalized pain managed with scheduled tylenol. Pt repositioned Q2hr or when requested. Pt incontinence of urine X1. Sputum sample still needs to be collected. Pt tolerating soft bite diet without N/V, fair appetite.

## 2024-05-29 NOTE — PLAN OF CARE
Goal Outcome Evaluation:      Plan of Care Reviewed With: patient    Overall Patient Progress: no changeOverall Patient Progress: no change    Outcome Evaluation: patient a/o x4, calls appropriately.  denies pain. tolerating soft and bite sized diet. R AKA dressing CDI, pt declined dressing change. worked with PT and up in chair for couple hours. incontinent urine, brief changed x2.  no BM today, passing flatus.  continue to monitor.

## 2024-05-29 NOTE — PLAN OF CARE
"BP (!) 145/74 (BP Location: Left arm)   Pulse 60   Temp 97.8  F (36.6  C) (Oral)   Resp 18   Ht 1.626 m (5' 4.02\")   Wt 40.4 kg (89 lb)   LMP  (LMP Unknown)   SpO2 94%   BMI 15.27 kg/m      Neuro: Alert and oriented x 3, lets needs known  Cardiac:Wnl, denies chest pain  Respiratory:WNL, Denies SOB  GI/: incont of bowel and bladder, diaper changed x 2  Diet/Appetite:Tolerating diet, denies nausea,   Skin:no new skin deficient this shift  LDA: L PIV TKO between ABX  Labs: Reviewed.   Activity: A X 2   Pain:pain contolled with felicitas. Tylenol  Plan:Cont with POC          Goal Outcome Evaluation:Ongoing                            "

## 2024-05-30 NOTE — PLAN OF CARE
"Goal Outcome Evaluation:      Plan of Care Reviewed With: patient    Overall Patient Progress: no changeOverall Patient Progress: no change    A&Ox4. VSS on RA. Pain managed with scheduled tylenol. Denies nausea. R AKA covered with [place] CDI- wound care x1 completed this shift. Incontinent of bowel and bladder. BM x1 this shift. R PIV TKO. Soft and bite sized diet, poor appetite, 1800 FR. Assist x2 with lift, Pt declined recliner x2 and repositioning. Potassium replaced this shift, recheck 1435. Continue with POC.     Vitals: BP (!) 142/71 (BP Location: Right arm)   Pulse 63   Temp 98.2  F (36.8  C) (Oral)   Resp 18   Ht 1.626 m (5' 4.02\")   Wt 40.4 kg (89 lb)   LMP  (LMP Unknown)   SpO2 94%   BMI 15.27 kg/m      "

## 2024-05-30 NOTE — PROGRESS NOTES
Quick Note:     BRYAN received a call from [employer]. BRYAN gave them the 7B phone number. BRYAN received another phone call from [employer] this time from a computer programed voice about pt. BRYAN looked up pt and got the approval number. SW Team's messaged CHW, Lui Pope, who wrote today's note on TCU  follow up for pt, asking her to check the fax for authorization.     Authorization number #[id number]  EXPIRES 6/6/24    GREY Nicole   5A Oncology beds:5220-40 & 5C  beds 5417-32 (non BMT )      Vocera:  Phone: [phone]  Pager: [phone]

## 2024-05-30 NOTE — PROGRESS NOTES
[facility]    Medicine Progress Note - Hospitalist Service, GOLD TEAM 9    Date of Admission:  5/24/2024    Assessment & Plan   78 year old female admitted on 5/24/2024. 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  She presented to ED with complaint of weakness and dark stools and is admitted to hospital medicine for GI bleed in setting of therapeutic anticoagulation, now s/p EGD with non bleeding duodenal and esophageal ulcers, now gradually resuming anticoagulation     Acute blood loss anemia secondary to duodenal and esophageal ulcer without active bleed during endoscope, POA  Patient presents with dark stools and anemia with downtrending hemoglobin with Hb - 6.9 requriing 1uPRBC in AM, stable on repeat. Underwent EGD on 5/25 - esophagitis, esophageal ulcers, esophageal ring with ulceration, duodenal ulcers. 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  Now developed GI bleed.  Elevated INR  -Resumed full anticoagulation with apixaban 5 mg twice daily, for right now, the patient is able to tolerate Excedrin at 2.5 mg twice daily  - Can discuss IVC filter if not tolerating therapeutic anticoagulation     Better controlled hypertension, POA  -The patient would like to  "decrease the amount of medications he was receiving, resumed lisinopril 20 mg, discontinued amlodipine 5 mg, continue metoprolol succinate 50 mg daily    R AKA wound dehiscence  #S/p right AKA (02/2024)  Follows with vascular surgery.    -WOC consult requested     Generalized weakness  Had several week stay at TCU following amputation and discharged home in March. Was receiving home PT services and improving but had significant functional decline after a 3-week lapse in home services. Extensive work-up completed during previous admission with no clear metabolic, infectious, or other organic etiology to explain her weakness. Currently, experiencing generalized weakness in setting of GIB though also has general physical deconditioning.    -physical and occupational therapies    Chronic medical problems  -Rheumatoid arthritis, continue prednisone 5 mg daily and will consider resumption of methotrexate 20 mg p.o. weekly            Diet: Soft & Bite Sized Diet (level 6) Thin Liquids (level 0)  Room Service  Fluid restriction 1800 ML FLUID    DVT Prophylaxis: DOAC  Nolan Catheter: Not present  Lines: None     Cardiac Monitoring: None  Code Status: Full Code      Clinically Significant Risk Factors        # Hypokalemia: Lowest K = 3.2 mmol/L in last 2 days, will replace as needed  # Hyponatremia: Lowest Na = 129 mmol/L in last 2 days, will monitor as appropriate    # Hypomagnesemia: Lowest Mg = 1.5 mg/dL in last 2 days, will replace as needed   # Hypoalbuminemia: Lowest albumin = 2.3 g/dL at 5/28/2024  6:15 AM, will monitor as appropriate     # Hypertension: Noted on problem list        # Cachexia: Estimated body mass index is 15.27 kg/m  as calculated from the following:    Height as of this encounter: 1.626 m (5' 4.02\").    Weight as of this encounter: 40.4 kg (89 lb).        # Financial/Environmental Concerns: none         Disposition Plan     Medically Ready for Discharge: Anticipated Tomorrow             Sameh M. " MD [place]  Hospitalist Service, GOLD TEAM 9  [facility]  Securely message with [employer] (more info)  Text page via [employer] Paging/Directory   See signed in provider for up to date coverage information  ______________________________________________________________________    Interval History   The patient denied any new symptoms.    Physical Exam   Vital Signs: Temp: 98.4  F (36.9  C) Temp src: Oral BP: (!) 142/71 Pulse: 74   Resp: 18 SpO2: 94 % O2 Device: None (Room air)    Weight: 89 lbs 0 oz    Constitutional: Awake, alert, cooperative, no apparent distress.  Eyes: Conjunctiva and pupils examined and normal.  HEENT: Moist mucous membranes, normal dentition.  Respiratory: Clear to auscultation bilaterally, no crackles or wheezing.  Cardiovascular: Regular rate and rhythm, normal S1 and S2, and no murmur noted.  GI: Soft, non-distended, non-tender, normal bowel sounds.  Lymph/Hematologic: No anterior cervical or supraclavicular adenopathy.  Skin: No rashes, no cyanosis, no edema.  Musculoskeletal: No joint swelling, erythema or tenderness.  Neurologic: Cranial nerves 2-12 intact, normal strength and sensation.  Psychiatric: Alert, oriented to person, place and time, no obvious anxiety or depression.     Medical Decision Making       45 MINUTES SPENT BY ME on the date of service doing chart review, history, exam, documentation & further activities per the note.      Data     I have personally reviewed the following data over the past 24 hrs:    3.1 (L)  \   8.8 (L)   / 189     131 (L) 99 6.3 (L) /  75   3.4 22 0.35 (L) \       Imaging results reviewed over the past 24 hrs:   No results found for this or any previous visit (from the past 24 hour(s)).

## 2024-05-30 NOTE — PROGRESS NOTES
"Care Management Follow Up    Additional Information:  05/30: CHW did the TCU F/ups as follows:    ACCEPTED:  [facility] - #3  [address] [place]  [address]  P: [phone]  P: [phone] - Admissions(Ms Lim)  F: [phone]  Bed Opening today    [facility]  [address] [place]  [address]  P: [phone]  P: [phone] - Admissions( Ms Johnson)  F: [phone]  Bed Opening today up to 6:00 PM.    PENDING:  [facility] - #2   [address]  [address]  P: [phone]  F: [phone]  05/30: LVM for a call back with the review.    [facility]  [address] [place] [address]  [address]  P: [phone]  F: [phone]  05/30:\"Not appropriate for [facility] d/t anticipated prolonged rehab course needed\"    [facility]  P: [phone] - [facility]  F: [phone]  05/30: They will give us a call back.    DECLINED:  [facility] - #1 choice  P: [phone]  P: [phone] - Admissions  F: [phone]   Reason: Can not meet Pt's needs.    [facility]   [address] [place]  [address]  P: [phone]  P: [phone] - Admissions  F: [phone]  Reason: Bed not available.    05/30: CHW called [employer] regarding prior auth. They confirmed receipt, and a nurse is reviewing and will get back to SW today. Efrem Cunha) called to see if additional PT notes have been added since 5/24. None added, so prior auth will be completed with just that note. [facility] will return call to [facility] later today or tomorrow.    Lui Pope CHW 7A & 7B [facility]  P [phone]   Fax: [phone]  Email: [email or web address]   "

## 2024-05-30 NOTE — PROGRESS NOTES
Care Management Follow Up    Additional Information:  05/30: CHW presented the two TCU facilities that accepted the referral to the Pt for a choice and she said she and the Fiance will decide and inform us.    Lui PAUL 7A & 7B [facility]  P [phone]   Fax: [phone]  Email: [email or web address]

## 2024-05-30 NOTE — PLAN OF CARE
"Goal Outcome Evaluation:     Plan of Care Reviewed With: patient    Overall Patient Progress: improving      Vitals: Blood pressure 132/79, pulse 58, temperature 98.2  F (36.8  C), temperature source Oral, resp. rate 18, height 1.626 m (5' 4.02\"), weight 40.4 kg (89 lb), SpO2 93%, not currently breastfeeding.  Time: 9217-8622  Neuro: A/O x 4, calls appropriately and makes needs known.  Activity: Up with assist x2/lift.    Pain and N/V: Rt leg and generalized pain managed with scheduled tylenol and PRN oxycodone BID.  GI/: No BM this shift, Voiding spontaneously. AUOP.   Cardiac: Denies cardiac chest pain.   Diet: soft and bite sized diet (level 6) thin liquids (level 0)  Respiratory: Denies SOB, on RA  Lines/Drains: R PIV infusing TKO and intermittent abx.   Incisions/Skin: WDL, No new deficit. R leg AKA, daily wound care, dressing CDI.   Lab: Reviewed.  New changes this shift: No significant changes this shift, Continue POC.         "

## 2024-05-31 NOTE — PLAN OF CARE
"Goal Outcome Evaluation:      Plan of Care Reviewed With: patient    Overall Patient Progress: no changeOverall Patient Progress: no change     BP (!) 142/71 (BP Location: Right arm)   Pulse 74   Temp 98.4  F (36.9  C) (Oral)   Resp 18   Ht 1.626 m (5' 4.02\")   Wt 40.4 kg (89 lb)   LMP  (LMP Unknown)   SpO2 94%   BMI 15.27 kg/m      Patient is stable, pain managed to tolerable level, K+ level 3.4, replacement order placed; Mag and Phos order placed to be drawn tomorrow. AKA wound dressing is CDI. 1800 mL fluid restriction observed. No changes observed in overall functions and clinical status.   "

## 2024-05-31 NOTE — PLAN OF CARE
"/70 (BP Location: Left arm)   Pulse 66   Temp 98  F (36.7  C) (Oral)   Resp 16   Ht 1.626 m (5' 4.02\")   Wt 40.4 kg (89 lb)   LMP  (LMP Unknown)   SpO2 95%   BMI 15.27 kg/m       Problem: Adult Inpatient Plan of Care  Goal: Plan of Care Review  Description: The Plan of Care Review/Shift note should be completed every shift.  The Outcome Evaluation is a brief statement about your assessment that the patient is improving, declining, or no change.  This information will be displayed automatically on your shift  note.  Outcome: Progressing     Goal Outcome Evaluation: Pt. A&Ox4. VSS on RA. R PIV TKO. Incont of bowel and bladder- brief in place. Assist x2 repo/ lift. R AKA, wound care refused this shift. 1800mL fluid restriction. Soft and bite sized diet- tolerating well. Labs reviewed. Phos replaced. Oxy given for pain.      "

## 2024-05-31 NOTE — PROGRESS NOTES
Care Management Follow Up    Additional Information:  05/31: CHW completed Pre-Admission Screening through [facility] which is needed to be admitted to a TCU and confirmation number is the following:  [id number]    Lui Pope   CHW 7A & 7B [facility]  P [phone]   Fax: [phone]  Email: [email or web address]

## 2024-05-31 NOTE — PROVIDER NOTIFICATION
7239. Mil. Pt spouse is here and he would like a provider come and talk to them both, he has some specific questions. Thanks! Yeimy [phone]

## 2024-05-31 NOTE — PROGRESS NOTES
[facility]    Medicine Progress Note - Hospitalist Service, GOLD TEAM 9    Date of Admission:  5/24/2024    Assessment & Plan   78 year old female admitted on 5/24/2024. 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  She presented to ED with complaint of weakness and dark stools and is admitted to hospital medicine for GI bleed in setting of therapeutic anticoagulation, now s/p EGD with non bleeding duodenal and esophageal ulcers, now gradually resuming anticoagulation     Acute blood loss anemia secondary to duodenal and esophageal ulcer without active bleed during endoscope, POA  Patient presents with dark stools and anemia with downtrending hemoglobin with Hb - 6.9 requriing 1uPRBC in AM, stable on repeat. Underwent EGD on 5/25 - esophagitis, esophageal ulcers, esophageal ring with ulceration, duodenal ulcers. 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  Now developed GI bleed.  Elevated INR  -Continue apixaban 5 mg twice daily  - Can discuss IVC filter if not tolerating therapeutic anticoagulation     Better controlled  hypertension, POA  -Increase lisinopril to 40 mg daily instead of 20  -Discontinued amlodipine 5 mg daily  -Continue metoprolol succinate 50 mg daily    R AKA wound dehiscence  #S/p right AKA (02/2024)  Follows with vascular surgery.    -WOC consult requested     Generalized weakness  Had several week stay at TCU following amputation and discharged home in March. Was receiving home PT services and improving but had significant functional decline after a 3-week lapse in home services. Extensive work-up completed during previous admission with no clear metabolic, infectious, or other organic etiology to explain her weakness. Currently, experiencing generalized weakness in setting of GIB though also has general physical deconditioning.    -physical and occupational therapies    Chronic medical problems  -Rheumatoid arthritis, continue prednisone 5 mg daily and will consider resumption of methotrexate 20 mg p.o. weekly    Possible depression, POA  -Offered psychology and psychiatry consult, patient declined however patient significant other is interested in giving psychology and psychiatry.    Addendum dated 5/31 at 6:30 PM  Hyponatremia persists.  Will obtain sodium again in AM.  Lactic acidosis noted, will continue current antibiotics and repeat lactic acid in a.m.  Will ask nephrology to see the patient in a.m. as well.        Diet: Soft & Bite Sized Diet (level 6) Thin Liquids (level 0)  Room Service  Fluid restriction 1800 ML FLUID    DVT Prophylaxis: DOAC  Nolan Catheter: Not present  Lines: None     Cardiac Monitoring: None  Code Status: Full Code      Clinically Significant Risk Factors        # Hypokalemia: Lowest K = 3.2 mmol/L in last 2 days, will replace as needed  # Hyponatremia: Lowest Na = 127 mmol/L in last 2 days, will monitor as appropriate    # Hypomagnesemia: Lowest Mg = 1.4 mg/dL in last 2 days, will replace as needed   # Hypoalbuminemia: Lowest albumin = 2.3 g/dL at 5/28/2024  6:15 AM, will monitor  "as appropriate     # Hypertension: Noted on problem list        # Cachexia: Estimated body mass index is 15.27 kg/m  as calculated from the following:    Height as of this encounter: 1.626 m (5' 4.02\").    Weight as of this encounter: 40.4 kg (89 lb).        # Financial/Environmental Concerns: none         Disposition Plan     Medically Ready for Discharge: Anticipated Tomorrow             Cynthia Tao MD  Hospitalist Service, GOLD TEAM 9  [employer]  Securely message with [employer] (more info)  Text page via [facility] Paging/Directory   See signed in provider for up to date coverage information  ______________________________________________________________________    Interval History   The patient denied any new symptoms.    Physical Exam   Vital Signs: Temp: 98  F (36.7  C) Temp src: Oral BP: (!) 153/80 Pulse: 72   Resp: 18 SpO2: 94 % O2 Device: None (Room air)    Weight: 89 lbs 0 oz    Constitutional: Awake, alert, cooperative, no apparent distress.  Eyes: Conjunctiva and pupils examined and normal.  HEENT: Moist mucous membranes, normal dentition.  Respiratory: Clear to auscultation bilaterally, no crackles or wheezing.  Cardiovascular: Regular rate and rhythm, normal S1 and S2, and no murmur noted.  GI: Soft, non-distended, non-tender, normal bowel sounds.  Lymph/Hematologic: No anterior cervical or supraclavicular adenopathy.  Skin: No rashes, no cyanosis, no edema.  Musculoskeletal: No joint swelling, erythema or tenderness.  Neurologic: Cranial nerves 2-12 intact, normal strength and sensation.  Psychiatric: Alert, oriented to person, place and time, no obvious anxiety or depression.     Medical Decision Making       45 MINUTES SPENT BY ME on the date of service doing chart review, history, exam, documentation & further activities per the note.      Data     I have personally reviewed the following data over the past 24 hrs:    3.5 (L)  \   9.8 (L)   / 215     127 " (L) 96 (L) 5.9 (L) /  81   3.6 21 (L) 0.33 (L) \     Procal: N/A CRP: 73.00 (H) Lactic Acid: N/A         Imaging results reviewed over the past 24 hrs:   No results found for this or any previous visit (from the past 24 hour(s)).

## 2024-05-31 NOTE — PROGRESS NOTES
Care Management Follow Up    Additional Information:  05/31: CHW met the Pt and Fiance this morning and they both agreed to go to:  [facility].  [address] [place]  [address]  P: [phone]  P: [phone] - Admissions(Ms Lim)  F: [phone]     We will go ahead and let [facility] know of their choice and will equally notify [facility] for them know the Pt. had another choice.     [facility] (Ph: [phone]) Stretcher ride has been set up and the  window is 13:37 to 14:22, tomorrow Saturday 06/01/2024.  Pt has been informed.    Lui Pope CHW [facility] [address] [facility]  P [phone]   Fax: [phone]  Email: [email or web address]

## 2024-05-31 NOTE — PROGRESS NOTES
Care Management Follow Up    Length of Stay (days): 7  Expected Discharge Date: 06/01/2024  Concerns to be Addressed: discharge planning, financial/insurance     Patient plan of care discussed at interdisciplinary rounds: Yes  Anticipated Discharge Disposition: Skilled Nursing Facility, Transitional Care  Anticipated Discharge Services: None  Anticipated Discharge DME: None  Patient/family educated on Medicare website which has current facility and service quality ratings: yes  Education Provided on the Discharge Plan: Yes  Patient/Family in Agreement with the Plan: yes  Referrals Placed by CM/SW:    Private pay costs discussed: Not applicable  Additional Information: Per Gold 9 Provider, this pt's sodium is decreasing so the pt will need to be hospitalized for at least an additional day before she is medically ready for discharge to a TCU.    [facility] (Ph: [phone]; F: [phone])     [facility] (Ph: [phone]) Stretcher ride will need to be arranged once discharge plans are solidified. Indication for stretcher transport is inadequate trunk support to sit up for duration of ride.     Writer messaged with [employer] via [email or web address] and determined that the pt can discharge to their TCU over the wknd. Pt placed on the weekend  helplist.  __________________________     DEBI Barba, JOSEPH  [facility]  Coverage  - [address]  Desk Phone: [phone]   Securely message with [employer] (Search Name or 7C Med Surg SW)  Text page via [facility] Paging/Directory   See signed in provider for up to date coverage information  Social Work & Care Management Department

## 2024-05-31 NOTE — PLAN OF CARE
"BP (!) 163/89 (BP Location: Left arm)   Pulse 71   Temp 97.9  F (36.6  C) (Oral)   Resp 18   Ht 1.626 m (5' 4.02\")   Wt 40.4 kg (89 lb)   LMP  (LMP Unknown)   SpO2 94%   BMI 15.27 kg/m       [id number]    Goal Outcome Evaluation:      Plan of Care Reviewed With: patient    Overall Patient Progress: no changeOverall Patient Progress: no change    A/O x4; stable on RA. Pain mgd with sched tylenol x1. Pt had 20 mEq K+ replacement and mag replacement overnight. R PIV SL with intermittent abx. No acute changes this shift. Continue to follow POC.     "

## 2024-06-01 NOTE — PLAN OF CARE
Goal Outcome Evaluation:      Plan of Care Reviewed With: patient    Overall Patient Progress: no changeOverall Patient Progress: no change         Alert, disoriented to time which has been happening overnight per report. No new neuro deficits. Vss on RA. Denies pain, managed w/ scheduled tylenol. Denies nausea. Minimal intake overnight. Purewick in place, changed x1. Incontinent BM x1. Barrier cream applied. Drsgs CDI. Repositioned prn. A1-2 w/ turns.

## 2024-06-01 NOTE — CONSULTS
"[facility], [place]   Psychiatric History and Physical Consult   Admission date: 5/24/2024      Chief Complaint:   \"I guess I am depressed\"      HPI:   From ED:   Sakshi Jaeger is a 78 year old female with a history of R femoral endarterectomy and common iliac artery stenting s/p R AKA who presents to the ED for evaluation of generalized weakness. Patient was admitted on 5/17/24 for evaluation of a DVT of femoral vein of left lower extremity and discharged on 5/19/24. She started Eliquis on 5/18. Patient thinks she needs to go to a TCU facility to get with with daily cares and regain strength in her extremities. Patient states she is very tired. Patient notes dark and \"muddy\" stool since she was discharged. She states she is not urinating as frequently and didn't have urinary voiding or a bowel movement all day on Wednesday (5/22). Patient has had poor appetite. Denies history of GI bleed. Patient also endorses left hand swelling and an open wound on her right leg from amputation. Denies chest pain or shortness of breath. Denies dizziness or lightheadedness.    Sakshi Jaeger is a 78 year old female with no prior history of mental illness.  The patient is a good historian however, she is not forthcoming with information.  She is somewhat irritable.  Her responses are very short.  Many questions were answered with \"sure\".  The information was obtained from the patient and her significant other (SO) David who was present for some of the interview.  I spoke with Tiburcio again in the hallway.  The patient admits feeling depressed.  Unable to say how long this has been going on.  SO indicates that the patient started decompensated physically 2 years ago and since then that she appears to be depressed.  She is frequently very angry and insulting people around her.  The patient has been feeling tired.  She is not able to do her ADLs due to fatigue.  When asked if she is sleeping well, the " "patient responded with \"I guess so\".  Reports good appetite however, SO indicates that the patient has not been eating recently.  She does drink boost at home but not every day.  The patient denies any problems with memory and concentration.  The patient reports some passive suicidal ideation on and off, no plan or intent to act on her thoughts.  When asked if she has been feeling hopeless and helpless, the patient responded with \"sure\".  The patient reports anxiety but would not elaborate.  No history of panic attacks.  She has never been manic or psychotic.  Denies history of sexual, physical, emotional abuse.  Denies  suicide attempts, and self-injury behaviors. Denies seizures, head injuries, and loss of consciousness.      Past Psychiatric History:   No prior history of mental illness.      Substance Use and History:   No history of alcohol or drug abuse.      Past Medical History:   PAST MEDICAL HISTORY:   Past Medical History:   Diagnosis Date    Anemia 8/24/2022    BENIGN HYPERTENSION 3/1/2005    CAD (coronary artery disease) 1/26/2011    Coronary artery disease involving native coronary artery of native heart without angina pectoris 9/27/2016    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    Essential hypertension with goal blood pressure less than 140/90 8/25/2016    Hyperlipidemia LDL goal <100 11/12/2010    Hypertension goal BP (blood pressure) < 130/80 1/26/2011    Infection of right below knee amputation (H) 10/1/2019    IRON DEFIC ANEMIA NOS 9/15/2005    Lower limb amputation, below knee 12/26/2012    MI (myocardial infarction) (H)     3/2010    Non-healing surgical wound, subsequent encounter 9/24/2020    Added automatically from request for surgery [id number]    Osteoporosis 2/16/2005    OSTEOPOROSIS NOS 2/16/2005    Protein-calorie malnutrition (H24) 5/18/2022    PVD (peripheral vascular disease) (H24) 5/18/2022    Rheumatoid arthritis (H) 2/16/2005         Rheumatoid arthritis(714.0) 2/16/2005    Status post " below-knee amputation (H) 12/26/2012          PAST SURGICAL HISTORY:   Past Surgical History:   Procedure Laterality Date    ---------INCISION AND DRAINAGE  03/05/2014    AMPUTATE LEG ABOVE KNEE Right 1/13/2023    Procedure: AMPUTATION, ABOVE KNEE RIGHT;  Surgeon: Emma Oconnor MD;  Location: [facility] OR    AMPUTATE LEG ABOVE KNEE Right 2/2/2024    Procedure: Redo ABOVE KNEE AMPUTATION;  Surgeon: Bryon Mitchell MD;  Location: [facility]    AMPUTATE LEG BELOW KNEE Right 9/28/2022    Procedure: Right through knee amputation;  Surgeon: Doron Mott MD;  Location: [facility]    AMPUTATION BELOW KNEE RT/LT  01/01/2005    right lowwer leg.     ANGIOGRAM Right 1/13/2023    Procedure: right iliac arteriogram aned right common iliac artery stent;  Surgeon: Emma Oconnor MD;  Location: [facility] OR    APPENDECTOMY      ARTHROPLASTY KNEE  04/27/2011    Procedure:ARTHROPLASTY KNEE; Surgeon:JESSE HAWKINS; Location:[facility]    COMPLEX WOUND CLOSURE (UPDATE) Right 12/08/2021    Procedure: Right stump wound debridment and closure;  Surgeon: RAISA Perea MD;  Location: [facility] OR    CORONARY STENT PLACEMENT      ENDARTERECTOMY FEMORAL Right 1/13/2023    Procedure: ENDARTERECTOMY, FEMORAL RIGHT;  Surgeon: Emma Oconnor MD;  [facility]: [facility]    ESOPHAGOSCOPY, GASTROSCOPY, DUODENOSCOPY (EGD), COMBINED N/A 5/25/2024    Procedure: ESOPHAGOGASTRODUODENOSCOPY, WITH BIOPSY;  Surgeon: Xander Gomez MD;  Location: [facility]    INJECT NERVE BLOCK SPHENOPALATINE GANGLION N/A 9/29/2022    Procedure: Out of OR encounter for block to be done by ;  Surgeon: GENERIC ANESTHESIA PROVIDER;  Location: [facility] OR    IR OR ANGIOGRAM  1/13/2023    IRRIGATION AND DEBRIDEMENT LOWER EXTREMITY, COMBINED Right 10/09/2019    Procedure: Irrigation and debridement Right leg;  Surgeon: Doron Mott MD;  Location: [facility]    NERVE BLOCK PERIPHERAL N/A 9/27/2022    Procedure: BLOCK, NERVE;  Surgeon: GENERIC ANESTHESIA PROVIDER;  [facility]: [facility]  OR    OTHER SURGICAL HISTORY  03/05/2014    I AND D     OTHER SURGICAL HISTORY Right 10/09/2019    IRRIGATION AND DEBRIDEMENT LOWER EXTREMITY, COMBINED    PHACOEMULSIFICATION CLEAR CORNEA WITH STANDARD INTRAOCULAR LENS IMPLANT Left 9/8/2022    Procedure: PHACOEMULSIFICATION, LEFT CATARACT, WITH INTRAOCULAR LENS IMPLANT;  Surgeon: Flip Izaguirre MD;  Location: [facility] OR    PHACOEMULSIFICATION CLEAR CORNEA WITH STANDARD INTRAOCULAR LENS IMPLANT Right 10/13/2022    Procedure: PHACOEMULSIFICATION, RIGHT CATARACT, WITH INTRAOCULAR LENS IMPLANT;  Surgeon: Flip Izaguirre MD;  Location: [facility] OR    REVISE SCAR EXTREMITY Right 12/17/2020    Procedure: Right stump scar revision;  Surgeon: RAISA Perea MD;  Location: [facility] OR         Family History:   FAMILY HISTORY:   Family History   Problem Relation Age of Onset    Cardiovascular Mother     Cancer Brother     Diabetes No family hx of     Hypertension No family hx of     Cerebrovascular Disease No family hx of     Alzheimer Disease No family hx of     Thyroid Disease No family hx of     Respiratory No family hx of     Other - See Comments Mother         CARDIOVASCULAR    Cancer Brother    Unable to obtain, patient, patient declined to answer.      Social History:   The patient is from [place].  She has been living with her significant other for 28 years.  She has 3 adult children and 3 stepchildren.  She used to work for [employer].  Now retired.  No  and legal history.       Physical ROS:   The patient endorsed multiple medical issues.  Please refer to the primary team notes for details. The remainder of 10-point review of systems was negative except as noted in HPI.       PTA Medications:     Medications Prior to Admission   Medication Sig Dispense Refill Last Dose    acetaminophen (TYLENOL) 325 MG tablet Take 2 tablets (650 mg) by mouth every 6 hours 24 tablet 0 Unknown at unknown    amLODIPine (NORVASC) 5 MG tablet TAKE 1 TABLET BY MOUTH  EVERY DAY 90 tablet 3 Unknown at unknown    apixaban ANTICOAGULANT (ELIQUIS) 5 MG tablet Take 2 tablets (10 mg) by mouth 2 times daily for 7 days, THEN 1 tablet (5 mg) 2 times daily for 83 days. 194 tablet 0 Unknown at unknown    aspirin (ASA) 81 MG tablet Take 81 mg by mouth daily   Unknown at unknown    calcium carbonate 600 mg-vitamin D 400 units (CALCIUM 600 + D) 600-400 MG-UNIT per tablet Take 2 tablets by mouth daily   Unknown at unknown    Elemental iron 65 mg Vitamin C 125 mg (VITRON C)  MG TABS tablet Take 1 tablet by mouth daily   Unknown at unknown    fluocinolone acetonide oil 0.01 % ear drops Place 0.25 mLs (5 drops) into both ears twice a week 20 mL 3 Unknown at unknown    folic acid (FOLVITE) 1 MG tablet Take 1 mg by mouth daily   Unknown at unknown    gabapentin (NEURONTIN) 300 MG capsule Take 300 mg by mouth 3 times daily as needed for neuropathic pain   Unknown at unknown    leflunomide (ARAVA) 20 MG tablet Take 1 tablet by mouth every 24 hours   Unknown at unknown    Lidocaine (LIDOCARE) 4 % Patch Place 1 patch onto the skin every 24 hours To prevent lidocaine toxicity, patient should be patch free for 12 hrs daily.   Unknown at unknown    lisinopril (ZESTRIL) 20 MG tablet TAKE 1 TABLET BY MOUTH EVERY DAY 90 tablet 0 Unknown at unknown    methotrexate 2.5 MG tablet Take 20 mg by mouth every 7 days On Saturdays   Unknown at unknown    metoprolol succinate ER (TOPROL XL) 50 MG 24 hr tablet TAKE ONE TABLET BY MOUTH EVERY DAY 90 tablet 0 Unknown at unknown    oxyCODONE (ROXICODONE) 5 MG tablet Take 1 tablet (5 mg) by mouth 2 times daily as needed for pain 14 tablet 0 Unknown at unknown    polyethylene glycol (MIRALAX) 17 GM/Dose powder Take 17 g by mouth daily 510 g 0 Unknown at unknown    predniSONE (DELTASONE) 5 MG tablet Take 1 tablet (5 mg) by mouth daily  0 Unknown at unknown    simvastatin (ZOCOR) 20 MG tablet Take 1 tablet (20 mg) by mouth At Bedtime 90 tablet 1 Unknown at unknown           Allergies:     Allergies   Allergen Reactions    Adhesive Tape Other (See Comments)     Fragile skin          Labs:     Recent Results (from the past 48 hour(s))   Potassium    Collection Time: 05/30/24  3:08 PM   Result Value Ref Range    Potassium 3.4 3.4 - 5.3 mmol/L   Magnesium    Collection Time: 05/30/24 11:19 PM   Result Value Ref Range    Magnesium 1.4 (L) 1.7 - 2.3 mg/dL   Magnesium    Collection Time: 05/31/24  3:24 AM   Result Value Ref Range    Magnesium 1.4 (L) 1.7 - 2.3 mg/dL   CBC with platelets    Collection Time: 05/31/24  7:17 AM   Result Value Ref Range    WBC Count 3.5 (L) 4.0 - 11.0 10e3/uL    RBC Count 3.42 (L) 3.80 - 5.20 10e6/uL    Hemoglobin 9.8 (L) 11.7 - 15.7 g/dL    Hematocrit 29.7 (L) 35.0 - 47.0 %    MCV 87 78 - 100 fL    MCH 28.7 26.5 - 33.0 pg    MCHC 33.0 31.5 - 36.5 g/dL    RDW 20.0 (H) 10.0 - 15.0 %    Platelet Count 215 150 - 450 10e3/uL   Basic metabolic panel    Collection Time: 05/31/24  7:17 AM   Result Value Ref Range    Sodium 127 (L) 135 - 145 mmol/L    Potassium 3.6 3.4 - 5.3 mmol/L    Chloride 96 (L) 98 - 107 mmol/L    Carbon Dioxide (CO2) 21 (L) 22 - 29 mmol/L    Anion Gap 10 7 - 15 mmol/L    Urea Nitrogen 5.9 (L) 8.0 - 23.0 mg/dL    Creatinine 0.33 (L) 0.51 - 0.95 mg/dL    GFR Estimate >90 >60 mL/min/1.73m2    Calcium 7.7 (L) 8.8 - 10.2 mg/dL    Glucose 81 70 - 99 mg/dL   Magnesium    Collection Time: 05/31/24  7:17 AM   Result Value Ref Range    Magnesium 2.1 1.7 - 2.3 mg/dL   Phosphorus    Collection Time: 05/31/24  7:17 AM   Result Value Ref Range    Phosphorus 2.0 (L) 2.5 - 4.5 mg/dL   CRP inflammation    Collection Time: 05/31/24  7:17 AM   Result Value Ref Range    CRP Inflammation 73.00 (H) <5.00 mg/L   Lactic acid whole blood    Collection Time: 05/31/24 10:16 AM   Result Value Ref Range    Lactic Acid 2.6 (H) 0.7 - 2.0 mmol/L   Sodium    Collection Time: 05/31/24 10:16 AM   Result Value Ref Range    Sodium 126 (L) 135 - 145 mmol/L   Magnesium random urine with  Creat Ratio    Collection Time: 05/31/24  5:34 PM   Result Value Ref Range    Magnesium Urine mg/dL 15.0 mg/dL    Creatinine Urine mg/dL 15.4 mg/dL    Magnesium Urine mg/mg Cr 0.97 (H) 0.04 - 0.12 mg/mg Cr   UA Microscopic    Collection Time: 05/31/24  5:34 PM   Result Value Ref Range    Color Urine Light Yellow Colorless, Straw, Light Yellow, Yellow    Appearance Urine Clear Clear    Glucose Urine Negative Negative mg/dL    Bilirubin Urine Negative Negative    Ketones Urine Negative Negative mg/dL    Specific Gravity Urine 1.015 1.003 - 1.035    Blood Urine Negative Negative    pH Urine 6.5 5.0 - 7.0    Protein Albumin Urine Negative Negative mg/dL    Urobilinogen Urine Normal Normal, 2.0 mg/dL    Nitrite Urine Negative Negative    Leukocyte Esterase Urine Negative Negative    Mucus Urine Present (A) None Seen /LPF    RBC Urine 0 <=2 /HPF    WBC Urine 1 <=5 /HPF    Squamous Epithelials Urine <1 <=1 /HPF    Transitional Epithelials Urine <1 <=1 /HPF   Urine Osmolality    Collection Time: 05/31/24  5:34 PM   Result Value Ref Range    Osmolality Urine 335 100 - 1,200 mmol/kg   Urine Sodium    Collection Time: 05/31/24  5:34 PM   Result Value Ref Range    Sodium Urine mmol/L 76 mmol/L   Basic metabolic panel    Collection Time: 06/01/24  9:10 AM   Result Value Ref Range    Sodium 129 (L) 135 - 145 mmol/L    Potassium 3.2 (L) 3.4 - 5.3 mmol/L    Chloride 97 (L) 98 - 107 mmol/L    Carbon Dioxide (CO2) 22 22 - 29 mmol/L    Anion Gap 10 7 - 15 mmol/L    Urea Nitrogen 6.3 (L) 8.0 - 23.0 mg/dL    Creatinine 0.30 (L) 0.51 - 0.95 mg/dL    GFR Estimate >90 >60 mL/min/1.73m2    Calcium 7.6 (L) 8.8 - 10.2 mg/dL    Glucose 76 70 - 99 mg/dL   Magnesium    Collection Time: 06/01/24  9:10 AM   Result Value Ref Range    Magnesium 1.3 (L) 1.7 - 2.3 mg/dL   Phosphorus    Collection Time: 06/01/24  9:10 AM   Result Value Ref Range    Phosphorus 1.9 (L) 2.5 - 4.5 mg/dL   Lactic acid whole blood    Collection Time: 06/01/24  9:10 AM  "  Result Value Ref Range    Lactic Acid 1.6 0.7 - 2.0 mmol/L          Physical and Psychiatric Examination:   BP (!) 134/108 (BP Location: Left arm)   Pulse 86   Temp 99.1  F (37.3  C) (Oral)   Resp 15   Ht 1.62 m (5' 3.78\")   Wt 37.1 kg (81 lb 12.7 oz)   LMP  (LMP Unknown)   SpO2 92%   BMI 14.14 kg/m    Weight is 81 lbs 12.65 oz  Body mass index is 14.14 kg/m .  Physical Exam:  I have reviewed the physical exam as documented by the medical team and agree with findings and assessment and have no additional findings to add at this time.  Mental Status Exam:  Appearance: awake, alert, dressed in hospital scrubs, and frail  Attitude:  slightly uncooperative  Eye Contact:  poor   Mood:  depressed  Affect:  intensity is blunted and intensity is flat  Speech:  dysarthria  Language: fluent and intact in English  Psychomotor, Gait, Musculoskeletal:  no evidence of tardive dyskinesia, dystonia, or tics  Thought Process:  logical and goal oriented  Associations:  no loose associations  Thought Content:  no evidence of suicidal ideation or homicidal ideation, no auditory hallucinations present, and no visual hallucinations present  Insight:  fair  Judgement:  fair  Oriented to:  time, person, and place  Attention Span and Concentration:  fair  Recent and Remote Memory:  fair  Fund of Knowledge:  appropriate         Admission Diagnoses:   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  Lexapro is less likely to calls further hyponatremia compared to antidepressants.  Electrolytes should be monitored frequently.  --Continue melatonin, 1 mg at bedtime for sleep  -- The patient is on gabapentin 300 mg 3 times a day for neuropathic pain.  This medication can be used for anxiety, agitation and sleep as well.  Oversedation may " be a problem.  -- Continue the rest of the medications for medical issues, managed by the primary team.  Lab work:  Lab work was reviewed.  Abnormal results include sodium 129, potassium 3.2, chloride 97, urea nitrogen 63, creatinine 0.30, calcium 7.6, magnesium 1.3, phosphorus 1.9, CRP inflammation 773, lactic acid 2.6, magnesium urine 0.97, WBC 3.5, hemoglobin 9.8, hematocrit 29.7, RBC count 3.42, RDW 20.0  Urinalysis is unremarkable.  EKG was ordered and shows a QTc of 399 which is WNL.  Disposition Plan   Reason for ongoing admission:  The patient is not medically stable to discharge.  Discharge location:  TCU likely on Monday, provided electrolytes are better  Legal Status: voluntary  Entered by: DESIRE Jenkins CNP on 6/1/2024 at 11:13 AM     This note was created with the help of Dragon dictation system. All grammatical/typing errors or context distortion are unintentional and inherent to software.

## 2024-06-01 NOTE — CONSULTS
Nephrology Initial Consult  June 1, 2024      Sakshi Jaeger MRN:[id number] YOB: 1945  Date of Admission:5/24/2024  Primary care provider: Lucia Bates  Requesting physician: Cynthia Tao MD    ASSESSMENT AND RECOMMENDATIONS:   78 year old female admitted on 5/24/2024 with weakness and GI bleeding.  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  She presented to ED with complaint of weakness and dark stools and is admitted to hospital medicine for GI bleed in setting of therapeutic anticoagulation, now s/p EGD with non bleeding duodenal and esophageal ulcers, now gradually resuming anticoagulation   Nephrology is consulted for hyponatremia  Hyponatremia- likely multifactorial in setting of poor intake and hypovolemia/ high ADH from bleeding.  She has low creatinine and U osm of 330-360        She is a little irritated but answers questions. Her intake is fair, and she is now on a fluid restriction.  Her sodium has improved to 129 today  - encourage dietary intake and monitor daily sodium  2. Blood pressure- normal to high normal  3. 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  She presented to ED with complaint of weakness and dark stools and is admitted to hospital medicine for GI bleed in setting of therapeutic anticoagulation, now s/p EGD with non bleeding duodenal and esophageal ulcers, now gradually resuming anticoagulation   Nephrology is  consulted for hyponatremia. Her sodium has been low on admission at 129, and was down to 126 yesterday. Medicine has put her on fluid restriction and sodium today is at 129. She is agitated and needs her soiled diaper to be addressed.    PAST MEDICAL HISTORY:  Reviewed with patient on 06/01/2024     Past Medical History:   Diagnosis Date    Anemia 8/24/2022    BENIGN HYPERTENSION 3/1/2005    CAD (coronary artery disease) 1/26/2011    Coronary artery disease involving native coronary artery of native heart without angina pectoris 9/27/2016    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    Essential hypertension with goal blood pressure less than 140/90 8/25/2016    Hyperlipidemia LDL goal <100 11/12/2010    Hypertension goal BP (blood pressure) < 130/80 1/26/2011    Infection of right below knee amputation (H) 10/1/2019    IRON DEFIC ANEMIA NOS 9/15/2005    Lower limb amputation, below knee 12/26/2012    MI (myocardial infarction) (H)     3/2010    Non-healing surgical wound, subsequent encounter 9/24/2020    Added automatically from request for surgery [id number]    Osteoporosis 2/16/2005    OSTEOPOROSIS NOS 2/16/2005    Protein-calorie malnutrition (H24) 5/18/2022    PVD (peripheral vascular disease) (H24) 5/18/2022    Rheumatoid arthritis (H) 2/16/2005         Rheumatoid arthritis(714.0) 2/16/2005    Status post below-knee amputation (H) 12/26/2012            Past Surgical History:   Procedure Laterality Date    ---------INCISION AND DRAINAGE  03/05/2014    AMPUTATE LEG ABOVE KNEE Right 1/13/2023    Procedure: AMPUTATION, ABOVE KNEE RIGHT;  Surgeon: Emma Oconnor MD;  [facility]    AMPUTATE LEG ABOVE KNEE Right 2/2/2024    Procedure: Redo ABOVE KNEE AMPUTATION;  Surgeon: Bryon Mitchell MD;  [facility]    AMPUTATE LEG BELOW KNEE Right 9/28/2022    Procedure: Right through knee amputation;  Surgeon: Doron Mott MD;  [facility]    AMPUTATION BELOW KNEE RT/LT  01/01/2005    right lowwer leg.      ANGIOGRAM Right 1/13/2023    Procedure: right iliac arteriogram aned right common iliac artery stent;  Surgeon: Emma Oconnor MD;  [facility]    APPENDECTOMY      ARTHROPLASTY KNEE  04/27/2011    Procedure:ARTHROPLASTY KNEE; Surgeon:JESSE HAWKINS; [facility]    COMPLEX WOUND CLOSURE (UPDATE) Right 12/08/2021    Procedure: Right stump wound debridment and closure;  Surgeon: RAISA Perea MD;  Location: [facility]    CORONARY STENT PLACEMENT      ENDARTERECTOMY FEMORAL Right 1/13/2023    Procedure: ENDARTERECTOMY, FEMORAL RIGHT;  Surgeon: Emma Oconnor MD;  [facility]    ESOPHAGOSCOPY, GASTROSCOPY, DUODENOSCOPY (EGD), COMBINED N/A 5/25/2024    Procedure: ESOPHAGOGASTRODUODENOSCOPY, WITH BIOPSY;  Surgeon: Xander Gomez MD;  [facility]    INJECT NERVE BLOCK SPHENOPALATINE GANGLION N/A 9/29/2022    Procedure: Out of OR encounter for block to be done by ;  Surgeon: GENERIC ANESTHESIA PROVIDER;  [facility]    IR OR ANGIOGRAM  1/13/2023    IRRIGATION AND DEBRIDEMENT LOWER EXTREMITY, COMBINED Right 10/09/2019    Procedure: Irrigation and debridement Right leg;  Surgeon: Doron Mott MD;  [facility]    NERVE BLOCK PERIPHERAL N/A 9/27/2022    Procedure: BLOCK, NERVE;  Surgeon: GENERIC ANESTHESIA PROVIDER;  [facility]    OTHER SURGICAL HISTORY  03/05/2014    I AND D     OTHER SURGICAL HISTORY Right 10/09/2019    IRRIGATION AND DEBRIDEMENT LOWER EXTREMITY, COMBINED    PHACOEMULSIFICATION CLEAR CORNEA WITH STANDARD INTRAOCULAR LENS IMPLANT Left 9/8/2022    Procedure: PHACOEMULSIFICATION, LEFT CATARACT, WITH INTRAOCULAR LENS IMPLANT;  Surgeon: Flip Izaguirre MD;  Location: [facility]    PHACOEMULSIFICATION CLEAR CORNEA WITH STANDARD INTRAOCULAR LENS IMPLANT Right 10/13/2022    Procedure: PHACOEMULSIFICATION, RIGHT CATARACT, WITH INTRAOCULAR LENS IMPLANT;  Surgeon: Flip Izaguirre MD;  Location: [facility]    REVISE SCAR EXTREMITY Right  12/17/2020    Procedure: Right stump scar revision;  Surgeon: RAISA Perea MD;  [place] [facility] [place]        MEDICATIONS:  PTA Meds  Prior to Admission medications    Medication Sig Last Dose Taking? Auth Provider Long Term End Date   acetaminophen (TYLENOL) 325 MG tablet Take 2 tablets (650 mg) by mouth every 6 hours Unknown at unknown Yes Denis Fox MD No    amLODIPine (NORVASC) 5 MG tablet TAKE 1 TABLET BY MOUTH EVERY DAY Unknown at unknown Yes Lucia Bates APRN CNP Yes    apixaban ANTICOAGULANT (ELIQUIS) 5 MG tablet Take 2 tablets (10 mg) by mouth 2 times daily for 7 days, THEN 1 tablet (5 mg) 2 times daily for 83 days. Unknown at unknown Yes Niecy Shields PA-C  8/16/24   aspirin (ASA) 81 MG tablet Take 81 mg by mouth daily Unknown at unknown Yes Nick De La Cruz MD No    calcium carbonate 600 mg-vitamin D 400 units (CALCIUM 600 + D) 600-400 MG-UNIT per tablet Take 2 tablets by mouth daily Unknown at unknown Yes Trish Sahni MD     Elemental iron 65 mg Vitamin C 125 mg (VITRON C)  MG TABS tablet Take 1 tablet by mouth daily Unknown at unknown Yes Reported, Patient     fluocinolone acetonide oil 0.01 % ear drops Place 0.25 mLs (5 drops) into both ears twice a week Unknown at unknown Yes Pito Mosher MD     folic acid (FOLVITE) 1 MG tablet Take 1 mg by mouth daily Unknown at unknown Yes Reported, Patient     gabapentin (NEURONTIN) 300 MG capsule Take 300 mg by mouth 3 times daily as needed for neuropathic pain Unknown at unknown Yes Reported, Patient Yes    leflunomide (ARAVA) 20 MG tablet Take 1 tablet by mouth every 24 hours Unknown at unknown Yes Reported, Patient No    Lidocaine (LIDOCARE) 4 % Patch Place 1 patch onto the skin every 24 hours To prevent lidocaine toxicity, patient should be patch free for 12 hrs daily. Unknown at unknown Yes Unknown, Entered By History     lisinopril (ZESTRIL) 20 MG tablet TAKE 1 TABLET BY MOUTH EVERY DAY  Unknown at unknown Yes Emma Dorsey PA-C Yes    methotrexate 2.5 MG tablet Take 20 mg by mouth every 7 days On Saturdays Unknown at unknown Yes Reported, Patient No    metoprolol succinate ER (TOPROL XL) 50 MG 24 hr tablet TAKE ONE TABLET BY MOUTH EVERY DAY Unknown at unknown Yes Harry Roberto MD Yes    oxyCODONE (ROXICODONE) 5 MG tablet Take 1 tablet (5 mg) by mouth 2 times daily as needed for pain Unknown at unknown Yes Yasmin Rob APRN CNP No    polyethylene glycol (MIRALAX) 17 GM/Dose powder Take 17 g by mouth daily Unknown at unknown Yes Denis Fox MD     predniSONE (DELTASONE) 5 MG tablet Take 1 tablet (5 mg) by mouth daily Unknown at unknown Yes Trish Sahni MD Yes    simvastatin (ZOCOR) 20 MG tablet Take 1 tablet (20 mg) by mouth At Bedtime Unknown at unknown Yes Harry Roberto MD Yes       Current Meds  Current Facility-Administered Medications   Medication Dose Route Frequency Provider Last Rate Last Admin    acetaminophen (TYLENOL) tablet 650 mg  650 mg Oral Q6H Marta Denton APRN CNP   650 mg at 06/01/24 1554    amoxicillin-clavulanate (AUGMENTIN) 875-125 MG per tablet 1 tablet  1 tablet Oral Q12H [facility] (08/20) Cynthia Tao MD   1 tablet at 06/01/24 0941    apixaban ANTICOAGULANT (ELIQUIS) tablet 5 mg  5 mg Oral BID Cynthia Tao MD   5 mg at 06/01/24 0941    escitalopram (LEXAPRO) tablet 5 mg  5 mg Oral Daily Carson Nelson APRN CNP   5 mg at 06/01/24 1256    fluocinolone acetonide oil 0.01 % ear drops 5 drop  5 drop Both Ears Once per day on Monday Thursday Vinay Jeffrey MD        folic acid (FOLVITE) tablet 1 mg  1 mg Oral Daily Marta Denton APRN CNP   1 mg at 06/01/24 0940    leflunomide (ARAVA) tablet 20 mg  20 mg Oral Daily Marta Denton APRN CNP   20 mg at 06/01/24 0940    lisinopril (ZESTRIL) tablet 40 mg  40 mg Oral Daily Cynthia Tao MD   40 mg at 06/01/24 0941    melatonin tablet 1 mg  1 mg Oral At Bedtime  Cynthia Tao MD   1 mg at 24 210    methotrexate tablet 20 mg  20 mg Oral Q7 Days Cynthia Tao MD   20 mg at 24 0941    metoprolol succinate ER (TOPROL XL) 24 hr tablet 50 mg  50 mg Oral Daily Vinay Jeffrey MD   50 mg at 24 0941    pantoprazole (PROTONIX) EC tablet 40 mg  40 mg Oral BID AC Vinay Jeffrey MD   40 mg at 24 1554    Potassium Phosphate 15 mmol in NS intermittent infusion 15 mmol  15 mmol Intravenous Once Cynthia Tao MD   15 mmol at 24 1256    predniSONE (DELTASONE) tablet 5 mg  5 mg Oral Daily Marta Denton APRN CNP   5 mg at 24 0940    simvastatin (ZOCOR) tablet 20 mg  20 mg Oral At Bedtime Marta Denton APRN CNP   20 mg at 24 210    sodium chloride (PF) 0.9% PF flush 3 mL  3 mL Intracatheter Q8H Cynthia Tao MD   3 mL at 24 1555    valACYclovir (VALTREX) tablet 1,000 mg  1,000 mg Oral Q12H REID () Cynthia Tao MD   1,000 mg at 24 0940     Infusion Meds  Current Facility-Administered Medications   Medication Dose Route Frequency Provider Last Rate Last Admin       ALLERGIES:    Allergies   Allergen Reactions    Adhesive Tape Other (See Comments)     Fragile skin       REVIEW OF SYSTEMS:  A comprehensive of systems was negative except as noted above.    SOCIAL HISTORY:   Social History     Socioeconomic History    Marital status: Single     Spouse name: Not on file    Number of children: 3    Years of education: Not on file    Highest education level: Not on file   Occupational History    Not on file   Tobacco Use    Smoking status: Former     Current packs/day: 0.00     Average packs/day: 0.5 packs/day for 45.0 years (22.5 ttl pk-yrs)     Types: Cigarettes     Start date: 1965     Quit date: 2010     Years since quittin.2     Passive exposure: Past    Smokeless tobacco: Never   Vaping Use    Vaping status: Never Used   Substance and Sexual Activity    Alcohol use: Yes     Comment:  "occsionally-3 -4 drinks a week    Drug use: No    Sexual activity: Yes     Partners: Male     Birth control/protection: Post-menopausal   Other Topics Concern    Parent/sibling w/ CABG, MI or angioplasty before 65F 55M? No   Social History Narrative    Live with Bhavna  Together since .       Social Determinants of Health     Financial Resource Strain: Not on file   Food Insecurity: Not on file   Transportation Needs: Not on file   Physical Activity: Not on file   Stress: Not on file   Social Connections: Unknown (2022)    [facility]    Social Connections     Frequency of Communication with Friends and Family: Not on file   Interpersonal Safety: Not on file   Housing Stability: Not on file     Reviewed with patient   partner accompanies Sakshi PARKER Mil in hospital room    FAMILY MEDICAL HISTORY:   Family History   Problem Relation Age of Onset    Cardiovascular Mother     Cancer Brother     Diabetes No family hx of     Hypertension No family hx of     Cerebrovascular Disease No family hx of     Alzheimer Disease No family hx of     Thyroid Disease No family hx of     Respiratory No family hx of     Other - See Comments Mother         CARDIOVASCULAR    Cancer Brother      no Family history of kidney disease  Reviewed with patient     PHYSICAL EXAM:   Temp  Av.1  F (36.7  C)  Min: 97  F (36.1  C)  Max: 99.1  F (37.3  C)      Pulse  Av.9  Min: 50  Max: 107 Resp  Av.2  Min: 12  Max: 20  SpO2  Av.2 %  Min: 82 %  Max: 100 %       /75 (BP Location: Left arm)   Pulse 89   Temp 98.6  F (37  C) (Oral)   Resp 16   Ht 1.62 m (5' 3.78\")   Wt 37.1 kg (81 lb 12.7 oz)   LMP  (LMP Unknown)   SpO2 94%   BMI 14.14 kg/m     Date 24 0700 - 24 0659   Shift 0723-9666 0318-5711 8929-2778 24 Hour Total   INTAKE   P.O. 500   500   Shift Total(mL/kg) 500(13.48)   500(13.48)   OUTPUT   Shift " Total(mL/kg)       Weight (kg) 37.1 37.1 37.1 37.1      Admit Weight: 40.4 kg (89 lb)     GENERAL APPEARANCE: no distress,  awake, cachectic  EYES: no scleral icterus, pupils equal  Lymphatics: no cervical or supraclavicular LAD  Pulmonary: lungs clear to auscultation with equal breath sounds bilaterally, no clubbing  CV: regular rhythm, normal rate, no rub   - JVD no   - Edema no  GI: soft, nontender, normal bowel sounds  MS: no evidence of inflammation in joints, no muscle tenderness  : no le  SKIN: no rash, warm, dry, no cyanosis  NEURO: face symmetric, no asterixis     LABS:   I have reviewed the following labs:  CMP  Recent Labs   Lab 06/01/24  0910 05/31/24  1016 05/31/24  0717 05/31/24  0324 05/30/24  2319 05/30/24  1508 05/30/24  0715 05/29/24  2143 05/29/24  0622 05/28/24  1552 05/28/24  0615   * 126* 127*  --   --   --  131*  --  129*  --  128*   POTASSIUM 3.2*  --  3.6  --   --  3.4 3.2*  --  4.3  --  3.5   CHLORIDE 97*  --  96*  --   --   --  99  --  98  --  98   CO2 22  --  21*  --   --   --  22  --  22  --  21*   ANIONGAP 10  --  10  --   --   --  10  --  9  --  9   GLC 76  --  81  --   --   --  75  --  72  --  73   BUN 6.3*  --  5.9*  --   --   --  6.3*  --  5.8*  --  6.2*   CR 0.30*  --  0.33*  --   --   --  0.35*  --  0.44*  --  0.33*   GFRESTIMATED >90  --  >90  --   --   --  >90  --  >90  --  >90   ADRIAN 7.6*  --  7.7*  --   --   --  7.3*  --  7.8*  --  7.3*   MAG 1.3*  --  2.1 1.4* 1.4*  --   --    < > 1.5*   < > 1.3*   PHOS 1.9*  --  2.0*  --   --   --  2.7  --  2.1*  --  2.0*   PROTTOTAL  --   --   --   --   --   --   --   --   --   --  4.2*   ALBUMIN  --   --   --   --   --   --   --   --   --   --  2.3*   BILITOTAL  --   --   --   --   --   --   --   --   --   --  0.5   ALKPHOS  --   --   --   --   --   --   --   --   --   --  97   AST  --   --   --   --   --   --   --   --   --   --  24   ALT  --   --   --   --   --   --   --   --   --   --  16    < > = values in this interval not  displayed.     CBC  Recent Labs   Lab 05/31/24  0717 05/30/24  0715 05/29/24  0622 05/28/24  0615   HGB 9.8* 8.8* 9.9* 9.0*  9.0*   WBC 3.5* 3.1* 3.2* 4.2   RBC 3.42* 3.04* 3.38* 3.12*   HCT 29.7* 27.1* 29.3* 27.1*   MCV 87 89 87 87   MCH 28.7 28.9 29.3 28.8   MCHC 33.0 32.5 33.8 33.2   RDW 20.0* 20.1* 20.0* 20.0*    189 211 185     INRNo lab results found in last 7 days.  ABGNo lab results found in last 7 days.   URINE STUDIES  Recent Labs   Lab Test 05/31/24  1734 05/25/24  1818 05/18/24  1203 10/03/19  2343 06/13/19  1408   COLOR Light Yellow Light Yellow Yellow Light Yellow Yellow   APPEARANCE Clear Clear Clear Clear Clear   URINEGLC Negative Negative Negative Negative Negative   URINEBILI Negative Negative Negative Negative Negative   URINEKETONE Negative Negative Negative Negative 15*   SG 1.015 1.015 1.010 1.006 1.015   UBLD Negative Negative Negative Negative Negative   URINEPH 6.5 7.0 6.5 6.5 7.0   PROTEIN Negative Negative 10* Negative Trace*   UROBILINOGEN  --   --   --   --  0.2   NITRITE Negative Negative Negative Negative Negative   LEUKEST Negative Negative Negative Negative Trace*   RBCU 0 <1 <1 0 O - 2   WBCU 1 2 1 1 0 - 5     No lab results found.  PTH  No lab results found.  IRON STUDIES  Recent Labs   Lab Test 08/24/22  1300 05/18/22  1220   IRON 133 115    252   IRONSAT 50* 46   PADMINI 383* 676*       IMAGING:  All imaging studies reviewed by me.     Terri Quick MD

## 2024-06-01 NOTE — PROGRESS NOTES
Care Management Follow Up    Length of Stay (days): 8    Expected Discharge Date: 06/01/2024     Concerns to be Addressed: discharge planning, financial/insurance     Patient plan of care discussed at interdisciplinary rounds: Yes    Anticipated Discharge Disposition: Skilled Nursing Facility, Transitional Care     Anticipated Discharge Services: None  Anticipated Discharge DME: None    Patient/family educated on Medicare website which has current facility and service quality ratings: yes  Education Provided on the Discharge Plan: Yes  Patient/Family in Agreement with the Plan: yes    Referrals Placed by CM/SW:      [facility] at [facility].  [address] [place]  [address]  P: [phone]  P: [phone] - Admissions(Ms Lim)  F: [phone]     Private pay costs discussed: Discussed-transportation and potential costs of TCU    Additional Information:  Writer working this weekend to facilitate discharge. Informed by Dr. Tao this morning that the patient is losing electrolytes and would like to hold off on discharge until Monday.    Writer called [facility] and left message for admissions. Also tried calling main line to reach someone, but only able to reach phone tree. Called [employer] and cancelled the ride.    Met with the patient and  to inform them of holding off on discharge. Completed the PCS for transportation and placed in chart.  reported having concerns about the cost of her TCU stay. Was at a TCU in [place] between 2/9-3/23. Had an ED visit for 3-4 days in the last month, but discharged home. Now, back in the hospital with an admission day of 5/24.    Explained that Medicare pays for up to 100 days in TCU if progressing toward needs with Medicare paying 100% up to 20 days and Medicare paying a lower percentage (80%) from day . Explained need to be out of facility for 60 days for benefits to start over. She was out of TCU for 60 days  but unsure if ED visit of 3-4 days was inpatient stay or not.    Also explained that [facility] often has a deductible and out of pocket max per year that needs to be paid by patient. Unsure if patient will have copay at the TCU or not. Per , she did not get a bill from last TCU stay 2/9-3/23.    Also explained potential costs related to ambulance transport. Explained completion of the PCS form, but that mileage is not covered and may have 20% of ride or higher, but up to insurance to review.    Lucia Underwood, [facility] 4415  6/1/2024       Social Work and Care Management Department       SEARCHABLE in [facility] - search SOCIAL WORK       [place] (0800 - 1630) Saturday and Sunday     Units: 4A Vocera, 4C Vocera, & 4E Vocera        Units: 5A [id number] Vocera, 5A 2250-4058 Vocera , BMT SW 1 BMT SW 2, BMT SW 3 & BMT SW 4  5C Off Service 5401 - 5416  5C Off Service [phone]     Units: 6A Vocera & 6B Vocera      Units: 6C Vocera     Units: 7A Vocera & 7B Vocera      Units: 7C Med Surg 7401 thru 7418 and 7C Med Surg 7502 thru 7521      Unit: [place] ED Vocera & [place] Obs Vocera     [facility] ([id number]) Saturday and Sunday      Units: 5 Ortho Vocera, 5 Med Surg Vocera & WB ED Vocera     Units: 6 Med Surg Vocera, 8 Med Surg Vocera, & 10 ICU Vocera      After hours Vocera [facility] and After Hours Vocera [place]     Saturday & Sunday (1630 - 0000)    Mon-Fri ([phone])     [facility] Recognized Holidays  ([id number])    Units: ALL   - see above VOCERA links to units and after hours

## 2024-06-01 NOTE — PLAN OF CARE
"/75 (BP Location: Left arm)   Pulse 89   Temp 98.6  F (37  C) (Oral)   Resp 16   Ht 1.62 m (5' 3.78\")   Wt 37.1 kg (81 lb 12.7 oz)   LMP  (LMP Unknown)   SpO2 94%   BMI 14.14 kg/m       Problem: Adult Inpatient Plan of Care  Goal: Plan of Care Review  Description: The Plan of Care Review/Shift note should be completed every shift.  The Outcome Evaluation is a brief statement about your assessment that the patient is improving, declining, or no change.  This information will be displayed automatically on your shift  note.  Outcome: Progressing     Goal Outcome Evaluation: Pt. A&Ox4. VSS on RA. R PIV TKO. Incont of bowel and bladder- brief in place & changed x3. X2 Bms this shift. Assist x2 repo/ lift. R AKA, refused packing to be changed but was able to change the primapore x2. 1800mL fluid restriction. Soft and bite sized diet- tolerating well. Labs reviewed. Mag, pot, and phos replaced this shift. Pain tolerated.          "

## 2024-06-01 NOTE — PROGRESS NOTES
[facility]    Medicine Progress Note - Hospitalist Service, GOLD TEAM 9    Date of Admission:  5/24/2024    Assessment & Plan   78 year old female admitted on 5/24/2024. 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  She presented to ED with complaint of weakness and dark stools and is admitted to hospital medicine for GI bleed in setting of therapeutic anticoagulation, now s/p EGD with non bleeding duodenal and esophageal ulcers, now gradually resuming anticoagulation     Acute blood loss anemia secondary to duodenal and esophageal ulcer without active bleed during endoscope, POA  Patient presents with dark stools and anemia with downtrending hemoglobin with Hb - 6.9 requriing 1uPRBC in AM, stable on repeat. Underwent EGD on 5/25 - esophagitis, esophageal ulcers, esophageal ring with ulceration, duodenal ulcers. 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  -Will consider amiloride pending goals of care discussion    Left lower extremity DVT and left lower lobe subsegmental PE  DVT and PE diagnosed 5/17 and patient was loaded with apixaban 10 mg twice daily for 1 week.  Now developed GI bleed.  Elevated INR  -Continue  apixaban 5 mg twice daily    Better controlled hypertension, POA  -Continue lisinopril 40 mg daily  -Continue metoprolol succinate 50 mg daily    R AKA wound dehiscence  #S/p right AKA (02/2024)  Follows with vascular surgery.    -WOC consult requested     Generalized weakness  Had several week stay at TCU following amputation and discharged home in March. Was receiving home PT services and improving but had significant functional decline after a 3-week lapse in home services. Extensive work-up completed during previous admission with no clear metabolic, infectious, or other organic etiology to explain her weakness. Currently, experiencing generalized weakness in setting of GIB though also has general physical deconditioning.    -physical and occupational therapies    Chronic medical problems  -Rheumatoid arthritis, continue prednisone 5 mg daily and will consider resumption of methotrexate 20 mg p.o. weekly    Possible depression, POA  -Offered psychology and psychiatry consult, patient declined however patient significant other is interested in giving psychology and psychiatry.    Goals of care discussion to be held on 6/2 with the patient and her significant other based on the patient request.  I discussed CODE STATUS and the patient will continue to be full code for now        Diet: Soft & Bite Sized Diet (level 6) Thin Liquids (level 0)  Room Service  Fluid restriction 1800 ML FLUID  Snacks/Supplements Adult: Ensure Enlive; With Meals    DVT Prophylaxis: DOAC  Nolan Catheter: Not present  Lines: None     Cardiac Monitoring: None  Code Status: Full Code      Clinically Significant Risk Factors        # Hypokalemia: Lowest K = 3.2 mmol/L in last 2 days, will replace as needed  # Hyponatremia: Lowest Na = 126 mmol/L in last 2 days, will monitor as appropriate    # Hypomagnesemia: Lowest Mg = 1.3 mg/dL in last 2 days, will replace as needed   # Hypoalbuminemia: Lowest albumin = 2.3 g/dL at 5/28/2024  6:15 AM, will  "monitor as appropriate     # Hypertension: Noted on problem list        # Cachexia: Estimated body mass index is 14.14 kg/m  as calculated from the following:    Height as of this encounter: 1.62 m (5' 3.78\").    Weight as of this encounter: 37.1 kg (81 lb 12.7 oz).        # Financial/Environmental Concerns: none         Disposition Plan     Medically Ready for Discharge: Anticipated Tomorrow             Cynthia Tao MD  Hospitalist Service, GOLD TEAM 9  [facility]  Securely message with [employer] (more info)  Text page via [facility] Paging/Directory   See signed in provider for up to date coverage information  ______________________________________________________________________    Interval History   The patient denied any new symptoms.    Physical Exam   Vital Signs: Temp: 98.5  F (36.9  C) Temp src: Oral BP: 136/72 Pulse: 75   Resp: 18 SpO2: 93 % O2 Device: None (Room air)    Weight: 81 lbs 12.65 oz    Constitutional: Awake, alert, cooperative, no apparent distress.  Eyes: Conjunctiva and pupils examined and normal.  HEENT: Moist mucous membranes, normal dentition.  Respiratory: Clear to auscultation bilaterally, no crackles or wheezing.  Cardiovascular: Regular rate and rhythm, normal S1 and S2, and no murmur noted.  GI: Soft, non-distended, non-tender, normal bowel sounds.  Lymph/Hematologic: No anterior cervical or supraclavicular adenopathy.  Skin: No rashes, no cyanosis, no edema.  Musculoskeletal: No joint swelling, erythema or tenderness.  Neurologic: Cranial nerves 2-12 intact, normal strength and sensation.  Psychiatric: Alert, oriented to person, place and time, no obvious anxiety or depression.     Medical Decision Making       45 MINUTES SPENT BY ME on the date of service doing chart review, history, exam, documentation & further activities per the note.      Data     I have personally reviewed the following data over the past 24 hrs:    N/A  \   N/A   / " N/A     129 (L) 97 (L) 6.3 (L) /  76   3.2 (L) 22 0.30 (L) \     Procal: N/A CRP: N/A Lactic Acid: 1.6         Imaging results reviewed over the past 24 hrs:   No results found for this or any previous visit (from the past 24 hour(s)).

## 2024-06-02 NOTE — PROGRESS NOTES
[facility]    Medicine Progress Note - Hospitalist Service, GOLD TEAM 9    Date of Admission:  5/24/2024    Assessment & Plan   78 year old female admitted on 5/24/2024. 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  She presented to ED with complaint of weakness and dark stools and is admitted to hospital medicine for GI bleed in setting of therapeutic anticoagulation, now s/p EGD with non bleeding duodenal and esophageal ulcers, now gradually resuming anticoagulation     Focus on quality of life  The patient reported that the increased pill burden has decreased her quality of life significantly.  She also reported that rheumatoid arthritis related pain has also significantly decreased her quality of life.  She also reported that lab work frequently has decreased her quality of life.  -Discontinued simvastatin, folic acid, and metoprolol to decrease pill burden  -Made the oxycodone at 5 mg every 4 hours as needed for moderate pain and 7.5 mg as needed for severe pain  -Scheduled acetaminophen 1 g every 8 hours  -Switched gabapentin 300 mg 3 times daily to be administered at 300 mg nightly to help with insomnia and with pain control and started 300 mg twice daily as needed  -Made the lab work to be performed on Monday Wednesday and Friday only  -Discussed DNR/DNI, the patient will discuss further with her daughters and get back to us  -Placed a consult to palliative care    Acute blood loss anemia secondary to duodenal and esophageal ulcer without active bleed during endoscope, POA  Patient presents with dark stools and anemia with downtrending hemoglobin with Hb - 6.9 requriing 1uPRBC in AM, stable on repeat. Underwent EGD on 5/25 - esophagitis, esophageal ulcers, esophageal ring with ulceration, duodenal ulcers. 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  - GI  consulted and following, appreciate recommendations   -Continue PPI orally twice daily, will discuss with GI tomorrow about switching to once daily since PPI is likely causing reduced absorption of electrolytes for this patient    Herpes simplex virus esophagitis, POA  -Continue valacyclovir for a total of 7 days based on discussion with infectious disease    Likely bacterial pneumonia, not clear if present on admission  Switched Unasyn to Augmentin for total of 7 days of antibiotic therapy, the patient completed the course of antibiotic therapy    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  -Will discuss switching PPI to daily instead of twice daily based on discussion with nephrology    Left lower extremity DVT and left lower lobe subsegmental PE  DVT and PE diagnosed 5/17 and patient was loaded with apixaban 10 mg twice daily for 1 week.  Now developed GI bleed.  Elevated INR  -Continue apixaban 5 mg twice daily    Better controlled hypertension, POA  -Continue lisinopril 40 mg daily  -Discontinued metoprolol succinate to decrease pill burden  R AKA wound dehiscence  #S/p right AKA (02/2024)  Follows with vascular surgery.    -WOC consult requested     Generalized weakness  Had several week stay at TCU following amputation and discharged home in March. Was receiving home PT services and improving but had significant functional decline after a 3-week lapse in home services. Extensive work-up completed during previous admission with no clear metabolic, infectious, or other organic etiology to explain her weakness. Currently, experiencing generalized weakness in setting of GIB  "though also has general physical deconditioning.    -physical and occupational therapies    Chronic medical problems  -Rheumatoid arthritis, continue prednisone 5 mg daily and will consider resumption of methotrexate 20 mg p.o. weekly    Possible depression, POA  -Continue escitalopram 5 mg daily    Common iliac artery stenting s/p R AKA, POA  -Discontinued simvastatin        Diet: Soft & Bite Sized Diet (level 6) Thin Liquids (level 0)  Room Service  Fluid restriction 1800 ML FLUID  Snacks/Supplements Adult: Ensure Enlive; With Meals    DVT Prophylaxis: DOAC  Nolan Catheter: Not present  Lines: None     Cardiac Monitoring: None  Code Status: Full Code      Clinically Significant Risk Factors        # Hypokalemia: Lowest K = 3.2 mmol/L in last 2 days, will replace as needed  # Hyponatremia: Lowest Na = 129 mmol/L in last 2 days, will monitor as appropriate    # Hypomagnesemia: Lowest Mg = 1.3 mg/dL in last 2 days, will replace as needed   # Hypoalbuminemia: Lowest albumin = 2.3 g/dL at 5/28/2024  6:15 AM, will monitor as appropriate     # Hypertension: Noted on problem list        # Cachexia: Estimated body mass index is 14.14 kg/m  as calculated from the following:    Height as of this encounter: 1.62 m (5' 3.78\").    Weight as of this encounter: 37.1 kg (81 lb 12.7 oz).        # Financial/Environmental Concerns: none         Disposition Plan     Medically Ready for Discharge: Anticipated Tomorrow         Billing  I spent 45 minutes at the bedside discussing goals of care with the patient and her significant other.    Cynthia Tao MD  Hospitalist Service, GOLD TEAM 9  [facility]  Securely message with [employer] (more info)  Text page via [facility] Paging/Directory   See signed in provider for up to date coverage information  ______________________________________________________________________    Interval History   The patient reported that she is upset with all the " medications that she is getting.    Physical Exam   Vital Signs: Temp: 97.2  F (36.2  C) Temp src: Oral BP: 115/63 Pulse: 85   Resp: 16 SpO2: 91 % O2 Device: None (Room air) Oxygen Delivery: 2 LPM  Weight: 81 lbs 12.65 oz    Constitutional: Awake, alert, cooperative, no apparent distress.  Eyes: Conjunctiva and pupils examined and normal.  HEENT: Moist mucous membranes, normal dentition.  Respiratory: Clear to auscultation bilaterally, no crackles or wheezing.  Cardiovascular: Regular rate and rhythm, normal S1 and S2, and no murmur noted.  GI: Soft, non-distended, non-tender, normal bowel sounds.  Lymph/Hematologic: No anterior cervical or supraclavicular adenopathy.  Skin: No rashes, no cyanosis, no edema.  Musculoskeletal: No joint swelling, erythema or tenderness.  Neurologic: Cranial nerves 2-12 intact, normal strength and sensation.  Psychiatric: Alert, oriented to person, place and time, no obvious anxiety or depression.     Medical Decision Making       45 MINUTES SPENT BY ME on the date of service doing chart review, history, exam, documentation & further activities per the note.      Data     I have personally reviewed the following data over the past 24 hrs:    N/A  \   N/A   / N/A     131 (L) 99 7.2 (L) /  76   3.8 23 0.35 (L) \       Imaging results reviewed over the past 24 hrs:   No results found for this or any previous visit (from the past 24 hour(s)).

## 2024-06-02 NOTE — PLAN OF CARE
Goal Outcome Evaluation:      Plan of Care Reviewed With: patient    Overall Patient Progress: no changeOverall Patient Progress: no change    Temp: 97.3  F (36.3  C) Temp src: Oral BP: (!) 148/75 Pulse: 66   Resp: 18 SpO2: 95 % O2 Device: None (Room air) Oxygen Delivery: 2 LPM     A&Ox3, intermittently disoriented to time. BP max 170s/90s, but down to 148/75 on recheck. Denies headache or visual changes. Prn oxycodone x1 for pain. Refused some meds and q2h repositioning overnight. Gabapentin at bedtime for sleep and anxiety effective. Purewick in place. No BM. Drsgs CDI.

## 2024-06-02 NOTE — PLAN OF CARE
Goal Outcome Evaluation:      Plan of Care Reviewed With: patient    Overall Patient Progress: no change    Vitals: VSS on RA  Neuro: WNL with the exception of orientation & forgetful.   Mobility: Pt moves A x2. Repositioned q2hrs  Pain/Nausea: Pain controlled with PRN Oxy x1 and scheduled tylenol. Denies nausea.   Diet & GI: Soft bite Poor appetite. BM x2 this shift. Incontinent of stool and urine.  Labs: Monitored. Electrolyte protocols run.Replaced K+  via IV, Mag via IV,Mag resulted 2.0 Phos via PO.  LDAs: R PIV; saline locked.  Skin/incisions: Edema throughout. New edema noted on right side of labia.  Respiratory: LLL & RLL diminished breath sounds  Cardiac: No acute changes this shift.  : Voiding appropriately and spontaneously.     Significant at bedside.    Continue to implement Care Plan.    Anna Granados RN

## 2024-06-02 NOTE — PROGRESS NOTES
Nephrology Progress Note      Sakshi Jaeger MRN:[id number] YOB: 1945  Date of Admission:5/24/2024  Primary care provider: Lucia Bates  Requesting physician: Cynthia Tao MD    ASSESSMENT AND RECOMMENDATIONS:   78 year old female admitted on 5/24/2024 with weakness and GI bleeding.  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  She presented to ED with complaint of weakness and dark stools and is admitted to hospital medicine for GI bleed in setting of therapeutic anticoagulation, now s/p EGD with non bleeding duodenal and esophageal ulcers, now gradually resuming anticoagulation   Nephrology is consulted for hyponatremia  Hyponatremia- likely multifactorial in setting of poor intake and hypovolemia/ high ADH from bleeding.  She has low creatinine and U osm of 330-360        She is a little irritated but answers questions. Her intake is fair, and she is now on a fluid restriction.  Her sodium has improved from 129 t 131 today-    - continue fluid restriction  - nephrology will follow peripherally for now  - if sodium does not improve to 135 over next couple of days, would consider urea packets   - encourage dietary intake and monitor daily sodium  2. Blood pressure- normal to high normal  3. 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  She presented to ED with complaint of weakness and dark stools and is admitted to hospital medicine for GI bleed  in setting of therapeutic anticoagulation, now s/p EGD with non bleeding duodenal and esophageal ulcers, now gradually resuming anticoagulation   Nephrology is consulted for hyponatremia. Her sodium has been low on admission at 129, and was down to 126 yesterday. Medicine has put her on fluid restriction and sodium today is again improved to 131    PAST MEDICAL HISTORY:  Reviewed with patient on 06/02/2024     Past Medical History:   Diagnosis Date    Anemia 8/24/2022    BENIGN HYPERTENSION 3/1/2005    CAD (coronary artery disease) 1/26/2011    Coronary artery disease involving native coronary artery of native heart without angina pectoris 9/27/2016    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    Essential hypertension with goal blood pressure less than 140/90 8/25/2016    Hyperlipidemia LDL goal <100 11/12/2010    Hypertension goal BP (blood pressure) < 130/80 1/26/2011    Infection of right below knee amputation (H) 10/1/2019    IRON DEFIC ANEMIA NOS 9/15/2005    Lower limb amputation, below knee 12/26/2012    MI (myocardial infarction) (H)     3/2010    Non-healing surgical wound, subsequent encounter 9/24/2020    Added automatically from request for surgery [id number]    Osteoporosis 2/16/2005    OSTEOPOROSIS NOS 2/16/2005    Protein-calorie malnutrition (H24) 5/18/2022    PVD (peripheral vascular disease) (H24) 5/18/2022    Rheumatoid arthritis (H) 2/16/2005         Rheumatoid arthritis(714.0) 2/16/2005    Status post below-knee amputation (H) 12/26/2012            Past Surgical History:   Procedure Laterality Date    ---------INCISION AND DRAINAGE  03/05/2014    AMPUTATE LEG ABOVE KNEE Right 1/13/2023    Procedure: AMPUTATION, ABOVE KNEE RIGHT;  Surgeon: Emma Oconnor MD;  Location: [facility]    AMPUTATE LEG ABOVE KNEE Right 2/2/2024    Procedure: Redo ABOVE KNEE AMPUTATION;  Surgeon: Bryon Mitchell MD;  Location: [facility]    AMPUTATE LEG BELOW KNEE Right 9/28/2022    Procedure: Right through knee amputation;  Surgeon:  Doron Mott MD;  Location: [facility]    AMPUTATION BELOW KNEE RT/LT  01/01/2005    right lowwer leg.     ANGIOGRAM Right 1/13/2023    Procedure: right iliac arteriogram aned right common iliac artery stent;  Surgeon: Emma Oconnor MD;  Location: [facility]    APPENDECTOMY      ARTHROPLASTY KNEE  04/27/2011    Procedure:ARTHROPLASTY KNEE; Surgeon:JESSE HAWKINS; Location[facility]    COMPLEX WOUND CLOSURE (UPDATE) Right 12/08/2021    Procedure: Right stump wound debridment and closure;  Surgeon: RAISA Perea MD;  Location: [facility] OR    CORONARY STENT PLACEMENT      ENDARTERECTOMY FEMORAL Right 1/13/2023    Procedure: ENDARTERECTOMY, FEMORAL RIGHT;  Surgeon: Emma Oconnor MD;  [facility]    ESOPHAGOSCOPY, GASTROSCOPY, DUODENOSCOPY (EGD), COMBINED N/A 5/25/2024    Procedure: ESOPHAGOGASTRODUODENOSCOPY, WITH BIOPSY;  Surgeon: Xander Gomez MD;  [facility]: [facility]    INJECT NERVE BLOCK SPHENOPALATINE GANGLION N/A 9/29/2022    Procedure: Out of OR encounter for block to be done by ;  Surgeon: GENERIC ANESTHESIA PROVIDER;  Location: [facility]    IR OR ANGIOGRAM  1/13/2023    IRRIGATION AND DEBRIDEMENT LOWER EXTREMITY, COMBINED Right 10/09/2019    Procedure: Irrigation and debridement Right leg;  Surgeon: Doron Mott MD;  [facility]    NERVE BLOCK PERIPHERAL N/A 9/27/2022    Procedure: BLOCK, NERVE;  Surgeon: GENERIC ANESTHESIA PROVIDER;  [facility]    OTHER SURGICAL HISTORY  03/05/2014    I AND D     OTHER SURGICAL HISTORY Right 10/09/2019    IRRIGATION AND DEBRIDEMENT LOWER EXTREMITY, COMBINED    PHACOEMULSIFICATION CLEAR CORNEA WITH STANDARD INTRAOCULAR LENS IMPLANT Left 9/8/2022    Procedure: PHACOEMULSIFICATION, LEFT CATARACT, WITH INTRAOCULAR LENS IMPLANT;  Surgeon: Flip Izaguirre MD;  Location: [facility] OR    PHACOEMULSIFICATION CLEAR CORNEA WITH STANDARD INTRAOCULAR LENS IMPLANT Right 10/13/2022    Procedure: PHACOEMULSIFICATION, RIGHT CATARACT, WITH  INTRAOCULAR LENS IMPLANT;  Surgeon: Flip Izaguirre MD;  Location: [facility] OR    REVISE SCAR EXTREMITY Right 12/17/2020    Procedure: Right stump scar revision;  Surgeon: RAISA Perea MD;  [place] [facility] [place]        MEDICATIONS:  PTA Meds  Prior to Admission medications    Medication Sig Last Dose Taking? Auth Provider Long Term End Date   acetaminophen (TYLENOL) 325 MG tablet Take 2 tablets (650 mg) by mouth every 6 hours Unknown at unknown Yes Denis Fox MD No    amLODIPine (NORVASC) 5 MG tablet TAKE 1 TABLET BY MOUTH EVERY DAY Unknown at unknown Yes Lucia Bates APRN CNP Yes    apixaban ANTICOAGULANT (ELIQUIS) 5 MG tablet Take 2 tablets (10 mg) by mouth 2 times daily for 7 days, THEN 1 tablet (5 mg) 2 times daily for 83 days. Unknown at unknown Yes Niecy Shields PA-C  8/16/24   aspirin (ASA) 81 MG tablet Take 81 mg by mouth daily Unknown at unknown Yes Nick De La Cruz MD No    calcium carbonate 600 mg-vitamin D 400 units (CALCIUM 600 + D) 600-400 MG-UNIT per tablet Take 2 tablets by mouth daily Unknown at unknown Yes Trish Sahni MD     Elemental iron 65 mg Vitamin C 125 mg (VITRON C)  MG TABS tablet Take 1 tablet by mouth daily Unknown at unknown Yes Reported, Patient     fluocinolone acetonide oil 0.01 % ear drops Place 0.25 mLs (5 drops) into both ears twice a week Unknown at unknown Yes Pito Mosher MD     folic acid (FOLVITE) 1 MG tablet Take 1 mg by mouth daily Unknown at unknown Yes Reported, Patient     gabapentin (NEURONTIN) 300 MG capsule Take 300 mg by mouth 3 times daily as needed for neuropathic pain Unknown at unknown Yes Reported, Patient Yes    leflunomide (ARAVA) 20 MG tablet Take 1 tablet by mouth every 24 hours Unknown at unknown Yes Reported, Patient No    Lidocaine (LIDOCARE) 4 % Patch Place 1 patch onto the skin every 24 hours To prevent lidocaine toxicity, patient should be patch free for 12 hrs daily. Unknown  at unknown Yes Unknown, Entered By History     lisinopril (ZESTRIL) 20 MG tablet TAKE 1 TABLET BY MOUTH EVERY DAY Unknown at unknown Yes Emma Dorsey PA-C Yes    methotrexate 2.5 MG tablet Take 20 mg by mouth every 7 days On Saturdays Unknown at unknown Yes Reported, Patient No    metoprolol succinate ER (TOPROL XL) 50 MG 24 hr tablet TAKE ONE TABLET BY MOUTH EVERY DAY Unknown at unknown Yes Harry Roberto MD Yes    oxyCODONE (ROXICODONE) 5 MG tablet Take 1 tablet (5 mg) by mouth 2 times daily as needed for pain Unknown at unknown Yes Yasmin Rob APRN CNP No    polyethylene glycol (MIRALAX) 17 GM/Dose powder Take 17 g by mouth daily Unknown at unknown Yes Denis Fox MD     predniSONE (DELTASONE) 5 MG tablet Take 1 tablet (5 mg) by mouth daily Unknown at unknown Yes Trish Sahni MD Yes    simvastatin (ZOCOR) 20 MG tablet Take 1 tablet (20 mg) by mouth At Bedtime Unknown at unknown Yes Harry Roberto MD Yes       Current Meds  Current Facility-Administered Medications   Medication Dose Route Frequency Provider Last Rate Last Admin    acetaminophen (TYLENOL) tablet 650 mg  650 mg Oral Q6H Marta Denton APRN CNP   650 mg at 06/02/24 0956    amoxicillin-clavulanate (AUGMENTIN) 875-125 MG per tablet 1 tablet  1 tablet Oral Q12H [facility] (08/20) Cynthia Tao MD   1 tablet at 06/02/24 0956    apixaban ANTICOAGULANT (ELIQUIS) tablet 5 mg  5 mg Oral BID Cynthia Tao MD   5 mg at 06/02/24 0955    escitalopram (LEXAPRO) tablet 5 mg  5 mg Oral Daily Carson Nelson APRN CNP   5 mg at 06/02/24 0955    fluocinolone acetonide oil 0.01 % ear drops 5 drop  5 drop Both Ears Once per day on Monday Thursday Vinay Jeffrey MD        folic acid (FOLVITE) tablet 1 mg  1 mg Oral Daily Marta Denton APRN CNP   1 mg at 06/02/24 0955    leflunomide (ARAVA) tablet 20 mg  20 mg Oral Daily Marta Denton APRN CNP   20 mg at 06/02/24 0956    lisinopril (ZESTRIL) tablet 40  mg  40 mg Oral Daily Cynthia Tao MD   40 mg at 06/02/24 0956    melatonin tablet 1 mg  1 mg Oral At Bedtime Cynthia Tao MD   1 mg at 05/31/24 2101    methotrexate tablet 20 mg  20 mg Oral Q7 Days Cynthia Tao MD   20 mg at 06/01/24 0941    metoprolol succinate ER (TOPROL XL) 24 hr tablet 50 mg  50 mg Oral Daily Vinay Jeffrey MD   50 mg at 06/02/24 0955    pantoprazole (PROTONIX) EC tablet 40 mg  40 mg Oral BID AC Vinay Jeffrey MD   40 mg at 06/02/24 0956    potassium & sodium phosphates (NEUTRA-PHOS) Packet 1 packet  1 packet Oral or Feeding Tube Q4H Elba Headley MD   1 packet at 06/02/24 1211    potassium chloride 10 mEq in 100 mL sterile water infusion  10 mEq Intravenous Once Elba Headley MD        predniSONE (DELTASONE) tablet 5 mg  5 mg Oral Daily Marta Denton APRN CNP   5 mg at 06/02/24 0955    simvastatin (ZOCOR) tablet 20 mg  20 mg Oral At Bedtime Marta Denton APRN CNP   20 mg at 05/31/24 2101    sodium chloride (PF) 0.9% PF flush 3 mL  3 mL Intracatheter Q8H Cynthia Tao MD   3 mL at 06/01/24 1555    valACYclovir (VALTREX) tablet 1,000 mg  1,000 mg Oral Q12H [facility] (08/20) Cynthia Tao MD   1,000 mg at 06/02/24 0955     Infusion Meds  Current Facility-Administered Medications   Medication Dose Route Frequency Provider Last Rate Last Admin       ALLERGIES:    Allergies   Allergen Reactions    Adhesive Tape Other (See Comments)     Fragile skin       REVIEW OF SYSTEMS:  A comprehensive of systems was negative except as noted above.    SOCIAL HISTORY:   Social History     Socioeconomic History    Marital status: Single     Spouse name: Not on file    Number of children: 3    Years of education: Not on file    Highest education level: Not on file   Occupational History    Not on file   Tobacco Use    Smoking status: Former     Current packs/day: 0.00     Average packs/day: 0.5 packs/day for 45.0 years (22.5 ttl pk-yrs)     Types: Cigarettes     Start  "date: 1965     Quit date: 2010     Years since quittin.2     Passive exposure: Past    Smokeless tobacco: Never   Vaping Use    Vaping status: Never Used   Substance and Sexual Activity    Alcohol use: Yes     Comment: occsionally-3 -4 drinks a week    Drug use: No    Sexual activity: Yes     Partners: Male     Birth control/protection: Post-menopausal   Other Topics Concern    Parent/sibling w/ CABG, MI or angioplasty before 65F 55M? No   Social History Narrative    Live with Santi.  Together since .       Social Determinants of Health     Financial Resource Strain: Not on file   Food Insecurity: Not on file   Transportation Needs: Not on file   Physical Activity: Not on file   Stress: Not on file   Social Connections: Unknown (2022)    [facility]    Social Connections     Frequency of Communication with Friends and Family: Not on file   Interpersonal Safety: Not on file   Housing Stability: Not on file     Reviewed with patient   partner accompanies Sakshi Jaeger in hospital room    FAMILY MEDICAL HISTORY:   Family History   Problem Relation Age of Onset    Cardiovascular Mother     Cancer Brother     Diabetes No family hx of     Hypertension No family hx of     Cerebrovascular Disease No family hx of     Alzheimer Disease No family hx of     Thyroid Disease No family hx of     Respiratory No family hx of     Other - See Comments Mother         CARDIOVASCULAR    Cancer Brother      no Family history of kidney disease  Reviewed with patient     PHYSICAL EXAM:   Temp  Av.1  F (36.7  C)  Min: 97  F (36.1  C)  Max: 99.1  F (37.3  C)      Pulse  Av.9  Min: 50  Max: 107 Resp  Av.2  Min: 12  Max: 20  SpO2  Av.2 %  Min: 82 %  Max: 100 %       /63 (BP Location: Left arm)   Pulse 85   Temp 97.2  F (36.2  C) (Oral)   Resp 16   Ht 1.62 m (5' 3.78\")   Wt 37.1 kg (81 lb 12.7 " oz)   LMP  (LMP Unknown)   SpO2 91%   BMI 14.14 kg/m     Date 06/01/24 0700 - 06/02/24 0659   Shift 3212-1326 8248-5352 4910-5133 24 Hour Total   INTAKE   P.O. 500   500   Shift Total(mL/kg) 500(13.48)   500(13.48)   OUTPUT   Shift Total(mL/kg)       Weight (kg) 37.1 37.1 37.1 37.1      Admit Weight: 40.4 kg (89 lb)     GENERAL APPEARANCE: no distress,  awake, cachectic  EYES: no scleral icterus, pupils equal  Lymphatics: no cervical or supraclavicular LAD  Pulmonary: lungs clear to auscultation with equal breath sounds bilaterally, no clubbing  CV: regular rhythm, normal rate, no rub   - JVD no   - Edema no  GI: soft, nontender, normal bowel sounds  MS: no evidence of inflammation in joints, no muscle tenderness  : no le  SKIN: no rash, warm, dry, no cyanosis  NEURO: face symmetric, no asterixis     LABS:   I have reviewed the following labs:  CMP  Recent Labs   Lab 06/02/24  0702 06/02/24  0246 06/01/24  2119 06/01/24  1612 06/01/24  0910 05/31/24  1016 05/31/24  0717 05/30/24  1508 05/30/24  0715 05/28/24  1552 05/28/24  0615   *  --   --   --  129* 126* 127*  --  131*   < > 128*   POTASSIUM 3.8  --   --  4.0 3.2*  --  3.6   < > 3.2*   < > 3.5   CHLORIDE 99  --   --   --  97*  --  96*  --  99   < > 98   CO2 23  --   --   --  22  --  21*  --  22   < > 21*   ANIONGAP 9  --   --   --  10  --  10  --  10   < > 9   GLC 76 84  --   --  76  --  81  --  75   < > 73   BUN 7.2*  --   --   --  6.3*  --  5.9*  --  6.3*   < > 6.2*   CR 0.35*  --   --   --  0.30*  --  0.33*  --  0.35*   < > 0.33*   GFRESTIMATED >90  --   --   --  >90  --  >90  --  >90   < > >90   ADRIAN 7.9*  --   --   --  7.6*  --  7.7*  --  7.3*   < > 7.3*   MAG 1.5*  --   --  1.8 1.3*  --  2.1   < >  --    < > 1.3*   PHOS 2.4*  --  2.7  --  1.9*  --  2.0*  --  2.7   < > 2.0*   PROTTOTAL  --   --   --   --   --   --   --   --   --   --  4.2*   ALBUMIN  --   --   --   --   --   --   --   --   --   --  2.3*   BILITOTAL  --   --   --   --   --   --    --   --   --   --  0.5   ALKPHOS  --   --   --   --   --   --   --   --   --   --  97   AST  --   --   --   --   --   --   --   --   --   --  24   ALT  --   --   --   --   --   --   --   --   --   --  16    < > = values in this interval not displayed.     CBC  Recent Labs   Lab 05/31/24  0717 05/30/24  0715 05/29/24  0622 05/28/24  0615   HGB 9.8* 8.8* 9.9* 9.0*  9.0*   WBC 3.5* 3.1* 3.2* 4.2   RBC 3.42* 3.04* 3.38* 3.12*   HCT 29.7* 27.1* 29.3* 27.1*   MCV 87 89 87 87   MCH 28.7 28.9 29.3 28.8   MCHC 33.0 32.5 33.8 33.2   RDW 20.0* 20.1* 20.0* 20.0*    189 211 185     INRNo lab results found in last 7 days.  ABGNo lab results found in last 7 days.   URINE STUDIES  Recent Labs   Lab Test 05/31/24  1734 05/25/24  1818 05/18/24  1203 10/03/19  2343 06/13/19  1408   COLOR Light Yellow Light Yellow Yellow Light Yellow Yellow   APPEARANCE Clear Clear Clear Clear Clear   URINEGLC Negative Negative Negative Negative Negative   URINEBILI Negative Negative Negative Negative Negative   URINEKETONE Negative Negative Negative Negative 15*   SG 1.015 1.015 1.010 1.006 1.015   UBLD Negative Negative Negative Negative Negative   URINEPH 6.5 7.0 6.5 6.5 7.0   PROTEIN Negative Negative 10* Negative Trace*   UROBILINOGEN  --   --   --   --  0.2   NITRITE Negative Negative Negative Negative Negative   LEUKEST Negative Negative Negative Negative Trace*   RBCU 0 <1 <1 0 O - 2   WBCU 1 2 1 1 0 - 5     No lab results found.  PTH  No lab results found.  IRON STUDIES  Recent Labs   Lab Test 08/24/22  1300 05/18/22  1220   IRON 133 115    252   IRONSAT 50* 46   PADMINI 383* 676*       IMAGING:  All imaging studies reviewed by me.     Terri Quick MD

## 2024-06-03 NOTE — PROGRESS NOTES
[facility]    Medicine Progress Note - Hospitalist Service, GOLD TEAM 9    Date of Admission:  5/24/2024    Assessment & Plan   78 year old female admitted on 5/24/2024. 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  She presented to ED with complaint of weakness and dark stools and is admitted to hospital medicine for GI bleed in setting of therapeutic anticoagulation, now s/p EGD with non bleeding duodenal and esophageal ulcers, now gradually resuming anticoagulation     Focus on quality of life  The patient reported that the increased pill burden has decreased her quality of life significantly.  She also reported that rheumatoid arthritis related pain has also significantly decreased her quality of life.  She also reported that lab work frequently has decreased her quality of life.  -Discontinued simvastatin, folic acid, and metoprolol to decrease pill burden  -Continue oxycodone at 5 mg every 4 hours as needed for moderate pain and 7.5 mg as needed for severe pain  - Continue scheduled acetaminophen 1 g every 8 hours  -Continue gabapentin 300 mg nightly to help with insomnia and with pain control and  300 mg twice daily as needed  -Made the lab work to be performed on Monday Wednesday and Friday only  -The patient would like to continue as full code and continue restorative care, however she is amendable for further goals of care discussion and family meeting  -Appreciative to the input of palliative care  -Please consult to social work and case management to help scheduling a care conference with the patient, her significant other Zoran [is not the patient ], her 2 daughters over the phone since they are out of the state, palliative care, and the primary service    Acute blood loss anemia secondary to duodenal and esophageal ulcer without active bleed during endoscope,  POA  Patient presents with dark stools and anemia with downtrending hemoglobin with Hb - 6.9 requriing 1uPRBC in AM, stable on repeat. Underwent EGD on 5/25 - esophagitis, esophageal ulcers, esophageal ring with ulceration, duodenal ulcers. 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  -Switch PPI to daily and started famotidine based on discussion with nephrology and gastroenterology    Left lower extremity DVT and left lower lobe subsegmental PE  DVT and PE diagnosed 5/17 and patient was loaded with apixaban 10 mg twice daily for 1 week.  Now developed GI bleed.  Elevated INR  -Continue apixaban 5 mg twice daily    Better controlled hypertension, POA  -Continue lisinopril 40 mg daily  -Discontinued metoprolol succinate 50 mg daily and amlodipine 5 mg daily to decrease pill burden    R AKA wound dehiscence  #S/p right AKA (02/2024)  Follows with vascular surgery.    -WOC consult requested     Generalized weakness  Had several week stay at [facility]CU following amputation and discharged home in March. Was  "receiving home PT services and improving but had significant functional decline after a 3-week lapse in home services. Extensive work-up completed during previous admission with no clear metabolic, infectious, or other organic etiology to explain her weakness. Currently, experiencing generalized weakness in setting of GIB though also has general physical deconditioning.    -physical and occupational therapies    Chronic medical problems  -Rheumatoid arthritis, continue prednisone 5 mg daily and resumed methotrexate 20 mg p.o. weekly    Possible depression, POA  -Continue escitalopram 5 mg daily    Common iliac artery stenting s/p R AKA, POA  -Discontinued simvastatin    Resolved medical problems  Likely bacterial pneumonia, not clear if present on admission, the patient completed 7 days course of antibiotic therapy        Diet: Soft & Bite Sized Diet (level 6) Thin Liquids (level 0)  Room Service  Fluid restriction 1800 ML FLUID  Snacks/Supplements Adult: Ensure Enlive; With Meals    DVT Prophylaxis: DOAC  Nolan Catheter: Not present  Lines: None     Cardiac Monitoring: None  Code Status: Full Code      Clinically Significant Risk Factors         # Hyponatremia: Lowest Na = 129 mmol/L in last 2 days, will monitor as appropriate    # Hypomagnesemia: Lowest Mg = 1.5 mg/dL in last 2 days, will replace as needed   # Hypoalbuminemia: Lowest albumin = 2.3 g/dL at 5/28/2024  6:15 AM, will monitor as appropriate     # Hypertension: Noted on problem list        # Cachexia: Estimated body mass index is 14.14 kg/m  as calculated from the following:    Height as of this encounter: 1.62 m (5' 3.78\").    Weight as of this encounter: 37.1 kg (81 lb 12.7 oz).   # Severe Malnutrition: based on nutrition assessment      # Financial/Environmental Concerns: none         Disposition Plan     Medically Ready for Discharge: Anticipated in 2-4 Days             Cynthia Tao MD  Hospitalist Service, GOLD TEAM 9  [facility] " [facility]  Securely message with [email or web address] (more info)  Text page via [facility] Paging/Directory   See signed in provider for up to date coverage information  ______________________________________________________________________    Interval History   The patient reported starting to feel better.  She reported she slept well overnight and her pain is better controlled    Physical Exam   Vital Signs: Temp: 98.5  F (36.9  C) Temp src: Oral BP: 129/79 Pulse: 74   Resp: 16 SpO2: 90 % O2 Device: None (Room air)    Weight: 81 lbs 12.65 oz    Constitutional: Awake, alert, cooperative, no apparent distress.  Eyes: Conjunctiva and pupils examined and normal.  HEENT: Moist mucous membranes, normal dentition.  Respiratory: Clear to auscultation bilaterally, no crackles or wheezing.  Cardiovascular: Regular rate and rhythm, normal S1 and S2, and no murmur noted.  GI: Soft, non-distended, non-tender, normal bowel sounds.  Lymph/Hematologic: No anterior cervical or supraclavicular adenopathy.  Skin: No rashes, no cyanosis, no edema.  Musculoskeletal: No joint swelling, erythema or tenderness.  Neurologic: Cranial nerves 2-12 intact, normal strength and sensation.  Psychiatric: Alert, oriented to person, place and time, no obvious anxiety or depression.     Medical Decision Making       45 MINUTES SPENT BY ME on the date of service doing chart review, history, exam, documentation & further activities per the note.      Data     I have personally reviewed the following data over the past 24 hrs:    4.0  \   9.3 (L)   / 241     129 (L) 97 (L) 9.1 /  77   3.9 24 0.36 (L) \     ALT: 24 AST: 34 AP: 130 TBILI: 0.3   ALB: 2.4 (L) TOT PROTEIN: 4.5 (L) LIPASE: N/A       Imaging results reviewed over the past 24 hrs:   No results found for this or any previous visit (from the past 24 hour(s)).

## 2024-06-03 NOTE — CONSULTS
Health Psychology                                                                                                                          Janeth Solomon, Ph.D., L.P [phone]  Evette Madsen, Ph.D., L.P. [phone]  Whitney Ha, Ph.D, L..P. [phone]  Simi Temple, Ph.D., L.P. [phone]  Jerson Kelly, Ph.D., A.B.P.P., L.P. [phone]         Kay Carrasquillo, Ph.D., L.P. [phone]       Ashley Buckley, Ph.D., A.B.P.P., LP [phone]           [facility], 3rd Floor  [address] [place] [address]       Inpatient Health Psychology Consultation      Date of Service:  06/03/24    Met with Sakshi emmanuel to introduce Health Psychology services and discuss nature of referral. She was spendign time with her  Santi when I entered the room. She expressed low interest in speaking today citing burden of questions and fatigue as main factors. She was agreeable to speaking at another time and her  also encouraged this. He said he feels she is now coming out of a fog and will likely be more engaged in the future.     PLAN: Follow-up later this week with Sakshi.     Whitney Ha, PhD,   Clinical Health Psychologist  Phone: [phone]

## 2024-06-03 NOTE — PLAN OF CARE
"/79 (BP Location: Right arm)   Pulse 75   Temp 98.4  F (36.9  C) (Oral)   Resp 16   Ht 1.62 m (5' 3.78\")   Wt 37.1 kg (81 lb 12.7 oz)   LMP  (LMP Unknown)   SpO2 93%   BMI 14.14 kg/m       Problem: Adult Inpatient Plan of Care  Goal: Plan of Care Review  Description: The Plan of Care Review/Shift note should be completed every shift.  The Outcome Evaluation is a brief statement about your assessment that the patient is improving, declining, or no change.  This information will be displayed automatically on your shift  note.  Outcome: Progressing     Goal Outcome Evaluation: Pt. A&Ox4. VSS on RA. R PIV TKO. Incont of bowel and bladder- brief in place & changed x2. X1 Bms this shift. Assist x2 repo/ lift. R AKA, WOC changed dressing this shift. 1800mL fluid restriction. Soft and bite sized diet- poor appetite. Labs reviewed. Mag, and phos replaced this shift. Pt up in chair this shift. Oxy given for post dressing change pain.       "

## 2024-06-03 NOTE — PROGRESS NOTES
Gastroenterology Brief Note    Sakshi Jaeger is a 78 year old female with a history of DVT/PE on Eliquis, PVD s/p R AKA, RA who presents with melena, weakness. GI consulted for evaluation of possible GI bleed.     Note GI luminal team previously signed off 5/26 post completion of EGD.      Dr. Tao contacted luminal GI team 6/3 inquiring if would be appropriate to consider decrease PPI therapy to once daily to help with refractory hypomagnesemia (per input from Nephrology).    Chart reviewed, did not examine/interview patient.      #Hypomagnesemia   #HSV esophagitis  #Ulcerated Peptic duodenitis  #Melena (resolved)  #Severe protein-calorie malnutrition, Underweight (BMI 14)  Underwent EGD on 5/25 with Dr. Gomez with evidence of esophagitis, esophageal ulcers, esophageal ring with ulceration and 2 superficial duodenal ulcers (non-bleeding) and esophagus/gastric/duodenal bx taken.  Bx resulted negative for CMV, H.pylori and malignancy but positive for HSV and notable for ulcerated peptic duodenitis.  Started on valacyclovir 1000 mg BID for 14 days.  Receiving pantoprazole 40 mg PO BID.  Mg 1.6 despite Mag replacement (po Mg Oxide + IV Mg).  No further melena (now brown stools) and Hgb stable/improving with resumption of Apixaban. Poor appetite and intakes, RD following to help supplement PO    RECOMMENDATIONS:  -Complete 14 day treatment course with valacyclovir for HSV esophagitis in immunocompromised patient.  -Appropriate to decrease PPI to 40 mg once daily and continue x4 weeks.  -Add Famotidine 40 mg BID x 4 weeks.  -Consider addition of carafate QID if persistent sx of abd pain post-prandially/reflux despite above adjustments.  -Diet as tolerated, appreciate RD assistance optimizing po.  -Agree with palliative consult.  -No plan for repeat endoscopic evaluation unless persistent sx post completion of anti-secretory and anti-viral therapies.    Discussed all above with primary medicine team (  Aislinn)    Above in collaboration/discussed with Dr. Gomez, GI attending    The inpatient gastroenterology service will sign off at this time. Thank you for allowing us to participate in the care of this patient. Please do not hesitate to page the GI service with any questions or concerns.      Stefania Patterson PA-C, RD, [facility]  Inpatient Gastroenterology Service  [facility]  Pager: *[phone] (M-F, 7:30-16:00) or GRACIELA

## 2024-06-03 NOTE — PROGRESS NOTES
[facility] Nurse Inpatient Assessment     Consulted for: Right AKA, known incisional dehiscence     Patient History (according to provider note(s):      78 year old female admitted on 5/24/2024. 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  She presented to ED with complaint of weakness and dark stools and is admitted to hospital medicine for GI bleed in setting of therapeutic anticoagulation, now s/p EGD with non bleeding duodenal and esophageal ulcers, now gradually resuming anticoagulation     Assessment:      Areas visualized during today's visit: Lower extremities     Wound location: Right AKA stump site    Last photo: 5/19/24 and 5/28  Wound due to: Surgical Wound  Wound history/plan of care: Follows with Vascular Surgery, last evaluated on 4/30. Wound healing nicely per OP visit notes and patient/family report. No concerns for infection on exam today. CT RLE completed on admission, findings c/w known wound dehiscence.    - Continue to monitor   - PTA gabapentin 300 mg TID  Wound base: 100 % non-granular tissue,      Palpation of the wound bed: normal      Drainage: small     Description of drainage: cloudy and yellow     Measurements (length x width x depth, in cm): 2  x 0.5  x  1.5 cm      Tunneling: N/A     Undermining: up to 1cm at 3 o clock   Periwound skin: Intact      Color: normal and consistent with surrounding tissue      Temperature: normal   Odor: none  Pain: moderate, sharp when packing or assessing wound with applicator   Pain interventions prior to dressing change: slow and gentle cares   Treatment goal: Maintain (prevention of deterioration)  STATUS: stable  Supplies ordered: supplies stored on unit      Treatment Plan:     R AKA incisional dehiscence: Every other day: cleanse with Vashe wound cleanser (513126) and allow to dry, cut a strip of Aquacel  AG and pack into wound bed, leaving tail for easy removal. Then place 4x4 mepilex.      Orders: Reviewed    RECOMMEND PRIMARY TEAM ORDER: None, at this time  Education provided: wound progress and Moisture management  Discussed plan of care with: Patient and Family  [facility] nurse follow-up plan: weekly  Notify WOC if wound(s) deteriorate.  Nursing to notify the Provider(s) and re-consult the WOC Nurse if new skin concern.    DATA:     Current support surface: Standard  Standard Isoflex gel  Containment of urine/stool: Incontinence Protocol  BMI: Body mass index is 14.14 kg/m .   Active diet order: Orders Placed This Encounter      Soft & Bite Sized Diet (level 6) Thin Liquids (level 0)     Output: I/O last 3 completed shifts:  In: 237 [P.O.:237]  Out: -      Labs:   Recent Labs   Lab 06/03/24  0625   ALBUMIN 2.4*   HGB 9.3*   WBC 4.0     Pressure injury risk assessment:   Sensory Perception: 3-->slightly limited  Moisture: 3-->occasionally moist  Activity: 1-->bedfast  Mobility: 2-->very limited  Nutrition: 2-->probably inadequate  Friction and Shear: 2-->potential problem  Alexandro Score: 13      Please contact through [facility] [place]: [facility] Nurse  Dept. Office Number: [phone]

## 2024-06-03 NOTE — PROGRESS NOTES
"CLINICAL NUTRITION SERVICES - ASSESSMENT NOTE     Nutrition Prescription    RECOMMENDATIONS FOR MDs/PROVIDERS TO ORDER:  Encourage PO intake as able. 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  She presented to ED with complaint of weakness and dark stools and is admitted to hospital medicine for GI bleed in setting of therapeutic anticoagulation, now s/p EGD with non bleeding duodenal and esophageal ulcers, now gradually resuming anticoagulation \"    Pallative consulted today (per provider note yesterday, pt reports increased pill burden has decreased her quality of life)    Per Psychiatry provider note 6/1, \"Reports good appetite however, SO indicates that the patient has not been eating recently.  She does drink boost at home but not every day     CURRENT NUTRITION ORDERS  Diet: Level 6: Soft & Bite-Sized Dysphagia Diet  and 1800 mL Fluid Restriction (fluid restriction ordered since 5/29)  - Supplements: Ensure Enlive with meals (provider ordered 6/1)    Intake/Tolerance: Tolerating diet, but mostly poor appetite.  Intake in flowsheets ranging %    LABS  Labs reviewed  - Na+ 129 (L), trending down  - K+ and Phos WNL  - Mg++ 1.6 (L), replacement  - Cr 0.36 (L)    MEDICATIONS  Medications reviewed  - Prednisone    ANTHROPOMETRICS  Height: 162 cm (5' 3.78\")  Most Recent Weight: 37.1 kg (81 lb 12.7 oz)    IBW: 54.5 kg - 11% for R AKA = 45.5 kg  BMI: " Underweight BMI <18.5  Weight History: wt down 8 lb x 2 weeks, overall down 19 lb from ~3 months ago (19%)  Wt Readings from Last 10 Encounters:   06/01/24 37.1 kg (81 lb 12.7 oz)   05/18/24 40.5 kg (89 lb 4.6 oz)   03/14/24 45.4 kg (100 lb)   02/06/24 42.7 kg (94 lb 2.2 oz)   10/26/23 45.4 kg (100 lb)   05/09/23 39.5 kg (87 lb)   01/16/23 39 kg (85 lb 15.7 oz)   01/11/23 45.4 kg (100 lb)   10/26/22 45.4 kg (100 lb)   09/27/22 42.8 kg (94 lb 5.7 oz)      Dosing Weight: 37 kg    ASSESSED NUTRITION NEEDS  Estimated Energy Needs: 3865-9745 kcals/day (30 - 35 kcals/kg)  Justification: Underweight  Estimated Protein Needs: 44-56 grams protein/day (1.2 - 1.5 grams of pro/kg)  Justification: Hypercatabolism with acute illness and Repletion  Estimated Fluid Needs: per provider pending fluid status    PHYSICAL FINDINGS  See malnutrition section below.    MALNUTRITION  % Intake: </= 50% for >/= 5 days (severe)  % Weight Loss: > 2% in 1 week (severe)  Subcutaneous Fat Loss: Unable to assess  Muscle Loss: Unable to assess  Fluid Accumulation/Edema: mild-moderate per flowsheets  Malnutrition Diagnosis: Severe malnutrition in the context of acute illness    NUTRITION DIAGNOSIS  Inadequate oral intake related to poor appetite as evidenced by mostly poor appetite documented since admit    INTERVENTIONS  Implementation  Nutrition Education: Unable to complete due to pt with other cares     Goals  Patient to consume % of nutritionally adequate meal trays TID, or the equivalent with supplements/snacks.     Monitoring/Evaluation  Progress toward goals will be monitored and evaluated per protocol.     Vicki Vazquez RD, , LD  Weekday Units covered: 5A (non-Heme Onc pts) and 7B ([id number])  Available by 5A or 7B Clinical Dietovidio Quintana  Weekend Coverage: Weekend Clinical Dietitimarisol Quintana    Clinical Dietitians no longer available via paging

## 2024-06-03 NOTE — CONSULTS
"Palliative Care Consultation Note  [facility]      Patient: Sakshi Jaeger  Date of Admission:  5/24/2024    Requesting Clinician / Team: Medicine   Reason for consult: Goals of care  Decisional support  Patient and family support       Recommendations & Counseling     GOALS OF CARE:   Restorative without limits   Primary team shared Sakshi has previously voiced increased pill burden and reported decreased quality of life secondary to her rheumatoid arthritis pain which has significantly decreased her quality of life. Palliative care consulted today to assist with further goals of care discussions.  Met with Sakshi at bedside this afternoon. Briefly, introduced the role of Palliative care and attempted to explore goals of care discussion with Sakshi. Sakshi shared her wishes are to remain full restorative care. She denies any knowledge of having rheumatoid arthritis related pain. At baseline, she uses scooter to get around in the house and receives home health aid and physical therapy. When attempted to explore what she finds important in her quality of life, she responded \"everything.\" Sakshi seems to have good insight of her medical condition. However, she still remains disengaged and not forthcoming with information. She easily becomes irritable when asked questions related to her goals of care and what her wishes are. Wound care RN at bedside at the time of my visit. Sakshi requested to end our meeting and shared she is no longer interested having further conversation at this time. She hopes to discuss her wishes further with Santi SHERWOOD)  Recommending family care conference in the coming day for further goals of care discussion. Primary team plans to facilitate FCC. Palliative care will continue to follow for ongoing goals of care discussion.     ADVANCE CARE PLANNING:  Patient has an advance directive dated 09/28/2022.  Primary Health Care Agent Santi Mercedes (BISI) . "  Alternate(s) NA.   There is no POLST form on file, defer to patient and/or next of kin for decisions   Code status: Full Code    MEDICAL MANAGEMENT:   We are not actively managing symptoms at this time.    PSYCHOSOCIAL/SPIRITUAL SUPPORT:  Family: MercedesSanti (Significant other)   [place] community: [place]   Spiritual: Declined  Palliative Care will continue to follow. Thank you for the consult and allowing us to aid in the care of Sakshi Jaeger.    These recommendations have been discussed with primary team, nursing and family.    DESIRE Landaverde CNP  [facility], Palliative Care  Securely message with the Vocera Web Console (learn more here) or  Text page via [facility] Paging/Directory       Assessment      Sakshi Jaeger is a 78 year old female admitted on 5/24/2024. 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  She presented to ED with complaint of weakness and dark stools and is admitted to hospital medicine for GI bleed in setting of therapeutic anticoagulation, now s/p EGD with non bleeding duodenal and esophageal ulcers, now gradually resuming anticoagulation. Palliative care consult for goals of care discussion.     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 I introduced our role as an extra layer of support and how we help patients and families dealing with serious, potentially life-limiting illnesses.  I explained the composition of the palliative care team. Introduced the role of palliative care as an interdisciplinary team that cares for patients with serious illness to help support symptom management, communication, coping for patients and their families as well as support with medical decision making. Sakshi has expressed feeling depressed during this admission. Appreciate Psych for medical management, see notes for details 6/1/24      Prognosis, Goals, & Planning:   Functional Status just prior to this current hospitalization:  ECOG3 (Capable of only limited self-care; needs help with ADLs; in bed/chair >50% of waking hours)    Prognosis, Goals, and/or Advance Care Planning:  Recommending formal goals of care discus goals of care     Code Status was addressed today:   Yes, We discussed potential risks and rationale of attempting cardiac resuscitation, intubation, and mechanical ventilation.  We also discussed probability of survival as well as quality of life implications.  Based on this discussion, patient or surrogate response/decision: Confirmed with Sakshi. Restorative care- full code status. Patient would like to explore with Santi further.       Patient's decision making preferences: shared with support from loved ones        Patient has decision-making capacity today for complex decisions: Questionable             Coping, Meaning, & Spirituality:   Mood, coping, and/or meaning in the context of serious illness were addressed today: No.  Patient declined to elaborate    Social:   Living situation:lives with significant other/spouse  Important relationships/caregivers:  Occupation: She used to work for [employer]. Now retired.   Areas of fulfillment/debbie: unable to assess     Medications:  Reviewed this patient's medication profile and medications from this hospitalization.     ROS:  Comprehensive ROS is reviewed and is negative except as here & per HPI:     Physical Exam   Vital Signs with Ranges  Temp:   [97.2  F (36.2  C)-98.3  F (36.8  C)] 98.1  F (36.7  C)  Pulse:  [72-85] 73  Resp:  [16] 16  BP: (111-152)/(63-71) 152/68  SpO2:  [91 %-94 %] 93 %  Wt Readings from Last 10 Encounters:   06/01/24 37.1 kg (81 lb 12.7 oz)   05/18/24 40.5 kg (89 lb 4.6 oz)   03/14/24 45.4 kg (100 lb)   02/06/24 42.7 kg (94 lb 2.2 oz)   10/26/23 45.4 kg (100 lb)   05/09/23 39.5 kg (87 lb)   01/16/23 39 kg (85 lb 15.7 oz)   01/11/23 45.4 kg (100 lb)   10/26/22 45.4 kg (100 lb)   09/27/22 42.8 kg (94 lb 5.7 oz)     81 lbs 12.65 oz    PHYSICAL EXAM:  Constitutional: alert, awake, frail-appearing  Respiratory: breathing comfortably, on room air   Psychiatric: irritable, appears anxious   Abdomen: soft, non-tender   NEURO: alert and awake, follows commands  SKIN: Warm/dry  JOINT/EXTREMITIES: Right AKA    Data reviewed:  CMP  Recent Labs   Lab 06/03/24  0625 06/02/24  1650 06/02/24  0702 05/28/24  1552 05/28/24  0615   *  --  131*   < > 128*   POTASSIUM 3.9  --  3.8   < > 3.5   CHLORIDE 97*  --  99   < > 98   CO2 24  --  23   < > 21*   ANIONGAP 8  --  9   < > 9   GLC 77  --  76   < > 73   BUN 9.1  --  7.2*   < > 6.2*   CR 0.36*  --  0.35*   < > 0.33*   GFRESTIMATED >90  --  >90   < > >90   ADRIAN 8.0*  --  7.9*   < > 7.3*   MAG 1.6* 2.0 1.5*   < > 1.3*   PHOS 2.7  --  2.4*   < > 2.0*   PROTTOTAL 4.5*  --   --   --  4.2*   ALBUMIN 2.4*  --   --   --  2.3*   BILITOTAL 0.3  --   --   --  0.5   ALKPHOS 130  --   --   --  97   AST 34  --   --   --  24   ALT 24  --   --   --  16    < > = values in this interval not displayed.     CBC  Recent Labs   Lab 06/03/24  0625 05/31/24  0717   WBC 4.0 3.5*   RBC 3.30* 3.42*   HGB 9.3* 9.8*   HCT 28.7* 29.7*   MCV 87 87   MCH 28.2 28.7   MCHC 32.4 33.0   RDW 19.8* 20.0*    215       Medical Decision Making       MANAGEMENT DISCUSSED with the following over the past 24 hours: primary, nursing and family   NOTE(S)/MEDICAL RECORDS REVIEWED over the past 24 hours: Medicine, nephrology, behavioral  health, unit SW and nursing  70 MINUTES SPENT BY ME on the date of service doing chart review, history, exam, documentation & further activities per the note.

## 2024-06-03 NOTE — PLAN OF CARE
Goal Outcome Evaluation:       Plan of Care Reviewed With: patient     Overall Patient Progress: no changeOverall Patient Progress: no change     Temp: 98  F (36.7  C) Temp src: Oral BP: 111/65 Pulse: 77   Resp: 16 SpO2: 93 % O2 Device: None (Room air)    A&Ox3, forgetful. Vss on RA. Prn oxycodone x1 for pain. Refused some meds and q2h repositioning overnight. Gabapentin at bedtime for sleep and anxiety effective. Mg and phos replaced, recheck this AM. 1 incontinent BM, very loose. Voiding. Applied barrier cream to sorin area. Julia CDI.

## 2024-06-04 NOTE — PLAN OF CARE
Problem: Adult Inpatient Plan of Care  Goal: Optimal Comfort and Wellbeing  Outcome: Progressing  Intervention: Provide Person-Centered Care  Recent Flowsheet Documentation  Taken 6/4/2024 0000 by Halle Diaz RN  Trust Relationship/Rapport: care explained   B/P: 169/85, T: 98.1, P: 71, R: 15 MD paged as an FYI about BP, no new orders were written. Incontinent bladder, placed purewick to try to keep skin dry. Repositioned every 2-2.5 hours. Dressing DI. Appeared to sleep in between care. Continue to monitor and notify MD with any significant changes.

## 2024-06-04 NOTE — PROGRESS NOTES
[facility]    Medicine Progress Note - Hospitalist Service, GOLD TEAM 9    Date of Admission:  5/24/2024    Assessment & Plan    78 year old female admitted on 5/24/2024. 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  She presented to ED with complaint of weakness and dark stools and is admitted to hospital medicine for GI bleed in setting of therapeutic anticoagulation, now s/p EGD with non bleeding duodenal and esophageal ulcers, now gradually resuming anticoagulation.      Today's plan   -Awaiting on TCU placement,  mentions no beds available currently   -Noted some hyponatremia on blood work, Ordered studies to determine  etiology      Focus on quality of life  The patient reported that the increased pill burden has decreased her quality of life significantly.  She also reported that rheumatoid arthritis related pain has also significantly decreased her quality of life.  She also reported that lab work frequently has decreased her quality of life.  -Discontinued simvastatin, folic acid, and metoprolol to decrease pill burden  -Continue oxycodone at 5 mg every 4 hours as needed for moderate pain and 7.5 mg as needed for severe pain  - Continue scheduled acetaminophen 1 g every 8 hours  -Continue gabapentin 300 mg nightly to help with insomnia and with pain control and  300 mg twice daily as needed  -Made the lab work to be performed on Monday Wednesday and Friday only  -The patient would like to continue as full code and continue restorative care, however she is amendable for further goals of care discussion and family meeting  -Appreciative to the input of palliative care  -Please consult to social work and case management to help scheduling a care conference with the patient, her significant other Zoran [is not the patient ], her 2 daughters over the phone  since they are out of the state, palliative care, and the primary service     Acute blood loss anemia secondary to duodenal and esophageal ulcer without active bleed during endoscope, POA  Patient presents with dark stools and anemia with downtrending hemoglobin with Hb - 6.9 requriing 1uPRBC in AM, stable on repeat. Underwent EGD on 5/25 - esophagitis, esophageal ulcers, esophageal ring with ulceration, duodenal ulcers. 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  -Switch PPI to daily and started famotidine based on discussion with nephrology and gastroenterology     Left lower extremity DVT and left lower lobe subsegmental PE  DVT and PE diagnosed 5/17 and patient was loaded with apixaban 10 mg twice daily for 1 week.  Now developed GI bleed.  Elevated INR  -Continue apixaban 5 mg twice daily     Better controlled hypertension, POA  -Continue lisinopril 40 mg daily  -Discontinued metoprolol succinate 50 mg daily and amlodipine 5 mg daily to decrease pill burden     R AKA wound dehiscence  S/p  right AKA (02/2024)  Follows with vascular surgery.    -WOC consult requested      Generalized weakness  Had several week stay at TCU following amputation and discharged home in March. Was receiving home PT services and improving but had significant functional decline after a 3-week lapse in home services. Extensive work-up completed during previous admission with no clear metabolic, infectious, or other organic etiology to explain her weakness. Currently, experiencing generalized weakness in setting of GIB though also has general physical deconditioning.    -physical and occupational therapies     Chronic medical problems  -Rheumatoid arthritis, continue prednisone 5 mg daily and resumed methotrexate 20 mg p.o. weekly     Possible depression, POA  -Continue escitalopram 5 mg daily     Common iliac artery stenting s/p R AKA, POA  -Discontinued simvastatin     Resolved medical problems  Likely bacterial pneumonia, not clear if present on admission, the patient completed 7 days course of antibiotic therapy                Diet: Soft & Bite Sized Diet (level 6) Thin Liquids (level 0)  Room Service  Fluid restriction 1800 ML FLUID  Snacks/Supplements Adult: Ensure Enlive; With Meals    DVT Prophylaxis: Apixaban for dvt ppx   Nolan Catheter: Not present  Lines: None     Cardiac Monitoring: None  Code Status: Full Code      Clinically Significant Risk Factors         # Hyponatremia: Lowest Na = 129 mmol/L in last 2 days, will monitor as appropriate    # Hypomagnesemia: Lowest Mg = 1.5 mg/dL in last 2 days, will replace as needed   # Hypoalbuminemia: Lowest albumin = 2.3 g/dL at 5/28/2024  6:15 AM, will monitor as appropriate     # Hypertension: Noted on problem list           #Precipitous drop in Hgb/Hct: Lowest Hgb this hospitalization: 6.9 g/dL. Will continue to monitor and treat/transfuse as appropriate.     # Cachexia: Estimated body mass index is 14.14 kg/m  as calculated from the following:    Height as of this  "encounter: 1.62 m (5' 3.78\").    Weight as of this encounter: 37.1 kg (81 lb 12.7 oz).   # Severe Malnutrition: based on nutrition assessment    # Financial/Environmental Concerns: none         Disposition Plan     Medically Ready for Discharge:              Attila Delarosa MD  Hospitalist Service, GOLD TEAM 9  [facility]  Securely message with [employer] (more info)  Text page via [employer] Paging/Directory   See signed in provider for up to date coverage information  ______________________________________________________________________    Interval History   Patient is awake and alert, no new abdominal pain,difficulty breathing, fevers or malaise     Physical Exam   Vital Signs: Temp: 98.1  F (36.7  C) Temp src: Oral BP: 137/80 Pulse: 92   Resp: 16 SpO2: 94 % O2 Device: None (Room air)    Weight: 81 lbs 12.65 oz    Patient is awake and alert   Lungs are clear   S1 and s2 heard   Abdomen was soft and non distended     Medical Decision Making       37 MINUTES SPENT BY ME on the date of service doing chart review, history, exam, documentation & further activities per the note.      Data     I have personally reviewed the following data over the past 24 hrs:    N/A  \   N/A   / N/A     N/A N/A N/A /  N/A   4.0 N/A N/A \       Imaging results reviewed over the past 24 hrs:   No results found for this or any previous visit (from the past 24 hour(s)).  "

## 2024-06-04 NOTE — DISCHARGE SUMMARY
"[facility]  Hospitalist Discharge Summary      Date of Admission:  5/24/2024  Date of Discharge:  6/5/2024  Discharging Provider: Attila Delarosa MD  Discharge Service: Hospitalist Service, GOLD TEAM 9    Discharge Diagnoses   Duodenal ulcer with herpes esophagitis       Clinically Significant Risk Factors     # Cachexia: Estimated body mass index is 14.14 kg/m  as calculated from the following:    Height as of this encounter: 1.62 m (5' 3.78\").    Weight as of this encounter: 37.1 kg (81 lb 12.7 oz).  # Severe Malnutrition: based on nutrition assessment      Follow-ups Needed After Discharge   {Additional follow-up instructions/to-do's for PCP    :    Unresulted Labs Ordered in the Past 30 Days of this Admission       No orders found from 4/24/2024 to 5/25/2024.        These results will be followed up by primary care within a week     Discharge Disposition   Discharged to assisted living  Condition at discharge: Stable    Hospital Course   78 year old female admitted on 5/24/2024. 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  She presented to ED with complaint of weakness and dark stools and is admitted to hospital medicine for GI bleed in setting of therapeutic anticoagulation, now s/p EGD with non bleeding duodenal and esophageal ulcers, now gradually resuming anticoagulation.      Hospital course   Pt presented on 5/24 with concerns for melena, Patient underwent urgent upper endoscopy on 5/25 that showed non bleeding ulcer with signs of esophagitis, post procedure on resumption of diet on 5/26 there was concerns of aspiration, SLP was consulted on 5/27 and suggested soft and easy to chew diet with thin liquids, for concerns of aspiration pna patient was treated with IV zosyn initially then transitioned to IV Unasyn to finish a 7 day course, Esophageal biopsy results came back " concerning for herpes simplex hence valacyclovir was started for 10 day course, on 6/3 given patient had recurrent concerns for poor quality of life given pill burden palliative care was consulted to assist with goals of care patient was unfortunately not ready to change her advanced directives currently but would be open to the idea of doing so on discharge.      Focus on quality of life  The patient reported that the increased pill burden has decreased her quality of life significantly.  She also reported that rheumatoid arthritis related pain has also significantly decreased her quality of life.  She also reported that lab work frequently has decreased her quality of life.  -Discontinued simvastatin, folic acid, and metoprolol to decrease pill burden  -Continue oxycodone at 5 mg every 4 hours as needed for moderate pain and 7.5 mg as needed for severe pain  - Continue scheduled acetaminophen 1 g every 8 hours  -Continue gabapentin 300 mg nightly to help with insomnia and with pain control and  300 mg twice daily as needed  -The patient would like to continue as full code and continue restorative care, however she is amendable for further goals of care discussion and family meeting  -Appreciative to the input of palliative care     Acute blood loss anemia secondary to duodenal and esophageal ulcer without active bleed during endoscope  Patient presents with dark stools and anemia with downtrending hemoglobin with Hb - 6.9 requriing 1uPRBC in AM, stable on repeat. Underwent EGD on 5/25 - esophagitis, esophageal ulcers, esophageal ring with ulceration, duodenal ulcers. 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  -Switch PPI to daily and started famotidine based on discussion with nephrology and gastroenterology     Left lower extremity DVT and left lower lobe subsegmental PE  DVT and PE diagnosed 5/17 and patient was loaded with apixaban 10 mg twice daily for 1 week.  Now developed GI bleed.  Elevated INR  -Continue apixaban 5 mg twice daily     Better controlled hypertension  -Continue lisinopril 40 mg daily  -Discontinued metoprolol succinate 50 mg daily and amlodipine 5 mg daily to decrease pill burden     R AKA wound dehiscence  S/p right AKA (02/2024)  Follows with vascular surgery    -[facility] consult requested      Generalized weakness  Had several week stay at TCU following amputation and discharged home in March. Was receiving home PT services and improving but had significant functional decline after a 3-week lapse in home services. Extensive work-up completed during previous admission with no clear metabolic, infectious, or other organic etiology to explain her weakness. Currently, experiencing generalized weakness in setting of GIB though also has general physical deconditioning.    -physical and occupational therapies     Chronic medical problems  -Rheumatoid arthritis, continue prednisone 5 mg daily and resumed methotrexate 20 mg p.o. weekly     Possible depression  -Continue escitalopram 5 mg daily     Common iliac  artery stenting s/p R AKA  -Discontinued simvastatin               Consultations This Hospital Stay   PHYSICAL THERAPY ADULT IP CONSULT  CARE MANAGEMENT / SOCIAL WORK IP CONSULT  GI LUMINAL ADULT IP CONSULT  SPEECH LANGUAGE PATH ADULT IP CONSULT  NURSING TO CONSULT FOR VASCULAR ACCESS CARE IP CONSULT  WOUND OSTOMY CONTINENCE NURSE  IP CONSULT  NURSING TO CONSULT FOR VASCULAR ACCESS CARE IP CONSULT  NEPHROLOGY GENERAL ADULT IP CONSULT  PSYCHIATRY IP CONSULT  PSYCHOLOGY ADULT IP CONSULT  PALLIATIVE CARE ADULT IP CONSULT  CARE MANAGEMENT / SOCIAL WORK IP CONSULT    Code Status   Full Code    Time Spent on this Encounter   I, Attila Delarosa MD, personally saw the patient today and spent greater than 30 minutes discharging this patient.       Attila Delarosa MD  [facility] [address] [facility] B[facility]  [address] [facility] [address]  Phone: [phone]  ______________________________________________________________________    Physical Exam   Vital Signs: Temp: 98.1  F (36.7  C) Temp src: Oral BP: 137/80 Pulse: 92   Resp: 16 SpO2: 94 % O2 Device: None (Room air)    Weight: 81 lbs 12.65 oz    Patient is awake and alert,   Lungs are clear   S1 and s2 heard   Abdomen was soft and non distended        Primary Care Physician   Lucia Bates    Discharge Orders   No discharge procedures on file.    Significant Results and Procedures   Most Recent 3 CBC's:  Recent Labs   Lab Test 06/03/24  0625 05/31/24  0717 05/30/24  0715   WBC 4.0 3.5* 3.1*   HGB 9.3* 9.8* 8.8*   MCV 87 87 89    215 189     Most Recent 3 BMP's:  Recent Labs   Lab Test 06/04/24  0645 06/03/24  0625 06/02/24  0702   * 129* 131*   POTASSIUM 4.0  4.0 3.9 3.8   CHLORIDE 95* 97* 99   CO2 24 24 23   BUN 8.0 9.1 7.2*   CR 0.37* 0.36* 0.35*   ANIONGAP 9 8 9   ADRIAN 8.2* 8.0* 7.9*   * 77 76     Most Recent 2 LFT's:  Recent Labs   Lab Test 06/04/24  0645 06/03/24  0625   AST 33 34   ALT 28 24   ALKPHOS 135 130   BILITOTAL 0.4 0.3   ,   Results  for orders placed or performed during the hospital encounter of 05/24/24   XR Chest Port 1 View    Narrative    EXAM: XR CHEST PORT 1 VIEW  5/28/2024 11:06 AM      HISTORY: cough, concerns for pneumonia    COMPARISON: Chest x-ray 5/17/2024, CT 5/18/2024    FINDINGS: Single view of the chest. Cardiac and mediastinal silhouette  is within normal limits. Mild patchy bibasilar opacities. No  significant pleural effusion. No pneumothorax. No focal consolidation.  Levoconvex curvature of the thoracic spine. Diffuse osteopenia. No  acute osseous lesion.      Impression    IMPRESSION: Patchy bibasilar opacities may represent overlapping  shadows, particularly on the left given patient rotation. Differential  additionally includes atelectasis or infection.    I have personally reviewed the examination and initial interpretation  and I agree with the findings.    ALIYA DESAI MD         SYSTEM ID:  [id number]     *Note: Due to a large number of results and/or encounters for the requested time period, some results have not been displayed. A complete set of results can be found in Results Review.       Discharge Medications   Current Discharge Medication List        CONTINUE these medications which have NOT CHANGED    Details   acetaminophen (TYLENOL) 325 MG tablet Take 2 tablets (650 mg) by mouth every 6 hours  Qty: 24 tablet, Refills: 0    Associated Diagnoses: Amputation of leg (H)      amLODIPine (NORVASC) 5 MG tablet TAKE 1 TABLET BY MOUTH EVERY DAY  Qty: 90 tablet, Refills: 3    Associated Diagnoses: Essential hypertension with goal blood pressure less than 140/90      apixaban ANTICOAGULANT (ELIQUIS) 5 MG tablet Take 2 tablets (10 mg) by mouth 2 times daily for 7 days, THEN 1 tablet (5 mg) 2 times daily for 83 days.  Qty: 194 tablet, Refills: 0    Associated Diagnoses: Acute deep vein thrombosis (DVT) of femoral vein of left lower extremity (H)      aspirin (ASA) 81 MG tablet Take 81 mg by mouth daily  Qty:       Associated Diagnoses: Coronary artery disease involving native coronary artery of native heart without angina pectoris      calcium carbonate 600 mg-vitamin D 400 units (CALCIUM 600 + D) 600-400 MG-UNIT per tablet Take 2 tablets by mouth daily    Associated Diagnoses: Rheumatoid arthritis, involving unspecified site, unspecified rheumatoid factor presence      Elemental iron 65 mg Vitamin C 125 mg (VITRON C)  MG TABS tablet Take 1 tablet by mouth daily      fluocinolone acetonide oil 0.01 % ear drops Place 0.25 mLs (5 drops) into both ears twice a week  Qty: 20 mL, Refills: 3    Associated Diagnoses: Bilateral impacted cerumen; Ear fullness, bilateral      folic acid (FOLVITE) 1 MG tablet Take 1 mg by mouth daily      gabapentin (NEURONTIN) 300 MG capsule Take 300 mg by mouth 3 times daily as needed for neuropathic pain      leflunomide (ARAVA) 20 MG tablet Take 1 tablet by mouth every 24 hours      Lidocaine (LIDOCARE) 4 % Patch Place 1 patch onto the skin every 24 hours To prevent lidocaine toxicity, patient should be patch free for 12 hrs daily.      lisinopril (ZESTRIL) 20 MG tablet TAKE 1 TABLET BY MOUTH EVERY DAY  Qty: 90 tablet, Refills: 0    Associated Diagnoses: Essential hypertension with goal blood pressure less than 140/90      methotrexate 2.5 MG tablet Take 20 mg by mouth every 7 days On Saturdays      metoprolol succinate ER (TOPROL XL) 50 MG 24 hr tablet TAKE ONE TABLET BY MOUTH EVERY DAY  Qty: 90 tablet, Refills: 0    Comments: Due for visit  Associated Diagnoses: Essential hypertension with goal blood pressure less than 140/90; Coronary artery disease involving native coronary artery of native heart without angina pectoris      oxyCODONE (ROXICODONE) 5 MG tablet Take 1 tablet (5 mg) by mouth 2 times daily as needed for pain  Qty: 14 tablet, Refills: 0    Associated Diagnoses: Status post above-knee amputation of right lower extremity (H)      polyethylene glycol (MIRALAX) 17 GM/Dose powder  Take 17 g by mouth daily  Qty: 510 g, Refills: 0    Associated Diagnoses: Amputation of leg (H)      predniSONE (DELTASONE) 5 MG tablet Take 1 tablet (5 mg) by mouth daily  Refills: 0    Associated Diagnoses: Rheumatoid arthritis, involving unspecified site, unspecified rheumatoid factor presence      simvastatin (ZOCOR) 20 MG tablet Take 1 tablet (20 mg) by mouth At Bedtime  Qty: 90 tablet, Refills: 1    Associated Diagnoses: Hyperlipidemia LDL goal <100           Allergies   Allergies   Allergen Reactions    Adhesive Tape Other (See Comments)     Fragile skin

## 2024-06-04 NOTE — PROGRESS NOTES
PALLIATIVE CARE PROGRESS NOTE  [facility]     Patient Name: Sakshi Jaeger  Date of Admission: 5/24/2024   Today the patient was seen for: Ongoing goals of care discussion     Recommendations & Counseling       GOALS OF CARE:   Restorative without limits   Placed outpatient Palliative care referrals     ADVANCE CARE PLANNING:  Patient has an advance directive dated 09/28/2022.  Primary Health Care Agent Santi Mercedes ([place]) .  Alternate(s) NA.   There is no POLST form on file, defer to patient and/or next of kin for decisions   Code status: Full Code     MEDICAL MANAGEMENT:   We are not actively managing symptoms at this time.     PSYCHOSOCIAL/SPIRITUAL SUPPORT:  Family: Santi Mercedes (Significant other)   [place] community: Adventism   Spiritual: Declined    Palliative Care will continue to follow. Thank you for the consult and allowing us to aid in the care of Sakshi Jaeger.    These recommendations have been discussed with medicine, family, nursing and RNCC.    DESIRE Landaverde CNP  [facility], Palliative Care  Securely message with the Vocera Web Console (learn more here) or  Text page via [employer] Paging/Directory       Interval History:     Multidisciplinary collaboration:  Spoke with RN care coordinator and medicine team. Patient will be discharging to TCU 6/5.     Patient/family narrative  Met with Brooke at bedside. Spoke with Santi early this morning over the phone. Santi shared Sakshi struggles with decision making. He shared given her medical condition, he believes CPR will do more harm than good. Santi shared he would like to have further discussion with Sakshi's 2 daughter about her code status and overall goals of care. Sakshi remains disengaged and continues to express less interest in further goals of care discussion.     Previously, Sakshi expressed increased pill burden has decreased her quality of life significantly along with her  rheumatoid arthritis related pain. Santi (silverio) shared the orthopedic doctor told Sakshi she is not surgical candidate and she received injections for her should pain. Sakshi has decisional capacity. Therefore, recommended formal goals of care discussion with Sakshi's family, Palliative care and medicine team. Unfortunately, Sakshi is medically cleared to discharge to TCU tomorrow 6/5/24 and family care conference can't be facilitated prior to discharge.     At this time, Sakshi would like to continue as full restorative care however, she is amendable for ongoing goals of care discussion. She expressed feeling fearful and struggles to make decision alone. Plan is to follow up with outpatient palliative clinic for ongoing goals care discussion and for symptom management. She also reports significant weight loss and no appetite. Weight history: Wt down 8 lb x 2 weeks, overall down 19 lb from ~3 months ago (19%)     Assessment          Sakshi Jaeger is a 78 year old female admitted on 5/24/2024. 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  She presented to ED with complaint of weakness and dark stools and is admitted to hospital medicine for GI bleed in setting of therapeutic anticoagulation, now s/p EGD with non bleeding duodenal and esophageal ulcers, now gradually resuming anticoagulation. Palliative care consult for goals of care discussion.      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, POA, improving  #Left lower extremity DVT and left lower lobe subsegmental PE   #R AKA wound dehiscence  #S/p right AKA (02/2024)  #Chronic medical problems  #Palliative care encounter   #Goals of  care discussion        Review of Systems:     Besides above, ROS was reviewed and is unremarkable        Physical Exam:   Temp:  [97.9  F (36.6  C)-98.4  F (36.9  C)] 97.9  F (36.6  C)  Pulse:  [71-92] 77  Resp:  [15-16] 16  BP: (136-169)/() 136/72  SpO2:  [89 %-95 %] 92 %  81 lbs 12.65 oz    Physical Exam  GENERAL: lying in bed flat, no acute distress, closes eyes at times    SKIN: Warm and dry   LUNGS: non-labored breathing, on room air   ABDOMINAL: BS (+), soft, non distended, non tender  NEUROLOGIC: alert and awake, disengaged in conversation   PSYCH: calm      Data Reviewed:     CMP  Recent Labs   Lab 06/04/24  0645 06/03/24  0625   * 129*   POTASSIUM 4.0  4.0 3.9   CHLORIDE 95* 97*   CO2 24 24   ANIONGAP 9 8   * 77   BUN 8.0 9.1   CR 0.37* 0.36*   GFRESTIMATED >90 >90   ADRIAN 8.2* 8.0*   MAG 1.5* 1.6*   PHOS 2.5 2.7   PROTTOTAL 4.6* 4.5*   ALBUMIN 2.5* 2.4*   BILITOTAL 0.4 0.3   ALKPHOS 135 130   AST 33 34   ALT 28 24     CBC  Recent Labs   Lab 06/03/24  0625 05/31/24  0717   WBC 4.0 3.5*   RBC 3.30* 3.42*   HGB 9.3* 9.8*   HCT 28.7* 29.7*   MCV 87 87   MCH 28.2 28.7   MCHC 32.4 33.0   RDW 19.8* 20.0*    215       Medical Decision Making       MANAGEMENT DISCUSSED with the following over the past 24 hours: Medicine, family, RN care coordinator  and nursing   NOTE(S)/MEDICAL RECORDS REVIEWED over the past 24 hours: Medicine, nursing, and RN care coordinator   50 MINUTES SPENT BY ME on the date of service doing chart review, history, exam, documentation & further activities per the note.

## 2024-06-04 NOTE — PLAN OF CARE
Neuro: A&Ox4.   Cardiac: Not on tele, Afebrile, HR within normal limit. VSS.   Respiratory: Sating above 92% on RA.  GI/: Incontinent of bowel and bladder.No BM this shift, calls when wet  Diet/appetite: Tolerating soft & bite sized  diet.   Activity:  Assist of 2, up to chair and in halls.  Pain: At acceptable level on current regimen.   Skin: No new deficits noted.  LDA's:PIV, saline locked  Plan: Continue with POC. Notify primary team with changes.  Goal Outcome Evaluation:      Plan of Care Reviewed With: patient

## 2024-06-04 NOTE — PROGRESS NOTES
Care Management Follow Up    Additional Information:  06/04: CHW completed Pre-Admission Screening through [facility] Line which is needed to be admitted to a TCU and confirmation number is the following: [id number]     CHW did schedule a stretcher ride for this Pt going to:  [facility] at [facility].  [address] [place]  [address]  P: [phone]  Fax: [phone]  For a  window of 9:37 AM to 10:22 AM.      Lui Pope   CHW [facility] [facility] [facility]  P [phone]   Fax: [phone]  Email: [email or web address]

## 2024-06-04 NOTE — PROGRESS NOTES
Care Management Follow Up    Discharge Disposition: TCU     Discharge Services: None    Discharge DME: None    Discharge Transportation:  [facility]     Private pay costs discussed: Not applicable    Does the patient's insurance plan have a 3 day qualifying hospital stay waiver?  No    PAS Confirmation Code: (P) [id number]  Patient/family educated on Medicare website which has current facility and service quality ratings: yes    Education Provided on the Discharge Plan: Yes  Persons Notified of Discharge Plans: Pt  Patient/Family in Agreement with the Plan: yes    Handoff Referral Completed: Yes    Additional Information:  RNCC notified Pt is anticipated to be medically ready for discharge to TCU tomorrow. RNCC confirmed bed.    RNCC met with Pt and family to review discharge plan. Pt and SO expressed questions regarding code status and HCD. RNCC provided copy of existing HCD as well as a blank HCD. RNCC notified provider of Pt's questions and concerns for follow up.     CHW arranged for transport as family is unable to provide safe transport.       [facility].  [address] [place]  [address]  P: [phone]  P: [phone] - Admissions(Ms Lim)  F: [phone]     Authorization number #[id number]  EXPIRES 6/6/24    Georgie Tomas RN  RNCC Float   [phone]

## 2024-06-05 NOTE — PLAN OF CARE
Physical Therapy Discharge Summary    Reason for therapy discharge:    Discharged to transitional care facility.    Progress towards therapy goal(s). See goals on Care Plan in [facility] electronic health record for goal details.  Goals partially met.  Barriers to achieving goals:   discharge from facility.    Therapy recommendation(s):    Continued therapy is recommended.  Rationale/Recommendations:  to improve functional IND status and strength.

## 2024-06-05 NOTE — PROGRESS NOTES
Care Management Discharge Note    Discharge Date: 06/04/2024       Discharge Disposition: Skilled Nursing Facility, Transitional Care    [facility].  [address] [place]  [address]  P: [phone]  P: [phone] - Admissions(Ms Lim)  F: [phone]     Discharge Services: None    Discharge DME: None    Discharge Transportation:  [facility] (Ph: [phone])    Private pay costs discussed: Not applicable    Does the patient's insurance plan have a 3 day qualifying hospital stay waiver?  No    PAS Confirmation Code: [id number]  Patient/family educated on Medicare website which has current facility and service quality ratings: yes    Education Provided on the Discharge Plan: Yes  Persons Notified of Discharge Plans: Patient, bedside nurse  Patient/Family in Agreement with the Plan: yes    Handoff Referral Completed: Yes    Additional Information:  Chart reviewed. Case discussed with bedside RN and medical provider. Pt to discharge to TCU today. Stretcher ride schedule for  between 9:37 AM to 10:22 AM. PCS form complete in pt's chart.     0930   BRYAN sent signed discharge orders to [facility] via [employer].     BRYAN called [facility] admission P: [phone] and spoke with Carina, confirmed they are expecting pt today. BRYAN had sent discharge orders via Epic. Pt should be picked up within the hour for transport. Carina ask to have nurse call to give report. BRYAN replay message to bedside nurse.   _______________________    JOSE Figueroa, LSW  ED/OBS   [facility]  Phone: [phone]  Fax: [phone]    After hours [employer] [place] and After Hours [employer] [place]  Available from 4:00pm - Midnight

## 2024-06-05 NOTE — PLAN OF CARE
Vital signs:  Temp: 98.4  F (36.9  C) Temp src: Oral BP: (!) 155/93 Pulse: 85   Resp: 18 SpO2: 93 % O2 Device: None (Room air) Oxygen Delivery: 2 LPM     [id number]:    Activity: Repositioned in bed with assist of 2.  Neuros: Unable to assess, patient refused to answer questions.  Cardiac: BP 140s-150s/80s-90s, within parameters. HR 80s-90s. Heart rhythm irregular, sinus rhythm with PACs on EKG on 6/1/24. Asymptomatic.  Respiratory: LS clear. O2 sats above 92% on RA. Denies SOB. Unlabored.   GI/: BS+, passing flatus, had two soft incontinent brown BMs. Incontinent of urine, refused Purewick.  Diet: Soft and bite-sized diet, fluid restriction 1800 mL.  Skin: Right AKA site dressing c/d/I, dressing to be changed today. Blanchable redness right hip, and coccyx, applied Mepilexes.   Lines: PIV TKO. Phosphorus replaced.   Incisions/Drains: None.   Labs: Reviewed. K+ 3.6, patient wants to wait to take oral replacement until this AM. Mg 1.6, currently replacing. Phosphorus 2.9, recheck tomorrow AM.   Pain/nausea: Denies pain. On scheduled Tylenol. Denies nausea.   New changes this shift: None.  Plan: Continue POC.

## 2024-06-05 NOTE — PLAN OF CARE
Goal Outcome Evaluation:  Vitals:    24 1036 24 1037 24 1611 24   BP:   136/72 (!) 145/71   BP Location:   Left arm Left arm   Pulse:   77 87   Resp:   16 16   Temp:   97.9  F (36.6  C) 97.8  F (36.6  C)   TempSrc:   Oral Oral   SpO2: (!) 89% 94% 92% 95%   Weight:       Height:           Neuro: alert oriented     VS: afebrile vitally stable sating 95% on room air, non labor breathing     B    Cardiac: 87    Pain/Nausea: denies any nausea, denies pain, schedule tylenol     Lines/Drains: PIV     Incision/Wound: right AKA dressing intact     GI/: refused pure wick,  voided, urine sample sent to lab, audible BS, passed gas     Diet/Appetite: fair PO intake, pt is not happy with food consistency, wants regular diet     Activity: with lift, reposition Q 2 hrs     Plan:  continue with plan of care.

## 2024-06-05 NOTE — PLAN OF CARE
Goal Outcome Evaluation:       Vitals:    06/04/24 1958 06/04/24 2227 06/05/24 0400 06/05/24 0800   BP: (!) 145/71 (!) 149/73 (!) 155/93 (!) 163/79   BP Location: Left arm Right arm Left arm Left arm   Pulse: 87 90 85 84   Resp: 16 16 18 18   Temp: 97.8  F (36.6  C) 98.4  F (36.9  C) 97.4  F (36.3  C) 97.5  F (36.4  C)   TempSrc: Oral Oral Axillary Oral   SpO2: 95% 95% 93% 91%   Weight:       Height:        ctivity: Repositioned in bed with assist of 1.  Neuros: alert and oriented made needs known,  Cardiac: 84 Heart rhythm irregular, sinus rhythm with PACs on EKG on 6/1/24. Asymptomatic.  Respiratory: LS clear. O2 sats above 92% on RA. Denies SOB. Unlabored.   GI/: BS+, passing flatus, had one BM  incontinent brown this AM, Incontinent of urine, refused Purewick.  Diet: Soft and bite-sized diet, has not order breakfast,  fluid restriction 1800 mL.  Skin: Right AKA site dressing c/d/I,  Blanchable redness right hip, and coccyx, applied Mepilexes.   Incisions/Drains: right AKA dressing intact   Labs: Reviewed. K+ 3.6, oral replacement  this AM. Mg 1.6, currently replacing. Phosphorus 2.9, recheck tomorrow AM.   Pain/nausea: Denies pain. On scheduled Tylenol. Decline this AM, Denies nausea.   New changes this shift: None.  Plan: Continue discharge script written, report given to TCU nurse lesa Sellers is waiting on transport to be discharge to TCU,

## 2024-06-06 NOTE — LETTER
TCU Hand In    Patient name: Sakshi Jaeger  PCP: Lucia Bates  PCP Care Coordinator's information (phone number/email):   Deepak Umana CC RN with [facility]   phone: [phone]   Primary family contact: Santi Mercedes [phone]  Other MDs involved: na    Patient Care Team:  Lucia Bates APRN CNP as PCP - General (Family Medicine)  Doron Mott MD as Assigned Musculoskeletal Provider  Pito Mosher MD as MD (Otolaryngology)  Bryon Mitchell MD as Assigned Heart and Vascular Provider  Yasmin Rob APRN CNP as Assigned Surgical Provider  Lucia Bates APRN CNP as Assigned Pain Medication Provider  Lucia Bates APRN CNP as Assigned PCP  Yeimy Rogel RN as Registered Nurse (Vascular Surgery)  Christina Tesfaye RN as Lead Care Coordinator (Primary Care - CC)  Deepak Umana RN as Clinic Care Coordinator (Primary Care - CC)    Resources in place previously (home care services, equipment needs, etc.): na    Advanced Directive: Y    Social History     Socioeconomic History    Marital status: Single     Spouse name: Not on file    Number of children: 3    Years of education: Not on file    Highest education level: Not on file   Occupational History    Not on file   Tobacco Use    Smoking status: Former     Current packs/day: 0.00     Average packs/day: 0.5 packs/day for 45.0 years (22.5 ttl pk-yrs)     Types: Cigarettes     Start date: 1965     Quit date: 2010     Years since quittin.2     Passive exposure: Past    Smokeless tobacco: Never   Vaping Use    Vaping status: Never Used   Substance and Sexual Activity    Alcohol use: Yes     Comment: occsionally-3 -4 drinks a week    Drug use: No    Sexual activity: Yes     Partners: Male     Birth control/protection: Post-menopausal   Other Topics Concern    Parent/sibling w/ CABG, MI or angioplasty before 65F 55M? No   Social History Narrative    Live with Santi.  Together  since 1995.       Social Determinants of Health     Financial Resource Strain: Not on file   Food Insecurity: Not on file   Transportation Needs: Not on file   Physical Activity: Not on file   Stress: Not on file   Social Connections: Unknown (11/18/2022)    [facility]    Social Connections     Frequency of Communication with Friends and Family: Not on file   Interpersonal Safety: Not on file   Housing Stability: Not on file     Please contact Deepak GOLDBERG RN with [facility]  phone: [phone] with discharge planning info such as discharge date and disposition and if any supports ie; home care, county involvement.    Thank you,   Christina RAYMUNDO, covering for Deepak GOLDBERG RN with [facility]   phone: [phone]

## 2024-06-06 NOTE — PROGRESS NOTES
Clinic Care Coordination Contact  Care Coordination Transition Communication    Clinical Data: Patient was hospitalized at [facility] from 5/24/24 to 6/5/24 with diagnosis of Duodenal ulcer with herpes esophagitis.     Assessment: Patient has transitioned to TCU/ARU for short term rehabilitation:    Facility Name: [facility].   Transition Communication:  Notified facility of Primary Care- Care Coordination support via Epic fax.    Plan: Care Coordinator will await notification from facility staff informing of patient's discharge plans/needs. Care Coordinator will review chart and outreach to facility staff every 4 weeks and as needed.     Christina Tesfaye RN, BSN, PHN Care Coordinator  [facility]   Phone: [phone]   (Covering for Deepak GOLDBERG RN)

## 2024-06-07 PROBLEM — B00.89 HERPES SIMPLEX ESOPHAGITIS: Status: ACTIVE | Noted: 2024-01-01

## 2024-06-07 PROBLEM — K26.9 DUODENAL ULCER: Status: ACTIVE | Noted: 2024-01-01

## 2024-06-07 PROBLEM — F32.0 CURRENT MILD EPISODE OF MAJOR DEPRESSIVE DISORDER, UNSPECIFIED WHETHER RECURRENT (H): Status: ACTIVE | Noted: 2024-01-01

## 2024-06-07 PROBLEM — L97.816: Status: ACTIVE | Noted: 2024-01-01

## 2024-06-07 PROBLEM — Z89.611 STATUS POST ABOVE-KNEE AMPUTATION OF RIGHT LOWER EXTREMITY (H): Status: ACTIVE | Noted: 2024-01-01

## 2024-06-07 PROBLEM — K20.80 HERPES SIMPLEX ESOPHAGITIS: Status: ACTIVE | Noted: 2024-01-01

## 2024-06-07 PROBLEM — K22.11 ULCER OF ESOPHAGUS WITH BLEEDING: Status: ACTIVE | Noted: 2024-01-01

## 2024-06-07 NOTE — LETTER
6/7/2024      Sakshi Jaeger  [address] [address]        [facility]    PRIMARY CARE PROVIDER AND CLINIC:  DESIRE Barnes CNP, [address] [facility] [address]  Chief Complaint   Patient presents with     Hospital F/U      [facility] Record Number:  [id number]  Place of Service where encounter took place:  [facility] ([facility]) [[id number]]    Skashi Jaeger  is a 78 year old  (1945), admitted to the above facility from  [facility]. Hospital stay 05/24/2024 through 06/05/2024..   HPI:    Hospital Course  78 year old female admitted on 5/24/2024. 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  She presented to ED with complaint of weakness and dark stools and is admitted to hospital medicine for GI bleed in setting of therapeutic anticoagulation, now s/p EGD with non bleeding duodenal and esophageal ulcers, now gradually resuming anticoagulation.       Hospital course   Pt presented on 5/24 with concerns for melena, Patient underwent urgent upper endoscopy on 5/25 that showed non bleeding ulcer with signs of esophagitis, post procedure on resumption of diet on 5/26 there was concerns of aspiration, SLP was consulted on 5/27 and suggested soft and easy to chew diet with thin liquids, for concerns of aspiration pna patient was treated with IV zosyn initially then transitioned to IV Unasyn to finish a 7 day course, Esophageal biopsy results came back concerning for herpes simplex hence valacyclovir was started for 10 day course, on 6/3 given patient had recurrent concerns for poor quality of life given pill burden palliative care was consulted to assist with goals of care patient was unfortunately not ready to change her advanced directives currently but would be open to the idea of doing so on discharge.       Focus on quality of life  The patient reported that the increased pill burden has decreased her quality of life significantly.  She also reported that rheumatoid arthritis related pain has also significantly decreased her quality of life.  She also reported that lab work frequently has decreased her quality of life.  -Discontinued simvastatin, folic acid, and metoprolol to decrease pill burden  -Continue oxycodone at 5 mg every 4 hours as needed for moderate pain and 7.5 mg as needed for severe pain  - Continue scheduled acetaminophen 1 g every 8 hours  -Continue gabapentin 300 mg nightly to help with insomnia and with pain control and  300 mg twice daily as needed  -The patient would like to continue as full code and continue restorative care, however she is amendable for further goals of care discussion and family meeting  -Appreciative to the input of palliative care     Acute blood loss anemia secondary to duodenal and esophageal ulcer without active bleed during endoscope  Patient presents with dark stools and anemia with downtrending hemoglobin with Hb - 6.9 requriing 1uPRBC in AM, stable on repeat. Underwent EGD on 5/25 - esophagitis, esophageal ulcers, esophageal ring with ulceration, duodenal ulcers. 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  -Switch PPI to daily and started famotidine based on discussion with nephrology and gastroenterology     Left lower extremity DVT and left lower lobe subsegmental PE  DVT and PE diagnosed 5/17 and patient was loaded with apixaban 10 mg twice daily for 1 week.  Now developed GI bleed.  Elevated INR  -Continue apixaban 5 mg twice daily     Better controlled hypertension  -Continue lisinopril 40 mg daily  -Discontinued metoprolol succinate 50 mg daily and amlodipine 5 mg daily to decrease pill burden     R AKA wound dehiscence  S/p right AKA (02/2024)  Follows with vascular surgery    -[facility] consult requested      Generalized weakness  Had several week stay at TCU following amputation and discharged home in March. Was receiving home PT services and improving but had significant functional decline after a 3-week lapse in home services. Extensive work-up completed during previous admission with no clear metabolic, infectious, or other organic etiology to explain her weakness. Currently, experiencing generalized weakness in setting of GIB though also has general physical deconditioning.    -physical and occupational therapies     Chronic medical problems  -Rheumatoid arthritis, continue prednisone 5 mg daily and resumed methotrexate 20 mg p.o. weekly     Possible depression  -Continue escitalopram 5 mg daily     Common iliac artery stenting s/p R AKA  -Discontinued simvastatin    Patient seen today for follow up, flat affect, oriented, cognitive limitations, BMI 14.04, denies gut issues, states normal B&B, no edema nor s/s DVT, R AKA with open ulcer no s/s infection, denies pain, overall appears healthy albeit thin.    Advance Care Planning Goals of Care Discussion 6/7/2024  Goals of care  discussed with Sakshi Jaeger on 6/7. Present at discussion: patient. Questions discussed and patient response:  What is your understanding now of where you are with your illness?: good. How much information about what is likely to be ahead with your illness would you like to have?: all. As the clinician I communicated the following to the patient regarding their prognosis: good. If your health situation worsens, what are your most important goals?: get home. What are your biggest fears and worries about the future with your health?: not getting healthy again. Unacceptable function : NA. What abilities are so critical to your life that you cannot imagine living without them?: independence. Pt does NOT want to: die. If you become sicker, how much are you willing to go through for the possibility of gaining more time?: willing. Would this change if these were permanent states, if they did not get better?: maybe. How much does your agent and/or family know about your priorities and wishes?: all. Added by DESIRE Link CNP      CODE STATUS/ADVANCE DIRECTIVES DISCUSSION:  Full Code  CPR/Full code   ALLERGIES:   Allergies   Allergen Reactions     Adhesive Tape Other (See Comments)     Fragile skin      PAST MEDICAL HISTORY:   Past Medical History:   Diagnosis Date     Anemia 8/24/2022     BENIGN HYPERTENSION 3/1/2005     CAD (coronary artery disease) 1/26/2011     Coronary artery disease involving native coronary artery of native heart without angina pectoris 9/27/2016     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     Essential hypertension with goal blood pressure less than 140/90 8/25/2016     Hyperlipidemia LDL goal <100 11/12/2010     Hypertension goal BP (blood pressure) < 130/80 1/26/2011     Infection of right below knee amputation (H) 10/1/2019     IRON DEFIC ANEMIA NOS 9/15/2005     Lower limb amputation, below knee 12/26/2012     MI (myocardial infarction) (H)     3/2010     Non-healing surgical wound,  subsequent encounter 9/24/2020    Added automatically from request for surgery [id number]     Osteoporosis 2/16/2005     OSTEOPOROSIS NOS 2/16/2005     Protein-calorie malnutrition (H24) 5/18/2022     PVD (peripheral vascular disease) (H24) 5/18/2022     Rheumatoid arthritis (H) 2/16/2005          Rheumatoid arthritis(714.0) 2/16/2005     Status post below-knee amputation (H) 12/26/2012           PAST SURGICAL HISTORY:   has a past surgical history that includes amputation below knee rt/lt (01/01/2005); appendectomy; Arthroplasty knee (04/27/2011); ---------incision and drainage (03/05/2014); Irrigation and debridement lower extremity, combined (Right, 10/09/2019); Revise scar extremity (Right, 12/17/2020); Coronary Stent Placement; other surgical history (03/05/2014); other surgical history (Right, 10/09/2019); Complex Wound Closure (Update) (Right, 12/08/2021); Phacoemulsification clear cornea with standard intraocular lens implant (Left, 9/8/2022); Nerve block peripheral (N/A, 9/27/2022); Amputate leg below knee (Right, 9/28/2022); Inject Nerve Block Sphenopalatine Ganglion (N/A, 9/29/2022); Phacoemulsification clear cornea with standard intraocular lens implant (Right, 10/13/2022); IR OR Angiogram (1/13/2023); Endarterectomy femoral (Right, 1/13/2023); Angiogram (Right, 1/13/2023); Amputate leg above knee (Right, 1/13/2023); Amputate leg above knee (Right, 2/2/2024); and Esophagoscopy, gastroscopy, duodenoscopy (EGD), combined (N/A, 5/25/2024).  FAMILY HISTORY: family history includes Cancer in her brother and brother; Cardiovascular in her mother; Other - See Comments in her mother.  SOCIAL HISTORY:   reports that she quit smoking about 14 years ago. Her smoking use included cigarettes. She started smoking about 59 years ago. She has a 22.5 pack-year smoking history. She has been exposed to tobacco smoke. She has never used smokeless tobacco. She reports current alcohol use. She reports that she does not use  drugs.  Patient's living condition: lives with an adult     Post Discharge Medication Reconciliation Status:   MED REC REQUIRED  Post Medication Reconciliation Status: discharge medications reconciled and changed, per note/orders       Current Outpatient Medications   Medication Sig Dispense Refill     acetaminophen (TYLENOL) 325 MG tablet Take 2 tablets (650 mg) by mouth every 6 hours 24 tablet 0     amLODIPine (NORVASC) 5 MG tablet TAKE 1 TABLET BY MOUTH EVERY DAY 90 tablet 3     apixaban ANTICOAGULANT (ELIQUIS) 5 MG tablet Take 2 tablets (10 mg) by mouth 2 times daily for 7 days, THEN 1 tablet (5 mg) 2 times daily for 83 days. 194 tablet 0     aspirin (ASA) 81 MG tablet Take 81 mg by mouth daily       calcium carbonate 600 mg-vitamin D 400 units (CALCIUM 600 + D) 600-400 MG-UNIT per tablet Take 2 tablets by mouth daily       Elemental iron 65 mg Vitamin C 125 mg (VITRON C)  MG TABS tablet Take 1 tablet by mouth daily       escitalopram (LEXAPRO) 5 MG tablet Take 1 tablet (5 mg) by mouth daily 30 tablet 0     fluocinolone acetonide oil 0.01 % ear drops Place 0.25 mLs (5 drops) into both ears twice a week 20 mL 3     folic acid (FOLVITE) 1 MG tablet Take 1 mg by mouth daily       gabapentin (NEURONTIN) 300 MG capsule Take 300 mg by mouth 3 times daily as needed for neuropathic pain       leflunomide (ARAVA) 20 MG tablet Take 1 tablet by mouth every 24 hours       lisinopril (ZESTRIL) 20 MG tablet TAKE 1 TABLET BY MOUTH EVERY DAY 90 tablet 0     methotrexate 2.5 MG tablet Take 20 mg by mouth every 7 days On Saturdays       metoprolol succinate ER (TOPROL XL) 50 MG 24 hr tablet TAKE ONE TABLET BY MOUTH EVERY DAY 90 tablet 0     oxyCODONE (ROXICODONE) 5 MG tablet Take 1 tablet (5 mg) by mouth 2 times daily as needed for pain 20 tablet 0     polyethylene glycol (MIRALAX) 17 GM/Dose powder Take 17 g by mouth daily 510 g 0     predniSONE (DELTASONE) 5 MG tablet Take 1 tablet (5 mg) by mouth daily  0      "simvastatin (ZOCOR) 20 MG tablet Take 1 tablet (20 mg) by mouth At Bedtime 90 tablet 1     valACYclovir (VALTREX) 1000 mg tablet Take 1 tablet (1,000 mg) by mouth every 12 hours for 9 days 18 tablet 0     No current facility-administered medications for this visit.       ROS:  4 point ROS including Respiratory, CV, GI and , other than that noted in the HPI,  is negative    Vitals:  BP (!) 153/78   Pulse 68   Temp 98.4  F (36.9  C)   Resp 18   Ht 1.626 m (5' 4\")   LMP  (LMP Unknown)   SpO2 93%   BMI 14.04 kg/m    Exam:  GENERAL APPEARANCE:  in no distress, appears healthy  ENT:  Mouth and posterior oropharynx normal, moist mucous membranes  RESP:  lungs clear to auscultation , no respiratory distress  CV:  no edema, rate-normal  ABDOMEN:  bowel sounds normal  M/S:   Gait and station abnormal transfer assist  SKIN:  Inspection of skin and subcutaneous tissue baseline  NEURO:   Examination of sensation by touch normal  PSYCH:  affect and mood normal    Lab/Diagnostic data:  Recent labs in [facility] reviewed by me today.     ASSESSMENT/PLAN:    (K92.1) Gastrointestinal hemorrhage with melena  (primary encounter diagnosis)  (B00.89,  K20.80) Herpes simplex esophagitis  (K26.9) Duodenal ulcer  (K22.11) Ulcer of esophagus with bleeding  (D64.9) Anemia, unspecified type  Comment: post endoscopy, meds adjusted, mild dysphagia, denies pain, transfused in hospital with Hgb 6.9  Plan:   -PT/OT/ST eval and treat  -CBC, BMP, Fe on 6/10  -R AKA wound; daily cleanse vashe, pack, cover foam  -discontinue melatonin and lidocaine patch  -start mirtazapine  -continue valcyclovir through 6/13  -continue APAP and oxycodone both PRN  -continue Fe supplement  -follow up  6/25, GI in 4 weeks    (E46) Protein-calorie malnutrition, unspecified severity (H24)  (F32.0) Current mild episode of major depressive disorder, unspecified whether recurrent (H24)  Comment: palliative consult in hospital, not thriving, flat affect, BMI " 14.04  Plan:   -dietary to follow  -start mirtazapine 7.5mg Qpm  -continue lexapro 5mg (new in hospital)  -encourage intake  -ACP to eval/treat      (Z89.611) Status post above-knee amputation of right lower extremity (H)  (L97.816) 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)  Comment: amputation Feb 2024, small open wound on stump  Plan:   -change dressing and pack daily  -APAP and oxycodone for pain    (M06.9) Rheumatoid arthritis, involving unspecified site, unspecified whether rheumatoid factor present (H)  Comment: chronic debilitating RA multiple joints  Plan: continue arava, methotrexate, prednisone and APAP  -follow up rheumatologist after TCU stay    (Z71.89) ACP (advance care planning)  Comment: full code  Plan: [place] ADVANCE CARE PLANNING FIRST 30 MINS          I spent 16 minutes F2F with patient in addition to todays visit completing a POLST form.        Electronically signed by:  DESIRE Link CNP                    Sincerely,        DESIRE Link CNP

## 2024-06-07 NOTE — PROGRESS NOTES
[facility] GERIATRICS    PRIMARY CARE PROVIDER AND CLINIC:  DESIRE Barnes CNP, [address] [facility] [address] / [place] [address]  Chief Complaint   Patient presents with    Hospital F/U      [place] Medical Record Number:  [id number]  Place of Service where encounter took place:  [facility]  AT [facility] ([facility]) [[id number]]    Sakshi Jaeger  is a 78 year old  (1945), admitted to the above facility from  [facility]. Hospital stay 05/24/2024 through 06/05/2024..   HPI:    Hospital Course  78 year old female admitted on 5/24/2024. 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  She presented to ED with complaint of weakness and dark stools and is admitted to hospital medicine for GI bleed in setting of therapeutic anticoagulation, now s/p EGD with non bleeding duodenal and esophageal ulcers, now gradually resuming anticoagulation.       Hospital course   Pt presented on 5/24 with concerns for melena, Patient underwent urgent upper endoscopy on 5/25 that showed non bleeding ulcer with signs of esophagitis, post procedure on resumption of diet on 5/26 there was concerns of aspiration, SLP was consulted on 5/27 and suggested soft and easy to chew diet with thin liquids, for concerns of aspiration pna patient was treated with IV zosyn initially then transitioned to IV Unasyn to finish a 7 day course, Esophageal biopsy results came back concerning for herpes simplex hence valacyclovir was started for 10 day course, on 6/3 given patient had recurrent concerns for poor quality of life given pill burden palliative care was consulted to assist with goals of care patient was unfortunately not ready to change her advanced directives currently but would be open to the idea of doing so on discharge.      Focus on quality of life  The patient reported that the increased pill burden has  decreased her quality of life significantly.  She also reported that rheumatoid arthritis related pain has also significantly decreased her quality of life.  She also reported that lab work frequently has decreased her quality of life.  -Discontinued simvastatin, folic acid, and metoprolol to decrease pill burden  -Continue oxycodone at 5 mg every 4 hours as needed for moderate pain and 7.5 mg as needed for severe pain  - Continue scheduled acetaminophen 1 g every 8 hours  -Continue gabapentin 300 mg nightly to help with insomnia and with pain control and  300 mg twice daily as needed  -The patient would like to continue as full code and continue restorative care, however she is amendable for further goals of care discussion and family meeting  -Appreciative to the input of palliative care     Acute blood loss anemia secondary to duodenal and esophageal ulcer without active bleed during endoscope  Patient presents with dark stools and anemia with downtrending hemoglobin with Hb - 6.9 requriing 1uPRBC in AM, stable on repeat. Underwent EGD on 5/25 - esophagitis, esophageal ulcers, esophageal ring with ulceration, duodenal ulcers. 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  -Switch PPI to daily and started famotidine based on discussion with nephrology and gastroenterology     Left lower extremity DVT and left lower lobe subsegmental PE  DVT and PE diagnosed 5/17 and patient was loaded with apixaban 10 mg twice daily for 1 week.  Now developed GI bleed.  Elevated INR  -Continue apixaban 5 mg twice daily     Better controlled hypertension  -Continue lisinopril 40 mg daily  -Discontinued metoprolol succinate 50 mg daily and amlodipine 5 mg daily to decrease pill burden     R AKA wound dehiscence  S/p right AKA (02/2024)  Follows with vascular surgery    -[facility] consult requested      Generalized weakness  Had several week stay at TCU following amputation and discharged home in March. Was receiving home PT services and improving but had significant functional decline after a 3-week lapse in home services. Extensive work-up completed during previous admission with no clear metabolic, infectious, or other organic etiology to explain her weakness. Currently, experiencing generalized weakness in setting of GIB though also has general physical deconditioning.    -physical and occupational therapies     Chronic medical problems  -Rheumatoid arthritis, continue prednisone 5 mg daily and resumed methotrexate 20 mg p.o. weekly     Possible depression  -Continue escitalopram 5 mg daily     Common iliac artery stenting s/p R AKA  -Discontinued simvastatin    Patient seen today for follow up, flat affect, oriented, cognitive limitations, BMI 14.04, denies gut issues, states normal B&B, no edema nor s/s DVT, R AKA with open ulcer no s/s infection, denies pain, overall appears healthy albeit thin.    Advance Care Planning Goals of Care Discussion 6/7/2024  Goals of care discussed with Sakshi Jaeger on 6/7. Present at discussion: patient. Questions  discussed and patient response:  What is your understanding now of where you are with your illness?: good. How much information about what is likely to be ahead with your illness would you like to have?: all. As the clinician I communicated the following to the patient regarding their prognosis: good. If your health situation worsens, what are your most important goals?: get home. What are your biggest fears and worries about the future with your health?: not getting healthy again. Unacceptable function : NA. What abilities are so critical to your life that you cannot imagine living without them?: independence. Pt does NOT want to: die. If you become sicker, how much are you willing to go through for the possibility of gaining more time?: willing. Would this change if these were permanent states, if they did not get better?: maybe. How much does your agent and/or family know about your priorities and wishes?: all. Added by DESIRE Link CNP      CODE STATUS/ADVANCE DIRECTIVES DISCUSSION:  Full Code  CPR/Full code   ALLERGIES:   Allergies   Allergen Reactions    Adhesive Tape Other (See Comments)     Fragile skin      PAST MEDICAL HISTORY:   Past Medical History:   Diagnosis Date    Anemia 8/24/2022    BENIGN HYPERTENSION 3/1/2005    CAD (coronary artery disease) 1/26/2011    Coronary artery disease involving native coronary artery of native heart without angina pectoris 9/27/2016    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    Essential hypertension with goal blood pressure less than 140/90 8/25/2016    Hyperlipidemia LDL goal <100 11/12/2010    Hypertension goal BP (blood pressure) < 130/80 1/26/2011    Infection of right below knee amputation (H) 10/1/2019    IRON DEFIC ANEMIA NOS 9/15/2005    Lower limb amputation, below knee 12/26/2012    MI (myocardial infarction) (H)     3/2010    Non-healing surgical wound, subsequent encounter 9/24/2020    Added automatically from request for surgery [id number]     Osteoporosis 2/16/2005    OSTEOPOROSIS NOS 2/16/2005    Protein-calorie malnutrition (H24) 5/18/2022    PVD (peripheral vascular disease) (H24) 5/18/2022    Rheumatoid arthritis (H) 2/16/2005         Rheumatoid arthritis(714.0) 2/16/2005    Status post below-knee amputation (H) 12/26/2012           PAST SURGICAL HISTORY:   has a past surgical history that includes amputation below knee rt/lt (01/01/2005); appendectomy; Arthroplasty knee (04/27/2011); ---------incision and drainage (03/05/2014); Irrigation and debridement lower extremity, combined (Right, 10/09/2019); Revise scar extremity (Right, 12/17/2020); Coronary Stent Placement; other surgical history (03/05/2014); other surgical history (Right, 10/09/2019); Complex Wound Closure (Update) (Right, 12/08/2021); Phacoemulsification clear cornea with standard intraocular lens implant (Left, 9/8/2022); Nerve block peripheral (N/A, 9/27/2022); Amputate leg below knee (Right, 9/28/2022); Inject Nerve Block Sphenopalatine Ganglion (N/A, 9/29/2022); Phacoemulsification clear cornea with standard intraocular lens implant (Right, 10/13/2022); IR OR Angiogram (1/13/2023); Endarterectomy femoral (Right, 1/13/2023); Angiogram (Right, 1/13/2023); Amputate leg above knee (Right, 1/13/2023); Amputate leg above knee (Right, 2/2/2024); and Esophagoscopy, gastroscopy, duodenoscopy (EGD), combined (N/A, 5/25/2024).  FAMILY HISTORY: family history includes Cancer in her brother and brother; Cardiovascular in her mother; Other - See Comments in her mother.  SOCIAL HISTORY:   reports that she quit smoking about 14 years ago. Her smoking use included cigarettes. She started smoking about 59 years ago. She has a 22.5 pack-year smoking history. She has been exposed to tobacco smoke. She has never used smokeless tobacco. She reports current alcohol use. She reports that she does not use drugs.  Patient's living condition: lives with an adult     Post Discharge Medication  Reconciliation Status:   MED REC REQUIRED  Post Medication Reconciliation Status: discharge medications reconciled and changed, per note/orders       Current Outpatient Medications   Medication Sig Dispense Refill    acetaminophen (TYLENOL) 325 MG tablet Take 2 tablets (650 mg) by mouth every 6 hours 24 tablet 0    amLODIPine (NORVASC) 5 MG tablet TAKE 1 TABLET BY MOUTH EVERY DAY 90 tablet 3    apixaban ANTICOAGULANT (ELIQUIS) 5 MG tablet Take 2 tablets (10 mg) by mouth 2 times daily for 7 days, THEN 1 tablet (5 mg) 2 times daily for 83 days. 194 tablet 0    aspirin (ASA) 81 MG tablet Take 81 mg by mouth daily      calcium carbonate 600 mg-vitamin D 400 units (CALCIUM 600 + D) 600-400 MG-UNIT per tablet Take 2 tablets by mouth daily      Elemental iron 65 mg Vitamin C 125 mg (VITRON C)  MG TABS tablet Take 1 tablet by mouth daily      escitalopram (LEXAPRO) 5 MG tablet Take 1 tablet (5 mg) by mouth daily 30 tablet 0    fluocinolone acetonide oil 0.01 % ear drops Place 0.25 mLs (5 drops) into both ears twice a week 20 mL 3    folic acid (FOLVITE) 1 MG tablet Take 1 mg by mouth daily      gabapentin (NEURONTIN) 300 MG capsule Take 300 mg by mouth 3 times daily as needed for neuropathic pain      leflunomide (ARAVA) 20 MG tablet Take 1 tablet by mouth every 24 hours      lisinopril (ZESTRIL) 20 MG tablet TAKE 1 TABLET BY MOUTH EVERY DAY 90 tablet 0    methotrexate 2.5 MG tablet Take 20 mg by mouth every 7 days On Saturdays      metoprolol succinate ER (TOPROL XL) 50 MG 24 hr tablet TAKE ONE TABLET BY MOUTH EVERY DAY 90 tablet 0    oxyCODONE (ROXICODONE) 5 MG tablet Take 1 tablet (5 mg) by mouth 2 times daily as needed for pain 20 tablet 0    polyethylene glycol (MIRALAX) 17 GM/Dose powder Take 17 g by mouth daily 510 g 0    predniSONE (DELTASONE) 5 MG tablet Take 1 tablet (5 mg) by mouth daily  0    simvastatin (ZOCOR) 20 MG tablet Take 1 tablet (20 mg) by mouth At Bedtime 90 tablet 1    valACYclovir (VALTREX)  "1000 mg tablet Take 1 tablet (1,000 mg) by mouth every 12 hours for 9 days 18 tablet 0     No current facility-administered medications for this visit.       ROS:  4 point ROS including Respiratory, CV, GI and , other than that noted in the HPI,  is negative    Vitals:  BP (!) 153/78   Pulse 68   Temp 98.4  F (36.9  C)   Resp 18   Ht 1.626 m (5' 4\")   LMP  (LMP Unknown)   SpO2 93%   BMI 14.04 kg/m    Exam:  GENERAL APPEARANCE:  in no distress, appears healthy  ENT:  Mouth and posterior oropharynx normal, moist mucous membranes  RESP:  lungs clear to auscultation , no respiratory distress  CV:  no edema, rate-normal  ABDOMEN:  bowel sounds normal  M/S:   Gait and station abnormal transfer assist  SKIN:  Inspection of skin and subcutaneous tissue baseline  NEURO:   Examination of sensation by touch normal  PSYCH:  affect and mood normal    Lab/Diagnostic data:  Recent labs in [facility] reviewed by me today.     ASSESSMENT/PLAN:    (K92.1) Gastrointestinal hemorrhage with melena  (primary encounter diagnosis)  (B00.89,  K20.80) Herpes simplex esophagitis  (K26.9) Duodenal ulcer  (K22.11) Ulcer of esophagus with bleeding  (D64.9) Anemia, unspecified type  Comment: post endoscopy, meds adjusted, mild dysphagia, denies pain, transfused in hospital with Hgb 6.9  Plan:   -PT/OT/ST eval and treat  -CBC, BMP, Fe on 6/10  -R AKA wound; daily cleanse vashe, pack, cover foam  -discontinue melatonin and lidocaine patch  -start mirtazapine  -continue valcyclovir through 6/13  -continue APAP and oxycodone both PRN  -continue Fe supplement  -follow up  6/25, GI in 4 weeks    (E46) Protein-calorie malnutrition, unspecified severity (H24)  (F32.0) Current mild episode of major depressive disorder, unspecified whether recurrent (H24)  Comment: palliative consult in hospital, not thriving, flat affect, BMI 14.04  Plan:   -dietary to follow  -start mirtazapine 7.5mg Qpm  -continue lexapro 5mg (new in hospital)  -encourage " intake  -ACP to eval/treat      (Z89.611) Status post above-knee amputation of right lower extremity (H)  (L97.816) 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)  Comment: amputation Feb 2024, small open wound on stump  Plan:   -change dressing and pack daily  -APAP and oxycodone for pain    (M06.9) Rheumatoid arthritis, involving unspecified site, unspecified whether rheumatoid factor present (H)  Comment: chronic debilitating RA multiple joints  Plan: continue arava, methotrexate, prednisone and APAP  -follow up rheumatologist after TCU stay    (Z71.89) ACP (advance care planning)  Comment: full code  Plan: [place] ADVANCE CARE PLANNING FIRST 30 MINS          I spent 16 minutes F2F with patient in addition to todays visit completing a POLST form.        Electronically signed by:  DESIRE Link CNP

## 2024-06-10 NOTE — TELEPHONE ENCOUNTER
MTM referral from: Transitions of Care (recent hospital discharge or ED visit)    MTM referral outreach attempt #2 on Prema 10, 2024 at 9:26 AM      Outcome: Patient not reachable after several attempts, routed to Pharmacist Team/Provider as an FYI    Use [facility] part d map (shania) for the carrier/Plan on the flowsheet      MTM Practitioner please send patient letter    Radhika Palm CPhT  MTM

## 2024-06-10 NOTE — LETTER
Prema 10, 2024      Sakshi Jaeger  [address] [address]        Dear Sakshi,           With your recent hospital discharge it is recommended you schedule a Medication Therapy Management (MTM) appointment. MTM is designed to help you get the most of out of your medicines.      During an MTM appointment a specially trained pharmacist will review all of your medicines, both prescription and over-the-counter. They will make sure your medicines are the best choice for you and are safe and convenient for you.  MTM pharmacists work together with you and your doctor to help you understand your medicines, solve any problems related to your medicines and help you get the best results from taking your medicines.      At [facility], we strongly believe in a team approach to health care. We want to help you understand your medicines and health conditions. To learn more about how you might benefit from MTM services, watch the patient video at [email or web address].      To make an appointment, please call the MTM scheduling line at [phone] (toll-free at [phone]).     We look forward to hearing from you!           [facility] MTM Team

## 2024-06-11 NOTE — LETTER
6/11/2024      Sakshi Jaeger  [address] [address]        [facility] GERIATRICS    Chief Complaint   Patient presents with     RECHECK     HPI:  Sakshi Jaeger is a 78 year old  (1945), who is being seen today for an episodic care visit at: [facility] AT [facility] ([facility]) [[id number]]. Today's concern is:   1. Current mild episode of major depressive disorder, unspecified whether recurrent (H24)    2. Protein-calorie malnutrition, unspecified severity (H24)    3. Abdominal pain, generalized      Patient seen for follow up, post GI bleed with duodenal ulcer, now having certain gut intermittent discomfort thought to be gas issue and very flatulent, also certain FTT symptoms with limited motivation, BMI 14.45, high ryder skin risk due to habitus.    Allergies, and PMH/PSH reviewed in EPIC today.  REVIEW OF SYSTEMS:  4 point ROS including Respiratory, CV, GI and , other than that noted in the HPI,  is negative    Objective:   BP (!) 170/84   Pulse 58   Temp 97  F (36.1  C)   Resp 18   Wt 38.2 kg (84 lb 3.2 oz)   LMP  (LMP Unknown)   SpO2 93%   BMI 14.45 kg/m    GENERAL APPEARANCE:  in no distress, thin  ENT:  Mouth and posterior oropharynx normal, moist mucous membranes  RESP:  lungs clear to auscultation , no respiratory distress  CV:  no edema, rate-normal  ABDOMEN:  bowel sounds normal  M/S:   Gait and station abnormal unsteady  SKIN:  Inspection of skin and subcutaneous tissue baseline  NEURO:   Examination of sensation by touch normal  PSYCH:  affect and mood normal    Labs done in SNF are in [facility]. Please refer to them using [employer]/Care Everywhere.    Assessment/Plan:  (F32.0) Current mild episode of major depressive disorder, unspecified whether recurrent (H24)  (primary encounter diagnosis)  (E46) Protein-calorie malnutrition, unspecified severity (H24)  Comment: FTT, limited motivation and poor appetite  Plan: increase mirtazapine to 15mg Qpm  -dietary following  weights/supplements  -ACP eval/treat in-house  -OT eval WC seat for pressure reduction  -air mattress  -reposition Q2h  -wound team to examine on 6/12    (R10.84) Abdominal pain, generalized  Comment: thought to be gas/flatulence  Plan:   -AXR ordered, results pending  -start simethicone TID scheduled      MED REC REQUIRED  Post Medication Reconciliation Status: medication reconcilation previously completed during another office visit          Electronically signed by: DESIRE Link CNP          Sincerely,        DESIRE Link CNP

## 2024-06-11 NOTE — PROGRESS NOTES
[facility] GERIATRICS    Chief Complaint   Patient presents with    RECHECK     HPI:  Sakshi Jaeger is a 78 year old  (1945), who is being seen today for an episodic care visit at: [facility] [place] AT [facility] ([facility]) [[id number]]. Today's concern is:   1. Current mild episode of major depressive disorder, unspecified whether recurrent (H24)    2. Protein-calorie malnutrition, unspecified severity (H24)    3. Abdominal pain, generalized      Patient seen for follow up, post GI bleed with duodenal ulcer, now having certain gut intermittent discomfort thought to be gas issue and very flatulent, also certain FTT symptoms with limited motivation, BMI 14.45, high ryder skin risk due to habitus.    Allergies, and PMH/PSH reviewed in [facility] today.  REVIEW OF SYSTEMS:  4 point ROS including Respiratory, CV, GI and , other than that noted in the HPI,  is negative    Objective:   BP (!) 170/84   Pulse 58   Temp 97  F (36.1  C)   Resp 18   Wt 38.2 kg (84 lb 3.2 oz)   LMP  (LMP Unknown)   SpO2 93%   BMI 14.45 kg/m    GENERAL APPEARANCE:  in no distress, thin  ENT:  Mouth and posterior oropharynx normal, moist mucous membranes  RESP:  lungs clear to auscultation , no respiratory distress  CV:  no edema, rate-normal  ABDOMEN:  bowel sounds normal  M/S:   Gait and station abnormal unsteady  SKIN:  Inspection of skin and subcutaneous tissue baseline  NEURO:   Examination of sensation by touch normal  PSYCH:  affect and mood normal    Labs done in SNF are in [facility]. Please refer to them using [facility]/Care Everywhere.    Assessment/Plan:  (F32.0) Current mild episode of major depressive disorder, unspecified whether recurrent (H24)  (primary encounter diagnosis)  (E46) Protein-calorie malnutrition, unspecified severity (H24)  Comment: FTT, limited motivation and poor appetite  Plan: increase mirtazapine to 15mg Qpm  -dietary following weights/supplements  -ACP eval/treat in-house  -OT eval WC seat for pressure  reduction  -air mattress  -reposition Q2h  -wound team to examine on 6/12    (R10.84) Abdominal pain, generalized  Comment: thought to be gas/flatulence  Plan:   -AXR ordered, results pending  -start simethicone TID scheduled      MED REC REQUIRED  Post Medication Reconciliation Status: medication reconcilation previously completed during another office visit          Electronically signed by: DESIRE Link CNP

## 2024-06-14 NOTE — PROGRESS NOTES
[facility] GERIATRICS    Chief Complaint   Patient presents with    RECHECK     HPI:  Sakshi Jaeger is a 78 year old  (1945), who is being seen today for an episodic care visit at: [facility] AT [facility] ([facility]) [[id number]]. Today's concern is:   1. Protein-calorie malnutrition, unspecified severity (H24)    2. Pressure injury of coccygeal region, stage 2 (H)    3. Duodenal ulcer      Patient seen for follow up, sig other Santi also present, BMI 14.45, limited appetite, sleeping more, pain+ in joints and buttocks, skin eval with PU coccyx plus eroded/excoriated areas between buttock cheeks, skin sensitive and bleeds with any cleansing as today, is post duodenal ulcer and having gas pains, AXR on 6/11 with non-sepecific gas pattern, overall not thriving.    Allergies, and PMH/PSH reviewed in EPIC today.  REVIEW OF SYSTEMS:  4 point ROS including Respiratory, CV, GI and , other than that noted in the HPI,  is negative    Objective:   BP (!) 145/76   Pulse 74   Temp 98.1  F (36.7  C)   Resp 18   Wt 38.2 kg (84 lb 3.2 oz)   LMP  (LMP Unknown)   SpO2 94%   BMI 14.45 kg/m    GENERAL APPEARANCE:  in no distress, appears healthy  ENT:  Mouth and posterior oropharynx normal, moist mucous membranes  RESP:  lungs clear to auscultation , no respiratory distress  CV:  no edema, rate-normal  ABDOMEN:  bowel sounds normal  M/S:   Gait and station abnormal transfer assist  SKIN:  Inspection of skin and subcutaneous tissue baseline  NEURO:   Examination of sensation by touch normal  PSYCH:  affect and mood normal    Labs done in SNF are in [facility]. Please refer to them using EPIC/Care Everywhere.    Assessment/Plan:  (E46) Protein-calorie malnutrition, unspecified severity (H24)  (primary encounter diagnosis)  Comment: BMI 14.45, limited appetite  Plan: continue mirtazapine increased to 15mg  -dietary informed to assess again    (L89.152) Pressure injury of coccygeal region, stage 2 (H)  Comment: pain+,  bleeding, no s/s infection  Plan: start butt paste TID, light coat  -change briefs PRN  -change oxycodone to QID plus PRN    (K26.9) Duodenal ulcer  Comment: Hgb 8.4, mild gut pain/gas, AXR clean  Plan:   -continue simethicone QID  -start famotidine 40mg at bedtime  -repeat Hgb in 1-2 weeks    MED REC REQUIRED  Post Medication Reconciliation Status: medication reconcilation previously completed during another office visit        Electronically signed by: DESIRE Link CNP

## 2024-06-14 NOTE — LETTER
6/14/2024      Sakshi Jaeger  [address] [address]        [facility] GERIATRICS    Chief Complaint   Patient presents with     RECHECK     HPI:  Sakshi Jaeger is a 78 year old  (1945), who is being seen today for an episodic care visit at: [facility] AT [facility] ([facility]) [[id number]]. Today's concern is:   1. Protein-calorie malnutrition, unspecified severity (H24)    2. Pressure injury of coccygeal region, stage 2 (H)    3. Duodenal ulcer      Patient seen for follow up, sig other Santi also present, BMI 14.45, limited appetite, sleeping more, pain+ in joints and buttocks, skin eval with PU coccyx plus eroded/excoriated areas between buttock cheeks, skin sensitive and bleeds with any cleansing as today, is post duodenal ulcer and having gas pains, AXR on 6/11 with non-sepecific gas pattern, overall not thriving.    Allergies, and PMH/PSH reviewed in EPIC today.  REVIEW OF SYSTEMS:  4 point ROS including Respiratory, CV, GI and , other than that noted in the HPI,  is negative    Objective:   BP (!) 145/76   Pulse 74   Temp 98.1  F (36.7  C)   Resp 18   Wt 38.2 kg (84 lb 3.2 oz)   LMP  (LMP Unknown)   SpO2 94%   BMI 14.45 kg/m    GENERAL APPEARANCE:  in no distress, appears healthy  ENT:  Mouth and posterior oropharynx normal, moist mucous membranes  RESP:  lungs clear to auscultation , no respiratory distress  CV:  no edema, rate-normal  ABDOMEN:  bowel sounds normal  M/S:   Gait and station abnormal transfer assist  SKIN:  Inspection of skin and subcutaneous tissue baseline  NEURO:   Examination of sensation by touch normal  PSYCH:  affect and mood normal    Labs done in SNF are in [facility]. Please refer to them using EPIC/Care Everywhere.    Assessment/Plan:  (E46) Protein-calorie malnutrition, unspecified severity (H24)  (primary encounter diagnosis)  Comment: BMI 14.45, limited appetite  Plan: continue mirtazapine increased to 15mg  -dietary informed to assess  again    (L89.152) Pressure injury of coccygeal region, stage 2 (H)  Comment: pain+, bleeding, no s/s infection  Plan: start butt paste TID, light coat  -change briefs PRN  -change oxycodone to QID plus PRN    (K26.9) Duodenal ulcer  Comment: Hgb 8.4, mild gut pain/gas, AXR clean  Plan:   -continue simethicone QID  -start famotidine 40mg at bedtime  -repeat Hgb in 1-2 weeks    MED REC REQUIRED  Post Medication Reconciliation Status: medication reconcilation previously completed during another office visit        Electronically signed by: DESIRE Link CNP          Sincerely,        DESIRE Link CNP

## 2024-06-18 NOTE — LETTER
6/18/2024      Sakshi Jaeger  [address] [address]        [facility] GERIATRICS    Chief Complaint   Patient presents with     RECHECK     HPI:  Sakshi Jaeger is a 78 year old  (1945), who is being seen today for an episodic care visit at: [facility] AT [facility] ([facility]) [[id number]]. Today's concern is:   1. Current mild episode of major depressive disorder, unspecified whether recurrent (H24)    2. Protein-calorie malnutrition, unspecified severity (H24)    3. Duodenal ulcer      Patient seen for follow up wit significant other Santi present, not thriving, limited motivation, decreased appetite, flat affect, post duodenal ulcer with GIB, FOBT negative, Hgb 8.4, Fe 53, BMI 14.45, overall pleasant and appears fairly healthy but not thriving.    Allergies, and PMH/PSH reviewed in EPIC today.  REVIEW OF SYSTEMS:  4 point ROS including Respiratory, CV, GI and , other than that noted in the HPI,  is negative    Objective:   /72   Pulse 70   Temp 97.6  F (36.4  C)   Resp 18   Wt 38.2 kg (84 lb 3.2 oz)   LMP  (LMP Unknown)   SpO2 97%   BMI 14.45 kg/m    GENERAL APPEARANCE:  in no distress, appears healthy, thin  ENT:  Mouth and posterior oropharynx normal, moist mucous membranes  RESP:  lungs clear to auscultation , no respiratory distress  CV:  no edema  ABDOMEN:  bowel sounds normal  M/S:   Gait and station abnormal unsteady  SKIN:  Inspection of skin and subcutaneous tissue baseline  NEURO:   Examination of sensation by touch normal  PSYCH:  affect and mood normal    Labs done in SNF are in [facility]. Please refer to them using [facility]/Care Everywhere.    Assessment/Plan:  (F32.0) Current mild episode of major depressive disorder, unspecified whether recurrent (H24)  (primary encounter diagnosis)  Comment: depression+, not thriving  Plan:   -increase mirtazapine to 30mg  -discontinue lexapro, folic acid and gabapentin  -refer to [facility] for BHT    (E46) Protein-calorie  malnutrition, unspecified severity (H24)  Comment: BMI 14.45, limited appetite  Plan: dietary to follow  -supplements PRN  -encourage intake  -sig other to bring palatable foods    (K26.9) Duodenal ulcer  Comment: with GIB, FOBT negative  Plan: repeat Hgb in 1-2 weeks  -PT/OT working with  -consider repeat FOBT, start PPI depending on results    MED REC REQUIRED  Post Medication Reconciliation Status: medication reconcilation previously completed during another office visit        Electronically signed by: DESIRE Link CNP          Sincerely,        DESIRE Link CNP

## 2024-06-18 NOTE — PROGRESS NOTES
[facility] GERIATRICS    Chief Complaint   Patient presents with    RECHECK     HPI:  Sakshi Jaeger is a 78 year old  (1945), who is being seen today for an episodic care visit at: [facility] AT [facility] ([facility]) [[id number]]. Today's concern is:   1. Current mild episode of major depressive disorder, unspecified whether recurrent (H24)    2. Protein-calorie malnutrition, unspecified severity (H24)    3. Duodenal ulcer      Patient seen for follow up wit significant other Santi present, not thriving, limited motivation, decreased appetite, flat affect, post duodenal ulcer with GIB, FOBT negative, Hgb 8.4, Fe 53, BMI 14.45, overall pleasant and appears fairly healthy but not thriving.    Allergies, and PMH/PSH reviewed in [facility] today.  REVIEW OF SYSTEMS:  4 point ROS including Respiratory, CV, GI and , other than that noted in the HPI,  is negative    Objective:   /72   Pulse 70   Temp 97.6  F (36.4  C)   Resp 18   Wt 38.2 kg (84 lb 3.2 oz)   LMP  (LMP Unknown)   SpO2 97%   BMI 14.45 kg/m    GENERAL APPEARANCE:  in no distress, appears healthy, thin  ENT:  Mouth and posterior oropharynx normal, moist mucous membranes  RESP:  lungs clear to auscultation , no respiratory distress  CV:  no edema  ABDOMEN:  bowel sounds normal  M/S:   Gait and station abnormal unsteady  SKIN:  Inspection of skin and subcutaneous tissue baseline  NEURO:   Examination of sensation by touch normal  PSYCH:  affect and mood normal    Labs done in SNF are in [facility]. Please refer to them using [facility]/Care Everywhere.    Assessment/Plan:  (F32.0) Current mild episode of major depressive disorder, unspecified whether recurrent (H24)  (primary encounter diagnosis)  Comment: depression+, not thriving  Plan:   -increase mirtazapine to 30mg  -discontinue lexapro, folic acid and gabapentin  -refer to [facility] for BHT    (E46) Protein-calorie malnutrition, unspecified severity (H24)  Comment: BMI 14.45, limited appetite  Plan:  dietary to follow  -supplements PRN  -encourage intake  -sig other to bring palatable foods    (K26.9) Duodenal ulcer  Comment: with GIB, FOBT negative  Plan: repeat Hgb in 1-2 weeks  -PT/OT working with  -consider repeat FOBT, start PPI depending on results    MED REC REQUIRED  Post Medication Reconciliation Status: medication reconcilation previously completed during another office visit        Electronically signed by: DESIRE Link CNP

## 2024-06-21 NOTE — PROGRESS NOTES
[facility] GERIATRICS    Chief Complaint   Patient presents with    RECHECK     HPI:  Sakshi Jaeger is a 78 year old  (1945), who is being seen today for an episodic care visit at: [facility] AT [facility] ([facility]) [[id number]]. Today's concern is:   1. Pressure injury of coccygeal region, stage 2 (H)    2. Protein-calorie malnutrition, unspecified severity (H24)    3. Duodenal ulcer      Patient seen for follow up, sig other Santi also present, BMI 14.45, thin habitus, limited appetite, oriented, full ADL assist, of note has previous R AKA, PU coccyx <1cm open epidermal area with surrounding skin intact, between buttocks skin and at rectum very thin and easily bleeds with wiping off fecal matter, no s/s infection, denies gut issues, recent FOBT negative, Hgb 8.4, no distress.    Allergies, and PMH/PSH reviewed in EPIC today.  REVIEW OF SYSTEMS:  4 point ROS including Respiratory, CV, GI and , other than that noted in the HPI,  is negative    Objective:   BP (!) 145/80   Pulse 71   Temp 98.5  F (36.9  C)   Resp 18   Wt 38.2 kg (84 lb 3.2 oz)   LMP  (LMP Unknown)   SpO2 94%   BMI 14.45 kg/m    GENERAL APPEARANCE:  in no distress, appears healthy  ENT:  Mouth and posterior oropharynx normal, moist mucous membranes  RESP:  lungs clear to auscultation , no respiratory distress  CV:  no edema, rate-normal  ABDOMEN:  bowel sounds normal  M/S:   Gait and station abnormal transfer assist  SKIN:  Inspection of skin and subcutaneous tissueas in HPI  NEURO:   Examination of sensation by touch normal  PSYCH:  affect and mood normal    Labs done in SNF are in [place] EPIC. Please refer to them using EPIC/Care Everywhere.    Assessment/Plan:  (L89.152) Pressure injury of coccygeal region, stage 2 (H)  (primary encounter diagnosis)  Comment: <1cm open area, thin skin easily bleeds  Plan: discontinue ASA  -continue Butt Paste BID, wipe lightly to prevent bleeding  -reposition Q2h    (E46) Protein-calorie  malnutrition, unspecified severity (H24)  Comment: BMI 14.45  Plan: started mirtazapine for appetite then increased to 30mg  -encourage intake  -dietary following, supplements PRN    (K26.9) Duodenal ulcer  Comment: with GIB in hospital  Plan: discontinue ASA  -continue famotidine, simethicone  -follow up GI PRN    MED REC REQUIRED  Post Medication Reconciliation Status: medication reconcilation previously completed during another office visit          Electronically signed by: DESIRE Link CNP

## 2024-06-21 NOTE — LETTER
6/21/2024      Sakshi Jaeger  [address] [address]        [facility] GERIATRICS    Chief Complaint   Patient presents with     RECHECK     HPI:  Sakshi Jaeger is a 78 year old  (1945), who is being seen today for an episodic care visit at: [facility] AT [facility] ([facility]) [[id number]]. Today's concern is:   1. Pressure injury of coccygeal region, stage 2 (H)    2. Protein-calorie malnutrition, unspecified severity (H24)    3. Duodenal ulcer      Patient seen for follow up, sig other Santi also present, BMI 14.45, thin habitus, limited appetite, oriented, full ADL assist, of note has previous R AKA, PU coccyx <1cm open epidermal area with surrounding skin intact, between buttocks skin and at rectum very thin and easily bleeds with wiping off fecal matter, no s/s infection, denies gut issues, recent FOBT negative, Hgb 8.4, no distress.    Allergies, and PMH/PSH reviewed in EPIC today.  REVIEW OF SYSTEMS:  4 point ROS including Respiratory, CV, GI and , other than that noted in the HPI,  is negative    Objective:   BP (!) 145/80   Pulse 71   Temp 98.5  F (36.9  C)   Resp 18   Wt 38.2 kg (84 lb 3.2 oz)   LMP  (LMP Unknown)   SpO2 94%   BMI 14.45 kg/m    GENERAL APPEARANCE:  in no distress, appears healthy  ENT:  Mouth and posterior oropharynx normal, moist mucous membranes  RESP:  lungs clear to auscultation , no respiratory distress  CV:  no edema, rate-normal  ABDOMEN:  bowel sounds normal  M/S:   Gait and station abnormal transfer assist  SKIN:  Inspection of skin and subcutaneous tissueas in HPI  NEURO:   Examination of sensation by touch normal  PSYCH:  affect and mood normal    Labs done in SNF are in [facility]. Please refer to them using EPIC/Care Everywhere.    Assessment/Plan:  (L89.152) Pressure injury of coccygeal region, stage 2 (H)  (primary encounter diagnosis)  Comment: <1cm open area, thin skin easily bleeds  Plan: discontinue ASA  -continue Butt Paste BID,  wipe lightly to prevent bleeding  -reposition Q2h    (E46) Protein-calorie malnutrition, unspecified severity (H24)  Comment: BMI 14.45  Plan: started mirtazapine for appetite then increased to 30mg  -encourage intake  -dietary following, supplements PRN    (K26.9) Duodenal ulcer  Comment: with GIB in hospital  Plan: discontinue ASA  -continue famotidine, simethicone  -follow up GI PRN    MED REC REQUIRED  Post Medication Reconciliation Status: medication reconcilation previously completed during another office visit          Electronically signed by: DESIRE Link CNP          Sincerely,        DESIRE Link CNP

## 2024-06-24 NOTE — TELEPHONE ENCOUNTER
[facility]    Phone Message    May a detailed message be left on voicemail: Yes    Reason for Call: Other: Patient is currently scheduled on 3/25/25, as visit type New Esophageal Emergent. This is outside the expected timeline for this referral. Patient has been added to the waitlist.      Action Taken: Message routed to:  Other: GI REFERRAL TRIAGE POOL     Travel Screening: Not Applicable

## 2024-06-25 NOTE — TELEPHONE ENCOUNTER
Writer called and left voice message for Pt. Writer called to help get Pt scheduled for a sooner GI appointment. Writer left call back number.

## 2024-06-25 NOTE — LETTER
6/25/2024      Sakshi Jaeger  [address] [facility] [address]        [facility] GERIATRICS DISCHARGE SUMMARY  PATIENT'S NAME: Sakshi Jaeger  YOB: 1945  MEDICAL RECORD NUMBER:  [id number]  Place of Service where encounter took place:  [place]  AT [facility] ([facility]) [[id number]]    PRIMARY CARE PROVIDER AND CLINIC RESPONSIBLE AFTER TRANSFER:   DESIRE Barnes CNP, [address] [facility] [address]    [facility] [place]     Transferring providers: DESIRE Hilton CNP; Anup Purdy MD  Recent Hospitalization/ED:  [facility] stay 5/24/2024 to 6/5/2024.  Date of SNF Admission:  6/5/2024  Date of SNF (anticipated) Discharge:  6/25/2024  Discharged to: previous independent home  Cognitive Scores:  NA  Physical Function: Pivot transfers  DME: Wheelchair and Walker    CODE STATUS/ADVANCE DIRECTIVES DISCUSSION:  Full Code   ALLERGIES: Adhesive tape    NURSING FACILITY COURSE   Medication Changes/Rationale:   See notes    Summary of nursing facility stay:      Gastrointestinal hemorrhage with melena  Herpes simplex esophagitis  Duodenal ulcer  Ulcer of esophagus with bleeding  Anemia, unspecified type  Protein-calorie malnutrition, unspecified severity (H24)  Current mild episode of major depressive disorder, unspecified whether recurrent (H24)  Status post above-knee amputation of right lower extremity (H)  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)  Rheumatoid arthritis, involving unspecified site, unspecified whether rheumatoid factor present (H)    (K92.1) Gastrointestinal hemorrhage with melena  (primary encounter diagnosis)  (B00.89,  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) Herpes simplex esophagitis  (K26.9) Duodenal ulcer  (K22.11) Ulcer of esophagus with bleeding  (D64.9) Anemia, unspecified type  Comment: post endoscopy, meds adjusted, mild dysphagia, denies pain,  transfused in hospital with Hgb 6.9  Plan:   -PT/OT/ST eval and treat  -CBC, BMP, Fe on 6/10  -R AKA wound; daily cleanse vashe, pack, cover foam  -discontinue melatonin and lidocaine patch  -start mirtazapine  -start famotidine 40mg  -simethicone TID  -continue valcyclovir through 6/13  -continue APAP and oxycodone both PRN  -continue Fe supplement  -discontinue folic acid, ASA, gabapentin  -follow up  6/25, GI in 4 weeks     (E46) Protein-calorie malnutrition, unspecified severity (H24)  (F32.0) Current mild episode of major depressive disorder, unspecified whether recurrent (H24)  Comment: palliative consult in hospital, not thriving, flat affect, BMI 14.04  Plan:   -dietary to follow  -0butt paste BID to coccyx/buttocks  -start mirtazapine 7.5mg Qpm, increased to 30mg  -discontinue lexapro  -encourage intake  -ACP to eval/treat        (Z89.611) Status post above-knee amputation of right lower extremity (H)  (L97.816) 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)  Comment: amputation Feb 2024, small open wound on stump  Plan:   -change dressing and pack daily  -APAP and oxycodone for pain     (M06.9) Rheumatoid arthritis, involving unspecified site, unspecified whether rheumatoid factor present (H)  Comment: chronic debilitating RA multiple joints  Plan: continue arava, methotrexate, prednisone and APAP  -follow up rheumatologist after TCU stay    Discharge Medications:  MED REC REQUIRED  Post Medication Reconciliation Status: medication reconcilation previously completed during another office visit       Current Outpatient Medications   Medication Sig Dispense Refill     oxyCODONE (ROXICODONE) 5 MG tablet Take 1 tablet (5 mg) by mouth 4 times daily And 5mg Q6h PRN 30 tablet 0     acetaminophen (TYLENOL) 325 MG tablet Take 2 tablets (650 mg) by mouth every 6 hours 24 tablet 0     amLODIPine (NORVASC) 5 MG tablet TAKE 1 TABLET BY MOUTH  EVERY DAY 90 tablet 3     apixaban ANTICOAGULANT (ELIQUIS) 5 MG tablet Take 2 tablets (10 mg) by mouth 2 times daily for 7 days, THEN 1 tablet (5 mg) 2 times daily for 83 days. 194 tablet 0     calcium carbonate 600 mg-vitamin D 400 units (CALCIUM 600 + D) 600-400 MG-UNIT per tablet Take 2 tablets by mouth daily       Elemental iron 65 mg Vitamin C 125 mg (VITRON C)  MG TABS tablet Take 1 tablet by mouth daily       famotidine (PEPCID) 40 MG tablet Take 1 tablet (40 mg) by mouth at bedtime       fluocinolone acetonide oil 0.01 % ear drops Place 0.25 mLs (5 drops) into both ears twice a week 20 mL 3     leflunomide (ARAVA) 20 MG tablet Take 1 tablet by mouth every 24 hours       lisinopril (ZESTRIL) 20 MG tablet TAKE 1 TABLET BY MOUTH EVERY DAY 90 tablet 0     methotrexate 2.5 MG tablet Take 20 mg by mouth every 7 days On Saturdays       metoprolol succinate ER (TOPROL XL) 50 MG 24 hr tablet TAKE ONE TABLET BY MOUTH EVERY DAY 90 tablet 0     mirtazapine (REMERON) 7.5 MG tablet Take 4 tablets (30 mg) by mouth at bedtime       polyethylene glycol (MIRALAX) 17 GM/Dose powder Take 17 g by mouth daily 510 g 0     predniSONE (DELTASONE) 5 MG tablet Take 1 tablet (5 mg) by mouth daily  0     simethicone (MYLICON) 125 MG chewable tablet Take 1 tablet (125 mg) by mouth 3 times daily       simvastatin (ZOCOR) 20 MG tablet Take 1 tablet (20 mg) by mouth At Bedtime 90 tablet 1          Controlled medications:   Medication oxycodone electronically prescribed to [facility] pharmacy #[id number]     Past Medical History:   Past Medical History:   Diagnosis Date     Anemia 8/24/2022     BENIGN HYPERTENSION 3/1/2005     CAD (coronary artery disease) 1/26/2011     Coronary artery disease involving native coronary artery of native heart without angina pectoris 9/27/2016     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     Essential hypertension with goal blood pressure less than 140/90 8/25/2016     Hyperlipidemia LDL goal <100 11/12/2010     Hypertension  goal BP (blood pressure) < 130/80 1/26/2011     Infection of right below knee amputation (H) 10/1/2019     IRON DEFIC ANEMIA NOS 9/15/2005     Lower limb amputation, below knee 12/26/2012     MI (myocardial infarction) (H)     3/2010     Non-healing surgical wound, subsequent encounter 9/24/2020    Added automatically from request for surgery [id number]     Osteoporosis 2/16/2005     OSTEOPOROSIS NOS 2/16/2005     Protein-calorie malnutrition (H24) 5/18/2022     PVD (peripheral vascular disease) (H24) 5/18/2022     Rheumatoid arthritis (H) 2/16/2005          Rheumatoid arthritis(714.0) 2/16/2005     Status post below-knee amputation (H) 12/26/2012          Physical Exam:   Vitals: /70   Pulse 75   Temp 98.2  F (36.8  C)   Resp 18   Wt 38.2 kg (84 lb 3.2 oz)   LMP  (LMP Unknown)   SpO2 95%   BMI 14.45 kg/m    BMI: Body mass index is 14.45 kg/m .  GENERAL APPEARANCE:  in no distress, appears healthy  ENT:  Mouth and posterior oropharynx normal, moist mucous membranes  RESP:  lungs clear to auscultation , no respiratory distress  CV:  no edema, rate-normal  ABDOMEN:  bowel sounds normal  M/S:   Gait and station abnormal unsteady  SKIN:  Inspection of skin and subcutaneous tissue baseline  NEURO:   Examination of sensation by touch normal  PSYCH:  affect and mood normal     SNF labs: Labs done in SNF are in [place] EPIC. Please refer to them using EPIC/Care Everywhere.    DISCHARGE PLAN:  Follow up labs: No labs orders/due  Medical Follow Up:      Follow up with primary care provider in 1 weeks  Current [place] scheduled appointments:  Appointments in Next Year      Jun 25, 2024 10:00 AM  Discharge Summary with DESIRE Link CNP  [facility] ([facility] ) [phone] Jul 25, 2024 1:30 PM  (Arrive by 1:15 PM)  Return Visit with Pito Mosher MD  [facility] ([facility] ) [phone]      Mar 03, 2025 1:00 PM  (Arrive by 12:45 PM)  New Patient with Wilson King DO  [facility] ([facility] ) [phone]           Discharge Services: Home Care:  Occupational Therapy, Physical Therapy, Registered Nurse, and Home Health Aide  Discharge Instructions Verbalized to Patient at Discharge:   None    TOTAL DISCHARGE TIME:   Greater than 30 minutes  Electronically signed by:  DESIRE Link CNP         Documentation of Face-to-Face and Certification for Home Health Services     Patient: Sakshi Jaeger   YOB: 1945  MR Number: [id number]  Today's Date: 6/25/2024    I certify that patient: Sakshi Jaeger is under my care and that I, or a nurse practitioner or physician's assistant working with me, had a face-to-face encounter that meets the physician face-to-face encounter requirements with this patient on: 6/25/2024.    This encounter with the patient was in whole, or in part, for the following medical condition, which is the primary reason for home health care:   1. Gastrointestinal hemorrhage with melena    2. Herpes simplex esophagitis    3. Duodenal ulcer    4. Ulcer of esophagus with bleeding    5. Anemia, unspecified type    6. Protein-calorie malnutrition, unspecified severity (H24)    7. Current mild episode of major depressive disorder, unspecified whether recurrent (H24)    8. Status post above-knee amputation of right lower extremity (H)    9. 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 Rheumatoid arthritis, involving unspecified site, unspecified whether rheumatoid factor present (H)          I certify that, based on my findings, the following services are medically necessary home health services: Nursing, Occupational Therapy, Physical Therapy, and HHA .    My clinical findings support the need for the above  services because: Nurse is needed: To provide caregiver training to assist with: med teaching, wounds, pain.., Occupational Therapy Services are needed to assess and treat cognitive ability and address ADL safety due to impairment in transfers., Physical Therapy Services are needed to assess and treat the following functional impairments: gait., and HHA for bathing.    Further, I certify that my clinical findings support that this patient is homebound (i.e. absences from home require considerable and taxing effort and are for medical reasons or [employer] services or infrequently or of short duration when for other reasons) because: Requires assistance of another person or specialized equipment to access medical services because patient: Is unable to operate assistive equipment on their own...    Based on the above findings. I certify that this patient is confined to the home and needs intermittent skilled nursing care, physical therapy and/or speech therapy.  The patient is under my care, and I have initiated the establishment of the plan of care.  This patient will be followed by a physician who will periodically review the plan of care.  Physician/Provider to provide follow up care: Lucia Bates    Responsible Medicare certified PECOS Physician: Maxim Keita NP  Electronic Physician Signature  Date: 6/25/2024                 Sincerely,        DESIRE Link CNP

## 2024-06-25 NOTE — PROGRESS NOTES
[facility] GERIATRICS DISCHARGE SUMMARY  PATIENT'S NAME: Sakshi Jaeger  YOB: 1945  MEDICAL RECORD NUMBER:  [id number]  Place of Service where encounter took place:  [place] [facility] ([facility]) [[id number]]    PRIMARY CARE PROVIDER AND CLINIC RESPONSIBLE AFTER TRANSFER:   DESIRE Barnes CNP, [address] [facility] [address]    [facility] Provider     Transferring providers: DESIRE Hilton CNP; Anup Purdy MD  Recent Hospitalization/ED:  [facility] stay 5/24/2024 to 6/5/2024.  Date of SNF Admission:  6/5/2024  Date of SNF (anticipated) Discharge:  6/25/2024  Discharged to: previous independent home  Cognitive Scores:  NA  Physical Function: Pivot transfers  DME: Wheelchair and Walker    CODE STATUS/ADVANCE DIRECTIVES DISCUSSION:  Full Code   ALLERGIES: Adhesive tape    NURSING FACILITY COURSE   Medication Changes/Rationale:   See notes    Summary of nursing facility stay:      Gastrointestinal hemorrhage with melena  Herpes simplex esophagitis  Duodenal ulcer  Ulcer of esophagus with bleeding  Anemia, unspecified type  Protein-calorie malnutrition, unspecified severity (H24)  Current mild episode of major depressive disorder, unspecified whether recurrent (H24)  Status post above-knee amputation of right lower extremity (H)  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)  Rheumatoid arthritis, involving unspecified site, unspecified whether rheumatoid factor present (H)    (K92.1) Gastrointestinal hemorrhage with melena  (primary encounter diagnosis)  (B00.89,  K20.80) Herpes simplex esophagitis  (K26.9) Duodenal ulcer  (K22.11) Ulcer of esophagus with bleeding  (D64.9) Anemia, unspecified type  Comment: post endoscopy, meds adjusted, mild dysphagia, denies pain, transfused in hospital with Hgb 6.9  Plan:   -PT/OT/ST eval and treat  -CBC, BMP, Fe on  6/10  -R AKA wound; daily cleanse vashe, pack, cover foam  -discontinue melatonin and lidocaine patch  -start mirtazapine  -start famotidine 40mg  -simethicone TID  -continue valcyclovir through 6/13  -continue APAP and oxycodone both PRN  -continue Fe supplement  -discontinue folic acid, ASA, gabapentin  -follow up  6/25, GI in 4 weeks     (E46) Protein-calorie malnutrition, unspecified severity (H24)  (F32.0) Current mild episode of major depressive disorder, unspecified whether recurrent (H24)  Comment: palliative consult in hospital, not thriving, flat affect, BMI 14.04  Plan:   -dietary to follow  -0butt paste BID to coccyx/buttocks  -start mirtazapine 7.5mg Qpm, increased to 30mg  -discontinue lexapro  -encourage intake  -ACP to eval/treat        (Z89.611) Status post above-knee amputation of right lower extremity (H)  (L97.816) 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)  Comment: amputation Feb 2024, small open wound on stump  Plan:   -change dressing and pack daily  -APAP and oxycodone for pain     (M06.9) Rheumatoid arthritis, involving unspecified site, unspecified whether rheumatoid factor present (H)  Comment: chronic debilitating RA multiple joints  Plan: continue arava, methotrexate, prednisone and APAP  -follow up rheumatologist after TCU stay    Discharge Medications:  MED REC REQUIRED  Post Medication Reconciliation Status: medication reconcilation previously completed during another office visit       Current Outpatient Medications   Medication Sig Dispense Refill    oxyCODONE (ROXICODONE) 5 MG tablet Take 1 tablet (5 mg) by mouth 4 times daily And 5mg Q6h PRN 30 tablet 0    acetaminophen (TYLENOL) 325 MG tablet Take 2 tablets (650 mg) by mouth every 6 hours 24 tablet 0    amLODIPine (NORVASC) 5 MG tablet TAKE 1 TABLET BY MOUTH EVERY DAY 90 tablet 3    apixaban ANTICOAGULANT (ELIQUIS) 5 MG tablet Take 2 tablets (10 mg)  by mouth 2 times daily for 7 days, THEN 1 tablet (5 mg) 2 times daily for 83 days. 194 tablet 0    calcium carbonate 600 mg-vitamin D 400 units (CALCIUM 600 + D) 600-400 MG-UNIT per tablet Take 2 tablets by mouth daily      Elemental iron 65 mg Vitamin C 125 mg (VITRON C)  MG TABS tablet Take 1 tablet by mouth daily      famotidine (PEPCID) 40 MG tablet Take 1 tablet (40 mg) by mouth at bedtime      fluocinolone acetonide oil 0.01 % ear drops Place 0.25 mLs (5 drops) into both ears twice a week 20 mL 3    leflunomide (ARAVA) 20 MG tablet Take 1 tablet by mouth every 24 hours      lisinopril (ZESTRIL) 20 MG tablet TAKE 1 TABLET BY MOUTH EVERY DAY 90 tablet 0    methotrexate 2.5 MG tablet Take 20 mg by mouth every 7 days On Saturdays      metoprolol succinate ER (TOPROL XL) 50 MG 24 hr tablet TAKE ONE TABLET BY MOUTH EVERY DAY 90 tablet 0    mirtazapine (REMERON) 7.5 MG tablet Take 4 tablets (30 mg) by mouth at bedtime      polyethylene glycol (MIRALAX) 17 GM/Dose powder Take 17 g by mouth daily 510 g 0    predniSONE (DELTASONE) 5 MG tablet Take 1 tablet (5 mg) by mouth daily  0    simethicone (MYLICON) 125 MG chewable tablet Take 1 tablet (125 mg) by mouth 3 times daily      simvastatin (ZOCOR) 20 MG tablet Take 1 tablet (20 mg) by mouth At Bedtime 90 tablet 1          Controlled medications:   Medication oxycodone electronically prescribed to [facility] pharmacy #[id number]     Past Medical History:   Past Medical History:   Diagnosis Date    Anemia 8/24/2022    BENIGN HYPERTENSION 3/1/2005    CAD (coronary artery disease) 1/26/2011    Coronary artery disease involving native coronary artery of native heart without angina pectoris 9/27/2016    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    Essential hypertension with goal blood pressure less than 140/90 8/25/2016    Hyperlipidemia LDL goal <100 11/12/2010    Hypertension goal BP (blood pressure) < 130/80 1/26/2011    Infection of right below knee amputation (H) 10/1/2019    IRON DEFIC  ANEMIA NOS 9/15/2005    Lower limb amputation, below knee 12/26/2012    MI (myocardial infarction) (H)     3/2010    Non-healing surgical wound, subsequent encounter 9/24/2020    Added automatically from request for surgery [id number]    Osteoporosis 2/16/2005    OSTEOPOROSIS NOS 2/16/2005    Protein-calorie malnutrition (H24) 5/18/2022    PVD (peripheral vascular disease) (H24) 5/18/2022    Rheumatoid arthritis (H) 2/16/2005         Rheumatoid arthritis(714.0) 2/16/2005    Status post below-knee amputation (H) 12/26/2012          Physical Exam:   Vitals: /70   Pulse 75   Temp 98.2  F (36.8  C)   Resp 18   Wt 38.2 kg (84 lb 3.2 oz)   LMP  (LMP Unknown)   SpO2 95%   BMI 14.45 kg/m    BMI: Body mass index is 14.45 kg/m .  GENERAL APPEARANCE:  in no distress, appears healthy  ENT:  Mouth and posterior oropharynx normal, moist mucous membranes  RESP:  lungs clear to auscultation , no respiratory distress  CV:  no edema, rate-normal  ABDOMEN:  bowel sounds normal  M/S:   Gait and station abnormal unsteady  SKIN:  Inspection of skin and subcutaneous tissue baseline  NEURO:   Examination of sensation by touch normal  PSYCH:  affect and mood normal     SNF labs: Labs done in SNF are in [place] EPIC. Please refer to them using [employer]/Care Everywhere.    DISCHARGE PLAN:  Follow up labs: No labs orders/due  Medical Follow Up:      Follow up with primary care provider in 1 weeks  Current [place] scheduled appointments:  Appointments in Next Year      Jun 25, 2024 10:00 AM  Discharge Summary with DESIRE Link CNP  [facility] ([facility] ) [phone] Jul 25, 2024 1:30 PM  (Arrive by 1:15 PM)  Return Visit with Pito Mosher MD  [facility] ([facility] ) [phone]     Mar 03, 2025 1:00 PM  (Arrive by 12:45 PM)  New Patient with Wilson King DO  [facility]  [facility] [place] ) [phone]           Discharge Services: Home Care:  Occupational Therapy, Physical Therapy, Registered Nurse, and Home Health Aide  Discharge Instructions Verbalized to Patient at Discharge:   None    TOTAL DISCHARGE TIME:   Greater than 30 minutes  Electronically signed by:  DESIRE Link CNP         Documentation of Face-to-Face and Certification for Home Health Services     Patient: Sakshi Jaeger   YOB: 1945  MR Number: [id number]  Today's Date: 6/25/2024    I certify that patient: Sakshi Jaeger is under my care and that I, or a nurse practitioner or physician's assistant working with me, had a face-to-face encounter that meets the physician face-to-face encounter requirements with this patient on: 6/25/2024.    This encounter with the patient was in whole, or in part, for the following medical condition, which is the primary reason for home health care:   1. Gastrointestinal hemorrhage with melena    2. Herpes simplex esophagitis    3. Duodenal ulcer    4. Ulcer of esophagus with bleeding    5. Anemia, unspecified type    6. Protein-calorie malnutrition, unspecified severity (H24)    7. Current mild episode of major depressive disorder, unspecified whether recurrent (H24)    8. Status post above-knee amputation of right lower extremity (H)    9. 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 Rheumatoid arthritis, involving unspecified site, unspecified whether rheumatoid factor present (H)          I certify that, based on my findings, the following services are medically necessary home health services: Nursing, Occupational Therapy, Physical Therapy, and HHA .    My clinical findings support the need for the above services because: Nurse is needed: To provide caregiver training to assist with: med teaching, wounds, pain.., Occupational  Therapy Services are needed to assess and treat cognitive ability and address ADL safety due to impairment in transfers., Physical Therapy Services are needed to assess and treat the following functional impairments: gait., and HHA for bathing.    Further, I certify that my clinical findings support that this patient is homebound (i.e. absences from home require considerable and taxing effort and are for medical reasons or [employer] services or infrequently or of short duration when for other reasons) because: Requires assistance of another person or specialized equipment to access medical services because patient: Is unable to operate assistive equipment on their own...    Based on the above findings. I certify that this patient is confined to the home and needs intermittent skilled nursing care, physical therapy and/or speech therapy.  The patient is under my care, and I have initiated the establishment of the plan of care.  This patient will be followed by a physician who will periodically review the plan of care.  Physician/Provider to provide follow up care: Lucia Bates    Responsible Medicare certified ROBERTAOS Physician: Maxim Keita NP  Electronic Physician Signature  Date: 6/25/2024

## 2024-06-28 NOTE — TELEPHONE ENCOUNTER
Deann with [facility] is calling regarding an established patient with [facility].       Requesting orders from: Lucia Bates  Provider is following patient: No, need to confirm PCP will follow    Orders Requested    Skilled Nursing  Request for initial certification (first set of orders)   Frequency:  1x/wk for 1 wks  then 2x/wk for 5 wks  Then 1x/wk for 3 wks  3 PRN visits      Patient is also needing refills on:  Pepcid 40 mg  Mirtazapine 7.5 mg, take 4 tabs at HS  Meds are listed as historical on med list      Information was gathered and will be sent to provider for review.  RN will contact Home Care with information after provider review.  Confirmed ok to leave a detailed message with call back.  Contact information confirmed and updated as needed.    AMANDA BlackwoodN RN

## 2024-06-28 NOTE — TELEPHONE ENCOUNTER
Needs PA for mirtazapine 7.5mg 4 tablets a day at bedtime.    Thanks!  Mart Garcia RN   [facility]

## 2024-07-01 NOTE — PROGRESS NOTES
Clinic Care Coordination - Chart Review Only    Situation: Ambulatory Care Coordination leader performing chart review related to staff coverage planning.    Assessment: Primary Care- Care Coordination RN CC was peripherally following patient during recent TCU phase of care.  Upon review 7/1/24, patient has discharged home and is open to skilled home services for support. Patient also has scheduled post-discharge follow up visit with PCP on 7/2/24.     Plan: Care Coordination will close patient given completed follow up and support noted after TCU discharge. Patient has previously received Care Coordination Introduction letter as well which provides contact information.  If future needs arise, a new referral may be placed.    Nena Collazo, AMANDAN, RN   Manager of Ambulatory Care Management  [facility]

## 2024-07-02 PROBLEM — M00.9 INFECTION OF RIGHT KNEE (H): Status: RESOLVED | Noted: 2022-09-28 | Resolved: 2024-01-01

## 2024-07-02 NOTE — PROGRESS NOTES
Assessment & Plan     (Z09) Hospital discharge follow-up  (primary encounter diagnosis)  Comment: Patient seen in clinic today for follow up after hospital and TCU discharged. Patient  had concerns with patient being more confused.  He wanted to know if it could be associated wit her Pepcid. Discussed that follow up should be made with GI to discuss medication change due to his concerns. He was also concerned with patient depression.Discussed medication options. Discussed that patient does not want more medications added.       (F41.9,  F32.A) Anxiety and depression (R63.0) Loss of appetite  Comment: Chronic, unstable. Discussed making changes to mirtazapine increased dose. Also discussed following up to see if that helps. Patient is taking for appetite as well as anxiety and depression.   Plan: mirtazapine (REMERON) 15 MG tablet        Prescription sent to pharmacy    (K26.9) Duodenal ulcer  Comment: Stable.  Plan: Continue current plan of care.     (B00.89,  K20.80) Herpes simplex esophagitis  Comment: Chronic, stable.   Plan: Continue current plan of care.     (E43) Severe protein-calorie malnutrition (H24)  Comment: Chronic. Patient continues to still have issues with appetite   Plan: Continue current plan of care.     (I73.9) PVD (peripheral vascular disease) (H24)  Comment: Chronic, stable.   Plan: Continue current plan of care.         MED REC REQUIRED  Post Medication Reconciliation Status: discharge medications reconciled and changed, per note/orders        Robin Cantor is a 79 year old, presenting for the following health issues:  Hospital F/U and Health Maintenance        7/2/2024    10:12 AM   Additional Questions   Roomed by osei quinteros   Accompanied by Khushi spouse     [facility]         Hospital Follow-up Visit:    Hospital/Nursing Home/IP Rehab Facility:  [facility]  Date of Admission: June 5, 2024  Date of Discharge: June 25, 2024  Reason(s) for Admission: generalized  weakness  Was the patient in the ICU or did the patient experience delirium during hospitalization?  No  Do you have any other stressors you would like to discuss with your provider? No    Problems taking medications regularly:  None  Medication changes since discharge: Yes, changes in medications   Problems adhering to non-medication therapy:  None    Summary of hospitalization:  [facility] discharge summary reviewed  Diagnostic Tests/Treatments reviewed.  Follow up needed: none  Other Healthcare Providers Involved in Patient s Care:         None  Update since discharge: stable.     Plan of care communicated with patient and family             Review of Systems  Constitutional, HEENT, cardiovascular, pulmonary, gi and gu systems are negative, except as otherwise noted.      Objective    /69 (BP Location: Left arm, Patient Position: Chair, Cuff Size: Adult Regular)   Pulse 83   Temp 99.6  F (37.6  C) (Temporal)   Resp 20   LMP  (LMP Unknown)   There is no height or weight on file to calculate BMI.  Physical Exam   GENERAL: alert and no distress  NECK: no adenopathy, no asymmetry, masses, or scars  RESP: lungs clear to auscultation - no rales, rhonchi or wheezes  CV: regular rate and rhythm, normal S1 S2, no S3 or S4, no murmur, click or rub, no peripheral edema  ABDOMEN: soft, nontender, no hepatosplenomegaly, no masses and bowel sounds normal  MS: no edema           Signed Electronically by: DESIRE Barnes CNP  Answers submitted by the patient for this visit:  Patient Health Questionnaire (Submitted on 7/2/2024)  If you checked off any problems, how difficult have these problems made it for you to do your work, take care of things at home, or get along with other people?: Not difficult at all  PHQ9 TOTAL SCORE: 0

## 2024-07-02 NOTE — TELEPHONE ENCOUNTER
Home Care is calling regarding an established patient with [facility].       Requesting orders from: Lucia Bates  Provider is following patient: Yes  Is this a 60-day recertification request?  No    Orders Requested    Physical Therapy  Request for continuation of care with increase in frequency  Frequency:  1x/wk for 1 wks then 2x/wk for 2 weeks then 1x/wk for 5 weeks        Verbal orders given.  Home Care will send orders for provider to sign.  Confirmed ok to leave a detailed message with call back.  Contact information confirmed and updated as needed.    Kristie Franco RN

## 2024-07-03 PROBLEM — K92.2 UPPER GI BLEED: Status: ACTIVE | Noted: 2024-01-01

## 2024-07-03 PROBLEM — D64.9 LOW HEMOGLOBIN: Status: ACTIVE | Noted: 2024-01-01

## 2024-07-03 NOTE — ED NOTES
Pt struggled with PO medications, choked on pill, displayed a very weak cough and had a hard time clearing her throat. Dr. Gutierrez made aware of finding with request for swallow eval pending speech therapy. Will hold PO meds until update given by Dr. Gutierrez

## 2024-07-03 NOTE — TELEPHONE ENCOUNTER
[facility] Pharm[facility] Prior Authorization Team   Phone: [phone]    Provider changed rx to 15mg tablets - will disregard this request at this time.

## 2024-07-03 NOTE — ED PROVIDER NOTES
"ED Provider Note  [facility]      History     Chief Complaint   Patient presents with    Dehydration     Per EMS, pt is confused, not drinking/eating since returning home from hospital following surgery here at the . Home health nurse called 911 to have the pt brought to the ED.    Altered Mental Status     Per pt's family member \"She seems to be a little more confused than normal. I think they missed some of her antidepression meds\".       HPI  Sakshi Jaeger is a 79 year old female with history of rheumatoid arthritis, prior right above-knee amputation, recent admission for acute blood loss anemia secondary to duodenal and esophageal ulcer who presents the emergency Department with confusion, decreased oral intake and hydration since discharge as well as altered mental status.     Past Medical History  Past Medical History:   Diagnosis Date    Anemia 8/24/2022    BENIGN HYPERTENSION 3/1/2005    CAD (coronary artery disease) 1/26/2011    Coronary artery disease involving native coronary artery of native heart without angina pectoris 9/27/2016    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    Essential hypertension with goal blood pressure less than 140/90 8/25/2016    Hyperlipidemia LDL goal <100 11/12/2010    Hypertension goal BP (blood pressure) < 130/80 1/26/2011    Infection of right below knee amputation (H) 10/1/2019    IRON DEFIC ANEMIA NOS 9/15/2005    Lower limb amputation, below knee 12/26/2012    MI (myocardial infarction) (H)     3/2010    Non-healing surgical wound, subsequent encounter 9/24/2020    Added automatically from request for surgery [id number]    Osteoporosis 2/16/2005    OSTEOPOROSIS NOS 2/16/2005    Protein-calorie malnutrition (H24) 5/18/2022    PVD (peripheral vascular disease) (H24) 5/18/2022    Rheumatoid arthritis (H) 2/16/2005         Rheumatoid arthritis(714.0) 2/16/2005    Status post below-knee amputation (H) 12/26/2012          Past Surgical History:   Procedure " Laterality Date    ---------INCISION AND DRAINAGE  03/05/2014    AMPUTATE LEG ABOVE KNEE Right 1/13/2023    Procedure: AMPUTATION, ABOVE KNEE RIGHT;  Surgeon: Emma Oconnor MD;  Location: [facility]    AMPUTATE LEG ABOVE KNEE Right 2/2/2024    Procedure: Redo ABOVE KNEE AMPUTATION;  Surgeon: Bryon Mitchell MD;  [facility]    AMPUTATE LEG BELOW KNEE Right 9/28/2022    Procedure: Right through knee amputation;  Surgeon: Doron Mott MD;  [facility]: [facility]    AMPUTATION BELOW KNEE RT/LT  01/01/2005    right lowwer leg.     ANGIOGRAM Right 1/13/2023    Procedure: right iliac arteriogram aned right common iliac artery stent;  Surgeon: Emma Oconnor MD;  [facility]    APPENDECTOMY      ARTHROPLASTY KNEE  04/27/2011    Procedure:ARTHROPLASTY KNEE; Surgeon:JESSE HAWKINS; [facility]    COMPLEX WOUND CLOSURE (UPDATE) Right 12/08/2021    Procedure: Right stump wound debridment and closure;  Surgeon: RAISA Perea MD;  Location: [facility] OR    CORONARY STENT PLACEMENT      ENDARTERECTOMY FEMORAL Right 1/13/2023    Procedure: ENDARTERECTOMY, FEMORAL RIGHT;  Surgeon: Emma Oconnor MD;  [facility]    ESOPHAGOSCOPY, GASTROSCOPY, DUODENOSCOPY (EGD), COMBINED N/A 5/25/2024    Procedure: ESOPHAGOGASTRODUODENOSCOPY, WITH BIOPSY;  Surgeon: Xander Gomez MD;  [facility]: [facility]    INJECT NERVE BLOCK SPHENOPALATINE GANGLION N/A 9/29/2022    Procedure: Out of OR encounter for block to be done by ;  Surgeon: GENERIC ANESTHESIA PROVIDER;  [facility]: [facility]    IR OR ANGIOGRAM  1/13/2023    IRRIGATION AND DEBRIDEMENT LOWER EXTREMITY, COMBINED Right 10/09/2019    Procedure: Irrigation and debridement Right leg;  Surgeon: Doron Mott MD;  [facility]    NERVE BLOCK PERIPHERAL N/A 9/27/2022    Procedure: BLOCK, NERVE;  Surgeon: GENERIC ANESTHESIA PROVIDER;  [facility]    OTHER SURGICAL HISTORY  03/05/2014    I AND D     OTHER SURGICAL HISTORY Right 10/09/2019     IRRIGATION AND DEBRIDEMENT LOWER EXTREMITY, COMBINED    PHACOEMULSIFICATION CLEAR CORNEA WITH STANDARD INTRAOCULAR LENS IMPLANT Left 9/8/2022    Procedure: PHACOEMULSIFICATION, LEFT CATARACT, WITH INTRAOCULAR LENS IMPLANT;  Surgeon: Flip Izaguirre MD;  Location: [facility]    PHACOEMULSIFICATION CLEAR CORNEA WITH STANDARD INTRAOCULAR LENS IMPLANT Right 10/13/2022    Procedure: PHACOEMULSIFICATION, RIGHT CATARACT, WITH INTRAOCULAR LENS IMPLANT;  Surgeon: Flip Izaguirre MD;  Location: [facility]    REVISE SCAR EXTREMITY Right 12/17/2020    Procedure: Right stump scar revision;  Surgeon: RAISA Perea MD;  Location: [facility] OR     acetaminophen (TYLENOL) 325 MG tablet  amLODIPine (NORVASC) 5 MG tablet  apixaban ANTICOAGULANT (ELIQUIS) 5 MG tablet  calcium carbonate 600 mg-vitamin D 400 units (CALCIUM 600 + D) 600-400 MG-UNIT per tablet  Elemental iron 65 mg Vitamin C 125 mg (VITRON C)  MG TABS tablet  famotidine (PEPCID) 40 MG tablet  fluocinolone acetonide oil 0.01 % ear drops  leflunomide (ARAVA) 20 MG tablet  lisinopril (ZESTRIL) 20 MG tablet  methotrexate 2.5 MG tablet  metoprolol succinate ER (TOPROL XL) 50 MG 24 hr tablet  mirtazapine (REMERON) 15 MG tablet  oxyCODONE (ROXICODONE) 5 MG tablet  polyethylene glycol (MIRALAX) 17 GM/Dose powder  predniSONE (DELTASONE) 5 MG tablet  simethicone (MYLICON) 125 MG chewable tablet  simvastatin (ZOCOR) 20 MG tablet      Allergies   Allergen Reactions    Adhesive Tape Other (See Comments)     Fragile skin     Family History  Family History   Problem Relation Age of Onset    Cardiovascular Mother     Cancer Brother     Diabetes No family hx of     Hypertension No family hx of     Cerebrovascular Disease No family hx of     Alzheimer Disease No family hx of     Thyroid Disease No family hx of     Respiratory No family hx of     Other - See Comments Mother         CARDIOVASCULAR    Cancer Brother      Social History   Social History     Tobacco Use  "   Smoking status: Former     Current packs/day: 0.00     Average packs/day: 0.5 packs/day for 45.0 years (22.5 ttl pk-yrs)     Types: Cigarettes     Start date: 1965     Quit date: 2010     Years since quittin.3     Passive exposure: Past    Smokeless tobacco: Never   Vaping Use    Vaping status: Never Used   Substance Use Topics    Alcohol use: Yes     Comment: occsionally-3 -4 drinks a week    Drug use: No      Past medical history, past surgical history, medications, allergies, family history, and social history were reviewed with the patient. No additional pertinent items.   A medically appropriate review of systems was performed with pertinent positives and negatives noted in the HPI, and all other systems negative.    Physical Exam   BP: 104/72  Pulse: 95  Temp: 98.3  F (36.8  C)  Resp: 16  Height: 167.6 cm (5' 6\")  Weight: 38.6 kg (85 lb)  SpO2: 94 %  Physical Exam  Physical Exam   Constitutional: Pt is oriented to person, place, and time.Pt appears well-developed and well-nourished.   HENT:   Head: Normocephalic and atraumatic.   Eyes: Conjunctivae are normal. Pupils are equal, round, and reactive to light.   Neck: Normal range of motion. Neck supple.   Cardiovascular: Normal rate, regular rhythm, normal heart sounds and intact distal pulses.    Pulmonary/Chest: Effort normal and breath sounds normal. No respiratory distress. Pt has no wheezes. Pt has no rales  Abdominal: Soft. Bowel sounds are normal. Pt exhibits no distension and no mass. No tenderness. Pt has no rebound and no guarding.   Musculoskeletal: Normal range of motion. Pt exhibits no edema.   Neurological: Pt is alert and oriented to person, place, and time.   Skin: Skin is warm and dry. No rash noted.   Psychiatric: Pt has a normal mood and affect. Behavior is normal. Judgment and thought content normal.      ED Course, Procedures, & Data      Procedures       A consult was attained from the gastroenterology service.      "   Results for orders placed or performed during the hospital encounter of 07/03/24   Head CT w/o contrast     Status: None    Narrative    EXAM: CT HEAD W/O CONTRAST  7/3/2024 3:58 PM     HISTORY: AMS       COMPARISON: None    TECHNIQUE: Using multidetector thin collimation helical acquisition  technique, axial, coronal and sagittal CT images from the skull base  to the vertex were obtained without intravenous contrast.   (topogram) image(s) also obtained and reviewed. Dose reduction  techniques were used.    FINDINGS:  No acute intracranial hemorrhage, mass effect, or midline shift. No CT  findings of acute infarct or hydrocephalus. Preserved subarachnoid  spaces. Moderate diffuse parenchymal volume loss. The ventricles and  sulci appear proportionate. Bilateral periventricular hypoattenuation,  likely sequelae of chronic small vessel ischemic disease.  Atherosclerotic calcifications of the carotid siphons.    Atraumatic calvarium. Mild sphenoid sinus mucosal thickening. Mild  right mastoid effusion. Bilateral pseudophakia..       Impression    IMPRESSION:   1. No acute intracranial pathology.  2. Moderate diffuse parenchymal volume loss and sequelae of chronic  small vessel ischemic disease..    I have personally reviewed the examination and initial interpretation  and I agree with the findings.    LISA TAYLOR MD         SYSTEM ID:  [id number]   Comprehensive metabolic panel     Status: Abnormal   Result Value Ref Range    Sodium 129 (L) 135 - 145 mmol/L    Potassium 2.8 (L) 3.4 - 5.3 mmol/L    Carbon Dioxide (CO2) 26 22 - 29 mmol/L    Anion Gap 10 7 - 15 mmol/L    Urea Nitrogen 11.8 8.0 - 23.0 mg/dL    Creatinine 0.46 (L) 0.51 - 0.95 mg/dL    GFR Estimate >90 >60 mL/min/1.73m2    Calcium 8.0 (L) 8.8 - 10.2 mg/dL    Chloride 93 (L) 98 - 107 mmol/L    Glucose 83 70 - 99 mg/dL    Alkaline Phosphatase 80 40 - 150 U/L    AST 18 0 - 45 U/L    ALT 11 0 - 50 U/L    Protein Total 4.9 (L) 6.4 - 8.3 g/dL    Albumin  2.6 (L) 3.5 - 5.2 g/dL    Bilirubin Total 0.7 <=1.2 mg/dL   Magnesium     Status: Abnormal   Result Value Ref Range    Magnesium 1.3 (L) 1.7 - 2.3 mg/dL   Phosphorus     Status: Abnormal   Result Value Ref Range    Phosphorus 2.1 (L) 2.5 - 4.5 mg/dL   UA with Microscopic reflex to Culture     Status: Abnormal    Specimen: Urine, Nolan Catheter   Result Value Ref Range    Color Urine Yellow Colorless, Straw, Light Yellow, Yellow    Appearance Urine Clear Clear    Glucose Urine Negative Negative mg/dL    Bilirubin Urine Negative Negative    Ketones Urine Negative Negative mg/dL    Specific Gravity Urine 1.011 1.003 - 1.035    Blood Urine Negative Negative    pH Urine 7.0 5.0 - 7.0    Protein Albumin Urine Negative Negative mg/dL    Urobilinogen Urine 4.0 (A) Normal, 2.0 mg/dL    Nitrite Urine Negative Negative    Leukocyte Esterase Urine Negative Negative    Mucus Urine Present (A) None Seen /LPF    RBC Urine 0 <=2 /HPF    WBC Urine 0 <=5 /HPF    Narrative    Urine Culture not indicated   [place] Draw     Status: None    Narrative    The following orders were created for panel order [place] Draw.  Procedure                               Abnormality         Status                     ---------                               -----------         ------                     Extra Blue Top Tube[[id number]]                              Final result               Extra Red Top Tube[[id number]]                               Final result                 Please view results for these tests on the individual orders.   CBC with platelets and differential     Status: Abnormal   Result Value Ref Range    WBC Count 1.8 (L) 4.0 - 11.0 10e3/uL    RBC Count 2.11 (L) 3.80 - 5.20 10e6/uL    Hemoglobin 6.4 (LL) 11.7 - 15.7 g/dL    Hematocrit 20.4 (L) 35.0 - 47.0 %    MCV 97 78 - 100 fL    MCH 30.3 26.5 - 33.0 pg    MCHC 31.4 (L) 31.5 - 36.5 g/dL    RDW 27.8 (H) 10.0 - 15.0 %    Platelet Count 258 150 - 450 10e3/uL    % Neutrophils      %  Lymphocytes      % Monocytes      % Eosinophils      % Basophils      % Immature Granulocytes      NRBCs per 100 WBC 1 (H) <1 /100    Absolute Neutrophils      Absolute Lymphocytes      Absolute Monocytes      Absolute Eosinophils      Absolute Basophils      Absolute Immature Granulocytes      Absolute NRBCs 0.0 10e3/uL   Extra Blue Top Tube     Status: None   Result Value Ref Range    Hold Specimen JIC    Extra Red Top Tube     Status: None   Result Value Ref Range    Hold Specimen JIC    INR     Status: Abnormal   Result Value Ref Range    INR 1.86 (H) 0.85 - 1.15   PTT     Status: Abnormal   Result Value Ref Range    aPTT 40 (H) 22 - 38 Seconds   Manual Differential     Status: Abnormal   Result Value Ref Range    % Neutrophils 75 %    % Lymphocytes 5 %    % Monocytes 9 %    % Eosinophils 0 %    % Basophils 2 %    % Metamyelocytes 4 %    % Myelocytes 4 %    % Promyelocytes 1 %    Absolute Neutrophils 1.4 (L) 1.6 - 8.3 10e3/uL    Absolute Lymphocytes 0.1 (L) 0.8 - 5.3 10e3/uL    Absolute Monocytes 0.2 0.0 - 1.3 10e3/uL    Absolute Eosinophils 0.0 0.0 - 0.7 10e3/uL    Absolute Basophils 0.0 0.0 - 0.2 10e3/uL    Absolute Metamyelocytes 0.1 (H) <=0.0 10e3/uL    Absolute Myelocytes 0.1 (H) <=0.0 10e3/uL    Absolute Promyelocytes 0.0 <=0.0 10e3/uL    RBC Morphology Confirmed RBC Indices     Platelet Assessment  Automated Count Confirmed. Platelet morphology is normal.     Automated Count Confirmed. Platelet morphology is normal.    Acanthocytes Marked (A) None Seen    Elliptocytes Moderate (A) None Seen    RBC Fragments Slight (A) None Seen    Polychromasia Moderate (A) None Seen   Reticulocyte count     Status: Abnormal   Result Value Ref Range    % Reticulocyte 5.8 (H) 0.5 - 2.0 %    Absolute Reticulocyte 0.121 (H) 0.025 - 0.095 10e6/uL   Iron and iron binding capacity     Status: Abnormal   Result Value Ref Range    Iron 24 (L) 37 - 145 ug/dL    Iron Binding Capacity 138 (L) 240 - 430 ug/dL    Iron Sat Index 17 15  - 46 %   Ferritin     Status: Abnormal   Result Value Ref Range    Ferritin 1,052 (H) 11 - 328 ng/mL   Transferrin     Status: Abnormal   Result Value Ref Range    Transferrin 117.0 (L) 200.0 - 360.0 mg/dL   Vitamin B12     Status: Abnormal   Result Value Ref Range    Vitamin B12 2,676 (H) 232 - 1,245 pg/mL   CBC with platelets     Status: Abnormal   Result Value Ref Range    WBC Count 2.5 (L) 4.0 - 11.0 10e3/uL    RBC Count 2.84 (L) 3.80 - 5.20 10e6/uL    Hemoglobin 8.7 (L) 11.7 - 15.7 g/dL    Hematocrit 26.9 (L) 35.0 - 47.0 %    MCV 95 78 - 100 fL    MCH 30.6 26.5 - 33.0 pg    MCHC 32.3 31.5 - 36.5 g/dL    RDW 22.5 (H) 10.0 - 15.0 %    Platelet Count 230 150 - 450 10e3/uL   Folate     Status: Normal   Result Value Ref Range    Folic Acid 13.8 4.6 - 34.8 ng/mL   Phosphorus     Status: Abnormal   Result Value Ref Range    Phosphorus 1.7 (L) 2.5 - 4.5 mg/dL   HIV Antigen Antibody Combo Cascade     Status: Normal   Result Value Ref Range    HIV Antigen Antibody Combo Nonreactive Nonreactive   Comprehensive metabolic panel     Status: Abnormal   Result Value Ref Range    Sodium 129 (L) 135 - 145 mmol/L    Potassium 3.3 (L) 3.4 - 5.3 mmol/L    Carbon Dioxide (CO2) 23 22 - 29 mmol/L    Anion Gap 8 7 - 15 mmol/L    Urea Nitrogen 8.3 8.0 - 23.0 mg/dL    Creatinine 0.37 (L) 0.51 - 0.95 mg/dL    GFR Estimate >90 >60 mL/min/1.73m2    Calcium 7.5 (L) 8.8 - 10.2 mg/dL    Chloride 98 98 - 107 mmol/L    Glucose 73 70 - 99 mg/dL    Alkaline Phosphatase 85 40 - 150 U/L    AST 24 0 - 45 U/L    ALT 11 0 - 50 U/L    Protein Total 4.6 (L) 6.4 - 8.3 g/dL    Albumin 2.5 (L) 3.5 - 5.2 g/dL    Bilirubin Total 0.8 <=1.2 mg/dL   Magnesium     Status: Abnormal   Result Value Ref Range    Magnesium 1.5 (L) 1.7 - 2.3 mg/dL   Potassium     Status: Abnormal   Result Value Ref Range    Potassium 3.3 (L) 3.4 - 5.3 mmol/L   Adult Type and Screen     Status: None   Result Value Ref Range    ABO/RH(D) B POS     Antibody Screen Negative Negative     SPECIMEN EXPIRATION DATE [id number]    Prepare red blood cells (unit)     Status: None (Preliminary result)   Result Value Ref Range    Blood Component Type Red Blood Cells     Product Code [id number]     Unit Status Ready for issue     Unit Number [id number]     CROSSMATCH Compatible     CODING SYSTEM [id number]     ISSUE DATE AND TIME [id number]     UNIT ABO/RH B+     UNIT TYPE ISBT 7300    Cytomegalovirus DNA by PCR, Quantitative     Status: Normal    Specimen: Arm, Right; Blood   Result Value Ref Range    CMV DNA IU/mL Not Detected Not Detected IU/mL    Narrative    The adan  CMV assay is a FDA-approved in vitro nucleic acid amplification test for the quantification of cytomegalovirus (CMV) DNA in human EDTA plasma using the Roche adan  iogyn0 instrument for automated viral nucleic acid extraction and purification (silica-based capture technique), followed by PCR amplification and real-time detection. Selective amplification of target nucleic acid from the sample is achieved by the use of target virus-specific forward and reverse primers which are selected from highly conserved regions of the CMV DNA polymerase (UL54) gene.     This test is intended for use as an aid in the management of CMV in transplant patients. In patients receiving anti-CMV therapy, serial DNA measurements can be used to assess viral response to treatment. Titer results are reported in International Units/mL (IU/mL). This assay has received FDA approval for the testing of human EDTA plasma only. The [facility] at [facility] has validated the performance characteristics of the adan  CMV assay for plasma and urine.   Reginald Barr Virus Quantitative PCR, Plasma     Status: Abnormal    Specimen: Arm, Right; Blood   Result Value Ref Range    EBV DNA IU/mL <35 (A) Not Detected IU/mL    EBV DNA Log IU/mL <1.5     Narrative    The adan  EBV assay is an FDA-approved, in vitro nucleic acid amplification  test for the quantification of Reginald-Barr virus (EBV) in human EDTA plasma on the Roche adan  instrument system for automated viral nucleic acid extraction, purification, amplification, and detection of the viral nucleic acid target. Selective amplification of target nucleic acid from the sample is achieved using a dual target virus-specific approach from highly conserved regions of the EBV located in the EBV EBNA-1 gene and the EBV BMRF gene.     This test is intended for use as an aid in the management of EBV in transplant patients. In patients undergoing monitoring of EBV, serial DNA measurements can be used to indicate the need for potential treatment changes and to assess response to treatment. The assay is calibrated to the World Health Organization International Standard for EBV DNA. Titer results are reported in International Units (IU)/mL.   CBC with Platelets & Differential     Status: Abnormal    Narrative    The following orders were created for panel order CBC with Platelets & Differential.  Procedure                               Abnormality         Status                     ---------                               -----------         ------                     CBC with platelets and d...[[id number]]  Abnormal            Final result               Manual Differential[[id number]]          Abnormal            Final result                 Please view results for these tests on the individual orders.   ABO/Rh type and screen     Status: None    Narrative    The following orders were created for panel order ABO/Rh type and screen.  Procedure                               Abnormality         Status                     ---------                               -----------         ------                     Adult Type and Screen[[id number]]                            Final result                 Please view results for these tests on the individual orders.     Medications   magnesium oxide (MAG-OX) tablet 400 mg  (400 mg Oral $Given 7/4/24 0842)   lidocaine 1 % 0.1-1 mL (has no administration in time range)   lidocaine (LMX4) cream (has no administration in time range)   sodium chloride (PF) 0.9% PF flush 3 mL (3 mLs Intracatheter $Given 7/5/24 0306)   sodium chloride (PF) 0.9% PF flush 3 mL (has no administration in time range)   senna-docusate (SENOKOT-S/PERICOLACE) 8.6-50 MG per tablet 1 tablet (has no administration in time range)     Or   senna-docusate (SENOKOT-S/PERICOLACE) 8.6-50 MG per tablet 2 tablet (has no administration in time range)   calcium carbonate (TUMS) chewable tablet 1,000 mg (has no administration in time range)   acetaminophen (TYLENOL) tablet 650 mg (650 mg Oral $Given 7/4/24 2135)     Or   acetaminophen (TYLENOL) Suppository 650 mg ( Rectal See Alternative 7/4/24 2135)   melatonin tablet 1 mg (has no administration in time range)   polyethylene glycol (MIRALAX) Packet 17 g (has no administration in time range)   ondansetron (ZOFRAN ODT) ODT tab 4 mg (has no administration in time range)     Or   ondansetron (ZOFRAN) injection 4 mg (has no administration in time range)   benzocaine-menthol (CHLORASEPTIC MAX) 15-10 MG lozenge 1 lozenge (has no administration in time range)   guaiFENesin (ROBITUSSIN) 20 mg/mL solution 200 mg (has no administration in time range)   Elemental iron 65 mg Vitamin C 125 mg (VITRON C) tablet 1 tablet (1 tablet Oral $Given 7/4/24 0900)   famotidine (PEPCID) tablet 40 mg (40 mg Oral $Given 7/4/24 2241)   leflunomide (ARAVA) tablet 20 mg (20 mg Oral $Given 7/4/24 0900)   lisinopril (ZESTRIL) tablet 20 mg (20 mg Oral $Given 7/4/24 0842)   mirtazapine (REMERON) tablet 15 mg (15 mg Oral $Given 7/4/24 2241)   predniSONE (DELTASONE) tablet 5 mg (5 mg Oral $Given 7/4/24 1583)   simethicone (MYLICON) chewable tablet 160 mg (160 mg Oral $Given 7/4/24 2007)   oxyCODONE (ROXICODONE) tablet 5 mg (5 mg Oral $Given 7/4/24 4327)   HYDROmorphone (DILAUDID) injection 0.2 mg (has no  administration in time range)   naloxone (NARCAN) injection 0.2 mg (has no administration in time range)     Or   naloxone (NARCAN) injection 0.4 mg (has no administration in time range)     Or   naloxone (NARCAN) injection 0.2 mg (has no administration in time range)     Or   naloxone (NARCAN) injection 0.4 mg (has no administration in time range)   apixaban ANTICOAGULANT (ELIQUIS) tablet 2.5 mg (2.5 mg Oral $Given 7/4/24 2007)   potassium & sodium phosphates (NEUTRA-PHOS) Packet 2 packet (2 packets Oral or Feeding Tube $Given 7/5/24 0308)   sodium chloride 0.9% BOLUS 1,000 mL (0 mLs Intravenous Stopped 7/3/24 1742)   potassium chloride (KAYCIEL) solution 40 mEq (40 mEq Oral $Given 7/3/24 1649)   magnesium sulfate 2 g in 50 mL sterile water intermittent infusion (0 g Intravenous Stopped 7/3/24 1828)   potassium chloride willis ER (KLOR-CON M10) CR tablet 20 mEq (20 mEq Oral $Given 7/3/24 1734)     Labs Ordered and Resulted from Time of ED Arrival to Time of ED Departure   COMPREHENSIVE METABOLIC PANEL - Abnormal       Result Value    Sodium 129 (*)     Potassium 2.8 (*)     Carbon Dioxide (CO2) 26      Anion Gap 10      Urea Nitrogen 11.8      Creatinine 0.46 (*)     GFR Estimate >90      Calcium 8.0 (*)     Chloride 93 (*)     Glucose 83      Alkaline Phosphatase 80      AST 18      ALT 11      Protein Total 4.9 (*)     Albumin 2.6 (*)     Bilirubin Total 0.7     MAGNESIUM - Abnormal    Magnesium 1.3 (*)    PHOSPHORUS - Abnormal    Phosphorus 2.1 (*)    ROUTINE UA WITH MICROSCOPIC REFLEX TO CULTURE - Abnormal    Color Urine Yellow      Appearance Urine Clear      Glucose Urine Negative      Bilirubin Urine Negative      Ketones Urine Negative      Specific Gravity Urine 1.011      Blood Urine Negative      pH Urine 7.0      Protein Albumin Urine Negative      Urobilinogen Urine 4.0 (*)     Nitrite Urine Negative      Leukocyte Esterase Urine Negative      Mucus Urine Present (*)     RBC Urine 0      WBC Urine 0      CBC WITH PLATELETS AND DIFFERENTIAL - Abnormal    WBC Count 1.8 (*)     RBC Count 2.11 (*)     Hemoglobin 6.4 (*)     Hematocrit 20.4 (*)     MCV 97      MCH 30.3      MCHC 31.4 (*)     RDW 27.8 (*)     Platelet Count 258      % Neutrophils        % Lymphocytes        % Monocytes        % Eosinophils        % Basophils        % Immature Granulocytes        NRBCs per 100 WBC 1 (*)     Absolute Neutrophils        Absolute Lymphocytes        Absolute Monocytes        Absolute Eosinophils        Absolute Basophils        Absolute Immature Granulocytes        Absolute NRBCs 0.0     INR - Abnormal    INR 1.86 (*)    PARTIAL THROMBOPLASTIN TIME - Abnormal    aPTT 40 (*)    DIFFERENTIAL - Abnormal    % Neutrophils 75      % Lymphocytes 5      % Monocytes 9      % Eosinophils 0      % Basophils 2      % Metamyelocytes 4      % Myelocytes 4      % Promyelocytes 1      Absolute Neutrophils 1.4 (*)     Absolute Lymphocytes 0.1 (*)     Absolute Monocytes 0.2      Absolute Eosinophils 0.0      Absolute Basophils 0.0      Absolute Metamyelocytes 0.1 (*)     Absolute Myelocytes 0.1 (*)     Absolute Promyelocytes 0.0      RBC Morphology Confirmed RBC Indices      Platelet Assessment        Value: Automated Count Confirmed. Platelet morphology is normal.    Acanthocytes Marked (*)     Elliptocytes Moderate (*)     RBC Fragments Slight (*)     Polychromasia Moderate (*)    RETICULOCYTE COUNT - Abnormal    % Reticulocyte 5.8 (*)     Absolute Reticulocyte 0.121 (*)    IRON AND IRON BINDING CAPACITY - Abnormal    Iron 24 (*)     Iron Binding Capacity 138 (*)     Iron Sat Index 17     FERRITIN - Abnormal    Ferritin 1,052 (*)    TRANSFERRIN - Abnormal    Transferrin 117.0 (*)    VITAMIN B12 - Abnormal    Vitamin B12 2,676 (*)    CBC WITH PLATELETS - Abnormal    WBC Count 2.5 (*)     RBC Count 2.84 (*)     Hemoglobin 8.7 (*)     Hematocrit 26.9 (*)     MCV 95      MCH 30.6      MCHC 32.3      RDW 22.5 (*)     Platelet Count 230      PHOSPHORUS - Abnormal    Phosphorus 1.7 (*)    COMPREHENSIVE METABOLIC PANEL - Abnormal    Sodium 129 (*)     Potassium 3.3 (*)     Carbon Dioxide (CO2) 23      Anion Gap 8      Urea Nitrogen 8.3      Creatinine 0.37 (*)     GFR Estimate >90      Calcium 7.5 (*)     Chloride 98      Glucose 73      Alkaline Phosphatase 85      AST 24      ALT 11      Protein Total 4.6 (*)     Albumin 2.5 (*)     Bilirubin Total 0.8     MAGNESIUM - Abnormal    Magnesium 1.5 (*)    POTASSIUM - Abnormal    Potassium 3.3 (*)    SHAWN MENDEZ VIRUS QUANTITATIVE PCR, PLASMA - Abnormal    EBV DNA IU/mL <35 (*)     EBV DNA Log IU/mL <1.5     FOLATE - Normal    Folic Acid 13.8     HIV ANTIGEN ANTIBODY COMBO - Normal    HIV Antigen Antibody Combo Nonreactive     CMV QUANTITATIVE, PCR - Normal    CMV DNA IU/mL Not Detected     COPPER LEVEL   ZINC   BASIC METABOLIC PANEL   CBC WITH PLATELETS   MAGNESIUM   PHOSPHORUS   TYPE AND SCREEN, ADULT    ABO/RH(D) B POS      Antibody Screen Negative      SPECIMEN EXPIRATION DATE [id number]     PREPARE RED BLOOD CELLS (UNIT)    Blood Component Type Red Blood Cells      Product Code [id number]      Unit Status Ready for issue      Unit Number [id number]      CROSSMATCH Compatible      CODING SYSTEM [id number]      ISSUE DATE AND TIME [id number]      UNIT ABO/RH B+      UNIT TYPE ISBT 7300     PREPARE RED BLOOD CELLS (UNIT)   HERPES SIMPLEX VIRUS 1&2 PCR   TRANSFUSE RED BLOOD CELLS (UNIT)   ABO/RH TYPE AND SCREEN     Head CT w/o contrast   Final Result   IMPRESSION:    1. No acute intracranial pathology.   2. Moderate diffuse parenchymal volume loss and sequelae of chronic   small vessel ischemic disease..      I have personally reviewed the examination and initial interpretation   and I agree with the findings.      LISA TAYLOR MD            SYSTEM ID:  [id number]             Critical care was not performed.     Medical Decision Making  The patient's presentation was of high complexity (an acute  health issue posing potential threat to life or bodily function).    The patient's evaluation involved:  review of external note(s) from 1 sources (see separate area of note for details)  review of 3+ test result(s) ordered prior to this encounter (see separate area of note for details)  ordering and/or review of 3+ test(s) in this encounter (see separate area of note for details)  discussion of management or test interpretation with another health professional (see separate area of note for details)    The patient's management necessitated high risk (a decision regarding hospitalization).    Assessment & Plan    Sakshi Jaeger is a 79 year old female with history of rheumatoid arthritis, prior right above-knee amputation, recent admission for acute blood loss anemia secondary to duodenal and esophageal ulcer who presents the emergency Department with confusion, decreased oral intake and hydration since discharge as well as altered mental status.  Patient is nontoxic-appearing on exam, his vital signs within normal limits.  She does appear pale, but otherwise is acting like her normal self and her partner in the room confirms the patient is behaving appropriately and normal to herself.  Given the complaint, she was worked up with screening lab work, CT of the head, and workup came back concerning for a drop in her hemoglobin below 7.  Based on her prior history of gastroesophageal ulcer disease, suspect that this may be related to known ulcers.  Patient was consented for transfusion of 1 unit of red blood cells and admitted to medicine for further care.  GI was consulted for further recommendations management.    I have reviewed the nursing notes. I have reviewed the findings, diagnosis, plan and need for follow up with the patient.    New Prescriptions    No medications on file       Final diagnoses:   Upper GI bleed   Generalized weakness   Low hemoglobin   Hypokalemia   Hypomagnesemia       Jerson Thapa  MD  [facility] EMERGENCY DEPARTMENT  7/3/2024        Jerson Thapa MD  07/05/24 [id number]

## 2024-07-03 NOTE — H&P
[facility]    History and Physical - Medicine Service, MAROON TEAM 4       Date of Admission:  7/3/2024    Assessment & Plan      Sakshi Jaeger is a 79 year old female admitted on 7/3/2024, with 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 She is admitted today for acute blood loss anemia w/ concern for GI bleed, complicated by worsening mental status and poor PO intake    Today  Consulted palliative, SLP, PT, OT, GI. Appreciate recs  -Gave 1u PRBC, will recheck Hb following transfusion.       Acute Problems  #Acute on chronic anemia, likely 2/2 duodenal vs esophageal ulcer (baseline 8.4-9)  #Hx of duodenal and esophgeal ulcer (5/25 EGD ) w/ esophageal ring ulceration and 2 superficial duodenal nonbleeding ulcers.   #Hx of EGD biopsy w/ herpes esophagitis, completed course ov valacyclovir  Admitted w/ hb of 6.4 in a history of duodenal and esophageal ulcers, gave 1UPRBC in ED. Patient denies any hemoptysis, hematuria, melena, hematochezia. .   -PTA pantoprazole, simethicone, famotidine, iron  -GI consulted, appreciate recs    #Progressively worsening altered mental status  #Poor PO intake, SLP recommending soft/easy chew diet w/ thin liquids 5/27 w/ concerns for aspiration  #Asymptomatic hyponatremia, hx of SIADH treated with fluid restriction  #Hypokalemia  #Malnutrition  #Hypophosphatemic, will monitor for refeeding syndrome  #Chronic Hypomagnesemia, followed by nephrology and GI  Patient choking on individual pills in ED. Made NPO until SLP can reasses.  -PTA magnesium oxide 400mg/d  -AM BMP, Mg PO4    #Leukopenia of unclear etiology  No new medications, would expect leukocytosis in the setting of chronic prednisone use. Added on reticulocytes to assess BM production capacity. No hypersplenism noted on  physical exam. Patient has anemia, but platelets are WNL (no pancytopenia). Will consider a peripheral smear pending reticulocyte labs.  Patient not on immuosuprression medications or chemotherapy. May be 2/2 underlying viral or bacterial illness. Consider lab error, will recheck at 2200 along with repeat Hb following PRBC transfusion. Patient without any localzing symptoms, fevers, chills, night sweats. UA pending. Low threshold to order CXR and blood cultures. Will follow up UA. Can consider EBV, HSV, CMV labs, especially given hx of HSV esophagitis.   -AM CBC  -Follow up UA    #Generalized weakness  Was discharged to TCU in 03/2023 following AKA, followed up w/ in home PT and had mild improvement, but services lapsed for 3 weeks and her strength declined. Most recent PT assessment at 06/05 discharge w/ no obvious metabolic, infectious, or organic etiology. Was sent to TCU 6/5-6/25 for rehabilitation. PT/OT consulted during this stay, but may no longer be within patient's goals of care.   -PT/OT consulted    #Quality of Life  #Depression  #Concern for failure to thrive  Patient has had discussions with palliative care during past admissions, with conversations focusing on comfort, minimizing pill burden and lab draws. Metoporol, folate, and statin were stopped at last hospital stay. Continues to want to be full code w/ restorative cares, but would entertain further discussion of these goals during this 7/3 admission. Escitalopram listed as PTA med at discharge, not on home med list. Will re-discuss with patient in AM about reintroducing it given her concerns of pill burden vs 's claim that it improves her mood. Per above PO intake concerns, she has been eating and drinking less and less since her 06/05 discharge.   -Continue PTA acetaminophen, oxycodone 5mg q4hrs PRN for pain control  -Palliative consulted, appreciate recs  -Continue to not start escitalopram at this time    #Urinary retention  Bladder  "scanned at bedside, > 700mL postvoid. Ordered le catheter      Chronic Problems  #Rheumatoid arthritis  -PTA prednisone 5mg/d    #Hx of LLE DVT and left lower lobe PE (5/17), currently on apixaban  -Holding PTA apixaban in the setting of concern for upper GI bleed    #R femoral endarterctomy w/ common iliac stenting, sp JACQUES AKA.  Follows w/ vascular surgery and WOC,  changes wounds at home  -Consulted WOC, appreciate recs.        Diet: NPO w/ concerns of aspiration  DVT Prophylaxis: Held w/ concerns for GI bleed  Le Catheter: Not present  Fluids: None  Lines: PIV, Le  Cardiac Monitoring: None  Code Status:  Full    Clinically Significant Risk Factors Present on Admission        # Hypokalemia: Lowest K = 2.8 mmol/L in last 2 days, will replace as needed  # Hyponatremia: Lowest Na = 129 mmol/L in last 2 days, will monitor as appropriate    # Hypomagnesemia: Lowest Mg = 1.3 mg/dL in last 2 days, will replace as needed   # Hypoalbuminemia: Lowest albumin = 2.6 g/dL at 7/3/2024  2:37 PM, will monitor as appropriate  # Drug Induced Coagulation Defect: home medication list includes an anticoagulant medication    # Hypertension: Noted on problem list    # Anemia: based on hgb <11  # Anemia: based on hgb <11           # Cachexia: Estimated body mass index is 13.72 kg/m  as calculated from the following:    Height as of this encounter: 1.676 m (5' 6\").    Weight as of this encounter: 38.6 kg (85 lb).       # Financial/Environmental Concerns:           Disposition Plan      Expected Discharge Date: 07/05/2024                The patient's care was discussed with the Attending Physician, Dr. Victor .      Dean Gutierrez MD  Medicine Service, [address]  Securely message with [employer] (more info)  Text page via [employer] Paging/Directory   See signed in provider for up to date coverage " information  ______________________________________________________________________    Chief Complaint   Altered mental status    History is obtained from the patient's spouse    History of Present Illness   Sakshi Jaeger is a 79 year old female admitted on 7/3/2024, with 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 She is admitted today for acute blood loss anemia w/ concern for GI bleed, complicated by worsening mental status and poor PO intake    She was seen by PCP on 7/2 for follow up on recent hospital and TCU discharge. Patient had no major complaints, but  was concerned with worsening confusion and depression. For example, she has a motorized scooter that she has used for many months, but recently could not remember how to operate.  also discussed possible increase in dose of mirtazapine for poor appeitie and depression, and is unsure if antidepressant medications were changed following last discharge. This AM, home health nurse noted worsening AMS and poor PO intake, and EMS was called to transport patient to ED.   On admission found to have bicytopenia w/ WBC to 1.8 and Hb to 6.4 from baseline ~8.4. Hyponatremic to 129, K to 3.8, Mg to 1.3 PO4 to 2.1. Coagulation labs w/ PTT to 40 and INR of 1.86. 1 U PRBC was ordered, GI consulted. 60KCL ordered for replacement. Patient admitted to medicine.       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    Essential hypertension with goal blood pressure less than 140/90 8/25/2016    Hyperlipidemia LDL goal <100 11/12/2010    Hypertension  goal BP (blood pressure) < 130/80 1/26/2011    Infection of right below knee amputation (H) 10/1/2019    IRON DEFIC ANEMIA NOS 9/15/2005    Lower limb amputation, below knee 12/26/2012    MI (myocardial infarction) (H)     3/2010    Non-healing surgical wound, subsequent encounter 9/24/2020    Added automatically from request for surgery [id number]    Osteoporosis 2/16/2005    OSTEOPOROSIS NOS 2/16/2005    Protein-calorie malnutrition (H24) 5/18/2022    PVD (peripheral vascular disease) (H24) 5/18/2022    Rheumatoid arthritis (H) 2/16/2005         Rheumatoid arthritis(714.0) 2/16/2005    Status post below-knee amputation (H) 12/26/2012            Past Surgical History   Past Surgical History:   Procedure Laterality Date    ---------INCISION AND DRAINAGE  03/05/2014    AMPUTATE LEG ABOVE KNEE Right 1/13/2023    Procedure: AMPUTATION, ABOVE KNEE RIGHT;  Surgeon: Emma Oconnor MD;  Location: [facility]    AMPUTATE LEG ABOVE KNEE Right 2/2/2024    Procedure: Redo ABOVE KNEE AMPUTATION;  Surgeon: Bryon Mitchell MD;  [facility]    AMPUTATE LEG BELOW KNEE Right 9/28/2022    Procedure: Right through knee amputation;  Surgeon: Doron Mott MD;  Location: [facility]    AMPUTATION BELOW KNEE RT/LT  01/01/2005    right lowwer leg.     ANGIOGRAM Right 1/13/2023    Procedure: right iliac arteriogram aned right common iliac artery stent;  Surgeon: Emma Oconnor MD;  Location: [facility]    APPENDECTOMY      ARTHROPLASTY KNEE  04/27/2011    Procedure:ARTHROPLASTY KNEE; Surgeon:JESSE HAWKINS; Location:[facility]    COMPLEX WOUND CLOSURE (UPDATE) Right 12/08/2021    Procedure: Right stump wound debridment and closure;  Surgeon: RAISA Perea MD;  Location: [facility] OR    CORONARY STENT PLACEMENT      ENDARTERECTOMY FEMORAL Right 1/13/2023    Procedure: ENDARTERECTOMY, FEMORAL RIGHT;  Surgeon: Emma Oconnor MD;  [facility]    ESOPHAGOSCOPY, GASTROSCOPY, DUODENOSCOPY (EGD), COMBINED N/A 5/25/2024    Procedure:  ESOPHAGOGASTRODUODENOSCOPY, WITH BIOPSY;  Surgeon: Xander Gomez MD;  [facility]: [facility]    INJECT NERVE BLOCK SPHENOPALATINE GANGLION N/A 9/29/2022    Procedure: Out of OR encounter for block to be done by ;  Surgeon: GENERIC ANESTHESIA PROVIDER;  Location: [facility]    IR OR ANGIOGRAM  1/13/2023    IRRIGATION AND DEBRIDEMENT LOWER EXTREMITY, COMBINED Right 10/09/2019    Procedure: Irrigation and debridement Right leg;  Surgeon: Doron Mott MD;  [facility]    NERVE BLOCK PERIPHERAL N/A 9/27/2022    Procedure: BLOCK, NERVE;  Surgeon: GENERIC ANESTHESIA PROVIDER;  [facility]: [facility]    OTHER SURGICAL HISTORY  03/05/2014    I AND D     OTHER SURGICAL HISTORY Right 10/09/2019    IRRIGATION AND DEBRIDEMENT LOWER EXTREMITY, COMBINED    PHACOEMULSIFICATION CLEAR CORNEA WITH STANDARD INTRAOCULAR LENS IMPLANT Left 9/8/2022    Procedure: PHACOEMULSIFICATION, LEFT CATARACT, WITH INTRAOCULAR LENS IMPLANT;  Surgeon: Flip Izaguirre MD;  Location: [facility]    PHACOEMULSIFICATION CLEAR CORNEA WITH STANDARD INTRAOCULAR LENS IMPLANT Right 10/13/2022    Procedure: PHACOEMULSIFICATION, RIGHT CATARACT, WITH INTRAOCULAR LENS IMPLANT;  Surgeon: Flip Izaguirre MD;  Location: [facility]    REVISE SCAR EXTREMITY Right 12/17/2020    Procedure: Right stump scar revision;  Surgeon: RAISA Perea MD;  Location: [facility] OR       Prior to Admission Medications   Prior to Admission Medications   Prescriptions Last Dose Informant Patient Reported? Taking?   Elemental iron 65 mg Vitamin C 125 mg (VITRON C)  MG TABS tablet  Other Yes No   Sig: Take 1 tablet by mouth daily   acetaminophen (TYLENOL) 325 MG tablet  Other No No   Sig: Take 2 tablets (650 mg) by mouth every 6 hours   amLODIPine (NORVASC) 5 MG tablet  Other No No   Sig: TAKE 1 TABLET BY MOUTH EVERY DAY   apixaban ANTICOAGULANT (ELIQUIS) 5 MG tablet   No No   Sig: Take 2 tablets (10 mg) by mouth 2 times daily for 7 days, THEN 1  tablet (5 mg) 2 times daily for 83 days.   calcium carbonate 600 mg-vitamin D 400 units (CALCIUM 600 + D) 600-400 MG-UNIT per tablet  Other No No   Sig: Take 2 tablets by mouth daily   famotidine (PEPCID) 40 MG tablet   No No   Sig: Take 1 tablet (40 mg) by mouth at bedtime   fluocinolone acetonide oil 0.01 % ear drops  Other No No   Sig: Place 0.25 mLs (5 drops) into both ears twice a week   leflunomide (ARAVA) 20 MG tablet  Other Yes No   Sig: Take 1 tablet by mouth every 24 hours   lisinopril (ZESTRIL) 20 MG tablet  Other No No   Sig: TAKE 1 TABLET BY MOUTH EVERY DAY   methotrexate 2.5 MG tablet  Other Yes No   Sig: Take 20 mg by mouth every 7 days On Saturdays   metoprolol succinate ER (TOPROL XL) 50 MG 24 hr tablet  Other No No   Sig: TAKE ONE TABLET BY MOUTH EVERY DAY   mirtazapine (REMERON) 15 MG tablet   No No   Sig: Take 1 tablet (15 mg) by mouth at bedtime   oxyCODONE (ROXICODONE) 5 MG tablet   No No   Sig: Take 1 tablet (5 mg) by mouth 4 times daily And 5mg Q6h PRN   polyethylene glycol (MIRALAX) 17 GM/Dose powder  Other No No   Sig: Take 17 g by mouth daily   predniSONE (DELTASONE) 5 MG tablet  Other No No   Sig: Take 1 tablet (5 mg) by mouth daily   simethicone (MYLICON) 125 MG chewable tablet   No No   Sig: Take 1 tablet (125 mg) by mouth 3 times daily   simvastatin (ZOCOR) 20 MG tablet  Other No No   Sig: Take 1 tablet (20 mg) by mouth At Bedtime      Facility-Administered Medications: None        Review of Systems    The 10 point Review of Systems is negative other than noted in the HPI or here.      Physical Exam   Vital Signs: Temp: 98.3  F (36.8  C) Temp src: Oral BP: 101/72 Pulse: 95   Resp: 16 SpO2: 94 % O2 Device: None (Room air)    Weight: 85 lbs 0 oz    General: tired, apathetic  Cards: No murmurs, rubs, gallops. Regular rate and rhythm   Pulm: No crackles, wheezes, rales. Lungs clear to auscultation BL   Abd: Soft, nontender, nondistended, nontympanitic.   Extremeties: Radial, pedal, tibial  pulses present on L. R AKA wound healing appropriately.   Neuro: No focal deficits noted. Answers questions appropriately but overall disinterested in hospital stay.         Data     I have personally reviewed the following data over the past 24 hrs:    1.8 (L)  \   6.4 (LL)   / 258     129 (L) 93 (L) 11.8 /  83   2.8 (L) 26 0.46 (L) \     ALT: 11 AST: 18 AP: 80 TBILI: 0.7   ALB: 2.6 (L) TOT PROTEIN: 4.9 (L) LIPASE: N/A     INR:  1.86 (H) PTT:  40 (H)   D-dimer:  N/A Fibrinogen:  N/A

## 2024-07-04 NOTE — PROGRESS NOTES
Neuro: A&Ox3. Disoriented to time. Pt has had intermittent confusion throughout the day. Calling writer by the wrong name repeatedly. Pt becomes easily aggravated and irritated d/t being in the hospital and wanting to go home.   Cardiac: SR. VSS.   Respiratory: Sating 98 on RA.  GI/: Adequate urine output. No BM reported during shift.   Diet/appetite: Pt had swallow evaluation today and recommended that a clear liquid diet would be appropriate. Speech therapist recommended to have 1:1 while drinking liquids.   Activity:  Pt bedbound d/t AKA of RL.  Pain: Denies pain throughout shift.   Skin: No new deficits noted.  LDA's: RPIV flushed, saline locked and capped.     Plan: Continue with POC. Notify primary team with changes.

## 2024-07-04 NOTE — CONSULTS
GASTROENTEROLOGY CONSULTATION      Date of Admission:  7/3/2024          ASSESSMENT AND RECOMMENDATIONS:     79 year old female with a history of DVT/PE on Eliquis, PVD s/p R AKA, RA who presents with acute on chronic anemia and fatigue. GI consulted for evaluation of GI bleeding.    #. Acute on chronic anemia  #. History of HSV esophagitis  #. Duodenal ulcers    Recently underwent EGD on 05/25 with evidence of esophagitis, esophageal ulcers, esophageal ring with ulceration. Biopsies obtained to rule out CMV, malignancy. Also found to have duodenal ulcers. Gastric biopsies obtained to rule out H.pylori, also duodenal biopsies to rule out CMV.     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 Also started taking pantoprazole 40 mg BID, although that was decreased to once daily shortly after due to persistent hypomagnesemia, along with BID Famotidine and Simethicone.      Normal colonoscopy in 2018.      Would hold off repeat endoscopies in the absence of active GI bleeding and overcorrection of hemoglobin after 1U pRBC transfusion. Ongoing slow bleeding still possible from the upper GI tract, would require BID PPI therapy at this point. Repeat endoscopic evaluation would unlikely .     RECOMMENDATIONS    - Escalate to BID PPI therapy.  - Hold off any GI interventions at this time.   - Continue Famotidine, Simethicone, iron  - Defer AC plan to primary team but consider IVC filter to reduce risk of bleeding.    Gastroenterology follow up recommendations: None    Thank you for involving us in this patient's care. Please do not hesitate to contact the GI service with any questions or concerns.     Patient care plan discussed with Dr. Gomez, GI staff physician.    Wilberto Arguelles MD  Gastroenterology Fellow  Pager             Chief Complaint:   We were asked by Medicine service of [facility] to evaluate this patient with Acute on chronic  anemia    History is obtained from the patient and the medical record.          History of Present Illness:   Sakshi Jaeger is a 79 year old female with a history of DVT/PE on Eliquis, PVD s/p R AKA, RA who presents with acute on chronic anemia and fatigue. GI consulted for evaluation of GI bleeding.      Patient recently hospitalized with melena, anemia, underwent EGD that revealed esophageal and duodenal ulcers, found to have HSV esophagitis. Then discharged to TCU, but since then with worsening confusion, depression, poor nutrition and appetite. Now with woorsening mental status as well. Found to have Hg droop from 8.4 to 6.4, Mg 1.3, got 1U pRBC.         Past Medical History:   Reviewed and edited as appropriate  Past Medical History:   Diagnosis Date    Anemia 8/24/2022    BENIGN HYPERTENSION 3/1/2005    CAD (coronary artery disease) 1/26/2011    Coronary artery disease involving native coronary artery of native heart without angina pectoris 9/27/2016    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    Essential hypertension with goal blood pressure less than 140/90 8/25/2016    Hyperlipidemia LDL goal <100 11/12/2010    Hypertension goal BP (blood pressure) < 130/80 1/26/2011    Infection of right below knee amputation (H) 10/1/2019    IRON DEFIC ANEMIA NOS 9/15/2005    Lower limb amputation, below knee 12/26/2012    MI (myocardial infarction) (H)     3/2010    Non-healing surgical wound, subsequent encounter 9/24/2020    Added automatically from request for surgery [id number]    Osteoporosis 2/16/2005    OSTEOPOROSIS NOS 2/16/2005    Protein-calorie malnutrition (H24) 5/18/2022    PVD (peripheral vascular disease) (H24) 5/18/2022    Rheumatoid arthritis (H) 2/16/2005         Rheumatoid arthritis(714.0) 2/16/2005    Status post below-knee amputation (H) 12/26/2012                 Past Surgical History:   Reviewed and edited as appropriate   Past Surgical History:   Procedure Laterality Date    ---------INCISION AND DRAINAGE   03/05/2014    AMPUTATE LEG ABOVE KNEE Right 1/13/2023    Procedure: AMPUTATION, ABOVE KNEE RIGHT;  Surgeon: Emma Oconnor MD;  [facility]    AMPUTATE LEG ABOVE KNEE Right 2/2/2024    Procedure: Redo ABOVE KNEE AMPUTATION;  Surgeon: Bryon Mitchell MD;  [facility]    AMPUTATE LEG BELOW KNEE Right 9/28/2022    Procedure: Right through knee amputation;  Surgeon: Doron Mott MD;  [facility]    AMPUTATION BELOW KNEE RT/LT  01/01/2005    right lowwer leg.     ANGIOGRAM Right 1/13/2023    Procedure: right iliac arteriogram aned right common iliac artery stent;  Surgeon: Emma Oconnor MD;  [facility]    APPENDECTOMY      ARTHROPLASTY KNEE  04/27/2011    Procedure:ARTHROPLASTY KNEE; Surgeon:JESSE HAWKINS; [facility]    COMPLEX WOUND CLOSURE (UPDATE) Right 12/08/2021    Procedure: Right stump wound debridment and closure;  Surgeon: RAISA Perea MD;  Location: [facility]    CORONARY STENT PLACEMENT      ENDARTERECTOMY FEMORAL Right 1/13/2023    Procedure: ENDARTERECTOMY, FEMORAL RIGHT;  Surgeon: Emma Oconnor MD;  [facility]    ESOPHAGOSCOPY, GASTROSCOPY, DUODENOSCOPY (EGD), COMBINED N/A 5/25/2024    Procedure: ESOPHAGOGASTRODUODENOSCOPY, WITH BIOPSY;  Surgeon: Xander Gomez MD;  [facility]    INJECT NERVE BLOCK SPHENOPALATINE GANGLION N/A 9/29/2022    Procedure: Out of OR encounter for block to be done by ;  Surgeon: GENERIC ANESTHESIA PROVIDER;  [facility]: [facility]    IR OR ANGIOGRAM  1/13/2023    IRRIGATION AND DEBRIDEMENT LOWER EXTREMITY, COMBINED Right 10/09/2019    Procedure: Irrigation and debridement Right leg;  Surgeon: Doron Mott MD;  [facility]    NERVE BLOCK PERIPHERAL N/A 9/27/2022    Procedure: BLOCK, NERVE;  Surgeon: GENERIC ANESTHESIA PROVIDER;  [facility]    OTHER SURGICAL HISTORY  03/05/2014    I AND D     OTHER SURGICAL HISTORY Right 10/09/2019    IRRIGATION AND DEBRIDEMENT LOWER EXTREMITY, COMBINED     PHACOEMULSIFICATION CLEAR CORNEA WITH STANDARD INTRAOCULAR LENS IMPLANT Left 2022    Procedure: PHACOEMULSIFICATION, LEFT CATARACT, WITH INTRAOCULAR LENS IMPLANT;  Surgeon: Flip Izaguirre MD;  Location: [facility]    PHACOEMULSIFICATION CLEAR CORNEA WITH STANDARD INTRAOCULAR LENS IMPLANT Right 10/13/2022    Procedure: PHACOEMULSIFICATION, RIGHT CATARACT, WITH INTRAOCULAR LENS IMPLANT;  Surgeon: Flip Izaguirre MD;  Location: [facility]    REVISE SCAR EXTREMITY Right 2020    Procedure: Right stump scar revision;  Surgeon: RAISA Perea MD;  Location: [facility] OR            Previous Endoscopy:   No results found. However, due to the size of the patient record, not all encounters were searched. Please check Results Review for a complete set of results.         Social History:   Reviewed and edited as appropriate  Social History     Socioeconomic History    Marital status: Single     Spouse name: Not on file    Number of children: 3    Years of education: Not on file    Highest education level: Not on file   Occupational History    Not on file   Tobacco Use    Smoking status: Former     Current packs/day: 0.00     Average packs/day: 0.5 packs/day for 45.0 years (22.5 ttl pk-yrs)     Types: Cigarettes     Start date: 1965     Quit date: 2010     Years since quittin.3     Passive exposure: Past    Smokeless tobacco: Never   Vaping Use    Vaping status: Never Used   Substance and Sexual Activity    Alcohol use: Yes     Comment: occsionally-3 -4 drinks a week    Drug use: No    Sexual activity: Yes     Partners: Male     Birth control/protection: Post-menopausal   Other Topics Concern    Parent/sibling w/ CABG, MI or angioplasty before 65F 55M? No   Social History Narrative    Live with Santi.  Together since .       Social Determinants of Health     Financial Resource Strain: Not on file   Food Insecurity: Not on file   Transportation Needs: Not on file   Physical  Activity: Not on file   Stress: Not on file   Social Connections: Unknown (11/18/2022)    [facility]    Social Connections     Frequency of Communication with Friends and Family: Not on file   Interpersonal Safety: Not on file   Housing Stability: Not on file            Family History:   Reviewed and edited as appropriate  No known history of gastrointestinal/liver disease or  gastrointestinal malignancies       Allergies:   Reviewed and edited as appropriate     Allergies   Allergen Reactions    Adhesive Tape Other (See Comments)     Fragile skin            Medications:     Current Facility-Administered Medications   Medication Dose Route Frequency Provider Last Rate Last Admin    acetaminophen (TYLENOL) tablet 650 mg  650 mg Oral Q4H PRN Dean Gutierrez MD        Or    acetaminophen (TYLENOL) Suppository 650 mg  650 mg Rectal Q4H PRN Dean Gutierrez MD        benzocaine-menthol (CHLORASEPTIC MAX) 15-10 MG lozenge 1 lozenge  1 lozenge Buccal Q1H PRN Dean Gutierrez MD        calcium carbonate (TUMS) chewable tablet 1,000 mg  1,000 mg Oral 4x Daily PRN Dean Gutierrez MD        Elemental iron 65 mg Vitamin C 125 mg (VITRON C) tablet 1 tablet  1 tablet Oral Daily Dean Gutierrez MD   1 tablet at 07/04/24 0900    famotidine (PEPCID) tablet 40 mg  40 mg Oral At Bedtime Dean Gutierrez MD        guaiFENesin (ROBITUSSIN) 20 mg/mL solution 200 mg  200 mg Oral Q4H PRN Dean Gutierrez MD        HYDROmorphone (DILAUDID) injection 0.2 mg  0.2 mg Intravenous Q6H PRN Hamzah Cannon MD        leflunomide (ARAVA) tablet 20 mg  20 mg Oral Q24H Dean Gutierrez MD   20 mg at 07/04/24 0900    lidocaine (LMX4) cream   Topical Q1H PRN Dean Gutierrez MD        lidocaine 1 % 0.1-1 mL  0.1-1 mL Other Q1H PRN Dean Gutierrez MD        lisinopril (ZESTRIL) tablet 20 mg  20 mg Oral Daily Dean Gutierrez MD   20 mg at  07/04/24 0842    magnesium oxide (MAG-OX) tablet 400 mg  400 mg Oral Daily Jerson Thapa MD   400 mg at 07/04/24 0842    melatonin tablet 1 mg  1 mg Oral At Bedtime PRN Dean Gutierrez MD        mirtazapine (REMERON) tablet 15 mg  15 mg Oral At Bedtime Dean Gutierrez MD        naloxone (NARCAN) injection 0.2 mg  0.2 mg Intravenous Q2 Min PRN Trish Sahni MD        Or    naloxone (NARCAN) injection 0.4 mg  0.4 mg Intravenous Q2 Min PRN Trish Sahni MD        Or    naloxone (NARCAN) injection 0.2 mg  0.2 mg Intramuscular Q2 Min PRN Trish Sahni MD        Or    naloxone (NARCAN) injection 0.4 mg  0.4 mg Intramuscular Q2 Min PRN Trish Shani MD        ondansetron (ZOFRAN ODT) ODT tab 4 mg  4 mg Oral Q6H PRN Dean Gutierrez MD        Or    ondansetron (ZOFRAN) injection 4 mg  4 mg Intravenous Q6H PRN Dean Gutierrez MD        [Held by provider] oxyCODONE (ROXICODONE) tablet 5 mg  5 mg Oral Q6H PRN Dean Gutierrez MD        polyethylene glycol (MIRALAX) Packet 17 g  17 g Oral BID PRN Dean Gutierrez MD        predniSONE (DELTASONE) tablet 5 mg  5 mg Oral Daily Dean Gutierrez MD   5 mg at 07/04/24 0842    senna-docusate (SENOKOT-S/PERICOLACE) 8.6-50 MG per tablet 1 tablet  1 tablet Oral BID PRN Dean Gutierrez MD        Or    senna-docusate (SENOKOT-S/PERICOLACE) 8.6-50 MG per tablet 2 tablet  2 tablet Oral BID PRN Dean Gutierrez MD        simethicone (MYLICON) chewable tablet 160 mg  160 mg Oral TID Amberly Victor MD   160 mg at 07/04/24 0842    sodium chloride (PF) 0.9% PF flush 3 mL  3 mL Intracatheter Q8H Dean Gutierrez MD        sodium chloride (PF) 0.9% PF flush 3 mL  3 mL Intracatheter q1 min prn Dean Gutierrez MD         Current Outpatient Medications   Medication Sig Dispense Refill    acetaminophen (TYLENOL) 325 MG tablet Take 2 tablets (650 mg) by mouth every 6 hours 24 tablet 0    amLODIPine (NORVASC) 5 MG tablet TAKE 1 TABLET BY  "MOUTH EVERY DAY 90 tablet 3    apixaban ANTICOAGULANT (ELIQUIS) 5 MG tablet Take 2 tablets (10 mg) by mouth 2 times daily for 7 days, THEN 1 tablet (5 mg) 2 times daily for 83 days. 194 tablet 0    calcium carbonate 600 mg-vitamin D 400 units (CALCIUM 600 + D) 600-400 MG-UNIT per tablet Take 2 tablets by mouth daily      Elemental iron 65 mg Vitamin C 125 mg (VITRON C)  MG TABS tablet Take 1 tablet by mouth daily      famotidine (PEPCID) 40 MG tablet Take 1 tablet (40 mg) by mouth at bedtime 30 tablet 3    fluocinolone acetonide oil 0.01 % ear drops Place 0.25 mLs (5 drops) into both ears twice a week 20 mL 3    leflunomide (ARAVA) 20 MG tablet Take 1 tablet by mouth every 24 hours      lisinopril (ZESTRIL) 20 MG tablet TAKE 1 TABLET BY MOUTH EVERY DAY 90 tablet 0    methotrexate 2.5 MG tablet Take 20 mg by mouth every 7 days On Saturdays      metoprolol succinate ER (TOPROL XL) 50 MG 24 hr tablet TAKE ONE TABLET BY MOUTH EVERY DAY 90 tablet 0    mirtazapine (REMERON) 15 MG tablet Take 1 tablet (15 mg) by mouth at bedtime 30 tablet 4    oxyCODONE (ROXICODONE) 5 MG tablet Take 1 tablet (5 mg) by mouth 4 times daily And 5mg Q6h PRN 30 tablet 0    polyethylene glycol (MIRALAX) 17 GM/Dose powder Take 17 g by mouth daily 510 g 0    predniSONE (DELTASONE) 5 MG tablet Take 1 tablet (5 mg) by mouth daily  0    simethicone (MYLICON) 125 MG chewable tablet Take 1 tablet (125 mg) by mouth 3 times daily      simvastatin (ZOCOR) 20 MG tablet Take 1 tablet (20 mg) by mouth At Bedtime 90 tablet 1             Review of Systems:     A complete review of systems was performed and is negative except as noted in the HPI           Physical Exam:   BP (!) 142/67   Pulse 72   Temp 97.9  F (36.6  C)   Resp 18   Ht 1.676 m (5' 6\")   Wt 38.6 kg (85 lb)   LMP  (LMP Unknown)   SpO2 94%   BMI 13.72 kg/m    Wt:   Wt Readings from Last 2 Encounters:   07/03/24 38.6 kg (85 lb)   06/25/24 38.2 kg (84 lb 3.2 oz)      Constitutional: " ill appearing, cachectic  Eyes: Sclera anicteric/injected  Ears/nose/mouth/throat: Normal oropharynx without ulcers or exudate, mucus membranes moist  Neck: supple  CV: RRR, No edema  Respiratory: Unlabored breathing  Abdomen: Nondistended, +bs, no hepatosplenomegaly, nontender, no peritoneal signs  Skin: warm, perfused, no jaundice  Neuro: AAO x 3, No asterixis  Psych: Normal affect  MSK: Normal gait         Data:   Labs and imaging below were independently reviewed and interpreted    BMP  Recent Labs   Lab 07/03/24  1437   *   POTASSIUM 2.8*   CHLORIDE 93*   ADRIAN 8.0*   CO2 26   BUN 11.8   CR 0.46*   GLC 83     CBC  Recent Labs   Lab 07/04/24  0059 07/03/24  1437   WBC 2.5* 1.8*   RBC 2.84* 2.11*   HGB 8.7* 6.4*   HCT 26.9* 20.4*   MCV 95 97   MCH 30.6 30.3   MCHC 32.3 31.4*   RDW 22.5* 27.8*    258     INR  Recent Labs   Lab 07/03/24  1559   INR 1.86*     LFTs  Recent Labs   Lab 07/03/24  1437   ALKPHOS 80   AST 18   ALT 11   BILITOTAL 0.7   PROTTOTAL 4.9*   ALBUMIN 2.6*      PANCNo lab results found in last 7 days.

## 2024-07-04 NOTE — PLAN OF CARE
"VS: BP (!) 145/83 (BP Location: Right arm, Patient Position: Supine, Cuff Size: Adult Regular)   Pulse 96   Temp 97.4  F (36.3  C) (Oral)   Resp 18   Ht 1.676 m (5' 6\")   Wt 38.6 kg (85 lb)   LMP  (LMP Unknown)   SpO2 100%   BMI 13.72 kg/m     Neuro: Disoriented X3, alert to self  Cardiac: WDL                 Respiratory: On RA, no report of SOB  GI/: Nolan in place with good output, passing gas and no BM this shift  Diet/appetite: NPO due to choking. Speech evaluation pending  Activity: Not OOB this shift, AO2  Pain: No report of pain  Skin/drains: Wound on R AKA, dressing in place  Lines: R piv saline locked.     No change to patient's status, continue POC.      "

## 2024-07-04 NOTE — PROGRESS NOTES
"   07/04/24 0836   Appointment Info   Signing Clinician's Name / Credentials (SLP) Jostin Zapata MA CCC SLP   Quick Adds Certification   General Information   Onset of Illness/Injury or Date of Surgery 07/03/24   Referring Physician Dean Gutierrez MD   Pertinent History of Current Problem Per H&P: \"Sakshi Jaeger is a 79 year old female admitted on 7/3/2024, with 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 She is admitted today for acute blood loss anemia w/ concern for GI bleed, complicated by worsening mental status and poor PO intake.\"  Clinical swallow evaluation completed this AM per provider orders.   Type of Evaluation   Type of Evaluation Swallow Evaluation   Oral Motor   Oral Musculature generally intact   Structural Abnormalities none present   Mucosal Quality adequate   Dentition (Oral Motor)   Dentition (Oral Motor) natural dentition;significant number of missing teeth;dental appliance/dentures   Comment, Dentition (Oral Motor) Upper partials appeared loose and not remaining in place. Patient would not allow nursing staff or SLP to remove them.   Dental Appliance/Denture (Oral Motor) upper;dentures, partial;poor fit   Facial Symmetry (Oral Motor)   Facial Symmetry (Oral Motor) WNL   Lip Function (Oral Motor)   Lip Range of Motion (Oral Motor) WNL   Tongue Function (Oral Motor)   Tongue ROM (Oral Motor) unable/difficult to assess   Tongue Coordination/Speed (Oral Motor) unable/difficult to assess   Comment, Tongue Function (Oral Motor) Unable to follow directions or model   Jaw Function (Oral Motor)   Jaw Function (Oral Motor) unable/difficult to assess   Cough/Swallow/Gag Reflex (Oral Motor)   Soft Palate/Velum (Oral Motor) unable/difficult to assess   Volitional Throat Clear/Cough (Oral Motor) WNL   Volitional Swallow (Oral Motor) mildly " "delayed   Vocal Quality/Secretion Management (Oral Motor)   Vocal Quality (Oral Motor) WNL   Secretion Management (Oral Motor) WNL   General Swallowing Observations   Current Diet/Method of Nutritional Intake (General Swallowing Observations, NIS) NPO   Respiratory Support room air   Past History of Dysphagia Patient seen during hospitalization 5/26 revealing: \"Pharyngeal phase remarkable for s/sx of aspiration marked by coughing x1 following consecutive straw sips but no overt s/sx of aspiration with single straw sips. Recommend soft and bite sized textures (IDDSI 6) and thin liquids (IDDSI 0) with 1:1 feeding assist.  Please ensure pt is upright to 90 degrees for all PO intake, takes small, single bites/sips at a slow rate and alternates solids and liquids.\"   Swallowing Evaluation Clinical swallow evaluation   Clinical Swallow Evaluation   Feeding Assistance other (see comments)  (Asked patient if she could feed herself, she declined to remove her arms from under the blankets to attempt, citing she was too cold.)   Clinical Swallow Evaluation Textures Trialed thin liquids;pureed;solid foods;soft & bite-sized   Clinical Swallow Eval: Thin Liquid Texture Trial   Mode of Presentation, Thin Liquids cup;straw;fed by clinician   Volume of Liquid or Food Presented >4 oz thin water   Oral Phase of Swallow WFL   Pharyngeal Phase of Swallow intact   Diagnostic Statement No overt s/s of aspiration observed across single and consecutive cup and straw sip trials. Patient naturally impulsive with rate of intake.   Clinical Swallow Evaluation: Puree Solid Texture Trial   Mode of Presentation, Puree spoon;fed by clinician   Volume of Puree Presented 4 tsps   Oral Phase, Puree WFL   Pharyngeal Phase, Puree intact   Diagnostic Statement No overt s/s of aspiration observed.   Clinical Swallow Eval: Soft & Bite Sized   Mode of Presentation spoon;fed by clinician   Volume Presented 1 tsp amount   Oral Phase other (see " comments)  (extended mastication, patient not consistently attending to task)   Pharyngeal Phase intact   Diagnostic Statement No overt s/s of aspiration. Patient not consistently attending to task, resulting in extended bolus manipulation.   Clinical Swallow Evaluation: Solid Food Texture Trial   Mode of Presentation fed by clinician   Volume Presented 1/2 boris cracker   Oral Phase impaired mastication   Pharyngeal Phase intact   Diagnostic Statement No overt s/s of aspiration. Extended mastication episode. Patient easily distractible and not attending to mastication consistently.   Esophageal Phase of Swallow   Patient reports or presents with symptoms of esophageal dysphagia Yes   Esophageal comments esophageal ulcer   Swallowing Recommendations   Diet Consistency Recommendations full liquid diet;thin liquids (level 0)   Supervision Level for Intake 1:1 supervision needed   Mode of Delivery Recommendations bolus size, small;slow rate of intake;other (see comments)  (1 sip at a time)   Monitoring/Assistance Required (Eating/Swallowing) check mouth frequently for oral residue/pocketing;monitor for cough or change in vocal quality with intake;optimize oral intake to minimize need for tube feeding;stop eating activities when fatigue is present   Recommended Feeding/Eating Techniques (Swallow Eval) maintain upright posture during/after eating for 30 minutes;provide assist with feeding   Medication Administration Recommendations, Swallowing (SLP) As tolerated, 1 at a time   Instrumental Assessment Recommendations   (pending progress)   General Therapy Interventions   Planned Therapy Interventions Dysphagia Treatment   Dysphagia treatment Modified diet education;Instruction of safe swallow strategies   Clinical Impression   Criteria for Skilled Therapeutic Interventions Met (SLP Eval) Yes, treatment indicated   SLP Diagnosis Mild oral dysphagia   Risks & Benefits of therapy have been explained evaluation/treatment  results reviewed;care plan/treatment goals reviewed;risks/benefits reviewed;current/potential barriers reviewed;participants included;patient   Clinical Impression Comments   Clinical swallow evaluation completed per provider orders. Patient presents with mild oral dysphagia in setting of altered mentation. Patient oral motor evaluation revealed poorly fitting/loose upper partial denture that patient would not allow SLP or nursing staff to remove. Patient only partially cooperative, at times refusing to participate until she received a drink of water, but other times patient confused and unable to follow directions. PO trials of thin liquids, puree textures, soft & bite size solids, and regular textures completed at the bedside. No overt s/s of aspiration observed with single and consecutive cup or straw sips, and with all solid textures. Oral phase c/b adequate bolus acceptance and closure, but patient easily distracted and mastication of soft & bite size and hard solids extended. Also suspect reduced dentition/poor fitting partials impacted efficiency of mastication.     Recommend full liquids diet - thin consistency with 1:1 supervision. Patient must be fully upright and alert for all PO intake, take single sips, and use a slow rate of intake. Please check to ensure good oral clearance during intake, stop intake if patient is too fatigued, and watch for overt s/s of aspiration. Also, given patient's mentation and poor fitting upper partial, recommend remove partial denture if possible. SLP will follow for dysphagia management.     SLP Total Evaluation Time   Eval: oral/pharyngeal swallow function, clinical swallow Minutes (36847) 16   Therapy Certification   Start of Care Date 07/04/24   Certification date from 07/04/24   Certification date to 08/01/24   Medical Diagnosis Dysphagia   SLP Discharge Planning   SLP Discharge Recommendation Transitional Care Facility   SLP Rationale for DC Rec Below baseline swallow  fx impacted by mentation, requiring altered diet.     [facility]  OUTPATIENT SPEECH LANGUAGE PATHOLOGY EVALUATION  PLAN OF TREATMENT FOR OUTPATIENT REHABILITATION  (COMPLETE FOR INITIAL CLAIMS ONLY)  Patient's Last Name, First Name, M.I.  YOB: 1945  Sakshi Jaeger                        Provider's Name  [facility] Medical Record No.  [id number]                             Onset Date:  07/03/24  Start of Care Date: 07/04/24    Type:     ___PT   ___OT   _X_SLP Medical Diagnosis: Dysphagia          SLP Diagnosis:  Mild oral dysphagia  Visits from SOC:  1     See note for plan of treatment, functional goals and certification details    I CERTIFY THE NEED FOR THESE SERVICES FURNISHED UNDER        THIS PLAN OF TREATMENT AND WHILE UNDER MY CARE     (Physician co-signature of this document indicates review and certification of the therapy plan).

## 2024-07-05 NOTE — CONSULTS
"SPIRITUAL HEALTH SERVICES Consult Note  [facility] ([place]) ED    I visited with Sakshi and Santi per routine consult for emotional support. Sakshi said, \"I'm feeling fine\" and Santi said with tears in his eyes, \"she is feeling fine but I'm not.\"    Santi is deeply struggling with the thought of losing Sakshi. We process through various emotional dynamics around her dying in the near future and his life story. Continued support is highly recommended.    Sakshi is \"spiritual not Synagogue\" with Nondenominational roots and Santi is a charismatic [employer]ian[employer] and deeply Syn[employer].    Plan: I will continue to follow up as Sakshi's stay extends.  support remains available upon request. Please place consults for follow up visits.    Rev. BETTY Holly.  Chaplain Resident  Pager [phone]    * [facility] remains available 24/7 for emergent requests/referrals, either by having the switchboard page the on-call  or by entering an ASAP/STAT consult in Epic (this will also page the on-call ). Routine Epic consults receive an initial response within 24 hours.*    "

## 2024-07-05 NOTE — PROGRESS NOTES
"Shift: [id number]    Blood pressure 130/61, pulse 87, temperature 97.7  F (36.5  C), temperature source Oral, resp. rate 16, height 1.676 m (5' 6\"), weight 38.6 kg (85 lb), SpO2 96%, not currently breastfeeding.    Neuro: A&Ox3. Intermittent confusion  Cardiac: Sinus rhythm & tele   Respiratory: Pt 02 Sat 96%.Pt is on RA.  GI/: Adequate urine output. BM X1 and loose  Diet/appetite: Tolerating full liquid diet  Activity:  Assist of two  Pain: At acceptable level on current regimen.   Skin: Pt has wound on right leg and elbow with 3 ulcerations fluid and wound on her bottom. Pt has wound consult.  LDA's: PIV saline locked     Plan: Continue with POC.   Pt has     "

## 2024-07-05 NOTE — CONSULTS
"Palliative Care Consultation Note  [facility]      Patient: Sakshi Jaeger  Date of Admission:  7/3/2024    Requesting Clinician / Team: Medicine  Reason for consult:  Goals of care, cocnern for failure to thrive     Recommendations & Counseling   79 year old female with history of DVT/PE on Eliquis, Rheumatoid arthritis, PVD status-post right femoral endarterectomy with common iliac stenting status-post right above the knee amputation who presented to the ED On 7/3/2024 for acute on chronic anemia, fatigue, altered mental status and poor oral intake. Sakshi is known to our team and was seen by Palliative during her last admission. Sakshi was discharge to TCU and transitioned to home with [facility]. Palliative care consulted concerning for failure to thrive.     GOALS OF CARE:   Restorative with limits - Code status changed to DNR/DNI 07/05/2024.   Met with Sakshi and Santi (BISI) at bedside. Santi shared Sakshi was doing well at TCU and when she came home \"something switched off.\" Santi expressed worries of Sakshi running out of her antidepressant medication and worries if it is contributing to her decreased appetite. Santi shared while at TCU, Sakshi was tolerating soft texture food, she tolerated taking her pills with pudding and wasn't as concerned for aspiration. Santi expressed worries for potential medical conditions that can be addressed contributing to Sakshi's confusion, decreased appetite, and overall for her to gain her strength back. He expressed interested in pursuing IV fluids, nutritional support with boost, and encouraging her to eat knowing she is likely to aspirate and face medical complications including infection and pneumonia. Furthermore, Santi expressed worries Sakshi's current medical condition maybe related to dehydration and with IV fluids and nutritional support Sakshi could improve.   During our meeting, Sakshi expressed " "feeling \"tired\" of it all, and shared she never wanted to come back to the hospital. When asked why she did not want to come back to the hospital, Sakshi states \"its a lost cause.\"   With Sakshi and Santi's permission explored how we could better support Sakshi's  medical wishes/goals. Both Sakshi and Santi expressed end-of-life conversations are difficult for them to talk about, but are open to exploring comfort-focused cares/hospice when they feel ready. For now, Santi hopes Sakshi to continue with IV fluids, resume her antidepressant medication and hopefully she will gain her strength back as she is likely dehydrated and needs nutritional support. Santi was tearful during our meeting and shared it would be hard for him to loose Sakshi. Acknowledged Santi's ongoing support and care for Sakshi.   Briefly, explored paths of care including continuing with restorative care, escalation of care to ICU, and aggressive medical interventions. Dicussed code status in depth and what that would look like for Sakshi. At this time, Sakshi and Santi expressed interested in restorative with limits, code status changed to DNR/DNI. Santi shared he remains hopeful for Sakshi to improve. He verbalized understanding Sakshi may not be a candidate for TCU and would likely need long-term care facility.  Primary team was able to join our meeting, and briefly summarized plan of care. Palliative care will continue to follow for ongoing goals of care discussion and for patient/family support.     ADVANCE CARE PLANNING:  Patient has an advance directive dated 09/28/2022.  Primary Health Care Agent Santi Mercedes (Significant other).  Alternate(s) NA.   There is no POLST form on file, defer to patient and/or next of kin for decisions   Code status: Full Code    MEDICAL MANAGEMENT:   We are not actively managing symptoms at this time.    PSYCHOSOCIAL/SPIRITUAL SUPPORT:  Family:  [place] community: [facility]   Spiritual     Palliative Care " will continue to follow. Thank you for the consult and allowing us to aid in the care of Sakshi Jaeger.    These recommendations have been discussed with primary team, nursing and family.    DESIRE Landaverde CNP  [facility], Palliative Care  Securely message with the [email or web address] Web Console (learn more here) or  Text page via [facility] Paging/Directory       Assessment      Sakshi Jaeger is a 79 year old female admitted on 7/3/2024, with 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 She is admitted today for acute blood loss anemia w/ concern for GI bleed, complicated by worsening mental status and poor PO intake. Palliative care consulted for ongoing goals of care discussion concerning for failure to thrive.     Today, the patient was seen for:  #Acute on chronic anemia, likely 2/2 duodenal vs esophageal ulcer (baseline 8.4-9)  #Hx of duodenal and esophgeal ulcer (5/25 EGD ) w/ esophageal ring ulceration and 2 superficial duodenal nonbleeding ulcers.   #Hx of EGD biopsy w/ herpes esophagitis, completed course ov valacyclovir  #Progressively worsening altered mental status  #Poor PO intake, SLP recommending soft/easy chew diet w/ thin liquids 5/27 w/ concerns for aspiration  #Asymptomatic hyponatremia, hx of SIADH treated with fluid restriction  #Hypokalemia  #Malnutrition  #Depression  #Concern for failure to thrive  #Palliative care encounter   #Goals of care discussion       History of Present Illness   Met with Brooke at bedside. I introduced our role as an extra layer of support and how we help patients and families dealing with serious, potentially life-limiting illnesses. I explained the composition of the palliative care team.  Introduced the role of palliative care as an interdisciplinary team that cares for patients with serious illness to help  support symptom management, communication, coping for patients and their families as well as support with medical decision making.      Prognosis, Goals, & Planning:   Functional Status just prior to this current hospitalization:  Recently discharged from the hospital to TCU. Sakshi transitioned to home with home care on 06/25/24. She presents to ER concerning for failure to thrive, altered mental status and decreased PO intake. Sakshi repeatedly stated she does not wish to come back to hospital. We briefly discussed hospice care and what that would look like for Sakshi. Sakshi and Santi are opening to exploring hospice but stated they are not ready and would like to see how Sakshi does in the coming days.     Prognosis, Goals, and/or Advance Care Planning:  We discussed general treatment options (full/restorative, selective/conservatives, and comfort only/hospice). We then discussed how these specifically apply to Mil [place].  Based on this discussion, Sakshi has decided to DNR/DNI  Discussed what continuing restorative/life-prolonging care entails, including continued (re)admissions to the hospital, continuing with preventative and primary care, seeing disease/organ specific specialty consultations for medical treatments in hopes to prolong life for as long as possible.    Education provided on transition to comfort-focused goals of care would be including discontinuation of IV fluids, cardiac monitoring, labs, tube feeding, TPN and other interventions that do not directly promote comfort.  Anticipatory guidance was given regarding feeding, hunger, fluids at end of life. We discussed utilization of medications to ease air hunger, agitation and restlessness at the time that ventilator is compassionately withdrawn.  Discussed that this process is very purposeful in terms of ensuring patient is as comfortable as possible and that family wishes are honored.  Education provided regarding hospice philosophy,  prognostic,and eligibility criteria. Discussed what services are provided and those that are not,  Discussed common misconceptions. We explored the various disposition options where they can receive hospice care (home, residential hospice homes, LTC with hospice) including subsequent financial and familial implications. Discussed typical anticipated timing of discharge.    Code Status was addressed today:   Yes, We discussed potential risks and rationale of attempting cardiac resuscitation, intubation, and mechanical ventilation.  We also discussed probability of survival as well as quality of life implications.  Based on this discussion, patient or surrogate response/decision: DNR/DNI      Patient's decision making preferences: shared with support from loved ones        Patient has decision-making capacity today for complex decisions: Questionable          Coping, Meaning, & Spirituality:   Mood, coping, and/or meaning in the context of serious illness were addressed today: Yes    Social:   Living situation:lives with significant other/spouse  Important relationships/caregivers:unable to assess   Occupation: Sakshi worked for [employer]. Now retired.   Areas of fulfillment/debbie: Unable to assess     Medications:  Reviewed this patient's medication profile and medications from this hospitalization.   [place] Board of Pharmacy Data Base Reviewed:    ROS:  Comprehensive ROS is reviewed and is negative except as here & per HPI:     Physical Exam   Vital Signs with Ranges  Temp:  [97.7  F (36.5  C)-98.4  F (36.9  C)] 98.4  F (36.9  C)  Pulse:  [] 111  Resp:  [16] 16  BP: (130-153)/(22-92) 137/92  SpO2:  [96 %-97 %] 97 %  Wt Readings from Last 10 Encounters:   07/03/24 38.6 kg (85 lb)   06/25/24 38.2 kg (84 lb 3.2 oz)   06/20/24 38.2 kg (84 lb 3.2 oz)   06/17/24 38.2 kg (84 lb 3.2 oz)   06/13/24 38.2 kg (84 lb 3.2 oz)   06/11/24 38.2 kg (84 lb 3.2 oz)   06/01/24 37.1 kg (81 lb 12.7 oz)   05/18/24 40.5 kg (89 lb 4.6  oz)   03/14/24 45.4 kg (100 lb)   02/06/24 42.7 kg (94 lb 2.2 oz)     85 lbs 0 oz    PHYSICAL EXAM:  Constitutional: alert, awake, lying in bed, no acute distress   Respiratory: non-labored breathing, on room air   Psychiatric: calm   Abdomen: soft, nontender, non-distended  NEURO: a/x 3, forgetful at times   SKIN: warm, dry, left elbow mild tenderness, wrapped with dressing   JOINT/EXTREMITIES: R AKA, able to move extremities     Data reviewed:  CMP  Recent Labs   Lab 07/05/24  0715 07/04/24  2223 07/04/24  1043 07/03/24  1437   * 129*  --  129*   POTASSIUM 3.9 3.3*  3.3*  --  2.8*   CHLORIDE 97* 98  --  93*   CO2 23 23  --  26   ANIONGAP 10 8  --  10   GLC 69* 73  --  83   BUN 8.2 8.3  --  11.8   CR 0.37* 0.37*  --  0.46*   GFRESTIMATED >90 >90  --  >90   ADRIAN 7.8* 7.5*  --  8.0*   MAG 1.5* 1.5*  --  1.3*   PHOS 2.4*  --  1.7* 2.1*   PROTTOTAL  --  4.6*  --  4.9*   ALBUMIN  --  2.5*  --  2.6*   BILITOTAL  --  0.8  --  0.7   ALKPHOS  --  85  --  80   AST  --  24  --  18   ALT  --  11  --  11     CBC  Recent Labs   Lab 07/05/24  0715 07/04/24  0059   WBC 8.3 2.5*   RBC 3.27* 2.84*   HGB 10.0* 8.7*   HCT 30.8* 26.9*   MCV 94 95   MCH 30.6 30.6   MCHC 32.5 32.3   RDW 23.7* 22.5*    230       EXAM: CT HEAD W/O CONTRAST  7/3/2024 3:58 PM      HISTORY: AMS        COMPARISON: None     TECHNIQUE: Using multidetector thin collimation helical acquisition  technique, axial, coronal and sagittal CT images from the skull base  to the vertex were obtained without intravenous contrast.   (topogram) image(s) also obtained and reviewed. Dose reduction  techniques were used.     FINDINGS:  No acute intracranial hemorrhage, mass effect, or midline shift. No CT  findings of acute infarct or hydrocephalus. Preserved subarachnoid  spaces. Moderate diffuse parenchymal volume loss. The ventricles and  sulci appear proportionate. Bilateral periventricular hypoattenuation,  likely sequelae of chronic small vessel ischemic  disease.  Atherosclerotic calcifications of the carotid siphons.     Atraumatic calvarium. Mild sphenoid sinus mucosal thickening. Mild  right mastoid effusion. Bilateral pseudophakia..                                                                       IMPRESSION:   1. No acute intracranial pathology.  2. Moderate diffuse parenchymal volume loss and sequelae of chronic  small vessel ischemic disease..     I have personally reviewed the examination and initial interpretation  and I agree with the findings.     LISA TAYLOR MD     Medical Decision Making       MANAGEMENT DISCUSSED with the following over the past 24 hours: Medicine, GI, nursing and patient/family   NOTE(S)/MEDICAL RECORDS REVIEWED over the past 24 hours: Medicine, GI, nursing, and SW   80 MINUTES SPENT BY ME on the date of service doing chart review, history, exam, documentation & further activities per the note.

## 2024-07-05 NOTE — PROGRESS NOTES
Physician Attestation   I saw this patient with the resident and agree with the resident/fellow's findings and plan of care as documented in the note.        Trihs Sahni MD  Date of Service (when I saw the patient): 7/5/24        [facility]    Progress Note - Medicine Service, [facility] TEAM 4       Date of Admission:  7/3/2024    Assessment & Plan   Sakshi Jaeger is a 79 year old female admitted on 7/3/2024, with 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 She is admitted today for acute blood loss anemia w/ concern for GI bleed, complicated by worsening mental status and poor PO intake     Today  GI signed off, anemia likely 2/2 to poor PO intake  -Seen by palliaitve, appreciate discussion with patient and family. Made DNR/DNI, full diet with emphasis on comfort  Start mIVF 50ml/hr for rehydration/comfort.      Acute Problems  #Acute on chronic anemia, likely 2/2 duodenal vs esophageal ulcer (baseline 8.4-9) vs malnutrition vs anemia of chronic disease  #Hx of duodenal and esophgeal ulcer (5/25 EGD ) w/ esophageal ring ulceration and 2 superficial duodenal nonbleeding ulcers.   #Hx of EGD biopsy w/ herpes esophagitis, completed course ov valacyclovir  Admitted w/ hb of 6.4 in a history of duodenal and esophageal ulcers, gave 1UPRBC in ED. Patient denies any hemoptysis, hematuria, melena, hematochezia. Reticulocytes appropriately elevated. Folate WNL, B12 elevated. Iron labs suggestive of anemia of chronic disease. Per GI, given recent EGD w/ nonbleeding ulcers unlikely to have GI source of blood loss at this time. GI recommending PPI, but in the setting of minimizing pill burden and optimizing comfort, will not start at this time.   -PTA, simethicone, famotidine, iron    #Progressively worsening  altered mental status  #Poor PO intake, SLP recommending soft/easy chew diet w/ thin liquids 5/27 w/ concerns for aspiration  #Asymptomatic hyponatremia, hx of SIADH treated with fluid restriction  #Hypokalemia  #Malnutrition  #Hypophosphatemic, will monitor for refeeding syndrome  #Chronic Hypomagnesemia, followed by nephrology and GI  Patient choking on individual pills in ED. Seen by SLP, mild oral dysphagia with poor cooperation on exam, no overt signs or symptoms of aspiration. Recommending thin liquids w/ 1:1 supervision. Would recommend removal of partial ill fitting denture. Will priortiize comfort feeding, family aware of risks of aspiration. Ordered full diet.   -PTA magnesium oxide 400mg/d  -AM BMP, Mg PO4  -Regular diet (counseled on aspiration risk vs desire for comfort)  -LR w 5% dextrose and 20meqK 50 ml/hr     #Generalized weakness  Was discharged to TCU in 03/2023 following AKA, followed up w/ in home PT and had mild improvement, but services lapsed for 3 weeks and her strength declined. Most recent PT assessment at 06/05 discharge w/ no obvious metabolic, infectious, or organic etiology. Was sent to TCU 6/5-6/25 for rehabilitation. PT/OT consulted during this stay, but may no longer be within patient's goals of care.   -PT/OT consulted     #Quality of Life  #Depression  #Concern for failure to thrive  Patient has had discussions with palliative care during past admissions, with conversations focusing on comfort, minimizing pill burden and lab draws. Metoprolol, folate, and statin were stopped at last hospital stay. Per above PO intake concerns, she has been eating and drinking less and less since her 06/05 discharge.  Will continue to prioritize comfort, minimize pill burden, and allow full diet with minimization of labs. Will resume escitalopram. Seen by palliative 7/5, appreciate lengthy discussion with patient and family about goals of care and disease progression.  -Continue PTA acetaminophen,  "oxycodone 5mg q4hrs PRN for pain control  -Continue  escitalopram     #Urinary retention  Bladder scanned at bedside, > 700mL postvoid. Ordered le catheter     Stable/Resolved  #Leukopenia of unclear etiology- resolved.     Chronic Problems  #Rheumatoid arthritis  -PTA prednisone 5mg/d     #Hx of LLE DVT and left lower lobe PE (5/17), currently on apixaban  -Resume PTA apixaban     #R femoral endarterctomy w/ common iliac stenting, sp R AKA.  Follows w/ vascular surgery and WOC,  changes wounds at home  -Consulted WOC, appreciate recs.        Diet: NPO w/ concerns of aspiration  DVT Prophylaxis: Held w/ concerns for GI bleed  Le Catheter: Present  Fluids: None  Lines: PIV, Le  Cardiac Monitoring: None  Code Status:  Full    Clinically Significant Risk Factors        # Hypokalemia: Lowest K = 3.3 mmol/L in last 2 days, will replace as needed  # Hyponatremia: Lowest Na = 129 mmol/L in last 2 days, will monitor as appropriate    # Hypomagnesemia: Lowest Mg = 1.5 mg/dL in last 2 days, will replace as needed   # Hypoalbuminemia: Lowest albumin = 2.5 g/dL at 7/4/2024 10:23 PM, will monitor as appropriate  # Coagulation Defect: INR = 1.86 (Ref range: 0.85 - 1.15) and/or PTT = 40 Seconds (Ref range: 22 - 38 Seconds), will monitor for bleeding    # Hypertension: Noted on problem list           #Precipitous drop in Hgb/Hct: Lowest Hgb this hospitalization: 6.4 g/dL. Will continue to monitor and treat/transfuse as appropriate.     # Cachexia: Estimated body mass index is 13.72 kg/m  as calculated from the following:    Height as of this encounter: 1.676 m (5' 6\").    Weight as of this encounter: 38.6 kg (85 lb)., PRESENT ON ADMISSION     # Financial/Environmental Concerns: none         Disposition Plan     Expected Discharge Date: 07/05/2024                The patient's care was discussed with the Attending Physician, Dr. Sahni .    Dean Gutierrez MD  Medicine Service, [address] " [facility]  Securely message with [employer] (more info)  Text page via [facility] Paging/Directory   See signed in provider for up to date coverage information  ______________________________________________________________________    Interval History   Seen by palliative today, appreciate prolonged conversation. Code status changed to DNR/DNI with an emphasis on comfort feeding despite risk of aspiration. Patient and  seen by primary team and palliative, discussed supporting patient and spouse and for now continuing IV fluids with hopes of physical rehabilitation, although it is most likely she will need long term care. Sakshi and BISI Santi report she has a low appetite.    Physical Exam   Vital Signs: Temp: 97.9  F (36.6  C) Temp src: Oral BP: (!) 145/84 Pulse: 100   Resp: 18 SpO2: 96 % O2 Device: None (Room air)    Weight: 85 lbs 0 oz    General: tired, in NAD  Cards: No murmurs, rubs, gallops. Regular rate and rhythm   Pulm: No crackles, wheezes, rales. Lungs clear to auscultation BL   Abd: NABS, soft, nontender, nondistended, nontympanitic.   Neuro: No focal deficits noted. Answers questions appropriately.  Psych:  Flat affect      Data     I have personally reviewed the following data over the past 24 hrs:    8.3  \   10.0 (L)   / 296     130 (L) 97 (L) 8.2 /  69 (L)   3.9 23 0.37 (L) \     ALT: 11 AST: 24 AP: 85 TBILI: 0.8   ALB: 2.5 (L) TOT PROTEIN: 4.6 (L) LIPASE: N/A

## 2024-07-05 NOTE — CONSULTS
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 She is admitted today for acute blood loss anemia w/ concern for GI bleed, complicated by worsening mental status and poor PO intake     Assessment:      Areas visualized during today's visit: Sacrum/coccyx, Lower extremities , and Upper extremities     Wound location: L elbow      Last photo: 7/5  Wound due to: Skin Tear and Trauma  Wound history/plan of care: had fall about 10 days ago resulting in injury   Wound base: 30 % dermis, 70 % fibrin vs slough      Palpation of the wound bed: firm      Drainage: moderate     Description of drainage: serosanguinous     Measurements (length x width x depth, in cm): three open areas largest measures about 2.5  x 3  x  0.1cm      Tunneling: N/A     Undermining: N/A  Periwound skin: Erythema- blanchable      Color: red      Temperature: normal   Odor: none  Pain: moderate, tender  Pain interventions prior to dressing change: slow and gentle cares   Treatment goal: Infection control/prevention  STATUS: initial assessment  Supplies ordered: gathered and at bedside    Wound location: R AKA     Last photo: 7/5  Wound due to: Surgical Wound  Wound history/plan of care: incisional dehiscence from AKA earlier this year   Wound base: 100 % granulation tissue,      Palpation of the wound bed: normal      Drainage: small     Description of drainage: serosanguinous     Measurements (length x width x depth, in cm): 0.3  x 1  x  1.5 cm      Tunneling: N/A     Undermining:  up to 0.5 cm from 3 o'clock  Periwound skin: Scar tissue      Color: pink      Temperature: normal   Odor: none  Pain: moderate, tender  Pain interventions prior to dressing change: slow and gentle cares   Treatment goal: Infection control/prevention  STATUS: initial assessment  Supplies ordered: ordered more vashe     Wound location: bilateral buttock, near posterior iliac crest   Wound due to: Pressure Injury, Incontinence Associated Dermatitis (IAD), Moisture Associated Skin Damage (MASD), and Skin Tear  Wound history/plan of care: present on admission, likely mixed etiology   Wound base: L buttock 100% dermis R buttock 100% erythema, purple, and epidermis, possible bruise vs pressure injury      Palpation of the wound bed: normal      Drainage: small     Description of drainage: serosanguinous     Measurements (length x width x depth, in cm): two pea sized areas to L buttock partial thickness, R buttock with redness vs bruising measuring about 2  x 3.5  x  0 cm     Tunneling: N/A     Undermining: N/A  Periwound skin: Intact      Color: pink      Temperature: normal   Odor: none  Pain: moderate, tender  Pain interventions prior to dressing change: slow and gentle cares   Treatment goal: Protection  STATUS: initial assessment  Supplies ordered: supplies stored on unit      Treatment Plan:     L elbow wound(s): Every 3 days apply Vashe (432949) moistened gauze over wound bed and let sit for 5 minutes then remove and let dry, cover open areas with Xeroform gauze (212955), then place mepilex.     R AKA wound: Daily and PRN if soiled/loose cleanse wound and skin around wound with Vashe wound cleanser ([id number]) and let dry, pack open area with Vashe moistened nugauze packing strip, leaving a tail outside of wound bed for easy removal. Cover with primapore.     Bilateral buttocks wounds: Every third day cleanse with microKlenz and dry, apply Cavilon no sting barrier film to skin around wound and let dry, then place  mepilex. If patient is stooling frequently and soiling dressings needing to be changed more than once per shift, discontinue mepilex and start critic aid paste BID and PRN to maintain barrier.        Orders: Written    RECOMMEND PRIMARY TEAM ORDER: None, at this time  Education provided: plan of care  Discussed plan of care with: Patient, Family, and Nurse  [facility] nurse follow-up plan: weekly  Notify WOC if wound(s) deteriorate.  Nursing to notify the Provider(s) and re-consult the WO Nurse if new skin concern.    DATA:     Current support surface: Standard  Standard Isoflex gel  Containment of urine/stool: Incontinence Protocol  BMI: Body mass index is 13.72 kg/m .   Active diet order: Orders Placed This Encounter      Regular Diet Adult     Output: I/O last 3 completed shifts:  In: 120 [P.O.:120]  Out: 325 [Urine:325]     Labs:   Recent Labs   Lab 07/05/24  0715 07/04/24  2223 07/04/24  0059 07/03/24  1559   ALBUMIN  --  2.5*  --   --    HGB 10.0*  --    < >  --    INR  --   --   --  1.86*   WBC 8.3  --    < >  --     < > = values in this interval not displayed.     Pressure injury risk assessment:   Sensory Perception: 3-->slightly limited  Moisture: 3-->occasionally moist  Activity: 1-->bedfast  Mobility: 2-->very limited  Nutrition: 2-->probably inadequate  Friction and Shear: 2-->potential problem  Alexandro Score: 13      Pager no longer is use, please contact through [facility] group: [facility] Nurse [place]   Dept. Office Number: [phone]

## 2024-07-05 NOTE — PROGRESS NOTES
"    GASTROENTEROLOGY PROGRESS and Sign-Off  NOTE  GI Luminal Service    Date of Admission: 7/3/2024  Reason for Admission: Acute on Chronic Anemia, Fatigue, Altered Mental Status, Poor Oral Intake       ASSESSMENT:  Sakshi Jaeger is a 79 year old female with history of DVT/PE on Eliquis, Rheumatoid arthritis, PVD status-post right femoral endarterectomy with common iliac stenting status-post right above the knee amputation who presented to the ED On 7/3/2024 for acute on chronic anemia, fatigue, altered mental status and poor oral intake. The GI Luminal Service was consulted regarding: \"GI bleed, hgb 6.4 down from 8.4, prior esophageal ulcer.\"      # Acute on Chronic Normocytic Anemia  # Severe protein-calorie malnutrition, Underweight (BMI 14)  # History of HSV Esophagitis  # Ulcerated Peptic Duodenitis  Hemoglobin on admission 6.4 g/dL (7/3) status-post 1 unit packed red blood cells that overcorrected to 8.7 g/dL (7/4) and is now 10.0 g/dL (7/5).     Patient with acute on chronic anemia in the absence of overt bleeding that is likely multifactorial with differential including malnutrition (potentially nutritional deficiencies plus ongoing concern for poor oral intake), anemia of chronic disease, potentially medications adverse effect. Defer hematologic anemia work-up to primary team.      Recently underwent EGD on 5/25/2024 with Dr. Gomez with evidence of esophagitis, esophageal ulcers, esophageal ring with ulceration and 2 superficial duodenal ulcers (non-bleeding) and esophagus/gastric/duodenal biopsies were taken. Biopsies resulted negative for CMV, H.pylori and malignancy but positive for HSV and notable for ulcerated peptic duodenitis. HSV esophagitis treated with 14 days of valacyclovir. PPI 40 mg PO BID was previously recommended; however, due to hypomagnesemia, nephrology requested decrease in PPI to Pantoprazole 40 mg by mouth once daily, so famotidine 40 mg PO BID was added +/- sucralfate. Patient " continues with poor appetite and poor oral intake with ongoing concern for malnutrition.     Previous colonoscopy 5/2018 was reported normal.     Patient had a large brown bowel movement this morning per nursing staff.     No overt GI bleeding (hematemesis, hematochezia, bright red blood per rectum, melena [black, tarry, sticky stool]) at this time. In the absence of overt GI bleeding, the pretest probability of finding a GI endoscopically intervenable lesion is low. No acute/urgent indication for GI endoscopy at this time. If there is ongoing oozing from the upper GI tract, which is still possible, would require increasing PPI therapy back to BID at this point. Repeat endoscopic evaluation would unlikely . Favor continuing supportive cares and conservative management, including strict documentation of rectal output. If the patient experiences overt GI bleeding with hemodynamic instability, please page the GI Luminal Service (listed on [facility]).       RECOMMENDATIONS:  -- Appreciate Palliative Care team's involvement and recommendations and ongoing Goals of Care conversations.     -- RD Nutrition evaluation given ongoing concern for malnutrition, ?additional nutritional labs.     -- No acute/emergent indication for GI endoscopic intervention at this time.     -- Escalate to Pantoprazole 40 mg PO BID PPI.    -- Continue Famotidine 40 mg PO BID +/- Sucralfate.     -- Please consider following an antireflux regimen. This includes:  - Do not lie down for at least 3 to 4 hours after meals.  - Raise the head of the bed 6 to 8 inches (35-45 degrees).  - Avoid foods and liquids that cause symptoms.  - Avoid cigarettes and other tobacco products.    -- Defer hematologic anemia work-up to primary team.     -- Strict documentation of rectal output.  - Appreciate detailed documentation of stool appearance, including color, consistency, frequency and amount.    - Consider smearing stool thinly on white paper towel  to distinguish color.     -- Regarding anticoagulation/antiplatelet therapy, from a GI perspective, no contraindication to restarting/continuing anticoagulation/antiplatelets. Defer decision and risk/benefit discussion to primary team. However, would consider IVC filter to reduce risk of bleeding.     -- Maintain 2 large bore IVs, 18G or larger.  -- Continue Supportive Cares  - Adequate volume resuscitation with IVF, pRBCs.  - Monitor Hemoglobin closely. Transfuse to keep Hgb > 7 g/dL from GI standpoint.   - Monitor Platelets closely. Keep PLT > 50 10e3/uL from GI standpoint.  - Maintain hemodynamics: MAP >65 mmHg and HR <100.  - Monitor and optimize electrolytes.  - Monitor and optimize nutrition. --> Diet per primary team. Appreciate RD nutrition recs.   - Reposition every 30 minutes while awake.  - Encourage Ambulation as able: 4-6 walks per day.   -- Avoid NSAIDs.  -- Analgesics/Antiemetics per primary team.  -- If the patient experiences overt GI bleeding with hemodynamic instability, please page the GI Luminal Service (listed on [facility]).       OUTPATIENT:   -- Okay to follow-up in outpatient [facility] on 3/3/2025 with Dr. Dion King as currently scheduled if aligns with the patient's overall goals of care.       COVID status: not tested this admission.     Discussed with Dr. Dean Gutierrez - [place] [address] Resident and staff Dr. Samira Sahni.     The inpatient gastroenterology service will sign off at this time. Thank you for allowing us to participate in the care of this patient. Please do not hesitate to page the GI service with any questions or concerns.     The patient was discussed and plan agreed upon with Dr. Marla Otto, GI Luminal Service staff physician.    Overall time spent on the date of this encounter preparing to see the patient (including chart review of available notes, clinical status events, imaging and labs); obtaining and/or reviewing separately obtained history from Santi  "(patient's significant other at bedside); discussing, ordering, coordinating recommended medications, tests or procedures; communicating with other health care professionals; and documenting the above clinical information in the electronic medical record was 40 minutes.    Yasmine Morejon PA-C  Inpatient Gastroenterology Service  [facility]  Text Page  _______________________________________________________________      Subjective: Nursing notes and 24hr events reviewed.     Patient seen and examined at 1000.     Patient reports no nausea, vomiting, acid reflux, heartburn, abdominal pain.    Discussed assessment and plan as above with patient and patient's significant other Santi. They had no additional questions/concerns for the GI Luminal Service.       ROS:   4 pt ROS negative unless noted in subjective.     Objective:  Blood pressure (!) 137/92, pulse 111, temperature 98.4  F (36.9  C), temperature source Axillary, resp. rate 16, height 1.676 m (5' 6\"), weight 38.6 kg (85 lb), SpO2 97%, not currently breastfeeding.    General: 79 year old female lying in bed in NAD. Appears comfortable.  Answers appropriately. +thin body habitus.   HEENT: Head is AT/NC. Sclera anicteric.   Lungs: No increased work of breathing, speaking in full sentences, equal chest rise. On Room Air.   Heart: +tachycardic. Peripheral perfusion intact.  Abdomen: Soft, non-tender, non-distended. No peritoneal signs.  Extremities: WWP. +right above the knee amputation.   Musculoskeletal: No gross deformity. +right above the knee amputation.   Skin: No jaundice or rash on exposed skin.  Neurologic: Grossly non-focal.  CN 2-12 grossly intact.   Mental status/Psych: A&O. Asks/answers questions appropriately. +flat affect. +interactive.    Date 07/05/24 0700 - 07/06/24 0659   Shift 0189-5091 4618-2756 8748-4315 24 Hour Total   INTAKE   Shift Total(mL/kg)       OUTPUT   Urine 700   700   Shift Total(mL/kg) 700(18.16)   " 700(18.16)   Weight (kg) 38.56 38.56 38.56 38.56         PROCEDURES:    5/25/2024 - EGD - Dr. Song Gomez  Impression:            - Esophageal ulcers in the mid and lower esophagus                          with no bleeding and no stigmata of recent bleeding.                          Biopsied to rule out malignancy, CMV, HSV.                          - Non-obstructing lower esophageal ring with                          ulceration. Biopsied to rule out malignancy, CMV, HSV.                          - 4 cm hiatal hernia.                          - Deformity in the pylorus, possibly related to prior                          ulcer.                          - Non-bleeding duodenal ulcers with no stigmata of                          bleeding. Biopsied for histology, rule out CMV.                          - Biopsies were taken from the gastric antrum, body,                          incisura with a cold forceps for Helicobacter pylori                          testing.                          The ulcerations may be related to her ASA use, but                          other causes should be considered, including                          infectious ulcers (given her immunosuppression).   Addendum   B. STOMACH, BIOPSY:  Immunostain, with appropriate controls, on block B1 is negative for H. pylori.      C & D. DISTAL & MID ESOPHAGUS BIOPSIES:  Immunohistochemistry for HSV  on blocks C1 & D1 are positive confirming herpes esophagitis. PAS/F Special stain for fungi is negative.     Addendum electronically signed by Bebe Raymond MD on 5/30/2024 at 11:58 AM   Final Diagnosis   A. DUODENUM, ULCER, BIOPSY:  Duodenal mucosa with focal ulceration, Brunner's gland hyperplasia, and foveolar metaplasia, consistent with ulcerated peptic duodenitis; negative for dysplasia or CMV by immunohistochemistry     B. STOMACH, BIOPSY:  Gastric mucosa with no significant histologic abnormality; negative for intestinal metaplasia or dysplasia; no  H. Pylori like organisms identified on routine staining; report of H. Pylori immunohistochemistry to follow     C. DISTAL ESOPHAGUS, ULCER, BIOPSY:  Acute herpes esophagitis with ulceration; HSV viral cytopathic effect identified; negative for dysplasia or CMV by immunohistochemistry     D. MID ESOPHAGUS, ULCER, BIOPSY:  Acute herpes esophagitis with ulceration; HSV viral cytopathic effect identified; negative for dysplasia or CMV by immunohistochemistry        5/2/2018 - Colonoscopy - [facility]         LABS:  BMP  Recent Labs   Lab 07/05/24  0715 07/04/24  2223 07/03/24  1437   * 129* 129*   POTASSIUM 3.9 3.3*  3.3* 2.8*   CHLORIDE 97* 98 93*   ADRIAN 7.8* 7.5* 8.0*   CO2 23 23 26   BUN 8.2 8.3 11.8   CR 0.37* 0.37* 0.46*   GLC 69* 73 83     CBC  Recent Labs   Lab 07/05/24  0715 07/04/24  0059 07/03/24  1437   WBC 8.3 2.5* 1.8*   RBC 3.27* 2.84* 2.11*   HGB 10.0* 8.7* 6.4*   HCT 30.8* 26.9* 20.4*   MCV 94 95 97   MCH 30.6 30.6 30.3   MCHC 32.5 32.3 31.4*   RDW 23.7* 22.5* 27.8*    230 258     INR  Recent Labs   Lab 07/03/24  1559   INR 1.86*     LFTs  Recent Labs   Lab 07/04/24 2223 07/03/24  1437   ALKPHOS 85 80   AST 24 18   ALT 11 11   BILITOTAL 0.8 0.7   PROTTOTAL 4.6* 4.9*   ALBUMIN 2.5* 2.6*      PANCNo lab results found in last 7 days.      IMAGING:  -- No abdominal imaging this admission.       CT HEAD W/O CONTRAST  7/3/2024 3:58 PM   HISTORY: AMS        COMPARISON: None     TECHNIQUE: Using multidetector thin collimation helical acquisition  technique, axial, coronal and sagittal CT images from the skull base  to the vertex were obtained without intravenous contrast.   (topogram) image(s) also obtained and reviewed. Dose reduction  techniques were used.     FINDINGS:  No acute intracranial hemorrhage, mass effect, or midline shift. No CT  findings of acute infarct or hydrocephalus. Preserved subarachnoid  spaces. Moderate diffuse parenchymal volume loss. The ventricles  and  sulci appear proportionate. Bilateral periventricular hypoattenuation,  likely sequelae of chronic small vessel ischemic disease.  Atherosclerotic calcifications of the carotid siphons.     Atraumatic calvarium. Mild sphenoid sinus mucosal thickening. Mild  right mastoid effusion. Bilateral pseudophakia..                                                                       IMPRESSION:   1. No acute intracranial pathology.  2. Moderate diffuse parenchymal volume loss and sequelae of chronic  small vessel ischemic disease

## 2024-07-05 NOTE — PROGRESS NOTES
"BP (!) 146/71 (BP Location: Right arm, Patient Position: Semi-Weaver's, Cuff Size: Adult Regular)   Pulse 91   Temp 98.2  F (36.8  C) (Oral)   Resp 16   Ht 1.676 m (5' 6\")   Wt 38.6 kg (85 lb)   LMP  (LMP Unknown)   SpO2 97%   BMI 13.72 kg/m      7-11:30 PM shift July 4, 2024     Activity: Assist of 2, Lift. R AKA  Neuros:  A&Ox3, disoriented to time. Intermittent confusion. Declined further neuro assessment.   Cardiac: Sinus rhythm on tele. Slightly hypertensive: 146/71.   Respiratory: Stable on room air. Clear-diminished lung sounds.   GI/: Last BM 7/3/24. Nolan cath with adequate yellow-monae output.   Diet: Transitioned to clears while monitored on PM shift. Tolerated without difficulty.   Skin/Incisions/Drains: Wound on right leg. R PIV, saline locked. Left elbow wound with 3 ulcerations and fluid pooling under skin from a wound that occurred on 6/25/24. Team notified and dressing changed. WOC to consult in the morning.   Lines: R PIV s/l   Pain: Pt reporting 9/10 pain in arm and abdomen. Oxycodone and tylenol given.   Labs: Ran K, Mg, Ph replacement protocol.           "

## 2024-07-05 NOTE — PROGRESS NOTES
Care Management Initial Consult    General Information  Assessment completed with: VM-chart review,         Primary Care Provider verified and updated as needed:     Readmission within the last 30 days: previous discharge plan unsuccessful      Reason for Consult: discharge planning  Advance Care Planning: Advance Care Planning Reviewed: present on chart          Communication Assessment  Patient's communication style: spoken language (English or Bilingual)             Cognitive  Cognitive/Neuro/Behavioral: level of consciousness  Level of Consciousness: confused                   Living Environment:   People in home: significant other     Current living Arrangements: house      Able to return to prior arrangements: no       Family/Social Support:  Care provided by: spouse/significant other  Provides care for: no one, unable/limited ability to care for self                Description of Support System:           Current Resources:   Patient receiving home care services: Yes  Skilled Home Care Services: Skilled Nursing  Community Resources: None  Equipment currently used at home: wheelchair, power, walker, rolling, commode chair  Supplies currently used at home: Wound Care Supplies    Employment/Financial:  Employment Status: retired        Financial Concerns: none           Does the patient's insurance plan have a 3 day qualifying hospital stay waiver?  Yes     Which insurance plan 3 day waiver is available? [employer]    Will the waiver be used for post-acute placement? Undetermined at this time    Lifestyle & Psychosocial Needs:  Social Determinants of Health     Food Insecurity: Not on file   Depression: Not at risk (7/2/2024)    PHQ-2     PHQ-2 Score: 0   Housing Stability: Not on file   Tobacco Use: Medium Risk (7/3/2024)    Patient History     Smoking Tobacco Use: Former     Smokeless Tobacco Use: Never     Passive Exposure: Past   Financial Resource Strain: Not on file   Alcohol Use: Not on file    Transportation Needs: Not on file   Physical Activity: Not on file   Interpersonal Safety: Not on file   Stress: Not on file   Social Connections: Unknown (11/18/2022)    [facility] from [facility], [facility]    Social Connections     Frequency of Communication with Friends and Family: Not on file   Health Literacy: Not on file       Functional Status:  Prior to admission patient needed assistance:   Dependent ADLs:: Wheelchair-with assist, Transfers, Bathing, Toileting  Dependent IADLs:: Cleaning, Cooking, Laundry, Shopping, Medication Management, Meal Preparation, Transportation       Mental Health Status:          Chemical Dependency Status:                Values/Beliefs:  Spiritual, Cultural Beliefs, Druze Practices, Values that affect care: no       Cultural/Druze Practices Patient Routinely Participates In: prayer       Additional Information:  SW reviewed pt chart and spoke with SO to complete assessment. Pt returned to ED form SNF after last being seen at [facility]. Pt presents as confused and has trouble with recall. Pt is reporting to bedside team that she wants to go home. SW reviewed with SO pt reports of wanting to go home. SO has been at bedside with support and assistance with wound care. SO is concerned that pt is in need of increased supports. Pt support is limited and current course of care is undetermined. SW will continue to monitor pt progress for discharge needs.    GREY Gimenez LSW   7/4/2024       Social Work and Care Management Department       SEARCHABLE in [employer] - search SOCIAL WORK       [place] ([phone]) Saturday and Sunday     Units: 4A Vocera, 4C Vocera, & 4E Vocera        Units: 5A 9900-0831 Vocera, 5A 6004-8254 Vocera , BMT SW 1 BMT SW 2, BMT SW 3 & BMT SW 4  5C Off Service [phone] - 5458  5C Off Service [id number]     Units: 6A Vocera & 6B Vocera      Units: 6C  Vocera     Units: 7A Vocera & 7B Vocera      Units: 7C Med Surg 7401 thru 7418 and 7C Med Surg 7502 thru 7521      Unit: [place] ED Vocera & [place] Obs Vocera     [facility] ([phone]) Saturday and Sunday      Units: 5 Ortho Vocera, 5 Med Surg Vocera & WB ED Vocera     Units: 6 Med Surg Vocera, 8 Med Surg Vocera, & 10 ICU Vocera      After hours Vocera [facility] and After Hours Vocera [place]     Saturday & Sunday (1630 - 0000)    Mon-Fri ([phone])     [facility] Recognized Holidays  ([phone])    Units: ALL   - see above VOCERA links to units and after hours

## 2024-07-05 NOTE — PROGRESS NOTES
GASTROENTEROLOGY PROGRESS NOTE    Date of Admission: 7/3/2024  Reason for Admission: generalized weakness       ASSESSMENT:  79 year old female with a history of DVT/PE on Eliquis, PVD s/p R AKA, RA who presents with acute on chronic anemia and fatigue. GI consulted for evaluation of GI bleeding.     #. Acute on chronic anemia  #. History of HSV esophagitis  #. Duodenal ulcers     Recently underwent EGD on 05/25 with evidence of esophagitis, esophageal ulcers, esophageal ring with ulceration. Biopsies obtained to rule out CMV, malignancy. Also found to have duodenal ulcers. Gastric biopsies obtained to rule out H.pylori, also duodenal biopsies to rule out CMV.      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 Also started taking pantoprazole 40 mg BID, although that was decreased to once daily shortly after due to persistent hypomagnesemia, along with BID Famotidine and Simethicone.       Normal colonoscopy in 2018.      Would hold off repeat endoscopies in the absence of active GI bleeding and overcorrection of hemoglobin after 1U pRBC transfusion. Ongoing slow bleeding still possible from the upper GI tract, would require BID PPI therapy at this point. Repeat endoscopic evaluation would unlikely .     The patient was discussed and plan agreed upon with GI staff,  ***.    RECOMMENDATIONS:  - Escalate to BID PPI therapy.  - Hold off any GI interventions at this time.   - Continue Famotidine, Simethicone, iron  - Defer AC plan to primary team but consider IVC filter to reduce risk of bleeding.     Gastroenterology follow up recommendations: None     Thank you for involving us in this patient's care. Please do not hesitate to contact the GI service with any questions or concerns.      Patient care plan discussed with Dr. Gomez, GI staff physician.    Ian Tuttle MD  Gastroenterology  "Fellow    _______________________________________________________________      Subjective: Nursing notes and 24hr events reviewed. Patient seen and examined at ***. Patient reports ***.    ROS:   4 pt ROS negative unless noted in subjective.     Objective:  Blood pressure 130/61, pulse 87, temperature 97.7  F (36.5  C), temperature source Oral, resp. rate 16, height 1.676 m (5' 6\"), weight 38.6 kg (85 lb), SpO2 96%, not currently breastfeeding.  Gen: no acute distress  HEENT: NCAT. Conjunctiva clear. Sclera anicteric ***  ,   Resp: *** work of breathing  Abd: Soft, NT, ND, no guarding or rebound, ***  Msk: no gross deformity  Skin:  *** jaundice  Ext: warm, well perfused ***  Neuro: grossly normal  Mental status/Psych: A&O. Asks/answers questions appropriately       LABS:  BMP  Recent Labs   Lab 07/04/24 2223 07/03/24  1437   * 129*   POTASSIUM 3.3*  3.3* 2.8*   CHLORIDE 98 93*   ADRIAN 7.5* 8.0*   CO2 23 26   BUN 8.3 11.8   CR 0.37* 0.46*   GLC 73 83     CBC  Recent Labs   Lab 07/05/24  0715 07/04/24  0059 07/03/24  1437   WBC 8.3 2.5* 1.8*   RBC 3.27* 2.84* 2.11*   HGB 10.0* 8.7* 6.4*   HCT 30.8* 26.9* 20.4*   MCV 94 95 97   MCH 30.6 30.6 30.3   MCHC 32.5 32.3 31.4*   RDW 23.7* 22.5* 27.8*    230 258     INR  Recent Labs   Lab 07/03/24  1559   INR 1.86*     LFTs  Recent Labs   Lab 07/04/24 2223 07/03/24  1437   ALKPHOS 85 80   AST 24 18   ALT 11 11   BILITOTAL 0.8 0.7   PROTTOTAL 4.6* 4.9*   ALBUMIN 2.5* 2.6*      PANCNo lab results found in last 7 days.       "

## 2024-07-06 NOTE — PLAN OF CARE
Arrived from: ED  Belongings/meds: remains with patient  2 RN Skin Assessment Completed by: Collette Shi RN     Non-intact findings documented (yes/no/NA):  Yes, L elbow wound, R knee amputation wound, Bilateral buttocks wounds, generalized bruising

## 2024-07-06 NOTE — PROGRESS NOTES
[facility]    Progress Note - Medicine Service, MISA TEAM 4       Date of Admission:  7/3/2024    Assessment & Plan   Sakshi Jaeger is a 79 year old female admitted on 7/3/2024, with 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 She is admitted today for acute blood loss anemia w/ concern for GI bleed, complicated by worsening mental status and poor PO intake     Today  -Patient's  agreeable to hospice. Will meet with social work to discuss options. Appreciate SW help.  -New leukocytosis to 14 from leukopenia on admission. No clear infectious signs/symptoms, fevers, systemic complaints. Will continue to monitor and have low threshold to work up w/ procal/crp/lactate, with CT CAP IF this would be in concordance w/ patient's goals of care.     Acute Problems  #Quality of Life  #Depression  #Concern for failure to thrive  Patient has had discussions with palliative care during past admissions, with conversations focusing on comfort, minimizing pill burden and lab draws. Metoprolol, folate, and statin were stopped at last hospital stay. Per above PO intake concerns, she has been eating and drinking less and less since her 06/05 discharge.  Will continue to prioritize comfort, minimize pill burden, and allow full diet with minimization of labs. Will resume escitalopram. Seen by palliative 7/5, appreciate lengthy discussion with patient and family about goals of care and disease progression.  -Continue PTA acetaminophen, oxycodone 5mg q4hrs PRN for pain control  -Continue escitalopram     #Leukocytosis  WBC to 14 on 7/6 from leukopenia on admission. Denies any systemic symptoms, although may be underreporting due to altered mental status. No fevers, chills, remains hemodynamically stable. If WBC continues  to elevate, would check procal, CRP, lactate. If lactate elevated and does not respond to 1LNS, would consider CT CAP w/ contrast to work up underlying infectious etiology. Will add on differential to 7/7 CBC  -AM CBC w/ diff    #Acute on chronic anemia, likely 2/2 duodenal vs esophageal ulcer (baseline 8.4-9) vs malnutrition vs anemia of chronic disease  #Hx of duodenal and esophgeal ulcer (5/25 EGD ) w/ esophageal ring ulceration and 2 superficial duodenal nonbleeding ulcers.   #Hx of EGD biopsy w/ herpes esophagitis, completed course ov valacyclovir  Admitted w/ hb of 6.4 in a history of duodenal and esophageal ulcers, gave 1UPRBC in ED. Patient denies any hemoptysis, hematuria, melena, hematochezia. Reticulocytes appropriately elevated. Folate WNL, B12 elevated. Iron labs suggestive of anemia of chronic disease. Per GI, given recent EGD w/ nonbleeding ulcers unlikely to have GI source of blood loss at this time. GI recommending PPI, but in the setting of minimizing pill burden and optimizing comfort, will not start at this time.   -PTA, simethicone, famotidine, iron  -AM CBC    #Progressively worsening altered mental status  #Poor PO intake, SLP recommending soft/easy chew diet w/ thin liquids 5/27 w/ concerns for aspiration  #Asymptomatic hyponatremia, hx of SIADH treated with fluid restriction  #Hypokalemia  #Malnutrition  #Hypophosphatemic, will monitor for refeeding syndrome  #Chronic Hypomagnesemia, followed by nephrology and GI  Patient choking on individual pills in ED. Seen by SLP, mild oral dysphagia with poor cooperation on exam, no overt signs or symptoms of aspiration. Recommending thin liquids w/ 1:1 supervision. Would recommend removal of partial ill fitting denture. Will priortiize comfort feeding, family aware of risks of aspiration. Ordered full diet.   -PTA magnesium oxide 400mg/d  -AM BMP, Mg PO4  -Regular diet (counseled on aspiration risk vs desire for comfort)  -LR w 5% dextrose and 20meqK  "50 ml/hr     #Generalized weakness  Was discharged to TCU in 03/2023 following AKA, followed up w/ in home PT and had mild improvement, but services lapsed for 3 weeks and her strength declined. Most recent PT assessment at 06/05 discharge w/ no obvious metabolic, infectious, or organic etiology. Was sent to TCU 6/5-6/25 for rehabilitation. PT/OT consulted during this stay, but may no longer be within patient's goals of care.   -PT/OT consulted    Stable/Resolved  #Leukopenia of unclear etiology- resolved.  #Urinary retention-le catheter in place.     Chronic Problems  #Rheumatoid arthritis  -PTA prednisone 5mg/d     #Hx of LLE DVT and left lower lobe PE (5/17), currently on apixaban  -Resume PTA apixaban     #R femoral endarterctomy w/ common iliac stenting, sp R AKA.  Follows w/ vascular surgery and WOC,  changes wounds at home  -Consulted WOC, appreciate recs.        Diet: NPO w/ concerns of aspiration  DVT Prophylaxis: Held w/ concerns for GI bleed  Le Catheter: Present  Fluids: None  Lines: PIV, Le  Cardiac Monitoring: None  Code Status:  Full    Clinically Significant Risk Factors        # Hypokalemia: Lowest K = 3.3 mmol/L in last 2 days, will replace as needed  # Hyponatremia: Lowest Na = 129 mmol/L in last 2 days, will monitor as appropriate    # Hypomagnesemia: Lowest Mg = 1.5 mg/dL in last 2 days, will replace as needed   # Hypoalbuminemia: Lowest albumin = 2.5 g/dL at 7/4/2024 10:23 PM, will monitor as appropriate     # Hypertension: Noted on problem list           #Precipitous drop in Hgb/Hct: Lowest Hgb this hospitalization: 6.4 g/dL. Will continue to monitor and treat/transfuse as appropriate.     # Cachexia: Estimated body mass index is 13.72 kg/m  as calculated from the following:    Height as of this encounter: 1.676 m (5' 6\").    Weight as of this encounter: 38.6 kg (85 lb)., PRESENT ON ADMISSION     # Financial/Environmental Concerns: none         Disposition Plan      Expected " Discharge Date: 07/07/2024                The patient's care was discussed with the Attending Physician, Dr. Sahni .    Dean Gutierrez MD  Medicine Service, [facility] TEAM [address] [place] [address]  Securely message with [employer] (more info)  Text page via [facility] Paging/Directory   See signed in provider for up to date coverage information  ______________________________________________________________________    Interval History   No events overnight. Seen sleepy at bedside this AM with . Spoke with , agreeable to hospice. Will meet with SW.     Physical Exam   Vital Signs: Temp: 98.7  F (37.1  C) Temp src: Oral BP: 130/79 Pulse: 85   Resp: 14 SpO2: 94 % O2 Device: None (Room air)    Weight: 85 lbs 0 oz    General: tired, in NAD  Cards: No murmurs, rubs, gallops. Regular rate and rhythm   Pulm: No crackles, wheezes, rales. Lungs clear to auscultation BL   Abd: NABS, soft, nontender, nondistended, nontympanitic.   Neuro: No focal deficits noted. Answers questions appropriately.  Psych:  Flat affect      Data     I have personally reviewed the following data over the past 24 hrs:    14.0 (H)  \   8.5 (L)   / 303     133 (L) 101 7.8 (L) /  93   3.4 23 0.40 (L) \

## 2024-07-06 NOTE — CONSULTS
Care Management Initial Consult    General Information  Assessment completed with: Significant other    Type of CM/SW Visit: Initial Assessment    Primary Care Provider verified and updated as needed: Yes; Dr. Lucia Bates   Readmission within the last 30 days: previous discharge plan unsuccessful   Reason for Consult: discharge planning, elevated risk score  Advance Care Planning: Advance Care Planning Reviewed: present on chart        Communication Assessment  Patient's communication style: spoken language (English or Bilingual)        Cognitive  Cognitive/Neuro/Behavioral: .WDL except, level of consciousness, orientation, speech  Level of Consciousness: intermittent confusion, lethargic  Arousal Level: opens eyes spontaneously  Orientation: disoriented to, time  Mood/Behavior: cooperative  Best Language: 0 - No aphasia  Speech: slurred, spontaneous    Living Environment:   People in home: significant other     Current living Arrangements: house      Able to return to prior arrangements: Pt is now going comfort cares. If pt's family can povide caregiver support, pt can d/c home.     Family/Social Support:  Care provided by: spouse/significant other  Provides care for: no one, unable/limited ability to care for self  Marital Status: Lives with Significant Other  Support System: Significant Other, Children          Description of Support System: Supportive, Involved    Support Assessment: Adequate family and caregiver support, Adequate social supports    Current Resources:   Patient receiving home care services: Yes  Skilled Home Care Services: Skilled Nursing  Community Resources: None  Equipment currently used at home: wheelchair, power, walker, rolling, commode chair  Supplies currently used at home: Wound Care Supplies    Employment/Financial:  Employment Status: retired        Financial Concerns: none   Referral to Financial Worker: no    Does the patient's insurance plan have a 3 day qualifying hospital  stay waiver?  No    Lifestyle & Psychosocial Needs:  Social Determinants of Health     Food Insecurity: Not on file   Depression: Not at risk (7/2/2024)    PHQ-2     PHQ-2 Score: 0   Housing Stability: Not on file   Tobacco Use: Medium Risk (7/3/2024)    Patient History     Smoking Tobacco Use: Former     Smokeless Tobacco Use: Never     Passive Exposure: Past   Financial Resource Strain: Not on file   Alcohol Use: Not on file   Transportation Needs: Not on file   Physical Activity: Not on file   Interpersonal Safety: Not on file   Stress: Not on file   Social Connections: Unknown (11/18/2022)    [facility] [employer] [facility] [employer] [facility]    Social Connections     Frequency of Communication with Friends and Family: Not on file   Health Literacy: Not on file     Functional Status:  Prior to admission patient needed assistance:   Dependent ADLs:: Wheelchair-with assist, Transfers, Bathing, Toileting  Dependent IADLs:: Cleaning, Cooking, Laundry, Shopping, Medication Management, Meal Preparation, Transportation  Assesssment of Functional Status: Not at  functional baseline    Mental Health Status:        Couldn't ask, as nurse/therapist were at bedside.    Chemical Dependency Status:        Couldn't ask, as nurse/therapist were at bedside.        Values/Beliefs:  Spiritual, Cultural Beliefs, Evangelical Practices, Values that affect care: no       Cultural/Evangelical Practices Patient Routinely Participates In: prayer       Additional Information:  SW received an update from primary M4 provider Dean Gutierrez, stating pt/family have decided to go comfort cares and would like SW to discuss different hospice options.    SW met with pt and pt's BISI Hancock at bedside to complete initial assessment, due to elevated risk score and hospice discharge planning, . Introduced self and role. Pt was unwell and sleeping, so pt's BISI Hancock answered most questions. SW verified  home address and PCP (Dr. Lucia Bates). Santi reported he lives with SO. Pt has 3 adult children; 2 live 1.5 hours away from [place], and 1 lives in [place]. Santi reports pt was getting homecare skilled nursing and wound care supplies from [facility]. Santi reports pt doesn't use any community resources. Pt uses a wheelchair, power, walker, rolling, commode chair. Pt is retired and receives social security as income. Prior to hospitalization, pt needed support with most I/ADLS.    SW then discussed pt's discharge dispo of hospice and offered the different types of hospice services; residential, in-home, and nursing home. SW did discuss that if pt goes to a residential or nursing home, it will have to be private pay as pt's insurance does not cover room and board fees. Pt's SO Santi reported the issue he currently has is if pt goes home, there currently isn't a 24/7 caregiver plan. Santi then starts to get teary eyed and emotional, as he expressed being overwhelmed with out of pocket private pay and pt's overall condition.     SW provided empathy, active listening, and validation of SO feelings. Santi then reported pt's adult children live [address] hours [address] and have their own life, so it would be pretty difficult for them to take care of pt on home hospice. SW then asked pt if he would like some time to think about hospice discharge planning. Santi reported he would like some time to think about what he would like to do. SW agreed and will need to follow up with pt/Santi for hospice discharge planning. Care management will follow for updates.    DEBI Pearson, LGSW  6A/6B Weekend SW  Ph: [phone]  Vocera: Jennifer Valle or 6A Neuro SW; 6B IMC SW

## 2024-07-06 NOTE — PROGRESS NOTES
"   07/06/24 0953   Appointment Info   Signing Clinician's Name / Credentials (OT) Lady Alvarez, OTR/L   Living Environment   People in Home spouse   Current Living Arrangements house   Home Accessibility no concerns   Transportation Anticipated   (TBD)   Living Environment Comments Per confirmation of spouse and pt from previous admission, \"Pt lives with  in a house. Ramp installed to enter. Stairs to basement but pt does not need to go to basement.  assists with sponge bathing and commode for toileting.\"   Self-Care   Usual Activity Tolerance poor   Current Activity Tolerance poor   Equipment Currently Used at Home wheelchair, power;walker, rolling;commode chair   Fall history within last six months no   Activity/Exercise/Self-Care Comment Per confirmation of spouse and pt from previous admission, \"Partner states that patient could stand IND several months ago and pivot mod I but now requires near dependent assist for all transfers/mobility\"   Instrumental Activities of Daily Living (IADL)   IADL Comments Spouse completes all IADL   General Information   Onset of Illness/Injury or Date of Surgery 07/03/24   Referring Physician Dean Gutierrez MD   Patient/Family Therapy Goal Statement (OT) did not specify   Additional Occupational Profile Info/Pertinent History of Current Problem Per EMR, \"Sakshi Jaeger is a 79 year old female admitted on 7/3/2024, with 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 She is admitted today for acute blood loss anemia w/ concern for GI bleed, complicated by worsening mental status and poor PO intake.\"   Existing Precautions/Restrictions fall   Left Upper Extremity (Weight-bearing Status) full weight-bearing (FWB)   Right Upper Extremity (Weight-bearing Status) full weight-bearing (FWB)   Left Lower Extremity " (Weight-bearing Status) full weight-bearing (FWB)   Right Lower Extremity (Weight-bearing Status) full weight-bearing (FWB)   General Observations and Info Activity orders: up ad nabil; OH lift Ax2 currently   Cognitive Status Examination   Orientation Status person;place;time   Cognitive Status Comments continue to monitor, pt slightly agitated and requiring increased processing time, occasional verbal prompting and repeat of instructions to initiate tasks   Visual Perception   Visual Impairment/Limitations WFL   Sensory   Sensory Quick Adds sensation intact   Posture   Posture kyphosis;protracted shoulders;forward head position   Range of Motion Comprehensive   General Range of Motion upper extremity range of motion deficits identified   Comment, General Range of Motion LUE limited range, pt did not allow for formal testing, Sensitive to touch and movement in shoulder. RUE WFL with decreased ROM   Strength Comprehensive (MMT)   Comment, General Manual Muscle Testing (MMT) Assessment generalized weakness and deconditioning; diminshed  strength to bilateral hands   Coordination   Upper Extremity Coordination Left UE impaired;Right UE impaired   Fine Motor Coordination impaired   Coordination Comments difficulty maintaining large grasp to water cup, encouraged two-hand    Bed Mobility   Comment (Bed Mobility) OH lift; Ax1-2 to log roll R/L   Transfers   Transfer Comments OH lift   Balance   Balance Comments DNT   Activities of Daily Living   BADL Assessment/Intervention bathing;upper body dressing;lower body dressing;grooming;toileting;feeding   Bathing Assessment/Intervention   [place] Level (Bathing) dependent (less than 25% patient effort)   Comment, (Bathing) anticipate per clinical judgement   Upper Body Dressing Assessment/Training   Comment, (Upper Body Dressing) anticipate per clinical judgement   [place] Level (Upper Body Dressing) maximum assist (25% patient effort);verbal cues   Lower Body  Dressing Assessment/Training   Comment, (Lower Body Dressing) anticipate per clinical judgement   [place] Level (Lower Body Dressing) dependent (less than 25% patient effort)   Grooming Assessment/Training   [place] Level (Grooming) minimum assist (75% patient effort);moderate assist (50% patient effort);verbal cues   Eating/Self Feeding   [place] Level (Feeding) minimum assist (75% patient effort);contact guard assist;verbal cues   Toileting   Comment, (Toileting) anticipate per clinical judgement   [place] Level (Toileting) dependent (less than 25% patient effort)   Clinical Impression   Criteria for Skilled Therapeutic Interventions Met (OT) Yes, treatment indicated   OT Diagnosis Decreased ADL participation, functional strength/ROM, ax tolerance, and functional mobility/transfers   OT Problem List-Impairments impacting ADL problems related to;activity tolerance impaired;cognition;coordination;eating/swallowing;mobility;range of motion (ROM);strength;pain   Assessment of Occupational Performance 5 or more Performance Deficits   Identified Performance Deficits g/hygiene, toileting, dressing, bathing, functional mobility/transfers, cognition   Planned Therapy Interventions (OT) ADL retraining;cognition;ROM;strengthening;transfer training;home program guidelines;progressive activity/exercise   Clinical Decision Making Complexity (OT) problem focused assessment/low complexity   Risk & Benefits of therapy have been explained evaluation/treatment results reviewed;care plan/treatment goals reviewed;risks/benefits reviewed;current/potential barriers reviewed;participants voiced agreement with care plan;participants included;patient;spouse/significant other   Clinical Impression Comments Pt will benefit from skilled IP OT to maximize safety and participation engaging in ADLs prior to discharge to reduce caregiver burden and improve autonomy.   OT Total Evaluation Time   OT Eval, Low Complexity Minutes  (80092) 4   OT Goals   Therapy Frequency (OT) 5 times/week   OT Predicted Duration/Target Date for Goal Attainment 08/23/24   OT Goals Hygiene/Grooming;Upper Body Dressing;Lower Body Dressing;Upper Body Bathing;Lower Body Bathing;Toilet Transfer/Toileting;Cognition   OT: Hygiene/Grooming supervision/stand-by assist  (while supported seated)   OT: Upper Body Dressing Minimal assist  (while supported seated)   OT: Lower Body Dressing Maximum assist   OT: Upper Body Bathing Minimal assist  (while supported seated)   OT: Lower Body Bathing Maximum assist   OT: Toilet Transfer/Toileting Maximum assist   OT: Cognitive Patient/caregiver will verbalize understanding of cognitive assessment results/recommendations as needed for safe discharge planning   Self-Care/Home Management   Self-Care/Home Mgmt/ADL, Compensatory, Meal Prep Minutes (98853) 25   Symptoms Noted During/After Treatment (Meal Preparation/Planning Training) fatigue   Treatment Detail/Skilled Intervention Greeted supine and required rapport building as pt approached slightly frustrated and irritable. Introduced self and offered engagement in self-cares. OT assisted with self-feeding/drinking encouraging IND through two-hand grasp on cup and assistance stabilizing to reduce spills. Encouraged IND initiation of face wahsing after set up, however, pt declined and OT started task, pt reporting too rough and attempted with min. A and hand over hand. Ultimately requiring max. A. Required mod. A initially to initiate oral hygiene, OT performing set up and toothpaste application. Pt required mod. vcs to complete step-by-step tasks and grew increasingly irritated by OT and  instruction. Required increased time and exploration of completion of task before intervening.   Therapeutic Activities   Therapeutic Activity Minutes (39298) 23   Symptoms noted during/after treatment fatigue   Treatment Detail/Skilled Intervention Facilitated functional transfers in prep  for continued seated upright ax tolerance. Pt required dep. Ax2 to complete log roll R/L for dep. brief and OH lift placement. Transported to chair and additional time spent adjusting pillows and seated position for comfort and to reduce pressure to buttocks. Alerted RN of benefits to geomat cushion for pt and to order. Once seated up in chair, attempted to engage in shampoo cap task and seated activity, pt declined despite encouragement. Left reclined with spouse present. Call light near.   OT Discharge Planning   OT Plan Seated ADL engagement in chair, STS as able, UB bathing and dressing   OT Discharge Recommendation (DC Rec) Transitional Care Facility   OT Rationale for DC Rec Pt would benefit from continued skilled OT to maximzie functional participation in ADL and to reduce caregiver burden prior to discharge. Would benefit from increased strength training and ax tolerance building as well.   OT Brief overview of current status OH lift   Total Session Time   Timed Code Treatment Minutes 48   Total Session Time (sum of timed and untimed services) 52

## 2024-07-06 NOTE — PLAN OF CARE
Goal Outcome Evaluation:      Plan of Care Reviewed With: patient, significant other    Overall Patient Progress: no changeOverall Patient Progress: no change    Outcome Evaluation: SW completed initial assessment with pt's BISI Hancock. Pt's current discharge dispo is comfort cares and SW discussed different options with SO. SW then asked pt if he would like some time to think about hospice discharge planning and he agreed. SW will need to follow up with pt/SO to get final hospice discharge plan. Care management will follow for updates.

## 2024-07-06 NOTE — PHARMACY-ADMISSION MEDICATION HISTORY
"Pharmacy Intern Admission Medication History    Admission medication history is complete. The information provided in this note is only as accurate as the sources available at the time of the update.    Information Source(s): Family member via phone    Pertinent Information:  Spoke with , who was very hesitant about the patient's medication information, but tried to report as much as he could about it. He said that her patient list should be up-to-date, and he relies heavily on that list.   states that patient did not have famotidine or mirtazapine when she left the TCU on 6/25, but didn't specify whether or not they were resumed after that. He also states that she went without mirtazapine for about 5 days but didn't specify when that was.  Stopped using fluocinolone ear drops because the patient didn't like needing to take so many medications.  Stopped taking Miralax and simethicone on 6/25.   stated that the patient was about [facility] through her \"morning meds\" when she left for the hospital, but he couldn't remember which ones she missed.    Changes made to PTA medication list:  Added: None  Deleted: None  Changed: None    Allergies reviewed with patient and updates made in EHR: no    Medication History Completed By: Mart Cortez 7/6/2024 4:44 PM    PTA Med List   Medication Sig Last Dose    apixaban ANTICOAGULANT (ELIQUIS) 5 MG tablet Take 2 tablets (10 mg) by mouth 2 times daily for 7 days, THEN 1 tablet (5 mg) 2 times daily for 83 days. Past Week    calcium carbonate 600 mg-vitamin D 400 units (CALCIUM 600 + D) 600-400 MG-UNIT per tablet Take 2 tablets by mouth daily Past Week    Elemental iron 65 mg Vitamin C 125 mg (VITRON C)  MG TABS tablet Take 1 tablet by mouth daily Past Week    famotidine (PEPCID) 40 MG tablet Take 1 tablet (40 mg) by mouth at bedtime Past Month    leflunomide (ARAVA) 20 MG tablet Take 1 tablet by mouth every 24 hours Past Week    lisinopril (ZESTRIL) 20 " MG tablet TAKE 1 TABLET BY MOUTH EVERY DAY Past Week    methotrexate 2.5 MG tablet Take 20 mg by mouth every 7 days On Saturdays Past Week    metoprolol succinate ER (TOPROL XL) 50 MG 24 hr tablet TAKE ONE TABLET BY MOUTH EVERY DAY Past Week    mirtazapine (REMERON) 15 MG tablet Take 1 tablet (15 mg) by mouth at bedtime Past Month    oxyCODONE (ROXICODONE) 5 MG tablet Take 1 tablet (5 mg) by mouth 4 times daily And 5mg Q6h PRN Past Week    predniSONE (DELTASONE) 5 MG tablet Take 1 tablet (5 mg) by mouth daily Past Week    simvastatin (ZOCOR) 20 MG tablet Take 1 tablet (20 mg) by mouth At Bedtime Past Week

## 2024-07-06 NOTE — PLAN OF CARE
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 She is admitted today for acute blood loss anemia w/ concern for GI bleed, complicated by worsening mental status and poor PO intake   Vitals: VSS on RA   Neuros: A+O x3, did not know the year. 3/5 BUE, 2/5 BLE. R AKA. N/T BLE. Confused, labile in mood   IV: PIV infusing D5 LR with KCL at 50, potassium phosphate currently infusing, not compatible   Labs/Electrolytes: Replaced all IV, redraw potassium and mag at 1600  Resp/trach: WNL   Diet: Regular diet for comfort, coughs intermittent. Partner helped patient eat. Drank two ensures and pills crushed in pudding   Bowel status: BS+, smear BM today. Incontinent   : Nolan in place, low output. (200ml in 8 hours), MD Brunson and aware   Skin: L elbow dressing CDI, dressing due Monday. Sacral dressing CDI, due Monday. R AKA dressing changed today, done daily. Redness to rectum due to frequent stools yesterday  Pain: Patient denied, but moans and yells out when being turned, moved, etc. Pain medications available if needed   Activity: A2 and lift, in chair x1 today   Social: Significant other, Santi, here most of day   Plan: Continue with POC

## 2024-07-06 NOTE — PLAN OF CARE
Goal Outcome Evaluation:      Plan of Care Reviewed With: patient    Overall Patient Progress: improvingOverall Patient Progress: improving    Status: pt admitted 7/5 for acute blood loss anemia with concern for GI bleed, complicated by worsening mental status and poor PO intake. PMHx of DVT/PE on apixaban, R femoral endarterectomy w/ common iliac stenting sp R AKA, and RA and recent hospitalization for acute blood loss anemia 2/2 duodenal and esophageal ulcer.   Vitals: VSS on RA, [facility]  Neuros: A&Ox4, with intermittent confusion and forgetfulness, strengths BUE 2-3/5, BLE 2/5 due to generalized weakness, denied N/T  IV: PIV infusing with dextrose 5% in LR with potassium chloride @ 50ml/hr  Resp/trach: on RA  Diet: Regular diet--poor PO intake  Bowel status: LBM 7/6 loose brown stools x3, incontinent to stool  : voiding with le due to retention and pt being bed bound  Skin: L elbow wound, R knee amputation wound, Bilateral buttocks wounds--see WOC notes on how to do wound care. Wound dressing changed on 7/5 by WOC nurse.   Pain: generalized pain mainly from wounds, managed with PRN tylenol, oxycodone,  Activity: Ax2 with lift, q2hr turn/repo   Social: Significant other at bedside--see phone number on pat`s board  Plan: wound care, pain management, palliative care following. continue to monitor

## 2024-07-07 NOTE — PROVIDER NOTIFICATION
"Notified Pio Barahona patient heart rate 120s and Tmax 102.2 aux.     MD called back informed writer patient is \"hospice\", pending orders for comfort cares. Will continue to monitor.  "

## 2024-07-07 NOTE — PROGRESS NOTES
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 She is admitted today for acute blood loss anemia w/ concern for GI bleed, complicated by worsening mental status and poor PO intake     Today  - Continuing IV fluids and lab checks until patient's SO Santi has had more time to discuss options with SW re hospice at home vs a hospice facility, and for patient's family to arrive to see her  - Discussed with Santi that when patients (like Sakshi) are not eating and drinking, life expectancy is days/weeks.  I suspect that is is her situation since her appetite and PO intake are quite poor.  -  Appreciate SW assistance with determining financially acceptable options for meeting Sakshi's needs  -  Continuing lab checks, medications, etc but anticipate transition to comfort cares/hospice in the upcoming 1-3 days.  - Okay to skip vitals checks if Sakshi is sleeping  - Not escalating cares (not working up new fever)     Acute Problems  #Quality of Life  #Depression  #Concern for failure to thrive  Patient has had discussions with palliative care during past admissions, with conversations focusing on comfort, minimizing pill burden and lab draws. Metoprolol, folate, and statin were stopped at last hospital stay. Per above PO intake concerns, she has been eating and drinking less and less since her 06/05 discharge.  Will continue to prioritize comfort, minimize pill burden, and allow full diet with minimization of labs. Will resume escitalopram. Seen by palliative 7/5, appreciate lengthy discussion with patient and family about goals of care  and disease progression.  -Continue PTA acetaminophen, oxycodone 5mg q4hrs PRN for pain control  -Continue escitalopram   -Anticipate transition to hospice/comfort cares in the upcoming days     #Leukocytosis  WBC to 14 on 7/6 from leukopenia on admission. Denies any systemic symptoms, although may be underreporting due to altered mental status. No fevers, chills, remains hemodynamically stable. If WBC continues to elevate, would check procal, CRP, lactate. If lactate elevated and does not respond to 1LNS, would consider CT CAP w/ contrast to work up underlying infectious etiology. Will add on differential to 7/7 CBC  -AM CBC w/ diff    #Acute on chronic anemia, likely 2/2 duodenal vs esophageal ulcer (baseline 8.4-9) vs malnutrition vs anemia of chronic disease  #Hx of duodenal and esophgeal ulcer (5/25 EGD ) w/ esophageal ring ulceration and 2 superficial duodenal nonbleeding ulcers.   #Hx of EGD biopsy w/ herpes esophagitis, completed course ov valacyclovir  Admitted w/ hb of 6.4 in a history of duodenal and esophageal ulcers, gave 1UPRBC in ED. Patient denies any hemoptysis, hematuria, melena, hematochezia. Reticulocytes appropriately elevated. Folate WNL, B12 elevated. Iron labs suggestive of anemia of chronic disease. Per GI, given recent EGD w/ nonbleeding ulcers unlikely to have GI source of blood loss at this time. GI recommending PPI, but in the setting of minimizing pill burden and optimizing comfort, will not start at this time.   -PTA, simethicone, famotidine, iron  -AM CBC    #Progressively worsening altered mental status  #Poor PO intake, SLP recommending soft/easy chew diet w/ thin liquids 5/27 w/ concerns for aspiration  #Asymptomatic hyponatremia, hx of SIADH treated with fluid restriction  #Hypokalemia  #Malnutrition  #Hypophosphatemic, will monitor for refeeding syndrome  #Chronic Hypomagnesemia, followed by nephrology and GI  Patient choking on individual pills in ED. Seen by SLP, mild oral  dysphagia with poor cooperation on exam, no overt signs or symptoms of aspiration. Recommending thin liquids w/ 1:1 supervision. Would recommend removal of partial ill fitting denture. Will priortiize comfort feeding, family aware of risks of aspiration. Ordered full diet.   -PTA magnesium oxide 400mg/d  -AM BMP, Mg PO4  -Regular diet (counseled on aspiration risk vs desire for comfort)  -LR w 5% dextrose and 20meqK 50 ml/hr     #Generalized weakness  Was discharged to TCU in 03/2023 following AKA, followed up w/ in home PT and had mild improvement, but services lapsed for 3 weeks and her strength declined. Most recent PT assessment at 06/05 discharge w/ no obvious metabolic, infectious, or organic etiology. Was sent to TCU 6/5-6/25 for rehabilitation. PT/OT consulted during this stay, but may no longer be within patient's goals of care.   -PT/OT consulted    Stable/Resolved  #Leukopenia of unclear etiology- resolved.  #Urinary retention-le catheter in place.     Chronic Problems  #Rheumatoid arthritis  -PTA prednisone 5mg/d     #Hx of LLE DVT and left lower lobe PE (5/17), currently on apixaban  -Resume PTA apixaban     #R femoral endarterctomy w/ common iliac stenting, sp R AKA.  Follows w/ vascular surgery and WOC,  changes wounds at home  -Consulted WOC, appreciate recs.        Diet: Regular diet (counseled on risks of aspiration)  DVT Prophylaxis: Held w/ concerns for GI bleed  Le Catheter: Present  Fluids: None  Lines: PIV, Le  Cardiac Monitoring: None  Code Status:  Full    Clinically Significant Risk Factors            # Hypomagnesemia: Lowest Mg = 1.3 mg/dL in last 2 days, will replace as needed   # Hypoalbuminemia: Lowest albumin = 2.5 g/dL at 7/4/2024 10:23 PM, will monitor as appropriate     # Hypertension: Noted on problem list           #Precipitous drop in Hgb/Hct: Lowest Hgb this hospitalization: 6.4 g/dL. Will continue to monitor and treat/transfuse as appropriate.     # Cachexia:  "Estimated body mass index is 13.72 kg/m  as calculated from the following:    Height as of this encounter: 1.676 m (5' 6\").    Weight as of this encounter: 38.6 kg (85 lb)., PRESENT ON ADMISSION       # Financial/Environmental Concerns: none         Disposition Plan      Expected Discharge Date: 07/08/2024        Discharge Comments: 7/7- on comfort cares        The patient's care was discussed with the Bedside Nurse, Patient, and Patient's Family.    Trish Sahni MD  Medicine Service, [facility] TEAM [address] [place] [address]  Securely message with [employer] (more info)  Text page via [facility] Paging/Directory   See signed in provider for up to date coverage information  ______________________________________________________________________    Interval History   No events overnight, tired today.  No pain.  Clearly expressed she would like to stop lab checks, reduce medications to comfort focused ones.  Significant other Santi would like a little more time to make a plan with her relatives and with SW before transitioning fully to comfort cares/hospice.    Physical Exam   Vital Signs: Temp: (!) 102.2  F (39  C) Temp src: Axillary BP: 135/78 Pulse: 71   Resp: 20 SpO2: 94 % O2 Device: None (Room air)    Weight: 85 lbs 0 oz    General: Lying in bed in NAD  Cards: RRR, no m/r/g.    Pulm: CTAB, breathing comfortably on room air.    Abd: NABS, soft, nontender, nondistended, nontympanitic.   Neuro: Tired but rouses and answers questions appropriately.  Psych:  Flat affect      Data     I have personally reviewed the following data over the past 24 hrs:    22.3 (H)  \   9.6 (L)   / 306     131 (L) 101 6.3 (L) /  92   3.9 17 (L) 0.39 (L) \       "

## 2024-07-07 NOTE — PLAN OF CARE
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 She is admitted today for acute blood loss anemia w/ concern for GI bleed, complicated by worsening mental status and poor PO intake   Vitals: VSS on RA   Neuros: A+O x3, did not know the year. Intermittent confusion. 3/5 BUE, 2/5 BLE. R AKA. N/T BLE.   IV: PIV infusing D5 LR with KCL at 50ml/hr  Labs/Electrolytes: Phos redraw in AM 7/7  Resp/trach: WNL   Diet: Regular diet for comfort, coughs intermittent. Partner helped patient eat. Pills crushed in pudding   Bowel status: BS+, smear BM today. Incontinent   : Nolan in place.  Skin: L elbow dressing CDI, dressing due Monday. Sacral dressing CDI, due Monday. R AKA dressing changed daily, done daily, significant other changed dressing today.  Pain: Oxy given x1 for generalized pain, pain managed within goal range.  Activity: A2 and lift. T/R q2h.  Social: Significant other, Santi, here most of day   Plan: Continue with POC

## 2024-07-07 NOTE — PLAN OF CARE
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 She is admitted today for acute blood loss anemia w/ concern for GI bleed, complicated by worsening mental status and poor PO intake   Vitals: VSS on RA, okay to not do if patient resting   Neuros: Not done due to comfort care status   IV: PIV infusing D5 LR with KCL at 50   Labs/Electrolytes: Replaced mag and phos IV, redraw AM   Resp/trach: Did not assess, comfort cares. Intermittent cough. Has trouble tolerating secretions, continue oral cares if possible. Patient frequently refuses    Diet: Regular diet for comfort, almost zero intake today. Took pills with pudding  Bowel status: BS+, incontinent. No BM today   : Nolan in place  Skin: L elbow dressing CDI, dressing due 7/8. Sacral dressing CDI, due 7/8. R AKA dressing changed today, to be done daily. Blanching to coccyx and rectum, barrier cream utilized   Pain: Patient denied, but moans and yells out when being turned, moved, etc. Pain medications available if needed   Activity: A2 and lift  Social: Significant other, Santi, here most of day. Daughter visited today  Plan: SW following for hospice placement vs home with hospice services. Labs to be done one more time tomorrow, tomorrow fluids and labs to be discontinued. Family wanted one more day of some intervention. Continue with POC

## 2024-07-07 NOTE — PLAN OF CARE
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 She is admitted today for acute blood loss anemia w/ concern for GI bleed, complicated by worsening mental status and poor PO intake   Vitals: On comfort cares. Tmax 102.2 Declined tylenol.   Neuros: A+O x3, did not know the year. Intermittent confusion. 3/5 BUE, 2/5 BLE. R AKA. N/T BLE.   IV: PIV infusing D5 LR with KCL at 50ml/hr  Resp/trach: WNL   Diet: Regular diet for comfort, coughs intermittent. Partner helped patient eat. Pills crushed in pudding   Bowel status: BS+  : Nolan in place.  Skin: L elbow dressing CDI, dressing due Monday. Sacral dressing CDI, due Monday. R AKA dressing changed daily, done daily, significant other changed dressing today. Small skin tear buttocks cleaned with NS and mepilex applied.  Pain: Oxy given x1 for generalized pain, pain managed within goal range.  Activity: A2 and lift. T/R q2h.  Social: Significant other, Santi here during visiting hours  Plan: Continue with POC. Patient transitioned to comfort cares overnight.

## 2024-07-07 NOTE — PROGRESS NOTES
Care Management Follow Up    Length of Stay (days): 4    Expected Discharge Date: 07/08/2024     Concerns to be Addressed: discharge planning     Patient plan of care discussed at interdisciplinary rounds: Yes    Anticipated Discharge Disposition:  Comfort Cares     Anticipated Discharge Services:  TBD  Anticipated Discharge DME:  TBD    Patient/family educated on Medicare website which has current facility and service quality ratings:  yes  Education Provided on the Discharge Plan:  yes  Patient/Family in Agreement with the Plan:  yes    Referrals Placed by CM/SW:      Pending:    [facility]  [facility] [place] [address]  Main: [phone], Fax: [phone]  Admissions ([place]): [phone]   7/7: SW faxed referral.    [place] [id number] [place] [address]  P: [phone]  F: [phone]   7/7: SW faxed referral.    Private pay costs discussed: private room/amenity fees and insurance costs out of pocket expenses    Additional Information:  Per bedside nurse, pt's BISI Santi requested to follow up about discharge discussion. BRYAN met pt and SO at bedside. Introduced self and role. SO let SW know that he would like SW to send a referral to [facility]. SW let SO know that SW will just need to verify pt's life expectancy, as [facility] typically takes pt's with a couple weeks or less of life expectancy. SO also informed SW that he is interested in SW sending a referral to [facility] and having an information session with them so he can better understand home hospice.     BRYAN again discussed the differences of hospice services (home, residential, nursing home) and private pay costs for certain options. SO reports understanding the differences of hospice options. SO began to get emotional and overwhelmed with discussion. SW provided empathy for SO/pt's situation. SO reported he was very appreciative of SW's help.     BRYAN Quintana messaged primary M4  provider Trish Sahni to verify pt's life expectancy and provider reported it would be days to weeks.     SW sent referrals to [facility] and [facility]. SW will continue to follow for updates. Care management will follow for updates.    DEBI Pearson, LGSW  6A/6B Weekend SW  Ph: [phone]  Vocera: Jennifer Valle or 6A Neuro SW; 6B IMC SW

## 2024-07-08 NOTE — PROGRESS NOTES
PALLIATIVE CARE PROGRESS NOTE  [facility]     Patient Name: Sakshi Jaeger  Date of Admission: 7/3/2024   Today the patient was seen for: Symptoms and ongoing GOC     Recommendations & Counseling       GOALS OF CARE:   Modified comfort-focused care  Santi plans to reached out to Sakshi's sisters and hopes they will be able to visit Sakshi in the hospital vs at [facility]. Per family's wishes, please continue with IVF and essential medication for now  Plan for hospice at discharge, appreciate SW assistance.     ADVANCE CARE PLANNING:  Patient has an advance directive dated 09/28/2022.  Primary Health Care Agent Santi Mercedes (Significant other).  Alternate(s) NA.   There is no POLST form on file, defer to patient and/or next of kin for decisions   Code status: DNR/DNI    MEDICAL MANAGEMENT:   Agree with comfort measures initiated by primary team      PSYCHOSOCIAL/SPIRITUAL:  Family: Daren Hancock (BISI)   [place] community: Church   Spiritual:     Palliative Care will continue to follow. Thank you for the consult and allowing us to aid in the care of Sakshi Jaeger.    These recommendations have been discussed with primary team, nursing and patient/family.    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 She is admitted today for acute blood loss anemia w/ concern for GI bleed, complicated by worsening mental status and poor PO intake. Palliative care consulted for ongoing goals of care discussion concerning for failure to thrive.     Today, the  patient was seen for:  #Acute on chronic anemia, likely 2/2 duodenal vs esophageal ulcer (baseline 8.4-9)  #Hx of duodenal and esophgeal ulcer (5/25 EGD ) w/ esophageal ring ulceration and 2 superficial duodenal nonbleeding ulcers.   #Hx of EGD biopsy w/ herpes esophagitis, completed course ov valacyclovir  #Progressively worsening altered mental status  #Poor PO intake, SLP recommending soft/easy chew diet w/ thin liquids 5/27 w/ concerns for aspiration  #Asymptomatic hyponatremia, hx of SIADH treated with fluid restriction  #Hypokalemia  #Malnutrition  #Depression  #Concern for failure to thrive  #Palliative care encounter   #Goals of care discussion       Interval History:     Multidisciplinary collaboration:  Patient's case discussed with primary team, bedside RN, SW and family today. See above for goals/discussions.    Notable medications:  Reviewed comfort medications     Patient/family narrative  Sakshi was seen lying in bed comfortably this morning. She denies any pain at the time of my visit. Spoke with Santi over the phone this afternoon. Santi has agreed to decreased pill burden today as Sakshi continue to have difficulties taking oral medications. Per family's wishes, please continue with IVF and essential medication for now. Santi shared he would like more time with Sakshi and hopes Sakshi's sisters to have the chance to visit her.     Review of Systems:     Besides above, ROS was reviewed and is unremarkable        Physical Exam:   Temp:  [98.3  F (36.8  C)-100.7  F (38.2  C)] 99.5  F (37.5  C)  Pulse:  [113] 113  Resp:  [18-20] 20  BP: (125)/(78-87) 125/87  SpO2:  [90 %-95 %] 92 %  85 lbs 0 oz    Physical Exam  GENERAL: lying in bed, no acute distress   SKIN: warm and dry   LUNGS: non-labored breathing   ABDOMINAL: BS (+), soft, non distended, non tender  MUSKL: no gross joint deformities   EXTREMITIES: No edema or cyanosis, pulses 2+ and symmetrical  NEUROLOGIC: lethargic, arouses to voice and  responds to questions appropriately   PSYCH: calm      Data Reviewed:     CMP  Recent Labs   Lab 07/08/24  0547 07/07/24  1110 07/05/24  0715 07/04/24  2223 07/04/24  1043 07/03/24  1437   * 131*   < > 129*  --  129*   POTASSIUM 3.6 3.9   < > 3.3*  3.3*  --  2.8*   CHLORIDE 101 101   < > 98  --  93*   CO2 22 17*   < > 23  --  26   ANIONGAP 11 13   < > 8  --  10   * 92   < > 73  --  83   BUN 8.0 6.3*   < > 8.3  --  11.8   CR 0.35* 0.39*   < > 0.37*  --  0.46*   GFRESTIMATED >90 >90   < > >90  --  >90   ADRIAN 8.0* 7.6*   < > 7.5*  --  8.0*   MAG 1.7 1.5*   < > 1.5*  --  1.3*   PHOS 3.0 2.3*   < >  --    < > 2.1*   PROTTOTAL  --   --   --  4.6*  --  4.9*   ALBUMIN  --   --   --  2.5*  --  2.6*   BILITOTAL  --   --   --  0.8  --  0.7   ALKPHOS  --   --   --  85  --  80   AST  --   --   --  24  --  18   ALT  --   --   --  11  --  11    < > = values in this interval not displayed.     CBC  Recent Labs   Lab 07/08/24  0547 07/07/24  1110   WBC 41.7* 22.3*   RBC 3.22* 3.15*   HGB 10.0* 9.6*   HCT 31.6* 31.3*   MCV 98 99   MCH 31.1 30.5   MCHC 31.6 30.7*   RDW 24.2* 23.9*    306         Medical Decision Making       MANAGEMENT DISCUSSED with the following over the past 24 hours: medicine, SW and nursing   NOTE(S)/MEDICAL RECORDS REVIEWED over the past 24 hours: Medicine, SW, nursing  50 MINUTES SPENT BY ME on the date of service doing chart review, history, exam, documentation & further activities per the note.

## 2024-07-08 NOTE — PLAN OF CARE
No change from previous note. Pt able to make needs known. Tylenol given for generalized pain, crushed in apple juice. R AKA dressing changed, Left elbow dressing changed. Daughter at bedside, updated by MD. Repo/turned Q2H. See SW note for plan.

## 2024-07-08 NOTE — PLAN OF CARE
Speech Language Therapy Discharge Summary    Reason for therapy discharge:    Change in medical status. Pt now eating/drinking for comfort.    Progress towards therapy goal(s). See goals on Care Plan in Epic electronic health record for goal details.  Goals not met.  Barriers to achieving goals:   limited tolerance for therapy.    Therapy recommendation(s):    No ongoing therapy recommended at this time.  Pt is now on regular diet for comfort per MD notes and is pursing comfort focused cares upon discharge. As pt is now eating/drinking for comfort SLP will sign off and complete orders at this time. Please re-consult should pt have a change in GOC and would benefit from ongoing speech therapy.

## 2024-07-08 NOTE — PLAN OF CARE
Physical Therapy: Orders received. Chart reviewed and discussed with care team.? Physical Therapy not indicated due to pt transitioning to modified comfort cares for IVF and essential medications only, planning for hospice facility discharge, no IP PT needs.? Defer discharge recommendations to medical team.? Will complete orders.

## 2024-07-08 NOTE — PLAN OF CARE
Patient admitted for acute blood loss anemia w/ concern for GI bleed, complicated by worsening mental status and poor PO intake, recently transitioned to comfort cares. Arouses to voice, moves all extremities with localized movement. Denied pain. No PIV. Nolan for end of life. Pressure injuries to L elbow, coccyx x 2, and R AKA dressing intact. Took pills in pudding but would recommend suspensions for future pills.

## 2024-07-08 NOTE — CONSULTS
SPIRITUAL HEALTH SERVICES Consult Note  [facility] 6A    Visited Sakshi and her daughter lEizabeth. Elizabeth requested that I pray with her and Sakshi, though Sakshi expressed that she did not want to pray. I prayed with Elizabeth individually, who asked me to pray in particular for patience.    Uche Fabian   Intern  Pager [phone]    * [facility] remains available 24/7 for emergent requests/referrals, either by having the switchboard page the on-call  or by entering an ASAP/STAT consult in Epic (this will also page the on-call ). Routine Epic consults receive an initial response within 24 hours.*

## 2024-07-08 NOTE — PLAN OF CARE
Occupational Therapy Discharge Summary    Reason for therapy discharge:    Change in medical status.    Progress towards therapy goal(s). See goals on Care Plan in [facility] electronic health record for goal details.  Change in medical status.    Therapy recommendation(s):    No further therapy is recommended.

## 2024-07-08 NOTE — PLAN OF CARE
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 She is admitted today for acute blood loss anemia w/ concern for GI bleed, complicated by worsening mental status and poor PO intake   Vitals: On comfort cares. Tmax 98.3 aux.   Neuros: A+O x3, did not know the year. Intermittent confusion. 3/5 BUE, 2/5 BLE. R AKA. N/T BLE.   IV: PIV infusing D5 LR with KCL at 50ml/hr- possible discontinuation of MIVF this morning.  Resp/trach: WNL   Diet: Regular diet for comfort, coughs intermittent. Partner helped patient eat. Pills crushed in pudding   Bowel status: No BM overnight.  : Nolan in place.  Skin: L elbow dressing CDI, dressing due Monday. Sacral dressing CDI, due Monday. R AKA dressing changed daily, done daily. Small skin tear buttocks covered with mepilex  Pain: Dilaudid IV given d/t patient was not able to tolerate PO.  Activity: A2 and lift. T/R q2h.  Social: Significant other, Santi here during visiting hours  Plan: Continue to monitor.

## 2024-07-08 NOTE — PROGRESS NOTES
Care Management Follow Up    Length of Stay (days): 5    Expected Discharge Date: 07/08/2024     Concerns to be Addressed: discharge planning       Patient plan of care discussed at interdisciplinary rounds: Yes    Anticipated Discharge Disposition: Residential Hospice     Anticipated Discharge Services:  Hospice    Anticipated Discharge DME:  TBD    Patient/family educated on Medicare website which has current facility and service quality ratings:  yes-home hospice list given on 7/7/24    Education Provided on the Discharge Plan:  yes    Patient/Family in Agreement with the Plan:  yes    Referrals Placed by CM/SW:      Private pay costs discussed:  Private duty caregiver in the home to supplement significant other's care    Additional Information: BRYAN spoke with Arturo at [facility] ([phone]). Arturo reported there aren't any openings today so she has not reviewed Sakshi's referral yet. She advised we call back tomorrow morning (7/9/24).    BRYAN spoke with Santi (significant other) who reported he could give Sakshi her meds and be there most of the day but in general he thinks he would need a break (gave example of 12 pm - 4 pm he would want to be out of the house to do errands, etc.) BRYAN inquired if a list could be provided to him where he could look at hiring a private duty caregiver agency to assist him for the days/times when he would need to be out. He asked what the cost is for that. BRYAN told him $25/hour and up. He reported he doesn't think pt has the funds for that but isn't 100% sure as their finances are separate. Santi reported he would prefer to wait to see if [facility]  has an opening in the foreseeable future. RBYAN agreed. BRYAN informed palliative provider Kennedi Rocha that pt's significant other does not believe he can provide 24 hour care and is overwhelmed with idea of hiring out the care - Kennedi acknowledged receipt of the information.    BRYAN listened to VM from Santi that  "someone named Erin Perlita is going to be calling the SW desk. This is regarding Sakshi's estate plan. Not sure if I can help them but will await call.    BRYAN listened to VM from elder law  Erin Bhat ([phone] cell) who reported she is working with Santi and Sakshi on the \"most appropriate next step\" and wondered if pt has capacity to sign documents. SW reviewed notes and left VM for Erin that it appears pt is making her needs known and hasn't been deemed to not have capacity so she does have capacity.    Dilma Negro, [facility]   Covering for Varsha Jeter - Phone: [phone]  "

## 2024-07-08 NOTE — PROGRESS NOTES
Physician Attestation   I saw this patient with the resident and agree with the resident/fellow's findings and plan of care as documented in the note.        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 She is admitted today for acute blood loss anemia w/ concern for GI bleed, complicated by worsening mental status and poor PO intake     Today  - PIV lost overnight, discontinuing IVF and lab checks as that was the intention to do so today anyway   - Appreciate SW assistance with determining financially acceptable options for meeting Sakshi's needs with a transition to hospice as an outpatient  - Comfort cares here, discontinuing unhelpful medications  - Okay to skip vitals checks if Sakshi is sleeping  - Not escalating cares (not working up new fever)  -Discontinued escitalopram and lisinopril to minimize pill burden.,      Acute Problems  #Quality of Life  #Depression  #Concern for failure to thrive  Patient has had discussions with palliative care during past admissions, with conversations focusing on comfort, minimizing pill burden and lab draws. Metoprolol, folate, and statin were stopped at last hospital stay. Per above PO intake concerns, she has been eating and drinking less and less since her 06/05 discharge.  Will continue to prioritize comfort, minimize pill burden, and allow full diet with minimization of labs. Will resume escitalopram. Seen by palliative 7/5, appreciate lengthy discussion with patient and family about goals of care  and disease progression.  -Continue PTA acetaminophen, oxycodone 5mg q4hrs PRN for pain control  -Discontinue escitalopram   -Anticipate transition to hospice/comfort cares in the upcoming days      #Leukocytosis  WBC to 14 on 7/6 from leukopenia on admission. Denies any systemic symptoms, although may be underreporting due to altered mental status. No fevers, chills, remains hemodynamically stable. If WBC continues to elevate, would check procal, CRP, lactate. If lactate elevated and does not respond to 1LNS, would consider CT CAP w/ contrast to work up underlying infectious etiology. Will add on differential to 7/7 CBC  -Stopping daily labs to optimize comfort     #Acute on chronic anemia, likely 2/2 duodenal vs esophageal ulcer (baseline 8.4-9) vs malnutrition vs anemia of chronic disease  #Hx of duodenal and esophgeal ulcer (5/25 EGD ) w/ esophageal ring ulceration and 2 superficial duodenal nonbleeding ulcers.   #Hx of EGD biopsy w/ herpes esophagitis, completed course ov valacyclovir  Admitted w/ hb of 6.4 in a history of duodenal and esophageal ulcers, gave 1UPRBC in ED. Patient denies any hemoptysis, hematuria, melena, hematochezia. Reticulocytes appropriately elevated. Folate WNL, B12 elevated. Iron labs suggestive of anemia of chronic disease. Per GI, given recent EGD w/ nonbleeding ulcers unlikely to have GI source of blood loss at this time. GI recommending PPI, but in the setting of minimizing pill burden and optimizing comfort, will not start at this time.   -PTA, simethicone, famotidine, iron  -Stopping daily labs to optimize comfort     #Progressively worsening altered mental status  #Poor PO intake, SLP recommending soft/easy chew diet w/ thin liquids 5/27 w/ concerns for aspiration  #Asymptomatic hyponatremia, hx of SIADH treated with fluid restriction  #Hypokalemia  #Malnutrition  #Hypophosphatemic, will monitor for refeeding syndrome  #Chronic Hypomagnesemia, followed by nephrology and  GI  Patient choking on individual pills in ED. Seen by SLP, mild oral dysphagia with poor cooperation on exam, no overt signs or symptoms of aspiration. Recommending thin liquids w/ 1:1 supervision. Would recommend removal of partial ill fitting denture. Will priortiize comfort feeding, family aware of risks of aspiration. Ordered full diet.   -PTA magnesium oxide 400mg/d  -Regular diet (counseled on aspiration risk vs desire for comfort)  -Stopping daily labs to optimize comfort     #Generalized weakness  Was discharged to TCU in 03/2023 following AKA, followed up w/ in home PT and had mild improvement, but services lapsed for 3 weeks and her strength declined. Most recent PT assessment at 06/05 discharge w/ no obvious metabolic, infectious, or organic etiology. Was sent to TCU 6/5-6/25 for rehabilitation. PT/OT consulted during this stay, but may no longer be within patient's goals of care.   -PT/OT consulted    #Injury on bilateral buttocks secondary to pressure, incontinence dermatitis  #Skin tear on left elbow secondary to fall  Appreciate evaluation and cares by [facility].  - Buttocks: Every third day cleanse with microKlenz and dry, apply Cavilon no sting barrier film to skin around wound and let dry, then place mepilex. If patient is stooling frequently and soiling dressings needing to be changed more than once per shift, discontinue mepilex and start critic aid paste BID and PRN to maintain barrier.   - Elbow: Every 3 days apply Vashe (033575) moistened gauze over wound bed and let sit for 5 minutes then remove and let dry, cover open areas with Xeroform gauze ([id number]), then place mepilex.        Stable/Resolved  #Leukopenia of unclear etiology- resolved.  #Urinary retention-le catheter in place.     Chronic Problems  #Rheumatoid arthritis  -PTA prednisone 5mg/d     #Hx of LLE DVT and left lower lobe PE (5/17), currently on apixaban  -Resume PTA apixaban     #R femoral endarterctomy w/ common iliac stenting,  "sp R AKA.  Follows w/ vascular surgery and WOC,  changes wounds at home  -Consulted WOC, appreciate recs.        Diet: Regular diet (counseled on risks of aspiration)  DVT Prophylaxis: Held w/ concerns for GI bleed  Nolan Catheter: Present  Fluids: None  Lines: PIV, Nolan  Cardiac Monitoring: None  Code Status:  Full    Clinically Significant Risk Factors            # Hypomagnesemia: Lowest Mg = 1.3 mg/dL in last 2 days, will replace as needed   # Hypoalbuminemia: Lowest albumin = 2.5 g/dL at 7/4/2024 10:23 PM, will monitor as appropriate     # Hypertension: Noted on problem list           #Precipitous drop in Hgb/Hct: Lowest Hgb this hospitalization: 6.4 g/dL. Will continue to monitor and treat/transfuse as appropriate.     # Cachexia: Estimated body mass index is 13.72 kg/m  as calculated from the following:    Height as of this encounter: 1.676 m (5' 6\").    Weight as of this encounter: 38.6 kg (85 lb).      # Financial/Environmental Concerns: none         Disposition Plan     Expected Discharge Date: 07/08/2024        Discharge Comments: 7/7- on comfort cares        The patient's care was discussed with the Attending Physician, Dr. Sahni .    Dean Gutierrez MD  Medicine Service, [facility] TEAM [address] [place] [address]  Securely message with [employer] (more info)  Text page via [facility] Paging/Directory   See signed in provider for up to date coverage information  ______________________________________________________________________    Interval History   IV access lost overnight, stopped maintenance fluids and daily labs. Stopped lisinopril and escitalopram to minimze pill burden.     Physical Exam   Vital Signs: Temp: 98.3  F (36.8  C) Temp src: Axillary BP: 125/78 Pulse: 113   Resp: 18 SpO2: 95 % O2 Device: None (Room air)    Weight: 85 lbs 0 oz    General: Lying in bed in NAD  Cards: RRR, no m/r/g.    Pulm: CTAB, breathing comfortably on room air.    Abd: NABS, soft, nontender, " nondistended, nontympanitic.   Neuro: Tired but rouses and answers questions appropriately.  Psych:  Flat affect      Data     I have personally reviewed the following data over the past 24 hrs:    41.7 (H)  \   10.0 (L)   / 317     134 (L) 101 8.0 /  105 (H)   3.6 22 0.35 (L) \

## 2024-07-09 NOTE — PLAN OF CARE
MD Gutierrez called to bedside as patient . Time of death quoted was 1019. MD updated , Santi. Will await Santi's arrival before moved to [facility].

## 2024-07-09 NOTE — PROGRESS NOTES
Patient in bed, eyes closed, no respiratory effort. No palpable carotid, radial, pedal pulses. No heart sounds, lung sounds. Pupils fixed and dilated, unresponsive to light. Patient unresponsive to noxious stimuli or supra orbital pressure.

## 2024-07-09 NOTE — PLAN OF CARE
Status: acute blood loss anemia w/ concern for GI bleed, complicated by worsening mental status and poor PO intake. Transitioned to comfort cares this pm.  : Nolan in place for end of life.  Skin: R AKA dressing changed, Left elbow dressing changed during day shift. Coccyx wound w/ mepilex in place - scant old drainage noted.  Pain: Pt reported 10/10 pain verbally - trx & resolved w/ PRN morphine 1x this shift.  Activity: Ax2/lift/repo q2hr.  Social: Significant other & daughter present at bedside thru out early eric.  Plan: Provide comfort & manage pain.  Updates this shift: Comfort cares status.  Education completed: Provided PRN pain medication management education to sig other & daughter.    Goal Outcome Evaluation:

## 2024-07-09 NOTE — TELEPHONE ENCOUNTER
Reason for Call:  Form, our goal is to have forms completed with 72 hours, however, some forms may require a visit or additional information.    Type of letter, form or note:  Home Health Certification    Who is the form from?: Home care    Where did the form come from: form was faxed in    What clinic location was the form placed at?: [facility]    Where the form was placed: Given to physician    What number is listed as a contact on the form?: [phone]       Call taken on 7/9/2024 at 2:59 PM by Candy Eldridge

## 2024-07-09 NOTE — PROGRESS NOTES
MD DEATH PRONOUNCEMENT    Called to pronounce Sakshi Jaeger dead.    Physical Exam: Unresponsive to noxious stimuli, Spontaneous respirations absent, Breath sounds absent, Heart sounds absent, Pupillary light reflex absent, and Corneal blink reflex absent    Patient was pronounced dead at 10:19 AM, 2024.    Preliminary Cause of Death: Respiratory failure, acute on chronic anemia    Active Problems:    Generalized weakness    Upper GI bleed    Low hemoglobin       Infectious disease present?: NO    Communicable disease present? (examples: HIV, chicken pox, TB, Ebola, CJD) :  NO    Multi-drug resistant organism present? (example: MRSA): NO    Please consider an autopsy if any of the following exist:  NO Unexpected or unexplained death during or following any dental, medical, or surgical diagnostic treatment procedures.   NO Death of mother at or up to seven days after delivery.     NO All  and pediatric deaths.     NO Death where the cause is sufficiently obscure to delay completion of the death certificate.   NO Deaths in which autopsy would confirm a suspected illness/condition that would affect surviving family members or recipients of transplanted organs.     The following deaths must be reported to the 's Office:  NO A death that may be due entirely or in part to any factors other than natural disease (recent surgery, recent trauma, suspected abuse/neglect).   NO A death that may be an accident, suicide, or homicide.     NO Any sudden, unexpected death in which there is no prior history of significant heart disease or any other condition associated with sudden death.   NO A death under suspicious, unusual, or unexpected circumstances.    NO Any death which is apparently due to natural causes but in which the  does not have a personal physician familiar with the patient s medical history, social, or environmental situation or the circumstances of the terminal event.   NO Any death  apparently due to Sudden Infant Death Syndrome.     NO Deaths that occur during, in association with, or as consequences of a diagnostic, therapeutic, or anesthetic procedure.   NO Any death in which a fracture of a major bone has occurred within the past (6) six months.   NO A death of persons note seen by their physician within 120 days of demise.     NO Any death in which the  was an inmate of a public institution or was in the custody of Law Enforcement personnel.   NO  All unexpected deaths of children   NO Solid organ donors   NO Unidentified bodies   NO Deaths of persons whose bodies are to be cremated or otherwise disposed of so that the bodies will later be unavailable for examination;   NO Deaths unattended by a physician outside of a licensed healthcare facility or licensed residential hospice program   NO Deaths occurring within 24 hours of arrival to a health care facility if death is unexpected.    NO Deaths associated with the decedent s employment.   NO Deaths attributed to acts of terrorism.   NO Any death in which there is uncertainty as to whether it is a medical examiner s care should be discussed with the medical investigator.        Body disposition: Autopsy was discussed with family member:  Spouse in person.  Permission for autopsy was declined.  Organ donation was discussed with family member:  Spouse.  Permission for organ donation was declined.

## 2024-07-09 NOTE — CONSULTS
SPIRITUAL HEALTH SERVICES Consult Note  [facility]A    Saw pt Sakshi Jaeger's Family per STAT Consult. Provided emotional and spiritual support to Sakshi Hancock's significant other, at her death.     [facility] remains available via consult.    Edson Valentine  Chaplain Resident  Pager [phone]    * [facility] remains available 24/7 for emergent requests/referrals, either by having the switchboard page the on-call  or by entering an ASAP/STAT consult in Epic (this will also page the on-call ).*

## 2024-07-09 NOTE — PROGRESS NOTES
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 She was admitted for acute blood loss anemia w/ concern for GI bleed, complicated by worsening mental status and poor PO intake, now made comfort cares and awaiting hospice placement.      Today  Continues to await hospice   Switched morphine solution to roxanol     Acute Problems  #Quality of Life  #Depression  #Concern for failure to thrive  Patient has had discussions with palliative care during past admissions, with conversations focusing on comfort, minimizing pill burden and lab draws. Metoprolol, folate, and statin were stopped at last hospital stay. Per above PO intake concerns, she has been eating and drinking less and less since her 06/05 discharge.  Will continue to prioritize comfort, minimize pill burden, and allow full diet with minimization of labs. Will resume escitalopram. Seen by palliative 7/5, appreciate lengthy discussion with patient and family about goals of care and disease progression.  -Continue PTA acetaminophen, oxycodone 5mg q4hrs PRN for pain control    #Injury on bilateral buttocks secondary to pressure, incontinence dermatitis  #Skin tear on left elbow secondary to fall  Appreciate evaluation and cares by WOC.  - Buttocks: Every third day cleanse with microKlenz and dry, apply Cavilon no sting barrier film to skin around wound and let dry, then place mepilex. If patient is stooling frequently and soiling dressings needing to be changed more than once per shift, discontinue mepilex and start critic aid paste BID and PRN to maintain barrier.   - Elbow:  Every 3 days apply Vashe (412070) moistened gauze over wound bed and let sit for 5 minutes then remove and let dry, cover open areas with Xeroform gauze ([id number]), then place mepilex.     Stable/Resolved  #Leukocytosis  WBC to 14 on 7/6 from leukopenia on admission. Denies any systemic symptoms, although may be underreporting due to altered mental status. No fevers, chills, remains hemodynamically stable. If WBC continues to elevate, would check procal, CRP, lactate. If lactate elevated and does not respond to 1LNS, would consider CT CAP w/ contrast to work up underlying infectious etiology. Will add on differential to 7/7 CBC  -Stopping daily labs to optimize comfort     #Acute on chronic anemia, likely 2/2 duodenal vs esophageal ulcer (baseline 8.4-9) vs malnutrition vs anemia of chronic disease  #Hx of duodenal and esophgeal ulcer (5/25 EGD ) w/ esophageal ring ulceration and 2 superficial duodenal nonbleeding ulcers.   #Hx of EGD biopsy w/ herpes esophagitis, completed course ov valacyclovir  Admitted w/ hb of 6.4 in a history of duodenal and esophageal ulcers, gave 1UPRBC in ED. Patient denies any hemoptysis, hematuria, melena, hematochezia. Reticulocytes appropriately elevated. Folate WNL, B12 elevated. Iron labs suggestive of anemia of chronic disease. Per GI, given recent EGD w/ nonbleeding ulcers unlikely to have GI source of blood loss at this time. GI recommending PPI, but in the setting of minimizing pill burden and optimizing comfort, will not start at this time.   -PTA, simethicone, famotidine, iron  -Stopping daily labs to optimize comfort     #Progressively worsening altered mental status  #Poor PO intake, SLP recommending soft/easy chew diet w/ thin liquids 5/27 w/ concerns for aspiration  #Asymptomatic hyponatremia, hx of SIADH treated with fluid restriction  #Hypokalemia  #Malnutrition  #Hypophosphatemic, will monitor for refeeding syndrome  #Chronic Hypomagnesemia, followed by nephrology and  GI  Patient choking on individual pills in ED. Seen by SLP, mild oral dysphagia with poor cooperation on exam, no overt signs or symptoms of aspiration. Recommending thin liquids w/ 1:1 supervision. Would recommend removal of partial ill fitting denture. Will priortiize comfort feeding, family aware of risks of aspiration. Ordered full diet.   -PTA magnesium oxide 400mg/d  -Regular diet (counseled on aspiration risk vs desire for comfort)  -Stopping daily labs to optimize comfort     #Generalized weakness  Was discharged to TCU in 03/2023 following AKA, followed up w/ in home PT and had mild improvement, but services lapsed for 3 weeks and her strength declined. Most recent PT assessment at 06/05 discharge w/ no obvious metabolic, infectious, or organic etiology. Was sent to TCU 6/5-6/25 for rehabilitation. PT/OT consulted during this stay, but may no longer be within patient's goals of care.     Stable/Resolved  #Leukopenia of unclear etiology- resolved.  #Urinary retention-le catheter in place.     Chronic Problems  #Rheumatoid arthritis  -PTA prednisone 5mg/d     #Hx of LLE DVT and left lower lobe PE (5/17), currently on apixaban  -Resume PTA apixaban     #R femoral endarterctomy w/ common iliac stenting, sp R AKA.  Follows w/ vascular surgery and WOC,  changes wounds at home  -Consulted WOC, appreciate recs.        Diet: Regular diet (counseled on risks of aspiration)  DVT Prophylaxis: Held w/ concerns for GI bleed  Le Catheter: Present  Fluids: None  Lines: PIV, Le  Cardiac Monitoring: None  Code Status:  Full    Clinically Significant Risk Factors            # Hypomagnesemia: Lowest Mg = 1.5 mg/dL in last 2 days, will replace as needed   # Hypoalbuminemia: Lowest albumin = 2.5 g/dL at 7/4/2024 10:23 PM, will monitor as appropriate     # Hypertension: Noted on problem list           #Precipitous drop in Hgb/Hct: Lowest Hgb this hospitalization: 6.4 g/dL. Will continue to monitor and  "treat/transfuse as appropriate.     # Cachexia: Estimated body mass index is 13.72 kg/m  as calculated from the following:    Height as of this encounter: 1.676 m (5' 6\").    Weight as of this encounter: 38.6 kg (85 lb).      # Financial/Environmental Concerns: none         Disposition Plan      Expected Discharge Date: 07/09/2024    Discharge Delays: *Medically Ready for Discharge  Comfort Care/Hospice    Discharge Comments: home on hospice        The patient's care was discussed with the Attending Physician, Dr. Son .    Dean Gutierrez MD  Medicine Service, [address]  Securely message with [employer] (more info)  Text page via [facility] Paging/Directory   See signed in provider for up to date coverage information  ______________________________________________________________________    Interval History   Patient seen at bedside, no changes from previous examination. Family not in the room during rounds    Physical Exam   Vital Signs: Temp: 99.5  F (37.5  C) Temp src: Axillary BP: 125/87     Resp: 20 SpO2: 92 % O2 Device: None (Room air)    Weight: 85 lbs 0 oz  General: Lying in bed in NAD  Cards: RRR, no m/r/g.    Pulm: CTAB, breathing comfortably on room air.    Abd: NABS, soft, nontender, nondistended, nontympanitic.   Neuro: Tired but rouses and answers questions appropriately.  Psych:  Flat affect        "

## 2024-07-09 NOTE — DISCHARGE SUMMARY
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 She was admitted for acute blood loss anemia w/ concern for GI bleed, complicated by worsening mental status and poor PO intake,   The following problems were addressed during her hospitalization:    #Quality of Life  #Depression  #Concern for failure to thrive  Patient has had discussions with palliative care during past admissions, with conversations focusing on comfort, minimizing pill burden and lab draws. Metoprolol, folate, and statin were stopped at last hospital stay. Per above PO intake concerns, she has been eating and drinking less and less since her 06/05 discharge.  Will continue to prioritize comfort, minimize pill burden, and allow full diet with minimization of labs. Will resume escitalopram. Seen by palliative 7/5, appreciate lengthy discussion with patient and family about goals of care and disease progression.     #Injury on bilateral buttocks secondary to pressure, incontinence dermatitis  #Skin tear on left elbow secondary to fall  Appreciate evaluation and cares by WO.  - Buttocks: Every third day cleanse with microKlenz and dry, apply Cavilon no sting barrier film to skin around wound and let dry, then place mepilex. If patient is stooling frequently and soiling dressings needing to be changed more than once per shift, discontinue mepilex and start critic aid paste BID and PRN to maintain barrier.   - Elbow: Every 3 days  apply Vashe (931101) moistened gauze over wound bed and let sit for 5 minutes then remove and let dry, cover open areas with Xeroform gauze (826890), then place mepilex.      Stable/Resolved  #Leukocytosis  WBC to 14 on 7/6 from leukopenia on admission. Denies any systemic symptoms, although may be underreporting due to altered mental status. No fevers, chills, remains hemodynamically stable. If WBC continues to elevate, would check procal, CRP, lactate. If lactate elevated and does not respond to 1LNS, would consider CT CAP w/ contrast to work up underlying infectious etiology. Will add on differential to 7/7 CBC     #Acute on chronic anemia, likely 2/2 duodenal vs esophageal ulcer (baseline 8.4-9) vs malnutrition vs anemia of chronic disease  #Hx of duodenal and esophgeal ulcer (5/25 EGD ) w/ esophageal ring ulceration and 2 superficial duodenal nonbleeding ulcers.   #Hx of EGD biopsy w/ herpes esophagitis, completed course ov valacyclovir  Admitted w/ hb of 6.4 in a history of duodenal and esophageal ulcers, gave 1UPRBC in ED. Patient denies any hemoptysis, hematuria, melena, hematochezia. Reticulocytes appropriately elevated. Folate WNL, B12 elevated. Iron labs suggestive of anemia of chronic disease. Per GI, given recent EGD w/ nonbleeding ulcers unlikely to have GI source of blood loss at this time. GI recommending PPI, but in the setting of minimizing pill burden and optimizing comfort, will not start at this time.     #Progressively worsening altered mental status  #Poor PO intake, SLP recommending soft/easy chew diet w/ thin liquids 5/27 w/ concerns for aspiration  #Asymptomatic hyponatremia, hx of SIADH treated with fluid restriction  #Hypokalemia  #Malnutrition  #Hypophosphatemic, will monitor for refeeding syndrome  #Chronic Hypomagnesemia, followed by nephrology and GI  Patient choking on individual pills in ED. Seen by SLP, mild oral dysphagia with poor cooperation on exam, no overt signs or symptoms  of aspiration. Recommending thin liquids w/ 1:1 supervision. Would recommend removal of partial ill fitting denture. Will priortiize comfort feeding, family aware of risks of aspiration. Ordered full diet.      #Generalized weakness  Was discharged to TCU in 03/2023 following AKA, followed up w/ in home PT and had mild improvement, but services lapsed for 3 weeks and her strength declined. Most recent PT assessment at 06/05 discharge w/ no obvious metabolic, infectious, or organic etiology. Was sent to TCU 6/5-6/25 for rehabilitation. PT/OT consulted during this stay, but may no longer be within patient's goals of care.      Stable/Resolved  #Leukopenia of unclear etiology- resolved.  #Urinary retention-le catheter in place.     Chronic Problems  #Rheumatoid arthriti  #Hx of LLE DVT and left lower lobe PE (5/17), currently on apixaban     #R femoral endarterctomy w/ common iliac stenting, sp R AKA.  Follows w/ vascular surgery and WOC,  changes wounds at home  -Consulted WOC, appreciate recs.    Dean Gutierrez MD  [facility]    ______________________________________________________________________      Significant Results and Procedures       Consultations This Hospital Stay   GI LUMINAL ADULT IP CONSULT  PHYSICAL THERAPY ADULT IP CONSULT  OCCUPATIONAL THERAPY ADULT IP CONSULT  PALLIATIVE CARE ADULT IP CONSULT  SPEECH LANGUAGE PATH ADULT IP CONSULT  WOUND OSTOMY CONTINENCE NURSE  IP CONSULT  DENTAL HYGIENE IP ADULT CONSULT - UU  WOUND OSTOMY CONTINENCE NURSE  IP CONSULT  SPIRITUAL HEALTH SERVICES IP CONSULT  NURSING TO CONSULT FOR VASCULAR ACCESS CARE IP CONSULT  CARE MANAGEMENT / SOCIAL WORK IP CONSULT  SPIRITUAL HEALTH SERVICES IP CONSULT  NURSING TO CONSULT FOR VASCULAR ACCESS CARE IP CONSULT  SPIRITUAL HEALTH SERVICES IP CONSULT    Primary Care Physician   Lucia Bates

## 2024-07-09 NOTE — PLAN OF CARE
Goal Outcome Evaluation:      Plan of Care Reviewed With: patient    Overall Patient Progress: no changeOverall Patient Progress: no change    Outcome Evaluation: patient pain managed with PRN morphine x2 overnight. turn q2hr. eye drops given x1. aquaphor for lips x2.    Care [id number]:    Patient on comfort cares. No vitals or formal assessment completed. Generalized pain managed with PRN morphine x2. Regular diet ordered, pt coughing when trying to swallow, on list to change all meds to IV or sublingual, and remove all tabs. On list to add atropine drops. Weak nonproductive congested cough infrequently. Nolan in place for end of life. No BM This shift, is incontinent.  L elbow dressing CDI, dressing due 7/11. Sacral dressing CDI, due 7/11. R AKA dressing changed daily, due today. Small skin tear buttocks covered with mepilex. A2/lift, turn/repo q2hr. Continue to manage pain.

## 2024-07-09 NOTE — PROGRESS NOTES
Care Management Follow Up    Length of Stay (days): 6    Expected Discharge Date: 07/09/2024     Concerns to be Addressed: discharge planning     Patient plan of care discussed at interdisciplinary rounds: Yes    Anticipated Discharge Disposition:  N/A     Anticipated Discharge Services:    Anticipated Discharge DME:      Patient/family educated on Medicare website which has current facility and service quality ratings:    Education Provided on the Discharge Plan:    Patient/Family in Agreement with the Plan:      Referrals Placed by CM/SW:      [facility]  [address] [place] [address]  Main: [phone], Fax: [phone]  Admissions (Arturo): [phone]   7/7: BRYAN faxed referral.  7/9: BRYAN left a VM for Arturo requesting call back to see if they have bed available or had gotten a chance to review referral.        [facility] [address] [place] [address]  P: [phone]  F: [phone]   7/7: BRYAN faxed referral.    Private pay costs discussed: Not applicable    Additional Information:  SW following for discharge planning. Pt currently on comfort cares and looking to admit to hospice. Per chart review, pt's family is not able to provide 24/7 caregiver support and do not believe they would be able to afford to pay for private pay home care.     ADDENDUM 1100: BRYAN notified by Charge that pt passed away earlier this morning.     BRYAN spoke with Arturo and relayed that pt passed away this morning. BRYAN requested to cancel the referral. [facility] noted that they did not have any bed available this week and would not have been able to accept pt.     KAROL Cote  [place]   [facility]   Covering [address]  [address] Ph: [phone]

## 2024-07-09 NOTE — PROGRESS NOTES
Palliative chart checked and was going to see Sakshi for a daily visit. RN staff note that she had passed 10-20 minutes prior. Significant other Santi was not yet present. No needs note from medical staff.    Please page the palliative team if needed.    This is a non-billable note as patient as seen after she had passed.    Sheldon Isbell DO / Internal and Palliative Medicine   Securely message with the Vocera Web Console (learn more here)   Text page via [facility] Paging/Directory

## 2024-07-10 DIAGNOSIS — Z53.9 DIAGNOSIS NOT YET DEFINED: Primary | ICD-10-CM

## 2024-07-10 PROCEDURE — 99207 PR MD CERTIFICATION HHA PATIENT: CPT

## 2024-07-20 LAB
BLD PROD TYP BPU: NORMAL
BLOOD COMPONENT TYPE: NORMAL
CODING SYSTEM: NORMAL
CROSSMATCH: NORMAL
ISSUE DATE AND TIME: NORMAL
UNIT ABO/RH: NORMAL
UNIT NUMBER: NORMAL
UNIT STATUS: NORMAL
UNIT TYPE ISBT: 7300

## 2024-11-26 NOTE — TELEPHONE ENCOUNTER
Called patient regarding message below. Patient noticed a sore on her right stump a couple days ago. There is clear drainage coming from the wound, patient has pain when she wears her prosthetic and walks on it. Patient wants to know if she should come in to see Dr. Walker. Pt decline signing up for [facility] to send photo. Inform patient, this LPN will send message to Dr. Walker and will call her back after he replies. Pt states understanding.    Leo Queen LPN     Pt is A+Ox4  Pt complains of middle finger pain to the left hand  Pt states x6 days ago bent finger nail back  Pt has artifical nails  Pt states slight drainage form the injured nail  Family at the bedside  All questions answered and needs met at this time  Whiteboard updated

## (undated) DEVICE — SU VICRYL 0 CT 36" J358H

## (undated) DEVICE — PREP POVIDONE-IODINE 10% SOLUTION 4OZ BOTTLE MDS093944

## (undated) DEVICE — ESU PENCIL SMOKE EVAC W/ROCKER SWITCH 0703-047-000

## (undated) DEVICE — SPONGE LAP 18X18" X8435

## (undated) DEVICE — VESSEL LOOPS RED MINI 31145710

## (undated) DEVICE — ESU CLEANER TIP 31142717

## (undated) DEVICE — SU SILK 2-0 TIE 12X30" A305H

## (undated) DEVICE — DRSG TEGADERM 2 3/8X2 3/4" 1624W

## (undated) DEVICE — DRSG GAUZE 2X2" 8042

## (undated) DEVICE — SUCTION TIP YANKAUER W/O VENT K86

## (undated) DEVICE — Device

## (undated) DEVICE — PREP POVIDONE IODINE SCRUB 7.5% 120ML

## (undated) DEVICE — SU PDS II 0 CT-1 27" Z340H

## (undated) DEVICE — LINEN TOWEL PACK X6 WHITE 5487

## (undated) DEVICE — ESU CORD BIPOLAR GREEN 10-4000

## (undated) DEVICE — DRSG STERI STRIP 1/2X4" R1547

## (undated) DEVICE — SPONGE RAY-TEC 4X8" 7318

## (undated) DEVICE — LINEN TOWEL PACK X5 5464

## (undated) DEVICE — GLOVE PROTEXIS W/NEU-THERA 7.0  2D73TE70

## (undated) DEVICE — BLADE KNIFE SURG 11 371111

## (undated) DEVICE — DEVICE HIGH PRESSURE FLOSWITCH FLOW CONTROL M001442010

## (undated) DEVICE — SU VICRYL 2-0 CT-1 27" UND J259H

## (undated) DEVICE — DRAPE C-ARM W/STRAPS 42X72" 07-CA104

## (undated) DEVICE — PREP CHLORAPREP 26ML TINTED HI-LITE ORANGE 930815

## (undated) DEVICE — LINEN ORTHO PACK 5446

## (undated) DEVICE — DEVICE RETRIEVER HEWSON 71111579

## (undated) DEVICE — SU FIBERWIRE 2 38" T-8 NDL  AR-7206

## (undated) DEVICE — GLOVE PROTEXIS W/NEU-THERA 7.5  2D73TE75

## (undated) DEVICE — STPL SKIN 35W 059037

## (undated) DEVICE — PREP POVIDONE-IODINE 7.5% SCRUB 4OZ BOTTLE MDS093945

## (undated) DEVICE — LINEN GOWN XLG 5407

## (undated) DEVICE — DRSG TEGADERM ABSORBENT 8"X7.5" 90805

## (undated) DEVICE — SOL WATER IRRIG 1000ML BOTTLE 2F7114

## (undated) DEVICE — PACK LOWER EXTREMITY CUSTOM ASC

## (undated) DEVICE — SU VICRYL 2-0 SH 27" UND J417H

## (undated) DEVICE — EYE PACK CUSTOM CATARACT AS12127-01

## (undated) DEVICE — SOL NACL 0.9% IRRIG 500ML BOTTLE 2F7123

## (undated) DEVICE — DRAPE U SPLIT 74X120" 29440

## (undated) DEVICE — BLADE CLIPPER SGL USE 9680

## (undated) DEVICE — SU SILK 0 TIE 6X30" A306H

## (undated) DEVICE — CLIP HORIZON MED BLUE 002200

## (undated) DEVICE — STPL SKIN 35W ROTATING HEAD PRW35

## (undated) DEVICE — DRSG TELFA 3X8" 1238

## (undated) DEVICE — STPL SKIN PROXIMATE 35 WIDE PMW35

## (undated) DEVICE — DRAPE IOBAN INCISE 23X17" 6650EZ

## (undated) DEVICE — SU SILK 4-0 TIE 12X30" A303H

## (undated) DEVICE — DRAPE SHEET REV FOLD 3/4 9349

## (undated) DEVICE — SOL WATER IRRIG 500ML BOTTLE 2F7113

## (undated) DEVICE — SU SILK 3-0 TIE 12X30" A304H

## (undated) DEVICE — SU MONOCRYL 4-0 PS-2 18" UND Y496G

## (undated) DEVICE — GLOVE PROTEXIS BLUE W/NEU-THERA 7.0  2D73EB70

## (undated) DEVICE — SUCTION MANIFOLD NEPTUNE 2 SYS 4 PORT 0702-020-000

## (undated) DEVICE — CAST PLASTER SPLINT 5X30" 7395

## (undated) DEVICE — DRSG ABDOMINAL 07 1/2X8" 7197D

## (undated) DEVICE — ESU GROUND PAD ADULT W/CORD E7507

## (undated) DEVICE — ADH SKIN CLOSURE PREMIERPRO EXOFIN 1.0ML 3470

## (undated) DEVICE — SYR 30ML LL W/O NDL 302832

## (undated) DEVICE — PREP POVIDONE IODINE SOLUTION 10% 120ML

## (undated) DEVICE — BNDG ELASTIC 4" DBL LENGTH UNSTERILE 6611-14

## (undated) DEVICE — IMM LEG ELEVATOR 79-90191

## (undated) DEVICE — GOWN XLG DISP 9545

## (undated) DEVICE — GLOVE PROTEXIS W/NEU-THERA 6.5  2D73TE65

## (undated) DEVICE — GLOVE PROTEXIS BLUE W/NEU-THERA 8.0  2D73EB80

## (undated) DEVICE — DRSG TEGADERM 4X4 3/4" 1626W

## (undated) DEVICE — SOL NACL 0.9% IRRIG 1000ML BOTTLE 2F7124

## (undated) DEVICE — GLOVE BIOGEL PI MICRO INDICATOR UNDERGLOVE SZ 6.5 48965

## (undated) DEVICE — DECANTER BAG 2002S

## (undated) DEVICE — CAST PADDING 4" STERILE 9044S

## (undated) DEVICE — DRSG GAUZE 4X4" TRAY 6939

## (undated) DEVICE — SU UMBILICAL TAPE .125X30" U11T

## (undated) DEVICE — PACKING NUGAUZE 1/2" PLAIN 7632

## (undated) DEVICE — CLIP HORIZON SM RED WIDE SLOT 001201

## (undated) DEVICE — PACK MINOR CUSTOM ASC

## (undated) DEVICE — INTRO SHEATH BRITE TIP 7FRX11CM 401-711M

## (undated) DEVICE — SOL NACL 0.9% INJ 1000ML BAG 07983-09

## (undated) DEVICE — BLADE SAW SAGITTAL STRK MED WIDE 25X73X0.89MM 2108-105-000

## (undated) DEVICE — PAD CHUX UNDERPAD 30X30"

## (undated) DEVICE — SU SILK 2-0 SH 30" K833H

## (undated) DEVICE — PACK GOWN 3/PK DISP XL SBA32GPFCB

## (undated) DEVICE — SU CHROMIC 4-0 RB-1 27" U203H

## (undated) DEVICE — PREP CHLORAPREP 26ML TINTED ORANGE  260815

## (undated) DEVICE — LIGHT HANDLE X1 31140133

## (undated) DEVICE — PAD CHUX UNDERPAD 23X24" 7136

## (undated) DEVICE — SPONGE SURGIFOAM 100 1974

## (undated) DEVICE — KIT MICRO-INTRODUCER STIFFEN 4FR 7274V

## (undated) DEVICE — TEST TUBE W/SCREW CAP 17361

## (undated) DEVICE — SU VICRYL 3-0 SH 27" UND J416H

## (undated) DEVICE — DRAIN JACKSON PRATT RESERVOIR 400ML SU130-1000

## (undated) DEVICE — PACK LOWER EXTREMITY RIVERSIDE SOP32LEFSX

## (undated) DEVICE — NDL 25GA 2"  8881200441

## (undated) DEVICE — DRAPE STOCKINETTE IMPERVIOUS 12" 1587

## (undated) DEVICE — DRSG TEGADERM 8X12" 1629

## (undated) DEVICE — VESSEL LOOPS YELLOW MAXI 31145694

## (undated) DEVICE — DRAIN JACKSON PRATT 10FR ROUND SU130-1321

## (undated) DEVICE — APPLICATOR COTTON TIP 6"X2 STERILE LF 6012

## (undated) DEVICE — SUCTION MANIFOLD NEPTUNE 2 SYS 1 PORT 702-025-000

## (undated) DEVICE — TUBE CULTURE AEROBIC/ANAEROBIC W/O SWABS A.C.T.I.1. 12401

## (undated) DEVICE — CAST PADDING 4" UNSTERILE 9044

## (undated) DEVICE — CATH EP SPECTRANETICS QUICK CROSS 4.8-3.7FRX90CM 518-036

## (undated) DEVICE — DRAIN JACKSON PRATT RESERVOIR 100ML SU130-1305

## (undated) DEVICE — SUTURE BOOTS 051003PBX

## (undated) DEVICE — GLIDEWIRE TERUMO .035X180 ANG STIFF GS3508

## (undated) DEVICE — SU VICRYL 2-0 CT-2 27" UND J269H

## (undated) DEVICE — CLOSURE SYS SKIN PREMIERPRO EXOFINFUSION 4X60CM 3473

## (undated) DEVICE — GLOVE PROTEXIS POWDER FREE SMT 7.0  2D72PT70X

## (undated) DEVICE — SU ETHILON 3-0 PS-1 18" 1663H

## (undated) DEVICE — SU ETHILON 3-0 FS-1 18" 663G

## (undated) DEVICE — PREP SKIN SCRUB TRAY 4461A

## (undated) DEVICE — INFLATION DEVICE ENCORE  26 PRESSURE GAUGE M001151050

## (undated) DEVICE — SPONGE KITTNER 30-101

## (undated) DEVICE — NDL 21GA 1.5"

## (undated) DEVICE — LINEN TOWEL PACK X30 5481

## (undated) DEVICE — LABEL MEDICATION SYSTEM 3303-P

## (undated) DEVICE — GLOVE PROTEXIS BLUE W/NEU-THERA 7.5  2D73EB75

## (undated) DEVICE — BLADE KNIFE SURG 10 371110

## (undated) DEVICE — DRSG ADAPTIC 3X8" 6113

## (undated) DEVICE — SUCTION MANIFOLD DORNOCH ULTRA CART UL-CL500

## (undated) DEVICE — BASIN SET SINGLE STERILE 13752-624

## (undated) DEVICE — GLOVE PROTEXIS MICRO 6.5  2D73PM65

## (undated) DEVICE — GLOVE BIOGEL PI MICRO SZ 8.0 48580

## (undated) DEVICE — TOURNIQUET CUFF 24" YELLOW LF 5921-024-135

## (undated) DEVICE — DRSG GAUZE 4X8" NON21842

## (undated) DEVICE — GLOVE BIOGEL PI MICRO SZ 6.5 48565

## (undated) DEVICE — DRAPE STOCKINETTE IMPERVIOUS 10" 21048

## (undated) DEVICE — CATH ANGIO ANG GLIDE 5FRX65CM CG507

## (undated) DEVICE — DRSG KERLIX 4 1/2"X4YDS ROLL 6715

## (undated) DEVICE — ESU ELEC BLADE 2.75" COATED/INSULATED E1455

## (undated) DEVICE — INTRO SHEATH BRITE TIP 5FRX11CM 401-511M

## (undated) DEVICE — VESSEL LOOPS DEV-O-LOOP WHITE MINI 31145728

## (undated) DEVICE — SU FIBERWIRE 2 38"  AR-7200

## (undated) DEVICE — DEVICE MULTI TORQUE TD01

## (undated) DEVICE — SU PROLENE 5-0 RB-1DA 36"  8556H

## (undated) DEVICE — DRAPE STOCKINETTE 8" 8586

## (undated) DEVICE — SYR BULB IRRIG 50ML LATEX FREE 0035280

## (undated) DEVICE — BLADE KNIFE SURG 15 371115

## (undated) DEVICE — DRSG ADAPTIC 3X16"  6114

## (undated) DEVICE — GLOVE PROTEXIS BLUE W/NEU-THERA 6.5  2D73EB65

## (undated) DEVICE — STRAP KNEE/BODY 31143004

## (undated) DEVICE — DRSG WOUND VAC SPONGE MED BLACK M8275052/5

## (undated) DEVICE — SU PROLENE 2-0 V-7 36" 8977H

## (undated) DEVICE — GOWN IMPERVIOUS BREATHABLE SMART LG 89015

## (undated) DEVICE — SU PROLENE 5-0 C-1DA 36" 8720H

## (undated) RX ORDER — ACETAMINOPHEN 325 MG/1
TABLET ORAL
Status: DISPENSED
Start: 2020-12-17

## (undated) RX ORDER — PROPOFOL 10 MG/ML
INJECTION, EMULSION INTRAVENOUS
Status: DISPENSED
Start: 2021-12-08

## (undated) RX ORDER — FENTANYL CITRATE 50 UG/ML
INJECTION, SOLUTION INTRAMUSCULAR; INTRAVENOUS
Status: DISPENSED
Start: 2023-01-13

## (undated) RX ORDER — ACETAMINOPHEN 325 MG/1
TABLET ORAL
Status: DISPENSED
Start: 2023-01-13

## (undated) RX ORDER — PROPOFOL 10 MG/ML
INJECTION, EMULSION INTRAVENOUS
Status: DISPENSED
Start: 2019-10-09

## (undated) RX ORDER — EPHEDRINE SULFATE 50 MG/ML
INJECTION, SOLUTION INTRAMUSCULAR; INTRAVENOUS; SUBCUTANEOUS
Status: DISPENSED
Start: 2023-01-13

## (undated) RX ORDER — GLYCOPYRROLATE 0.2 MG/ML
INJECTION INTRAMUSCULAR; INTRAVENOUS
Status: DISPENSED
Start: 2021-12-08

## (undated) RX ORDER — LIDOCAINE HYDROCHLORIDE 20 MG/ML
INJECTION, SOLUTION EPIDURAL; INFILTRATION; INTRACAUDAL; PERINEURAL
Status: DISPENSED
Start: 2021-12-08

## (undated) RX ORDER — CEFAZOLIN SODIUM 1 G/3ML
INJECTION, POWDER, FOR SOLUTION INTRAMUSCULAR; INTRAVENOUS
Status: DISPENSED
Start: 2021-12-08

## (undated) RX ORDER — FENTANYL CITRATE 50 UG/ML
INJECTION, SOLUTION INTRAMUSCULAR; INTRAVENOUS
Status: DISPENSED
Start: 2019-10-07

## (undated) RX ORDER — CEFAZOLIN SODIUM/WATER 2 G/20 ML
SYRINGE (ML) INTRAVENOUS
Status: DISPENSED
Start: 2024-01-01

## (undated) RX ORDER — FENTANYL CITRATE 50 UG/ML
INJECTION, SOLUTION INTRAMUSCULAR; INTRAVENOUS
Status: DISPENSED
Start: 2022-09-08

## (undated) RX ORDER — PROPOFOL 10 MG/ML
INJECTION, EMULSION INTRAVENOUS
Status: DISPENSED
Start: 2020-12-17

## (undated) RX ORDER — LIDOCAINE HYDROCHLORIDE 20 MG/ML
INJECTION, SOLUTION EPIDURAL; INFILTRATION; INTRACAUDAL; PERINEURAL
Status: DISPENSED
Start: 2019-10-09

## (undated) RX ORDER — HYDROMORPHONE HCL/0.9% NACL/PF 0.2MG/0.2
SYRINGE (ML) INTRAVENOUS
Status: DISPENSED
Start: 2019-10-09

## (undated) RX ORDER — DEXMEDETOMIDINE HYDROCHLORIDE 4 UG/ML
INJECTION, SOLUTION INTRAVENOUS
Status: DISPENSED
Start: 2021-12-08

## (undated) RX ORDER — ONDANSETRON 2 MG/ML
INJECTION INTRAMUSCULAR; INTRAVENOUS
Status: DISPENSED
Start: 2020-12-17

## (undated) RX ORDER — OXYCODONE HYDROCHLORIDE 5 MG/1
TABLET ORAL
Status: DISPENSED
Start: 2023-01-13

## (undated) RX ORDER — FENTANYL CITRATE 50 UG/ML
INJECTION, SOLUTION INTRAMUSCULAR; INTRAVENOUS
Status: DISPENSED
Start: 2020-12-17

## (undated) RX ORDER — EPHEDRINE SULFATE 50 MG/ML
INJECTION, SOLUTION INTRAMUSCULAR; INTRAVENOUS; SUBCUTANEOUS
Status: DISPENSED
Start: 2021-12-08

## (undated) RX ORDER — FENTANYL CITRATE 50 UG/ML
INJECTION, SOLUTION INTRAMUSCULAR; INTRAVENOUS
Status: DISPENSED
Start: 2024-01-01

## (undated) RX ORDER — ACETAMINOPHEN 325 MG/1
TABLET ORAL
Status: DISPENSED
Start: 2024-01-01

## (undated) RX ORDER — IODIXANOL 320 MG/ML
INJECTION, SOLUTION INTRAVASCULAR
Status: DISPENSED
Start: 2023-01-13

## (undated) RX ORDER — HYDRALAZINE HYDROCHLORIDE 20 MG/ML
INJECTION INTRAMUSCULAR; INTRAVENOUS
Status: DISPENSED
Start: 2024-01-01

## (undated) RX ORDER — FENTANYL CITRATE-0.9 % NACL/PF 10 MCG/ML
PLASTIC BAG, INJECTION (ML) INTRAVENOUS
Status: DISPENSED
Start: 2021-12-08

## (undated) RX ORDER — FENTANYL CITRATE 50 UG/ML
INJECTION, SOLUTION INTRAMUSCULAR; INTRAVENOUS
Status: DISPENSED
Start: 2022-09-29

## (undated) RX ORDER — SODIUM CHLORIDE, SODIUM LACTATE, POTASSIUM CHLORIDE, CALCIUM CHLORIDE 600; 310; 30; 20 MG/100ML; MG/100ML; MG/100ML; MG/100ML
INJECTION, SOLUTION INTRAVENOUS
Status: DISPENSED
Start: 2024-01-01

## (undated) RX ORDER — HEPARIN SODIUM 1000 [USP'U]/ML
INJECTION, SOLUTION INTRAVENOUS; SUBCUTANEOUS
Status: DISPENSED
Start: 2023-01-13

## (undated) RX ORDER — DEXAMETHASONE SODIUM PHOSPHATE 4 MG/ML
INJECTION, SOLUTION INTRA-ARTICULAR; INTRALESIONAL; INTRAMUSCULAR; INTRAVENOUS; SOFT TISSUE
Status: DISPENSED
Start: 2021-12-08

## (undated) RX ORDER — BUPIVACAINE HYDROCHLORIDE 2.5 MG/ML
INJECTION, SOLUTION EPIDURAL; INFILTRATION; INTRACAUDAL
Status: DISPENSED
Start: 2021-12-08

## (undated) RX ORDER — ACETAMINOPHEN 325 MG/1
TABLET ORAL
Status: DISPENSED
Start: 2021-12-08

## (undated) RX ORDER — DEXAMETHASONE SODIUM PHOSPHATE 4 MG/ML
INJECTION, SOLUTION INTRA-ARTICULAR; INTRALESIONAL; INTRAMUSCULAR; INTRAVENOUS; SOFT TISSUE
Status: DISPENSED
Start: 2020-12-17

## (undated) RX ORDER — LIDOCAINE HYDROCHLORIDE 20 MG/ML
SOLUTION OROPHARYNGEAL
Status: DISPENSED
Start: 2019-10-07

## (undated) RX ORDER — GENTAMICIN 40 MG/ML
INJECTION, SOLUTION INTRAMUSCULAR; INTRAVENOUS
Status: DISPENSED
Start: 2024-01-01

## (undated) RX ORDER — HYDROMORPHONE HCL IN WATER/PF 6 MG/30 ML
PATIENT CONTROLLED ANALGESIA SYRINGE INTRAVENOUS
Status: DISPENSED
Start: 2023-01-13

## (undated) RX ORDER — FENTANYL CITRATE-0.9 % NACL/PF 10 MCG/ML
PLASTIC BAG, INJECTION (ML) INTRAVENOUS
Status: DISPENSED
Start: 2020-12-17

## (undated) RX ORDER — FENTANYL CITRATE 50 UG/ML
INJECTION, SOLUTION INTRAMUSCULAR; INTRAVENOUS
Status: DISPENSED
Start: 2021-12-08

## (undated) RX ORDER — CLOPIDOGREL BISULFATE 75 MG/1
TABLET ORAL
Status: DISPENSED
Start: 2023-01-13

## (undated) RX ORDER — VASOPRESSIN 20 U/ML
INJECTION PARENTERAL
Status: DISPENSED
Start: 2021-12-08

## (undated) RX ORDER — BUPIVACAINE HYDROCHLORIDE 5 MG/ML
INJECTION, SOLUTION EPIDURAL; INTRACAUDAL
Status: DISPENSED
Start: 2019-10-09

## (undated) RX ORDER — HYDROMORPHONE HCL IN WATER/PF 6 MG/30 ML
PATIENT CONTROLLED ANALGESIA SYRINGE INTRAVENOUS
Status: DISPENSED
Start: 2024-01-01

## (undated) RX ORDER — DEXAMETHASONE SODIUM PHOSPHATE 4 MG/ML
INJECTION, SOLUTION INTRA-ARTICULAR; INTRALESIONAL; INTRAMUSCULAR; INTRAVENOUS; SOFT TISSUE
Status: DISPENSED
Start: 2019-10-09

## (undated) RX ORDER — OXYCODONE HYDROCHLORIDE 5 MG/1
TABLET ORAL
Status: DISPENSED
Start: 2022-09-28

## (undated) RX ORDER — ONDANSETRON 2 MG/ML
INJECTION INTRAMUSCULAR; INTRAVENOUS
Status: DISPENSED
Start: 2021-12-08

## (undated) RX ORDER — LIDOCAINE HYDROCHLORIDE AND EPINEPHRINE 10; 10 MG/ML; UG/ML
INJECTION, SOLUTION INFILTRATION; PERINEURAL
Status: DISPENSED
Start: 2020-12-17

## (undated) RX ORDER — CEFAZOLIN SODIUM 2 G/50ML
SOLUTION INTRAVENOUS
Status: DISPENSED
Start: 2021-12-08

## (undated) RX ORDER — FENTANYL CITRATE 50 UG/ML
INJECTION, SOLUTION INTRAMUSCULAR; INTRAVENOUS
Status: DISPENSED
Start: 2019-10-09

## (undated) RX ORDER — FENTANYL CITRATE 50 UG/ML
INJECTION, SOLUTION INTRAMUSCULAR; INTRAVENOUS
Status: DISPENSED
Start: 2022-09-28

## (undated) RX ORDER — BUPIVACAINE HYDROCHLORIDE 2.5 MG/ML
INJECTION, SOLUTION EPIDURAL; INFILTRATION; INTRACAUDAL
Status: DISPENSED
Start: 2024-01-01

## (undated) RX ORDER — OXYCODONE HYDROCHLORIDE 5 MG/1
TABLET ORAL
Status: DISPENSED
Start: 2020-12-17

## (undated) RX ORDER — OXYCODONE HYDROCHLORIDE 5 MG/1
TABLET ORAL
Status: DISPENSED
Start: 2021-12-08

## (undated) RX ORDER — CEFAZOLIN SODIUM/WATER 2 G/20 ML
SYRINGE (ML) INTRAVENOUS
Status: DISPENSED
Start: 2023-01-13

## (undated) RX ORDER — CEFAZOLIN SODIUM 1 G/3ML
INJECTION, POWDER, FOR SOLUTION INTRAMUSCULAR; INTRAVENOUS
Status: DISPENSED
Start: 2020-12-17

## (undated) RX ORDER — LIDOCAINE HYDROCHLORIDE 10 MG/ML
INJECTION, SOLUTION EPIDURAL; INFILTRATION; INTRACAUDAL; PERINEURAL
Status: DISPENSED
Start: 2019-10-12

## (undated) RX ORDER — ONDANSETRON 2 MG/ML
INJECTION INTRAMUSCULAR; INTRAVENOUS
Status: DISPENSED
Start: 2019-10-09

## (undated) RX ORDER — ACETAMINOPHEN 325 MG/1
TABLET ORAL
Status: DISPENSED
Start: 2022-10-13